# Patient Record
Sex: FEMALE | Race: BLACK OR AFRICAN AMERICAN | Employment: OTHER | URBAN - METROPOLITAN AREA
[De-identification: names, ages, dates, MRNs, and addresses within clinical notes are randomized per-mention and may not be internally consistent; named-entity substitution may affect disease eponyms.]

---

## 2017-01-31 ENCOUNTER — OFFICE VISIT (OUTPATIENT)
Dept: INTERNAL MEDICINE CLINIC | Age: 67
End: 2017-01-31

## 2017-01-31 VITALS
SYSTOLIC BLOOD PRESSURE: 124 MMHG | RESPIRATION RATE: 18 BRPM | HEART RATE: 65 BPM | BODY MASS INDEX: 29.06 KG/M2 | OXYGEN SATURATION: 93 % | TEMPERATURE: 98 F | HEIGHT: 66 IN | DIASTOLIC BLOOD PRESSURE: 66 MMHG | WEIGHT: 180.8 LBS

## 2017-01-31 DIAGNOSIS — J44.1 CHRONIC OBSTRUCTIVE PULMONARY DISEASE WITH ACUTE EXACERBATION (HCC): ICD-10-CM

## 2017-01-31 DIAGNOSIS — R42 DIZZINESS: ICD-10-CM

## 2017-01-31 DIAGNOSIS — I48.0 PAROXYSMAL ATRIAL FIBRILLATION (HCC): Chronic | ICD-10-CM

## 2017-01-31 DIAGNOSIS — I50.32 CHRONIC DIASTOLIC HEART FAILURE (HCC): ICD-10-CM

## 2017-01-31 DIAGNOSIS — Z95.5 S/P CORONARY ARTERY STENT PLACEMENT: ICD-10-CM

## 2017-01-31 DIAGNOSIS — E78.2 MIXED HYPERLIPIDEMIA: ICD-10-CM

## 2017-01-31 DIAGNOSIS — I10 ESSENTIAL HYPERTENSION: Chronic | ICD-10-CM

## 2017-01-31 DIAGNOSIS — Z79.01 ANTICOAGULATION MONITORING, INR RANGE 2-3: ICD-10-CM

## 2017-01-31 DIAGNOSIS — J30.9 ALLERGIC RHINITIS, UNSPECIFIED ALLERGIC RHINITIS TRIGGER, UNSPECIFIED RHINITIS SEASONALITY: ICD-10-CM

## 2017-01-31 DIAGNOSIS — E11.40 TYPE 2 DIABETES MELLITUS WITH DIABETIC NEUROPATHY, WITHOUT LONG-TERM CURRENT USE OF INSULIN (HCC): Primary | ICD-10-CM

## 2017-01-31 LAB
CHOLEST SERPL-MCNC: 149 MG/DL
GLUCOSE POC: 195 MG/DL
HBA1C MFR BLD HPLC: 7.1 %
HDLC SERPL-MCNC: 42 MG/DL
INR BLD: 4.5
LDL CHOLESTEROL POC: 74
NON-HDL GOAL (POC): 107
PT POC: 53.5 SEC
TCHOL/HDL RATIO (POC): 3.5
TRIGL SERPL-MCNC: 166 MG/DL
VALID INTERNAL CONTROL?: YES

## 2017-01-31 RX ORDER — INSULIN PUMP SYRINGE, 3 ML
EACH MISCELLANEOUS
Qty: 1 KIT | Refills: 0 | Status: SHIPPED | OUTPATIENT
Start: 2017-01-31 | End: 2017-09-22

## 2017-01-31 RX ORDER — FLUTICASONE PROPIONATE 50 MCG
2 SPRAY, SUSPENSION (ML) NASAL ONCE
Qty: 1 BOTTLE | Refills: 11 | Status: SHIPPED | OUTPATIENT
Start: 2017-01-31 | End: 2017-01-31

## 2017-01-31 RX ORDER — CETIRIZINE HCL 10 MG
10 TABLET ORAL DAILY
Qty: 30 TAB | Refills: 5 | Status: SHIPPED | OUTPATIENT
Start: 2017-01-31 | End: 2017-04-04 | Stop reason: CLARIF

## 2017-01-31 NOTE — PROGRESS NOTES
1. Have you been to the ER, urgent care clinic since your last visit? Hospitalized since your last visit? No    2. Have you seen or consulted any other health care providers outside of the [address] [place] [address] since your last visit? Include any pap smears or colon screening. No     Pt is here for   Chief Complaint   Patient presents with    Cold     pt states she's had this cold for 2 days    Diabetes     follow up    Cholesterol Problem     follow up     Pt denies pain at this time. ...      Pt states that she would like her inhalers to be sent to [facility] for 90 day supply

## 2017-01-31 NOTE — MR AVS SNAPSHOT
Visit Information Date & Time Provider Department Dept. Phone Encounter #  
 1/31/2017  7:45 AM Noa Valdez NP [address] [facility] [phone] [id number] Follow-up Instructions Return in about 3 months (around 4/30/2017) for HTN, DM, Chol. INR check on FRI. Your Appointments 3/23/2017  8:30 AM  
PACEMAKER with PACEMAKER, [facility] [address]) Appt Note: [employer] DCPM 6mo check [address] [facility]  
[phone] [address] [facility]  
  
    
 4/25/2017  2:30 PM  
New Patient with Dyan Clements MD  
[facility] [address] [facility]) Appt Note: ref by Dr Colette Lopez , for type 2 DM message sent wants name on list; r/s; ref by Dr Colette Lopez , for type 2 DM message sent wants name on list  
 [address] [facility] [phone] [id number] [address] [place] [address] Upcoming Health Maintenance Date Due  
 BREAST CANCER SCRN MAMMOGRAM 4/7/2017 EYE EXAM RETINAL OR DILATED Q1 5/1/2017* MICROALBUMIN Q1 3/22/2017 HEMOGLOBIN A1C Q6M 4/30/2017 FOOT EXAM Q1 6/10/2017 LIPID PANEL Q1 10/31/2017 FOBT Q 1 YEAR AGE 50-75 10/31/2017 MEDICARE YEARLY EXAM 11/1/2017 GLAUCOMA SCREENING Q2Y 7/28/2018 Pneumococcal 65+ Low/Medium Risk (2 of 2 - PPSV23) 6/25/2020 DTaP/Tdap/Td series (2 - Td) 7/16/2026 *Topic was postponed. The date shown is not the original due date. Allergies as of 1/31/2017  Review Complete On: 1/31/2017 By: Noa Valdez NP Severity Noted Reaction Type Reactions Crestor [Rosuvastatin]  10/31/2016   Side Effect Myalgia Levaquin [Levofloxacin]  12/07/2015   Intolerance Nausea Only GI Upset Lyrica [Pregabalin]  10/31/2016   Side Effect Myalgia Current Immunizations  Reviewed on 10/31/2016 Name Date H1N1 FLU VACCINE 10/20/2009 Influenza High Dose Vaccine PF 10/31/2016 Influenza Vaccine (Madin June Canine Kidney) PF 9/23/2014 11:26 AM  
 Influenza Vaccine Intradermal PF 11/27/2015 Influenza Vaccine Split 9/22/2011  6:25 AM  
 Influenza Vaccine Whole 10/20/2009 Pneumococcal Polysaccharide (PPSV-23) 6/25/2015 Pneumococcal Vaccine (Unspecified Type) 10/20/2009 Tdap 7/16/2016 11:47 PM  
  
 Not reviewed this visit You Were Diagnosed With   
  
 Codes Comments Type 2 diabetes mellitus with diabetic neuropathy, without long-term current use of insulin (HCC)    -  Primary ICD-10-CM: E11.40 ICD-9-CM: 250.60, 357.2 Paroxysmal atrial fibrillation (HCC)     ICD-10-CM: I48.0 ICD-9-CM: 427.31 Chronic diastolic heart failure (HCC)     ICD-10-CM: I50.32 
ICD-9-CM: 428.32 Chronic obstructive pulmonary disease with acute exacerbation (HCC)     ICD-10-CM: J44.1 ICD-9-CM: 491.21 Essential hypertension     ICD-10-CM: I10 
ICD-9-CM: 401.9 S/P coronary artery stent placement     ICD-10-CM: Z95.5 ICD-9-CM: V45.82 Anticoagulation monitoring, INR range 2-3     ICD-10-CM: Z79.01 
ICD-9-CM: V58.61 Mixed hyperlipidemia     ICD-10-CM: E78.2 ICD-9-CM: 272.2 Type 2 diabetes mellitus without complication, without long-term current use of insulin (HCC)     ICD-10-CM: E11.9 ICD-9-CM: 250.00 Allergic rhinitis, unspecified allergic rhinitis trigger, unspecified rhinitis seasonality     ICD-10-CM: J30.9 ICD-9-CM: 477.9 Vitals BP Pulse Temp Resp Height(growth percentile) Weight(growth percentile) 124/66 (BP 1 Location: Right arm, BP Patient Position: Sitting) 65 98 °F (36.7 °C) (Oral) 18 5' 6\" (1.676 m) 180 lb 12.8 oz (82 kg) SpO2 BMI OB Status Smoking Status 93% 29.18 kg/m2 Postmenopausal Former Smoker Vitals History BMI and BSA Data Body Mass Index Body Surface Area 29.18 kg/m 2 1.95 m 2 Preferred Pharmacy Pharmacy Name Phone [facility] [address], [address] [place] [address] Avenue [phone] Your Updated Medication List  
  
   
This list is accurate as of: 1/31/17  9:10 AM.  Always use your most recent med list.  
  
  
  
  
 * albuterol 90 mcg/actuation inhaler Commonly known as:  VENTOLIN HFA Take 2 Puffs by inhalation every six (6) hours as needed for Wheezing. * albuterol 2.5 mg /3 mL (0.083 %) nebulizer solution Commonly known as:  PROVENTIL VENTOLIN  
3 mL by Nebulization route every four (4) hours as needed for Wheezing. aspirin 81 mg chewable tablet Take 81 mg by mouth daily. Azelastine 0.15 % (205.5 mcg) nasal spray Commonly known as:  ASTEPRO  
1 Spray by Both Nostrils route two (2) times daily as needed. budesonide-formoterol 160-4.5 mcg/actuation HFA inhaler Commonly known as:  SYMBICORT Take 1 Puff by inhalation two (2) times a day. CENTRUM SILVER PO Take  by mouth. Takes one po daily. cod liver oil Cap Take 1 Cap by mouth daily. colchicine 0.6 mg tablet Take 1 Tab by mouth two (2) times a day. evolocumab 420 mg/3.5 mL Injt Commonly known as:  REPATHA PUSHTRONEX  
420 mg by SubCUTAneous route every month. Indications: arteriosclerotic vascular disease  
  
 fluticasone 50 mcg/actuation nasal spray Commonly known as:  Kvng Hernandez 2 Sprays by Both Nostrils route once for 1 dose. Indications: ALLERGIC RHINITIS  
  
 furosemide 20 mg tablet Commonly known as:  LASIX Take 1 Tab by mouth daily. gabapentin 800 mg tablet Commonly known as:  NEURONTIN Take 1 Tab by mouth three (3) times daily. metFORMIN 1,000 mg tablet Commonly known as:  GLUCOPHAGE  
TAKE 1 TABLET BY MOUTH TWICE DAILY WITH MEALS  Indications: PREVENTION OF TYPE 2 DIABETES MELLITUS  
  
 sotalol 120 mg tablet Commonly known as:  Leann Ni  
 Take 1 Tab by mouth two (2) times a day. tiotropium 18 mcg inhalation capsule Commonly known as:  [address] INHALE THE CONTENTS OF 1 CAPSULE THROUGH HANDIHALER DEVICE DAILY  
  
 valsartan 40 mg tablet Commonly known as:  DIOVAN  
TAKE ONE-HALF TABLET BY MOUTH ONCE DAILY FOR  DIABETIC NEPHROPATHY  
  
 warfarin 5 mg tablet Commonly known as:  COUMADIN Take 1.5 tabs daily, but on Saturday and  take 2 tabs. * Notice: This list has 2 medication(s) that are the same as other medications prescribed for you. Read the directions carefully, and ask your doctor or other care provider to review them with you. Prescriptions Sent to Pharmacy Refills  
 fluticasone (FLONASE) 50 mcg/actuation nasal spray 11 Si Sprays by Both Nostrils route once for 1 dose. Indications: ALLERGIC RHINITIS Class: Normal  
 Pharmacy: [address] [place] [address] Dr [place], [address] [place] [address] Ph #: [phone] Route: Both Nostrils We Performed the Following AMB POC GLUCOSE BLOOD, BY GLUCOSE MONITORING DEVICE [22322 CPT(R)] AMB POC HEMOGLOBIN A1C [34626 CPT(R)] AMB POC LIPID PROFILE [38263 CPT(R)] AMB POC PT/INR [41633 CPT(R)] Follow-up Instructions Return in about 3 months (around 2017) for HTN, DM, Chol. INR check on FRI. Patient Instructions Allergies: Care Instructions Your Care Instructions Allergies occur when your body's defense system (immune system) overreacts to certain substances. The immune system treats a harmless substance as if it were a harmful germ or virus. Many things can cause this overreaction, including pollens, medicine, food, dust, animal dander, and mold. Allergies can be mild or severe. Mild allergies can be managed with home treatment. But medicine may be needed to prevent problems. Managing your allergies is an important part of staying healthy.  Your doctor may suggest that you have allergy testing to help find out what is causing your allergies. When you know what things trigger your symptoms, you can avoid them. This can prevent allergy symptoms and other health problems. For severe allergies that cause reactions that affect your whole body (anaphylactic reactions), your doctor may prescribe a shot of epinephrine to carry with you in case you have a severe reaction. Learn how to give yourself the shot and keep it with you at all times. Make sure it is not . Follow-up care is a key part of your treatment and safety. Be sure to make and go to all appointments, and call your doctor if you are having problems. It's also a good idea to know your test results and keep a list of the medicines you take. How can you care for yourself at home? · If you have been told by your doctor that dust or dust mites are causing your allergy, decrease the dust around your bed: 
[facility] AUTHORITY sheets, pillowcases, and other bedding in hot water every week. ¨ Use dust-proof covers for pillows, duvets, and mattresses. Avoid plastic covers because they tear easily and do not \"breathe. \" Wash as instructed on the label. ¨ Do not use any blankets and pillows that you do not need. ¨ Use blankets that you can wash in your washing machine. ¨ Consider removing drapes and carpets, which attract and hold dust, from your bedroom. · If you are allergic to house dust and mites, do not use home humidifiers. Your doctor can suggest ways you can control dust and mites. · Look for signs of cockroaches. Cockroaches cause allergic reactions. Use cockroach baits to get rid of them. Then, clean your home well. Cockroaches like areas where grocery bags, newspapers, empty bottles, or cardboard boxes are stored. Do not keep these inside your home, and keep trash and food containers sealed. Seal off any spots where cockroaches might enter your home. · If you are allergic to mold, get rid of furniture, rugs, and drapes that smell musty. Check for mold in the bathroom. · If you are allergic to outdoor pollen or mold spores, use air-conditioning. Change or clean all filters every month. Keep windows closed. · If you are allergic to pollen, stay inside when pollen counts are high. Use a vacuum  with a HEPA filter or a double-thickness filter at least two times each week. · Stay inside when air pollution is bad. Avoid paint fumes, perfumes, and other strong odors. · Avoid conditions that make your allergies worse. Stay away from smoke. Do not smoke or let anyone else smoke in your house. Do not use fireplaces or wood-burning stoves. · If you are allergic to your pets, change the air filter in your furnace every month. Use high-efficiency filters. · If you are allergic to pet dander, keep pets outside or out of your bedroom. Old carpet and cloth furniture can hold a lot of animal dander. You may need to replace them. When should you call for help? Give an epinephrine shot if: 
· You think you are having a severe allergic reaction. · You have symptoms in more than one body area, such as mild nausea and an itchy mouth. After giving an epinephrine shot call 911, even if you feel better. Call 911 if: 
· You have symptoms of a severe allergic reaction. These may include: 
¨ Sudden raised, red areas (hives) all over your body. ¨ Swelling of the throat, mouth, lips, or tongue. ¨ Trouble breathing. ¨ Passing out (losing consciousness). Or you may feel very lightheaded or suddenly feel weak, confused, or restless. · You have been given an epinephrine shot, even if you feel better. Call your doctor now or seek immediate medical care if: 
· You have symptoms of an allergic reaction, such as: ¨ A rash or hives (raised, red areas on the skin). ¨ Itching. ¨ Swelling. ¨ Belly pain, nausea, or vomiting. Watch closely for changes in your health, and be sure to contact your doctor if: 
· You do not get better as expected. Where can you learn more? Go to http://rusty-marcela.info/. Enter P144 in the search box to learn more about \"Allergies: Care Instructions. \" Current as of: February 12, 2016 Content Version: 11.1 © 4667-7732 [employer]. Care instructions adapted under license by [employer] (which disclaims liability or warranty for this information). If you have questions about a medical condition or this instruction, always ask your healthcare professional. [address] any warranty or liability for your use of this information. Introducing [facility]! Dear Allan Andres: Thank you for requesting a [employer] account. Our records indicate that you already have an active [employer] account. You can access your account anytime at [email or web address] Did you know that you can access your hospital and ER discharge instructions at any time in [employer]? You can also review all of your test results from your hospital stay or ER visit. Additional Information If you have questions, please visit the Frequently Asked Questions section of the [employer] website at [email or web address] Remember, [employer] is NOT to be used for urgent needs. For medical emergencies, dial 911. Now available from your iPhone and Android! Please provide this summary of care documentation to your next provider. Your primary care clinician is listed as CAROLINA Glez. If you have any questions after today's visit, please call [phone].

## 2017-01-31 NOTE — PATIENT INSTRUCTIONS
Allergies: Care Instructions  Your Care Instructions  Allergies occur when your body's defense system (immune system) overreacts to certain substances. The immune system treats a harmless substance as if it were a harmful germ or virus. Many things can cause this overreaction, including pollens, medicine, food, dust, animal dander, and mold. Allergies can be mild or severe. Mild allergies can be managed with home treatment. But medicine may be needed to prevent problems. Managing your allergies is an important part of staying healthy. Your doctor may suggest that you have allergy testing to help find out what is causing your allergies. When you know what things trigger your symptoms, you can avoid them. This can prevent allergy symptoms and other health problems. For severe allergies that cause reactions that affect your whole body (anaphylactic reactions), your doctor may prescribe a shot of epinephrine to carry with you in case you have a severe reaction. Learn how to give yourself the shot and keep it with you at all times. Make sure it is not . Follow-up care is a key part of your treatment and safety. Be sure to make and go to all appointments, and call your doctor if you are having problems. It's also a good idea to know your test results and keep a list of the medicines you take. How can you care for yourself at home? · If you have been told by your doctor that dust or dust mites are causing your allergy, decrease the dust around your bed:  [facility] AUTHORITY sheets, pillowcases, and other bedding in hot water every week. ¨ Use dust-proof covers for pillows, duvets, and mattresses. Avoid plastic covers because they tear easily and do not \"breathe. \" Wash as instructed on the label. ¨ Do not use any blankets and pillows that you do not need. ¨ Use blankets that you can wash in your washing machine. ¨ Consider removing drapes and carpets, which attract and hold dust, from your bedroom.   · If you are allergic to house dust and mites, do not use home humidifiers. Your doctor can suggest ways you can control dust and mites. · Look for signs of cockroaches. Cockroaches cause allergic reactions. Use cockroach baits to get rid of them. Then, clean your home well. Cockroaches like areas where grocery bags, newspapers, empty bottles, or cardboard boxes are stored. Do not keep these inside your home, and keep trash and food containers sealed. Seal off any spots where cockroaches might enter your home. · If you are allergic to mold, get rid of furniture, rugs, and drapes that smell musty. Check for mold in the bathroom. · If you are allergic to outdoor pollen or mold spores, use air-conditioning. Change or clean all filters every month. Keep windows closed. · If you are allergic to pollen, stay inside when pollen counts are high. Use a vacuum  with a HEPA filter or a double-thickness filter at least two times each week. · Stay inside when air pollution is bad. Avoid paint fumes, perfumes, and other strong odors. · Avoid conditions that make your allergies worse. Stay away from smoke. Do not smoke or let anyone else smoke in your house. Do not use fireplaces or wood-burning stoves. · If you are allergic to your pets, change the air filter in your furnace every month. Use high-efficiency filters. · If you are allergic to pet dander, keep pets outside or out of your bedroom. Old carpet and cloth furniture can hold a lot of animal dander. You may need to replace them. When should you call for help? Give an epinephrine shot if:  · You think you are having a severe allergic reaction. · You have symptoms in more than one body area, such as mild nausea and an itchy mouth. After giving an epinephrine shot call 911, even if you feel better. Call 911 if:  · You have symptoms of a severe allergic reaction. These may include:  ¨ Sudden raised, red areas (hives) all over your body.   ¨ Swelling of the throat, mouth, lips, or tongue. ¨ Trouble breathing. ¨ Passing out (losing consciousness). Or you may feel very lightheaded or suddenly feel weak, confused, or restless. · You have been given an epinephrine shot, even if you feel better. Call your doctor now or seek immediate medical care if:  · You have symptoms of an allergic reaction, such as:  ¨ A rash or hives (raised, red areas on the skin). ¨ Itching. ¨ Swelling. ¨ Belly pain, nausea, or vomiting. Watch closely for changes in your health, and be sure to contact your doctor if:  · You do not get better as expected. Where can you learn more? Go to http://rusty-marcela.info/. Enter S244 in the search box to learn more about \"Allergies: Care Instructions. \"  Current as of: February 12, 2016  Content Version: 11.1  © 9171-5177 [employer]. Care instructions adapted under license by [employer] (which disclaims liability or warranty for this information). If you have questions about a medical condition or this instruction, always ask your healthcare professional. [address] any warranty or liability for your use of this information.

## 2017-01-31 NOTE — PROGRESS NOTES
Darron Bautista is a 73 y.o. female and presents with Cold (pt states she's had this cold for 2 days); Diabetes (follow up); and Cholesterol Problem (follow up)    Subjective:  Upper respiratory infection Review:  Darron Bautista is a 73 y.o. female who complains of rhinitis, nasal congestion, nasal drainage, and productive cough for the past 2 days, unchanged since that time. She denies a history of shortness of breath. Evaluation to date: none. Treatment to date: Flonase  Patient does not smoke cigarettes. Relevant PMH: COPD, CHF    Hypertension Review:  The patient has hypertension  Diet and Lifestyle: generally does try to follow a  low sodium diet, exercises rarely   Home BP Monitoring: is not measured at home. Pertinent ROS: taking medications as instructed, no medication side effects noted. No TIA's, chest pain on exertion, or swelling of ankles. BP Readings from Last 3 Encounters:   01/31/17 124/66   12/08/16 123/58   10/31/16 127/60     Diabetes Mellitus Review:  She has diabetes mellitus, NIDDM  Diabetic ROS -negative for polyphagia. negative for polyuria,polydipsia, and heat intolerance  Current diabetic medications include oral agents, NO insulin    Medication compliance: compliant MOST of the time  Diabetic diet compliance: compliant MOST of the time,   Yearly visit with dietician: no  Current exercise: walking, only at work  Home glucose monitoring: is performed daily, averaging 125-160  Any episodes of hypoglycemia? no  Known diabetic complications: neuropathy  Cardiovascular risk factors: family history, dyslipidemia, obesity, hypertension  Yearly foot exam: 6/2016  Eye exam current (within one year): Summer 2016  Weight trend: stable   Is She on ACE inhibitor or angiotensin II receptor blocker? Yes   Lab review: orders written for new lab studies as appropriate; see orders.    Lab Results   Component Value Date/Time    Hemoglobin A1c 6.9 10/31/2016 12:00 AM    Hemoglobin A1c 8.0 07/01/2014 03:10 AM    Hemoglobin A1c 6.5 02/08/2011 05:30 AM     Dyslipidemia Review:  Patient presents for evaluation of lipids. Compliance with treatment thus far has been good, started REPATHA injection. Denies myalgias, slurring speech, unilateral weakness, and chest pain. A repeat fasting lipid profile was done. The patient does use medications that may worsen dyslipidemias (corticosteroids, progestins, anabolic steroids, diuretics, beta-blockers, amiodarone, cyclosporine, olanzapine). The patient does NOT exercise regularly. The patient is known to have coexisting coronary artery disease: stents. Taking Aspirin daily as prescribed/advised. Anticoagulation  Patient here for followup of chronic anticoagulation. Indication: A-Fib  Bleeding Signs/Symptoms:  None. Thromboembolic Signs/Symptoms:  None. INR's are drawn regularly and have been therapeutic. Taking coumadin 7.5 mg x 5 days, 10 mg x 2 days  Lab Results   Component Value Date/Time    INR 1.1 04/12/2016 10:50 PM    INR (POC) 2.1 07/16/2016 11:47 PM    INR POC 2.4 07/29/2016 09:29 AM     Review of Systems  Constitutional: negative for fevers, chills, anorexia and weight loss  Eyes:   negative for visual disturbance, drainage, and irritation  ENT:   See HPI. negative for tinnitus and ear pain  Respiratory:  See HPI. negative for hemoptysis and wheezing  CV:   negative for chest pain, palpitations, and lower extremity edema  GI:   negative for nausea, vomiting, diarrhea, abdominal pain, and melena  Endo:               negative for polyuria,polydipsia,polyphagia, and heat intolerance  Genitourinary: negative for frequency, urgency, dysuria, retention, and hematuria  Integument:  negative for rash, ulcerations, and pruritus  Hematologic:  negative for easy bruising and bleeding  Musculoskel: + arthralgias. negative for muscle weakness,and joint pain/swelling  Neurological:  + dizziness, increasing lately.  negative for headaches, vertigo,and memory/gait problems  Behavl/Psych: negative for feelings of anxiety, depression, suicide, and mood changes    Past Medical History   Diagnosis Date    Atrial fibrillation ([facility] 2010    Chronic diastolic heart failure ([facility] 2014    COPD     COPD (chronic obstructive pulmonary disease) ([facility] 2010    Diabetes ([facility]     Fibroid     Heart failure ([facility]     Hypertension 2010    NIDDM (non-insulin dependent diabetes mellitus) 2010    Screening mammogram 5/4/10    SOB (shortness of breath) 2014     Past Surgical History   Procedure Laterality Date    Pr excise adrenal gland      Hx other surgical       adrenal gland removed    Hx  section      Hx pacemaker       Social History     Social History    Marital status:      Spouse name: N/A    Number of children: N/A    Years of education: N/A     Social History Main Topics    Smoking status: Former Smoker     Types: Cigarettes     Quit date: 2009    Smokeless tobacco: Never Used    Alcohol use No    Drug use: No    Sexual activity: Not Currently     Other Topics Concern    None     Social History Narrative     Family History   Problem Relation Age of Onset    Heart Disease Mother     Diabetes Father     Heart Disease Brother      Current Outpatient Prescriptions   Medication Sig Dispense Refill    fluticasone (FLONASE) 50 mcg/actuation nasal spray 2 Sprays by Both Nostrils route once for 1 dose. Indications: ALLERGIC RHINITIS 1 Bottle 11    Blood-Glucose Meter monitoring kit Check blood sugar daily. May substitute for insurance preferred meter 1 Kit 0    cetirizine (ZYRTEC) 10 mg tablet Take 1 Tab by mouth daily. Indications: ALLERGIC RHINITIS 30 Tab 5    evolocumab (REPATHA PUSHTRONEX) 420 mg/3.5 mL Injt 420 mg by SubCUTAneous route every month.  Indications: arteriosclerotic vascular disease 1 Device 12    tiotropium (SPIRIVA WITH HANDIHALER) 18 mcg inhalation capsule INHALE THE CONTENTS OF 1 CAPSULE THROUGH HANDIHALER DEVICE DAILY 90 Cap 3    metFORMIN (GLUCOPHAGE) 1,000 mg tablet TAKE 1 TABLET BY MOUTH TWICE DAILY WITH MEALS  Indications: PREVENTION OF TYPE 2 DIABETES MELLITUS 180 Tab 3    gabapentin (NEURONTIN) 800 mg tablet Take 1 Tab by mouth three (3) times daily. 90 Tab 3    valsartan (DIOVAN) 40 mg tablet TAKE ONE-HALF TABLET BY MOUTH ONCE DAILY FOR  DIABETIC NEPHROPATHY 15 Tab 11    sotalol (BETAPACE) 120 mg tablet Take 1 Tab by mouth two (2) times a day. 180 Tab 3    warfarin (COUMADIN) 5 mg tablet Take 1.5 tabs daily, but on Saturday and Sunday take 2 tabs. 55 Tab 2    furosemide (LASIX) 20 mg tablet Take 1 Tab by mouth daily. 90 Tab 0    colchicine 0.6 mg tablet Take 1 Tab by mouth two (2) times a day. 60 Tab 5    budesonide-formoterol (SYMBICORT) 160-4.5 mcg/actuation HFA inhaler Take 1 Puff by inhalation two (2) times a day. 3 Inhaler 3    albuterol (PROVENTIL VENTOLIN) 2.5 mg /3 mL (0.083 %) nebulizer solution 3 mL by Nebulization route every four (4) hours as needed for Wheezing. 1 Package 5    FOLIC ACID/MULTIVIT-MIN/LUTEIN (CENTRUM SILVER PO) Take  by mouth. Takes one po daily.  albuterol (VENTOLIN HFA) 90 mcg/actuation inhaler Take 2 Puffs by inhalation every six (6) hours as needed for Wheezing. 1 Inhaler 11    Azelastine (ASTEPRO) 0.15 % (205.5 mcg) nasal spray 1 [place] by Both Nostrils route two (2) times daily as needed.  cod liver oil cap Take 1 Cap by mouth daily.  aspirin 81 mg chewable tablet Take 81 mg by mouth daily.        Allergies   Allergen Reactions    Crestor [Rosuvastatin] Myalgia    Levaquin [Levofloxacin] Nausea Only     GI Upset    Lyrica [Pregabalin] Myalgia       Objective:  Visit Vitals    /66 (BP 1 Location: Right arm, BP Patient Position: Sitting)    Pulse 65    Temp 98 °F (36.7 °C) (Oral)    Resp 18    Ht 5' 6\" (1.676 m)    Wt 180 lb 12.8 oz (82 kg)    SpO2 93%    BMI 29.18 kg/m2     Physical Exam:   General appearance - alert, well appearing, and in mild distress. +hoarseness. Mental status - A/O x 4, normal mood and affect. Head/Eyes- AT/NC. ROCK, EOMI, corneas normal, no foreign bodies. Ears- TM intact bilaterally, no erythema or drainage. Nose- Septum midline, pink mucosa. Turbinates normal, no polyps or erythema. No sinus tenderness. Mouth/Throat - mucous membranes moist, pharynx normal without lesions. Neck -Supple ,normal CSP. FROM, non-tender. No cervical adenopathy. No thyromegaly. No JVD. Chest - clear to auscultation with symmetric chest rise. No wheezing, rales or rhonchi.  +cough  Heart - Normal rate, irregular rhythm. Normal S1, S2. No MGR. Abdomen - Soft,non-distended. Normoactive BS in all quadrants. NT, no mass, rebound, or HSM   Ext- Radial, DP pulses, 2+ bilaterally. No pedal edema, clubbing, or cyanosis. Skin- Normal for ethnicity, warm, and dry. No hyperpigmentation, ulcerations, or suspicious lesions  Neuro - Normal speech, no focal findings. Normal strength and muscle tone. Coordination and gait normal.      Assessment/Plan:  Holding dose of coumadin today, will return on Friday for recheck. Zyrtec and flonase for sinus symptoms and dizziness. See below for other orders   Follow-up Disposition:  Return in about 3 months (around 4/30/2017) for HTN, DM, Chol. INR check on FRI. ICD-10-CM ICD-9-CM    1. Type 2 diabetes mellitus with diabetic neuropathy, without long-term current use of insulin ([facility]) E11.40 250.60 AMB POC GLUCOSE BLOOD, BY GLUCOSE MONITORING DEVICE     357.2 AMB POC HEMOGLOBIN A1C      Blood-Glucose Meter monitoring kit   2. Paroxysmal atrial fibrillation ([facility]) I48.0 427.31 AMB POC PT/INR   3. Chronic diastolic heart failure ([facility]) I50.32 428.32 AMB POC LIPID PROFILE   4. Chronic obstructive pulmonary disease with acute exacerbation ([facility]) J44.1 491.21    5. Essential hypertension I10 401.9 AMB POC LIPID PROFILE   6. S/P coronary artery stent placement Z95.5 V45.82 AMB POC LIPID PROFILE   7. Anticoagulation monitoring, INR range 2-3 Z79.01 V58.61 AMB POC PT/INR   8. Mixed hyperlipidemia E78.2 272.2 AMB POC LIPID PROFILE   9. Allergic rhinitis, unspecified allergic rhinitis trigger, unspecified rhinitis seasonality J30.9 477.9 fluticasone (FLONASE) 50 mcg/actuation nasal spray      cetirizine (ZYRTEC) 10 mg tablet   10. Dizziness R42 780.4      Orders Placed This Encounter    AMB POC GLUCOSE BLOOD, BY GLUCOSE MONITORING DEVICE    AMB POC LIPID PROFILE    AMB POC HEMOGLOBIN A1C    AMB POC PT/INR    fluticasone (FLONASE) 50 mcg/actuation nasal spray     Si Sprays by Both Nostrils route once for 1 dose. Indications: ALLERGIC RHINITIS     Dispense:  1 Bottle     Refill:  11    Blood-Glucose Meter monitoring kit     Sig: Check blood sugar daily. May substitute for insurance preferred meter     Dispense:  1 Kit     Refill:  0    cetirizine (ZYRTEC) 10 mg tablet     Sig: Take 1 Tab by mouth daily. Indications: ALLERGIC RHINITIS     Dispense:  30 Tab     Refill:  Kendra Sorto 74 Ilya Leal expressed understanding of plan. An After Visit Summary was offered/printed and given to the patient.

## 2017-02-03 ENCOUNTER — CLINICAL SUPPORT (OUTPATIENT)
Dept: INTERNAL MEDICINE CLINIC | Age: 67
End: 2017-02-03

## 2017-02-03 DIAGNOSIS — D68.9 COAGULATION DEFECT (HCC): Primary | ICD-10-CM

## 2017-02-03 LAB
INR BLD: 2
PT POC: 23.6 SEC
VALID INTERNAL CONTROL?: YES

## 2017-02-03 NOTE — PROGRESS NOTES
Pt is advised to continue current warfarin dosage and to return in 1 week for an INR check.     Lab Results   Component Value Date/Time    INR 3.2 10/31/2016 12:00 AM    INR 1.1 04/12/2016 10:50 PM    INR 1.0 04/12/2016 04:29 PM    INR (POC) 2.1 07/16/2016 11:47 PM    INR (POC) 0.9 09/09/2015 06:49 PM    INR (POC) 3.5 02/25/2011 07:30 AM    INR POC 2.0 02/03/2017 09:25 AM    INR POC 4.5 01/31/2017 09:23 AM    INR POC 2.4 07/29/2016 09:29 AM

## 2017-02-09 ENCOUNTER — TELEPHONE (OUTPATIENT)
Dept: CARDIOLOGY CLINIC | Age: 67
End: 2017-02-09

## 2017-02-09 NOTE — TELEPHONE ENCOUNTER
Spoke with patient. Verified patient with two patient identifiers. States she has been \"spasm\" in the right side of her breast, lasting less than a minute for 2 days. Comes and goes intermittently all day long. No change with exertion. No real pattern. Denied CP, SOB, ankle edema. Advised to call PCP for advice. Patient verbalized understanding.

## 2017-02-09 NOTE — TELEPHONE ENCOUNTER
Pt says she has a short spasm on the right side of the breast and she's not sure if this is heart related or pcp. No chest pain or sob. Please call.

## 2017-02-14 ENCOUNTER — APPOINTMENT (OUTPATIENT)
Dept: GENERAL RADIOLOGY | Age: 67
End: 2017-02-14
Attending: EMERGENCY MEDICINE
Payer: COMMERCIAL

## 2017-02-14 ENCOUNTER — HOSPITAL ENCOUNTER (EMERGENCY)
Age: 67
Discharge: HOME | End: 2017-02-14
Attending: EMERGENCY MEDICINE | Admitting: EMERGENCY MEDICINE
Payer: COMMERCIAL

## 2017-02-14 VITALS
TEMPERATURE: 97.5 F | WEIGHT: 182.76 LBS | HEIGHT: 66 IN | RESPIRATION RATE: 22 BRPM | SYSTOLIC BLOOD PRESSURE: 118 MMHG | DIASTOLIC BLOOD PRESSURE: 56 MMHG | BODY MASS INDEX: 29.37 KG/M2 | OXYGEN SATURATION: 97 % | HEART RATE: 82 BPM

## 2017-02-14 DIAGNOSIS — J44.1 COPD EXACERBATION (HCC): ICD-10-CM

## 2017-02-14 DIAGNOSIS — R06.02 SOB (SHORTNESS OF BREATH): Primary | ICD-10-CM

## 2017-02-14 LAB
ATRIAL RATE: 98 BPM
CALCULATED P AXIS, ECG09: 54 DEGREES
CALCULATED R AXIS, ECG10: -18 DEGREES
CALCULATED T AXIS, ECG11: 136 DEGREES
DIAGNOSIS, 93000: NORMAL
P-R INTERVAL, ECG05: 218 MS
Q-T INTERVAL, ECG07: 396 MS
QRS DURATION, ECG06: 92 MS
QTC CALCULATION (BEZET), ECG08: 445 MS
VENTRICULAR RATE, ECG03: 76 BPM

## 2017-02-14 PROCEDURE — 94640 AIRWAY INHALATION TREATMENT: CPT

## 2017-02-14 PROCEDURE — 93005 ELECTROCARDIOGRAM TRACING: CPT

## 2017-02-14 PROCEDURE — 77030013140 HC MSK NEB VYRM -A

## 2017-02-14 PROCEDURE — 74011636637 HC RX REV CODE- 636/637: Performed by: EMERGENCY MEDICINE

## 2017-02-14 PROCEDURE — 74011000250 HC RX REV CODE- 250: Performed by: EMERGENCY MEDICINE

## 2017-02-14 PROCEDURE — 71020 XR CHEST PA LAT: CPT

## 2017-02-14 PROCEDURE — 99284 EMERGENCY DEPT VISIT MOD MDM: CPT

## 2017-02-14 RX ORDER — IPRATROPIUM BROMIDE AND ALBUTEROL SULFATE 2.5; .5 MG/3ML; MG/3ML
3 SOLUTION RESPIRATORY (INHALATION)
Status: COMPLETED | OUTPATIENT
Start: 2017-02-14 | End: 2017-02-14

## 2017-02-14 RX ORDER — PREDNISONE 20 MG/1
60 TABLET ORAL DAILY
Qty: 15 TAB | Refills: 0 | Status: SHIPPED | OUTPATIENT
Start: 2017-02-14 | End: 2017-02-19

## 2017-02-14 RX ORDER — PREDNISONE 20 MG/1
60 TABLET ORAL
Status: COMPLETED | OUTPATIENT
Start: 2017-02-14 | End: 2017-02-14

## 2017-02-14 RX ADMIN — PREDNISONE 60 MG: 20 TABLET ORAL at 11:13

## 2017-02-14 RX ADMIN — IPRATROPIUM BROMIDE AND ALBUTEROL SULFATE 3 ML: .5; 3 SOLUTION RESPIRATORY (INHALATION) at 09:06

## 2017-02-14 NOTE — LETTER
NOTIFICATION RETURN TO WORK 
 
2/14/2017 10:38 AM 
 
Ms. Riky Garcia [id number] [facility] [address] [id number] To Whom It May Concern: 
 
Riky Garcia is currently under the care of [facility] EMERGENCY DEPT. She will return to work on: 02/15/2017 If there are questions or concerns please have the patient contact our office.  
 
 
 
Sincerely, 
 
 
 
 
 
 
Regan Burciaga MD

## 2017-02-14 NOTE — ED PROVIDER NOTES
HPI Comments: Terry Méndez is a 73 y.o. female who presents ambulatory to ED c/o worsening shortness of breath for the past 4-5 days, along with associated productive wheezing and cough with dark and bloody sputum. Pt states she was referred to the ED by her pulmonologist for a chest xray. She has been using nebulizer treatments at home with no improvement of her symptoms. Pt denies any fever, chills, chest pain, N/V/D.     PCP:  Rome Cabrera NP  Pulmonology: Dr Moises Melvin    There are no other complaints, changes or physical findings at this time. Written by Cecilia Allison. Tracy Sherman ED Scribe, as dictated by Rita Lopez MD.    The history is provided by the patient. No  was used. Past Medical History:   Diagnosis Date    Atrial fibrillation ([address] 2010    Chronic diastolic heart failure ([address] 2014    COPD     COPD (chronic obstructive pulmonary disease) ([address] 2010    Diabetes ([address]     Fibroid     Heart failure ([address]     Hypertension 2010    NIDDM (non-insulin dependent diabetes mellitus) 2010    Screening mammogram 5/4/10    SOB (shortness of breath) 2014       Past Surgical History:   Procedure Laterality Date    Pr excise adrenal gland      Hx other surgical       adrenal gland removed    Hx  section      Hx pacemaker           Family History:   Problem Relation Age of Onset    Heart Disease Mother     Diabetes Father     Heart Disease Brother        Social History     Social History    Marital status:      Spouse name: N/A    Number of children: N/A    Years of education: N/A     Occupational History    Not on file.      Social History Main Topics    Smoking status: Former Smoker     Types: Cigarettes     Quit date: 2009    Smokeless tobacco: Never Used    Alcohol use No    Drug use: No    Sexual activity: Not Currently     Other Topics Concern    Not on file     Social History Narrative ALLERGIES: Crestor [rosuvastatin]; Levaquin [levofloxacin]; and Lyrica [pregabalin]    Review of Systems   Constitutional: Negative for chills and fever. HENT: Negative for rhinorrhea, sneezing and sore throat. Eyes: Negative for redness and visual disturbance. Respiratory: Positive for cough, shortness of breath and wheezing. Cardiovascular: Negative for leg swelling. Gastrointestinal: Negative for abdominal pain, nausea and vomiting. Genitourinary: Negative for difficulty urinating and frequency. Musculoskeletal: Negative for back pain, myalgias and neck stiffness. Skin: Negative for rash. Neurological: Negative for dizziness, syncope, weakness and headaches. Hematological: Negative for adenopathy. Patient Vitals for the past 12 hrs:   Temp Pulse Resp BP SpO2   02/14/17 1000 - 69 25 108/63 98 %   02/14/17 0930 - 61 16 114/51 100 %   02/14/17 0828 97.7 °F (36.5 °C) 62 20 139/61 100 %       Physical Exam   Constitutional: She is oriented to person, place, and time. She appears well-developed and well-nourished. HENT:   Head: Normocephalic and atraumatic. Mouth/Throat: Oropharynx is clear and moist.   Eyes: Conjunctivae and EOM are normal.   Neck: Normal range of motion and full passive range of motion without pain. Neck supple. Cardiovascular: Normal rate, regular rhythm, S1 normal, S2 normal, normal heart sounds, intact distal pulses and normal pulses. No murmur heard. Pulmonary/Chest: Effort normal. No respiratory distress. Decreased air movement in upper lobes. No wheezing in lower lobes. Abdominal: Soft. Normal appearance and bowel sounds are normal. She exhibits no distension. There is no tenderness. There is no rebound. Musculoskeletal: Normal range of motion. Neurological: She is alert and oriented to person, place, and time. She has normal strength. Skin: Skin is warm, dry and intact. No rash noted. Psychiatric: She has a normal mood and affect.  Her speech is normal and behavior is normal. Judgment and thought content normal.   Nursing note and vitals reviewed. MDM  Number of Diagnoses or Management Options  Diagnosis management comments: DDx: COPD exacerbation, bronchitis, pneumonia, URI        Amount and/or Complexity of Data Reviewed  Tests in the radiology section of CPT®: reviewed and ordered  Tests in the medicine section of CPT®: ordered and reviewed  Review and summarize past medical records: yes    Patient Progress  Patient progress: stable    ED Course       Procedures     ED EKG interpretation: 08:25  Rhythm: atrial-paced and PVC's; and irregular. Rate (approx.): 76; Axis: normal; P wave: prolonged; QRS interval: normal ; ST/T wave: T wave inverted; Other findings: unchanged from previous ekg. This EKG was interpreted by Caleb William MD,ED Provider. Progress Note  10:38 AM    Caleb William MD has re-evaluated pt, and pt states her breathing has improved after the nebulizer treatment. Discussed pt with Dr. Oli Garber, who states that pt has an appointment with them in the office tomorrow. Recommends PO Prednisone and follow up with them in the office. Pt agrees with this plan.     LABORATORY TESTS:  Recent Results (from the past 12 hour(s))   EKG, 12 LEAD, INITIAL    Collection Time: 02/14/17  8:25 AM   Result Value Ref Range    Ventricular Rate 76 BPM    Atrial Rate 98 BPM    P-R Interval 218 ms    QRS Duration 92 ms    Q-T Interval 396 ms    QTC Calculation (Bezet) 445 ms    Calculated P Axis 54 degrees    Calculated R Axis -18 degrees    Calculated T Axis 136 degrees    Diagnosis       Atrial-paced rhythm with prolonged AV conduction with occasional   supraventricular complexes and with premature ventricular or aberrantly   conducted  complexes  T wave abnormality, consider lateral ischemia  When compared with ECG of 26-SEP-2016 10:06,  No significant change was found         IMAGING RESULTS:  CXR Results (Last 48 hours)               02/14/17 0913  XR CHEST PA LAT Final result    Impression:  IMPRESSION: No acute abnormality identified. Narrative:  EXAM:  XR CHEST PA LAT       INDICATION:   SOB, cough with yellow/bloody mucous, COPD history, pneumonia? COMPARISON: 2016. FINDINGS: PA and lateral radiographs of the chest demonstrate pacer leads are   intact. . The lungs are clear except for minor atelectasis/scarring left base. [place] [facility] size is borderline enlarged and stable. Bony structures are unchanged. MEDICATIONS GIVEN:  Medications   predniSONE (DELTASONE) tablet 60 mg (not administered)   albuterol-ipratropium (DUO-NEB) 2.5 MG-0.5 MG/3 ML (3 mL Nebulization Given 2/14/17 0906)       IMPRESSION:  1. SOB (shortness of breath)    2. COPD exacerbation ([facility]        PLAN:  1. Current Discharge Medication List      START taking these medications    Details   predniSONE (DELTASONE) 20 mg tablet Take 3 Tabs by mouth daily for 5 days. Qty: 15 Tab, Refills: 0         CONTINUE these medications which have NOT CHANGED    Details   Blood-Glucose Meter monitoring kit Check blood sugar daily. May substitute for insurance preferred meter  Qty: 1 Kit, Refills: 0    Associated Diagnoses: Type 2 diabetes mellitus with diabetic neuropathy, without long-term current use of insulin ([facility])      cetirizine (ZYRTEC) 10 mg tablet Take 1 Tab by mouth daily. Indications: ALLERGIC RHINITIS  Qty: 30 Tab, Refills: 5    Associated Diagnoses: Allergic rhinitis, unspecified allergic rhinitis trigger, unspecified rhinitis seasonality      evolocumab (REPATHA PUSHTRONEX) 420 mg/3.5 mL Injt 420 mg by SubCUTAneous route every month.  Indications: arteriosclerotic vascular disease  Qty: 1 Device, Refills: 12    Associated Diagnoses: S/P coronary artery stent placement; Mixed hyperlipidemia; Cardiac pacemaker in situ; Sick sinus syndrome (HCC)      tiotropium (SPIRIVA WITH HANDIHALER) 18 mcg inhalation capsule INHALE THE CONTENTS OF 1 CAPSULE THROUGH HANDIHALER DEVICE DAILY  Qty: 90 Cap, Refills: 3    Associated Diagnoses: Chronic obstructive pulmonary disease, unspecified COPD type ([facility])      metFORMIN (GLUCOPHAGE) 1,000 mg tablet TAKE 1 TABLET BY MOUTH TWICE DAILY WITH MEALS  Indications: PREVENTION OF TYPE 2 DIABETES MELLITUS  Qty: 180 Tab, Refills: 3    Associated Diagnoses: Type 2 diabetes mellitus without complication, unspecified long term insulin use status ([facility])      gabapentin (NEURONTIN) 800 mg tablet Take 1 Tab by mouth three (3) times daily. Qty: 90 Tab, Refills: 3    Associated Diagnoses: Polyneuropathy associated with underlying disease ([facility]      valsartan (DIOVAN) 40 mg tablet TAKE ONE-HALF TABLET BY MOUTH ONCE DAILY FOR  DIABETIC NEPHROPATHY  Qty: 15 Tab, Refills: 11    Associated Diagnoses: Type 2 diabetes mellitus without complication ([facility])      sotalol (BETAPACE) 120 mg tablet Take 1 Tab by mouth two (2) times a day. Qty: 180 Tab, Refills: 3      warfarin (COUMADIN) 5 mg tablet Take 1.5 tabs daily, but on Saturday and Sunday take 2 tabs. Qty: 55 Tab, Refills: 2    Associated Diagnoses: Atrial fibrillation, unspecified type ([facility])      furosemide (LASIX) 20 mg tablet Take 1 Tab by mouth daily. Qty: 90 Tab, Refills: 0      colchicine 0.6 mg tablet Take 1 Tab by mouth two (2) times a day. Qty: 60 Tab, Refills: 5    Associated Diagnoses: Chronic gout involving toe of left foot without tophus, unspecified cause      budesonide-formoterol (SYMBICORT) 160-4.5 mcg/actuation HFA inhaler Take 1 Puff by inhalation two (2) times a day. Qty: 3 Inhaler, Refills: 3    Associated Diagnoses: Chronic obstructive pulmonary disease with acute exacerbation ([facility])      albuterol (PROVENTIL VENTOLIN) 2.5 mg /3 mL (0.083 %) nebulizer solution 3 mL by Nebulization route every four (4) hours as needed for Wheezing.   Qty: 1 Package, Refills: 5    Associated Diagnoses: SOB (shortness of breath)      FOLIC ACID/MULTIVIT-MIN/LUTEIN (CENTRUM SILVER PO) Take  by mouth. Takes one po daily. albuterol (VENTOLIN HFA) 90 mcg/actuation inhaler Take 2 Puffs by inhalation every six (6) hours as needed for Wheezing. Qty: 1 Inhaler, Refills: 11      Azelastine (ASTEPRO) 0.15 % (205.5 mcg) nasal spray 1 [place] by Both Nostrils route two (2) times daily as needed. cod liver oil cap Take 1 Cap by mouth daily. aspirin 81 mg chewable tablet Take 81 mg by mouth daily. 2.   Follow-up Information     Follow up With Details Comments [address] NP Call  [place]  [address] [facility] [facility]  [address] [facility] [address]      Stiven Thurman MD In 1 day at your appointment [address]  [facility]  Sly Moise [address] [place] [address]  [phone]      [facility] EMERGENCY DEPT  As needed, If symptoms worsen [address]  [address] [facility]  [phone]        Return to ED if worse       DISCHARGE NOTE  10:41 AM  The patient has been re-evaluated and is ready for discharge. Reviewed available results with patient. Counseled pt on diagnosis and care plan. Pt has expressed understanding, and all questions have been answered. Pt agrees with plan and agrees to follow up as recommended, or return to the ED if their symptoms worsen. Discharge instructions have been provided and explained to the pt, along with reasons to return to the ED. Attestations: This note is prepared by Kaleb Azar. Barbara Tuttle, acting as Scribe for Yazan Winston MD.    Yazan Winston MD: The scribe's documentation has been prepared under my direction and personally reviewed by me in its entirety. I confirm that the note above accurately reflects all work, treatment, procedures, and medical decision making performed by me.

## 2017-02-14 NOTE — DISCHARGE INSTRUCTIONS
Chronic Obstructive Pulmonary Disease (COPD): Care Instructions  Your Care Instructions    Chronic obstructive pulmonary disease (COPD) is a general term for a group of lung diseases, including emphysema and chronic bronchitis. People with COPD have decreased airflow in and out of the lungs, which makes it hard to breathe. The airways also can get clogged with thick mucus. Cigarette smoking is a major cause of COPD. Although there is no cure for COPD, you can slow its progress. Following your treatment plan and taking care of yourself can help you feel better and live longer. Follow-up care is a key part of your treatment and safety. Be sure to make and go to all appointments, and call your doctor if you are having problems. It's also a good idea to know your test results and keep a list of the medicines you take. How can you care for yourself at home? Staying healthy  · Do not smoke. This is the most important step you can take to prevent more damage to your lungs. If you need help quitting, talk to your doctor about stop-smoking programs and medicines. These can increase your chances of quitting for good. · Avoid colds and flu. Get a pneumococcal vaccine shot. If you have had one before, ask your doctor whether you need a second dose. Get the flu vaccine every fall. If you must be around people with colds or the flu, wash your hands often. · Avoid secondhand smoke, air pollution, and high altitudes. Also avoid cold, dry air and hot, humid air. Stay at home with your windows closed when air pollution is bad. Medicines and oxygen therapy  · Take your medicines exactly as prescribed. Call your doctor if you think you are having a problem with your medicine. · You may be taking medicines such as:  ¨ Bronchodilators. These help open your airways and make breathing easier. Bronchodilators are either short-acting (work for 6 to 9 hours) or long-acting (work for 24 hours).  You inhale most bronchodilators, so they start to act quickly. Always carry your quick-relief inhaler with you in case you need it while you are away from home. ¨ Corticosteroids (prednisone, budesonide). These reduce airway inflammation. They come in pill or inhaled form. You must take these medicines every day for them to work well. · A spacer may help you get more inhaled medicine to your lungs. Ask your doctor or pharmacist if a spacer is right for you. If it is, ask how to use it properly. · Do not take any vitamins, over-the-counter medicine, or herbal products without talking to your doctor first.  · If your doctor prescribed antibiotics, take them as directed. Do not stop taking them just because you feel better. You need to take the full course of antibiotics. · Oxygen therapy boosts the amount of oxygen in your blood and helps you breathe easier. Use the flow rate your doctor has recommended, and do not change it without talking to your doctor first.  Activity  · Get regular exercise. Walking is an easy way to get exercise. Start out slowly, and walk a little more each day. · Pay attention to your breathing. You are exercising too hard if you cannot talk while you are exercising. · Take short rest breaks when doing household chores and other activities. · Learn breathing methods--such as breathing through pursed lips--to help you become less short of breath. · If your doctor has not set you up with a pulmonary rehabilitation program, talk to him or her about whether rehab is right for you. Rehab includes exercise programs, education about your disease and how to manage it, help with diet and other changes, and emotional support. Diet  · Eat regular, healthy meals. Use bronchodilators about 1 hour before you eat to make it easier to eat. Eat several small meals instead of three large ones. Drink beverages at the end of the meal. Avoid foods that are hard to chew.   · Eat foods that contain protein so that you do not lose muscle mass.  Mental health  · Talk to your family, friends, or a therapist about your feelings. It is normal to feel frightened, angry, hopeless, helpless, and even guilty. Talking openly about bad feelings can help you cope. If these feelings last, talk to your doctor. When should you call for help? Call 911 anytime you think you may need emergency care. For example, call if:  · You have severe trouble breathing. Call your doctor now or seek immediate medical care if:  · You have new or worse trouble breathing. · You cough up blood. · You have a fever. Watch closely for changes in your health, and be sure to contact your doctor if:  · You cough more deeply or more often, especially if you notice more mucus or a change in the color of your mucus. · You have new or worse swelling in your legs or belly. · You are not getting better as expected. Where can you learn more? Go to http://rustyHi-Dis(Mosen)marcela.info/. Inderjit Jones in the search box to learn more about \"Chronic Obstructive Pulmonary Disease (COPD): Care Instructions. \"  Current as of: May 23, 2016  Content Version: 11.1  © 7329-0958 [employer]. Care instructions adapted under license by [employer] (which disclaims liability or warranty for this information). If you have questions about a medical condition or this instruction, always ask your healthcare professional. [place] [address] any warranty or liability for your use of this information. Learning About Chronic Bronchitis  What is chronic bronchitis? Chronic bronchitis is long-term swelling and the buildup of mucus in the airways of your lungs. The airways (bronchial tubes) get inflamed and make a lot of mucus. This can narrow or block the airways, making it hard for you to breathe. It is a form of COPD (chronic obstructive pulmonary disease). Chronic bronchitis is usually caused by smoking.  But chemical fumes, dust, or air pollution also can cause it over time. What can you expect when you have chronic bronchitis? Chronic bronchitis gets worse over time. You cannot undo the damage to your lungs. Over time, you may find that:  · You get short of breath even when you do simple things like get dressed or fix a meal.  · It is hard to eat or exercise. · You lose weight and feel weaker. Over many years, the swelling and mucus from chronic bronchitis make it more likely that you will get lung infections. But there are things you can do to prevent more damage and feel better. What are the symptoms? The main symptoms of chronic bronchitis are:  · A cough that will not go away. · Mucus that comes up when you cough. · Shortness of breath that gets worse when you exercise. At times, your symptoms may suddenly flare up and get much worse. This is a called an exacerbation (say \"egg-ANN-MARIE-phoebe-BAY-renate\"). When this happens, your usual symptoms quickly get worse and stay bad. This can be dangerous. You may have to go to the hospital.  How can you keep chronic bronchitis from getting worse? Don't smoke. That is the best way to keep chronic bronchitis from getting worse. If you already smoke, it is never too late to stop. If you need help quitting, talk to your doctor about stop-smoking programs and medicines. These can increase your chances of quitting for good. You can do other things to keep chronic bronchitis from getting worse:  · Avoid bad air. Air pollution, chemical fumes, and dust also can make chronic bronchitis worse. · Get a flu shot every year. A shot may keep the flu from turning into something more serious, like pneumonia. A flu shot also may lower your chances of having a flare-up. · Get a pneumococcal shot. A shot can prevent some of the serious complications of pneumonia. Ask your doctor how often you should get this shot. How is chronic bronchitis treated? Chronic bronchitis is treated with medicines and oxygen.  You also can take steps at home to stay healthy and keep your condition from getting worse. Medicines and oxygen therapy  · You may be taking medicines such as:  ¨ Bronchodilators. These help open your airways and make breathing easier. Bronchodilators are either short-acting (work for 6 to 9 hours) or long-acting (work for 24 hours). You inhale most bronchodilators, so they start to act quickly. Always carry your quick-relief inhaler with you in case you need it while you are away from home. ¨ Corticosteroids. These reduce airway inflammation. They come in pill or inhaled form. You must take these medicines every day for them to work well. ¨ Antibiotics. These medicines are used when you have a bacterial lung infection. · Take your medicines exactly as prescribed. Call your doctor if you think you are having a problem with your medicine. · Oxygen therapy boosts the amount of oxygen in your blood and helps you breathe easier. Use the flow rate your doctor has recommended, and do not change it without talking to your doctor first.  Other care at home  · If your doctor recommends it, get more exercise. Walking is a good choice. Bit by bit, increase the amount you walk every day. Try for at least 30 minutes on most days of the week. · Learn breathing methods--such as breathing through pursed lips--to help you become less short of breath. · If your doctor has not set you up with a pulmonary rehabilitation program, talk to him or her about whether rehab is right for you. Rehab includes exercise programs, education about your disease and how to manage it, help with diet and other changes, and emotional support. · Eat regular, healthy meals. Use bronchodilators about 1 hour before you eat to make it easier to eat. Eat several small meals instead of three large ones. Drink beverages at the end of the meal. Avoid foods that are hard to chew. Follow-up care is a key part of your treatment and safety.  Be sure to make and go to all appointments, and call your doctor if you are having problems. It's also a good idea to know your test results and keep a list of the medicines you take. Where can you learn more? Go to http://rusty-marcela.info/. Enter V554 in the search box to learn more about \"Learning About Chronic Bronchitis. \"  Current as of: May 23, 2016  Content Version: 11.1  © 7432-8290 [employer]. Care instructions adapted under license by [employer] (which disclaims liability or warranty for this information). If you have questions about a medical condition or this instruction, always ask your healthcare professional. [place] [address] any warranty or liability for your use of this information. Shortness of Breath: Care Instructions  Your Care Instructions  Shortness of breath has many causes. Sometimes conditions such as anxiety can lead to shortness of breath. Some people get mild shortness of breath when they exercise. Trouble breathing also can be a symptom of a serious problem, such as asthma, lung disease, emphysema, heart problems, and pneumonia. If your shortness of breath continues, you may need tests and treatment. Watch for any changes in your breathing and other symptoms. Follow-up care is a key part of your treatment and safety. Be sure to make and go to all appointments, and call your doctor if you are having problems. Its also a good idea to know your test results and keep a list of the medicines you take. How can you care for yourself at home? · Do not smoke or allow others to smoke around you. If you need help quitting, talk to your doctor about stop-smoking programs and medicines. These can increase your chances of quitting for good. · Get plenty of rest and sleep. · Take your medicines exactly as prescribed. Call your doctor if you think you are having a problem with your medicine. · Find healthy ways to deal with stress.   ¨ Exercise daily.  ¨ Get plenty of sleep. ¨ Eat regularly and well. When should you call for help? Call 911 anytime you think you may need emergency care. For example, call if:  · You have severe shortness of breath. · You have symptoms of a heart attack. These may include:  ¨ Chest pain or pressure, or a strange feeling in the chest.  ¨ Sweating. ¨ Shortness of breath. ¨ Nausea or vomiting. ¨ Pain, pressure, or a strange feeling in the back, neck, jaw, or upper belly or in one or both shoulders or arms. ¨ Lightheadedness or sudden weakness. ¨ A fast or irregular heartbeat. After you call 911, the  may tell you to chew 1 adult-strength or 2 to 4 low-dose aspirin. Wait for an ambulance. Do not try to drive yourself. Call your doctor now or seek immediate medical care if:  · Your shortness of breath gets worse or you start to wheeze. Wheezing is a high-pitched sound when you breathe. · You wake up at night out of breath or have to prop your head up on several pillows to breathe. · You are short of breath after only light activity or while at rest.  Watch closely for changes in your health, and be sure to contact your doctor if:  · You do not get better over the next 1 to 2 days. Where can you learn more? Go to http://rusty-marcela.info/. Enter S780 in the search box to learn more about \"Shortness of Breath: Care Instructions. \"  Current as of: May 23, 2016  Content Version: 11.1  © 2736-0397 [employer]. Care instructions adapted under license by [employer] (which disclaims liability or warranty for this information). If you have questions about a medical condition or this instruction, always ask your healthcare professional. [address] any warranty or liability for your use of this information.

## 2017-02-14 NOTE — ED NOTES
Discharge instructions given to pt. By provider. All questions answered and pt verbalized understanding. V/S stable @ time of discharge. Pt ambulatory out of unit.

## 2017-02-15 ENCOUNTER — PATIENT OUTREACH (OUTPATIENT)
Dept: INTERNAL MEDICINE CLINIC | Age: 67
End: 2017-02-15

## 2017-02-23 ENCOUNTER — HOSPITAL ENCOUNTER (OUTPATIENT)
Dept: MAMMOGRAPHY | Age: 67
Discharge: HOME | End: 2017-02-23
Attending: NURSE PRACTITIONER
Payer: COMMERCIAL

## 2017-02-23 DIAGNOSIS — Z12.31 VISIT FOR SCREENING MAMMOGRAM: ICD-10-CM

## 2017-02-23 PROCEDURE — 77067 SCR MAMMO BI INCL CAD: CPT

## 2017-03-14 DIAGNOSIS — I48.0 PAROXYSMAL ATRIAL FIBRILLATION (HCC): ICD-10-CM

## 2017-03-14 DIAGNOSIS — K52.9 COLITIS: ICD-10-CM

## 2017-03-14 DIAGNOSIS — B34.9 VIRAL ILLNESS: ICD-10-CM

## 2017-03-14 DIAGNOSIS — J44.1 CHRONIC OBSTRUCTIVE PULMONARY DISEASE WITH ACUTE EXACERBATION (HCC): Primary | ICD-10-CM

## 2017-03-14 RX ORDER — CLINDAMYCIN HYDROCHLORIDE 300 MG/1
300 CAPSULE ORAL 4 TIMES DAILY
Qty: 28 CAP | Refills: 0 | Status: SHIPPED | OUTPATIENT
Start: 2017-03-14 | End: 2017-03-21

## 2017-03-20 ENCOUNTER — TELEPHONE (OUTPATIENT)
Dept: INTERNAL MEDICINE CLINIC | Age: 67
End: 2017-03-20

## 2017-03-20 NOTE — TELEPHONE ENCOUNTER
Pt states it was the illness that caused her blood sugar to go up and right now she does not want an alternative mediction because she is much better

## 2017-03-20 NOTE — TELEPHONE ENCOUNTER
Pt states she could not take the antibiotic because it elevated her blood sugar. She states each time she takes colored pills  Her sugar goes up. She took the pill on last Thursday and was up all night. Her feet and hands are not bothering her. You gave her clindamycin 300.   Pt # [phone]

## 2017-03-20 NOTE — TELEPHONE ENCOUNTER
If she is no longer feeling ill, we don't need an alternate medication. If she is, then I can try sending in an alternate medication, however I can not promise it will not cause the same reaction for her. Her reaction is atypical, as clindamycin does NOT typically raise blood sugar levels. It is possible the illness raised her level, but if she took the med more than once and noticed this effect with each use then it seems that is her reaction to this med.

## 2017-03-23 ENCOUNTER — CLINICAL SUPPORT (OUTPATIENT)
Dept: CARDIOLOGY CLINIC | Age: 67
End: 2017-03-23

## 2017-03-23 DIAGNOSIS — Z95.0 CARDIAC PACEMAKER IN SITU: Primary | ICD-10-CM

## 2017-03-23 DIAGNOSIS — I48.0 PAROXYSMAL ATRIAL FIBRILLATION (HCC): Chronic | ICD-10-CM

## 2017-04-04 ENCOUNTER — APPOINTMENT (OUTPATIENT)
Dept: GENERAL RADIOLOGY | Age: 67
DRG: 247 | End: 2017-04-04
Attending: EMERGENCY MEDICINE
Payer: COMMERCIAL

## 2017-04-04 ENCOUNTER — HOSPITAL ENCOUNTER (INPATIENT)
Age: 67
LOS: 6 days | Discharge: HOME HEALTH | DRG: 247 | End: 2017-04-10
Attending: EMERGENCY MEDICINE | Admitting: INTERNAL MEDICINE
Payer: COMMERCIAL

## 2017-04-04 DIAGNOSIS — Z71.89 COUNSELING REGARDING ADVANCED CARE PLANNING AND GOALS OF CARE: ICD-10-CM

## 2017-04-04 DIAGNOSIS — E11.9 TYPE 2 DIABETES MELLITUS WITHOUT COMPLICATION (HCC): Chronic | ICD-10-CM

## 2017-04-04 DIAGNOSIS — G89.29 CHRONIC MIDLINE LOW BACK PAIN WITHOUT SCIATICA: ICD-10-CM

## 2017-04-04 DIAGNOSIS — F40.9 FEAR ASSOCIATED WITH ILLNESS AND BODY FUNCTION: ICD-10-CM

## 2017-04-04 DIAGNOSIS — I50.20 SYSTOLIC CONGESTIVE HEART FAILURE, UNSPECIFIED CONGESTIVE HEART FAILURE CHRONICITY: ICD-10-CM

## 2017-04-04 DIAGNOSIS — E87.6 HYPOKALEMIA: ICD-10-CM

## 2017-04-04 DIAGNOSIS — R07.9 CHEST PAIN, UNSPECIFIED TYPE: ICD-10-CM

## 2017-04-04 DIAGNOSIS — M54.50 CHRONIC MIDLINE LOW BACK PAIN WITHOUT SCIATICA: ICD-10-CM

## 2017-04-04 DIAGNOSIS — R09.02 HYPOXIA: Primary | ICD-10-CM

## 2017-04-04 PROBLEM — I50.9 CHF (CONGESTIVE HEART FAILURE) (HCC): Status: ACTIVE | Noted: 2017-04-04

## 2017-04-04 LAB
ALBUMIN SERPL BCP-MCNC: 3.2 G/DL (ref 3.5–5)
ALBUMIN/GLOB SERPL: 0.8 {RATIO} (ref 1.1–2.2)
ALP SERPL-CCNC: 109 U/L (ref 45–117)
ALT SERPL-CCNC: 61 U/L (ref 12–78)
ANION GAP BLD CALC-SCNC: 10 MMOL/L (ref 5–15)
ANION GAP BLD CALC-SCNC: 11 MMOL/L (ref 5–15)
AST SERPL W P-5'-P-CCNC: 64 U/L (ref 15–37)
ATRIAL RATE: 267 BPM
ATRIAL RATE: 288 BPM
BASOPHILS # BLD AUTO: 0 K/UL (ref 0–0.1)
BASOPHILS # BLD: 0 % (ref 0–1)
BILIRUB SERPL-MCNC: 0.7 MG/DL (ref 0.2–1)
BNP SERPL-MCNC: 1798 PG/ML (ref 0–125)
BUN SERPL-MCNC: 12 MG/DL (ref 6–20)
BUN SERPL-MCNC: 15 MG/DL (ref 6–20)
BUN/CREAT SERPL: 13 (ref 12–20)
BUN/CREAT SERPL: 18 (ref 12–20)
CALCIUM SERPL-MCNC: 8.5 MG/DL (ref 8.5–10.1)
CALCIUM SERPL-MCNC: 9.1 MG/DL (ref 8.5–10.1)
CALCULATED R AXIS, ECG10: 6 DEGREES
CALCULATED R AXIS, ECG10: 9 DEGREES
CALCULATED T AXIS, ECG11: -146 DEGREES
CALCULATED T AXIS, ECG11: -55 DEGREES
CHLORIDE SERPL-SCNC: 103 MMOL/L (ref 97–108)
CHLORIDE SERPL-SCNC: 109 MMOL/L (ref 97–108)
CO2 SERPL-SCNC: 25 MMOL/L (ref 21–32)
CO2 SERPL-SCNC: 26 MMOL/L (ref 21–32)
CREAT SERPL-MCNC: 0.83 MG/DL (ref 0.55–1.02)
CREAT SERPL-MCNC: 0.94 MG/DL (ref 0.55–1.02)
D DIMER PPP FEU-MCNC: 0.19 MG/L FEU (ref 0–0.65)
DIAGNOSIS, 93000: NORMAL
DIAGNOSIS, 93000: NORMAL
EOSINOPHIL # BLD: 0.1 K/UL (ref 0–0.4)
EOSINOPHIL NFR BLD: 2 % (ref 0–7)
ERYTHROCYTE [DISTWIDTH] IN BLOOD BY AUTOMATED COUNT: 14.6 % (ref 11.5–14.5)
FLUAV AG NPH QL IA: NEGATIVE
FLUBV AG NOSE QL IA: NEGATIVE
GLOBULIN SER CALC-MCNC: 3.9 G/DL (ref 2–4)
GLUCOSE BLD STRIP.AUTO-MCNC: 151 MG/DL (ref 65–100)
GLUCOSE BLD STRIP.AUTO-MCNC: 165 MG/DL (ref 65–100)
GLUCOSE SERPL-MCNC: 127 MG/DL (ref 65–100)
GLUCOSE SERPL-MCNC: 148 MG/DL (ref 65–100)
HCT VFR BLD AUTO: 36.2 % (ref 35–47)
HGB BLD-MCNC: 12.3 G/DL (ref 11.5–16)
INR PPP: 3.3 (ref 0.9–1.1)
LYMPHOCYTES # BLD AUTO: 30 % (ref 12–49)
LYMPHOCYTES # BLD: 2.3 K/UL (ref 0.8–3.5)
MAGNESIUM SERPL-MCNC: 1.9 MG/DL (ref 1.6–2.4)
MCH RBC QN AUTO: 28.3 PG (ref 26–34)
MCHC RBC AUTO-ENTMCNC: 34 G/DL (ref 30–36.5)
MCV RBC AUTO: 83.2 FL (ref 80–99)
MONOCYTES # BLD: 0.9 K/UL (ref 0–1)
MONOCYTES NFR BLD AUTO: 11 % (ref 5–13)
NEUTS SEG # BLD: 4.3 K/UL (ref 1.8–8)
NEUTS SEG NFR BLD AUTO: 57 % (ref 32–75)
PLATELET # BLD AUTO: 225 K/UL (ref 150–400)
POTASSIUM SERPL-SCNC: 3.1 MMOL/L (ref 3.5–5.1)
POTASSIUM SERPL-SCNC: 4.3 MMOL/L (ref 3.5–5.1)
PROT SERPL-MCNC: 7.1 G/DL (ref 6.4–8.2)
PROTHROMBIN TIME: 34.8 SEC (ref 9–11.1)
Q-T INTERVAL, ECG07: 376 MS
Q-T INTERVAL, ECG07: 398 MS
QRS DURATION, ECG06: 86 MS
QRS DURATION, ECG06: 88 MS
QTC CALCULATION (BEZET), ECG08: 449 MS
QTC CALCULATION (BEZET), ECG08: 459 MS
RBC # BLD AUTO: 4.35 M/UL (ref 3.8–5.2)
SERVICE CMNT-IMP: ABNORMAL
SERVICE CMNT-IMP: ABNORMAL
SODIUM SERPL-SCNC: 140 MMOL/L (ref 136–145)
SODIUM SERPL-SCNC: 144 MMOL/L (ref 136–145)
TROPONIN I SERPL-MCNC: <0.04 NG/ML
VENTRICULAR RATE, ECG03: 80 BPM
VENTRICULAR RATE, ECG03: 86 BPM
WBC # BLD AUTO: 7.5 K/UL (ref 3.6–11)

## 2017-04-04 PROCEDURE — 85025 COMPLETE CBC W/AUTO DIFF WBC: CPT | Performed by: EMERGENCY MEDICINE

## 2017-04-04 PROCEDURE — 80048 BASIC METABOLIC PNL TOTAL CA: CPT | Performed by: INTERNAL MEDICINE

## 2017-04-04 PROCEDURE — 99218 HC RM OBSERVATION: CPT

## 2017-04-04 PROCEDURE — 77030029684 HC NEB SM VOL KT MONA -A

## 2017-04-04 PROCEDURE — 36415 COLL VENOUS BLD VENIPUNCTURE: CPT | Performed by: EMERGENCY MEDICINE

## 2017-04-04 PROCEDURE — 71010 XR CHEST PORT: CPT

## 2017-04-04 PROCEDURE — 83735 ASSAY OF MAGNESIUM: CPT | Performed by: INTERNAL MEDICINE

## 2017-04-04 PROCEDURE — 65660000000 HC RM CCU STEPDOWN

## 2017-04-04 PROCEDURE — 80053 COMPREHEN METABOLIC PANEL: CPT | Performed by: EMERGENCY MEDICINE

## 2017-04-04 PROCEDURE — 83880 ASSAY OF NATRIURETIC PEPTIDE: CPT | Performed by: EMERGENCY MEDICINE

## 2017-04-04 PROCEDURE — 85379 FIBRIN DEGRADATION QUANT: CPT | Performed by: EMERGENCY MEDICINE

## 2017-04-04 PROCEDURE — 85610 PROTHROMBIN TIME: CPT | Performed by: EMERGENCY MEDICINE

## 2017-04-04 PROCEDURE — 96374 THER/PROPH/DIAG INJ IV PUSH: CPT

## 2017-04-04 PROCEDURE — 93005 ELECTROCARDIOGRAM TRACING: CPT

## 2017-04-04 PROCEDURE — 84484 ASSAY OF TROPONIN QUANT: CPT | Performed by: EMERGENCY MEDICINE

## 2017-04-04 PROCEDURE — 74011250636 HC RX REV CODE- 250/636: Performed by: EMERGENCY MEDICINE

## 2017-04-04 PROCEDURE — 74011250637 HC RX REV CODE- 250/637: Performed by: EMERGENCY MEDICINE

## 2017-04-04 PROCEDURE — 82962 GLUCOSE BLOOD TEST: CPT

## 2017-04-04 PROCEDURE — 74011250637 HC RX REV CODE- 250/637: Performed by: INTERNAL MEDICINE

## 2017-04-04 PROCEDURE — 87804 INFLUENZA ASSAY W/OPTIC: CPT | Performed by: EMERGENCY MEDICINE

## 2017-04-04 PROCEDURE — 99285 EMERGENCY DEPT VISIT HI MDM: CPT

## 2017-04-04 PROCEDURE — 77030018729 HC ELECTRD DEFIB PAD CARD -B

## 2017-04-04 PROCEDURE — 94640 AIRWAY INHALATION TREATMENT: CPT

## 2017-04-04 PROCEDURE — 74011250636 HC RX REV CODE- 250/636: Performed by: INTERNAL MEDICINE

## 2017-04-04 PROCEDURE — 74011000250 HC RX REV CODE- 250: Performed by: EMERGENCY MEDICINE

## 2017-04-04 RX ORDER — MAGNESIUM SULFATE 100 %
4 CRYSTALS MISCELLANEOUS AS NEEDED
Status: DISCONTINUED | OUTPATIENT
Start: 2017-04-04 | End: 2017-04-10 | Stop reason: HOSPADM

## 2017-04-04 RX ORDER — FLUTICASONE PROPIONATE 50 MCG
2 SPRAY, SUSPENSION (ML) NASAL
Status: DISCONTINUED | OUTPATIENT
Start: 2017-04-04 | End: 2017-04-10 | Stop reason: HOSPADM

## 2017-04-04 RX ORDER — FLUTICASONE FUROATE AND VILANTEROL 100; 25 UG/1; UG/1
1 POWDER RESPIRATORY (INHALATION) DAILY
Status: DISCONTINUED | OUTPATIENT
Start: 2017-04-05 | End: 2017-04-10 | Stop reason: HOSPADM

## 2017-04-04 RX ORDER — WARFARIN SODIUM 5 MG/1
10 TABLET ORAL
COMMUNITY
End: 2017-04-17 | Stop reason: DRUGHIGH

## 2017-04-04 RX ORDER — SOTALOL HYDROCHLORIDE 120 MG/1
180 TABLET ORAL 2 TIMES DAILY
Status: DISCONTINUED | OUTPATIENT
Start: 2017-04-05 | End: 2017-04-07

## 2017-04-04 RX ORDER — FUROSEMIDE 10 MG/ML
20 INJECTION INTRAMUSCULAR; INTRAVENOUS
Status: COMPLETED | OUTPATIENT
Start: 2017-04-04 | End: 2017-04-04

## 2017-04-04 RX ORDER — SODIUM CHLORIDE 0.9 % (FLUSH) 0.9 %
5-10 SYRINGE (ML) INJECTION EVERY 8 HOURS
Status: DISCONTINUED | OUTPATIENT
Start: 2017-04-04 | End: 2017-04-10 | Stop reason: HOSPADM

## 2017-04-04 RX ORDER — ONDANSETRON 2 MG/ML
4 INJECTION INTRAMUSCULAR; INTRAVENOUS
Status: DISCONTINUED | OUTPATIENT
Start: 2017-04-04 | End: 2017-04-10 | Stop reason: HOSPADM

## 2017-04-04 RX ORDER — FLUTICASONE PROPIONATE 50 MCG
2 SPRAY, SUSPENSION (ML) NASAL
COMMUNITY
End: 2020-01-01 | Stop reason: SDUPTHER

## 2017-04-04 RX ORDER — INSULIN LISPRO 100 [IU]/ML
INJECTION, SOLUTION INTRAVENOUS; SUBCUTANEOUS
Status: DISCONTINUED | OUTPATIENT
Start: 2017-04-04 | End: 2017-04-10 | Stop reason: HOSPADM

## 2017-04-04 RX ORDER — FUROSEMIDE 10 MG/ML
40 INJECTION INTRAMUSCULAR; INTRAVENOUS 2 TIMES DAILY
Status: DISCONTINUED | OUTPATIENT
Start: 2017-04-04 | End: 2017-04-05

## 2017-04-04 RX ORDER — SOTALOL HYDROCHLORIDE 80 MG/1
120 TABLET ORAL
Status: COMPLETED | OUTPATIENT
Start: 2017-04-04 | End: 2017-04-04

## 2017-04-04 RX ORDER — COLCHICINE 0.6 MG/1
0.6 TABLET ORAL 2 TIMES DAILY
Status: DISCONTINUED | OUTPATIENT
Start: 2017-04-04 | End: 2017-04-05

## 2017-04-04 RX ORDER — VALSARTAN 40 MG/1
20 TABLET ORAL
Status: DISCONTINUED | OUTPATIENT
Start: 2017-04-05 | End: 2017-04-10 | Stop reason: HOSPADM

## 2017-04-04 RX ORDER — DOCUSATE SODIUM 100 MG/1
100 CAPSULE, LIQUID FILLED ORAL
Status: DISCONTINUED | OUTPATIENT
Start: 2017-04-04 | End: 2017-04-10 | Stop reason: HOSPADM

## 2017-04-04 RX ORDER — VALSARTAN 40 MG/1
40 TABLET ORAL
Status: DISPENSED | OUTPATIENT
Start: 2017-04-04 | End: 2017-04-04

## 2017-04-04 RX ORDER — GUAIFENESIN 100 MG/5ML
81 LIQUID (ML) ORAL DAILY
Status: DISCONTINUED | OUTPATIENT
Start: 2017-04-05 | End: 2017-04-10 | Stop reason: HOSPADM

## 2017-04-04 RX ORDER — DEXTROSE 50 % IN WATER (D50W) INTRAVENOUS SYRINGE
12.5-25 AS NEEDED
Status: DISCONTINUED | OUTPATIENT
Start: 2017-04-04 | End: 2017-04-10 | Stop reason: HOSPADM

## 2017-04-04 RX ORDER — WARFARIN SODIUM 5 MG/1
7.5 TABLET ORAL DAILY
COMMUNITY
End: 2017-06-14

## 2017-04-04 RX ORDER — VALSARTAN 40 MG/1
40 TABLET ORAL DAILY
Status: DISCONTINUED | OUTPATIENT
Start: 2017-04-05 | End: 2017-04-04

## 2017-04-04 RX ORDER — IPRATROPIUM BROMIDE AND ALBUTEROL SULFATE 2.5; .5 MG/3ML; MG/3ML
3 SOLUTION RESPIRATORY (INHALATION)
Status: COMPLETED | OUTPATIENT
Start: 2017-04-04 | End: 2017-04-04

## 2017-04-04 RX ORDER — MAGNESIUM SULFATE 1 G/100ML
1 INJECTION INTRAVENOUS ONCE
Status: COMPLETED | OUTPATIENT
Start: 2017-04-05 | End: 2017-04-05

## 2017-04-04 RX ORDER — POTASSIUM CHLORIDE 7.45 MG/ML
10 INJECTION INTRAVENOUS
Status: DISCONTINUED | OUTPATIENT
Start: 2017-04-04 | End: 2017-04-04

## 2017-04-04 RX ORDER — ACETAMINOPHEN 325 MG/1
650 TABLET ORAL
Status: DISCONTINUED | OUTPATIENT
Start: 2017-04-04 | End: 2017-04-10 | Stop reason: HOSPADM

## 2017-04-04 RX ORDER — SODIUM CHLORIDE 0.9 % (FLUSH) 0.9 %
5-10 SYRINGE (ML) INJECTION AS NEEDED
Status: DISCONTINUED | OUTPATIENT
Start: 2017-04-04 | End: 2017-04-10 | Stop reason: HOSPADM

## 2017-04-04 RX ADMIN — IPRATROPIUM BROMIDE AND ALBUTEROL SULFATE 3 ML: .5; 3 SOLUTION RESPIRATORY (INHALATION) at 09:30

## 2017-04-04 RX ADMIN — COLCHICINE 0.6 MG: 0.6 TABLET, FILM COATED ORAL at 23:45

## 2017-04-04 RX ADMIN — FUROSEMIDE 40 MG: 10 INJECTION, SOLUTION INTRAMUSCULAR; INTRAVENOUS at 18:18

## 2017-04-04 RX ADMIN — Medication 10 ML: at 23:52

## 2017-04-04 RX ADMIN — FUROSEMIDE 20 MG: 10 INJECTION, SOLUTION INTRAMUSCULAR; INTRAVENOUS at 11:02

## 2017-04-04 RX ADMIN — ACETAMINOPHEN 650 MG: 325 TABLET, FILM COATED ORAL at 17:59

## 2017-04-04 RX ADMIN — SOTALOL HYDROCHLORIDE 120 MG: 80 TABLET ORAL at 12:37

## 2017-04-04 RX ADMIN — POTASSIUM CHLORIDE 10 MEQ: 10 INJECTION, SOLUTION INTRAVENOUS at 18:13

## 2017-04-04 RX ADMIN — GABAPENTIN 800 MG: 100 CAPSULE ORAL at 10:07

## 2017-04-04 RX ADMIN — GABAPENTIN 800 MG: 100 CAPSULE ORAL at 23:48

## 2017-04-04 NOTE — ED TRIAGE NOTES
Patient reports onset of shortness of breath 4/1/17 and chest pain with exertion, \"feels like something is sitting on my chest.\"  Patient O2 sats at 90% on room air on arrival to ED, O2 2L via NC administered and O2 sats increased to 93%.

## 2017-04-04 NOTE — PROGRESS NOTES
Pharmacy Medication Reconciliation     Recommendations/Findings: The following amendments were made to the patient's active medication list on file at [facility]:   1) Additions: flonase (says she alternates nasal spray taking astepro once in morning and flonase once in evening)    2) Deletions: none    3) Changes: repatha (once monthly injection, due for next injection on )    4) sotalol: patient explains that her dose was increased from 120 mg twice daily to 180 mg twice daily by her cardiologist about 1.5 weeks ago (was seen to be in afib on dose of 120 mg twice daily)     5) warfarin: 5 mg tab, takes once and one half tab (7.5 mg) Monday through Friday and takes two tabs (10 mg) on Sat and Sun. Takes in evening and last dose was 4/3    -Clarified PTA med list verbally with patient at bedside. PTA medication list was corrected to the following:     Prior to Admission Medications   Prescriptions Last Dose Informant Patient Reported? Taking? Azelastine (ASTEPRO) 0.15 % (205.5 mcg) nasal spray 3/28/2017 at Unknown time  Yes Yes   Si [place] by Both Nostrils route daily. Blood-Glucose Meter monitoring kit   No No   Sig: Check blood sugar daily. May substitute for insurance preferred meter   FOLIC ACID/MULTIVIT-MIN/LUTEIN (CENTRUM SILVER PO) 4/3/2017 at Unknown time  Yes Yes   Sig: Take 1 Tab by mouth daily. Takes one po daily. albuterol (PROVENTIL VENTOLIN) 2.5 mg /3 mL (0.083 %) nebulizer solution 3/28/2017 at Unknown time  No Yes   Sig: 3 mL by Nebulization route every four (4) hours as needed for Wheezing. albuterol (VENTOLIN HFA) 90 mcg/actuation inhaler 2017 at Unknown time  No Yes   Sig: Take 2 Puffs by inhalation every six (6) hours as needed for Wheezing. aspirin 81 mg chewable tablet 4/3/2017 at Unknown time  Yes Yes   Sig: Take 81 mg by mouth daily.    budesonide-formoterol (SYMBICORT) 160-4.5 mcg/actuation HFA inhaler 2017 at Unknown time  No Yes   Sig: Take 1 Puff by inhalation two (2) times a day. cod liver oil cap 4/3/2017 at Unknown time  Yes Yes   Sig: Take 1 Cap by mouth daily. colchicine 0.6 mg tablet 4/3/2017 at Unknown time  No Yes   Sig: Take 1 Tab by mouth two (2) times a day. evolocumab (REPATHA PUSHTRONEX) 420 mg/3.5 mL Injt 3/4/2017 at Unknown time  No Yes   Si mg by SubCUTAneous route every month. Indications: arteriosclerotic vascular disease   fluticasone (FLONASE) 50 mcg/actuation nasal spray   Yes Yes   Si Sprays by Both Nostrils route every evening. furosemide (LASIX) 20 mg tablet 4/3/2017 at Unknown time  No Yes   Sig: Take 1 Tab by mouth daily. gabapentin (NEURONTIN) 800 mg tablet 4/3/2017 at Unknown time  No Yes   Sig: TAKE ONE TABLET BY MOUTH THREE TIMES DAILY   metFORMIN (GLUCOPHAGE) 1,000 mg tablet 4/3/2017 at Unknown time  No Yes   Sig: TAKE 1 TABLET BY MOUTH TWICE DAILY WITH MEALS  Indications: PREVENTION OF TYPE 2 DIABETES MELLITUS   sotalol (BETAPACE) 120 mg tablet 4/3/2017 at Unknown time  No Yes   Sig: Take 1 Tab by mouth two (2) times a day. tiotropium (SPIRIVA WITH HANDIHALER) 18 mcg inhalation capsule 2017 at Unknown time  No Yes   Sig: INHALE THE CONTENTS OF 1 CAPSULE THROUGH HANDIHALER DEVICE DAILY   valsartan (DIOVAN) 40 mg tablet 4/3/2017 at Unknown time  No Yes   Sig: TAKE ONE-HALF TABLET BY MOUTH ONCE DAILY FOR  DIABETIC NEPHROPATHY   warfarin (COUMADIN) 5 mg tablet 4/3/2017 at Unknown time  No Yes   Sig: Take 1.5 tabs daily, but on Saturday and  take 2 tabs. warfarin (COUMADIN) 5 mg tablet 4/3/2017  Yes Yes   Sig: Take 7.5 mg by mouth five (5) days a week.  7.5 mg five days a week Monday through Friday   warfarin (COUMADIN) 5 mg tablet 2017  Yes Yes   Sig: Take 10 mg by mouth two (2) times daily on Sat & Sun. 10 mg Sat and Sun      Facility-Administered Medications: None          Thank you,  Mauro Ruiz, PHARMD

## 2017-04-04 NOTE — CONSULTS
CARDIOLOGY CONSULT       Date of  Admission: 4/4/2017  8:32 AM     Admission type:Emergency    Consult for: Acute on chronic diastolic heart failure  Consulted by: Dr. Rivka Ortiz     Subjective:     Raghu Giron is a 73 y.o. female admitted for acute on chronic diastolic CHF (congestive heart failure) ([facility] 73 y.o. female, presents via EMS to [facility] ED with cc of progressively worsening SOB x 3 days. The patient states that her SOB is exacerbated with exertion. The patient also c/o exertional chest pain that she describes as a pressure that started three days ago as well. The patient also c/o an unproductive cough and increased leg swelling. The patient notes that she has used her inhaler and nebulizer treatment with no relief. Per EMS, the pt's SpO2 was 90% on RA and was placed on 2 L of O2 NC. The patient states that she is prescribed a diuretic and denies any recent changes in dosages. The patient notes that she attempted to take an additional dose to alleviate her symptoms with no relief. The patient states that she had her pacemaker interrogated 1 week ago with intermittent paroxysmal atrial fibrillation discovered. Her sotalol was increased at that time. She states she has been weighing herself regularly has not noted any increase in her weight although she has noted some increased lower extremity edema. Chest x-ray revealed interstitial edema and proBNP was elevated in the emergency room. She was also in atrial fibrillation on EKG. She has started to feel better with diuresis.     Patient Active Problem List    Diagnosis Date Noted    CHF (congestive heart failure) ([facility] 04/04/2017    Type 2 diabetes mellitus with diabetic neuropathy, without long-term current use of insulin ([facility] 01/31/2017    Anticoagulation monitoring, INR range 2-3 01/31/2017    Dizziness 06/18/9591    Diastolic CHF, acute on chronic (HCC) 09/22/2014    S/P coronary artery stent placement 07/04/2014    DNR (do not resuscitate) 06/30/2014    Back pain 11/14/2012    Sinoatrial node dysfunction ([address] 07/05/2012    Cardiac pacemaker in situ 07/05/2012    Mixed hyperlipidemia 07/05/2012    Anemia 07/25/2011    Sick sinus syndrome ([address] 02/25/2011    S/P angioplasty with stent 02/07/2011    Hip pain 06/08/2010    Hypertension--essential 06/02/2010    Atrial fibrillation--paroxysmal 06/02/2010    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      July Krishnamurthy NP  Past Medical History:   Diagnosis Date    Atrial fibrillation ([facility] 6/2/2010    Chronic diastolic heart failure ([address] 9/22/2014    COPD     COPD (chronic obstructive pulmonary disease) ([address] 6/2/2010    Diabetes ([facility]     Fibroid     Heart failure ([facility]     Hypertension 6/2/2010    NIDDM (non-insulin dependent diabetes mellitus) 6/2/2010    Screening mammogram 5/4/10    SOB (shortness of breath) 9/22/2014      Social History     Social History    Marital status:      Spouse name: N/A    Number of children: N/A    Years of education: N/A     Social History Main Topics    Smoking status: Former Smoker     Types: Cigarettes     Quit date: 12/31/2009    Smokeless tobacco: Never Used    Alcohol use No    Drug use: No    Sexual activity: Not Currently     Other Topics Concern    None     Social History Narrative     Allergies   Allergen Reactions    Crestor [Rosuvastatin] Myalgia    Levaquin [Levofloxacin] Nausea Only     GI Upset    Lyrica [Pregabalin] Myalgia      Family History   Problem Relation Age of Onset    Heart Disease Mother     Diabetes Father     Heart Disease Brother       Current Facility-Administered Medications   Medication Dose Route Frequency    valsartan (DIOVAN) tablet 40 mg  40 mg Oral NOW    potassium chloride 10 mEq in 100 ml IVPB  10 mEq IntraVENous Q1H    [START ON 4/5/2017] aspirin chewable tablet 81 mg  81 mg Oral DAILY    . PHARMACY TO SUBSTITUTE PER PROTOCOL    Per Protocol    colchicine tablet 0.6 mg  0.6 mg Oral BID    fluticasone (FLONASE) 50 mcg/actuation nasal spray 2 Spray  2 Spray Both Nostrils QPM    . PHARMACY TO SUBSTITUTE PER PROTOCOL    Per Protocol    . PHARMACY TO SUBSTITUTE PER PROTOCOL    Per Protocol    sotalol (BETAPACE) tablet 180 mg  180 mg Oral BID    [START ON 4/5/2017] umeclidinium (INCRUSE ELLIPTA) 62.5 mcg/actuation  1 Puff Inhalation DAILY    [START ON 4/5/2017] valsartan (DIOVAN) tablet 40 mg  40 mg Oral DAILY    furosemide (LASIX) injection 40 mg  40 mg IntraVENous BID    sodium chloride (NS) flush 5-10 mL  5-10 mL IntraVENous Q8H    sodium chloride (NS) flush 5-10 mL  5-10 mL IntraVENous PRN    acetaminophen (TYLENOL) tablet 650 mg  650 mg Oral Q4H PRN    ondansetron (ZOFRAN) injection 4 mg  4 mg IntraVENous Q4H PRN    docusate sodium (COLACE) capsule 100 mg  100 mg Oral BID PRN    insulin lispro (HUMALOG) injection   SubCUTAneous AC&HS    glucose chewable tablet 16 g  4 Tab Oral PRN    dextrose (D50W) injection syrg 12.5-25 g  12.5-25 g IntraVENous PRN    glucagon (GLUCAGEN) injection 1 mg  1 mg IntraMUSCular PRN    WARFARIN INFORMATION NOTE (COUMADIN)   Other QPM    warfarin - hold dose on 4/4  1 Each Other ONCE     Current Outpatient Prescriptions   Medication Sig    warfarin (COUMADIN) 5 mg tablet Take 7.5 mg by mouth five (5) days a week. 7.5 mg five days a week Monday through Friday    warfarin (COUMADIN) 5 mg tablet Take 10 mg by mouth two (2) times daily on Sat & Sun. 10 mg Sat and Sun    fluticasone (FLONASE) 50 mcg/actuation nasal spray 2 Sprays by Both Nostrils route every evening.  gabapentin (NEURONTIN) 800 mg tablet TAKE ONE TABLET BY MOUTH THREE TIMES DAILY    evolocumab (REPATHA PUSHTRONEX) 420 mg/3.5 mL Injt 420 mg by SubCUTAneous route every month.  Indications: arteriosclerotic vascular disease    tiotropium (SPIRIVA WITH HANDIHALER) 18 mcg inhalation capsule INHALE THE CONTENTS OF 1 CAPSULE THROUGH HANDIHALER DEVICE DAILY    metFORMIN (GLUCOPHAGE) 1,000 mg tablet TAKE 1 TABLET BY MOUTH TWICE DAILY WITH MEALS  Indications: PREVENTION OF TYPE 2 DIABETES MELLITUS    valsartan (DIOVAN) 40 mg tablet TAKE ONE-HALF TABLET BY MOUTH ONCE DAILY FOR  DIABETIC NEPHROPATHY    sotalol (BETAPACE) 120 mg tablet Take 1 Tab by mouth two (2) times a day. (Patient taking differently: Take 180 mg by mouth two (2) times a day.)    warfarin (COUMADIN) 5 mg tablet Take 1.5 tabs daily, but on Saturday and Sunday take 2 tabs.  furosemide (LASIX) 20 mg tablet Take 1 Tab by mouth daily.  colchicine 0.6 mg tablet Take 1 Tab by mouth two (2) times a day.  budesonide-formoterol (SYMBICORT) 160-4.5 mcg/actuation HFA inhaler Take 1 Puff by inhalation two (2) times a day.  albuterol (PROVENTIL VENTOLIN) 2.5 mg /3 mL (0.083 %) nebulizer solution 3 mL by Nebulization route every four (4) hours as needed for Wheezing.  FOLIC ACID/MULTIVIT-MIN/LUTEIN (CENTRUM SILVER PO) Take 1 Tab by mouth daily. Takes one po daily.  albuterol (VENTOLIN HFA) 90 mcg/actuation inhaler Take 2 Puffs by inhalation every six (6) hours as needed for Wheezing.  Azelastine (ASTEPRO) 0.15 % (205.5 mcg) nasal spray 1 [place] by Both Nostrils route daily.  cod liver oil cap Take 1 Cap by mouth daily.  aspirin 81 mg chewable tablet Take 81 mg by mouth daily.  Blood-Glucose Meter monitoring kit Check blood sugar daily.  May substitute for insurance preferred meter         Review of Symptoms:  11 systems reviewed, negative other than as stated in HPI     Subjective:      Visit Vitals    /64    Pulse 99    Temp 98.1 °F (36.7 °C)    Resp 14    Ht 5' 6\" (1.676 m)    Wt 173 lb (78.5 kg)    SpO2 94%    BMI 27.92 kg/m2       Physical:  Gen:  NAD  Heart: Irregular rhythm, no murmur, gallop or rub  Lungs: Decreased breath sounds with a few basilar crackles bilaterally  Neck: supple, symmetrical, trachea midline, no adenopathy, thyroid: not enlarged, symmetric, no tenderness/mass/nodules, no carotid bruit and no JVD  Abdomen: soft, non-tender. Bowel sounds normal. No masses,  no organomegaly  Extremities: extremities normal, atraumatic, no cyanosis or edema, positive femorals without bruits, normal dp pulses  Neurologic: CN, motor and speech grossly normal    Data Review:   Recent Labs      04/04/17   0944   WBC  7.5   HGB  12.3   HCT  36.2   PLT  225     Recent Labs      04/04/17   0944   NA  144   K  3.1*   CL  109*   CO2  25   GLU  127*   BUN  15   CREA  0.83   CA  8.5   ALB  3.2*   TBILI  0.7   SGOT  64*   ALT  61   INR  3.3*       Recent Labs      04/04/17   0944   TROIQ  <0.04       @BRIEFLAB(CHOL,CHOLPOCT,[id number],CHOLX,CHOLP,CHLST,CHOLV,[id number],HDL,HDLPOCT,HDLPOC,[id number],NHDLCT,[id number],HDLC,HDLP,LDL,LDLPOCT,LDLCPOC,[id number],NLDLCT,DLDL,LDLC,DLDLP,[id number],VLDLC,VLDL,TGL,TGLX,TRIGL,[id number],TRIGP,TGLPOCT,[id number],CHHD,CHHDX)@      Intake/Output Summary (Last 24 hours) at 04/04/17 1822  Last data filed at 04/04/17 1533   Gross per 24 hour   Intake                0 ml   Output             1500 ml   Net            -1500 ml        Cardiographics    Telemetry: AFIB    ECG: atrial fibrillation    Echocardiogram 10/17/16:  SUMMARY:  Left ventricle: Systolic function was normal. Ejection fraction was  estimated in the range of 55 % to 60 %. Left atrium: The atrium was mildly dilated. Right atrium: The atrium was mildly dilated. Mitral valve: There was mild regurgitation. Aortic valve: There was moderate regurgitation.      Assessment:         Active Problems:    Hypertension--essential (6/2/2010)      Atrial fibrillation--paroxysmal (6/2/2010)      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 (6/2/2010)      Cardiac pacemaker in situ (7/5/2012)      Mixed hyperlipidemia (7/5/2012)      S/P coronary artery stent placement (2/9/1578)      Diastolic CHF, acute on chronic ([facility] (9/22/2014)      Type 2 diabetes mellitus with diabetic neuropathy, without long-term current use of insulin ([facility] (1/31/2017)         Plan:     67y/o admitted with Acute on Chronic diastolic heart failure:  IV diuretics as tolerated/ needed by creatinine and physical examshe is diuresing well currently  Strict I's/O's, weights, sign on door  Follow lytes and replete as needed  Continue valsartan and tight BP control  Repeat echo as it has been 6 months to rule out new changes  Cardiac rehab to follow    Paroxysmal atrial fibrillation:  Continue Coumadin and sotalol. Possible contributor to diastolic heart failure    HTN:  Well-controlled    Thanks for the consult. Dr. Ely Adler will continue to follow starting the morning.

## 2017-04-04 NOTE — IP AVS SNAPSHOT
[address] [facility] 
[phone] Patient: Unknown Jamar MRN: [id number] NEQ:[id number] You are allergic to the following Allergen Reactions Crestor (Rosuvastatin) Myalgia Levaquin (Levofloxacin) Nausea Only GI Upset Lyrica (Pregabalin) Myalgia Recent Documentation Height Weight BMI OB Status Smoking Status 1.676 m 77.2 kg 27.47 kg/m2 Postmenopausal Former Smoker Emergency Contacts Name Discharge Info Relation Home Work Mobile Rhonda Beat  Child [2] [phone] [address] [facility] CAREGIVER [3] Other Relative [6] [phone] About your hospitalization You were admitted on:  April 4, 2017 You last received care in the:  [facility] 2 [facility] CARDIO You were discharged on:  April 10, 2017 Unit phone number:  [phone] Why you were hospitalized Your primary diagnosis was:  Systolic Chf, Acute (Hcc) Your diagnoses also included:  Diastolic Chf, Acute On Chronic (Hcc), Type 2 Diabetes Mellitus With Diabetic Neuropathy, Without Long-Term Current Use Of Insulin (Hcc), S/P Coronary Artery Stent Placement, Mixed Hyperlipidemia, Hypertension, Copd (Chronic Obstructive Pulmonary Disease) (Hcc), Cardiac Pacemaker In Situ, Atrial Fibrillation (Hcc), Hypokalemia, Acute Systolic Chf (Congestive Heart Failure) ([facility]), Fear Associated With Illness And Body Function, Counseling Regarding Advanced Care Planning And Goals Of Care Providers Seen During Your Hospitalizations Provider Role Specialty Primary office phone Tomy Caballero MD Attending Provider Emergency Medicine [phone] Svetlana Hoyos MD Attending Provider Internal Medicine [phone] Your Primary Care Physician (PCP) Primary Care Physician Office Phone Office Fax Yudy Yao I [phone] Follow-up Information Follow up With Details Comments Contact Info Irais Garcia MD Schedule an appointment as soon as possible for a visit on 4/25/2017 your appointment is at [facility] 
[phone] [address] [place] [address]  PT, OT and [place] [facility] [place] [id number] 
[phone] Shonna Garcia NP On 4/17/2017 your appointment is at 9:20am.  [facility] [id number] 
[phone] Your Appointments Monday April 17, 2017  9:20 AM EDT ROUTINE CARE with Shonna Garcia NP  
[facility] [address] [facility] [address] [address] [address]  
[phone] Tuesday April 25, 2017  9:00 AM EDT HOSPITAL FOLLOW-UP with Irais Garcia MD  
[facility] [address]  
[phone] Monday May 01, 2017  8:20 AM EDT ROUTINE CARE with Shonna Garcia NP  
[facility] [place] [id number] [facility] [address]  
[phone] Current Discharge Medication List  
  
START taking these medications Dose & Instructions Dispensing Information Comments Morning Noon Evening Bedtime  
 atorvastatin 20 mg tablet Commonly known as:  LIPITOR Your last dose was: Your next dose is:    
   
   
 Dose:  20 mg Take 1 Tab by mouth nightly. Quantity:  30 Tab Refills:  0  
     
   
   
   
  
 clopidogrel 75 mg Tab Commonly known as:  PLAVIX Your last dose was: Your next dose is:    
   
   
 Dose:  75 mg Take 1 Tab by mouth daily. Quantity:  30 Tab Refills:  0 CONTINUE these medications which have CHANGED Dose & Instructions Dispensing Information Comments Morning Noon Evening Bedtime colchicine 0.6 mg tablet What changed:   
- how much to take - when to take this Your last dose was: Your next dose is:    
   
   
 Dose:  0.6 mg Take 1 Tab by mouth two (2) times a day. Quantity:  60 Tab Refills:  5  
     
   
   
   
  
 sotalol 120 mg tablet Commonly known as:  Hermilo Gonzales What changed:  how much to take Your last dose was: Your next dose is:    
   
   
 Dose:  120 mg Take 1 Tab by mouth two (2) times a day. Quantity:  180 Tab Refills:  3  
     
   
   
   
  
 valsartan 40 mg tablet Commonly known as:  DIOVAN What changed:  See the new instructions. Your last dose was: Your next dose is:    
   
   
 Dose:  20 mg Take 0.5 Tabs by mouth daily. Quantity:  30 Tab Refills:  0 CONTINUE these medications which have NOT CHANGED Dose & Instructions Dispensing Information Comments Morning Noon Evening Bedtime * albuterol 90 mcg/actuation inhaler Commonly known as:  VENTOLIN HFA Your last dose was: Your next dose is:    
   
   
 Dose:  2 Puff Take 2 Puffs by inhalation every six (6) hours as needed for Wheezing. Quantity:  1 Inhaler Refills:  11  
     
   
   
   
  
 * albuterol 2.5 mg /3 mL (0.083 %) nebulizer solution Commonly known as:  PROVENTIL VENTOLIN Your last dose was: Your next dose is:    
   
   
 Dose:  2.5 mg  
3 mL by Nebulization route every four (4) hours as needed for Wheezing. Quantity:  1 Package Refills:  5  
     
   
   
   
  
 aspirin 81 mg chewable tablet Your last dose was: Your next dose is:    
   
   
 Dose:  81 mg Take 81 mg by mouth daily. Refills:  0 Azelastine 0.15 % (205.5 mcg) nasal spray Commonly known as:  ASTEPRO Your last dose was: Your next dose is:    
   
   
 Dose:  1 Spray 1 [place] by Both Nostrils route daily. Refills:  0 Blood-Glucose Meter monitoring kit Your last dose was: Your next dose is:    
   
   
 Check blood sugar daily. May substitute for insurance preferred meter Quantity:  1 Kit Refills:  0  
     
   
   
   
  
 budesonide-formoterol 160-4.5 mcg/actuation HFA inhaler Commonly known as:  SYMBICORT Your last dose was: Your next dose is:    
   
   
 Dose:  1 Puff Take 1 Puff by inhalation two (2) times a day. Quantity:  3 Inhaler Refills:  3 CENTRUM SILVER PO Your last dose was: Your next dose is:    
   
   
 Dose:  1 Tab Take 1 Tab by mouth daily. Takes one po daily. Refills:  0  
     
   
   
   
  
 cod liver oil Cap Your last dose was: Your next dose is:    
   
   
 Dose:  1 Cap Take 1 Cap by mouth daily. Refills:  0  
     
   
   
   
  
 evolocumab 420 mg/3.5 mL Injt Commonly known as:  Paticijenifer Latonia Your last dose was: Your next dose is:    
   
   
 Dose:  420 mg  
420 mg by SubCUTAneous route every month. Indications: arteriosclerotic vascular disease Quantity:  1 Device Refills:  12  
     
   
   
   
  
 FLONASE 50 mcg/actuation nasal spray Generic drug:  fluticasone Your last dose was: Your next dose is:    
   
   
 Dose:  2 Spray 2 Sprays by Both Nostrils route every evening. Refills:  0  
     
   
   
   
  
 furosemide 20 mg tablet Commonly known as:  LASIX Your last dose was: Your next dose is:    
   
   
 Dose:  20 mg Take 1 Tab by mouth daily. Quantity:  90 Tab Refills:  0  
     
   
   
   
  
 gabapentin 800 mg tablet Commonly known as:  NEURONTIN Your last dose was: Your next dose is: TAKE ONE TABLET BY MOUTH THREE TIMES DAILY Quantity:  90 Tab Refills:  11  
     
   
   
   
  
 metFORMIN 1,000 mg tablet Commonly known as:  GLUCOPHAGE Your last dose was: Your next dose is: TAKE 1 TABLET BY MOUTH TWICE DAILY WITH MEALS  Indications: PREVENTION OF TYPE 2 DIABETES MELLITUS Quantity:  180 Tab Refills:  3  
     
   
   
   
  
 tiotropium 18 mcg inhalation capsule Commonly known as:  [address] Your last dose was: Your next dose is:    
   
   
 INHALE THE CONTENTS OF 1 CAPSULE THROUGH HANDIHALER DEVICE DAILY Quantity:  90 Cap Refills:  3  
     
   
   
   
  
 * warfarin 5 mg tablet Commonly known as:  COUMADIN Your last dose was: Your next dose is:    
   
   
 Dose:  7.5 mg Take 7.5 mg by mouth five (5) days a week. 7.5 mg five days a week Monday through Friday Refills:  0  
     
   
   
   
  
 * warfarin 5 mg tablet Commonly known as:  COUMADIN Your last dose was: Your next dose is:    
   
   
 Dose:  10 mg Take 10 mg by mouth two (2) times daily on Sat & Sun. 10 mg Sat and Sun Refills:  0  
     
   
   
   
  
 * warfarin 5 mg tablet Commonly known as:  COUMADIN Your last dose was: Your next dose is: Take 1.5 tabs daily, but on Saturday and  take 2 tabs. Quantity:  55 Tab Refills:  2  
     
   
   
   
  
 * Notice: This list has 5 medication(s) that are the same as other medications prescribed for you. Read the directions carefully, and ask your doctor or other care provider to review them with you. Where to Get Your Medications Information on where to get these meds will be given to you by the nurse or doctor. ! Ask your nurse or doctor about these medications  
  atorvastatin 20 mg tablet  
 clopidogrel 75 mg Tab  
 valsartan 40 mg tablet Discharge Instructions HOSPITALIST DISCHARGE INSTRUCTIONS 
 
NAME: Nydia Coles :  1950 MRN:  [id number] Date/Time:  4/10/2017 12:47 PM 
 
ADMIT DATE: 2017 DISCHARGE DATE: 4/10/2017 DISCHARGE DIAGNOSIS: 
 CHF specific discharge instructions Weight: 
· Daily weights, Notify your Heart Doctor if Wt gain of 3 lb in a day or 5 lb in a week Diet : 
· Low salt cardiac diet · Limit Sodium to 2000 mg per day · Fluid restriction of 1500 ml daily Acute (New) Systolic CHF POA (EF 27% on Echo this admission)- s/p Cardiac Cath/PCI of diagonal vessel , Added Plavix & Lipitor by cardiology , OP follow up in 2-3 weeks R groin hematoma (cath site) - now resolved, no oozing Acute on chronic diastolic heart failure POA- diuresing well, cont Lasix at home dose Afib with RVR POA- now rate controlled, decreased sotalol to 120 mg BID this admission Subtherapeutic INR- 2.0 today on discharge, cont Coumadin daily Hypokalemia - resolved Hypomagnesemia - resolved Runs of Vtach in ED - no more events since then COPD Diabetes Principal Problem: 
  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 
 
  Diastolic CHF, acute on chronic ([address] (9/22/2014) Type 2 diabetes mellitus with diabetic neuropathy, without long-term current use of insulin ([address] (1/31/2017) Acute systolic CHF (congestive heart failure) ([address] (4/6/2017) Fear associated with illness and body function (4/7/2017) Counseling regarding advanced care planning and goals of care (4/7/2017) MEDICATIONS: 
As per medication reconciliation  list 
· It is important that you take the medication exactly as they are prescribed.   
· Keep your medication in the bottles provided by the pharmacist and keep a list of the medication names, dosages, and times to be taken in your wallet. · Do not take other medications without consulting your doctor. Pain Management: per above medications What to do at [facility] Recommended diet:  Cardiac Diet, Diabetic Diet and Low fat, Low cholesterol Recommended activity: Activity as tolerated If you have questions regarding the hospital related prescriptions or hospital related issues please call Lorna Mead at . If you experience any of the following symptoms then please call your primary care physician or return to the emergency room if you cannot get hold of your doctor: 
Fever, chills, nausea, vomiting, diarrhea, change in mentation, falling, bleeding, shortness of breath, Follow Up: 
Dr. Fátima Diaz, NP  you are to call and set up an appointment to see them in 7-10 days. Dr Chinmay John ([address] [place] [address] cardiology) in office in 2-3 weeks for follow up on CHF, Afib Information obtained by : 
I understand that if any problems occur once I am at home I am to contact my physician. I understand and acknowledge receipt of the instructions indicated above. Physician's or R.N.'s Signature                                                                  Date/Time Patient or Representative Signature                                                          Date/Time [facility] [address] [place] [address]  [phone] CARDIOLOGY DISCHARGE INSTRUCTIONS Admission Diagnoses:  
CHF (congestive heart failure) ([facility] Diastolic CHF, acute on chronic (HCC) Acute systolic CHF (congestive heart failure) ([facility] Discharge Diagnoses: Principal Problem: 
  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 
 
  Diastolic CHF, acute on chronic ([facility] (9/22/2014) Type 2 diabetes mellitus with diabetic neuropathy, without long-term current use of insulin ([facility] (1/31/2017) Acute systolic CHF (congestive heart failure) ([facility] (4/6/2017) Cardiology Procedures this Admission:  EchoCardiogram, Left heart catheterization and intervention CARDIAC CATHETERIZATION/PCI DISCHARGE INSTRUCTIONS It is normal to feel tired the first couple days. Take it easy and follow the physicians instructions. CHECK THE CATHETER INSERTION SITE DAILY: 
 
You may shower 24 hours after the procedure, remove the bandage during showering. Wash with soap and water and pat dry. Gentle cleaning of the site with soap and water is sufficient, cover with a dry clean dressing or bandage. Do not apply creams or powders to the area. Do not sit in a bathtub or pool of water for 7 days or until wound has completely healed. Temporary bruising and discomfort is normal and may last a few weeks. You may have a  formation of a small lump at the site which may last up to 6 weeks. CALL THE PHYSICIANS: 
 
If the site becomes red, swollen or feels warm to the touch If there is bleeding or drainage or if there is unusual pain at the groin or down the leg. If there is any bleeding, lie down, apply pressure or have someone apply pressure with a clean cloth until the bleeding stops.   
If the bleeding continues, call 911 to be transported to the hospital. 
 DO NOT DRIVE YOURSELF, OR HAVE ANYONE ELSE DRIVE YOU  CALL [phone]. ACTIVITY: 
 
For the first 24-48 hours or as instructed by the physician: No lifting, pushing or pulling over 10 pounds and no straining the insertion site. Do not life grocery bags or the garbage can, do not run the vacuum  or  for 7 days. Start with short walks as in the hospital and gradually increase as tolerated each day. It is recommended to walk 30 minutes 5-7 days per week. Follow your physicians instructions on activity. Avoid walking outside in extremes of heat or cold. Walk inside when it is cold and windy or hot and humid. Things to keep in mind: 
No driving for at least 24 hours, or as designated by your physician. Limit the number of times you go up and down the stairs Take rests and pace yourself with activity. Be careful and do not strain with bowel movements. MEDICATIONS: 
 
Take all medications as prescribed Call your physician if you have any questions Keep an updated list of your medications with you at all times and give a list to your physician and pharmacist 
 
 
 
SIGNS AND SYMPTOMS: 
 
Be cautious of symptoms of angina or recurrent symptoms such as chest discomfort, unusual shortness of breath or fatigue. These could be symptoms of restenosis, a new blockage or a heart attack. If your symptoms are relieved with rest it is still recommended that you notify your physician of recurrent chest pain or discomfort. FOR CHEST PAIN or symptoms of angina not relieved with rest:  If the discomfort is not relieved with rest, and you have been prescribed Nitroglycerin, take as directed (taken under the tongue, one at a time 5 minutes apart for a total of 3 doses). If the discomfort is not relieved after the 3rd nitroglycerin, call 911. If you have not been prescribed Nitroglycerin  and your chest discomfort is not relieved with rest, call 911.   
 
AFTER CARE: 
 
 Follow up with your physician as instructed. Follow a heart healthy diet with proper portion control, daily stress management, daily exercise, blood pressure and cholesterol control , and smoking cessation. Discharge Instructions Attachments/References WARFARIN: LONG-TERM USE (ENGLISH) HEART FAILURE ZONES: GENERAL INFO (ENGLISH) HEART FAILURE: AVOIDING TRIGGERS (ENGLISH) Discharge Orders None MyChart Announcement We are excited to announce that we are making your provider's discharge notes available to you in [employer]. You will see these notes when they are completed and signed by the physician that discharged you from your recent hospital stay. If you have any questions or concerns about any information you see in [employer], please call the Health Information Department where you were seen or reach out to your Primary Care Provider for more information about your plan of care. Introducing [facility] & HEALTH SERVICES! Dear Cassidy Greenwood: Thank you for requesting a [employer] account. Our records indicate that you already have an active [employer] account. You can access your account anytime at [email or web address] Did you know that you can access your hospital and ER discharge instructions at any time in [employer]? You can also review all of your test results from your hospital stay or ER visit. Additional Information If you have questions, please visit the Frequently Asked Questions section of the [employer] website at [email or web address] Remember, [employer] is NOT to be used for urgent needs. For medical emergencies, dial 911. Now available from your iPhone and Android! General Information Please provide this summary of care documentation to your next provider. Patient Signature:  ____________________________________________________________ Date:  ____________________________________________________________  
  
[facility] Provider Signature:  ____________________________________________________________ Date:  ____________________________________________________________ More Information Taking Warfarin Safely: Care Instructions Your Care Instructions Warfarin is a medicine that you take to prevent blood clots. It is often called a blood thinner. Doctors give warfarin (such as Coumadin) to reduce the risk of blood clots. You may be at risk for blood clots if you have atrial fibrillation or deep vein thrombosis. Some other health problems may also put you at risk. Warfarin slows the amount of time it takes for your blood to clot. It can cause bleeding problems. Even if you've been taking warfarin for a while, it's important to know how to take it safely. Foods and other medicines can affect the way warfarin works. Some can make warfarin work too well. This can cause bleeding problems. And some can make it work poorly, so that it does not prevent blood clots very well. You will need regular blood tests to check how long it takes for your blood to form a clot. This test is called a PT or prothrombin time test. The result of the test is called an INR level. Depending on the test results, your doctor or anticoagulation clinic may adjust your dose of warfarin. Follow-up care is a key part of your treatment and safety. Be sure to make and go to all appointments, and call your doctor if you are having problems. It's also a good idea to know your test results and keep a list of the medicines you take. How can you care for yourself at home? Take warfarin safely · Take your warfarin at the same time each day. · If you miss a dose of warfarin, don't take an extra dose to make up for it. Your doctor can tell you exactly what to do so you don't take too much or too little. · Wear medical alert jewelry that lets others know that you take warfarin. You can buy this at most drugstores. · Don't take warfarin if you are pregnant or planning to get pregnant. Talk to your doctor about how you can prevent getting pregnant while you are taking it. · Don't change your dose or stop taking warfarin unless your doctor tells you to. Effects of medicines and food on warfarin · Don't start or stop taking any medicines, vitamins, or natural remedies unless you first talk to your doctor. Many medicines can affect how warfarin works. These include aspirin and other pain relievers, over-the-counter medicines, multivitamins, dietary supplements, and herbal products. · Tell all of your doctors and pharmacists that you take warfarin. Some prescription medicines can affect how warfarin works. · Keep the amount of vitamin K in your diet about the same from day to day. Do not suddenly eat a lot more or a lot less food that is rich in vitamin K than you usually do. Vitamin K affects how warfarin works and how your blood clots. Talk with your doctor before making big changes in your diet. Foods that have a lot of vitamin K include cooked green vegetables, such as: ¨ Kale, spinach, turnip greens, juan carlos greens, Swiss chard, and mustard greens. ¨ Lutz sprouts, broccoli, and asparagus. · Limit your use of alcohol. Avoid bleeding by preventing falls and injuries · Wear slippers or shoes with nonskid soles. · Remove throw rugs and clutter. · Rearrange furniture and electrical cords to keep them out of walking paths. · Keep stairways, porches, and outside walkways well lit. Use night-lights in hallways and bathrooms. · Be extra careful when you work with sharp tools or knives. When should you call for help? Call 911 anytime you think you may need emergency care. For example, call if: 
· You have a sudden, severe headache that is different from past headaches. Call your doctor now or seek immediate medical care if: 
· You have any abnormal bleeding, such as: 
¨ Nosebleeds. ¨ Vaginal bleeding that is different (heavier, more frequent, at a different time of the month) than what you are used to. ¨ Bloody or black stools, or rectal bleeding. ¨ Bloody or pink urine. Watch closely for changes in your health, and be sure to contact your doctor if you have any problems. Where can you learn more? Go to http://rusty-marcela.info/. Enter S205 in the search box to learn more about \"Taking Warfarin Safely: Care Instructions. \" Current as of: November 15, 2016 Content Version: 11.2 © 2961-0031 [employer]. Care instructions adapted under license by [employer] (which disclaims liability or warranty for this information). If you have questions about a medical condition or this instruction, always ask your healthcare professional. [place] [address] any warranty or liability for your use of this information. Learning About Heart Failure Zones What are heart failure zones? Heart failure zones give you an easy way to see changes in your heart failure symptoms. They also tell you when you need to get help. Check every day to see which zone you are in. Green zone. You are doing well. This is where you want to be. · Your weight is stable. This means it is not going up or down. · You breathe easily. · You are sleeping well. You are able to lie flat without shortness of breath. · You can do your usual activities. Yellow zone. Be careful. Your symptoms are changing. Call your doctor. · You have new or increased shortness of breath. · You are dizzy or lightheaded, or you feel like you may faint. · You have sudden weight gain, such as 3 pounds or more in 2 to 3 days. · You have increased swelling in your legs, ankles, or feet. · You are so tired or weak that you cannot do your usual activities. · You are not sleeping well. Shortness of breath wakes you up at night. You need extra pillows. Your doctor's name: ____________________________________________________________ Your doctor's contact information: _________________________________________________ Red zone. This is an emergency. Call 911. You have symptoms of sudden heart failure, such as: 
· You have severe trouble breathing. · You cough up pink, foamy mucus. · You have a new irregular or fast heartbeat. You have symptoms of a heart attack. These may include: · Chest pain or pressure, or a strange feeling in the chest. 
· Sweating. · Shortness of breath. · Nausea or vomiting. · Pain, pressure, or a strange feeling in the back, neck, jaw, or upper belly or in one or both shoulders or arms. · Lightheadedness or sudden weakness. · A fast or irregular heartbeat. If you have symptoms of a heart attack: After you call 911, the  may tell you to chew 1 adult-strength or 2 to 4 low-dose aspirin. Wait for an ambulance. Do not try to drive yourself. Follow-up care is a key part of your treatment and safety. Be sure to make and go to all appointments, and call your doctor if you are having problems. It's also a good idea to know your test results and keep a list of the medicines you take. Where can you learn more? Go to http://rusty-marcela.info/. Enter T174 in the search box to learn more about \"Learning About Heart Failure Zones. \" Current as of: January 27, 2016 Content Version: 11.2 © 3551-6059 [employer]. Care instructions adapted under license by [employer] (which disclaims liability or warranty for this information). If you have questions about a medical condition or this instruction, always ask your healthcare professional. [address] any warranty or liability for your use of this information. Avoiding Triggers With Heart Failure: Care Instructions Your Care Instructions Triggers are anything that make your heart failure flare up. A flare-up is also called \"sudden heart failure\" or \"acute heart failure. \" When you have a flare-up, fluid builds up in your lungs, and you have problems breathing. You might need to go to the hospital. By watching for changes in your condition and avoiding triggers, you can prevent heart failure flare-ups. Follow-up care is a key part of your treatment and safety. Be sure to make and go to all appointments, and call your doctor if you are having problems. It's also a good idea to know your test results and keep a list of the medicines you take. How can you care for yourself at home? Watch for changes in your weight and condition · Weigh yourself without clothing at the same time each day. Record your weight. Call your doctor if you gain 3 pounds or more in 2 to 3 days. A sudden weight gain may mean that your heart failure is getting worse. · Keep a daily record of your symptoms. Write down any changes in how you feel, such as new shortness of breath, cough, or problems eating. Also record if your ankles are more swollen than usual and if you have to urinate in the night more often. Note anything that you ate or did that could have triggered these changes. Limit sodium Sodium causes your body to hold on to extra water. This may cause your heart failure symptoms to get worse. People get most of their sodium from processed foods. Fast food and restaurant meals also tend to be very high in sodium. · Your doctor may suggest that you limit sodium to 2,000 milligrams (mg) a day or less. That is less than 1 teaspoon of salt a day, including all the salt you eat in cooking or in packaged foods. · Read food labels on cans and food packages. They tell you how much sodium you get in one serving. Check the serving size.  If you eat more than one serving, you are getting more sodium. · Be aware that sodium can come in forms other than salt, including monosodium glutamate (MSG), sodium citrate, and sodium bicarbonate (baking soda). MSG is often added to Asian food. You can sometimes ask for food without MSG or salt. · Slowly reducing salt will help you adjust to the taste. Take the salt shaker off the table. · Flavor your food with garlic, lemon juice, onion, vinegar, herbs, and spices instead of salt. Do not use soy sauce, steak sauce, onion salt, garlic salt, mustard, or ketchup on your food, unless it is labeled \"low-sodium\" or \"low-salt. \" 
· Make your own salad dressings, sauces, and ketchup without adding salt. · Use fresh or frozen ingredients, instead of canned ones, whenever you can. Choose low-sodium canned goods. · Eat less processed food and food from restaurants, including fast food. Exercise as directed Moderate, regular exercise is very good for your heart. It improves your blood flow and helps control your weight. But too much exercise can stress your heart and cause a heart failure flare-up. · Check with your doctor before you start an exercise program. 
· Walking is an easy way to get exercise. Start out slowly. Gradually increase the length and pace of your walk. Swimming, riding a bike, and using a treadmill are also good forms of exercise. · When you exercise, watch for signs that your heart is working too hard. You are pushing yourself too hard if you cannot talk while you are exercising. If you become short of breath or dizzy or have chest pain, stop, sit down, and rest. 
· Do not exercise when you do not feel well. Take medicines correctly · Take your medicines exactly as prescribed. Call your doctor if you think you are having a problem with your medicine. · Make a list of all the medicines you take.  Include those prescribed to you by other doctors and any over-the-counter medicines, vitamins, or supplements you take. Take this list with you when you go to any doctor. · Take your medicines at the same time every day. It may help you to post a list of all the medicines you take every day and what time of day you take them. · Make taking your medicine as simple as you can. Plan times to take your medicines when you are doing other things, such as eating a meal or getting ready for bed. This will make it easier to remember to take your medicines. · Get organized. Use helpful tools, such as daily or weekly pill containers. When should you call for help? Call 911 if you have symptoms of sudden heart failure such as: 
· You have severe trouble breathing. · You cough up pink, foamy mucus. · You have a new irregular or rapid heartbeat. Call your doctor now or seek immediate medical care if: 
· You have new or increased shortness of breath. · You are dizzy or lightheaded, or you feel like you may faint. · You have sudden weight gain, such as 3 pounds or more in 2 to 3 days. · You have increased swelling in your legs, ankles, or feet. · You are suddenly so tired or weak that you cannot do your usual activities. Watch closely for changes in your health, and be sure to contact your doctor if you develop new symptoms. Where can you learn more? Go to http://rusty-marcela.info/. Enter L055 in the search box to learn more about \"Avoiding Triggers With Heart Failure: Care Instructions. \" Current as of: November 15, 2016 Content Version: 11.2 © 7233-7397 [employer]. Care instructions adapted under license by [employer] (which disclaims liability or warranty for this information). If you have questions about a medical condition or this instruction, always ask your healthcare professional. [address] any warranty or liability for your use of this information.

## 2017-04-04 NOTE — ED NOTES
Patient alert and oriented x 4, call bell in reach, eating dinner meal, breath sounds diminished bilaterally, aware of plan for admission.

## 2017-04-04 NOTE — PROGRESS NOTES
Pharmacy Daily Dosing of Warfarin    Indication: afib  Goal INR: 2-3    Average Daily Warfarin Dose: 7.5 mg M-F, and 10 mg on Sat and Sun  Concurrent Anticoagulants/Antiplatelets none at this time  Major Interacting Medications (Dose/Frequency): none at this time    INR (0.9-1.1) > 5 or Platelets (< 63S): discuss with MD:  Recent Labs      04/04/17   0944   INR  3.3*   HGB  12.3   PLT  225       Impression/Plan: hold warfarin for elevated INR     Pharmacy will follow daily and adjust the dose as appropriate.     Thanks for the consult  Henri Eason, GIOVANAD

## 2017-04-04 NOTE — ED NOTES
Patient alert and oriented x 4, call bell within reach and side rails up, needing to urinate and placed on bedside commode, denies any further chest pressure since initial event.

## 2017-04-04 NOTE — H&P
Hospitalist Admission Note    NAME: Nydia Coles   :  1950   MRN:  [id number]     Date/Time:  2017 3:21 PM    Patient PCP: Delilah Zimmerman NP  ________________________________________________________________________    My assessment of this patient's clinical condition and my plan of care is as follows. Assessment / Plan:  Acute on chronic diastolic heart failure  Afib with RVR  -patient with increased shortness of breath and chest pressure on exertion, rales on exam and CXR with interstitial edema, pro BNP 1700 all suggest CHF exacerbation, likely triggered by recent illness / afib with RVR, troponin negative  -given lasix in ED, will order lasix 40 mg IV BID  -strict I/Os, daily weights, recheck echo  -cardiology consulted  -continue aspirin, diovan  -continue sotalol and warfarin    Runs of Vtach  -up to 24 beats on ED telemetry  -will replete K  -monitor lytes closely    COPD  -continue home inhalers, no signs of exacerbation    Diabetes  -hold metformin  -SSI      Code Status: full  Surrogate Decision Maker: daughter    DVT Prophylaxis: warfarin  GI Prophylaxis: not indicated    Baseline: independent        Subjective:   CHIEF COMPLAINT: SOB, chest pressure on exertion    HISTORY OF PRESENT ILLNESS:     Jefferson Farias is a 73 y.o.  female with history of atrial fibrillation, SSS s/p pacemaker, diabetes, diastolic CHF who presents with about 1 week history of shortness of breath and chest pressure on exertion. Patient had URI symptoms about 3 weeks ago, was seen by PCP and given antibiotics has not had any further fever or chills for last week. Over last few days patient has noticed increased swelling in her feet and shortness of breath that is associated with chest pressure only on exertion. Of note patient's sotalol was increased to 180 mg due to afib, had pacemaker interrogation at that time. Has been taking medications regularly.    In ED had few runs of Vtach - up to 24 beats. We were asked to admit for work up and evaluation of the above problems. Past Medical History:   Diagnosis Date    [facility] fibrillation ([facility] [address] 2010    Chronic diastolic heart failure ([facility] [address] 2014    COPD     COPD (chronic obstructive pulmonary disease) ([facility] [address] 2010    Diabetes ([facility] [address]     Fibroid     [facility]     Hypertension 2010    NIDDM (non-insulin dependent diabetes mellitus) 2010    Screening mammogram 5/4/10    SOB (shortness of breath) 2014        Past Surgical History:   Procedure Laterality Date    HX  SECTION      HX OTHER SURGICAL      adrenal gland removed    HX PACEMAKER      [place] EXCISE ADRENAL GLAND         Social History   Substance Use Topics    Smoking status: Former Smoker     Types: Cigarettes     Quit date: 2009    Smokeless tobacco: Never Used    Alcohol use No        Family History   Problem Relation Age of Onset    Heart Disease Mother     Diabetes Father     Heart Disease Brother      Allergies   Allergen Reactions    Crestor [Rosuvastatin] Myalgia    Levaquin [Levofloxacin] Nausea Only     GI Upset    Lyrica [Pregabalin] Myalgia        Prior to Admission medications    Medication Sig Start Date End Date Taking? Authorizing Provider   warfarin (COUMADIN) 5 mg tablet Take 7.5 mg by mouth five (5) days a week. 7.5 mg five days a week Monday through Friday   Yes Shannan Castro MD   warfarin (COUMADIN) 5 mg tablet Take 10 mg by mouth two (2) times daily on Sat & Sun. 10 mg Sat and Sun   Yes Shannan Castro MD   fluticasone (FLONASE) 50 mcg/actuation nasal spray 2 Sprays by Both Nostrils route every evening. Yes Shannan Castro MD   gabapentin (NEURONTIN) 800 mg tablet TAKE ONE TABLET BY MOUTH THREE TIMES DAILY 3/6/17  Yes Katt Stewart NP   evolocumab (REPATHA PUSHTRONEX) 420 mg/3.5 mL Injt 420 mg by SubCUTAneous route every month.  Indications: arteriosclerotic vascular disease 16  Yes Katt Stewart NP tiotropium (SPIRIVA WITH HANDIHALER) 18 mcg inhalation capsule INHALE THE CONTENTS OF 1 CAPSULE THROUGH HANDIHALER DEVICE DAILY 10/31/16  Yes Malissa Mcfadden NP   metFORMIN (GLUCOPHAGE) 1,000 mg tablet TAKE 1 TABLET BY MOUTH TWICE DAILY WITH MEALS  Indications: PREVENTION OF TYPE 2 DIABETES MELLITUS 10/31/16  Yes Malissa Mcfadden NP   valsartan (DIOVAN) 40 mg tablet TAKE ONE-HALF TABLET BY MOUTH ONCE DAILY FOR  DIABETIC NEPHROPATHY 9/9/16  Yes Malissa Mcfadden NP   sotalol (BETAPACE) 120 mg tablet Take 1 Tab by mouth two (2) times a day. Patient taking differently: Take 180 mg by mouth two (2) times a day. 9/8/16  Yes Malissa Mcfadden NP   warfarin (COUMADIN) 5 mg tablet Take 1.5 tabs daily, but on Saturday and Sunday take 2 tabs. 9/8/16  Yes Malissa Mcfadden NP   furosemide (LASIX) 20 mg tablet Take 1 Tab by mouth daily. 9/8/16  Yes Shannan Byers NP   colchicine 0.6 mg tablet Take 1 Tab by mouth two (2) times a day. 9/8/16  Yes Malissa Mcfadden NP   budesonide-formoterol (SYMBICORT) 160-4.5 mcg/actuation HFA inhaler Take 1 Puff by inhalation two (2) times a day. 7/12/16  Yes Malissa Mcfadden NP   albuterol (PROVENTIL VENTOLIN) 2.5 mg /3 mL (0.083 %) nebulizer solution 3 mL by Nebulization route every four (4) hours as needed for Wheezing. 5/19/16  Yes Malissa Mcfadden NP   FOLIC ACID/MULTIVIT-MIN/LUTEIN (CENTRUM SILVER PO) Take 1 Tab by mouth daily. Takes one po daily. Yes Historical Provider   albuterol (VENTOLIN HFA) 90 mcg/actuation inhaler Take 2 Puffs by inhalation every six (6) hours as needed for Wheezing. 3/8/16  Yes Deric Arvizu MD   Azelastine (ASTEPRO) 0.15 % (205.5 mcg) nasal spray 1 [place] by Both Nostrils route daily. 1/21/15  Yes Historical Provider   cod liver oil cap Take 1 Cap by mouth daily. Yes Historical Provider   aspirin 81 mg chewable tablet Take 81 mg by mouth daily. Yes Historical Provider   Blood-Glucose Meter monitoring kit Check blood sugar daily.  May substitute for insurance preferred meter 1/31/17   Princess Bolton NP       REVIEW OF SYSTEMS:       Total of 12 systems reviewed as follows:         General: no fever, no chills, no sweats, no generalized weakness, no weight loss, no weight gain, no loss of appetite   Eyes: no blurred vision, no eye pain, no loss of vision, no double vision  ENT: no rhinorrhea, no pharyngitis   Respiratory: + cough, no sputum production, + SOB, + [place], no wheezing, no pleuritic pain   Cardiology: + chest pain, no palpitations, no orthopnea, no PND, + edema, no syncope   Gastrointestinal: no abdominal pain, no N/V, + diarrhea, no dysphagia, no constipation, no bleeding   Genitourinary: no frequency, no urgency, no dysuria, no hematuria, no incontinence   Muskuloskeletal : no arthralgia, no myalgia, no back pain  Hematology: no easy bruising, no nose or gum bleeding, no lymphadenopathy   Dermatological: no rash, no ulceration, no pruritis, no color change / jaundice  Endocrine: no hot flashes, no polydipsia   Neurological: no headache, no dizziness, no confusion, no focal weakness, no paresthesia, no speech difficulties, no memory loss, no gait difficulty  Psychological: no anxiety, no depression, no agitation      Objective:   VITALS:    Patient Vitals for the past 12 hrs:   Temp Pulse Resp BP SpO2   04/04/17 1430 - 83 14 100/67 94 %   04/04/17 1400 - 85 18 104/68 98 %   04/04/17 1331 - 91 15 112/74 96 %   04/04/17 1300 - 97 16 96/73 97 %   04/04/17 1245 - (!) 102 19 (!) 116/97 97 %   04/04/17 1230 - (!) 101 13 100/57 96 %   04/04/17 1145 - (!) 102 18 110/63 97 %   04/04/17 1000 - 100 23 110/70 94 %   04/04/17 0945 - (!) 101 23 114/67 96 %   04/04/17 0916 - 97 27 122/72 95 %   04/04/17 0900 98.1 °F (36.7 °C) 97 21 103/88 91 %         PHYSICAL EXAM:    General:    Alert, cooperative, no distress, appears stated age.      HEENT: Atraumatic, anicteric sclerae, pink conjunctivae     No oral ulcers, oral mucosa moist, throat clear, dentition fair  Neck:  Supple, symmetrical  Lungs: Rales at bases bilaterally, no wheezing or rhonchi  Chest wall:  No tenderness, no accessory muscle use. Heart:   irregular rhythm, no murmur, no significant edema on exam  Abdomen:   Soft, non-tender, not distended, bowel sounds normal  Extremities: No cyanosis, no clubbing, warm, well perfused, radial pulse 2+  Skin:     Not pale, not jaundiced, no rashes   Psych:  Good insight, not depressed, not anxious or agitated. Neurologic: EOMs intact, face symmetric, no aphasia or slurred speech, strength 5/5 in BUE, 5/5 in BLE, sensation grossly intact, alert and oriented x 4.     _______________________________________________________________________  Care Plan discussed with:    Comments   Patient x    Family      RN x    Care Manager                    Consultant:      _______________________________________________________________________  Expected  Disposition:   Home with Family x   HH/PT/OT/RN    SNF/LTC    [facility]    ________________________________________________________________________  TOTAL TIME:  72 Minutes    Critical Care Provided     Minutes non procedure based      Comments     Reviewed previous records   >50% of visit spent in counseling and coordination of care  Discussion with patient and/or family and questions answered       ________________________________________________________________________  Sierra Shelby MD    Procedures: see electronic medical records for all procedures/Xrays and details which were not copied into this note but were reviewed prior to creation of Plan.     LAB DATA REVIEWED:    Recent Results (from the past 24 hour(s))   EKG, 12 LEAD, INITIAL    Collection Time: 04/04/17  8:45 AM   Result Value Ref Range    Ventricular Rate 86 BPM    Atrial Rate 288 BPM    QRS Duration 86 ms    Q-T Interval 376 ms    QTC Calculation (Bezet) 449 ms    Calculated R Axis 9 degrees    Calculated T Axis -55 degrees    Diagnosis       Atrial fibrillation with premature ventricular or aberrantly conducted   complexes  ST & T wave abnormality, consider lateral ischemia or digitalis effect  Abnormal ECG  When compared with ECG of 14-FEB-2017 08:25,  Atrial fibrillation has replaced Electronic atrial pacemaker  ST now depressed in Anterior leads  Inverted T waves have replaced nonspecific T wave abnormality in Inferior   leads     CBC WITH AUTOMATED DIFF    Collection Time: 04/04/17  9:44 AM   Result Value Ref Range    WBC 7.5 3.6 - 11.0 K/uL    RBC 4.35 3.80 - 5.20 M/uL    HGB 12.3 11.5 - 16.0 g/dL    HCT 36.2 35.0 - 47.0 %    MCV 83.2 80.0 - 99.0 FL    MCH 28.3 26.0 - 34.0 PG    MCHC 34.0 30.0 - 36.5 g/dL    RDW 14.6 (H) 11.5 - 14.5 %    PLATELET 449 911 - 565 K/uL    NEUTROPHILS 57 32 - 75 %    LYMPHOCYTES 30 12 - 49 %    MONOCYTES 11 5 - 13 %    EOSINOPHILS 2 0 - 7 %    BASOPHILS 0 0 - 1 %    ABS. NEUTROPHILS 4.3 1.8 - 8.0 K/UL    ABS. LYMPHOCYTES 2.3 0.8 - 3.5 K/UL    ABS. MONOCYTES 0.9 0.0 - 1.0 K/UL    ABS. EOSINOPHILS 0.1 0.0 - 0.4 K/UL    ABS. BASOPHILS 0.0 0.0 - 0.1 K/UL   METABOLIC PANEL, COMPREHENSIVE    Collection Time: 04/04/17  9:44 AM   Result Value Ref Range    Sodium 144 136 - 145 mmol/L    Potassium 3.1 (L) 3.5 - 5.1 mmol/L    Chloride 109 (H) 97 - 108 mmol/L    CO2 25 21 - 32 mmol/L    Anion gap 10 5 - 15 mmol/L    Glucose 127 (H) 65 - 100 mg/dL    BUN 15 6 - 20 MG/DL    Creatinine 0.83 0.55 - 1.02 MG/DL    BUN/Creatinine ratio 18 12 - 20      GFR est AA >60 >60 ml/min/1.73m2    GFR est non-AA >60 >60 ml/min/1.73m2    Calcium 8.5 8.5 - 10.1 MG/DL    Bilirubin, total 0.7 0.2 - 1.0 MG/DL    ALT (SGPT) 61 12 - 78 U/L    AST (SGOT) 64 (H) 15 - 37 U/L    Alk.  phosphatase 109 45 - 117 U/L    Protein, total 7.1 6.4 - 8.2 g/dL    Albumin 3.2 (L) 3.5 - 5.0 g/dL    Globulin 3.9 2.0 - 4.0 g/dL    A-G Ratio 0.8 (L) 1.1 - 2.2     PRO-BNP    Collection Time: 04/04/17  9:44 AM   Result Value Ref Range    NT pro-BNP 1798 (H) 0 - 125 PG/ML   D DIMER    Collection Time: 04/04/17  9:44 AM   Result Value Ref Range    D-dimer 0.19 0.00 - 0.65 mg/L FEU   TROPONIN I    Collection Time: 04/04/17  9:44 AM   Result Value Ref Range    Troponin-I, Qt. <0.04 <0.05 ng/mL   PROTHROMBIN TIME + INR    Collection Time: 04/04/17  9:44 AM   Result Value Ref Range    INR 3.3 (H) 0.9 - 1.1      Prothrombin time 34.8 (H) 9.0 - 11.1 sec   INFLUENZA A & B AG (RAPID TEST)    Collection Time: 04/04/17  9:45 AM   Result Value Ref Range    Influenza A Antigen NEGATIVE  NEG      Influenza B Antigen NEGATIVE  NEG

## 2017-04-04 NOTE — IP AVS SNAPSHOT
Current Discharge Medication List  
  
START taking these medications Dose & Instructions Dispensing Information Comments Morning Noon Evening Bedtime  
 atorvastatin 20 mg tablet Commonly known as:  LIPITOR Your last dose was: Your next dose is:    
   
   
 Dose:  20 mg Take 1 Tab by mouth nightly. Quantity:  30 Tab Refills:  0  
     
   
   
   
  
 clopidogrel 75 mg Tab Commonly known as:  PLAVIX Your last dose was: Your next dose is:    
   
   
 Dose:  75 mg Take 1 Tab by mouth daily. Quantity:  30 Tab Refills:  0 CONTINUE these medications which have CHANGED Dose & Instructions Dispensing Information Comments Morning Noon Evening Bedtime  
 colchicine 0.6 mg tablet What changed:   
- how much to take - when to take this Your last dose was: Your next dose is:    
   
   
 Dose:  0.6 mg Take 1 Tab by mouth two (2) times a day. Quantity:  60 Tab Refills:  5  
     
   
   
   
  
 sotalol 120 mg tablet Commonly known as:  Janet De Los Santos What changed:  how much to take Your last dose was: Your next dose is:    
   
   
 Dose:  120 mg Take 1 Tab by mouth two (2) times a day. Quantity:  180 Tab Refills:  3  
     
   
   
   
  
 valsartan 40 mg tablet Commonly known as:  DIOVAN What changed:  See the new instructions. Your last dose was: Your next dose is:    
   
   
 Dose:  20 mg Take 0.5 Tabs by mouth daily. Quantity:  30 Tab Refills:  0 CONTINUE these medications which have NOT CHANGED Dose & Instructions Dispensing Information Comments Morning Noon Evening Bedtime * albuterol 90 mcg/actuation inhaler Commonly known as:  VENTOLIN HFA Your last dose was: Your next dose is:    
   
   
 Dose:  2 Puff Take 2 Puffs by inhalation every six (6) hours as needed for Wheezing. Quantity:  1 Inhaler Refills:  11  
     
   
   
   
  
 * albuterol 2.5 mg /3 mL (0.083 %) nebulizer solution Commonly known as:  PROVENTIL VENTOLIN Your last dose was: Your next dose is:    
   
   
 Dose:  2.5 mg  
3 mL by Nebulization route every four (4) hours as needed for Wheezing. Quantity:  1 Package Refills:  5  
     
   
   
   
  
 aspirin 81 mg chewable tablet Your last dose was: Your next dose is:    
   
   
 Dose:  81 mg Take 81 mg by mouth daily. Refills:  0 Azelastine 0.15 % (205.5 mcg) nasal spray Commonly known as:  ASTEPRO Your last dose was: Your next dose is:    
   
   
 Dose:  1 Spray 1 [place] by Both Nostrils route daily. Refills:  0 Blood-Glucose Meter monitoring kit Your last dose was: Your next dose is:    
   
   
 Check blood sugar daily. May substitute for insurance preferred meter Quantity:  1 Kit Refills:  0  
     
   
   
   
  
 budesonide-formoterol 160-4.5 mcg/actuation HFA inhaler Commonly known as:  SYMBICORT Your last dose was: Your next dose is:    
   
   
 Dose:  1 Puff Take 1 Puff by inhalation two (2) times a day. Quantity:  3 Inhaler Refills:  3 CENTRUM SILVER PO Your last dose was: Your next dose is:    
   
   
 Dose:  1 Tab Take 1 Tab by mouth daily. Takes one po daily. Refills:  0  
     
   
   
   
  
 cod liver oil Cap Your last dose was: Your next dose is:    
   
   
 Dose:  1 Cap Take 1 Cap by mouth daily. Refills:  0  
     
   
   
   
  
 evolocumab 420 mg/3.5 mL Injt Commonly known as:  Ivana Heatphoebe Your last dose was: Your next dose is:    
   
   
 Dose:  420 mg  
420 mg by SubCUTAneous route every month. Indications: arteriosclerotic vascular disease Quantity:  1 Device Refills:  12 FLONASE 50 mcg/actuation nasal spray Generic drug:  fluticasone Your last dose was: Your next dose is:    
   
   
 Dose:  2 Spray 2 Sprays by Both Nostrils route every evening. Refills:  0  
     
   
   
   
  
 furosemide 20 mg tablet Commonly known as:  LASIX Your last dose was: Your next dose is:    
   
   
 Dose:  20 mg Take 1 Tab by mouth daily. Quantity:  90 Tab Refills:  0  
     
   
   
   
  
 gabapentin 800 mg tablet Commonly known as:  NEURONTIN Your last dose was: Your next dose is: TAKE ONE TABLET BY MOUTH THREE TIMES DAILY Quantity:  90 Tab Refills:  11  
     
   
   
   
  
 metFORMIN 1,000 mg tablet Commonly known as:  GLUCOPHAGE Your last dose was: Your next dose is: TAKE 1 TABLET BY MOUTH TWICE DAILY WITH MEALS  Indications: PREVENTION OF TYPE 2 DIABETES MELLITUS Quantity:  180 Tab Refills:  3  
     
   
   
   
  
 tiotropium 18 mcg inhalation capsule Commonly known as:  [address] Your last dose was: Your next dose is:    
   
   
 INHALE THE CONTENTS OF 1 CAPSULE THROUGH HANDIHALER DEVICE DAILY Quantity:  90 Cap Refills:  3  
     
   
   
   
  
 * warfarin 5 mg tablet Commonly known as:  COUMADIN Your last dose was: Your next dose is:    
   
   
 Dose:  7.5 mg Take 7.5 mg by mouth five (5) days a week. 7.5 mg five days a week Monday through Friday Refills:  0  
     
   
   
   
  
 * warfarin 5 mg tablet Commonly known as:  COUMADIN Your last dose was: Your next dose is:    
   
   
 Dose:  10 mg Take 10 mg by mouth two (2) times daily on Sat & Sun. 10 mg Sat and Sun Refills:  0  
     
   
   
   
  
 * warfarin 5 mg tablet Commonly known as:  COUMADIN Your last dose was: Your next dose is: Take 1.5 tabs daily, but on Saturday and Sunday take 2 tabs. Quantity:  55 Tab Refills:  2  
     
   
   
   
  
 * Notice: This list has 5 medication(s) that are the same as other medications prescribed for you. Read the directions carefully, and ask your doctor or other care provider to review them with you. Where to Get Your Medications Information on where to get these meds will be given to you by the nurse or doctor. ! Ask your nurse or doctor about these medications  
  atorvastatin 20 mg tablet  
 clopidogrel 75 mg Tab  
 valsartan 40 mg tablet

## 2017-04-04 NOTE — ED NOTES
Dr. Samaria Jackson aware of systolic blood pressure 072, advised to hold Diovan PO. Patient given georgia crackers.

## 2017-04-04 NOTE — ED NOTES
Cardiac rhythm changed to approximately 18 beat run of V-tach, patient c/o chest pressure, Dr. Lola Pastrana aware and ordered home medications.

## 2017-04-04 NOTE — ED NOTES
Patient's cardiac rhythm with approx 24 beat run of v-tach, patient just started Albuterol aerosol tx and states \"this is making me feel funny,\" described as increased pressure to left chest.  Albuterol tx stopped, discussed change in cardiac rhythm with Dr. Hipolito Ramirez. Multifunction pads placed and code cart at bedside.

## 2017-04-04 NOTE — ED PROVIDER NOTES
HPI Comments: Latonia Castillo, 73 y.o. female, presents via EMS to [facility] ED with cc of progressively worsening SOB x 3 days. The patient states that her SOB is exacerbated with exertion. The patient also c/o exertional chest pain that she describes as a pressure that started three days ago as well. The patient also c/o an unproductive cough and increased leg swelling. The patient notes that she has used her inhaler and nebulizer treatment with no relief. Per EMS, the pt's SpO2 was 90% on RA and was placed on 2 L of O2 NC. The patient states that she is prescribed a diuretic and denies any recent changes in dosages. The patient notes that she attempted to take an additional dose to alleviate her symptoms with no relief. The patient states that she had her pacemaker interrogated 1 week ago with no significant findings. The patient also c/o intermittent episodes of diarrhea x 1 week. The pt denies being on home O2. The pt specifically denies rashes, dysuria, blood in her stool, or hematemesis at this time. PCP: Rome Abraham NP  Cardiology: Francoise Novoa MD    PMHx significant for: Diabetes, HTN, atrial fibrillation, COPD, CHF   Social history significant for: - Tobacco (former), - EtOH, - Illicit Drug Use    There are no other complaints, changes, or physical findings at this time. Written by ALETHA Mcleod, as dictated by Lucien Latif MD.    The history is provided by the patient. No  was used.         Past Medical History:   Diagnosis Date    Atrial fibrillation ([address] 6/2/2010    Chronic diastolic heart failure ([address] 9/22/2014    COPD     COPD (chronic obstructive pulmonary disease) ([address] 6/2/2010    Diabetes ([address]     Fibroid     Heart failure ([address]     Hypertension 6/2/2010    NIDDM (non-insulin dependent diabetes mellitus) 6/2/2010    Screening mammogram 5/4/10    SOB (shortness of breath) 9/22/2014       Past Surgical History:   Procedure Laterality Date  HX  SECTION      HX OTHER SURGICAL      adrenal gland removed    HX PACEMAKER      [place] EXCISE ADRENAL GLAND           Family History:   Problem Relation Age of Onset    Heart Disease Mother     Diabetes Father     Heart Disease Brother        Social History     Social History    Marital status:      Spouse name: N/A    Number of children: N/A    Years of education: N/A     Occupational History    Not on file. Social History Main Topics    Smoking status: Former Smoker     Types: Cigarettes     Quit date: 2009    Smokeless tobacco: Never Used    Alcohol use No    Drug use: No    Sexual activity: Not Currently     Other Topics Concern    Not on file     Social History Narrative         ALLERGIES: Crestor [rosuvastatin]; Levaquin [levofloxacin]; and Lyrica [pregabalin]    Review of Systems   Constitutional: Negative for chills, fatigue and fever. HENT: Negative for congestion, rhinorrhea and sore throat. Eyes: Negative for pain, discharge and visual disturbance. Respiratory: Positive for cough and shortness of breath. Negative for chest tightness and wheezing. Cardiovascular: Positive for chest pain and leg swelling. Negative for palpitations. Gastrointestinal: Negative for abdominal pain, blood in stool, constipation, diarrhea, nausea and vomiting.        (-) Hematemesis   Genitourinary: Negative for dysuria, frequency and hematuria. Musculoskeletal: Negative for arthralgias, back pain and myalgias. Skin: Negative for rash. Neurological: Negative for dizziness, weakness, light-headedness and headaches. Psychiatric/Behavioral: Negative.         Vitals:    17 0945 17 1000 17 1145 17 1230   BP: 114/67 110/70 110/63 100/57   Pulse: (!) 101 100 (!) 102 (!) 101   Resp: 23 23 18 13   Temp:       SpO2: 96% 94% 97% 96%   Weight:       Height:                Physical Exam   Vital signs and nursing notes reviewed    CONSTITUTIONAL: Alert, in no apparent distress; well-developed; well-nourished. HEAD:  Normocephalic, atraumatic  EYES: PERRL; EOM's intact. ENTM: Nose: no rhinorrhea; Throat: no erythema or exudate, mucous membranes moist  Neck:  Supple. trachea is midline. RESP: 95% on 2 L of NC. Pt is winded when sitting up in bed. RR is 22. Diminished air movement bilaterally. No crackles in bases. CV: S1 and S2 WNL; No murmurs, gallops or rubs. 2+ radial and DP pulses bilaterally. Borderline tachycardia. GI: non-distended, normal bowel sounds, abdomen soft and non-tender. No masses or organomegaly. : No costo-vertebral angle tenderness. BACK:  Non-tender, normal appearance  UPPER EXT:  Normal inspection. no joint or soft tissue swelling  LOWER EXT: Trace peripheral edema, no calf tenderness. NEURO: Alert and oriented x3, 5/5 strength and light touch sensation intact in bilateral upper and lower extremities. SKIN: No rashes; Warm and dry. Healing scab lesion on right lower shin. PSYCH: Normal mood, normal affect  Written by ALETHA Wright, as dictated by Aaron Sanford MD.    MDM  Number of Diagnoses or Management Options  Hypokalemia:   Hypoxia:   Systolic congestive heart failure, unspecified congestive heart failure chronicity [facility]):   Diagnosis management comments: 73 y.o. F with hypoxia, extreme fatigue, and dyspnea on exertion with concern for pulmonary edema and possible CHF without evidence of cardiac ischemia or increased risk for PE.  Plan for initial diuresis, had discussion with Cardiology who will see patient in the hospital.       Amount and/or Complexity of Data Reviewed  Clinical lab tests: ordered and reviewed  Tests in the radiology section of CPT®: ordered and reviewed  Tests in the medicine section of CPT®: ordered and reviewed  Review and summarize past medical records: yes  Discuss the patient with other providers: yes (Cardiology, Hospitalist)  Independent visualization of images, tracings, or specimens: yes    Patient Progress  Patient progress: stable    ED Course       Procedures    EKG interpretation: (Preliminary) 8:45  Rhythm: atrial fib; and irregular. Rate (approx.): 86; Axis: normal; T wave inversion in V4, V5, and V6, which are old compared to 2/14/17 EKG. Written by ALETHA Aguilar, as dictated by Emily Way MD.    Progress Note:  9:41 AM  During the patient's duo-neb treatment, she c/o palpitations. The patient was informed to stop her treatment. The patient had a run of vtach. Will place on pacers. Written by ALETHA Aguilar, as dictated by Emily Way MD.    Consult Note:  11:29 AM  Emily Way MD spoke with Daniela Alfaro MD,  Specialty: Cardiology  Discussed pt's hx, disposition, and available diagnostic and imaging results. Reviewed care plans. Consultant agrees with plans as outlined. Dr. Dionicio Kennedy recommends hospitalist admission with Cardiology consultation. Written by ALETHA Aguilar, as dictated by Emily Way MD.    CONSULT NOTE:   12:59 PM  Emily Way MD spoke with Verdell Siemens, MD,   Specialty: Hospitalist  Discussed pt's hx, disposition, and available diagnostic and imaging results. Reviewed care plans. Consultant will evaluate pt for admission. Written by ALETHA Aguilar, as dictated by Emily Way MD.    EKG interpretation: 13:49  Rhythm: paced; and regular . Rate (approx.): 80; Axis: normal; Frequent PVCs.   Written by ALETHA Aguilar, as dictated by Emily Way MD.    LABORATORY TESTS:  Recent Results (from the past 12 hour(s))   EKG, 12 LEAD, INITIAL    Collection Time: 04/04/17  8:45 AM   Result Value Ref Range    Ventricular Rate 86 BPM    Atrial Rate 288 BPM    QRS Duration 86 ms    Q-T Interval 376 ms    QTC Calculation (Bezet) 449 ms    Calculated R Axis 9 degrees    Calculated T Axis -55 degrees    Diagnosis       Atrial fibrillation with premature ventricular or aberrantly conducted   complexes  ST & T wave abnormality, consider lateral ischemia or digitalis effect  Abnormal ECG  When compared with ECG of 14-FEB-2017 08:25,  Atrial fibrillation has replaced Electronic atrial pacemaker  ST now depressed in Anterior leads  Inverted T waves have replaced nonspecific T wave abnormality in Inferior   leads     CBC WITH AUTOMATED DIFF    Collection Time: 04/04/17  9:44 AM   Result Value Ref Range    WBC 7.5 3.6 - 11.0 K/uL    RBC 4.35 3.80 - 5.20 M/uL    HGB 12.3 11.5 - 16.0 g/dL    HCT 36.2 35.0 - 47.0 %    MCV 83.2 80.0 - 99.0 FL    MCH 28.3 26.0 - 34.0 PG    MCHC 34.0 30.0 - 36.5 g/dL    RDW 14.6 (H) 11.5 - 14.5 %    PLATELET 294 281 - 807 K/uL    NEUTROPHILS 57 32 - 75 %    LYMPHOCYTES 30 12 - 49 %    MONOCYTES 11 5 - 13 %    EOSINOPHILS 2 0 - 7 %    BASOPHILS 0 0 - 1 %    ABS. NEUTROPHILS 4.3 1.8 - 8.0 K/UL    ABS. LYMPHOCYTES 2.3 0.8 - 3.5 K/UL    ABS. MONOCYTES 0.9 0.0 - 1.0 K/UL    ABS. EOSINOPHILS 0.1 0.0 - 0.4 K/UL    ABS. BASOPHILS 0.0 0.0 - 0.1 K/UL   METABOLIC PANEL, COMPREHENSIVE    Collection Time: 04/04/17  9:44 AM   Result Value Ref Range    Sodium 144 136 - 145 mmol/L    Potassium 3.1 (L) 3.5 - 5.1 mmol/L    Chloride 109 (H) 97 - 108 mmol/L    CO2 25 21 - 32 mmol/L    Anion gap 10 5 - 15 mmol/L    Glucose 127 (H) 65 - 100 mg/dL    BUN 15 6 - 20 MG/DL    Creatinine 0.83 0.55 - 1.02 MG/DL    BUN/Creatinine ratio 18 12 - 20      GFR est AA >60 >60 ml/min/1.73m2    GFR est non-AA >60 >60 ml/min/1.73m2    Calcium 8.5 8.5 - 10.1 MG/DL    Bilirubin, total 0.7 0.2 - 1.0 MG/DL    ALT (SGPT) 61 12 - 78 U/L    AST (SGOT) 64 (H) 15 - 37 U/L    Alk.  phosphatase 109 45 - 117 U/L    Protein, total 7.1 6.4 - 8.2 g/dL    Albumin 3.2 (L) 3.5 - 5.0 g/dL    Globulin 3.9 2.0 - 4.0 g/dL    A-G Ratio 0.8 (L) 1.1 - 2.2     PRO-BNP    Collection Time: 04/04/17  9:44 AM   Result Value Ref Range    NT pro-BNP 1798 (H) 0 - 125 PG/ML   D DIMER    Collection Time: 04/04/17  9:44 AM   Result Value Ref Range    D-dimer 0.19 0.00 - 0.65 mg/L FEU   TROPONIN I    Collection Time: 04/04/17  9:44 AM   Result Value Ref Range    Troponin-I, Qt. <0.04 <0.05 ng/mL   PROTHROMBIN TIME + INR    Collection Time: 04/04/17  9:44 AM   Result Value Ref Range    INR 3.3 (H) 0.9 - 1.1      Prothrombin time 34.8 (H) 9.0 - 11.1 sec   INFLUENZA A & B AG (RAPID TEST)    Collection Time: 04/04/17  9:45 AM   Result Value Ref Range    Influenza A Antigen NEGATIVE  NEG      Influenza B Antigen NEGATIVE  NEG         IMAGING RESULTS:  CXR Results  (Last 48 hours)               04/04/17 1027  XR CHEST PORT Final result    Impression:  IMPRESSION: Mild interstitial edema. Narrative:  EXAM:  XR CHEST PORT       INDICATION:  acute sob with hx of CHF and copd; eval for pulm edema       COMPARISON:  2/14/2017       FINDINGS: A portable AP radiograph of the chest was obtained at 1019 hours. The   patient is on a cardiac monitor. There is a dual-lead pacemaker in place without   evidence for lead fracture. There is a mild accentuation of interstitial   markings. No focal infiltrate is seen. The lungs remain hyperinflated. MEDICATIONS GIVEN:  Medications   valsartan (DIOVAN) tablet 40 mg (not administered)   albuterol-ipratropium (DUO-NEB) 2.5 MG-0.5 MG/3 ML (3 mL Nebulization Given 4/4/17 0930)   gabapentin (NEURONTIN) capsule 800 mg (800 mg Oral Given 4/4/17 1007)   furosemide (LASIX) injection 20 mg (20 mg IntraVENous Given 4/4/17 1102)   sotalol (BETAPACE) tablet 120 mg (120 mg Oral Given 4/4/17 1237)       IMPRESSION:  1. Hypoxia    2. Systolic congestive heart failure, unspecified congestive heart failure chronicity ([facility]    3. Hypokalemia        PLAN:  1. Admit to Hospitalist    Admit Note:  12:52 PM  Pt is being admitted by Tammie Graff MD. The results of their tests and reason(s) for their admission have been discussed with pt and/or available family.  They convey agreement and understanding for the need to be admitted and for admission diagnosis. This note is prepared by Dane Paz, acting as a Scribe for Calderon Alfred MD.    Calderon Alfred MD: The scribe's documentation has been prepared under my direction and personally reviewed by me in its entirety. I confirm that the notes above accurately reflects all work, treatment, procedures, and medical decision making performed by me.

## 2017-04-04 NOTE — ED NOTES
Patient alert and oriented x 4, call bell in reach and side rails up, denies increased in chest pressure, continues to c/o shortness of breath with exertion, aware of awaiting assessment by hospitalist.

## 2017-04-05 LAB
ANION GAP BLD CALC-SCNC: 12 MMOL/L (ref 5–15)
BUN SERPL-MCNC: 12 MG/DL (ref 6–20)
BUN/CREAT SERPL: 14 (ref 12–20)
CALCIUM SERPL-MCNC: 8.5 MG/DL (ref 8.5–10.1)
CHLORIDE SERPL-SCNC: 107 MMOL/L (ref 97–108)
CO2 SERPL-SCNC: 24 MMOL/L (ref 21–32)
CREAT SERPL-MCNC: 0.87 MG/DL (ref 0.55–1.02)
ERYTHROCYTE [DISTWIDTH] IN BLOOD BY AUTOMATED COUNT: 14.6 % (ref 11.5–14.5)
GLUCOSE BLD STRIP.AUTO-MCNC: 112 MG/DL (ref 65–100)
GLUCOSE BLD STRIP.AUTO-MCNC: 136 MG/DL (ref 65–100)
GLUCOSE BLD STRIP.AUTO-MCNC: 167 MG/DL (ref 65–100)
GLUCOSE BLD STRIP.AUTO-MCNC: 243 MG/DL (ref 65–100)
GLUCOSE SERPL-MCNC: 139 MG/DL (ref 65–100)
HCT VFR BLD AUTO: 37.4 % (ref 35–47)
HGB BLD-MCNC: 12.8 G/DL (ref 11.5–16)
INR PPP: 2.1 (ref 0.9–1.1)
MAGNESIUM SERPL-MCNC: 2.2 MG/DL (ref 1.6–2.4)
MAGNESIUM SERPL-MCNC: 2.2 MG/DL (ref 1.6–2.4)
MCH RBC QN AUTO: 28.7 PG (ref 26–34)
MCHC RBC AUTO-ENTMCNC: 34.2 G/DL (ref 30–36.5)
MCV RBC AUTO: 83.9 FL (ref 80–99)
PHOSPHATE SERPL-MCNC: 2.8 MG/DL (ref 2.6–4.7)
PLATELET # BLD AUTO: 244 K/UL (ref 150–400)
POTASSIUM SERPL-SCNC: 3.4 MMOL/L (ref 3.5–5.1)
POTASSIUM SERPL-SCNC: 3.7 MMOL/L (ref 3.5–5.1)
PROTHROMBIN TIME: 21.9 SEC (ref 9–11.1)
RBC # BLD AUTO: 4.46 M/UL (ref 3.8–5.2)
SERVICE CMNT-IMP: ABNORMAL
SODIUM SERPL-SCNC: 143 MMOL/L (ref 136–145)
WBC # BLD AUTO: 6.3 K/UL (ref 3.6–11)

## 2017-04-05 PROCEDURE — 74011636637 HC RX REV CODE- 636/637: Performed by: INTERNAL MEDICINE

## 2017-04-05 PROCEDURE — 99218 HC RM OBSERVATION: CPT

## 2017-04-05 PROCEDURE — 84132 ASSAY OF SERUM POTASSIUM: CPT | Performed by: INTERNAL MEDICINE

## 2017-04-05 PROCEDURE — 82962 GLUCOSE BLOOD TEST: CPT

## 2017-04-05 PROCEDURE — 65660000000 HC RM CCU STEPDOWN

## 2017-04-05 PROCEDURE — 74011250637 HC RX REV CODE- 250/637: Performed by: HOSPITALIST

## 2017-04-05 PROCEDURE — 77010033678 HC OXYGEN DAILY

## 2017-04-05 PROCEDURE — 83735 ASSAY OF MAGNESIUM: CPT | Performed by: INTERNAL MEDICINE

## 2017-04-05 PROCEDURE — 74011250636 HC RX REV CODE- 250/636: Performed by: INTERNAL MEDICINE

## 2017-04-05 PROCEDURE — 85027 COMPLETE CBC AUTOMATED: CPT | Performed by: INTERNAL MEDICINE

## 2017-04-05 PROCEDURE — 74011250637 HC RX REV CODE- 250/637: Performed by: INTERNAL MEDICINE

## 2017-04-05 PROCEDURE — 93306 TTE W/DOPPLER COMPLETE: CPT

## 2017-04-05 PROCEDURE — 36415 COLL VENOUS BLD VENIPUNCTURE: CPT | Performed by: INTERNAL MEDICINE

## 2017-04-05 PROCEDURE — 85610 PROTHROMBIN TIME: CPT | Performed by: INTERNAL MEDICINE

## 2017-04-05 PROCEDURE — 80048 BASIC METABOLIC PNL TOTAL CA: CPT | Performed by: INTERNAL MEDICINE

## 2017-04-05 PROCEDURE — 84100 ASSAY OF PHOSPHORUS: CPT | Performed by: INTERNAL MEDICINE

## 2017-04-05 RX ORDER — COLCHICINE 0.6 MG/1
1.25 TABLET ORAL DAILY
Status: DISCONTINUED | OUTPATIENT
Start: 2017-04-06 | End: 2017-04-05

## 2017-04-05 RX ORDER — POTASSIUM CHLORIDE 1.5 G/1.77G
40 POWDER, FOR SOLUTION ORAL
Status: DISCONTINUED | OUTPATIENT
Start: 2017-04-05 | End: 2017-04-05

## 2017-04-05 RX ORDER — POTASSIUM CHLORIDE 750 MG/1
40 TABLET, FILM COATED, EXTENDED RELEASE ORAL
Status: COMPLETED | OUTPATIENT
Start: 2017-04-05 | End: 2017-04-05

## 2017-04-05 RX ORDER — COLCHICINE 0.6 MG/1
1.25 TABLET ORAL DAILY
Status: DISCONTINUED | OUTPATIENT
Start: 2017-04-05 | End: 2017-04-10 | Stop reason: HOSPADM

## 2017-04-05 RX ORDER — POTASSIUM CHLORIDE 750 MG/1
20 TABLET, FILM COATED, EXTENDED RELEASE ORAL 2 TIMES DAILY
Status: DISCONTINUED | OUTPATIENT
Start: 2017-04-05 | End: 2017-04-10 | Stop reason: HOSPADM

## 2017-04-05 RX ORDER — WARFARIN 7.5 MG/1
7.5 TABLET ORAL ONCE
Status: COMPLETED | OUTPATIENT
Start: 2017-04-05 | End: 2017-04-05

## 2017-04-05 RX ORDER — FUROSEMIDE 10 MG/ML
20 INJECTION INTRAMUSCULAR; INTRAVENOUS 2 TIMES DAILY
Status: DISCONTINUED | OUTPATIENT
Start: 2017-04-05 | End: 2017-04-06

## 2017-04-05 RX ADMIN — INSULIN LISPRO 3 UNITS: 100 INJECTION, SOLUTION INTRAVENOUS; SUBCUTANEOUS at 12:42

## 2017-04-05 RX ADMIN — GABAPENTIN 800 MG: 100 CAPSULE ORAL at 21:15

## 2017-04-05 RX ADMIN — VALSARTAN 20 MG: 40 TABLET ORAL at 21:16

## 2017-04-05 RX ADMIN — FUROSEMIDE 20 MG: 10 INJECTION, SOLUTION INTRAMUSCULAR; INTRAVENOUS at 09:34

## 2017-04-05 RX ADMIN — COLCHICINE 1.2 MG: 0.6 TABLET, FILM COATED ORAL at 10:26

## 2017-04-05 RX ADMIN — WARFARIN SODIUM 7.5 MG: 7.5 TABLET ORAL at 17:03

## 2017-04-05 RX ADMIN — FUROSEMIDE 20 MG: 10 INJECTION, SOLUTION INTRAMUSCULAR; INTRAVENOUS at 17:03

## 2017-04-05 RX ADMIN — GABAPENTIN 800 MG: 100 CAPSULE ORAL at 09:33

## 2017-04-05 RX ADMIN — MAGNESIUM SULFATE HEPTAHYDRATE 1 G: 1 INJECTION, SOLUTION INTRAVENOUS at 00:06

## 2017-04-05 RX ADMIN — SOTALOL HYDROCHLORIDE 180 MG: 120 TABLET ORAL at 00:58

## 2017-04-05 RX ADMIN — Medication 10 ML: at 21:15

## 2017-04-05 RX ADMIN — MULTIPLE VITAMINS W/ MINERALS TAB 1 TABLET: TAB at 09:33

## 2017-04-05 RX ADMIN — POTASSIUM CHLORIDE 40 MEQ: 750 TABLET, FILM COATED, EXTENDED RELEASE ORAL at 09:34

## 2017-04-05 RX ADMIN — ASPIRIN 81 MG CHEWABLE TABLET 81 MG: 81 TABLET CHEWABLE at 09:34

## 2017-04-05 RX ADMIN — UMECLIDINIUM 1 PUFF: 62.5 AEROSOL, POWDER ORAL at 09:35

## 2017-04-05 RX ADMIN — FLUTICASONE FUROATE AND VILANTEROL TRIFENATATE 1 PUFF: 100; 25 POWDER RESPIRATORY (INHALATION) at 09:41

## 2017-04-05 RX ADMIN — POTASSIUM CHLORIDE 20 MEQ: 750 TABLET, FILM COATED, EXTENDED RELEASE ORAL at 17:03

## 2017-04-05 RX ADMIN — Medication 10 ML: at 06:28

## 2017-04-05 RX ADMIN — GABAPENTIN 800 MG: 100 CAPSULE ORAL at 17:03

## 2017-04-05 RX ADMIN — SOTALOL HYDROCHLORIDE 180 MG: 120 TABLET ORAL at 12:43

## 2017-04-05 RX ADMIN — FLUTICASONE PROPIONATE 2 SPRAY: 50 SPRAY, METERED NASAL at 21:15

## 2017-04-05 NOTE — PROGRESS NOTES
[address] SHIFT NURSING NOTE    Bedside shift change report given to Napoleon Vega (oncoming nurse) by Roby Gallego (offgoing nurse). Report included the following information SBAR, Kardex and MAR. SHIFT SUMMARY:         Admission Date 4/4/2017   Admission Diagnosis CHF (congestive heart failure) ([facility] [address]   Consults IP CONSULT TO CARDIOLOGY        Consults   [] PT   [] OT   [] Speech   [] Palliative      [] Hospice    [] Case Management   [x] None   Cardiac Monitoring   [x] Yes   [] No     Antibiotics   [] Yes   [x] No   GI Prophylaxis  (Ex: Protonix, Pepcid, etc,.)   [] Yes   [x] No          DVT Prophylaxis   SCDs:             Mayo stockings:         [x] Medication (Ex: Lovenox, Eliquis, Brilinta, Coumadin,  Heparin, etc..)   [] Contraindicated   [] No VTE needed       Urinary Catheter             LDAs               Peripheral IV 04/04/17 Left Forearm (Active)   Site Assessment Clean, dry, & intact 4/5/2017  7:50 AM   Phlebitis Assessment 0 4/5/2017  7:50 AM   Infiltration Assessment 0 4/5/2017  7:50 AM   Dressing Status Clean, dry, & intact 4/5/2017  7:50 AM   Dressing Type Transparent;Tape 4/5/2017  7:50 AM   Hub Color/Line Status Blue;Capped 4/5/2017  7:50 AM                      I/Os   Intake/Output Summary (Last 24 hours) at 04/05/17 0832  Last data filed at 04/05/17 3552   Gross per 24 hour   Intake                0 ml   Output             1950 ml   Net            -1950 ml         Activity Level Activity Level: Up with Assistance     Activity Assistance: Partial (one person)   Diet Active Orders   Diet    DIET CARDIAC Regular; 2 GM NA (House Low NA); Consistent Carb 1800kcal      Purposeful Rounding every 1-2 hour?    [x] Yes    Anthony Score  Total Score: 1   Bed Alarm (If score 3 or >)   [] Yes    [] Refused (See signed refusal form in chart)   Landon Score  Landon Score: 20       Landon Score (if score 14 or less)   [] PMT consult   [] Nutrition consult   [] Wound Care consult      []  Specialty bed         Influenza Vaccine Received Flu Vaccine for Current Season (usually Sept-March): Yes               Needs prior to discharge:   Home O2 required:    [] Yes   [x] No     If yes, how much O2 required?     Other:    Last Bowel Movement Date: 04/04/17   Readmission Risk Assessment Tool Score High Risk            21       Total Score        4 More than 1 Admission in calendar year    17 Charlson Comorbidity Score        Criteria that do not apply:    Relationship with PCP    Patient Living Status    Patient Length of Stay > 5    Patient Insurance is Medicare, Medicaid or Self Pay       Expected Length of Stay - - -   Actual Length of Stay 1

## 2017-04-05 NOTE — PROGRESS NOTES
Hospitalist Progress Note    NAME: Michael Wei   :  1950   MRN:  [id number]       Interim Hospital Summary: 73 y.o. female whom presented on 2017 with      Assessment / Plan:  Acute on chronic diastolic heart failure POA- diuresing well  Afib with RVR POA- now rate controlled  -patient with increased shortness of breath and chest pressure on exertion,   CXR with interstitial edema,   pro BNP 1700 all suggest     S/p IV lasix in ED- pt has responded well  Cont lasix but decrease to 20 mg IV BID for now, change to PO in 24 hrs on discharge  -strict I/Os, daily weights,  -cardiology consult noted - cleared pt from their standpoint  -continue aspirin, diovan  -continue sotalol and warfarin - resume tonight as INR down today to 2.1     Runs of Vtach in ED -up to 24 beats on ED telemetry    repleted K + now, still low today AM  Cont to give K+ BID with diuresis (pt is on sotalol-monitor lytes closely)  Cont telemetry observation     COPD  -continue home inhalers, no signs of exacerbation     Diabetes  -hold metformin  -SSI        Code Status: full  Surrogate Decision Maker: daughter     DVT Prophylaxis: warfarin  GI Prophylaxis: not indicated     Baseline: independent  Recommended Disposition: Home w/Family in 24 hrs     Subjective:     Chief Complaint / Reason for Physician Visit F/U SOB, Diastolic CHF  \"I feel a little better\". Discussed with RN events overnight. Review of Systems:  Symptom Y/N Comments  Symptom Y/N Comments   Fever/Chills n   Chest Pain n    Poor Appetite n   Edema n    Cough n   Abdominal Pain n    Sputum n   Joint Pain n    SOB/GREENFIELD y Improved slightly  Pruritis/Rash     Nausea/vomit n   Tolerating PT/OT y    Diarrhea    Tolerating Diet y    Constipation    Other       Could NOT obtain due to:      Objective:     VITALS:   Last 24hrs VS reviewed since prior progress note.  Most recent are:  Patient Vitals for the past 24 hrs:   Temp Pulse Resp BP SpO2   17 1535 - - - - 96 %   04/05/17 1453 97.7 °F (36.5 °C) 94 18 97/64 97 %   04/05/17 0745 97.6 °F (36.4 °C) 81 18 109/59 98 %   04/05/17 0354 97.8 °F (36.6 °C) 86 18 110/60 94 %   04/05/17 0058 - 94 - 94/52 -   04/05/17 0000 - 90 - 103/69 -   04/04/17 2300 97.3 °F (36.3 °C) (!) 115 18 113/84 97 %   04/04/17 2202 97.8 °F (36.6 °C) 93 16 110/69 93 %   04/04/17 1818 - 99 - 129/64 -       Intake/Output Summary (Last 24 hours) at 04/05/17 1730  Last data filed at 04/05/17 1204   Gross per 24 hour   Intake              600 ml   Output             1700 ml   Net            -1100 ml        PHYSICAL EXAM:  General: WD, WN. Alert, cooperative, no acute distress    EENT:  EOMI. Anicteric sclerae. MMM  Resp:  CTA bilaterally, no wheezing or rales. No accessory muscle use  CV:  irregular  rhythm,  1 + LE edema  GI:  Soft, Non distended, Non tender.  +Bowel sounds  Neurologic:  Alert and oriented X 3, normal speech,   Psych:   Good insight. Not anxious nor agitated  Skin:  No rashes. No jaundice    Reviewed most current lab test results and cultures  YES  Reviewed most current radiology test results   YES  Review and summation of old records today    NO  Reviewed patient's current orders and MAR    YES  PMH/SH reviewed - no change compared to H&P  ________________________________________________________________________  Care Plan discussed with:    Comments   Patient x    Family      RN x    Care Manager     Consultant  x Dr Jonna Messer ([address] [place] [address])                     Multidiciplinary team rounds were held today with , nursing, pharmacist and clinical coordinator. Patient's plan of care was discussed; medications were reviewed and discharge planning was addressed.      ________________________________________________________________________  Total NON critical care TIME:  35   Minutes    Total CRITICAL CARE TIME Spent:   Minutes non procedure based      Comments   >50% of visit spent in counseling and coordination of care ________________________________________________________________________  Sydni Lopez MD     Procedures: see electronic medical records for all procedures/Xrays and details which were not copied into this note but were reviewed prior to creation of Plan. LABS:  I reviewed today's most current labs and imaging studies.   Pertinent labs include:  Recent Labs      04/05/17 0559  04/04/17 0944   WBC  6.3  7.5   HGB  12.8  12.3   HCT  37.4  36.2   PLT  244  225     Recent Labs      04/05/17   1300  04/05/17 0559  04/04/17 2008 04/04/17   0944   NA   --   143  140  144   K  3.7  3.4*  4.3  3.1*   CL   --   107  103  109*   CO2   --   24  26  25   GLU   --   139*  148*  127*   BUN   --   12  12  15   CREA   --   0.87  0.94  0.83   CA   --   8.5  9.1  8.5   MG  2.2  2.2  1.9   --    PHOS   --   2.8   --    --    ALB   --    --    --   3.2*   TBILI   --    --    --   0.7   SGOT   --    --    --   64*   ALT   --    --    --   61   INR   --   2.1*   --   3.3*       Signed: Sydni Lopez MD

## 2017-04-05 NOTE — CARDIO/PULMONARY
CP REHAB NOTE    Chart Review: Patient admitted for increasing SOB and chest pressure on exertion. Medical History: Afib, SSS, pacemaker, diabetes, diastolic CHF, COPD  EF 26-19%, Echo 10/17/2016  Former Smoker    New Echo pending    The CHF teaching packet was provided. The pt also received information regarding the low sodium diet, Spices, and The TLC diet. Instruction given on s/s of CHF, checking weight every am and calling MD if weight is up 2-3 lbs in a day or 5 lbs in a week (or as directed by the physician), fluid/Na restrictions, s/s of worsening CHF and when to call MD.  Discussed the CHF \"zones\" and subsequent actions with pt. Reviewed activity as tolerated with frequent rest periods as needed, taking medications as prescribed, and the importance of follow up visits with physician. Patient admitted she had not been weighing daily and stated she plans to get a scale upon discharge. Reviewed guidelines for calling the doctor. We also discussed the low sodium diet and the rationale behind monitoring sodium intake. Patient stated she does not use salt or eat packaged foods and reads labels carefully. Encouraged patient to begin walking and build up her stamina. Patient is currently in Afib and per patient they are going to get fluid off first and then decide plan for her Afib. Will continue to follow as plan develops.

## 2017-04-05 NOTE — PROGRESS NOTES
This is a 73 yr old feamle who has been admitted due to heart failure, afib with RVR with history of COPD and diabetes. Patient reports independence and is followed by [facility] PCP. Due to RAT score of 21 will request Palliative Care consult and [facility] program.     Care Management Interventions  MyChart Signup: No  Discharge Durable Medical Equipment: No  Physical Therapy Consult: No  Occupational Therapy Consult: No  Speech Therapy Consult: No  Current Support Network: Own Home  Plan discussed with Pt/Family/Caregiver:  Yes

## 2017-04-05 NOTE — PROGRESS NOTES
Pharmacy Daily Dosing of Warfarin     Indication: afib  Goal INR: 2-3     Average Daily Warfarin Dose: 7.5 mg M-F, and 10 mg on Sat and Sun  Concurrent Anticoagulants/Antiplatelets none at this time  Major Interacting Medications (Dose/Frequency): none at this time     Estimated Creatinine Clearance: 67.9 mL/min (based on Cr of 0.87). Estimated Creatinine Clearance (using IBW): 60.4 mL/min  Recent Labs      17   1300  17   0559  17   0944   CREA   --   0.87  0.94  0.83   BUN   --   12  12  15   WBC   --   6.3   --   7.5   NA   --   143  140  144   K  3.7  3.4*  4.3  3.1*   MG  2.2  2.2  1.9   --    CA   --   8.5  9.1  8.5   PHOS   --   2.8   --    --    ALB   --    --    --   3.2*   HGB   --   12.8   --   12.3   HCT   --   37.4   --   36.2   PLT   --   244   --   225   INR   --   2.1*   --   3.3*   ALT   --    --    --   61     Temp (24hrs), Av.6 °F (36.4 °C), Min:97.3 °F (36.3 °C), Max:97.8 °F (36.6 °C)       Impression/Plan: INR drop 1.2 since . Warfarin 7.5mg today      Pharmacy will follow daily and adjust the dose as appropriate.   Thank you,  Jae Centeno, [facility]

## 2017-04-05 NOTE — CDMP QUERY
Please clarify if this patient is being treated/managed for:    =>What is the clinical significance of pt's abnormal coag panel? Abnormal coagulation profile POA?    =>Other Explanation of clinical findings  =>Unable to Determine (no explanation of clinical findings)    The medical record reflects the following clinical findings, treatment, and risk factors:    Risk Factors: on diuretics, pacer, has AOCDCHF, coumadin, diovan, inhaers, nebs, started betapace last wk, h/o HTN    Clinical Indicators: k-3.1, inr-3.3, ekg: afib, 24 beat run VTach, on coumadin    Treatment: Cont coumadin, CR to cs, redo echo, cont valsartan, labs, strict I&O's, IV diuretics, coags. Please clarify and document your clinical opinion in the progress notes and discharge summary. Thank you!   Albaro Keating, [facility] . [address]

## 2017-04-05 NOTE — PROGRESS NOTES
[address] SHIFT NURSING NOTE    Bedside shift change report given to Chi Modi RN (oncoming nurse) by Sallie Hartman RN (offgoing nurse). Report included the following information SBAR, Kardex, Recent Results and Cardiac Rhythm AFib. SHIFT SUMMARY:  22:30-Spoke with Dr. Chris Cervantes about continuing K+ with patient's K+ at 4.3, Dr. Chris Cervantes said to stop all K+ administration. 23:00-Spoke with Dr. Kecia Gregory (hopitalist) about adjusting valsartan to patient's home dose of 20mg to be taken at night. 23:00-Spoke with pharmacy about sotalol dosing. Zia Shannon from pharmacy said that the patient's home dose was not available and suggested to allow patient to use home medication instead. Pharmacy verified and labeled home medication. 00:30-Spoke with Dr. Kecia Gregory (hospitalist) again about holding valsartan due to patient's BP being too low.        Admission Date 4/4/2017   Admission Diagnosis CHF (congestive heart failure) ([facility] [address]   Consults IP CONSULT TO CARDIOLOGY        Consults   [] PT   [] OT   [] Speech   [] Palliative      [] Hospice    [] Case Management   [x] None   Cardiac Monitoring   [x] Yes   [] No     Antibiotics   [] Yes   [x] No   GI Prophylaxis  (Ex: Protonix, Pepcid, etc,.)   [] Yes   [x] No          DVT Prophylaxis   SCDs:             Mayo stockings:         [x] Medication (Ex: Lovenox, Eliquis, Brilinta, Coumadin,  Heparin, etc..)   [] Contraindicated   [] No VTE needed       Urinary Catheter             LDAs               Peripheral IV 04/04/17 Left Forearm (Active)   Site Assessment Clean, dry, & intact 4/5/2017  6:33 AM   Phlebitis Assessment 0 4/5/2017  6:33 AM   Infiltration Assessment 0 4/5/2017  6:33 AM   Dressing Status Clean, dry, & intact 4/5/2017  6:33 AM   Dressing Type Transparent 4/5/2017  6:33 AM   Hub Color/Line Status Blue;Flushed 4/5/2017  6:33 AM                      I/Os   Intake/Output Summary (Last 24 hours) at 04/05/17 0635  Last data filed at 04/05/17 3126   Gross per 24 hour   Intake                0 ml Output             1950 ml   Net            -1950 ml         Activity Level Activity Level: Up with Assistance     Activity Assistance: Partial (one person)   Diet Active Orders   There are no active orders of the following type(s): Diet. Purposeful Rounding every 1-2 hour? [x] Yes    Anthony Score  Total Score: 0   Bed Alarm (If score 3 or >)   [] Yes    [] Refused (See signed refusal form in chart)   Landon Score  Landon Score: 21       Landon Score (if score 14 or less)   [] PMT consult   [] Nutrition consult   [] Wound Care consult      []  Specialty bed         Influenza Vaccine Received Flu Vaccine for Current Season (usually Sept-March): Yes               Needs prior to discharge:   Home O2 required:    [] Yes   [x] No     If yes, how much O2 required?     Other:    Last Bowel Movement Date: 04/04/17   Readmission Risk Assessment Tool Score High Risk            21       Total Score        4 More than 1 Admission in calendar year    17 Charlson Comorbidity Score        Criteria that do not apply:    Relationship with PCP    Patient Living Status    Patient Length of Stay > 5    Patient Insurance is Medicare, Medicaid or Self Pay       Expected Length of Stay - - -   Actual Length of Stay 1

## 2017-04-05 NOTE — ED NOTES
TRANSFER - OUT REPORT:    Verbal report given to Araceli(name) on Echo Moncada  being transferred to [facility](unit) for routine progression of care       Report consisted of patients Situation, Background, Assessment and   Recommendations(SBAR). Information from the following report(s) SBAR was reviewed with the receiving nurse. Lines:       Opportunity for questions and clarification was provided.       Patient transported with:   O2 @ 2 liters

## 2017-04-05 NOTE — CDMP QUERY
Please clarify if this patient is being treated/managed for:    =>What is the clinical significance of pt's low potassium in the setting of longterm diuretic use? Hypokalemia POA?    =>Other Explanation of clinical findings  =>Unable to Determine (no explanation of clinical findings)    The medical record reflects the following clinical findings, treatment, and risk factors:    Risk Factors: on diuretics, pacer, has AOCDCHF, coumadin, diovan, inhaers, nebs, started betapace last wk, h/o HTN    Clinical Indicators: k-3.1, inr-3.3, ekg: afib, 24 beat run VTach, on coumadin    Treatment: Cont coumadin, CR to cs, redo echo, cont valsartan, labs, strict I&O's, IV diuretics, coags. Please clarify and document your clinical opinion in the progress notes and discharge summary. Thank you!   Nahid Mercer, [facility], . [address]

## 2017-04-05 NOTE — PROGRESS NOTES
TRANSFER - IN REPORT:    Verbal report received from Sunshine Foley [facility] on Annabella Elm  being received from Leonid [facility] for routine progression of care      Report consisted of patients Situation, Background, Assessment and   Recommendations(SBAR). Information from the following report(s) SBAR was reviewed with the receiving nurse. Opportunity for questions and clarification was provided. Assessment completed upon patients arrival to unit and care assumed.        Primary Nurse Beryl Lopez RN and Marvie Dakins, RN performed a dual skin assessment on this patient No impairment noted  Landon score is 21

## 2017-04-05 NOTE — PROGRESS NOTES
Pharmacy Monitoring of Sotalol for Atrial Arrhythmias    Pharmacy monitoring of this 73 y.o. female being treated with sotalol for atrial arrhythmias. Patient was on it at home and dose was just increased recently per patient    Sotalol dose ordered: 180 mg q 12 hr (note: confirmed with patient and patient's medication; ER pharmacist also confirmed with patient  Baseline QTc/JTc interval (on admission prior to dose) for new starts: n/a  QTc/JTc interval 2 hours after dose required for new starts or if available for continuation of chronic therapy: QTc on   Drug interactions: furosemide   Creatinine clearance(ml/min): 55.9  Potassium supplementation ordered: received 1 run of KCL 10 meq IV. Repeat k=4.3  Magnesium supplementation ordered: 1 gram IV x 1  Phosphate supplementation ordered: none      Recent Labs      04/04/17 2008 04/04/17   0944   K  4.3  3.1*   MG  1.9   --    CREA  0.94  0.83   BUN  12  15     Wt Readings from Last 1 Encounters:   04/04/17 78.5 kg (173 lb)     Ht Readings from Last 1 Encounters:   04/04/17 167.6 cm (66\")             Impression/Plan: Patient on high dose sotalol 180 mg po q 12 hr at home. Will ask day shift pharmacist to confirm this dose with cardiology. Will continue current regimen for now. Will check electrolytes and replete per protocol.            Thanks,    Allegra Thompson, [facility]

## 2017-04-06 ENCOUNTER — APPOINTMENT (OUTPATIENT)
Dept: GENERAL RADIOLOGY | Age: 67
DRG: 247 | End: 2017-04-06
Attending: INTERNAL MEDICINE
Payer: COMMERCIAL

## 2017-04-06 ENCOUNTER — HOME HEALTH ADMISSION (OUTPATIENT)
Dept: HOME HEALTH SERVICES | Facility: HOME HEALTH | Age: 67
End: 2017-04-06
Payer: COMMERCIAL

## 2017-04-06 PROBLEM — I50.21 ACUTE SYSTOLIC CHF (CONGESTIVE HEART FAILURE) (HCC): Status: ACTIVE | Noted: 2017-04-06

## 2017-04-06 PROBLEM — I50.21 SYSTOLIC CHF, ACUTE (HCC): Status: ACTIVE | Noted: 2017-04-06

## 2017-04-06 LAB
ANION GAP BLD CALC-SCNC: 10 MMOL/L (ref 5–15)
BUN SERPL-MCNC: 11 MG/DL (ref 6–20)
BUN/CREAT SERPL: 13 (ref 12–20)
CALCIUM SERPL-MCNC: 8.2 MG/DL (ref 8.5–10.1)
CHLORIDE SERPL-SCNC: 107 MMOL/L (ref 97–108)
CO2 SERPL-SCNC: 26 MMOL/L (ref 21–32)
CREAT SERPL-MCNC: 0.86 MG/DL (ref 0.55–1.02)
ERYTHROCYTE [DISTWIDTH] IN BLOOD BY AUTOMATED COUNT: 14.6 % (ref 11.5–14.5)
GLUCOSE BLD STRIP.AUTO-MCNC: 128 MG/DL (ref 65–100)
GLUCOSE BLD STRIP.AUTO-MCNC: 131 MG/DL (ref 65–100)
GLUCOSE BLD STRIP.AUTO-MCNC: 135 MG/DL (ref 65–100)
GLUCOSE SERPL-MCNC: 124 MG/DL (ref 65–100)
HCT VFR BLD AUTO: 38.5 % (ref 35–47)
HGB BLD-MCNC: 12.5 G/DL (ref 11.5–16)
INR PPP: 1.6 (ref 0.9–1.1)
MAGNESIUM SERPL-MCNC: 2.2 MG/DL (ref 1.6–2.4)
MCH RBC QN AUTO: 27.4 PG (ref 26–34)
MCHC RBC AUTO-ENTMCNC: 32.5 G/DL (ref 30–36.5)
MCV RBC AUTO: 84.4 FL (ref 80–99)
PLATELET # BLD AUTO: 244 K/UL (ref 150–400)
POTASSIUM SERPL-SCNC: 3.5 MMOL/L (ref 3.5–5.1)
PROTHROMBIN TIME: 16.1 SEC (ref 9–11.1)
RBC # BLD AUTO: 4.56 M/UL (ref 3.8–5.2)
SERVICE CMNT-IMP: ABNORMAL
SODIUM SERPL-SCNC: 143 MMOL/L (ref 136–145)
WBC # BLD AUTO: 6.7 K/UL (ref 3.6–11)

## 2017-04-06 PROCEDURE — 36415 COLL VENOUS BLD VENIPUNCTURE: CPT | Performed by: INTERNAL MEDICINE

## 2017-04-06 PROCEDURE — 74011250637 HC RX REV CODE- 250/637: Performed by: INTERNAL MEDICINE

## 2017-04-06 PROCEDURE — 71010 XR CHEST PORT: CPT

## 2017-04-06 PROCEDURE — 77010033678 HC OXYGEN DAILY

## 2017-04-06 PROCEDURE — 82962 GLUCOSE BLOOD TEST: CPT

## 2017-04-06 PROCEDURE — 76450000000

## 2017-04-06 PROCEDURE — 83735 ASSAY OF MAGNESIUM: CPT | Performed by: INTERNAL MEDICINE

## 2017-04-06 PROCEDURE — 74011250636 HC RX REV CODE- 250/636: Performed by: INTERNAL MEDICINE

## 2017-04-06 PROCEDURE — 85027 COMPLETE CBC AUTOMATED: CPT | Performed by: INTERNAL MEDICINE

## 2017-04-06 PROCEDURE — 65660000000 HC RM CCU STEPDOWN

## 2017-04-06 PROCEDURE — 74011250637 HC RX REV CODE- 250/637: Performed by: HOSPITALIST

## 2017-04-06 PROCEDURE — 80048 BASIC METABOLIC PNL TOTAL CA: CPT | Performed by: INTERNAL MEDICINE

## 2017-04-06 PROCEDURE — 85610 PROTHROMBIN TIME: CPT | Performed by: INTERNAL MEDICINE

## 2017-04-06 RX ORDER — POTASSIUM CHLORIDE 1.5 G/1.77G
40 POWDER, FOR SOLUTION ORAL ONCE
Status: COMPLETED | OUTPATIENT
Start: 2017-04-06 | End: 2017-04-06

## 2017-04-06 RX ORDER — ENOXAPARIN SODIUM 100 MG/ML
1 INJECTION SUBCUTANEOUS EVERY 12 HOURS
Status: DISCONTINUED | OUTPATIENT
Start: 2017-04-06 | End: 2017-04-07

## 2017-04-06 RX ORDER — FUROSEMIDE 40 MG/1
40 TABLET ORAL DAILY
Status: DISCONTINUED | OUTPATIENT
Start: 2017-04-06 | End: 2017-04-07

## 2017-04-06 RX ADMIN — POTASSIUM CHLORIDE 20 MEQ: 750 TABLET, FILM COATED, EXTENDED RELEASE ORAL at 17:19

## 2017-04-06 RX ADMIN — GABAPENTIN 800 MG: 100 CAPSULE ORAL at 21:20

## 2017-04-06 RX ADMIN — GABAPENTIN 800 MG: 100 CAPSULE ORAL at 08:27

## 2017-04-06 RX ADMIN — FUROSEMIDE 40 MG: 40 TABLET ORAL at 08:27

## 2017-04-06 RX ADMIN — FLUTICASONE FUROATE AND VILANTEROL TRIFENATATE 1 PUFF: 100; 25 POWDER RESPIRATORY (INHALATION) at 08:28

## 2017-04-06 RX ADMIN — ENOXAPARIN SODIUM 80 MG: 40 INJECTION SUBCUTANEOUS at 15:19

## 2017-04-06 RX ADMIN — SOTALOL HYDROCHLORIDE 180 MG: 120 TABLET ORAL at 16:00

## 2017-04-06 RX ADMIN — COLCHICINE 1.2 MG: 0.6 TABLET, FILM COATED ORAL at 08:27

## 2017-04-06 RX ADMIN — UMECLIDINIUM 1 PUFF: 62.5 AEROSOL, POWDER ORAL at 08:30

## 2017-04-06 RX ADMIN — Medication 10 ML: at 15:20

## 2017-04-06 RX ADMIN — MULTIPLE VITAMINS W/ MINERALS TAB 1 TABLET: TAB at 08:27

## 2017-04-06 RX ADMIN — VALSARTAN 20 MG: 40 TABLET ORAL at 21:20

## 2017-04-06 RX ADMIN — GABAPENTIN 800 MG: 100 CAPSULE ORAL at 16:12

## 2017-04-06 RX ADMIN — POTASSIUM CHLORIDE 20 MEQ: 750 TABLET, FILM COATED, EXTENDED RELEASE ORAL at 08:27

## 2017-04-06 RX ADMIN — SOTALOL HYDROCHLORIDE 180 MG: 120 TABLET ORAL at 03:26

## 2017-04-06 RX ADMIN — Medication 10 ML: at 21:19

## 2017-04-06 RX ADMIN — POTASSIUM CHLORIDE 40 MEQ: 1.5 POWDER, FOR SOLUTION ORAL at 13:37

## 2017-04-06 RX ADMIN — ASPIRIN 81 MG CHEWABLE TABLET 81 MG: 81 TABLET CHEWABLE at 08:27

## 2017-04-06 RX ADMIN — FLUTICASONE PROPIONATE 2 SPRAY: 50 SPRAY, METERED NASAL at 23:23

## 2017-04-06 NOTE — PROGRESS NOTES
Hospitalist Progress Note    NAME: Minerva Cortez   :  1950   MRN:  [id number]       Interim Hospital Summary: 73 y.o. female whom presented on 2017 with      Assessment / Plan:  Acute (New) Systolic CHF POA (EF 85% on Echo this admission)  Acute on chronic diastolic heart failure POA- diuresing well  Afib with RVR POA- now rate controlled  Subtherapeutic INR today  -patient with increased shortness of breath and chest pressure on exertion,   CXR with interstitial edema,   pro BNP 1700 all suggest     S/p IV lasix in ED- pt has responded well  Change to PO Lasix today 40mg  -strict I/Os, daily weights,  -cardiology consult noted - cleared pt from their standpoint  -continue aspirin, diovan  -continue sotalol   Hold warfarin as per Dr Silvana Castellon (cardiology)resume tonight as INR down today to 1.6  SQ Lovenox bridge for now- hold as needed as per cardiology  Cardiac Cath planned in AM  ?AICD upgrade of the PPM     Runs of Vtach in ED -up to 24 beats on ED telemetry    May need AICD upgrade with new Systolic CHF on Echo EF 30% - Dr Pina How following  repleted K + now, still low today AM  Cont to give K+ BID PO now (pt is on sotalol-monitor lytes closely)       COPD  -continue home inhalers, no signs of exacerbation     Diabetes  -hold metformin  -SSI        Code Status: full  Surrogate Decision Maker: daughter     DVT Prophylaxis: warfarin  GI Prophylaxis: not indicated     Baseline: independent  Recommended Disposition: Home w/Family once cleared by Cardiology     Subjective:     Chief Complaint / Reason for Physician Visit F/U SOB, Diastolic CHF  \"I feel a little better\". Discussed with RN events overnight.      Review of Systems:  Symptom Y/N Comments  Symptom Y/N Comments   Fever/Chills n   Chest Pain n    Poor Appetite n   Edema n    Cough n   Abdominal Pain n    Sputum n   Joint Pain n    SOB/GREENFIELD y Improved slightly  Pruritis/Rash     Nausea/vomit n   Tolerating PT/OT y    Diarrhea    Tolerating Diet y    Constipation    Other       Could NOT obtain due to:      Objective:     VITALS:   Last 24hrs VS reviewed since prior progress note. Most recent are:  Patient Vitals for the past 24 hrs:   Temp Pulse Resp BP SpO2   04/06/17 1117 98.3 °F (36.8 °C) 80 18 100/53 98 %   04/06/17 0745 98 °F (36.7 °C) 64 20 99/76 98 %   04/06/17 0326 97.6 °F (36.4 °C) 94 18 90/66 97 %   04/06/17 0040 98.3 °F (36.8 °C) 98 18 97/58 95 %   04/05/17 2117 - 84 - 105/60 -   04/05/17 2004 97.2 °F (36.2 °C) 77 18 91/78 99 %   04/05/17 1535 - - - - 96 %   04/05/17 1453 97.7 °F (36.5 °C) 94 18 97/64 97 %       Intake/Output Summary (Last 24 hours) at 04/06/17 1329  Last data filed at 04/06/17 1300   Gross per 24 hour   Intake              720 ml   Output             3500 ml   Net            -2780 ml        PHYSICAL EXAM:  General: WD, WN. Alert, cooperative, no acute distress    EENT:  EOMI. Anicteric sclerae. MMM  Resp:  CTA bilaterally, no wheezing or rales. No accessory muscle use  CV:  irregular  rhythm,  1 + LE edema  GI:  Soft, Non distended, Non tender.  +Bowel sounds  Neurologic:  Alert and oriented X 3, normal speech,   Psych:   Good insight. Not anxious nor agitated  Skin:  No rashes. No jaundice    Reviewed most current lab test results and cultures  YES  Reviewed most current radiology test results   YES  Review and summation of old records today    NO  Reviewed patient's current orders and MAR    YES  PMH/SH reviewed - no change compared to H&P  ________________________________________________________________________  Care Plan discussed with:    Comments   Patient x    Family      RN x    Care Manager     Consultant  x Dr Nick Mota ([address])                     Multidiciplinary team rounds were held today with , nursing, pharmacist and clinical coordinator. Patient's plan of care was discussed; medications were reviewed and discharge planning was addressed. ________________________________________________________________________  Total NON critical care TIME:  35   Minutes    Total CRITICAL CARE TIME Spent:   Minutes non procedure based      Comments   >50% of visit spent in counseling and coordination of care     ________________________________________________________________________  Shane Robles MD     Procedures: see electronic medical records for all procedures/Xrays and details which were not copied into this note but were reviewed prior to creation of Plan. LABS:  I reviewed today's most current labs and imaging studies. Pertinent labs include:  Recent Labs      04/06/17   0344  04/05/17 0559 04/04/17   0944   WBC  6.7  6.3  7.5   HGB  12.5  12.8  12.3   HCT  38.5  37.4  36.2   PLT  244  244  225     Recent Labs      04/06/17   0344  04/05/17   1300  04/05/17 0559  04/04/17 2008 04/04/17   0944   NA  143   --   143  140   --   144   K  3.5  3.7  3.4*  4.3   --   3.1*   CL  107   --   107  103   --   109*   CO2  26   --   24  26   --   25   GLU  124*   --   139*  148*   --   127*   BUN  11   --   12  12   --   15   CREA  0.86   --   0.87  0.94   --   0.83   CA  8.2*   --   8.5  9.1   --   8.5   MG  2.2  2.2  2.2  1.9   < >   --    PHOS   --    --   2.8   --    --    --    ALB   --    --    --    --    --   3.2*   TBILI   --    --    --    --    --   0.7   SGOT   --    --    --    --    --   64*   ALT   --    --    --    --    --   61   INR  1.6*   --   2.1*   --    --   3.3*    < > = values in this interval not displayed.        Signed: Shane Robels MD

## 2017-04-06 NOTE — PROGRESS NOTES
[address] SHIFT NURSING NOTE    Bedside and Verbal shift change report given to  (oncoming nurse) by Alyson (offgoing nurse). Report included the following information SBAR, Procedure Summary, Intake/Output, MAR and Recent Results. SHIFT SUMMARY:       Admission Date 4/4/2017   Admission Diagnosis CHF (congestive heart failure) (HCC)  Diastolic CHF, acute on chronic (HCC)   Consults IP CONSULT TO CARDIOLOGY        Consults   []PT   []OT   []Speech   []Case Management      [] Palliative       Cardiac Monitoring Order   []Yes   []No     GI Prophylaxis   []Yes   []No           DVT Prophylaxis   SCDs:             Mayo stockings:         [] Medication   []Contraindicated   []None        Activity Level Activity Level: Up ad nicolas     Activity Assistance: No assistance needed   Purposeful Rounding every 1-2 hour? []Yes    Anthony Score  Total Score: 1   Bed Alarm (If score 3 or >)   []Yes   [] Refused (See signed refusal form in chart)   Landon Score  Landon Score: 20   Landon Score (if score 14 or less)   []PMT consult   []Wound Care consult      []Specialty bed   [] Nutrition consult          Needs prior to discharge:   Home O2 required:    []Yes   []No    If yes, how much O2 required? Other:    Last Bowel Movement: Last Bowel Movement Date: 04/05/17        POST-OP SURGICAL VATS   []Yes   []N/A   Incentive Spirometer:   []Yes   []Refused   Coughing and deep breathing:     []Yes   []Refused   Oral care:     []Yes   []Refused   Understanding (patient education):     []Yes   []Refused   Getting out of bed Number times ambulated in hallway past shift:    Number of times OOB to chair past shift:     Head of bed elevation:     []Yes   []Refused      Influenza Vaccine Received Flu Vaccine for Current Season (usually Sept-March): Yes        Pneumonia Vaccine           Diet Active Orders   Diet    DIET CARDIAC Regular; 2 GM NA (House Low NA);  Consistent Carb 1800kcal      LDAs               Peripheral IV 04/04/17 Left Forearm (Active)   Site Assessment Clean, dry, & intact 4/6/2017  4:18 AM   Phlebitis Assessment 0 4/6/2017  4:18 AM   Infiltration Assessment 0 4/6/2017  4:18 AM   Dressing Status Clean, dry, & intact 4/6/2017  4:18 AM   Dressing Type Transparent;Tape 4/6/2017  4:18 AM   Hub Color/Line Status Blue;Capped 4/6/2017  4:18 AM                      Urinary Catheter      Intake & Output   Date 04/05/17 0700 - 04/06/17 0659 04/06/17 0700 - 04/07/17 0659   Shift 3619-5264 0968-7106 24 Hour Total 6981-5348 6881-8235 24 Hour Total   I  N  T  A  K  E   P. O.          P. O.        Shift Total  (mL/kg) 600  (7.7) 480  (6.2) 1080  (13.9)      O  U  T  P  U  T   Urine  (mL/kg/hr) 1950  (2.1) 1450 3400         Urine Voided 1950 1450 3400         Urine Occurrence(s)  4 x 4 x       Shift Total  (mL/kg) 1950  (25.1) 1450  (18.6) 3400  (43.6)      NET -1352 -307 -7280      Weight (kg) 77.7 77.9 77.9 77.9 77.9 77.9         Readmission Risk Assessment Tool Score High Risk            21       Total Score        4 More than 1 Admission in calendar year    17 Charlson Comorbidity Score        Criteria that do not apply:    Relationship with PCP    Patient Living Status    Patient Length of Stay > 5    Patient Insurance is Medicare, Medicaid or Self Pay       Expected Length of Stay 3d 12h   Actual Length of Stay 1

## 2017-04-06 NOTE — CARDIO/PULMONARY
CP REHAB NOTE     Chart Review: Patient admitted for increasing SOB and chest pressure on exertion. Medical History: Afib, SSS, pacemaker, diabetes, diastolic CHF, COPD  EF 36-65%, Echo 10/17/2016  Echo of yesterday (4/5/17) shows EF  of 25-30%. Former Smoker      This was a follow-up visit to answer questions and reinforce prior teaching re: CHF, S&Ss, medication management, Low NA diet, daily weights, when to call the doctor and balancing rest/activity. Patient state she knows her heart has gotten weaker and she wants to do everything she can to be compliant      All questions answered. Understanding verbalized.

## 2017-04-06 NOTE — PROGRESS NOTES
Indication: AFib  Goal INR: 2-3     Average Daily Warfarin Dose: 7.5 mg M-F, and 10 mg on Sat and Sun  Concurrent Anticoagulants/Antiplatelets: none at this time  Major Interacting Medications (Dose/Frequency): none at this time  Estimated Creatinine Clearance: 68.7 mL/min (based on Cr of 0.86). Estimated Creatinine Clearance (using IBW): 61.1 mL/min  Recent Labs      17   0344  17   1300  17   0559  17   0944   CREA  0.86   --   0.87  0.94  0.83   BUN  11   --   12  12  15   WBC  6.7   --   6.3   --   7.5   NA  143   --   143  140  144   K  3.5  3.7  3.4*  4.3  3.1*   MG  2.2  2.2  2.2  1.9   --    CA  8.2*   --   8.5  9.1  8.5   PHOS   --    --   2.8   --    --    ALB   --    --    --    --   3.2*   HGB  12.5   --   12.8   --   12.3   HCT  38.5   --   37.4   --   36.2   PLT  244   --   244   --   225   INR  1.6*   --   2.1*   --   3.3*   ALT   --    --    --    --   61     Temp (24hrs), Av.9 °F (36.6 °C), Min:97.2 °F (36.2 °C), Max:98.3 °F (36.8 °C)     Impression/Plan: INR drop due to Warfarin hold on  with supratherapeutic INR POA. Warfarin 9 mg today      Pharmacy will follow daily and adjust the dose as appropriate.

## 2017-04-06 NOTE — PROGRESS NOTES
7: 15 AM Bedside report received from Guero Perez, [address] [place] [address].    7:35 PM Bedside report given to Guero Perez RN including SBAR, MAR, and Recent Results.

## 2017-04-06 NOTE — PROGRESS NOTES
Per discussion with attending have consulted Palliative Care for dz management and have placed consult to [facility] to home program.     Ex [id number]

## 2017-04-06 NOTE — PROGRESS NOTES
Cardiology Progress Note            [address] [address]  [phone]    4/6/2017 11:59 AM    Admit Date: 4/4/2017    Admit Diagnosis: CHF (congestive heart failure) ([address] Diastolic CHF, acute o*    Subjective:     Nydia Coles   has dyspnea.     Visit Vitals    /53 (BP 1 Location: Right arm, BP Patient Position: At rest)    Pulse 80    Temp 98.3 °F (36.8 °C)    Resp 18    Ht 5' 6\" (1.676 m)    Wt 171 lb 11.8 oz (77.9 kg)    SpO2 98%    BMI 27.72 kg/m2     Current Facility-Administered Medications   Medication Dose Route Frequency    furosemide (LASIX) tablet 40 mg  40 mg Oral DAILY    potassium chloride SR (KLOR-CON 10) tablet 20 mEq  20 mEq Oral BID    colchicine tablet 1.2 mg  1.2 mg Oral DAILY    aspirin chewable tablet 81 mg  81 mg Oral DAILY    fluticasone-vilanterol (BREO ELLIPTA) 100mcg-25mcg/puff  1 Puff Inhalation DAILY    fluticasone (FLONASE) 50 mcg/actuation nasal spray 2 Spray  2 Spray Both Nostrils QHS    multivitamin, tx-iron-ca-min (THERA-M w/ IRON) tablet 1 Tab  1 Tab Oral DAILY    gabapentin (NEURONTIN) 800 mg  800 mg Oral TID    sotalol (BETAPACE) tablet 180 mg  180 mg Oral BID    umeclidinium (INCRUSE ELLIPTA) 62.5 mcg/actuation  1 Puff Inhalation DAILY    sodium chloride (NS) flush 5-10 mL  5-10 mL IntraVENous Q8H    sodium chloride (NS) flush 5-10 mL  5-10 mL IntraVENous PRN    acetaminophen (TYLENOL) tablet 650 mg  650 mg Oral Q4H PRN    ondansetron (ZOFRAN) injection 4 mg  4 mg IntraVENous Q4H PRN    docusate sodium (COLACE) capsule 100 mg  100 mg Oral BID PRN    insulin lispro (HUMALOG) injection   SubCUTAneous AC&HS    glucose chewable tablet 16 g  4 Tab Oral PRN    dextrose (D50W) injection syrg 12.5-25 g  12.5-25 g IntraVENous PRN    glucagon (GLUCAGEN) injection 1 mg  1 mg IntraMUSCular PRN    WARFARIN INFORMATION NOTE (COUMADIN)   Other QPM    valsartan (DIOVAN) tablet 20 mg  20 mg Oral QHS         Objective:      Visit Vitals    /53 (BP 1 Location: Right arm, BP Patient Position: At rest)    Pulse 80    Temp 98.3 °F (36.8 °C)    Resp 18    Ht 5' 6\" (1.676 m)    Wt 171 lb 11.8 oz (77.9 kg)    SpO2 98%    BMI 27.72 kg/m2       Physical Exam:  Abdomen: soft, non-tender  Extremities: extremities normal  Heart: irr irr  Lungs: clear to auscultation bilaterally  Pulses: 2+ and symmetric    Data Review:   Labs:    Recent Labs      04/06/17 0344 04/05/17 0559 04/04/17 0944   WBC  6.7  6.3  7.5   HGB  12.5  12.8  12.3   HCT  38.5  37.4  36.2   PLT  244  244  225     Recent Labs      04/06/17 0344 04/05/17   1300  04/05/17 0559  04/04/17 2008 04/04/17   0944   NA  143   --   143  140   --   144   K  3.5  3.7  3.4*  4.3   --   3.1*   CL  107   --   107  103   --   109*   CO2  26   --   24  26   --   25   GLU  124*   --   139*  148*   --   127*   BUN  11   --   12  12   --   15   CREA  0.86   --   0.87  0.94   --   0.83   CA  8.2*   --   8.5  9.1   --   8.5   MG  2.2  2.2  2.2  1.9   < >   --    PHOS   --    --   2.8   --    --    --    ALB   --    --    --    --    --   3.2*   TBILI   --    --    --    --    --   0.7   SGOT   --    --    --    --    --   64*   ALT   --    --    --    --    --   61   INR  1.6*   --   2.1*   --    --   3.3*    < > = values in this interval not displayed.        Recent Labs      04/04/17   0944   TROIQ  <0.04         Intake/Output Summary (Last 24 hours) at 04/06/17 1159  Last data filed at 04/06/17 0830   Gross per 24 hour   Intake             1320 ml   Output             2800 ml   Net            -1480 ml        Telemetry: afib with controlled vr    Assessment:     Principal Problem:    Systolic CHF, acute ([address] (4/6/2017)    Active Problems:    Hypertension--essential (6/2/2010)      Atrial fibrillation--paroxysmal (6/2/2010)      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 (6/2/2010)      Hypokalemia (7/20/2010)      Cardiac pacemaker in situ (7/5/2012)      Mixed hyperlipidemia (7/5/2012)      S/P coronary artery stent placement (5/2/6892)      Diastolic CHF, acute on chronic ([facility] (9/22/2014)      Type 2 diabetes mellitus with diabetic neuropathy, without long-term current use of insulin ([facility] (1/31/2017)      Acute systolic CHF (congestive heart failure) ([facility] (4/6/2017)        Plan:     Judy Orosco 's echo revealed a drop in her ef to 25-30%. We will proceed with cardiac cath tmrw am by Dr Karla Tabor. If the cath is unchanged from 2014, then we will proceed with upgrade to BiV-ICD (since she has already been on greater than 3 months of optimal med rx). If she receives an intervention then we will hold off. I discussed the risks/benefits/alternatives of the procedure with the patient. Risks include (but are not limited to) bleeding, heart block, infection, cva/mi/tamponade/death. The patient understands and agrees to proceed.  Npo p christopher Sarabia MD, [facility]    4/6/2017

## 2017-04-07 PROBLEM — F40.9 FEAR ASSOCIATED WITH ILLNESS AND BODY FUNCTION: Status: ACTIVE | Noted: 2017-04-07

## 2017-04-07 PROBLEM — Z71.89 COUNSELING REGARDING ADVANCED CARE PLANNING AND GOALS OF CARE: Status: ACTIVE | Noted: 2017-04-07

## 2017-04-07 LAB
ANION GAP BLD CALC-SCNC: 8 MMOL/L (ref 5–15)
ATRIAL RATE: 97 BPM
BUN SERPL-MCNC: 10 MG/DL (ref 6–20)
BUN/CREAT SERPL: 17 (ref 12–20)
CALCIUM SERPL-MCNC: 6.6 MG/DL (ref 8.5–10.1)
CALCULATED R AXIS, ECG10: 88 DEGREES
CALCULATED T AXIS, ECG11: -124 DEGREES
CHLORIDE SERPL-SCNC: 113 MMOL/L (ref 97–108)
CO2 SERPL-SCNC: 21 MMOL/L (ref 21–32)
CREAT SERPL-MCNC: 0.59 MG/DL (ref 0.55–1.02)
DIAGNOSIS, 93000: NORMAL
ERYTHROCYTE [DISTWIDTH] IN BLOOD BY AUTOMATED COUNT: 14.8 % (ref 11.5–14.5)
GLUCOSE BLD STRIP.AUTO-MCNC: 102 MG/DL (ref 65–100)
GLUCOSE BLD STRIP.AUTO-MCNC: 126 MG/DL (ref 65–100)
GLUCOSE BLD STRIP.AUTO-MCNC: 135 MG/DL (ref 65–100)
GLUCOSE SERPL-MCNC: 126 MG/DL (ref 65–100)
HCT VFR BLD AUTO: 37.5 % (ref 35–47)
HGB BLD-MCNC: 12.4 G/DL (ref 11.5–16)
INR PPP: 1.8 (ref 0.9–1.1)
MAGNESIUM SERPL-MCNC: 1.6 MG/DL (ref 1.6–2.4)
MCH RBC QN AUTO: 28.1 PG (ref 26–34)
MCHC RBC AUTO-ENTMCNC: 33.1 G/DL (ref 30–36.5)
MCV RBC AUTO: 85 FL (ref 80–99)
PLATELET # BLD AUTO: 239 K/UL (ref 150–400)
POTASSIUM SERPL-SCNC: 3.3 MMOL/L (ref 3.5–5.1)
PROTHROMBIN TIME: 18.1 SEC (ref 9–11.1)
Q-T INTERVAL, ECG07: 414 MS
QRS DURATION, ECG06: 90 MS
QTC CALCULATION (BEZET), ECG08: 474 MS
RBC # BLD AUTO: 4.41 M/UL (ref 3.8–5.2)
SERVICE CMNT-IMP: ABNORMAL
SODIUM SERPL-SCNC: 142 MMOL/L (ref 136–145)
VENTRICULAR RATE, ECG03: 79 BPM
WBC # BLD AUTO: 6.9 K/UL (ref 3.6–11)

## 2017-04-07 PROCEDURE — 74011250637 HC RX REV CODE- 250/637: Performed by: INTERNAL MEDICINE

## 2017-04-07 PROCEDURE — C1894 INTRO/SHEATH, NON-LASER: HCPCS

## 2017-04-07 PROCEDURE — 74011636320 HC RX REV CODE- 636/320

## 2017-04-07 PROCEDURE — 74011000258 HC RX REV CODE- 258: Performed by: INTERNAL MEDICINE

## 2017-04-07 PROCEDURE — 4A023N7 MEASUREMENT OF CARDIAC SAMPLING AND PRESSURE, LEFT HEART, PERCUTANEOUS APPROACH: ICD-10-PCS | Performed by: INTERNAL MEDICINE

## 2017-04-07 PROCEDURE — 74011250636 HC RX REV CODE- 250/636: Performed by: INTERNAL MEDICINE

## 2017-04-07 PROCEDURE — C1769 GUIDE WIRE: HCPCS

## 2017-04-07 PROCEDURE — 85610 PROTHROMBIN TIME: CPT | Performed by: INTERNAL MEDICINE

## 2017-04-07 PROCEDURE — 83735 ASSAY OF MAGNESIUM: CPT | Performed by: INTERNAL MEDICINE

## 2017-04-07 PROCEDURE — 74011636320 HC RX REV CODE- 636/320: Performed by: INTERNAL MEDICINE

## 2017-04-07 PROCEDURE — 77030002996 HC SUT SLK J&J -A

## 2017-04-07 PROCEDURE — 77030015766

## 2017-04-07 PROCEDURE — 77030004549 HC CATH ANGI DX PRF MRTM -A

## 2017-04-07 PROCEDURE — 77030010221 HC SPLNT WR POS TELE -B

## 2017-04-07 PROCEDURE — 74011000250 HC RX REV CODE- 250

## 2017-04-07 PROCEDURE — 4A033BC MEASUREMENT OF ARTERIAL PRESSURE, CORONARY, PERCUTANEOUS APPROACH: ICD-10-PCS | Performed by: INTERNAL MEDICINE

## 2017-04-07 PROCEDURE — 82962 GLUCOSE BLOOD TEST: CPT

## 2017-04-07 PROCEDURE — 77030019698 HC SYR ANGI MDLON MRTM -A

## 2017-04-07 PROCEDURE — 74011250637 HC RX REV CODE- 250/637: Performed by: NURSE PRACTITIONER

## 2017-04-07 PROCEDURE — 80048 BASIC METABOLIC PNL TOTAL CA: CPT | Performed by: INTERNAL MEDICINE

## 2017-04-07 PROCEDURE — B2151ZZ FLUOROSCOPY OF LEFT HEART USING LOW OSMOLAR CONTRAST: ICD-10-PCS | Performed by: INTERNAL MEDICINE

## 2017-04-07 PROCEDURE — 77030013797 HC KT TRNSDUC PRSSR EDWD -A

## 2017-04-07 PROCEDURE — 85027 COMPLETE CBC AUTOMATED: CPT | Performed by: INTERNAL MEDICINE

## 2017-04-07 PROCEDURE — 77030019569 HC BND COMPR RAD TERU -B

## 2017-04-07 PROCEDURE — 74011250637 HC RX REV CODE- 250/637: Performed by: HOSPITALIST

## 2017-04-07 PROCEDURE — C1887 CATHETER, GUIDING: HCPCS

## 2017-04-07 PROCEDURE — 74011250637 HC RX REV CODE- 250/637

## 2017-04-07 PROCEDURE — 85347 COAGULATION TIME ACTIVATED: CPT

## 2017-04-07 PROCEDURE — C1725 CATH, TRANSLUMIN NON-LASER: HCPCS

## 2017-04-07 PROCEDURE — 65660000000 HC RM CCU STEPDOWN

## 2017-04-07 PROCEDURE — 77030008543 HC TBNG MON PRSS MRTM -A

## 2017-04-07 PROCEDURE — 99152 MOD SED SAME PHYS/QHP 5/>YRS: CPT

## 2017-04-07 PROCEDURE — 77030019697 HC SYR ANGI INFL MRTM -B

## 2017-04-07 PROCEDURE — C1874 STENT, COATED/COV W/DEL SYS: HCPCS

## 2017-04-07 PROCEDURE — 93041 RHYTHM ECG TRACING: CPT

## 2017-04-07 PROCEDURE — 77030012468 HC VLV BLEEDBK CNTRL ABBT -B

## 2017-04-07 PROCEDURE — 36415 COLL VENOUS BLD VENIPUNCTURE: CPT | Performed by: INTERNAL MEDICINE

## 2017-04-07 PROCEDURE — B2111ZZ FLUOROSCOPY OF MULTIPLE CORONARY ARTERIES USING LOW OSMOLAR CONTRAST: ICD-10-PCS | Performed by: INTERNAL MEDICINE

## 2017-04-07 PROCEDURE — 027034Z DILATION OF CORONARY ARTERY, ONE ARTERY WITH DRUG-ELUTING INTRALUMINAL DEVICE, PERCUTANEOUS APPROACH: ICD-10-PCS | Performed by: INTERNAL MEDICINE

## 2017-04-07 PROCEDURE — 74011250636 HC RX REV CODE- 250/636

## 2017-04-07 RX ORDER — FENTANYL CITRATE 50 UG/ML
INJECTION, SOLUTION INTRAMUSCULAR; INTRAVENOUS
Status: COMPLETED
Start: 2017-04-07 | End: 2017-04-07

## 2017-04-07 RX ORDER — SODIUM CHLORIDE 9 MG/ML
INJECTION, SOLUTION INTRAVENOUS
Status: DISCONTINUED
Start: 2017-04-07 | End: 2017-04-08

## 2017-04-07 RX ORDER — FENTANYL CITRATE 50 UG/ML
25 INJECTION, SOLUTION INTRAMUSCULAR; INTRAVENOUS
Status: DISCONTINUED | OUTPATIENT
Start: 2017-04-07 | End: 2017-04-10 | Stop reason: HOSPADM

## 2017-04-07 RX ORDER — HEPARIN SODIUM 1000 [USP'U]/ML
INJECTION, SOLUTION INTRAVENOUS; SUBCUTANEOUS
Status: DISCONTINUED
Start: 2017-04-07 | End: 2017-04-10 | Stop reason: HOSPADM

## 2017-04-07 RX ORDER — ATORVASTATIN CALCIUM 20 MG/1
20 TABLET, FILM COATED ORAL
Status: DISCONTINUED | OUTPATIENT
Start: 2017-04-07 | End: 2017-04-10 | Stop reason: HOSPADM

## 2017-04-07 RX ORDER — FENTANYL CITRATE 50 UG/ML
25 INJECTION, SOLUTION INTRAMUSCULAR; INTRAVENOUS
Status: DISCONTINUED | OUTPATIENT
Start: 2017-04-07 | End: 2017-04-08

## 2017-04-07 RX ORDER — VERAPAMIL HYDROCHLORIDE 2.5 MG/ML
2.5 INJECTION, SOLUTION INTRAVENOUS ONCE
Status: DISCONTINUED | OUTPATIENT
Start: 2017-04-07 | End: 2017-04-07

## 2017-04-07 RX ORDER — FENTANYL CITRATE 50 UG/ML
25-50 INJECTION, SOLUTION INTRAMUSCULAR; INTRAVENOUS
Status: DISCONTINUED | OUTPATIENT
Start: 2017-04-07 | End: 2017-04-07

## 2017-04-07 RX ORDER — HEPARIN SODIUM 200 [USP'U]/100ML
500 INJECTION, SOLUTION INTRAVENOUS ONCE
Status: COMPLETED | OUTPATIENT
Start: 2017-04-07 | End: 2017-04-07

## 2017-04-07 RX ORDER — BIVALIRUDIN 250 MG/5ML
INJECTION, POWDER, LYOPHILIZED, FOR SOLUTION INTRAVENOUS
Status: DISCONTINUED
Start: 2017-04-07 | End: 2017-04-08

## 2017-04-07 RX ORDER — CLOPIDOGREL BISULFATE 75 MG/1
75 TABLET ORAL DAILY
Status: DISCONTINUED | OUTPATIENT
Start: 2017-04-08 | End: 2017-04-10 | Stop reason: HOSPADM

## 2017-04-07 RX ORDER — HEPARIN SODIUM 1000 [USP'U]/ML
2500 INJECTION, SOLUTION INTRAVENOUS; SUBCUTANEOUS ONCE
Status: DISCONTINUED | OUTPATIENT
Start: 2017-04-07 | End: 2017-04-07

## 2017-04-07 RX ORDER — MIDAZOLAM HYDROCHLORIDE 1 MG/ML
INJECTION, SOLUTION INTRAMUSCULAR; INTRAVENOUS
Status: COMPLETED
Start: 2017-04-07 | End: 2017-04-07

## 2017-04-07 RX ORDER — SOTALOL HYDROCHLORIDE 80 MG/1
120 TABLET ORAL 2 TIMES DAILY
Status: DISCONTINUED | OUTPATIENT
Start: 2017-04-07 | End: 2017-04-10 | Stop reason: HOSPADM

## 2017-04-07 RX ORDER — LIDOCAINE HYDROCHLORIDE 10 MG/ML
INJECTION INFILTRATION; PERINEURAL
Status: DISCONTINUED
Start: 2017-04-07 | End: 2017-04-08

## 2017-04-07 RX ORDER — WARFARIN SODIUM 5 MG/1
7.5 TABLET ORAL ONCE
Status: COMPLETED | OUTPATIENT
Start: 2017-04-07 | End: 2017-04-07

## 2017-04-07 RX ORDER — HEPARIN SODIUM 200 [USP'U]/100ML
INJECTION, SOLUTION INTRAVENOUS
Status: COMPLETED
Start: 2017-04-07 | End: 2017-04-07

## 2017-04-07 RX ORDER — VERAPAMIL HYDROCHLORIDE 2.5 MG/ML
INJECTION, SOLUTION INTRAVENOUS
Status: DISCONTINUED
Start: 2017-04-07 | End: 2017-04-10 | Stop reason: HOSPADM

## 2017-04-07 RX ORDER — MAGNESIUM SULFATE 1 G/100ML
1 INJECTION INTRAVENOUS ONCE
Status: COMPLETED | OUTPATIENT
Start: 2017-04-07 | End: 2017-04-07

## 2017-04-07 RX ORDER — LIDOCAINE HYDROCHLORIDE 10 MG/ML
INJECTION, SOLUTION EPIDURAL; INFILTRATION; INTRACAUDAL; PERINEURAL
Status: COMPLETED
Start: 2017-04-07 | End: 2017-04-07

## 2017-04-07 RX ORDER — MIDAZOLAM HYDROCHLORIDE 1 MG/ML
.5-2 INJECTION, SOLUTION INTRAMUSCULAR; INTRAVENOUS
Status: DISCONTINUED | OUTPATIENT
Start: 2017-04-07 | End: 2017-04-07

## 2017-04-07 RX ORDER — CLOPIDOGREL 300 MG/1
TABLET, FILM COATED ORAL
Status: COMPLETED
Start: 2017-04-07 | End: 2017-04-07

## 2017-04-07 RX ORDER — CLOPIDOGREL 300 MG/1
600 TABLET, FILM COATED ORAL ONCE
Status: COMPLETED | OUTPATIENT
Start: 2017-04-07 | End: 2017-04-07

## 2017-04-07 RX ORDER — FUROSEMIDE 20 MG/1
20 TABLET ORAL DAILY
Status: DISCONTINUED | OUTPATIENT
Start: 2017-04-07 | End: 2017-04-10 | Stop reason: HOSPADM

## 2017-04-07 RX ORDER — LIDOCAINE HYDROCHLORIDE 10 MG/ML
1-30 INJECTION, SOLUTION EPIDURAL; INFILTRATION; INTRACAUDAL; PERINEURAL
Status: DISCONTINUED | OUTPATIENT
Start: 2017-04-07 | End: 2017-04-07

## 2017-04-07 RX ORDER — ADENOSINE 3 MG/ML
INJECTION, SOLUTION INTRAVENOUS
Status: DISCONTINUED
Start: 2017-04-07 | End: 2017-04-08

## 2017-04-07 RX ADMIN — IOPAMIDOL 20 ML: 755 INJECTION, SOLUTION INTRAVENOUS at 09:50

## 2017-04-07 RX ADMIN — BIVALIRUDIN 1.75 MG/KG/HR: 250 INJECTION, POWDER, LYOPHILIZED, FOR SOLUTION INTRAVENOUS at 09:40

## 2017-04-07 RX ADMIN — HEPARIN SODIUM 1000 UNITS: 200 INJECTION, SOLUTION INTRAVENOUS at 08:09

## 2017-04-07 RX ADMIN — ENOXAPARIN SODIUM 80 MG: 40 INJECTION SUBCUTANEOUS at 01:14

## 2017-04-07 RX ADMIN — VALSARTAN 20 MG: 40 TABLET ORAL at 22:59

## 2017-04-07 RX ADMIN — ASPIRIN 81 MG CHEWABLE TABLET 81 MG: 81 TABLET CHEWABLE at 08:07

## 2017-04-07 RX ADMIN — HEPARIN SODIUM 1000 UNITS: 200 INJECTION, SOLUTION INTRAVENOUS at 08:08

## 2017-04-07 RX ADMIN — Medication 10 ML: at 11:27

## 2017-04-07 RX ADMIN — CLOPIDOGREL 600 MG: 300 TABLET, FILM COATED ORAL at 09:54

## 2017-04-07 RX ADMIN — WARFARIN SODIUM 7.5 MG: 5 TABLET ORAL at 20:50

## 2017-04-07 RX ADMIN — ONDANSETRON HYDROCHLORIDE 4 MG: 2 INJECTION, SOLUTION INTRAMUSCULAR; INTRAVENOUS at 11:27

## 2017-04-07 RX ADMIN — FENTANYL CITRATE 25 MCG: 50 INJECTION, SOLUTION INTRAMUSCULAR; INTRAVENOUS at 13:40

## 2017-04-07 RX ADMIN — FENTANYL CITRATE 25 MCG: 50 INJECTION, SOLUTION INTRAMUSCULAR; INTRAVENOUS at 14:47

## 2017-04-07 RX ADMIN — BIVALIRUDIN 1.75 MG/KG/HR: 250 INJECTION, POWDER, LYOPHILIZED, FOR SOLUTION INTRAVENOUS at 08:59

## 2017-04-07 RX ADMIN — IOPAMIDOL 120 ML: 755 INJECTION, SOLUTION INTRAVENOUS at 09:32

## 2017-04-07 RX ADMIN — FENTANYL CITRATE 25 MCG: 50 INJECTION, SOLUTION INTRAMUSCULAR; INTRAVENOUS at 08:08

## 2017-04-07 RX ADMIN — IOPAMIDOL 30 ML: 755 INJECTION, SOLUTION INTRAVENOUS at 08:57

## 2017-04-07 RX ADMIN — GABAPENTIN 800 MG: 100 CAPSULE ORAL at 22:58

## 2017-04-07 RX ADMIN — LIDOCAINE HYDROCHLORIDE 1 ML: 10 INJECTION, SOLUTION EPIDURAL; INFILTRATION; INTRACAUDAL; PERINEURAL at 08:21

## 2017-04-07 RX ADMIN — FENTANYL CITRATE 25 MCG: 50 INJECTION, SOLUTION INTRAMUSCULAR; INTRAVENOUS at 17:46

## 2017-04-07 RX ADMIN — CLOPIDOGREL BISULFATE 600 MG: 300 TABLET, FILM COATED ORAL at 09:54

## 2017-04-07 RX ADMIN — MIDAZOLAM HYDROCHLORIDE 1 MG: 1 INJECTION, SOLUTION INTRAMUSCULAR; INTRAVENOUS at 09:19

## 2017-04-07 RX ADMIN — GABAPENTIN 800 MG: 100 CAPSULE ORAL at 17:19

## 2017-04-07 RX ADMIN — POTASSIUM CHLORIDE 20 MEQ: 750 TABLET, FILM COATED, EXTENDED RELEASE ORAL at 20:53

## 2017-04-07 RX ADMIN — Medication 10 ML: at 23:01

## 2017-04-07 RX ADMIN — MIDAZOLAM HYDROCHLORIDE 1 MG: 1 INJECTION, SOLUTION INTRAMUSCULAR; INTRAVENOUS at 08:03

## 2017-04-07 RX ADMIN — FENTANYL CITRATE 25 MCG: 50 INJECTION, SOLUTION INTRAMUSCULAR; INTRAVENOUS at 11:44

## 2017-04-07 RX ADMIN — SOTALOL HYDROCHLORIDE 120 MG: 80 TABLET ORAL at 20:50

## 2017-04-07 RX ADMIN — SOTALOL HYDROCHLORIDE 180 MG: 120 TABLET ORAL at 04:48

## 2017-04-07 RX ADMIN — FLUTICASONE PROPIONATE 2 SPRAY: 50 SPRAY, METERED NASAL at 23:00

## 2017-04-07 RX ADMIN — COLCHICINE 1.2 MG: 0.6 TABLET, FILM COATED ORAL at 17:19

## 2017-04-07 RX ADMIN — MAGNESIUM SULFATE HEPTAHYDRATE 1 G: 1 INJECTION, SOLUTION INTRAVENOUS at 13:43

## 2017-04-07 RX ADMIN — UMECLIDINIUM 1 PUFF: 62.5 AEROSOL, POWDER ORAL at 12:57

## 2017-04-07 RX ADMIN — FENTANYL CITRATE 25 MCG: 50 INJECTION, SOLUTION INTRAMUSCULAR; INTRAVENOUS at 09:14

## 2017-04-07 RX ADMIN — Medication 10 ML: at 04:49

## 2017-04-07 RX ADMIN — MIDAZOLAM HYDROCHLORIDE 1 MG: 1 INJECTION, SOLUTION INTRAMUSCULAR; INTRAVENOUS at 08:32

## 2017-04-07 RX ADMIN — ATORVASTATIN CALCIUM 20 MG: 20 TABLET, FILM COATED ORAL at 22:58

## 2017-04-07 RX ADMIN — FENTANYL CITRATE 25 MCG: 50 INJECTION, SOLUTION INTRAMUSCULAR; INTRAVENOUS at 16:17

## 2017-04-07 RX ADMIN — MIDAZOLAM HYDROCHLORIDE 1 MG: 1 INJECTION INTRAMUSCULAR; INTRAVENOUS at 08:03

## 2017-04-07 NOTE — PROGRESS NOTES
Pharmacy Daily Dosing of Warfarin    Indication: A fib  Goal INR: 2-3      Average Daily Warfarin Dose: 7.5 mg M-F, and 10 mg on Sat and Sun  Concurrent Anticoagulants/Antiplatelets Lovenox 80 mg every 12 hours and aspirin 81 mg daily   Major Interacting Medications (Dose/Frequency): None    INR (0.9-1.1) > 5 or Platelets (< 02Q): discuss with MD:  Recent Labs      04/07/17   0212  04/07/17   0119  04/06/17   0344  04/05/17   0559   INR  1.8*   --   1.6*  2.1*   HGB   --   12.4  12.5  12.8   PLT   --   239  244  244       Impression/Plan: Will order warfarin 7.5 mg today. Per Dr. Herber Keller not the patient can resume warfarin. Lovenox dose is appropriate once patient is therapeutic would recommend discontinuing. Pharmacy will follow daily and adjust the dose as appropriate.     Thanks for the consult  SAMSON Bhatti

## 2017-04-07 NOTE — PROGRESS NOTES
Sotalol Monitor  PTA Regimen: Sotalol 180mg PO BID  Estimated Creatinine Clearance: 101 mL/min (based on Cr of 0.59). Estimated Creatinine Clearance (using IBW): 89.0 mL/min  Recent Labs      17   0212  17   0119  17   0344  17   1300  17   0559  17   CREA  0.59   --   0.86   --   0.87  0.94   BUN  10   --   11   --   12  12   WBC   --   6.9  6.7   --   6.3   --    NA  142   --   143   --   143  140   K  3.3*   --   3.5  3.7  3.4*  4.3   MG  1.6   --   2.2  2.2  2.2  1.9   CA  6.6*   --   8.2*   --   8.5  9.1   PHOS   --    --    --    --   2.8   --    HGB   --   12.4  12.5   --   12.8   --    HCT   --   37.5  38.5   --   37.4   --    PLT   --   239  244   --   244   --    INR  1.8*   --   1.6*   --   2.1*   --      Temp (24hrs), Av.9 °F (36.6 °C), Min:97.6 °F (36.4 °C), Max:98.3 °F (36.8 °C)    Impression/Plan:   PTA Patient on high dose sotalol 180 mg PO q12h. Post CCL with PCI completed. Dr. Ruben Noel has reduced Sotalol dose  with next dose due this evening. Last last  0400 so will make next dose 20:00 tonight. Pt for discharge today so will resume post discharge.         Thank you,  Jacqueline Reyes, [facility]

## 2017-04-07 NOTE — PROGRESS NOTES
[address] SHIFT NURSING NOTE    Bedside and Verbal shift change report given to [address] (oncoming nurse) by Jelena Mercado (offgoing nurse). Report included the following information SBAR, ED Summary, OR Summary, Procedure Summary, Intake/Output, Recent Results and Cardiac Rhythm AFib Paced. SHIFT SUMMARY:   6033: Pt being taken to card cath    pt's belongings including purse, cell phone, cell phone  and medications taken to pt in 2152.

## 2017-04-07 NOTE — DISCHARGE INSTRUCTIONS
HOSPITALIST DISCHARGE INSTRUCTIONS    NAME: Danielle Jaeger   :  1950   MRN:  [id number]     Date/Time:  4/10/2017 12:47 PM    ADMIT DATE: 2017     DISCHARGE DATE: 4/10/2017     DISCHARGE DIAGNOSIS:  CHF specific discharge instructions    Weight:  · Daily weights, Notify your Heart Doctor if Wt gain of 3 lb in a day or 5 lb in a week     Diet :  · Low salt cardiac diet   · Limit Sodium to 2000 mg per day  · Fluid restriction of 1500 ml daily              Acute (New) Systolic CHF POA (EF 42% on Echo this admission)- s/p Cardiac Cath/PCI of diagonal vessel , Added Plavix & Lipitor by cardiology , OP follow up in 2-3 weeks  R groin hematoma (cath site) - now resolved, no oozing   Acute on chronic diastolic heart failure POA- diuresing well, cont Lasix at home dose  Afib with RVR POA- now rate controlled, decreased sotalol to 120 mg BID this admission  Subtherapeutic INR- 2.0 today on discharge, cont Coumadin daily  Hypokalemia - resolved  Hypomagnesemia - resolved  Runs of Vtach in ED - no more events since then  COPD  Diabetes    Principal Problem:    Systolic CHF, acute ([address] (2017)    Active Problems:    Hypertension--essential (2010)      Atrial fibrillation--paroxysmal (2010)      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 (2010)      Hypokalemia (2010)      Cardiac pacemaker in situ (2012)      Mixed hyperlipidemia (2012)      S/P coronary artery stent placement (2014)      Overview: 17 PCI/ROSALINO Diagonal      Diastolic CHF, acute on chronic ([address] (2014)      Type 2 diabetes mellitus with diabetic neuropathy, without long-term current use of insulin ([address] (2017)      Acute systolic CHF (congestive heart failure) ([address] (2017)      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 · Keep your medication in the bottles provided by the pharmacist and keep a list of the medication names, dosages, and times to be taken in your wallet. · Do not take other medications without consulting your doctor. Pain Management: per above medications    What to do at Home    Recommended diet:  Cardiac Diet, Diabetic Diet and Low fat, Low cholesterol    Recommended activity: Activity as tolerated    If you have questions regarding the hospital related prescriptions or hospital related issues please call [facility] matthew Gilman at . If you experience any of the following symptoms then please call your primary care physician or return to the emergency room if you cannot get hold of your doctor:  Fever, chills, nausea, vomiting, diarrhea, change in mentation, falling, bleeding, shortness of breath,     Follow Up:  Dr. Martinez, NP  you are to call and set up an appointment to see them in 7-10 days. Dr Darrian Bueno ([address] [place] [address] cardiology) in office in 2-3 weeks for follow up on CHF, Afib    Information obtained by :  I understand that if any problems occur once I am at home I am to contact my physician. I understand and acknowledge receipt of the instructions indicated above.                                                                                                                                            Physician's or R.N.'s Signature                                                                  Date/Time                                                                                                                                              Patient or Representative Signature                                                          Date/Time             [facility]  [address] [place] [address] Beatrice Samuel, [address] [facility]  [phone]  CARDIOLOGY DISCHARGE INSTRUCTIONS        Admission Diagnoses:   CHF (congestive heart failure) ([facility])  Diastolic CHF, acute on chronic ([facility])  Acute systolic CHF (congestive heart failure) ([facility]    Discharge Diagnoses:   Principal Problem:    Systolic CHF, acute ([facility] (4/6/2017)    Active Problems:    Hypertension--essential (6/2/2010)      Atrial fibrillation--paroxysmal (6/2/2010)      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 (6/2/2010)      Hypokalemia (7/20/2010)      Cardiac pacemaker in situ (7/5/2012)      Mixed hyperlipidemia (7/5/2012)      S/P coronary artery stent placement (7/4/2014)      Overview: 4/7/17 PCI/ROSALINO Diagonal      Diastolic CHF, acute on chronic ([facility] (9/22/2014)      Type 2 diabetes mellitus with diabetic neuropathy, without long-term current use of insulin ([facility]) (1/31/2017)      Acute systolic CHF (congestive heart failure) ([facility] (4/6/2017)      Cardiology Procedures this Admission:  EchoCardiogram, Left heart catheterization and intervention      CARDIAC CATHETERIZATION/PCI DISCHARGE INSTRUCTIONS    It is normal to feel tired the first couple days. Take it easy and follow the physicians instructions. CHECK THE CATHETER INSERTION SITE DAILY:    You may shower 24 hours after the procedure, remove the bandage during showering. Wash with soap and water and pat dry. Gentle cleaning of the site with soap and water is sufficient, cover with a dry clean dressing or bandage. Do not apply creams or powders to the area. Do not sit in a bathtub or pool of water for 7 days or until wound has completely healed. Temporary bruising and discomfort is normal and may last a few weeks. You may have a  formation of a small lump at the site which may last up to 6 weeks. CALL THE PHYSICIANS:    If the site becomes red, swollen or feels warm to the touch  If there is bleeding or drainage or if there is unusual pain at the groin or down the leg.   If there is any bleeding, lie down, apply pressure or have someone apply pressure with a clean cloth until the bleeding stops. If the bleeding continues, call 911 to be transported to the hospital.  DO NOT DRIVE YOURSELF, [address] ACTIVITY:    For the first 24-48 hours or as instructed by the physician:  No lifting, pushing or pulling over 10 pounds and no straining the insertion site. Do not life grocery bags or the garbage can, do not run the vacuum  or  for 7 days. Start with short walks as in the hospital and gradually increase as tolerated each day. It is recommended to walk 30 minutes 5-7 days per week. Follow your physicians instructions on activity. Avoid walking outside in extremes of heat or cold. Walk inside when it is cold and windy or hot and humid. Things to keep in mind:  No driving for at least 24 hours, or as designated by your physician. Limit the number of times you go up and down the stairs  Take rests and pace yourself with activity. Be careful and do not strain with bowel movements. MEDICATIONS:    Take all medications as prescribed  Call your physician if you have any questions  Keep an updated list of your medications with you at all times and give a list to your physician and pharmacist        SIGNS AND SYMPTOMS:    Be cautious of symptoms of angina or recurrent symptoms such as chest discomfort, unusual shortness of breath or fatigue. These could be symptoms of restenosis, a new blockage or a heart attack. If your symptoms are relieved with rest it is still recommended that you notify your physician of recurrent chest pain or discomfort. FOR CHEST PAIN or symptoms of angina not relieved with rest:  If the discomfort is not relieved with rest, and you have been prescribed Nitroglycerin, take as directed (taken under the tongue, one at a time 5 minutes apart for a total of 3 doses). If the discomfort is not relieved after the 3rd nitroglycerin, call 911.   If you have not been prescribed Nitroglycerin  and your chest discomfort is not relieved with rest, call 911. AFTER CARE:    Follow up with your physician as instructed. Follow a heart healthy diet with proper portion control, daily stress management, daily exercise, blood pressure and cholesterol control , and smoking cessation.

## 2017-04-07 NOTE — PROGRESS NOTES
6fr sheath removed from the right femoral artery,manual pressure and quickclot held for 20 min. upon release no oozing or hematoma noted. femostop in place at 40 mmhg.  Groin inspected and card assumed by DARIELA Veras

## 2017-04-07 NOTE — PROGRESS NOTES
[address] SHIFT NURSING NOTE    Bedside and Verbal shift change report given to  (oncoming nurse) by Antonio Mayberry (offgoing nurse). Report included the following information SBAR, Procedure Summary, Intake/Output, MAR and Recent Results. SHIFT SUMMARY:   Pt is out of home [place]. Pt informed. Admission Date 4/4/2017   Admission Diagnosis CHF (congestive heart failure) (HCC)  Diastolic CHF, acute on chronic (HCC)  Acute systolic CHF (congestive heart failure) ([facility] [address]   Consults IP CONSULT TO CARDIOLOGY  IP CONSULT TO PALLIATIVE CARE - PROVIDER        Consults   []PT   []OT   []Speech   []Case Management      [] Palliative       Cardiac Monitoring Order   [x]Yes   []No     GI Prophylaxis   []Yes   []No           DVT Prophylaxis   SCDs:             Mayo stockings:         [x] Medication   []Contraindicated   []None        Activity Level Activity Level: Up ad nicolas     Activity Assistance: No assistance needed   Purposeful Rounding every 1-2 hour? [x]Yes    Anthony Score  Total Score: 1   Bed Alarm (If score 3 or >)   []Yes   [] Refused (See signed refusal form in chart)   Landon Score  Landon Score: 20   Landon Score (if score 14 or less)   []PMT consult   []Wound Care consult      []Specialty bed   [] Nutrition consult          Needs prior to discharge:   Home O2 required:    []Yes   []No    If yes, how much O2 required?     Other:    Last Bowel Movement: Last Bowel Movement Date: 04/05/17        POST-OP SURGICAL VATS   []Yes   [x]N/A   Incentive Spirometer:   []Yes   []Refused   Coughing and deep breathing:     []Yes   []Refused   Oral care:     []Yes   []Refused   Understanding (patient education):     []Yes   []Refused   Getting out of bed Number times ambulated in hallway past shift:    Number of times OOB to chair past shift:     Head of bed elevation:     []Yes   []Refused      Influenza Vaccine Received Flu Vaccine for Current Season (usually Sept-March): Yes        Pneumonia Vaccine           Diet Active Orders   Diet    DIET NPO      LDAs               Peripheral IV 04/07/17 Left Arm (Active)   Site Assessment Clean, dry, & intact 4/7/2017  3:03 AM   Phlebitis Assessment 0 4/7/2017  3:03 AM   Infiltration Assessment 0 4/7/2017  3:03 AM   Dressing Status Clean, dry, & intact 4/7/2017  3:03 AM   Dressing Type Transparent;Tape 4/7/2017  3:03 AM   Hub Color/Line Status Pink;Capped;Flushed 4/7/2017  3:03 AM   Action Taken Blood drawn 4/7/2017  3:03 AM                      Urinary Catheter      Intake & Output   Date 04/06/17 0700 - 04/07/17 0659 04/07/17 0700 - 04/08/17 0659   Shift 7353-5545 4148-1497 24 Hour Total 9346-2997 2730-8098 24 Hour Total   I  N  T  A  K  E   P.O.          P. O.        Shift Total  (mL/kg) 800  (10.3) 240  (3) 1040  (13.1)      O  U  T  P  U  T   Urine  (mL/kg/hr) 1350  (1.4) 1000 2350         Urine Voided 1350 1000 2350       Shift Total  (mL/kg) 1350  (17.3) 1000  (12.6) 2350  (29.7)      NET -550 760 -1310      Weight (kg) 77.9 79.2 79.2 79.2 79.2 79.2         Readmission Risk Assessment Tool Score High Risk            21       Total Score        4 More than 1 Admission in calendar year    17 Charlson Comorbidity Score        Criteria that do not apply:    Relationship with PCP    Patient Living Status    Patient Length of Stay > 5    Patient Insurance is Medicare, Medicaid or Self Pay       Expected Length of Stay 3d 12h   Actual Length of Stay 3

## 2017-04-07 NOTE — PROGRESS NOTES
Cardiology Progress Note            [address] [address]  [phone]    4/7/2017 10:51 AM    Admit Date: 4/4/2017    Admit Diagnosis: CHF (congestive heart failure) ([address] Diastolic CHF, acute o*    Subjective:     Minerva Cortez   denies chest pain.     Visit Vitals    BP 98/57 (BP 1 Location: Right arm, BP Patient Position: At rest)    Pulse 83    Temp 98 °F (36.7 °C)    Resp 16    Ht 5' 6\" (1.676 m)    Wt 174 lb 9.7 oz (79.2 kg)    SpO2 100%    BMI 28.18 kg/m2     Current Facility-Administered Medications   Medication Dose Route Frequency    magnesium sulfate 1 g/100 ml IVPB (premix or compounded)  1 g IntraVENous ONCE    sotalol (BETAPACE) tablet 120 mg  120 mg Oral BID    lidocaine (XYLOCAINE) 10 mg/mL (1 %) injection        bivalirudin (ANGIOMAX) 250 mg in 0.9% sodium chloride (MBP/ADV) 50 mL  0.5 mg/kg/hr IntraVENous CONTINUOUS    bivalirudin (ANGIOMAX) 250 mg injection        0.9% sodium chloride infusion        adenosine (ADENOSCAN) 3 mg/mL injection        ADDaptor        bivalirudin (ANGIOMAX) 250 mg injection        0.9% sodium chloride infusion        ADDaptor        enoxaparin (LOVENOX) injection 80 mg  1 mg/kg SubCUTAneous Q12H    potassium chloride SR (KLOR-CON 10) tablet 20 mEq  20 mEq Oral BID    colchicine tablet 1.2 mg  1.2 mg Oral DAILY    aspirin chewable tablet 81 mg  81 mg Oral DAILY    fluticasone-vilanterol (BREO ELLIPTA) 100mcg-25mcg/puff  1 Puff Inhalation DAILY    fluticasone (FLONASE) 50 mcg/actuation nasal spray 2 Spray  2 Spray Both Nostrils QHS    multivitamin, tx-iron-ca-min (THERA-M w/ IRON) tablet 1 Tab  1 Tab Oral DAILY    gabapentin (NEURONTIN) 800 mg  800 mg Oral TID    umeclidinium (INCRUSE ELLIPTA) 62.5 mcg/actuation  1 Puff Inhalation DAILY    sodium chloride (NS) flush 5-10 mL  5-10 mL IntraVENous Q8H    sodium chloride (NS) flush 5-10 mL  5-10 mL IntraVENous PRN    acetaminophen (TYLENOL) tablet 650 mg  650 mg Oral Q4H PRN    ondansetron (ZOFRAN) injection 4 mg  4 mg IntraVENous Q4H PRN    docusate sodium (COLACE) capsule 100 mg  100 mg Oral BID PRN    insulin lispro (HUMALOG) injection   SubCUTAneous AC&HS    glucose chewable tablet 16 g  4 Tab Oral PRN    dextrose (D50W) injection syrg 12.5-25 g  12.5-25 g IntraVENous PRN    glucagon (GLUCAGEN) injection 1 mg  1 mg IntraMUSCular PRN    WARFARIN INFORMATION NOTE (COUMADIN)   Other QPM    valsartan (DIOVAN) tablet 20 mg  20 mg Oral QHS         Objective:      Visit Vitals    BP 98/57 (BP 1 Location: Right arm, BP Patient Position: At rest)    Pulse 83    Temp 98 °F (36.7 °C)    Resp 16    Ht 5' 6\" (1.676 m)    Wt 174 lb 9.7 oz (79.2 kg)    SpO2 100%    BMI 28.18 kg/m2       Physical Exam:  Abdomen: soft, non-tender  Extremities: extremities normal  Heart: irr irr  Lungs: clear to auscultation bilaterally  Pulses: 2+ and symmetric    Data Review:   Labs:    Recent Labs      04/07/17   0119  04/06/17   0344  04/05/17   0559   WBC  6.9  6.7  6.3   HGB  12.4  12.5  12.8   HCT  37.5  38.5  37.4   PLT  239  244  244     Recent Labs      04/07/17   0212  04/06/17   0344  04/05/17   1300  04/05/17   0559   NA  142  143   --   143   K  3.3*  3.5  3.7  3.4*   CL  113*  107   --   107   CO2  21  26   --   24   GLU  126*  124*   --   139*   BUN  10  11   --   12   CREA  0.59  0.86   --   0.87   CA  6.6*  8.2*   --   8.5   MG  1.6  2.2  2.2  2.2   PHOS   --    --    --   2.8   INR  1.8*  1.6*   --   2.1*       No results for input(s): TROIQ, CPK, CKMB in the last 72 hours.       Intake/Output Summary (Last 24 hours) at 04/07/17 1051  Last data filed at 04/07/17 0440   Gross per 24 hour   Intake              800 ml   Output             2000 ml   Net            -1200 ml        Telemetry: afib with occasional v pacing    Assessment:     Principal Problem:    Systolic CHF, acute ([facility] [address] (4/6/2017)    Active Problems:    Hypertension--essential (6/2/2010)      Atrial fibrillation--paroxysmal (6/2/2010)      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 (6/2/2010)      Hypokalemia (7/20/2010)      Cardiac pacemaker in situ (7/5/2012)      Mixed hyperlipidemia (7/5/2012)      S/P coronary artery stent placement (7/0/8463)      Diastolic CHF, acute on chronic ([facility] [address] (9/22/2014)      Type 2 diabetes mellitus with diabetic neuropathy, without long-term current use of insulin ([facility]) (1/31/2017)      Acute systolic CHF (congestive heart failure) ([facility] [address] (4/6/2017)        Plan:     Hong Wagner is sp PCI of a diagonal vessel. Ok for [employer] once Dr Wing Ahumada agrees. AF is rate controlled. Cont sotalol. Resume coumadin.  Will f/u in 1 week to discuss cardioversion vs ablation    Maru Nash MD, [facility]    4/7/2017

## 2017-04-07 NOTE — CONSULTS
Palliative Medicine Consult  [place]: [phone] ([phone])    Patient Name: Davis Fleming  YOB: 1950    Date of Initial Consult: 04/07/2017  Reason for Consult: care decisions  Requesting Provider: Dr. Garold Gottron   Primary Care Physician: Murray Garcia NP      SUMMARY:   Davis Fleming is a 73 y.o. woman with PMH most significant for A fib, SSS s/p PM placement, diastolic HF, DM2, who was admitted on 4/4/2017 for SOB and chest pressure on exertion. Current medical issues leading to Palliative Medicine involvement include: care decisions in setting of  Acute systolic HF, EF 84%  s/p PCI earlier today  Vtach runs in our ED  COPD     PALLIATIVE DIAGNOSES:   1. Chest pain  2. Low back pain  3. Fear of sickness  4. Counseling regarding goals of care and advance care planning     PLAN:   Brief Summary of Plan  -visited w/ pt in her rm. No family at bedside.   -goals of care confirmed  -advance care planning discussed  -we will continue to establish therapeutic alliance w/ pt and her family as well as psychosocial support    1. Goals of Care- confirmed  Pt is clear that she wishes for full, restorative treatment and all measures that support this. 2. Shared Medical Decision Making- pt relays that she speaks w/ her dtr, Raghavendra Gipson, about medical decisions. 3. Information Sharing-   Pt was unfamiliar w/ palliative care.     After I provided education, she verbalized understanding of all the points made including the following:  -we are an interdisciplinary team serving as a bridge of communication and advocate on behalf of the patient and family when needed  -we are a support care service that, when needed, assists w/ sx mgmt  -though hospice is \"under\" palliative medicine, our service is not hospice nor does a consult visit by our service mean that hospice is being or should be considered  -we are not here to make medical decisions, and our being consulted does not equate to a change in a patient's overall condition    Pt states that she is scared. This is her 4th or 5th stent placed. She asks if her heart can be made stronger. We discussed range of medical treatment and, separate from this, code status. Pt is clear on wanting full, restorative treatment. She wishes to think about code status. It was relayed to pt that our discussion of goals of care and advance care planning are independent of her condition and clinical trajectory. Pt verbalized understanding. She said that she would speak w/ her dtr about what we discussed and expressed appreciation that we spoke about the above. Questions and concerns addressed. Supportive listening provided. 4. Advance Care Planning- pt's code status is FULL CODE. Active EMR order reflects this. Pt says that she may have an AMD but is not sure. She expresses wish to have her dtr to be her primary MPOA    5. Psychosocial Support- We will continue to support patient and family during this difficult hospitalization    6. Spiritual- at this time, there are no apparent spiritual needs or concerns. 7. Symptom Management- at this time, there does not appear to be symptom management for which our assistance is needed. I have discussed with patients bedside RN, Stephanie Noel. We appreciate her report and input. I have discussed with our palliative care IDT. Thank you for this consult that has provided us with the opportunity to be involved in this patient's care. We will continue to follow along with you. Should you have any questions or concerns prior to our next visit at the bedside, please do not hesitate to contact us at [phone] Ashish Colindres MD  Palliative Care Team     GOALS OF CARE / TREATMENT PREFERENCES:   [====Goals of Care====]  GOALS OF CARE:  Patient / health care proxy stated goals: full restorative treatment and all measures that support this.      TREATMENT PREFERENCES:   Code Status: Full Code    Advance Care Planning:  Advance Care Planning 4/4/2017   Patient's Healthcare Decision Maker is: Named in scanned ACP document   Confirm Advance Directive Yes, on file       Other:    The palliative care team has discussed with patient / health care proxy about goals of care / treatment preferences for patient.  [====Goals of Care====]         HISTORY:     History obtained from: pt, chart    CHIEF COMPLAINT: low back pain    HPI/SUBJECTIVE:    The patient is:   [x] Verbal and participatory  [] Non-participatory due to:     Pt is a 74 y/o AAF w/ PMH noted above who presented to our ED w/ SOB and chest pressure on exertion x ~1 wk. Pt said that she had a bad cough and subjective fever about 3 wks ago; was seen by her PCP; and was started on abx. She said that her sx's resolved and had none for the past 1 wk. Pt said that she has had swelling in both feet and SOB developed w/ chest pressure only on exertion. Pt's sotalol was recently increased to 180 mg for A fib. She reports having taken her meds as directed. Of note, pt had several episodes of V tach in our ED. Clinical Pain Assessment (nonverbal scale for severity on nonverbal patients):   [++++ Clinical Pain Assessment++++]  [++++Pain Severity++++]: Pain: 8   Pt notes that it was 10/10 all day while she was lying s/p cardiac cath. Just sitting up for less than 10 min has made pain go from 10/10 to 8/10.    [++++Pain Character++++]: achy, chronic  [++++Pain Duration++++]: constant  [++++Pain Effect++++]:   [++++Pain Factors++++]: sitting up has helped a great deal  [++++Pain Frequency++++]: persistent  [++++Pain Location++++]: low back   [++++ Clinical Pain Assessment++++]     FUNCTIONAL ASSESSMENT:     Palliative Performance Scale (PPS):  PPS: 80       PSYCHOSOCIAL/SPIRITUAL SCREENING:     Advance Care Planning:  Advance Care Planning 4/4/2017   Patient's Healthcare Decision Maker is: Named in scanned ACP document   Confirm Advance Directive Yes, on file   No ACP found on file     Any spiritual / Taoist concerns:  [] Yes /  [x] No    Caregiver Burnout:  [] Yes /  [] No /  [x] No Caregiver Present      Anticipatory grief assessment:   [] Normal  / [] Maladaptive       ESAS Anxiety: Anxiety: 0    ESAS Depression: Depression: 0        REVIEW OF SYSTEMS:     Positive and pertinent negative findings in ROS are noted above in HPI. The following systems were [x] reviewed / [] unable to be reviewed as noted in HPI  Other findings are noted below. Systems: constitutional, ears/nose/mouth/throat, respiratory, gastrointestinal, genitourinary, musculoskeletal, integumentary, neurologic, psychiatric, endocrine. Positive findings noted below. Modified ESAS Completed by: provider   Fatigue: 0 Drowsiness: 0   Depression: 0 Pain: 8   Anxiety: 0 Nausea: 0   Anorexia: 0 Dyspnea: 0                    PHYSICAL EXAM:     From RN flowsheet:  Wt Readings from Last 3 Encounters:   04/07/17 174 lb 9.7 oz (79.2 kg)   02/14/17 182 lb 12.2 oz (82.9 kg)   01/31/17 180 lb 12.8 oz (82 kg)     Blood pressure (!) 109/96, pulse 97, temperature 97.8 °F (36.6 °C), resp. rate 18, height 5' 6\" (1.676 m), weight 174 lb 9.7 oz (79.2 kg), SpO2 94 %.     Pain Scale 1: Numeric (0 - 10)  Pain Intensity 1: 0  Pain Onset 1: acute  Pain Location 1: Back  Pain Orientation 1: Left  Pain Description 1: Aching  Pain Intervention(s) 1: Medication (see MAR)    Gen: sitting up in bed, in NAD  HEENT: NC/AT, MMM  Respiratory: breathing not labored, symmetric  Neurologic: awake, alert, following commands, moving all extremities  Eats her supper without difficulty  Psychiatric: appropriate, no obvious signs of agitation     HISTORY:     Principal Problem:    Systolic CHF, acute ([facility] [address] (4/6/2017)    Active Problems:    Hypertension--essential (6/2/2010)      Atrial fibrillation--paroxysmal (6/2/2010)      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 (6/2/2010)      Hypokalemia (7/20/2010)      Cardiac pacemaker in situ (2012)      Mixed hyperlipidemia (2012)      S/P coronary artery stent placement (2014)      Overview: 17 PCI/ROSALINO Diagonal      Diastolic CHF, acute on chronic (HCC) (2014)      Type 2 diabetes mellitus with diabetic neuropathy, without long-term current use of insulin (HCC) (2017)      Acute systolic CHF (congestive heart failure) ([address] (2017)      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)      Past Medical History:   Diagnosis Date    Atrial fibrillation ([address] 2010    Chronic diastolic heart failure ([address] 2014    COPD     COPD (chronic obstructive pulmonary disease) ([address] 2010    Diabetes ([address]     Fibroid     Heart failure ([address]     Hypertension 2010    NIDDM (non-insulin dependent diabetes mellitus) 2010    Screening mammogram 5/4/10    SOB (shortness of breath) 2014      Past Surgical History:   Procedure Laterality Date    HX  SECTION      HX OTHER SURGICAL      adrenal gland removed    HX PACEMAKER      CT EXCISE ADRENAL GLAND        Family History   Problem Relation Age of Onset    Heart Disease Mother     Diabetes Father     Heart Disease Brother       History reviewed, no pertinent family history.   Social History   Substance Use Topics    Smoking status: Former Smoker     Types: Cigarettes     Quit date: 2009    Smokeless tobacco: Never Used    Alcohol use No     Allergies   Allergen Reactions    Crestor [Rosuvastatin] Myalgia    Levaquin [Levofloxacin] Nausea Only     GI Upset    Lyrica [Pregabalin] Myalgia      Current Facility-Administered Medications   Medication Dose Route Frequency    sotalol (BETAPACE) tablet 120 mg  120 mg Oral BID    lidocaine (XYLOCAINE) 10 mg/mL (1 %) injection        bivalirudin (ANGIOMAX) 250 mg injection        0.9% sodium chloride infusion        adenosine (ADENOSCAN) 3 mg/mL injection        ADDaptor        bivalirudin (ANGIOMAX) 250 mg injection        0.9% sodium chloride infusion        ADDaptor        fentaNYL citrate (PF) injection 25 mcg  25 mcg IntraVENous Q4H PRN    warfarin (COUMADIN) tablet 7.5 mg  7.5 mg Oral ONCE    [START ON 4/8/2017] clopidogrel (PLAVIX) tablet 75 mg  75 mg Oral DAILY    atorvastatin (LIPITOR) tablet 20 mg  20 mg Oral QHS    fentaNYL citrate (PF) injection 25 mcg  25 mcg IntraVENous Q1H PRN    furosemide (LASIX) tablet 20 mg  20 mg Oral DAILY    potassium chloride SR (KLOR-CON 10) tablet 20 mEq  20 mEq Oral BID    colchicine tablet 1.2 mg  1.2 mg Oral DAILY    aspirin chewable tablet 81 mg  81 mg Oral DAILY    fluticasone-vilanterol (BREO ELLIPTA) 100mcg-25mcg/puff  1 Puff Inhalation DAILY    fluticasone (FLONASE) 50 mcg/actuation nasal spray 2 Spray  2 Spray Both Nostrils QHS    multivitamin, tx-iron-ca-min (THERA-M w/ IRON) tablet 1 Tab  1 Tab Oral DAILY    gabapentin (NEURONTIN) 800 mg  800 mg Oral TID    umeclidinium (INCRUSE ELLIPTA) 62.5 mcg/actuation  1 Puff Inhalation DAILY    sodium chloride (NS) flush 5-10 mL  5-10 mL IntraVENous Q8H    sodium chloride (NS) flush 5-10 mL  5-10 mL IntraVENous PRN    acetaminophen (TYLENOL) tablet 650 mg  650 mg Oral Q4H PRN    ondansetron (ZOFRAN) injection 4 mg  4 mg IntraVENous Q4H PRN    docusate sodium (COLACE) capsule 100 mg  100 mg Oral BID PRN    insulin lispro (HUMALOG) injection   SubCUTAneous AC&HS    glucose chewable tablet 16 g  4 Tab Oral PRN    dextrose (D50W) injection syrg 12.5-25 g  12.5-25 g IntraVENous PRN    glucagon (GLUCAGEN) injection 1 mg  1 mg IntraMUSCular PRN    WARFARIN INFORMATION NOTE (COUMADIN)   Other QPM    valsartan (DIOVAN) tablet 20 mg  20 mg Oral QHS          LAB AND IMAGING FINDINGS:     Lab Results   Component Value Date/Time    WBC 6.9 04/07/2017 01:19 AM    HGB 12.4 04/07/2017 01:19 AM    PLATELET 726 12/62/2295 01:19 AM     Lab Results   Component Value Date/Time    Sodium 142 04/07/2017 02:12 AM Potassium 3.3 04/07/2017 02:12 AM    Chloride 113 04/07/2017 02:12 AM    CO2 21 04/07/2017 02:12 AM    BUN 10 04/07/2017 02:12 AM    Creatinine 0.59 04/07/2017 02:12 AM    Calcium 6.6 04/07/2017 02:12 AM    Magnesium 1.6 04/07/2017 02:12 AM    Phosphorus 2.8 04/05/2017 05:59 AM      Lab Results   Component Value Date/Time    AST (SGOT) 64 04/04/2017 09:44 AM    Alk. phosphatase 109 04/04/2017 09:44 AM    Protein, total 7.1 04/04/2017 09:44 AM    Albumin 3.2 04/04/2017 09:44 AM    Globulin 3.9 04/04/2017 09:44 AM     Lab Results   Component Value Date/Time    INR 1.8 04/07/2017 02:12 AM    Prothrombin time 18.1 04/07/2017 02:12 AM    aPTT 27.1 04/12/2016 04:29 PM      Lab Results   Component Value Date/Time    Iron 59 06/30/2014 03:30 AM    TIBC 302 06/30/2014 03:30 AM    Iron % saturation 20 06/30/2014 03:30 AM      Lab Results   Component Value Date/Time    pH 7.45 06/18/2013 04:53 AM    PCO2 38 06/18/2013 04:53 AM    PO2 146 06/18/2013 04:53 AM     No components found for: [place]   Lab Results   Component Value Date/Time     09/26/2016 11:02 AM    CK - MB 2.2 09/26/2016 11:02 AM                Total time: 60 min  Counseling / coordination time: 50 min  > 50% counseling / coordination?: yes    Prolonged service was provided for  []30 min   []75 min in face to face time in the presence of the patient. Time Start:   Time End:   Note: this can only be billed with 53241 (initial) or 34937 (follow up). If multiple start / stop times, list each separately.

## 2017-04-07 NOTE — PROGRESS NOTES
SW completed chart review. Pt is now on IVCU in room 2152. No discharge orders yet. Noted Irais's note: Pallitative Consult was made for disease management and  [facility] will be notified at discharge. CM will continue to monitor discharge plan.      Lui, [facility]

## 2017-04-07 NOTE — PROGRESS NOTES
Hospitalist Progress Note    NAME: Tal Miramontes   :  1950   MRN:  [id number]       Interim Hospital Summary: 73 y.o. female whom presented on 2017 with      Assessment / Plan:  Acute (New) Systolic CHF POA (EF 67% on Echo this admission)- s/p Cardiac Cath/PCI of diagonal vessel today  Acute on chronic diastolic heart failure POA- diuresing well  Afib with RVR POA- now rate controlled  Subtherapeutic INR- 1.8 today  Hypokalemia  Hypomagnesemia  -patient with increased shortness of breath and chest pressure on exertion,   CXR with interstitial edema,   pro BNP 1700 all suggest     S/p IV lasix in ED- pt has responded well  Change to PO Lasix today at lower dose due to borderline BP  Replenish Mag 1 g x 1  Cont PO   -strict I/Os, daily weights,  -EP consult noted - cleared pt from their standpoint again today after cardiac cath today AM  -continue aspirin, diovan  -continue sotalol - decrease the dose to 120 mg today as pt hypotensive  Cont SQ Lovenox bridge for now- resume coumadin tonight  ? AICD upgrade of the PPM on hold for now - OP follow up as per Dr Melina Walker in ED -up to 24 beats on ED telemetry    May need AICD upgrade with new Systolic CHF on Echo EF 19% - Dr Ranjana Weir to decide in office on follow up after Cath/PCI today  repleted K + now, still low today AM  Cont to give K+ BID PO now (pt is on sotalol-monitor lytes closely)       COPD  -continue home inhalers, no signs of exacerbation     Diabetes  -hold metformin  -SSI        Code Status: full  Surrogate Decision Maker: daughter     DVT Prophylaxis: warfarin  GI Prophylaxis: not indicated     Baseline: independent  Recommended Disposition: Home w/Family once cleared by Cardiology     Subjective:     Chief Complaint / Reason for Physician Visit F/U SOB, Diastolic CHF  \"I feel OKr\". Discussed with RN events overnight.      Review of Systems:  Symptom Y/N Comments  Symptom Y/N Comments   Fever/Chills n   Chest Pain n    Poor Appetite n   Edema n    Cough n   Abdominal Pain n    Sputum n   Joint Pain n    SOB/GREENFIELD y Improved slightly  Pruritis/Rash     Nausea/vomit n   Tolerating PT/OT y    Diarrhea    Tolerating Diet y    Constipation    Other       Could NOT obtain due to:      Objective:     VITALS:   Last 24hrs VS reviewed since prior progress note. Most recent are:  Patient Vitals for the past 24 hrs:   Temp Pulse Resp BP SpO2   04/07/17 1343 - - - 99/64 -   04/07/17 1230 97.8 °F (36.6 °C) 81 18 96/47 97 %   04/07/17 1215 - 83 - 104/69 97 %   04/07/17 1201 - 82 - 101/66 96 %   04/07/17 1145 - 78 - 109/64 97 %   04/07/17 1130 - 80 - 120/79 98 %   04/07/17 0950 97.6 °F (36.4 °C) 80 20 114/70 98 %   04/07/17 0447 98 °F (36.7 °C) 83 16 98/57 100 %   04/06/17 2322 98 °F (36.7 °C) 90 18 (!) 75/51 98 %   04/06/17 2030 98.1 °F (36.7 °C) 70 18 103/59 98 %   04/06/17 1458 97.6 °F (36.4 °C) (!) 101 20 114/89 98 %       Intake/Output Summary (Last 24 hours) at 04/07/17 1432  Last data filed at 04/07/17 1215   Gross per 24 hour   Intake              560 ml   Output             1300 ml   Net             -740 ml        PHYSICAL EXAM:  General: WD, WN. Alert, cooperative, no acute distress    EENT:  EOMI. Anicteric sclerae. MMM  Resp:  CTA bilaterally, no wheezing or rales. No accessory muscle use  CV:  irregular  rhythm,  1 + LE edema  GI:  Soft, Non distended, Non tender.  +Bowel sounds  Neurologic:  Alert and oriented X 3, normal speech,   Psych:   Good insight. Not anxious nor agitated  Skin:  No rashes.   No jaundice    Reviewed most current lab test results and cultures  YES  Reviewed most current radiology test results   YES  Review and summation of old records today    NO  Reviewed patient's current orders and MAR    YES  PMH/SH reviewed - no change compared to H&P  ________________________________________________________________________  Care Plan discussed with:    Comments   Patient x    Family      RN x    Care Manager x    Consultant Multidiciplinary team rounds were held today with , nursing, pharmacist and clinical coordinator. Patient's plan of care was discussed; medications were reviewed and discharge planning was addressed. ________________________________________________________________________  Total NON critical care TIME:  25   Minutes    Total CRITICAL CARE TIME Spent:   Minutes non procedure based      Comments   >50% of visit spent in counseling and coordination of care     ________________________________________________________________________  Clovis Vallejo MD     Procedures: see electronic medical records for all procedures/Xrays and details which were not copied into this note but were reviewed prior to creation of Plan. LABS:  I reviewed today's most current labs and imaging studies.   Pertinent labs include:  Recent Labs      04/07/17   0119  04/06/17   0344  04/05/17   0559   WBC  6.9  6.7  6.3   HGB  12.4  12.5  12.8   HCT  37.5  38.5  37.4   PLT  239  244  244     Recent Labs      04/07/17   0212  04/06/17   0344  04/05/17   1300  04/05/17   0559   NA  142  143   --   143   K  3.3*  3.5  3.7  3.4*   CL  113*  107   --   107   CO2  21  26   --   24   GLU  126*  124*   --   139*   BUN  10  11   --   12   CREA  0.59  0.86   --   0.87   CA  6.6*  8.2*   --   8.5   MG  1.6  2.2  2.2  2.2   PHOS   --    --    --   2.8   INR  1.8*  1.6*   --   2.1*       Signed: Clovis Vallejo MD

## 2017-04-08 LAB
ANION GAP BLD CALC-SCNC: 9 MMOL/L (ref 5–15)
BUN SERPL-MCNC: 9 MG/DL (ref 6–20)
BUN/CREAT SERPL: 9 (ref 12–20)
CALCIUM SERPL-MCNC: 8.4 MG/DL (ref 8.5–10.1)
CHLORIDE SERPL-SCNC: 106 MMOL/L (ref 97–108)
CO2 SERPL-SCNC: 24 MMOL/L (ref 21–32)
CREAT SERPL-MCNC: 1 MG/DL (ref 0.55–1.02)
ERYTHROCYTE [DISTWIDTH] IN BLOOD BY AUTOMATED COUNT: 14.6 % (ref 11.5–14.5)
GLUCOSE BLD STRIP.AUTO-MCNC: 122 MG/DL (ref 65–100)
GLUCOSE BLD STRIP.AUTO-MCNC: 134 MG/DL (ref 65–100)
GLUCOSE BLD STRIP.AUTO-MCNC: 137 MG/DL (ref 65–100)
GLUCOSE BLD STRIP.AUTO-MCNC: 172 MG/DL (ref 65–100)
GLUCOSE SERPL-MCNC: 140 MG/DL (ref 65–100)
HCT VFR BLD AUTO: 35.4 % (ref 35–47)
HGB BLD-MCNC: 11.7 G/DL (ref 11.5–16)
INR PPP: 1.3 (ref 0.9–1.1)
MAGNESIUM SERPL-MCNC: 2.3 MG/DL (ref 1.6–2.4)
MCH RBC QN AUTO: 28.3 PG (ref 26–34)
MCHC RBC AUTO-ENTMCNC: 33.1 G/DL (ref 30–36.5)
MCV RBC AUTO: 85.5 FL (ref 80–99)
PLATELET # BLD AUTO: 230 K/UL (ref 150–400)
POTASSIUM SERPL-SCNC: 4.6 MMOL/L (ref 3.5–5.1)
PROTHROMBIN TIME: 12.9 SEC (ref 9–11.1)
RBC # BLD AUTO: 4.14 M/UL (ref 3.8–5.2)
SERVICE CMNT-IMP: ABNORMAL
SODIUM SERPL-SCNC: 139 MMOL/L (ref 136–145)
WBC # BLD AUTO: 7.6 K/UL (ref 3.6–11)

## 2017-04-08 PROCEDURE — 36415 COLL VENOUS BLD VENIPUNCTURE: CPT | Performed by: INTERNAL MEDICINE

## 2017-04-08 PROCEDURE — 74011250637 HC RX REV CODE- 250/637: Performed by: INTERNAL MEDICINE

## 2017-04-08 PROCEDURE — 85610 PROTHROMBIN TIME: CPT | Performed by: INTERNAL MEDICINE

## 2017-04-08 PROCEDURE — G8978 MOBILITY CURRENT STATUS: HCPCS

## 2017-04-08 PROCEDURE — 85027 COMPLETE CBC AUTOMATED: CPT | Performed by: INTERNAL MEDICINE

## 2017-04-08 PROCEDURE — G8979 MOBILITY GOAL STATUS: HCPCS

## 2017-04-08 PROCEDURE — 82962 GLUCOSE BLOOD TEST: CPT

## 2017-04-08 PROCEDURE — 80048 BASIC METABOLIC PNL TOTAL CA: CPT | Performed by: INTERNAL MEDICINE

## 2017-04-08 PROCEDURE — 74011250636 HC RX REV CODE- 250/636: Performed by: INTERNAL MEDICINE

## 2017-04-08 PROCEDURE — 97162 PT EVAL MOD COMPLEX 30 MIN: CPT

## 2017-04-08 PROCEDURE — 74011636637 HC RX REV CODE- 636/637: Performed by: INTERNAL MEDICINE

## 2017-04-08 PROCEDURE — 74011250637 HC RX REV CODE- 250/637: Performed by: NURSE PRACTITIONER

## 2017-04-08 PROCEDURE — 83735 ASSAY OF MAGNESIUM: CPT | Performed by: INTERNAL MEDICINE

## 2017-04-08 PROCEDURE — 65660000000 HC RM CCU STEPDOWN

## 2017-04-08 RX ORDER — WARFARIN SODIUM 5 MG/1
10 TABLET ORAL ONCE
Status: COMPLETED | OUTPATIENT
Start: 2017-04-08 | End: 2017-04-08

## 2017-04-08 RX ORDER — ENOXAPARIN SODIUM 100 MG/ML
1 INJECTION SUBCUTANEOUS EVERY 12 HOURS
Status: DISCONTINUED | OUTPATIENT
Start: 2017-04-08 | End: 2017-04-09

## 2017-04-08 RX ADMIN — GABAPENTIN 800 MG: 100 CAPSULE ORAL at 21:21

## 2017-04-08 RX ADMIN — Medication 10 ML: at 03:36

## 2017-04-08 RX ADMIN — SOTALOL HYDROCHLORIDE 120 MG: 80 TABLET ORAL at 11:19

## 2017-04-08 RX ADMIN — ATORVASTATIN CALCIUM 20 MG: 20 TABLET, FILM COATED ORAL at 21:22

## 2017-04-08 RX ADMIN — INSULIN LISPRO 2 UNITS: 100 INJECTION, SOLUTION INTRAVENOUS; SUBCUTANEOUS at 08:59

## 2017-04-08 RX ADMIN — VALSARTAN 20 MG: 40 TABLET ORAL at 21:24

## 2017-04-08 RX ADMIN — POTASSIUM CHLORIDE 20 MEQ: 750 TABLET, FILM COATED, EXTENDED RELEASE ORAL at 08:58

## 2017-04-08 RX ADMIN — COLCHICINE 1.2 MG: 0.6 TABLET, FILM COATED ORAL at 09:09

## 2017-04-08 RX ADMIN — FLUTICASONE FUROATE AND VILANTEROL TRIFENATATE 1 PUFF: 100; 25 POWDER RESPIRATORY (INHALATION) at 09:02

## 2017-04-08 RX ADMIN — ENOXAPARIN SODIUM 80 MG: 80 INJECTION SUBCUTANEOUS at 12:39

## 2017-04-08 RX ADMIN — GABAPENTIN 800 MG: 100 CAPSULE ORAL at 16:36

## 2017-04-08 RX ADMIN — ASPIRIN 81 MG CHEWABLE TABLET 81 MG: 81 TABLET CHEWABLE at 08:59

## 2017-04-08 RX ADMIN — Medication 10 ML: at 23:23

## 2017-04-08 RX ADMIN — CLOPIDOGREL BISULFATE 75 MG: 75 TABLET, FILM COATED ORAL at 08:58

## 2017-04-08 RX ADMIN — GABAPENTIN 800 MG: 100 CAPSULE ORAL at 08:58

## 2017-04-08 RX ADMIN — ENOXAPARIN SODIUM 80 MG: 80 INJECTION SUBCUTANEOUS at 22:56

## 2017-04-08 RX ADMIN — SOTALOL HYDROCHLORIDE 120 MG: 80 TABLET ORAL at 01:20

## 2017-04-08 RX ADMIN — UMECLIDINIUM 1 PUFF: 62.5 AEROSOL, POWDER ORAL at 09:02

## 2017-04-08 RX ADMIN — FLUTICASONE PROPIONATE 2 SPRAY: 50 SPRAY, METERED NASAL at 21:22

## 2017-04-08 RX ADMIN — FUROSEMIDE 20 MG: 20 TABLET ORAL at 08:59

## 2017-04-08 RX ADMIN — POTASSIUM CHLORIDE 20 MEQ: 750 TABLET, FILM COATED, EXTENDED RELEASE ORAL at 17:58

## 2017-04-08 RX ADMIN — MULTIPLE VITAMINS W/ MINERALS TAB 1 TABLET: TAB at 09:05

## 2017-04-08 RX ADMIN — WARFARIN SODIUM 10 MG: 5 TABLET ORAL at 17:58

## 2017-04-08 NOTE — PROGRESS NOTES
Cardiology Progress Note            [address] [facility], [address]  [phone]    4/8/2017 10:10 AM    Admit Date: 4/4/2017    Admit Diagnosis: CHF (congestive heart failure) ([address] Diastolic CHF, acute o*    Subjective:     Emani Estes   denies chest pain.     Visit Vitals    /66    Pulse 91    Temp 98.3 °F (36.8 °C)    Resp 16    Ht 5' 6\" (1.676 m)    Wt 172 lb 6.4 oz (78.2 kg)    SpO2 98%    BMI 27.83 kg/m2     Current Facility-Administered Medications   Medication Dose Route Frequency    enoxaparin (LOVENOX) injection 80 mg  1 mg/kg SubCUTAneous Q12H    sotalol (BETAPACE) tablet 120 mg  120 mg Oral BID    clopidogrel (PLAVIX) tablet 75 mg  75 mg Oral DAILY    atorvastatin (LIPITOR) tablet 20 mg  20 mg Oral QHS    fentaNYL citrate (PF) injection 25 mcg  25 mcg IntraVENous Q1H PRN    furosemide (LASIX) tablet 20 mg  20 mg Oral DAILY    potassium chloride SR (KLOR-CON 10) tablet 20 mEq  20 mEq Oral BID    colchicine tablet 1.2 mg  1.2 mg Oral DAILY    aspirin chewable tablet 81 mg  81 mg Oral DAILY    fluticasone-vilanterol (BREO ELLIPTA) 100mcg-25mcg/puff  1 Puff Inhalation DAILY    fluticasone (FLONASE) 50 mcg/actuation nasal spray 2 Spray  2 Spray Both Nostrils QHS    multivitamin, tx-iron-ca-min (THERA-M w/ IRON) tablet 1 Tab  1 Tab Oral DAILY    gabapentin (NEURONTIN) 800 mg  800 mg Oral TID    umeclidinium (INCRUSE ELLIPTA) 62.5 mcg/actuation  1 Puff Inhalation DAILY    sodium chloride (NS) flush 5-10 mL  5-10 mL IntraVENous Q8H    sodium chloride (NS) flush 5-10 mL  5-10 mL IntraVENous PRN    acetaminophen (TYLENOL) tablet 650 mg  650 mg Oral Q4H PRN    ondansetron (ZOFRAN) injection 4 mg  4 mg IntraVENous Q4H PRN    docusate sodium (COLACE) capsule 100 mg  100 mg Oral BID PRN    insulin lispro (HUMALOG) injection   SubCUTAneous AC&HS    glucose chewable tablet 16 g  4 Tab Oral PRN    dextrose (D50W) injection syrg 12.5-25 g  12.5-25 g IntraVENous PRN    glucagon (GLUCAGEN) injection 1 mg  1 mg IntraMUSCular PRN    WARFARIN INFORMATION NOTE (COUMADIN)   Other QPM    valsartan (DIOVAN) tablet 20 mg  20 mg Oral QHS         Objective:      Visit Vitals    /66    Pulse 91    Temp 98.3 °F (36.8 °C)    Resp 16    Ht 5' 6\" (1.676 m)    Wt 172 lb 6.4 oz (78.2 kg)    SpO2 98%    BMI 27.83 kg/m2       Physical Exam:  Abdomen: soft, non-tender  Extremities: extremities normal  Heart: irr irr  Lungs: clear to auscultation bilaterally  Pulses: 2+ and symmetric    Data Review:   Labs:    Recent Labs      04/08/17   0343  04/07/17   0119  04/06/17   0344   WBC  7.6  6.9  6.7   HGB  11.7  12.4  12.5   HCT  35.4  37.5  38.5   PLT  230  239  244     Recent Labs      04/08/17   0343  04/07/17   0212  04/06/17   0344   NA  139  142  143   K  4.6  3.3*  3.5   CL  106  113*  107   CO2  24  21  26   GLU  140*  126*  124*   BUN  9  10  11   CREA  1.00  0.59  0.86   CA  8.4*  6.6*  8.2*   MG  2.3  1.6  2.2   INR  1.3*  1.8*  1.6*       No results for input(s): TROIQ, CPK, CKMB in the last 72 hours.       Intake/Output Summary (Last 24 hours) at 04/08/17 1010  Last data filed at 04/08/17 0329   Gross per 24 hour   Intake              480 ml   Output             1750 ml   Net            -1270 ml        Telemetry: afib    Assessment:     Principal Problem:    Systolic CHF, acute ([facility] (4/6/2017)    Active Problems:    Hypertension--essential (6/2/2010)      Atrial fibrillation--paroxysmal (6/2/2010)      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 (6/2/2010)      Hypokalemia (7/20/2010)      Cardiac pacemaker in situ (7/5/2012)      Mixed hyperlipidemia (7/5/2012)      S/P coronary artery stent placement (7/4/2014)      Overview: 4/7/17 PCI/ROSALINO Diagonal      Diastolic CHF, acute on chronic ([facility] (9/22/2014)      Type 2 diabetes mellitus with diabetic neuropathy, without long-term current use of insulin ([facility] (1/31/2017)      Acute systolic CHF (congestive heart failure) ([facility] (4/6/2017)      Fear associated with illness and body function (4/7/2017)      Counseling regarding advanced care planning and goals of care (4/7/2017)        Plan:     Keshawn Cast is in rate controlled afib. She is diuresing well on oral lasix. Would restart coumadin. Cont sotalol and diovan for cardiomyopathy. Sp pci - cont plavix.  Gita Bracket for [employer] from a cardiology standpoint with f/u in 1 week    Peña Talamantes MD, [facility]    4/8/2017

## 2017-04-08 NOTE — CARDIO/PULMONARY
CP REHAB NOTE      Chart Review: Patient admitted for increasing SOB and chest pressure on exertion. Medical History: Afib, SSS, pacemaker, diabetes, diastolic CHF, COPD  EF 88-64%, Echo 10/17/2016 Echo of yesterday (4/5/17) shows EF of 25-30%. Former Smoker      Pt visited. Printed material given and discussed re: heart healthy habits, the cardiac diet, medication management, what to expect following coronary angioplasty, and post cardiac catheterization instructions. Discussed post catheterization restrictions including no lifting, no tub baths and no straining for 7 days. Also discussed what to do if bleeding or bruising at the cath insertion site is observed. Reviewed the cardiac diet (low NA/fat/CHOL), the importance of medication compliance, monitoring for any unusual signs & symptoms and when to call the doctor. This was a follow-up visit to answer questions and reinforce prior teaching re: CHF, S&Ss, medication management, Low NA diet, daily weights, when to call the doctor and balancing rest/activity. Pt verbalized understanding.

## 2017-04-08 NOTE — PROGRESS NOTES
Hospitalist Progress Note    NAME: Terrance Del Valle   :  1950   MRN:  [id number]       Interim Hospital Summary: 73 y.o. female whom presented on 2017 with      Assessment / Plan:  Acute (New) Systolic CHF POA (EF 17% on Echo this admission)- s/p Cardiac Cath/PCI of diagonal vessel   Acute on chronic diastolic heart failure POA- diuresing well  Afib with RVR POA- now rate controlled  Subtherapeutic INR- 1.3 today  Hypokalemia - resolved  Hypomagnesemia - resolved  -patient with increased shortness of breath and chest pressure on exertion,   CXR with interstitial edema,   pro BNP 1700 all suggest     S/p IV lasix in ED- pt has responded well  Changed to PO Lasix now at lower dose due to borderline BP  strict I/Os, daily weights,  -EP consult noted - cleared pt from their standpoint again today after cardiac cath /PCI  -continue aspirin, diovan  -continue sotalol - decreased the dose to 120 mg today as pt hypotensive - cardiology seems to be ok with it  Cont SQ Lovenox bridge for now- cont dosing coumadin tonight - DC home once INR trends up  ? AICD upgrade of the PPM on hold for now - OP follow up as per Dr Lauren Garza  PT/OT eval today for DC planning    Runs of Vtach in ED -up to 24 beats on ED telemetry    May need AICD upgrade with new Systolic CHF on Echo EF 84% - Dr Lauren Garza to decide in office on follow up after Cath/PCI today  repleted K + now, still low today AM  Cont to give K+ BID PO with diuresis(pt is on sotalol-monitor lytes closely)       COPD  -continue home inhalers, no signs of exacerbation     Diabetes  -hold metformin  -SSI        Code Status: full  Surrogate Decision Maker: daughter     DVT Prophylaxis: warfarin  GI Prophylaxis: not indicated     Baseline: independent  Recommended Disposition: Home w/Family with [facility] services likely     Subjective:     Chief Complaint / Reason for Physician Visit F/U SOB, Diastolic CHF  \"I feel OK\". Discussed with RN events overnight.      Review of Systems:  Symptom Y/N Comments  Symptom Y/N Comments   Fever/Chills n   Chest Pain n    Poor Appetite n   Edema n    Cough n   Abdominal Pain n    Sputum n   Joint Pain n    SOB/[place] y Improved slightly  Pruritis/Rash     Nausea/vomit n   Tolerating PT/OT y    Diarrhea    Tolerating Diet y    Constipation    Other       Could NOT obtain due to:      Objective:     VITALS:   Last 24hrs VS reviewed since prior progress note. Most recent are:  Patient Vitals for the past 24 hrs:   Temp Pulse Resp BP SpO2   04/08/17 1200 97.8 °F (36.6 °C) - - - -   04/08/17 1127 - 94 - 91/53 (!) 73 %   04/08/17 0722 98.3 °F (36.8 °C) 91 16 107/66 98 %   04/08/17 0329 96.7 °F (35.9 °C) 82 17 93/50 96 %   04/07/17 2308 97.7 °F (36.5 °C) 83 18 (!) 73/41 94 %   04/07/17 2100 - 88 - 98/63 95 %   04/07/17 2045 - 87 - 108/65 96 %   04/07/17 2030 - 82 - 97/62 99 %   04/07/17 2015 - 93 - 95/53 94 %   04/07/17 2000 - 88 - (!) 127/105 95 %   04/07/17 1946 - 86 - (!) 124/95 95 %   04/07/17 1930 - 90 - 102/56 94 %   04/07/17 1915 - 90 - (!) 120/107 95 %   04/07/17 1900 - 97 - 108/70 96 %   04/07/17 1845 - 94 - 122/79 96 %   04/07/17 1830 97.8 °F (36.6 °C) 97 18 (!) 109/96 94 %   04/07/17 1810 - 93 - 108/75 95 %   04/07/17 1805 - 91 - 109/76 96 %   04/07/17 1800 - 92 - 109/58 96 %   04/07/17 1745 - 93 - 118/63 98 %   04/07/17 1730 - 88 - 101/65 94 %   04/07/17 1715 - 89 - 105/62 97 %   04/07/17 1700 - 93 - 104/76 97 %   04/07/17 1645 - 92 - 101/63 96 %   04/07/17 1630 - 88 - 91/59 96 %   04/07/17 1615 - 97 - 111/67 -   04/07/17 1601 - 94 - 148/68 -   04/07/17 1519 97.5 °F (36.4 °C) 90 - 108/54 -       Intake/Output Summary (Last 24 hours) at 04/08/17 1458  Last data filed at 04/08/17 1427   Gross per 24 hour   Intake             1080 ml   Output             2700 ml   Net            -1620 ml        PHYSICAL EXAM:  General: WD, WN. Alert, cooperative, no acute distress    EENT:  EOMI. Anicteric sclerae. MMM  Resp:  CTA bilaterally, no wheezing or rales. No accessory muscle use  CV:  irregular  rhythm,  1 + LE edema  GI:  Soft, Non distended, Non tender.  +Bowel sounds  Neurologic:  Alert and oriented X 3, normal speech,   Psych:   Good insight. Not anxious nor agitated  Skin:  No rashes. No jaundice    Reviewed most current lab test results and cultures  YES  Reviewed most current radiology test results   YES  Review and summation of old records today    NO  Reviewed patient's current orders and MAR    YES  PMH/SH reviewed - no change compared to H&P  ________________________________________________________________________  Care Plan discussed with:    Comments   Patient x    Family      RN x    Care Manager x    Consultant  x Dr Noemi Blakely (cardio/EP)                     Multidiciplinary team rounds were held today with , nursing, pharmacist and clinical coordinator. Patient's plan of care was discussed; medications were reviewed and discharge planning was addressed. ________________________________________________________________________  Total NON critical care TIME:  15   Minutes    Total CRITICAL CARE TIME Spent:   Minutes non procedure based      Comments   >50% of visit spent in counseling and coordination of care     ________________________________________________________________________  Shane Robles MD     Procedures: see electronic medical records for all procedures/Xrays and details which were not copied into this note but were reviewed prior to creation of Plan. LABS:  I reviewed today's most current labs and imaging studies.   Pertinent labs include:  Recent Labs      04/08/17   0343  04/07/17   0119  04/06/17   0344   WBC  7.6  6.9  6.7   HGB  11.7  12.4  12.5   HCT  35.4  37.5  38.5   PLT  230  239  244     Recent Labs      04/08/17   0343  04/07/17   0212  04/06/17   0344   NA  139  142  143   K  4.6  3.3*  3.5   CL  106  113*  107   CO2  24  21  26   GLU  140*  126*  124*   BUN  9  10  11   CREA  1.00  0.59  0.86   CA 8. 4*  6.6*  8.2*   MG  2.3  1.6  2.2   INR  1.3*  1.8*  1.6*       Signed: Kemal Ortega MD

## 2017-04-08 NOTE — PROGRESS NOTES
Problem: Mobility Impaired (Adult and Pediatric)  Goal: *Acute Goals and Plan of Care (Insert Text)  Physical Therapy Goals  Initiated 4/8/2017  1. Patient will move from supine to sit and sit to supine , scoot up and down and roll side to side in bed with independence within 7 day(s). 2. Patient will transfer from bed to chair and chair to bed with independence using the least restrictive device within 7 day(s). 3. Patient will perform sit to stand with independence within 7 day(s). 4. Patient will ambulate with independence for 150 feet with the least restrictive device within 7 day(s). PHYSICAL THERAPY EVALUATION  Patient: Mireya Moe (22 y.o. female)  Date: 4/8/2017  Primary Diagnosis: CHF (congestive heart failure) ([facility])  Diastolic CHF, acute on chronic ([facility])  Acute systolic CHF (congestive heart failure) ([facility] [address]        Precautions:  Fall      ASSESSMENT :  Based on the objective data described below, the patient presents with impaired standing balance, impaired gait, generalized weakness, decreased activity tolerance, and decline from baseline functional mobility following admission for CHF. Pt is independent for mobility at baseline. Pt received supine in bed and agreeable to PT evaluation. Nursing cleared pt for mobility and VSS on RA. Pt reports that she has been mobilizing around room without difficulty, but admits to generalized weakness due to hospital stay. Pt performed bed mobility mod I. /98 sitting EOB. Patient performed transfers with supervision, however reporting mild dizziness in standing. /48 in standing, however pt reports dizziness improving and agreeable to attempt ambulation. Pt ambulated 115' with min-CGA x 1, demonstrating mild path deviations, slow gait speed, narrowed FALGUNI, decreased step clearance, and slightly scissoring gait pattern throughout. Pt without overt LOB, however did demonstrate unsteady gait overall.  Pt was assisted into bedside chair following ambulation and BP 96/97. SaO2 >90% throughout on RA. Educated pt on use of SPC for balance upon discharge. Pt was left sitting in bedside chair with all needs met and nursing informed. Recommend patient discharge home with HHPT, family support, and possible use of SPC. Patient will continue to benefit from skilled acute PT while in the hospital to improve activity tolerance, strength, and gait. Patient will benefit from skilled intervention to address the above impairments. Patients rehabilitation potential is considered to be Good  Factors which may influence rehabilitation potential include:   [ ]         None noted  [ ]         Mental ability/status  [X]         Medical condition  [ ]         Home/family situation and support systems  [ ]         Safety awareness  [ ]         Pain tolerance/management  [ ]         Other:        PLAN :  Recommendations and Planned Interventions:  [X]           Bed Mobility Training             [ ]    Neuromuscular Re-Education  [X]           Transfer Training                   [ ]    Orthotic/Prosthetic Training  [X]           Gait Training                         [ ]    Modalities  [X]           Therapeutic Exercises           [ ]    Edema Management/Control  [X]           Therapeutic Activities            [X]    Patient and Family Training/Education  [ ]           Other (comment):     Frequency/Duration: Patient will be followed by physical therapy  4 times a week to address goals. Discharge Recommendations: Home Health  Further Equipment Recommendations for Discharge: TBD; possibly straight cane       SUBJECTIVE:   Patient stated I will be staying with my daughter.       OBJECTIVE DATA SUMMARY:   HISTORY:    Past Medical History:   Diagnosis Date    Atrial fibrillation ([facility] [address] 6/2/2010    Chronic diastolic heart failure ([facility] [address] 9/22/2014    COPD      COPD (chronic obstructive pulmonary disease) ([facility] [address] 6/2/2010    Diabetes ([facility] [address]      Fibroid      Heart failure ([facility] [address]  Hypertension 2010    NIDDM (non-insulin dependent diabetes mellitus) 2010    Screening mammogram 5/4/10    SOB (shortness of breath) 2014     Past Surgical History:   Procedure Laterality Date    HX  SECTION        HX OTHER SURGICAL         adrenal gland removed    HX PACEMAKER        [place] EXCISE ADRENAL GLAND         Prior Level of Function/Home Situation: pt is independent for mobility at baseline; lives with brother who has CP (reports independence) and grandson (12 yo); drives; works as ; planning to stay with daughter at discharge (home situation reflects this), but reports that she will be alone at times; active at baseline (does laundry, cooking, etc)  Personal factors and/or comorbidities impacting plan of care: a-fib; COPD; HF; HTN; diabetes     Home Situation  Home Environment: Private residence  # Steps to Enter: 0  Wheelchair Ramp: No  One/Two Story Residence: One story  Living Alone: No  Support Systems: [employer] community, Family member(s), Friends \ neighbors  Patient Expects to be Discharged to[de-identified] Private residence  Current DME Used/Available at Home: Neb[employer]  Tub or Shower Type: Tub/Shower combination     EXAMINATION/PRESENTATION/DECISION MAKING:   Critical Behavior:  Neurologic State: Alert     Cognition: Appropriate decision making, Appropriate for age attention/concentration, Appropriate safety awareness     Hearing:   Auditory  Auditory Impairment: None  Skin:  Intact  Edema: None  Range Of Motion:  AROM: Within functional limits           PROM: Within functional limits           Strength:    Strength: Generally decreased, functional                    Tone & Sensation:   Tone: Normal                              Coordination:  Coordination: Within functional limits  Vision:      Functional Mobility:  Bed Mobility:  Rolling: Modified independent  Supine to Sit: Modified independent     Scooting: Modified independent  Transfers:  Sit to Stand: Supervision  Stand to Sit: Supervision                       Balance:   Sitting: Intact  Standing: Impaired  Standing - Static: Good  Standing - Dynamic : Fair  Ambulation/Gait Training:  Distance (ft): 115 Feet (ft)  Assistive Device: Gait belt  Ambulation - Level of Assistance: Contact guard assistance;Minimal assistance;Assist x1;Additional time     Gait Description (WDL): Exceptions to WDL  Gait Abnormalities: Decreased step clearance; Path deviations;Scissoring        Base of Support: Narrowed     Speed/Cece: Pace decreased (<100 feet/min)  Step Length: Left shortened;Right shortened        Functional Measure:  Tinetti test:      Sitting Balance: 1  Arises: 1  Attempts to Rise: 2  Immediate Standing Balance: 1  Standing Balance: 2  Nudged: 2  Eyes Closed: 0  Turn 360 Degrees - Continuous/Discontinuous: 0  Turn 360 Degrees - Steady/Unsteady: 1  Sitting Down: 1  Balance Score: 11  Indication of Gait: 1  R Step Length/Height: 1  L Step Length/Height: 1  R Foot Clearance: 1  L Foot Clearance: 1  Step Symmetry: 1  Step Continuity: 1  Path: 1  Trunk: 1  Walking Time: 1  Gait Score: 10  Total Score: 21         Tinetti Test and G-code impairment scale:  Percentage of Impairment CH     0%    CI     1-19% CJ     20-39% CK     40-59% CL     60-79% CM     80-99% CN      100%   Tinetti  Score 0-28 28 23-27 17-22 12-16 6-11 1-5 0          Tinetti Tool Score Risk of Falls  <19 = High Fall Risk  19-24 = Moderate Fall Risk  25-28 = Low Fall Risk  Tinetti ME. Performance-Oriented Assessment of Mobility Problems in Elderly Patients. [address]man 66; [id number].  (Scoring Description: PT Bulletin Feb. 10, 1993)     Older adults: Joanne Owusu et al, 2009; n = 1000 [facility] elderly evaluated with ABC, WILMAN, ADL, and IADL)  · Mean WILMAN score for males aged 69-68 years = 26.21(3.40)  · Mean WILMAN score for females age 69-68 years = 25.16(4.30)  · Mean WILMAN score for males over 80 years = 23.29(6.02)  · Mean WILMAN score for females over 80 years = 17.20(8.32)         G codes: In compliance with CMSs Claims Based Outcome Reporting, the following G-code set was chosen for this patient based on their primary functional limitation being treated: The outcome measure chosen to determine the severity of the functional limitation was the Tinetti with a score of 21/28 which was correlated with the impairment scale. · Mobility - Walking and Moving Around:               - CURRENT STATUS:    CJ - 20%-39% impaired, limited or restricted               - GOAL STATUS:           CI - 1%-19% impaired, limited or restricted               - D/C STATUS:                       ---------------To be determined---------------      Physical Therapy Evaluation Charge Determination   History Examination Presentation Decision-Making   HIGH Complexity :3+ comorbidities / personal factors will impact the outcome/ POC  HIGH Complexity : 4+ Standardized tests and measures addressing body structure, function, activity limitation and / or participation in recreation  MEDIUM Complexity : Evolving with changing characteristics  Other outcome measures Tinetti  MEDIUM      Based on the above components, the patient evaluation is determined to be of the following complexity level: MEDIUM     Pain:  Pain Scale 1: Numeric (0 - 10)  Pain Intensity 1: 0              Activity Tolerance:   Fair - decline in BP with upright activities; SaO2 stable on RA  After treatment:   [X]         Patient left in no apparent distress sitting up in chair  [ ]         Patient left in no apparent distress in bed  [X]         Call bell left within reach  [X]         Nursing notified  [ ]         Caregiver present  [ ]         Bed alarm activated      COMMUNICATION/EDUCATION:   The patients plan of care was discussed with: Physical Therapist and Registered Nurse.  [X]         Fall prevention education was provided and the patient/caregiver indicated understanding.   [X]         Patient/family have participated as able in goal setting and plan of care. [X]         Patient/family agree to work toward stated goals and plan of care. [ ]         Patient understands intent and goals of therapy, but is neutral about his/her participation. [ ]         Patient is unable to participate in goal setting and plan of care.      Thank you for this referral.  Jess Orozco, PT, DPT   Time Calculation: 16 mins

## 2017-04-09 LAB
ANION GAP BLD CALC-SCNC: 8 MMOL/L (ref 5–15)
BUN SERPL-MCNC: 13 MG/DL (ref 6–20)
BUN/CREAT SERPL: 12 (ref 12–20)
CALCIUM SERPL-MCNC: 9 MG/DL (ref 8.5–10.1)
CHLORIDE SERPL-SCNC: 103 MMOL/L (ref 97–108)
CO2 SERPL-SCNC: 28 MMOL/L (ref 21–32)
CREAT SERPL-MCNC: 1.05 MG/DL (ref 0.55–1.02)
ERYTHROCYTE [DISTWIDTH] IN BLOOD BY AUTOMATED COUNT: 14.5 % (ref 11.5–14.5)
GLUCOSE BLD STRIP.AUTO-MCNC: 142 MG/DL (ref 65–100)
GLUCOSE BLD STRIP.AUTO-MCNC: 147 MG/DL (ref 65–100)
GLUCOSE BLD STRIP.AUTO-MCNC: 187 MG/DL (ref 65–100)
GLUCOSE BLD STRIP.AUTO-MCNC: 189 MG/DL (ref 65–100)
GLUCOSE SERPL-MCNC: 130 MG/DL (ref 65–100)
HCT VFR BLD AUTO: 39.4 % (ref 35–47)
HGB BLD-MCNC: 12.9 G/DL (ref 11.5–16)
INR PPP: 1.5 (ref 0.9–1.1)
MAGNESIUM SERPL-MCNC: 2.2 MG/DL (ref 1.6–2.4)
MCH RBC QN AUTO: 27.7 PG (ref 26–34)
MCHC RBC AUTO-ENTMCNC: 32.7 G/DL (ref 30–36.5)
MCV RBC AUTO: 84.5 FL (ref 80–99)
PLATELET # BLD AUTO: 239 K/UL (ref 150–400)
POTASSIUM SERPL-SCNC: 4.5 MMOL/L (ref 3.5–5.1)
PROTHROMBIN TIME: 14.9 SEC (ref 9–11.1)
RBC # BLD AUTO: 4.66 M/UL (ref 3.8–5.2)
SERVICE CMNT-IMP: ABNORMAL
SODIUM SERPL-SCNC: 139 MMOL/L (ref 136–145)
WBC # BLD AUTO: 6.8 K/UL (ref 3.6–11)

## 2017-04-09 PROCEDURE — 65660000000 HC RM CCU STEPDOWN

## 2017-04-09 PROCEDURE — 83735 ASSAY OF MAGNESIUM: CPT | Performed by: INTERNAL MEDICINE

## 2017-04-09 PROCEDURE — 85027 COMPLETE CBC AUTOMATED: CPT | Performed by: INTERNAL MEDICINE

## 2017-04-09 PROCEDURE — 74011250637 HC RX REV CODE- 250/637: Performed by: INTERNAL MEDICINE

## 2017-04-09 PROCEDURE — 74011636637 HC RX REV CODE- 636/637: Performed by: INTERNAL MEDICINE

## 2017-04-09 PROCEDURE — 82962 GLUCOSE BLOOD TEST: CPT

## 2017-04-09 PROCEDURE — 74011250637 HC RX REV CODE- 250/637: Performed by: NURSE PRACTITIONER

## 2017-04-09 PROCEDURE — 74011250636 HC RX REV CODE- 250/636: Performed by: NURSE PRACTITIONER

## 2017-04-09 PROCEDURE — 80048 BASIC METABOLIC PNL TOTAL CA: CPT | Performed by: INTERNAL MEDICINE

## 2017-04-09 PROCEDURE — 85610 PROTHROMBIN TIME: CPT | Performed by: INTERNAL MEDICINE

## 2017-04-09 PROCEDURE — 36415 COLL VENOUS BLD VENIPUNCTURE: CPT | Performed by: INTERNAL MEDICINE

## 2017-04-09 RX ORDER — WARFARIN SODIUM 5 MG/1
10 TABLET ORAL ONCE
Status: COMPLETED | OUTPATIENT
Start: 2017-04-09 | End: 2017-04-09

## 2017-04-09 RX ADMIN — FLUTICASONE PROPIONATE 2 SPRAY: 50 SPRAY, METERED NASAL at 19:59

## 2017-04-09 RX ADMIN — FENTANYL CITRATE 25 MCG: 50 INJECTION, SOLUTION INTRAMUSCULAR; INTRAVENOUS at 09:21

## 2017-04-09 RX ADMIN — MULTIPLE VITAMINS W/ MINERALS TAB 1 TABLET: TAB at 09:14

## 2017-04-09 RX ADMIN — Medication 10 ML: at 04:31

## 2017-04-09 RX ADMIN — COLCHICINE 1.2 MG: 0.6 TABLET, FILM COATED ORAL at 09:12

## 2017-04-09 RX ADMIN — INSULIN LISPRO 2 UNITS: 100 INJECTION, SOLUTION INTRAVENOUS; SUBCUTANEOUS at 17:07

## 2017-04-09 RX ADMIN — ATORVASTATIN CALCIUM 20 MG: 20 TABLET, FILM COATED ORAL at 19:53

## 2017-04-09 RX ADMIN — ACETAMINOPHEN 650 MG: 325 TABLET, FILM COATED ORAL at 19:53

## 2017-04-09 RX ADMIN — Medication 10 ML: at 17:08

## 2017-04-09 RX ADMIN — SOTALOL HYDROCHLORIDE 120 MG: 80 TABLET ORAL at 09:49

## 2017-04-09 RX ADMIN — Medication 10 ML: at 19:52

## 2017-04-09 RX ADMIN — WARFARIN SODIUM 10 MG: 5 TABLET ORAL at 17:07

## 2017-04-09 RX ADMIN — GABAPENTIN 800 MG: 100 CAPSULE ORAL at 19:52

## 2017-04-09 RX ADMIN — POTASSIUM CHLORIDE 20 MEQ: 750 TABLET, FILM COATED, EXTENDED RELEASE ORAL at 09:03

## 2017-04-09 RX ADMIN — FLUTICASONE FUROATE AND VILANTEROL TRIFENATATE 1 PUFF: 100; 25 POWDER RESPIRATORY (INHALATION) at 09:02

## 2017-04-09 RX ADMIN — UMECLIDINIUM 1 PUFF: 62.5 AEROSOL, POWDER ORAL at 09:01

## 2017-04-09 RX ADMIN — ASPIRIN 81 MG CHEWABLE TABLET 81 MG: 81 TABLET CHEWABLE at 09:03

## 2017-04-09 RX ADMIN — GABAPENTIN 800 MG: 100 CAPSULE ORAL at 17:07

## 2017-04-09 RX ADMIN — FENTANYL CITRATE 25 MCG: 50 INJECTION, SOLUTION INTRAMUSCULAR; INTRAVENOUS at 11:35

## 2017-04-09 RX ADMIN — FUROSEMIDE 20 MG: 20 TABLET ORAL at 09:03

## 2017-04-09 RX ADMIN — GABAPENTIN 800 MG: 100 CAPSULE ORAL at 09:03

## 2017-04-09 RX ADMIN — Medication 10 ML: at 09:21

## 2017-04-09 RX ADMIN — SOTALOL HYDROCHLORIDE 120 MG: 80 TABLET ORAL at 19:54

## 2017-04-09 RX ADMIN — POTASSIUM CHLORIDE 20 MEQ: 750 TABLET, FILM COATED, EXTENDED RELEASE ORAL at 17:07

## 2017-04-09 RX ADMIN — CLOPIDOGREL BISULFATE 75 MG: 75 TABLET, FILM COATED ORAL at 09:03

## 2017-04-09 NOTE — PROGRESS NOTES
1984 Bedside and Verbal shift change report given to Georgiana Allison  (oncoming nurse) by Merceda Bosworth  (offgoing nurse). Report included the following information Kardex.

## 2017-04-09 NOTE — PROGRESS NOTES
Cardiology Progress Note            [address] [place] [address], [address] [facility]  [phone]    4/9/2017 10:50 AM    Admit Date: 4/4/2017    Admit Diagnosis: CHF (congestive heart failure) ([address] Diastolic CHF, acute o*    Subjective:     Jessica Holliday  Had some oozing from her groin site overnight.     Visit Vitals    /69 (BP 1 Location: Right arm, BP Patient Position: Post activity)    Pulse (!) 101    Temp 97.8 °F (36.6 °C)    Resp 18    Ht 5' 6\" (1.676 m)    Wt 171 lb 1.2 oz (77.6 kg)    SpO2 96%    BMI 27.61 kg/m2     Current Facility-Administered Medications   Medication Dose Route Frequency    sotalol (BETAPACE) tablet 120 mg  120 mg Oral BID    clopidogrel (PLAVIX) tablet 75 mg  75 mg Oral DAILY    atorvastatin (LIPITOR) tablet 20 mg  20 mg Oral QHS    fentaNYL citrate (PF) injection 25 mcg  25 mcg IntraVENous Q1H PRN    furosemide (LASIX) tablet 20 mg  20 mg Oral DAILY    potassium chloride SR (KLOR-CON 10) tablet 20 mEq  20 mEq Oral BID    colchicine tablet 1.2 mg  1.2 mg Oral DAILY    aspirin chewable tablet 81 mg  81 mg Oral DAILY    fluticasone-vilanterol (BREO ELLIPTA) 100mcg-25mcg/puff  1 Puff Inhalation DAILY    fluticasone (FLONASE) 50 mcg/actuation nasal spray 2 Spray  2 Spray Both Nostrils QHS    multivitamin, tx-iron-ca-min (THERA-M w/ IRON) tablet 1 Tab  1 Tab Oral DAILY    gabapentin (NEURONTIN) 800 mg  800 mg Oral TID    umeclidinium (INCRUSE ELLIPTA) 62.5 mcg/actuation  1 Puff Inhalation DAILY    sodium chloride (NS) flush 5-10 mL  5-10 mL IntraVENous Q8H    sodium chloride (NS) flush 5-10 mL  5-10 mL IntraVENous PRN    acetaminophen (TYLENOL) tablet 650 mg  650 mg Oral Q4H PRN    ondansetron (ZOFRAN) injection 4 mg  4 mg IntraVENous Q4H PRN    docusate sodium (COLACE) capsule 100 mg  100 mg Oral BID PRN    insulin lispro (HUMALOG) injection   SubCUTAneous AC&HS    glucose chewable tablet 16 g  4 Tab Oral PRN    dextrose (D50W) injection syrg 12.5-25 g  12.5-25 g IntraVENous PRN    glucagon (GLUCAGEN) injection 1 mg  1 mg IntraMUSCular PRN    WARFARIN INFORMATION NOTE (COUMADIN)   Other QPM    valsartan (DIOVAN) tablet 20 mg  20 mg Oral QHS         Objective:      Visit Vitals    /69 (BP 1 Location: Right arm, BP Patient Position: Post activity)    Pulse (!) 101    Temp 97.8 °F (36.6 °C)    Resp 18    Ht 5' 6\" (1.676 m)    Wt 171 lb 1.2 oz (77.6 kg)    SpO2 96%    BMI 27.61 kg/m2       Physical Exam:  Abdomen: soft, non-tender  Extremities: right groin hematoma  Heart: regular rate and rhythm  Lungs: clear to auscultation bilaterally  Pulses: 2+ and symmetric    Data Review:   Labs:    Recent Labs      04/09/17 0427 04/08/17   0343  04/07/17   0119   WBC  6.8  7.6  6.9   HGB  12.9  11.7  12.4   HCT  39.4  35.4  37.5   PLT  239  230  239     Recent Labs      04/09/17   0427  04/08/17   0343  04/07/17   0212   NA  139  139  142   K  4.5  4.6  3.3*   CL  103  106  113*   CO2  28  24  21   GLU  130*  140*  126*   BUN  13  9  10   CREA  1.05*  1.00  0.59   CA  9.0  8.4*  6.6*   MG  2.2  2.3  1.6   INR  1.5*  1.3*  1.8*       No results for input(s): TROIQ, CPK, CKMB in the last 72 hours.       Intake/Output Summary (Last 24 hours) at 04/09/17 1050  Last data filed at 04/09/17 0029   Gross per 24 hour   Intake              600 ml   Output             2400 ml   Net            -1800 ml        Telemetry: afib    Assessment:     Principal Problem:    Systolic CHF, acute ([facility] [address] (4/6/2017)    Active Problems:    Hypertension--essential (6/2/2010)      Atrial fibrillation--paroxysmal (6/2/2010)      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 (6/2/2010)      Hypokalemia (7/20/2010)      Cardiac pacemaker in situ (7/5/2012)      Mixed hyperlipidemia (7/5/2012)      S/P coronary artery stent placement (7/4/2014)      Overview: 4/7/17 PCI/ROSALINO Diagonal      Diastolic CHF, acute on chronic ([facility] [address] (9/22/2014)      Type 2 diabetes mellitus with diabetic neuropathy, without long-term current use of insulin ([facility] [address] (1/31/2017)      Acute systolic CHF (congestive heart failure) ([facility] [address] (4/6/2017)      Fear associated with illness and body function (4/7/2017)      Counseling regarding advanced care planning and goals of care (4/7/2017)        Plan:     Wu Roberson is had a hematoma from the the cath site. Will stop lovenox. Cont coumadin. Observe overnight.      Eric Mcintosh MD, [facility]    4/9/2017

## 2017-04-09 NOTE — PROGRESS NOTES
Lovenox has been discontinued due to hematoma at cath site by cardiology. Her discharge was cancelled.     Ravin Hammond RN #[id number]

## 2017-04-09 NOTE — PROGRESS NOTES
NOTE FOR PHYSICIAN  This patient's renal function, as reflected by their creatinine clearance, is on the borderline which would warrant a dose adjustment. The patient's current creatinine clearance is ~ 56 ml/min, below 60 ml/min it could be recommended that the dose be reduced from q12h to q24h. For your consideration.   Brina Nettles., h

## 2017-04-09 NOTE — PROGRESS NOTES
Hospitalist Progress Note    NAME: Cony Rapp   :  1950   MRN:  [id number]       Interim Hospital Summary: 73 y.o. female whom presented on 2017 with      Assessment / Plan:  Acute (New) Systolic CHF POA (EF 70% on Echo this admission)- s/p Cardiac Cath/PCI of diagonal vessel   Acute on chronic diastolic heart failure POA- diuresing well  Afib with RVR POA- now rate controlled  Subtherapeutic INR- 1.5 today (trending up now)  Hypokalemia - resolved  Hypomagnesemia - resolved  -patient with increased shortness of breath and chest pressure on exertion,   CXR with interstitial edema,   pro BNP 1700 all suggest     S/p IV lasix in ED- pt has responded well  Changed to PO Lasix now at lower dose due to borderline BP  strict I/Os, daily weights,  -EP consult noted - cleared pt from their standpoint again today after cardiac cath /PCI  -continue aspirin, diovan  -continue sotalol - decreased the dose to 120 mg today as pt hypotensive - cardiology seems to be ok with this dose for now  Hold Lovenox bridge due to R groin hematoma (cath site)- pressure per protocol, Hold DC home today- observe overnight as per cardiology  cont dosing coumadin PO tonight as per cardiology - INR in AM  ?AICD upgrade of the PPM on hold for now - OP follow up as per Dr Juan Sanford in ED -up to 24 beats on ED telemetry    May need AICD upgrade with new Systolic CHF on Echo EF 44% - Dr Oskar Dailey to decide in office on follow up after Cath/PCI today  repleted K + now, still low today AM  Cont to give K+ BID PO with diuresis(pt is on sotalol-monitor lytes closely)       COPD  -continue home inhalers, no signs of exacerbation     Diabetes  -hold metformin  -SSI        Code Status: full  Surrogate Decision Maker: daughter     DVT Prophylaxis: warfarin  GI Prophylaxis: not indicated     Baseline: independent  [facility] by [facility]iology     Subjective: Chief Complaint / Reason for Physician Visit F/U SOB, Diastolic CHF, R groin hematoma  \"I feel OK\". Discussed with RN events overnight. Review of Systems:  Symptom Y/N Comments  Symptom Y/N Comments   Fever/Chills n   Chest Pain n    Poor Appetite n   Edema n    Cough n   Abdominal Pain n    Sputum n   Joint Pain n    SOB/[place] y Improved slightly  Pruritis/Rash     Nausea/vomit n   Tolerating PT/OT y    Diarrhea    Tolerating Diet y    Constipation    Other       Could NOT obtain due to:      Objective:     VITALS:   Last 24hrs VS reviewed since prior progress note. Most recent are:  Patient Vitals for the past 24 hrs:   Temp Pulse Resp BP SpO2   04/09/17 0706 97.8 °F (36.6 °C) (!) 101 18 109/69 96 %   04/09/17 0426 98.1 °F (36.7 °C) 96 14 102/56 98 %   04/09/17 0015 - 91 - 98/74 -   04/08/17 2323 - 97 14 (!) 89/62 92 %   04/08/17 2301 98 °F (36.7 °C) 90 18 (!) 85/51 99 %   04/08/17 1800 98 °F (36.7 °C) 96 18 112/73 98 %       Intake/Output Summary (Last 24 hours) at 04/09/17 1316  Last data filed at 04/09/17 0029   Gross per 24 hour   Intake              240 ml   Output             1750 ml   Net            -1510 ml        PHYSICAL EXAM:  General: WD, WN. Alert, cooperative, no acute distress    EENT:  EOMI. Anicteric sclerae. MMM  Resp:  CTA bilaterally, no wheezing or rales. No accessory muscle use  CV:  irregular  rhythm,  1 + LE edema  GI:  Soft, Non distended, Non tender.  +Bowel sounds  Neurologic:  Alert and oriented X 3, normal speech,   Psych:   Good insight. Not anxious nor agitated  Skin/extremities:  No rashes.   No jaundice, R Groin (Cath site) hematoma noted +    Reviewed most current lab test results and cultures  YES  Reviewed most current radiology test results   YES  Review and summation of old records today    NO  Reviewed patient's current orders and MAR    YES  PMH/SH reviewed - no change compared to H&P  ________________________________________________________________________  Care Plan discussed with:    Comments   Patient x    Family      RN x    Care Manager x    Consultant                        Multidiciplinary team rounds were held today with , nursing, pharmacist and clinical coordinator. Patient's plan of care was discussed; medications were reviewed and discharge planning was addressed. ________________________________________________________________________  Total NON critical care TIME:  15   Minutes    Total CRITICAL CARE TIME Spent:   Minutes non procedure based      Comments   >50% of visit spent in counseling and coordination of care     ________________________________________________________________________  Jermaine Lux MD     Procedures: see electronic medical records for all procedures/Xrays and details which were not copied into this note but were reviewed prior to creation of Plan. LABS:  I reviewed today's most current labs and imaging studies.   Pertinent labs include:  Recent Labs      04/09/17   0427  04/08/17   0343  04/07/17   0119   WBC  6.8  7.6  6.9   HGB  12.9  11.7  12.4   HCT  39.4  35.4  37.5   PLT  239  230  239     Recent Labs      04/09/17   0427  04/08/17   0343  04/07/17   0212   NA  139  139  142   K  4.5  4.6  3.3*   CL  103  106  113*   CO2  28  24  21   GLU  130*  140*  126*   BUN  13  9  10   CREA  1.05*  1.00  0.59   CA  9.0  8.4*  6.6*   MG  2.2  2.3  1.6   INR  1.5*  1.3*  1.8*       Signed: Jermaine Lux MD

## 2017-04-09 NOTE — PROGRESS NOTES
MAGUI called the insurance for the patient and the office was closed that would determine the cost of lovenox.   When the patient's prescription is written CM can fax it to the [facility] at [facility] to get the cost.   Ethyl Face RN #[id number]

## 2017-04-10 VITALS
HEIGHT: 66 IN | OXYGEN SATURATION: 98 % | BODY MASS INDEX: 27.35 KG/M2 | SYSTOLIC BLOOD PRESSURE: 110 MMHG | DIASTOLIC BLOOD PRESSURE: 68 MMHG | WEIGHT: 170.19 LBS | TEMPERATURE: 97.9 F | HEART RATE: 82 BPM | RESPIRATION RATE: 18 BRPM

## 2017-04-10 LAB
ACT BLD: 167 SECS (ref 79–138)
ACT BLD: 178 SECS (ref 79–138)
ACT BLD: 291 SECS (ref 79–138)
ACT BLD: 425 SECS (ref 79–138)
GLUCOSE BLD STRIP.AUTO-MCNC: 168 MG/DL (ref 65–100)
GLUCOSE BLD STRIP.AUTO-MCNC: 271 MG/DL (ref 65–100)
INR PPP: 2 (ref 0.9–1.1)
PROTHROMBIN TIME: 20.1 SEC (ref 9–11.1)
SERVICE CMNT-IMP: ABNORMAL
SERVICE CMNT-IMP: ABNORMAL

## 2017-04-10 PROCEDURE — G8989 SELF CARE D/C STATUS: HCPCS

## 2017-04-10 PROCEDURE — G8988 SELF CARE GOAL STATUS: HCPCS

## 2017-04-10 PROCEDURE — 82962 GLUCOSE BLOOD TEST: CPT

## 2017-04-10 PROCEDURE — 85610 PROTHROMBIN TIME: CPT | Performed by: INTERNAL MEDICINE

## 2017-04-10 PROCEDURE — 74011636637 HC RX REV CODE- 636/637: Performed by: INTERNAL MEDICINE

## 2017-04-10 PROCEDURE — 74011250637 HC RX REV CODE- 250/637: Performed by: INTERNAL MEDICINE

## 2017-04-10 PROCEDURE — 36415 COLL VENOUS BLD VENIPUNCTURE: CPT | Performed by: INTERNAL MEDICINE

## 2017-04-10 PROCEDURE — 97165 OT EVAL LOW COMPLEX 30 MIN: CPT

## 2017-04-10 PROCEDURE — 74011250637 HC RX REV CODE- 250/637: Performed by: NURSE PRACTITIONER

## 2017-04-10 PROCEDURE — 97116 GAIT TRAINING THERAPY: CPT

## 2017-04-10 PROCEDURE — G8987 SELF CARE CURRENT STATUS: HCPCS

## 2017-04-10 RX ORDER — CLOPIDOGREL BISULFATE 75 MG/1
75 TABLET ORAL DAILY
Qty: 30 TAB | Refills: 0 | Status: SHIPPED | OUTPATIENT
Start: 2017-04-10 | End: 2017-05-25 | Stop reason: SDUPTHER

## 2017-04-10 RX ORDER — WARFARIN SODIUM 5 MG/1
7.5 TABLET ORAL ONCE
Status: DISCONTINUED | OUTPATIENT
Start: 2017-04-10 | End: 2017-04-10 | Stop reason: HOSPADM

## 2017-04-10 RX ORDER — VALSARTAN 40 MG/1
20 TABLET ORAL DAILY
Qty: 30 TAB | Refills: 0 | Status: SHIPPED
Start: 2017-04-10 | End: 2017-05-22

## 2017-04-10 RX ORDER — ATORVASTATIN CALCIUM 20 MG/1
20 TABLET, FILM COATED ORAL
Qty: 30 TAB | Refills: 0 | Status: SHIPPED | OUTPATIENT
Start: 2017-04-10 | End: 2017-04-17 | Stop reason: SINTOL

## 2017-04-10 RX ADMIN — UMECLIDINIUM 1 PUFF: 62.5 AEROSOL, POWDER ORAL at 10:04

## 2017-04-10 RX ADMIN — ASPIRIN 81 MG CHEWABLE TABLET 81 MG: 81 TABLET CHEWABLE at 08:37

## 2017-04-10 RX ADMIN — COLCHICINE 1.2 MG: 0.6 TABLET, FILM COATED ORAL at 10:00

## 2017-04-10 RX ADMIN — FUROSEMIDE 20 MG: 20 TABLET ORAL at 08:37

## 2017-04-10 RX ADMIN — INSULIN LISPRO 2 UNITS: 100 INJECTION, SOLUTION INTRAVENOUS; SUBCUTANEOUS at 08:37

## 2017-04-10 RX ADMIN — POTASSIUM CHLORIDE 20 MEQ: 750 TABLET, FILM COATED, EXTENDED RELEASE ORAL at 08:37

## 2017-04-10 RX ADMIN — CLOPIDOGREL BISULFATE 75 MG: 75 TABLET, FILM COATED ORAL at 08:37

## 2017-04-10 RX ADMIN — MULTIPLE VITAMINS W/ MINERALS TAB 1 TABLET: TAB at 09:59

## 2017-04-10 RX ADMIN — FLUTICASONE FUROATE AND VILANTEROL TRIFENATATE 1 PUFF: 100; 25 POWDER RESPIRATORY (INHALATION) at 10:03

## 2017-04-10 RX ADMIN — Medication 10 ML: at 04:32

## 2017-04-10 RX ADMIN — GABAPENTIN 800 MG: 100 CAPSULE ORAL at 08:37

## 2017-04-10 RX ADMIN — SOTALOL HYDROCHLORIDE 120 MG: 80 TABLET ORAL at 10:00

## 2017-04-10 NOTE — PROGRESS NOTES
Spiritual Care Assessment/Progress Notes    Wu Roberson [id number]  [id number]    1950  73 y.o.  female    Patient Telephone Number: [phone] (home)   Episcopal Affiliation: Tim Trinh   Language: English   Extended Emergency Contact Information  Primary Emergency Contact: Juan5 Kirill Torres Phone: [phone]  Work Phone: [phone]  Relation: Child  Secondary Emergency Contact: Trudi Christensen Phone: [phone]  Relation: Other Relative   Patient Active Problem List    Diagnosis Date Noted    Fear associated with illness and body function 04/07/2017    Counseling regarding advanced care planning and goals of care 16/07/3632    Systolic CHF, acute ([facility] 64/18/9628    Acute systolic CHF (congestive heart failure) ([address] 04/06/2017    CHF (congestive heart failure) ([address] 04/04/2017    Type 2 diabetes mellitus with diabetic neuropathy, without long-term current use of insulin ([address] 01/31/2017    Anticoagulation monitoring, INR range 2-3 01/31/2017    Dizziness 50/96/0341    Diastolic CHF, acute on chronic ([address] 09/22/2014    S/P coronary artery stent placement 07/04/2014    DNR (do not resuscitate) 06/30/2014    Back pain 11/14/2012    Sinoatrial node dysfunction ([address] 07/05/2012    Cardiac pacemaker in situ 07/05/2012    Mixed hyperlipidemia 07/05/2012    Chest pain 09/21/2011    Anemia 07/25/2011    Sick sinus syndrome ([address] 02/25/2011    S/P angioplasty with stent 02/07/2011    Hypokalemia 07/20/2010    Hip pain 06/08/2010    Hypertension--essential 06/02/2010    Atrial fibrillation--paroxysmal 06/02/2010    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        Date: 4/10/2017       Level of Episcopal/Spiritual Activity:  []         Involved in bernardo tradition/spiritual practice    []         Not involved in bernardo tradition/spiritual practice  [x]         Spiritually oriented    [] Claims no spiritual orientation    []         seeking spiritual identity  []         Feels alienated from Faith practice/tradition  []         Feels angry about Faith practice/tradition  [x]         Spirituality/Faith tradition IS a resource for coping at this time. []         Not able to assess due to medical condition    Services Provided Today:  []         crisis intervention    []         reading Scriptures  [x]         spiritual assessment    []         prayer  [x]         empathic listening/emotional support  []         rites and rituals (cite in comments)  []         life review     []         Faith support  []         theological development   []         advocacy  []         ethical dialog     []         blessing  []         bereavement support    []         support to family  []         anticipatory grief support   []         help with AMD  []         spiritual guidance    []         meditation      Spiritual Care Needs  []         Emotional Support  []         Spiritual/Synagogue Care  []         Loss/Adjustment  []         Advocacy/Referral                /Ethics  [x]         No needs expressed at               this time  []         Other: (note in               comments)  [address]  []         Follow up visits with               pt/family  []         Provide materials  []         Schedule sacraments  []         Contact Community               Clergy  [x]         Follow up as needed  []         Other: (note in               comments)     Comments: Initial spiritual assessment in patients room. Patient shared about her bernardo in the aneudy and about her going home today. .  Provided pastoral presence today and encouragement. Advised of  Availability. [address] [facility]  PRN    paging service [phone])    Tru Magaña

## 2017-04-10 NOTE — PROGRESS NOTES
Met with pt and informed her she was discharged. Pt will contact family to transport her home. CM made f/u appointments, documented on the discharge paperwork and informed pt and pt's nurse. Pt to receive home health from [facility].      Freedom Villalba, [address]

## 2017-04-10 NOTE — PROGRESS NOTES
Occupational Therapy EVALUATION/discharge  Patient: Echo Moncada (89 y.o. female)  Date: 4/10/2017  Primary Diagnosis: CHF (congestive heart failure) (HCC)  Diastolic CHF, acute on chronic (HCC)  Acute systolic CHF (congestive heart failure) ([facility]        Precautions:   Fall    ASSESSMENT:   Based on the objective data described below, the patient presents with slightly impaired balance from generalized weakness and residual soresness at R groin site s/p cardiac cath. Pt is supervision for mobility without AD, pt with minor LOB but able to correct balance losses without physical assistance. Pt is completing ADLs supervision, educated pt to don underwear seated and don R LE first due to discomfort with groin hematoma. Pt with anticipated discharge home today, recommend HHPT. Further skilled acute occupational therapy is not indicated at this time. Discharge Recommendations: HHPT  Further Equipment Recommendations for Discharge: none      SUBJECTIVE:   Patient stated I am going shopping tomorrow at [employer].     OBJECTIVE DATA SUMMARY:   HISTORY:   Past Medical History:   Diagnosis Date    Atrial fibrillation ([facility] 2010    Chronic diastolic heart failure ([facility] 2014    COPD     COPD (chronic obstructive pulmonary disease) ([facility] 2010    Diabetes ([facility]     Fibroid     Heart failure ([facility]     Hypertension 2010    NIDDM (non-insulin dependent diabetes mellitus) 2010    Screening mammogram 5/4/10    SOB (shortness of breath) 2014     Past Surgical History:   Procedure Laterality Date    HX  SECTION      HX OTHER SURGICAL      adrenal gland removed    HX PACEMAKER      RI EXCISE ADRENAL GLAND         Prior Level of Function/Home Situation: independent, working   Expanded or extensive additional review of patient history:     Home Situation  Home Environment: Private residence  # Steps to Enter: 0  Wheelchair Ramp: No  One/Two Story Residence: One story  Living Alone: No  Support Systems: [place] / bernardo community, Family member(s), Friends \ neighbors  Patient Expects to be Discharged to[de-identified] Private residence  Current DME Used/Available at Home: Nebulizer  Tub or Shower Type: Tub/Shower combination  [x]  Right hand dominant   []  Left hand dominant    EXAMINATION OF PERFORMANCE DEFICITS:  Cognitive/Behavioral Status:  Neurologic State: Alert  Orientation Level: Oriented X4  Cognition: Appropriate decision making; Appropriate safety awareness; Appropriate for age attention/concentration; Follows commands             Skin: intact- groin site intact but not formally assessed    Edema: intact    Hearing: Auditory  Auditory Impairment: None    Vision/Perceptual:    Tracking: Able to track stimulus in all quadrants w/o difficulty                      Acuity: Within Defined Limits    Corrective Lenses: Glasses    Range of Motion:    AROM: Within functional limits  PROM: Within functional limits                      Strength:    Strength: Generally decreased, functional (generalized)                Coordination:  Coordination: Within functional limits  Fine Motor Skills-Upper: Left Intact; Right Intact    Gross Motor Skills-Upper: Left Intact; Right Intact    Tone & Sensation:    Tone: Normal                         Balance:  Sitting: Intact  Standing: Intact; Without support  Standing - Static: Good  Standing - Dynamic : Fair (occassional LOB)Intact without assistive device     Functional Mobility and Transfers for ADLs:  Bed Mobility:  Supine to Sit: Supervision  Sit to Supine: Supervision    Transfers:  Sit to Stand: Supervision  Stand to Sit: Supervision    ADL Assessment:  Feeding: Independent    Oral Facial Hygiene/Grooming: Supervision    Bathing: Supervision    Upper Body Dressing: Supervision    Lower Body Dressing: Supervision    Toileting: Supervision                ADL Intervention and task modifications:         Pt educated on role of OT and required verbal cues for safety and proper hand placement during ADLs/functional transfers. Pt has hx of dizziness with positional changes. Educated pt on importance of standing, making sure pt is asymptomatic prior to ambulating away from stable surface. Pt indicated understanding. Functional Measure:  Barthel Index:    Bathin  Bladder: 10  Bowels: 10  Groomin  Dressing: 10  Feeding: 10  Mobility: 15  Stairs: 5  Toilet Use: 10  Transfer (Bed to Chair and Back): 15  Total: 95       Barthel and G-code impairment scale:  Percentage of impairment CH  0% CI  1-19% CJ  20-39% CK  40-59% CL  60-79% CM  80-99% CN  100%   Barthel Score 0-100 100 99-80 79-60 59-40 20-39 1-19   0   Barthel Score 0-20 20 17-19 13-16 9-12 5-8 1-4 0      The Barthel ADL Index: Guidelines  1. The index should be used as a record of what a patient does, not as a record of what a patient could do. 2. The main aim is to establish degree of independence from any help, physical or verbal, however minor and for whatever reason. 3. The need for supervision renders the patient not independent. 4. A patient's performance should be established using the best available evidence. Asking the patient, friends/relatives and nurses are the usual sources, but direct observation and common sense are also important. However direct testing is not needed. 5. Usually the patient's performance over the preceding 24-48 hours is important, but occasionally longer periods will be relevant. 6. Middle categories imply that the patient supplies over 50 per cent of the effort. 7. Use of aids to be independent is allowed. Addison Silverman., Barthel, D.W. (316). Functional evaluation: the Barthel Index. [facility] (14)2. Dorathy Leyla ben BRAD Pelayo, Rosario Galindo, Marta Rivas., Ruth, [facility] (). Measuring the change indisability after inpatient rehabilitation; comparison of the responsiveness of the Barthel Index and Functional [place] Measure.  Journal of Neurology, Neurosurgery, and Psychiatry, 664), 184-556. CHAR Shah, MAL Bedolla, & Doris Garber M.A. (2004.) Assessment of post-stroke quality of life in cost-effectiveness studies: The usefulness of the Barthel Index and the EuroQoL-5D. Quality of Life Research, 13, 672-67         G codes: In compliance with CMSs Claims Based Outcome Reporting, the following G-code set was chosen for this patient based on their primary functional limitation being treated: The outcome measure chosen to determine the severity of the functional limitation was the Barthel index with a score of 95/100 which was correlated with the impairment scale. ? Self Care:     - CURRENT STATUS: CI - 1%-19% impaired, limited or restricted    - GOAL STATUS: CI - 1%-19% impaired, limited or restricted    - D/C STATUS:  CI - 1%-19% impaired, limited or restricted     Occupational Therapy Evaluation Charge Determination   History Examination Decision-Making   LOW Complexity : Brief history review  LOW Complexity : 1-3 performance deficits relating to physical, cognitive , or psychosocial skils that result in activity limitations and / or participation restrictions  LOW Complexity : No comorbidities that affect functional and no verbal or physical assistance needed to complete eval tasks       Based on the above components, the patient evaluation is determined to be of the following complexity level: LOW   Pain:  Pain Scale 1: Numeric (0 - 10)  Pain Intensity 1: 0              Activity Tolerance:   VSS  Please refer to the flowsheet for vital signs taken during this treatment.   After treatment:   []  Patient left in no apparent distress sitting up in chair  [x]  Patient left in no apparent distress in bed  [x]  Call bell left within reach  [x]  Nursing notified  []  Caregiver present  []  Bed alarm activated    COMMUNICATION/EDUCATION:   Communication/Collaboration:  []      Home safety education was provided and the patient/caregiver indicated understanding. [x]      Patient/family have participated as able and agree with findings and recommendations. []      Patient is unable to participate in plan of care at this time.   Findings and recommendations were discussed with: Physical Therapist and Registered Nurse    Rupert Pisano OT  Time Calculation: 16 mins

## 2017-04-10 NOTE — DISCHARGE SUMMARY
Hospitalist Discharge Summary     Patient ID:  Jessica Holliday  [id number]  73 y.o.  1950    PCP on record: Louis Flores NP    Admit date: 4/4/2017  Discharge date and time: 4/10/2017      DISCHARGE DIAGNOSIS:    Acute (New) Systolic CHF POA (EF 95% on Echo this admission)- s/p Cardiac Cath/PCI of diagonal vessel , Added Plavix & Lipitor by cardiology , OP follow up in 2-3 weeks  R groin hematoma (cath site) - now resolved, no oozing   Acute on chronic diastolic heart failure POA- diuresing well, cont Lasix at home dose  Afib with RVR POA- now rate controlled, decreased sotalol to 120 mg BID this admission  Subtherapeutic INR- 2.0 today on discharge, cont Coumadin daily  Hypokalemia - resolved  Hypomagnesemia - resolved  Runs of Vtach in ED - no more events since then  COPD  Diabetes      CONSULTATIONS:  IP CONSULT TO CARDIOLOGY  IP CONSULT TO PALLIATIVE CARE - PROVIDER    Excerpted HPI from H&P of Chi Scruggs MD:  Theo.Islas y.o.  female with history of atrial fibrillation, SSS s/p pacemaker, diabetes, diastolic CHF who presents with about 1 week history of shortness of breath and chest pressure on exertion. Patient had URI symptoms about 3 weeks ago, was seen by PCP and given antibiotics has not had any further fever or chills for last week. Over last few days patient has noticed increased swelling in her feet and shortness of breath that is associated with chest pressure only on exertion. Of note patient's sotalol was increased to 180 mg due to afib, had pacemaker interrogation at that time. Has been taking medications regularly. In ED had few runs of Vtach - up to 24 beats.      We were asked to admit for work up and evaluation of the above problems. \"    ______________________________________________________________________  DISCHARGE SUMMARY/HOSPITAL COURSE:  for full details see H&P, daily progress notes, labs, consult notes.      Acute (New) Systolic CHF POA (EF 03% on Echo this admission)- s/p Cardiac Cath/PCI of diagonal vessel , Added Plavix & Lipitor by cardiology , OP follow up in 2-3 weeks  Acute on chronic diastolic heart failure POA- diuresing well, cont Lasix at home dose  Afib with RVR POA- now rate controlled, decreased sotalol to 120 mg BID this admission  Subtherapeutic INR- 2.0 today on discharge, cont Coumadin daily  Hypokalemia - resolved  Hypomagnesemia - resolved  -patient with increased shortness of breath and chest pressure on exertion,   CXR with interstitial edema,   pro BNP 1700 all suggest      S/p IV lasix in ED- pt has responded well  Changed to PO Lasix now at lower dose due to borderline BP  strict I/Os, daily weights,  -EP consult noted - cleared pt from their standpoint again today after cardiac cath /PCI  -continue aspirin, diovan  -continue sotalol - decreased the dose to 120 mg today as pt hypotensive - cardiology seems to be ok with this dose for now  Hold Lovenox bridge due to R groin hematoma (cath site)- pressure per protocol, Hold DC home today- observe overnight as per cardiology  cont dosing coumadin PO tonight as per cardiology - INR in AM  ?AICD upgrade of the PPM on hold for now - OP follow up as per Dr Maurer Led in ED -up to 24 beats on ED telemetry     May need AICD upgrade with new Systolic CHF on Echo EF 10% - Dr Darrian Bueno to decide in office on follow up after Cath/PCI today  repleted K + now, still low today AM  Cont to give K+ BID PO with diuresis(pt is on sotalol-monitor lytes closely)         COPD  -continue home inhalers, no signs of exacerbation      Diabetes  -hold metformin  -SSI          Code Status: full  Surrogate Decision Maker: daughter        _______________________________________________________________________  Patient seen and examined by me on discharge day. Pertinent Findings:  Gen:    Not in distress  Chest: Clear lungs  CVS:   Regular rhythm.   No edema  Abd:  Soft, not distended, not tender  Neuro:  Alert, oriented x 3  Ext: R groin hematoma resolved, no oozing noted  _______________________________________________________________________  DISCHARGE MEDICATIONS:   Current Discharge Medication List      START taking these medications    Details   atorvastatin (LIPITOR) 20 mg tablet Take 1 Tab by mouth nightly. Qty: 30 Tab, Refills: 0      clopidogrel (PLAVIX) 75 mg tab Take 1 Tab by mouth daily. Qty: 30 Tab, Refills: 0         CONTINUE these medications which have CHANGED    Details   valsartan (DIOVAN) 40 mg tablet Take 0.5 Tabs by mouth daily. Qty: 30 Tab, Refills: 0    Associated Diagnoses: Type 2 diabetes mellitus without complication ([facility]         CONTINUE these medications which have NOT CHANGED    Details   !! warfarin (COUMADIN) 5 mg tablet Take 7.5 mg by mouth five (5) days a week. 7.5 mg five days a week Monday through Friday      !! warfarin (COUMADIN) 5 mg tablet Take 10 mg by mouth two (2) times daily on Sat & Sun. 10 mg Sat and Sun      fluticasone (FLONASE) 50 mcg/actuation nasal spray 2 Sprays by Both Nostrils route every evening.      gabapentin (NEURONTIN) 800 mg tablet TAKE ONE TABLET BY MOUTH THREE TIMES DAILY  Qty: 90 Tab, Refills: 11    Associated Diagnoses: Polyneuropathy associated with underlying disease ([facility]      evolocumab (REPATHA PUSHTRONEX) 420 mg/3.5 mL Injt 420 mg by SubCUTAneous route every month.  Indications: arteriosclerotic vascular disease  Qty: 1 Device, Refills: 12    Associated Diagnoses: S/P coronary artery stent placement; Mixed hyperlipidemia; Cardiac pacemaker in situ; Sick sinus syndrome (HCC)      tiotropium (SPIRIVA WITH HANDIHALER) 18 mcg inhalation capsule INHALE THE CONTENTS OF 1 CAPSULE THROUGH HANDIHALER DEVICE DAILY  Qty: 90 Cap, Refills: 3    Associated Diagnoses: Chronic obstructive pulmonary disease, unspecified COPD type (HCC)      metFORMIN (GLUCOPHAGE) 1,000 mg tablet TAKE 1 TABLET BY MOUTH TWICE DAILY WITH MEALS  Indications: PREVENTION OF TYPE 2 DIABETES MELLITUS  Qty: 180 Tab, Refills: 3    Associated Diagnoses: Type 2 diabetes mellitus without complication, unspecified long term insulin use status      sotalol (BETAPACE) 120 mg tablet Take 1 Tab by mouth two (2) times a day. Qty: 180 Tab, Refills: 3      !! warfarin (COUMADIN) 5 mg tablet Take 1.5 tabs daily, but on Saturday and Sunday take 2 tabs. Qty: 55 Tab, Refills: 2    Associated Diagnoses: Atrial fibrillation, unspecified type (HCC)      furosemide (LASIX) 20 mg tablet Take 1 Tab by mouth daily. Qty: 90 Tab, Refills: 0      colchicine 0.6 mg tablet Take 1 Tab by mouth two (2) times a day. Qty: 60 Tab, Refills: 5    Associated Diagnoses: Chronic gout involving toe of left foot without tophus, unspecified cause      budesonide-formoterol (SYMBICORT) 160-4.5 mcg/actuation HFA inhaler Take 1 Puff by inhalation two (2) times a day. Qty: 3 Inhaler, Refills: 3    Associated Diagnoses: Chronic obstructive pulmonary disease with acute exacerbation (HCC)      albuterol (PROVENTIL VENTOLIN) 2.5 mg /3 mL (0.083 %) nebulizer solution 3 mL by Nebulization route every four (4) hours as needed for Wheezing. Qty: 1 Package, Refills: 5    Associated Diagnoses: SOB (shortness of breath)      FOLIC ACID/MULTIVIT-MIN/LUTEIN (CENTRUM SILVER PO) Take 1 Tab by mouth daily. Takes one po daily. albuterol (VENTOLIN HFA) 90 mcg/actuation inhaler Take 2 Puffs by inhalation every six (6) hours as needed for Wheezing. Qty: 1 Inhaler, Refills: 11      Azelastine (ASTEPRO) 0.15 % (205.5 mcg) nasal spray 1 Allen by Both Nostrils route daily. cod liver oil cap Take 1 Cap by mouth daily. aspirin 81 mg chewable tablet Take 81 mg by mouth daily. Blood-Glucose Meter monitoring kit Check blood sugar daily.  May substitute for insurance preferred meter  Qty: 1 Kit, Refills: 0    Associated Diagnoses: Type 2 diabetes mellitus with diabetic neuropathy, without long-term current use of insulin [facility])       ! ! - Potential duplicate medications found. Please discuss with provider. My Recommended Diet, Activity, Wound Care, and follow-up labs are listed in the patient's Discharge Insturctions which I have personally completed and reviewed.     _______________________________________________________________________  DISPOSITION:    Home with Family:    Home with HH/PT/OT/RN: x   SNF/LTC:    CLARENCE:    OTHER:        Condition at Discharge:  Stable  _______________________________________________________________________  Follow up with:   PCP : Claudio Ramirez NP  Follow-up Information     Follow up With Details Comments [address], [place] Schedule an appointment as soon as possible for a visit on 4/25/2017 your appointment is at [facility]  [address]  PT, OT and Nursing [address] [place] [address] [id number] [place] [address], NP On 4/17/2017 your appointment is at 9:20am.  [facility] [address]  [phone]                Total time in minutes spent coordinating this discharge (includes going over instructions, follow-up, prescriptions, and preparing report for sign off to her PCP) :  35 minutes    Signed:  Shane Robles MD

## 2017-04-10 NOTE — PROGRESS NOTES
Pt received discharge instructions and prescriptions. Pt states understanding of follow-up care and side effects of medications. Pt given opportunity of for questions and clarifications. IV removed.  Luke Dear RN

## 2017-04-10 NOTE — PROGRESS NOTES
[address] April 10, 2017       RE: Parvez Nicole      To Whom It May Concern,    This is to certify that Parvez Nicole was admitted to [id number] [place] [address] on 4/4/2017 under hospitalist medicine service. Pt has been cleared to be discharged home today and has been advised to rest for next few days before returning to her work duties. Please feel free to contact my office if you have any questions or concerns. Thank you for your assistance in this matter.       Sincerely,  Ramya Jimenez MD

## 2017-04-10 NOTE — PROGRESS NOTES
Pt received discharge instructions and prescriptions. Pt states understanding follow-up care and side effects of medications. Pt given opportunity for questions and clarifications.  IV danna Benson RN

## 2017-04-10 NOTE — CONSULTS
Palliative Medicine Consult  [place]: [phone] ([phone])    Patient Name: Nydia Coles  YOB: 1950    Date of Initial Consult: 04/07/2017  Reason for Consult: care decisions  Requesting Provider: Dr. Gonzalez Lea   Primary Care Physician: Delilah Zimmerman NP      SUMMARY:   Nydia Coles is a 73 y.o. woman with PMH most significant for A fib, SSS s/p PM placement, diastolic HF, DM2, who was admitted on 4/4/2017 for SOB and chest pressure on exertion. Current medical issues leading to Palliative Medicine involvement include: care decisions in setting of  Acute systolic HF, EF 44%  s/p PCI earlier today  Vtach runs in our ED  COPD  Hematoma at femoral site for cardiac cath     PALLIATIVE DIAGNOSES:   1. Chest pain  2. Low back pain  3. Fear of sickness  4. Counseling regarding goals of care and advance care planning     PLAN:   Brief Summary of Plan  -visited w/ pt in her rm. No family at bedside.   -goals of care confirmed and advance care planning discussed  -we will continue to establish therapeutic alliance w/ pt and her family as well as psychosocial support    1. Goals of Care- confirmed  Pt was clear on 04/07/2017 that she wishes for full, restorative treatment and all measures that support this. 2. Shared Medical Decision Making- pt relays that she speaks w/ her dtr, Migue, about medical decisions. 3. Information Sharing-   Pt relayed that she s[norma with her dtr about range of medical treatment and code status. She initially tells me that she does not wish for intubation if she has cardiopulmonary arrest but she does want intubation if respiratory failure/distress outside of an end of life event. She further says that she does not want to be on life support if she is [place] dead\". She recalls that family went through this with her mother. Then she speaks about no interventions if there is no hope of recovery.       I provided education on resuscitative efforts and limiting it w/ no intubation. I also had detailed discussion regarding wishing for no intubation if end-of-life event and yes to intubation if, in the living, she has respiratory failure/distress. She notes that there is potential inconsistency with these 2 wishes as well as perhaps with what she had said (as noted above). She verbalizes understanding that, for now, her wishes are for full, restorative treatment and, separate from this, her code status is FULL CODE. She wishes to continue conversations w/ her dtr, Tanya Barrios, to determine what decisions she should make/change. Additionally, she verbalizes understanding that she can have an AMD completed to explicitly express her wishes in certain medical situations/conditions. Questions and concerns addressed. Supportive listening provided. 4. Advance Care Planning- pt's code status is FULL CODE. Active EMR order reflects this. Pt says that she may have an AMD but is not sure. She expresses wish to have her dtr to be her primary MPOA. 5. Psychosocial Support- We will continue to support patient and family during this difficult hospitalization    6. Spiritual- at this time, there are no apparent spiritual needs or concerns. 7. Symptom Management- at this time, there does not appear to be symptom management for which our assistance is needed. I have discussed with patients bedside RN, Javier Hernandez. We appreciate her report and input. I have discussed with our palliative care IDT. Thank you for this consult that has provided us with the opportunity to be involved in this patient's care. We will continue to follow along with you. Should you have any questions or concerns prior to our next visit at the bedside, please do not hesitate to contact us at [phone]     Andra Amaya MD  Palliative Care Team     GOALS OF CARE / TREATMENT PREFERENCES:   [====Goals of Care====]  GOALS OF CARE:  Patient / health care proxy stated goals: full restorative treatment and all measures that support this. TREATMENT PREFERENCES:   Code Status: Full Code    Advance Care Planning:  Advance Care Planning 4/4/2017   Patient's Healthcare Decision Maker is: Named in scanned ACP document   Confirm Advance Directive Yes, on file       Other:    The palliative care team has discussed with patient / health care proxy about goals of care / treatment preferences for patient.  [====Goals of Care====]         HISTORY:     History obtained from: pt, chart    CHIEF COMPLAINT: low back pain    HPI/SUBJECTIVE:    The patient is:   [x] Verbal and participatory  [] Non-participatory due to:   No o/n events or issues. Per RN, no staff concerns or issues. Clinical Pain Assessment (nonverbal scale for severity on nonverbal patients):   [++++ Clinical Pain Assessment++++]  [++++Pain Severity++++]: Pain: 8   Pt notes that it was 10/10 all day while she was lying s/p cardiac cath. Just sitting up for less than 10 min has made pain go from 10/10 to 8/10.    [++++Pain Character++++]: achy, chronic  [++++Pain Duration++++]: constant  [++++Pain Effect++++]:   [++++Pain Factors++++]: sitting up has helped a great deal  [++++Pain Frequency++++]: persistent  [++++Pain Location++++]: low back   [++++ Clinical Pain Assessment++++]     FUNCTIONAL ASSESSMENT:     Palliative Performance Scale (PPS):  PPS: 80       PSYCHOSOCIAL/SPIRITUAL SCREENING:     Advance Care Planning:  Advance Care Planning 4/4/2017   Patient's Healthcare Decision Maker is: Named in scanned ACP document   Confirm Advance Directive Yes, on file   No ACP found on file     Any spiritual / Sikhism concerns:  [] Yes /  [x] No    Caregiver Burnout:  [] Yes /  [] No /  [x] No Caregiver Present      Anticipatory grief assessment:   [] Normal  / [] Maladaptive       ESAS Anxiety: Anxiety: 0    ESAS Depression: Depression: 0        REVIEW OF SYSTEMS:     Positive and pertinent negative findings in ROS are noted above in HPI.  The following systems were [x] reviewed / [] unable to be reviewed as noted in HPI  Other findings are noted below. Systems: constitutional, ears/nose/mouth/throat, respiratory, gastrointestinal, genitourinary, musculoskeletal, integumentary, neurologic, psychiatric, endocrine. Positive findings noted below. Modified ESAS Completed by: provider   Fatigue: 0 Drowsiness: 0   Depression: 0 Pain: 8   Anxiety: 0 Nausea: 0   Anorexia: 0 Dyspnea: 0                    PHYSICAL EXAM:     From RN flowsheet:  Wt Readings from Last 3 Encounters:   04/10/17 170 lb 3.1 oz (77.2 kg)   02/14/17 182 lb 12.2 oz (82.9 kg)   01/31/17 180 lb 12.8 oz (82 kg)     Blood pressure 110/68, pulse 82, temperature 97.9 °F (36.6 °C), resp. rate 18, height 5' 6\" (1.676 m), weight 170 lb 3.1 oz (77.2 kg), SpO2 98 %.     Pain Scale 1: Numeric (0 - 10)  Pain Intensity 1: 0  Pain Onset 1:  (hand pressure appied for hematoma)  Pain Location 1: Head  Pain Orientation 1: Anterior  Pain Description 1: Aching  Pain Intervention(s) 1: Medication (see MAR)    Gen: sitting up in bed, in NAD  HEENT: NC/AT, MMM  Respiratory: breathing not labored, symmetric  Neurologic: awake, alert, following commands, moving all extremities  Eats her supper without difficulty  Psychiatric: appropriate, no obvious signs of agitation     HISTORY:     Principal Problem:    Systolic CHF, acute ([address] (4/6/2017)    Active Problems:    Hypertension--essential (6/2/2010)      Atrial fibrillation--paroxysmal (6/2/2010)      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 (6/2/2010)      Hypokalemia (7/20/2010)      Cardiac pacemaker in situ (7/5/2012)      Mixed hyperlipidemia (7/5/2012)      S/P coronary artery stent placement (7/4/2014)      Overview: 4/7/17 PCI/ROSALINO Diagonal      Diastolic CHF, acute on chronic ([address] (9/22/2014)      Type 2 diabetes mellitus with diabetic neuropathy, without long-term current use of insulin ([address] (1/31/2017)      Acute systolic CHF (congestive heart failure) ([facility] (2017)      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)      Counseling regarding advanced care planning and goals of care (2017)      Past Medical History:   Diagnosis Date    Atrial fibrillation ([facility] 2010    Chronic diastolic heart failure ([facility] 2014    COPD     COPD (chronic obstructive pulmonary disease) ([facility] 2010    Diabetes ([facility]     Fibroid     Heart failure ([facility]     Hypertension 2010    NIDDM (non-insulin dependent diabetes mellitus) 2010    Screening mammogram 5/4/10    SOB (shortness of breath) 2014      Past Surgical History:   Procedure Laterality Date    HX  SECTION      HX OTHER SURGICAL      adrenal gland removed    HX PACEMAKER      OK EXCISE ADRENAL GLAND        Family History   Problem Relation Age of Onset    Heart Disease Mother     Diabetes Father     Heart Disease Brother       History reviewed, no pertinent family history.   Social History   Substance Use Topics    Smoking status: Former Smoker     Types: Cigarettes     Quit date: 2009    Smokeless tobacco: Never Used    Alcohol use No     Allergies   Allergen Reactions    Crestor [Rosuvastatin] Myalgia    Levaquin [Levofloxacin] Nausea Only     GI Upset    Lyrica [Pregabalin] Myalgia      Current Facility-Administered Medications   Medication Dose Route Frequency    warfarin (COUMADIN) tablet 7.5 mg  7.5 mg Oral ONCE    sotalol (BETAPACE) tablet 120 mg  120 mg Oral BID    clopidogrel (PLAVIX) tablet 75 mg  75 mg Oral DAILY    atorvastatin (LIPITOR) tablet 20 mg  20 mg Oral QHS    fentaNYL citrate (PF) injection 25 mcg  25 mcg IntraVENous Q1H PRN    furosemide (LASIX) tablet 20 mg  20 mg Oral DAILY    potassium chloride SR (KLOR-CON 10) tablet 20 mEq  20 mEq Oral BID    colchicine tablet 1.2 mg  1.2 mg Oral DAILY    aspirin chewable tablet 81 mg  81 mg Oral DAILY    fluticasone-vilanterol (BREO ELLIPTA) 100mcg-25mcg/puff  1 Puff Inhalation DAILY    fluticasone (FLONASE) 50 mcg/actuation nasal spray 2 Spray  2 Spray Both Nostrils QHS    multivitamin, tx-iron-ca-min (THERA-M w/ IRON) tablet 1 Tab  1 Tab Oral DAILY    gabapentin (NEURONTIN) 800 mg  800 mg Oral TID    umeclidinium (INCRUSE ELLIPTA) 62.5 mcg/actuation  1 Puff Inhalation DAILY    sodium chloride (NS) flush 5-10 mL  5-10 mL IntraVENous Q8H    sodium chloride (NS) flush 5-10 mL  5-10 mL IntraVENous PRN    acetaminophen (TYLENOL) tablet 650 mg  650 mg Oral Q4H PRN    ondansetron (ZOFRAN) injection 4 mg  4 mg IntraVENous Q4H PRN    docusate sodium (COLACE) capsule 100 mg  100 mg Oral BID PRN    insulin lispro (HUMALOG) injection   SubCUTAneous AC&HS    glucose chewable tablet 16 g  4 Tab Oral PRN    dextrose (D50W) injection syrg 12.5-25 g  12.5-25 g IntraVENous PRN    glucagon (GLUCAGEN) injection 1 mg  1 mg IntraMUSCular PRN    WARFARIN INFORMATION NOTE (COUMADIN)   Other QPM    valsartan (DIOVAN) tablet 20 mg  20 mg Oral QHS          LAB AND IMAGING FINDINGS:     Lab Results   Component Value Date/Time    WBC 6.8 04/09/2017 04:27 AM    HGB 12.9 04/09/2017 04:27 AM    PLATELET 102 87/32/3960 04:27 AM     Lab Results   Component Value Date/Time    Sodium 139 04/09/2017 04:27 AM    Potassium 4.5 04/09/2017 04:27 AM    Chloride 103 04/09/2017 04:27 AM    CO2 28 04/09/2017 04:27 AM    BUN 13 04/09/2017 04:27 AM    Creatinine 1.05 04/09/2017 04:27 AM    Calcium 9.0 04/09/2017 04:27 AM    Magnesium 2.2 04/09/2017 04:27 AM    Phosphorus 2.8 04/05/2017 05:59 AM      Lab Results   Component Value Date/Time    AST (SGOT) 64 04/04/2017 09:44 AM    Alk.  phosphatase 109 04/04/2017 09:44 AM    Protein, total 7.1 04/04/2017 09:44 AM    Albumin 3.2 04/04/2017 09:44 AM    Globulin 3.9 04/04/2017 09:44 AM     Lab Results   Component Value Date/Time    INR 2.0 04/10/2017 04:30 AM    Prothrombin time 20.1 04/10/2017 04:30 AM    aPTT 27.1 04/12/2016 04:29 PM      Lab Results   Component Value Date/Time    Iron 59 06/30/2014 03:30 AM    TIBC 302 06/30/2014 03:30 AM    Iron % saturation 20 06/30/2014 03:30 AM      Lab Results   Component Value Date/Time    pH 7.45 06/18/2013 04:53 AM    PCO2 38 06/18/2013 04:53 AM    PO2 146 06/18/2013 04:53 AM     No components found for: [place]   Lab Results   Component Value Date/Time     09/26/2016 11:02 AM    CK - MB 2.2 09/26/2016 11:02 AM                Total time: 35 min  Counseling / coordination time: 20 min  > 50% counseling / coordination?: yes    Prolonged service was provided for  []30 min   []75 min in face to face time in the presence of the patient. Time Start:   Time End:   Note: this can only be billed with 45034 (initial) or 68800 (follow up). If multiple start / stop times, list each separately.

## 2017-04-10 NOTE — PROGRESS NOTES
Problem: Mobility Impaired (Adult and Pediatric)  Goal: *Acute Goals and Plan of Care (Insert Text)  Physical Therapy Goals  Initiated 4/8/2017  1. Patient will move from supine to sit and sit to supine , scoot up and down and roll side to side in bed with independence within 7 day(s). 2. Patient will transfer from bed to chair and chair to bed with independence using the least restrictive device within 7 day(s). 3. Patient will perform sit to stand with independence within 7 day(s). 4. Patient will ambulate with independence for 150 feet with the least restrictive device within 7 day(s). PHYSICAL THERAPY TREATMENT  Patient: Cony Rapp (64 y.o. female)  Date: 4/10/2017  Diagnosis: CHF (congestive heart failure) ([facility])  Diastolic CHF, acute on chronic ([facility])  Acute systolic CHF (congestive heart failure) ([facility]) Systolic CHF, acute ([facility]       Precautions: Fall      ASSESSMENT:  Patient received seated edge of bed and agreeable to therapy. Patient tolerated session well. Patient completed supine to sit with supervision, sit<>stand without assistive device with supervision. Patient ambulated in the hallway with CGA. Patient with 1 -2 episodes of instability during gait, but able to correct LOB by way of stepping strategy. Discussed with patient the importance of maintaining an active lifestyle within reason and if she decides to begin an exercise program that it to be monitored and be cleared by her MD. Patient verbalized understanding. Patient will continue to benefit from PT if she remains in the hospital, however patient reports she is planned for discharge today. Patient reported improvement in pain at groin site, just soreness.    Progression toward goals:  [X]    Improving appropriately and progressing toward goals  [ ]    Improving slowly and progressing toward goals  [ ]    Not making progress toward goals and plan of care will be adjusted       PLAN:  Patient continues to benefit from skilled intervention to address the above impairments. Continue treatment per established plan of care. Discharge Recommendations:  Home Health  Further Equipment Recommendations for Discharge:  none       SUBJECTIVE:   Patient stated I am going home today! Evelyn Bahena      OBJECTIVE DATA SUMMARY:   Critical Behavior:  Neurologic State: Alert  Orientation Level: Oriented X4  Cognition: Appropriate decision making, Appropriate safety awareness, Appropriate for age attention/concentration, Follows commands     Functional Mobility Training:  Bed Mobility:     Supine to Sit: Supervision  Sit to Supine: Supervision           Transfers:  Sit to Stand: Supervision  Stand to Sit: Supervision                             Balance:     Ambulation/Gait Training:  Distance (ft): 120 Feet (ft)  Assistive Device: Gait belt  Ambulation - Level of Assistance: Stand-by asssistance;Contact guard assistance        Gait Abnormalities: Decreased step clearance; Path deviations        Base of Support: Narrowed     Speed/Cece: Pace decreased (<100 feet/min)  Step Length: Left shortened;Right shortened                               Stairs:                       Neuro Re-Education:     Therapeutic Exercises:      Pain:  Pain Scale 1: Numeric (0 - 10)  Pain Intensity 1: 0              Activity Tolerance:   Good. VSS  Please refer to the flowsheet for vital signs taken during this treatment.   After treatment:   [ ]    Patient left in no apparent distress sitting up in chair  [X]    Patient left in no apparent distress in bed  [X]    Call bell left within reach  [X]    Nursing notified  [ ]    Caregiver present  [ ]    Bed alarm activated      COMMUNICATION/COLLABORATION:   The patients plan of care was discussed with: Occupational Therapist and Registered Nurse     Hedy Vance, PT, DPT   Time Calculation: 14 mins

## 2017-04-10 NOTE — PROGRESS NOTES
Met with pt and discussed new orders of home health PT/OT and Nursing. Pt selected [id number] prior for hospital to home visit and agreed to have them for home health PT& OT. Pt signed the [facility] form for [address]. Pt in good spirits and ready for discharge.      Priscila Ledbetter, 2019 Andrew Samuel

## 2017-04-10 NOTE — CARDIO/PULMONARY
CP REHAB NOTES/P PCI/stenting on  4/7/2017. Chart Review: Patient admitted for increasing SOB and chest pressure on exertion. Medical History: Afib, SSS, pacemaker, diabetes, diastolic CHF, COPD  EF 66-43%, Echo 10/17/2016 Echo  EF of 25-30%. Former Smoker. Discharge was cancelled per notes due to oozing from groin. This was a follow-up visit to answer questions and reinforce prior teaching re: CHF, S&Ss, medication management, Low NA diet, daily weights, when to call the doctor and balancing rest/activity. Teachback-able to answer all teach back questions. States she is unable to attend Cardiac Rehab due to work schedule as she works at [employer]. Plans to join a gym or try to walk after work. States she is purchasing a scale and lives with 2 family members. She does the grocery shopping and does read labels. Reminded to keep follow up appts and take medications as ordered and report any changes to MD.  Reviewed Plavix,what it is for and why she needs to not miss a dose along with side effects. States she does have Cardiac Rehab contact information if schedule changes  All questions answered. Understanding verbalized.

## 2017-04-10 NOTE — PROGRESS NOTES
Pharmacy Daily Dosing of Warfarin    Indication: A fib  Goal INR: 2-3      Average Daily Warfarin Dose: 7.5 mg M-F, and 10 mg on Sat and Sun  Concurrent Anticoagulants/Antiplatelets Lovenox 80 mg every 12 hours and aspirin 81 mg daily   Major Interacting Medications (Dose/Frequency): None    INR (0.9-1.1) > 5 or Platelets (< 14B): discuss with MD:  Recent Labs      04/10/17   0430  04/09/17   0427  04/08/17   0343   INR  2.0*  1.5*  1.3*   HGB   --   12.9  11.7   PLT   --   239  230       Impression/Plan: patient getting discharged. Will place order for 7.5 mg for today incase dose is needed prior to discharge. Pharmacy will follow daily and adjust the dose as appropriate.     Thanks for the consult  SAMSON Garcia

## 2017-04-10 NOTE — PROGRESS NOTES
Home Health Care Discharge Planning: [facility]  Face to Face Encounter      NAME: Nydia Coles   :  1950   MRN:  [id number]     Primary Diagnosis: New Acute systolic CHF + chronic diastolic CHF    Date of Face to Face:  4/10/2017 12:18 PM                                  Face to Face Encounter findings are related to primary reason for home care:   YES    1. I certify that the patient needs intermittent skilled nursing care, physical therapy and/or speech therapy. I will not be following this patient in the Community and Dr. Delilah Zimmerman, NP will be responsible for signing the [address] of Care. 2. Initial Orders for Care: Skilled Nursing, Physical Therapy and Occupational Therapy    3. I certify that this patient is homebound because of illness or injury, need the aid of supportive devices such as crutches, canes, wheelchairs, and walkers; the use of special transportation; or the assistance of another person in order to leave their place of residence. There exists a normal inability to leave home and leaving home requires a considerable and taxing effort. 4. I certify that this patient is under my care and that I had a Face-to-Face Encounter that meets the physician Face-to-Face Encounter requirements. Document the physical findings from the Face-to-Face Encounter that support the need for skilled services: Has new diagnosis that requires skilled nursing teaching and intervention , Has new medications that requires skilled nursing teaching and monitoring for understanding and compliance  and Has new finding of weakness and altered mobility that requires skilled physical/occupational  therapy services for evaluation and interventions.      Dickson Cota MD  Discharging Physician  Office: [phone]  Fax:   [phone]

## 2017-04-11 ENCOUNTER — PATIENT OUTREACH (OUTPATIENT)
Dept: INTERNAL MEDICINE CLINIC | Age: 67
End: 2017-04-11

## 2017-04-11 NOTE — PROGRESS NOTES
Patient listed on discharge [facility]) report on 4/10/17. Patient discharged from [facility] for Acute Systolic CHF. NN contacted the patient to perform post hospital discharge assessment. Verified  and address (including temp addr) with patient as identifiers. Provided introduction to self, and explanation of the NN role. Today Mr//Ms April Arreaga reports \"I'm coming along, my chest is a little tight today. I'm taking it easy\". She denies any chest pain, falls, groin changes (moitoring recent hematoma), vision changes, dizziness. She does have shortness of breath at rest and says her blood sugar today was 123. Ms April Arreaga says she has all new medications and is taking all of her previous medications as indicated prior to this hospitalization. As this writer continues with conversation Ms April Arreaga says \"I don't feel like talking right now, I want to lay down\". This writer thanks pt for taking the time to talk with this writer and she is reminded of upcoming PCP apt on . This writer encouraged pt to rest, elevate legs and monitor right groin and to contact office even after hours to speak with on call physician to report non life threatening symptoms and that physician will assist in deciding if a trip to the ED is necessary. Reviewed red flags and discharge instructions with patient who verbalizes understanding. Patient given an opportunity to ask questions. No other clinical/social/functional needs noted. The patient agrees to contact the PCP office for questions related to her healthcare. The patient expressed thanks, offered no additional questions and ended the call.        RAT Score: High Risk            24       Total Score        4 More than 1 Admission in calendar year    20 Charlson Comorbidity Score        Criteria that do not apply:    Relationship with PCP    Patient Living Status    Patient Length of Stay > 5    Patient Insurance is Medicare, Medicaid or Self Pay

## 2017-04-12 ENCOUNTER — HOME CARE VISIT (OUTPATIENT)
Dept: SCHEDULING | Facility: HOME HEALTH | Age: 67
End: 2017-04-12
Payer: COMMERCIAL

## 2017-04-12 PROCEDURE — G0299 HHS/HOSPICE OF RN EA 15 MIN: HCPCS

## 2017-04-12 PROCEDURE — 3331090002 HH PPS REVENUE DEBIT

## 2017-04-12 PROCEDURE — 3331090001 HH PPS REVENUE CREDIT

## 2017-04-12 PROCEDURE — G0151 HHCP-SERV OF PT,EA 15 MIN: HCPCS

## 2017-04-12 PROCEDURE — 400013 HH SOC

## 2017-04-13 VITALS
OXYGEN SATURATION: 95 % | RESPIRATION RATE: 20 BRPM | DIASTOLIC BLOOD PRESSURE: 66 MMHG | HEART RATE: 90 BPM | TEMPERATURE: 98.9 F | SYSTOLIC BLOOD PRESSURE: 118 MMHG

## 2017-04-13 PROCEDURE — 3331090002 HH PPS REVENUE DEBIT

## 2017-04-13 PROCEDURE — 3331090001 HH PPS REVENUE CREDIT

## 2017-04-14 ENCOUNTER — HOME CARE VISIT (OUTPATIENT)
Dept: HOME HEALTH SERVICES | Facility: HOME HEALTH | Age: 67
End: 2017-04-14
Payer: COMMERCIAL

## 2017-04-14 ENCOUNTER — DOCUMENTATION ONLY (OUTPATIENT)
Dept: INTERNAL MEDICINE CLINIC | Age: 67
End: 2017-04-14

## 2017-04-14 PROCEDURE — G0157 HHC PT ASSISTANT EA 15: HCPCS

## 2017-04-14 PROCEDURE — 3331090002 HH PPS REVENUE DEBIT

## 2017-04-14 PROCEDURE — 3331090001 HH PPS REVENUE CREDIT

## 2017-04-14 NOTE — PROGRESS NOTES
Shaye from [facility] called to inform us that the patient has been having diarrhea since she started taking the atorvastatin and that she's been fatigued and very raw. .. Pt was instructed by DEEPAK Henderson to stop taking the atorvastatin and to apply Desitin to the affected area.  Pt verbalizes understanding

## 2017-04-15 VITALS
OXYGEN SATURATION: 98 % | HEART RATE: 62 BPM | TEMPERATURE: 97.7 F | SYSTOLIC BLOOD PRESSURE: 100 MMHG | DIASTOLIC BLOOD PRESSURE: 64 MMHG | RESPIRATION RATE: 14 BRPM

## 2017-04-15 PROCEDURE — 3331090001 HH PPS REVENUE CREDIT

## 2017-04-15 PROCEDURE — 3331090002 HH PPS REVENUE DEBIT

## 2017-04-16 VITALS
SYSTOLIC BLOOD PRESSURE: 120 MMHG | RESPIRATION RATE: 20 BRPM | TEMPERATURE: 98.2 F | DIASTOLIC BLOOD PRESSURE: 82 MMHG | WEIGHT: 169 LBS | BODY MASS INDEX: 26.53 KG/M2 | HEART RATE: 88 BPM | HEIGHT: 67 IN

## 2017-04-16 PROCEDURE — 3331090001 HH PPS REVENUE CREDIT

## 2017-04-16 PROCEDURE — 3331090002 HH PPS REVENUE DEBIT

## 2017-04-17 ENCOUNTER — HOME CARE VISIT (OUTPATIENT)
Dept: HOME HEALTH SERVICES | Facility: HOME HEALTH | Age: 67
End: 2017-04-17
Payer: COMMERCIAL

## 2017-04-17 ENCOUNTER — TELEPHONE (OUTPATIENT)
Dept: INTERNAL MEDICINE CLINIC | Age: 67
End: 2017-04-17

## 2017-04-17 ENCOUNTER — OFFICE VISIT (OUTPATIENT)
Dept: INTERNAL MEDICINE CLINIC | Age: 67
End: 2017-04-17

## 2017-04-17 VITALS
WEIGHT: 172.2 LBS | DIASTOLIC BLOOD PRESSURE: 59 MMHG | SYSTOLIC BLOOD PRESSURE: 99 MMHG | BODY MASS INDEX: 27.03 KG/M2 | HEART RATE: 64 BPM | HEIGHT: 67 IN | TEMPERATURE: 98 F | OXYGEN SATURATION: 95 % | RESPIRATION RATE: 18 BRPM

## 2017-04-17 VITALS
SYSTOLIC BLOOD PRESSURE: 98 MMHG | OXYGEN SATURATION: 98 % | RESPIRATION RATE: 16 BRPM | DIASTOLIC BLOOD PRESSURE: 58 MMHG | TEMPERATURE: 97.6 F | HEART RATE: 96 BPM

## 2017-04-17 DIAGNOSIS — I50.23 ACUTE ON CHRONIC SYSTOLIC CONGESTIVE HEART FAILURE (HCC): ICD-10-CM

## 2017-04-17 DIAGNOSIS — Z95.5 S/P CORONARY ARTERY STENT PLACEMENT: ICD-10-CM

## 2017-04-17 DIAGNOSIS — Z95.0 CARDIAC PACEMAKER IN SITU: ICD-10-CM

## 2017-04-17 DIAGNOSIS — I48.0 PAROXYSMAL ATRIAL FIBRILLATION (HCC): Chronic | ICD-10-CM

## 2017-04-17 DIAGNOSIS — I10 ESSENTIAL HYPERTENSION: Chronic | ICD-10-CM

## 2017-04-17 DIAGNOSIS — E78.00 HYPERCHOLESTEREMIA: ICD-10-CM

## 2017-04-17 DIAGNOSIS — E11.40 TYPE 2 DIABETES MELLITUS WITH DIABETIC NEUROPATHY, WITHOUT LONG-TERM CURRENT USE OF INSULIN (HCC): ICD-10-CM

## 2017-04-17 DIAGNOSIS — E78.2 MIXED HYPERLIPIDEMIA: ICD-10-CM

## 2017-04-17 DIAGNOSIS — Z79.01 ANTICOAGULATION MONITORING, INR RANGE 2-3: Primary | ICD-10-CM

## 2017-04-17 PROBLEM — R42 DIZZINESS: Status: RESOLVED | Noted: 2017-01-31 | Resolved: 2017-04-17

## 2017-04-17 LAB
GLUCOSE POC: 147 MG/DL
HBA1C MFR BLD HPLC: 7.6 %
INR BLD: 3.8
PT POC: 45.3 SEC
VALID INTERNAL CONTROL?: YES

## 2017-04-17 PROCEDURE — 3331090002 HH PPS REVENUE DEBIT

## 2017-04-17 PROCEDURE — G0157 HHC PT ASSISTANT EA 15: HCPCS

## 2017-04-17 PROCEDURE — 3331090001 HH PPS REVENUE CREDIT

## 2017-04-17 RX ORDER — PRAVASTATIN SODIUM 20 MG/1
20 TABLET ORAL
Qty: 90 TAB | Refills: 3 | Status: SHIPPED | OUTPATIENT
Start: 2017-04-17 | End: 2017-09-22

## 2017-04-17 RX ORDER — CEFUROXIME AXETIL 500 MG/1
TABLET ORAL
COMMUNITY
Start: 2017-02-15 | End: 2017-04-17 | Stop reason: ALTCHOICE

## 2017-04-17 NOTE — TELEPHONE ENCOUNTER
Pt states her job wants to know from what date you are keeping her out and what date you want her to return to work. She also gave a policy#  [id number]. Pt # [phone]

## 2017-04-17 NOTE — PATIENT INSTRUCTIONS
Learning About Atrial Fibrillation  What is atrial fibrillation? Atrial fibrillation (say \"AY-tree-cory esk-cdoy-QCK-shun\") is the most common type of irregular heartbeat (arrhythmia). Normally, the heart beats in a strong, steady rhythm. In atrial fibrillation, a problem with the heart's electrical system causes the two upper parts of the heart (the atria) to quiver, or fibrillate. Your heart rate also may be faster than normal.  Atrial fibrillation can be dangerous because if the heartbeat isn't strong and steady, blood can collect, or pool, in the atria. And pooled blood is more likely to form clots. Clots can travel to the brain, block blood flow, and cause a stroke. Atrial fibrillation can also lead to heart failure. Treatment for atrial fibrillation helps prevent stroke and heart failure. It also helps relieve symptoms. Atrial fibrillation is often caused by another heart problem. It may happen after heart surgery. It may also be caused by other problems, such as an overactive thyroid gland or lung disease. Many people with atrial fibrillation are able to live full and active lives. What are the symptoms? Some people feel symptoms when they have episodes of atrial fibrillation. But other people don't notice any symptoms. If you have symptoms, you may feel:  · A fluttering, racing, or pounding feeling in your chest called palpitations. · Weak or tired. · Dizzy or lightheaded. · Short of breath. · Chest pain. · Confused. You may notice signs of atrial fibrillation when you check your pulse. Your pulse may seem uneven or fast.  What can you expect when you have atrial fibrillation? At first, spells of atrial fibrillation may come on suddenly and last a short time. It may go away on its own or it goes away after treatment. This is called paroxysmal atrial fibrillation. Over time, the spells may last longer and occur more often. They often don't go away on their own.   How is it treated? Treatments can help you feel better and prevent future problems, especially stroke and heart failure. The main types of treatment slow the heart rate, control the heart rhythm, and help prevent stroke. Your treatment will depend on the cause of your atrial fibrillation, your symptoms, and your risk for stroke. · Heart rate treatment. Medicine may be used to slow your heart rate. Your heartbeat may still be irregular. But these medicines keep your heart from beating too fast. They may also help relieve your symptoms. · Heart rhythm treatment. Different treatments may be used to try to stop atrial fibrillation and keep it from returning. They can also relieve symptoms. These treatments include medicine, electrical cardioversion to shock the heart back to a normal rhythm, a procedure called catheter ablation, and heart surgery. · Stroke prevention. You and your doctor can decide how to lower your risk. You may decide to take a blood-thinning medicine, such as aspirin or an anticoagulant. How can you live well with it? You can live well and help manage atrial fibrillation by having a heart-healthy lifestyle. To have a heart-healthy lifestyle:  · Don't smoke. · Eat heart-healthy foods. · Be active. Talk to your doctor about what type and level of exercise is safe for you. · Stay at a healthy weight. Lose weight if you need to. · Manage stress. · Avoid alcohol if it triggers symptoms. · Manage other health problems such as high blood pressure, high cholesterol, and diabetes. · Avoid getting sick from the flu. Get a flu shot every year. When should you call for help? Call 911 anytime you think you may need emergency care. For example, call if:  · You have symptoms of a stroke. These may include:  ¨ Sudden numbness, tingling, weakness, or loss of movement in your face, arm, or leg, especially on only one side of your body. ¨ Sudden vision changes. ¨ Sudden trouble speaking.   ¨ Sudden confusion or trouble understanding simple statements. ¨ Sudden problems with walking or balance. ¨ A sudden, severe headache that is different from past headaches. Call your doctor now or seek immediate medical care if:  · You have new or increased shortness of breath. · You feel dizzy or lightheaded, or you feel like you may faint. Watch closely for changes in your health, and be sure to contact your doctor if you have any problems. Follow-up care is a key part of your treatment and safety. Be sure to make and go to all appointments, and call your doctor if you are having problems. It's also a good idea to know your test results and keep a list of the medicines you take. Where can you learn more? Go to http://rusty-marcela.info/. Enter [phone] in the search box to learn more about \"Learning About Atrial Fibrillation. \"  Current as of: March 28, 2016  Content Version: 11.2  © 4580-2132 [employer]. Care instructions adapted under license by [employer] (which disclaims liability or warranty for this information). If you have questions about a medical condition or this instruction, always ask your healthcare professional. [place] [address] any warranty or liability for your use of this information. Managing Other Conditions When You Have Heart Failure: Care Instructions  Your Care Instructions  All the systems in your body rely on each other to work properly. Heart failure has effects all through your body that can lead to other problems, such as kidney disease. The reverse is also true. A condition like diabetes or lung disease can damage or stress your heart and cause heart failure. Managing any other problems can help reduce your heart's workload and make your heart failure better. Conditions that commonly cause or occur along with heart failure include high blood pressure, diabetes, COPD, high cholesterol, kidney problems, anemia, and arthritis.   Follow-up care is a key part of your treatment and safety. Be sure to make and go to all appointments, and call your doctor if you are having problems. It's also a good idea to know your test results and keep a list of the medicines you take. How can you care for yourself at home? Steps to help with heart failure and other problems  · Eat less salt (sodium). This helps keep fluid from building up. It may help you feel better. Limiting sodium can also help if you have high blood pressure or kidney disease. · Watch your fluid intake if your doctor tells you to. Reducing fluids can ease your heart's workload and keep your sodium level in balance. · Get regular exercise. Regular, moderate exercise, such as walking, helps your heart. It can also help lower your blood pressure, lower stress, and help you lose weight. · Lose weight if you are overweight. Losing weight can help you manage diabetes, lower your blood pressure and cholesterol level, and reduce the workload on your heart. · Stop smoking. Smoking stresses your lungs, interferes with healing, and can make heart failure worse. · Limit alcohol. Alcohol can raise your blood pressure. Ask your doctor how much, if any, is safe. If your doctor has not set you up with a cardiac rehabilitation (rehab) program, talk to him or her about whether that is right for you. Cardiac rehab includes exercise, help with diet and lifestyle changes, and emotional support. To stay as healthy as possible  · Work closely with your doctor. Have all your tests, and go to all your appointments. · Take your medicines exactly as prescribed. You will take medicines to treat the other conditions you have along with heart failure. It can be hard to balance the treatment for all your conditions. You will need to have follow-up tests to make sure that all your medicines are working well together. Talk to your doctor if you have any problems with your medicine.   · Keep all your doctors informed about your health problems and all the medicines you take for them. Medicines that can treat one condition may make another condition worse. · Talk to your doctor before you take any vitamins, over-the-counter drugs, or herbal products. Do not take aspirin, ibuprofen (Advil, Motrin), or naproxen (Aleve) unless you talk to your doctor first. They could make your heart failure and other problems worse. When should you call for help? Call 911 if you have symptoms of sudden heart failure such as:  · You have severe trouble breathing. · You cough up pink, foamy mucus. · You have a new irregular or rapid heartbeat. Call 911 if you have symptoms of a heart attack. These may include:  · Chest pain or pressure, or a strange feeling in the chest.  · Sweating. · Shortness of breath. · Nausea or vomiting. · Pain, pressure, or a strange feeling in the back, neck, jaw, or upper belly or in one or both shoulders or arms. · Lightheadedness or sudden weakness. · A fast or irregular heartbeat. After you call 911, the  may tell you to chew 1 adult-strength or 2 to 4 low-dose aspirin. Wait for an ambulance. Do not try to drive yourself. Call your doctor now or seek immediate medical care if:  · You have new or increased shortness of breath. · You are dizzy or lightheaded, or you feel like you may faint. · You have sudden weight gain, such as 3 pounds or more in 2 to 3 days. · You have increased swelling in your legs, ankles, or feet. · You are suddenly so tired or weak that you cannot do your usual activities. Watch closely for changes in your health, and be sure to contact your doctor if you develop new symptoms. Where can you learn more? Go to http://rusty-marcela.info/. Enter P052 in the search box to learn more about \"Managing Other Conditions When You Have Heart Failure: Care Instructions. \"  Current as of: November 15, 2016  Content Version: 11.2  © 6360-4579 [employer]. Care instructions adapted under license by [employer] (which disclaims liability or warranty for this information). If you have questions about a medical condition or this instruction, always ask your healthcare professional. [address] any warranty or liability for your use of this information.

## 2017-04-17 NOTE — PROGRESS NOTES
A1C stable. Advised to hold coumadin today and resume at 7.5 mg daily. On plavix and ASA since stenting. Modified dosing to accommodate, will recheck in 1 week.

## 2017-04-17 NOTE — MR AVS SNAPSHOT
Visit Information Date & Time Provider Department Dept. Phone Encounter #  
 4/17/2017  9:20 AM Evelin Rodriguez NP [address] [facility] [phone] [id number] Follow-up Instructions Return in about 4 weeks (around 5/15/2017) for CHF, afib f/u. INR check in 1 wk. Your Appointments 4/25/2017  9:00 AM  
HOSPITAL FOLLOW-UP with Trang Leavitt MD  
[facility] [address] [place] [address]) Appt Note: increased Atrial burden; hfu per  at [employer]. [address] [facility]  
[phone] [address] [facility]  
  
    
 5/1/2017  8:20 AM  
ROUTINE CARE with Evelin Rodriguez NP  
[address]) Appt Note: DM, htn and chol; DM, htn and chol [facility] [address]  
[phone]  
  
   
 [address] [facility] Upcoming Health Maintenance Date Due MICROALBUMIN Q1 3/22/2017 EYE EXAM RETINAL OR DILATED Q1 5/1/2017* FOOT EXAM Q1 6/10/2017 HEMOGLOBIN A1C Q6M 7/31/2017 FOBT Q 1 YEAR AGE 50-75 10/31/2017 MEDICARE YEARLY EXAM 11/1/2017 LIPID PANEL Q1 1/31/2018 GLAUCOMA SCREENING Q2Y 7/28/2018 BREAST CANCER SCRN MAMMOGRAM 2/23/2019 DTaP/Tdap/Td series (2 - Td) 7/16/2026 *Topic was postponed. The date shown is not the original due date. Allergies as of 4/17/2017  Review Complete On: 4/17/2017 By: Evelin Rodriguez NP Severity Noted Reaction Type Reactions Crestor [Rosuvastatin]  10/31/2016   Side Effect Myalgia Levaquin [Levofloxacin]  12/07/2015   Intolerance Nausea Only GI Upset Lipitor [Atorvastatin]  04/17/2017   Side Effect Diarrhea Lyrica [Pregabalin]  10/31/2016   Side Effect Myalgia Current Immunizations  Reviewed on 10/31/2016 Name Date H1N1 FLU VACCINE 10/20/2009 Influenza High Dose Vaccine PF 10/31/2016 Influenza Vaccine ([place] Canine Kidney) PF 9/23/2014 11:26 AM  
 Influenza Vaccine Intradermal PF 11/27/2015 Influenza Vaccine Split 9/22/2011  6:25 AM  
 Influenza Vaccine Whole 10/20/2009 Pneumococcal Polysaccharide (PPSV-23) 6/25/2015 Pneumococcal Vaccine (Unspecified Type) 10/20/2009 Tdap 7/16/2016 11:47 PM  
  
 Not reviewed this visit You Were Diagnosed With   
  
 Codes Comments Anticoagulation monitoring, INR range 2-3    -  Primary ICD-10-CM: Z79.01 
ICD-9-CM: V58.61 Type 2 diabetes mellitus with diabetic neuropathy, without long-term current use of insulin (HCC)     ICD-10-CM: E11.40 ICD-9-CM: 250.60, 357.2 Mixed hyperlipidemia     ICD-10-CM: E78.2 ICD-9-CM: 272.2 Hypercholesteremia     ICD-10-CM: E78.00 ICD-9-CM: 272.0 Paroxysmal atrial fibrillation (HCC)     ICD-10-CM: I48.0 ICD-9-CM: 427.31 S/P coronary artery stent placement     ICD-10-CM: Z95.5 ICD-9-CM: V45.82 Cardiac pacemaker in situ     ICD-10-CM: Z95.0 ICD-9-CM: V45.01 Essential hypertension     ICD-10-CM: I10 
ICD-9-CM: 401.9 Vitals BP Pulse Temp Resp Height(growth percentile) Weight(growth percentile) 99/59 (BP 1 Location: Right arm, BP Patient Position: Sitting) 64 98 °F (36.7 °C) (Oral) 18 5' 7\" (1.702 m) 172 lb 3.2 oz (78.1 kg) SpO2 BMI OB Status Smoking Status 95% 26.97 kg/m2 Postmenopausal Former Smoker Vitals History BMI and BSA Data Body Mass Index Body Surface Area  
 26.97 kg/m 2 1.92 m 2 Preferred Pharmacy Pharmacy Name Phone [facility] [address], [address] [phone] Your Updated Medication List  
  
   
This list is accurate as of: 4/17/17 10:32 AM.  Always use your most recent med list.  
  
  
  
  
 * albuterol 90 mcg/actuation inhaler Commonly known as:  VENTOLIN HFA Take 2 Puffs by inhalation every six (6) hours as needed for Wheezing. * albuterol 2.5 mg /3 mL (0.083 %) nebulizer solution Commonly known as:  PROVENTIL VENTOLIN  
3 mL by Nebulization route every four (4) hours as needed for Wheezing. aspirin 81 mg chewable tablet Take 81 mg by mouth daily. Azelastine 0.15 % (205.5 mcg) nasal spray Commonly known as:  ASTEPRO  
1 Spray by Both Nostrils route daily. Blood-Glucose Meter monitoring kit Check blood sugar daily. May substitute for insurance preferred meter  
  
 budesonide-formoterol 160-4.5 mcg/actuation HFA inhaler Commonly known as:  SYMBICORT Take 1 Puff by inhalation two (2) times a day. cefUROXime 500 mg tablet Commonly known as:  CEFTIN  
  
 CENTRUM SILVER PO Take 1 Tab by mouth daily. Takes one po daily. clopidogrel 75 mg Tab Commonly known as:  PLAVIX Take 1 Tab by mouth daily. cod liver oil Cap Take 1 Cap by mouth daily. colchicine 0.6 mg tablet Take 1 Tab by mouth two (2) times a day. evolocumab 420 mg/3.5 mL Injt Commonly known as:  REPATHA PUSHTRONEX  
420 mg by SubCUTAneous route every month. Indications: arteriosclerotic vascular disease FLONASE 50 mcg/actuation nasal spray Generic drug:  fluticasone 2 Sprays by Both Nostrils route every evening. furosemide 20 mg tablet Commonly known as:  LASIX Take 1 Tab by mouth daily. gabapentin 800 mg tablet Commonly known as:  NEURONTIN  
TAKE ONE TABLET BY MOUTH THREE TIMES DAILY  
  
 metFORMIN 1,000 mg tablet Commonly known as:  GLUCOPHAGE  
TAKE 1 TABLET BY MOUTH TWICE DAILY WITH MEALS  Indications: PREVENTION OF TYPE 2 DIABETES MELLITUS  
  
 pravastatin 20 mg tablet Commonly known as:  PRAVACHOL Take 1 Tab by mouth nightly. sotalol 120 mg tablet Commonly known as:  [place] Hungarian Take 1 Tab by mouth two (2) times a day. tiotropium 18 mcg inhalation capsule Commonly known as:  Oxsensis [facility]  
 INHALE THE CONTENTS OF 1 CAPSULE THROUGH HANDIHALER DEVICE DAILY  
  
 valsartan 40 mg tablet Commonly known as:  DIOVAN Take 0.5 Tabs by mouth daily. * warfarin 5 mg tablet Commonly known as:  COUMADIN Take 7.5 mg by mouth five (5) days a week. 7.5 mg five days a week Monday through Friday * warfarin 5 mg tablet Commonly known as:  COUMADIN Take 1.5 tabs daily, but on Saturday and Sunday take 2 tabs. * Notice: This list has 4 medication(s) that are the same as other medications prescribed for you. Read the directions carefully, and ask your doctor or other care provider to review them with you. Prescriptions Sent to Pharmacy Refills  
 pravastatin (PRAVACHOL) 20 mg tablet 3 Sig: Take 1 Tab by mouth nightly. Class: Normal  
 Pharmacy: [id number] Ph #: [phone] Route: Oral  
  
We Performed the Following AMB POC GLUCOSE BLOOD, BY GLUCOSE MONITORING DEVICE [28017 CPT(R)] AMB POC HEMOGLOBIN A1C [45710 CPT(R)] AMB POC PT/INR [75348 CPT(R)] Follow-up Instructions Return in about 4 weeks (around 5/15/2017) for CHF, afib f/u. INR check in 1 wk. To-Do List   
 04/17/2017 3:00 PM  
  Appointment with Roby Roberson at [place] [address]  
  
 04/18/2017 To Be Determined Appointment with Edilson Moreira at [place] [address]  
  
 04/21/2017 To Be Determined Appointment with Rubi Borrero PT at [place] [address]  
  
 04/21/2017 To Be Determined Appointment with Edilson Moreira at [place] [address]  
  
 04/25/2017 To Be Determined Appointment with Edilson Moreira at [place] [address]  
  
 04/28/2017 To Be Determined Appointment with Monisha Ty RN at [place] [address] Patient Instructions Learning About Atrial Fibrillation What is atrial fibrillation? Atrial fibrillation (say \"AY-tree-cory skn-ucyu-DSA-shun\") is the most common type of irregular heartbeat (arrhythmia). Normally, the heart beats in a strong, steady rhythm. In atrial fibrillation, a problem with the heart's electrical system causes the two upper parts of the heart (the atria) to quiver, or fibrillate. Your heart rate also may be faster than normal. 
Atrial fibrillation can be dangerous because if the heartbeat isn't strong and steady, blood can collect, or pool, in the atria. And pooled blood is more likely to form clots. Clots can travel to the brain, block blood flow, and cause a stroke. Atrial fibrillation can also lead to heart failure. Treatment for atrial fibrillation helps prevent stroke and heart failure. It also helps relieve symptoms. Atrial fibrillation is often caused by another heart problem. It may happen after heart surgery. It may also be caused by other problems, such as an overactive thyroid gland or lung disease. Many people with atrial fibrillation are able to live full and active lives. What are the symptoms? Some people feel symptoms when they have episodes of atrial fibrillation. But other people don't notice any symptoms. If you have symptoms, you may feel: · A fluttering, racing, or pounding feeling in your chest called palpitations. · Weak or tired. · Dizzy or lightheaded. · Short of breath. · Chest pain. · Confused. You may notice signs of atrial fibrillation when you check your pulse. Your pulse may seem uneven or fast. 
What can you expect when you have atrial fibrillation? At first, spells of atrial fibrillation may come on suddenly and last a short time. It may go away on its own or it goes away after treatment. This is called paroxysmal atrial fibrillation. Over time, the spells may last longer and occur more often. They often don't go away on their own. How is it treated? Treatments can help you feel better and prevent future problems, especially stroke and heart failure. The main types of treatment slow the heart rate, control the heart rhythm, and help prevent stroke. Your treatment will depend on the cause of your atrial fibrillation, your symptoms, and your risk for stroke. · Heart rate treatment. Medicine may be used to slow your heart rate. Your heartbeat may still be irregular. But these medicines keep your heart from beating too fast. They may also help relieve your symptoms. · Heart rhythm treatment. Different treatments may be used to try to stop atrial fibrillation and keep it from returning. They can also relieve symptoms. These treatments include medicine, electrical cardioversion to shock the heart back to a normal rhythm, a procedure called catheter ablation, and heart surgery. · Stroke prevention. You and your doctor can decide how to lower your risk. You may decide to take a blood-thinning medicine, such as aspirin or an anticoagulant. How can you live well with it? You can live well and help manage atrial fibrillation by having a heart-healthy lifestyle. To have a heart-healthy lifestyle: · Don't smoke. · Eat heart-healthy foods. · Be active. Talk to your doctor about what type and level of exercise is safe for you. · Stay at a healthy weight. Lose weight if you need to. · Manage stress. · Avoid alcohol if it triggers symptoms. · Manage other health problems such as high blood pressure, high cholesterol, and diabetes. · Avoid getting sick from the flu. Get a flu shot every year. When should you call for help? Call 911 anytime you think you may need emergency care. For example, call if: 
· You have symptoms of a stroke. These may include: 
¨ Sudden numbness, tingling, weakness, or loss of movement in your face, arm, or leg, especially on only one side of your body. ¨ Sudden vision changes. ¨ Sudden trouble speaking. ¨ Sudden confusion or trouble understanding simple statements. ¨ Sudden problems with walking or balance. ¨ A sudden, severe headache that is different from past headaches. Call your doctor now or seek immediate medical care if: 
· You have new or increased shortness of breath. · You feel dizzy or lightheaded, or you feel like you may faint. Watch closely for changes in your health, and be sure to contact your doctor if you have any problems. Follow-up care is a key part of your treatment and safety. Be sure to make and go to all appointments, and call your doctor if you are having problems. It's also a good idea to know your test results and keep a list of the medicines you take. Where can you learn more? Go to http://rusty-marcela.info/. Enter [phone] in the search box to learn more about \"Learning About Atrial Fibrillation. \" Current as of: March 28, 2016 Content Version: 11.2 © 9353-5319 [employer]. Care instructions adapted under license by [employer] (which disclaims liability or warranty for this information). If you have questions about a medical condition or this instruction, always ask your healthcare professional. [place] [address] any warranty or liability for your use of this information. Managing Other Conditions When You Have Heart Failure: Care Instructions Your Care Instructions All the systems in your body rely on each other to work properly. Heart failure has effects all through your body that can lead to other problems, such as kidney disease. The reverse is also true. A condition like diabetes or lung disease can damage or stress your heart and cause heart failure. Managing any other problems can help reduce your heart's workload and make your heart failure better.  
Conditions that commonly cause or occur along with heart failure include high blood pressure, diabetes, COPD, high cholesterol, kidney problems, anemia, and arthritis. Follow-up care is a key part of your treatment and safety. Be sure to make and go to all appointments, and call your doctor if you are having problems. It's also a good idea to know your test results and keep a list of the medicines you take. How can you care for yourself at home? Steps to help with heart failure and other problems · Eat less salt (sodium). This helps keep fluid from building up. It may help you feel better. Limiting sodium can also help if you have high blood pressure or kidney disease. · Watch your fluid intake if your doctor tells you to. Reducing fluids can ease your heart's workload and keep your sodium level in balance. · Get regular exercise. Regular, moderate exercise, such as walking, helps your heart. It can also help lower your blood pressure, lower stress, and help you lose weight. · Lose weight if you are overweight. Losing weight can help you manage diabetes, lower your blood pressure and cholesterol level, and reduce the workload on your heart. · Stop smoking. Smoking stresses your lungs, interferes with healing, and can make heart failure worse. · Limit alcohol. Alcohol can raise your blood pressure. Ask your doctor how much, if any, is safe. If your doctor has not set you up with a cardiac rehabilitation (rehab) program, talk to him or her about whether that is right for you. Cardiac rehab includes exercise, help with diet and lifestyle changes, and emotional support. To stay as healthy as possible · Work closely with your doctor. Have all your tests, and go to all your appointments. · Take your medicines exactly as prescribed. You will take medicines to treat the other conditions you have along with heart failure. It can be hard to balance the treatment for all your conditions. You will need to have follow-up tests to make sure that all your medicines are working well together. Talk to your doctor if you have any problems with your medicine. · Keep all your doctors informed about your health problems and all the medicines you take for them. Medicines that can treat one condition may make another condition worse. · Talk to your doctor before you take any vitamins, over-the-counter drugs, or herbal products. Do not take aspirin, ibuprofen (Advil, Motrin), or naproxen (Aleve) unless you talk to your doctor first. They could make your heart failure and other problems worse. When should you call for help? Call 911 if you have symptoms of sudden heart failure such as: 
· You have severe trouble breathing. · You cough up pink, foamy mucus. · You have a new irregular or rapid heartbeat. Call 911 if you have symptoms of a heart attack. These may include: · Chest pain or pressure, or a strange feeling in the chest. 
· Sweating. · Shortness of breath. · Nausea or vomiting. · Pain, pressure, or a strange feeling in the back, neck, jaw, or upper belly or in one or both shoulders or arms. · Lightheadedness or sudden weakness. · A fast or irregular heartbeat. After you call 911, the  may tell you to chew 1 adult-strength or 2 to 4 low-dose aspirin. Wait for an ambulance. Do not try to drive yourself. Call your doctor now or seek immediate medical care if: 
· You have new or increased shortness of breath. · You are dizzy or lightheaded, or you feel like you may faint. · You have sudden weight gain, such as 3 pounds or more in 2 to 3 days. · You have increased swelling in your legs, ankles, or feet. · You are suddenly so tired or weak that you cannot do your usual activities. Watch closely for changes in your health, and be sure to contact your doctor if you develop new symptoms. Where can you learn more? Go to http://rusty-marcela.info/. Enter P052 in the search box to learn more about \"Managing Other Conditions When You Have Heart Failure: Care Instructions. \" Current as of: November 15, 2016 Content Version: 11.2 © 7946-5119 [employer]. Care instructions adapted under license by [employer] (which disclaims liability or warranty for this information). If you have questions about a medical condition or this instruction, always ask your healthcare professional. [address] any warranty or liability for your use of this information. Introducing \[facility] [place] [facility]\"" & HEALTH SERVICES! Dear Cassidy Greenwood: Thank you for requesting a [employer] account. Our records indicate that you already have an active [employer] account. You can access your account anytime at [email or web address] Did you know that you can access your hospital and ER discharge instructions at any time in [employer]? You can also review all of your test results from your hospital stay or ER visit. Additional Information If you have questions, please visit the Frequently Asked Questions section of the [employer] website at [email or web address] Remember, [employer] is NOT to be used for urgent needs. For medical emergencies, dial 911. Now available from your iPhone and Android! Please provide this summary of care documentation to your next provider. Your primary care clinician is listed as CAROLINA Ayala. If you have any questions after today's visit, please call [phone].

## 2017-04-17 NOTE — PROGRESS NOTES
Wu Roberson is a 73 y.o. female and presents with Hospital Follow Up [facility] for CHF 4/4/17)    Subjective:  Pt is here for hospital follow-up from 4/4 to 4/10 for SOB and trouble walking. Diagnosed with CHF and Afib. Was given stent and started on Plavix with lipitor. Instructed to schedule appt with PCP. In home PT and will see cardio next week. Reports feeling BETTER THAN when in hospital.    Congestive Heart Failure Review:   Patient presents for presents evaluation of congestive heart failure. Patient currently complains of No dyspnea, fatigue, orthopnea. Patient states they are compliant most of the time with their medications. Patient states they are compliant most of the time with their diet. Measuring daily weight, no wt gain over 2 lb reported. Hypertension Review:  The patient has hypertension  Diet and Lifestyle: generally does try to follow a  low sodium diet, exercises rarely   Home BP Monitoring: is not measured at home. Pertinent ROS: taking medications as instructed, no medication side effects noted. No TIA's, chest pain on exertion, or swelling of ankles.    BP Readings from Last 3 Encounters:   04/17/17 99/59   04/14/17 100/64   04/12/17 118/66     Diabetes Mellitus Review:  She has diabetes mellitus, NIDDM  Diabetic ROS -negative for polyphagia. negative for polyuria,polydipsia, and heat intolerance  Current diabetic medications include oral agents, NO insulin    Medication compliance: compliant MOST of the time  Diabetic diet compliance: compliant MOST of the time,   Yearly visit with dietician: no  Current exercise: walking, only at work  Home glucose monitoring: is performed daily, averaging 125-160  Any episodes of hypoglycemia? no  Known diabetic complications: neuropathy  Cardiovascular risk factors: family history, dyslipidemia, obesity, hypertension  Yearly foot exam: 6/2016  Eye exam current (within one year): Summer 2016  Weight trend: stable   Is She on ACE inhibitor or angiotensin II receptor blocker? Yes   Lab review: orders written for new lab studies as appropriate; see orders. Lab Results   Component Value Date/Time    Hemoglobin A1c 6.9 10/31/2016 12:00 AM    Hemoglobin A1c 8.0 07/01/2014 03:10 AM    Hemoglobin A1c 6.5 02/08/2011 05:30 AM     Dyslipidemia Review:  Patient presents for evaluation of lipids. Compliance with treatment thus far has been good, started REPATHA injection. Also noted with diarrhea upon start of lipitor, but also around grandson whom was also stricken with diarrhea around the same time. Not exposed before that. Denies myalgias, slurring speech, unilateral weakness, and chest pain. A repeat fasting lipid profile was ordered. The patient does use medications that may worsen dyslipidemias (corticosteroids, progestins, anabolic steroids, diuretics, beta-blockers, amiodarone, cyclosporine, olanzapine). The patient does NOT exercise regularly. The patient is known to have coexisting coronary artery disease: stents, recent over hospitalization. Taking Aspirin and Plavix daily as prescribed/advised. Anticoagulation  Patient here for followup of chronic anticoagulation. Indication: A-Fib  Bleeding Signs/Symptoms:  None. Thromboembolic Signs/Symptoms:  None. INR's are drawn regularly and have been therapeutic.  Taking coumadin 7.5 mg x 5 days, 10 mg x 2 days  Lab Results   Component Value Date/Time    INR 1.1 04/12/2016 10:50 PM    INR (POC) 2.1 07/16/2016 11:47 PM    INR POC 2.4 07/29/2016 09:29 AM     Review of Systems  Constitutional: negative for fevers, chills, anorexia and weight loss  Eyes:   negative for visual disturbance, drainage, and irritation  ENT:   See HPI. negative for tinnitus and ear pain  Respiratory:  See HPI. negative for hemoptysis and wheezing  CV:   negative for chest pain, palpitations, and lower extremity edema  GI:   negative for nausea, vomiting, diarrhea, abdominal pain, and melena  Endo:               negative for polyuria,polydipsia,polyphagia, and heat intolerance  Genitourinary: negative for frequency, urgency, dysuria, retention, and hematuria  Integument:  negative for rash, ulcerations, and pruritus  Hematologic:  negative for easy bruising and bleeding  Musculoskel: + arthralgias. negative for muscle weakness,and joint pain/swelling  Neurological:  + dizziness, increasing lately. negative for headaches, vertigo,and memory/gait problems  Behavl/Psych: negative for feelings of anxiety, depression, suicide, and mood changes    Past Medical History:   Diagnosis Date    Atrial fibrillation ([facility] [facility] 2010    Chronic diastolic heart failure ([facility] [address] [facility] 2014    COPD     COPD (chronic obstructive pulmonary disease) ([facility] [address] 2010    Diabetes ([facility] [address]     Fibroid     Heart failure ([facility] [address]     Hypertension 2010    NIDDM (non-insulin dependent diabetes mellitus) 2010    Screening mammogram 5/4/10    SOB (shortness of breath) 2014     Past Surgical History:   Procedure Laterality Date    HX  SECTION      HX OTHER SURGICAL      adrenal gland removed    HX PACEMAKER      [place] EXCISE ADRENAL GLAND       Social History     Social History    Marital status:      Spouse name: N/A    Number of children: N/A    Years of education: N/A     Social History Main Topics    Smoking status: Former Smoker     Types: Cigarettes     Quit date: 2009    Smokeless tobacco: Never Used    Alcohol use No    Drug use: No    Sexual activity: Not Currently     Other Topics Concern    None     Social History Narrative     Family History   Problem Relation Age of Onset    Heart Disease Mother     Diabetes Father     Heart Disease Brother      Current Outpatient Prescriptions   Medication Sig Dispense Refill    pravastatin (PRAVACHOL) 20 mg tablet Take 1 Tab by mouth nightly. 90 Tab 3    valsartan (DIOVAN) 40 mg tablet Take 0.5 Tabs by mouth daily.  30 Tab 0    clopidogrel (PLAVIX) 75 mg tab Take 1 Tab by mouth daily. 30 Tab 0    warfarin (COUMADIN) 5 mg tablet Take 7.5 mg by mouth five (5) days a week. 7.5 mg five days a week Monday through Friday      fluticasone (FLONASE) 50 mcg/actuation nasal spray 2 Sprays by Both Nostrils route every evening.  evolocumab (REPATHA PUSHTRONEX) 420 mg/3.5 mL Injt 420 mg by SubCUTAneous route every month. Indications: arteriosclerotic vascular disease 1 Device 12    tiotropium (SPIRIVA WITH HANDIHALER) 18 mcg inhalation capsule INHALE THE CONTENTS OF 1 CAPSULE THROUGH HANDIHALER DEVICE DAILY 90 Cap 3    warfarin (COUMADIN) 5 mg tablet Take 1.5 tabs daily, but on Saturday and Sunday take 2 tabs. 55 Tab 2    colchicine 0.6 mg tablet Take 1 Tab by mouth two (2) times a day. (Patient taking differently: Take 1.25 mg by mouth daily. Indications: GOUT) 60 Tab 5    budesonide-formoterol (SYMBICORT) 160-4.5 mcg/actuation HFA inhaler Take 1 Puff by inhalation two (2) times a day. 3 Inhaler 3    albuterol (PROVENTIL VENTOLIN) 2.5 mg /3 mL (0.083 %) nebulizer solution 3 mL by Nebulization route every four (4) hours as needed for Wheezing. 1 Package 5    FOLIC ACID/MULTIVIT-MIN/LUTEIN (CENTRUM SILVER PO) Take 1 Tab by mouth daily. Takes one po daily.  albuterol (VENTOLIN HFA) 90 mcg/actuation inhaler Take 2 Puffs by inhalation every six (6) hours as needed for Wheezing. 1 Inhaler 11    Azelastine (ASTEPRO) 0.15 % (205.5 mcg) nasal spray 1 [place] by Both Nostrils route daily.  cod liver oil cap Take 1 Cap by mouth daily.  aspirin 81 mg chewable tablet Take 81 mg by mouth daily.  cefUROXime (CEFTIN) 500 mg tablet       gabapentin (NEURONTIN) 800 mg tablet TAKE ONE TABLET BY MOUTH THREE TIMES DAILY 90 Tab 11    Blood-Glucose Meter monitoring kit Check blood sugar daily.  May substitute for insurance preferred meter 1 Kit 0    metFORMIN (GLUCOPHAGE) 1,000 mg tablet TAKE 1 TABLET BY MOUTH TWICE DAILY WITH MEALS  Indications: PREVENTION OF TYPE 2 DIABETES MELLITUS 180 Tab 3    sotalol (BETAPACE) 120 mg tablet Take 1 Tab by mouth two (2) times a day. (Patient taking differently: Take 180 mg by mouth two (2) times a day.) 180 Tab 3    furosemide (LASIX) 20 mg tablet Take 1 Tab by mouth daily. 90 Tab 0     Allergies   Allergen Reactions    Crestor [Rosuvastatin] Myalgia    Levaquin [Levofloxacin] Nausea Only     GI Upset    Lipitor [Atorvastatin] Diarrhea    Lyrica [Pregabalin] Myalgia       Objective:  Visit Vitals    BP 99/59 (BP 1 Location: Right arm, BP Patient Position: Sitting)    Pulse 64    Temp 98 °F (36.7 °C) (Oral)    Resp 18    Ht 5' 7\" (1.702 m)    Wt 172 lb 3.2 oz (78.1 kg)    SpO2 95%    BMI 26.97 kg/m2     Physical Exam:   General appearance - alert, well appearing, and in mild distress. + raspy voice. Mental status - A/O x 4, normal mood and affect. Neck -Supple ,normal CSP. FROM, non-tender. No cervical adenopathy. No thyromegaly. No JVD. Chest - faint crackles in bases, R>L. clear to auscultation in upper lung fields with symmetric chest rise. No wheezing. + productive cough  Heart - Normal rate, irregular rhythm. Normal S1, S2. No MGR. Abdomen - Soft,non-distended. Normoactive BS in all quadrants. NT, no mass, rebound, or HSM   Ext- Radial, DP pulses, 2+ bilaterally. No pedal edema, clubbing, or cyanosis. Skin- Normal for ethnicity, warm, and dry. No hyperpigmentation, ulcerations, or suspicious lesions  Neuro - Normal speech, no focal findings. Normal strength and muscle tone. Coordination and gait normal.      Assessment/Plan:  Changed coumadin to 7.5 mg daily, holding today's dose. Pt taking plavix and ASA now for recent stenting. STD paperwork completed also. See below for other orders   Follow-up Disposition:  Return in about 4 weeks (around 5/15/2017) for CHF, afib f/u. INR check in 1 wk. ICD-10-CM ICD-9-CM    1. Anticoagulation monitoring, INR range 2-3 Z79.01 V58.61 AMB POC PT/INR   2.  Type 2 diabetes mellitus with diabetic neuropathy, without long-term current use of insulin (HCC) E11.40 250.60 AMB POC GLUCOSE BLOOD, BY GLUCOSE MONITORING DEVICE     357.2 AMB POC HEMOGLOBIN A1C   3. Mixed hyperlipidemia E78.2 272.2 CANCELED: AMB POC LIPID PROFILE   4. Hypercholesteremia E78.00 272.0 pravastatin (PRAVACHOL) 20 mg tablet   5. Paroxysmal atrial fibrillation (HCC) I48.0 427.31    6. S/P coronary artery stent placement Z95.5 V45.82    7. Cardiac pacemaker in situ Z95.0 V45.01    8. Essential hypertension I10 401.9      Orders Placed This Encounter    AMB POC PT/INR    AMB POC GLUCOSE BLOOD, BY GLUCOSE MONITORING DEVICE    AMB POC HEMOGLOBIN A1C    cefUROXime (CEFTIN) 500 mg tablet    pravastatin (PRAVACHOL) 20 mg tablet     Sig: Take 1 Tab by mouth nightly. Dispense:  90 Tab     Refill:  3       Wu Roberson expressed understanding of plan. An After Visit Summary was offered/printed and given to the patient.

## 2017-04-17 NOTE — PROGRESS NOTES
Pt is here for   Chief Complaint   Patient presents with   [place] for CHF 4/4/17     Pt denies pain at this time. ..    1. Have you been to the ER, urgent care clinic since your last visit? Hospitalized since your last visit? Yes When: 4/4/17 [facility] for CHF    2. Have you seen or consulted any other health care providers outside of the [address] [place] [address] since your last visit? Include any pap smears or colon screening.  No

## 2017-04-18 ENCOUNTER — HOSPITAL ENCOUNTER (EMERGENCY)
Age: 67
Discharge: HOME | End: 2017-04-18
Attending: EMERGENCY MEDICINE
Payer: COMMERCIAL

## 2017-04-18 ENCOUNTER — APPOINTMENT (OUTPATIENT)
Dept: GENERAL RADIOLOGY | Age: 67
End: 2017-04-18
Attending: EMERGENCY MEDICINE
Payer: COMMERCIAL

## 2017-04-18 ENCOUNTER — HOME CARE VISIT (OUTPATIENT)
Dept: SCHEDULING | Facility: HOME HEALTH | Age: 67
End: 2017-04-18
Payer: COMMERCIAL

## 2017-04-18 VITALS
SYSTOLIC BLOOD PRESSURE: 108 MMHG | BODY MASS INDEX: 26.49 KG/M2 | RESPIRATION RATE: 16 BRPM | TEMPERATURE: 98 F | HEIGHT: 67 IN | HEART RATE: 87 BPM | OXYGEN SATURATION: 95 % | DIASTOLIC BLOOD PRESSURE: 62 MMHG | WEIGHT: 168.8 LBS

## 2017-04-18 DIAGNOSIS — N17.9 AKI (ACUTE KIDNEY INJURY) (HCC): ICD-10-CM

## 2017-04-18 DIAGNOSIS — R07.9 CHEST PAIN, UNSPECIFIED TYPE: Primary | ICD-10-CM

## 2017-04-18 DIAGNOSIS — R19.7 DIARRHEA, UNSPECIFIED TYPE: ICD-10-CM

## 2017-04-18 DIAGNOSIS — Z79.01 ANTICOAGULANT LONG-TERM USE: ICD-10-CM

## 2017-04-18 DIAGNOSIS — M79.2 NEUROPATHIC PAIN: ICD-10-CM

## 2017-04-18 LAB
ALBUMIN SERPL BCP-MCNC: 3.6 G/DL (ref 3.5–5)
ALBUMIN/GLOB SERPL: 0.8 {RATIO} (ref 1.1–2.2)
ALP SERPL-CCNC: 124 U/L (ref 45–117)
ALT SERPL-CCNC: 45 U/L (ref 12–78)
ANION GAP BLD CALC-SCNC: 10 MMOL/L (ref 5–15)
AST SERPL W P-5'-P-CCNC: 57 U/L (ref 15–37)
ATRIAL RATE: 91 BPM
BASOPHILS # BLD AUTO: 0 K/UL (ref 0–0.1)
BASOPHILS # BLD: 0 % (ref 0–1)
BILIRUB SERPL-MCNC: 0.6 MG/DL (ref 0.2–1)
BUN SERPL-MCNC: 20 MG/DL (ref 6–20)
BUN/CREAT SERPL: 16 (ref 12–20)
C DIFF TOX GENS STL QL NAA+PROBE: POSITIVE
CALCIUM SERPL-MCNC: 9.6 MG/DL (ref 8.5–10.1)
CALCULATED R AXIS, ECG10: 16 DEGREES
CALCULATED T AXIS, ECG11: -71 DEGREES
CHLORIDE SERPL-SCNC: 104 MMOL/L (ref 97–108)
CK MB CFR SERPL CALC: 1.2 % (ref 0–2.5)
CK MB SERPL-MCNC: 1.6 NG/ML (ref 5–25)
CK SERPL-CCNC: 130 U/L (ref 26–192)
CO2 SERPL-SCNC: 25 MMOL/L (ref 21–32)
CREAT SERPL-MCNC: 1.24 MG/DL (ref 0.55–1.02)
DIAGNOSIS, 93000: NORMAL
EOSINOPHIL # BLD: 0.2 K/UL (ref 0–0.4)
EOSINOPHIL NFR BLD: 2 % (ref 0–7)
ERYTHROCYTE [DISTWIDTH] IN BLOOD BY AUTOMATED COUNT: 14.4 % (ref 11.5–14.5)
GLOBULIN SER CALC-MCNC: 4.7 G/DL (ref 2–4)
GLUCOSE SERPL-MCNC: 119 MG/DL (ref 65–100)
HCT VFR BLD AUTO: 39.5 % (ref 35–47)
HGB BLD-MCNC: 13.4 G/DL (ref 11.5–16)
LYMPHOCYTES # BLD AUTO: 40 % (ref 12–49)
LYMPHOCYTES # BLD: 3.1 K/UL (ref 0.8–3.5)
MCH RBC QN AUTO: 28.3 PG (ref 26–34)
MCHC RBC AUTO-ENTMCNC: 33.9 G/DL (ref 30–36.5)
MCV RBC AUTO: 83.3 FL (ref 80–99)
MONOCYTES # BLD: 1.5 K/UL (ref 0–1)
MONOCYTES NFR BLD AUTO: 19 % (ref 5–13)
NEUTS SEG # BLD: 3 K/UL (ref 1.8–8)
NEUTS SEG NFR BLD AUTO: 39 % (ref 32–75)
PLATELET # BLD AUTO: 267 K/UL (ref 150–400)
POTASSIUM SERPL-SCNC: 4 MMOL/L (ref 3.5–5.1)
PROT SERPL-MCNC: 8.3 G/DL (ref 6.4–8.2)
Q-T INTERVAL, ECG07: 370 MS
QRS DURATION, ECG06: 90 MS
QTC CALCULATION (BEZET), ECG08: 462 MS
RBC # BLD AUTO: 4.74 M/UL (ref 3.8–5.2)
SODIUM SERPL-SCNC: 139 MMOL/L (ref 136–145)
TROPONIN I SERPL-MCNC: <0.04 NG/ML
VENTRICULAR RATE, ECG03: 94 BPM
WBC # BLD AUTO: 7.8 K/UL (ref 3.6–11)

## 2017-04-18 PROCEDURE — 71020 XR CHEST PA LAT: CPT

## 2017-04-18 PROCEDURE — 87045 FECES CULTURE AEROBIC BACT: CPT | Performed by: EMERGENCY MEDICINE

## 2017-04-18 PROCEDURE — 3331090001 HH PPS REVENUE CREDIT

## 2017-04-18 PROCEDURE — 80053 COMPREHEN METABOLIC PANEL: CPT | Performed by: EMERGENCY MEDICINE

## 2017-04-18 PROCEDURE — 96360 HYDRATION IV INFUSION INIT: CPT

## 2017-04-18 PROCEDURE — 36415 COLL VENOUS BLD VENIPUNCTURE: CPT | Performed by: EMERGENCY MEDICINE

## 2017-04-18 PROCEDURE — G0152 HHCP-SERV OF OT,EA 15 MIN: HCPCS

## 2017-04-18 PROCEDURE — 99284 EMERGENCY DEPT VISIT MOD MDM: CPT

## 2017-04-18 PROCEDURE — 87077 CULTURE AEROBIC IDENTIFY: CPT | Performed by: EMERGENCY MEDICINE

## 2017-04-18 PROCEDURE — 93005 ELECTROCARDIOGRAM TRACING: CPT

## 2017-04-18 PROCEDURE — 87493 C DIFF AMPLIFIED PROBE: CPT | Performed by: EMERGENCY MEDICINE

## 2017-04-18 PROCEDURE — 94762 N-INVAS EAR/PLS OXIMTRY CONT: CPT

## 2017-04-18 PROCEDURE — 85025 COMPLETE CBC W/AUTO DIFF WBC: CPT | Performed by: EMERGENCY MEDICINE

## 2017-04-18 PROCEDURE — 74011250636 HC RX REV CODE- 250/636: Performed by: EMERGENCY MEDICINE

## 2017-04-18 PROCEDURE — 84484 ASSAY OF TROPONIN QUANT: CPT | Performed by: EMERGENCY MEDICINE

## 2017-04-18 PROCEDURE — G0300 HHS/HOSPICE OF LPN EA 15 MIN: HCPCS

## 2017-04-18 PROCEDURE — 74011250637 HC RX REV CODE- 250/637: Performed by: EMERGENCY MEDICINE

## 2017-04-18 PROCEDURE — 82550 ASSAY OF CK (CPK): CPT | Performed by: EMERGENCY MEDICINE

## 2017-04-18 PROCEDURE — 3331090002 HH PPS REVENUE DEBIT

## 2017-04-18 RX ORDER — HYDROCODONE BITARTRATE AND ACETAMINOPHEN 5; 325 MG/1; MG/1
1 TABLET ORAL
Status: COMPLETED | OUTPATIENT
Start: 2017-04-18 | End: 2017-04-18

## 2017-04-18 RX ORDER — METRONIDAZOLE 500 MG/1
500 TABLET ORAL 3 TIMES DAILY
Qty: 30 TAB | Refills: 0 | Status: SHIPPED | OUTPATIENT
Start: 2017-04-18 | End: 2017-04-28

## 2017-04-18 RX ORDER — HYDROCODONE BITARTRATE AND ACETAMINOPHEN 5; 325 MG/1; MG/1
1 TABLET ORAL
Qty: 8 TAB | Refills: 0 | Status: ON HOLD | OUTPATIENT
Start: 2017-04-18 | End: 2017-05-01

## 2017-04-18 RX ADMIN — SODIUM CHLORIDE 500 ML: 900 INJECTION, SOLUTION INTRAVENOUS at 04:18

## 2017-04-18 RX ADMIN — HYDROCODONE BITARTRATE AND ACETAMINOPHEN 1 TABLET: 5; 325 TABLET ORAL at 03:21

## 2017-04-18 NOTE — ED NOTES
IV fluids infusing as ordered at this time. Chest pain remains 8/10. Feet pain 8/10. Able to speak in complete sentences. Resting in bed at this time.

## 2017-04-18 NOTE — ED TRIAGE NOTES
Received patient from EMS. Patient c/o chest pressure beneath left breast. She was recently discharged from [facility] last week during which she received a cardiac stent. She also c/o BLE pain and diarrhea which began this evening. Patient has SOB due to A-fib. Placed on cardiac monitor x 4. Call bell in reach.

## 2017-04-18 NOTE — ED NOTES
Assumed care at this time. [facility] at this time.

## 2017-04-18 NOTE — DISCHARGE INSTRUCTIONS
Chest Pain: Care Instructions  Your Care Instructions  There are many things that can cause chest pain. Some are not serious and will get better on their own in a few days. But some kinds of chest pain need more testing and treatment. Your doctor may have recommended a follow-up visit in the next 8 to 12 hours. If you are not getting better, you may need more tests or treatment. Even though your doctor has released you, you still need to watch for any problems. The doctor carefully checked you, but sometimes problems can develop later. If you have new symptoms or if your symptoms do not get better, get medical care right away. If you have worse or different chest pain or pressure that lasts more than 5 minutes or you passed out (lost consciousness), call 911 or seek other emergency help right away. A medical visit is only one step in your treatment. Even if you feel better, you still need to do what your doctor recommends, such as going to all suggested follow-up appointments and taking medicines exactly as directed. This will help you recover and help prevent future problems. How can you care for yourself at home? · Rest until you feel better. · Take your medicine exactly as prescribed. Call your doctor if you think you are having a problem with your medicine. · Do not drive after taking a prescription pain medicine. When should you call for help? Call 911 if:  · You passed out (lost consciousness). · You have severe difficulty breathing. · You have symptoms of a heart attack. These may include:  ¨ Chest pain or pressure, or a strange feeling in your chest.  ¨ Sweating. ¨ Shortness of breath. ¨ Nausea or vomiting. ¨ Pain, pressure, or a strange feeling in your back, neck, jaw, or upper belly or in one or both shoulders or arms. ¨ Lightheadedness or sudden weakness. ¨ A fast or irregular heartbeat.   After you call 911, the  may tell you to chew 1 adult-strength or 2 to 4 low-dose aspirin. Wait for an ambulance. Do not try to drive yourself. Call your doctor today if:  · You have any trouble breathing. · Your chest pain gets worse. · You are dizzy or lightheaded, or you feel like you may faint. · You are not getting better as expected. · You are having new or different chest pain. Where can you learn more? Go to http://rusty-marcela.info/. Enter A120 in the search box to learn more about \"Chest Pain: Care Instructions. \"  Current as of: May 27, 2016  Content Version: 11.2  © 1828-5104 [employer]. Care instructions adapted under license by [employer] (which disclaims liability or warranty for this information). If you have questions about a medical condition or this instruction, always ask your healthcare professional. [address] any warranty or liability for your use of this information. Diarrhea: Care Instructions  Your Care Instructions    Diarrhea is loose, watery stools (bowel movements). The exact cause is often hard to find. Sometimes diarrhea is your body's way of getting rid of what caused an upset stomach. Viruses, food poisoning, and many medicines can cause diarrhea. Some people get diarrhea in response to emotional stress, anxiety, or certain foods. Almost everyone has diarrhea now and then. It usually isn't serious, and your stools will return to normal soon. The important thing to do is replace the fluids you have lost, so you can prevent dehydration. The doctor has checked you carefully, but problems can develop later. If you notice any problems or new symptoms, get medical treatment right away. Follow-up care is a key part of your treatment and safety. Be sure to make and go to all appointments, and call your doctor if you are having problems. It's also a good idea to know your test results and keep a list of the medicines you take. How can you care for yourself at home?   · Watch for signs of dehydration, which means your body has lost too much water. Dehydration is a serious condition and should be treated right away. Signs of dehydration are:  ¨ Increasing thirst and dry eyes and mouth. ¨ Feeling faint or lightheaded. ¨ Darker urine, and a smaller amount of urine than normal.  · To prevent dehydration, drink plenty of fluids, enough so that your urine is light yellow or clear like water. Choose water and other caffeine-free clear liquids until you feel better. If you have kidney, heart, or liver disease and have to limit fluids, talk with your doctor before you increase the amount of fluids you drink. · Begin eating small amounts of mild foods the next day, if you feel like it. ¨ Try yogurt that has live cultures of Lactobacillus. (Check the label.)  ¨ Avoid spicy foods, fruits, alcohol, and caffeine until 48 hours after all symptoms are gone. ¨ Avoid chewing gum that contains sorbitol. ¨ Avoid dairy products (except for yogurt with Lactobacillus) while you have diarrhea and for 3 days after symptoms are gone. · The doctor may recommend that you take over-the-counter medicine, such as loperamide (Imodium), if you still have diarrhea after 6 hours. Read and follow all instructions on the label. Do not use this medicine if you have bloody diarrhea, a high fever, or other signs of serious illness. Call your doctor if you think you are having a problem with your medicine. When should you call for help? Call 911 anytime you think you may need emergency care. For example, call if:  · You passed out (lost consciousness). · Your stools are maroon or very bloody. Call your doctor now or seek immediate medical care if:  · You are dizzy or lightheaded, or you feel like you may faint. · Your stools are black and look like tar, or they have streaks of blood. · You have new or worse belly pain. · You have symptoms of dehydration, such as:  ¨ Dry eyes and a dry mouth.   ¨ Passing only a little dark urine.  ¨ Feeling thirstier than usual.  · You have a new or higher fever. Watch closely for changes in your health, and be sure to contact your doctor if:  · Your diarrhea is getting worse. · You see pus in the diarrhea. · You are not getting better after 2 days (48 hours). Where can you learn more? Go to http://rusty-marcela.info/. Enter J697 in the search box to learn more about \"Diarrhea: Care Instructions. \"  Current as of: May 27, 2016  Content Version: 11.2  © 5499-2803 [employer]. Care instructions adapted under license by [employer] (which disclaims liability or warranty for this information). If you have questions about a medical condition or this instruction, always ask your healthcare professional. [address] any warranty or liability for your use of this information. Neuropathic Pain: Care Instructions  Your Care Instructions  Neuropathic pain is caused by pressure on or damage to your nerves. It's often simply called nerve pain. Some people feel this type of pain all the time. For others, it comes and goes. Diabetes, shingles, or an injury can cause nerve pain. Many people say the pain feels sharp, burning, or stabbing. But some people feel it as a dull ache. In some cases, it makes your skin very sensitive. So touch, pressure, and other sensations that did not hurt before may now cause pain. It's important to know that this kind of pain is real and can affect your quality of life. It's also important to know that treatment can help. Treatment includes pain medicines, exercise, and physical therapy. Medicines can help reduce the number of pain signals that travel over the nerves. This can make the painful areas less sensitive. It can also help you sleep better and improve your mood. But medicines are only one part of successful treatment. Most people do best with more than one kind of treatment.  Your doctor may recommend that you try cognitive-behavioral therapy and stress management. Or, if needed, you may decide to try to quit smoking, lower your blood pressure, or better control blood sugar. These kinds of healthy changes can also make a difference. If you feel that your treatment is not working, talk to your doctor. And be sure to tell your doctor if you think you might be depressed or anxious. These are common problems that can also be treated. Follow-up care is a key part of your treatment and safety. Be sure to make and go to all appointments, and call your doctor if you are having problems. It's also a good idea to know your test results and keep a list of the medicines you take. How can you care for yourself at home? · Be safe with medicines. Read and follow all instructions on the label. ¨ If the doctor gave you a prescription medicine for pain, take it as prescribed. ¨ If you are not taking a prescription pain medicine, ask your doctor if you can take an over-the-counter medicine. · Save hard tasks for days when you have less pain. Follow a hard task with an easy task. And remember to take breaks. · Relax, and reduce stress. You may want to try deep breathing or meditation. These can help. · Keep moving. Gentle, daily exercise can help reduce pain. Your doctor or physical therapist can tell you what type of exercise is best for you. This may include walking, swimming, and stationary biking. It may also include stretches and range-of-motion exercises. · Try heat, cold packs, and massage. · Get enough sleep. Constant pain can make you more tired. If the pain makes it hard to sleep, talk with your doctor. · Think positively. Your thoughts can affect your pain. Do fun things to distract yourself from the pain. See a movie, read a book, listen to music, or spend time with a friend. · Keep a pain diary. Try to write down how strong your pain is and what it feels like.  Also try to notice and write down how your moods, thoughts, sleep, activities, and medicine affect your pain. These notes can help you and your doctor find the best ways to treat your pain. Reducing constipation caused by pain medicine  Pain medicines often cause constipation. To reduce constipation:  · Include fruits, vegetables, beans, and whole grains in your diet each day. These foods are high in fiber. · Drink plenty of fluids, enough so that your urine is light yellow or clear like water. If you have kidney, heart, or liver disease and have to limit fluids, talk with your doctor before you increase the amount of fluids you drink. · Get some exercise every day. Build up slowly to 30 to 60 minutes a day on 5 or more days of the week. · Take a fiber supplement, such as Citrucel or Metamucil, every day if needed. Read and follow all instructions on the label. · Schedule time each day for a bowel movement. Having a daily routine may help. Take your time and do not strain when having a bowel movement. · Ask your doctor about a laxative. The goal is to have one easy bowel movement every 1 to 2 days. Do not let constipation go untreated for more than 3 days. When should you call for help? Call your doctor now or seek immediate medical care if:  · You feel sad, anxious, or hopeless for more than a few days. This could mean you are depressed. Depression is common in people who have a lot of pain. But it can be treated. · You have trouble with bowel movements, such as:  ¨ No bowel movement in 3 days. ¨ Blood in the anal area, in your stool, or on the toilet paper. ¨ Diarrhea for more than 24 hours. Watch closely for changes in your health, and be sure to contact your doctor if:  · Your pain is getting worse. · You can't sleep because of pain. · You are very worried or anxious about your pain. · You have trouble taking your pain medicine. · You have any concerns about your pain medicine or its side effects.   · You have vomiting or cramps for more than 2 hours. Where can you learn more? Go to http://rusty-marcela.info/. Enter P207 in the search box to learn more about \"Neuropathic Pain: Care Instructions. \"  Current as of: October 14, 2016  Content Version: 11.2  © 9094-4668 [employer]. Care instructions adapted under license by [employer] (which disclaims liability or warranty for this information). If you have questions about a medical condition or this instruction, always ask your healthcare professional. [place] [address] any warranty or liability for your use of this information.

## 2017-04-18 NOTE — ED PROVIDER NOTES
HPI Comments: Alexei Brooks is a 73 y.o. female with PMHx significant for CHF, A-fib, HTN, DM, who presents via EMS to [facility] ED with cc of sudden onset b/l foot pain since last night. Patient reports she difficulty walking secondary to pain. She states her foot pain feels different than neuropathy (prescribed Gabapentin). Patient reports 7 episodes of diarrhea since 9:00 PM last night. She also c/o persistent chest pressure under her L breast with associated SOB since she was discharged from [facility] on 4/10/2017. She reports she was admitted to the hospital for a heart blockage and had to have stents placed. Patient states she has an appointment with Dr. Parth Hernandez on 2017. She reports she was prescribed Coumadin prior to stent placement and was prescribed Plavix after her surgery. Patient states her PCP frequently checks her INR and advised her not to take Coumadin today because her INR was 3.8. Patient denies any change in medication since discharge from the hospital. She specifically denies any recent falls. PCP: Malissa Mcfadden NP      Social History: (former) Tobacco, (-) EtOH, (-) Illicit Drugs       There are no other complaints, changes, or physical findings at this time. Written by Revonda Snellen, ED Scribe, as dictated by Dulce Maria Calzada MD.       The history is provided by the patient. No  was used.         Past Medical History:   Diagnosis Date    Atrial fibrillation ([address] 2010    CAD (coronary artery disease)     stent    Chronic diastolic heart failure ([address] 2014    COPD     COPD (chronic obstructive pulmonary disease) ([address] 2010    Diabetes ([address]     Fibroid     Heart failure ([address]     Hypertension 2010    NIDDM (non-insulin dependent diabetes mellitus) 2010    Screening mammogram 5/4/10    SOB (shortness of breath) 2014       Past Surgical History:   Procedure Laterality Date    HX  SECTION      HX OTHER SURGICAL adrenal gland removed    HX PACEMAKER      UT EXCISE ADRENAL GLAND           Family History:   Problem Relation Age of Onset    Heart Disease Mother     Diabetes Father     Heart Disease Brother        Social History     Social History    Marital status:      Spouse name: N/A    Number of children: N/A    Years of education: N/A     Occupational History    Not on file. Social History Main Topics    Smoking status: Former Smoker     Types: Cigarettes     Quit date: 12/31/2009    Smokeless tobacco: Never Used    Alcohol use No    Drug use: No    Sexual activity: Not Currently     Other Topics Concern    Not on file     Social History Narrative         ALLERGIES: Crestor [rosuvastatin]; Levaquin [levofloxacin]; Lipitor [atorvastatin]; and Lyrica [pregabalin]    Review of Systems   Constitutional: Negative for chills and fever. HENT: Negative for congestion, rhinorrhea, sneezing and sore throat. Eyes: Negative for redness and visual disturbance. Respiratory: Positive for shortness of breath. Cardiovascular: Positive for chest pain. Negative for leg swelling. Gastrointestinal: Positive for diarrhea. Negative for abdominal pain, nausea and vomiting. Genitourinary: Negative for difficulty urinating and frequency. Musculoskeletal: Positive for myalgias. Negative for back pain and neck stiffness. Skin: Negative for rash. Neurological: Negative for dizziness, syncope, weakness and headaches. Hematological: Negative for adenopathy. Patient Vitals for the past 12 hrs:   Temp Pulse Resp BP SpO2   04/18/17 0300 - 97 17 105/56 94 %   04/18/17 0230 - (!) 105 24 114/72 96 %   04/18/17 0200 97.7 °F (36.5 °C) 96 17 94/70 96 %       Physical Exam   Constitutional: She is oriented to person, place, and time. She appears well-developed and well-nourished. HENT:   Head: Normocephalic and atraumatic.    Mouth/Throat: Oropharynx is clear and moist.   Eyes: Conjunctivae and EOM are normal. Neck: Normal range of motion and full passive range of motion without pain. Neck supple. Cardiovascular: S1 normal, S2 normal, normal heart sounds, intact distal pulses and normal pulses. An irregularly irregular rhythm present. Tachycardia present. No murmur heard. Pulmonary/Chest: Effort normal and breath sounds normal. No respiratory distress. She has no wheezes. Abdominal: Soft. Normal appearance and bowel sounds are normal. She exhibits no distension. There is no tenderness. There is no rebound. Musculoskeletal: Normal range of motion. Tenderness to bilateral feet. No deformity noted. Neurological: She is alert and oriented to person, place, and time. She has normal strength. Skin: Skin is warm, dry and intact. No rash noted. Psychiatric: She has a normal mood and affect. Her speech is normal and behavior is normal. Judgment and thought content normal.   Nursing note and vitals reviewed. MDM  Number of Diagnoses or Management Options  Diagnosis management comments: DDx: neuropathy, arthritis, chronic CP, ACS        Amount and/or Complexity of Data Reviewed  Clinical lab tests: ordered and reviewed  Tests in the radiology section of CPT®: ordered and reviewed  Tests in the medicine section of CPT®: ordered and reviewed  Review and summarize past medical records: yes  Independent visualization of images, tracings, or specimens: yes    Patient Progress  Patient progress: stable    ED Course       Procedures     ED EKG interpretation: 2:30  Rhythm: atrial fib; and irregular. Rate (approx.): 94; Axis: normal; QRS interval: normal ; ST/T wave: non-specific changes; Other findings: unchanged from previous ekg (04/07/2017). This EKG was interpreted by Alexandra Shah MD,ED Provider. Progress Note  4:54 AM  I have re-evaluated pt and her pain is better. I discussed her results and plan. Will send a stool sample for c dif and stool culture. Pt is stable for discharge.  Pt agrees to follow up with PCP and return if her symptoms persist or worsen. LABORATORY TESTS:  Recent Results (from the past 12 hour(s))   EKG, 12 LEAD, INITIAL    Collection Time: 04/18/17  2:30 AM   Result Value Ref Range    Ventricular Rate 94 BPM    Atrial Rate 91 BPM    QRS Duration 90 ms    Q-T Interval 370 ms    QTC Calculation (Bezet) 462 ms    Calculated R Axis 16 degrees    Calculated T Axis -71 degrees    Diagnosis       Atrial fibrillation  ST & T wave abnormality, consider lateral ischemia  When compared with ECG of 07-APR-2017 10:11,  Previous ECG has undetermined rhythm, needs review     CBC WITH AUTOMATED DIFF    Collection Time: 04/18/17  2:46 AM   Result Value Ref Range    WBC 7.8 3.6 - 11.0 K/uL    RBC 4.74 3.80 - 5.20 M/uL    HGB 13.4 11.5 - 16.0 g/dL    HCT 39.5 35.0 - 47.0 %    MCV 83.3 80.0 - 99.0 FL    MCH 28.3 26.0 - 34.0 PG    MCHC 33.9 30.0 - 36.5 g/dL    RDW 14.4 11.5 - 14.5 %    PLATELET 478 850 - 384 K/uL    NEUTROPHILS 39 32 - 75 %    LYMPHOCYTES 40 12 - 49 %    MONOCYTES 19 (H) 5 - 13 %    EOSINOPHILS 2 0 - 7 %    BASOPHILS 0 0 - 1 %    ABS. NEUTROPHILS 3.0 1.8 - 8.0 K/UL    ABS. LYMPHOCYTES 3.1 0.8 - 3.5 K/UL    ABS. MONOCYTES 1.5 (H) 0.0 - 1.0 K/UL    ABS. EOSINOPHILS 0.2 0.0 - 0.4 K/UL    ABS.  BASOPHILS 0.0 0.0 - 0.1 K/UL   CK W/ CKMB & INDEX    Collection Time: 04/18/17  2:46 AM   Result Value Ref Range     26 - 192 U/L    CK - MB 1.6 <3.6 NG/ML    CK-MB Index 1.2 0 - 2.5     METABOLIC PANEL, COMPREHENSIVE    Collection Time: 04/18/17  2:46 AM   Result Value Ref Range    Sodium 139 136 - 145 mmol/L    Potassium 4.0 3.5 - 5.1 mmol/L    Chloride 104 97 - 108 mmol/L    CO2 25 21 - 32 mmol/L    Anion gap 10 5 - 15 mmol/L    Glucose 119 (H) 65 - 100 mg/dL    BUN 20 6 - 20 MG/DL    Creatinine 1.24 (H) 0.55 - 1.02 MG/DL    BUN/Creatinine ratio 16 12 - 20      GFR est AA 53 (L) >60 ml/min/1.73m2    GFR est non-AA 43 (L) >60 ml/min/1.73m2    Calcium 9.6 8.5 - 10.1 MG/DL    Bilirubin, total 0.6 0.2 - 1.0 MG/DL    ALT (SGPT) 45 12 - 78 U/L    AST (SGOT) 57 (H) 15 - 37 U/L    Alk. phosphatase 124 (H) 45 - 117 U/L    Protein, total 8.3 (H) 6.4 - 8.2 g/dL    Albumin 3.6 3.5 - 5.0 g/dL    Globulin 4.7 (H) 2.0 - 4.0 g/dL    A-G Ratio 0.8 (L) 1.1 - 2.2     TROPONIN I    Collection Time: 04/18/17  2:46 AM   Result Value Ref Range    Troponin-I, Qt. <0.04 <0.05 ng/mL       IMAGING RESULTS:  XR CHEST PA LAT   Final Result   INDICATION: Chest Pain, SOB     COMPARISON: April 6, 2017     FINDINGS:     Frontal and lateral views of the chest demonstrate left subclavian pacemaker. Heart size upper limits of normal. The lungs are adequately expanded. There is  no edema, effusion, consolidation, or pneumothorax. The osseous structures are  unremarkable.     IMPRESSION  IMPRESSION:  No acute process. MEDICATIONS GIVEN:  Medications   sodium chloride 0.9 % bolus infusion 500 mL (500 mL IntraVENous New Bag 4/18/17 6769)   HYDROcodone-acetaminophen (NORCO) 5-325 mg per tablet 1 Tab (1 Tab Oral Given 4/18/17 6575)       IMPRESSION:  1. Chest pain, unspecified type    2. Neuropathic pain    3. Diarrhea, unspecified type    4. MODE (acute kidney injury) ([facility] [address]        PLAN:  1. Current Discharge Medication List      START taking these medications    Details   HYDROcodone-acetaminophen (NORCO) 5-325 mg per tablet Take 1 Tab by mouth every four (4) hours as needed for Pain. Max Daily Amount: 6 Tabs. Qty: 8 Tab, Refills: 0           2.    Follow-up Information     Follow up With Details Comments [address] Saranya Bowling NP Call in 1 day  [facility] [address]      Peña Talamantes MD Call  [address] [facility]  [place] [address]  [phone]      [facility] EMERGENCY DEPT  As needed, If symptoms worsen [address]  [address] [facility]  [phone]          Return to ED if worse Discharge Note:  4:51 AM  The patient has been re-evaluated and is ready for discharge. Reviewed available results with patient. Counseled patient on diagnosis and care plan. Patient has expressed understanding, and all questions have been answered. Patient agrees with plan and agrees to follow up as recommended, or to return to the ED if their symptoms worsen. Discharge instructions have been provided and explained to the patient, along with reasons to return to the ED. Written by Keny Lam, ED Scribe, as dictated by Vale Cooley MD.    This note is prepared by Keny Lam, acting as Scribe for Vale Cooley MD.    Vale Cooley MD: The scribe's documentation has been prepared under my direction and personally reviewed by me in its entirety. I confirm that the note above accurately reflects all work, treatment, procedures, and medical decision making performed by me.

## 2017-04-18 NOTE — PROGRESS NOTES
Called and discussed results with patient. Flagyl 500 mg TID x 10 days prescribed to patient's preferred pharmacy.   Negro Crane PA-C

## 2017-04-18 NOTE — ED NOTES
Patient discharged by provider. Given written and verbal discharge instructions by provider. Patient states her family is on their way to come give her a ride home. Patient removed from the monitor and IV removed.      Patient instructed she can change into her street clothes while she awaits her family's arrival.

## 2017-04-18 NOTE — ED NOTES
Patient on bedside commode to attempt to provide stool specimen due to complaints of diarrhea at this time. Patient instructed to call after provides specimen.

## 2017-04-19 ENCOUNTER — PATIENT OUTREACH (OUTPATIENT)
Dept: INTERNAL MEDICINE CLINIC | Age: 67
End: 2017-04-19

## 2017-04-19 VITALS
RESPIRATION RATE: 12 BRPM | OXYGEN SATURATION: 96 % | HEART RATE: 88 BPM | SYSTOLIC BLOOD PRESSURE: 102 MMHG | BODY MASS INDEX: 26.74 KG/M2 | WEIGHT: 168.2 LBS | DIASTOLIC BLOOD PRESSURE: 58 MMHG | TEMPERATURE: 97.7 F

## 2017-04-19 PROCEDURE — 3331090002 HH PPS REVENUE DEBIT

## 2017-04-19 PROCEDURE — 3331090001 HH PPS REVENUE CREDIT

## 2017-04-19 RX ORDER — WARFARIN SODIUM 5 MG/1
TABLET ORAL
Qty: 90 TAB | Refills: 0 | Status: ON HOLD | OUTPATIENT
Start: 2017-04-19 | End: 2017-05-01

## 2017-04-19 NOTE — PATIENT INSTRUCTIONS
Clostridium Difficile Colitis: Care Instructions  Your Care Instructions  Clostridium difficile (also called C. difficile) are bacteria that can cause swelling and irritation of the large intestine, or colon. This inflammation is also called colitis. It can cause diarrhea, fever, and belly cramps. You may get C. difficile colitis if you take antibiotics. The infection is most common in people who are taking antibiotics while in the hospital. It is also common in older people in hospitals and nursing homes. Severe disease could cause the colon to swell to many times its normal size (toxic megacolon). This can cause death and needs emergency treatment. You may have a swollen belly that is painful or tender, a rapid heartbeat, and a fever. Follow-up care is a key part of your treatment and safety. Be sure to make and go to all appointments, and call your doctor if you are having problems. It's also a good idea to know your test results and keep a list of the medicines you take. How can you care for yourself at home? · Your doctor may give you antibiotics to treat C. difficile colitis. If your doctor prescribes an antibiotic, he or she will give you a different antibiotic than the one that caused your infection. Take your antibiotics as directed. Do not stop taking them just because you feel better. You need to take the full course of antibiotics. · To prevent dehydration, drink plenty of fluids, enough so that your urine is light yellow or clear like water. Choose water and other caffeine-free clear liquids until you feel better. If you have kidney, heart, or liver disease and have to limit fluids, talk with your doctor before you increase the amount of fluids you drink. · Begin eating small amounts of mild foods, if you feel like it. Try yogurt that has live cultures of lactobacillus (check the label). ¨ Avoid spicy foods, fruits, alcohol, and caffeine until 48 hours after all symptoms go away.   ¨ Avoid chewing gum that contains sorbitol. ¨ Avoid dairy products (except for yogurt with lactobacillus) while you have diarrhea and for 3 days after symptoms go away. · To prevent the spread of C. difficile, practice good hygiene. Keep your hands clean by washing them well and often with soap and clean, running water. Alcohol-based hand sanitizers do not kill C. difficile. When should you call for help? Call 911 if:  · You passed out (lost consciousness). Call your doctor now or seek immediate medical care if:  · You have a fever over 101°F or shaking chills. · You feel lightheaded or have a fast heart rate. · You pass stools that are almost always bloody. · You have signs of needing more fluids. You have sunken eyes and a dry mouth, and you pass only a little dark urine. · You have severe belly pain with or without bloating. · You have severe vomiting and cannot keep down liquids. · You are not passing any stools or gas. Watch closely for changes in your health, and be sure to contact your doctor if:  · You do not get better as expected. Where can you learn more? Go to http://rusty-marcela.info/. Enter [phone] in the search box to learn more about \"Clostridium Difficile Colitis: Care Instructions. \"  Current as of: May 24, 2016  Content Version: 11.2  © 7581-8561 [employer]. Care instructions adapted under license by [employer] (which disclaims liability or warranty for this information). If you have questions about a medical condition or this instruction, always ask your healthcare professional. [place] [address] any warranty or liability for your use of this information.

## 2017-04-19 NOTE — PROGRESS NOTES
This writer reaches out to pt as she is listed on discharge [facility]) report on 17. Patient discharged from [facility] for Chest Pain (pressure). NN contacted the patient to perform post ED discharge assessment and f/u on previous conversation. Verified  and address with patient as identifiers. Provided introduction to self, and explanation of the NN role. Today Mr/Mrs/Ms Early reports that she is not sure how she is doing, d/t social issues like mice in her place thus staying with her daughter. She tells this writer that she went to the ED because of diarrhea and pain to her feet that woke her out of her sleep. At this time she doesn't have pain to her feet d/t medication given to her in the ed but reports having filled the Tam Christine should she need it. She also has the Flagyl for her new diagnosis of C-Diff. This writer educated pt on importance to hand washing especially since there is a one year old in the home. Shortness of breath \"comes and goes\". She is mostly resting at this time trying not to stress. Her cardiology f/u was pushed back to 17. This writer has confirmed her disability paper work has been completed and ready for her  in the morning as she has no plans to come out today. This writer encouraged pt to rest, everyone in the home to wash hands regularly and monitor young grandson for changes (fever, diarrhea, decreased appetite) and to contact office even after hours to speak with on call physician to report non life threatening symptoms and that physician will assist in deciding if a trip to the ED is necessary. Reviewed red flags and discharge instructions with patient who verbalizes understanding. Patient given an opportunity to ask questions. No other clinical/social/functional needs noted. The patient agrees to contact the PCP office for questions related to her healthcare. The patient expressed thanks, offered no additional questions and ended the call.        RAT Score: High Risk            24       Total Score        4 More than 1 Admission in calendar year    20 Charlson Comorbidity Score        Criteria that do not apply:    Relationship with PCP    Patient Living Status    Patient Length of Stay > 5    Patient Insurance is Medicare, Medicaid or Self Pay

## 2017-04-20 ENCOUNTER — HOME CARE VISIT (OUTPATIENT)
Dept: SCHEDULING | Facility: HOME HEALTH | Age: 67
End: 2017-04-20
Payer: COMMERCIAL

## 2017-04-20 PROCEDURE — 3331090001 HH PPS REVENUE CREDIT

## 2017-04-20 PROCEDURE — G0152 HHCP-SERV OF OT,EA 15 MIN: HCPCS

## 2017-04-20 PROCEDURE — 3331090002 HH PPS REVENUE DEBIT

## 2017-04-21 ENCOUNTER — HOME CARE VISIT (OUTPATIENT)
Dept: HOME HEALTH SERVICES | Facility: HOME HEALTH | Age: 67
End: 2017-04-21
Payer: COMMERCIAL

## 2017-04-21 ENCOUNTER — HOME CARE VISIT (OUTPATIENT)
Dept: SCHEDULING | Facility: HOME HEALTH | Age: 67
End: 2017-04-21
Payer: COMMERCIAL

## 2017-04-21 VITALS — HEART RATE: 74 BPM | SYSTOLIC BLOOD PRESSURE: 128 MMHG | OXYGEN SATURATION: 98 % | DIASTOLIC BLOOD PRESSURE: 66 MMHG

## 2017-04-21 LAB
BACTERIA SPEC CULT: NORMAL
C JEJUNI+C COLI AG STL QL: NEGATIVE
E COLI SXT1+2 STL IA: NEGATIVE
SERVICE CMNT-IMP: NORMAL

## 2017-04-21 PROCEDURE — 3331090001 HH PPS REVENUE CREDIT

## 2017-04-21 PROCEDURE — 3331090002 HH PPS REVENUE DEBIT

## 2017-04-22 PROCEDURE — 3331090001 HH PPS REVENUE CREDIT

## 2017-04-22 PROCEDURE — 3331090002 HH PPS REVENUE DEBIT

## 2017-04-23 PROCEDURE — 3331090001 HH PPS REVENUE CREDIT

## 2017-04-23 PROCEDURE — 3331090002 HH PPS REVENUE DEBIT

## 2017-04-24 PROCEDURE — 3331090002 HH PPS REVENUE DEBIT

## 2017-04-24 PROCEDURE — 3331090001 HH PPS REVENUE CREDIT

## 2017-04-25 ENCOUNTER — OFFICE VISIT (OUTPATIENT)
Dept: CARDIOLOGY CLINIC | Age: 67
End: 2017-04-25

## 2017-04-25 ENCOUNTER — CLINICAL SUPPORT (OUTPATIENT)
Dept: CARDIOLOGY CLINIC | Age: 67
End: 2017-04-25

## 2017-04-25 VITALS
DIASTOLIC BLOOD PRESSURE: 66 MMHG | HEIGHT: 67 IN | RESPIRATION RATE: 18 BRPM | WEIGHT: 172.2 LBS | OXYGEN SATURATION: 97 % | BODY MASS INDEX: 27.03 KG/M2 | HEART RATE: 98 BPM | SYSTOLIC BLOOD PRESSURE: 98 MMHG

## 2017-04-25 DIAGNOSIS — I10 ESSENTIAL HYPERTENSION: Primary | Chronic | ICD-10-CM

## 2017-04-25 DIAGNOSIS — I50.33 DIASTOLIC CHF, ACUTE ON CHRONIC (HCC): ICD-10-CM

## 2017-04-25 DIAGNOSIS — I48.0 PAROXYSMAL ATRIAL FIBRILLATION (HCC): Chronic | ICD-10-CM

## 2017-04-25 DIAGNOSIS — Z95.0 CARDIAC PACEMAKER IN SITU: Primary | ICD-10-CM

## 2017-04-25 DIAGNOSIS — I49.5 SINOATRIAL NODE DYSFUNCTION (HCC): ICD-10-CM

## 2017-04-25 PROCEDURE — 3331090002 HH PPS REVENUE DEBIT

## 2017-04-25 PROCEDURE — 3331090001 HH PPS REVENUE CREDIT

## 2017-04-25 NOTE — PROGRESS NOTES
Chief Complaint   Patient presents with   [facility] Follow Up     complains of dizziness and Occasional SOB with exerction

## 2017-04-25 NOTE — PROGRESS NOTES
Subjective:      Emani Estes is a 73 y.o. female is here for hospital follow up s/p PCI with AF post procedure. She was discharged home with sotalol and presents today with shortness of breath. The patient denies chest pain, orthopnea, PND, LE edema, palpitations, syncope, presyncope or fatigue.        Patient Active Problem List    Diagnosis Date Noted    Fear associated with illness and body function 04/07/2017    Counseling regarding advanced care planning and goals of care 80/18/6255    Systolic CHF, acute ([address] 22/20/7517    Acute systolic CHF (congestive heart failure) ([address] 04/06/2017    CHF (congestive heart failure) ([address] 04/04/2017    Type 2 diabetes mellitus with diabetic neuropathy, without long-term current use of insulin ([address] 01/31/2017    Anticoagulation monitoring, INR range 2-3 65/14/6248    Diastolic CHF, acute on chronic ([address] 09/22/2014    S/P coronary artery stent placement 07/04/2014    DNR (do not resuscitate) 06/30/2014    Back pain 11/14/2012    Sinoatrial node dysfunction ([address] 07/05/2012    Cardiac pacemaker in situ 07/05/2012    Mixed hyperlipidemia 07/05/2012    Chest pain 09/21/2011    Anemia 07/25/2011    Sick sinus syndrome ([address] 02/25/2011    S/P angioplasty with stent 02/07/2011    Hypokalemia 07/20/2010    Hip pain 06/08/2010    Hypertension--essential 06/02/2010    Atrial fibrillation--paroxysmal 06/02/2010    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      Brody Draper NP  Past Medical History:   Diagnosis Date    Atrial fibrillation ([address] 6/2/2010    CAD (coronary artery disease)     stent    Chronic diastolic heart failure ([address] 9/22/2014    COPD     COPD (chronic obstructive pulmonary disease) ([address] 6/2/2010    Diabetes ([address]     Fibroid     Heart failure ([address]     Hypertension 6/2/2010    NIDDM (non-insulin dependent diabetes mellitus) 6/2/2010    Screening mammogram 5/4/10    SOB (shortness of breath) 2014      Past Surgical History:   Procedure Laterality Date    HX  SECTION      HX OTHER SURGICAL      adrenal gland removed    HX PACEMAKER      MO EXCISE ADRENAL GLAND       Allergies   Allergen Reactions    Crestor [Rosuvastatin] Myalgia    Levaquin [Levofloxacin] Nausea Only     GI Upset    Lipitor [Atorvastatin] Diarrhea    Lyrica [Pregabalin] Myalgia      Family History   Problem Relation Age of Onset    Heart Disease Mother     Diabetes Father     Heart Disease Brother     negative for cardiac disease  Social History     Social History    Marital status:      Spouse name: N/A    Number of children: N/A    Years of education: N/A     Social History Main Topics    Smoking status: Former Smoker     Types: Cigarettes     Quit date: 2009    Smokeless tobacco: Never Used    Alcohol use No    Drug use: No    Sexual activity: Not Currently     Other Topics Concern    None     Social History Narrative     Current Outpatient Prescriptions   Medication Sig    metroNIDAZOLE (FLAGYL) 500 mg tablet Take 1 Tab by mouth three (3) times daily for 10 days.  pravastatin (PRAVACHOL) 20 mg tablet Take 1 Tab by mouth nightly.  valsartan (DIOVAN) 40 mg tablet Take 0.5 Tabs by mouth daily.  clopidogrel (PLAVIX) 75 mg tab Take 1 Tab by mouth daily.  fluticasone (FLONASE) 50 mcg/actuation nasal spray 2 Sprays by Both Nostrils route every evening.  gabapentin (NEURONTIN) 800 mg tablet TAKE ONE TABLET BY MOUTH THREE TIMES DAILY    Blood-Glucose Meter monitoring kit Check blood sugar daily.  May substitute for insurance preferred meter    tiotropium (SPIRIVA WITH HANDIHALER) 18 mcg inhalation capsule INHALE THE CONTENTS OF 1 CAPSULE THROUGH HANDIHALER DEVICE DAILY    metFORMIN (GLUCOPHAGE) 1,000 mg tablet TAKE 1 TABLET BY MOUTH TWICE DAILY WITH MEALS  Indications: PREVENTION OF TYPE 2 DIABETES MELLITUS    sotalol (BETAPACE) 120 mg tablet Take 1 Tab by mouth two (2) times a day. (Patient taking differently: Take 180 mg by mouth two (2) times a day.)    warfarin (COUMADIN) 5 mg tablet Take 1.5 tabs daily, but on Saturday and Sunday take 2 tabs.  furosemide (LASIX) 20 mg tablet Take 1 Tab by mouth daily.  colchicine 0.6 mg tablet Take 1 Tab by mouth two (2) times a day. (Patient taking differently: Take 1.25 mg by mouth daily. Indications: GOUT)    budesonide-formoterol (SYMBICORT) 160-4.5 mcg/actuation HFA inhaler Take 1 Puff by inhalation two (2) times a day.  albuterol (PROVENTIL VENTOLIN) 2.5 mg /3 mL (0.083 %) nebulizer solution 3 mL by Nebulization route every four (4) hours as needed for Wheezing.  FOLIC ACID/MULTIVIT-MIN/LUTEIN (CENTRUM SILVER PO) Take 1 Tab by mouth daily. Takes one po daily.  albuterol (VENTOLIN HFA) 90 mcg/actuation inhaler Take 2 Puffs by inhalation every six (6) hours as needed for Wheezing.  Azelastine (ASTEPRO) 0.15 % (205.5 mcg) nasal spray 1 [place] by Both Nostrils route daily.  cod liver oil cap Take 1 Cap by mouth daily.  aspirin 81 mg chewable tablet Take 81 mg by mouth daily.  warfarin (COUMADIN) 5 mg tablet TAKE ONE TABLET BY MOUTH ONCE DAILY    HYDROcodone-acetaminophen (NORCO) 5-325 mg per tablet Take 1 Tab by mouth every four (4) hours as needed for Pain. Max Daily Amount: 6 Tabs.  warfarin (COUMADIN) 5 mg tablet Take 7.5 mg by mouth five (5) days a week. 7.5 mg five days a week Monday through Friday    evolocumab (REPATHA PUSHTRONEX) 420 mg/3.5 mL Injt 420 mg by SubCUTAneous route every month. Indications: arteriosclerotic vascular disease     No current facility-administered medications for this visit.        Vitals:    04/25/17 1334 04/25/17 1347 04/25/17 1356   BP: 108/66 100/56 98/66   Pulse: 67 89 98   Resp: 18     SpO2: 97%     Weight: 172 lb 3.2 oz (78.1 kg)     Height: 5' 6.5\" (1.689 m)         I have reviewed the nurses notes, vitals, problem list, allergy list, medical history, family, social history and medications. Review of Symptoms:    General: Pt denies excessive weight gain or loss. Pt is able to conduct ADL's  HEENT: Denies blurred vision, headaches, epistaxis and difficulty swallowing. Respiratory:  +shortness of breath, Denies shortness of breath, [place], wheezing or stridor. Cardiovascular: +palpitations, Denies precordial pain, edema or PND  Gastrointestinal: Denies poor appetite, indigestion, abdominal pain or blood in stool  Urinary: Denies dysuria, pyuria  Musculoskeletal: Denies pain or swelling from muscles or joints  Neurologic: Denies tremor, paresthesias, or sensory motor disturbance  Skin: Denies rash, itching or texture change. Psych: Denies depression      Physical Exam:      General: Well developed, in no acute distress. HEENT: Eyes - PERRL, no jvd  Heart:  +irregular, Normal S1/S2 negative S3 or S4. Regular, no murmur, gallop or rub.   Respiratory: Clear bilaterally x 4, no wheezing or rales  Abdomen:   Soft, non-tender, bowel sounds are active.   Extremities:  No edema, normal cap refill, no cyanosis. Musculoskeletal: No clubbing  Neuro: A&Ox3, speech clear, gait stable. Skin: Skin color is normal. No rashes or lesions. Non diaphoretic  Vascular: 2+ pulses symmetric in all extremities    Cardiographics    Ekg: atrial fibrillation  [place] PM: 29% AF x15 days.      Results for orders placed or performed during the hospital encounter of 04/18/17   EKG, 12 LEAD, INITIAL   Result Value Ref Range    Ventricular Rate 94 BPM    Atrial Rate 91 BPM    QRS Duration 90 ms    Q-T Interval 370 ms    QTC Calculation (Bezet) 462 ms    Calculated R Axis 16 degrees    Calculated T Axis -71 degrees    Diagnosis       Atrial fibrillation  Nonspecific ST abnormality  Confirmed by Shanna Taylor ([id number]) on 4/18/2017 8:57:37 AM           Lab Results   Component Value Date/Time    WBC 7.8 04/18/2017 02:46 AM    Hemoglobin (POC) 10.6 05/19/2016 08:50 AM    HGB 13.4 04/18/2017 02:46 AM    HCT 39.5 04/18/2017 02:46 AM    PLATELET 394 37/33/9070 02:46 AM    MCV 83.3 04/18/2017 02:46 AM      Lab Results   Component Value Date/Time    Sodium 139 04/18/2017 02:46 AM    Potassium 4.0 04/18/2017 02:46 AM    Chloride 104 04/18/2017 02:46 AM    CO2 25 04/18/2017 02:46 AM    Anion gap 10 04/18/2017 02:46 AM    Glucose 119 04/18/2017 02:46 AM    BUN 20 04/18/2017 02:46 AM    Creatinine 1.24 04/18/2017 02:46 AM    BUN/Creatinine ratio 16 04/18/2017 02:46 AM    GFR est AA 53 04/18/2017 02:46 AM    GFR est non-AA 43 04/18/2017 02:46 AM    Calcium 9.6 04/18/2017 02:46 AM    Bilirubin, total 0.6 04/18/2017 02:46 AM    AST (SGOT) 57 04/18/2017 02:46 AM    Alk. phosphatase 124 04/18/2017 02:46 AM    Protein, total 8.3 04/18/2017 02:46 AM    Albumin 3.6 04/18/2017 02:46 AM    Globulin 4.7 04/18/2017 02:46 AM    A-G Ratio 0.8 04/18/2017 02:46 AM    ALT (SGPT) 45 04/18/2017 02:46 AM         Assessment:     Assessment:        ICD-10-CM ICD-9-CM    1. Essential hypertension I10 401.9 AMB POC EKG ROUTINE W/ 12 LEADS, INTER & REP     Orders Placed This Encounter    AMB POC EKG ROUTINE W/ 12 LEADS, INTER & REP     Order Specific Question:   Reason for Exam:     Answer:   routine        Plan:   Ms. Jeramy Luo is here for follow up of hospital visit. She has symptomatic left sided aflutter/afib 29% of the time and has been maintained on sotalol. EKG today shows AF. She is a candidate for is a candidate for an AF/AFL ablation. I discussed the risks/benefits/alternatives of the procedure with the patient. Risks include (but are not limited to) bleeding, infection, cva/mi/tamponade/esophageal perforation/pv stenosis/death. The patient understands that there is a 0-8% major complication rate and agrees to proceed. Thank you for this interesting consultation. She can return to the work this week. Continue medical management for HTN. Thank you for allowing me to participate in [facility] 's care.     Isaiah MELCHOR Shamar Kraft MD, [facility], [facility]

## 2017-04-25 NOTE — MR AVS SNAPSHOT
Visit Information Date & Time Provider Department Dept. Phone Encounter #  
 4/25/2017  1:30 PM Isaiah Mandel, [address] [facility] [phone] [id number] Follow-up Instructions Return in about 4 weeks (around 5/23/2017). Routing History Follow-up and Disposition History Your Appointments 5/15/2017  9:40 AM  
ROUTINE CARE with Prateek Camacho NP  
[address]) Appt Note: one month fu  
 [address] [id number]  
[phone]  
  
   
 [address] [facility] Upcoming Health Maintenance Date Due  
 EYE EXAM RETINAL OR DILATED Q1 5/1/2017* MICROALBUMIN Q1 5/17/2017* FOOT EXAM Q1 6/10/2017 HEMOGLOBIN A1C Q6M 10/17/2017 FOBT Q 1 YEAR AGE 50-75 10/31/2017 MEDICARE YEARLY EXAM 11/1/2017 LIPID PANEL Q1 1/31/2018 GLAUCOMA SCREENING Q2Y 7/28/2018 BREAST CANCER SCRN MAMMOGRAM 2/23/2019 DTaP/Tdap/Td series (2 - Td) 7/16/2026 *Topic was postponed. The date shown is not the original due date. Allergies as of 4/25/2017  Review Complete On: 4/25/2017 By: William Lux MD  
  
 Severity Noted Reaction Type Reactions Crestor [Rosuvastatin]  10/31/2016   Side Effect Myalgia Levaquin [Levofloxacin]  12/07/2015   Intolerance Nausea Only GI Upset Lipitor [Atorvastatin]  04/17/2017   Side Effect Diarrhea Lyrica [Pregabalin]  10/31/2016   Side Effect Myalgia Current Immunizations  Reviewed on 10/31/2016 Name Date H1N1 FLU VACCINE 10/20/2009 Influenza High Dose Vaccine PF 10/31/2016 Influenza Vaccine (Madin June Canine Kidney) PF 9/23/2014 11:26 AM  
 Influenza Vaccine Intradermal PF 11/27/2015 Influenza Vaccine Split 9/22/2011  6:25 AM  
 Influenza Vaccine Whole 10/20/2009 Pneumococcal Polysaccharide (PPSV-23) 6/25/2015 Pneumococcal Vaccine (Unspecified Type) 10/20/2009  Tdap 7/16/2016 11:47 PM  
  
 Not reviewed this visit You Were Diagnosed With   
  
 Codes Comments Essential hypertension    -  Primary ICD-10-CM: I10 
ICD-9-CM: 401.9 Paroxysmal atrial fibrillation (HCC)     ICD-10-CM: I48.0 ICD-9-CM: 427.31 Vitals BP Pulse Resp Height(growth percentile) Weight(growth percentile) SpO2  
 98/66 (BP 1 Location: Right arm, BP Patient Position: Standing) 98 18 5' 6.5\" (1.689 m) 172 lb 3.2 oz (78.1 kg) 97% BMI OB Status Smoking Status 27.38 kg/m2 Postmenopausal Former Smoker Vitals History BMI and BSA Data Body Mass Index Body Surface Area  
 27.38 kg/m 2 1.91 m 2 Preferred Pharmacy Pharmacy Name Phone [facility] [address], [address] [place] [address] Avenue [phone] Your Updated Medication List  
  
   
This list is accurate as of: 4/25/17  2:59 PM.  Always use your most recent med list.  
  
  
  
  
 * albuterol 90 mcg/actuation inhaler Commonly known as:  VENTOLIN HFA Take 2 Puffs by inhalation every six (6) hours as needed for Wheezing. * albuterol 2.5 mg /3 mL (0.083 %) nebulizer solution Commonly known as:  PROVENTIL VENTOLIN  
3 mL by Nebulization route every four (4) hours as needed for Wheezing. aspirin 81 mg chewable tablet Take 81 mg by mouth daily. Azelastine 0.15 % (205.5 mcg) nasal spray Commonly known as:  ASTEPRO  
1 Spray by Both Nostrils route daily. Blood-Glucose Meter monitoring kit Check blood sugar daily. May substitute for insurance preferred meter  
  
 budesonide-formoterol 160-4.5 mcg/actuation HFA inhaler Commonly known as:  SYMBICORT Take 1 Puff by inhalation two (2) times a day. CENTRUM SILVER PO Take 1 Tab by mouth daily. Takes one po daily. clopidogrel 75 mg Tab Commonly known as:  PLAVIX Take 1 Tab by mouth daily. cod liver oil Cap Take 1 Cap by mouth daily. colchicine 0.6 mg tablet Take 1 Tab by mouth two (2) times a day. evolocumab 420 mg/3.5 mL Injt Commonly known as:  REPATHA PUSHTRONEX  
420 mg by SubCUTAneous route every month. Indications: arteriosclerotic vascular disease FLONASE 50 mcg/actuation nasal spray Generic drug:  fluticasone 2 Sprays by Both Nostrils route every evening. furosemide 20 mg tablet Commonly known as:  LASIX Take 1 Tab by mouth daily. gabapentin 800 mg tablet Commonly known as:  NEURONTIN  
TAKE ONE TABLET BY MOUTH THREE TIMES DAILY HYDROcodone-acetaminophen 5-325 mg per tablet Commonly known as:  Maryfrances Grumbles Take 1 Tab by mouth every four (4) hours as needed for Pain. Max Daily Amount: 6 Tabs. metFORMIN 1,000 mg tablet Commonly known as:  GLUCOPHAGE  
TAKE 1 TABLET BY MOUTH TWICE DAILY WITH MEALS  Indications: PREVENTION OF TYPE 2 DIABETES MELLITUS  
  
 metroNIDAZOLE 500 mg tablet Commonly known as:  FLAGYL Take 1 Tab by mouth three (3) times daily for 10 days. pravastatin 20 mg tablet Commonly known as:  PRAVACHOL Take 1 Tab by mouth nightly. sotalol 120 mg tablet Commonly known as:  Kimmie Coombes Take 1 Tab by mouth two (2) times a day. tiotropium 18 mcg inhalation capsule Commonly known as:  [address] INHALE THE CONTENTS OF 1 CAPSULE THROUGH HANDIHALER DEVICE DAILY  
  
 valsartan 40 mg tablet Commonly known as:  DIOVAN Take 0.5 Tabs by mouth daily. * warfarin 5 mg tablet Commonly known as:  COUMADIN Take 7.5 mg by mouth five (5) days a week. 7.5 mg five days a week Monday through Friday * warfarin 5 mg tablet Commonly known as:  COUMADIN Take 1.5 tabs daily, but on Saturday and Sunday take 2 tabs. * warfarin 5 mg tablet Commonly known as:  COUMADIN  
TAKE ONE TABLET BY MOUTH ONCE DAILY * Notice: This list has 5 medication(s) that are the same as other medications prescribed for you.  Read the directions carefully, and ask your doctor or other care provider to review them with you. We Performed the Following AMB POC EKG ROUTINE W/ 12 LEADS, INTER & REP [05385 CPT(R)] CBC W/O DIFF [43851 CPT(R)] METABOLIC PANEL, COMPREHENSIVE [59822 CPT(R)] PROTHROMBIN TIME + INR [84073 CPT(R)] Follow-up Instructions Return in about 4 weeks (around 5/23/2017). To-Do List   
 04/25/2017 Imaging:  CTA CHEST W OR W WO CONT   
  
 04/26/2017 To Be Determined Appointment with Mychal Banegas at [place] [address]  
  
 04/28/2017 To Be Determined Appointment with Marzena Casper RN at [place] [address]  
  
 04/28/2017 3:00 PM  
  Appointment with [facility] CT 1 at [facility] CT ([phone]) CONTRAST STUDY: 1. The patient should not eat solid food four hours before the appointment but should be encouraged to drink clear liquids. 2. The patient will require IV access for contrast administration. 3. The patient should not take  Ibuprofen (Advil, Motrin, etc.) and Naproxen Sodium (Aleve, etc.)  on the day of the exam. Stopping non-steroidal anti-inflammatory agents (NSAIDs) like Ibuprofen decreases the risk of kidney damage from the x-ray contrast (dye). 4. Bring any non [facility] facility films/images pertaining to the area of interest with you on the day of appointment. 5. Bring current lab work if available(within last 90 days CMP) ***If scheduled at [facility], iSTAT is not available, labs will need to be done before appointment*** 6. Check in at registration at least 30 minutes before appt time unless you were instructed to do otherwise. 05/01/2017 11:45 AM  
  Appointment with CATH EP ROOM [facility] at [address] [facility] ([phone]) NPO AFTER MIDNIGHT! ROUTINE CASES:  Please arrive 2 hour prior to your scheduled appointment time. If your scheduled appointment is for 0730, 0800, 0815, please arrive by 0645.   NON ROUTINE CASES:  PATIENTS WHO REQUIRE LABS, X-RAY, EKG, or MEDS:  PLEASE ARRIVE 3 HOURS PRIOR TO YOUR SCHEDULED APPOINTMENT. If you require hydration prior to your procedure, PLEASE ARRIVE 4 HOURS PRIOR TO YOUR APPOINTMENT  **** IT IS THE OFFICE SCHEDULARS RESPONSBILITY TO NOTIFY THE CATH LAB SCHEDULAR IF THE PATIENT WILL REQUIRE ANY ADDITIONAL TIME FOR PREP FROM ROUTINE CASE ***** Introducing [facility] SERVICES! Dear Ulises Wilkins: Thank you for requesting a [employer] account. Our records indicate that you already have an active [employer] account. You can access your account anytime at [email or web address] Did you know that you can access your hospital and ER discharge instructions at any time in [employer]? You can also review all of your test results from your hospital stay or ER visit. Additional Information If you have questions, please visit the Frequently Asked Questions section of the [employer] website at [email or web address] Remember, [employer] is NOT to be used for urgent needs. For medical emergencies, dial 911. Now available from your iPhone and Android! Please provide this summary of care documentation to your next provider. Your primary care clinician is listed as CAROLINA Salgado. If you have any questions after today's visit, please call [phone].

## 2017-04-26 ENCOUNTER — HOME CARE VISIT (OUTPATIENT)
Dept: HOME HEALTH SERVICES | Facility: HOME HEALTH | Age: 67
End: 2017-04-26
Payer: COMMERCIAL

## 2017-04-26 ENCOUNTER — TELEPHONE (OUTPATIENT)
Dept: CARDIOLOGY CLINIC | Age: 67
End: 2017-04-26

## 2017-04-26 LAB
ALBUMIN SERPL-MCNC: 4.4 G/DL (ref 3.6–4.8)
ALBUMIN/GLOB SERPL: 1.2 {RATIO} (ref 1.2–2.2)
ALP SERPL-CCNC: 116 IU/L (ref 39–117)
ALT SERPL-CCNC: 115 IU/L (ref 0–32)
AST SERPL-CCNC: 161 IU/L (ref 0–40)
BILIRUB SERPL-MCNC: 0.4 MG/DL (ref 0–1.2)
BUN SERPL-MCNC: 11 MG/DL (ref 8–27)
BUN/CREAT SERPL: 13 (ref 12–28)
CALCIUM SERPL-MCNC: 9.6 MG/DL (ref 8.7–10.3)
CHLORIDE SERPL-SCNC: 99 MMOL/L (ref 96–106)
CO2 SERPL-SCNC: 23 MMOL/L (ref 18–29)
CREAT SERPL-MCNC: 0.83 MG/DL (ref 0.57–1)
ERYTHROCYTE [DISTWIDTH] IN BLOOD BY AUTOMATED COUNT: 15 % (ref 12.3–15.4)
GLOBULIN SER CALC-MCNC: 3.6 G/DL (ref 1.5–4.5)
GLUCOSE SERPL-MCNC: 93 MG/DL (ref 65–99)
HCT VFR BLD AUTO: 41.4 % (ref 34–46.6)
HGB BLD-MCNC: 13.8 G/DL (ref 11.1–15.9)
INR PPP: 6.1 (ref 0.8–1.2)
MCH RBC QN AUTO: 28.3 PG (ref 26.6–33)
MCHC RBC AUTO-ENTMCNC: 33.3 G/DL (ref 31.5–35.7)
MCV RBC AUTO: 85 FL (ref 79–97)
PLATELET # BLD AUTO: 293 X10E3/UL (ref 150–379)
POTASSIUM SERPL-SCNC: 4.3 MMOL/L (ref 3.5–5.2)
PROT SERPL-MCNC: 8 G/DL (ref 6–8.5)
PROTHROMBIN TIME: 61.2 SEC (ref 9.1–12)
RBC # BLD AUTO: 4.87 X10E6/UL (ref 3.77–5.28)
SODIUM SERPL-SCNC: 140 MMOL/L (ref 134–144)
WBC # BLD AUTO: 7.7 X10E3/UL (ref 3.4–10.8)

## 2017-04-26 PROCEDURE — 3331090001 HH PPS REVENUE CREDIT

## 2017-04-26 PROCEDURE — 3331090002 HH PPS REVENUE DEBIT

## 2017-04-26 NOTE — TELEPHONE ENCOUNTER
Spoke with patient - she verbalized understanding to hold her Coumadin after tonight - Pt is taking Flagyl for 10 days which is more than likely making the INR elevated.

## 2017-04-26 NOTE — TELEPHONE ENCOUNTER
Pt was advised to hold current dose of warfarin tonight as well. Pt verbalizes understanding. Pt was also informed that we did receive her paperwork, will call when completed and faxed.

## 2017-04-26 NOTE — TELEPHONE ENCOUNTER
MD Harpreet Fallon RN        Caller: Unspecified (Today,  8:05 AM)                     pls have her hold her coumadin after tonite            Previous Messages       ----- Message -----      From: Harpreet Ramsey RN      Sent: 4/26/2017   8:12 AM        To:  Emerita Garcia MD, Anabel Alberts NP     ----- Message from Harpreet Ramsey RN sent at 4/26/2017  8:12 AM EDT -----   Bola Jonas to PCP NP

## 2017-04-26 NOTE — TELEPHONE ENCOUNTER
Critical lab value from [address] [place] [address] on 4/25/17. INR 6.1  PT 61.2    Pt is having a PVI ablation with Dr. Mario Magallanes on 5/1/17 - these were pre op labs drawn.

## 2017-04-27 ENCOUNTER — TELEPHONE (OUTPATIENT)
Dept: INTERNAL MEDICINE CLINIC | Age: 67
End: 2017-04-27

## 2017-04-27 PROCEDURE — 3331090001 HH PPS REVENUE CREDIT

## 2017-04-27 PROCEDURE — 3331090002 HH PPS REVENUE DEBIT

## 2017-04-28 ENCOUNTER — HOSPITAL ENCOUNTER (OUTPATIENT)
Dept: CT IMAGING | Age: 67
Discharge: HOME | End: 2017-04-28
Attending: NURSE PRACTITIONER
Payer: COMMERCIAL

## 2017-04-28 ENCOUNTER — HOME CARE VISIT (OUTPATIENT)
Dept: HOME HEALTH SERVICES | Facility: HOME HEALTH | Age: 67
End: 2017-04-28
Payer: COMMERCIAL

## 2017-04-28 VITALS
HEART RATE: 68 BPM | DIASTOLIC BLOOD PRESSURE: 42 MMHG | RESPIRATION RATE: 20 BRPM | TEMPERATURE: 97.8 F | SYSTOLIC BLOOD PRESSURE: 93 MMHG | OXYGEN SATURATION: 95 %

## 2017-04-28 DIAGNOSIS — I10 ESSENTIAL HYPERTENSION: Chronic | ICD-10-CM

## 2017-04-28 PROCEDURE — 74011636320 HC RX REV CODE- 636/320: Performed by: NURSE PRACTITIONER

## 2017-04-28 PROCEDURE — 71275 CT ANGIOGRAPHY CHEST: CPT

## 2017-04-28 PROCEDURE — 3331090001 HH PPS REVENUE CREDIT

## 2017-04-28 PROCEDURE — 74011250636 HC RX REV CODE- 250/636: Performed by: NURSE PRACTITIONER

## 2017-04-28 PROCEDURE — 3331090002 HH PPS REVENUE DEBIT

## 2017-04-28 RX ORDER — SODIUM CHLORIDE 9 MG/ML
150 INJECTION, SOLUTION INTRAVENOUS CONTINUOUS
Status: DISCONTINUED | OUTPATIENT
Start: 2017-04-28 | End: 2017-05-02 | Stop reason: HOSPADM

## 2017-04-28 RX ORDER — SODIUM CHLORIDE 0.9 % (FLUSH) 0.9 %
10 SYRINGE (ML) INJECTION
Status: COMPLETED | OUTPATIENT
Start: 2017-04-28 | End: 2017-04-28

## 2017-04-28 RX ORDER — SODIUM CHLORIDE 9 MG/ML
50 INJECTION, SOLUTION INTRAVENOUS
Status: COMPLETED | OUTPATIENT
Start: 2017-04-28 | End: 2017-04-28

## 2017-04-28 RX ADMIN — SODIUM CHLORIDE 50 ML/HR: 900 INJECTION, SOLUTION INTRAVENOUS at 15:04

## 2017-04-28 RX ADMIN — IOPAMIDOL 100 ML: 755 INJECTION, SOLUTION INTRAVENOUS at 15:04

## 2017-04-28 RX ADMIN — SODIUM CHLORIDE 150 ML/HR: 900 INJECTION, SOLUTION INTRAVENOUS at 14:00

## 2017-04-28 RX ADMIN — Medication 10 ML: at 15:04

## 2017-04-28 NOTE — ROUTINE PROCESS
Received care of pt from lobby to radiology recovery for IV hydration before and after CT scan. Hydration started at this time as ordered. VS stable and pt is free from complaints.

## 2017-04-28 NOTE — TELEPHONE ENCOUNTER
My chart message left for Ms Chuyita Alexphoebe that her Documents have been faxed and she can also pick them up from the office.

## 2017-04-29 PROCEDURE — 3331090001 HH PPS REVENUE CREDIT

## 2017-04-29 PROCEDURE — 3331090002 HH PPS REVENUE DEBIT

## 2017-04-30 PROCEDURE — 3331090002 HH PPS REVENUE DEBIT

## 2017-04-30 PROCEDURE — 3331090001 HH PPS REVENUE CREDIT

## 2017-05-01 ENCOUNTER — ANESTHESIA (OUTPATIENT)
Dept: SURGERY | Age: 67
End: 2017-05-01
Payer: COMMERCIAL

## 2017-05-01 ENCOUNTER — HOSPITAL ENCOUNTER (OUTPATIENT)
Dept: NON INVASIVE DIAGNOSTICS | Age: 67
Setting detail: OBSERVATION
Discharge: HOME | End: 2017-05-02
Attending: INTERNAL MEDICINE | Admitting: INTERNAL MEDICINE
Payer: COMMERCIAL

## 2017-05-01 ENCOUNTER — ANESTHESIA EVENT (OUTPATIENT)
Dept: SURGERY | Age: 67
End: 2017-05-01
Payer: COMMERCIAL

## 2017-05-01 LAB
ACT BLD: 142 SECS (ref 79–138)
ACT BLD: 322 SECS (ref 79–138)
ACT BLD: 337 SECS (ref 79–138)
GLUCOSE BLD STRIP.AUTO-MCNC: 147 MG/DL (ref 65–100)
GLUCOSE BLD STRIP.AUTO-MCNC: 153 MG/DL (ref 65–100)
GLUCOSE BLD STRIP.AUTO-MCNC: 258 MG/DL (ref 65–100)
INR BLD: 1 (ref 0.9–1.2)
SERVICE CMNT-IMP: ABNORMAL

## 2017-05-01 PROCEDURE — 74011000250 HC RX REV CODE- 250

## 2017-05-01 PROCEDURE — C1766 INTRO/SHEATH,STRBLE,NON-PEEL: HCPCS

## 2017-05-01 PROCEDURE — 74011250636 HC RX REV CODE- 250/636

## 2017-05-01 PROCEDURE — 93657 TX L/R ATRIAL FIB ADDL: CPT

## 2017-05-01 PROCEDURE — 76210000001 HC OR PH I REC 2.5 TO 3 HR

## 2017-05-01 PROCEDURE — C1894 INTRO/SHEATH, NON-LASER: HCPCS

## 2017-05-01 PROCEDURE — 3331090002 HH PPS REVENUE DEBIT

## 2017-05-01 PROCEDURE — 93656 COMPRE EP EVAL ABLTJ ATR FIB: CPT

## 2017-05-01 PROCEDURE — 77030027107 HC PTCH EXT REF CARTO3 J&J -F

## 2017-05-01 PROCEDURE — 93613 INTRACARDIAC EPHYS 3D MAPG: CPT

## 2017-05-01 PROCEDURE — 85347 COAGULATION TIME ACTIVATED: CPT

## 2017-05-01 PROCEDURE — 76060000035 HC ANESTHESIA 2 TO 2.5 HR

## 2017-05-01 PROCEDURE — 82962 GLUCOSE BLOOD TEST: CPT

## 2017-05-01 PROCEDURE — 74011250636 HC RX REV CODE- 250/636: Performed by: ANESTHESIOLOGY

## 2017-05-01 PROCEDURE — 77030029065 HC DRSG HEMO QCLOT ZMED -B

## 2017-05-01 PROCEDURE — 99218 HC RM OBSERVATION: CPT

## 2017-05-01 PROCEDURE — 77030026438 HC STYL ET INTUB CARD -A: Performed by: NURSE ANESTHETIST, CERTIFIED REGISTERED

## 2017-05-01 PROCEDURE — 77030013797 HC KT TRNSDUC PRSSR EDWD -A

## 2017-05-01 PROCEDURE — 77030015398 HC CBL EP EXT STJU -C

## 2017-05-01 PROCEDURE — 74011000250 HC RX REV CODE- 250: Performed by: ANESTHESIOLOGY

## 2017-05-01 PROCEDURE — 77030029359 HC PRB ESOPH TEMP CATH ANTM -F

## 2017-05-01 PROCEDURE — 3331090001 HH PPS REVENUE CREDIT

## 2017-05-01 PROCEDURE — 85610 PROTHROMBIN TIME: CPT

## 2017-05-01 PROCEDURE — 77030013079 HC BLNKT BAIR HGGR 3M -A: Performed by: NURSE ANESTHETIST, CERTIFIED REGISTERED

## 2017-05-01 PROCEDURE — 77030011640 HC PAD GRND REM COVD -A

## 2017-05-01 PROCEDURE — C1893 INTRO/SHEATH, FIXED,NON-PEEL: HCPCS

## 2017-05-01 PROCEDURE — C1730 CATH, EP, 19 OR FEW ELECT: HCPCS

## 2017-05-01 PROCEDURE — C1732 CATH, EP, DIAG/ABL, 3D/VECT: HCPCS

## 2017-05-01 PROCEDURE — 77030010880 HC CBL EP SUPRME STJU -C

## 2017-05-01 PROCEDURE — 77030008684 HC TU ET CUF COVD -B: Performed by: NURSE ANESTHETIST, CERTIFIED REGISTERED

## 2017-05-01 PROCEDURE — 77030034850

## 2017-05-01 PROCEDURE — 74011250637 HC RX REV CODE- 250/637: Performed by: INTERNAL MEDICINE

## 2017-05-01 PROCEDURE — 77030020506 HC NDL TRNSPTL NRG BAYL -F

## 2017-05-01 PROCEDURE — 93325 DOPPLER ECHO COLOR FLOW MAPG: CPT

## 2017-05-01 PROCEDURE — 77010033678 HC OXYGEN DAILY

## 2017-05-01 PROCEDURE — 77030018729 HC ELECTRD DEFIB PAD CARD -B

## 2017-05-01 PROCEDURE — 77030035291 HC TBNG PMP SMARTABLATE J&J -B

## 2017-05-01 PROCEDURE — C1731 CATH, EP, 20 OR MORE ELEC: HCPCS

## 2017-05-01 RX ORDER — GABAPENTIN 800 MG/1
800 TABLET ORAL 3 TIMES DAILY
Status: DISCONTINUED | OUTPATIENT
Start: 2017-05-01 | End: 2017-05-01

## 2017-05-01 RX ORDER — HEPARIN SODIUM 200 [USP'U]/100ML
1500 INJECTION, SOLUTION INTRAVENOUS ONCE
Status: DISCONTINUED | OUTPATIENT
Start: 2017-05-01 | End: 2017-05-01

## 2017-05-01 RX ORDER — HEPARIN SODIUM 1000 [USP'U]/ML
INJECTION, SOLUTION INTRAVENOUS; SUBCUTANEOUS
Status: DISCONTINUED
Start: 2017-05-01 | End: 2017-05-01

## 2017-05-01 RX ORDER — FENTANYL CITRATE 50 UG/ML
12.5-5 INJECTION, SOLUTION INTRAMUSCULAR; INTRAVENOUS
Status: DISCONTINUED | OUTPATIENT
Start: 2017-05-01 | End: 2017-05-01

## 2017-05-01 RX ORDER — FENTANYL CITRATE 50 UG/ML
INJECTION, SOLUTION INTRAMUSCULAR; INTRAVENOUS AS NEEDED
Status: DISCONTINUED | OUTPATIENT
Start: 2017-05-01 | End: 2017-05-01 | Stop reason: HOSPADM

## 2017-05-01 RX ORDER — SODIUM CHLORIDE, SODIUM LACTATE, POTASSIUM CHLORIDE, CALCIUM CHLORIDE 600; 310; 30; 20 MG/100ML; MG/100ML; MG/100ML; MG/100ML
INJECTION, SOLUTION INTRAVENOUS
Status: DISCONTINUED | OUTPATIENT
Start: 2017-05-01 | End: 2017-05-01 | Stop reason: HOSPADM

## 2017-05-01 RX ORDER — ACETAMINOPHEN 325 MG/1
650 TABLET ORAL
Status: DISCONTINUED | OUTPATIENT
Start: 2017-05-01 | End: 2017-05-02 | Stop reason: HOSPADM

## 2017-05-01 RX ORDER — WARFARIN 7.5 MG/1
7.5 TABLET ORAL
Status: DISCONTINUED | OUTPATIENT
Start: 2017-05-01 | End: 2017-05-02 | Stop reason: HOSPADM

## 2017-05-01 RX ORDER — DIPHENHYDRAMINE HYDROCHLORIDE 50 MG/ML
12.5 INJECTION, SOLUTION INTRAMUSCULAR; INTRAVENOUS
Status: DISCONTINUED | OUTPATIENT
Start: 2017-05-01 | End: 2017-05-01 | Stop reason: HOSPADM

## 2017-05-01 RX ORDER — VALSARTAN 40 MG/1
20 TABLET ORAL DAILY
Status: DISCONTINUED | OUTPATIENT
Start: 2017-05-02 | End: 2017-05-02 | Stop reason: HOSPADM

## 2017-05-01 RX ORDER — HEPARIN SODIUM 1000 [USP'U]/ML
INJECTION, SOLUTION INTRAVENOUS; SUBCUTANEOUS AS NEEDED
Status: DISCONTINUED | OUTPATIENT
Start: 2017-05-01 | End: 2017-05-01 | Stop reason: HOSPADM

## 2017-05-01 RX ORDER — FENTANYL CITRATE 50 UG/ML
25 INJECTION, SOLUTION INTRAMUSCULAR; INTRAVENOUS
Status: DISCONTINUED | OUTPATIENT
Start: 2017-05-01 | End: 2017-05-01 | Stop reason: HOSPADM

## 2017-05-01 RX ORDER — HEPARIN SODIUM 200 [USP'U]/100ML
INJECTION, SOLUTION INTRAVENOUS
Status: DISCONTINUED
Start: 2017-05-01 | End: 2017-05-01

## 2017-05-01 RX ORDER — ONDANSETRON 2 MG/ML
INJECTION INTRAMUSCULAR; INTRAVENOUS AS NEEDED
Status: DISCONTINUED | OUTPATIENT
Start: 2017-05-01 | End: 2017-05-01 | Stop reason: HOSPADM

## 2017-05-01 RX ORDER — SODIUM CHLORIDE, SODIUM LACTATE, POTASSIUM CHLORIDE, CALCIUM CHLORIDE 600; 310; 30; 20 MG/100ML; MG/100ML; MG/100ML; MG/100ML
25 INJECTION, SOLUTION INTRAVENOUS CONTINUOUS
Status: DISCONTINUED | OUTPATIENT
Start: 2017-05-01 | End: 2017-05-01 | Stop reason: HOSPADM

## 2017-05-01 RX ORDER — PROTAMINE SULFATE 10 MG/ML
INJECTION, SOLUTION INTRAVENOUS
Status: DISCONTINUED
Start: 2017-05-01 | End: 2017-05-02 | Stop reason: HOSPADM

## 2017-05-01 RX ORDER — BUDESONIDE AND FORMOTEROL FUMARATE DIHYDRATE 160; 4.5 UG/1; UG/1
1 AEROSOL RESPIRATORY (INHALATION) 2 TIMES DAILY
Status: DISCONTINUED | OUTPATIENT
Start: 2017-05-01 | End: 2017-05-02

## 2017-05-01 RX ORDER — WARFARIN SODIUM 5 MG/1
5 TABLET ORAL
Status: DISCONTINUED | OUTPATIENT
Start: 2017-05-01 | End: 2017-05-01 | Stop reason: DRUGHIGH

## 2017-05-01 RX ORDER — PROTAMINE SULFATE 10 MG/ML
INJECTION, SOLUTION INTRAVENOUS AS NEEDED
Status: DISCONTINUED | OUTPATIENT
Start: 2017-05-01 | End: 2017-05-01 | Stop reason: HOSPADM

## 2017-05-01 RX ORDER — HEPARIN SODIUM 10000 [USP'U]/100ML
INJECTION, SOLUTION INTRAVENOUS
Status: DISCONTINUED
Start: 2017-05-01 | End: 2017-05-01

## 2017-05-01 RX ORDER — WARFARIN SODIUM 5 MG/1
10 TABLET ORAL
Status: DISCONTINUED | OUTPATIENT
Start: 2017-05-06 | End: 2017-05-02 | Stop reason: HOSPADM

## 2017-05-01 RX ORDER — GUAIFENESIN 100 MG/5ML
81 LIQUID (ML) ORAL DAILY
Status: DISCONTINUED | OUTPATIENT
Start: 2017-05-02 | End: 2017-05-02 | Stop reason: HOSPADM

## 2017-05-01 RX ORDER — SODIUM CHLORIDE 0.9 % (FLUSH) 0.9 %
5-10 SYRINGE (ML) INJECTION AS NEEDED
Status: CANCELLED | OUTPATIENT
Start: 2017-05-01

## 2017-05-01 RX ORDER — SODIUM CHLORIDE 0.9 % (FLUSH) 0.9 %
5-10 SYRINGE (ML) INJECTION AS NEEDED
Status: DISCONTINUED | OUTPATIENT
Start: 2017-05-01 | End: 2017-05-02 | Stop reason: HOSPADM

## 2017-05-01 RX ORDER — ROCURONIUM BROMIDE 10 MG/ML
INJECTION, SOLUTION INTRAVENOUS AS NEEDED
Status: DISCONTINUED | OUTPATIENT
Start: 2017-05-01 | End: 2017-05-01 | Stop reason: HOSPADM

## 2017-05-01 RX ORDER — HEPARIN SODIUM 200 [USP'U]/100ML
INJECTION, SOLUTION INTRAVENOUS
Status: COMPLETED
Start: 2017-05-01 | End: 2017-05-01

## 2017-05-01 RX ORDER — PROTAMINE SULFATE 10 MG/ML
25-100 INJECTION, SOLUTION INTRAVENOUS
Status: DISCONTINUED | OUTPATIENT
Start: 2017-05-01 | End: 2017-05-01

## 2017-05-01 RX ORDER — SOTALOL HYDROCHLORIDE 80 MG/1
120 TABLET ORAL EVERY 12 HOURS
Status: DISCONTINUED | OUTPATIENT
Start: 2017-05-01 | End: 2017-05-02 | Stop reason: HOSPADM

## 2017-05-01 RX ORDER — MIDAZOLAM HYDROCHLORIDE 1 MG/ML
1-5 INJECTION, SOLUTION INTRAMUSCULAR; INTRAVENOUS
Status: DISCONTINUED | OUTPATIENT
Start: 2017-05-01 | End: 2017-05-01

## 2017-05-01 RX ORDER — HEPARIN SODIUM 1000 [USP'U]/ML
30000 INJECTION, SOLUTION INTRAVENOUS; SUBCUTANEOUS ONCE
Status: DISCONTINUED | OUTPATIENT
Start: 2017-05-01 | End: 2017-05-01 | Stop reason: HOSPADM

## 2017-05-01 RX ORDER — HEPARIN SODIUM 1000 [USP'U]/ML
INJECTION, SOLUTION INTRAVENOUS; SUBCUTANEOUS
Status: DISCONTINUED | OUTPATIENT
Start: 2017-05-01 | End: 2017-05-01 | Stop reason: HOSPADM

## 2017-05-01 RX ORDER — PROCHLORPERAZINE EDISYLATE 5 MG/ML
INJECTION INTRAMUSCULAR; INTRAVENOUS
Status: DISPENSED
Start: 2017-05-01 | End: 2017-05-02

## 2017-05-01 RX ORDER — LIDOCAINE HYDROCHLORIDE 10 MG/ML
0.1 INJECTION, SOLUTION EPIDURAL; INFILTRATION; INTRACAUDAL; PERINEURAL AS NEEDED
Status: CANCELLED | OUTPATIENT
Start: 2017-05-01

## 2017-05-01 RX ORDER — ALBUTEROL SULFATE 90 UG/1
2 AEROSOL, METERED RESPIRATORY (INHALATION)
Status: DISCONTINUED | OUTPATIENT
Start: 2017-05-01 | End: 2017-05-02 | Stop reason: HOSPADM

## 2017-05-01 RX ORDER — DOBUTAMINE HYDROCHLORIDE 200 MG/100ML
INJECTION INTRAVENOUS
Status: DISCONTINUED
Start: 2017-05-01 | End: 2017-05-01

## 2017-05-01 RX ORDER — FLUTICASONE PROPIONATE 50 MCG
2 SPRAY, SUSPENSION (ML) NASAL EVERY EVENING
Status: DISCONTINUED | OUTPATIENT
Start: 2017-05-01 | End: 2017-05-02 | Stop reason: HOSPADM

## 2017-05-01 RX ORDER — SODIUM CHLORIDE 0.9 % (FLUSH) 0.9 %
5-10 SYRINGE (ML) INJECTION EVERY 8 HOURS
Status: CANCELLED | OUTPATIENT
Start: 2017-05-01

## 2017-05-01 RX ORDER — SUCCINYLCHOLINE CHLORIDE 20 MG/ML
INJECTION INTRAMUSCULAR; INTRAVENOUS AS NEEDED
Status: DISCONTINUED | OUTPATIENT
Start: 2017-05-01 | End: 2017-05-01 | Stop reason: HOSPADM

## 2017-05-01 RX ORDER — COLCHICINE 0.6 MG/1
0.6 TABLET ORAL 2 TIMES DAILY
Status: DISCONTINUED | OUTPATIENT
Start: 2017-05-01 | End: 2017-05-02 | Stop reason: HOSPADM

## 2017-05-01 RX ORDER — SODIUM CHLORIDE 0.9 % (FLUSH) 0.9 %
5-10 SYRINGE (ML) INJECTION EVERY 8 HOURS
Status: DISCONTINUED | OUTPATIENT
Start: 2017-05-01 | End: 2017-05-02 | Stop reason: HOSPADM

## 2017-05-01 RX ORDER — MIDAZOLAM HYDROCHLORIDE 1 MG/ML
INJECTION, SOLUTION INTRAMUSCULAR; INTRAVENOUS AS NEEDED
Status: DISCONTINUED | OUTPATIENT
Start: 2017-05-01 | End: 2017-05-01 | Stop reason: HOSPADM

## 2017-05-01 RX ORDER — ALBUTEROL SULFATE 0.83 MG/ML
2.5 SOLUTION RESPIRATORY (INHALATION)
Status: DISCONTINUED | OUTPATIENT
Start: 2017-05-01 | End: 2017-05-02 | Stop reason: HOSPADM

## 2017-05-01 RX ORDER — FUROSEMIDE 20 MG/1
20 TABLET ORAL DAILY
Status: DISCONTINUED | OUTPATIENT
Start: 2017-05-02 | End: 2017-05-02 | Stop reason: HOSPADM

## 2017-05-01 RX ORDER — HYDROMORPHONE HYDROCHLORIDE 1 MG/ML
.2-.5 INJECTION, SOLUTION INTRAMUSCULAR; INTRAVENOUS; SUBCUTANEOUS
Status: DISCONTINUED | OUTPATIENT
Start: 2017-05-01 | End: 2017-05-01 | Stop reason: HOSPADM

## 2017-05-01 RX ORDER — ETOMIDATE 2 MG/ML
INJECTION INTRAVENOUS AS NEEDED
Status: DISCONTINUED | OUTPATIENT
Start: 2017-05-01 | End: 2017-05-01 | Stop reason: HOSPADM

## 2017-05-01 RX ORDER — METFORMIN HYDROCHLORIDE 500 MG/1
1000 TABLET ORAL 2 TIMES DAILY WITH MEALS
Status: DISCONTINUED | OUTPATIENT
Start: 2017-05-02 | End: 2017-05-02 | Stop reason: HOSPADM

## 2017-05-01 RX ORDER — MORPHINE SULFATE 10 MG/ML
2 INJECTION, SOLUTION INTRAMUSCULAR; INTRAVENOUS
Status: DISCONTINUED | OUTPATIENT
Start: 2017-05-01 | End: 2017-05-01 | Stop reason: HOSPADM

## 2017-05-01 RX ORDER — PROTAMINE SULFATE 10 MG/ML
INJECTION, SOLUTION INTRAVENOUS
Status: DISPENSED
Start: 2017-05-01 | End: 2017-05-02

## 2017-05-01 RX ORDER — CLOPIDOGREL BISULFATE 75 MG/1
75 TABLET ORAL DAILY
Status: DISCONTINUED | OUTPATIENT
Start: 2017-05-02 | End: 2017-05-02 | Stop reason: HOSPADM

## 2017-05-01 RX ORDER — HYDROCODONE BITARTRATE AND ACETAMINOPHEN 5; 325 MG/1; MG/1
1 TABLET ORAL
Status: DISCONTINUED | OUTPATIENT
Start: 2017-05-01 | End: 2017-05-02 | Stop reason: HOSPADM

## 2017-05-01 RX ORDER — PROPOFOL 10 MG/ML
INJECTION, EMULSION INTRAVENOUS AS NEEDED
Status: DISCONTINUED | OUTPATIENT
Start: 2017-05-01 | End: 2017-05-01 | Stop reason: HOSPADM

## 2017-05-01 RX ORDER — SODIUM CHLORIDE 0.9 % (FLUSH) 0.9 %
5-10 SYRINGE (ML) INJECTION AS NEEDED
Status: DISCONTINUED | OUTPATIENT
Start: 2017-05-01 | End: 2017-05-01 | Stop reason: HOSPADM

## 2017-05-01 RX ORDER — AZELASTINE 1 MG/ML
1 SPRAY, METERED NASAL DAILY
Status: DISCONTINUED | OUTPATIENT
Start: 2017-05-02 | End: 2017-05-02 | Stop reason: HOSPADM

## 2017-05-01 RX ORDER — FENTANYL CITRATE 50 UG/ML
INJECTION, SOLUTION INTRAMUSCULAR; INTRAVENOUS
Status: DISPENSED
Start: 2017-05-01 | End: 2017-05-02

## 2017-05-01 RX ORDER — PRAVASTATIN SODIUM 10 MG/1
20 TABLET ORAL
Status: DISCONTINUED | OUTPATIENT
Start: 2017-05-01 | End: 2017-05-02 | Stop reason: HOSPADM

## 2017-05-01 RX ORDER — SODIUM CHLORIDE, SODIUM LACTATE, POTASSIUM CHLORIDE, CALCIUM CHLORIDE 600; 310; 30; 20 MG/100ML; MG/100ML; MG/100ML; MG/100ML
25 INJECTION, SOLUTION INTRAVENOUS CONTINUOUS
Status: CANCELLED | OUTPATIENT
Start: 2017-05-01 | End: 2017-05-02

## 2017-05-01 RX ORDER — DOBUTAMINE HYDROCHLORIDE 400 MG/100ML
INJECTION INTRAVENOUS
Status: DISCONTINUED | OUTPATIENT
Start: 2017-05-01 | End: 2017-05-01 | Stop reason: HOSPADM

## 2017-05-01 RX ORDER — DEXAMETHASONE SODIUM PHOSPHATE 4 MG/ML
INJECTION, SOLUTION INTRA-ARTICULAR; INTRALESIONAL; INTRAMUSCULAR; INTRAVENOUS; SOFT TISSUE AS NEEDED
Status: DISCONTINUED | OUTPATIENT
Start: 2017-05-01 | End: 2017-05-01 | Stop reason: HOSPADM

## 2017-05-01 RX ADMIN — SODIUM CHLORIDE, SODIUM LACTATE, POTASSIUM CHLORIDE, CALCIUM CHLORIDE: 600; 310; 30; 20 INJECTION, SOLUTION INTRAVENOUS at 14:32

## 2017-05-01 RX ADMIN — Medication 10 ML: at 21:46

## 2017-05-01 RX ADMIN — PROTAMINE SULFATE 100 MG: 10 INJECTION, SOLUTION INTRAVENOUS at 16:33

## 2017-05-01 RX ADMIN — FLUTICASONE PROPIONATE 2 SPRAY: 50 SPRAY, METERED NASAL at 21:45

## 2017-05-01 RX ADMIN — HEPARIN SODIUM 1500 ML: 200 INJECTION, SOLUTION INTRAVENOUS at 15:26

## 2017-05-01 RX ADMIN — DEXAMETHASONE SODIUM PHOSPHATE 10 MG: 4 INJECTION, SOLUTION INTRA-ARTICULAR; INTRALESIONAL; INTRAMUSCULAR; INTRAVENOUS; SOFT TISSUE at 15:16

## 2017-05-01 RX ADMIN — MIDAZOLAM HYDROCHLORIDE 2 MG: 1 INJECTION, SOLUTION INTRAMUSCULAR; INTRAVENOUS at 14:32

## 2017-05-01 RX ADMIN — FENTANYL CITRATE 50 MCG: 50 INJECTION, SOLUTION INTRAMUSCULAR; INTRAVENOUS at 17:11

## 2017-05-01 RX ADMIN — HYDROCODONE BITARTRATE AND ACETAMINOPHEN 1 TABLET: 5; 325 TABLET ORAL at 20:24

## 2017-05-01 RX ADMIN — DOBUTAMINE HYDROCHLORIDE 20 MCG/KG/MIN: 400 INJECTION INTRAVENOUS at 16:32

## 2017-05-01 RX ADMIN — SOTALOL HYDROCHLORIDE 120 MG: 80 TABLET ORAL at 21:45

## 2017-05-01 RX ADMIN — FENTANYL CITRATE 100 MCG: 50 INJECTION, SOLUTION INTRAMUSCULAR; INTRAVENOUS at 14:41

## 2017-05-01 RX ADMIN — SUCCINYLCHOLINE CHLORIDE 120 MG: 20 INJECTION INTRAMUSCULAR; INTRAVENOUS at 14:41

## 2017-05-01 RX ADMIN — PROPOFOL 50 MG: 10 INJECTION, EMULSION INTRAVENOUS at 16:07

## 2017-05-01 RX ADMIN — GABAPENTIN 800 MG: 300 CAPSULE ORAL at 20:54

## 2017-05-01 RX ADMIN — COLCHICINE 0.6 MG: 0.6 TABLET, FILM COATED ORAL at 23:36

## 2017-05-01 RX ADMIN — HEPARIN SODIUM 5000 UNITS/HR: 1000 INJECTION, SOLUTION INTRAVENOUS; SUBCUTANEOUS at 15:23

## 2017-05-01 RX ADMIN — PRAVASTATIN SODIUM 20 MG: 10 TABLET ORAL at 21:45

## 2017-05-01 RX ADMIN — ETOMIDATE 12 MG: 2 INJECTION INTRAVENOUS at 14:41

## 2017-05-01 RX ADMIN — ROCURONIUM BROMIDE 10 MG: 10 INJECTION, SOLUTION INTRAVENOUS at 14:41

## 2017-05-01 RX ADMIN — PROPOFOL 50 MG: 10 INJECTION, EMULSION INTRAVENOUS at 14:41

## 2017-05-01 RX ADMIN — PROPOFOL 20 MG: 10 INJECTION, EMULSION INTRAVENOUS at 16:17

## 2017-05-01 RX ADMIN — WARFARIN SODIUM 7.5 MG: 7.5 TABLET ORAL at 23:36

## 2017-05-01 RX ADMIN — ONDANSETRON 4 MG: 2 INJECTION INTRAMUSCULAR; INTRAVENOUS at 15:16

## 2017-05-01 RX ADMIN — PROCHLORPERAZINE EDISYLATE 5 MG: 5 INJECTION INTRAMUSCULAR; INTRAVENOUS at 17:41

## 2017-05-01 RX ADMIN — HEPARIN SODIUM 15000 UNITS: 1000 INJECTION, SOLUTION INTRAVENOUS; SUBCUTANEOUS at 15:22

## 2017-05-01 NOTE — ANESTHESIA PREPROCEDURE EVALUATION
Anesthetic History   No history of anesthetic complications            Review of Systems / Medical History  Patient summary reviewed, nursing notes reviewed and pertinent labs reviewed    Pulmonary    COPD: mild      Shortness of breath and smoker      Comments: Former smoker   Neuro/Psych   Within defined limits           Cardiovascular    Hypertension: well controlled      CHF  Dysrhythmias : atrial fibrillation  Pacemaker, CAD, cardiac stents and hyperlipidemia    Exercise tolerance: <4 METS  Comments:   Sick sinus syndrome    DNR (do not resuscitate   GI/Hepatic/Renal  Within defined limits              Endo/Other    Diabetes: type 2         Other Findings            Physical Exam    Airway  Mallampati: III  TM Distance: > 6 cm  Neck ROM: normal range of motion   Mouth opening: Normal     Cardiovascular    Rhythm: irregular  Rate: normal         Dental    Dentition: Poor dentition     Pulmonary  Breath sounds clear to auscultation               Abdominal  GI exam deferred       Other Findings   Comments: Missing multiple teeth         Anesthetic Plan    ASA: 3  Anesthesia type: general    Monitoring Plan: BIS      Induction: Intravenous  Anesthetic plan and risks discussed with: Patient      Was told to stop her beta blocker last Friday.

## 2017-05-01 NOTE — H&P (VIEW-ONLY)
Subjective:      Mireya Moe is a 73 y.o. female is here for hospital follow up s/p PCI with AF post procedure. She was discharged home with sotalol and presents today with shortness of breath. The patient denies chest pain, orthopnea, PND, LE edema, palpitations, syncope, presyncope or fatigue.        Patient Active Problem List    Diagnosis Date Noted    Fear associated with illness and body function 04/07/2017    Counseling regarding advanced care planning and goals of care 78/34/1637    Systolic CHF, acute ([address] 54/72/8149    Acute systolic CHF (congestive heart failure) ([address] 04/06/2017    CHF (congestive heart failure) ([address] 04/04/2017    Type 2 diabetes mellitus with diabetic neuropathy, without long-term current use of insulin ([address] 01/31/2017    Anticoagulation monitoring, INR range 2-3 57/69/4973    Diastolic CHF, acute on chronic ([address] 09/22/2014    S/P coronary artery stent placement 07/04/2014    DNR (do not resuscitate) 06/30/2014    Back pain 11/14/2012    Sinoatrial node dysfunction ([address] 07/05/2012    Cardiac pacemaker in situ 07/05/2012    Mixed hyperlipidemia 07/05/2012    Chest pain 09/21/2011    Anemia 07/25/2011    Sick sinus syndrome ([address] 02/25/2011    S/P angioplasty with stent 02/07/2011    Hypokalemia 07/20/2010    Hip pain 06/08/2010    Hypertension--essential 06/02/2010    Atrial fibrillation--paroxysmal 06/02/2010    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      Rhea Ochoa NP  Past Medical History:   Diagnosis Date    Atrial fibrillation ([address] 6/2/2010    CAD (coronary artery disease)     stent    Chronic diastolic heart failure ([address] 9/22/2014    COPD     COPD (chronic obstructive pulmonary disease) ([address] 6/2/2010    Diabetes ([address]     Fibroid     Heart failure ([address]     Hypertension 6/2/2010    NIDDM (non-insulin dependent diabetes mellitus) 6/2/2010    Screening mammogram 5/4/10    SOB (shortness of breath) 2014      Past Surgical History:   Procedure Laterality Date    HX  SECTION      HX OTHER SURGICAL      adrenal gland removed    HX PACEMAKER      SD EXCISE ADRENAL GLAND       Allergies   Allergen Reactions    Crestor [Rosuvastatin] Myalgia    Levaquin [Levofloxacin] Nausea Only     GI Upset    Lipitor [Atorvastatin] Diarrhea    Lyrica [Pregabalin] Myalgia      Family History   Problem Relation Age of Onset    Heart Disease Mother     Diabetes Father     Heart Disease Brother     negative for cardiac disease  Social History     Social History    Marital status:      Spouse name: N/A    Number of children: N/A    Years of education: N/A     Social History Main Topics    Smoking status: Former Smoker     Types: Cigarettes     Quit date: 2009    Smokeless tobacco: Never Used    Alcohol use No    Drug use: No    Sexual activity: Not Currently     Other Topics Concern    None     Social History Narrative     Current Outpatient Prescriptions   Medication Sig    metroNIDAZOLE (FLAGYL) 500 mg tablet Take 1 Tab by mouth three (3) times daily for 10 days.  pravastatin (PRAVACHOL) 20 mg tablet Take 1 Tab by mouth nightly.  valsartan (DIOVAN) 40 mg tablet Take 0.5 Tabs by mouth daily.  clopidogrel (PLAVIX) 75 mg tab Take 1 Tab by mouth daily.  fluticasone (FLONASE) 50 mcg/actuation nasal spray 2 Sprays by Both Nostrils route every evening.  gabapentin (NEURONTIN) 800 mg tablet TAKE ONE TABLET BY MOUTH THREE TIMES DAILY    Blood-Glucose Meter monitoring kit Check blood sugar daily.  May substitute for insurance preferred meter    tiotropium (SPIRIVA WITH HANDIHALER) 18 mcg inhalation capsule INHALE THE CONTENTS OF 1 CAPSULE THROUGH HANDIHALER DEVICE DAILY    metFORMIN (GLUCOPHAGE) 1,000 mg tablet TAKE 1 TABLET BY MOUTH TWICE DAILY WITH MEALS  Indications: PREVENTION OF TYPE 2 DIABETES MELLITUS    sotalol (BETAPACE) 120 mg tablet Take 1 Tab by mouth two (2) times a day. (Patient taking differently: Take 180 mg by mouth two (2) times a day.)    warfarin (COUMADIN) 5 mg tablet Take 1.5 tabs daily, but on Saturday and Sunday take 2 tabs.  furosemide (LASIX) 20 mg tablet Take 1 Tab by mouth daily.  colchicine 0.6 mg tablet Take 1 Tab by mouth two (2) times a day. (Patient taking differently: Take 1.25 mg by mouth daily. Indications: GOUT)    budesonide-formoterol (SYMBICORT) 160-4.5 mcg/actuation HFA inhaler Take 1 Puff by inhalation two (2) times a day.  albuterol (PROVENTIL VENTOLIN) 2.5 mg /3 mL (0.083 %) nebulizer solution 3 mL by Nebulization route every four (4) hours as needed for Wheezing.  FOLIC ACID/MULTIVIT-MIN/LUTEIN (CENTRUM SILVER PO) Take 1 Tab by mouth daily. Takes one po daily.  albuterol (VENTOLIN HFA) 90 mcg/actuation inhaler Take 2 Puffs by inhalation every six (6) hours as needed for Wheezing.  Azelastine (ASTEPRO) 0.15 % (205.5 mcg) nasal spray 1 Fortescue by Both Nostrils route daily.  cod liver oil cap Take 1 Cap by mouth daily.  aspirin 81 mg chewable tablet Take 81 mg by mouth daily.  warfarin (COUMADIN) 5 mg tablet TAKE ONE TABLET BY MOUTH ONCE DAILY    HYDROcodone-acetaminophen (NORCO) 5-325 mg per tablet Take 1 Tab by mouth every four (4) hours as needed for Pain. Max Daily Amount: 6 Tabs.  warfarin (COUMADIN) 5 mg tablet Take 7.5 mg by mouth five (5) days a week. 7.5 mg five days a week Monday through Friday    evolocumab (REPATHA PUSHTRONEX) 420 mg/3.5 mL Injt 420 mg by SubCUTAneous route every month. Indications: arteriosclerotic vascular disease     No current facility-administered medications for this visit.        Vitals:    04/25/17 1334 04/25/17 1347 04/25/17 1356   BP: 108/66 100/56 98/66   Pulse: 67 89 98   Resp: 18     SpO2: 97%     Weight: 172 lb 3.2 oz (78.1 kg)     Height: 5' 6.5\" (1.689 m)         I have reviewed the nurses notes, vitals, problem list, allergy list, medical history, family, social history and medications. Review of Symptoms:    General: Pt denies excessive weight gain or loss. Pt is able to conduct ADL's  HEENT: Denies blurred vision, headaches, epistaxis and difficulty swallowing. Respiratory:  +shortness of breath, Denies shortness of breath, [place], wheezing or stridor. Cardiovascular: +palpitations, Denies precordial pain, edema or PND  Gastrointestinal: Denies poor appetite, indigestion, abdominal pain or blood in stool  Urinary: Denies dysuria, pyuria  Musculoskeletal: Denies pain or swelling from muscles or joints  Neurologic: Denies tremor, paresthesias, or sensory motor disturbance  Skin: Denies rash, itching or texture change. Psych: Denies depression      Physical Exam:      General: Well developed, in no acute distress. HEENT: Eyes - PERRL, no jvd  Heart:  +irregular, Normal S1/S2 negative S3 or S4. Regular, no murmur, gallop or rub.   Respiratory: Clear bilaterally x 4, no wheezing or rales  Abdomen:   Soft, non-tender, bowel sounds are active.   Extremities:  No edema, normal cap refill, no cyanosis. Musculoskeletal: No clubbing  Neuro: A&Ox3, speech clear, gait stable. Skin: Skin color is normal. No rashes or lesions. Non diaphoretic  Vascular: 2+ pulses symmetric in all extremities    Cardiographics    Ekg: atrial fibrillation  Biotronik PM: 29% AF x15 days.      Results for orders placed or performed during the hospital encounter of 04/18/17   EKG, 12 LEAD, INITIAL   Result Value Ref Range    Ventricular Rate 94 BPM    Atrial Rate 91 BPM    QRS Duration 90 ms    Q-T Interval 370 ms    QTC Calculation (Bezet) 462 ms    Calculated R Axis 16 degrees    Calculated T Axis -71 degrees    Diagnosis       Atrial fibrillation  Nonspecific ST abnormality  Confirmed by Iron Pacheco ([id number]) on 4/18/2017 8:57:37 AM           Lab Results   Component Value Date/Time    WBC 7.8 04/18/2017 02:46 AM    Hemoglobin (POC) 10.6 05/19/2016 08:50 AM    HGB 13.4 04/18/2017 02:46 AM    HCT 39.5 04/18/2017 02:46 AM    PLATELET 893 75/70/6983 02:46 AM    MCV 83.3 04/18/2017 02:46 AM      Lab Results   Component Value Date/Time    Sodium 139 04/18/2017 02:46 AM    Potassium 4.0 04/18/2017 02:46 AM    Chloride 104 04/18/2017 02:46 AM    CO2 25 04/18/2017 02:46 AM    Anion gap 10 04/18/2017 02:46 AM    Glucose 119 04/18/2017 02:46 AM    BUN 20 04/18/2017 02:46 AM    Creatinine 1.24 04/18/2017 02:46 AM    BUN/Creatinine ratio 16 04/18/2017 02:46 AM    GFR est AA 53 04/18/2017 02:46 AM    GFR est non-AA 43 04/18/2017 02:46 AM    Calcium 9.6 04/18/2017 02:46 AM    Bilirubin, total 0.6 04/18/2017 02:46 AM    AST (SGOT) 57 04/18/2017 02:46 AM    Alk. phosphatase 124 04/18/2017 02:46 AM    Protein, total 8.3 04/18/2017 02:46 AM    Albumin 3.6 04/18/2017 02:46 AM    Globulin 4.7 04/18/2017 02:46 AM    A-G Ratio 0.8 04/18/2017 02:46 AM    ALT (SGPT) 45 04/18/2017 02:46 AM         Assessment:     Assessment:        ICD-10-CM ICD-9-CM    1. Essential hypertension I10 401.9 AMB POC EKG ROUTINE W/ 12 LEADS, INTER & REP     Orders Placed This Encounter    AMB POC EKG ROUTINE W/ 12 LEADS, INTER & REP     Order Specific Question:   Reason for Exam:     Answer:   routine        Plan:   Ms. Josepha Sever is here for follow up of hospital visit. She has symptomatic left sided aflutter/afib 29% of the time and has been maintained on sotalol. EKG today shows AF. She is a candidate for is a candidate for an AF/AFL ablation. I discussed the risks/benefits/alternatives of the procedure with the patient. Risks include (but are not limited to) bleeding, infection, cva/mi/tamponade/esophageal perforation/pv stenosis/death. The patient understands that there is a 2-3% major complication rate and agrees to proceed. Thank you for this interesting consultation. She can return to the work this week. Continue medical management for HTN. Thank you for allowing me to participate in Batsheva Gomez 's care.     Isaiah MELCHOR Ely Adler MD, [facility], [facility]

## 2017-05-01 NOTE — PROGRESS NOTES
Cardiac Cath Lab Recovery Arrival Note:      Latonia Castillo arrived to Cardiac Cath Lab, Recovery Area. Staff introduced to patient. Patient identifiers verified with NAME and DATE OF BIRTH. Procedure verified with patient. Consent forms reviewed and signed by patient or authorized representative and verified. Allergies verified. Patient and family oriented to department. Patient and family informed of procedure and plan of care. Questions answered with review. Patient prepped for procedure, per orders from physician, prior to arrival.    Patient on cardiac monitor, non-invasive blood pressure, SPO2 monitor. On room air. Patient is A&Ox 3. Patient reports no c/o. Patient in stretcher, in low position, with side rails up, call bell within reach, patient instructed to call if assistance as needed. Patient prep in: [address], [place] [address]. Patient family has pager # none  Family in: [facility] waiting area.    Prep by: Tina Olson RN

## 2017-05-01 NOTE — DISCHARGE INSTRUCTIONS
[address]  [phone]        [facility] INSTRUCTIONS    Patient ID:  Alfreda Connell  [id number]  2 y.o.  1950    Admit Date: 5/1/2017    Discharge Date: 5/2/2017     Admitting Physician: Araeclis Escudero MD     Discharge Physician: Kimberly Herman NP/ Dr. Meme Schwarz     Admission Diagnoses:   a fib  RECOVERY NEEDED IN PACU  a fib    Discharge Diagnoses: Active Problems:    S/P ablation of atrial fibrillation (5/2/2017)      Overview: 5/1/17    atrial fibrillation  htn    Discharge Condition: Good    Cardiology Procedures this Admission:  AF ablation    Disposition: home    Reference discharge instructions provided by nursing for diet and activity. Follow-up with Dr Nick Mota in one week. Call [phone] to make an appointment. Signed:  Kimberly Herman NP  5/2/2017  2:47 PM    S/P ABLATION DISCHARGE INSTRUCTIONS    It is normal to feel tired the first couple days. Take it easy and follow the physicians instructions. CHECK THE CATHETER INSERTION SITE DAILY:  You may shower 24 hours after the procedure, remove the bandage during showering. Wash with soap and water and pat dry. Gentle cleaning of the site with soap and water is sufficient, cover with a dry clean dressing or bandage. Do not apply creams or powders to the area. Do not sit in a bathtub or pool of water for 7 days or until wound has completely healed. Temporary bruising and discomfort is normal and may last a few weeks. You may have a  formation of a small lump at the site which may last up to 6 weeks. CALL THE PHYSICIAN:  If the site becomes red, swollen or feels warm to the touch  If there is bleeding or drainage or if there is unusual pain at the groin or down the leg. If there is any bleeding, lie down, apply pressure or have someone apply pressure with a clean cloth until the bleeding stops.   If the bleeding continues, call 911 to be transported to the hospital.  DO NOT DRIVE YOURSELF, OR HAVE ANYONE ELSE DRIVE YOU - CALL [phone]. Activity:      For the first 24-48 hours or as instructed by the physician:  No lifting, pushing or pulling over 5 pounds and no straining the insertion site. Do not life grocery bags or the garbage can, do not run the vacuum  or  for 7 days. Start with short walks as in the hospital and gradually increase as tolerated each day. It is recommended to walk 30 minutes 5-7 days per week. Follow your physicians instructions on activity. Avoid walking outside in extremes of heat or cold. Walk inside when it is cold and windy or hot and humid. Things to keep in mind:  No driving for at least 5 days, or as designated by your physician. Limit the number of times you go up and down the stairs  Take rests and pace yourself with activity. Be careful and do not strain with bowel movements. Medications: Take all medications as prescribed  Call your physician if you have any questions  Keep an updated list of your medications with you at all times and give a list to your physician and pharmacist    Signs and Symptoms:  Be cautious of symptoms of angina or recurrent symptoms such as chest discomfort, unusual shortness of breath or fatigue, palpitations. After Care: Follow up with your physician as instructed. Follow a heart healthy diet with proper portion control, daily stress management, daily exercise, blood pressure and cholesterol control , and smoking cessation. AFTER YOU TRANSESOPHAGEAL ECHOCARDIOGRAM    Do not eat or drink for at least two hours after your procedure. Your throat will be numb and there is a risk you might have difficulty swallowing for a while. Be careful when you do eat or drink for the first time especially with hot fluids since you could easily burn your throat. Call your doctor if:    · You are bleeding from your throat or mouth.   · You have trouble breathing all of a sudden. · You have chest pain or any pain that spreads to your neck, jaw, or arms. · You have questions or concerns.   · You have a fever greater than 101°F.

## 2017-05-01 NOTE — PERIOP NOTES
Handoff Report from Operating Room to PACU    Report received from  [facility]  and [facility] regarding Jasmyn Pires. Surgeon(s):  Case Anesthesia  And Procedure(s) (LRB):  PACU/RECOVERY FROM SIERRA/FIB ABLATION (N/A)  confirmed   with drains and dressings discussed. Anesthesia type, drugs, patient history, complications, estimated blood loss, vital signs, intake and output, and last pain medication were reviewed.

## 2017-05-01 NOTE — ANESTHESIA POSTPROCEDURE EVALUATION
Post-Anesthesia Evaluation and Assessment    Patient: Mireya Moe MRN: [id number]  SSN: [id number]    YOB: 1950  Age: 73 y.o. Sex: female       Cardiovascular Function/Vital Signs  Visit Vitals    BP 96/49    Pulse 90    Temp 37.3 °C (99.1 °F)    Resp 18    Ht 5' 6\" (1.676 m)    Wt 77.1 kg (170 lb)    SpO2 94%    Breastfeeding No    BMI 27.44 kg/m2       Patient is status post general anesthesia for * No procedures listed *. Nausea/Vomiting: None    Postoperative hydration reviewed and adequate. Pain:  Pain Scale 1: Numeric (0 - 10) (05/01/17 1720)  Pain Intensity 1: 0 (05/01/17 1720)   Managed    Neurological Status:   Neuro (WDL): Within Defined Limits (05/01/17 1720)  Neuro  LUE Motor Response: Purposeful;Spontaneous  (05/01/17 1720)  LLE Motor Response: Purposeful;Spontaneous  (05/01/17 1720)  RUE Motor Response: Purposeful;Spontaneous  (05/01/17 1720)  RLE Motor Response: Purposeful;Spontaneous  (05/01/17 1720)   At baseline    Mental Status and Level of Consciousness: Arousable    Pulmonary Status:   O2 Device: Nasal cannula (05/01/17 1720)   Adequate oxygenation and airway patent    Complications related to anesthesia: None    Post-anesthesia assessment completed.  No concerns    Signed By: Archana Wall MD     May 1, 2017

## 2017-05-01 NOTE — PROCEDURES
[address] [address]  [phone]    Indications and Pre-Procedure Diagnosis:  Tal Miramontes is a 73 y.o. female with persistent AF is referred for electr-physiologic evaluation and intervention. Post Procedure Diagnosis    Atrial fibrillation    Atrial Fibrillation Ablation Summary  Informed consent was obtained. The patient was brought to the EP lab where SIERRA demonstrated no thrombus in the left atrial appendage. All vascular access sites were prepped and draped in the usual sterile fashion and the Seldinger technique was used to catherize the RFV and LFV with multi-polar electrode catheters, which were placed in the appropriate intra-cardiac sites under fluoroscopic guidance (see catheter list). Right and left atrial pacing and recording, His bundle recording, and right ventricular pacing and recording were performed. Continuous pulse oximetry and cuff BP monitoring were performed. During the procedure, the patient received Versed, Fentanyl and Propofol for sedation per anesthesia personnel. Transeptal Puncture  A transeptal atrial puncture was performed using fluoroscopic and intracardiac ultrasound guidance. An SL1 sheath was dragged from the SVC along the septum until a sudden leftward displacement was observed. Engagement of the Fossa Ovalis was confirmed by the interatrial tenting seen under intracardiac ultrasound guidance. The Murvin Larve NRG needle was advanced into the left atrium and its positioned confirmed with contrast injection. The dilator and sheath were advanced carefully over the needle into the body of the left atrium and the dilator/needle removed. An Agilis sheath was exchanged over the wire for the SL1 sheath. A second transeptal puncture was performed using the same technique and a SL1 sheath was maintained.  No pericardial effusion was appreciated on intracardiac ultrasound so a bolus injection of heparin 100 units/kg was administered, with a continuous heparin gtt of 5000 units/hr so that the activated clotting time maintained was between 300 and 400 seconds with additional boluses q 30 minutes as necessary. A 3D shell representing the left atrium and pulmonary veins was constructed with the electroanatomic mapping system. A Lasso mapping catheter was advanced into the left atrium through the Agilis sheath and a map of the body of the LA and the pulmonary veins was created. A SmartMelophonech F/J curve 8Fr/3.5mm catheter was then advanced into the left atrium and WACA RFA was performed around the pulmonary veins. Pulmonary vein isolation was confirmed with exit and entrance block. Additional Focus  The patient remained in atrial fibrillation and the RF catheter was drawn up to the superior aspect of the LUPV and across the roof the superior aspect of the RUPV. Additional Focus  The patient remained in atrial fibrillation and the RF catheter was used to perform a WACA around the MAGGY including the aidee between the MAGGY and the LUPV. Additional Focus  The patient remained in atrial fibrillation and the RF catheter was used to perform a RF line from the LIPV down to the MV to create a mitral isthmus line. The patient was cardioverted to sinus rhythm with one 360J biphasic synchronized shock. Radiofrequency energy was applied with a target power of 25-35W. Local sites were targeted until the local electrogram amplitude decreased to <0.15mv or by >50%. Autonomic manipulation with Dobutamine @ 20 mcg/min was performed during this procedure. Post ablation, rapid atrial pacing to 240 msec, on and off dobutamine, failed to induce any atrial arrhythmias. At the end of the procedure, all catheters were removed, heparin reversed, groin sheaths pulled and hemostasis achieved. The patient left the laboratory in a stable condition. Fluoroscopy and procedure times were 16 and 90 minutes respectively. Estimated blood loss: <10 ml. Sharp counts: correct. Specimen (s) collected: none. The procedure related complication occurred: none. The following problems were encountered: none. Conduction intervals (ms)    A-A A-H H-V P-R QRS Q-T R-R V-V  [phone]    AV victoria conduction    VA Block when pacing at 480 ms    Findings and Summary    This study demonstrates:  1. Successful PVI of all 4 PVs  2. Additional RFA focus of roof line  3. Additional RFA focus of WACA around the MAGGY  4. Additional RFA focus of mitral isthmus line  5. No further inducible atrial arrhythmias on dobutamine with rapid atrial pacing    Recommendation:  1. [address]  2. Sotalol  3. DDDR 75 bpm    Thank you for allowing me to participate in this patients care.     Devin Andrews MD, Ivone Matthews

## 2017-05-01 NOTE — INTERVAL H&P NOTE
H&P Update:  Paulie Dunlap was seen and examined. History and physical has been reviewed. The patient has been examined.  There have been no significant clinical changes since the completion of the originally dated History and Physical.    Signed By: Migue Morgan MD     May 1, 2017 8:56 AM

## 2017-05-01 NOTE — PERIOP NOTES
C/o nausea relieved by compazine 5 mg iv, vitals stable, no c/o chest pain, bilateral groin soft, no hematoma noted

## 2017-05-01 NOTE — IP AVS SNAPSHOT
[address] [facility] 
[phone] Patient: Wu Roberson MRN: [id number] BKX:[id number]/[id number] You are allergic to the following Allergen Reactions Crestor (Rosuvastatin) Myalgia Levaquin (Levofloxacin) Nausea Only GI Upset Lipitor (Atorvastatin) Diarrhea Lyrica (Pregabalin) Myalgia Recent Documentation Height Weight Breastfeeding? BMI OB Status Smoking Status 1.676 m 77.1 kg No 27.44 kg/m2 Postmenopausal Former Smoker Emergency Contacts Name Discharge Info Relation Home Work Mobile Halina Collins  Child [2] [phone] [address] [facility] CAREGIVER [3] Other Relative [6] [phone] Huong Knox  Child [2] [phone] About your hospitalization You were admitted on:  May 1, 2017 You [facility] You were discharged on:  May 2, 2017 Unit phone number:  [phone] Why you were hospitalized Your primary diagnosis was:  Not on File Your diagnoses also included:  S/P Ablation Of Atrial Fibrillation Providers Seen During Your Hospitalizations Provider Role Specialty Primary office phone Eric Mcintosh MD Attending Provider Cardiology [phone] Your Primary Care Physician (PCP) Primary Care Physician Office Phone Office Fax Belgica Humphreys I [phone] Follow-up Information Follow up With Details Comments Contact Info Yen Gómez NP   [facility] [address] 
[phone] Eric Mcintosh MD In 1 week Please call and make an appointment for 1 week from now when you get home. Thank you! [address] [facility] 
[phone] Your Appointments  Monday May 15, 2017  9:40 AM EDT  
 ROUTINE CARE with Rhea Ochoa NP  
[facility]) [address] [address]  
[phone] Thursday May 18, 2017  9:15 AM EDT  
PACEMAKER with PACEMAKER, [facility]) [address] [place]  
[phone] Current Discharge Medication List  
  
CONTINUE these medications which have CHANGED Dose & Instructions Dispensing Information Comments Morning Noon Evening Bedtime  
 colchicine 0.6 mg tablet What changed:   
- how much to take - when to take this Your last dose was: Your next dose is:    
   
   
 Dose:  0.6 mg Take 1 Tab by mouth two (2) times a day. Quantity:  60 Tab Refills:  5  
     
   
   
   
  
 sotalol 120 mg tablet Commonly known as:  Savi Campos What changed:  how much to take Your last dose was: Your next dose is:    
   
   
 Dose:  120 mg Take 1 Tab by mouth two (2) times a day. Quantity:  180 Tab Refills:  3 CONTINUE these medications which have NOT CHANGED Dose & Instructions Dispensing Information Comments Morning Noon Evening Bedtime * albuterol 90 mcg/actuation inhaler Commonly known as:  VENTOLIN HFA Your last dose was: Your next dose is:    
   
   
 Dose:  2 Puff Take 2 Puffs by inhalation every six (6) hours as needed for Wheezing. Quantity:  1 Inhaler Refills:  11  
     
   
   
   
  
 * albuterol 2.5 mg /3 mL (0.083 %) nebulizer solution Commonly known as:  PROVENTIL VENTOLIN Your last dose was: Your next dose is:    
   
   
 Dose:  2.5 mg  
3 mL by Nebulization route every four (4) hours as needed for Wheezing. Quantity:  1 Package Refills:  5  
     
   
   
   
  
 aspirin 81 mg chewable tablet Your last dose was:     
   
Your next dose is:    
   
   
 Dose:  81 mg  
 Take 81 mg by mouth daily. Refills:  0 Azelastine 0.15 % (205.5 mcg) nasal spray Commonly known as:  ASTEPRO Your last dose was: Your next dose is:    
   
   
 Dose:  1 Spray 1 [place] by Both Nostrils route daily. Refills:  0 Blood-Glucose Meter monitoring kit Your last dose was: Your next dose is:    
   
   
 Check blood sugar daily. May substitute for insurance preferred meter Quantity:  1 Kit Refills:  0  
     
   
   
   
  
 budesonide-formoterol 160-4.5 mcg/actuation HFA inhaler Commonly known as:  SYMBICORT Your last dose was: Your next dose is:    
   
   
 Dose:  1 Puff Take 1 Puff by inhalation two (2) times a day. Quantity:  3 Inhaler Refills:  3 CENTRUM SILVER PO Your last dose was: Your next dose is:    
   
   
 Dose:  1 Tab Take 1 Tab by mouth daily. Takes one po daily. Refills:  0  
     
   
   
   
  
 clopidogrel 75 mg Tab Commonly known as:  PLAVIX Your last dose was: Your next dose is:    
   
   
 Dose:  75 mg Take 1 Tab by mouth daily. Quantity:  30 Tab Refills:  0  
     
   
   
   
  
 cod liver oil Cap Your last dose was: Your next dose is:    
   
   
 Dose:  1 Cap Take 1 Cap by mouth daily. Refills:  0  
     
   
   
   
  
 evolocumab 420 mg/3.5 mL Injt Commonly known as:  Jackie Eis Your last dose was: Your next dose is:    
   
   
 Dose:  420 mg  
420 mg by SubCUTAneous route every month. Indications: arteriosclerotic vascular disease Quantity:  1 Device Refills:  12  
     
   
   
   
  
 FLONASE 50 mcg/actuation nasal spray Generic drug:  fluticasone Your last dose was: Your next dose is:    
   
   
 Dose:  2 Spray 2 Sprays by Both Nostrils route every evening. Refills:  0  
     
   
   
   
  
 furosemide 20 mg tablet Commonly known as:  LASIX Your last dose was: Your next dose is:    
   
   
 Dose:  20 mg Take 1 Tab by mouth daily. Quantity:  90 Tab Refills:  0  
     
   
   
   
  
 gabapentin 800 mg tablet Commonly known as:  NEURONTIN Your last dose was: Your next dose is: TAKE ONE TABLET BY MOUTH THREE TIMES DAILY Quantity:  90 Tab Refills:  11  
     
   
   
   
  
 metFORMIN 1,000 mg tablet Commonly known as:  GLUCOPHAGE Your last dose was: Your next dose is: TAKE 1 TABLET BY MOUTH TWICE DAILY WITH MEALS  Indications: PREVENTION OF TYPE 2 DIABETES MELLITUS Quantity:  180 Tab Refills:  3  
     
   
   
   
  
 pravastatin 20 mg tablet Commonly known as:  PRAVACHOL Your last dose was: Your next dose is:    
   
   
 Dose:  20 mg Take 1 Tab by mouth nightly. Quantity:  90 Tab Refills:  3  
     
   
   
   
  
 tiotropium 18 mcg inhalation capsule Commonly known as:  [employer] [address] Your last dose was: Your next dose is:    
   
   
 INHALE THE CONTENTS OF 1 CAPSULE THROUGH HANDIHALER DEVICE DAILY Quantity:  90 Cap Refills:  3  
     
   
   
   
  
 valsartan 40 mg tablet Commonly known as:  DIOVAN Your last dose was: Your next dose is:    
   
   
 Dose:  20 mg Take 0.5 Tabs by mouth daily. Quantity:  30 Tab Refills:  0  
     
   
   
   
  
 * warfarin 5 mg tablet Commonly known as:  COUMADIN Your last dose was: Your next dose is:    
   
   
 Dose:  7.5 mg Take 7.5 mg by mouth five (5) days a week. 7.5 mg five days a week Monday through Friday Refills:  0  
     
   
   
   
  
 * warfarin 5 mg tablet Commonly known as:  COUMADIN Your last dose was: Your next dose is: Take 1.5 tabs daily, but on Saturday and Sunday take 2 tabs. Quantity:  55 Tab Refills:  2 * Notice: This list has 4 medication(s) that are the same as other medications prescribed for you. Read the directions carefully, and ask your doctor or other care provider to review them with you. Discharge Instructions [address] [facility], [address]  [phone] ABLATION DISCHARGE INSTRUCTIONS Patient ID: 
Michael Wei [id number] 
63 y.o. 
1950 Admit Date: 5/1/2017 Discharge Date: 5/2/2017 Admitting Physician: Jaquan Scruggs MD  
 
Discharge Physician: Deborah Dugan NP/ Dr. Yaritza Miranda Admission Diagnoses:  
a fib RECOVERY NEEDED IN PACU 
a fib Discharge Diagnoses: Active Problems: S/P ablation of atrial fibrillation (5/2/2017) Overview: 5/1/17 
 
atrial fibrillation 
htn Discharge Condition: Good Cardiology Procedures this Admission:  AF ablation Disposition: home Reference discharge instructions provided by nursing for diet and activity. Follow-up with Dr Darrian Bueno in one week. Call [phone] to make an appointment. Signed: 
Deborah Dugan NP 
5/2/2017 
2:47 PM 
 
S/P ABLATION DISCHARGE INSTRUCTIONS It is normal to feel tired the first couple days. Take it easy and follow the physicians instructions. CHECK THE CATHETER INSERTION SITE DAILY: 
You may shower 24 hours after the procedure, remove the bandage during showering. Wash with soap and water and pat dry. Gentle cleaning of the site with soap and water is sufficient, cover with a dry clean dressing or bandage. Do not apply creams or powders to the area. Do not sit in a bathtub or pool of water for 7 days or until wound has completely healed. Temporary bruising and discomfort is normal and may last a few weeks. You may have a  formation of a small lump at the site which may last up to 6 weeks. CALL THE PHYSICIAN: If the site becomes red, swollen or feels warm to the touch If there is bleeding or drainage or if there is unusual pain at the groin or down the leg. If there is any bleeding, lie down, apply pressure or have someone apply pressure with a clean cloth until the bleeding stops. If the bleeding continues, call 911 to be transported to the hospital. 
DO  [facility] [address]. Activity: For the first 24-48 hours or as instructed by the physician: No lifting, pushing or pulling over 5 pounds and no straining the insertion site. Do not life grocery bags or the garbage can, do not run the vacuum  or  for 7 days. Start with short walks as in the hospital and gradually increase as tolerated each day. It is recommended to walk 30 minutes 5-7 days per week. Follow your physicians instructions on activity. Avoid walking outside in extremes of heat or cold. Walk inside when it is cold and windy or hot and humid. Things to keep in mind: 
No driving for at least 5 days, or as designated by your physician. Limit the number of times you go up and down the stairs Take rests and pace yourself with activity. Be careful and do not strain with bowel movements. Medications: Take all medications as prescribed Call your physician if you have any questions Keep an updated list of your medications with you at all times and give a list to your physician and pharmacist 
 
Signs and Symptoms: 
Be cautious of symptoms of angina or recurrent symptoms such as chest discomfort, unusual shortness of breath or fatigue, palpitations. After Care: Follow up with your physician as instructed. Follow a heart healthy diet with proper portion control, daily stress management, daily exercise, blood pressure and cholesterol control , and smoking cessation. AFTER YOU TRANSESOPHAGEAL ECHOCARDIOGRAM 
 
Do not eat or drink for at least two hours after your procedure.  Your throat will be numb and there is a risk you might have difficulty swallowing for a while. Be careful when you do eat or drink for the first time especially with hot fluids since you could easily burn your throat. Call your doctor if: 
 
· You are bleeding from your throat or mouth. · You have trouble breathing all of a sudden. · You have chest pain or any pain that spreads to your neck, jaw, or arms. · You have questions or concerns. · You have a fever greater than 101°F. Discharge Orders None Introducing [facility]! Dear Ulises Wilkins: Thank you for requesting a [employer] account. Our records indicate that you already have an active [employer] account. You can access your account anytime at [email or web address] Did you know that you can access your hospital and ER discharge instructions at any time in [employer]? You can also review all of your test results from your hospital stay or ER visit. Additional Information If you have questions, please visit the Frequently Asked Questions section of the [employer] website at [email or web address] Remember, [employer] is NOT to be used for urgent needs. For medical emergencies, dial 911. Now available from your iPhone and Android! General Information Please provide this summary of care documentation to your next provider. Patient Signature:  ____________________________________________________________ Date:  ____________________________________________________________  
  
Meagan Brought Provider Signature:  ____________________________________________________________ Date:  ____________________________________________________________

## 2017-05-02 VITALS
WEIGHT: 170 LBS | TEMPERATURE: 97.5 F | BODY MASS INDEX: 27.32 KG/M2 | OXYGEN SATURATION: 99 % | SYSTOLIC BLOOD PRESSURE: 109 MMHG | RESPIRATION RATE: 18 BRPM | DIASTOLIC BLOOD PRESSURE: 54 MMHG | HEART RATE: 76 BPM | HEIGHT: 66 IN

## 2017-05-02 PROBLEM — Z98.890 S/P ABLATION OF ATRIAL FIBRILLATION: Status: ACTIVE | Noted: 2017-05-02

## 2017-05-02 PROBLEM — Z86.79 S/P ABLATION OF ATRIAL FIBRILLATION: Status: ACTIVE | Noted: 2017-05-02

## 2017-05-02 LAB
ANION GAP BLD CALC-SCNC: 14 MMOL/L (ref 5–15)
ATRIAL RATE: 79 BPM
BUN SERPL-MCNC: 9 MG/DL (ref 6–20)
BUN/CREAT SERPL: 9 (ref 12–20)
CALCIUM SERPL-MCNC: 8.1 MG/DL (ref 8.5–10.1)
CALCULATED P AXIS, ECG09: 90 DEGREES
CALCULATED R AXIS, ECG10: 81 DEGREES
CALCULATED T AXIS, ECG11: 115 DEGREES
CHLORIDE SERPL-SCNC: 106 MMOL/L (ref 97–108)
CO2 SERPL-SCNC: 20 MMOL/L (ref 21–32)
CREAT SERPL-MCNC: 0.97 MG/DL (ref 0.55–1.02)
DIAGNOSIS, 93000: NORMAL
ERYTHROCYTE [DISTWIDTH] IN BLOOD BY AUTOMATED COUNT: 14.9 % (ref 11.5–14.5)
GLUCOSE BLD STRIP.AUTO-MCNC: 191 MG/DL (ref 65–100)
GLUCOSE BLD STRIP.AUTO-MCNC: 212 MG/DL (ref 65–100)
GLUCOSE SERPL-MCNC: 196 MG/DL (ref 65–100)
HCT VFR BLD AUTO: 34.8 % (ref 35–47)
HGB BLD-MCNC: 11.4 G/DL (ref 11.5–16)
INR PPP: 1.1 (ref 0.9–1.1)
MAGNESIUM SERPL-MCNC: 1.8 MG/DL (ref 1.6–2.4)
MCH RBC QN AUTO: 27.7 PG (ref 26–34)
MCHC RBC AUTO-ENTMCNC: 32.8 G/DL (ref 30–36.5)
MCV RBC AUTO: 84.5 FL (ref 80–99)
P-R INTERVAL, ECG05: 206 MS
PHOSPHATE SERPL-MCNC: 1.9 MG/DL (ref 2.6–4.7)
PLATELET # BLD AUTO: 199 K/UL (ref 150–400)
POTASSIUM SERPL-SCNC: 3.9 MMOL/L (ref 3.5–5.1)
PROTHROMBIN TIME: 11.3 SEC (ref 9–11.1)
Q-T INTERVAL, ECG07: 384 MS
QRS DURATION, ECG06: 94 MS
QTC CALCULATION (BEZET), ECG08: 440 MS
RBC # BLD AUTO: 4.12 M/UL (ref 3.8–5.2)
SERVICE CMNT-IMP: ABNORMAL
SERVICE CMNT-IMP: ABNORMAL
SODIUM SERPL-SCNC: 140 MMOL/L (ref 136–145)
VENTRICULAR RATE, ECG03: 79 BPM
WBC # BLD AUTO: 8.6 K/UL (ref 3.6–11)

## 2017-05-02 PROCEDURE — 74011250637 HC RX REV CODE- 250/637: Performed by: INTERNAL MEDICINE

## 2017-05-02 PROCEDURE — 36415 COLL VENOUS BLD VENIPUNCTURE: CPT | Performed by: INTERNAL MEDICINE

## 2017-05-02 PROCEDURE — 82962 GLUCOSE BLOOD TEST: CPT

## 2017-05-02 PROCEDURE — 3331090001 HH PPS REVENUE CREDIT

## 2017-05-02 PROCEDURE — 83735 ASSAY OF MAGNESIUM: CPT | Performed by: INTERNAL MEDICINE

## 2017-05-02 PROCEDURE — 74011000250 HC RX REV CODE- 250: Performed by: INTERNAL MEDICINE

## 2017-05-02 PROCEDURE — 94640 AIRWAY INHALATION TREATMENT: CPT

## 2017-05-02 PROCEDURE — 85027 COMPLETE CBC AUTOMATED: CPT | Performed by: INTERNAL MEDICINE

## 2017-05-02 PROCEDURE — 99218 HC RM OBSERVATION: CPT

## 2017-05-02 PROCEDURE — 85610 PROTHROMBIN TIME: CPT | Performed by: INTERNAL MEDICINE

## 2017-05-02 PROCEDURE — 84100 ASSAY OF PHOSPHORUS: CPT | Performed by: INTERNAL MEDICINE

## 2017-05-02 PROCEDURE — 3331090002 HH PPS REVENUE DEBIT

## 2017-05-02 PROCEDURE — 93005 ELECTROCARDIOGRAM TRACING: CPT

## 2017-05-02 PROCEDURE — 80048 BASIC METABOLIC PNL TOTAL CA: CPT | Performed by: INTERNAL MEDICINE

## 2017-05-02 RX ADMIN — Medication 10 ML: at 04:45

## 2017-05-02 RX ADMIN — SOTALOL HYDROCHLORIDE 120 MG: 80 TABLET ORAL at 09:22

## 2017-05-02 RX ADMIN — FUROSEMIDE 20 MG: 20 TABLET ORAL at 09:21

## 2017-05-02 RX ADMIN — CLOPIDOGREL BISULFATE 75 MG: 75 TABLET, FILM COATED ORAL at 09:21

## 2017-05-02 RX ADMIN — GABAPENTIN 800 MG: 300 CAPSULE ORAL at 09:21

## 2017-05-02 RX ADMIN — ALBUTEROL SULFATE 2.5 MG: 2.5 SOLUTION RESPIRATORY (INHALATION) at 10:20

## 2017-05-02 RX ADMIN — COLCHICINE 0.6 MG: 0.6 TABLET, FILM COATED ORAL at 09:21

## 2017-05-02 RX ADMIN — ASPIRIN 81 MG 81 MG: 81 TABLET ORAL at 09:21

## 2017-05-02 RX ADMIN — METFORMIN HYDROCHLORIDE 1000 MG: 500 TABLET, FILM COATED ORAL at 09:21

## 2017-05-02 NOTE — PROGRESS NOTES
Patient ambulated in hallway without difficulty. Dressing CDI. No complaints. Discharge instructions reviewed with patient; to be discharged to home with granddaughter. Site care instructions reviewed; site(s) CDI. Patient instructed on which medications to continue. Medication info provided and reviewed with patient. Follow-up appointment information given; follow-up appointment to be made by patient. IV and tele box removed. Opportunity for questions provided; all questions answered. All belongings returned. Patient wheeled to front door via wheelchair by volunteer; to be transported home by granddaughter.

## 2017-05-02 NOTE — CARDIO/PULMONARY
C/P Rehab Note:    Chart Reviewed. Pt with history of A fib admitted for A fib Ablation,(5/1/17). [facility] results yesterday pending. Echo from (4/17 ),LV EF 25-30%. Pt received CHF teaching on 4/4/17. Met with pt who was sitting up in bed. Reviewed role of Cardiac Rehab Nurse. Pt responded,\" I want to show you something\", and pulled out Zone Calendar that she had received from our department. \" I have been weighing daily and documenting weight and blood sugar. \"  I explained how much I appreciated knowing that she is utilizing information that we provided. Provided pt a handout on \" EP STUDY, AFTER YOUR PROCEDURE\". Reviewed activity restrictions: No tub baths or swimming for the first seven days. No driving for the first 24 hours or strenuous activities. Discussed signs and symptoms of infection and when to call the MD. The importance of taking medications as prescribed and keeping follow up appointments. Patient  given printed teaching materials on CHF and low NA diet. Instruction given on s/s of CHF, checking weight every am and calling MD if weight is up 2-3 lbs in a day or 5 lbs in a week, fluid/Na restrictions, s/s of worsening CHF and when to call MD. Reviewed activity as tolerated with frequent rest periods as needed, taking medications as prescribed, and the importance of follow up visits with physician. Pt without questions and demonstrated understanding.

## 2017-05-02 NOTE — CARDIO/PULMONARY
C/P Rehab Note:     Chart Reviewed. Pt with history of A fib admitted for A fib Ablation,(5/1/17). [facility] results yesterday pending. Echo from (4/17 ),LV EF 25-30%. Pt received CHF teaching on 4/4/17. Disregard note, this note started inadvertently.

## 2017-05-02 NOTE — PROGRESS NOTES
[address] [place] [address]  [phone]      Cardiology Progress Note      5/2/2017 815 AM    Admit Date: 5/1/2017    Admit Diagnosis:   a fib  RECOVERY NEEDED IN PACU  a fib    Subjective:     Sharon Lindo is a 73 y.o. female who underwent elective a-fib ablation yesterday. She states she is slightly out of breath this morning, but that SOB is not unusual for her. She denies pain, palpitations.       Visit Vitals    /56 (BP 1 Location: Left arm, BP Patient Position: At rest;Sitting)    Pulse 78    Temp 98.2 °F (36.8 °C)    Resp 16    Ht 5' 6\" (1.676 m)    Wt 77.1 kg (170 lb)    SpO2 99%    Breastfeeding No    BMI 27.44 kg/m2       Current Facility-Administered Medications   Medication Dose Route Frequency    aspirin chewable tablet 81 mg  81 mg Oral DAILY    albuterol (PROVENTIL HFA, VENTOLIN HFA, PROAIR HFA) inhaler 2 Puff  2 Puff Inhalation Q6H PRN    albuterol (PROVENTIL VENTOLIN) nebulizer solution 2.5 mg  2.5 mg Nebulization Q4H PRN    budesonide-formoterol (SYMBICORT) 160-4.5 mcg/actuation HFA inhaler 1 Puff  1 Puff Inhalation BID    sotalol (BETAPACE) tablet 120 mg  120 mg Oral Q12H    furosemide (LASIX) tablet 20 mg  20 mg Oral DAILY    colchicine tablet 0.6 mg  0.6 mg Oral BID    tiotropium (SPIRIVA) inhalation capsule 18 mcg  1 Cap Inhalation DAILY    metFORMIN (GLUCOPHAGE) tablet 1,000 mg  1,000 mg Oral BID WITH MEALS    warfarin (COUMADIN) tablet 7.5 mg  7.5 mg Oral Once per day on Mon Tue Wed Thu Fri    fluticasone (FLONASE) 50 mcg/actuation nasal spray 2 Spray  2 Spray Both Nostrils QPM    valsartan (DIOVAN) tablet 20 mg  20 mg Oral DAILY    clopidogrel (PLAVIX) tablet 75 mg  75 mg Oral DAILY    pravastatin (PRAVACHOL) tablet 20 mg  20 mg Oral QHS    azelastine (ASTELIN) 137mcg/spray nasal spray  1 Spray Both Nostrils DAILY    sodium chloride (NS) flush 5-10 mL  5-10 mL IntraVENous Q8H    sodium chloride (NS) flush 5-10 mL  5-10 mL IntraVENous PRN    acetaminophen (TYLENOL) tablet 650 mg  650 mg Oral Q4H PRN    HYDROcodone-acetaminophen (NORCO) 5-325 mg per tablet 1 Tab  1 Tab Oral Q4H PRN    protamine 10 mg/mL injection        gabapentin (NEURONTIN) capsule 800 mg  800 mg Oral TID    [START ON 5/6/2017] warfarin (COUMADIN) tablet 10 mg  10 mg Oral Once per day on Sun Sat       Objective:      Physical Exam:  General Appearance:  pleasant, AAF resting on bed eating in NAD   Chest:   Clear;  Slightly dyspneic when talking   Cardiovascular:  irregular rhythm, regular rate, no murmur.   Abdomen:   Soft, non-tender, bowel sounds are active.   Extremities: palpable distal pulses; no edema   Skin:  Warm and dry. bilateral groin with scant old drainage, no swelling, bleeding, or bruit     Data Review:   Recent Labs      05/02/17   0444   WBC  8.6   HGB  11.4*   HCT  34.8*   PLT  199     Recent Labs      05/02/17   0444  05/01/17   1156   NA  140   --    K  3.9   --    CL  106   --    CO2  20*   --    GLU  196*   --    BUN  9   --    CREA  0.97   --    CA  8.1*   --    MG  1.8   --    PHOS  1.9*   --    INR  1.1  1.0       No results for input(s): TROIQ, CPK, CKMB in the last 72 hours. Intake/Output Summary (Last 24 hours) at 05/02/17 0951  Last data filed at 05/01/17 2341   Gross per 24 hour   Intake              880 ml   Output              535 ml   Net              345 ml        Telemetry: some sinus, some ? A-fib. Burst of a-fib abberancy overnight   EKG: ordered       Assessment:     Active Problems:    S/P ablation of atrial fibrillation (5/2/2017)      Overview: 5/1/17        Plan:     Charla Has is recovering post-ablationprocedure. Bilateral groin site dressing is CDI without swelling or bleeding or bruit. VSS. Rhythm slightly irregular with some sinus. May be in and out of a-fib. Darylene Peek denies complaints at this time other than SOB.   Will give patient breathing treatment, check EKG, and reassess patient after morning medications. If Ms. Fabienne Victor continues to progress, discharge is planned for later today. Malik Miller NP  DNP, RN, AGACNP-BC      Pt seen and examined in details. Agree with NP A&P. D/w pt.      Sally Turner MD

## 2017-05-02 NOTE — PROGRESS NOTES
[facility].      5/2/2017      RE: Roscoe Clements      To Whom it May Concern: This is to certify that Roscoe Clements may may return to work on 5/8/17. Please feel free to contact my office if you have any questions or concerns. Thank you for your assistance in this matter.     Sincerely,      Tasia Lloyd NP      [facility]  [phone]

## 2017-05-02 NOTE — PERIOP NOTES
TRANSFER - OUT REPORT:    Verbal report given to [facility] RN  on Cali Bai  being transferred to 2164(unit) for routine post - op       Report consisted of patients Situation, Background, Assessment and   Recommendations(SBAR). Information from the following report(s) SBAR, Kardex, Procedure Summary, Intake/Output, MAR and Recent Results was reviewed with the receiving nurse. Opportunity for questions and clarification was provided.       Patient transported with:   O2 @ 2 liters  Registered Nurse  Tech, monitor  Pt family in her room 2164 waiting

## 2017-05-03 ENCOUNTER — PATIENT OUTREACH (OUTPATIENT)
Dept: CARDIOLOGY CLINIC | Age: 67
End: 2017-05-03

## 2017-05-03 PROCEDURE — 3331090002 HH PPS REVENUE DEBIT

## 2017-05-03 PROCEDURE — 3331090001 HH PPS REVENUE CREDIT

## 2017-05-03 NOTE — PROGRESS NOTES
This note will not be viewable in [address] NN VALERIY Post Hospitalization     Called pt to follow up on hospital visit to [facility] from 17 to 17 with a diagnosis of s/p cardiac ablation. Pt verified  and address. NN role explained to pt and pt states understanding. Reviewed medications with pt and pt reports that she has all medications. Pt reports that Repatha pen broke in her leg and that hospital is aware of this. Pt asked if she should continue to use this. Pt advised to review medications with physician. Pt reports that she has removed bandage and site is healing well. Pt states that breathing has not improved. Patient reminded that there is a cardiologist on call 24 hours a day / 7 days a week (M-F 5pm to 8am and from Friday 5pm until Monday 8a for the weekend) should the patient have questions or concerns. Patient reminded to call 911 if situation is emergent or patient feels the situation is emergent. Physician procedure note states:  Procedures  Date of Service: 17 9526  Ramone Oates MD   Cardiology   Pre-procedure Diagnoses:   1. Persistent atrial fibrillation (HCC) [I48.1]   Post-procedure Diagnoses:   1. Persistent atrial fibrillation ([address] [I48.1]   Procedures:   1. ABLATION, ATRIAL FIBRILLATION  [QGW0152 (Custom)]   2. ABLATE L/R ATRIAL FIBRIL W/ ISOLATED PULM VEIN [[id number]]      []Hide copied text  []Praveenver for attribution information     [address] [place] [address] [phone]     Indications and Pre-Procedure Diagnosis:  Tobi Brice is a 73 y.o. female with persistent AF is referred for electr-physiologic evaluation and intervention.      Post Procedure Diagnosis     Atrial fibrillation     Atrial Fibrillation Ablation Summary  Informed consent was obtained. The patient was brought to the EP lab where SIERRA demonstrated no thrombus in the left atrial appendage.  All vascular access sites were prepped and draped in the usual sterile fashion and the Seldinger technique was used to catherize the RFV and LFV with multi-polar electrode catheters, which were placed in the appropriate intra-cardiac sites under fluoroscopic guidance (see catheter list). Right and left atrial pacing and recording, His bundle recording, and right ventricular pacing and recording were performed. Continuous pulse oximetry and cuff BP monitoring were performed. During the procedure, the patient received Versed, Fentanyl and Propofol for sedation per anesthesia personnel.     Transeptal Puncture  A transeptal atrial puncture was performed using fluoroscopic and intracardiac ultrasound guidance. An SL1 sheath was dragged from the SVC along the septum until a sudden leftward displacement was observed. Engagement of the Fossa Ovalis was confirmed by the interatrial tenting seen under intracardiac ultrasound guidance. The Applicasa NRG needle was advanced into the left atrium and its positioned confirmed with contrast injection. The dilator and sheath were advanced carefully over the needle into the body of the left atrium and the dilator/needle removed. An Agilis sheath was exchanged over the wire for the SL1 sheath. A second transeptal puncture was performed using the same technique and a SL1 sheath was maintained. No pericardial effusion was appreciated on intracardiac ultrasound so a bolus injection of heparin 100 units/kg was administered, with a continuous heparin gtt of 5000 units/hr so that the activated clotting time maintained was between 300 and 400 seconds with additional boluses q 30 minutes as necessary.     A 3D shell representing the left atrium and pulmonary veins was constructed with the electroanatomic mapping system. A Lasso mapping catheter was advanced into the left atrium through the Agilis sheath and a map of the body of the LA and the pulmonary veins was created.  A Smartouch F/J curve 8Fr/3.5mm catheter was then advanced into the left atrium and WACA RFA was performed around the pulmonary veins. Pulmonary vein isolation was confirmed with exit and entrance block.     Additional Focus  The patient remained in atrial fibrillation and the RF catheter was drawn up to the superior aspect of the LUPV and across the roof the superior aspect of the RUPV.      Additional Focus  The patient remained in atrial fibrillation and the RF catheter was used to perform a WACA around the MAGGY including the aidee between the MAGGY and the LUPV.     Additional Focus  The patient remained in atrial fibrillation and the RF catheter was used to perform a RF line from the LIPV down to the MV to create a mitral isthmus line. The patient was cardioverted to sinus rhythm with one 360J biphasic synchronized shock.     Radiofrequency energy was applied with a target power of 25-35W. Local sites were targeted until the local electrogram amplitude decreased to <0.15mv or by >50%. Autonomic manipulation with Dobutamine @ 20 mcg/min was performed during this procedure. Post ablation, rapid atrial pacing to 240 msec, on and off dobutamine, failed to induce any atrial arrhythmias.     At the end of the procedure, all catheters were removed, heparin reversed, groin sheaths pulled and hemostasis achieved. The patient left the laboratory in a stable condition. Fluoroscopy and procedure times were 16 and 90 minutes respectively. Estimated blood loss: <10 ml. Sharp counts: correct. Specimen (s) collected: none. The procedure related complication occurred: none. The following problems were encountered: none.     Conduction intervals (ms)     A-A A-H H-V P-R QRS Q-T R-R V-V  [phone]     AV victoria conduction     VA Block when pacing at 480 ms     Findings and Summary     This study demonstrates:  1. Successful PVI of all 4 PVs  2. Additional RFA focus of roof line  3. Additional RFA focus of WACA around the MAGGY  4. Additional RFA focus of mitral isthmus line  5.  No further inducible atrial arrhythmias on dobutamine with rapid atrial pacing     Recommendation:  1. [address]  2. Sotalol  3. DDDR 75 bpm     Thank you for allowing me to participate in this patients care.     Tiffani Reyes MD, [facility], [facility]              AVS includes: You are allergic to the following     Allergen Reactions     Crestor (Rosuvastatin) Myalgia             Levaquin (Levofloxacin) Nausea Only     GI Upset             Lipitor (Atorvastatin) Diarrhea             Lyrica (Pregabalin) Myalgia      Recent Documentation     Height Weight Breastfeeding? BMI OB Status Smoking Status     1.676 m 77.1 kg No 27.44 kg/m2 Postmenopausal Former Smoker                          Emergency Contacts        Name Discharge Info Relation Home Work Mobile     Huong Glass   Child [2] [phone]       Tonja Bartholomew DISCHARGE CAREGIVER [3] Other Relative [6] [phone]         Huong Knox   Child [2] [phone]          About your hospitalization            You were admitted on: May 1, 2017 You last received care in the: [facility]      You were discharged on: May 2, 2017 Unit phone number: [phone]        Why you were hospitalized           Your primary diagnosis was: Not on File      Your diagnoses also included: S/P Ablation Of Atrial Fibrillation                         Providers Seen During Your Hospitalizations     Provider Role Specialty Primary office phone     Jenniffer Graff MD Attending Provider Cardiology [phone]      Your Primary Care Physician (PCP)     Primary Care Physician Office Phone Office Fax   [facility] [phone]      Follow-up Information     Follow up With Details Comments [address], DEEPAK     8726 85[address] [facility]  [address]  [address]     Jenniffer Graff MD In 1 week Please call and make an appointment for 1 week from now when you get home. Thank you!  [address] [facility]  [address] [address]  [phone]      Your Appointments            Monday May 15, 2017 9:40 AM EDT   ROUTINE CARE with Prateek Camacho NP   [address] [facility] ([address])     [address] [facility] [address] [facility]   [phone]                  Thursday May 18, 2017 9:15 AM EDT   PACEMAKER with PACEMAKER, [facility] [address] [place] [address])     [address] [place] [address]   [phone]                                         Current Discharge Medication List       CONTINUE these medications which have CHANGED       Dose & Instructions Dispensing Information Comments   Morning Noon Evening Bedtime     colchicine 0.6 mg tablet   What changed:   - how much to take  - when to take this        Your last dose was:         Your next dose is:                Dose: 0.6 mg   Take 1 Tab by mouth two (2) times a day.     Quantity: 60 Tab   Refills: 5                                     sotalol 120 mg tablet   Commonly known as: BETAPACE   What changed: how much to take        Your last dose was:         Your next dose is:                Dose: 120 mg   Take 1 Tab by mouth two (2) times a day.     Quantity: 180 Tab   Refills: 3                                      CONTINUE these medications which have NOT CHANGED       Dose & Instructions Dispensing Information Comments   Morning Noon Evening Bedtime     * albuterol 90 mcg/actuation inhaler   Commonly known as: VENTOLIN HFA        Your last dose was:         Your next dose is:                Dose: 2 Puff   Take 2 Puffs by inhalation every six (6) hours as needed for Wheezing.     Quantity: 1 Inhaler   Refills: 11                                     * albuterol 2.5 mg /3 mL (0.083 %) nebulizer solution   Commonly known as: PROVENTIL VENTOLIN        Your last dose was:         Your next dose is:                Dose: 2.5 mg   3 mL by Nebulization route every four (4) hours as needed for Wheezing.     Quantity: 1 Package   Refills: 5                                     aspirin 81 mg chewable tablet        Your last dose was:         Your next dose is:                Dose: 81 mg   Take 81 mg by mouth daily.     Refills: 0                                     Azelastine 0.15 % (205.5 mcg) nasal spray   Commonly known as: ASTEPRO        Your last dose was:         Your next dose is:                Dose: 1 Spray   1 [place] by Both Nostrils route daily.     Refills: 0                                     Blood-Glucose Meter monitoring kit        Your last dose was:         Your next dose is:                Check blood sugar daily. May substitute for insurance preferred meter     Quantity: 1 Kit   Refills: 0                                     budesonide-formoterol 160-4.5 mcg/actuation HFA inhaler   Commonly known as: SYMBICORT        Your last dose was:         Your next dose is:                Dose: 1 Puff   Take 1 Puff by inhalation two (2) times a day.     Quantity: 3 Inhaler   Refills: 3                                     CENTRUM SILVER PO        Your last dose was:         Your next dose is:                Dose: 1 Tab   Take 1 Tab by mouth daily. Takes one po daily.     Refills: 0                                     clopidogrel 75 mg Tab   Commonly known as: PLAVIX        Your last dose was:         Your next dose is:                Dose: 75 mg   Take 1 Tab by mouth daily.     Quantity: 30 Tab   Refills: 0                                     cod liver oil Cap        Your last dose was:         Your next dose is:                Dose: 1 Cap   Take 1 Cap by mouth daily.     Refills: 0                                     evolocumab 420 mg/3.5 mL Injt   Commonly known as: REPATHA PUSHTRONEX        Your last dose was:         Your next dose is:                Dose: 420 mg   420 mg by SubCUTAneous route every month.  Indications: arteriosclerotic vascular disease     Quantity: 1 Device   Refills: 12                                   FLONASE 50 mcg/actuation nasal spray   Generic drug: fluticasone        Your last dose was:         Your next dose is:                Dose: 2 Spray   2 Sprays by Both Nostrils route every evening.     Refills: 0                                     furosemide 20 mg tablet   Commonly known as: LASIX        Your last dose was:         Your next dose is:                Dose: 20 mg   Take 1 Tab by mouth daily.     Quantity: 90 Tab   Refills: 0                                     gabapentin 800 mg tablet   Commonly known as: NEURONTIN        Your last dose was:         Your next dose is:                TAKE ONE TABLET BY MOUTH THREE TIMES DAILY     Quantity: 90 Tab   Refills: 11                                     metFORMIN 1,000 mg tablet   Commonly known as: GLUCOPHAGE        Your last dose was:         Your next dose is:                TAKE 1 TABLET BY MOUTH TWICE DAILY WITH MEALS Indications: PREVENTION OF TYPE 2 DIABETES MELLITUS     Quantity: 180 Tab   Refills: 3                                     pravastatin 20 mg tablet   Commonly known as: PRAVACHOL        Your last dose was:         Your next dose is:                Dose: 20 mg   Take 1 Tab by mouth nightly.     Quantity: 90 Tab   Refills: 3                                     tiotropium 18 mcg inhalation capsule   Commonly known as: SPIRIVA WITH HANDIHALER        Your last dose was:         Your next dose is:                INHALE THE CONTENTS OF 1 CAPSULE THROUGH HANDIHALER DEVICE DAILY     Quantity: 90 Cap   Refills: 3                                     valsartan 40 mg tablet   Commonly known as: DIOVAN        Your last dose was:         Your next dose is:                Dose: 20 mg   Take 0.5 Tabs by mouth daily.     Quantity: 30 Tab   Refills: 0                                     * warfarin 5 mg tablet   Commonly known as: COUMADIN        Your last dose was:         Your next dose is:                Dose: 7.5 mg   Take 7.5 mg by mouth five (5) days a week. 7.5 mg five days a week Monday through Friday     Refills: 0                                     * warfarin 5 mg tablet   Commonly known as: COUMADIN        Your last dose was:         Your next dose is:                Take 1.5 tabs daily, but on Saturday and Sunday take 2 tabs.     Quantity: 55 Tab   Refills: 2                                     * Notice: This list has 4 medication(s) that are the same as other medications prescribed for you. Read the directions carefully, and ask your doctor or other care provider to review them with you.                  Discharge Instructions                [address] [phone]           ABLATION DISCHARGE INSTRUCTIONS     Patient ID:  Paulie Dunlap  [id number]  11 y.o.  1950     Admit Date: 5/1/2017     Discharge Date: 5/2/2017      Admitting Physician: Migue Morgan MD      Discharge Physician: Gurmeet Young NP/ Dr. Kris Byrnes      Admission Diagnoses:   a fib  RECOVERY NEEDED IN PACU  a fib     Discharge Diagnoses: Active Problems:  S/P ablation of atrial fibrillation (5/2/2017)  Overview: 5/1/17     atrial fibrillation  htn     Discharge Condition: Good     Cardiology Procedures this Admission:  AF ablation     Disposition: home     Reference discharge instructions provided by nursing for diet and activity.     Follow-up with Dr Ely Adler in one week. Call [phone] to make an appointment.     Signed:  Gurmeet Young NP  5/2/2017  2:47 PM     S/P ABLATION DISCHARGE INSTRUCTIONS     It is normal to feel tired the first couple days. Take it easy and follow the physicians instructions.      CHECK THE CATHETER INSERTION SITE DAILY:  You may shower 24 hours after the procedure, remove the bandage during showering. Wash with soap and water and pat dry. Gentle cleaning of the site with soap and water is sufficient, cover with a dry clean dressing or bandage.  Do not apply creams or powders to the area.  Do not sit in a bathtub or pool of water for 7 days or until wound has completely healed. Temporary bruising and discomfort is normal and may last a few weeks. You may have a formation of a small lump at the site which may last up to 6 weeks.     CALL THE PHYSICIAN:  If the site becomes red, swollen or feels warm to the touch  If there is bleeding or drainage or if there is unusual pain at the groin or down the leg. If there is any bleeding, lie down, apply pressure or have someone apply pressure with a clean cloth until the bleeding stops. If the bleeding continues, call 911 to be transported to the hospital.  DO NOT DRIVE YOURSELF, OR HAVE ANYONE ELSE DRIVE YOU  CALL 429.     Activity:      For the first 24-48 hours or as instructed by the physician:  No lifting, pushing or pulling over 5 pounds and no straining the insertion site. Do not life grocery bags or the garbage can, do not run the vacuum  or  for 7 days. Start with short walks as in the hospital and gradually increase as tolerated each day. It is recommended to walk 30 minutes 5-7 days per week. Follow your physicians instructions on activity. Avoid walking outside in extremes of heat or cold. Walk inside when it is cold and windy or hot and humid.      Things to keep in mind:  No driving for at least 5 days, or as designated by your physician. Limit the number of times you go up and down the stairs  Take rests and pace yourself with activity. Be careful and do not strain with bowel movements.     Medications:     Take all medications as prescribed  Call your physician if you have any questions  Keep an updated list of your medications with you at all times and give a list to your physician and pharmacist     Signs and Symptoms:  Be cautious of symptoms of angina or recurrent symptoms such as chest discomfort, unusual shortness of breath or fatigue, palpitations.      After Care:   Follow up with your physician as instructed. Follow a heart healthy diet with proper portion control, daily stress management, daily exercise, blood pressure and cholesterol control , and smoking cessation.        AFTER YOU TRANSESOPHAGEAL ECHOCARDIOGRAM     Do not eat or drink for at least two hours after your procedure. Your throat will be numb and there is a risk you might have difficulty swallowing for a while. Be careful when you do eat or drink for the first time especially with hot fluids since you could easily burn your throat.     Call your doctor if:     · You are bleeding from your throat or mouth. · You have trouble breathing all of a sudden. · You have chest pain or any pain that spreads to your neck, jaw, or arms. · You have questions or concerns. · You have a fever greater than 101°F.    Most recent lab work:   Results for Ilana Shipley (MRN [id number]) as of 5/3/2017 15:57   Ref.  Range 5/1/2017 20:26 5/2/2017 04:44 5/2/2017 07:10 5/2/2017 11:07 5/2/2017 12:24   GLUCOSE,FAST - POC Latest Ref Range: 65 - 100 mg/dL 258 (H)  212 (H) 191 (H)    WBC Latest Ref Range: 3.6 - 11.0 K/uL  8.6      RBC Latest Ref Range: 3.80 - 5.20 M/uL  4.12      HGB Latest Ref Range: 11.5 - 16.0 g/dL  11.4 (L)      HCT Latest Ref Range: 35.0 - 47.0 %  34.8 (L)      MCV Latest Ref Range: 80.0 - 99.0 FL  84.5      MCH Latest Ref Range: 26.0 - 34.0 PG  27.7      MCHC Latest Ref Range: 30.0 - 36.5 g/dL  32.8      RDW Latest Ref Range: 11.5 - 14.5 %  14.9 (H)      PLATELET Latest Ref Range: 150 - 400 K/uL  199      INR Latest Ref Range: 0.9 - 1.1    1.1      Prothrombin time Latest Ref Range: 9.0 - 11.1 sec  11.3 (H)      Sodium Latest Ref Range: 136 - 145 mmol/L  140      Potassium Latest Ref Range: 3.5 - 5.1 mmol/L  3.9      Chloride Latest Ref Range: 97 - 108 mmol/L  106      CO2 Latest Ref Range: 21 - 32 mmol/L  20 (L)      Anion gap Latest Ref Range: 5 - 15 mmol/L  14      Glucose Latest Ref Range: 65 - 100 mg/dL  196 (H)      BUN Latest Ref Range: 6 - 20 MG/DL  9      Creatinine Latest Ref Range: 0.55 - 1.02 MG/DL  0.97      BUN/Creatinine ratio Latest Ref Range: 12 - 20    9 (L)      Calcium Latest Ref Range: 8.5 - 10.1 MG/DL  8.1 (L)      Phosphorus Latest Ref Range: 2.6 - 4.7 MG/DL  1.9 (L)      Magnesium Latest Ref Range: 1.6 - 2.4 mg/dL  1.8      GFR est non-AA Latest Ref Range: >60 ml/min/1.73m2  58 (L)      GFR est AA Latest Ref Range: >60 ml/min/1.73m2  >60      EKG, 12 LEAD, SUBSEQUENT Unknown     Rpt     Risk Assessment score: High Risk            27       Total Score        3 Relationship with PCP    4 More than 1 Admission in calendar year    20 Charlson Comorbidity Score        Criteria that do not apply:    Patient Living Status    Patient Length of Stay > 5    Patient Insurance is Medicare, Medicaid or Self Pay      Advanced Care Plan:  Not on file    Barriers include:  breathing    Patient reminded that there is a cardiologist on call 24 hours a day / 7 days a week should the patient have questions or concerns. Patient reminded to call 911 if situation is emergent or patient feels the situation is emergent. Plan of Care:  Pt has follow up appt with Dr. Atilio Still on 5/9/17. Pt states that her breathing has not improved yet. Chart sent to Dr. Barrera Form NP for review as well as nurses.

## 2017-05-04 PROCEDURE — 3331090002 HH PPS REVENUE DEBIT

## 2017-05-04 PROCEDURE — 3331090001 HH PPS REVENUE CREDIT

## 2017-05-05 PROCEDURE — 3331090001 HH PPS REVENUE CREDIT

## 2017-05-05 PROCEDURE — 3331090002 HH PPS REVENUE DEBIT

## 2017-05-06 PROCEDURE — 3331090002 HH PPS REVENUE DEBIT

## 2017-05-06 PROCEDURE — 3331090001 HH PPS REVENUE CREDIT

## 2017-05-07 PROCEDURE — 3331090001 HH PPS REVENUE CREDIT

## 2017-05-07 PROCEDURE — 3331090002 HH PPS REVENUE DEBIT

## 2017-05-08 PROCEDURE — 3331090001 HH PPS REVENUE CREDIT

## 2017-05-08 PROCEDURE — 3331090002 HH PPS REVENUE DEBIT

## 2017-05-09 ENCOUNTER — HOSPITAL ENCOUNTER (INPATIENT)
Age: 67
LOS: 13 days | Discharge: HOME HEALTH | DRG: 871 | End: 2017-05-22
Attending: EMERGENCY MEDICINE | Admitting: FAMILY MEDICINE
Payer: COMMERCIAL

## 2017-05-09 ENCOUNTER — OFFICE VISIT (OUTPATIENT)
Dept: CARDIOLOGY CLINIC | Age: 67
End: 2017-05-09

## 2017-05-09 ENCOUNTER — CLINICAL SUPPORT (OUTPATIENT)
Dept: CARDIOLOGY CLINIC | Age: 67
End: 2017-05-09

## 2017-05-09 ENCOUNTER — APPOINTMENT (OUTPATIENT)
Dept: CT IMAGING | Age: 67
DRG: 871 | End: 2017-05-09
Attending: EMERGENCY MEDICINE
Payer: COMMERCIAL

## 2017-05-09 ENCOUNTER — APPOINTMENT (OUTPATIENT)
Dept: GENERAL RADIOLOGY | Age: 67
DRG: 871 | End: 2017-05-09
Attending: EMERGENCY MEDICINE
Payer: COMMERCIAL

## 2017-05-09 VITALS
OXYGEN SATURATION: 97 % | BODY MASS INDEX: 27.35 KG/M2 | RESPIRATION RATE: 28 BRPM | WEIGHT: 170.2 LBS | DIASTOLIC BLOOD PRESSURE: 60 MMHG | SYSTOLIC BLOOD PRESSURE: 80 MMHG | HEART RATE: 153 BPM | HEIGHT: 66 IN

## 2017-05-09 DIAGNOSIS — Z95.0 PACEMAKER: Primary | ICD-10-CM

## 2017-05-09 DIAGNOSIS — I48.91 ATRIAL FIBRILLATION, UNSPECIFIED TYPE (HCC): Chronic | ICD-10-CM

## 2017-05-09 DIAGNOSIS — R00.0 TACHYCARDIA: Primary | ICD-10-CM

## 2017-05-09 DIAGNOSIS — R06.02 SHORTNESS OF BREATH: ICD-10-CM

## 2017-05-09 DIAGNOSIS — R77.8 ELEVATED TROPONIN I LEVEL: ICD-10-CM

## 2017-05-09 DIAGNOSIS — R06.02 SOB (SHORTNESS OF BREATH): ICD-10-CM

## 2017-05-09 DIAGNOSIS — I48.91 ATRIAL FIBRILLATION, UNSPECIFIED TYPE (HCC): Primary | Chronic | ICD-10-CM

## 2017-05-09 DIAGNOSIS — I50.21 SYSTOLIC CHF, ACUTE (HCC): ICD-10-CM

## 2017-05-09 LAB
ALBUMIN SERPL BCP-MCNC: 3.5 G/DL (ref 3.5–5)
ALBUMIN/GLOB SERPL: 0.7 {RATIO} (ref 1.1–2.2)
ALP SERPL-CCNC: 156 U/L (ref 45–117)
ALT SERPL-CCNC: 60 U/L (ref 12–78)
ANION GAP BLD CALC-SCNC: 10 MMOL/L (ref 5–15)
AST SERPL W P-5'-P-CCNC: 77 U/L (ref 15–37)
BASOPHILS # BLD AUTO: 0 K/UL (ref 0–0.1)
BASOPHILS # BLD: 0 % (ref 0–1)
BILIRUB SERPL-MCNC: 0.5 MG/DL (ref 0.2–1)
BUN SERPL-MCNC: 13 MG/DL (ref 6–20)
BUN/CREAT SERPL: 13 (ref 12–20)
CALCIUM SERPL-MCNC: 9.1 MG/DL (ref 8.5–10.1)
CHLORIDE SERPL-SCNC: 104 MMOL/L (ref 97–108)
CK SERPL-CCNC: 96 U/L (ref 26–192)
CO2 SERPL-SCNC: 24 MMOL/L (ref 21–32)
CREAT SERPL-MCNC: 1.04 MG/DL (ref 0.55–1.02)
EOSINOPHIL # BLD: 0.6 K/UL (ref 0–0.4)
EOSINOPHIL NFR BLD: 9 % (ref 0–7)
ERYTHROCYTE [DISTWIDTH] IN BLOOD BY AUTOMATED COUNT: 14.6 % (ref 11.5–14.5)
GLOBULIN SER CALC-MCNC: 5.3 G/DL (ref 2–4)
GLUCOSE SERPL-MCNC: 97 MG/DL (ref 65–100)
HCT VFR BLD AUTO: 38.9 % (ref 35–47)
HGB BLD-MCNC: 13.1 G/DL (ref 11.5–16)
INR BLD: 2.7 (ref 0.9–1.2)
INR PPP: 3 (ref 0.9–1.1)
LACTATE SERPL-SCNC: 1.5 MMOL/L (ref 0.4–2)
LYMPHOCYTES # BLD AUTO: 37 % (ref 12–49)
LYMPHOCYTES # BLD: 2.6 K/UL (ref 0.8–3.5)
MAGNESIUM SERPL-MCNC: 1.7 MG/DL (ref 1.6–2.4)
MCH RBC QN AUTO: 28.5 PG (ref 26–34)
MCHC RBC AUTO-ENTMCNC: 33.7 G/DL (ref 30–36.5)
MCV RBC AUTO: 84.6 FL (ref 80–99)
MONOCYTES # BLD: 0.9 K/UL (ref 0–1)
MONOCYTES NFR BLD AUTO: 13 % (ref 5–13)
NEUTS SEG # BLD: 2.9 K/UL (ref 1.8–8)
NEUTS SEG NFR BLD AUTO: 41 % (ref 32–75)
PLATELET # BLD AUTO: 275 K/UL (ref 150–400)
POTASSIUM SERPL-SCNC: 3.6 MMOL/L (ref 3.5–5.1)
PROT SERPL-MCNC: 8.8 G/DL (ref 6.4–8.2)
PROTHROMBIN TIME: 31.2 SEC (ref 9–11.1)
RBC # BLD AUTO: 4.6 M/UL (ref 3.8–5.2)
SODIUM SERPL-SCNC: 138 MMOL/L (ref 136–145)
TROPONIN I SERPL-MCNC: 0.13 NG/ML
TSH SERPL DL<=0.05 MIU/L-ACNC: 2.74 UIU/ML (ref 0.36–3.74)
WBC # BLD AUTO: 7.1 K/UL (ref 3.6–11)

## 2017-05-09 PROCEDURE — 85610 PROTHROMBIN TIME: CPT

## 2017-05-09 PROCEDURE — 80053 COMPREHEN METABOLIC PANEL: CPT | Performed by: EMERGENCY MEDICINE

## 2017-05-09 PROCEDURE — 71010 XR CHEST PORT: CPT

## 2017-05-09 PROCEDURE — 65660000000 HC RM CCU STEPDOWN

## 2017-05-09 PROCEDURE — 74011250637 HC RX REV CODE- 250/637: Performed by: EMERGENCY MEDICINE

## 2017-05-09 PROCEDURE — 74011250636 HC RX REV CODE- 250/636: Performed by: EMERGENCY MEDICINE

## 2017-05-09 PROCEDURE — 83735 ASSAY OF MAGNESIUM: CPT | Performed by: EMERGENCY MEDICINE

## 2017-05-09 PROCEDURE — 96360 HYDRATION IV INFUSION INIT: CPT

## 2017-05-09 PROCEDURE — 85025 COMPLETE CBC W/AUTO DIFF WBC: CPT | Performed by: EMERGENCY MEDICINE

## 2017-05-09 PROCEDURE — 74011636320 HC RX REV CODE- 636/320: Performed by: EMERGENCY MEDICINE

## 2017-05-09 PROCEDURE — 85610 PROTHROMBIN TIME: CPT | Performed by: EMERGENCY MEDICINE

## 2017-05-09 PROCEDURE — 83605 ASSAY OF LACTIC ACID: CPT | Performed by: EMERGENCY MEDICINE

## 2017-05-09 PROCEDURE — 36415 COLL VENOUS BLD VENIPUNCTURE: CPT | Performed by: EMERGENCY MEDICINE

## 2017-05-09 PROCEDURE — 3331090001 HH PPS REVENUE CREDIT

## 2017-05-09 PROCEDURE — 84443 ASSAY THYROID STIM HORMONE: CPT | Performed by: EMERGENCY MEDICINE

## 2017-05-09 PROCEDURE — 87040 BLOOD CULTURE FOR BACTERIA: CPT | Performed by: EMERGENCY MEDICINE

## 2017-05-09 PROCEDURE — 84484 ASSAY OF TROPONIN QUANT: CPT | Performed by: EMERGENCY MEDICINE

## 2017-05-09 PROCEDURE — 93005 ELECTROCARDIOGRAM TRACING: CPT

## 2017-05-09 PROCEDURE — 71275 CT ANGIOGRAPHY CHEST: CPT

## 2017-05-09 PROCEDURE — 82550 ASSAY OF CK (CPK): CPT | Performed by: EMERGENCY MEDICINE

## 2017-05-09 PROCEDURE — 93306 TTE W/DOPPLER COMPLETE: CPT

## 2017-05-09 PROCEDURE — 3331090002 HH PPS REVENUE DEBIT

## 2017-05-09 PROCEDURE — 99285 EMERGENCY DEPT VISIT HI MDM: CPT

## 2017-05-09 PROCEDURE — 75810000137 HC PLCMT CENT VENOUS CATH

## 2017-05-09 RX ORDER — CLOPIDOGREL BISULFATE 75 MG/1
75 TABLET ORAL DAILY
Status: DISCONTINUED | OUTPATIENT
Start: 2017-05-10 | End: 2017-05-22 | Stop reason: HOSPADM

## 2017-05-09 RX ORDER — HYDROCODONE BITARTRATE AND ACETAMINOPHEN 5; 325 MG/1; MG/1
TABLET ORAL
COMMUNITY
Start: 2017-04-18 | End: 2017-05-31

## 2017-05-09 RX ORDER — ALBUTEROL SULFATE 90 UG/1
2 AEROSOL, METERED RESPIRATORY (INHALATION)
Status: DISCONTINUED | OUTPATIENT
Start: 2017-05-09 | End: 2017-05-22 | Stop reason: HOSPADM

## 2017-05-09 RX ORDER — GUAIFENESIN 100 MG/5ML
162 LIQUID (ML) ORAL
Status: COMPLETED | OUTPATIENT
Start: 2017-05-09 | End: 2017-05-09

## 2017-05-09 RX ORDER — POTASSIUM CHLORIDE 1.5 G/1.77G
40 POWDER, FOR SOLUTION ORAL
Status: COMPLETED | OUTPATIENT
Start: 2017-05-09 | End: 2017-05-10

## 2017-05-09 RX ORDER — WARFARIN SODIUM 5 MG/1
5 TABLET ORAL DAILY
Status: DISCONTINUED | OUTPATIENT
Start: 2017-05-10 | End: 2017-05-10

## 2017-05-09 RX ORDER — ATORVASTATIN CALCIUM 20 MG/1
20 TABLET, FILM COATED ORAL DAILY
Status: DISCONTINUED | OUTPATIENT
Start: 2017-05-10 | End: 2017-05-10

## 2017-05-09 RX ORDER — MAGNESIUM SULFATE HEPTAHYDRATE 40 MG/ML
2 INJECTION, SOLUTION INTRAVENOUS ONCE
Status: COMPLETED | OUTPATIENT
Start: 2017-05-09 | End: 2017-05-12

## 2017-05-09 RX ORDER — ATORVASTATIN CALCIUM 20 MG/1
20 TABLET, FILM COATED ORAL DAILY
COMMUNITY
Start: 2017-04-10 | End: 2017-05-22

## 2017-05-09 RX ORDER — GUAIFENESIN 100 MG/5ML
81 LIQUID (ML) ORAL DAILY
Status: DISCONTINUED | OUTPATIENT
Start: 2017-05-10 | End: 2017-05-11

## 2017-05-09 RX ORDER — SODIUM CHLORIDE 0.9 % (FLUSH) 0.9 %
5-10 SYRINGE (ML) INJECTION EVERY 8 HOURS
Status: DISCONTINUED | OUTPATIENT
Start: 2017-05-09 | End: 2017-05-14 | Stop reason: SDUPTHER

## 2017-05-09 RX ORDER — FLUTICASONE FUROATE AND VILANTEROL 100; 25 UG/1; UG/1
1 POWDER RESPIRATORY (INHALATION) DAILY
Status: DISCONTINUED | OUTPATIENT
Start: 2017-05-10 | End: 2017-05-22 | Stop reason: HOSPADM

## 2017-05-09 RX ORDER — SODIUM CHLORIDE 9 MG/ML
250 INJECTION, SOLUTION INTRAVENOUS ONCE
Status: DISCONTINUED | OUTPATIENT
Start: 2017-05-09 | End: 2017-05-10

## 2017-05-09 RX ORDER — ALBUTEROL SULFATE 90 UG/1
2 AEROSOL, METERED RESPIRATORY (INHALATION)
Status: DISCONTINUED | OUTPATIENT
Start: 2017-05-09 | End: 2017-05-09

## 2017-05-09 RX ORDER — SODIUM CHLORIDE 9 MG/ML
500 INJECTION, SOLUTION INTRAVENOUS ONCE
Status: COMPLETED | OUTPATIENT
Start: 2017-05-09 | End: 2017-05-09

## 2017-05-09 RX ORDER — SODIUM CHLORIDE 0.9 % (FLUSH) 0.9 %
5-10 SYRINGE (ML) INJECTION AS NEEDED
Status: DISCONTINUED | OUTPATIENT
Start: 2017-05-09 | End: 2017-05-14 | Stop reason: SDUPTHER

## 2017-05-09 RX ORDER — SODIUM CHLORIDE 9 MG/ML
50 INJECTION, SOLUTION INTRAVENOUS
Status: COMPLETED | OUTPATIENT
Start: 2017-05-09 | End: 2017-05-12

## 2017-05-09 RX ORDER — SOTALOL HYDROCHLORIDE 80 MG/1
120 TABLET ORAL 2 TIMES DAILY
Status: DISCONTINUED | OUTPATIENT
Start: 2017-05-09 | End: 2017-05-10

## 2017-05-09 RX ORDER — SODIUM CHLORIDE 0.9 % (FLUSH) 0.9 %
10 SYRINGE (ML) INJECTION
Status: COMPLETED | OUTPATIENT
Start: 2017-05-09 | End: 2017-05-09

## 2017-05-09 RX ADMIN — IOPAMIDOL 80 ML: 755 INJECTION, SOLUTION INTRAVENOUS at 21:31

## 2017-05-09 RX ADMIN — Medication 10 ML: at 21:31

## 2017-05-09 RX ADMIN — GABAPENTIN 800 MG: 300 CAPSULE ORAL at 19:17

## 2017-05-09 RX ADMIN — SODIUM CHLORIDE 50 ML/HR: 900 INJECTION, SOLUTION INTRAVENOUS at 21:31

## 2017-05-09 RX ADMIN — SODIUM CHLORIDE 500 ML: 900 INJECTION, SOLUTION INTRAVENOUS at 17:06

## 2017-05-09 RX ADMIN — ASPIRIN 81 MG CHEWABLE TABLET 162 MG: 81 TABLET CHEWABLE at 19:14

## 2017-05-09 NOTE — IP AVS SNAPSHOT
Current Discharge Medication List  
  
START taking these medications Dose & Instructions Dispensing Information Comments Morning Noon Evening Bedtime  
 amiodarone 200 mg tablet Commonly known as:  CORDARONE Your last dose was: Your next dose is:    
   
   
 Dose:  200 mg Take 1 Tab by mouth two (2) times a day. Quantity:  60 Tab Refills:  0  
     
   
   
   
  
 benzonatate 100 mg capsule Commonly known as:  TESSALON Your last dose was: Your next dose is:    
   
   
 Dose:  100 mg Take 1 Cap by mouth three (3) times daily as needed for Cough for up to 7 days. Quantity:  20 Cap Refills:  0  
     
   
   
   
  
 carvedilol 6.25 mg tablet Commonly known as:  Real Bracket Your last dose was: Your next dose is:    
   
   
 Dose:  6.25 mg Take 1 Tab by mouth two (2) times daily (with meals). Quantity:  60 Tab Refills:  0  
     
   
   
   
  
 predniSONE 10 mg tablet Commonly known as:  Elvia Simon Your last dose was: Your next dose is: Take  2 Tab daily x 2 days then 1 Tab daily x 2 days Quantity:  6 Tab Refills:  0  
     
   
   
   
  
 vancomycin 50 mg/mL oral solution (compounded) Your last dose was: Your next dose is:    
   
   
 Dose:  125 mg Take 2.5 mL by mouth every six (6) hours for 3 days. Quantity:  30 mL Refills:  0 CONTINUE these medications which have CHANGED Dose & Instructions Dispensing Information Comments Morning Noon Evening Bedtime  
 furosemide 40 mg tablet Commonly known as:  LASIX What changed:   
- medication strength 
- how much to take 
- how to take this - when to take this 
- additional instructions Your last dose was: Your next dose is: Take 40 mg daily Quantity:  30 Tab Refills:  0 CONTINUE these medications which have NOT CHANGED Dose & Instructions Dispensing Information Comments Morning Noon Evening Bedtime * albuterol 90 mcg/actuation inhaler Commonly known as:  VENTOLIN HFA Your last dose was: Your next dose is:    
   
   
 Dose:  2 Puff Take 2 Puffs by inhalation every six (6) hours as needed for Wheezing. Quantity:  1 Inhaler Refills:  11  
     
   
   
   
  
 * albuterol 2.5 mg /3 mL (0.083 %) nebulizer solution Commonly known as:  PROVENTIL VENTOLIN Your last dose was: Your next dose is:    
   
   
 Dose:  2.5 mg  
3 mL by Nebulization route every four (4) hours as needed for Wheezing. Quantity:  1 Package Refills:  5 Azelastine 0.15 % (205.5 mcg) nasal spray Commonly known as:  ASTEPRO Your last dose was: Your next dose is:    
   
   
 Dose:  1 Spray 1 Clune by Both Nostrils route daily. Refills:  0 Blood-Glucose Meter monitoring kit Your last dose was: Your next dose is:    
   
   
 Check blood sugar daily. May substitute for insurance preferred meter Quantity:  1 Kit Refills:  0  
     
   
   
   
  
 budesonide-formoterol 160-4.5 mcg/actuation HFA inhaler Commonly known as:  SYMBICORT Your last dose was: Your next dose is:    
   
   
 Dose:  1 Puff Take 1 Puff by inhalation two (2) times a day. Quantity:  3 Inhaler Refills:  3 CENTRUM SILVER PO Your last dose was: Your next dose is:    
   
   
 Dose:  1 Tab Take 1 Tab by mouth daily. Takes one po daily. Refills:  0  
     
   
   
   
  
 clopidogrel 75 mg Tab Commonly known as:  PLAVIX Your last dose was: Your next dose is:    
   
   
 Dose:  75 mg Take 1 Tab by mouth daily. Quantity:  30 Tab Refills:  0  
     
   
   
   
  
 cod liver oil Cap Your last dose was: Your next dose is:    
   
   
 Dose:  1 Cap Take 1 Cap by mouth daily. Refills:  0  
     
   
   
   
  
 colchicine 0.6 mg tablet Your last dose was: Your next dose is:    
   
   
 Dose:  0.6 mg Take 1 Tab by mouth two (2) times a day. Quantity:  60 Tab Refills:  5 FLONASE 50 mcg/actuation nasal spray Generic drug:  fluticasone Your last dose was: Your next dose is:    
   
   
 Dose:  2 Spray 2 Sprays by Both Nostrils route every evening. Refills:  0  
     
   
   
   
  
 gabapentin 800 mg tablet Commonly known as:  NEURONTIN Your last dose was: Your next dose is: TAKE ONE TABLET BY MOUTH THREE TIMES DAILY Quantity:  90 Tab Refills:  11 HYDROcodone-acetaminophen 5-325 mg per tablet Commonly known as:  Mena Holiday Your last dose was: Your next dose is:    
   
   
  Refills:  0  
     
   
   
   
  
 metFORMIN 1,000 mg tablet Commonly known as:  GLUCOPHAGE Your last dose was: Your next dose is: TAKE 1 TABLET BY MOUTH TWICE DAILY WITH MEALS  Indications: PREVENTION OF TYPE 2 DIABETES MELLITUS Quantity:  180 Tab Refills:  3  
     
   
   
   
  
 pravastatin 20 mg tablet Commonly known as:  PRAVACHOL Your last dose was: Your next dose is:    
   
   
 Dose:  20 mg Take 1 Tab by mouth nightly. Quantity:  90 Tab Refills:  3  
     
   
   
   
  
 tiotropium 18 mcg inhalation capsule Commonly known as:  [address] Your last dose was: Your next dose is:    
   
   
 INHALE THE CONTENTS OF 1 CAPSULE THROUGH HANDIHALER DEVICE DAILY Quantity:  90 Cap Refills:  3  
     
   
   
   
  
 warfarin 5 mg tablet Commonly known as:  COUMADIN Your last dose was: Your next dose is:    
   
   
 Dose:  7.5 mg Take 7.5 mg by mouth daily. Refills:  0 * Notice: This list has 2 medication(s) that are the same as other medications prescribed for you. Read the directions carefully, and ask your doctor or other care provider to review them with you. STOP taking these medications   
 aspirin 81 mg chewable tablet  
   
  
 atorvastatin 20 mg tablet Commonly known as:  LIPITOR  
   
  
 evolocumab 420 mg/3.5 mL Injt Commonly known as:  REPATHA PUSHTRONEX  
   
  
 sotalol 120 mg tablet Commonly known as:  BETAPACE  
   
  
 valsartan 40 mg tablet Commonly known as:  DIOVAN Where to Get Your Medications Information on where to get these meds will be given to you by the nurse or doctor. ! Ask your nurse or doctor about these medications  
  amiodarone 200 mg tablet  
 benzonatate 100 mg capsule  
 carvedilol 6.25 mg tablet  
 furosemide 40 mg tablet  
 predniSONE 10 mg tablet  
 vancomycin 50 mg/mL oral solution (compounded)

## 2017-05-09 NOTE — IP AVS SNAPSHOT
[address] [facility] 
[phone] Patient: [place] MRN: [id number] WNP:[birth year] You are allergic to the following Allergen Reactions Crestor (Rosuvastatin) Myalgia Levaquin (Levofloxacin) Nausea Only GI Upset Lipitor (Atorvastatin) Diarrhea Lyrica (Pregabalin) Myalgia Recent Documentation Height Weight Breastfeeding? BMI OB Status Smoking Status 1.676 m 76.3 kg No 27.16 kg/m2 Postmenopausal Former Smoker Emergency Contacts Name Discharge Info Relation Home Work [employer]  Child [2] [phone] [address] [facility] CAREGIVER [3] Other Relative [6] [phone] Huong Knox  Child [2] [phone] About your hospitalization You were admitted on:  May 9, 2017 You last received care in the:  [facility] CARDIOPULMONARY CARE You were discharged on:  May 22, 2017 Unit phone number:  [phone] Why you were hospitalized Your primary diagnosis was:  Not on File Your diagnoses also included: Tachycardia, Chronic Systolic Hf (Heart Failure) (Hcc), Atrial Fibrillation (Hcc), Cardiac Pacemaker In Situ, Copd (Chronic Obstructive Pulmonary Disease) (Hcc), Hypertension, Mixed Hyperlipidemia, S/P Ablation Of Atrial Fibrillation, S/P Coronary Artery Stent Placement, Type 2 Diabetes Mellitus With Diabetic Neuropathy, Without Long-Term Current Use Of Insulin (Hcc), History Of Clostridium Difficile Infection, Junctional Tachycardia (Hcc), Hypotension Providers Seen During Your Hospitalizations Provider Role Specialty Primary office phone Gena Stewart. Audrey Monroe MD Attending Provider Emergency Medicine [phone] Joseph Vann MD Attending Provider Internal Medicine [phone] Kimberly Bass MD Attending Provider Internal Medicine [phone] Your Primary Care Physician (PCP) Primary Care Physician Office Phone Office Fax Anne-Marie Chirinos I [phone] Follow-up Information Follow up With Details Comments Contact Info Sarah Andrea NP In 1 week  [facility] [address] [facility] [place] [address] [address] 
[phone] Kristian Campos MD In 1 week  [address] [place] [address] 
[phone] Olivia Cordova MD In 2 months  [address] [facility] [address] [facility] 
[phone] Current Discharge Medication List  
  
START taking these medications Dose & Instructions Dispensing Information Comments Morning Noon Evening Bedtime  
 amiodarone 200 mg tablet Commonly known as:  CORDARONE Your last dose was: Your next dose is:    
   
   
 Dose:  200 mg Take 1 Tab by mouth two (2) times a day. Quantity:  60 Tab Refills:  0  
     
   
   
   
  
 benzonatate 100 mg capsule Commonly known as:  TESSALON Your last dose was: Your next dose is:    
   
   
 Dose:  100 mg Take 1 Cap by mouth three (3) times daily as needed for Cough for up to 7 days. Quantity:  20 Cap Refills:  0  
     
   
   
   
  
 carvedilol 6.25 mg tablet Commonly known as:  Pritchard Brod Your last dose was: Your next dose is:    
   
   
 Dose:  6.25 mg Take 1 Tab by mouth two (2) times daily (with meals). Quantity:  60 Tab Refills:  0  
     
   
   
   
  
 predniSONE 10 mg tablet Commonly known as:  Euel Salaam Your last dose was: Your next dose is: Take  2 Tab daily x 2 days then 1 Tab daily x 2 days Quantity:  6 Tab Refills:  0  
     
   
   
   
  
 vancomycin 50 mg/mL oral solution (compounded) Your last dose was: Your next dose is:    
   
   
 Dose:  125 mg Take 2.5 mL by mouth every six (6) hours for 3 days. Quantity:  30 mL Refills:  0 CONTINUE these medications which have CHANGED Dose & Instructions Dispensing Information Comments Morning Noon Evening Bedtime  
 furosemide 40 mg tablet Commonly known as:  LASIX What changed:   
- medication strength 
- how much to take 
- how to take this - when to take this 
- additional instructions Your last dose was: Your next dose is: Take 40 mg daily Quantity:  30 Tab Refills:  0 CONTINUE these medications which have NOT CHANGED Dose & Instructions Dispensing Information Comments Morning Noon Evening Bedtime * albuterol 90 mcg/actuation inhaler Commonly known as:  VENTOLIN HFA Your last dose was: Your next dose is:    
   
   
 Dose:  2 Puff Take 2 Puffs by inhalation every six (6) hours as needed for Wheezing. Quantity:  1 Inhaler Refills:  11  
     
   
   
   
  
 * albuterol 2.5 mg /3 mL (0.083 %) nebulizer solution Commonly known as:  PROVENTIL VENTOLIN Your last dose was: Your next dose is:    
   
   
 Dose:  2.5 mg  
3 mL by Nebulization route every four (4) hours as needed for Wheezing. Quantity:  1 Package Refills:  5 Azelastine 0.15 % (205.5 mcg) nasal spray Commonly known as:  ASTEPRO Your last dose was: Your next dose is:    
   
   
 Dose:  1 Spray 1 Reedy by Both Nostrils route daily. Refills:  0 Blood-Glucose Meter monitoring kit Your last dose was: Your next dose is:    
   
   
 Check blood sugar daily. May substitute for insurance preferred meter Quantity:  1 Kit Refills:  0  
     
   
   
   
  
 budesonide-formoterol 160-4.5 mcg/actuation HFA inhaler Commonly known as:  SYMBICORT Your last dose was: Your next dose is:    
   
   
 Dose:  1 Puff Take 1 Puff by inhalation two (2) times a day. Quantity:  3 Inhaler Refills:  3 CENTRUM SILVER PO Your last dose was: Your next dose is:    
   
   
 Dose:  1 Tab Take 1 Tab by mouth daily. Takes one po daily. Refills:  0  
     
   
   
   
  
 clopidogrel 75 mg Tab Commonly known as:  PLAVIX Your last dose was: Your next dose is:    
   
   
 Dose:  75 mg Take 1 Tab by mouth daily. Quantity:  30 Tab Refills:  0  
     
   
   
   
  
 cod liver oil Cap Your last dose was: Your next dose is:    
   
   
 Dose:  1 Cap Take 1 Cap by mouth daily. Refills:  0  
     
   
   
   
  
 colchicine 0.6 mg tablet Your last dose was: Your next dose is:    
   
   
 Dose:  0.6 mg Take 1 Tab by mouth two (2) times a day. Quantity:  60 Tab Refills:  5 FLONASE 50 mcg/actuation nasal spray Generic drug:  fluticasone Your last dose was: Your next dose is:    
   
   
 Dose:  2 Spray 2 Sprays by Both Nostrils route every evening. Refills:  0  
     
   
   
   
  
 gabapentin 800 mg tablet Commonly known as:  NEURONTIN Your last dose was: Your next dose is: TAKE ONE TABLET BY MOUTH THREE TIMES DAILY Quantity:  90 Tab Refills:  11 HYDROcodone-acetaminophen 5-325 mg per tablet Commonly known as:  Matt Fuller Your last dose was: Your next dose is:    
   
   
  Refills:  0  
     
   
   
   
  
 metFORMIN 1,000 mg tablet Commonly known as:  GLUCOPHAGE Your last dose was: Your next dose is: TAKE 1 TABLET BY MOUTH TWICE DAILY WITH MEALS  Indications: PREVENTION OF TYPE 2 DIABETES MELLITUS Quantity:  180 Tab Refills:  3  
     
   
   
   
  
 pravastatin 20 mg tablet Commonly known as:  PRAVACHOL Your last dose was: Your next dose is:    
   
   
 Dose:  20 mg Take 1 Tab by mouth nightly. Quantity:  90 Tab Refills:  3 tiotropium 18 mcg inhalation capsule Commonly known as:  [address] Your last dose was: Your next dose is:    
   
   
 INHALE THE CONTENTS OF 1 CAPSULE THROUGH HANDIHALER DEVICE DAILY Quantity:  90 Cap Refills:  3  
     
   
   
   
  
 warfarin 5 mg tablet Commonly known as:  COUMADIN Your last dose was: Your next dose is:    
   
   
 Dose:  7.5 mg Take 7.5 mg by mouth daily. Refills:  0  
     
   
   
   
  
 * Notice: This list has 2 medication(s) that are the same as other medications prescribed for you. Read the directions carefully, and ask your doctor or other care provider to review them with you. STOP taking these medications   
 aspirin 81 mg chewable tablet  
   
  
 atorvastatin 20 mg tablet Commonly known as:  LIPITOR  
   
  
 evolocumab 420 mg/3.5 mL Injt Commonly known as:  REPATHA PUSHTRONEX  
   
  
 sotalol 120 mg tablet Commonly known as:  BETAPACE  
   
  
 valsartan 40 mg tablet Commonly known as:  DIOVAN Where to Get Your Medications Information on where to get these meds will be given to you by the nurse or doctor. ! Ask your nurse or doctor about these medications  
  amiodarone 200 mg tablet  
 benzonatate 100 mg capsule  
 carvedilol 6.25 mg tablet  
 furosemide 40 mg tablet  
 predniSONE 10 mg tablet  
 vancomycin 50 mg/mL oral solution (compounded) Discharge Instructions Patient Discharge Instructions Steven Felix / [id number] : 1950 Admitted 2017 Discharged: 2017 DISCHARGE DIAGNOSIS:  
 
 
COPD exacerbation Follow with pulmonology in 2 months to repeat chest CT Heart Failure Atrial fibrillation Recurrent Clostridium difficile Complete vancomycin Take probiotics  X 1 month ( over the counter)   
  Take Home Medications General drug facts If you have a very bad allergy, wear an allergy ID at all times. It is important that you take the medication exactly as they are prescribed. Keep your medication in the bottles provided by the pharmacist. 
Keep a list of all your drugs (prescription, natural products, vitamins, OTC) with you. Give this list to your doctor. Do not take other medications without consulting your doctor. Do not share your drugs with others and do not take anyone else's drugs. Keep all drugs out of the reach of children and pets. Most drugs may be thrown away in household trash after mixing with coffee grounds or jewel litter and sealing in a plastic bag. Keep a list Call your doctor for help with any side effects. If in the [place] you may also call the FDA at 3-896-OVO-1928 Talk with the doctor before starting any new drug, including OTC, natural products, or vitamins. What to do at [facility] 1. Recommended diet:diabetic 2. Recommended activity: Activity as tolerated 3. If you experience any of the following symptoms then please call your primary care physician or return to the emergency room if you cannot get hold of your doctor: worsening dyspnea/ chest pain 4. You should weigh yourself daily. You should measure your weight first thing in the morning, after you use the restroom. If you gain more than 3 pounds in 1 day or 5 pounds in 1 week or you are feeling more short of breath then usual you should take an extra lasix in the afternoon. If you require extra lasix more than 3 days in a row, call your cardiology. Record your daily weights in a notebook. Bring this with you to all your doctor's appointments. 5. Lab work: INR need to be checked 5/25 ( home health care nurse will be drawing blood ) 6. Bring these papers with you to your follow up appointments.  The papers will help your doctors be sure to continue the care plan from the hospital. 
 
 
Follow-up with:  
PCP: Claudio Ramirez NP 
 Follow-up Information Follow up With Details Comments Contact Info Charly Stevens NP In 1 week  [facility] [address] [facility] [place] [address] [address] [phone] Paulette Singh MD In 1 week  [address] 
[phone] Daron Moran MD In 2 months  [address] [facility] [address] [facility] 
[phone] Please call for your own appointment Information obtained by : 
I understand that if any problems occur once I am at home I am to contact my physician. I understand and acknowledge receipt of the instructions indicated above. Physician's or R.N.'s Signature                                                                  Date/Time Patient or Representative Signature                                                          Date/Time Discharge Instructions Attachments/References WARFARIN: LONG-TERM USE (ENGLISH) Discharge Orders None [employer] Announcement We are excited to announce that we are making your provider's discharge notes available to you in [employer]. You will see these notes when they are completed and signed by the physician that discharged you from your recent hospital stay. If you have any questions or concerns about any information you see in [employer], please call the Health Information Department where you were seen or reach out to your Primary Care Provider for more information about your plan of care. Introducing [facility] [place] [facility]! Dear Marta Garcia: Thank you for requesting a [employer] account.   Our records indicate that you already have an active [employer] account. You can access your account anytime at [email or web address] Did you know that you can access your hospital and ER discharge instructions at any time in [employer]? You can also review all of your test results from your hospital stay or ER visit. Additional Information If you have questions, please visit the Frequently Asked Questions section of the [employer] website at [email or web address] Remember, [employer] is NOT to be used for urgent needs. For medical emergencies, dial 911. Now available from your iPhone and Android! General Information Please provide this summary of care documentation to your next provider. Patient Signature:  ____________________________________________________________ Date:  ____________________________________________________________  
  
Melida Huggins Provider Signature:  ____________________________________________________________ Date:  ____________________________________________________________ More Information Taking Warfarin Safely: Care Instructions Your Care Instructions Warfarin is a medicine that you take to prevent blood clots. It is often called a blood thinner. Doctors give warfarin (such as Coumadin) to reduce the risk of blood clots. You may be at risk for blood clots if you have atrial fibrillation or deep vein thrombosis. Some other health problems may also put you at risk. Warfarin slows the amount of time it takes for your blood to clot. It can cause bleeding problems. Even if you've been taking warfarin for a while, it's important to know how to take it safely. Foods and other medicines can affect the way warfarin works. Some can make warfarin work too well. This can cause bleeding problems. And some can make it work poorly, so that it does not prevent blood clots very well. You will need regular blood tests to check how long it takes for your blood to form a clot. This test is called a PT or prothrombin time test. The result of the test is called an INR level. Depending on the test results, your doctor or anticoagulation clinic may adjust your dose of warfarin. Follow-up care is a key part of your treatment and safety. Be sure to make and go to all appointments, and call your doctor if you are having problems. It's also a good idea to know your test results and keep a list of the medicines you take. How can you care for yourself at home? Take warfarin safely · Take your warfarin at the same time each day. · If you miss a dose of warfarin, don't take an extra dose to make up for it. Your doctor can tell you exactly what to do so you don't take too much or too little. · Wear medical alert jewelry that lets others know that you take warfarin. You can buy this at most drugstores. · Don't take warfarin if you are pregnant or planning to get pregnant. Talk to your doctor about how you can prevent getting pregnant while you are taking it. · Don't change your dose or stop taking warfarin unless your doctor tells you to. Effects of medicines and food on warfarin · Don't start or stop taking any medicines, vitamins, or natural remedies unless you first talk to your doctor. Many medicines can affect how warfarin works. These include aspirin and other pain relievers, over-the-counter medicines, multivitamins, dietary supplements, and herbal products. · Tell all of your doctors and pharmacists that you take warfarin. Some prescription medicines can affect how warfarin works. · Keep the amount of vitamin K in your diet about the same from day to day. Do not suddenly eat a lot more or a lot less food that is rich in vitamin K than you usually do. Vitamin K affects how warfarin works and how your blood clots.  Talk with your doctor before making big changes in your diet. Foods that have a lot of vitamin K include cooked green vegetables, such as: ¨ Kale, spinach, turnip greens, juan carlos greens, Swiss chard, and mustard greens. ¨ [place] sprouts, broccoli, and asparagus. · Limit your use of alcohol. Avoid bleeding by preventing falls and injuries · Wear slippers or shoes with nonskid soles. · Remove throw rugs and clutter. · Rearrange furniture and electrical cords to keep them out of walking paths. · Keep stairways, porches, and outside walkways well lit. Use night-lights in hallways and bathrooms. · Be extra careful when you work with sharp tools or knives. When should you call for help? Call 911 anytime you think you may need emergency care. For example, call if: 
· You have a sudden, severe headache that is different from past headaches. Call your doctor now or seek immediate medical care if: 
· You have any abnormal bleeding, such as: 
¨ Nosebleeds. ¨ Vaginal bleeding that is different (heavier, more frequent, at a different time of the month) than what you are used to. ¨ Bloody or black stools, or rectal bleeding. ¨ Bloody or pink urine. Watch closely for changes in your health, and be sure to contact your doctor if you have any problems. Where can you learn more? Go to http://rusty-marcela.info/. Enter Q310 in the search box to learn more about \"Taking Warfarin Safely: Care Instructions. \" Current as of: November 15, 2016 Content Version: 11.2 © 8660-3383 [employer]. Care instructions adapted under license by [employer] (which disclaims liability or warranty for this information). If you have questions about a medical condition or this instruction, always ask your healthcare professional. [place] [address] any warranty or liability for your use of this information.

## 2017-05-09 NOTE — PROGRESS NOTES
Chief Complaint   Patient presents with   [facility] Follow Up     S/P ablation; pt c/o chest pressure to L side x 1-1/2 days     Shortness of Breath

## 2017-05-09 NOTE — PROGRESS NOTES
Subjective:      Sharon Lindo is a 73 y.o. female is here for EP consult. She is complaining of sob. She was unable to sleep last night.      Patient Active Problem List    Diagnosis Date Noted    S/P ablation of atrial fibrillation 2017    Fear associated with illness and body function 2017    Counseling regarding advanced care planning and goals of care     Systolic CHF, acute ([address]     Acute systolic CHF (congestive heart failure) ([address] 2017    CHF (congestive heart failure) ([address] 2017    Type 2 diabetes mellitus with diabetic neuropathy, without long-term current use of insulin ([address] 2017    Anticoagulation monitoring, INR range 2-3     Diastolic CHF, acute on chronic ([address] 2014    S/P coronary artery stent placement 2014    DNR (do not resuscitate) 2014    Back pain 2012    Sinoatrial node dysfunction ([address] 2012    Cardiac pacemaker in situ 2012    Mixed hyperlipidemia 2012    Chest pain 2011    Anemia 2011    Sick sinus syndrome ([address] 2011    S/P angioplasty with stent 2011    Hypokalemia 2010    Hip pain 2010    Hypertension--essential 2010    Atrial fibrillation--paroxysmal 2010    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 2010      Sang Salazar NP  Past Medical History:   Diagnosis Date    Atrial fibrillation ([address] 2010    CAD (coronary artery disease)     stent    Chronic diastolic heart failure ([address] 2014    COPD     COPD (chronic obstructive pulmonary disease) ([address] 2010    Diabetes ([address]     Fibroid     Heart failure ([address]     Hypertension 2010    NIDDM (non-insulin dependent diabetes mellitus) 2010    Screening mammogram 5/4/10    SOB (shortness of breath) 2014      Past Surgical History:   Procedure Laterality Date    HX  SECTION      HX OTHER SURGICAL      adrenal gland removed    HX PACEMAKER      MA EXCISE ADRENAL GLAND       Allergies   Allergen Reactions    Crestor [Rosuvastatin] Myalgia    Levaquin [Levofloxacin] Nausea Only     GI Upset    Lipitor [Atorvastatin] Diarrhea    Lyrica [Pregabalin] Myalgia      Family History   Problem Relation Age of Onset    Heart Disease Mother     Diabetes Father     Heart Disease Brother     negative for cardiac disease  Social History     Social History    Marital status:      Spouse name: N/A    Number of children: N/A    Years of education: N/A     Social History Main Topics    Smoking status: Former Smoker     Types: Cigarettes     Quit date: 12/31/2009    Smokeless tobacco: Never Used    Alcohol use No    Drug use: No    Sexual activity: Not Currently     Other Topics Concern    None     Social History Narrative     Current Outpatient Prescriptions   Medication Sig    pravastatin (PRAVACHOL) 20 mg tablet Take 1 Tab by mouth nightly.  valsartan (DIOVAN) 40 mg tablet Take 0.5 Tabs by mouth daily.  clopidogrel (PLAVIX) 75 mg tab Take 1 Tab by mouth daily.  warfarin (COUMADIN) 5 mg tablet Take 7.5 mg by mouth five (5) days a week. 7.5 mg five days a week Monday through Friday    fluticasone (FLONASE) 50 mcg/actuation nasal spray 2 Sprays by Both Nostrils route every evening.  gabapentin (NEURONTIN) 800 mg tablet TAKE ONE TABLET BY MOUTH THREE TIMES DAILY    Blood-Glucose Meter monitoring kit Check blood sugar daily. May substitute for insurance preferred meter    tiotropium (SPIRIVA WITH HANDIHALER) 18 mcg inhalation capsule INHALE THE CONTENTS OF 1 CAPSULE THROUGH HANDIHALER DEVICE DAILY    metFORMIN (GLUCOPHAGE) 1,000 mg tablet TAKE 1 TABLET BY MOUTH TWICE DAILY WITH MEALS  Indications: PREVENTION OF TYPE 2 DIABETES MELLITUS    sotalol (BETAPACE) 120 mg tablet Take 1 Tab by mouth two (2) times a day.  (Patient taking differently: Take 180 mg by mouth two (2) times a day.)    warfarin (COUMADIN) 5 mg tablet Take 1.5 tabs daily, but on Saturday and Sunday take 2 tabs.  furosemide (LASIX) 20 mg tablet Take 1 Tab by mouth daily.  colchicine 0.6 mg tablet Take 1 Tab by mouth two (2) times a day. (Patient taking differently: Take 1.25 mg by mouth daily. Indications: GOUT)    budesonide-formoterol (SYMBICORT) 160-4.5 mcg/actuation HFA inhaler Take 1 Puff by inhalation two (2) times a day.  albuterol (PROVENTIL VENTOLIN) 2.5 mg /3 mL (0.083 %) nebulizer solution 3 mL by Nebulization route every four (4) hours as needed for Wheezing.  FOLIC ACID/MULTIVIT-MIN/LUTEIN (CENTRUM SILVER PO) Take 1 Tab by mouth daily. Takes one po daily.  albuterol (VENTOLIN HFA) 90 mcg/actuation inhaler Take 2 Puffs by inhalation every six (6) hours as needed for Wheezing.  Azelastine (ASTEPRO) 0.15 % (205.5 mcg) nasal spray 1 [place] by Both Nostrils route daily.  cod liver oil cap Take 1 Cap by mouth daily.  aspirin 81 mg chewable tablet Take 81 mg by mouth daily.  atorvastatin (LIPITOR) 20 mg tablet Take 20 mg by mouth daily.  HYDROcodone-acetaminophen (NORCO) 5-325 mg per tablet     evolocumab (REPATHA PUSHTRONEX) 420 mg/3.5 mL Injt 420 mg by SubCUTAneous route every month. Indications: arteriosclerotic vascular disease     No current facility-administered medications for this visit. Vitals:    05/09/17 1544   BP: (!) 80/60   Pulse: (!) 153   Resp: 28   SpO2: 97%   Weight: 170 lb 3.2 oz (77.2 kg)   Height: 5' 6\" (1.676 m)       I have reviewed the nurses notes, vitals, problem list, allergy list, medical history, family, social history and medications. Review of Symptoms:    General: Pt denies excessive weight gain or loss. Pt is able to conduct ADL's  HEENT: Denies blurred vision, headaches, epistaxis and difficulty swallowing. Respiratory: + shortness of breath, [place], denies wheezing or stridor.   Cardiovascular: Denies precordial pain, palpitations, edema or PND  Gastrointestinal: Denies poor appetite, indigestion, abdominal pain or blood in stool  Urinary: Denies dysuria, pyuria  Musculoskeletal: Denies pain or swelling from muscles or joints  Neurologic: Denies tremor, paresthesias, or sensory motor disturbance  Skin: Denies rash, itching or texture change. Psych: Denies depression      Physical Exam:      General: Well developed, in no acute distress. HEENT: Eyes - PERRL, no jvd  Heart: tachy,regular  Respiratory: Clear bilaterally x 4, no wheezing or rales  Abdomen:   Soft, non-tender, bowel sounds are active.   Extremities:  No edema, normal cap refill, no cyanosis. Musculoskeletal: No clubbing  Neuro: A&Ox3, speech clear, gait stable. Skin: Skin color is normal. No rashes or lesions.  Non diaphoretic  Vascular: 2+ pulses symmetric in all extremities    Cardiographics    Ekg: s tach  Pacer - s tach    Results for orders placed or performed during the hospital encounter of 04/18/17   EKG, 12 LEAD, INITIAL   Result Value Ref Range    Ventricular Rate 94 BPM    Atrial Rate 91 BPM    QRS Duration 90 ms    Q-T Interval 370 ms    QTC Calculation (Bezet) 462 ms    Calculated R Axis 16 degrees    Calculated T Axis -71 degrees    Diagnosis       Atrial fibrillation  Nonspecific ST abnormality  Confirmed by Linda Mac ([id number]) on 4/18/2017 8:57:37 AM           Lab Results   Component Value Date/Time    WBC 8.6 05/02/2017 04:44 AM    Hemoglobin (POC) 10.6 05/19/2016 08:50 AM    HGB 11.4 05/02/2017 04:44 AM    HCT 34.8 05/02/2017 04:44 AM    PLATELET 959 34/67/8564 04:44 AM    MCV 84.5 05/02/2017 04:44 AM      Lab Results   Component Value Date/Time    Sodium 140 05/02/2017 04:44 AM    Potassium 3.9 05/02/2017 04:44 AM    Chloride 106 05/02/2017 04:44 AM    CO2 20 05/02/2017 04:44 AM    Anion gap 14 05/02/2017 04:44 AM    Glucose 196 05/02/2017 04:44 AM    BUN 9 05/02/2017 04:44 AM    Creatinine 0.97 05/02/2017 04:44 AM    BUN/Creatinine ratio 9 05/02/2017 04:44 AM    GFR est AA >60 05/02/2017 04:44 AM    GFR est non-AA 58 05/02/2017 04:44 AM    Calcium 8.1 05/02/2017 04:44 AM    Bilirubin, total 0.4 04/25/2017 03:18 PM    AST (SGOT) 161 04/25/2017 03:18 PM    Alk. phosphatase 116 04/25/2017 03:18 PM    Protein, total 8.0 04/25/2017 03:18 PM    Albumin 4.4 04/25/2017 03:18 PM    Globulin 4.7 04/18/2017 02:46 AM    A-G Ratio 1.2 04/25/2017 03:18 PM    ALT (SGPT) 115 04/25/2017 03:18 PM         Assessment:     Assessment:        ICD-10-CM ICD-9-CM    1. Atrial fibrillation, unspecified type ([facility] [facility] I48.91 427.31 AMB POC EKG ROUTINE W/ 12 LEADS, INTER & REP   2. SOB (shortness of breath) R06.02 786.05      Orders Placed This Encounter    AMB POC EKG ROUTINE W/ 12 LEADS, INTER & REP     Order Specific Question:   Reason for Exam:     Answer:   Routine    atorvastatin (LIPITOR) 20 mg tablet     Sig: Take 20 mg by mouth daily.  HYDROcodone-acetaminophen (NORCO) 5-325 mg per tablet        Plan:   Ms Tasia Kraft is having sob and she is s tach. She has significant tachypnea. i am sending her to the ER to rule out an infection and obtain an echo to assess her aortic valve (significant AI noted on SIERRA at the time of AF ablation). Continue medical management for htn. Thank you for allowing me to participate in Hong Wagner 's care.     aMru Nash MD, Myrna Cade

## 2017-05-09 NOTE — IP AVS SNAPSHOT
[address] [facility] 
[phone] Patient: Davis Fleming MRN: [id number] GEV:1/71/2456 You are allergic to the following Allergen Reactions Crestor (Rosuvastatin) Myalgia Levaquin (Levofloxacin) Nausea Only GI Upset Lipitor (Atorvastatin) Diarrhea Lyrica (Pregabalin) Myalgia Recent Documentation Height Weight Breastfeeding? BMI OB Status Smoking Status 1.676 m 76.3 kg No 27.16 kg/m2 Postmenopausal Former Smoker Emergency Contacts Name Discharge Info Relation Home Work Mobile Minor Efrain  Child [2] [phone] [address] [facility] CAREGIVER [3] Other Relative [6] [phone] Huong Knox  Child [2] [phone] About your hospitalization You were admitted on:  May 9, 2017 You last received care in the:  [facility] 2 CARDIOPULMONARY CARE You were discharged on:  May 22, 2017 Unit phone number:  [phone] Why you were hospitalized Your primary diagnosis was:  Not on File Your diagnoses also included: Tachycardia, Chronic Systolic Hf (Heart Failure) (Hcc), Atrial Fibrillation (Hcc), Cardiac Pacemaker In Situ, Copd (Chronic Obstructive Pulmonary Disease) (Hcc), Hypertension, Mixed Hyperlipidemia, S/P Ablation Of Atrial Fibrillation, S/P Coronary Artery Stent Placement, Type 2 Diabetes Mellitus With Diabetic Neuropathy, Without Long-Term Current Use Of Insulin (Hcc), History Of Clostridium Difficile Infection, Junctional Tachycardia (Hcc), Hypotension Providers Seen During Your Hospitalizations Provider Role Specialty Primary office phone Tino Blue. Bravo Lovett MD Attending Provider Emergency Medicine [phone] Vlad Edwards MD Attending Provider Internal Medicine [phone] Darlene Cote MD Attending Provider Internal Medicine [phone] Your Primary Care Physician (PCP) Primary Care Physician Office Phone Office Fax Higinio King I [phone] Follow-up Information Follow up With Details Comments Contact Info Princess Bolton NP In 1 week  hospitals [address] [facility] [address] [address] [phone] Efrain Kayser, MD In 1 week  [address] [facility] 
[phone] Meera Brannon MD In 2 months  [address] [facility] [address] [facility] 
[phone] Current Discharge Medication List  
  
START taking these medications Dose & Instructions Dispensing Information Comments Morning Noon Evening Bedtime  
 amiodarone 200 mg tablet Commonly known as:  CORDARONE Your last dose was: Your next dose is:    
   
   
 Dose:  200 mg Take 1 Tab by mouth two (2) times a day. Quantity:  60 Tab Refills:  0  
     
   
   
   
  
 benzonatate 100 mg capsule Commonly known as:  TESSALON Your last dose was: Your next dose is:    
   
   
 Dose:  100 mg Take 1 Cap by mouth three (3) times daily as needed for Cough for up to 7 days. Quantity:  20 Cap Refills:  0  
     
   
   
   
  
 carvedilol 6.25 mg tablet Commonly known as:  Joanna Simper Your last dose was: Your next dose is:    
   
   
 Dose:  6.25 mg Take 1 Tab by mouth two (2) times daily (with meals). Quantity:  60 Tab Refills:  0  
     
   
   
   
  
 predniSONE 10 mg tablet Commonly known as:  Rowena Sanchezwell Your last dose was: Your next dose is: Take  2 Tab daily x 2 days then 1 Tab daily x 2 days Quantity:  6 Tab Refills:  0  
     
   
   
   
  
 vancomycin 50 mg/mL oral solution (compounded) Your last dose was: Your next dose is:    
   
   
 Dose:  125 mg Take 2.5 mL by mouth every six (6) hours for 3 days. Quantity:  30 mL Refills:  0 CONTINUE these medications which have CHANGED Dose & Instructions Dispensing Information Comments Morning Noon Evening Bedtime  
 furosemide 40 mg tablet Commonly known as:  LASIX What changed:   
- medication strength 
- how much to take 
- how to take this - when to take this 
- additional instructions Your last dose was: Your next dose is: Take 40 mg daily Quantity:  30 Tab Refills:  0 CONTINUE these medications which have NOT CHANGED Dose & Instructions Dispensing Information Comments Morning Noon Evening Bedtime * albuterol 90 mcg/actuation inhaler Commonly known as:  VENTOLIN HFA Your last dose was: Your next dose is:    
   
   
 Dose:  2 Puff Take 2 Puffs by inhalation every six (6) hours as needed for Wheezing. Quantity:  1 Inhaler Refills:  11  
     
   
   
   
  
 * albuterol 2.5 mg /3 mL (0.083 %) nebulizer solution Commonly known as:  PROVENTIL VENTOLIN Your last dose was: Your next dose is:    
   
   
 Dose:  2.5 mg  
3 mL by Nebulization route every four (4) hours as needed for Wheezing. Quantity:  1 Package Refills:  5 Azelastine 0.15 % (205.5 mcg) nasal spray Commonly known as:  ASTEPRO Your last dose was: Your next dose is:    
   
   
 Dose:  1 Spray 1 [place] by Both Nostrils route daily. Refills:  0 Blood-Glucose Meter monitoring kit Your last dose was: Your next dose is:    
   
   
 Check blood sugar daily. May substitute for insurance preferred meter Quantity:  1 Kit Refills:  0  
     
   
   
   
  
 budesonide-formoterol 160-4.5 mcg/actuation HFA inhaler Commonly known as:  SYMBICORT Your last dose was: Your next dose is:    
   
   
 Dose:  1 Puff Take 1 Puff by inhalation two (2) times a day. Quantity:  3 Inhaler Refills:  3 CENTRUM SILVER PO Your last dose was: Your next dose is:    
   
   
 Dose:  1 Tab Take 1 Tab by mouth daily. Takes one po daily. Refills:  0  
     
   
   
   
  
 clopidogrel 75 mg Tab Commonly known as:  PLAVIX Your last dose was: Your next dose is:    
   
   
 Dose:  75 mg Take 1 Tab by mouth daily. Quantity:  30 Tab Refills:  0  
     
   
   
   
  
 cod liver oil Cap Your last dose was: Your next dose is:    
   
   
 Dose:  1 Cap Take 1 Cap by mouth daily. Refills:  0  
     
   
   
   
  
 colchicine 0.6 mg tablet Your last dose was: Your next dose is:    
   
   
 Dose:  0.6 mg Take 1 Tab by mouth two (2) times a day. Quantity:  60 Tab Refills:  5 FLONASE 50 mcg/actuation nasal spray Generic drug:  fluticasone Your last dose was: Your next dose is:    
   
   
 Dose:  2 Spray 2 Sprays by Both Nostrils route every evening. Refills:  0  
     
   
   
   
  
 gabapentin 800 mg tablet Commonly known as:  NEURONTIN Your last dose was: Your next dose is: TAKE ONE TABLET BY MOUTH THREE TIMES DAILY Quantity:  90 Tab Refills:  11 HYDROcodone-acetaminophen 5-325 mg per tablet Commonly known as:  Mena Holiday Your last dose was: Your next dose is:    
   
   
  Refills:  0  
     
   
   
   
  
 metFORMIN 1,000 mg tablet Commonly known as:  GLUCOPHAGE Your last dose was: Your next dose is: TAKE 1 TABLET BY MOUTH TWICE DAILY WITH MEALS  Indications: PREVENTION OF TYPE 2 DIABETES MELLITUS Quantity:  180 Tab Refills:  3  
     
   
   
   
  
 pravastatin 20 mg tablet Commonly known as:  PRAVACHOL Your last dose was: Your next dose is:    
   
   
 Dose:  20 mg Take 1 Tab by mouth nightly. Quantity:  90 Tab Refills:  3 tiotropium 18 mcg inhalation capsule Commonly known as:  [address] Your last dose was: Your next dose is:    
   
   
 INHALE THE CONTENTS OF 1 CAPSULE THROUGH HANDIHALER DEVICE DAILY Quantity:  90 Cap Refills:  3  
     
   
   
   
  
 warfarin 5 mg tablet Commonly known as:  COUMADIN Your last dose was: Your next dose is:    
   
   
 Dose:  7.5 mg Take 7.5 mg by mouth daily. Refills:  0  
     
   
   
   
  
 * Notice: This list has 2 medication(s) that are the same as other medications prescribed for you. Read the directions carefully, and ask your doctor or other care provider to review them with you. STOP taking these medications   
 aspirin 81 mg chewable tablet  
   
  
 atorvastatin 20 mg tablet Commonly known as:  LIPITOR  
   
  
 evolocumab 420 mg/3.5 mL Injt Commonly known as:  REPATHA PUSHTRONEX  
   
  
 sotalol 120 mg tablet Commonly known as:  BETAPACE  
   
  
 valsartan 40 mg tablet Commonly known as:  DIOVAN Where to Get Your Medications Information on where to get these meds will be given to you by the nurse or doctor. ! Ask your nurse or doctor about these medications  
  amiodarone 200 mg tablet  
 benzonatate 100 mg capsule  
 carvedilol 6.25 mg tablet  
 furosemide 40 mg tablet  
 predniSONE 10 mg tablet  
 vancomycin 50 mg/mL oral solution (compounded) Discharge Instructions Patient Discharge Instructions Cali Bai / [id number] : 1950 Admitted 2017 Discharged: 2017 DISCHARGE DIAGNOSIS:  
 
 
COPD exacerbation Follow with pulmonology in 2 months to repeat chest CT Heart Failure Atrial fibrillation Recurrent Clostridium difficile Complete vancomycin Take probiotics  X 1 month ( over the counter)   
  Take Home Medications General drug facts If you have a very bad allergy, wear an allergy ID at all times. It is important that you take the medication exactly as they are prescribed. Keep your medication in the bottles provided by the pharmacist. 
Keep a list of all your drugs (prescription, natural products, vitamins, OTC) with you. Give this list to your doctor. Do not take other medications without consulting your doctor. Do not share your drugs with others and do not take anyone else's drugs. Keep all drugs out of the reach of children and pets. Most drugs may be thrown away in household trash after mixing with coffee grounds or jewel litter and sealing in a plastic bag. Keep a list Call your doctor for help with any side effects. If in the [place] you may also call the FDA at 8-110-SZB-0741 Talk with the doctor before starting any new drug, including OTC, natural products, or vitamins. What to do at [facility] 1. Recommended diet:diabetic 2. Recommended activity: Activity as tolerated 3. If you experience any of the following symptoms then please call your primary care physician or return to the emergency room if you cannot get hold of your doctor: worsening dyspnea/ chest pain 4. You should weigh yourself daily. You should measure your weight first thing in the morning, after you use the restroom. If you gain more than 3 pounds in 1 day or 5 pounds in 1 week or you are feeling more short of breath then usual you should take an extra lasix in the afternoon. If you require extra lasix more than 3 days in a row, call your cardiology. Record your daily weights in a notebook. Bring this with you to all your doctor's appointments. 5. Lab work: INR need to be checked 5/25 ( home health care nurse will be drawing blood ) 6. Bring these papers with you to your follow up appointments.  The papers will help your doctors be sure to continue the care plan from the hospital. 
 
 
Follow-up with:  
PCP: Claudia Zarco NP 
 Follow-up Information Follow up With Details Comments Contact Info Yen Gómez NP In 1 week  [facility] [address] [facility] [address] [address] [phone] Beverley Ceron MD In 1 week  [address] 
[phone] Consuelo Downey MD In 2 months  [address] [facility] [address] [facility] 
[phone] Please call for your own appointment Information obtained by : 
I understand that if any problems occur once I am at home I am to contact my physician. I understand and acknowledge receipt of the instructions indicated above. Physician's or R.N.'s Signature                                                                  Date/Time Patient or Representative Signature                                                          Date/Time Discharge Instructions Attachments/References WARFARIN: LONG-TERM USE (ENGLISH) Discharge Orders None [employer] Announcement We are excited to announce that we are making your provider's discharge notes available to you in DigiMeld. You will see these notes when they are completed and signed by the physician that discharged you from your recent hospital stay. If you have any questions or concerns about any information you see in DigiMeld, please call the Health Information Department where you were seen or reach out to your Primary Care Provider for more information about your plan of care. Introducing [facility]! Dear Jahaira Enrique: Thank you for requesting a DigiMeld account.   Our records indicate that you already have an active Converser account. You can access your account anytime at [email or web address] Did you know that you can access your hospital and ER discharge instructions at any time in Converser? You can also review all of your test results from your hospital stay or ER visit. Additional Information If you have questions, please visit the Frequently Asked Questions section of the Converser website at [email or web address] Remember, Converser is NOT to be used for urgent needs. For medical emergencies, dial 911. Now available from your iPhone and Android! General Information Please provide this summary of care documentation to your next provider. Patient Signature:  ____________________________________________________________ Date:  ____________________________________________________________  
  
Flavia De Luna Provider Signature:  ____________________________________________________________ Date:  ____________________________________________________________ More Information Taking Warfarin Safely: Care Instructions Your Care Instructions Warfarin is a medicine that you take to prevent blood clots. It is often called a blood thinner. Doctors give warfarin (such as Coumadin) to reduce the risk of blood clots. You may be at risk for blood clots if you have atrial fibrillation or deep vein thrombosis. Some other health problems may also put you at risk. Warfarin slows the amount of time it takes for your blood to clot. It can cause bleeding problems. Even if you've been taking warfarin for a while, it's important to know how to take it safely. Foods and other medicines can affect the way warfarin works. Some can make warfarin work too well. This can cause bleeding problems. And some can make it work poorly, so that it does not prevent blood clots very well. You will need regular blood tests to check how long it takes for your blood to form a clot. This test is called a PT or prothrombin time test. The result of the test is called an INR level. Depending on the test results, your doctor or anticoagulation clinic may adjust your dose of warfarin. Follow-up care is a key part of your treatment and safety. Be sure to make and go to all appointments, and call your doctor if you are having problems. It's also a good idea to know your test results and keep a list of the medicines you take. How can you care for yourself at home? Take warfarin safely · Take your warfarin at the same time each day. · If you miss a dose of warfarin, don't take an extra dose to make up for it. Your doctor can tell you exactly what to do so you don't take too much or too little. · Wear medical alert jewelry that lets others know that you take warfarin. You can buy this at most drugstores. · Don't take warfarin if you are pregnant or planning to get pregnant. Talk to your doctor about how you can prevent getting pregnant while you are taking it. · Don't change your dose or stop taking warfarin unless your doctor tells you to. Effects of medicines and food on warfarin · Don't start or stop taking any medicines, vitamins, or natural remedies unless you first talk to your doctor. Many medicines can affect how warfarin works. These include aspirin and other pain relievers, over-the-counter medicines, multivitamins, dietary supplements, and herbal products. · Tell all of your doctors and pharmacists that you take warfarin. Some prescription medicines can affect how warfarin works. · Keep the amount of vitamin K in your diet about the same from day to day. Do not suddenly eat a lot more or a lot less food that is rich in vitamin K than you usually do. Vitamin K affects how warfarin works and how your blood clots.  Talk with your doctor before making big changes in your diet. Foods that have a lot of vitamin K include cooked green vegetables, such as: ¨ Kale, spinach, turnip greens, juan carlos greens, Swiss chard, and mustard greens. ¨ [place] sprouts, broccoli, and asparagus. · Limit your use of alcohol. Avoid bleeding by preventing falls and injuries · Wear slippers or shoes with nonskid soles. · Remove throw rugs and clutter. · Rearrange furniture and electrical cords to keep them out of walking paths. · Keep stairways, porches, and outside walkways well lit. Use night-lights in hallways and bathrooms. · Be extra careful when you work with sharp tools or knives. When should you call for help? Call 911 anytime you think you may need emergency care. For example, call if: 
· You have a sudden, severe headache that is different from past headaches. Call your doctor now or seek immediate medical care if: 
· You have any abnormal bleeding, such as: 
¨ Nosebleeds. ¨ Vaginal bleeding that is different (heavier, more frequent, at a different time of the month) than what you are used to. ¨ Bloody or black stools, or rectal bleeding. ¨ Bloody or pink urine. Watch closely for changes in your health, and be sure to contact your doctor if you have any problems. Where can you learn more? Go to http://rusty-marcela.info/. Enter Y830 in the search box to learn more about \"Taking Warfarin Safely: Care Instructions. \" Current as of: November 15, 2016 Content Version: 11.2 © 4456-6066 [employer]. Care instructions adapted under license by [employer] (which disclaims liability or warranty for this information). If you have questions about a medical condition or this instruction, always ask your healthcare professional. [place] [address] any warranty or liability for your use of this information.

## 2017-05-09 NOTE — ED PROVIDER NOTES
HPI Comments: Judy Orosco, 73 y.o. female, presents ambulatory to ED [facility] ED with cc of gradually worsening shortness of breath since her ablation on 5/1/2017. She states that her SOB significantly worsened yesterday, is worse with laying flat and unchanged with remaining still. The patient reports that she had difficulty sleeping last night d/t sxs. She also c/o associated chest pressure below the breast, chills, diarrhea s/p PO intake, decreased appetite, sore throat, and baseline productive cough with white sputum. The patient reports no improvement of her sxs with a lower sodium diet as well. She denies specific complaints of fever, diaphoresis, abdominal pain, or vomiting. Her pmhx is not significant for DVT/PE. The patient states she had a cardiac stent placed on 4/4/2017 and a pacemaker placed in 2011. Patient takes 800 mg Gabapentin for neuropathy and states she is due at 1700. Patient is not on home O2 therapy. PCP: Fátima Diaz NP  Cardiology: Blair Melvin     PMHx significant for: heart failure, neuropathy, NIDDM, pulmonary nodule of LLL. COPD, HTN, CAD, SOB  PSHx significant for: adrenal gland removal, C section, pacemaker  Social history significant for: - (former) Tobacco, - EtOH, - Illicit Drug Use    There are no other complaints, changes, or physical findings at this time. Written by ALETHA Crowley, as dictated by Mariah Hannon MD.     The history is provided by the patient. No  was used.         Past Medical History:   Diagnosis Date    Atrial fibrillation ([address] 6/2/2010    CAD (coronary artery disease)     stent    Chronic diastolic heart failure ([address] 9/22/2014    COPD     COPD (chronic obstructive pulmonary disease) ([address] 6/2/2010    Diabetes ([address]     Fibroid     Heart failure ([address]     Hypertension 6/2/2010    NIDDM (non-insulin dependent diabetes mellitus) 6/2/2010    Screening mammogram 5/4/10    SOB (shortness of breath) 9/22/2014       Past Surgical History:   Procedure Laterality Date    HX  SECTION      HX OTHER SURGICAL      adrenal gland removed    HX PACEMAKER      [place] EXCISE ADRENAL GLAND           Family History:   Problem Relation Age of Onset    Heart Disease Mother     Diabetes Father     Heart Disease Brother        Social History     Social History    Marital status:      Spouse name: N/A    Number of children: N/A    Years of education: N/A     Occupational History    Not on file. Social History Main Topics    Smoking status: Former Smoker     Types: Cigarettes     Quit date: 2009    Smokeless tobacco: Never Used    Alcohol use No    Drug use: No    Sexual activity: Not Currently     Other Topics Concern    Not on file     Social History Narrative         ALLERGIES: Crestor [rosuvastatin]; Levaquin [levofloxacin]; Lipitor [atorvastatin]; and Lyrica [pregabalin]    Review of Systems   Constitutional: Positive for appetite change and chills. Negative for diaphoresis and fever. HENT: Positive for sore throat. Negative for congestion. Eyes: Negative for visual disturbance. Respiratory: Positive for cough and shortness of breath. Negative for chest tightness. Cardiovascular: Positive for chest pain (pressure). Negative for leg swelling. Gastrointestinal: Positive for diarrhea. Negative for abdominal pain and vomiting. Endocrine: Negative for polyuria. Genitourinary: Negative for dysuria and frequency. Musculoskeletal: Negative for myalgias. Skin: Negative for color change. Allergic/Immunologic: Negative for immunocompromised state. Neurological: Negative for numbness.        Patient Vitals for the past 12 hrs:   Temp Pulse Resp BP SpO2   05/10/17 0030 - (!) 150 18 (!) 76/56 98 %   05/10/17 0000 - (!) 137 15 (!) 76/56 95 %   176 - (!) 136 15 (!) 85/62 96 %   17 2330 - (!) 137 20 (!) 78/64 98 %   17 - (!) 156 27 106/67 100 %   17 - (!) 155 23 106/73 99 %   05/09/17 1915 - (!) 155 27 104/77 96 %   05/09/17 1900 - (!) 157 20 100/72 96 %   05/09/17 1845 - (!) 153 22 102/81 97 %   05/09/17 1830 - (!) 157 16 95/64 98 %   05/09/17 1815 - (!) 153 16 96/66 98 %   05/09/17 1800 - (!) 150 28 90/59 97 %   05/09/17 1745 - (!) 154 23 99/70 97 %   05/09/17 1734 - (!) 154 21 103/66 96 %   05/09/17 1730 - (!) 153 30 106/69 96 %   05/09/17 1715 - (!) 154 (!) 34 96/73 97 %   05/09/17 1701 - (!) 158 16 - 97 %   05/09/17 1700 - - - 101/65 -   05/09/17 1643 97.9 °F (36.6 °C) (!) 156 18 95/63 99 %        Physical Exam   Nursing note and vitals reviewed. General appearance: non-toxic, NAD  Eyes: PERRL, EOMI, conjunctiva normal, anicteric sclera  HEENT: mucous membranes moist, oropharynx is clear  Pulmonary: Slight coarse breath sounds of RLL o/w CTAB; mild tachypnea  Cardiac: tachycardic rate and regular rhythm, no murmurs, gallops, or rubs, 1+DP pulses bilaterally, 2+ radial pulses  Abdomen: soft, nontender, nondistended, bowel sounds present, no CVT. MSK: no pre-tibial edema  Neuro: Alert, answers questions appropriately. BLE decreased sensation to light touch c/w baseline neuropathy   Skin: capillary refill brisk, extremities are warm, well perfused       MDM  Number of Diagnoses or Management Options  Elevated troponin I level:   Shortness of breath: Tachycardia:   Diagnosis management comments:   Ddx: PE, pericardial effusion, underlying atrial tachycardia, lower suspicion for developing sepsis given no clear source    Pt with persistent tachycardia; BP slowly trending down. Admitted to hospitalist; d/w cardiology echo results. No signs of PE on CT. No abdominal pain, abdominal exam benign. No clear etiology for infection. No Abx per d/w hospitalist & cardiology at this point.         Amount and/or Complexity of Data Reviewed  Clinical lab tests: ordered and reviewed  Tests in the radiology section of CPT®: ordered and reviewed  Tests in the medicine section of CPT®: ordered and reviewed  Review and summarize past medical records: yes  Discuss the patient with other providers: yes (Echo tech, cardiology, hospitalist)  Independent visualization of images, tracings, or specimens: yes    Patient Progress  Patient progress: stable    ED Course       Procedures     EKG interpretation: (Preliminary) 1653  Rhythm: sinus tachycardia; and regular with short MT interval. Rate (approx.): 157; Axis: normal; P wave: normal; QRS interval: normal ; ST/T wave: no ST elevation, no ST depression; rate-related changes. MT interval 92 ms, QRS 92 ms,  ms, QTc 413 ms. Written by Linh Jaime ED Scribe, as dictated by Cesar Franklin MD.       Progress Note:  5:30 PM  After hours echocardiogram technician called. Written by Linh Jaime, ED Scribe, as dictated by Cesar Franklin MD.     Consult Note:  5:31 PM  Cesar Franklin MD spoke with Ama Alvarez,  Specialty: echocardiogram technician   Discussed pt's hx, disposition, and available diagnostic and imaging results. Reviewed care plans. Consultant agrees with plans as outlined. Ama Alvarez states she will perform the echocardiogram.   Written by Linh Jaime, ED Scribe, as dictated by Cesar Franklin MD.     Progress Note:  5:34 PM  MD updated on PT/INR of 2.7  Written by Linh Jaime, ED Scribe, as dictated by Cesar Franklin MD.     Consult Note:  5:49 PM  Cesar Franklin MD spoke with Dr. Mar Hilton,  Specialty: cardiology   Discussed pt's hx, disposition, and available diagnostic and imaging results. Reviewed care plans. Consultant agrees with plans as outlined. Dr. Mar Hilton suggests admitting the patient. Written by Linh Jaime, ED Scribe, as dictated by Cesar Franklin MD.     Progress Note:  5:50 PM  Tn reported 0.13. MD aware  Written by Linh Jaime, ED Scribe, as dictated by Cesar Franklin MD.      Progress Note:  6:57 PM  Patient resting in gurney.  Updated and counseled on results, recommended hospitalization based on results and consults, and addressed patient's questions. Patient is agreeable with hospitalization. Written by ALETHA Dupree, as dictated by Opal Monroy MD.    Consult Note:  7:29 PM  Opal Monroy MD spoke with Dr. Svetlana Alcala,  Specialty: cardiology   Discussed pt's hx, disposition, and available diagnostic and imaging results. Reviewed care plans. Consultant agrees with plans as outlined. Recommends CT of chest.   Written by ALETHA Dupree, as dictated by Opal Monroy MD.     Progress Note:  11:50 PM  RN states that the pt has been admitted with an order for phenylephrine drip which requires a central line placement. Written by ALETHA Dupree, as dictated by Opal Monroy MD.    Progress Note:  12:03 AM  Spoke with cardiologist and hospitalist, who both do not wish to start abx without clear source of infection. Written by ALETHA Dupree, as dictated by Opal Monroy MD.     Procedure Note - Central Line Placement:   1:00 AM  Performed by: Opal Monroy MD    Immediately prior to the procedure, the patient was reevaluated and found suitable for the planned procedure and any planned medications. Immediately prior to the procedure a time out was called to verify the correct patient, procedure, equipment, staff, and marking as appropriate. Area was cleansed with Betadine and anesthetized with 7 mLs of 1% lidocaine. Prepped and draped in sterile fashion. Landmarks identified. 18 gauge needle with triple lumen catheter was inserted into pt's Right, Internal Jugular Vein with ultrasound guidance. Line sutured in place; sterile dressing applied. Position: Trendelenburg  Number of attempts: 1  Estimated blood loss: 5 mL  The procedure took 31-45 minutes, and pt tolerated well.   Written by ALETHA Dupree, as dictated by Opal Monroy MD.        LABORATORY TESTS:  Recent Results (from the past 12 hour(s))   EKG, 12 LEAD, INITIAL    Collection Time: 05/09/17  4:53 PM Result Value Ref Range    Ventricular Rate 157 BPM    Atrial Rate 157 BPM    P-R Interval 92 ms    QRS Duration 92 ms    Q-T Interval 256 ms    QTC Calculation (Bezet) 413 ms    Calculated P Axis 87 degrees    Calculated R Axis -22 degrees    Calculated T Axis 124 degrees    Diagnosis       Sinus tachycardia with short GA  Moderate voltage criteria for LVH, may be normal variant  Anterior infarct , age undetermined  ST & T wave abnormality, consider lateral ischemia  Abnormal ECG  When compared with ECG of 02-MAY-2017 12:24,  Significant changes have occurred     CBC WITH AUTOMATED DIFF    Collection Time: 05/09/17  5:04 PM   Result Value Ref Range    WBC 7.1 3.6 - 11.0 K/uL    RBC 4.60 3.80 - 5.20 M/uL    HGB 13.1 11.5 - 16.0 g/dL    HCT 38.9 35.0 - 47.0 %    MCV 84.6 80.0 - 99.0 FL    MCH 28.5 26.0 - 34.0 PG    MCHC 33.7 30.0 - 36.5 g/dL    RDW 14.6 (H) 11.5 - 14.5 %    PLATELET 490 197 - 204 K/uL    NEUTROPHILS 41 32 - 75 %    LYMPHOCYTES 37 12 - 49 %    MONOCYTES 13 5 - 13 %    EOSINOPHILS 9 (H) 0 - 7 %    BASOPHILS 0 0 - 1 %    ABS. NEUTROPHILS 2.9 1.8 - 8.0 K/UL    ABS. LYMPHOCYTES 2.6 0.8 - 3.5 K/UL    ABS. MONOCYTES 0.9 0.0 - 1.0 K/UL    ABS. EOSINOPHILS 0.6 (H) 0.0 - 0.4 K/UL    ABS.  BASOPHILS 0.0 0.0 - 0.1 K/UL   PROTHROMBIN TIME + INR    Collection Time: 05/09/17  5:04 PM   Result Value Ref Range    INR 3.0 (H) 0.9 - 1.1      Prothrombin time 31.2 (H) 9.0 - 40.4 sec   METABOLIC PANEL, COMPREHENSIVE    Collection Time: 05/09/17  5:04 PM   Result Value Ref Range    Sodium 138 136 - 145 mmol/L    Potassium 3.6 3.5 - 5.1 mmol/L    Chloride 104 97 - 108 mmol/L    CO2 24 21 - 32 mmol/L    Anion gap 10 5 - 15 mmol/L    Glucose 97 65 - 100 mg/dL    BUN 13 6 - 20 MG/DL    Creatinine 1.04 (H) 0.55 - 1.02 MG/DL    BUN/Creatinine ratio 13 12 - 20      GFR est AA >60 >60 ml/min/1.73m2    GFR est non-AA 53 (L) >60 ml/min/1.73m2    Calcium 9.1 8.5 - 10.1 MG/DL    Bilirubin, total 0.5 0.2 - 1.0 MG/DL    ALT (SGPT) 60 12 - 78 U/L    AST (SGOT) 77 (H) 15 - 37 U/L    Alk. phosphatase 156 (H) 45 - 117 U/L    Protein, total 8.8 (H) 6.4 - 8.2 g/dL    Albumin 3.5 3.5 - 5.0 g/dL    Globulin 5.3 (H) 2.0 - 4.0 g/dL    A-G Ratio 0.7 (L) 1.1 - 2.2     MAGNESIUM    Collection Time: 05/09/17  5:04 PM   Result Value Ref Range    Magnesium 1.7 1.6 - 2.4 mg/dL   CK W/ REFLX CKMB    Collection Time: 05/09/17  5:04 PM   Result Value Ref Range    CK 96 26 - 192 U/L   TROPONIN I    Collection Time: 05/09/17  5:04 PM   Result Value Ref Range    Troponin-I, Qt. 0.13 (H) <0.05 ng/mL   TSH 3RD GENERATION    Collection Time: 05/09/17  5:04 PM   Result Value Ref Range    TSH 2.74 0.36 - 3.74 uIU/mL   POC INR    Collection Time: 05/09/17  5:22 PM   Result Value Ref Range    INR (POC) 2.7 (H) <1.2     LACTIC ACID, PLASMA    Collection Time: 05/09/17  5:27 PM   Result Value Ref Range    Lactic acid 1.5 0.4 - 2.0 MMOL/L       IMAGING RESULTS:  CTA CHEST W OR W WO CONT   Final Result   CT Results  (Last 48 hours)               05/09/17 2131  CTA CHEST W OR W WO CONT Final result    Impression:  IMPRESSION:   1. No CT evidence for central pulmonary embolus at this time . 2. Extensive interstitial lung disease bilaterally, advanced since 2015 with   nodules as described above and borderline enlarged hilar and mediastinal lymph   nodes, many of which were present in 2015. Correlate with known clinical   pulmonary history for sarcoidosis or other explanations for these findings. Compared to prior chest imaging. If none is available, follow-up chest CT would   be recommended in 3-6 months. Narrative:  EXAM: CT ANGIOGRAPHY CHEST        INDICATION:  SOB, atypical chest pain, recent ablation, eval for PE       COMPARISON: Pulmonary vein mapping for 20/8/2017. Unenhanced CT thorax   11/27/2015. CONTRAST: 80 mL of Isovue-370. TECHNIQUE:    Precontrast  images were obtained to localize the volume for acquisition.    Multislice helical CT arteriography was performed from the diaphragm to the   thoracic inlet during uneventful rapid bolus intravenous contrast   administration. Lung and soft tissue windows were generated. Coronal and   sagittal  reformatted images were also generated. 3D post processing consisting   of coronal maximum intensity projection images was performed. CT dose reduction   was achieved through use of a standardized protocol tailored for this   examination and automatic exposure control for dose modulation. FINDINGS:   A right lower lobe nodular density 1.4 x 1.1 cm in the costophrenic sulcus   medially is new since 4/28/2017. . A 8 0.6 cm nodule in the right lower lobe   (image 51) is new since 2015 but was not included on the more recent scan. A 0.8   cm pleural-based nodule in the right lower lobe is also new since the older   study and was not included on the numerous study. A nodular 1.2 cm density   appears in the right upper lobe, not included on the recent study but new since   the older study. Extensive interstitial lung disease with bleb formation   bilaterally is stable. The pulmonary arteries are well enhanced and no pulmonary emboli are identified. A right hilar lymph node measures 1.8 x 1.3 cm, compared to similar measurements   of 1.4 x 0.9 cm on the prior study. . A para-aortic lymph node measures 2.2 x 1.3   cm, compared to similar measurements of 1.9 x 1.2 cm on the older study study   11/27/2015. An indeterminate pretracheal lymph node is stable since 2015. Normal   and indeterminate sized lymph nodes are noted throughout the mediastinum, but   may appear stable since an older study 11/27/2015. The aorta enhances normally   without evidence of aneurysm or dissection. Surgical clips project at the site of right adrenalectomy.  The visualized   portions of the upper abdominal organs are normal.                 XR CHEST PORT   Final Result   CXR Results  (Last 48 hours) 05/09/17 1725  XR CHEST PORT Final result    Impression:  IMPRESSION: No acute findings. Narrative:  EXAM:  XR CHEST PORT       INDICATION:  tachycardia, sob       COMPARISON:  April 18, 2017       FINDINGS: A portable AP radiograph of the chest was obtained at 1718 hours. Left   subclavian leads are stable. The patient is on a cardiac monitor. The lungs are   clear. The cardiac and mediastinal contours and pulmonary vascularity are   normal.  The bones and soft tissues are grossly within normal limits.                       MEDICATIONS GIVEN:  Medications   0.9% sodium chloride infusion 250 mL (not administered)   sodium chloride (NS) flush 5-10 mL (not administered)   sodium chloride (NS) flush 5-10 mL (not administered)   aspirin chewable tablet 81 mg (not administered)   atorvastatin (LIPITOR) tablet 20 mg (not administered)   fluticasone-vilanterol (BREO ELLIPTA) 100mcg-25mcg/puff (not administered)   clopidogrel (PLAVIX) tablet 75 mg (not administered)   sotalol (BETAPACE) tablet 120 mg (not administered)   umeclidinium (INCRUSE ELLIPTA) 62.5 mcg/actuation (not administered)   albuterol (PROVENTIL HFA, VENTOLIN HFA, PROAIR HFA) inhaler 2 Puff (not administered)   potassium chloride (KLOR-CON) packet 40 mEq (not administered)   magnesium sulfate 2 g/50 ml IVPB (premix or compounded) (not administered)   PHENYLephrine (NEOSYNEPHRINE) 30,000 mcg in 0.9% sodium chloride 250 mL infusion (not administered)   warfarin (COUMADIN) tablet 7.5 mg (not administered)   WARFARIN INFORMATION NOTE (COUMADIN) (not administered)   lidocaine-EPINEPHrine (PF) (XYLOCAINE) 2 %-1:200,000 injection 30 mg (not administered)   lidocaine (PF) (XYLOCAINE) 10 mg/mL (1 %) injection (not administered)   lidocaine (PF) (XYLOCAINE) 10 mg/mL (1 %) injection (not administered)   gabapentin (NEURONTIN) capsule 800 mg (not administered)   0.9% sodium chloride infusion 500 mL (0 mL IntraVENous IV Completed 5/9/17 3643)   gabapentin (NEURONTIN) capsule 800 mg (800 mg Oral Given 5/9/17 1917)   aspirin chewable tablet 162 mg (162 mg Oral Given 5/9/17 1914)   iopamidol (ISOVUE-370) 76 % injection 100 mL (80 mL IntraVENous Given 5/9/17 2131)   0.9% sodium chloride infusion (50 mL/hr IntraVENous New Bag 5/9/17 2131)   sodium chloride (NS) flush 10 mL (10 mL IntraVENous Given 5/9/17 2131)       IMPRESSION:  1. Tachycardia    2. Shortness of breath    3. Elevated troponin I level        PLAN:  1. Admit to Dr. Jean-Pierre Guerrero. Admit Note:  6:49 PM   Pt is being admitted by Dr. Jean-Pierre Guerrero, hospitalist. The results of their tests and reason(s) for their admission have been discussed with pt and/or available family. They convey agreement and understanding for the need to be admitted and for admission diagnosis. This note is prepared by Dwain Esposito, acting as a Scribe for Jacky Levy MD.    Jacky Levy MD: The scribe's documentation has been prepared under my direction and personally reviewed by me in its entirety. I confirm that the notes above accurately reflects all work, treatment, procedures, and medical decision making performed by me.

## 2017-05-09 NOTE — CONSULTS
Subjective:       Nydia Coles is a 73 y.o. female is here for EP consult. She is complaining of sob.  She was unable to sleep last night.           Patient Active Problem List     Diagnosis Date Noted    S/P ablation of atrial fibrillation 05/02/2017    Fear associated with illness and body function 04/07/2017    Counseling regarding advanced care planning and goals of care 42/33/2830    Systolic CHF, acute ([address] 71/61/2517    Acute systolic CHF (congestive heart failure) ([address] 04/06/2017    CHF (congestive heart failure) ([address] 04/04/2017    Type 2 diabetes mellitus with diabetic neuropathy, without long-term current use of insulin ([address] 01/31/2017    Anticoagulation monitoring, INR range 2-3 21/80/6156    Diastolic CHF, acute on chronic ([address] 09/22/2014    S/P coronary artery stent placement 07/04/2014    DNR (do not resuscitate) 06/30/2014    Back pain 11/14/2012    Sinoatrial node dysfunction ([address] 07/05/2012    Cardiac pacemaker in situ 07/05/2012    Mixed hyperlipidemia 07/05/2012    Chest pain 09/21/2011    Anemia 07/25/2011    Sick sinus syndrome ([address] 02/25/2011    S/P angioplasty with stent 02/07/2011    Hypokalemia 07/20/2010    Hip pain 06/08/2010    Hypertension--essential 06/02/2010    Atrial fibrillation--paroxysmal 06/02/2010    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      Delilah Zimmerman NP       Past Medical History:   Diagnosis Date    Atrial fibrillation ([address] 6/2/2010    CAD (coronary artery disease)       stent    Chronic diastolic heart failure ([address] 9/22/2014    COPD      COPD (chronic obstructive pulmonary disease) ([address] 6/2/2010    Diabetes ([address]      Fibroid      Heart failure ([address]      Hypertension 6/2/2010    NIDDM (non-insulin dependent diabetes mellitus) 6/2/2010    Screening mammogram 5/4/10    SOB (shortness of breath) 9/22/2014            Past Surgical History:   Procedure Laterality Date    HX  SECTION        HX OTHER SURGICAL         adrenal gland removed    HX PACEMAKER        MO EXCISE ADRENAL GLAND                Allergies   Allergen Reactions    Crestor [Rosuvastatin] Myalgia    Levaquin [Levofloxacin] Nausea Only       GI Upset    Lipitor [Atorvastatin] Diarrhea    Lyrica [Pregabalin] Myalgia            Family History   Problem Relation Age of Onset    Heart Disease Mother      Diabetes Father      Heart Disease Brother      negative for cardiac disease   Social History                Social History    Marital status:        Spouse name: N/A    Number of children: N/A    Years of education: N/A            Social History Main Topics    Smoking status: Former Smoker       Types: Cigarettes       Quit date: 2009    Smokeless tobacco: Never Used    Alcohol use No    Drug use: No    Sexual activity: Not Currently           Other Topics Concern    None      Social History Narrative              Current Outpatient Prescriptions   Medication Sig    pravastatin (PRAVACHOL) 20 mg tablet Take 1 Tab by mouth nightly.  valsartan (DIOVAN) 40 mg tablet Take 0.5 Tabs by mouth daily.  clopidogrel (PLAVIX) 75 mg tab Take 1 Tab by mouth daily.  warfarin (COUMADIN) 5 mg tablet Take 7.5 mg by mouth five (5) days a week. 7.5 mg five days a week Monday through Friday    fluticasone (FLONASE) 50 mcg/actuation nasal spray 2 Sprays by Both Nostrils route every evening.  gabapentin (NEURONTIN) 800 mg tablet TAKE ONE TABLET BY MOUTH THREE TIMES DAILY    Blood-Glucose Meter monitoring kit Check blood sugar daily.  May substitute for insurance preferred meter    tiotropium (SPIRIVA WITH HANDIHALER) 18 mcg inhalation capsule INHALE THE CONTENTS OF 1 CAPSULE THROUGH HANDIHALER DEVICE DAILY    metFORMIN (GLUCOPHAGE) 1,000 mg tablet TAKE 1 TABLET BY MOUTH TWICE DAILY WITH MEALS Indications: PREVENTION OF TYPE 2 DIABETES MELLITUS    sotalol (BETAPACE) 120 mg tablet Take 1 Tab by mouth two (2) times a day. (Patient taking differently: Take 180 mg by mouth two (2) times a day.)    warfarin (COUMADIN) 5 mg tablet Take 1.5 tabs daily, but on Saturday and Sunday take 2 tabs.  furosemide (LASIX) 20 mg tablet Take 1 Tab by mouth daily.  colchicine 0.6 mg tablet Take 1 Tab by mouth two (2) times a day. (Patient taking differently: Take 1.25 mg by mouth daily. Indications: GOUT)    budesonide-formoterol (SYMBICORT) 160-4.5 mcg/actuation HFA inhaler Take 1 Puff by inhalation two (2) times a day.  albuterol (PROVENTIL VENTOLIN) 2.5 mg /3 mL (0.083 %) nebulizer solution 3 mL by Nebulization route every four (4) hours as needed for Wheezing.  FOLIC ACID/MULTIVIT-MIN/LUTEIN (CENTRUM SILVER PO) Take 1 Tab by mouth daily. Takes one po daily.  albuterol (VENTOLIN HFA) 90 mcg/actuation inhaler Take 2 Puffs by inhalation every six (6) hours as needed for Wheezing.  Azelastine (ASTEPRO) 0.15 % (205.5 mcg) nasal spray 1 [place] by Both Nostrils route daily.  cod liver oil cap Take 1 Cap by mouth daily.  aspirin 81 mg chewable tablet Take 81 mg by mouth daily.  atorvastatin (LIPITOR) 20 mg tablet Take 20 mg by mouth daily.  HYDROcodone-acetaminophen (NORCO) 5-325 mg per tablet      evolocumab (REPATHA PUSHTRONEX) 420 mg/3.5 mL Injt 420 mg by SubCUTAneous route every month. Indications: arteriosclerotic vascular disease      No current facility-administered medications for this visit. Vitals:     05/09/17 1544   BP: (!) 80/60   Pulse: (!) 153   Resp: 28   SpO2: 97%   Weight: 170 lb 3.2 oz (77.2 kg)   Height: 5' 6\" (1.676 m)         I have reviewed the nurses notes, vitals, problem list, allergy list, medical history, family, social history and medications.     Review of Symptoms:     General: Pt denies excessive weight gain or loss. Pt is able to conduct ADL's  HEENT: Denies blurred vision, headaches, epistaxis and difficulty swallowing.   Respiratory: + shortness of breath, [place], denies wheezing or stridor. Cardiovascular: Denies precordial pain, palpitations, edema or PND  Gastrointestinal: Denies poor appetite, indigestion, abdominal pain or blood in stool  Urinary: Denies dysuria, pyuria  Musculoskeletal: Denies pain or swelling from muscles or joints  Neurologic: Denies tremor, paresthesias, or sensory motor disturbance  Skin: Denies rash, itching or texture change. Psych: Denies depression        Physical Exam:       General: Well developed, in no acute distress. HEENT: Eyes - PERRL, no jvd  Heart: tachy,regular  Respiratory: Clear bilaterally x 4, no wheezing or rales  Abdomen:   Soft, non-tender, bowel sounds are active.   Extremities: No edema, normal cap refill, no cyanosis. Musculoskeletal: No clubbing  Neuro: A&Ox3, speech clear, gait stable. Skin: Skin color is normal. No rashes or lesions.  Non diaphoretic  Vascular: 2+ pulses symmetric in all extremities     Cardiographics     Ekg: s tach  Pacer - s tach            Results for orders placed or performed during the hospital encounter of 04/18/17   EKG, 12 LEAD, INITIAL   Result Value Ref Range     Ventricular Rate 94 BPM     Atrial Rate 91 BPM     QRS Duration 90 ms     Q-T Interval 370 ms     QTC Calculation (Bezet) 462 ms     Calculated R Axis 16 degrees     Calculated T Axis -71 degrees     Diagnosis           Atrial fibrillation  Nonspecific ST abnormality  Confirmed by Sherrill Ji ([id number]) on 4/18/2017 8:57:37 AM                  Lab Results   Component Value Date/Time     WBC 8.6 05/02/2017 04:44 AM     Hemoglobin (POC) 10.6 05/19/2016 08:50 AM     HGB 11.4 05/02/2017 04:44 AM     HCT 34.8 05/02/2017 04:44 AM     PLATELET 549 44/65/1667 04:44 AM     MCV 84.5 05/02/2017 04:44 AM            Lab Results   Component Value Date/Time     Sodium 140 05/02/2017 04:44 AM     Potassium 3.9 05/02/2017 04:44 AM     Chloride 106 05/02/2017 04:44 AM     CO2 20 05/02/2017 04:44 AM     Anion gap 14 05/02/2017 04:44 AM     Glucose 196 05/02/2017 04:44 AM     BUN 9 05/02/2017 04:44 AM     Creatinine 0.97 05/02/2017 04:44 AM     BUN/Creatinine ratio 9 05/02/2017 04:44 AM     GFR est AA >60 05/02/2017 04:44 AM     GFR est non-AA 58 05/02/2017 04:44 AM     Calcium 8.1 05/02/2017 04:44 AM     Bilirubin, total 0.4 04/25/2017 03:18 PM     AST (SGOT) 161 04/25/2017 03:18 PM     Alk. phosphatase 116 04/25/2017 03:18 PM     Protein, total 8.0 04/25/2017 03:18 PM     Albumin 4.4 04/25/2017 03:18 PM     Globulin 4.7 04/18/2017 02:46 AM     A-G Ratio 1.2 04/25/2017 03:18 PM     ALT (SGPT) 115 04/25/2017 03:18 PM         Assessment:     Assessment:           ICD-10-CM ICD-9-CM     1. Atrial fibrillation, unspecified type ([facility] I48.91 427.31 AMB POC EKG ROUTINE W/ 12 LEADS, INTER & REP   2. SOB (shortness of breath) R06.02 786.05              Orders Placed This Encounter    AMB POC EKG ROUTINE W/ 12 LEADS, INTER & REP       Order Specific Question:  Reason for Exam:       Answer:  Routine    atorvastatin (LIPITOR) 20 mg tablet       Sig: Take 20 mg by mouth daily.  HYDROcodone-acetaminophen (NORCO) 5-325 mg per tablet         Plan:   Ms Sharyn Burks is having sob and she is s tach. She has significant tachypnea. i am sending her to the ER to rule out an infection and obtain an echo to assess her aortic valve (significant AI noted on SIERRA at the time of AF ablation).      Continue medical management for htn.     Thank you for allowing me to participate in Alexei Brooks 's care.     Elif Tucker MD, [facility], [facility]                                  In the ER, ms israel's wbc is wnl and afebrile. Her cxr is wnl. Echo is pending. inr is therapeutic. Unclear origin of sinus tachycardia. Would admit to hospitalist service. Pt seen and examined. Agree with assessment and plan as documented above.     Elif Tucker MD, [facility]

## 2017-05-10 ENCOUNTER — APPOINTMENT (OUTPATIENT)
Dept: GENERAL RADIOLOGY | Age: 67
DRG: 871 | End: 2017-05-10
Attending: EMERGENCY MEDICINE
Payer: COMMERCIAL

## 2017-05-10 PROBLEM — I95.9 HYPOTENSION: Status: ACTIVE | Noted: 2017-05-10

## 2017-05-10 PROBLEM — Z86.19 HISTORY OF CLOSTRIDIUM DIFFICILE INFECTION: Status: ACTIVE | Noted: 2017-05-10

## 2017-05-10 PROBLEM — I50.22 CHRONIC SYSTOLIC HF (HEART FAILURE) (HCC): Status: ACTIVE | Noted: 2017-05-10

## 2017-05-10 PROBLEM — I47.1 JUNCTIONAL TACHYCARDIA (HCC): Status: ACTIVE | Noted: 2017-05-10

## 2017-05-10 LAB
ANION GAP BLD CALC-SCNC: 8 MMOL/L (ref 5–15)
ANION GAP BLD CALC-SCNC: 8 MMOL/L (ref 5–15)
APPEARANCE UR: CLEAR
ATRIAL RATE: 150 BPM
ATRIAL RATE: 157 BPM
BACTERIA URNS QL MICRO: NEGATIVE /HPF
BILIRUB UR QL: NEGATIVE
BUN SERPL-MCNC: 10 MG/DL (ref 6–20)
BUN SERPL-MCNC: 11 MG/DL (ref 6–20)
BUN/CREAT SERPL: 12 (ref 12–20)
BUN/CREAT SERPL: 14 (ref 12–20)
C DIFF TOX GENS STL QL NAA+PROBE: POSITIVE
CALCIUM SERPL-MCNC: 8.3 MG/DL (ref 8.5–10.1)
CALCIUM SERPL-MCNC: 8.8 MG/DL (ref 8.5–10.1)
CALCULATED P AXIS, ECG09: 103 DEGREES
CALCULATED P AXIS, ECG09: 87 DEGREES
CALCULATED R AXIS, ECG10: -22 DEGREES
CALCULATED R AXIS, ECG10: 9 DEGREES
CALCULATED T AXIS, ECG11: 107 DEGREES
CALCULATED T AXIS, ECG11: 124 DEGREES
CHLORIDE SERPL-SCNC: 105 MMOL/L (ref 97–108)
CHLORIDE SERPL-SCNC: 107 MMOL/L (ref 97–108)
CK MB CFR SERPL CALC: 2.9 % (ref 0–2.5)
CK MB SERPL-MCNC: 1.8 NG/ML (ref 5–25)
CK SERPL-CCNC: 62 U/L (ref 26–192)
CO2 SERPL-SCNC: 25 MMOL/L (ref 21–32)
CO2 SERPL-SCNC: 25 MMOL/L (ref 21–32)
COLOR UR: NORMAL
CREAT SERPL-MCNC: 0.78 MG/DL (ref 0.55–1.02)
CREAT SERPL-MCNC: 0.83 MG/DL (ref 0.55–1.02)
DIAGNOSIS, 93000: NORMAL
DIAGNOSIS, 93000: NORMAL
EPITH CASTS URNS QL MICRO: NORMAL /LPF
ERYTHROCYTE [DISTWIDTH] IN BLOOD BY AUTOMATED COUNT: 14.7 % (ref 11.5–14.5)
GLUCOSE BLD STRIP.AUTO-MCNC: 242 MG/DL (ref 65–100)
GLUCOSE BLD STRIP.AUTO-MCNC: 250 MG/DL (ref 65–100)
GLUCOSE BLD STRIP.AUTO-MCNC: 316 MG/DL (ref 65–100)
GLUCOSE SERPL-MCNC: 135 MG/DL (ref 65–100)
GLUCOSE SERPL-MCNC: 178 MG/DL (ref 65–100)
GLUCOSE UR STRIP.AUTO-MCNC: NEGATIVE MG/DL
HCT VFR BLD AUTO: 34.3 % (ref 35–47)
HGB BLD-MCNC: 11.3 G/DL (ref 11.5–16)
HGB UR QL STRIP: NEGATIVE
HYALINE CASTS URNS QL MICRO: NORMAL /LPF (ref 0–5)
INR PPP: 2.1 (ref 0.9–1.1)
KETONES UR QL STRIP.AUTO: NEGATIVE MG/DL
LEUKOCYTE ESTERASE UR QL STRIP.AUTO: NEGATIVE
MAGNESIUM SERPL-MCNC: 2.2 MG/DL (ref 1.6–2.4)
MCH RBC QN AUTO: 28.2 PG (ref 26–34)
MCHC RBC AUTO-ENTMCNC: 32.9 G/DL (ref 30–36.5)
MCV RBC AUTO: 85.5 FL (ref 80–99)
NITRITE UR QL STRIP.AUTO: NEGATIVE
P-R INTERVAL, ECG05: 208 MS
P-R INTERVAL, ECG05: 92 MS
PH UR STRIP: 5 [PH] (ref 5–8)
PHOSPHATE SERPL-MCNC: 3.1 MG/DL (ref 2.6–4.7)
PLATELET # BLD AUTO: 230 K/UL (ref 150–400)
POTASSIUM SERPL-SCNC: 3.9 MMOL/L (ref 3.5–5.1)
POTASSIUM SERPL-SCNC: 4 MMOL/L (ref 3.5–5.1)
PROT UR STRIP-MCNC: NEGATIVE MG/DL
PROTHROMBIN TIME: 21.4 SEC (ref 9–11.1)
Q-T INTERVAL, ECG07: 256 MS
Q-T INTERVAL, ECG07: 262 MS
QRS DURATION, ECG06: 100 MS
QRS DURATION, ECG06: 92 MS
QTC CALCULATION (BEZET), ECG08: 378 MS
QTC CALCULATION (BEZET), ECG08: 413 MS
RBC # BLD AUTO: 4.01 M/UL (ref 3.8–5.2)
RBC #/AREA URNS HPF: NORMAL /HPF (ref 0–5)
SERVICE CMNT-IMP: ABNORMAL
SODIUM SERPL-SCNC: 138 MMOL/L (ref 136–145)
SODIUM SERPL-SCNC: 140 MMOL/L (ref 136–145)
SP GR UR REFRACTOMETRY: 1.02 (ref 1–1.03)
TROPONIN I SERPL-MCNC: 0.09 NG/ML
TROPONIN I SERPL-MCNC: 0.1 NG/ML
UROBILINOGEN UR QL STRIP.AUTO: 0.2 EU/DL (ref 0.2–1)
VENTRICULAR RATE, ECG03: 125 BPM
VENTRICULAR RATE, ECG03: 157 BPM
WBC # BLD AUTO: 6.1 K/UL (ref 3.6–11)
WBC #/AREA STL HPF: NORMAL /HPF (ref 0–4)
WBC URNS QL MICRO: NORMAL /HPF (ref 0–4)

## 2017-05-10 PROCEDURE — 87177 OVA AND PARASITES SMEARS: CPT | Performed by: FAMILY MEDICINE

## 2017-05-10 PROCEDURE — 82553 CREATINE MB FRACTION: CPT | Performed by: FAMILY MEDICINE

## 2017-05-10 PROCEDURE — 3331090001 HH PPS REVENUE CREDIT

## 2017-05-10 PROCEDURE — 82550 ASSAY OF CK (CPK): CPT | Performed by: FAMILY MEDICINE

## 2017-05-10 PROCEDURE — 3331090002 HH PPS REVENUE DEBIT

## 2017-05-10 PROCEDURE — 74011250637 HC RX REV CODE- 250/637: Performed by: INTERNAL MEDICINE

## 2017-05-10 PROCEDURE — 89055 LEUKOCYTE ASSESSMENT FECAL: CPT | Performed by: FAMILY MEDICINE

## 2017-05-10 PROCEDURE — 74011000250 HC RX REV CODE- 250

## 2017-05-10 PROCEDURE — 71010 XR CHEST SNGL V: CPT

## 2017-05-10 PROCEDURE — 77030013797 HC KT TRNSDUC PRSSR EDWD -A

## 2017-05-10 PROCEDURE — 80048 BASIC METABOLIC PNL TOTAL CA: CPT | Performed by: FAMILY MEDICINE

## 2017-05-10 PROCEDURE — 74011250636 HC RX REV CODE- 250/636: Performed by: INTERNAL MEDICINE

## 2017-05-10 PROCEDURE — 87493 C DIFF AMPLIFIED PROBE: CPT | Performed by: INTERNAL MEDICINE

## 2017-05-10 PROCEDURE — 74011000258 HC RX REV CODE- 258: Performed by: INTERNAL MEDICINE

## 2017-05-10 PROCEDURE — 74011000250 HC RX REV CODE- 250: Performed by: INTERNAL MEDICINE

## 2017-05-10 PROCEDURE — 74011636637 HC RX REV CODE- 636/637: Performed by: INTERNAL MEDICINE

## 2017-05-10 PROCEDURE — 81001 URINALYSIS AUTO W/SCOPE: CPT | Performed by: EMERGENCY MEDICINE

## 2017-05-10 PROCEDURE — C1751 CATH, INF, PER/CENT/MIDLINE: HCPCS

## 2017-05-10 PROCEDURE — 74011000250 HC RX REV CODE- 250: Performed by: EMERGENCY MEDICINE

## 2017-05-10 PROCEDURE — 36415 COLL VENOUS BLD VENIPUNCTURE: CPT | Performed by: NURSE PRACTITIONER

## 2017-05-10 PROCEDURE — 93005 ELECTROCARDIOGRAM TRACING: CPT

## 2017-05-10 PROCEDURE — 74011000250 HC RX REV CODE- 250: Performed by: NURSE PRACTITIONER

## 2017-05-10 PROCEDURE — 65660000000 HC RM CCU STEPDOWN

## 2017-05-10 PROCEDURE — 93041 RHYTHM ECG TRACING: CPT

## 2017-05-10 PROCEDURE — 87045 FECES CULTURE AEROBIC BACT: CPT | Performed by: FAMILY MEDICINE

## 2017-05-10 PROCEDURE — 74011250636 HC RX REV CODE- 250/636

## 2017-05-10 PROCEDURE — 87077 CULTURE AEROBIC IDENTIFY: CPT | Performed by: FAMILY MEDICINE

## 2017-05-10 PROCEDURE — 85610 PROTHROMBIN TIME: CPT | Performed by: FAMILY MEDICINE

## 2017-05-10 PROCEDURE — 74011250636 HC RX REV CODE- 250/636: Performed by: FAMILY MEDICINE

## 2017-05-10 PROCEDURE — 85027 COMPLETE CBC AUTOMATED: CPT | Performed by: FAMILY MEDICINE

## 2017-05-10 PROCEDURE — 74011250637 HC RX REV CODE- 250/637: Performed by: FAMILY MEDICINE

## 2017-05-10 PROCEDURE — 84484 ASSAY OF TROPONIN QUANT: CPT | Performed by: FAMILY MEDICINE

## 2017-05-10 PROCEDURE — 84100 ASSAY OF PHOSPHORUS: CPT | Performed by: FAMILY MEDICINE

## 2017-05-10 PROCEDURE — 74011250636 HC RX REV CODE- 250/636: Performed by: EMERGENCY MEDICINE

## 2017-05-10 PROCEDURE — 83735 ASSAY OF MAGNESIUM: CPT | Performed by: FAMILY MEDICINE

## 2017-05-10 PROCEDURE — 82962 GLUCOSE BLOOD TEST: CPT

## 2017-05-10 RX ORDER — METOPROLOL TARTRATE 5 MG/5ML
2.5 INJECTION INTRAVENOUS ONCE
Status: COMPLETED | OUTPATIENT
Start: 2017-05-10 | End: 2017-05-10

## 2017-05-10 RX ORDER — AMIODARONE HYDROCHLORIDE 150 MG/3ML
150 INJECTION, SOLUTION INTRAVENOUS ONCE
Status: DISCONTINUED | OUTPATIENT
Start: 2017-05-10 | End: 2017-05-10

## 2017-05-10 RX ORDER — MAGNESIUM SULFATE 100 %
4 CRYSTALS MISCELLANEOUS AS NEEDED
Status: DISCONTINUED | OUTPATIENT
Start: 2017-05-10 | End: 2017-05-22 | Stop reason: HOSPADM

## 2017-05-10 RX ORDER — WARFARIN 7.5 MG/1
7.5 TABLET ORAL ONCE
Status: COMPLETED | OUTPATIENT
Start: 2017-05-10 | End: 2017-05-10

## 2017-05-10 RX ORDER — INSULIN LISPRO 100 [IU]/ML
INJECTION, SOLUTION INTRAVENOUS; SUBCUTANEOUS
Status: DISCONTINUED | OUTPATIENT
Start: 2017-05-10 | End: 2017-05-11

## 2017-05-10 RX ORDER — GABAPENTIN 300 MG/1
CAPSULE ORAL
Status: DISPENSED
Start: 2017-05-10 | End: 2017-05-10

## 2017-05-10 RX ORDER — PHENYLEPHRINE HYDROCHLORIDE 10 MG/ML
INJECTION INTRAVENOUS
Status: COMPLETED
Start: 2017-05-10 | End: 2017-05-10

## 2017-05-10 RX ORDER — ACETAMINOPHEN 325 MG/1
650 TABLET ORAL
Status: DISCONTINUED | OUTPATIENT
Start: 2017-05-10 | End: 2017-05-22 | Stop reason: HOSPADM

## 2017-05-10 RX ORDER — LIDOCAINE HYDROCHLORIDE 10 MG/ML
INJECTION, SOLUTION EPIDURAL; INFILTRATION; INTRACAUDAL; PERINEURAL
Status: COMPLETED
Start: 2017-05-10 | End: 2017-05-10

## 2017-05-10 RX ORDER — GABAPENTIN 100 MG/1
CAPSULE ORAL
Status: DISPENSED
Start: 2017-05-10 | End: 2017-05-10

## 2017-05-10 RX ORDER — HYDROCORTISONE SODIUM SUCCINATE 100 MG/2ML
50 INJECTION, POWDER, FOR SOLUTION INTRAMUSCULAR; INTRAVENOUS EVERY 8 HOURS
Status: DISCONTINUED | OUTPATIENT
Start: 2017-05-10 | End: 2017-05-11 | Stop reason: SDUPTHER

## 2017-05-10 RX ORDER — SODIUM CHLORIDE 9 MG/ML
75 INJECTION, SOLUTION INTRAVENOUS CONTINUOUS
Status: DISCONTINUED | OUTPATIENT
Start: 2017-05-10 | End: 2017-05-10

## 2017-05-10 RX ORDER — MUPIROCIN 20 MG/G
OINTMENT TOPICAL EVERY 12 HOURS
Status: DISPENSED | OUTPATIENT
Start: 2017-05-10 | End: 2017-05-15

## 2017-05-10 RX ORDER — LIDOCAINE HYDROCHLORIDE AND EPINEPHRINE 20; 5 MG/ML; UG/ML
1.5 INJECTION, SOLUTION EPIDURAL; INFILTRATION; INTRACAUDAL; PERINEURAL
Status: DISCONTINUED | OUTPATIENT
Start: 2017-05-10 | End: 2017-05-10

## 2017-05-10 RX ORDER — DEXTROSE 50 % IN WATER (D50W) INTRAVENOUS SYRINGE
12.5-25 AS NEEDED
Status: DISCONTINUED | OUTPATIENT
Start: 2017-05-10 | End: 2017-05-12

## 2017-05-10 RX ORDER — LIDOCAINE HYDROCHLORIDE 10 MG/ML
100 INJECTION INFILTRATION; PERINEURAL ONCE
Status: COMPLETED | OUTPATIENT
Start: 2017-05-10 | End: 2017-05-10

## 2017-05-10 RX ORDER — COLCHICINE 0.6 MG/1
0.6 TABLET ORAL DAILY
Status: DISCONTINUED | OUTPATIENT
Start: 2017-05-10 | End: 2017-05-12

## 2017-05-10 RX ORDER — WARFARIN SODIUM 5 MG/1
7.5 TABLET ORAL ONCE
Status: COMPLETED | OUTPATIENT
Start: 2017-05-10 | End: 2017-05-10

## 2017-05-10 RX ADMIN — SOTALOL HYDROCHLORIDE 120 MG: 80 TABLET ORAL at 03:21

## 2017-05-10 RX ADMIN — METOPROLOL TARTRATE 2.5 MG: 5 INJECTION INTRAVENOUS at 11:18

## 2017-05-10 RX ADMIN — METOPROLOL TARTRATE 2.5 MG: 5 INJECTION INTRAVENOUS at 10:15

## 2017-05-10 RX ADMIN — ACETAMINOPHEN 650 MG: 325 TABLET, FILM COATED ORAL at 09:48

## 2017-05-10 RX ADMIN — PHENYLEPHRINE HYDROCHLORIDE: 10 INJECTION INTRAVENOUS at 05:00

## 2017-05-10 RX ADMIN — ACETAMINOPHEN 650 MG: 325 TABLET, FILM COATED ORAL at 15:46

## 2017-05-10 RX ADMIN — MAGNESIUM SULFATE HEPTAHYDRATE 2 G: 40 INJECTION, SOLUTION INTRAVENOUS at 01:43

## 2017-05-10 RX ADMIN — AMIODARONE HYDROCHLORIDE 150 MG: 50 INJECTION, SOLUTION INTRAVENOUS at 20:41

## 2017-05-10 RX ADMIN — LIDOCAINE HYDROCHLORIDE: 10 INJECTION, SOLUTION EPIDURAL; INFILTRATION; INTRACAUDAL; PERINEURAL at 00:20

## 2017-05-10 RX ADMIN — PHENYLEPHRINE HYDROCHLORIDE 10 MCG/MIN: 10 INJECTION INTRAVENOUS at 05:07

## 2017-05-10 RX ADMIN — WARFARIN SODIUM 7.5 MG: 7.5 TABLET ORAL at 02:20

## 2017-05-10 RX ADMIN — GABAPENTIN 800 MG: 100 CAPSULE ORAL at 17:33

## 2017-05-10 RX ADMIN — HYDROCORTISONE SODIUM SUCCINATE 50 MG: 100 INJECTION, POWDER, FOR SOLUTION INTRAMUSCULAR; INTRAVENOUS at 14:58

## 2017-05-10 RX ADMIN — POTASSIUM CHLORIDE 40 MEQ: 1.5 POWDER, FOR SOLUTION ORAL at 02:20

## 2017-05-10 RX ADMIN — LIDOCAINE HYDROCHLORIDE: 10 INJECTION, SOLUTION EPIDURAL; INFILTRATION; INTRACAUDAL; PERINEURAL at 00:22

## 2017-05-10 RX ADMIN — SODIUM CHLORIDE 75 ML/HR: 900 INJECTION, SOLUTION INTRAVENOUS at 03:30

## 2017-05-10 RX ADMIN — CLOPIDOGREL BISULFATE 75 MG: 75 TABLET ORAL at 10:18

## 2017-05-10 RX ADMIN — GABAPENTIN 800 MG: 100 CAPSULE ORAL at 09:48

## 2017-05-10 RX ADMIN — MUPIROCIN: 20 OINTMENT TOPICAL at 11:32

## 2017-05-10 RX ADMIN — ASPIRIN 81 MG 81 MG: 81 TABLET ORAL at 10:18

## 2017-05-10 RX ADMIN — Medication 10 ML: at 14:55

## 2017-05-10 RX ADMIN — PHENYLEPHRINE HYDROCHLORIDE 55 MCG/MIN: 10 INJECTION INTRAVENOUS at 14:54

## 2017-05-10 RX ADMIN — INSULIN LISPRO 5 UNITS: 100 INJECTION, SOLUTION INTRAVENOUS; SUBCUTANEOUS at 11:32

## 2017-05-10 RX ADMIN — GABAPENTIN 800 MG: 100 CAPSULE ORAL at 21:14

## 2017-05-10 RX ADMIN — WARFARIN SODIUM 7.5 MG: 5 TABLET ORAL at 17:33

## 2017-05-10 RX ADMIN — METOPROLOL TARTRATE 2.5 MG: 5 INJECTION INTRAVENOUS at 16:49

## 2017-05-10 RX ADMIN — COLCHICINE 0.6 MG: 0.6 TABLET, FILM COATED ORAL at 17:34

## 2017-05-10 RX ADMIN — Medication 10 ML: at 10:44

## 2017-05-10 RX ADMIN — Medication 10 ML: at 21:11

## 2017-05-10 RX ADMIN — MUPIROCIN: 20 OINTMENT TOPICAL at 20:42

## 2017-05-10 RX ADMIN — INSULIN LISPRO 4 UNITS: 100 INJECTION, SOLUTION INTRAVENOUS; SUBCUTANEOUS at 21:31

## 2017-05-10 RX ADMIN — INSULIN LISPRO 3 UNITS: 100 INJECTION, SOLUTION INTRAVENOUS; SUBCUTANEOUS at 17:33

## 2017-05-10 RX ADMIN — FLUTICASONE FUROATE AND VILANTEROL TRIFENATATE 1 PUFF: 100; 25 POWDER RESPIRATORY (INHALATION) at 10:43

## 2017-05-10 RX ADMIN — ACETAMINOPHEN 650 MG: 325 TABLET, FILM COATED ORAL at 20:41

## 2017-05-10 RX ADMIN — HYDROCORTISONE SODIUM SUCCINATE 50 MG: 100 INJECTION, POWDER, FOR SOLUTION INTRAMUSCULAR; INTRAVENOUS at 21:14

## 2017-05-10 RX ADMIN — GABAPENTIN 800 MG: 100 CAPSULE ORAL at 01:42

## 2017-05-10 RX ADMIN — VANCOMYCIN HYDROCHLORIDE 125 MG: 1 INJECTION, POWDER, LYOPHILIZED, FOR SOLUTION INTRAVENOUS at 21:11

## 2017-05-10 RX ADMIN — UMECLIDINIUM 1 PUFF: 62.5 AEROSOL, POWDER ORAL at 10:43

## 2017-05-10 RX ADMIN — AMIODARONE HYDROCHLORIDE 1 MG/MIN: 50 INJECTION, SOLUTION INTRAVENOUS at 21:10

## 2017-05-10 RX ADMIN — LIDOCAINE HYDROCHLORIDE 10 ML: 10 INJECTION, SOLUTION INFILTRATION; PERINEURAL at 01:02

## 2017-05-10 NOTE — PROGRESS NOTES
Pharmacy Initial Dosing of Warfarin    Pharmacy consulted to dose warfarin for this  73 y.o. female ordered warfarin for atrial fibrillation    Goal INR (2-3, 2.5-3.5, 3-4): 2-3      Concurrent anticoagulants & Platelet Inhibitors: aspirin 81 mg po daily, clopidogrel 75 mg po daily  Home Warfarin Dose: 7.5 mg po daily  Major Interacting Medications (Dose/Frequency): none  Last dose: 5/8 pm   Recent Labs      05/09/17   1722  05/09/17   1704   INR  2.7*  3.0*   HGB   --   13.1   PLT   --   275   ALB   --   3.5   SGOT   --   77*   ALT   --   60   TBILI   --   0.5   TSH   --   2.74          Impression/Plan: therapeutic on home dose of 7.5 mg po daily. Will continue home dose of 7.5 mg po tonight since last dose was last night     Pharmacy will follow daily and adjust the dose as appropriate.     Thanks for the consult    Ryne Carney, GIOVANAD

## 2017-05-10 NOTE — PROGRESS NOTES
Problem: Pressure Injury - Risk of  Goal: *Prevention of pressure ulcer  Pressure ulcer prevention measures in place as documented on [facility] Flow sheet. Problem: Hypotension  Goal: *Blood pressure within specified parameters  Outcome: Progressing Towards Goal  Pt remains on Neosynephrine titrated for MAP >65. Goal: *Fluid volume balance  Outcome: Progressing Towards Goal  CVP monitoring    Problem: Falls - Risk of  Goal: *Knowledge of fall prevention  Outcome: Progressing Towards Goal  Call bell within reach, pt educated on and verbalized understanding of calling before getting OOB. Patient specific fall prevention measures in place as documented on Fall Flowsheet.

## 2017-05-10 NOTE — CDMP QUERY
Dr. Jim Moore :  Please clarify if this patient is being treated/managed for:    =>Cdiff poa in the setting of chills, diarrhea, 5/10 +cdiff req Solucortef, ivfs, vanc  =>Other Explanation of clinical findings  =>Unable to Determine (no explanation of clinical findings)    The medical record reflects the following clinical findings, treatment, and risk factors:    Risk Factors: 66F adm w/ junct. tachycardia, \"chills and diarrhea. Previous hx of CDiff\"  Clinical Indicators: 5/10 stool + cdiff  Treatment: Solucortef, ivfs, vanc    Please clarify and document your clinical opinion in the progress notes and discharge summary including the definitive and/or presumptive diagnosis, (suspected or probable), related to the above clinical findings. Please include clinical findings supporting your diagnosis.     Thank Patrick Messer

## 2017-05-10 NOTE — PROGRESS NOTES
Pharmacy Monitoring of Sotalol for Atrial Arrhythmias    Pharmacy monitoring of this 73 y.o. female being treated with sotalol for atrial arrhythmias. Patient was on sotalol 120 mg po BID at home (confirmed with patient)    Sotalol dose ordered: 120 mg BID  Baseline QTc/JTc interval (on admission prior to dose) for new starts: on at home  QTc/JTc interval 2 hours after dose required for new starts or if available for continuation of chronic therapy: CNu=298 on baseline ECG  Drug interactions: no major interactions  Creatinine clearance(ml/min): 50.5  Potassium supplementation ordered: 40 meq x1  Magnesium supplementation ordered: 2 grams IV x1  Phosphate supplementation ordered: n/a. Ordering phosphorous for am.  Will order BMP and magnesium starting with am labs as well as phosphorous x1      Recent Labs      05/09/17   1704   K  3.6   MG  1.7   CREA  1.04*   BUN  13     Wt Readings from Last 1 Encounters:   05/09/17 77 kg (169 lb 12.1 oz)     Ht Readings from Last 1 Encounters:   05/09/17 167.6 cm (66\")             Impression/Plan: Patient on sotalol at home continuing on same dose. Creatinine similar to last creatinine on 5/2 of 0.97. Repleted potassium and magnesium.   Repeat labs beginning with am labs and phosphorous x1 in am.         Thanks,    Junie Gonzalez, PHARMD

## 2017-05-10 NOTE — PROGRESS NOTES
[id number] [address]  [phone]      Cardiology Progress Note      5/10/2017 8:30 AM    Admit Date: 5/9/2017    Admit Diagnosis:   Tachycardia    Subjective:     Raghu Giron  Still feeling SOB, unable to lay flat. States that since PVI afib ablation on 5/1/17 she has been SOB, progressively worsening. Seen in office by Dr. Nevin Shah yesterday and admitted for above complaints. On admission, ECG showed ST at 150-160s. Today's ECG shows junctional tachycardia. Echo with EF 25-30%, sm pericardial effusion. New diagnosis of systolic HF with LVH 3/1842. Previous echo 10/2016 EF 55-60%. Negative CT for PE. She is on coumadin for anticoagulation, INR WNL. Requiring Antonio for BP support, 85-90/s  Her only complaints prior to admission was 5/8/17 evening felt chills and did have diarrhea. Previous hx of CDiff in April admission - stool sample pending. Overall labs stable.         Visit Vitals    BP (!) 88/58    Pulse (!) 109    Temp 97.7 °F (36.5 °C)    Resp 15    Ht 5' 6\" (1.676 m)    Wt 78 kg (171 lb 15.3 oz)    SpO2 99%    Breastfeeding No    BMI 27.75 kg/m2       Current Facility-Administered Medications   Medication Dose Route Frequency    WARFARIN INFORMATION NOTE (COUMADIN)   Other QPM    gabapentin (NEURONTIN) capsule 800 mg  800 mg Oral TID    gabapentin (NEURONTIN) 100 mg capsule        gabapentin (NEURONTIN) 300 mg capsule        0.9% sodium chloride infusion  75 mL/hr IntraVENous CONTINUOUS    acetaminophen (TYLENOL) tablet 650 mg  650 mg Oral Q4H PRN    hydrocortisone Sod Succ (PF) (SOLU-CORTEF) injection 50 mg  50 mg IntraVENous Q8H    insulin lispro (HUMALOG) injection   SubCUTAneous AC&HS    glucose chewable tablet 16 g  4 Tab Oral PRN    dextrose (D50W) injection syrg 12.5-25 g  12.5-25 g IntraVENous PRN    glucagon (GLUCAGEN) injection 1 mg  1 mg IntraMUSCular PRN    mupirocin (BACTROBAN) 2 % ointment   Topical Q12H    sodium chloride (NS) flush 5-10 mL  5-10 mL IntraVENous Q8H    sodium chloride (NS) flush 5-10 mL  5-10 mL IntraVENous PRN    aspirin chewable tablet 81 mg  81 mg Oral DAILY    fluticasone-vilanterol (BREO ELLIPTA) 100mcg-25mcg/puff  1 Puff Inhalation DAILY    clopidogrel (PLAVIX) tablet 75 mg  75 mg Oral DAILY    umeclidinium (INCRUSE ELLIPTA) 62.5 mcg/actuation  1 Puff Inhalation DAILY    albuterol (PROVENTIL HFA, VENTOLIN HFA, PROAIR HFA) inhaler 2 Puff  2 Puff Inhalation Q6H PRN    PHENYLephrine (NEOSYNEPHRINE) 30,000 mcg in 0.9% sodium chloride 250 mL infusion   mcg/min IntraVENous TITRATE       Objective:      Physical Exam:  General Appearance:  WNWD AAF in no acute distress  Chest:   Clear  Cardiovascular:  tachy no murmur.   Abdomen:   Soft, non-tender, bowel sounds are active.   Extremities: rosario LE no edema  Skin:  Warm and dry.     Data Review:   Recent Labs      05/10/17   0454  05/09/17   1704   WBC  6.1  7.1   HGB  11.3*  13.1   HCT  34.3*  38.9   PLT  230  275     Recent Labs      05/10/17   0454  05/09/17   1722  05/09/17   1704   NA  140   --   138   K  4.0   --   3.6   CL  107   --   104   CO2  25   --   24   GLU  135*   --   97   BUN  11   --   13   CREA  0.78   --   1.04*   CA  8.8   --   9.1   MG  2.2   --   1.7   PHOS  3.1   --    --    ALB   --    --   3.5   TBILI   --    --   0.5   SGOT   --    --   77*   ALT   --    --   60   INR  2.1*  2.7*  3.0*       Recent Labs      05/09/17   1704   TROIQ  0.13*         Intake/Output Summary (Last 24 hours) at 05/10/17 1150  Last data filed at 05/10/17 1046   Gross per 24 hour   Intake              240 ml   Output              300 ml   Net              -60 ml        Telemetry: tachy, junctional   EKG:junctional tachy  Cxray: no acute process    Echo: 5/9/17  Left ventricle: Systolic function was severely reduced. Ejection fraction  was estimated in the range of 25 % to 30 %.  There was severe hypokinesis  of the entire anterior and basal-mid anteroseptal wall(s). There was  moderate concentric hypertrophy. Right ventricle: The ventricle was mildly dilated. Systolic function was  moderately reduced. Left atrium: The atrium was moderately to severely dilated. Mitral valve: There was moderate regurgitation. Tricuspid valve: Pulmonary artery systolic pressure: 46 mmHg. There was  mild pulmonary hypertension. Pericardium: A small pericardial effusion was identified. 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 (6/2/2010)      Cardiac pacemaker in situ (7/5/2012)      Mixed hyperlipidemia (7/5/2012)      S/P coronary artery stent placement (7/4/2014)      Overview: 4/7/17 PCI/ROSALINO Diagonal      Type 2 diabetes mellitus with diabetic neuropathy, without long-term current use of insulin ([address] (1/31/2017)      S/P ablation of atrial fibrillation (5/2/2017)      Overview: 5/1/17      Tachycardia (5/9/2017)      Chronic systolic HF (heart failure) ([address] (5/10/2017)      Overview: 4/2017 EF 25-30%      History of Clostridium difficile infection (5/10/2017)      Overview: 4/2017 CDiff positive      Junctional tachycardia ([address] (5/10/2017)      Hypotension (5/10/2017)        Plan:     Junctional tachycardia:  Admission ECG ST, repeat ECG this AM with junctional tachycardia  IV BB 2.5mg given x 2 with slight slowing of HR, improved symptoms, but causing decreased BP further requiring increased pressor support  Unclear etiology of arrhythmia and unable to slow further due to hypotension  No obvious s/s infection, patient not having pain  Symptoms of SOB likely r/t tachyarrhythmia with low EF - supply/demand.   Her symptoms improved with slowing rate, but not tolerating AV victoria blocking meds with hypotension  Consider ablation if appropriate if continues to be uncontrolled  BB discontinued    Chronic systolic and diastolic HF:  Overall stable, CXray normal, no edema, not volume overloaded  PTA was on ARB, diuretic, BB (sotalol - which is not recommended for low EFs), now held due to hypotension  Newly diagnosed with systolic HF 0/4/83, repeat echo yesterday with continuing low EF  S/p PCI 4/7/17 had hoped for improvement in EF, but remains <35%  AICD candidate in July 2017 if EF remains <35%  Lifevest at discharge  Monitor I/Os, daily weights, labs    CAD:  S/p PCI 4/5/17, will require Plavix for at least 1 year  Continues on ASA. Will discontinue ASA as patient 1 month post PCI and on \"triple therapy\" meds (Plavix, ASA, coumadin). D/Cing ASA decreases risk of bleeds, and has similar outcomes with Plavix and AC post PCI  Holding Lipitor with mildly elevated LFTs. On Repatha for management  BB on hold, once able to restart, consider Coreg/Toprol XL with HF dx    PAF with PVI afib ablation:   No afib on telemetry, other tachyarrhythmia   Continue with coumadin, holding antiarrythmic (sotalol) and will be changing likely to dofetilide vs amiodarone (elevated LFTs)      [address] [facility]    5/10/2017         Agree with note as outlined by  NP. I confirm findings in history and physical exam. No additional findings noted. Agree with plan as outlined above. Should be able to tolerate low dose betablocker even though she had transient hypotension.     Milagro Pace MD

## 2017-05-10 NOTE — ROUTINE PROCESS
TRANSFER - OUT REPORT:    Verbal report given to Chin Marie RN(name) on Charla Has  being transferred to CCU(unit) for routine progression of care       Report consisted of patients Situation, Background, Assessment and   Recommendations(SBAR). Information from the following report(s) SBAR, Kardex, ED Summary, Intake/Output, MAR, Recent Results and Cardiac Rhythm ST was reviewed with the receiving nurse. Lines:   Peripheral IV 05/09/17 Right Antecubital (Active)   Site Assessment Clean, dry, & intact 5/9/2017  5:06 PM   Phlebitis Assessment 0 5/9/2017  5:06 PM   Infiltration Assessment 0 5/9/2017  5:06 PM   Dressing Status Clean, dry, & intact 5/9/2017  5:06 PM   Dressing Type Transparent 5/9/2017  5:06 PM   Hub Color/Line Status Pink 5/9/2017  5:06 PM   Action Taken Blood drawn 5/9/2017  5:06 PM        Opportunity for questions and clarification was provided.       Patient transported with:   Monitor  O2 @ 2 liters  Registered Nurse  [facility]

## 2017-05-10 NOTE — PROGRESS NOTES
Pt received from ED via stretcher. IV infusing without difficulty. VSS. Pt assisted to bed from stretcher, bathed and new gown placed.

## 2017-05-10 NOTE — PROGRESS NOTES
Hospitalist Progress Note    NAME: Jasmyn Pires   :  1950   MRN:  [id number]       Interim Hospital Summary: 73 y.o. female whom presented on 2017 with      Assessment / Plan:  Junctional Tachycardia/A.fib with RVR with associated hypotension  Managed in ICU  On Neosynephrine  Became further hypotensive with BB  Cardiology is involved in patient's care  If remain uncontrolled, cardiology may consider ablation (also had ablation in the past)    Respiratory distress  Due to ILD vs Acute on chronic systolic heart failure with EF 25%  CTA chest with ILD, findings could be related to CHF  Unable to diurese due to HR issues  Unable to use inotropic agents due to junctional tachycardia  Prognosis guarded  Managed in ICU  Cardiology and Pulmonary is involved in patient's care    DM type 2  holding metformin, got CTA  Continue SSI    Elevated troponin  due to strain from above tachycardia  Cardiology is following    Code Status: Full  Surrogate Decision Maker: Daughter     DVT Prophylaxis: on coumadin  GI Prophylaxis: not indicated     Baseline: ambulatory      Subjective: Pt seen and examined at bedside. Continue to feel short of breath. Overnight events d/w RN     CHIEF COMPLAINT: f/u \"Worsening SOB\"     Review of Systems:  Symptom Y/N Comments  Symptom Y/N Comments   Fever/Chills n   Chest Pain n    Poor Appetite    Edema     Cough n   Abdominal Pain n    Sputum    Joint Pain     SOB/GREENFIELD y   Pruritis/Rash     Nausea/vomit    Tolerating PT/OT     Diarrhea    Tolerating Diet y    Constipation    Other       Could NOT obtain due to:      Objective:     VITALS:   Last 24hrs VS reviewed since prior progress note.  Most recent are:  Patient Vitals for the past 24 hrs:   Temp Pulse Resp BP SpO2   05/10/17 1500 - (!) 109 16 (!) 87/55 99 %   05/10/17 1430 - (!) 104 15 98/59 98 %   05/10/17 1400 - (!) 111 20 103/65 100 %   05/10/17 1345 - (!) 111 24 - 94 %   05/10/17 1330 - (!) 121 24 (!) 87/49 100 %   05/10/17 1315 - 100 13 (!) 81/54 97 %   05/10/17 1300 - (!) 101 12 91/53 97 %   05/10/17 1245 - (!) 112 12 (!) 86/56 98 %   05/10/17 1230 - 100 18 (!) 82/59 98 %   05/10/17 1215 - 100 15 (!) 89/57 98 %   05/10/17 1200 98.1 °F (36.7 °C) 100 13 92/60 98 %   05/10/17 1145 - (!) 109 15 (!) 88/58 99 %   05/10/17 1130 - (!) 113 21 (!) 81/54 99 %   05/10/17 1128 - (!) 112 18 (!) 64/42 96 %   05/10/17 1115 - (!) 113 18 (!) 86/40 99 %   05/10/17 1100 - (!) 118 20 (!) 85/60 100 %   05/10/17 1045 - (!) 115 17 (!) 87/54 99 %   05/10/17 1033 - (!) 116 19 - 99 %   05/10/17 1030 - (!) 128 25 (!) 83/56 98 %   05/10/17 1018 - (!) 140 13 106/67 98 %   05/10/17 1015 - (!) 151 19 95/57 99 %   05/10/17 1000 - (!) 122 20 92/50 99 %   05/10/17 0949 - (!) 132 16 (!) 89/48 98 %   05/10/17 0941 - (!) 118 19 - 98 %   05/10/17 0915 - (!) 123 18 - 99 %   05/10/17 0900 - (!) 118 15 (!) 85/59 99 %   05/10/17 0845 - (!) 112 26 - 98 %   05/10/17 0800 97.7 °F (36.5 °C) (!) 118 13 91/57 98 %   05/10/17 0700 - (!) 111 18 93/58 100 %   05/10/17 0500 - (!) 101 13 91/53 96 %   05/10/17 0402 - (!) 137 15 (!) 81/55 97 %   05/10/17 0400 - (!) 146 14 (!) 74/56 97 %   05/10/17 0300 - (!) 156 18 102/74 99 %   05/10/17 0250 97.3 °F (36.3 °C) (!) 157 24 108/61 99 %   05/10/17 0249 - - - - 99 %   05/10/17 0200 - (!) 118 23 95/73 100 %   05/10/17 0142 - (!) 152 - 91/60 -   05/10/17 0130 - (!) 144 18 91/60 100 %   05/10/17 0030 - (!) 150 18 (!) 76/56 98 %   05/10/17 0000 - (!) 137 15 (!) 76/56 95 %   05/09/17 2336 - (!) 136 15 (!) 85/62 96 %   05/09/17 2330 - (!) 137 20 (!) 78/64 98 %   05/09/17 2036 - (!) 156 27 106/67 100 %   05/09/17 2000 - (!) 155 23 106/73 99 %   05/09/17 1915 - (!) 155 27 104/77 96 %   05/09/17 1900 - (!) 157 20 100/72 96 %   05/09/17 1845 - (!) 153 22 102/81 97 %   05/09/17 1830 - (!) 157 16 95/64 98 %   05/09/17 1815 - (!) 153 16 96/66 98 %   05/09/17 1800 - (!) 150 28 90/59 97 %   05/09/17 1745 - (!) 154 23 99/70 97 %   05/09/17 1734 - (!) 154 21 103/66 96 %   05/09/17 1730 - (!) 153 30 106/69 96 %   05/09/17 1715 - (!) 154 (!) 34 96/73 97 %   05/09/17 1701 - (!) 158 16 - 97 %   05/09/17 1700 - - - 101/65 -   05/09/17 1643 97.9 °F (36.6 °C) (!) 156 18 95/63 99 %       Intake/Output Summary (Last 24 hours) at 05/10/17 1525  Last data filed at 05/10/17 1333   Gross per 24 hour   Intake           288.17 ml   Output              900 ml   Net          -611.83 ml        PHYSICAL EXAM:  General: WD, WN. Alert, cooperative, no acute distress    EENT:  EOMI. Anicteric sclerae. MMM  Resp:  Coarse breath sounds. No accessory muscle use  CV:  Irregularly irregular  rhythm,  No edema  GI:  Soft, Non distended, Non tender.  +Bowel sounds  Neurologic:  Alert and oriented X 3, normal speech,   Psych:   Good insight. Not anxious nor agitated  Skin:  No rashes. No jaundice    Reviewed most current lab test results and cultures  YES  Reviewed most current radiology test results   YES  Review and summation of old records today    NO  Reviewed patient's current orders and MAR    YES  PMH/SH reviewed - no change compared to H&P  ________________________________________________________________________  Care Plan discussed with:    Comments   Patient y    Family      RN y    Care Manager     Consultant                        Multidiciplinary team rounds were held today with , nursing, pharmacist and clinical coordinator. Patient's plan of care was discussed; medications were reviewed and discharge planning was addressed.      ________________________________________________________________________  Total NON critical care TIME:  35  Minutes    Total CRITICAL CARE TIME Spent:   Minutes non procedure based      Comments   >50% of visit spent in counseling and coordination of care     ________________________________________________________________________  Azul Stewart MD     Procedures: see electronic medical records for all procedures/Xrays and details which were not copied into this note but were reviewed prior to creation of Plan. LABS:  I reviewed today's most current labs and imaging studies.   Pertinent labs include:  Recent Labs      05/10/17   0454 05/09/17   1704   WBC  6.1  7.1   HGB  11.3*  13.1   HCT  34.3*  38.9   PLT  230  275     Recent Labs      05/10/17   1037  05/10/17   0454  05/09/17   1722  05/09/17   1704   NA  138  140   --   138   K  3.9  4.0   --   3.6   CL  105  107   --   104   CO2  25  25   --   24   GLU  178*  135*   --   97   BUN  10  11   --   13   CREA  0.83  0.78   --   1.04*   CA  8.3*  8.8   --   9.1   MG   --   2.2   --   1.7   PHOS   --   3.1   --    --    ALB   --    --    --   3.5   TBILI   --    --    --   0.5   SGOT   --    --    --   77*   ALT   --    --    --   60   INR   --   2.1*  2.7*  3.0*       Signed: Joyce Oseguera MD

## 2017-05-10 NOTE — H&P
Hospitalist Admission Note    NAME: Raghu Giron   :  1950   MRN:  [id number]     Date/Time:  2017 11:02 PM    Patient PCP: Kasandra Johnson NP  ______________________________________________________________________        My assessment of this patient's clinical condition and my plan of care is as follows. Given the patient's current clinical presentation, I have a high level of concern for decompensation if discharged from the ED. Complex decision making was performed which includes reviewing the patient's available past medical records, laboratory results, and Xray films. I have also directly communicated my plan and discussed this case with the involved ED physician.      Assessment / Plan:  1. Sinus tachycardia with SOB: HR in the 150's, BP stable. Got about 750 ml of IVF in ED. Last ECHO with Ef of 25%. CTA neg for PE, lactate wnl. Holding fluids for now. CXR negative. Bcx sent. Seen by cardiology. ECHO done. CTA showing ILD and could be contributing to SOB along with being on sotalol. Will consult with Pulmonology for further recs  Extensive interstitial lung disease bilaterally, advanced since 2015 with nodules as described above and borderline enlarged hilar and mediastinal lymph nodes, many of which were present in 2015. Correlate with known clinical pulmonary history for sarcoidosis or other explanations for these findings. Compared to prior chest imaging. If none is available, follow-up chest CT would  be recommended in 3-6 months. 2. Chronic diastolic HF, AF s/p ablation; last ECHO with EF of 25-30%. Repeat ECHO done in ED. On coumadin, INR therapeutic. 3. DM; holding metformin, got CTA. Add SSI and accuchecks. 4. Elevated troponin; possibly due to strain from above tachycardia, follow serial enzymes. Cardiology on board. Addendum; spoke with Dr Nevin Shah about dropping BP and still tachycardic. Recommends transfer to ICU and start paula gtt.          Code Status: Wants to be FULL code if for short term  Surrogate Decision Maker: her dtr    DVT Prophylaxis: on coumadin  GI Prophylaxis: not indicated    Baseline: ambulatory        Subjective:   CHIEF COMPLAINT:  Worsening SOB    HISTORY OF PRESENT ILLNESS:     Lu Martinez is a 73 y.o.  female who presents with  Worsening SOB. Pt with h/o diastolic HF, DM, SSS s/p PPM, recent ablation  comes with above. Pt had ablation  and had f/u with Dr Jonna Messer at his office with worsening SOB. Denies fever/cough/CP. Had some diarrhea today. She was sent here to Ed for further eval.  In ED, HR in the 150's sinus tach, cr 1.04, lactate 1.5, trop 0.13, INR 2.7. ECHO with EF 25-30%. CTA chest negative for PE, shows Extensive interstitial lung disease bilaterally, advanced since 2015 with nodules as described above and borderline enlarged hilar and mediastinal lymph nodes, many of which were present in 2015. Correlate with known clinical  pulmonary history for sarcoidosis or other explanations for these findings. Compared to prior chest imaging. If none is available, follow-up chest CT would  be recommended in 3-6 months. Had ECHO in ED that was reviewed by Dr Jonna Messer.    We were asked to admit for work up and evaluation of the above problems.      Past Medical History:   Diagnosis Date    Atrial fibrillation ([address] 2010    CAD (coronary artery disease)     stent    Chronic diastolic heart failure ([address] 2014    COPD     COPD (chronic obstructive pulmonary disease) ([address] 2010    Diabetes ([address]     Fibroid     Heart failure ([address]     Hypertension 2010    NIDDM (non-insulin dependent diabetes mellitus) 2010    Screening mammogram 5/4/10    SOB (shortness of breath) 2014        Past Surgical History:   Procedure Laterality Date    HX  SECTION      HX OTHER SURGICAL      adrenal gland removed    HX PACEMAKER      MO EXCISE ADRENAL GLAND         Social History   Substance Use Topics    Smoking status: Former Smoker     Types: Cigarettes     Quit date: 12/31/2009    Smokeless tobacco: Never Used    Alcohol use No        Family History   Problem Relation Age of Onset    Heart Disease Mother     Diabetes Father     Heart Disease Brother       Allergies   Allergen Reactions    Crestor [Rosuvastatin] Myalgia    Levaquin [Levofloxacin] Nausea Only     GI Upset    Lipitor [Atorvastatin] Diarrhea    Lyrica [Pregabalin] Myalgia        Prior to Admission medications    Medication Sig Start Date End Date Taking? Authorizing Provider   atorvastatin (LIPITOR) 20 mg tablet Take 20 mg by mouth daily. 4/10/17   Historical Provider   HYDROcodone-acetaminophen (NORCO) 5-325 mg per tablet  4/18/17   Historical Provider   pravastatin (PRAVACHOL) 20 mg tablet Take 1 Tab by mouth nightly. 4/17/17   Claudia Zarco NP   valsartan (DIOVAN) 40 mg tablet Take 0.5 Tabs by mouth daily. 4/10/17   Toshia Wilson MD   clopidogrel (PLAVIX) 75 mg tab Take 1 Tab by mouth daily. 4/10/17   Toshia Wilson MD   warfarin (COUMADIN) 5 mg tablet Take 7.5 mg by mouth five (5) days a week. 7.5 mg five days a week Monday through Friday    Shannan Castro MD   fluticasone (FLONASE) 50 mcg/actuation nasal spray 2 Sprays by Both Nostrils route every evening. Shannan Castro MD   gabapentin (NEURONTIN) 800 mg tablet TAKE ONE TABLET BY MOUTH THREE TIMES DAILY 3/6/17   Claudia Zarco NP   Blood-Glucose Meter monitoring kit Check blood sugar daily. May substitute for insurance preferred meter 1/31/17   Claudia Zarco NP   evolocumab (REPATHA PUSHTRONEX) 420 mg/3.5 mL Injt 420 mg by SubCUTAneous route every month.  Indications: arteriosclerotic vascular disease 12/22/16   Claudia Zarco NP   tiotropium (SPIRIVA WITH HANDIHALER) 18 mcg inhalation capsule INHALE THE CONTENTS OF 1 CAPSULE THROUGH HANDIHALER DEVICE DAILY 10/31/16   Claudia Zarco NP   metFORMIN (GLUCOPHAGE) 1,000 mg tablet TAKE 1 TABLET BY MOUTH TWICE DAILY WITH MEALS Indications: PREVENTION OF TYPE 2 DIABETES MELLITUS 10/31/16   Claudio Ramirez NP   sotalol (BETAPACE) 120 mg tablet Take 1 Tab by mouth two (2) times a day. Patient taking differently: Take 180 mg by mouth two (2) times a day. 9/8/16   Claudio Ramirez NP   warfarin (COUMADIN) 5 mg tablet Take 1.5 tabs daily, but on Saturday and Sunday take 2 tabs. 9/8/16   Claudio Ramirez NP   furosemide (LASIX) 20 mg tablet Take 1 Tab by mouth daily. 9/8/16   Katherine BleacherDEEPAK   colchicine 0.6 mg tablet Take 1 Tab by mouth two (2) times a day. Patient taking differently: Take 1.25 mg by mouth daily. Indications: GOUT 9/8/16   Claudio Ramirez NP   budesonide-formoterol (SYMBICORT) 160-4.5 mcg/actuation HFA inhaler Take 1 Puff by inhalation two (2) times a day. 7/12/16   Claudio Ramirez NP   albuterol (PROVENTIL VENTOLIN) 2.5 mg /3 mL (0.083 %) nebulizer solution 3 mL by Nebulization route every four (4) hours as needed for Wheezing. 5/19/16   Claudio Ramirez NP   FOLIC ACID/MULTIVIT-MIN/LUTEIN (CENTRUM SILVER PO) Take 1 Tab by mouth daily. Takes one po daily. Historical Provider   albuterol (VENTOLIN HFA) 90 mcg/actuation inhaler Take 2 Puffs by inhalation every six (6) hours as needed for Wheezing. 3/8/16   Patrick Nickerson MD   Azelastine (ASTEPRO) 0.15 % (205.5 mcg) nasal spray 1 [place] by Both Nostrils route daily. 1/21/15   Historical Provider   cod liver oil cap Take 1 Cap by mouth daily. Historical Provider   aspirin 81 mg chewable tablet Take 81 mg by mouth daily. Historical Provider       REVIEW OF SYSTEMS:     I am not able to complete the review of systems because:    The patient is intubated and sedated    The patient has altered mental status due to his acute medical problems    The patient has baseline aphasia from prior stroke(s)    The patient has baseline dementia and is not reliable historian    The patient is in acute medical distress and unable to provide information           Total of 12 systems reviewed as follows:       POSITIVE= underlined text  Negative = text not underlined  General:  fever, chills, sweats, generalized weakness, weight loss/gain,      loss of appetite   Eyes:    blurred vision, eye pain, loss of vision, double vision  ENT:    rhinorrhea, pharyngitis   Respiratory:   cough, sputum production, SOB, GREENFIELD, wheezing, pleuritic pain   Cardiology:   chest pain, palpitations, orthopnea, PND, edema, syncope chest pressure  Gastrointestinal:  abdominal pain , N/V, diarrhea, dysphagia, constipation, bleeding   Genitourinary:  frequency, urgency, dysuria, hematuria, incontinence   Muskuloskeletal :  arthralgia, myalgia, back pain  Hematology:  easy bruising, nose or gum bleeding, lymphadenopathy   Dermatological: rash, ulceration, pruritis, color change / jaundice  Endocrine:   hot flashes or polydipsia   Neurological:  headache, dizziness, confusion, focal weakness, paresthesia,     Speech difficulties, memory loss, gait difficulty  Psychological: Feelings of anxiety, depression, agitation    Objective:   VITALS:    Visit Vitals    /77    Pulse (!) 155    Temp 97.9 °F (36.6 °C)    Resp 27    Ht 5' 6\" (1.676 m)    Wt 77 kg (169 lb 12.1 oz)    SpO2 96%    BMI 27.4 kg/m2       PHYSICAL EXAM:    General:    Alert, cooperative, no distress, appears stated age. HEENT: Atraumatic, anicteric sclerae, pink conjunctivae     No oral ulcers, mucosa moist, throat clear, dentition fair  Neck:  Supple, symmetrical,  thyroid: non tender  Lungs:   Clear to auscultation bilaterally. No Wheezing or Rhonchi. No rales. Chest wall:  No tenderness  No Accessory muscle use. Heart:   Sinus tachycardia, Regular  rhythm,  No  murmur   No edema  Abdomen:   Soft, non-tender. Not distended. Bowel sounds normal  Extremities: No cyanosis. No clubbing  Skin:     Not pale. Not Jaundiced  No rashes   Psych:  Good insight. Not depressed. Not anxious or agitated. Neurologic: EOMs intact. No facial asymmetry.  No aphasia or slurred speech. Symmetrical strength, Sensation grossly intact. Alert and oriented X 4.     _______________________________________________________________________  Care Plan discussed with:    Comments   Patient x    Family      RN x    Care Manager                    Consultant:      _______________________________________________________________________  Recommended Disposition:   Home with Family x   HH/PT/OT/RN    SNF/LTC    [facility]    ________________________________________________________________________  TOTAL TIME:   Minutes    Critical Care Provided 54    Minutes non procedure based      Comments   >50% of visit spent in counseling and coordination of care  Chart review  Discussion with patient and/or family and questions answered     ________________________________________________________________________  Luke Hammond MD    Procedures: see electronic medical records for all procedures/Xrays and details which were not copied into this note but were reviewed prior to creation of Plan.     LAB DATA REVIEWED:    Recent Results (from the past 24 hour(s))   EKG, 12 LEAD, INITIAL    Collection Time: 05/09/17  4:53 PM   Result Value Ref Range    Ventricular Rate 157 BPM    Atrial Rate 157 BPM    P-R Interval 92 ms    QRS Duration 92 ms    Q-T Interval 256 ms    QTC Calculation (Bezet) 413 ms    Calculated P Axis 87 degrees    Calculated R Axis -22 degrees    Calculated T Axis 124 degrees    Diagnosis       Sinus tachycardia with short FL  Moderate voltage criteria for LVH, may be normal variant  Anterior infarct , age undetermined  ST & T wave abnormality, consider lateral ischemia  Abnormal ECG  When compared with ECG of 02-MAY-2017 12:24,  Significant changes have occurred     CBC WITH AUTOMATED DIFF    Collection Time: 05/09/17  5:04 PM   Result Value Ref Range    WBC 7.1 3.6 - 11.0 K/uL    RBC 4.60 3.80 - 5.20 M/uL    HGB 13.1 11.5 - 16.0 g/dL    HCT 38.9 35.0 - 47.0 %    MCV 84.6 80.0 - 99.0 FL    MCH 28.5 26.0 - 34.0 PG    MCHC 33.7 30.0 - 36.5 g/dL    RDW 14.6 (H) 11.5 - 14.5 %    PLATELET 083 852 - 213 K/uL    NEUTROPHILS 41 32 - 75 %    LYMPHOCYTES 37 12 - 49 %    MONOCYTES 13 5 - 13 %    EOSINOPHILS 9 (H) 0 - 7 %    BASOPHILS 0 0 - 1 %    ABS. NEUTROPHILS 2.9 1.8 - 8.0 K/UL    ABS. LYMPHOCYTES 2.6 0.8 - 3.5 K/UL    ABS. MONOCYTES 0.9 0.0 - 1.0 K/UL    ABS. EOSINOPHILS 0.6 (H) 0.0 - 0.4 K/UL    ABS. BASOPHILS 0.0 0.0 - 0.1 K/UL   PROTHROMBIN TIME + INR    Collection Time: 05/09/17  5:04 PM   Result Value Ref Range    INR 3.0 (H) 0.9 - 1.1      Prothrombin time 31.2 (H) 9.0 - 22.1 sec   METABOLIC PANEL, COMPREHENSIVE    Collection Time: 05/09/17  5:04 PM   Result Value Ref Range    Sodium 138 136 - 145 mmol/L    Potassium 3.6 3.5 - 5.1 mmol/L    Chloride 104 97 - 108 mmol/L    CO2 24 21 - 32 mmol/L    Anion gap 10 5 - 15 mmol/L    Glucose 97 65 - 100 mg/dL    BUN 13 6 - 20 MG/DL    Creatinine 1.04 (H) 0.55 - 1.02 MG/DL    BUN/Creatinine ratio 13 12 - 20      GFR est AA >60 >60 ml/min/1.73m2    GFR est non-AA 53 (L) >60 ml/min/1.73m2    Calcium 9.1 8.5 - 10.1 MG/DL    Bilirubin, total 0.5 0.2 - 1.0 MG/DL    ALT (SGPT) 60 12 - 78 U/L    AST (SGOT) 77 (H) 15 - 37 U/L    Alk.  phosphatase 156 (H) 45 - 117 U/L    Protein, total 8.8 (H) 6.4 - 8.2 g/dL    Albumin 3.5 3.5 - 5.0 g/dL    Globulin 5.3 (H) 2.0 - 4.0 g/dL    A-G Ratio 0.7 (L) 1.1 - 2.2     MAGNESIUM    Collection Time: 05/09/17  5:04 PM   Result Value Ref Range    Magnesium 1.7 1.6 - 2.4 mg/dL   CK W/ REFLX CKMB    Collection Time: 05/09/17  5:04 PM   Result Value Ref Range    CK 96 26 - 192 U/L   TROPONIN I    Collection Time: 05/09/17  5:04 PM   Result Value Ref Range    Troponin-I, Qt. 0.13 (H) <0.05 ng/mL   POC INR    Collection Time: 05/09/17  5:22 PM   Result Value Ref Range    INR (POC) 2.7 (H) <1.2     LACTIC ACID, PLASMA    Collection Time: 05/09/17  5:27 PM   Result Value Ref Range    Lactic acid 1.5 0.4 - 2.0 MMOL/L

## 2017-05-10 NOTE — ED NOTES
Spoke with pharmacy regarding paula gtt and need for central line. Per pharmacy, d/t strength of paula gtt, pt will need a central line. Notified hospitialist. Request made for central line. Dr. Kenny Myles notified. Request made to hospitalist to call ED doctor to discuss.

## 2017-05-10 NOTE — PROGRESS NOTES
Received call from Faisal in lab patient is positive for C-diff. Dr. Dru Kaminski paged, notified of result. MD to enter orders.

## 2017-05-10 NOTE — PROGRESS NOTES
Pharmacy Dosing of Warfarin    Indication for Warfarin: AFIB    Goal INR: 2-3    Home Warfarin Dose: 7.5 mg po daily    Concurrent anticoagulants & Platelet Inhibitors:   - Aspirin 81 mg PO daily  - Clopidogrel 75 mg PO daily    Major Interacting Medications (Dose/Frequency): None ordered    Inpatient Warfarin Doses:  Date          INR          Warfarin Dose  5/9/17        2.7           Warfarin 7.5 mg PO  5/10/17      2.1           Warfarin 7.5 mg PO    Recent Labs      05/10/17   0454  05/09/17   1722  05/09/17   1704   INR  2.1*  2.7*  3.0*   HGB  11.3*   --   13.1   PLT  230   --   275   ALB   --    --   3.5   SGOT   --    --   77*   ALT   --    --   60   TBILI   --    --   0.5   TSH   --    --   2.74     Impression/Plan:   - INR is at goal. Will continue with home warfarin dosing and will order warfarin 7.5 mg PO for this evening. Pharmacy will follow daily and adjust the dose as appropriate.     Thanks for the consult  SAMSON Webster

## 2017-05-10 NOTE — ED NOTES
Spoke with hospitalist regarding pt being hypotensive and still tachycardic. Hospitalist states that she will put in orders. Will continue to monitor pt while awaiting orders. Pt is not symptomatic. Denies respiratory distress and dizziness. Pt up to bathroom with steady gait. Call bell within reach and bed in lowest position.

## 2017-05-10 NOTE — CDMP QUERY
Dr. France [place] :  Please clarify if this patient is being treated/managed for:    =>Sepsis poa in the setting of +cdiff, BP 76/56, 81/54, hr 118---157, rr 21--34 req MAP monitoring, Solucortef, Vanc, Antonio, ICU, 750 ml of IVF in ED  =>Other Explanation of clinical findings  =>Unable to Determine (no explanation of clinical findings)    The medical record reflects the following clinical findings, treatment, and risk factors:    Risk Factors: 66F adm w/ junct tachycardia, hx cdiff  Clinical Indicators: BP 76/56, 81/54, hr 118---157, rr 21--34, 5/10 +cdiff  Treatment: MAP monitoring, Solucortef, Vanc, Antonio, ICU, 750 ml of IVF in ED    Please clarify and document your clinical opinion in the progress notes and discharge summary including the definitive and/or presumptive diagnosis, (suspected or probable), related to the above clinical findings. Please include clinical findings supporting your diagnosis.     Thank Ari Sheets

## 2017-05-10 NOTE — PROGRESS NOTES
0800-Bedside report received from [facility], Critical access hospital0 [facility]. CVP monitoring started per Dr. Kaylee Peters. Stool spec obtained and sent to lab.  4015-  Pt rhythm change noted, Dr. Loraine Rajan NP Dickson Marie in to see pt, EKG completed. 1037-  UA obtained clean catch. Troponin obtained, sent to lab. 1113-  NP Dickson Marie given telephone update of pt rate change after 2.5 mg IV Metoprolol: remains in Junctional tachycardia with Trigeminal PVCs. Ordered to give another 2.5 mg IV Metoprolol. 1150-  Second dose Metoprolol given, pt maintains same rate but 100-110 currently. BP dropped, increasing Antonio accordingly. [phone]  Pt now in Bigeminal rate, NP Dickson Marie notified via telephone. 1430-  Pt sleeping, call bell within reach. BP and HR stable, Antonio titrated as needed, rhythm unchanged. 1640-  Pt HR became tachycardiac (regular rate) at 152. Pt has no physical complaints, BP stable. Dr. Kaylee Peters on unit, notified. Ordered one time dose of Metoprolol 2.5 mg IV. Also, instructed to notify Cardiology for additional orders. Left message for DEEPAK Marie, also paged Dr. Nik Méndez (on call for cardiology). Awaiting response. [id number]  Dr. Nik Méndez returned page. EKG ordered, Metoprolol was given, rate slower now and more irregular with PVCs. 1722-  EKG appears to be afib with RVR, Dr. Nik Méndez paged to notify. Damaris Tee in cath lab called for Dr. Nik Méndez, who is currently scrubbed in on a case. Relayed EKG findings,  Dr. Nik Méndez states that he will see pt when he is finished. Pt remains alert and interactive, tolerating rhythm currently. BP remains unchanged. 1824-  Repeat Troponin obtained. Pt condition unchanged. Sitting up in bed playing game on phone, niece at bedside. Awaiting Dr. Nik Méndez. Megan Méndez in, ordered Amiodarone bolus and continuous infusion, pharmacy preparing. 1930-  Bedside report given to [facility], RN.

## 2017-05-10 NOTE — PROGRESS NOTES
Attended Interdisciplinary rounds in Critical Care Unit, where patient care was discussed. Visit by: Jenniffer Marti. Marely Camargo.  Warden Rebeca MA, [address]

## 2017-05-10 NOTE — CONSULTS
[facility]  Pulmonary, Critical Care, and Sleep Medicine    Name: Tobi Brice MRN: [id number]   : 1950 Hospital: [address] [facility]   Date: 5/10/2017        Critical Care Initial Patient Consult    IMPRESSION:   · Systolic Heart Failure, s/p ablation, LVEF of 25%  · CVP of 14. · Hx of C Diff, was treated. · Has localized neck pain at site of CVC. · Hypotension was started on Antonio? · Noted to have diarrhea over night undergoing eval for C diff. · Dyspnea  · Sarcoid with ILD-pt does not remember having this per her report. Noted since . · Tachy with HR in the 150s  · On coumadin  · DM  · Critically ill, moderate risk of decompensation. 35 min CC, EOP. RECOMMENDATIONS:   · Antonio to keep MAP over 65  · May need steroids  · CVP monitoring, at 14-15. Will hold IV fluids. · Stool studies  · Per Cards  · Tylenol prn  · Will be available as needed in the ICU. Subjective/History: This patient has been seen and evaluated at the request of Dr. Shanna Vazquez for above. Patient is a 73 y.o. female worsening dyspnea, has not improved since ablation. NO fevers, chills, sweats. No pain. Started having diarrhea since in hospital. Pt report eating a lot of fruit recently. ROS of 10 systems otherwise negative.     Past Medical History:   Diagnosis Date    Atrial fibrillation ([address] 2010    CAD (coronary artery disease)     stent    Chronic diastolic heart failure [facility] 2014    COPD     COPD (chronic obstructive pulmonary disease) ([address] 2010    Diabetes ([facility]     Fibroid     Heart failure ([facility]     Hypertension 2010    NIDDM (non-insulin dependent diabetes mellitus) 2010    Screening mammogram 5/4/10    SOB (shortness of breath) 2014      Past Surgical History:   Procedure Laterality Date    HX  SECTION      HX OTHER SURGICAL      adrenal gland removed    HX PACEMAKER      AK EXCISE ADRENAL GLAND        Prior to Admission medications    Medication Sig Start Date End Date Taking? Authorizing Provider   warfarin (COUMADIN) 5 mg tablet Take 7.5 mg by mouth daily. Yes Shannan Castro MD   atorvastatin (LIPITOR) 20 mg tablet Take 20 mg by mouth daily. 4/10/17   Historical Provider   HYDROcodone-acetaminophen (NORCO) 5-325 mg per tablet  4/18/17   Historical Provider   pravastatin (PRAVACHOL) 20 mg tablet Take 1 Tab by mouth nightly. 4/17/17   Citlalli Mitchell NP   valsartan (DIOVAN) 40 mg tablet Take 0.5 Tabs by mouth daily. 4/10/17   Elfego Nicole MD   clopidogrel (PLAVIX) 75 mg tab Take 1 Tab by mouth daily. 4/10/17   Elfego Nicole MD   fluticasone (FLONASE) 50 mcg/actuation nasal spray 2 Sprays by Both Nostrils route every evening. Phys MD Gloria   gabapentin (NEURONTIN) 800 mg tablet TAKE ONE TABLET BY MOUTH THREE TIMES DAILY 3/6/17   Citlalli Mitchell NP   Blood-Glucose Meter monitoring kit Check blood sugar daily. May substitute for insurance preferred meter 1/31/17   Citlalli Mitchell NP   evolocumab (REPATHA PUSHTRONEX) 420 mg/3.5 mL Injt 420 mg by SubCUTAneous route every month. Indications: arteriosclerotic vascular disease 12/22/16   Citlalli Mitchell NP   tiotropium (SPIRIVA WITH HANDIHALER) 18 mcg inhalation capsule INHALE THE CONTENTS OF 1 CAPSULE THROUGH HANDIHALER DEVICE DAILY 10/31/16   Citlalli Mitchell NP   metFORMIN (GLUCOPHAGE) 1,000 mg tablet TAKE 1 TABLET BY MOUTH TWICE DAILY WITH MEALS  Indications: PREVENTION OF TYPE 2 DIABETES MELLITUS 10/31/16   Citlalli Mitchell NP   sotalol (BETAPACE) 120 mg tablet Take 1 Tab by mouth two (2) times a day. 9/8/16   Citlalli Mitchell NP   furosemide (LASIX) 20 mg tablet Take 1 Tab by mouth daily. 9/8/16   Rossy Segundo NP   colchicine 0.6 mg tablet Take 1 Tab by mouth two (2) times a day. Patient taking differently: Take 1.25 mg by mouth daily.  Indications: GOUT 9/8/16   Citlalli Mitchell NP   budesonide-formoterol (SYMBICORT) 160-4.5 mcg/actuation HFA inhaler Take 1 Puff by inhalation two (2) times a day. 7/12/16   Ramirez Vinson NP   albuterol (PROVENTIL VENTOLIN) 2.5 mg /3 mL (0.083 %) nebulizer solution 3 mL by Nebulization route every four (4) hours as needed for Wheezing. 5/19/16   Ramirez Vinson NP   FOLIC ACID/MULTIVIT-MIN/LUTEIN (CENTRUM SILVER PO) Take 1 Tab by mouth daily. Takes one po daily. Historical Provider   albuterol (VENTOLIN HFA) 90 mcg/actuation inhaler Take 2 Puffs by inhalation every six (6) hours as needed for Wheezing. 3/8/16   Jenniffer Jasso MD   Azelastine (ASTEPRO) 0.15 % (205.5 mcg) nasal spray 1 [place] by Both Nostrils route daily. 1/21/15   Historical Provider   cod liver oil cap Take 1 Cap by mouth daily. Historical Provider   aspirin 81 mg chewable tablet Take 81 mg by mouth daily.     Historical Provider     Current Facility-Administered Medications   Medication Dose Route Frequency    WARFARIN INFORMATION NOTE (COUMADIN)   Other QPM    gabapentin (NEURONTIN) capsule 800 mg  800 mg Oral TID    gabapentin (NEURONTIN) 100 mg capsule        gabapentin (NEURONTIN) 300 mg capsule        0.9% sodium chloride infusion  75 mL/hr IntraVENous CONTINUOUS    PHENYLephrine (NEOSYNEPHRINE) 10 mg/mL injection        0.9% sodium chloride infusion 250 mL  250 mL IntraVENous ONCE    sodium chloride (NS) flush 5-10 mL  5-10 mL IntraVENous Q8H    aspirin chewable tablet 81 mg  81 mg Oral DAILY    atorvastatin (LIPITOR) tablet 20 mg  20 mg Oral DAILY    fluticasone-vilanterol (BREO ELLIPTA) 100mcg-25mcg/puff  1 Puff Inhalation DAILY    clopidogrel (PLAVIX) tablet 75 mg  75 mg Oral DAILY    sotalol (BETAPACE) tablet 120 mg  120 mg Oral BID    umeclidinium (INCRUSE ELLIPTA) 62.5 mcg/actuation  1 Puff Inhalation DAILY    PHENYLephrine (NEOSYNEPHRINE) 30,000 mcg in 0.9% sodium chloride 250 mL infusion   mcg/min IntraVENous TITRATE     Allergies   Allergen Reactions    Crestor [Rosuvastatin] Myalgia    Levaquin [Levofloxacin] Nausea Only     GI Upset    Lipitor [Atorvastatin] Diarrhea    Lyrica [Pregabalin] Myalgia      Social History   Substance Use Topics    Smoking status: Former Smoker     Types: Cigarettes     Quit date: 2009    Smokeless tobacco: Never Used    Alcohol use No      Family History   Problem Relation Age of Onset    Heart Disease Mother     Diabetes Father     Heart Disease Brother         Review of Systems:  A comprehensive review of systems was negative. Objective:   Vital Signs:    Visit Vitals    BP 93/58    Pulse (!) 111    Temp 97.3 °F (36.3 °C)    Resp 18    Ht 5' 6\" (1.676 m)    Wt 78 kg (171 lb 15.3 oz)    SpO2 100%    Breastfeeding No    BMI 27.75 kg/m2       O2 Device: Nasal cannula   O2 Flow Rate (L/min): 2 l/min   Temp (24hrs), Av.6 °F (36.4 °C), Min:97.3 °F (36.3 °C), Max:97.9 °F (36.6 °C)       Intake/Output:   Last shift:         Last 3 shifts:    No intake or output data in the 24 hours ending 05/10/17 0750  Hemodynamics:   PAP:   CO:     Wedge:   CI:     CVP:    SVR:       PVR:       Physical Exam:    General:  Alert, cooperative, no distress, appears stated age. Head:  Normocephalic, without obvious abnormality, atraumatic. Eyes:  Conjunctivae/corneas clear. PERRL, EOMs intact. Nose: Nares normal. Septum midline. Mucosa normal. No drainage or sinus tenderness. Throat: Lips, mucosa, and tongue normal. Teeth and gums normal.   Neck: Supple, symmetrical, trachea midline, no adenopathy, thyroid: no enlargment/tenderness/nodules, no carotid bruit and no JVD. CVC on right neck. Back:   Symmetric, no curvature. ROM normal.   Lungs:   Clear to auscultation bilaterally. Chest wall:  No tenderness or deformity. Heart:  Regular rate and rhythm, S1, S2 normal, no murmur, click, rub or gallop. Abdomen:   Soft, non-tender. Bowel sounds normal. No masses,  No organomegaly. Extremities: Extremities normal, atraumatic, no cyanosis or edema. Pulses: 2+ and symmetric all extremities.    Skin: Skin color, texture, turgor normal. No rashes or lesions   Lymph nodes: Cervical, supraclavicular, and axillary nodes normal.   Neurologic: Grossly nonfocal       Data:     Recent Results (from the past 24 hour(s))   EKG, 12 LEAD, INITIAL    Collection Time: 05/09/17  4:53 PM   Result Value Ref Range    Ventricular Rate 157 BPM    Atrial Rate 157 BPM    P-R Interval 92 ms    QRS Duration 92 ms    Q-T Interval 256 ms    QTC Calculation (Bezet) 413 ms    Calculated P Axis 87 degrees    Calculated R Axis -22 degrees    Calculated T Axis 124 degrees    Diagnosis       Sinus tachycardia with short WV  Moderate voltage criteria for LVH, may be normal variant  Anterior infarct , age undetermined  ST & T wave abnormality, consider lateral ischemia  Abnormal ECG  When compared with ECG of 02-MAY-2017 12:24,  Significant changes have occurred     CBC WITH AUTOMATED DIFF    Collection Time: 05/09/17  5:04 PM   Result Value Ref Range    WBC 7.1 3.6 - 11.0 K/uL    RBC 4.60 3.80 - 5.20 M/uL    HGB 13.1 11.5 - 16.0 g/dL    HCT 38.9 35.0 - 47.0 %    MCV 84.6 80.0 - 99.0 FL    MCH 28.5 26.0 - 34.0 PG    MCHC 33.7 30.0 - 36.5 g/dL    RDW 14.6 (H) 11.5 - 14.5 %    PLATELET 930 372 - 317 K/uL    NEUTROPHILS 41 32 - 75 %    LYMPHOCYTES 37 12 - 49 %    MONOCYTES 13 5 - 13 %    EOSINOPHILS 9 (H) 0 - 7 %    BASOPHILS 0 0 - 1 %    ABS. NEUTROPHILS 2.9 1.8 - 8.0 K/UL    ABS. LYMPHOCYTES 2.6 0.8 - 3.5 K/UL    ABS. MONOCYTES 0.9 0.0 - 1.0 K/UL    ABS. EOSINOPHILS 0.6 (H) 0.0 - 0.4 K/UL    ABS.  BASOPHILS 0.0 0.0 - 0.1 K/UL   PROTHROMBIN TIME + INR    Collection Time: 05/09/17  5:04 PM   Result Value Ref Range    INR 3.0 (H) 0.9 - 1.1      Prothrombin time 31.2 (H) 9.0 - 06.6 sec   METABOLIC PANEL, COMPREHENSIVE    Collection Time: 05/09/17  5:04 PM   Result Value Ref Range    Sodium 138 136 - 145 mmol/L    Potassium 3.6 3.5 - 5.1 mmol/L    Chloride 104 97 - 108 mmol/L    CO2 24 21 - 32 mmol/L    Anion gap 10 5 - 15 mmol/L    Glucose 97 65 - 100 mg/dL BUN 13 6 - 20 MG/DL    Creatinine 1.04 (H) 0.55 - 1.02 MG/DL    BUN/Creatinine ratio 13 12 - 20      GFR est AA >60 >60 ml/min/1.73m2    GFR est non-AA 53 (L) >60 ml/min/1.73m2    Calcium 9.1 8.5 - 10.1 MG/DL    Bilirubin, total 0.5 0.2 - 1.0 MG/DL    ALT (SGPT) 60 12 - 78 U/L    AST (SGOT) 77 (H) 15 - 37 U/L    Alk.  phosphatase 156 (H) 45 - 117 U/L    Protein, total 8.8 (H) 6.4 - 8.2 g/dL    Albumin 3.5 3.5 - 5.0 g/dL    Globulin 5.3 (H) 2.0 - 4.0 g/dL    A-G Ratio 0.7 (L) 1.1 - 2.2     MAGNESIUM    Collection Time: 05/09/17  5:04 PM   Result Value Ref Range    Magnesium 1.7 1.6 - 2.4 mg/dL   CK W/ REFLX CKMB    Collection Time: 05/09/17  5:04 PM   Result Value Ref Range    CK 96 26 - 192 U/L   TROPONIN I    Collection Time: 05/09/17  5:04 PM   Result Value Ref Range    Troponin-I, Qt. 0.13 (H) <0.05 ng/mL   TSH 3RD GENERATION    Collection Time: 05/09/17  5:04 PM   Result Value Ref Range    TSH 2.74 0.36 - 3.74 uIU/mL   POC INR    Collection Time: 05/09/17  5:22 PM   Result Value Ref Range    INR (POC) 2.7 (H) <1.2     CULTURE, BLOOD, PAIRED    Collection Time: 05/09/17  5:27 PM   Result Value Ref Range    Special Requests: NO SPECIAL REQUESTS      Culture result: NO GROWTH AFTER 13 HOURS     LACTIC ACID, PLASMA    Collection Time: 05/09/17  5:27 PM   Result Value Ref Range    Lactic acid 1.5 0.4 - 2.0 MMOL/L   METABOLIC PANEL, BASIC    Collection Time: 05/10/17  4:54 AM   Result Value Ref Range    Sodium 140 136 - 145 mmol/L    Potassium 4.0 3.5 - 5.1 mmol/L    Chloride 107 97 - 108 mmol/L    CO2 25 21 - 32 mmol/L    Anion gap 8 5 - 15 mmol/L    Glucose 135 (H) 65 - 100 mg/dL    BUN 11 6 - 20 MG/DL    Creatinine 0.78 0.55 - 1.02 MG/DL    BUN/Creatinine ratio 14 12 - 20      GFR est AA >60 >60 ml/min/1.73m2    GFR est non-AA >60 >60 ml/min/1.73m2    Calcium 8.8 8.5 - 10.1 MG/DL   CBC W/O DIFF    Collection Time: 05/10/17  4:54 AM   Result Value Ref Range    WBC 6.1 3.6 - 11.0 K/uL    RBC 4.01 3.80 - 5.20 M/uL HGB 11.3 (L) 11.5 - 16.0 g/dL    HCT 34.3 (L) 35.0 - 47.0 %    MCV 85.5 80.0 - 99.0 FL    MCH 28.2 26.0 - 34.0 PG    MCHC 32.9 30.0 - 36.5 g/dL    RDW 14.7 (H) 11.5 - 14.5 %    PLATELET 787 083 - 946 K/uL   MAGNESIUM    Collection Time: 05/10/17  4:54 AM   Result Value Ref Range    Magnesium 2.2 1.6 - 2.4 mg/dL   PHOSPHORUS    Collection Time: 05/10/17  4:54 AM   Result Value Ref Range    Phosphorus 3.1 2.6 - 4.7 MG/DL   PROTHROMBIN TIME + INR    Collection Time: 05/10/17  4:54 AM   Result Value Ref Range    INR 2.1 (H) 0.9 - 1.1      Prothrombin time 21.4 (H) 9.0 - 11.1 sec             Telemetry:normal sinus rhythm    Imaging:  I have personally reviewed the patients radiographs and have reviewed the reports:  5-9-17: IMPRESSION:  1. No CT evidence for central pulmonary embolus at this time . 2. Extensive interstitial lung disease bilaterally, advanced since 2015 with  nodules as described above and borderline enlarged hilar and mediastinal lymph  nodes, many of which were present in 2015. Correlate with known clinical  pulmonary history for sarcoidosis or other explanations for these findings. Compared to prior chest imaging. If none is available, follow-up chest CT would  be recommended in 3-6 months.      Chrissy Curiel MD

## 2017-05-11 LAB
ANION GAP BLD CALC-SCNC: 9 MMOL/L (ref 5–15)
ATRIAL RATE: 147 BPM
BNP SERPL-MCNC: 747 PG/ML (ref 0–100)
BUN SERPL-MCNC: 11 MG/DL (ref 6–20)
BUN/CREAT SERPL: 11 (ref 12–20)
CALCIUM SERPL-MCNC: 8.4 MG/DL (ref 8.5–10.1)
CALCULATED R AXIS, ECG10: 0 DEGREES
CALCULATED T AXIS, ECG11: 114 DEGREES
CHLORIDE SERPL-SCNC: 108 MMOL/L (ref 97–108)
CO2 SERPL-SCNC: 22 MMOL/L (ref 21–32)
CREAT SERPL-MCNC: 1.03 MG/DL (ref 0.55–1.02)
DIAGNOSIS, 93000: NORMAL
GLUCOSE BLD STRIP.AUTO-MCNC: 175 MG/DL (ref 65–100)
GLUCOSE BLD STRIP.AUTO-MCNC: 188 MG/DL (ref 65–100)
GLUCOSE BLD STRIP.AUTO-MCNC: 256 MG/DL (ref 65–100)
GLUCOSE SERPL-MCNC: 252 MG/DL (ref 65–100)
INR PPP: 3.5 (ref 0.9–1.1)
MAGNESIUM SERPL-MCNC: 2.2 MG/DL (ref 1.6–2.4)
POTASSIUM SERPL-SCNC: 4.2 MMOL/L (ref 3.5–5.1)
PROTHROMBIN TIME: 36.3 SEC (ref 9–11.1)
Q-T INTERVAL, ECG07: 328 MS
QRS DURATION, ECG06: 96 MS
QTC CALCULATION (BEZET), ECG08: 488 MS
SERVICE CMNT-IMP: ABNORMAL
SODIUM SERPL-SCNC: 139 MMOL/L (ref 136–145)
VENTRICULAR RATE, ECG03: 133 BPM

## 2017-05-11 PROCEDURE — 74011250636 HC RX REV CODE- 250/636: Performed by: INTERNAL MEDICINE

## 2017-05-11 PROCEDURE — 74011250637 HC RX REV CODE- 250/637: Performed by: FAMILY MEDICINE

## 2017-05-11 PROCEDURE — 3331090001 HH PPS REVENUE CREDIT

## 2017-05-11 PROCEDURE — 74011000258 HC RX REV CODE- 258: Performed by: INTERNAL MEDICINE

## 2017-05-11 PROCEDURE — 74011636637 HC RX REV CODE- 636/637: Performed by: INTERNAL MEDICINE

## 2017-05-11 PROCEDURE — 80048 BASIC METABOLIC PNL TOTAL CA: CPT | Performed by: FAMILY MEDICINE

## 2017-05-11 PROCEDURE — 83735 ASSAY OF MAGNESIUM: CPT | Performed by: FAMILY MEDICINE

## 2017-05-11 PROCEDURE — 74011250636 HC RX REV CODE- 250/636: Performed by: FAMILY MEDICINE

## 2017-05-11 PROCEDURE — 83880 ASSAY OF NATRIURETIC PEPTIDE: CPT | Performed by: NURSE PRACTITIONER

## 2017-05-11 PROCEDURE — 92960 CARDIOVERSION ELECTRIC EXT: CPT | Performed by: NURSE PRACTITIONER

## 2017-05-11 PROCEDURE — 3331090002 HH PPS REVENUE DEBIT

## 2017-05-11 PROCEDURE — 36415 COLL VENOUS BLD VENIPUNCTURE: CPT | Performed by: FAMILY MEDICINE

## 2017-05-11 PROCEDURE — 65660000000 HC RM CCU STEPDOWN

## 2017-05-11 PROCEDURE — 77010033678 HC OXYGEN DAILY

## 2017-05-11 PROCEDURE — 74011000250 HC RX REV CODE- 250

## 2017-05-11 PROCEDURE — 77030018729 HC ELECTRD DEFIB PAD CARD -B

## 2017-05-11 PROCEDURE — 74011250637 HC RX REV CODE- 250/637: Performed by: INTERNAL MEDICINE

## 2017-05-11 PROCEDURE — 74011250636 HC RX REV CODE- 250/636: Performed by: NURSE PRACTITIONER

## 2017-05-11 PROCEDURE — 82962 GLUCOSE BLOOD TEST: CPT

## 2017-05-11 PROCEDURE — 85610 PROTHROMBIN TIME: CPT | Performed by: FAMILY MEDICINE

## 2017-05-11 RX ORDER — METOPROLOL TARTRATE 5 MG/5ML
INJECTION INTRAVENOUS
Status: COMPLETED
Start: 2017-05-11 | End: 2017-05-11

## 2017-05-11 RX ORDER — METOPROLOL TARTRATE 5 MG/5ML
2.5 INJECTION INTRAVENOUS ONCE
Status: COMPLETED | OUTPATIENT
Start: 2017-05-11 | End: 2017-05-11

## 2017-05-11 RX ORDER — HYDROCORTISONE SODIUM SUCCINATE 100 MG/2ML
50 INJECTION, POWDER, FOR SOLUTION INTRAMUSCULAR; INTRAVENOUS EVERY 8 HOURS
Status: DISCONTINUED | OUTPATIENT
Start: 2017-05-11 | End: 2017-05-12

## 2017-05-11 RX ORDER — INSULIN LISPRO 100 [IU]/ML
INJECTION, SOLUTION INTRAVENOUS; SUBCUTANEOUS EVERY 6 HOURS
Status: DISCONTINUED | OUTPATIENT
Start: 2017-05-11 | End: 2017-05-13

## 2017-05-11 RX ORDER — MIDAZOLAM HYDROCHLORIDE 1 MG/ML
4 INJECTION, SOLUTION INTRAMUSCULAR; INTRAVENOUS ONCE
Status: COMPLETED | OUTPATIENT
Start: 2017-05-11 | End: 2017-05-11

## 2017-05-11 RX ORDER — FENTANYL CITRATE 50 UG/ML
50 INJECTION, SOLUTION INTRAMUSCULAR; INTRAVENOUS ONCE
Status: COMPLETED | OUTPATIENT
Start: 2017-05-11 | End: 2017-05-11

## 2017-05-11 RX ADMIN — INSULIN HUMAN 8 UNITS: 100 INJECTION, SUSPENSION SUBCUTANEOUS at 14:41

## 2017-05-11 RX ADMIN — UMECLIDINIUM 1 PUFF: 62.5 AEROSOL, POWDER ORAL at 08:39

## 2017-05-11 RX ADMIN — PHENYLEPHRINE HYDROCHLORIDE 15 MCG/MIN: 10 INJECTION INTRAVENOUS at 14:54

## 2017-05-11 RX ADMIN — FLUTICASONE FUROATE AND VILANTEROL TRIFENATATE 1 PUFF: 100; 25 POWDER RESPIRATORY (INHALATION) at 08:39

## 2017-05-11 RX ADMIN — PHENYLEPHRINE HYDROCHLORIDE 25 MCG/MIN: 10 INJECTION INTRAVENOUS at 09:08

## 2017-05-11 RX ADMIN — MUPIROCIN: 20 OINTMENT TOPICAL at 22:13

## 2017-05-11 RX ADMIN — VANCOMYCIN HYDROCHLORIDE 125 MG: 1 INJECTION, POWDER, LYOPHILIZED, FOR SOLUTION INTRAVENOUS at 08:56

## 2017-05-11 RX ADMIN — MIDAZOLAM HYDROCHLORIDE 2 MG: 1 INJECTION, SOLUTION INTRAMUSCULAR; INTRAVENOUS at 13:21

## 2017-05-11 RX ADMIN — PHENYLEPHRINE HYDROCHLORIDE 20 MCG/MIN: 10 INJECTION INTRAVENOUS at 14:35

## 2017-05-11 RX ADMIN — HYDROCORTISONE SODIUM SUCCINATE 50 MG: 100 INJECTION, POWDER, FOR SOLUTION INTRAMUSCULAR; INTRAVENOUS at 14:39

## 2017-05-11 RX ADMIN — ACETAMINOPHEN 650 MG: 325 TABLET, FILM COATED ORAL at 04:07

## 2017-05-11 RX ADMIN — MUPIROCIN: 20 OINTMENT TOPICAL at 08:37

## 2017-05-11 RX ADMIN — VANCOMYCIN HYDROCHLORIDE 125 MG: 1 INJECTION, POWDER, LYOPHILIZED, FOR SOLUTION INTRAVENOUS at 18:07

## 2017-05-11 RX ADMIN — INSULIN LISPRO 2 UNITS: 100 INJECTION, SOLUTION INTRAVENOUS; SUBCUTANEOUS at 13:18

## 2017-05-11 RX ADMIN — Medication 10 ML: at 14:43

## 2017-05-11 RX ADMIN — PHENYLEPHRINE HYDROCHLORIDE 50 MCG/MIN: 10 INJECTION INTRAVENOUS at 01:05

## 2017-05-11 RX ADMIN — HYDROCORTISONE SODIUM SUCCINATE 50 MG: 100 INJECTION, POWDER, FOR SOLUTION INTRAMUSCULAR; INTRAVENOUS at 22:10

## 2017-05-11 RX ADMIN — VANCOMYCIN HYDROCHLORIDE 125 MG: 1 INJECTION, POWDER, LYOPHILIZED, FOR SOLUTION INTRAVENOUS at 14:42

## 2017-05-11 RX ADMIN — INSULIN LISPRO 2 UNITS: 100 INJECTION, SOLUTION INTRAVENOUS; SUBCUTANEOUS at 09:24

## 2017-05-11 RX ADMIN — PHENYLEPHRINE HYDROCHLORIDE 20 MCG/MIN: 10 INJECTION INTRAVENOUS at 13:36

## 2017-05-11 RX ADMIN — Medication 10 ML: at 06:32

## 2017-05-11 RX ADMIN — GABAPENTIN 800 MG: 100 CAPSULE ORAL at 08:37

## 2017-05-11 RX ADMIN — Medication 10 ML: at 22:09

## 2017-05-11 RX ADMIN — ASPIRIN 81 MG 81 MG: 81 TABLET ORAL at 08:37

## 2017-05-11 RX ADMIN — METOPROLOL TARTRATE 2.5 MG: 5 INJECTION INTRAVENOUS at 10:53

## 2017-05-11 RX ADMIN — AMIODARONE HYDROCHLORIDE 0.5 MG/MIN: 50 INJECTION, SOLUTION INTRAVENOUS at 04:43

## 2017-05-11 RX ADMIN — COLCHICINE 0.6 MG: 0.6 TABLET, FILM COATED ORAL at 08:56

## 2017-05-11 RX ADMIN — INSULIN HUMAN 8 UNITS: 100 INJECTION, SUSPENSION SUBCUTANEOUS at 22:38

## 2017-05-11 RX ADMIN — VANCOMYCIN HYDROCHLORIDE 125 MG: 1 INJECTION, POWDER, LYOPHILIZED, FOR SOLUTION INTRAVENOUS at 03:30

## 2017-05-11 RX ADMIN — GABAPENTIN 800 MG: 100 CAPSULE ORAL at 22:09

## 2017-05-11 RX ADMIN — AMIODARONE HYDROCHLORIDE 0.5 MG/MIN: 50 INJECTION, SOLUTION INTRAVENOUS at 22:07

## 2017-05-11 RX ADMIN — CLOPIDOGREL BISULFATE 75 MG: 75 TABLET ORAL at 08:37

## 2017-05-11 RX ADMIN — HYDROCORTISONE SODIUM SUCCINATE 50 MG: 100 INJECTION, POWDER, FOR SOLUTION INTRAMUSCULAR; INTRAVENOUS at 06:32

## 2017-05-11 RX ADMIN — GABAPENTIN 800 MG: 100 CAPSULE ORAL at 17:05

## 2017-05-11 RX ADMIN — FENTANYL CITRATE 50 MCG: 50 INJECTION, SOLUTION INTRAMUSCULAR; INTRAVENOUS at 13:20

## 2017-05-11 RX ADMIN — INSULIN LISPRO 5 UNITS: 100 INJECTION, SOLUTION INTRAVENOUS; SUBCUTANEOUS at 18:30

## 2017-05-11 NOTE — PROGRESS NOTES
Pharmacy Dosing of Warfarin    Indication for Warfarin: AFIB    Goal INR: 2-3    Home Warfarin Dose: 7.5 mg po daily    Concurrent anticoagulants & Platelet Inhibitors:   - Aspirin 81 mg PO daily  - Clopidogrel 75 mg PO daily    Major Interacting Medications (Dose/Frequency):   - Amiodarone 0.5 mg/min IV started on 5/11/17    Inpatient Warfarin Doses:  Date          INR          Warfarin Dose  5/9/17        2.7           Warfarin 7.5 mg PO  5/10/17      2.1           Warfarin 7.5 mg PO  5/11/17      3.5           Hold warfarin therapy today    Recent Labs      05/11/17   0338  05/10/17   0454  05/09/17   1722  05/09/17   1704   INR  3.5*  2.1*  2.7*  3.0*   HGB   --   11.3*   --   13.1   PLT   --   230   --   275   ALB   --    --    --   3.5   SGOT   --    --    --   77*   ALT   --    --    --   60   TBILI   --    --    --   0.5   TSH   --    --    --   2.74     Impression/Plan:   - INR is above goal and rising. Amiodarone started on 5/11/17. Patient now CDIFF positive again. - Will hold warfarin therapy today. Pharmacy will follow daily and adjust the dose as appropriate.     Thanks for the consult  SAMSON Harrison

## 2017-05-11 NOTE — PROGRESS NOTES
Bedside and Verbal shift change report given to Brittany Carmichael RN (oncoming nurse) by Dalton Turcios RN (offgoing nurse). Report included the following information SBAR, Kardex, Intake/Output, MAR, Recent Results, Med Rec Status and Cardiac Rhythm Junctional, Vent Bigeminy.

## 2017-05-11 NOTE — CARDIO/PULMONARY
C/P Rehab Note:    Chart Reviewed. Pt admitted with Tachycardia and developed A fib overnight. Plan for Life Vest at Discharge. Echo from 5/9/17 LV Ef 25-30%. PMH significant for:  -Chronic Systolic/Diastolic HF   -CAD  -COPD  -HTN  Pt is a former smoker. Pt received teaching last on 5/2/17. Pt lying in bed sleeping. RN in getting ready to go into pt room. Will continue to follow.

## 2017-05-11 NOTE — PROGRESS NOTES
Hospitalist Progress Note    NAME: Sharon Lindo   :  1950   MRN:  [id number]       Interim Hospital Summary: 73 y.o. female whom presented on 2017 with      Assessment / Plan:  Junctional Tachycardia/A.fib with RVR with associated hypotension  Managed in ICU  On Amiodarone and Neosynephrine  Became further hypotensive with BB  Cardiology is involved in patient's care  If remain uncontrolled, cardiology may consider cardioversion (also had ablation 17)    Respiratory distress  Due to ILD vs Acute on chronic systolic heart failure with EF 25%  CTA chest with ILD, findings could be related to CHF  Unable to diurese due to HR issues  Unable to use inotropic agents due to junctional tachycardia  Prognosis guarded  Managed in ICU  Cardiology and Pulmonary is involved in patient's care    Recurrent vs persistent C.diff  Continue PO vancomycin    DM type 2  holding metformin, got CTA  Continue SSI    Elevated troponin  due to strain from above tachycardia  Cardiology is following    Code Status: Full  Surrogate Decision Maker: Daughter     DVT Prophylaxis: on coumadin  GI Prophylaxis: not indicated     Baseline: ambulatory      Subjective: Pt seen and examined at bedside. Continue to feel short of breath. Overnight events d/w RN     CHIEF COMPLAINT: f/u \"Worsening SOB\"     Review of Systems:  Symptom Y/N Comments  Symptom Y/N Comments   Fever/Chills n   Chest Pain n    Poor Appetite    Edema     Cough n   Abdominal Pain n    Sputum    Joint Pain     SOB/[place] y   Pruritis/Rash     Nausea/vomit    Tolerating PT/OT     Diarrhea    Tolerating Diet y    Constipation    Other       Could NOT obtain due to:      Objective:     VITALS:   Last 24hrs VS reviewed since prior progress note.  Most recent are:  Patient Vitals for the past 24 hrs:   Temp Pulse Resp BP SpO2   17 1032 - - - 105/68 -   17 1028 - (!) 111 22 - 97 %   17 1026 - (!) 116 17 - 97 %   17 1024 - (!) 125 23 - 97 % 05/11/17 1023 - (!) 132 18 - 97 %   05/11/17 1021 - (!) 144 21 - -   05/11/17 1020 - (!) 147 18 - -   05/11/17 0955 - (!) 145 - - -   05/11/17 0928 - (!) 116 - 101/65 -   05/11/17 0900 - (!) 116 21 - 97 %   05/11/17 0830 - (!) 109 23 - 97 %   05/11/17 0800 - (!) 109 19 102/64 98 %   05/11/17 0730 97.6 °F (36.4 °C) 98 19 107/60 98 %   05/11/17 0630 - (!) 106 25 117/77 98 %   05/11/17 0600 - 96 22 108/60 97 %   05/11/17 0530 - 94 21 115/67 99 %   05/11/17 0500 - 95 19 117/57 99 %   05/11/17 0430 - 94 20 124/72 99 %   05/11/17 0400 97.4 °F (36.3 °C) - - - -   05/11/17 0200 - 91 12 105/59 98 %   05/11/17 0130 - 93 13 112/55 98 %   05/11/17 0105 - 98 - 108/58 -   05/11/17 0100 - 94 14 108/58 98 %   05/11/17 0000 97.6 °F (36.4 °C) 94 13 111/56 98 %   05/10/17 2300 - 95 13 94/45 99 %   05/10/17 2230 97.4 °F (36.3 °C) - - - -   05/10/17 2200 - 96 19 110/55 98 %   05/10/17 2100 - 96 20 95/54 100 %   05/10/17 2051 - (!) 129 17 - 98 %   05/10/17 2030 - (!) 147 23 - 97 %   05/10/17 2000 - (!) 154 21 (!) 89/61 98 %   05/10/17 1930 98 °F (36.7 °C) (!) 154 24 93/62 98 %   05/10/17 1915 - (!) 153 25 93/66 97 %   05/10/17 1900 - (!) 153 24 (!) 88/68 98 %   05/10/17 1845 - (!) 152 22 (!) 87/61 98 %   05/10/17 1830 - (!) 150 20 98/67 97 %   05/10/17 1815 - (!) 152 - 96/70 99 %   05/10/17 1800 - (!) 152 - 92/65 99 %   05/10/17 1745 - (!) 152 18 98/65 100 %   05/10/17 1734 - (!) 150 20 - 98 %   05/10/17 1715 - (!) 135 20 (!) 80/57 96 %   05/10/17 1700 - (!) 138 21 103/65 99 %   05/10/17 1649 - (!) 153 - - -   05/10/17 1630 - (!) 152 24 102/57 93 %   05/10/17 1628 - (!) 150 26 90/63 96 %   05/10/17 1530 97.6 °F (36.4 °C) (!) 114 13 104/58 99 %   05/10/17 1500 - (!) 109 16 (!) 87/55 99 %   05/10/17 1430 - (!) 104 15 98/59 98 %   05/10/17 1400 - (!) 111 20 103/65 100 %   05/10/17 1345 - (!) 111 24 - 94 %   05/10/17 1330 - (!) 121 24 (!) 87/49 100 %   05/10/17 1315 - 100 13 (!) 81/54 97 %   05/10/17 1300 - (!) 101 12 91/53 97 %   05/10/17 1245 - (!) 112 12 (!) 86/56 98 %   05/10/17 1230 - 100 18 (!) 82/59 98 %   05/10/17 1215 - 100 15 (!) 89/57 98 %   05/10/17 1200 98.1 °F (36.7 °C) 100 13 92/60 98 %   05/10/17 1145 - (!) 109 15 (!) 88/58 99 %   05/10/17 1130 - (!) 113 21 (!) 81/54 99 %   05/10/17 1128 - (!) 112 18 (!) 64/42 96 %       Intake/Output Summary (Last 24 hours) at 17 1119  Last data filed at 17 1032   Gross per 24 hour   Intake          1593.79 ml   Output             1650 ml   Net           -56.21 ml        PHYSICAL EXAM:  General: WD, WN. Alert, cooperative, no acute distress    EENT:  EOMI. Anicteric sclerae. MMM  Resp:  Coarse breath sounds. No accessory muscle use  CV:  Irregularly irregular  rhythm,  No edema  GI:  Soft, Non distended, Non tender.  +Bowel sounds  Neurologic:  Alert and oriented X 3, normal speech,   Psych:   Good insight. Not anxious nor agitated  Skin:  No rashes. No jaundice    Reviewed most current lab test results and cultures  YES  Reviewed most current radiology test results   YES  Review and summation of old records today    NO  Reviewed patient's current orders and MAR    YES  PMH/ reviewed - no change compared to H&P  ________________________________________________________________________  Care Plan discussed with:    Comments   Patient y    Family      RN y    Care Manager     Consultant                        Multidiciplinary team rounds were held today with , nursing, pharmacist and clinical coordinator. Patient's plan of care was discussed; medications were reviewed and discharge planning was addressed.      ________________________________________________________________________  Total NON critical care TIME:  35  Minutes    Total CRITICAL CARE TIME Spent:   Minutes non procedure based      Comments   >50% of visit spent in counseling and coordination of care     ________________________________________________________________________  Shabbir Saldana MD     Procedures: see electronic medical records for all procedures/Xrays and details which were not copied into this note but were reviewed prior to creation of Plan. LABS:  I reviewed today's most current labs and imaging studies.   Pertinent labs include:  Recent Labs      05/10/17   0454 05/09/17   1704   WBC  6.1  7.1   HGB  11.3*  13.1   HCT  34.3*  38.9   PLT  230  275     Recent Labs      05/11/17   0338  05/10/17   1037  05/10/17   0454 05/09/17   1722  05/09/17   1704   NA  139  138  140   --   138   K  4.2  3.9  4.0   --   3.6   CL  108  105  107   --   104   CO2  22  25  25   --   24   GLU  252*  178*  135*   --   97   BUN  11  10  11   --   13   CREA  1.03*  0.83  0.78   --   1.04*   CA  8.4*  8.3*  8.8   --   9.1   MG  2.2   --   2.2   --   1.7   PHOS   --    --   3.1   --    --    ALB   --    --    --    --   3.5   TBILI   --    --    --    --   0.5   SGOT   --    --    --    --   77*   ALT   --    --    --    --   60   INR  3.5*   --   2.1*  2.7*  3.0*       Signed: Vlad Edwards MD

## 2017-05-11 NOTE — PROGRESS NOTES
Pt had gotten tachycardic speaking with her employer re disability. Also developed chest discomfort . Dr Adilene Dejesus here, pt given lopressor 2.5mg  And now Hr down to 109.  Plan to cardiovert , pt NPO now

## 2017-05-11 NOTE — PROGRESS NOTES
Bedside and Verbal shift change report given to Nick Hill RN (oncoming nurse) by Jevon Lowe RN (offgoing nurse). Report included the following information SBAR, Kardex, Intake/Output, MAR, Recent Results, Med Rec Status and Cardiac Rhythm Afib. 2110 Amiodarone IV bolus given and HR dropped from 155 to . Patient continues to have some mild shortness of breath with exertion, advised that she has some extra fluid on board by her elevated CVP and until properly diuresed she may continue to feel SOB. Advised to not exert too much energy and call for assistance    2230 Patient in ventricular bigeminy. No other distress noted. VSS on phenylephrine drip.

## 2017-05-11 NOTE — PROGRESS NOTES
[facility]  Pulmonary, Critical Care, and Sleep Medicine    Name: Alexei Brooks MRN: [id number]   : 1950 Hospital: [facility]   Date: 2017          IMPRESSION:   · Systolic Heart Failure, s/p ablation, LVEF of 25%  · CVP of 14. · C Diff. · Has localized neck pain at site of CVC. · Hypotension was started on Antonio? · Noted to have diarrhea over night undergoing eval for C diff. · Dyspnea  · Sarcoid with ILD-pt does not remember having this per her report. Noted since . · Tachy with HR in the 150s  · On coumadin  · DM  · Critically ill, moderate risk of decompensation. 35 min CC, EOP.       RECOMMENDATIONS:   · Wean O2  · Antonio to keep MAP over 65  · CVP monitoring  · Oral vanco for CDiff  · amiodarone drip  · Tylenol prn  · Monitor renal fx     Subjective/History:     No acute events overnight  No distress      Current Facility-Administered Medications   Medication Dose Route Frequency    WARFARIN INFORMATION NOTE (COUMADIN)   Other QPM    gabapentin (NEURONTIN) capsule 800 mg  800 mg Oral TID    hydrocortisone Sod Succ (PF) (SOLU-CORTEF) injection 50 mg  50 mg IntraVENous Q8H    insulin lispro (HUMALOG) injection   SubCUTAneous AC&HS    mupirocin (BACTROBAN) 2 % ointment   Topical Q12H    vancomycin 50 mg/mL oral solution (compounded) 125 mg  125 mg Oral Q6H    colchicine tablet 0.6 mg  0.6 mg Oral DAILY    amiodarone (CORDARONE) 450 mg in dextrose 5% 250 mL infusion  0.5 mg/min IntraVENous CONTINUOUS    sodium chloride (NS) flush 5-10 mL  5-10 mL IntraVENous Q8H    aspirin chewable tablet 81 mg  81 mg Oral DAILY    fluticasone-vilanterol (BREO ELLIPTA) 100mcg-25mcg/puff  1 Puff Inhalation DAILY    clopidogrel (PLAVIX) tablet 75 mg  75 mg Oral DAILY    umeclidinium (INCRUSE ELLIPTA) 62.5 mcg/actuation  1 Puff Inhalation DAILY    PHENYLephrine (NEOSYNEPHRINE) 30,000 mcg in 0.9% sodium chloride 250 mL infusion   mcg/min IntraVENous TITRATE          Review of Systems:  A comprehensive review of systems was negative. Objective:   Vital Signs:    Visit Vitals    /77    Pulse (!) 106    Temp 97.4 °F (36.3 °C)    Resp 25    Ht 5' 6\" (1.676 m)    Wt 78.6 kg (173 lb 4.5 oz)    SpO2 98%    Breastfeeding No    BMI 27.97 kg/m2       O2 Device: Nasal cannula   O2 Flow Rate (L/min): 2 l/min   Temp (24hrs), Av.7 °F (36.5 °C), Min:97.4 °F (36.3 °C), Max:98.1 °F (36.7 °C)       Intake/Output:   Last shift:         Last 3 shifts:  1901 -  0700  In: 728.2 [P.O.:680; I.V.:48.2]  Out: 1200 [Urine:1200]    Intake/Output Summary (Last 24 hours) at 17 0754  Last data filed at 17 0640   Gross per 24 hour   Intake           728.17 ml   Output              950 ml   Net          -221.83 ml     Hemodynamics:   PAP:   CO:     Wedge:   CI:     CVP:  CVP (mmHg): 10 mmHg (17 0630) SVR:       PVR:       Physical Exam:    General:  Alert, cooperative, no distress, appears stated age. Head:  Normocephalic, without obvious abnormality, atraumatic. Eyes:  Conjunctivae/corneas clear. PERRL, EOMs intact. Nose: Nares normal. Septum midline. Mucosa normal. No drainage or sinus tenderness. Throat: Lips, mucosa, and tongue normal. Teeth and gums normal.   Neck: Supple, symmetrical, trachea midline, no adenopathy, thyroid: no enlargment/tenderness/nodules, no carotid bruit and no JVD. CVC on right neck. Back:   Symmetric, no curvature. ROM normal.   Lungs:   Clear to auscultation bilaterally. Chest wall:  No tenderness or deformity. Heart:  Regular rate and rhythm, S1, S2 normal, no murmur, click, rub or gallop. Abdomen:   Soft, non-tender. Bowel sounds normal. No masses,  No organomegaly. Extremities: Extremities normal, atraumatic, no cyanosis or edema. Pulses: 2+ and symmetric all extremities.    Skin: Skin color, texture, turgor normal. No rashes or lesions   Lymph nodes: Cervical, supraclavicular, and axillary nodes normal.   Neurologic: Grossly nonfocal       Data:     Recent Results (from the past 24 hour(s))   CULTURE, STOOL    Collection Time: 05/10/17  8:07 AM   Result Value Ref Range    Special Requests: NO SPECIAL REQUESTS      Campylobacter antigen NEGATIVE      Culture result: PENDING    WBC, STOOL    Collection Time: 05/10/17  8:07 AM   Result Value Ref Range    White blood cells, stool 0 TO 4 0 - 4 /HPF   C. DIFFICILE (DNA)    Collection Time: 05/10/17  8:07 AM   Result Value Ref Range    C. difficile (DNA) POSITIVE (A) NEG     EKG, 12 LEAD, SUBSEQUENT    Collection Time: 05/10/17  9:37 AM   Result Value Ref Range    Ventricular Rate 125 BPM    Atrial Rate 150 BPM    P-R Interval 208 ms    QRS Duration 100 ms    Q-T Interval 262 ms    QTC Calculation (Bezet) 378 ms    Calculated P Axis 103 degrees    Calculated R Axis 9 degrees    Calculated T Axis 107 degrees    Diagnosis       Sinus tachycardia with frequent ventricular-paced complexes  Nonspecific T wave abnormality      Confirmed by Shadia Alamo ([id number]) on 5/10/2017 12:24:08 PM     URINALYSIS W/MICROSCOPIC    Collection Time: 05/10/17 10:37 AM   Result Value Ref Range    Color YELLOW/STRAW      Appearance CLEAR CLEAR      Specific gravity 1.018 1.003 - 1.030      pH (UA) 5.0 5.0 - 8.0      Protein NEGATIVE  NEG mg/dL    Glucose NEGATIVE  NEG mg/dL    Ketone NEGATIVE  NEG mg/dL    Bilirubin NEGATIVE  NEG      Blood NEGATIVE  NEG      Urobilinogen 0.2 0.2 - 1.0 EU/dL    Nitrites NEGATIVE  NEG      Leukocyte Esterase NEGATIVE  NEG      WBC 0-4 0 - 4 /hpf    RBC 0-5 0 - 5 /hpf    Epithelial cells FEW FEW /lpf    Bacteria NEGATIVE  NEG /hpf    Hyaline cast 0-2 0 - 5 /lpf   METABOLIC PANEL, BASIC    Collection Time: 05/10/17 10:37 AM   Result Value Ref Range    Sodium 138 136 - 145 mmol/L    Potassium 3.9 3.5 - 5.1 mmol/L    Chloride 105 97 - 108 mmol/L    CO2 25 21 - 32 mmol/L    Anion gap 8 5 - 15 mmol/L    Glucose 178 (H) 65 - 100 mg/dL    BUN 10 6 - 20 MG/DL    Creatinine 0.83 0.55 - 1.02 MG/DL    BUN/Creatinine ratio 12 12 - 20      GFR est AA >60 >60 ml/min/1.73m2    GFR est non-AA >60 >60 ml/min/1.73m2    Calcium 8.3 (L) 8.5 - 10.1 MG/DL   CK-MB,QUANT.     Collection Time: 05/10/17 10:37 AM   Result Value Ref Range    CK - MB 1.8 <3.6 NG/ML    CK-MB Index 2.9 (H) 0 - 2.5     CK    Collection Time: 05/10/17 10:37 AM   Result Value Ref Range    CK 62 26 - 192 U/L   TROPONIN I    Collection Time: 05/10/17 10:37 AM   Result Value Ref Range    Troponin-I, Qt. 0.10 (H) <0.05 ng/mL   GLUCOSE, POC    Collection Time: 05/10/17 11:26 AM   Result Value Ref Range    Glucose (POC) 250 (H) 65 - 100 mg/dL    Performed by [employer]    ECG RHYTHM ANALYSIS ADULT    Collection Time: 05/10/17  4:05 PM   Result Value Ref Range    Ventricular Rate 133 BPM    Atrial Rate 147 BPM    QRS Duration 96 ms    Q-T Interval 328 ms    QTC Calculation (Bezet) 488 ms    Calculated R Axis 0 degrees    Calculated T Axis 114 degrees    Diagnosis       Demand pacemaker, interpretation is based on intrinsic rhythm  Atrial fibrillation with rapid ventricular response with premature   ventricular or aberrantly conducted complexes  Nonspecific T wave abnormality , probably digitalis effect  When compared with ECG of 10-MAY-2017 09:37,  Atrial fibrillation has replaced Electronic ventricular pacemaker     GLUCOSE, POC    Collection Time: 05/10/17  4:31 PM   Result Value Ref Range    Glucose (POC) 242 (H) 65 - 100 mg/dL    Performed by [employer]    TROPONIN I    Collection Time: 05/10/17  6:22 PM   Result Value Ref Range    Troponin-I, Qt. 0.09 (H) <0.05 ng/mL   GLUCOSE, POC    Collection Time: 05/10/17  9:25 PM   Result Value Ref Range    Glucose (POC) 316 (H) 65 - 100 mg/dL    Performed by [facility]    METABOLIC PANEL, BASIC    Collection Time: 05/11/17  3:38 AM   Result Value Ref Range    Sodium 139 136 - 145 mmol/L    Potassium 4.2 3.5 - 5.1 mmol/L    Chloride 108 97 - 108 mmol/L CO2 22 21 - 32 mmol/L    Anion gap 9 5 - 15 mmol/L    Glucose 252 (H) 65 - 100 mg/dL    BUN 11 6 - 20 MG/DL    Creatinine 1.03 (H) 0.55 - 1.02 MG/DL    BUN/Creatinine ratio 11 (L) 12 - 20      GFR est AA >60 >60 ml/min/1.73m2    GFR est non-AA 54 (L) >60 ml/min/1.73m2    Calcium 8.4 (L) 8.5 - 10.1 MG/DL   MAGNESIUM    Collection Time: 05/11/17  3:38 AM   Result Value Ref Range    Magnesium 2.2 1.6 - 2.4 mg/dL   PROTHROMBIN TIME + INR    Collection Time: 05/11/17  3:38 AM   Result Value Ref Range    INR 3.5 (H) 0.9 - 1.1      Prothrombin time 36.3 (H) 9.0 - 11.1 sec             Telemetry:normal sinus rhythm    Imaging:  I have personally reviewed the patients radiographs and have reviewed the reports:       Rachid Michel MD

## 2017-05-11 NOTE — PROGRESS NOTES
reported to Swapna Yao pt still c/o SOB with exertion and chest tightness with exertion.   Received orders for pt to stay in bed

## 2017-05-11 NOTE — PROGRESS NOTES
[address]  [phone]      Cardiology Progress Note      5/11/2017 9:00 AM    Admit Date: 5/9/2017    Admit Diagnosis:   Tachycardia    Subjective:     Tobi Brice still c/o SOB. Overnight developed Afib, started on amiodarone by Dr. Leonie Garcia for rate control as BP still requiring SHELDON. This AM remains in afib with RVR at 100-140s. Very anxious, tearful, which results with increased HR.   Positive for CDiff, started on PO vancomycin    Visit Vitals    /68    Pulse (!) 111    Temp 97.6 °F (36.4 °C)    Resp 22    Ht 5' 6\" (1.676 m)    Wt 78.6 kg (173 lb 4.5 oz)    SpO2 97%    Breastfeeding No    BMI 27.97 kg/m2       Current Facility-Administered Medications   Medication Dose Route Frequency    insulin lispro (HUMALOG) injection   SubCUTAneous Q6H    WARFARIN - Hold warfarin today  1 Each Other ONCE    hydrocortisone Sod Succ (PF) (SOLU-CORTEF) injection 50 mg  50 mg IntraVENous Q8H    And    insulin NPH (NOVOLIN N, HUMULIN N) injection 8 Units  8 Units SubCUTAneous Q8H    WARFARIN INFORMATION NOTE (COUMADIN)   Other QPM    gabapentin (NEURONTIN) capsule 800 mg  800 mg Oral TID    acetaminophen (TYLENOL) tablet 650 mg  650 mg Oral Q4H PRN    glucose chewable tablet 16 g  4 Tab Oral PRN    dextrose (D50W) injection syrg 12.5-25 g  12.5-25 g IntraVENous PRN    glucagon (GLUCAGEN) injection 1 mg  1 mg IntraMUSCular PRN    mupirocin (BACTROBAN) 2 % ointment   Topical Q12H    vancomycin 50 mg/mL oral solution (compounded) 125 mg  125 mg Oral Q6H    colchicine tablet 0.6 mg  0.6 mg Oral DAILY    amiodarone (CORDARONE) 450 mg in dextrose 5% 250 mL infusion  0.5 mg/min IntraVENous CONTINUOUS    sodium chloride (NS) flush 5-10 mL  5-10 mL IntraVENous Q8H    sodium chloride (NS) flush 5-10 mL  5-10 mL IntraVENous PRN    aspirin chewable tablet 81 mg  81 mg Oral DAILY    fluticasone-vilanterol (BREO ELLIPTA) 100mcg-25mcg/puff  1 Puff Inhalation DAILY    clopidogrel (PLAVIX) tablet 75 mg  75 mg Oral DAILY    umeclidinium (INCRUSE ELLIPTA) 62.5 mcg/actuation  1 Puff Inhalation DAILY    albuterol (PROVENTIL HFA, VENTOLIN HFA, PROAIR HFA) inhaler 2 Puff  2 Puff Inhalation Q6H PRN    PHENYLephrine (NEOSYNEPHRINE) 30,000 mcg in 0.9% sodium chloride 250 mL infusion   mcg/min IntraVENous TITRATE       Objective:      Physical Exam:  General Appearance:  WNWD AAF in no acute distress  Chest:   Clear  Cardiovascular:  irr, irr  no murmur.   Abdomen:   Soft, non-tender, bowel sounds are active.   Extremities: no peripheral edema  Skin:  Warm and dry.     Data Review:   Recent Labs      05/10/17   0454  05/09/17   1704   WBC  6.1  7.1   HGB  11.3*  13.1   HCT  34.3*  38.9   PLT  230  275     Recent Labs      05/11/17   0338  05/10/17   1037  05/10/17   0454  05/09/17   1722  05/09/17   1704   NA  139  138  140   --   138   K  4.2  3.9  4.0   --   3.6   CL  108  105  107   --   104   CO2  22  25  25   --   24   GLU  252*  178*  135*   --   97   BUN  11  10  11   --   13   CREA  1.03*  0.83  0.78   --   1.04*   CA  8.4*  8.3*  8.8   --   9.1   MG  2.2   --   2.2   --   1.7   PHOS   --    --   3.1   --    --    ALB   --    --    --    --   3.5   TBILI   --    --    --    --   0.5   SGOT   --    --    --    --   77*   ALT   --    --    --    --   60   INR  3.5*   --   2.1*  2.7*  3.0*       Recent Labs      05/10/17   1822  05/10/17   1037  05/09/17   1704   TROIQ  0.09*  0.10*  0.13*   CPK   --   62   --    CKMB   --   1.8   --          Intake/Output Summary (Last 24 hours) at 05/11/17 1057  Last data filed at 05/11/17 1032   Gross per 24 hour   Intake          1614.79 ml   Output             1650 ml   Net           -35.21 ml        Telemetry: afib with RVR      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 (6/2/2010)      Cardiac pacemaker in situ (7/5/2012)      Mixed hyperlipidemia (7/5/2012)      S/P coronary artery stent placement (7/4/2014)      Overview: 4/7/17 PCI/ROSALINO Diagonal      Type 2 diabetes mellitus with diabetic neuropathy, without long-term current use of insulin ([facility] [address] (1/31/2017)      S/P ablation of atrial fibrillation (5/2/2017)      Overview: 5/1/17      Tachycardia (5/9/2017)      Chronic systolic HF (heart failure) ([address] (5/10/2017)      Overview: 4/2017 EF 25-30%      History of Clostridium difficile infection (5/10/2017)      Overview: 4/2017 CDiff positive      Junctional tachycardia ([address] (5/10/2017)      Hypotension (5/10/2017)        Plan:     Arrhythmia/afib with RVR:  Admission ECG ST, repeat ECG with junctional tachycardia  Overnight developed afib with RVR (s/p PVI afib ablation on 5/1/17)  Receiving IV BB prn for rate control  Continuing on amiodarone but will plan for cardioversion this afternoon (not a good candidate for amio due to hx of elevated LFTs)  Etiology of arrhythmia possible r/t CDiff infection  Symptoms of SOB likely r/t tachyarrhythmia with low EF - supply/demand.  Her symptoms improved with slowing rate, but not tolerating AV victoria blocking meds with hypotension  INR supratherapeutic - holding tonight  Once cardioverted and BP stable, will start Dofetilide for management of rhythm     Chronic systolic and diastolic HF:  Overall stable not volume overloaded  PTA was on ARB, diuretic, BB (sotalol - which is not recommended for low EFs), now held due to hypotension  Newly diagnosed with systolic HF 1/1/83, repeat echo yesterday with continuing low EF  S/p PCI 4/7/17 had hoped for improvement in EF, but remains <35%  AICD candidate in July 2017 if EF remains <35%  Lifevest at discharge  Monitor I/Os, daily weights, labs - states her weight is up 3#s - ??accuracy, will follow     CAD:  S/p PCI 4/5/17, will require Plavix for at least 1 year  ASA as patient 1 month post PCI and on \"triple therapy\" meds (Plavix, ASA, coumadin). D/Cing ASA decreases risk of bleeds, and has similar outcomes with Plavix and AC post PCI  Holding Lipitor with mildly elevated LFTs. On Repatha for management  BB on hold, once able to restart, consider Coreg/Toprol XL with HF dx          [facility]    5/11/2017         Agree with note as outlined by  NP. I confirm findings in history and physical exam. No additional findings noted. Agree with plan as outlined above.      Celeste Roy MD

## 2017-05-11 NOTE — DIABETES MGMT
DTC Progress Note    Recommendations/ Comments: Pt discussed with rounding team and Dr. Han Hinton. Plan to begin NPH 8units Q8hrs timed with steroids and change correctional insulin to Q6hrs. Chart reviewed on Jasmyn Pires during Multidisciplinary Rounds. A1c:   Lab Results   Component Value Date/Time    Hemoglobin A1c 6.9 10/31/2016 12:00 AM           Recent Glucose Results: Lab Results   Component Value Date/Time     (H) 05/11/2017 03:38 AM     (H) 05/10/2017 10:37 AM    GLUCPOC 188 (H) 05/11/2017 08:33 AM    GLUCPOC 316 (H) 05/10/2017 09:25 PM    GLUCPOC 242 (H) 05/10/2017 04:31 PM        Lab Results   Component Value Date/Time    Creatinine 1.03 05/11/2017 03:38 AM       Active Orders   Diet    DIET NPO        PO intake: Patient Vitals for the past 72 hrs:   % Diet Eaten   05/10/17 1829 100 %   05/10/17 1329 75 %   05/10/17 1046 75 %       Will continue to follow as needed. Thank you.   EVETTE Parker, RN, [address]

## 2017-05-11 NOTE — PROGRESS NOTES
Attended Interdisciplinary rounds in Critical Care Unit, where patient care was discussed. Visit by: Melina Escamilla. Bhavesh Bee.  Annie Hong MA, [address]

## 2017-05-12 LAB
ALP SERPL-CCNC: 108 U/L (ref 45–117)
ALT SERPL-CCNC: 42 U/L (ref 12–78)
ANION GAP BLD CALC-SCNC: 10 MMOL/L (ref 5–15)
AST SERPL W P-5'-P-CCNC: 35 U/L (ref 15–37)
BACTERIA SPEC CULT: NORMAL
BILIRUB SERPL-MCNC: 0.5 MG/DL (ref 0.2–1)
BUN SERPL-MCNC: 10 MG/DL (ref 6–20)
BUN/CREAT SERPL: 10 (ref 12–20)
C JEJUNI+C COLI AG STL QL: NEGATIVE
CALCIUM SERPL-MCNC: 8.8 MG/DL (ref 8.5–10.1)
CHLORIDE SERPL-SCNC: 109 MMOL/L (ref 97–108)
CO2 SERPL-SCNC: 23 MMOL/L (ref 21–32)
CREAT SERPL-MCNC: 0.98 MG/DL (ref 0.55–1.02)
E COLI SXT1+2 STL IA: NEGATIVE
ERYTHROCYTE [DISTWIDTH] IN BLOOD BY AUTOMATED COUNT: 14.8 % (ref 11.5–14.5)
GLUCOSE BLD STRIP.AUTO-MCNC: 143 MG/DL (ref 65–100)
GLUCOSE BLD STRIP.AUTO-MCNC: 147 MG/DL (ref 65–100)
GLUCOSE BLD STRIP.AUTO-MCNC: 175 MG/DL (ref 65–100)
GLUCOSE BLD STRIP.AUTO-MCNC: 175 MG/DL (ref 65–100)
GLUCOSE BLD STRIP.AUTO-MCNC: 181 MG/DL (ref 65–100)
GLUCOSE BLD STRIP.AUTO-MCNC: 237 MG/DL (ref 65–100)
GLUCOSE SERPL-MCNC: 185 MG/DL (ref 65–100)
HCT VFR BLD AUTO: 33.5 % (ref 35–47)
HGB BLD-MCNC: 10.9 G/DL (ref 11.5–16)
INR PPP: 3.3 (ref 0.9–1.1)
MAGNESIUM SERPL-MCNC: 2.3 MG/DL (ref 1.6–2.4)
MCH RBC QN AUTO: 27.9 PG (ref 26–34)
MCHC RBC AUTO-ENTMCNC: 32.5 G/DL (ref 30–36.5)
MCV RBC AUTO: 85.9 FL (ref 80–99)
O+P SPEC MICRO: NORMAL
O+P STL CONC: NORMAL
PLATELET # BLD AUTO: 223 K/UL (ref 150–400)
POTASSIUM SERPL-SCNC: 4.1 MMOL/L (ref 3.5–5.1)
PROTHROMBIN TIME: 34.1 SEC (ref 9–11.1)
RBC # BLD AUTO: 3.9 M/UL (ref 3.8–5.2)
SERVICE CMNT-IMP: ABNORMAL
SERVICE CMNT-IMP: NORMAL
SODIUM SERPL-SCNC: 142 MMOL/L (ref 136–145)
SPECIMEN SOURCE: NORMAL
WBC # BLD AUTO: 11 K/UL (ref 3.6–11)

## 2017-05-12 PROCEDURE — 3331090001 HH PPS REVENUE CREDIT

## 2017-05-12 PROCEDURE — 84075 ASSAY ALKALINE PHOSPHATASE: CPT | Performed by: INTERNAL MEDICINE

## 2017-05-12 PROCEDURE — 74011250637 HC RX REV CODE- 250/637: Performed by: EMERGENCY MEDICINE

## 2017-05-12 PROCEDURE — 74011250636 HC RX REV CODE- 250/636: Performed by: INTERNAL MEDICINE

## 2017-05-12 PROCEDURE — 74011250637 HC RX REV CODE- 250/637: Performed by: INTERNAL MEDICINE

## 2017-05-12 PROCEDURE — 74011636637 HC RX REV CODE- 636/637: Performed by: INTERNAL MEDICINE

## 2017-05-12 PROCEDURE — 84460 ALANINE AMINO (ALT) (SGPT): CPT | Performed by: INTERNAL MEDICINE

## 2017-05-12 PROCEDURE — 5A2204Z RESTORATION OF CARDIAC RHYTHM, SINGLE: ICD-10-PCS | Performed by: INTERNAL MEDICINE

## 2017-05-12 PROCEDURE — 82247 BILIRUBIN TOTAL: CPT | Performed by: INTERNAL MEDICINE

## 2017-05-12 PROCEDURE — 82962 GLUCOSE BLOOD TEST: CPT

## 2017-05-12 PROCEDURE — 65660000000 HC RM CCU STEPDOWN

## 2017-05-12 PROCEDURE — 74011250637 HC RX REV CODE- 250/637: Performed by: FAMILY MEDICINE

## 2017-05-12 PROCEDURE — 3331090002 HH PPS REVENUE DEBIT

## 2017-05-12 PROCEDURE — 83735 ASSAY OF MAGNESIUM: CPT | Performed by: FAMILY MEDICINE

## 2017-05-12 PROCEDURE — 84450 TRANSFERASE (AST) (SGOT): CPT | Performed by: INTERNAL MEDICINE

## 2017-05-12 PROCEDURE — 85610 PROTHROMBIN TIME: CPT | Performed by: FAMILY MEDICINE

## 2017-05-12 PROCEDURE — 80048 BASIC METABOLIC PNL TOTAL CA: CPT | Performed by: FAMILY MEDICINE

## 2017-05-12 PROCEDURE — 36415 COLL VENOUS BLD VENIPUNCTURE: CPT | Performed by: FAMILY MEDICINE

## 2017-05-12 PROCEDURE — 85027 COMPLETE CBC AUTOMATED: CPT | Performed by: FAMILY MEDICINE

## 2017-05-12 RX ORDER — AMIODARONE HYDROCHLORIDE 200 MG/1
200 TABLET ORAL 2 TIMES DAILY
Status: DISCONTINUED | OUTPATIENT
Start: 2017-05-12 | End: 2017-05-22 | Stop reason: HOSPADM

## 2017-05-12 RX ORDER — DEXTROSE 50 % IN WATER (D50W) INTRAVENOUS SYRINGE
12.5-25 AS NEEDED
Status: DISCONTINUED | OUTPATIENT
Start: 2017-05-12 | End: 2017-05-13

## 2017-05-12 RX ORDER — COLCHICINE 0.6 MG/1
0.6 TABLET ORAL ONCE
Status: COMPLETED | OUTPATIENT
Start: 2017-05-12 | End: 2017-05-12

## 2017-05-12 RX ORDER — COLCHICINE 0.6 MG/1
1.2 TABLET ORAL DAILY
Status: DISCONTINUED | OUTPATIENT
Start: 2017-05-12 | End: 2017-05-12

## 2017-05-12 RX ORDER — COLCHICINE 0.6 MG/1
0.6 TABLET ORAL DAILY
Status: DISCONTINUED | OUTPATIENT
Start: 2017-05-13 | End: 2017-05-12

## 2017-05-12 RX ORDER — COLCHICINE 0.6 MG/1
0.6 TABLET ORAL DAILY
Status: DISCONTINUED | OUTPATIENT
Start: 2017-05-12 | End: 2017-05-22 | Stop reason: HOSPADM

## 2017-05-12 RX ORDER — FUROSEMIDE 10 MG/ML
20 INJECTION INTRAMUSCULAR; INTRAVENOUS ONCE
Status: COMPLETED | OUTPATIENT
Start: 2017-05-12 | End: 2017-05-12

## 2017-05-12 RX ADMIN — AMIODARONE HYDROCHLORIDE 200 MG: 200 TABLET ORAL at 18:15

## 2017-05-12 RX ADMIN — VANCOMYCIN HYDROCHLORIDE 125 MG: 1 INJECTION, POWDER, LYOPHILIZED, FOR SOLUTION INTRAVENOUS at 00:57

## 2017-05-12 RX ADMIN — INSULIN LISPRO 2 UNITS: 100 INJECTION, SOLUTION INTRAVENOUS; SUBCUTANEOUS at 01:01

## 2017-05-12 RX ADMIN — VANCOMYCIN HYDROCHLORIDE 125 MG: 1 INJECTION, POWDER, LYOPHILIZED, FOR SOLUTION INTRAVENOUS at 18:52

## 2017-05-12 RX ADMIN — VANCOMYCIN HYDROCHLORIDE 125 MG: 1 INJECTION, POWDER, LYOPHILIZED, FOR SOLUTION INTRAVENOUS at 23:23

## 2017-05-12 RX ADMIN — MUPIROCIN: 20 OINTMENT TOPICAL at 08:27

## 2017-05-12 RX ADMIN — AMIODARONE HYDROCHLORIDE 200 MG: 200 TABLET ORAL at 08:48

## 2017-05-12 RX ADMIN — INSULIN LISPRO 2 UNITS: 100 INJECTION, SOLUTION INTRAVENOUS; SUBCUTANEOUS at 18:15

## 2017-05-12 RX ADMIN — Medication 10 ML: at 21:39

## 2017-05-12 RX ADMIN — VANCOMYCIN HYDROCHLORIDE 125 MG: 1 INJECTION, POWDER, LYOPHILIZED, FOR SOLUTION INTRAVENOUS at 12:26

## 2017-05-12 RX ADMIN — INSULIN HUMAN 8 UNITS: 100 INJECTION, SUSPENSION SUBCUTANEOUS at 06:19

## 2017-05-12 RX ADMIN — CLOPIDOGREL BISULFATE 75 MG: 75 TABLET ORAL at 08:27

## 2017-05-12 RX ADMIN — VANCOMYCIN HYDROCHLORIDE 125 MG: 1 INJECTION, POWDER, LYOPHILIZED, FOR SOLUTION INTRAVENOUS at 07:05

## 2017-05-12 RX ADMIN — COLCHICINE 0.6 MG: 0.6 TABLET, FILM COATED ORAL at 01:08

## 2017-05-12 RX ADMIN — HYDROCORTISONE SODIUM SUCCINATE 50 MG: 100 INJECTION, POWDER, FOR SOLUTION INTRAMUSCULAR; INTRAVENOUS at 06:18

## 2017-05-12 RX ADMIN — MUPIROCIN: 20 OINTMENT TOPICAL at 21:39

## 2017-05-12 RX ADMIN — Medication 10 ML: at 06:19

## 2017-05-12 RX ADMIN — GABAPENTIN 800 MG: 100 CAPSULE ORAL at 18:14

## 2017-05-12 RX ADMIN — COLCHICINE 0.6 MG: 0.6 TABLET, FILM COATED ORAL at 12:26

## 2017-05-12 RX ADMIN — FUROSEMIDE 20 MG: 10 INJECTION, SOLUTION INTRAMUSCULAR; INTRAVENOUS at 09:57

## 2017-05-12 RX ADMIN — GABAPENTIN 800 MG: 100 CAPSULE ORAL at 21:38

## 2017-05-12 RX ADMIN — Medication 10 ML: at 18:52

## 2017-05-12 RX ADMIN — ACETAMINOPHEN 650 MG: 325 TABLET, FILM COATED ORAL at 06:18

## 2017-05-12 RX ADMIN — INSULIN LISPRO 2 UNITS: 100 INJECTION, SOLUTION INTRAVENOUS; SUBCUTANEOUS at 06:59

## 2017-05-12 RX ADMIN — GABAPENTIN 800 MG: 100 CAPSULE ORAL at 08:27

## 2017-05-12 RX ADMIN — INSULIN LISPRO 2 UNITS: 100 INJECTION, SOLUTION INTRAVENOUS; SUBCUTANEOUS at 12:26

## 2017-05-12 RX ADMIN — Medication 60 ML: at 14:10

## 2017-05-12 RX ADMIN — FLUTICASONE FUROATE AND VILANTEROL TRIFENATATE 1 PUFF: 100; 25 POWDER RESPIRATORY (INHALATION) at 08:28

## 2017-05-12 RX ADMIN — ALBUTEROL SULFATE 2 PUFF: 90 AEROSOL, METERED RESPIRATORY (INHALATION) at 21:38

## 2017-05-12 RX ADMIN — UMECLIDINIUM 1 PUFF: 62.5 AEROSOL, POWDER ORAL at 08:28

## 2017-05-12 NOTE — PROGRESS NOTES
Bedside and Verbal shift change report given to Adarsh Archuleta RN (oncoming nurse) by Manuel Villarreal Rn (offgoing nurse). Report included the following information SBAR, Kardex, Procedure Summary, Intake/Output, MAR, Recent Results, Med Rec Status and Cardiac Rhythm NSR.

## 2017-05-12 NOTE — PROGRESS NOTES
Pharmacy Dosing of Warfarin     Indication for Warfarin: AFib     Goal INR: 2-3     Warfarin PTA Regimen: 7.5 mg po daily     Concurrent anticoagulants & Platelet Inhibitors: Plavix  (ASA 81mg discontinued )    Major Interacting Medications:   Amiodarone started 5/10     Inpatient Warfarin Doses:    Date           INR        Warfarin Dose  17         2.7         7.5 mg  5/10/17       2.1         7.5 mg   17       3.5          Hold   Estimated Creatinine Clearance: 61.1 mL/min (based on Cr of 0.98). Estimated Creatinine Clearance (using IBW): 53.6 mL/min  Recent Labs      17   0616  17   0614  17   0338  05/10/17   1037  05/10/17   0454  17   1722  17   1704   CREA  0.98   --   1.03*  0.83  0.78   --   1.04*   BUN  10   --   11  10  11   --   13   WBC  11.0   --    --    --   6.1   --   7.1   NA  142   --   139  138  140   --   138   K  4.1   --   4.2  3.9  4.0   --   3.6   MG  2.3   --   2.2   --   2.2   --   1.7   CA  8.8   --   8.4*  8.3*  8.8   --   9.1   PHOS   --    --    --    --   3.1   --    --    ALB   --    --    --    --    --    --   3.5   HGB  10.9*   --    --    --   11.3*   --   13.1   HCT  33.5*   --    --    --   34.3*   --   38.9   PLT  223   --    --    --   230   --   275   INR  3.3*   --   3.5*   --   2.1*  2.7*  3.0*   ALT   --   42   --    --    --    --   60     Temp (24hrs), Av.7 °F (36.5 °C), Min:97.4 °F (36.3 °C), Max:98.2 °F (36.8 °C)    Warfarin sensitivity:  high (Amiodarone)    Impression/Plan:   INR appears to have reach a plateau from rise due to new Amiodarone initiation. Hold Warfarin today. Considering PTA Warfarin regimen of 7.5mg PO Daily, will consider re-evaluation  with 25-30% dose reduction once INR projected to be 3.0. Pharmacy will follow daily and adjust the dose as appropriate.   Thank you,  Marciano Tillman, [facility]

## 2017-05-12 NOTE — PROGRESS NOTES
[address] SHIFT NURSING NOTE    Bedside shift change report given to Anselmo Ty  (oncoming nurse) by [place]  (offgoing nurse). Report included the following information SBAR. SHIFT SUMMARY: patient resting this afternoon. No complaints of pain. Up to bsc with 1 bowel movement. Admission Date 5/9/2017   Admission Diagnosis Tachycardia   Consults IP CONSULT TO HOSPITALIST  IP CONSULT TO CARDIOLOGY  IP CONSULT TO PULMONOLOGY        Consults   [] PT   [] OT   [] Speech   [] Palliative      [] Hospice    [] Case Management   [] None   Cardiac Monitoring   [] Yes   [] No     Antibiotics   [] Yes   [] No   GI Prophylaxis  (Ex: Protonix, Pepcid, etc,.)   [] Yes   [] No          DVT Prophylaxis   SCDs:             Mayo stockings:         [] Medication (Ex: Lovenox, Eliquis, Brilinta, Coumadin,  Heparin, etc..)   [] Contraindicated   [] No VTE needed       Urinary Catheter             LDAs           Triple Lumen Central line 05/10/17 Right Neck (Active)   Central Line Being Utilized Yes 5/12/2017  6:17 PM   Criteria for Appropriate Use Hemodynamically unstable, requiring monitoring lines, vasopressors, or volume resuscitation 5/12/2017  6:17 PM   Site Assessment Clean;Dry 5/12/2017  6:17 PM   Infiltration Assessment 0 5/12/2017 10:29 AM   Affected Extremity/Extremities Color distal to insertion site pink (or appropriate for race) 5/12/2017 10:29 AM   Date of Last Dressing Change 05/09/17 5/12/2017  7:00 AM   Dressing Status Clean, dry, & intact 5/12/2017 10:29 AM   Dressing Type Disk with Chlorhexadine gluconate (CHG); Transparent 5/12/2017 10:29 AM   Action Taken Open ports on tubing capped 5/12/2017 10:29 AM   Proximal Hub Color/Line Status Brown 5/12/2017  6:55 PM   Positive Blood Return (Medial Site) Yes 5/12/2017  6:55 PM   Medial Hub Color/Line Status Blue 5/12/2017  6:55 PM   Positive Blood Return (Lateral Site) Yes 5/12/2017  6:55 PM   Distal Hub Color/Line Status White 5/12/2017  6:55 PM   Positive Blood Return (Site #3) Yes 5/12/2017  6:55 PM   Alcohol Cap Used Yes 5/12/2017 10:29 AM                          I/Os   Intake/Output Summary (Last 24 hours) at 05/1951  Last data filed at 05/12/17 1841   Gross per 24 hour   Intake           815.14 ml   Output             2650 ml   Net         -1834.86 ml         Activity Level Activity Level: Head of bed elevated (degrees)     Activity Assistance: Partial (one person)   Diet Active Orders   Diet    DIET CARDIAC Regular      Purposeful Rounding every 1-2 hour? [] Yes    Anthony Score  Total Score: 3   Bed Alarm (If score 3 or >)   [] Yes    [] Refused (See signed refusal form in chart)   Landon Score  Landon Score: 20       Landon Score (if score 14 or less)   [] PMT consult   [] Nutrition consult   [] Wound Care consult      []  Specialty bed         Influenza Vaccine Received Flu Vaccine for Current Season (usually Sept-March): Not Flu Season               Needs prior to discharge:   Home O2 required:    [] Yes   [] No     If yes, how much O2 required?     Other:    Last Bowel Movement Date: 05/10/17   Readmission Risk Assessment Tool Score High Risk            29       Total Score        3 Relationship with PCP    4 More than 1 Admission in calendar year    5 Patient Insurance is Medicare, Medicaid or Self Pay    17 Charlson Comorbidity Score        Criteria that do not apply:    Patient Living Status    Patient Length of Stay > 5       Expected Length of Stay 2d 14h   Actual Length of Stay 3

## 2017-05-12 NOTE — CARDIO/PULMONARY
Cardiopulmonary Rehab Nursing Entry:    Chart Reviewed. Pt admitted with Tachycardia and developed A fib overnight. Plan for Life Vest at Discharge. Echo from 5/9/17 LV Ef 25-30%.     PMH significant for:  -Chronic Systolic/Diastolic HF   -CAD  -COPD  -HTN Pt is a former smoker.      Pt received teaching last on 5/2/17. Pt transferred out from CCU, Pt denies knowledge of plans for Live Vest. Stated that she was told she had a bad valve and needs surgical evaluation. Pt has folder of cardiac teaching materials at bedside. Provided additional hand-outs on \"Guide to Understanding Heart Failure\". Pt able to report changing her diet to incorporate fresh fruits & vegetables, avoiding salt on food, states she has had [place]EN[place] since March and has been back & forth from doctors office and hospital.  Plans to return to work and caring for her brother. She quit smoking in 2009. Will continue to follow pt during this admission and teach as appropriate pending final cardiac treatment plan.

## 2017-05-12 NOTE — PROGRESS NOTES
[facility]  Pulmonary, Critical Care, and Sleep Medicine    Name: Terrance Del Valle MRN: [id number]   : 1950 Hospital: [facility]   Date: 2017          IMPRESSION:   · Systolic Heart Failure, s/p ablation, LVEF of 25%  · CVP of 14. · C Diff. · Has localized neck pain at site of CVC. · Hypotension was started on Antonio? · Noted to have diarrhea over night undergoing eval for C diff. · Dyspnea  · Sarcoid with ILD-pt does not remember having this per her report. Noted since . · Tachy with HR in the 150s  · On coumadin  · DM  · Critically ill, moderate risk of decompensation. 35 min CC, EOP.       RECOMMENDATIONS:   · Wean O2  · Off Antonio   · Cardioverted to NSR  · Oral vanco for CDiff  · Change amiodarone to po  · DC steroids  · Tylenol prn  · Monitor renal fx, improved  · Mobilize   · Transfer to tele today     Subjective/History:     No acute events overnight  No distress      Current Facility-Administered Medications   Medication Dose Route Frequency    colchicine tablet 1.2 mg  1.2 mg Oral DAILY    insulin lispro (HUMALOG) injection   SubCUTAneous Q6H    hydrocortisone Sod Succ (PF) (SOLU-CORTEF) injection 50 mg  50 mg IntraVENous Q8H    And    insulin NPH (NOVOLIN N, HUMULIN N) injection 8 Units  8 Units SubCUTAneous Q8H    WARFARIN INFORMATION NOTE (COUMADIN)   Other QPM    gabapentin (NEURONTIN) capsule 800 mg  800 mg Oral TID    mupirocin (BACTROBAN) 2 % ointment   Topical Q12H    vancomycin 50 mg/mL oral solution (compounded) 125 mg  125 mg Oral Q6H    amiodarone (CORDARONE) 450 mg in dextrose 5% 250 mL infusion  0.5 mg/min IntraVENous CONTINUOUS    sodium chloride (NS) flush 5-10 mL  5-10 mL IntraVENous Q8H    fluticasone-vilanterol (BREO ELLIPTA) 100mcg-25mcg/puff  1 Puff Inhalation DAILY    clopidogrel (PLAVIX) tablet 75 mg  75 mg Oral DAILY    umeclidinium (INCRUSE ELLIPTA) 62.5 mcg/actuation  1 Puff Inhalation DAILY    PHENYLephrine (NEOSYNEPHRINE) 30,000 mcg in 0.9% sodium chloride 250 mL infusion   mcg/min IntraVENous TITRATE          Review of Systems:  A comprehensive review of systems was negative. Objective:   Vital Signs:    Visit Vitals    BP (!) 116/95 (BP 1 Location: Left arm, BP Patient Position: At rest)    Pulse 79    Temp 98 °F (36.7 °C)    Resp 19    Ht 5' 6\" (1.676 m)    Wt 80.1 kg (176 lb 9.4 oz)    SpO2 99%    Breastfeeding No    BMI 28.5 kg/m2       O2 Device: Nasal cannula   O2 Flow Rate (L/min): 2 l/min   Temp (24hrs), Av.8 °F (36.6 °C), Min:97.5 °F (36.4 °C), Max:98.2 °F (36.8 °C)       Intake/Output:   Last shift:         Last 3 shifts: 05/10 190 -  07  In: 1959.9 [P.O.:560; I.V.:1399.9]  Out: 1700 [Urine:1700]    Intake/Output Summary (Last 24 hours) at 17 0806  Last data filed at 17 0700   Gross per 24 hour   Intake          1071. 95 ml   Output             1200 ml   Net          -128.05 ml     Hemodynamics:   PAP:   CO:     Wedge:   CI:     CVP:  CVP (mmHg): 14 mmHg (17 0700) SVR:       PVR:       Physical Exam:    General:  Alert, cooperative, no distress, appears stated age. Head:  Normocephalic, without obvious abnormality, atraumatic. Eyes:  Conjunctivae/corneas clear. PERRL, EOMs intact. Nose: Nares normal. Septum midline. Mucosa normal. No drainage or sinus tenderness. Throat: Lips, mucosa, and tongue normal. Teeth and gums normal.   Neck: Supple, symmetrical, trachea midline, no adenopathy, thyroid: no enlargment/tenderness/nodules, no carotid bruit and no JVD. CVC on right neck. Back:   Symmetric, no curvature. ROM normal.   Lungs:   Clear to auscultation bilaterally. Chest wall:  No tenderness or deformity. Heart:  Regular rate and rhythm, S1, S2 normal, no murmur, click, rub or gallop. Abdomen:   Soft, non-tender. Bowel sounds normal. No masses,  No organomegaly. Extremities: Extremities normal, atraumatic, no cyanosis or edema. Pulses: 2+ and symmetric all extremities.    Skin: Skin color, texture, turgor normal. No rashes or lesions   Lymph nodes: Cervical, supraclavicular, and axillary nodes normal.   Neurologic: Grossly nonfocal       Data:     Recent Results (from the past 24 hour(s))   GLUCOSE, POC    Collection Time: 05/11/17  8:33 AM   Result Value Ref Range    Glucose (POC) 188 (H) 65 - 100 mg/dL    Performed by Shanta Bsailio    GLUCOSE, POC    Collection Time: 05/11/17 12:50 PM   Result Value Ref Range    Glucose (POC) 175 (H) 65 - 100 mg/dL    Performed by Shanta VogelIMANINbassam    GLUCOSE, POC    Collection Time: 05/11/17  6:05 PM   Result Value Ref Range    Glucose (POC) 256 (H) 65 - 100 mg/dL    Performed by Shanta Basilio    BNP    Collection Time: 05/11/17  7:25 PM   Result Value Ref Range     (H) 0 - 100 pg/mL   GLUCOSE, POC    Collection Time: 05/12/17 12:59 AM   Result Value Ref Range    Glucose (POC) 237 (H) 65 - 100 mg/dL    Performed by Rakesh Garcia    METABOLIC PANEL, BASIC    Collection Time: 05/12/17  6:16 AM   Result Value Ref Range    Sodium 142 136 - 145 mmol/L    Potassium 4.1 3.5 - 5.1 mmol/L    Chloride 109 (H) 97 - 108 mmol/L    CO2 23 21 - 32 mmol/L    Anion gap 10 5 - 15 mmol/L    Glucose 185 (H) 65 - 100 mg/dL    BUN 10 6 - 20 MG/DL    Creatinine 0.98 0.55 - 1.02 MG/DL    BUN/Creatinine ratio 10 (L) 12 - 20      GFR est AA >60 >60 ml/min/1.73m2    GFR est non-AA 57 (L) >60 ml/min/1.73m2    Calcium 8.8 8.5 - 10.1 MG/DL   MAGNESIUM    Collection Time: 05/12/17  6:16 AM   Result Value Ref Range    Magnesium 2.3 1.6 - 2.4 mg/dL   PROTHROMBIN TIME + INR    Collection Time: 05/12/17  6:16 AM   Result Value Ref Range    INR 3.3 (H) 0.9 - 1.1      Prothrombin time 34.1 (H) 9.0 - 11.1 sec   CBC W/O DIFF    Collection Time: 05/12/17  6:16 AM   Result Value Ref Range    WBC 11.0 3.6 - 11.0 K/uL    RBC 3.90 3.80 - 5.20 M/uL    HGB 10.9 (L) 11.5 - 16.0 g/dL    HCT 33.5 (L) 35.0 - 47.0 %    MCV 85.9 80.0 - 99.0 FL    MCH 27.9 26.0 - 34.0 PG    MCHC 32.5 30.0 - 36.5 g/dL    RDW 14.8 (H) 11.5 - 14.5 %    PLATELET 572 385 - 794 K/uL   GLUCOSE, POC    Collection Time: 05/12/17  6:57 AM   Result Value Ref Range    Glucose (POC) 175 (H) 65 - 100 mg/dL    Performed by Miriam Kimball              Telemetry:normal sinus rhythm    Imaging:  I have personally reviewed the patients radiographs and have reviewed the reports:       Jaron Kelly MD

## 2017-05-12 NOTE — PROCEDURES
[address]   [address] [facility]  [address]       Name:  Stepan Portillo   MR#:  [id number]   :  1950   Account #:  [de-identified]        Date of Adm:  2017       INDICATION: Atrial fibrillation. DESCRIPTION OF PROCEDURE: After obtaining informed consent,   the procedure was performed in the intensive care unit. The patient   recently had a transesophageal echocardiogram with no evidence of   intracardiac thrombus. She has been on anticoagulation. The patient   was sedated with 2 mg of IV Versed, 25 mcg of IV fentanyl. Synchronized, biphasic electricity using 360 joules x1 was utilized, with   which the patient converted to sinus rhythm. Her heart rate, blood   pressure, and oxygen saturations were constantly monitored during the   procedure. ESTIMATED BLOOD LOSS: None. SPECIMENS REMOVED: None. COMPLICATIONS: None.         MD Victor M Cardona / Izabel Mayo   D:  2017   17:20   T:  2017   00:23   Job #:  [id number]

## 2017-05-12 NOTE — PROGRESS NOTES
Bedside and Verbal shift change report given to  (oncoming nurse) by Ann Melendez (offgoing nurse). Report included the following information SBAR, Kardex, Intake/Output, MAR and Recent Results.

## 2017-05-12 NOTE — ROUTINE PROCESS
[address] [facility] [facility]    Fax: [phone]        To Whom may it concern      This letter is to certify that  Piyush Cabrera   was admitted under our care on 5/9/2017 and still under our care as of 5/12/2017                     Piyush Cabrera  may not be able to go back to work until she is permitted by her outpatient physician (s). If you have any questions, please do not hesitate to contact me.         Dr. Nilo Van  [phone]

## 2017-05-12 NOTE — PROGRESS NOTES
Cardiology Progress Note      5/12/2017 9:15 AM    Admit Date: 5/9/2017    Admit Diagnosis: Tachycardia      Subjective:     Hong Wagner c/o chest tightness with activity.     Visit Vitals    /49    Pulse 79    Temp 98 °F (36.7 °C)    Resp 24    Ht 5' 6\" (1.676 m)    Wt 176 lb 9.4 oz (80.1 kg)    SpO2 98%    Breastfeeding No    BMI 28.5 kg/m2       Current Facility-Administered Medications   Medication Dose Route Frequency    colchicine tablet 1.2 mg  1.2 mg Oral DAILY    amiodarone (CORDARONE) tablet 200 mg  200 mg Oral BID    furosemide (LASIX) injection 20 mg  20 mg IntraVENous ONCE    insulin lispro (HUMALOG) injection   SubCUTAneous Q6H    WARFARIN INFORMATION NOTE (COUMADIN)   Other QPM    gabapentin (NEURONTIN) capsule 800 mg  800 mg Oral TID    acetaminophen (TYLENOL) tablet 650 mg  650 mg Oral Q4H PRN    glucose chewable tablet 16 g  4 Tab Oral PRN    dextrose (D50W) injection syrg 12.5-25 g  12.5-25 g IntraVENous PRN    glucagon (GLUCAGEN) injection 1 mg  1 mg IntraMUSCular PRN    mupirocin (BACTROBAN) 2 % ointment   Topical Q12H    vancomycin 50 mg/mL oral solution (compounded) 125 mg  125 mg Oral Q6H    sodium chloride (NS) flush 5-10 mL  5-10 mL IntraVENous Q8H    sodium chloride (NS) flush 5-10 mL  5-10 mL IntraVENous PRN    fluticasone-vilanterol (BREO ELLIPTA) 100mcg-25mcg/puff  1 Puff Inhalation DAILY    clopidogrel (PLAVIX) tablet 75 mg  75 mg Oral DAILY    umeclidinium (INCRUSE ELLIPTA) 62.5 mcg/actuation  1 Puff Inhalation DAILY    albuterol (PROVENTIL HFA, VENTOLIN HFA, PROAIR HFA) inhaler 2 Puff  2 Puff Inhalation Q6H PRN         Objective:      Physical Exam:  Visit Vitals    /49    Pulse 79    Temp 98 °F (36.7 °C)    Resp 24    Ht 5' 6\" (1.676 m)    Wt 176 lb 9.4 oz (80.1 kg)    SpO2 98%    Breastfeeding No    BMI 28.5 kg/m2     General Appearance:  Well developed, well nourished,alert and oriented x 3, and individual in no acute distress. Ears/Nose/Mouth/Throat:   Hearing grossly normal.         Neck: Supple. Chest:   Lungs clear to auscultation bilaterally, diminished BS. Cardiovascular:  Regular rate and rhythm, S1, S2 normal, no murmur. Abdomen:   Soft, non-tender, bowel sounds are active. Extremities: No edema bilaterally. Skin: Warm and dry.                Data Review:   Labs:    Recent Results (from the past 24 hour(s))   GLUCOSE, POC    Collection Time: 05/11/17 12:50 PM   Result Value Ref Range    Glucose (POC) 175 (H) 65 - 100 mg/dL    Performed by Lars Brunner    GLUCOSE, POC    Collection Time: 05/11/17  6:05 PM   Result Value Ref Range    Glucose (POC) 256 (H) 65 - 100 mg/dL    Performed by Lars Brunner    BNP    Collection Time: 05/11/17  7:25 PM   Result Value Ref Range     (H) 0 - 100 pg/mL   GLUCOSE, POC    Collection Time: 05/12/17 12:59 AM   Result Value Ref Range    Glucose (POC) 237 (H) 65 - 100 mg/dL    Performed by [facility]    METABOLIC PANEL, BASIC    Collection Time: 05/12/17  6:16 AM   Result Value Ref Range    Sodium 142 136 - 145 mmol/L    Potassium 4.1 3.5 - 5.1 mmol/L    Chloride 109 (H) 97 - 108 mmol/L    CO2 23 21 - 32 mmol/L    Anion gap 10 5 - 15 mmol/L    Glucose 185 (H) 65 - 100 mg/dL    BUN 10 6 - 20 MG/DL    Creatinine 0.98 0.55 - 1.02 MG/DL    BUN/Creatinine ratio 10 (L) 12 - 20      GFR est AA >60 >60 ml/min/1.73m2    GFR est non-AA 57 (L) >60 ml/min/1.73m2    Calcium 8.8 8.5 - 10.1 MG/DL   MAGNESIUM    Collection Time: 05/12/17  6:16 AM   Result Value Ref Range    Magnesium 2.3 1.6 - 2.4 mg/dL   PROTHROMBIN TIME + INR    Collection Time: 05/12/17  6:16 AM   Result Value Ref Range    INR 3.3 (H) 0.9 - 1.1      Prothrombin time 34.1 (H) 9.0 - 11.1 sec   CBC W/O DIFF    Collection Time: 05/12/17  6:16 AM   Result Value Ref Range    WBC 11.0 3.6 - 11.0 K/uL    RBC 3.90 3.80 - 5.20 M/uL    HGB 10.9 (L) 11.5 - 16.0 g/dL    HCT 33.5 (L) 35.0 - 47.0 %    MCV 85.9 80.0 - 99.0 FL    MCH 27.9 26.0 - 34.0 PG    MCHC 32.5 30.0 - 36.5 g/dL    RDW 14.8 (H) 11.5 - 14.5 %    PLATELET 389 418 - 110 K/uL   GLUCOSE, POC    Collection Time: 05/12/17  6:57 AM   Result Value Ref Range    Glucose (POC) 175 (H) 65 - 100 mg/dL    Performed by Krish Juan A        Telemetry: normal sinus rhythm      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 (6/2/2010)      Cardiac pacemaker in situ (7/5/2012)      Mixed hyperlipidemia (7/5/2012)      S/P coronary artery stent placement (7/4/2014)      Overview: 4/7/17 PCI/ROSALINO Diagonal      Type 2 diabetes mellitus with diabetic neuropathy, without long-term current use of insulin ([address] (1/31/2017)      S/P ablation of atrial fibrillation (5/2/2017)      Overview: 5/1/17      Tachycardia (5/9/2017)      Chronic systolic HF (heart failure) ([address] (5/10/2017)      Overview: 4/2017 EF 25-30%      History of Clostridium difficile infection (5/10/2017)      Overview: 4/2017 CDiff positive      Junctional tachycardia ([address] (5/10/2017)      Hypotension (5/10/2017)        Plan:     Switch to oral amiodarone. IV lasix x 1 now. Start low dose coreg. Watch BP. If continues to have chest tightness. Consider cath. She is unable to lay flat. Needs aggressive pulmonary treatment.     Larae Schwab, MD

## 2017-05-12 NOTE — PROGRESS NOTES
Hospitalist Progress Note    NAME: Nydia Coles   :  1950   MRN:  [id number]       Interim Hospital Summary: 73 y.o. female whom presented on 2017 with      Assessment / Plan:  Junctional Tachycardia/A.fib with RVR with associated hypotension  Managed in ICU, now transferred to tele  Switched to PO Amiodarone  Off Neosynephrine  S/p cardioversion in sinus rhythm  Cardiology is involved in patient's care  She had ablation 17)    Respiratory distress  Due to ILD vs Acute on chronic systolic heart failure with EF 25%  CTA chest with ILD, findings could be related to CHF  Diuresis as per cardiology  Cardiology and Pulmonary is involved in patient's care  Monitor fluid status closely while diuresed    Recurrent vs persistent C.diff  Continue PO vancomycin    DM type 2  holding metformin, got CTA  Continue SSI    Elevated troponin  due to strain from above tachycardia  Cardiology is following    Code Status: Full  Surrogate Decision Maker: Daughter     DVT Prophylaxis: on coumadin  GI Prophylaxis: not indicated     Baseline: ambulatory      Subjective: Pt seen and examined at bedside. Continue to feel short of breath. Overnight events d/w RN     CHIEF COMPLAINT: f/u \"Worsening SOB\"     Review of Systems:  Symptom Y/N Comments  Symptom Y/N Comments   Fever/Chills n   Chest Pain n    Poor Appetite    Edema     Cough n   Abdominal Pain n    Sputum    Joint Pain     SOB/[place] y   Pruritis/Rash     Nausea/vomit    Tolerating PT/OT     Diarrhea    Tolerating Diet y    Constipation    Other       Could NOT obtain due to:      Objective:     VITALS:   Last 24hrs VS reviewed since prior progress note.  Most recent are:  Patient Vitals for the past 24 hrs:   Temp Pulse Resp BP SpO2   17 1144 98 °F (36.7 °C) 75 16 107/53 -   17 - 79 24 118/49 98 %   17 - 81 15 117/65 98 %   17 - 79 22 128/73 97 %   17 0700 98 °F (36.7 °C) 79 19 (!) 116/95 99 %   17 - 81 16 127/57 100 %   05/12/17 0600 - 76 13 (!) 84/47 96 %   05/12/17 0530 - 76 13 96/46 97 %   05/12/17 0430 - 76 14 (!) 88/47 94 %   05/12/17 0400 97.4 °F (36.3 °C) 76 14 (!) 89/42 97 %   05/12/17 0330 - 76 14 94/43 97 %   05/12/17 0300 - 78 15 99/49 96 %   05/12/17 0230 - 77 20 131/57 96 %   05/12/17 0200 - 78 13 117/63 97 %   05/12/17 0130 - 83 25 120/57 91 %   05/12/17 0100 - 82 16 113/51 96 %   05/12/17 0030 - 79 17 112/61 97 %   05/12/17 0000 97.6 °F (36.4 °C) 77 21 106/56 96 %   05/11/17 2330 - 76 21 115/63 97 %   05/11/17 2300 97.6 °F (36.4 °C) 76 20 111/48 97 %   05/11/17 2208 - 78 - 113/54 -   05/11/17 2200 - 77 17 113/54 99 %   05/11/17 2130 - 76 21 116/88 98 %   05/11/17 2100 - 80 21 135/49 98 %   05/11/17 2005 - - - - 97 %   05/11/17 2000 97.8 °F (36.6 °C) 76 24 98/52 96 %   05/11/17 1930 - 78 17 92/64 99 %   05/11/17 1915 - 79 18 100/50 96 %   05/11/17 1900 - 77 23 109/50 99 %   05/11/17 1809 - 77 19 98/58 97 %   05/11/17 1800 - 76 17 (!) 166/108 98 %   05/11/17 1745 - 78 18 111/63 99 %   05/11/17 1730 - 76 21 108/59 100 %   05/11/17 1700 - 76 17 99/53 97 %   05/11/17 1648 - 76 16 104/67 98 %       Intake/Output Summary (Last 24 hours) at 05/12/17 1641  Last data filed at 05/12/17 1233   Gross per 24 hour   Intake          1388.77 ml   Output             2000 ml   Net          -611.23 ml        PHYSICAL EXAM:  General: WD, WN. Alert, cooperative, no acute distress    EENT:  EOMI. Anicteric sclerae. MMM  Resp:  Coarse breath sounds. No accessory muscle use  CV:  Irregularly irregular  rhythm,  No edema  GI:  Soft, Non distended, Non tender.  +Bowel sounds  Neurologic:  Alert and oriented X 3, normal speech,   Psych:   Good insight. Not anxious nor agitated  Skin:  No rashes.   No jaundice    Reviewed most current lab test results and cultures  YES  Reviewed most current radiology test results   YES  Review and summation of old records today    NO  Reviewed patient's current orders and MAR    YES  PMH/SH reviewed - no change compared to H&P  ________________________________________________________________________  Care Plan discussed with:    Comments   Patient y    Family      RN y    Care Manager     Consultant                        Multidiciplinary team rounds were held today with , nursing, pharmacist and clinical coordinator. Patient's plan of care was discussed; medications were reviewed and discharge planning was addressed. ________________________________________________________________________  Total NON critical care TIME:  35  Minutes    Total CRITICAL CARE TIME Spent:   Minutes non procedure based      Comments   >50% of visit spent in counseling and coordination of care     ________________________________________________________________________  Ovidio Salmon MD     Procedures: see electronic medical records for all procedures/Xrays and details which were not copied into this note but were reviewed prior to creation of Plan. LABS:  I reviewed today's most current labs and imaging studies.   Pertinent labs include:  Recent Labs      05/12/17   0616  05/10/17   0454  05/09/17   1704   WBC  11.0  6.1  7.1   HGB  10.9*  11.3*  13.1   HCT  33.5*  34.3*  38.9   PLT  223  230  275     Recent Labs      05/12/17   0616  05/12/17   0614  05/11/17   0338  05/10/17   1037  05/10/17   0454   05/09/17   1704   NA  142   --   139  138  140   --   138   K  4.1   --   4.2  3.9  4.0   --   3.6   CL  109*   --   108  105  107   --   104   CO2  23   --   22  25  25   --   24   GLU  185*   --   252*  178*  135*   --   97   BUN  10   --   11  10  11   --   13   CREA  0.98   --   1.03*  0.83  0.78   --   1.04*   CA  8.8   --   8.4*  8.3*  8.8   --   9.1   MG  2.3   --   2.2   --   2.2   --   1.7   PHOS   --    --    --    --   3.1   --    --    ALB   --    --    --    --    --    --   3.5   TBILI   --   0.5   --    --    --    --   0.5   SGOT   --   35   --    --    --    --   77*   ALT   --   42   -- --    --    --   60   INR  3.3*   --   3.5*   --   2.1*   < >  3.0*    < > = values in this interval not displayed.        Signed: Augustin Schmidt MD

## 2017-05-13 LAB
ANION GAP BLD CALC-SCNC: 6 MMOL/L (ref 5–15)
BUN SERPL-MCNC: 10 MG/DL (ref 6–20)
BUN/CREAT SERPL: 11 (ref 12–20)
CALCIUM SERPL-MCNC: 8.6 MG/DL (ref 8.5–10.1)
CHLORIDE SERPL-SCNC: 106 MMOL/L (ref 97–108)
CO2 SERPL-SCNC: 28 MMOL/L (ref 21–32)
CREAT SERPL-MCNC: 0.95 MG/DL (ref 0.55–1.02)
GLUCOSE BLD STRIP.AUTO-MCNC: 123 MG/DL (ref 65–100)
GLUCOSE BLD STRIP.AUTO-MCNC: 139 MG/DL (ref 65–100)
GLUCOSE BLD STRIP.AUTO-MCNC: 139 MG/DL (ref 65–100)
GLUCOSE BLD STRIP.AUTO-MCNC: 209 MG/DL (ref 65–100)
GLUCOSE SERPL-MCNC: 107 MG/DL (ref 65–100)
INR PPP: 2 (ref 0.9–1.1)
MAGNESIUM SERPL-MCNC: 2.1 MG/DL (ref 1.6–2.4)
POTASSIUM SERPL-SCNC: 3.7 MMOL/L (ref 3.5–5.1)
PROTHROMBIN TIME: 20.4 SEC (ref 9–11.1)
SERVICE CMNT-IMP: ABNORMAL
SODIUM SERPL-SCNC: 140 MMOL/L (ref 136–145)

## 2017-05-13 PROCEDURE — 80048 BASIC METABOLIC PNL TOTAL CA: CPT | Performed by: FAMILY MEDICINE

## 2017-05-13 PROCEDURE — 74011250637 HC RX REV CODE- 250/637: Performed by: INTERNAL MEDICINE

## 2017-05-13 PROCEDURE — 74011000250 HC RX REV CODE- 250: Performed by: NURSE PRACTITIONER

## 2017-05-13 PROCEDURE — 85610 PROTHROMBIN TIME: CPT | Performed by: FAMILY MEDICINE

## 2017-05-13 PROCEDURE — 74011250637 HC RX REV CODE- 250/637: Performed by: FAMILY MEDICINE

## 2017-05-13 PROCEDURE — 77030029684 HC NEB SM VOL KT MONA -A

## 2017-05-13 PROCEDURE — 74011636637 HC RX REV CODE- 636/637: Performed by: NURSE PRACTITIONER

## 2017-05-13 PROCEDURE — 82962 GLUCOSE BLOOD TEST: CPT

## 2017-05-13 PROCEDURE — 65660000000 HC RM CCU STEPDOWN

## 2017-05-13 PROCEDURE — 94640 AIRWAY INHALATION TREATMENT: CPT

## 2017-05-13 PROCEDURE — 77010033678 HC OXYGEN DAILY

## 2017-05-13 PROCEDURE — 83735 ASSAY OF MAGNESIUM: CPT | Performed by: FAMILY MEDICINE

## 2017-05-13 PROCEDURE — 74011250636 HC RX REV CODE- 250/636: Performed by: INTERNAL MEDICINE

## 2017-05-13 PROCEDURE — 36415 COLL VENOUS BLD VENIPUNCTURE: CPT | Performed by: FAMILY MEDICINE

## 2017-05-13 RX ORDER — INSULIN LISPRO 100 [IU]/ML
INJECTION, SOLUTION INTRAVENOUS; SUBCUTANEOUS
Status: DISCONTINUED | OUTPATIENT
Start: 2017-05-13 | End: 2017-05-22 | Stop reason: HOSPADM

## 2017-05-13 RX ORDER — DEXTROSE MONOHYDRATE 100 MG/ML
125-250 INJECTION, SOLUTION INTRAVENOUS AS NEEDED
Status: DISCONTINUED | OUTPATIENT
Start: 2017-05-13 | End: 2017-05-22 | Stop reason: HOSPADM

## 2017-05-13 RX ORDER — GUAIFENESIN 100 MG/5ML
81 LIQUID (ML) ORAL DAILY
Status: DISCONTINUED | OUTPATIENT
Start: 2017-05-13 | End: 2017-05-17

## 2017-05-13 RX ORDER — FUROSEMIDE 10 MG/ML
20 INJECTION INTRAMUSCULAR; INTRAVENOUS 2 TIMES DAILY
Status: DISCONTINUED | OUTPATIENT
Start: 2017-05-13 | End: 2017-05-17

## 2017-05-13 RX ORDER — IPRATROPIUM BROMIDE AND ALBUTEROL SULFATE 2.5; .5 MG/3ML; MG/3ML
3 SOLUTION RESPIRATORY (INHALATION)
Status: DISCONTINUED | OUTPATIENT
Start: 2017-05-13 | End: 2017-05-22 | Stop reason: HOSPADM

## 2017-05-13 RX ADMIN — Medication 10 ML: at 06:20

## 2017-05-13 RX ADMIN — GABAPENTIN 800 MG: 100 CAPSULE ORAL at 21:28

## 2017-05-13 RX ADMIN — MUPIROCIN: 20 OINTMENT TOPICAL at 21:31

## 2017-05-13 RX ADMIN — Medication 1 CAPSULE: at 12:24

## 2017-05-13 RX ADMIN — GABAPENTIN 800 MG: 100 CAPSULE ORAL at 10:16

## 2017-05-13 RX ADMIN — FUROSEMIDE 20 MG: 10 INJECTION, SOLUTION INTRAMUSCULAR; INTRAVENOUS at 12:23

## 2017-05-13 RX ADMIN — Medication 10 ML: at 21:29

## 2017-05-13 RX ADMIN — COLCHICINE 0.6 MG: 0.6 TABLET, FILM COATED ORAL at 10:15

## 2017-05-13 RX ADMIN — INSULIN LISPRO 2 UNITS: 100 INJECTION, SOLUTION INTRAVENOUS; SUBCUTANEOUS at 21:29

## 2017-05-13 RX ADMIN — CLOPIDOGREL BISULFATE 75 MG: 75 TABLET ORAL at 10:16

## 2017-05-13 RX ADMIN — AMIODARONE HYDROCHLORIDE 200 MG: 200 TABLET ORAL at 18:23

## 2017-05-13 RX ADMIN — Medication 10 ML: at 18:25

## 2017-05-13 RX ADMIN — UMECLIDINIUM 1 PUFF: 62.5 AEROSOL, POWDER ORAL at 10:17

## 2017-05-13 RX ADMIN — AMIODARONE HYDROCHLORIDE 200 MG: 200 TABLET ORAL at 10:16

## 2017-05-13 RX ADMIN — FLUTICASONE FUROATE AND VILANTEROL TRIFENATATE 1 PUFF: 100; 25 POWDER RESPIRATORY (INHALATION) at 10:17

## 2017-05-13 RX ADMIN — VANCOMYCIN HYDROCHLORIDE 125 MG: 1 INJECTION, POWDER, LYOPHILIZED, FOR SOLUTION INTRAVENOUS at 12:23

## 2017-05-13 RX ADMIN — ALBUTEROL SULFATE 2 PUFF: 90 AEROSOL, METERED RESPIRATORY (INHALATION) at 16:06

## 2017-05-13 RX ADMIN — ACETAMINOPHEN 650 MG: 325 TABLET, FILM COATED ORAL at 01:23

## 2017-05-13 RX ADMIN — VANCOMYCIN HYDROCHLORIDE 125 MG: 1 INJECTION, POWDER, LYOPHILIZED, FOR SOLUTION INTRAVENOUS at 06:19

## 2017-05-13 RX ADMIN — IPRATROPIUM BROMIDE AND ALBUTEROL SULFATE 3 ML: .5; 3 SOLUTION RESPIRATORY (INHALATION) at 16:50

## 2017-05-13 RX ADMIN — MUPIROCIN: 20 OINTMENT TOPICAL at 10:17

## 2017-05-13 RX ADMIN — VANCOMYCIN HYDROCHLORIDE 125 MG: 1 INJECTION, POWDER, LYOPHILIZED, FOR SOLUTION INTRAVENOUS at 18:24

## 2017-05-13 RX ADMIN — GABAPENTIN 800 MG: 100 CAPSULE ORAL at 18:22

## 2017-05-13 RX ADMIN — ASPIRIN 81 MG CHEWABLE TABLET 81 MG: 81 TABLET CHEWABLE at 12:24

## 2017-05-13 RX ADMIN — FUROSEMIDE 20 MG: 10 INJECTION, SOLUTION INTRAMUSCULAR; INTRAVENOUS at 18:23

## 2017-05-13 NOTE — PROGRESS NOTES
Cardiology Progress Note      5/13/2017 11:04 AM    Admit Date: 5/9/2017    Admit Diagnosis: Tachycardia      Subjective:     Terrance Del Valle c/o SOB. No edema. Maintaining sins rythm. Has slight hemoptysis.     Visit Vitals    /62 (BP 1 Location: Right arm, BP Patient Position: At rest)    Pulse 63    Temp 97.9 °F (36.6 °C)    Resp 18    Ht 5' 6\" (1.676 m)    Wt 174 lb (78.9 kg)    SpO2 96%    Breastfeeding No    BMI 28.08 kg/m2       Current Facility-Administered Medications   Medication Dose Route Frequency    dextrose 10% infusion 125-250 mL  125-250 mL IntraVENous PRN    aspirin chewable tablet 81 mg  81 mg Oral DAILY    furosemide (LASIX) injection 20 mg  20 mg IntraVENous BID    lactobac ac& pc-s.therm-b.anim (MARYAN Q/RISAQUAD)  1 Cap Oral DAILY    amiodarone (CORDARONE) tablet 200 mg  200 mg Oral BID    colchicine tablet 0.6 mg  0.6 mg Oral DAILY    insulin lispro (HUMALOG) injection   SubCUTAneous Q6H    gabapentin (NEURONTIN) capsule 800 mg  800 mg Oral TID    acetaminophen (TYLENOL) tablet 650 mg  650 mg Oral Q4H PRN    glucose chewable tablet 16 g  4 Tab Oral PRN    glucagon (GLUCAGEN) injection 1 mg  1 mg IntraMUSCular PRN    mupirocin (BACTROBAN) 2 % ointment   Topical Q12H    vancomycin 50 mg/mL oral solution (compounded) 125 mg  125 mg Oral Q6H    sodium chloride (NS) flush 5-10 mL  5-10 mL IntraVENous Q8H    sodium chloride (NS) flush 5-10 mL  5-10 mL IntraVENous PRN    fluticasone-vilanterol (BREO ELLIPTA) 100mcg-25mcg/puff  1 Puff Inhalation DAILY    clopidogrel (PLAVIX) tablet 75 mg  75 mg Oral DAILY    umeclidinium (INCRUSE ELLIPTA) 62.5 mcg/actuation  1 Puff Inhalation DAILY    albuterol (PROVENTIL HFA, VENTOLIN HFA, PROAIR HFA) inhaler 2 Puff  2 Puff Inhalation Q6H PRN         Objective:      Physical Exam:  Visit Vitals    /56    Pulse 95    Temp 98.1 °F (36.7 °C)    Resp 18    Ht 5' 6\" (1.676 m)    Wt 174 lb (78.9 kg)    SpO2 99%    Breastfeeding No    BMI 28.08 kg/m2     General Appearance:  Well developed, well nourished,alert and oriented x 3, and individual in no acute distress. Ears/Nose/Mouth/Throat:   Hearing grossly normal.         Neck: Supple. Chest:   Lungs clear to auscultation bilaterally. Cardiovascular:  Regular rate and rhythm, S1, S2 normal, no murmur. Abdomen:   Soft, non-tender, bowel sounds are active. Extremities: No edema bilaterally. Skin: Warm and dry.                Data Review:   Labs:    Recent Results (from the past 24 hour(s))   GLUCOSE, POC    Collection Time: 05/12/17 12:24 PM   Result Value Ref Range    Glucose (POC) 175 (H) 65 - 100 mg/dL    Performed by Alessandra Daniels    GLUCOSE, POC    Collection Time: 05/12/17  5:36 PM   Result Value Ref Range    Glucose (POC) 143 (H) 65 - 100 mg/dL    Performed by Carmen Perry    GLUCOSE, POC    Collection Time: 05/12/17  9:13 PM   Result Value Ref Range    Glucose (POC) 181 (H) 65 - 100 mg/dL    Performed by Lashay Hernandez    GLUCOSE, POC    Collection Time: 05/12/17 11:22 PM   Result Value Ref Range    Glucose (POC) 147 (H) 65 - 100 mg/dL    Performed by Mj Gottlieb, BASIC    Collection Time: 05/13/17  1:40 AM   Result Value Ref Range    Sodium 140 136 - 145 mmol/L    Potassium 3.7 3.5 - 5.1 mmol/L    Chloride 106 97 - 108 mmol/L    CO2 28 21 - 32 mmol/L    Anion gap 6 5 - 15 mmol/L    Glucose 107 (H) 65 - 100 mg/dL    BUN 10 6 - 20 MG/DL    Creatinine 0.95 0.55 - 1.02 MG/DL    BUN/Creatinine ratio 11 (L) 12 - 20      GFR est AA >60 >60 ml/min/1.73m2    GFR est non-AA 59 (L) >60 ml/min/1.73m2    Calcium 8.6 8.5 - 10.1 MG/DL   MAGNESIUM    Collection Time: 05/13/17  1:40 AM   Result Value Ref Range    Magnesium 2.1 1.6 - 2.4 mg/dL   PROTHROMBIN TIME + INR    Collection Time: 05/13/17  1:40 AM   Result Value Ref Range    INR 2.0 (H) 0.9 - 1.1      Prothrombin time 20.4 (H) 9.0 - 11.1 sec   GLUCOSE, POC    Collection Time: 05/13/17 6:18 AM   Result Value Ref Range    Glucose (POC) 123 (H) 65 - 100 mg/dL    Performed by [facility]        Telemetry: normal sinus rhythm      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 (6/2/2010)      Cardiac pacemaker in situ (7/5/2012)      Mixed hyperlipidemia (7/5/2012)      S/P coronary artery stent placement (7/4/2014)      Overview: 4/7/17 PCI/ROSALINO Diagonal      Type 2 diabetes mellitus with diabetic neuropathy, without long-term current use of insulin ([address] (1/31/2017)      S/P ablation of atrial fibrillation (5/2/2017)      Overview: 5/1/17      Tachycardia (5/9/2017)      Chronic systolic HF (heart failure) ([address] (5/10/2017)      Overview: 4/2017 EF 25-30%      History of Clostridium difficile infection (5/10/2017)      Overview: 4/2017 CDiff positive      Junctional tachycardia ([address] (5/10/2017)      Hypotension (5/10/2017)        Plan:     Hold coumadin until hemoptysis resolved. Continue ASA, plavix. Has extensive hilar  Lymphadenopathy, interstitial lung disease per CT. Further evaluation per pulmonary. Discussed with Dr. Yandy Quesada.     Kristian Campos MD

## 2017-05-13 NOTE — PROGRESS NOTES
[facility] [facility] SHIFT NURSING NOTE    Bedside and Verbal shift change report given to Lizzy Martin (oncoming nurse) by Elliot Friend (offgoing nurse). Report included the following information SBAR, Kardex, ED Summary, Procedure Summary, Intake/Output, MAR, Recent Results and Cardiac Rhythm NSR.    SHIFT SUMMARY:     908: paged dr Rashard Blevins. Notified MD of pt's c/o blood-tinged sputum with small blood clots times 6-7 occurrences since yesterday. MD recommendation to hold coumadinn and give plavix. MD request for pulmonology to be notified of pt's complaint. 9606: notified dr Kennedy Abernathy of the above conversation with dr Rashard Blevins. MD order to notify pulmonology of pt's condition. 2802: paged dr Dorys Dixon    7459: RN notified dr Dorys Dixon of pt's c/o blood-tinged sputum with small blood clots times 6-7 occurrences since yesterday. MD states this is expected finding of pt's pulmonary diagnosis and recommends continued diuresis and continued administration of coumadin and plavix. MD will not be rounding on pt today. 1014: notified dr Kennedy Abernathy of dr mehta's recommendations    421: gail to write neb tx order. 435: RT to come administer PRN breathing tx.      Admission Date 5/9/2017   Admission Diagnosis Tachycardia   Consults IP CONSULT TO HOSPITALIST  IP CONSULT TO CARDIOLOGY  IP CONSULT TO PULMONOLOGY        Consults   [] PT   [] OT   [] Speech   [] Palliative      [] Hospice    [] Case Management   [x] None   Cardiac Monitoring   [x] Yes   [] No     Antibiotics   [x] Yes   [] No   GI Prophylaxis  (Ex: Protonix, Pepcid, etc,.)   [] Yes   [x] No          DVT Prophylaxis   SCDs:             Mayo stockings:         [x] Medication (Ex: Lovenox, Eliquis, Brilinta, Coumadin,  Heparin, etc..)   [] Contraindicated   [] No VTE needed       Urinary Catheter             LDAs           Triple Lumen Central line 05/10/17 Right Neck (Active)   Central Line Being Utilized Yes 5/13/2017  3:31 AM   Criteria for Appropriate Use Hemodynamically unstable, requiring monitoring lines, vasopressors, or volume resuscitation 5/13/2017  3:31 AM   Site Assessment Clean, dry, & intact 5/13/2017  3:31 AM   Infiltration Assessment 0 5/13/2017  3:31 AM   Affected Extremity/Extremities Color distal to insertion site pink (or appropriate for race) 5/13/2017  3:31 AM   Date of Last Dressing Change 05/09/17 5/12/2017  7:00 AM   Dressing Status Clean, dry, & intact 5/13/2017  3:31 AM   Dressing Type Disk with Chlorhexadine gluconate (CHG) 5/13/2017  3:31 AM   Action Taken Open ports on tubing capped 5/12/2017 10:29 AM   Proximal Hub Color/Line Status Brown 5/13/2017  3:31 AM   Positive Blood Return (Medial Site) Yes 5/13/2017  3:31 AM   Medial Hub Color/Line Status Blue 5/13/2017  3:31 AM   Positive Blood Return (Lateral Site) Yes 5/13/2017  3:31 AM   Distal Hub Color/Line Status White 5/13/2017  3:31 AM   Positive Blood Return (Site #3) Yes 5/13/2017  3:31 AM   Alcohol Cap Used Yes 5/13/2017  3:31 AM                          I/Os   Intake/Output Summary (Last 24 hours) at 05/13/17 0639  Last data filed at 05/13/17 0258   Gross per 24 hour   Intake               50 ml   Output             2800 ml   Net            -2750 ml         Activity Level Activity Level: Up with Assistance     Activity Assistance: Partial (one person)   Diet Active Orders   Diet    DIET CARDIAC Regular      Purposeful Rounding every 1-2 hour? [x] Yes    Anthony Score  Total Score: 3   Bed Alarm (If score 3 or >)   [x] Yes    [] Refused (See signed refusal form in chart)   Landon Score  Landon Score: 20       Landon Score (if score 14 or less)   [] PMT consult   [] Nutrition consult   [] Wound Care consult      []  Specialty bed         Influenza Vaccine Received Flu Vaccine for Current Season (usually Sept-March): Not Flu Season               Needs prior to discharge:   Home O2 required:    [] Yes   [x] No     If yes, how much O2 required?     Other:    Last Bowel Movement Date: 05/11/17   Readmission Risk Assessment Tool Score High Risk            29       Total Score        3 Relationship with PCP    4 More than 1 Admission in calendar year    5 Patient Insurance is Medicare, Medicaid or Self Pay    17 Charlson Comorbidity Score        Criteria that do not apply:    Patient Living Status    Patient Length of Stay > 5       Expected Length of Stay 2d 14h   Actual Length of Stay 4

## 2017-05-13 NOTE — PROGRESS NOTES
Hospitalist Progress Note    NAME: Latonia Castillo   :  1950   MRN:  [id number]       Interim Hospital Summary: 73 y.o. female whom presented on 2017 with      Assessment / Plan:  Hemoptysis not POA  Spoke with cardiology Dr Melanie Galdamez, we have to continue ASA/Plavix due to recent stents  Will hold Coumadin for couple of days  Cardiology recommended pulmonary consultation, RN spoke to Dr Holden Arreaga who is oncall for pulmonary this weekend, he will not see the patient    Respiratory distress  Due to ILD vs Acute on chronic systolic heart failure with EF 25%  CTA chest with ILD, findings could be related to CHF  Spoke to cardiology, considering BP is better, will start her on 20mg IV lasix BID  Cardiology involved in patient's care, seems pulmonary signed off  Monitor fluid status closely while diuresed    Junctional Tachycardia/A.fib with RVR with associated hypotension  Initially Managed in ICU, now transferred to tele  Switched to PO Amiodarone  Off Neosynephrine  S/p cardioversion in sinus rhythm  Cardiology is involved in patient's care  She had ablation 17)    Sepsis POA   Due to Recurrent vs persistent C.diff  Recently completed 10 days coarse of PO flagyl  Diarrhea resolved, will complete 14 days coarse of PO vanco  Add regulo Q    DM type 2  holding metformin, got CTA  Continue SSI    Elevated troponin  due to strain from above tachycardia  Cardiology is following    Code Status: Full  Surrogate Decision Maker: Daughter     DVT Prophylaxis: on coumadin  GI Prophylaxis: not indicated     Baseline: ambulatory      Subjective: Pt seen and examined at bedside. Continue to feel short of breath.  Overnight events d/w RN     CHIEF COMPLAINT: f/u \"Worsening SOB\"     Review of Systems:  Symptom Y/N Comments  Symptom Y/N Comments   Fever/Chills n   Chest Pain n    Poor Appetite    Edema     Cough n   Abdominal Pain n    Sputum    Joint Pain     SOB/[place] y   Pruritis/Rash     Nausea/vomit    Tolerating PT/OT     Diarrhea    Tolerating Diet y    Constipation    Other       Could NOT obtain due to:      Objective:     VITALS:   Last 24hrs VS reviewed since prior progress note. Most recent are:  Patient Vitals for the past 24 hrs:   Temp Pulse Resp BP SpO2   05/13/17 0749 97.9 °F (36.6 °C) 63 18 120/62 96 %   05/13/17 0252 97.7 °F (36.5 °C) 83 16 124/60 97 %   05/12/17 2304 97.4 °F (36.3 °C) 77 20 139/58 96 %   05/12/17 2007 97.8 °F (36.6 °C) 79 20 111/55 99 %   05/12/17 1738 97.7 °F (36.5 °C) 75 20 122/50 99 %   05/12/17 1144 98 °F (36.7 °C) 75 16 107/53 -       Intake/Output Summary (Last 24 hours) at 05/13/17 1020  Last data filed at 05/13/17 0258   Gross per 24 hour   Intake                0 ml   Output             2800 ml   Net            -2800 ml        PHYSICAL EXAM:  General: WD, WN. Alert, cooperative, no acute distress    EENT:  EOMI. Anicteric sclerae. MMM  Resp:  Coarse breath sounds. No accessory muscle use  CV:  Irregularly irregular  rhythm,  No edema  GI:  Soft, Non distended, Non tender.  +Bowel sounds  Neurologic:  Alert and oriented X 3, normal speech,   Psych:   Good insight. Not anxious nor agitated  Skin:  No rashes. No jaundice    Reviewed most current lab test results and cultures  YES  Reviewed most current radiology test results   YES  Review and summation of old records today    NO  Reviewed patient's current orders and MAR    YES  PMH/ reviewed - no change compared to H&P  ________________________________________________________________________  Care Plan discussed with:    Comments   Patient y    Family      RN y    Care Manager     Consultant                        Multidiciplinary team rounds were held today with , nursing, pharmacist and clinical coordinator. Patient's plan of care was discussed; medications were reviewed and discharge planning was addressed.      ________________________________________________________________________  Total NON critical care TIME:  35 Minutes    Total CRITICAL CARE TIME Spent:   Minutes non procedure based      Comments   >50% of visit spent in counseling and coordination of care     ________________________________________________________________________  Rika Wall MD     Procedures: see electronic medical records for all procedures/Xrays and details which were not copied into this note but were reviewed prior to creation of Plan. LABS:  I reviewed today's most current labs and imaging studies.   Pertinent labs include:  Recent Labs      05/12/17   0616   WBC  11.0   HGB  10.9*   HCT  33.5*   PLT  223     Recent Labs      05/13/17   0140  05/12/17   0616  05/12/17   0614  05/11/17   0338   NA  140  142   --   139   K  3.7  4.1   --   4.2   CL  106  109*   --   108   CO2  28  23   --   22   GLU  107*  185*   --   252*   BUN  10  10   --   11   CREA  0.95  0.98   --   1.03*   CA  8.6  8.8   --   8.4*   MG  2.1  2.3   --   2.2   TBILI   --    --   0.5   --    SGOT   --    --   35   --    ALT   --    --   42   --    INR  2.0*  3.3*   --   3.5*       Signed: Rika Wall MD

## 2017-05-14 LAB
ANION GAP BLD CALC-SCNC: 11 MMOL/L (ref 5–15)
BACTERIA SPEC CULT: NORMAL
BUN SERPL-MCNC: 9 MG/DL (ref 6–20)
BUN/CREAT SERPL: 9 (ref 12–20)
CALCIUM SERPL-MCNC: 8.4 MG/DL (ref 8.5–10.1)
CHLORIDE SERPL-SCNC: 104 MMOL/L (ref 97–108)
CO2 SERPL-SCNC: 27 MMOL/L (ref 21–32)
CREAT SERPL-MCNC: 1.03 MG/DL (ref 0.55–1.02)
ERYTHROCYTE [DISTWIDTH] IN BLOOD BY AUTOMATED COUNT: 14.6 % (ref 11.5–14.5)
GLUCOSE BLD STRIP.AUTO-MCNC: 124 MG/DL (ref 65–100)
GLUCOSE BLD STRIP.AUTO-MCNC: 152 MG/DL (ref 65–100)
GLUCOSE BLD STRIP.AUTO-MCNC: 168 MG/DL (ref 65–100)
GLUCOSE BLD STRIP.AUTO-MCNC: 232 MG/DL (ref 65–100)
GLUCOSE SERPL-MCNC: 122 MG/DL (ref 65–100)
HCT VFR BLD AUTO: 35.2 % (ref 35–47)
HGB BLD-MCNC: 11.7 G/DL (ref 11.5–16)
INR PPP: 1.3 (ref 0.9–1.1)
MAGNESIUM SERPL-MCNC: 1.9 MG/DL (ref 1.6–2.4)
MCH RBC QN AUTO: 28.3 PG (ref 26–34)
MCHC RBC AUTO-ENTMCNC: 33.2 G/DL (ref 30–36.5)
MCV RBC AUTO: 85.2 FL (ref 80–99)
PLATELET # BLD AUTO: 253 K/UL (ref 150–400)
POTASSIUM SERPL-SCNC: 3.5 MMOL/L (ref 3.5–5.1)
PROTHROMBIN TIME: 13.4 SEC (ref 9–11.1)
RBC # BLD AUTO: 4.13 M/UL (ref 3.8–5.2)
SERVICE CMNT-IMP: ABNORMAL
SERVICE CMNT-IMP: NORMAL
SODIUM SERPL-SCNC: 142 MMOL/L (ref 136–145)
WBC # BLD AUTO: 9.4 K/UL (ref 3.6–11)

## 2017-05-14 PROCEDURE — 80048 BASIC METABOLIC PNL TOTAL CA: CPT | Performed by: FAMILY MEDICINE

## 2017-05-14 PROCEDURE — 77010033678 HC OXYGEN DAILY

## 2017-05-14 PROCEDURE — 74011250637 HC RX REV CODE- 250/637: Performed by: INTERNAL MEDICINE

## 2017-05-14 PROCEDURE — 82962 GLUCOSE BLOOD TEST: CPT

## 2017-05-14 PROCEDURE — 85027 COMPLETE CBC AUTOMATED: CPT | Performed by: FAMILY MEDICINE

## 2017-05-14 PROCEDURE — 74011250637 HC RX REV CODE- 250/637: Performed by: FAMILY MEDICINE

## 2017-05-14 PROCEDURE — 74011250636 HC RX REV CODE- 250/636: Performed by: ANESTHESIOLOGY

## 2017-05-14 PROCEDURE — 74011000250 HC RX REV CODE- 250: Performed by: NURSE PRACTITIONER

## 2017-05-14 PROCEDURE — 83735 ASSAY OF MAGNESIUM: CPT | Performed by: FAMILY MEDICINE

## 2017-05-14 PROCEDURE — 94640 AIRWAY INHALATION TREATMENT: CPT

## 2017-05-14 PROCEDURE — 74011636637 HC RX REV CODE- 636/637: Performed by: NURSE PRACTITIONER

## 2017-05-14 PROCEDURE — 36415 COLL VENOUS BLD VENIPUNCTURE: CPT | Performed by: FAMILY MEDICINE

## 2017-05-14 PROCEDURE — 65660000000 HC RM CCU STEPDOWN

## 2017-05-14 PROCEDURE — 85610 PROTHROMBIN TIME: CPT | Performed by: FAMILY MEDICINE

## 2017-05-14 PROCEDURE — 74011250636 HC RX REV CODE- 250/636: Performed by: INTERNAL MEDICINE

## 2017-05-14 RX ORDER — SODIUM CHLORIDE 0.9 % (FLUSH) 0.9 %
5-10 SYRINGE (ML) INJECTION EVERY 8 HOURS
Status: DISCONTINUED | OUTPATIENT
Start: 2017-05-14 | End: 2017-05-15

## 2017-05-14 RX ORDER — SODIUM CHLORIDE, SODIUM LACTATE, POTASSIUM CHLORIDE, CALCIUM CHLORIDE 600; 310; 30; 20 MG/100ML; MG/100ML; MG/100ML; MG/100ML
25 INJECTION, SOLUTION INTRAVENOUS CONTINUOUS
Status: DISCONTINUED | OUTPATIENT
Start: 2017-05-14 | End: 2017-05-15

## 2017-05-14 RX ORDER — GUAIFENESIN 600 MG/1
600 TABLET, EXTENDED RELEASE ORAL EVERY 12 HOURS
Status: DISCONTINUED | OUTPATIENT
Start: 2017-05-14 | End: 2017-05-15

## 2017-05-14 RX ORDER — SODIUM CHLORIDE 0.9 % (FLUSH) 0.9 %
5-10 SYRINGE (ML) INJECTION AS NEEDED
Status: DISCONTINUED | OUTPATIENT
Start: 2017-05-14 | End: 2017-05-15

## 2017-05-14 RX ORDER — LIDOCAINE HYDROCHLORIDE 10 MG/ML
0.1 INJECTION, SOLUTION EPIDURAL; INFILTRATION; INTRACAUDAL; PERINEURAL AS NEEDED
Status: DISCONTINUED | OUTPATIENT
Start: 2017-05-14 | End: 2017-05-15

## 2017-05-14 RX ADMIN — COLCHICINE 0.6 MG: 0.6 TABLET, FILM COATED ORAL at 11:06

## 2017-05-14 RX ADMIN — Medication 1 CAPSULE: at 10:31

## 2017-05-14 RX ADMIN — Medication 30 ML: at 15:41

## 2017-05-14 RX ADMIN — VANCOMYCIN HYDROCHLORIDE 125 MG: 1 INJECTION, POWDER, LYOPHILIZED, FOR SOLUTION INTRAVENOUS at 06:29

## 2017-05-14 RX ADMIN — SODIUM CHLORIDE, SODIUM LACTATE, POTASSIUM CHLORIDE, AND CALCIUM CHLORIDE 25 ML/HR: 600; 310; 30; 20 INJECTION, SOLUTION INTRAVENOUS at 11:06

## 2017-05-14 RX ADMIN — ASPIRIN 81 MG CHEWABLE TABLET 81 MG: 81 TABLET CHEWABLE at 10:32

## 2017-05-14 RX ADMIN — Medication 10 ML: at 21:29

## 2017-05-14 RX ADMIN — UMECLIDINIUM 1 PUFF: 62.5 AEROSOL, POWDER ORAL at 10:33

## 2017-05-14 RX ADMIN — VANCOMYCIN HYDROCHLORIDE 125 MG: 1 INJECTION, POWDER, LYOPHILIZED, FOR SOLUTION INTRAVENOUS at 18:11

## 2017-05-14 RX ADMIN — GABAPENTIN 800 MG: 100 CAPSULE ORAL at 10:31

## 2017-05-14 RX ADMIN — FLUTICASONE FUROATE AND VILANTEROL TRIFENATATE 1 PUFF: 100; 25 POWDER RESPIRATORY (INHALATION) at 10:33

## 2017-05-14 RX ADMIN — VANCOMYCIN HYDROCHLORIDE 125 MG: 1 INJECTION, POWDER, LYOPHILIZED, FOR SOLUTION INTRAVENOUS at 13:34

## 2017-05-14 RX ADMIN — AMIODARONE HYDROCHLORIDE 200 MG: 200 TABLET ORAL at 18:07

## 2017-05-14 RX ADMIN — FUROSEMIDE 20 MG: 10 INJECTION, SOLUTION INTRAMUSCULAR; INTRAVENOUS at 18:07

## 2017-05-14 RX ADMIN — GABAPENTIN 800 MG: 100 CAPSULE ORAL at 15:59

## 2017-05-14 RX ADMIN — GABAPENTIN 800 MG: 100 CAPSULE ORAL at 21:28

## 2017-05-14 RX ADMIN — GUAIFENESIN 600 MG: 600 TABLET, EXTENDED RELEASE ORAL at 13:26

## 2017-05-14 RX ADMIN — IPRATROPIUM BROMIDE AND ALBUTEROL SULFATE 3 ML: .5; 3 SOLUTION RESPIRATORY (INHALATION) at 09:58

## 2017-05-14 RX ADMIN — INSULIN LISPRO 2 UNITS: 100 INJECTION, SOLUTION INTRAVENOUS; SUBCUTANEOUS at 10:30

## 2017-05-14 RX ADMIN — Medication 10 ML: at 06:29

## 2017-05-14 RX ADMIN — GUAIFENESIN 600 MG: 600 TABLET, EXTENDED RELEASE ORAL at 21:28

## 2017-05-14 RX ADMIN — INSULIN LISPRO 3 UNITS: 100 INJECTION, SOLUTION INTRAVENOUS; SUBCUTANEOUS at 13:25

## 2017-05-14 RX ADMIN — FUROSEMIDE 20 MG: 10 INJECTION, SOLUTION INTRAMUSCULAR; INTRAVENOUS at 10:32

## 2017-05-14 RX ADMIN — CLOPIDOGREL BISULFATE 75 MG: 75 TABLET ORAL at 10:32

## 2017-05-14 RX ADMIN — VANCOMYCIN HYDROCHLORIDE 125 MG: 1 INJECTION, POWDER, LYOPHILIZED, FOR SOLUTION INTRAVENOUS at 01:08

## 2017-05-14 RX ADMIN — MUPIROCIN: 20 OINTMENT TOPICAL at 21:29

## 2017-05-14 RX ADMIN — AMIODARONE HYDROCHLORIDE 200 MG: 200 TABLET ORAL at 10:32

## 2017-05-14 RX ADMIN — Medication 10 ML: at 10:34

## 2017-05-14 NOTE — PROGRESS NOTES
[address] [facility] SHIFT NURSING NOTE    Bedside shift change report given to Sage Mina RN (oncoming nurse) by Rigo Iraheta RN (offgoing nurse). Report included the following information SBAR, Kardex, MAR, Recent Results and Cardiac Rhythm SR with pacing. SHIFT SUMMARY: 15:15 - Rec'd bedside report from Giselle Gallego, [address] [place] [address]. Assumed care of patient. Admission Date 5/9/2017   Admission Diagnosis Tachycardia   Consults IP CONSULT TO HOSPITALIST  IP CONSULT TO CARDIOLOGY  IP CONSULT TO PULMONOLOGY        Consults   [] PT   [] OT   [] Speech   [] Palliative      [] Hospice    [] Case Management   [x] None   Cardiac Monitoring   [x] Yes   [] No     Antibiotics   [x] Yes   [] No   GI Prophylaxis  (Ex: Protonix, Pepcid, etc,.)   [] Yes   [x] No          DVT Prophylaxis   SCDs:             Mayo stockings:         [] Medication (Ex: Lovenox, Eliquis, Brilinta, Coumadin,  Heparin, etc.. )   [x] Contraindicated- on hold due to hemoptysis   [] No VTE needed       Urinary Catheter             LDAs           Triple Lumen Central line 05/10/17 Right Neck (Active)   Central Line Being Utilized Yes 5/14/2017  3:50 PM   Criteria for Appropriate Use Hemodynamically unstable, requiring monitoring lines, vasopressors, or volume resuscitation 5/14/2017  3:50 PM   Site Assessment Clean, dry, & intact 5/14/2017  3:50 PM   Infiltration Assessment 0 5/14/2017  3:50 PM   Affected Extremity/Extremities Color distal to insertion site pink (or appropriate for race); Pulses palpable;Range of motion performed 5/14/2017  3:50 PM   Date of Last Dressing Change 05/09/17 5/14/2017  3:50 PM   Dressing Status Clean, dry, & intact; Occlusive 5/14/2017  3:50 PM   Dressing Type Disk with Chlorhexadine gluconate (CHG); Transparent 5/14/2017  3:50 PM   Action Taken Open ports on tubing capped 5/14/2017  3:35 AM   Proximal Hub Color/Line Status Brown;Flushed;Capped 5/14/2017  3:50 PM   Positive Blood Return (Medial Site) Yes 5/14/2017  3:50 PM   Medial Hub Color/Line Status Blue;Flushed; Infusing 5/14/2017  3:50 PM   Positive Blood Return (Lateral Site) Yes 5/14/2017  3:50 PM   Distal Hub Color/Line Status White;Flushed;Capped 5/14/2017  3:50 PM   Positive Blood Return (Site #3) Yes 5/14/2017  3:50 PM   Alcohol Cap Used Yes 5/14/2017  3:50 PM                          I/Os   Intake/Output Summary (Last 24 hours) at 05/14/17 1920  Last data filed at 05/14/17 1708   Gross per 24 hour   Intake                0 ml   Output             1220 ml   Net            -1220 ml         Activity Level Activity Level: Up with Assistance     Activity Assistance: Partial (one person)   Diet Active Orders   Diet    DIET CARDIAC Regular      Purposeful Rounding every 1-2 hour? [x] Yes    Anthony Score  Total Score: 2   Bed Alarm (If score 3 or >)   [] Yes    [] Refused (See signed refusal form in chart)   Landon Score  Landon Score: 20       Landon Score (if score 14 or less)   [] PMT consult   [] Nutrition consult   [] Wound Care consult      []  Specialty bed         Influenza Vaccine Received Flu Vaccine for Current Season (usually Sept-March): Not Flu Season               Needs prior to discharge:   Home O2 required:    [x] Yes   [] No     If yes, how much O2 required?  Wean as able    Other:    Last Bowel Movement Date: 05/13/17   Readmission Risk Assessment Tool Score High Risk            32       Total Score        3 Relationship with PCP    3 Patient Length of Stay > 5    4 More than 1 Admission in calendar year    5 Patient Insurance is Medicare, Medicaid or Self Pay    17 Charlson Comorbidity Score        Criteria that do not apply:    Patient Living Status       Expected Length of Stay 2d 14h   Actual Length of Stay 5

## 2017-05-14 NOTE — PROGRESS NOTES
Cardiology Progress Note      5/14/2017 12:14 PM    Admit Date: 5/9/2017    Admit Diagnosis: Tachycardia      Subjective:     Gregg Bennett feeling better, still has hemoptysis. Maintaining sinus rhythm.     Visit Vitals    /58 (BP 1 Location: Right arm)    Pulse 86    Temp 97.9 °F (36.6 °C)    Resp 17    Ht 5' 6\" (1.676 m)    Wt 168 lb 14 oz (76.6 kg)    SpO2 98%    Breastfeeding No    BMI 27.26 kg/m2       Current Facility-Administered Medications   Medication Dose Route Frequency    lactated ringers infusion  25 mL/hr IntraVENous CONTINUOUS    lidocaine (PF) (XYLOCAINE) 10 mg/mL (1 %) injection 0.1 mL  0.1 mL SubCUTAneous PRN    sodium chloride (NS) flush 5-10 mL  5-10 mL IntraVENous Q8H    sodium chloride (NS) flush 5-10 mL  5-10 mL IntraVENous PRN    guaiFENesin ER (MUCINEX) tablet 600 mg  600 mg Oral Q12H    dextrose 10% infusion 125-250 mL  125-250 mL IntraVENous PRN    aspirin chewable tablet 81 mg  81 mg Oral DAILY    furosemide (LASIX) injection 20 mg  20 mg IntraVENous BID    lactobac ac& pc-s.therm-b.anim (MARYAN Q/RISAQUAD)  1 Cap Oral DAILY    albuterol-ipratropium (DUO-NEB) 2.5 MG-0.5 MG/3 ML  3 mL Nebulization Q4H PRN    insulin lispro (HUMALOG) injection   SubCUTAneous AC&HS    amiodarone (CORDARONE) tablet 200 mg  200 mg Oral BID    colchicine tablet 0.6 mg  0.6 mg Oral DAILY    gabapentin (NEURONTIN) capsule 800 mg  800 mg Oral TID    acetaminophen (TYLENOL) tablet 650 mg  650 mg Oral Q4H PRN    glucose chewable tablet 16 g  4 Tab Oral PRN    glucagon (GLUCAGEN) injection 1 mg  1 mg IntraMUSCular PRN    mupirocin (BACTROBAN) 2 % ointment   Topical Q12H    vancomycin 50 mg/mL oral solution (compounded) 125 mg  125 mg Oral Q6H    fluticasone-vilanterol (BREO ELLIPTA) 100mcg-25mcg/puff  1 Puff Inhalation DAILY    clopidogrel (PLAVIX) tablet 75 mg  75 mg Oral DAILY    umeclidinium (INCRUSE ELLIPTA) 62.5 mcg/actuation  1 Puff Inhalation DAILY    albuterol (PROVENTIL HFA, VENTOLIN HFA, PROAIR HFA) inhaler 2 Puff  2 Puff Inhalation Q6H PRN         Objective:      Physical Exam:  Visit Vitals    /58 (BP 1 Location: Right arm)    Pulse 86    Temp 97.9 °F (36.6 °C)    Resp 17    Ht 5' 6\" (1.676 m)    Wt 168 lb 14 oz (76.6 kg)    SpO2 98%    Breastfeeding No    BMI 27.26 kg/m2     General Appearance:  Well developed, well nourished,alert and oriented x 3, and individual in no acute distress. Ears/Nose/Mouth/Throat:   Hearing grossly normal.         Neck: Supple. Chest:   Lungs clear to auscultation bilaterally. Cardiovascular:  Regular rate and rhythm, S1, S2 normal, no murmur. Abdomen:   Soft, non-tender, bowel sounds are active. Extremities: No edema bilaterally. Skin: Warm and dry.                Data Review:   Labs:    Recent Results (from the past 24 hour(s))   GLUCOSE, POC    Collection Time: 05/13/17  4:49 PM   Result Value Ref Range    Glucose (POC) 139 (H) 65 - 100 mg/dL    Performed by [facility] Ayana Rick, POC    Collection Time: 05/13/17  8:48 PM   Result Value Ref Range    Glucose (POC) 209 (H) 65 - 100 mg/dL    Performed by [facility], BASIC    Collection Time: 05/14/17  2:09 AM   Result Value Ref Range    Sodium 142 136 - 145 mmol/L    Potassium 3.5 3.5 - 5.1 mmol/L    Chloride 104 97 - 108 mmol/L    CO2 27 21 - 32 mmol/L    Anion gap 11 5 - 15 mmol/L    Glucose 122 (H) 65 - 100 mg/dL    BUN 9 6 - 20 MG/DL    Creatinine 1.03 (H) 0.55 - 1.02 MG/DL    BUN/Creatinine ratio 9 (L) 12 - 20      GFR est AA >60 >60 ml/min/1.73m2    GFR est non-AA 54 (L) >60 ml/min/1.73m2    Calcium 8.4 (L) 8.5 - 10.1 MG/DL   MAGNESIUM    Collection Time: 05/14/17  2:09 AM   Result Value Ref Range    Magnesium 1.9 1.6 - 2.4 mg/dL   PROTHROMBIN TIME + INR    Collection Time: 05/14/17  2:09 AM   Result Value Ref Range    INR 1.3 (H) 0.9 - 1.1      Prothrombin time 13.4 (H) 9.0 - 11.1 sec   CBC W/O DIFF    Collection Time: 05/14/17  2:09 AM Result Value Ref Range    WBC 9.4 3.6 - 11.0 K/uL    RBC 4.13 3.80 - 5.20 M/uL    HGB 11.7 11.5 - 16.0 g/dL    HCT 35.2 35.0 - 47.0 %    MCV 85.2 80.0 - 99.0 FL    MCH 28.3 26.0 - 34.0 PG    MCHC 33.2 30.0 - 36.5 g/dL    RDW 14.6 (H) 11.5 - 14.5 %    PLATELET 156 366 - 410 K/uL   GLUCOSE, POC    Collection Time: 05/14/17  7:36 AM   Result Value Ref Range    Glucose (POC) 152 (H) 65 - 100 mg/dL    Performed by Epi Sofia, POC    Collection Time: 05/14/17 11:19 AM   Result Value Ref Range    Glucose (POC) 232 (H) 65 - 100 mg/dL    Performed by Pillo Pierce        Telemetry: normal sinus rhythm      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 (6/2/2010)      Cardiac pacemaker in situ (7/5/2012)      Mixed hyperlipidemia (7/5/2012)      S/P coronary artery stent placement (7/4/2014)      Overview: 4/7/17 PCI/ROSALINO Diagonal      Type 2 diabetes mellitus with diabetic neuropathy, without long-term current use of insulin ([facility] [address] (1/31/2017)      S/P ablation of atrial fibrillation (5/2/2017)      Overview: 5/1/17      Tachycardia (5/9/2017)      Chronic systolic HF (heart failure) ([address] (5/10/2017)      Overview: 4/2017 EF 25-30%      History of Clostridium difficile infection (5/10/2017)      Overview: 4/2017 CDiff positive      Junctional tachycardia ([address] (5/10/2017)      Hypotension (5/10/2017)        Plan:     Continues to improve. Continue nebs, diuretics. If hemoptysis does not improve, will need pulmonary evaluation perhaps from another physician. May need bronchoscopy.     Hugo Cooley MD

## 2017-05-14 NOTE — PROGRESS NOTES
1930 Received report from KATIE Cardona. Assumed patient care. [address] SHIFT NURSING NOTE    Bedside shift change report given to KATIE Turk (oncoming nurse) by Terry Richards RN (offgoing nurse). Report included the following information SBAR, Kardex, Intake/Output, MAR, Recent Results and Cardiac Rhythm NSR.    SHIFT SUMMARY:         Admission Date 5/9/2017   Admission Diagnosis Tachycardia   Consults IP CONSULT TO HOSPITALIST  IP CONSULT TO CARDIOLOGY  IP CONSULT TO PULMONOLOGY        Consults   [] PT   [] OT   [] Speech   [] Palliative      [] Hospice    [] Case Management   [] None   Cardiac Monitoring   [x] Yes   [] No     Antibiotics   [x] Yes   [] No   GI Prophylaxis  (Ex: Protonix, Pepcid, etc,.)   [] Yes   [x] No          DVT Prophylaxis   SCDs:             Mayo stockings:         [x] Medication (Ex: Lovenox, Eliquis, Brilinta, Coumadin,  Heparin, etc..)   [] Contraindicated   [] No VTE needed       Urinary Catheter             LDAs           Triple Lumen Central line 05/10/17 Right Neck (Active)   Central Line Being Utilized Yes 5/14/2017  7:29 PM   Criteria for Appropriate Use Hemodynamically unstable, requiring monitoring lines, vasopressors, or volume resuscitation 5/14/2017  7:29 PM   Site Assessment Clean, dry, & intact 5/14/2017  7:29 PM   Infiltration Assessment 0 5/14/2017  7:29 PM   Affected Extremity/Extremities Color distal to insertion site pink (or appropriate for race); Pulses palpable;Range of motion performed 5/14/2017  7:29 PM   Date of Last Dressing Change 05/09/17 5/14/2017  7:29 PM   Dressing Status Clean, dry, & intact 5/14/2017  7:29 PM   Dressing Type Disk with Chlorhexadine gluconate (CHG); Transparent 5/14/2017  7:29 PM   Action Taken Open ports on tubing capped 5/14/2017  7:29 PM   Proximal Hub Color/Line Status Brown;Flushed;Capped 5/14/2017  7:29 PM   Positive Blood Return (Medial Site) Yes 5/14/2017  7:29 PM   Medial Hub Color/Line Status Blue; Infusing;Patent; Flushed 5/14/2017 7:29 PM   Positive Blood Return (Lateral Site) Yes 5/14/2017  7:29 PM   Distal Hub Color/Line Status White;Flushed;Patent;Capped 5/14/2017  7:29 PM   Positive Blood Return (Site #3) Yes 5/14/2017  7:29 PM   Alcohol Cap Used Yes 5/14/2017  7:29 PM                          I/Os   Intake/Output Summary (Last 24 hours) at 05/15/17 0046  Last data filed at 05/15/17 0028   Gross per 24 hour   Intake           844.16 ml   Output             2195 ml   Net         -1350.84 ml         Activity Level Activity Level: Up with Assistance     Activity Assistance: Partial (one person)   Diet Active Orders   Diet    DIET CARDIAC Regular      Purposeful Rounding every 1-2 hour? [x] Yes    Anthony Score  Total Score: 2   Bed Alarm (If score 3 or >)   [] Yes    [] Refused (See signed refusal form in chart)   Landon Score  Landon Score: 20       Landon Score (if score 14 or less)   [] PMT consult   [] Nutrition consult   [] Wound Care consult      []  Specialty bed         Influenza Vaccine Received Flu Vaccine for Current Season (usually Sept-March): Not Flu Season               Needs prior to discharge:   Home O2 required:    [] Yes   [x] No     If yes, how much O2 required?     Other:    Last Bowel Movement Date: 05/13/17   Readmission Risk Assessment Tool Score High Risk            32       Total Score        3 Relationship with PCP    3 Patient Length of Stay > 5    4 More than 1 Admission in calendar year    5 Patient Insurance is Medicare, Medicaid or Self Pay    17 Charlson Comorbidity Score        Criteria that do not apply:    Patient Living Status       Expected Length of Stay 2d 14h   Actual Length of Stay 6

## 2017-05-14 NOTE — PROGRESS NOTES
Hospitalist Progress Note    NAME: Davis Fleming   :  1950   MRN:  [id number]       Interim Hospital Summary: 73 y.o. female whom presented on 2017 with      Assessment / Plan:  Hemoptysis Not POA  We have to continue ASA/Plavix due to recent stents  Holding Coumadin   Cardiology recommended pulmonary consultation, RN spoke to Dr Veronica Parekh yesterday and I spoke to Dr Veronica Parekh today, as per him there is nothing to add so he will not see the patient  Add mucinex  Diurese as below    Respiratory distress  Due to ILD vs Acute on chronic systolic heart failure with EF 25%  CTA chest with ILD, findings could be related to CHF  Continue her on 20mg IV lasix BID  Cardiology involved in patient's care, seems pulmonary signed off  Monitor fluid status closely while diuresed    Junctional Tachycardia/A.fib with RVR with associated hypotension  Initially Managed in ICU, now transferred to tele  Switched to PO Amiodarone  Off Neosynephrine  S/p cardioversion in sinus rhythm  Cardiology is involved in patient's care  She had ablation 17)    Sepsis POA   Due to Recurrent vs persistent C.diff  Recently completed 10 days coarse of PO flagyl  Diarrhea resolved, will complete 14 days coarse of PO vanco  Add regulo Q    DM type 2  holding metformin, got CTA  Continue SSI    Elevated troponin  due to strain from above tachycardia  Cardiology is following    Code Status: Full  Surrogate Decision Maker: Daughter     DVT Prophylaxis: on coumadin  GI Prophylaxis: not indicated     Baseline: ambulatory      Subjective: Pt seen and examined at bedside. Continue to feel short of breath.  Overnight events d/w RN     CHIEF COMPLAINT: f/u \"Worsening SOB\"     Review of Systems:  Symptom Y/N Comments  Symptom Y/N Comments   Fever/Chills n   Chest Pain n    Poor Appetite    Edema     Cough n   Abdominal Pain n    Sputum    Joint Pain     SOB/GREENFIELD y   Pruritis/Rash     Nausea/vomit    Tolerating PT/OT     Diarrhea    Tolerating Diet y    Constipation    Other       Could NOT obtain due to:      Objective:     VITALS:   Last 24hrs VS reviewed since prior progress note. Most recent are:  Patient Vitals for the past 24 hrs:   Temp Pulse Resp BP SpO2   05/14/17 0956 - - - - 100 %   05/14/17 0832 - 98 17 137/65 100 %   05/14/17 0335 97.9 °F (36.6 °C) 95 18 (!) 125/99 97 %   05/13/17 1851 98.1 °F (36.7 °C) 77 20 129/54 100 %   05/13/17 1649 - - - - 99 %   05/13/17 1633 98.1 °F (36.7 °C) 95 26 115/56 99 %   05/13/17 1606 - - - - 100 %   05/13/17 1234 97.9 °F (36.6 °C) 72 18 132/60 98 %       Intake/Output Summary (Last 24 hours) at 05/14/17 1043  Last data filed at 05/14/17 0225   Gross per 24 hour   Intake                0 ml   Output              450 ml   Net             -450 ml        PHYSICAL EXAM:  General: WD, WN. Alert, cooperative, no acute distress    EENT:  EOMI. Anicteric sclerae. MMM  Resp:  Coarse breath sounds. No accessory muscle use  CV:  Irregularly irregular  rhythm,  No edema  GI:  Soft, Non distended, Non tender.  +Bowel sounds  Neurologic:  Alert and oriented X 3, normal speech,   Psych:   Good insight. Not anxious nor agitated  Skin:  No rashes. No jaundice    Reviewed most current lab test results and cultures  YES  Reviewed most current radiology test results   YES  Review and summation of old records today    NO  Reviewed patient's current orders and MAR    YES  PMH/SH reviewed - no change compared to H&P  ________________________________________________________________________  Care Plan discussed with:    Comments   Patient y    Family      RN y    Care Manager     Consultant                        Multidiciplinary team rounds were held today with , nursing, pharmacist and clinical coordinator. Patient's plan of care was discussed; medications were reviewed and discharge planning was addressed.      ________________________________________________________________________  Total NON critical care TIME:  35 Minutes    Total CRITICAL CARE TIME Spent:   Minutes non procedure based      Comments   >50% of visit spent in counseling and coordination of care     ________________________________________________________________________  Leon Ortiz MD     Procedures: see electronic medical records for all procedures/Xrays and details which were not copied into this note but were reviewed prior to creation of Plan. LABS:  I reviewed today's most current labs and imaging studies.   Pertinent labs include:  Recent Labs      05/14/17 0209 05/12/17   0616   WBC  9.4  11.0   HGB  11.7  10.9*   HCT  35.2  33.5*   PLT  253  223     Recent Labs      05/14/17 0209 05/13/17   0140  05/12/17 0616  05/12/17   0614   NA  142  140  142   --    K  3.5  3.7  4.1   --    CL  104  106  109*   --    CO2  27  28  23   --    GLU  122*  107*  185*   --    BUN  9  10  10   --    CREA  1.03*  0.95  0.98   --    CA  8.4*  8.6  8.8   --    MG  1.9  2.1  2.3   --    TBILI   --    --    --   0.5   SGOT   --    --    --   35   ALT   --    --    --   42   INR  1.3*  2.0*  3.3*   --        Signed: Leon Ortiz MD

## 2017-05-15 ENCOUNTER — APPOINTMENT (OUTPATIENT)
Dept: GENERAL RADIOLOGY | Age: 67
DRG: 871 | End: 2017-05-15
Attending: INTERNAL MEDICINE
Payer: COMMERCIAL

## 2017-05-15 LAB
ANION GAP BLD CALC-SCNC: 8 MMOL/L (ref 5–15)
BUN SERPL-MCNC: 11 MG/DL (ref 6–20)
BUN/CREAT SERPL: 11 (ref 12–20)
CALCIUM SERPL-MCNC: 8.6 MG/DL (ref 8.5–10.1)
CHLORIDE SERPL-SCNC: 101 MMOL/L (ref 97–108)
CO2 SERPL-SCNC: 28 MMOL/L (ref 21–32)
CREAT SERPL-MCNC: 0.99 MG/DL (ref 0.55–1.02)
GLUCOSE BLD STRIP.AUTO-MCNC: 166 MG/DL (ref 65–100)
GLUCOSE BLD STRIP.AUTO-MCNC: 182 MG/DL (ref 65–100)
GLUCOSE BLD STRIP.AUTO-MCNC: 190 MG/DL (ref 65–100)
GLUCOSE BLD STRIP.AUTO-MCNC: 212 MG/DL (ref 65–100)
GLUCOSE SERPL-MCNC: 172 MG/DL (ref 65–100)
INR PPP: 1.1 (ref 0.9–1.1)
MAGNESIUM SERPL-MCNC: 2 MG/DL (ref 1.6–2.4)
POTASSIUM SERPL-SCNC: 3.5 MMOL/L (ref 3.5–5.1)
PROTHROMBIN TIME: 11.4 SEC (ref 9–11.1)
SERVICE CMNT-IMP: ABNORMAL
SODIUM SERPL-SCNC: 137 MMOL/L (ref 136–145)

## 2017-05-15 PROCEDURE — 74011250637 HC RX REV CODE- 250/637: Performed by: INTERNAL MEDICINE

## 2017-05-15 PROCEDURE — 80048 BASIC METABOLIC PNL TOTAL CA: CPT | Performed by: INTERNAL MEDICINE

## 2017-05-15 PROCEDURE — 85610 PROTHROMBIN TIME: CPT | Performed by: INTERNAL MEDICINE

## 2017-05-15 PROCEDURE — 83735 ASSAY OF MAGNESIUM: CPT | Performed by: INTERNAL MEDICINE

## 2017-05-15 PROCEDURE — 74011250637 HC RX REV CODE- 250/637: Performed by: FAMILY MEDICINE

## 2017-05-15 PROCEDURE — 94640 AIRWAY INHALATION TREATMENT: CPT

## 2017-05-15 PROCEDURE — 82962 GLUCOSE BLOOD TEST: CPT

## 2017-05-15 PROCEDURE — 74011250636 HC RX REV CODE- 250/636: Performed by: INTERNAL MEDICINE

## 2017-05-15 PROCEDURE — 65660000000 HC RM CCU STEPDOWN

## 2017-05-15 PROCEDURE — 77030027138 HC INCENT SPIROMETER -A

## 2017-05-15 PROCEDURE — 77010033678 HC OXYGEN DAILY

## 2017-05-15 PROCEDURE — 36415 COLL VENOUS BLD VENIPUNCTURE: CPT | Performed by: INTERNAL MEDICINE

## 2017-05-15 PROCEDURE — 74011000250 HC RX REV CODE- 250: Performed by: INTERNAL MEDICINE

## 2017-05-15 PROCEDURE — 74011636637 HC RX REV CODE- 636/637: Performed by: NURSE PRACTITIONER

## 2017-05-15 PROCEDURE — 71020 XR CHEST PA LAT: CPT

## 2017-05-15 RX ORDER — ALBUTEROL SULFATE 0.83 MG/ML
2.5 SOLUTION RESPIRATORY (INHALATION)
Status: DISCONTINUED | OUTPATIENT
Start: 2017-05-15 | End: 2017-05-20

## 2017-05-15 RX ORDER — GABAPENTIN 300 MG/1
600 CAPSULE ORAL DAILY
Status: DISCONTINUED | OUTPATIENT
Start: 2017-05-16 | End: 2017-05-22 | Stop reason: HOSPADM

## 2017-05-15 RX ORDER — CARVEDILOL 3.12 MG/1
3.12 TABLET ORAL 2 TIMES DAILY WITH MEALS
Status: DISCONTINUED | OUTPATIENT
Start: 2017-05-15 | End: 2017-05-17

## 2017-05-15 RX ADMIN — GABAPENTIN 800 MG: 100 CAPSULE ORAL at 09:02

## 2017-05-15 RX ADMIN — VANCOMYCIN HYDROCHLORIDE 125 MG: 1 INJECTION, POWDER, LYOPHILIZED, FOR SOLUTION INTRAVENOUS at 00:03

## 2017-05-15 RX ADMIN — VANCOMYCIN HYDROCHLORIDE 125 MG: 1 INJECTION, POWDER, LYOPHILIZED, FOR SOLUTION INTRAVENOUS at 06:07

## 2017-05-15 RX ADMIN — FLUTICASONE FUROATE AND VILANTEROL TRIFENATATE 1 PUFF: 100; 25 POWDER RESPIRATORY (INHALATION) at 12:44

## 2017-05-15 RX ADMIN — AMIODARONE HYDROCHLORIDE 200 MG: 200 TABLET ORAL at 08:50

## 2017-05-15 RX ADMIN — ALBUTEROL SULFATE 2.5 MG: 2.5 SOLUTION RESPIRATORY (INHALATION) at 15:33

## 2017-05-15 RX ADMIN — FUROSEMIDE 20 MG: 10 INJECTION, SOLUTION INTRAMUSCULAR; INTRAVENOUS at 17:50

## 2017-05-15 RX ADMIN — ALBUTEROL SULFATE 2.5 MG: 2.5 SOLUTION RESPIRATORY (INHALATION) at 19:35

## 2017-05-15 RX ADMIN — GABAPENTIN 400 MG: 300 CAPSULE ORAL at 21:18

## 2017-05-15 RX ADMIN — ACETAMINOPHEN 650 MG: 325 TABLET, FILM COATED ORAL at 17:49

## 2017-05-15 RX ADMIN — Medication 1 CAPSULE: at 08:55

## 2017-05-15 RX ADMIN — ALBUTEROL SULFATE 2.5 MG: 2.5 SOLUTION RESPIRATORY (INHALATION) at 11:23

## 2017-05-15 RX ADMIN — GUAIFENESIN 600 MG: 600 TABLET, EXTENDED RELEASE ORAL at 08:54

## 2017-05-15 RX ADMIN — VANCOMYCIN HYDROCHLORIDE 125 MG: 1 INJECTION, POWDER, LYOPHILIZED, FOR SOLUTION INTRAVENOUS at 15:46

## 2017-05-15 RX ADMIN — AMIODARONE HYDROCHLORIDE 200 MG: 200 TABLET ORAL at 17:49

## 2017-05-15 RX ADMIN — ASPIRIN 81 MG CHEWABLE TABLET 81 MG: 81 TABLET CHEWABLE at 08:53

## 2017-05-15 RX ADMIN — CLOPIDOGREL BISULFATE 75 MG: 75 TABLET ORAL at 08:53

## 2017-05-15 RX ADMIN — UMECLIDINIUM 1 PUFF: 62.5 AEROSOL, POWDER ORAL at 12:44

## 2017-05-15 RX ADMIN — FUROSEMIDE 20 MG: 10 INJECTION, SOLUTION INTRAMUSCULAR; INTRAVENOUS at 09:04

## 2017-05-15 RX ADMIN — INSULIN LISPRO 2 UNITS: 100 INJECTION, SOLUTION INTRAVENOUS; SUBCUTANEOUS at 17:57

## 2017-05-15 RX ADMIN — INSULIN LISPRO 3 UNITS: 100 INJECTION, SOLUTION INTRAVENOUS; SUBCUTANEOUS at 09:02

## 2017-05-15 RX ADMIN — COLCHICINE 0.6 MG: 0.6 TABLET, FILM COATED ORAL at 08:54

## 2017-05-15 RX ADMIN — CARVEDILOL 3.12 MG: 3.12 TABLET, FILM COATED ORAL at 15:45

## 2017-05-15 RX ADMIN — INSULIN LISPRO 2 UNITS: 100 INJECTION, SOLUTION INTRAVENOUS; SUBCUTANEOUS at 12:39

## 2017-05-15 RX ADMIN — GABAPENTIN 800 MG: 100 CAPSULE ORAL at 15:45

## 2017-05-15 NOTE — PROGRESS NOTES
Pt was admitted through the ED after her cardiologist suggested she come to the ED. Pt is a pleasant alert and oriented female who live in her two story home with her brother, who had CP, and her 12 yr old grandson. Pt states that she is independent with all ADLs and IADLs. She drives and works outside the home for [employer]. She is out on FMLA. Pt anticipates discharging home with MD follow up. CM will follow for d/c needs. Care Management Interventions  PCP Verified by CM:  Yes  Transition of Care Consult (CM Consult): Discharge Planning  Discharge Durable Medical Equipment: No  Physical Therapy Consult: No  Occupational Therapy Consult: No  Speech Therapy Consult: No  Current Support Network: Own Home (her brother and 12 yr old grandson live with her)  Confirm Follow Up Transport: Self  Plan discussed with Pt/Family/Caregiver: Yes  Discharge Location  Discharge Placement: [address] , [address] [place] [address]

## 2017-05-15 NOTE — PROGRESS NOTES
[facility]  Pulmonary, Critical Care, and Sleep Medicine    Name: Paulie Dunlap MRN: [id number]   : 1950 Hospital: [facility]   Date: 5/15/2017        IMPRESSION:   · Hemoptysis due to pulmonary edema in the setting of anticoagulation and underlying emphysema  · She does not have interstitial lung disease - she has emphysema with superimposed pulmonary edema - CT scan can look like ILD in this setting, but there is no convincing evidence of new onset ILD (CT with Kerley B lines, etc) - I have followed her for her emphysema for years  · COPD/emphysema - FEV1 1.29 L (59% predicted), NORMAL lung volumes, 2015 - no convincing evidence of exacerbation  · Acute systolic heart failure - new onset CHF with EF 25% in April of this year, now s/p stenting  · Atrial fibrillation  · C diff colitis  · Diabetes - glucose control is very difficult when she is on systemic steroids  · H/O tobacco use      PLAN:   · O2 - wean as tolerated  · Diurese  · Hold warfarin; ASA and Plavix will have to continue due to recent stents - will have to tolerate some hemoptysis until pulmonary edema better  · Check sputum culture for completeness  · Check chest X-ray as this has not been done since hemoptysis started  · No role for bronchoscopy in this setting  · Bronchodilators  · Will need a repeat CT scan in 8-12 weeks for completeness  · Will follow     Subjective/Interval History:   I have reviewed the flowsheet and previous days notes. Asked to reevaluate patient for hemoptysis. Seen earlier in hospitalization by my partners. She has been in the hospital with heart failure and afib, complicated by C diff colitis. Underwent cardioversion on 17 and since that time she has had hemoptysis, coughing up dark red clots. She actually feels less short of breath since the cardioversion, but she is understandably concerned about the hemoptysis.   She has had hemoptysis from bronchitis in the past, though not as much blood as she is currently coughing. Review of Systems   Constitutional: Negative. HENT: Negative. Eyes: Negative. Respiratory: Positive for shortness of breath. Cardiovascular: Negative. Gastrointestinal: Negative. Objective:   Vital Signs:    Visit Vitals    /61 (BP 1 Location: Right arm, BP Patient Position: At rest)    Pulse 65    Temp 98.2 °F (36.8 °C)    Resp 18    Ht 5' 6\" (1.676 m)    Wt 75.6 kg (166 lb 11.2 oz)    SpO2 98%    Breastfeeding No    BMI 26.91 kg/m2       O2 Device: Nasal cannula   O2 Flow Rate (L/min): 2 l/min   Temp (24hrs), Av.8 °F (36.6 °C), Min:97.3 °F (36.3 °C), Max:98.2 °F (36.8 °C)       Intake/Output:   Last shift:         Last 3 shifts:  1901 - 05/15 0700  In: 844.2 [P.O.:510; I.V.:334.2]  Out: 2114 [Urine:2595]    Intake/Output Summary (Last 24 hours) at 05/15/17 1038  Last data filed at 05/15/17 0610   Gross per 24 hour   Intake           844.16 ml   Output             1825 ml   Net          -980.84 ml      Physical Exam   Constitutional: She is oriented to person, place, and time. She is cooperative. No distress. HENT:   Head: Normocephalic and atraumatic. Mouth/Throat: No oropharyngeal exudate. Eyes: No scleral icterus. Cardiovascular: Normal rate and regular rhythm. Pulmonary/Chest: She has no wheezes. She has rales. Abdominal: Soft. Bowel sounds are normal. She exhibits no distension. There is no tenderness. Musculoskeletal: She exhibits no edema. Neurological: She is alert and oriented to person, place, and time. Skin: Skin is warm and dry. No rash noted.      Data:   Labs:  Recent Labs      17   0209   WBC  9.4   HGB  11.7   HCT  35.2   PLT  253     Recent Labs      05/15/17   0246  17   0209  17   0140   NA  137  142  140   K  3.5  3.5  3.7   CL  101  104  106   CO2  28  27  28   GLU  172*  122*  107*   BUN  11  9  10   CREA  0.99  1.03*  0.95   CA  8.6 8.4*  8.6   MG  2.0  1.9  2.1   INR  1.1  1.3*  2.0*     No results for input(s): PH, PCO2, PO2, HCO3, FIO2 in the last 72 hours.     Imaging:  I have personally reviewed the patients radiographs:  None today, prior films reviewed in depth, back to 2015        Asia Spencer MD

## 2017-05-15 NOTE — PROGRESS NOTES
Renal dosing Adjustment  PTA Regimen:  Gabapentin 800mg PO TID    Estimated Creatinine Clearance: 58.8 mL/min (based on Cr of 0.99).   Estimated Creatinine Clearance (using IBW): 53.0 mL/min  Recent Labs      05/15/17   0246  17   0209  17   0140   CREA  0.99  1.03*  0.95   BUN  11  9  10   WBC   --   9.4   --    NA  137  142  140   K  3.5  3.5  3.7   MG  2.0  1.9  2.1   CA  8.6  8.4*  8.6   HGB   --   11.7   --    HCT   --   35.2   --    PLT   --   253   --    INR  1.1  1.3*  2.0*     Temp (24hrs), Av.7 °F (36.5 °C), Min:97.2 °F (36.2 °C), Max:98.2 °F (36.8 °C)    Renal Dosing Adjustment:  Gabapentin Renal dosing regimen:  400mg PO BID, 600mg PO daily at noon  (approx 1400mg max dose for current renal function)    Thank you,  Nathaly Andrea, [facility]

## 2017-05-15 NOTE — PROGRESS NOTES
Nutrition Services      Nutrition Screen:  Wt Readings from Last 10 Encounters:   05/15/17 75.6 kg (166 lb 11.2 oz)   05/09/17 77.2 kg (170 lb 3.2 oz)   05/01/17 77.1 kg (170 lb)   04/25/17 78.1 kg (172 lb 3.2 oz)   04/18/17 76.3 kg (168 lb 3.2 oz)   04/18/17 76.6 kg (168 lb 12.8 oz)   04/17/17 78.1 kg (172 lb 3.2 oz)   04/12/17 76.7 kg (169 lb)   04/10/17 77.2 kg (170 lb 3.1 oz)   02/14/17 82.9 kg (182 lb 12.2 oz)     Body mass index is 26.91 kg/(m^2). Supplements:                        _____ ordered ______  declined. __ __  Pt is nutritionally stable at this time, will rescreen in 7 days. _x __    Pt is at nutritional risk and will be rescreened in 3-5 days. __ __  Pt is at moderate or high nutritional risk, will refer to RD for assessment.        Tiffani Reynoso  Dietetic Technician, Registered

## 2017-05-15 NOTE — PROGRESS NOTES
Hospitalist Progress Note    NAME: Gregg Bennett   :  1950   MRN:  [id number]       Interim Hospital Summary: 73 y.o. female whom presented on 2017 with      Assessment / Plan:  Hemoptysis Not POA  We have to continue ASA/Plavix due to recent stents  Holding Coumadin   Cardiology recommended pulmonary consultation, I spoke to Dr Baljinder Goldman who will be seeing the patient today  Added mucinex  Diurese as below    Respiratory distress  Due to ILD vs Acute on chronic systolic heart failure with EF 25%  CTA chest with ILD, findings could be related to CHF  Continue her on 20mg IV lasix BID  Cardiology involved in patient's care, seems pulmonary signed off  Monitor fluid status closely while diuresed    Junctional Tachycardia/A.fib with RVR with associated hypotension  Initially Managed in ICU, now transferred to tele  Switched to PO Amiodarone  Off Neosynephrine  S/p cardioversion in sinus rhythm  Cardiology is involved in patient's care  She had ablation 17)    Sepsis POA   Due to Recurrent vs persistent C.diff  Recently completed 10 days coarse of PO flagyl  Diarrhea resolved, will complete 14 days coarse of PO vanco  Add regulo Q    DM type 2  holding metformin, got CTA  Continue SSI    Elevated troponin  due to strain from above tachycardia  Cardiology is following    Code Status: Full  Surrogate Decision Maker: Daughter     DVT Prophylaxis: on coumadin  GI Prophylaxis: not indicated     Baseline: ambulatory      Subjective: Pt seen and examined at bedside. Continue to feel short of breath.  Overnight events d/w RN     CHIEF COMPLAINT: f/u \"Worsening SOB\"     Review of Systems:  Symptom Y/N Comments  Symptom Y/N Comments   Fever/Chills n   Chest Pain n    Poor Appetite    Edema     Cough n   Abdominal Pain n    Sputum    Joint Pain     SOB/GREENFIELD y   Pruritis/Rash     Nausea/vomit    Tolerating PT/OT     Diarrhea    Tolerating Diet y    Constipation    Other       Could NOT obtain due to: Objective:     VITALS:   Last 24hrs VS reviewed since prior progress note. Most recent are:  Patient Vitals for the past 24 hrs:   Temp Pulse Resp BP SpO2   05/15/17 0815 98.2 °F (36.8 °C) 65 18 120/61 98 %   05/15/17 0331 98.1 °F (36.7 °C) (!) 104 18 105/60 98 %   05/14/17 2359 97.3 °F (36.3 °C) (!) 103 18 117/71 100 %   05/14/17 1929 97.8 °F (36.6 °C) (!) 102 16 112/59 97 %   05/14/17 1543 97.5 °F (36.4 °C) 97 18 120/59 96 %   05/14/17 1109 - 86 17 116/58 98 %       Intake/Output Summary (Last 24 hours) at 05/15/17 1005  Last data filed at 05/15/17 0610   Gross per 24 hour   Intake           844.16 ml   Output             1825 ml   Net          -980.84 ml        PHYSICAL EXAM:  General: WD, WN. Alert, cooperative, no acute distress    EENT:  EOMI. Anicteric sclerae. MMM  Resp:  Coarse breath sounds. No accessory muscle use  CV:  Irregularly irregular  rhythm,  No edema  GI:  Soft, Non distended, Non tender.  +Bowel sounds  Neurologic:  Alert and oriented X 3, normal speech,   Psych:   Good insight. Not anxious nor agitated  Skin:  No rashes. No jaundice    Reviewed most current lab test results and cultures  YES  Reviewed most current radiology test results   YES  Review and summation of old records today    NO  Reviewed patient's current orders and MAR    YES  PMH/ reviewed - no change compared to H&P  ________________________________________________________________________  Care Plan discussed with:    Comments   Patient y    Family      RN y    Care Manager     Consultant                        Multidiciplinary team rounds were held today with , nursing, pharmacist and clinical coordinator. Patient's plan of care was discussed; medications were reviewed and discharge planning was addressed.      ________________________________________________________________________  Total NON critical care TIME:  35 Minutes    Total CRITICAL CARE TIME Spent:   Minutes non procedure based      Comments >50% of visit spent in counseling and coordination of care     ________________________________________________________________________  Joyce Oseguera MD     Procedures: see electronic medical records for all procedures/Xrays and details which were not copied into this note but were reviewed prior to creation of Plan. LABS:  I reviewed today's most current labs and imaging studies.   Pertinent labs include:  Recent Labs      05/14/17   0209   WBC  9.4   HGB  11.7   HCT  35.2   PLT  253     Recent Labs      05/15/17   0246  05/14/17   0209  05/13/17   0140   NA  137  142  140   K  3.5  3.5  3.7   CL  101  104  106   CO2  28  27  28   GLU  172*  122*  107*   BUN  11  9  10   CREA  0.99  1.03*  0.95   CA  8.6  8.4*  8.6   MG  2.0  1.9  2.1   INR  1.1  1.3*  2.0*       Signed: Joyce Oseguera MD

## 2017-05-15 NOTE — PROGRESS NOTES
[address] SHIFT NURSING NOTE    Bedside shift change report given to zachariah (oncoming nurse) by Georgia  (offgoing nurse). Report included the following information SBAR. SHIFT SUMMARY: patient up without assistance to [facility]. Now she is off her 02.no bowel movements today. Admission Date 5/9/2017   Admission Diagnosis Tachycardia   Consults IP CONSULT TO HOSPITALIST  IP CONSULT TO CARDIOLOGY  IP CONSULT TO PULMONOLOGY        Consults   [] PT   [] OT   [] Speech   [] Palliative      [] Hospice    [] Case Management   [] None   Cardiac Monitoring   [] Yes   [] No     Antibiotics   [] Yes   [] No   GI Prophylaxis  (Ex: Protonix, Pepcid, etc,.)   [] Yes   [] No          DVT Prophylaxis   SCDs:             Mayo stockings:         [] Medication (Ex: Lovenox, Eliquis, Brilinta, Coumadin,  Heparin, etc..)   [] Contraindicated   [] No VTE needed       Urinary Catheter             LDAs           Triple Lumen Central line 05/10/17 Right Neck (Active)   Central Line Being Utilized Yes 5/15/2017  3:31 AM   Criteria for Appropriate Use Hemodynamically unstable, requiring monitoring lines, vasopressors, or volume resuscitation 5/15/2017  3:31 AM   Site Assessment Clean, dry, & intact 5/15/2017  3:31 AM   Infiltration Assessment 0 5/15/2017  3:31 AM   Affected Extremity/Extremities Color distal to insertion site pink (or appropriate for race); Pulses palpable;Range of motion performed 5/15/2017  3:31 AM   Date of Last Dressing Change 05/09/17 5/15/2017  3:31 AM   Dressing Status Clean, dry, & intact 5/15/2017  3:31 AM   Dressing Type Disk with Chlorhexadine gluconate (CHG); Transparent 5/15/2017  3:31 AM   Action Taken Open ports on tubing capped 5/15/2017  3:31 AM   Proximal Hub Color/Line Status Brown;Flushed;Patent 5/15/2017  3:31 AM   Positive Blood Return (Medial Site) Yes 5/15/2017  3:31 AM   Medial Hub Color/Line Status Blue;Flushed;Patent 5/15/2017  3:31 AM   Positive Blood Return (Lateral Site) Yes 5/15/2017  3:31 AM Distal Hub Color/Line Status White;Flushed;Patent 5/15/2017  3:31 AM   Positive Blood Return (Site #3) Yes 5/15/2017  3:31 AM   Alcohol Cap Used Yes 5/15/2017  3:31 AM                          I/Os   Intake/Output Summary (Last 24 hours) at 05/15/17 1610  Last data filed at 05/15/17 1554   Gross per 24 hour   Intake          1384.16 ml   Output             1825 ml   Net          -440.84 ml         Activity Level Activity Level: Head of bed elevated (degrees), Up with Assistance     Activity Assistance: Partial (one person)   Diet Active Orders   Diet    DIET CARDIAC Regular      Purposeful Rounding every 1-2 hour? [] Yes    Anthony Score  Total Score: 2   Bed Alarm (If score 3 or >)   [] Yes    [] Refused (See signed refusal form in chart)   Landon Score  Landon Score: 19       Landon Score (if score 14 or less)   [] PMT consult   [] Nutrition consult   [] Wound Care consult      []  Specialty bed         Influenza Vaccine Received Flu Vaccine for Current Season (usually Sept-March): Not Flu Season               Needs prior to discharge:   Home O2 required:    [] Yes   [] No     If yes, how much O2 required?     Other:    Last Bowel Movement Date: 05/13/17   Readmission Risk Assessment Tool Score High Risk            32       Total Score        3 Relationship with PCP    3 Patient Length of Stay > 5    4 More than 1 Admission in calendar year    5 Patient Insurance is Medicare, Medicaid or Self Pay    17 Charlson Comorbidity Score        Criteria that do not apply:    Patient Living Status       Expected Length of Stay 3d 19h   Actual Length of Stay 6

## 2017-05-15 NOTE — PROGRESS NOTES
Patient has been up int he chair this am. Pharmacy may change the dose of there Neurontin according to her renal function.

## 2017-05-15 NOTE — CARDIO/PULMONARY
Cardiopulmonary Rehab Nursing Entry:     Chart Reviewed. Pt admitted with Tachycardia and developed A fib overnight. Plan for Life Vest at Discharge. Echo from 5/9/17 LV Ef 25-30%.     PMH significant for:  -Chronic Systolic/Diastolic HF   -CAD  -COPD  -HTN Pt is a former smoker. This was a follow up CHF visit. Pt was able to verbalize weight parameters. When inquired how many mg of NA to limit in her diet? She replied,\" I eat foods without salt\". Advised pt to read food labels to note NA content. Demonstrated with a snack she had in her room pt able to return demonstration. She said she is unable to exercise outside of her home but was given exercises she can perform while sitting. Pt without questions but reinforcement recommended. Lorenso Frankel

## 2017-05-16 LAB
ANION GAP BLD CALC-SCNC: 9 MMOL/L (ref 5–15)
BUN SERPL-MCNC: 11 MG/DL (ref 6–20)
BUN/CREAT SERPL: 12 (ref 12–20)
CALCIUM SERPL-MCNC: 8.6 MG/DL (ref 8.5–10.1)
CHLORIDE SERPL-SCNC: 103 MMOL/L (ref 97–108)
CO2 SERPL-SCNC: 26 MMOL/L (ref 21–32)
CREAT SERPL-MCNC: 0.89 MG/DL (ref 0.55–1.02)
ERYTHROCYTE [DISTWIDTH] IN BLOOD BY AUTOMATED COUNT: 14.4 % (ref 11.5–14.5)
GLUCOSE BLD STRIP.AUTO-MCNC: 125 MG/DL (ref 65–100)
GLUCOSE BLD STRIP.AUTO-MCNC: 163 MG/DL (ref 65–100)
GLUCOSE BLD STRIP.AUTO-MCNC: 169 MG/DL (ref 65–100)
GLUCOSE BLD STRIP.AUTO-MCNC: 172 MG/DL (ref 65–100)
GLUCOSE SERPL-MCNC: 135 MG/DL (ref 65–100)
HCT VFR BLD AUTO: 36.1 % (ref 35–47)
HGB BLD-MCNC: 11.7 G/DL (ref 11.5–16)
INR PPP: 1.1 (ref 0.9–1.1)
MAGNESIUM SERPL-MCNC: 2 MG/DL (ref 1.6–2.4)
MCH RBC QN AUTO: 27.7 PG (ref 26–34)
MCHC RBC AUTO-ENTMCNC: 32.4 G/DL (ref 30–36.5)
MCV RBC AUTO: 85.3 FL (ref 80–99)
PLATELET # BLD AUTO: 258 K/UL (ref 150–400)
POTASSIUM SERPL-SCNC: 3.6 MMOL/L (ref 3.5–5.1)
PROTHROMBIN TIME: 11.2 SEC (ref 9–11.1)
RBC # BLD AUTO: 4.23 M/UL (ref 3.8–5.2)
SERVICE CMNT-IMP: ABNORMAL
SODIUM SERPL-SCNC: 138 MMOL/L (ref 136–145)
WBC # BLD AUTO: 6.6 K/UL (ref 3.6–11)

## 2017-05-16 PROCEDURE — 83735 ASSAY OF MAGNESIUM: CPT | Performed by: INTERNAL MEDICINE

## 2017-05-16 PROCEDURE — 74011250637 HC RX REV CODE- 250/637: Performed by: INTERNAL MEDICINE

## 2017-05-16 PROCEDURE — 74011250637 HC RX REV CODE- 250/637: Performed by: HOSPITALIST

## 2017-05-16 PROCEDURE — 74011250637 HC RX REV CODE- 250/637: Performed by: FAMILY MEDICINE

## 2017-05-16 PROCEDURE — 85610 PROTHROMBIN TIME: CPT | Performed by: INTERNAL MEDICINE

## 2017-05-16 PROCEDURE — 74011000250 HC RX REV CODE- 250: Performed by: INTERNAL MEDICINE

## 2017-05-16 PROCEDURE — 36415 COLL VENOUS BLD VENIPUNCTURE: CPT | Performed by: INTERNAL MEDICINE

## 2017-05-16 PROCEDURE — 74011636637 HC RX REV CODE- 636/637: Performed by: NURSE PRACTITIONER

## 2017-05-16 PROCEDURE — 94640 AIRWAY INHALATION TREATMENT: CPT

## 2017-05-16 PROCEDURE — 74011250636 HC RX REV CODE- 250/636: Performed by: INTERNAL MEDICINE

## 2017-05-16 PROCEDURE — 85027 COMPLETE CBC AUTOMATED: CPT | Performed by: INTERNAL MEDICINE

## 2017-05-16 PROCEDURE — 65660000000 HC RM CCU STEPDOWN

## 2017-05-16 PROCEDURE — 80048 BASIC METABOLIC PNL TOTAL CA: CPT | Performed by: INTERNAL MEDICINE

## 2017-05-16 PROCEDURE — 82962 GLUCOSE BLOOD TEST: CPT

## 2017-05-16 RX ORDER — GUAIFENESIN 600 MG/1
1200 TABLET, EXTENDED RELEASE ORAL 2 TIMES DAILY
Status: DISCONTINUED | OUTPATIENT
Start: 2017-05-16 | End: 2017-05-17

## 2017-05-16 RX ORDER — BENZONATATE 100 MG/1
100 CAPSULE ORAL
Status: DISCONTINUED | OUTPATIENT
Start: 2017-05-16 | End: 2017-05-22 | Stop reason: HOSPADM

## 2017-05-16 RX ORDER — GUAIFENESIN 100 MG/5ML
100 SOLUTION ORAL
Status: DISCONTINUED | OUTPATIENT
Start: 2017-05-16 | End: 2017-05-16

## 2017-05-16 RX ADMIN — VANCOMYCIN HYDROCHLORIDE 125 MG: 1 INJECTION, POWDER, LYOPHILIZED, FOR SOLUTION INTRAVENOUS at 00:19

## 2017-05-16 RX ADMIN — GUAIFENESIN 100 MG: 100 SOLUTION ORAL at 11:44

## 2017-05-16 RX ADMIN — VANCOMYCIN HYDROCHLORIDE 125 MG: 1 INJECTION, POWDER, LYOPHILIZED, FOR SOLUTION INTRAVENOUS at 11:44

## 2017-05-16 RX ADMIN — Medication 1 CAPSULE: at 08:42

## 2017-05-16 RX ADMIN — CLOPIDOGREL BISULFATE 75 MG: 75 TABLET ORAL at 08:42

## 2017-05-16 RX ADMIN — VANCOMYCIN HYDROCHLORIDE 125 MG: 1 INJECTION, POWDER, LYOPHILIZED, FOR SOLUTION INTRAVENOUS at 17:38

## 2017-05-16 RX ADMIN — COLCHICINE 0.6 MG: 0.6 TABLET, FILM COATED ORAL at 08:43

## 2017-05-16 RX ADMIN — VANCOMYCIN HYDROCHLORIDE 125 MG: 1 INJECTION, POWDER, LYOPHILIZED, FOR SOLUTION INTRAVENOUS at 06:37

## 2017-05-16 RX ADMIN — ACETAMINOPHEN 650 MG: 325 TABLET, FILM COATED ORAL at 11:55

## 2017-05-16 RX ADMIN — INSULIN LISPRO 2 UNITS: 100 INJECTION, SOLUTION INTRAVENOUS; SUBCUTANEOUS at 08:41

## 2017-05-16 RX ADMIN — AMIODARONE HYDROCHLORIDE 200 MG: 200 TABLET ORAL at 08:42

## 2017-05-16 RX ADMIN — ALBUTEROL SULFATE 2.5 MG: 2.5 SOLUTION RESPIRATORY (INHALATION) at 07:40

## 2017-05-16 RX ADMIN — GABAPENTIN 400 MG: 300 CAPSULE ORAL at 08:42

## 2017-05-16 RX ADMIN — BENZONATATE 100 MG: 100 CAPSULE ORAL at 17:42

## 2017-05-16 RX ADMIN — BENZONATATE 100 MG: 100 CAPSULE ORAL at 22:07

## 2017-05-16 RX ADMIN — CARVEDILOL 3.12 MG: 3.12 TABLET, FILM COATED ORAL at 08:42

## 2017-05-16 RX ADMIN — ASPIRIN 81 MG CHEWABLE TABLET 81 MG: 81 TABLET CHEWABLE at 08:42

## 2017-05-16 RX ADMIN — INSULIN LISPRO 2 UNITS: 100 INJECTION, SOLUTION INTRAVENOUS; SUBCUTANEOUS at 12:07

## 2017-05-16 RX ADMIN — ALBUTEROL SULFATE 2.5 MG: 2.5 SOLUTION RESPIRATORY (INHALATION) at 19:10

## 2017-05-16 RX ADMIN — ALBUTEROL SULFATE 2.5 MG: 2.5 SOLUTION RESPIRATORY (INHALATION) at 11:41

## 2017-05-16 RX ADMIN — ACETAMINOPHEN 650 MG: 325 TABLET, FILM COATED ORAL at 19:43

## 2017-05-16 RX ADMIN — FUROSEMIDE 20 MG: 10 INJECTION, SOLUTION INTRAMUSCULAR; INTRAVENOUS at 17:39

## 2017-05-16 RX ADMIN — GABAPENTIN 400 MG: 300 CAPSULE ORAL at 22:06

## 2017-05-16 RX ADMIN — UMECLIDINIUM 1 PUFF: 62.5 AEROSOL, POWDER ORAL at 08:45

## 2017-05-16 RX ADMIN — GUAIFENESIN 1200 MG: 600 TABLET, EXTENDED RELEASE ORAL at 17:38

## 2017-05-16 RX ADMIN — FUROSEMIDE 20 MG: 10 INJECTION, SOLUTION INTRAMUSCULAR; INTRAVENOUS at 08:42

## 2017-05-16 RX ADMIN — FLUTICASONE FUROATE AND VILANTEROL TRIFENATATE 1 PUFF: 100; 25 POWDER RESPIRATORY (INHALATION) at 08:44

## 2017-05-16 RX ADMIN — AMIODARONE HYDROCHLORIDE 200 MG: 200 TABLET ORAL at 17:38

## 2017-05-16 RX ADMIN — GABAPENTIN 600 MG: 300 CAPSULE ORAL at 11:45

## 2017-05-16 RX ADMIN — CARVEDILOL 3.12 MG: 3.12 TABLET, FILM COATED ORAL at 17:38

## 2017-05-16 NOTE — PROGRESS NOTES
[address] [facility] SHIFT NURSING NOTE    Bedside shift change report given to RN (oncoming nurse) by Karol Jhaveri (offgoing nurse). Report included the following information SBAR, Kardex, ED Summary, Recent Results, Med Rec Status and Cardiac Rhythm Sinus Tach. SHIFT SUMMARY: Comfortable throughout shift - up at nicolas to bedside commode. Remains on contact precautions. No loose stools on shift. Pt states mild tingling in feet due to Neurontin dose change per renal function, though tolerable/not painful. Pt denies productive cough with sputum throughout shift. Sputum cultures to be collected. Admission Date 5/9/2017   Admission Diagnosis Tachycardia   Consults IP CONSULT TO HOSPITALIST  IP CONSULT TO CARDIOLOGY  IP CONSULT TO PULMONOLOGY        Consults   [] PT   [] OT   [] Speech   [] Palliative      [] Hospice    [] Case Management   [] None   Cardiac Monitoring   [x] Yes   [] No     Antibiotics   [x] Yes   [] No   GI Prophylaxis  (Ex: Protonix, Pepcid, etc,.)   [] Yes   [x] No          DVT Prophylaxis   SCDs:             Mayo stockings:         [x] Medication (Ex: Lovenox, Eliquis, Brilinta, Coumadin,  Heparin, etc..)   [] Contraindicated   [x] No VTE needed       Urinary Catheter             LDAs           Triple Lumen Central line 05/10/17 Right Neck (Active)   Central Line Being Utilized Yes 5/16/2017  2:13 AM   Criteria for Appropriate Use Hemodynamically unstable, requiring monitoring lines, vasopressors, or volume resuscitation 5/16/2017  2:13 AM   Site Assessment Clean, dry, & intact 5/16/2017  2:13 AM   Infiltration Assessment 0 5/16/2017  2:13 AM   Affected Extremity/Extremities Color distal to insertion site pink (or appropriate for race); Pulses palpable 5/16/2017  2:13 AM   Date of Last Dressing Change 05/09/17 5/16/2017  2:13 AM   Dressing Status Clean, dry, & intact 5/16/2017  2:13 AM   Dressing Type Transparent 5/16/2017  2:13 AM   Action Taken Open ports on tubing capped 5/15/2017  3:31 AM   Proximal Hub Color/Line Status Brown;Flushed;Capped 5/16/2017  2:13 AM   Positive Blood Return (Medial Site) Yes 5/16/2017  2:13 AM   Medial Hub Color/Line Status Blue;Flushed;Capped 5/16/2017  2:13 AM   Positive Blood Return (Lateral Site) Yes 5/16/2017  2:13 AM   Distal Hub Color/Line Status White;Flushed;Capped 5/16/2017  2:13 AM   Positive Blood Return (Site #3) Yes 5/16/2017  2:13 AM   Alcohol Cap Used Yes 5/16/2017  2:13 AM                          I/Os   Intake/Output Summary (Last 24 hours) at 05/16/17 0508  Last data filed at 05/16/17 0037   Gross per 24 hour   Intake             1020 ml   Output             2150 ml   Net            -1130 ml         Activity Level Activity Level: Up with Assistance     Activity Assistance: Partial (one person)   Diet Active Orders   Diet    DIET CARDIAC Regular      Purposeful Rounding every 1-2 hour? [x] Yes    Anthony Score  Total Score: 2   Bed Alarm (If score 3 or >)   [x] Yes    [] Refused (See signed refusal form in chart)   Landon Score  Landon Score: 19       Landon Score (if score 14 or less)   [] PMT consult   [] Nutrition consult   [] Wound Care consult      []  Specialty bed         Influenza Vaccine Received Flu Vaccine for Current Season (usually Sept-March): Not Flu Season               Needs prior to discharge:   Home O2 required:    [] Yes   [x] No     If yes, how much O2 required?     Other:    Last Bowel Movement Date: 05/13/17   Readmission Risk Assessment Tool Score High Risk            32       Total Score        3 Relationship with PCP    3 Patient Length of Stay > 5    4 More than 1 Admission in calendar year    5 Patient Insurance is Medicare, Medicaid or Self Pay    17 Charlson Comorbidity Score        Criteria that do not apply:    Patient Living Status       Expected Length of Stay 3d 19h   Actual Length of Stay 7

## 2017-05-16 NOTE — PROGRESS NOTES
[facility]  Pulmonary, Critical Care, and Sleep Medicine    Name: Wes Lake MRN: [id number]   : 1950 Hospital: [facility]   Date: 2017        IMPRESSION:   · Hemoptysis due to pulmonary edema in the setting of anticoagulation and underlying emphysema  · She does not have interstitial lung disease - she has emphysema with superimposed pulmonary edema - CT scan can look like ILD in this setting, but there is no convincing evidence of new onset ILD (CT with Kerley B lines, etc) - I have followed her for her emphysema for years  · COPD/emphysema - FEV1 1.29 L (59% predicted), NORMAL lung volumes, 2015 - no convincing evidence of exacerbation  · Acute systolic heart failure - new onset CHF with EF 25% in April of this year, now s/p stenting  · Atrial fibrillation  · C diff colitis  · Diabetes - glucose control is very difficult when she is on systemic steroids  · H/O tobacco use      PLAN:   · O2 - wean as tolerated  · Continue diuresis  · Hold warfarin; ASA and Plavix will have to continue due to recent stents - might restart warfarin in a day or so  · Follow up sputum culture for completeness  · No role for bronchoscopy in this setting  · Bronchodilators  · Will need a repeat CT scan in 8-12 weeks for completeness     Subjective/Interval History:   I have reviewed the flowsheet and previous days notes. Breathing better today. Hemoptysis resolved. Coughing up yellow sputum only at this point. Review of Systems   Constitutional: Negative. HENT: Negative. Eyes: Negative. Respiratory: Positive for shortness of breath. Cardiovascular: Negative. Gastrointestinal: Negative.       Objective:   Vital Signs:    Visit Vitals    /57 (BP 1 Location: Right arm, BP Patient Position: At rest)    Pulse (!) 108    Temp 97.9 °F (36.6 °C)    Resp 20    Ht 5' 6\" (1.676 m)    Wt 75.7 kg (166 lb 14.2 oz)    SpO2 98%    Breastfeeding No    BMI 26.94 kg/m2       O2 Device: Room air   O2 Flow Rate (L/min): 1 l/min   Temp (24hrs), Av.6 °F (36.4 °C), Min:97.2 °F (36.2 °C), Max:97.9 °F (36.6 °C)       Intake/Output:   Last shift:         Last 3 shifts:  1901 -  0700  In: 1624.2 [P.O.:1290; I.V.:334.2]  Out: 3125 [Urine:3125]    Intake/Output Summary (Last 24 hours) at 17 0820  Last data filed at 17 0037   Gross per 24 hour   Intake             1020 ml   Output             1750 ml   Net             -730 ml      Physical Exam   Constitutional: She is oriented to person, place, and time. She is cooperative. No distress. HENT:   Head: Normocephalic and atraumatic. Mouth/Throat: No oropharyngeal exudate. Eyes: No scleral icterus. Cardiovascular: Normal rate and regular rhythm. Pulmonary/Chest: She has no wheezes. She has rales. Abdominal: Soft. Bowel sounds are normal. She exhibits no distension. There is no tenderness. Musculoskeletal: She exhibits no edema. Neurological: She is alert and oriented to person, place, and time. Skin: Skin is warm and dry. No rash noted. Data:   Labs:  Recent Labs      17   0353  17   0209   WBC  6.6  9.4   HGB  11.7  11.7   HCT  36.1  35.2   PLT  258  253     Recent Labs      17   0353  05/15/17   0246  17   0209   NA  138  137  142   K  3.6  3.5  3.5   CL  103  101  104   CO2  26  28  27   GLU  135*  172*  122*   BUN  11  11  9   CREA  0.89  0.99  1.03*   CA  8.6  8.6  8.4*   MG  2.0  2.0  1.9   INR  1.1  1.1  1.3*     No results for input(s): PH, PCO2, PO2, HCO3, FIO2 in the last 72 hours.     Imaging:  I have personally reviewed the patients radiographs:  No acute findings yesterday        Olivia Cordova MD

## 2017-05-16 NOTE — PROGRESS NOTES
Hospitalist Progress Note    NAME: Steven Felix   :  1950   MRN:  [id number]       Interim Hospital Summary: 73 y.o. female whom presented on 2017 with      Assessment / Plan:  Hemoptysis Not POA  Appears to resolve now   Continue ASA/Plavix due to recent stents  Holding Coumadin   Pulmonary help appreciated: follow sputum cultures for completeness; no role for bronchoscopy now; repeat CT in 8-12 weeks   Scheduled mucinex + tessalon perls     Respiratory distress due to Acute on chronic systolic heart failure with EF 25%  Diuresing well, Wt 176--> 166   CTA chest with ILD but per pulmonary no ILD, findings could be related to CHF  Continue her on 20mg IV lasix BID ( cr, electrolytes are stable)   Cardiology help appreciated     Junctional Tachycardia/A.fib with RVR with associated hypotension  Elevated troponin  Initially Managed in ICU, now transferred to tele  Cardiology help appreciated   Switched to PO Amiodarone  S/p cardioversion in sinus rhythm  S/p ablation 17)    Sepsis POA resolved   Due to Recurrent vs persistent C.diff  Recently completed 10 days coarse of PO flagyl  Diarrhea resolved, will complete 14 days coarse of PO vanco ( last dose )   regulo Q    DM type 2  /170th   Holding metformin  Continue SSI        Code Status: Full  Surrogate Decision Maker: Daughter  DVT Prophylaxis: on coumadin       Baseline:lives with her brother and grandson; independent   Disposition:     L IJ       Subjective:      CHIEF COMPLAINT: following sepsis/ DM/ respiratory distress   Hemoptysis: resolved   Pt c/o cough  + dyspnea      Overnight events d/w RN       Review of Systems:  Symptom Y/N Comments  Symptom Y/N Comments   Fever/Chills n   Chest Pain n    Poor Appetite    Edema     Cough y   Abdominal Pain n    Sputum    Joint Pain     SOB/[place] y   Pruritis/Rash     Nausea/vomit    Tolerating PT/OT     Diarrhea    Tolerating Diet y    Constipation    Other       Could NOT obtain due to: Objective:     VITALS:   Last 24hrs VS reviewed since prior progress note. Most recent are:  Patient Vitals for the past 24 hrs:   Temp Pulse Resp BP SpO2   05/16/17 1146 97.6 °F (36.4 °C) (!) 106 - 97/50 98 %   05/16/17 1141 - - - - 100 %   05/16/17 0803 97.9 °F (36.6 °C) (!) 108 20 112/57 98 %   05/16/17 0743 - - - - 98 %   05/16/17 0346 97.7 °F (36.5 °C) (!) 104 19 120/59 98 %   05/16/17 0015 97.5 °F (36.4 °C) (!) 106 19 118/65 97 %   05/15/17 1956 97.8 °F (36.6 °C) (!) 104 18 96/56 97 %   05/15/17 1934 - - - - 99 %   05/15/17 1538 97.2 °F (36.2 °C) (!) 105 18 109/57 99 %   05/15/17 1533 - - - - 100 %       Intake/Output Summary (Last 24 hours) at 05/16/17 1524  Last data filed at 05/16/17 0037   Gross per 24 hour   Intake              580 ml   Output             1750 ml   Net            -1170 ml        PHYSICAL EXAM:  General: WD, WN. Alert, cooperative, no acute distress    EENT:  EOMI. Anicteric sclerae. MMM  Resp:  Coarse breath sounds. No accessory muscle use  CV:  Irregularly irregular  rhythm,  No edema  GI:  Soft, Non distended, Non tender.  +Bowel sounds  Neurologic:  Alert and oriented X 3, normal speech,   Psych:   Good insight. Not anxious nor agitated  Skin:  No rashes. No jaundice    Reviewed most current lab test results and cultures  YES  Reviewed most current radiology test results   YES  Review and summation of old records today    NO  Reviewed patient's current orders and MAR    YES  PMH/SH reviewed - no change compared to H&P  ________________________________________________________________________  Care Plan discussed with:    Comments   Patient y    Family      RN y    Care Manager     Consultant                        Multidiciplinary team rounds were held today with , nursing, pharmacist and clinical coordinator. Patient's plan of care was discussed; medications were reviewed and discharge planning was addressed. ________________________________________________________________________  Total NON critical care TIME:  25 Minutes    Total CRITICAL CARE TIME Spent:   Minutes non procedure based      Comments   >50% of visit spent in counseling and coordination of care     ________________________________________________________________________  Phi Smith MD     Procedures: see electronic medical records for all procedures/Xrays and details which were not copied into this note but were reviewed prior to creation of Plan. LABS:  I reviewed today's most current labs and imaging studies.   Pertinent labs include:  Recent Labs      05/16/17   0353  05/14/17   0209   WBC  6.6  9.4   HGB  11.7  11.7   HCT  36.1  35.2   PLT  258  253     Recent Labs      05/16/17   0353  05/15/17   0246  05/14/17   0209   NA  138  137  142   K  3.6  3.5  3.5   CL  103  101  104   CO2  26  28  27   GLU  135*  172*  122*   BUN  11  11  9   CREA  0.89  0.99  1.03*   CA  8.6  8.6  8.4*   MG  2.0  2.0  1.9   INR  1.1  1.1  1.3*       Signed: Phi Smith MD

## 2017-05-16 NOTE — CARDIO/PULMONARY
Cardiopulmonary Rehab Nursing Entry:      Chart Reviewed. Pt admitted with Tachycardia and developed A fib overnight. Plan for Life Vest at Discharge. Echo from 5/9/17 LV Ef 25-30%.     PMH significant for:  -Chronic Systolic/Diastolic HF   -CAD  -COPD  -HTN Pt is a former smoker. Attempted to see pt for teaching reinforcement. Primary nurse at bedside administering medications, Teaching deferred.

## 2017-05-16 NOTE — PROGRESS NOTES
Received bedside report from Jack Portillo, [facility] SHIFT NURSING NOTE    Bedside and Verbal shift change report given to Jack Portillo  (oncoming nurse) by Krystle Amaro (offgoing nurse). Report included the following information SBAR, Kardex, Intake/Output and Med Rec Status. SHIFT SUMMARY:         Admission Date 5/9/2017   Admission Diagnosis Tachycardia   Consults IP CONSULT TO HOSPITALIST  IP CONSULT TO CARDIOLOGY  IP CONSULT TO PULMONOLOGY        Consults   [] PT   [] OT   [] Speech   [] Palliative      [] Hospice    [x] Case Management   [] None   Cardiac Monitoring   [x] Yes   [] No     Antibiotics   [x] Yes   [] No   GI Prophylaxis  (Ex: Protonix, Pepcid, etc,.)   [] Yes   [] No          DVT Prophylaxis   SCDs:             Mayo stockings:         [x] Medication (Ex: Lovenox, Eliquis, Brilinta, Coumadin,  Heparin, etc..)   [] Contraindicated   [] No VTE needed       Urinary Catheter             LDAs           Triple Lumen Central line 05/10/17 Right Neck (Active)   Central Line Being Utilized Yes 5/16/2017  3:53 PM   Criteria for Appropriate Use Limited/no vessel suitable for conventional peripheral access 5/16/2017  3:53 PM   Site Assessment Clean, dry, & intact 5/16/2017  3:53 PM   Infiltration Assessment 0 5/16/2017  2:13 AM   Affected Extremity/Extremities Color distal to insertion site pink (or appropriate for race); Pulses palpable 5/16/2017  2:13 AM   Date of Last Dressing Change 05/09/17 5/16/2017  8:03 AM   Dressing Status Clean, dry, & intact 5/16/2017  3:53 PM   Dressing Type Disk with Chlorhexadine gluconate (CHG) 5/16/2017  3:53 PM   Action Taken Open ports on tubing capped 5/15/2017  3:31 AM   Proximal Hub Color/Line Status Brown;Flushed 5/16/2017  3:53 PM   Positive Blood Return (Medial Site) Yes 5/16/2017  3:53 PM   Medial Hub Color/Line Status Blue;Flushed 5/16/2017  3:53 PM   Positive Blood Return (Lateral Site) Yes 5/16/2017  3:53 PM   Distal Hub Color/Line Status White;Flushed 5/16/2017  3:53 PM Positive Blood Return (Site #3) Yes 5/16/2017  3:53 PM   Alcohol Cap Used Yes 5/16/2017  3:53 PM                          I/Os   Intake/Output Summary (Last 24 hours) at 05/16/17 1657  Last data filed at 05/16/17 0037   Gross per 24 hour   Intake              480 ml   Output             1750 ml   Net            -1270 ml         Activity Level Activity Level: Up with Assistance     Activity Assistance: Partial (one person)   Diet Active Orders   Diet    DIET CARDIAC Regular      Purposeful Rounding every 1-2 hour? [x] Yes    Anthony Score  Total Score: 2   Bed Alarm (If score 3 or >)   [] Yes    [] Refused (See signed refusal form in chart)   Landon Score  Landon Score: 20       Landon Score (if score 14 or less)   [] PMT consult   [] Nutrition consult   [] Wound Care consult      []  Specialty bed         Influenza Vaccine Received Flu Vaccine for Current Season (usually Sept-March): Not Flu Season               Needs prior to discharge:   Home O2 required:    [] Yes   [x] No     If yes, how much O2 required?     Other:    Last Bowel Movement Date: 05/16/17   Readmission Risk Assessment Tool Score High Risk            32       Total Score        3 Relationship with PCP    3 Patient Length of Stay > 5    4 More than 1 Admission in calendar year    5 Patient Insurance is Medicare, Medicaid or Self Pay    17 Charlson Comorbidity Score        Criteria that do not apply:    Patient Living Status       Expected Length of Stay 3d 19h   Actual Length of Stay 7

## 2017-05-16 NOTE — PROGRESS NOTES
[address] [place] [address]  [phone]      Cardiology Progress Note      5/16/2017 9:00 AM    Admit Date: 5/9/2017    Admit Diagnosis:   Tachycardia    Subjective:     Roscoe Tyree c/o \"EYBIDG a cold\", cough. Dr. Francis Winston feels hemoptysis r/t fluid and use of AC vs pulmonary process, and agrees with diuresis. No further hemoptysis, but still awaiting sputum cx.     Visit Vitals    /57 (BP 1 Location: Right arm, BP Patient Position: At rest)    Pulse (!) 108    Temp 97.9 °F (36.6 °C)    Resp 20    Ht 5' 6\" (1.676 m)    Wt 75.7 kg (166 lb 14.2 oz)    SpO2 98%    Breastfeeding No    BMI 26.94 kg/m2       Current Facility-Administered Medications   Medication Dose Route Frequency    albuterol (PROVENTIL VENTOLIN) nebulizer solution 2.5 mg  2.5 mg Nebulization QID RT    carvedilol (COREG) tablet 3.125 mg  3.125 mg Oral BID WITH MEALS    gabapentin (NEURONTIN) capsule 400 mg  400 mg Oral BID    gabapentin (NEURONTIN) capsule 600 mg  600 mg Oral DAILY    dextrose 10% infusion 125-250 mL  125-250 mL IntraVENous PRN    aspirin chewable tablet 81 mg  81 mg Oral DAILY    furosemide (LASIX) injection 20 mg  20 mg IntraVENous BID    lactobac ac& pc-s.therm-b.anim (MARYAN Q/RISAQUAD)  1 Cap Oral DAILY    albuterol-ipratropium (DUO-NEB) 2.5 MG-0.5 MG/3 ML  3 mL Nebulization Q4H PRN    insulin lispro (HUMALOG) injection   SubCUTAneous AC&HS    amiodarone (CORDARONE) tablet 200 mg  200 mg Oral BID    colchicine tablet 0.6 mg  0.6 mg Oral DAILY    acetaminophen (TYLENOL) tablet 650 mg  650 mg Oral Q4H PRN    glucose chewable tablet 16 g  4 Tab Oral PRN    glucagon (GLUCAGEN) injection 1 mg  1 mg IntraMUSCular PRN    vancomycin 50 mg/mL oral solution (compounded) 125 mg  125 mg Oral Q6H    fluticasone-vilanterol (BREO ELLIPTA) 100mcg-25mcg/puff  1 Puff Inhalation DAILY    clopidogrel (PLAVIX) tablet 75 mg  75 mg Oral DAILY    umeclidinium (INCRUSE ELLIPTA) 62.5 mcg/actuation  1 Puff Inhalation DAILY    albuterol (PROVENTIL HFA, VENTOLIN HFA, PROAIR HFA) inhaler 2 Puff  2 Puff Inhalation Q6H PRN       Objective:      Physical Exam:  General Appearance:  WNWD AAF in no acute distress  Chest:   Clear  Cardiovascular:  Regular rate and rhythm, no murmur.   Abdomen:   Soft, non-tender, bowel sounds are active.   Extremities:   Skin:  Warm and dry.     Data Review:   Recent Labs      05/16/17   0353  05/14/17   0209   WBC  6.6  9.4   HGB  11.7  11.7   HCT  36.1  35.2   PLT  258  253     Recent Labs      05/16/17   0353  05/15/17   0246  05/14/17   0209   NA  138  137  142   K  3.6  3.5  3.5   CL  103  101  104   CO2  26  28  27   GLU  135*  172*  122*   BUN  11  11  9   CREA  0.89  0.99  1.03*   CA  8.6  8.6  8.4*   MG  2.0  2.0  1.9   INR  1.1  1.1  1.3*       No results for input(s): TROIQ, CPK, CKMB in the last 72 hours.       Intake/Output Summary (Last 24 hours) at 05/16/17 1044  Last data filed at 05/16/17 0037   Gross per 24 hour   Intake              920 ml   Output             1750 ml   Net             -830 ml        Telemetry: SR    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 (6/2/2010)      Cardiac pacemaker in situ (7/5/2012)      Mixed hyperlipidemia (7/5/2012)      S/P coronary artery stent placement (7/4/2014)      Overview: 4/7/17 PCI/ROSALINO Diagonal      Type 2 diabetes mellitus with diabetic neuropathy, without long-term current use of insulin ([facility] [address] (1/31/2017)      S/P ablation of atrial fibrillation (5/2/2017)      Overview: 5/1/17      Tachycardia (5/9/2017)      Chronic systolic HF (heart failure) ([address] (5/10/2017)      Overview: 4/2017 EF 25-30%      History of Clostridium difficile infection (5/10/2017)      Overview: 4/2017 CDiff positive      Junctional tachycardia ([address] (5/10/2017)      Hypotension (5/10/2017)        Plan:     PAF:  Remains in SR, off AC until sputum cx resulted. Incentive spirometer and Tussalon for cough and mucous. Chronic systolic HF:  Continue with diuresis neg 5L, tolerating Coreg, consider adding Entresto in future. [facility] Cardiology    5/16/2017         Agree with note as outlined by  NP. I confirm findings in history and physical exam. No additional findings noted. Agree with plan as outlined above.      Melina Aguilar MD

## 2017-05-17 LAB
ANION GAP BLD CALC-SCNC: 9 MMOL/L (ref 5–15)
BUN SERPL-MCNC: 13 MG/DL (ref 6–20)
BUN/CREAT SERPL: 15 (ref 12–20)
CALCIUM SERPL-MCNC: 8.6 MG/DL (ref 8.5–10.1)
CHLORIDE SERPL-SCNC: 102 MMOL/L (ref 97–108)
CO2 SERPL-SCNC: 27 MMOL/L (ref 21–32)
CREAT SERPL-MCNC: 0.89 MG/DL (ref 0.55–1.02)
GLUCOSE BLD STRIP.AUTO-MCNC: 120 MG/DL (ref 65–100)
GLUCOSE BLD STRIP.AUTO-MCNC: 147 MG/DL (ref 65–100)
GLUCOSE BLD STRIP.AUTO-MCNC: 178 MG/DL (ref 65–100)
GLUCOSE BLD STRIP.AUTO-MCNC: 186 MG/DL (ref 65–100)
GLUCOSE SERPL-MCNC: 153 MG/DL (ref 65–100)
INR PPP: 1 (ref 0.9–1.1)
MAGNESIUM SERPL-MCNC: 2.2 MG/DL (ref 1.6–2.4)
POTASSIUM SERPL-SCNC: 3.5 MMOL/L (ref 3.5–5.1)
PROTHROMBIN TIME: 10.5 SEC (ref 9–11.1)
SERVICE CMNT-IMP: ABNORMAL
SODIUM SERPL-SCNC: 138 MMOL/L (ref 136–145)

## 2017-05-17 PROCEDURE — 74011250637 HC RX REV CODE- 250/637: Performed by: HOSPITALIST

## 2017-05-17 PROCEDURE — 65660000000 HC RM CCU STEPDOWN

## 2017-05-17 PROCEDURE — 74011636637 HC RX REV CODE- 636/637: Performed by: NURSE PRACTITIONER

## 2017-05-17 PROCEDURE — 74011250637 HC RX REV CODE- 250/637: Performed by: INTERNAL MEDICINE

## 2017-05-17 PROCEDURE — 85610 PROTHROMBIN TIME: CPT | Performed by: INTERNAL MEDICINE

## 2017-05-17 PROCEDURE — 74011250637 HC RX REV CODE- 250/637: Performed by: FAMILY MEDICINE

## 2017-05-17 PROCEDURE — 74011000250 HC RX REV CODE- 250: Performed by: INTERNAL MEDICINE

## 2017-05-17 PROCEDURE — 82962 GLUCOSE BLOOD TEST: CPT

## 2017-05-17 PROCEDURE — 36415 COLL VENOUS BLD VENIPUNCTURE: CPT | Performed by: INTERNAL MEDICINE

## 2017-05-17 PROCEDURE — 83735 ASSAY OF MAGNESIUM: CPT | Performed by: INTERNAL MEDICINE

## 2017-05-17 PROCEDURE — 74011250636 HC RX REV CODE- 250/636: Performed by: INTERNAL MEDICINE

## 2017-05-17 PROCEDURE — 74011250637 HC RX REV CODE- 250/637: Performed by: NURSE PRACTITIONER

## 2017-05-17 PROCEDURE — 94640 AIRWAY INHALATION TREATMENT: CPT

## 2017-05-17 PROCEDURE — 80048 BASIC METABOLIC PNL TOTAL CA: CPT | Performed by: INTERNAL MEDICINE

## 2017-05-17 RX ORDER — CODEINE PHOSPHATE AND GUAIFENESIN 10; 100 MG/5ML; MG/5ML
5 SOLUTION ORAL
Status: DISCONTINUED | OUTPATIENT
Start: 2017-05-17 | End: 2017-05-22 | Stop reason: HOSPADM

## 2017-05-17 RX ORDER — FUROSEMIDE 20 MG/1
20 TABLET ORAL 2 TIMES DAILY
Status: DISCONTINUED | OUTPATIENT
Start: 2017-05-17 | End: 2017-05-18

## 2017-05-17 RX ORDER — WARFARIN 7.5 MG/1
7.5 TABLET ORAL ONCE
Status: COMPLETED | OUTPATIENT
Start: 2017-05-17 | End: 2017-05-17

## 2017-05-17 RX ORDER — CARVEDILOL 6.25 MG/1
6.25 TABLET ORAL 2 TIMES DAILY WITH MEALS
Status: DISCONTINUED | OUTPATIENT
Start: 2017-05-17 | End: 2017-05-22 | Stop reason: HOSPADM

## 2017-05-17 RX ORDER — SODIUM CHLORIDE 0.9 % (FLUSH) 0.9 %
10-30 SYRINGE (ML) INJECTION EVERY 8 HOURS
Status: DISCONTINUED | OUTPATIENT
Start: 2017-05-17 | End: 2017-05-22 | Stop reason: HOSPADM

## 2017-05-17 RX ORDER — SODIUM CHLORIDE 0.9 % (FLUSH) 0.9 %
10-30 SYRINGE (ML) INJECTION AS NEEDED
Status: DISCONTINUED | OUTPATIENT
Start: 2017-05-17 | End: 2017-05-22 | Stop reason: HOSPADM

## 2017-05-17 RX ORDER — ATORVASTATIN CALCIUM 20 MG/1
20 TABLET, FILM COATED ORAL
Status: DISCONTINUED | OUTPATIENT
Start: 2017-05-17 | End: 2017-05-19

## 2017-05-17 RX ADMIN — GUAIFENESIN AND CODEINE PHOSPHATE 5 ML: 100; 10 SOLUTION ORAL at 23:06

## 2017-05-17 RX ADMIN — INSULIN LISPRO 2 UNITS: 100 INJECTION, SOLUTION INTRAVENOUS; SUBCUTANEOUS at 08:46

## 2017-05-17 RX ADMIN — Medication 30 ML: at 21:36

## 2017-05-17 RX ADMIN — VANCOMYCIN HYDROCHLORIDE 125 MG: 1 INJECTION, POWDER, LYOPHILIZED, FOR SOLUTION INTRAVENOUS at 13:06

## 2017-05-17 RX ADMIN — VANCOMYCIN HYDROCHLORIDE 125 MG: 1 INJECTION, POWDER, LYOPHILIZED, FOR SOLUTION INTRAVENOUS at 23:09

## 2017-05-17 RX ADMIN — AMIODARONE HYDROCHLORIDE 200 MG: 200 TABLET ORAL at 08:44

## 2017-05-17 RX ADMIN — FLUTICASONE FUROATE AND VILANTEROL TRIFENATATE 1 PUFF: 100; 25 POWDER RESPIRATORY (INHALATION) at 08:49

## 2017-05-17 RX ADMIN — BENZONATATE 100 MG: 100 CAPSULE ORAL at 22:16

## 2017-05-17 RX ADMIN — GUAIFENESIN AND CODEINE PHOSPHATE 5 ML: 100; 10 SOLUTION ORAL at 19:02

## 2017-05-17 RX ADMIN — COLCHICINE 0.6 MG: 0.6 TABLET, FILM COATED ORAL at 08:57

## 2017-05-17 RX ADMIN — ALBUTEROL SULFATE 2.5 MG: 2.5 SOLUTION RESPIRATORY (INHALATION) at 07:34

## 2017-05-17 RX ADMIN — ACETAMINOPHEN 650 MG: 325 TABLET, FILM COATED ORAL at 22:16

## 2017-05-17 RX ADMIN — GUAIFENESIN 1200 MG: 600 TABLET, EXTENDED RELEASE ORAL at 08:46

## 2017-05-17 RX ADMIN — WARFARIN SODIUM 7.5 MG: 7.5 TABLET ORAL at 19:03

## 2017-05-17 RX ADMIN — UMECLIDINIUM 1 PUFF: 62.5 AEROSOL, POWDER ORAL at 08:49

## 2017-05-17 RX ADMIN — GABAPENTIN 400 MG: 300 CAPSULE ORAL at 08:46

## 2017-05-17 RX ADMIN — GABAPENTIN 600 MG: 300 CAPSULE ORAL at 13:05

## 2017-05-17 RX ADMIN — AMIODARONE HYDROCHLORIDE 200 MG: 200 TABLET ORAL at 19:03

## 2017-05-17 RX ADMIN — FUROSEMIDE 20 MG: 10 INJECTION, SOLUTION INTRAMUSCULAR; INTRAVENOUS at 08:49

## 2017-05-17 RX ADMIN — CLOPIDOGREL BISULFATE 75 MG: 75 TABLET ORAL at 08:48

## 2017-05-17 RX ADMIN — ATORVASTATIN CALCIUM 20 MG: 20 TABLET, FILM COATED ORAL at 21:08

## 2017-05-17 RX ADMIN — VANCOMYCIN HYDROCHLORIDE 125 MG: 1 INJECTION, POWDER, LYOPHILIZED, FOR SOLUTION INTRAVENOUS at 00:51

## 2017-05-17 RX ADMIN — VANCOMYCIN HYDROCHLORIDE 125 MG: 1 INJECTION, POWDER, LYOPHILIZED, FOR SOLUTION INTRAVENOUS at 06:24

## 2017-05-17 RX ADMIN — Medication 1 CAPSULE: at 08:48

## 2017-05-17 RX ADMIN — VANCOMYCIN HYDROCHLORIDE 125 MG: 1 INJECTION, POWDER, LYOPHILIZED, FOR SOLUTION INTRAVENOUS at 19:03

## 2017-05-17 RX ADMIN — ALBUTEROL SULFATE 2.5 MG: 2.5 SOLUTION RESPIRATORY (INHALATION) at 11:11

## 2017-05-17 RX ADMIN — FUROSEMIDE 20 MG: 20 TABLET ORAL at 19:03

## 2017-05-17 RX ADMIN — CARVEDILOL 3.12 MG: 3.12 TABLET, FILM COATED ORAL at 08:47

## 2017-05-17 RX ADMIN — ALBUTEROL SULFATE 2.5 MG: 2.5 SOLUTION RESPIRATORY (INHALATION) at 19:17

## 2017-05-17 RX ADMIN — INSULIN LISPRO 2 UNITS: 100 INJECTION, SOLUTION INTRAVENOUS; SUBCUTANEOUS at 13:05

## 2017-05-17 RX ADMIN — ASPIRIN 81 MG CHEWABLE TABLET 81 MG: 81 TABLET CHEWABLE at 08:41

## 2017-05-17 RX ADMIN — GABAPENTIN 400 MG: 300 CAPSULE ORAL at 21:09

## 2017-05-17 NOTE — PROGRESS NOTES
Hospitalist Progress Note    NAME: Piyush Cabrera   :  1950   MRN:  [id number]       Interim Hospital Summary: 73 y.o. female whom presented on 2017 with      Assessment / Plan:  Hemoptysis Not POA resolved   Continue ASA/Plavix due to recent stents  Restarted on Coumadin  ( OK with pulmonary)   Pulmonary help appreciated:no role for bronchoscopy now; repeat CT in 8-12 weeks   Scheduled mucinex + tessalon perls - not effective; adding mucinex + codeine     Respiratory distress due to Acute on chronic systolic heart failure with EF 25%  Diuresing well, Wt 176--> 166 x last 3 days   On RA   CTA chest with ILD but per pulmonary no ILD, findings could be related to CHF  Diuretic: IV --> PO  ( cr, electrolytes are stable)   Cardiology help appreciated     Junctional Tachycardia/A.fib with RVR with associated hypotension  Elevated troponin  Initially Managed in ICU, now transferred to tele  Cardiology help appreciated   Switched to PO Amiodarone  S/p cardioversion in sinus rhythm  S/p ablation 17    Sepsis POA resolved   Due to Recurrent vs persistent C.diff  Recently completed 10 days coarse of PO flagyl  Diarrhea resolved, will complete 14 days coarse of PO vanco ( last dose )   regulo Q    DM type 2  /170th   Holding metformin  Continue SSI        Code Status: Full  Surrogate Decision Maker: Daughter  DVT Prophylaxis: on coumadin       Baseline:lives with her brother and grandson; independent   Disposition: hopefully soon, PT/OT for disposition     L IJ       Subjective:      CHIEF COMPLAINT: following sepsis/ DM/ respiratory distress   Hemoptysis: no recurrent   Cough: continuous, bothering her a lot     Overnight events d/w RN       Review of Systems:  Symptom Y/N Comments  Symptom Y/N Comments   Fever/Chills n   Chest Pain n    Poor Appetite    Edema     Cough y   Abdominal Pain n    Sputum    Joint Pain     SOB/[place] y   Pruritis/Rash     Nausea/vomit    Tolerating PT/OT     Diarrhea Tolerating Diet y    Constipation    Other       Could NOT obtain due to:      Objective:     VITALS:   Last 24hrs VS reviewed since prior progress note. Most recent are:  Patient Vitals for the past 24 hrs:   Temp Pulse Resp BP SpO2   05/17/17 1504 97.8 °F (36.6 °C) (!) 103 20 106/51 100 %   05/17/17 1110 - - - - 100 %   05/17/17 1052 97.9 °F (36.6 °C) (!) 102 22 103/55 100 %   05/17/17 0758 97.7 °F (36.5 °C) (!) 105 22 105/78 100 %   05/17/17 0735 - - - - 98 %   05/17/17 0323 98.4 °F (36.9 °C) (!) 106 18 100/54 95 %   05/17/17 0048 97.7 °F (36.5 °C) (!) 108 17 98/71 99 %   05/16/17 1928 97.8 °F (36.6 °C) (!) 105 18 111/72 97 %   05/16/17 1910 - - - - 97 %   05/16/17 1637 98 °F (36.7 °C) (!) 103 18 93/52 98 %       Intake/Output Summary (Last 24 hours) at 05/17/17 1536  Last data filed at 05/17/17 0544   Gross per 24 hour   Intake              360 ml   Output             1675 ml   Net            -1315 ml        PHYSICAL EXAM:  General: WD, WN. Alert, cooperative, no acute distress    EENT:  EOMI. Anicteric sclerae. MMM  Resp:  Coarse breath sounds. No accessory muscle use  CV:  Irregularly irregular  rhythm,  No edema  GI:  Soft, Non distended, Non tender.  +Bowel sounds  Neurologic:  Alert and oriented X 3, normal speech,   Psych:   Good insight. Not anxious nor agitated  Skin:  No rashes. No jaundice    Reviewed most current lab test results and cultures  YES  Reviewed most current radiology test results   YES  Review and summation of old records today    NO  Reviewed patient's current orders and MAR    YES  PMH/SH reviewed - no change compared to H&P  ________________________________________________________________________  Care Plan discussed with:    Comments   Patient y    Family      RN y    Care Manager     Consultant                        Multidiciplinary team rounds were held today with , nursing, pharmacist and clinical coordinator.   Patient's plan of care was discussed; medications were reviewed and discharge planning was addressed. ________________________________________________________________________  Total NON critical care TIME:  25 Minutes    Total CRITICAL CARE TIME Spent:   Minutes non procedure based      Comments   >50% of visit spent in counseling and coordination of care     ________________________________________________________________________  Clint Last MD     Procedures: see electronic medical records for all procedures/Xrays and details which were not copied into this note but were reviewed prior to creation of Plan. LABS:  I reviewed today's most current labs and imaging studies.   Pertinent labs include:  Recent Labs      05/16/17   0353   WBC  6.6   HGB  11.7   HCT  36.1   PLT  258     Recent Labs      05/17/17   0330  05/16/17   0353  05/15/17   0246   NA  138  138  137   K  3.5  3.6  3.5   CL  102  103  101   CO2  27  26  28   GLU  153*  135*  172*   BUN  13  11  11   CREA  0.89  0.89  0.99   CA  8.6  8.6  8.6   MG  2.2  2.0  2.0   INR  1.0  1.1  1.1       Signed: Clint Last MD

## 2017-05-17 NOTE — PROGRESS NOTES
1100:  Cardiopulmonary Care Interdisciplinary rounds were held today to discuss patient plan of care and outcomes. The following members were present: PT, NP/Physician, Pharmacy, Nursing, Nutritionist and Case Management.       Plan of Care: Continue current treatment plan

## 2017-05-17 NOTE — PROGRESS NOTES
1377  Report received from Katy Rachel, [facility]. SBAR, Kardex, ED Summary, Procedure Summary, Intake/Output, MAR, Accordion, Recent Results, Med Rec Status and Cardiac Rhythm ST were discussed. Rob Coffey        [address] [place] [address] SHIFT NURSING NOTE    Bedside shift change report given to Jazzmine Brito (oncoming nurse) by Tina Crouch (offgoing nurse). Report included the following information SBAR, Kardex, ED Summary, Procedure Summary and Intake/Output. SHIFT SUMMARY:   Went to administer Coreg dose this evening and BP was 92/49. Held evening dose. Would appreciate parameters for coreg.         Admission Date 5/9/2017   Admission Diagnosis Tachycardia   Consults IP CONSULT TO HOSPITALIST  IP CONSULT TO CARDIOLOGY  IP CONSULT TO PULMONOLOGY        Consults   [x] PT   [x] OT   [] Speech   [] Palliative      [] Hospice    [x] Case Management   [] None   Cardiac Monitoring   [x] Yes   [] No     Antibiotics   [x] Yes   [] No   GI Prophylaxis  (Ex: Protonix, Pepcid, etc,.)   [x] Yes   [] No          DVT Prophylaxis   SCDs:             Mayo stockings:         [x] Medication (Ex: Lovenox, Eliquis, Brilinta, Coumadin,  Heparin, etc..)   [] Contraindicated   [] No VTE needed       Urinary Catheter             LDAs           Triple Lumen Central line 05/10/17 Right Neck (Active)   Central Line Being Utilized No 5/17/2017  8:00 AM   Criteria for Appropriate Use Hemodynamically unstable, requiring monitoring lines, vasopressors, or volume resuscitation 5/17/2017  8:00 AM   Site Assessment Clean, dry, & intact 5/17/2017  3:05 PM   Infiltration Assessment 0 5/17/2017  3:05 PM   Affected Extremity/Extremities Color distal to insertion site pink (or appropriate for race) 5/17/2017  3:05 PM   Date of Last Dressing Change 05/17/17 5/17/2017  3:05 PM   Dressing Status Clean, dry, & intact 5/17/2017  3:05 PM   Dressing Type Transparent 5/17/2017  3:05 PM   Action Taken Open ports on tubing capped 5/15/2017  3:31 AM   Proximal Hub Color/Line Status Brown;Cap end changed; Flushed 5/17/2017  8:00 AM   Positive Blood Return (Medial Site) Yes 5/17/2017  8:00 AM   Medial Hub Color/Line Status Blue;Capped 5/17/2017  8:00 AM   Positive Blood Return (Lateral Site) Yes 5/17/2017  2:24 AM   Distal Hub Color/Line Status White;Capped 5/17/2017  8:00 AM   Positive Blood Return (Site #3) Yes 5/17/2017  2:24 AM   Alcohol Cap Used Yes 5/17/2017  8:00 AM                          I/Os   Intake/Output Summary (Last 24 hours) at 05/17/17 1554  Last data filed at 05/17/17 0544   Gross per 24 hour   Intake              360 ml   Output             1675 ml   Net            -1315 ml         Activity Level Activity Level: Up ad nicolas     Activity Assistance: No assistance needed   Diet Active Orders   Diet    DIET CARDIAC Regular      Purposeful Rounding every 1-2 hour? [x] Yes    Anthony Score  Total Score: 1   Bed Alarm (If score 3 or >)   [] Yes    [] Refused (See signed refusal form in chart)   Landon Score  Landon Score: 21       Landon Score (if score 14 or less)   [] PMT consult   [] Nutrition consult   [] Wound Care consult      []  Specialty bed         Influenza Vaccine Received Flu Vaccine for Current Season (usually Sept-March): Not Flu Season               Needs prior to discharge:   Home O2 required:    [] Yes   [] No     If yes, how much O2 required?     Other:    Last Bowel Movement Date: 05/17/17   Readmission Risk Assessment Tool Score High Risk            32       Total Score        3 Relationship with PCP    3 Patient Length of Stay > 5    4 More than 1 Admission in calendar year    5 Patient Insurance is Medicare, Medicaid or Self Pay    17 Charlson Comorbidity Score        Criteria that do not apply:    Patient Living Status       Expected Length of Stay 4d 21h   Actual Length of Stay 8

## 2017-05-17 NOTE — PROGRESS NOTES
[address] SHIFT NURSING NOTE    Bedside shift change report given to RN (oncoming nurse) by Tobi Suero (offgoing nurse). Report included the following information SBAR, Kardex, Recent Results, Med Rec Status and Cardiac Rhythm Sinus tach. SHIFT SUMMARY:     Comfortable throughout shift - cough continues. PRN Tessalon capsule given x 1 - effective. Remains on oral Vanc for c-diff. Formed stool noted on shift.      Admission Date 5/9/2017   Admission Diagnosis Tachycardia   Consults IP CONSULT TO HOSPITALIST  IP CONSULT TO CARDIOLOGY  IP CONSULT TO PULMONOLOGY        Consults   [] PT   [] OT   [] Speech   [] Palliative      [] Hospice    [] Case Management   [] None   Cardiac Monitoring   [x] Yes   [] No     Antibiotics   [x] Yes   [] No   GI Prophylaxis  (Ex: Protonix, Pepcid, etc,.)   [] Yes   [x] No          DVT Prophylaxis   SCDs:             Mayo stockings:         [x] Medication (Ex: Lovenox, Eliquis, Brilinta, Coumadin,  Heparin, etc..)   [] Contraindicated   [x] No VTE needed       Urinary Catheter             LDAs           Triple Lumen Central line 05/10/17 Right Neck (Active)   Central Line Being Utilized Yes 5/16/2017  7:41 PM   Criteria for Appropriate Use Hemodynamically unstable, requiring monitoring lines, vasopressors, or volume resuscitation 5/16/2017  7:41 PM   Site Assessment Clean, dry, & intact 5/16/2017  7:41 PM   Infiltration Assessment 0 5/16/2017  7:41 PM   Affected Extremity/Extremities Color distal to insertion site pink (or appropriate for race) 5/16/2017  7:41 PM   Date of Last Dressing Change 05/09/17 5/16/2017  7:41 PM   Dressing Status Clean, dry, & intact 5/16/2017  7:41 PM   Dressing Type Transparent 5/16/2017  7:41 PM   Action Taken Open ports on tubing capped 5/15/2017  3:31 AM   Proximal Hub Color/Line Status Brown;Flushed;Capped 5/16/2017  7:41 PM   Positive Blood Return (Medial Site) Yes 5/16/2017  7:41 PM   Medial Hub Color/Line Status Blue;Flushed;Capped 5/16/2017  7:41 PM Positive Blood Return (Lateral Site) Yes 5/16/2017  7:41 PM   Distal Hub Color/Line Status White;Flushed;Capped 5/16/2017  7:41 PM   Positive Blood Return (Site #3) Yes 5/16/2017  7:41 PM   Alcohol Cap Used Yes 5/16/2017  7:41 PM                          I/Os   Intake/Output Summary (Last 24 hours) at 05/16/17 9632  Last data filed at 05/16/17 1941   Gross per 24 hour   Intake              360 ml   Output             1225 ml   Net             -865 ml         Activity Level Activity Level: Up with Assistance     Activity Assistance: Partial (one person)   Diet Active Orders   Diet    DIET CARDIAC Regular      Purposeful Rounding every 1-2 hour? [x] Yes    Anthony Score  Total Score: 2   Bed Alarm (If score 3 or >)   [x] Yes    [] Refused (See signed refusal form in chart)   Landon Score  Landon Score: 20       Landon Score (if score 14 or less)   [] PMT consult   [] Nutrition consult   [] Wound Care consult      []  Specialty bed         Influenza Vaccine Received Flu Vaccine for Current Season (usually Sept-March): Not Flu Season               Needs prior to discharge:   Home O2 required:    [] Yes   [x] No     If yes, how much O2 required?     Other:    Last Bowel Movement Date: 05/16/17   Readmission Risk Assessment Tool Score High Risk            32       Total Score        3 Relationship with PCP    3 Patient Length of Stay > 5    4 More than 1 Admission in calendar year    5 Patient Insurance is Medicare, Medicaid or Self Pay    17 Charlson Comorbidity Score        Criteria that do not apply:    Patient Living Status       Expected Length of Stay 3d 19h   Actual Length of Stay 7

## 2017-05-17 NOTE — CARDIO/PULMONARY
Cardiopulmonary Rehab Nursing Entry:     Chart Reviewed. Pt admitted with Tachycardia and developed A fib overnight. Plan for [facility] at [facility] significant for:  -Chronic Systolic/Diastolic HF   -CAD  -COPD  -HTN   Echo from 5/9/17 LV Ef 25-30%. Former smoker. Attempted to see patient for follow up CHF teaching. Staff in room. Will continue to follow.

## 2017-05-17 NOTE — PROGRESS NOTES
8351  MEWS 3 d/t increased HR. This is patients baseline and is currently being treated. Will continue to monitor closely.

## 2017-05-17 NOTE — PROGRESS NOTES
[facility]  Pulmonary, Critical Care, and Sleep Medicine    Name: Sharon Lindo MRN: [id number]   : 1950 Hospital: [facility]   Date: 2017        IMPRESSION:   · Hemoptysis due to pulmonary edema in the setting of anticoagulation and underlying emphysema  · She does not have interstitial lung disease - she has emphysema with superimposed pulmonary edema - CT scan can look like ILD in this setting, but there is no convincing evidence of new onset ILD (CT with Kerley B lines, etc) - I have followed her for her emphysema for years  · COPD/emphysema - FEV1 1.29 L (59% predicted), NORMAL lung volumes, 2015 - no convincing evidence of exacerbation  · Acute systolic heart failure - new onset CHF with EF 25% in April of this year, now s/p stenting  · Atrial fibrillation  · C diff colitis  · Diabetes - glucose control is very difficult when she is on systemic steroids  · H/O tobacco use      PLAN:   · On room air  · Continue diuresis  · Warfarin currently on hold; ASA and Plavix will have to continue due to recent stents - at this time it is probably OK to restart warfarin if desired; discussed with Dr. Flower Pena  · Follow up sputum culture for completeness  · No role for bronchoscopy in this setting  · Bronchodilators  · Will need a repeat CT scan in 8-12 weeks for completeness     Subjective/Interval History:   I have reviewed the flowsheet and previous days notes. Breathing continues to improve. Less sputum. No hemoptysis. Review of Systems   Constitutional: Negative. HENT: Negative. Eyes: Negative. Respiratory: Positive for shortness of breath. Cardiovascular: Negative. Gastrointestinal: Negative.       Objective:   Vital Signs:    Visit Vitals    /78    Pulse (!) 105    Temp 98.4 °F (36.9 °C)    Resp 22    Ht 5' 6\" (1.676 m)    Wt 75.7 kg (166 lb 14.2 oz)    SpO2 100%    Breastfeeding No    BMI 26.94 kg/m2       O2 Device: Room air   O2 Flow Rate (L/min): 1 l/min   Temp (24hrs), Av.9 °F (36.6 °C), Min:97.6 °F (36.4 °C), Max:98.4 °F (36.9 °C)       Intake/Output:   Last shift:         Last 3 shifts: 05/15 1901 -  0700  In: 840 [P.O.:840]  Out: 2625 [Urine:2625]    Intake/Output Summary (Last 24 hours) at 17 0826  Last data filed at 17 0544   Gross per 24 hour   Intake              360 ml   Output             1675 ml   Net            -1315 ml      Physical Exam   Constitutional: She is oriented to person, place, and time. She is cooperative. No distress. HENT:   Head: Normocephalic and atraumatic. Mouth/Throat: No oropharyngeal exudate. Eyes: No scleral icterus. Cardiovascular: Normal rate and regular rhythm. Pulmonary/Chest: She has no wheezes. She has rales. Abdominal: Soft. Bowel sounds are normal. She exhibits no distension. There is no tenderness. Musculoskeletal: She exhibits no edema. Neurological: She is alert and oriented to person, place, and time. Skin: Skin is warm and dry. No rash noted. Data:   Labs:  Recent Labs      17   0353   WBC  6.6   HGB  11.7   HCT  36.1   PLT  258     Recent Labs      17   0330  17   0353  05/15/17   0246   NA  138  138  137   K  3.5  3.6  3.5   CL  102  103  101   CO2  27  26  28   GLU  153*  135*  172*   BUN  13  11  11   CREA  0.89  0.89  0.99   CA  8.6  8.6  8.6   MG  2.2  2.0  2.0   INR  1.0  1.1  1.1     No results for input(s): PH, PCO2, PO2, HCO3, FIO2 in the last 72 hours.     Imaging:  I have personally reviewed the patients radiographs:  None today        Consuelo Downey MD

## 2017-05-17 NOTE — PROGRESS NOTES
[address] [facility]  [phone]      Cardiology Progress Note      5/17/2017 8:30AM    Admit Date: 5/9/2017    Admit Diagnosis:   Tachycardia    Subjective:     Steven Felix is feeling much better. Ambulating in room with no difficulty. Still coughing but improved. Cleared by pulmonary to restart coumadin today.      Visit Vitals    /55 (BP 1 Location: Right arm, BP Patient Position: Sitting)    Pulse (!) 102    Temp 97.9 °F (36.6 °C)    Resp 22    Ht 5' 6\" (1.676 m)    Wt 75.7 kg (166 lb 14.2 oz)    SpO2 100%    Breastfeeding No    BMI 26.94 kg/m2       Current Facility-Administered Medications   Medication Dose Route Frequency    furosemide (LASIX) tablet 20 mg  20 mg Oral BID    benzonatate (TESSALON) capsule 100 mg  100 mg Oral TID PRN    guaiFENesin ER (MUCINEX) tablet 1,200 mg  1,200 mg Oral BID    albuterol (PROVENTIL VENTOLIN) nebulizer solution 2.5 mg  2.5 mg Nebulization QID RT    carvedilol (COREG) tablet 3.125 mg  3.125 mg Oral BID WITH MEALS    gabapentin (NEURONTIN) capsule 400 mg  400 mg Oral BID    gabapentin (NEURONTIN) capsule 600 mg  600 mg Oral DAILY    dextrose 10% infusion 125-250 mL  125-250 mL IntraVENous PRN    aspirin chewable tablet 81 mg  81 mg Oral DAILY    lactobac ac& pc-s.therm-b.anim (MARYAN Q/RISAQUAD)  1 Cap Oral DAILY    albuterol-ipratropium (DUO-NEB) 2.5 MG-0.5 MG/3 ML  3 mL Nebulization Q4H PRN    insulin lispro (HUMALOG) injection   SubCUTAneous AC&HS    amiodarone (CORDARONE) tablet 200 mg  200 mg Oral BID    colchicine tablet 0.6 mg  0.6 mg Oral DAILY    acetaminophen (TYLENOL) tablet 650 mg  650 mg Oral Q4H PRN    glucose chewable tablet 16 g  4 Tab Oral PRN    glucagon (GLUCAGEN) injection 1 mg  1 mg IntraMUSCular PRN    vancomycin 50 mg/mL oral solution (compounded) 125 mg  125 mg Oral Q6H    fluticasone-vilanterol (BREO ELLIPTA) 100mcg-25mcg/puff  1 Puff Inhalation DAILY    clopidogrel (PLAVIX) tablet 75 mg  75 mg Oral DAILY    umeclidinium (INCRUSE ELLIPTA) 62.5 mcg/actuation  1 Puff Inhalation DAILY    albuterol (PROVENTIL HFA, VENTOLIN HFA, PROAIR HFA) inhaler 2 Puff  2 Puff Inhalation Q6H PRN       Objective:      Physical Exam:  General Appearance:  WNWD AAF in no acute distress  Chest:   Clear  Cardiovascular:  tachy, no murmur.   Abdomen:   Soft, non-tender, bowel sounds are active.   Extremities: no peripheral edema  Skin:  Warm and dry.     Data Review:   Recent Labs      05/16/17   0353   WBC  6.6   HGB  11.7   HCT  36.1   PLT  258     Recent Labs      05/17/17   0330  05/16/17   0353  05/15/17   0246   NA  138  138  137   K  3.5  3.6  3.5   CL  102  103  101   CO2  27  26  28   GLU  153*  135*  172*   BUN  13  11  11   CREA  0.89  0.89  0.99   CA  8.6  8.6  8.6   MG  2.2  2.0  2.0   INR  1.0  1.1  1.1       No results for input(s): TROIQ, CPK, CKMB in the last 72 hours.       Intake/Output Summary (Last 24 hours) at 05/17/17 1245  Last data filed at 05/17/17 0544   Gross per 24 hour   Intake              360 ml   Output             1675 ml   Net            -1315 ml        Telemetry: ST       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 (6/2/2010)      Cardiac pacemaker in situ (7/5/2012)      Mixed hyperlipidemia (7/5/2012)      S/P coronary artery stent placement (7/4/2014)      Overview: 4/7/17 PCI/ROSALINO Diagonal      Type 2 diabetes mellitus with diabetic neuropathy, without long-term current use of insulin ([facility] (1/31/2017)      S/P ablation of atrial fibrillation (5/2/2017)      Overview: 5/1/17      Tachycardia (5/9/2017)      Chronic systolic HF (heart failure) ([facility] [facility] (5/10/2017)      Overview: 4/2017 EF 25-30%      History of Clostridium difficile infection (5/10/2017)      Overview: 4/2017 CDiff positive      Junctional tachycardia ([facility] (5/10/2017)      Hypotension (5/10/2017)        Plan:     Junctional tachycardia/afib:  No further episodes, remains in SR  Continue on amiodarone 200mg daily and Coreg   Started back on coumadin with clearance from pulmonary reference hemoptysis  INR 2-3, does not bridging with lovenox     Chronic systolic and diastolic HF:  Stable, continues to diurese, neg 7L. Change IV lasix to PO  Consider starting Entresto in AM if patient can afford - CM evaluating  Newly diagnosed with systolic HF 0/8/48, repeat echo yesterday with continuing low EF  S/p PCI 4/7/17 had hoped for improvement in EF, but remains <35%  AICD candidate in July 2017 if EF remains <35%  Lifevest at discharge  Monitor I/Os, daily weights, labs     CAD:  S/p PCI 4/5/17, will require Plavix for at least 1 year  Discontinued on ASA as patient is 1 month post PCI and on \"triple therapy\" meds (Plavix, ASA, coumadin). D/Cing ASA decreases risk of bleeds, and has similar outcomes with Plavix and AC post PCI  Restarting Lipitor as LFTs stable, and is on Repatha bi-weekly for management  Continue on Coreg     PAF with PVI afib ablation:   No afib on telemetry, other tachyarrhythmia   Continue with coumadin, amiodorone and BB    Hopefully home within next few days. [facility]    5/17/2017         Agree with note as outlined by  NP. I confirm findings in history and physical exam. No additional findings noted. Agree with plan as outlined above.      [address] Filib[address] Simms MD

## 2017-05-17 NOTE — PROGRESS NOTES
Pharmacy Dosing of Warfarin     Indication for Warfarin: AFib     Goal INR: 2-3     Warfarin PTA Regimen: 7.5 mg po daily     Concurrent anticoagulants & Platelet Inhibitors: Plavix (ASA 81mg discontinued )     Major Interacting Medications:   Amiodarone started 5/10     Inpatient Warfarin Doses:     Date       INR       Warfarin Dose  17     2.7        7.5 mg  5/10/17   2.1        7.5 mg   17   3.5        on hold since 5/10    Estimated Creatinine Clearance: 65.6 mL/min (based on Cr of 0.89). Estimated Creatinine Clearance (using IBW): 59.0 mL/min  Recent Labs      17   0330  17   0353  05/15/17   0246   CREA  0.89  0.89  0.99   BUN  13  11  11   WBC   --   6.6   --    NA  138  138  137   K  3.5  3.6  3.5   MG  2.2  2.0  2.0   CA  8.6  8.6  8.6   HGB   --   11.7   --    HCT   --   36.1   --    PLT   --   258   --    INR  1.0  1.1  1.1     Temp (24hrs), Av °F (36.7 °C), Min:97.7 °F (36.5 °C), Max:98.4 °F (36.9 °C)  .7 °F (36.5 °C), Min:97.4 °F (36.3 °C), Max:98.2 °F (36.8 °C)     Warfarin sensitivity: high (Amiodarone)   Impression/Plan:   Resuming Warfarin today after hold in regimen since 5/10 due to hemptysis. No Lovenox bridge required since pt in NSR. Daily INR ordered. Warfarin 7.5mg today      Pharmacy will follow daily and adjust the dose as appropriate.   Thank you,  Walker Bates, [place]

## 2017-05-18 LAB
ANION GAP BLD CALC-SCNC: 10 MMOL/L (ref 5–15)
BUN SERPL-MCNC: 11 MG/DL (ref 6–20)
BUN/CREAT SERPL: 11 (ref 12–20)
CALCIUM SERPL-MCNC: 8.8 MG/DL (ref 8.5–10.1)
CHLORIDE SERPL-SCNC: 104 MMOL/L (ref 97–108)
CO2 SERPL-SCNC: 25 MMOL/L (ref 21–32)
CREAT SERPL-MCNC: 0.99 MG/DL (ref 0.55–1.02)
ERYTHROCYTE [DISTWIDTH] IN BLOOD BY AUTOMATED COUNT: 14.5 % (ref 11.5–14.5)
GLUCOSE BLD STRIP.AUTO-MCNC: 152 MG/DL (ref 65–100)
GLUCOSE BLD STRIP.AUTO-MCNC: 171 MG/DL (ref 65–100)
GLUCOSE BLD STRIP.AUTO-MCNC: 200 MG/DL (ref 65–100)
GLUCOSE BLD STRIP.AUTO-MCNC: 310 MG/DL (ref 65–100)
GLUCOSE SERPL-MCNC: 131 MG/DL (ref 65–100)
HCT VFR BLD AUTO: 36.2 % (ref 35–47)
HGB BLD-MCNC: 11.7 G/DL (ref 11.5–16)
INR PPP: 1.1 (ref 0.9–1.1)
MAGNESIUM SERPL-MCNC: 2.1 MG/DL (ref 1.6–2.4)
MCH RBC QN AUTO: 27.5 PG (ref 26–34)
MCHC RBC AUTO-ENTMCNC: 32.3 G/DL (ref 30–36.5)
MCV RBC AUTO: 85 FL (ref 80–99)
PLATELET # BLD AUTO: 275 K/UL (ref 150–400)
POTASSIUM SERPL-SCNC: 3.9 MMOL/L (ref 3.5–5.1)
PROTHROMBIN TIME: 10.6 SEC (ref 9–11.1)
RBC # BLD AUTO: 4.26 M/UL (ref 3.8–5.2)
SERVICE CMNT-IMP: ABNORMAL
SODIUM SERPL-SCNC: 139 MMOL/L (ref 136–145)
WBC # BLD AUTO: 6.7 K/UL (ref 3.6–11)

## 2017-05-18 PROCEDURE — 74011250636 HC RX REV CODE- 250/636: Performed by: HOSPITALIST

## 2017-05-18 PROCEDURE — 74011636637 HC RX REV CODE- 636/637: Performed by: HOSPITALIST

## 2017-05-18 PROCEDURE — 74011250637 HC RX REV CODE- 250/637: Performed by: INTERNAL MEDICINE

## 2017-05-18 PROCEDURE — 74011250637 HC RX REV CODE- 250/637: Performed by: FAMILY MEDICINE

## 2017-05-18 PROCEDURE — 36415 COLL VENOUS BLD VENIPUNCTURE: CPT | Performed by: INTERNAL MEDICINE

## 2017-05-18 PROCEDURE — 94640 AIRWAY INHALATION TREATMENT: CPT

## 2017-05-18 PROCEDURE — 82962 GLUCOSE BLOOD TEST: CPT

## 2017-05-18 PROCEDURE — 74011250637 HC RX REV CODE- 250/637: Performed by: HOSPITALIST

## 2017-05-18 PROCEDURE — 85610 PROTHROMBIN TIME: CPT | Performed by: INTERNAL MEDICINE

## 2017-05-18 PROCEDURE — 97165 OT EVAL LOW COMPLEX 30 MIN: CPT | Performed by: OCCUPATIONAL THERAPIST

## 2017-05-18 PROCEDURE — 80048 BASIC METABOLIC PNL TOTAL CA: CPT | Performed by: INTERNAL MEDICINE

## 2017-05-18 PROCEDURE — 74011000250 HC RX REV CODE- 250: Performed by: INTERNAL MEDICINE

## 2017-05-18 PROCEDURE — 83735 ASSAY OF MAGNESIUM: CPT | Performed by: INTERNAL MEDICINE

## 2017-05-18 PROCEDURE — 74011250637 HC RX REV CODE- 250/637: Performed by: NURSE PRACTITIONER

## 2017-05-18 PROCEDURE — 65660000000 HC RM CCU STEPDOWN

## 2017-05-18 PROCEDURE — 85027 COMPLETE CBC AUTOMATED: CPT | Performed by: INTERNAL MEDICINE

## 2017-05-18 PROCEDURE — 74011636637 HC RX REV CODE- 636/637: Performed by: NURSE PRACTITIONER

## 2017-05-18 RX ORDER — WARFARIN 7.5 MG/1
7.5 TABLET ORAL ONCE
Status: COMPLETED | OUTPATIENT
Start: 2017-05-18 | End: 2017-05-18

## 2017-05-18 RX ORDER — FUROSEMIDE 20 MG/1
20 TABLET ORAL ONCE
Status: COMPLETED | OUTPATIENT
Start: 2017-05-18 | End: 2017-05-18

## 2017-05-18 RX ORDER — FUROSEMIDE 40 MG/1
40 TABLET ORAL DAILY
Status: DISCONTINUED | OUTPATIENT
Start: 2017-05-19 | End: 2017-05-22 | Stop reason: HOSPADM

## 2017-05-18 RX ADMIN — GUAIFENESIN AND CODEINE PHOSPHATE 5 ML: 100; 10 SOLUTION ORAL at 06:09

## 2017-05-18 RX ADMIN — Medication 20 ML: at 12:26

## 2017-05-18 RX ADMIN — Medication 1 CAPSULE: at 09:08

## 2017-05-18 RX ADMIN — AMIODARONE HYDROCHLORIDE 200 MG: 200 TABLET ORAL at 17:59

## 2017-05-18 RX ADMIN — VANCOMYCIN HYDROCHLORIDE 125 MG: 1 INJECTION, POWDER, LYOPHILIZED, FOR SOLUTION INTRAVENOUS at 12:30

## 2017-05-18 RX ADMIN — Medication 30 ML: at 06:09

## 2017-05-18 RX ADMIN — GABAPENTIN 400 MG: 300 CAPSULE ORAL at 09:08

## 2017-05-18 RX ADMIN — VANCOMYCIN HYDROCHLORIDE 125 MG: 1 INJECTION, POWDER, LYOPHILIZED, FOR SOLUTION INTRAVENOUS at 17:59

## 2017-05-18 RX ADMIN — METHYLPREDNISOLONE SODIUM SUCCINATE 125 MG: 125 INJECTION, POWDER, FOR SOLUTION INTRAMUSCULAR; INTRAVENOUS at 15:05

## 2017-05-18 RX ADMIN — ALBUTEROL SULFATE 2.5 MG: 2.5 SOLUTION RESPIRATORY (INHALATION) at 15:17

## 2017-05-18 RX ADMIN — INSULIN HUMAN 5 UNITS: 100 INJECTION, SUSPENSION SUBCUTANEOUS at 23:45

## 2017-05-18 RX ADMIN — METHYLPREDNISOLONE SODIUM SUCCINATE 80 MG: 125 INJECTION, POWDER, FOR SOLUTION INTRAMUSCULAR; INTRAVENOUS at 23:44

## 2017-05-18 RX ADMIN — FUROSEMIDE 20 MG: 20 TABLET ORAL at 12:30

## 2017-05-18 RX ADMIN — INSULIN HUMAN 5 UNITS: 100 INJECTION, SUSPENSION SUBCUTANEOUS at 17:59

## 2017-05-18 RX ADMIN — GABAPENTIN 400 MG: 300 CAPSULE ORAL at 21:09

## 2017-05-18 RX ADMIN — VANCOMYCIN HYDROCHLORIDE 125 MG: 1 INJECTION, POWDER, LYOPHILIZED, FOR SOLUTION INTRAVENOUS at 23:45

## 2017-05-18 RX ADMIN — INSULIN LISPRO 3 UNITS: 100 INJECTION, SOLUTION INTRAVENOUS; SUBCUTANEOUS at 17:59

## 2017-05-18 RX ADMIN — CARVEDILOL 6.25 MG: 6.25 TABLET, FILM COATED ORAL at 17:59

## 2017-05-18 RX ADMIN — Medication 10 ML: at 21:10

## 2017-05-18 RX ADMIN — FLUTICASONE FUROATE AND VILANTEROL TRIFENATATE 1 PUFF: 100; 25 POWDER RESPIRATORY (INHALATION) at 09:09

## 2017-05-18 RX ADMIN — WARFARIN SODIUM 7.5 MG: 7.5 TABLET ORAL at 17:59

## 2017-05-18 RX ADMIN — ALBUTEROL SULFATE 2.5 MG: 2.5 SOLUTION RESPIRATORY (INHALATION) at 19:20

## 2017-05-18 RX ADMIN — INSULIN LISPRO 4 UNITS: 100 INJECTION, SOLUTION INTRAVENOUS; SUBCUTANEOUS at 21:09

## 2017-05-18 RX ADMIN — CLOPIDOGREL BISULFATE 75 MG: 75 TABLET ORAL at 09:08

## 2017-05-18 RX ADMIN — INSULIN LISPRO 2 UNITS: 100 INJECTION, SOLUTION INTRAVENOUS; SUBCUTANEOUS at 12:31

## 2017-05-18 RX ADMIN — ALBUTEROL SULFATE 2.5 MG: 2.5 SOLUTION RESPIRATORY (INHALATION) at 11:11

## 2017-05-18 RX ADMIN — INSULIN LISPRO 2 UNITS: 100 INJECTION, SOLUTION INTRAVENOUS; SUBCUTANEOUS at 09:07

## 2017-05-18 RX ADMIN — VANCOMYCIN HYDROCHLORIDE 125 MG: 1 INJECTION, POWDER, LYOPHILIZED, FOR SOLUTION INTRAVENOUS at 06:09

## 2017-05-18 RX ADMIN — CARVEDILOL 6.25 MG: 6.25 TABLET, FILM COATED ORAL at 09:07

## 2017-05-18 RX ADMIN — UMECLIDINIUM 1 PUFF: 62.5 AEROSOL, POWDER ORAL at 09:08

## 2017-05-18 RX ADMIN — ACETAMINOPHEN 650 MG: 325 TABLET, FILM COATED ORAL at 11:33

## 2017-05-18 RX ADMIN — FUROSEMIDE 20 MG: 20 TABLET ORAL at 09:08

## 2017-05-18 RX ADMIN — COLCHICINE 0.6 MG: 0.6 TABLET, FILM COATED ORAL at 09:07

## 2017-05-18 RX ADMIN — GABAPENTIN 600 MG: 300 CAPSULE ORAL at 11:33

## 2017-05-18 RX ADMIN — AMIODARONE HYDROCHLORIDE 200 MG: 200 TABLET ORAL at 09:07

## 2017-05-18 RX ADMIN — Medication 30 ML: at 04:40

## 2017-05-18 RX ADMIN — ALBUTEROL SULFATE 2.5 MG: 2.5 SOLUTION RESPIRATORY (INHALATION) at 07:14

## 2017-05-18 NOTE — PROGRESS NOTES
Hospitalist Progress Note    NAME: Tal Miramontes   :  1950   MRN:  [id number]       Interim Hospital Summary: 73 y.o. female whom presented on 2017 with      Assessment / Plan:  Hemoptysis Not POA resolved   Continue ASA/Plavix due to recent stents  Restarted on Coumadin  ( OK with pulmonary)   Pulmonary help appreciated:no role for bronchoscopy now; repeat CT in 8-12 weeks   Scheduled mucinex + tessalon perls - not effective; adding mucinex + codeine     Respiratory distress due to Acute on chronic systolic heart failure with EF 25%  Diuresing well, Wt 176--> 166 x last 3 days   On RA   CTA chest with ILD but per pulmonary no ILD, findings could be related to CHF  Diuretic: IV --> PO  ( cr, electrolytes are stable)   Cardiology help appreciated: lifevest at discharge     Addendum:   D/w cardiology: not ready for DC yet. Cough is persisting. Difficult to tell cardiac vs pulmonary. Trial of solumedrol.  C/w NPH for DM with steroids     Junctional Tachycardia/A.fib with RVR with associated hypotension  Elevated troponin/ CAD s/p PCI 17  Continue amiodarone 200 mg daily, coreg   Cardiology help appreciated: AICD planned 2017 if EF remains < 35 %   S/p cardioversion in sinus rhythm  S/p ablation 17  Continue coumadin     Sepsis POA resolved   Due to Recurrent vs persistent C.diff  Recently completed 10 days coarse of PO flagyl  Diarrhea resolved, will complete 14 days coarse of PO vanco ( last dose )   regulo Q    DM type 2  /170th   Holding metformin  Continue SSI        Code Status: Full  Surrogate Decision Maker: Daughter  DVT Prophylaxis: on coumadin       Baseline:lives with her brother and grandson; independent   Disposition: hopefully soon, PT/OT for disposition     L IJ       Subjective:      CHIEF COMPLAINT: following sepsis/ DM/ respiratory distress   Hemoptysis: no recurrent   Cough: reports some improvement      Overnight events d/w RN       Review of Systems:  Symptom Y/N Comments  Symptom Y/N Comments   Fever/Chills n   Chest Pain n    Poor Appetite    Edema     Cough y   Abdominal Pain n    Sputum    Joint Pain     SOB/[place] y   Pruritis/Rash     Nausea/vomit    Tolerating PT/OT     Diarrhea    Tolerating Diet y    Constipation    Other       Could NOT obtain due to:      Objective:     VITALS:   Last 24hrs VS reviewed since prior progress note. Most recent are:  Patient Vitals for the past 24 hrs:   Temp Pulse Resp BP SpO2   05/18/17 1220 98.5 °F (36.9 °C) 73 20 124/55 100 %   05/18/17 1111 - - - - 100 %   05/18/17 0835 - 85 - 122/52 100 %   05/18/17 0740 97.8 °F (36.6 °C) 81 16 (!) 87/61 100 %   05/18/17 0734 - - - - 97 %   05/18/17 0714 - - - - 100 %   05/18/17 0437 97.4 °F (36.3 °C) 91 16 91/68 99 %   05/17/17 2306 98.1 °F (36.7 °C) 90 16 108/71 100 %   05/17/17 1938 97.7 °F (36.5 °C) (!) 106 20 92/49 100 %   05/17/17 1921 - - - - 100 %   05/17/17 1504 97.8 °F (36.6 °C) (!) 103 20 106/51 100 %       Intake/Output Summary (Last 24 hours) at 05/18/17 1324  Last data filed at 05/18/17 1222   Gross per 24 hour   Intake              360 ml   Output              600 ml   Net             -240 ml        PHYSICAL EXAM:  General: WD, WN. Alert, cooperative, no acute distress    EENT:  EOMI. Anicteric sclerae. MMM  Resp:  CTA. No accessory muscle use  CV:  Irregularly irregular  rhythm,  No edema  GI:  Soft, Non distended, Non tender.  +Bowel sounds  Neurologic:  Alert and oriented X 3, normal speech,   Psych:   Good insight. Not anxious nor agitated  Skin:  No rashes.   No jaundice    Reviewed most current lab test results and cultures  YES  Reviewed most current radiology test results   YES  Review and summation of old records today    NO  Reviewed patient's current orders and MAR    YES  PMH/SH reviewed - no change compared to H&P  ________________________________________________________________________  Care Plan discussed with:    Comments   Patient y    Family RN y    Care Manager     Consultant  y Cardiology                      Multidiciplinary team rounds were held today with , nursing, pharmacist and clinical coordinator. Patient's plan of care was discussed; medications were reviewed and discharge planning was addressed. ________________________________________________________________________  Total NON critical care TIME:  25 Minutes    Total CRITICAL CARE TIME Spent:   Minutes non procedure based      Comments   >50% of visit spent in counseling and coordination of care     ________________________________________________________________________  Sivan Paul MD     Procedures: see electronic medical records for all procedures/Xrays and details which were not copied into this note but were reviewed prior to creation of Plan. LABS:  I reviewed today's most current labs and imaging studies.   Pertinent labs include:  Recent Labs      05/18/17   0443  05/16/17   0353   WBC  6.7  6.6   HGB  11.7  11.7   HCT  36.2  36.1   PLT  275  258     Recent Labs      05/18/17   0443  05/17/17   0330  05/16/17   0353   NA  139  138  138   K  3.9  3.5  3.6   CL  104  102  103   CO2  25  27  26   GLU  131*  153*  135*   BUN  11  13  11   CREA  0.99  0.89  0.89   CA  8.8  8.6  8.6   MG  2.1  2.2  2.0   INR  1.1  1.0  1.1       Signed: Sivan Paul MD

## 2017-05-18 NOTE — PROGRESS NOTES
Spoke to OT who reported patient up ad nicolas independently without any needs to assess prior to DC to home. Confirmed such with patient, who spoke to having poor living situation given reports of 'mice' within the household and a landlord that 'doesn't take care of things'. Author provided empathetic listening as well as education on how home health therapy does not assist with these concerns as she initially reported desire for home therapy. Upon further explanation, patient then confirmed no needs at DC after reviewed brief home safety checklist without concern. Patient encouraged to continue ad nicolas status within room (given contact precautions) as much as possible throughout remainder of admission to avoid ill effects of bed-rest. Patient in agreement. No further PT needs or concerns. Jeovanny Post PT, DPT.

## 2017-05-18 NOTE — PROGRESS NOTES
CM was consulted for to please investigate cost for Entresto medication for this Pt. CM called John [phone]. [address] was not sure of the dose or Quantity but, [employer] indicated that If Pt needed Entresto 24/26 mg 1 tab per day. Pt has meet OOP expenses for the year and should not have a copay for a 30 day supply. [address] indicated that when she starts a new year, if she is still on it that it is a Tier 2 medication so her copay would be $30 for medication per month supply. During IDR team discussed that Pt would be DC on PO Antibiotics. Therapy indicated no HH at discharge. Pt would be discharged on a Lifevest which would be set up by Cardiology. CM will continue to monitor discharge plan.

## 2017-05-18 NOTE — PROGRESS NOTES
Pharmacy Dosing of Warfarin     Indication for Warfarin: AFib     Goal INR: 2-3     Warfarin PTA Regimen: 7.5 mg po daily     Concurrent anticoagulants & Platelet Inhibitors: Plavix (ASA 81mg discontinued )     Major Interacting Medications:   Amiodarone started 5/10     Inpatient Warfarin Doses:  Date            INR      Warfarin Dose  17          2.7       7.5 mg  5/10/17        2.1       7.5 mg   17        3.5       on hold since 5/10   5/17            1.0       7.5mg        Estimated Creatinine Clearance: 58.8 mL/min (based on Cr of 0.99). Estimated Creatinine Clearance (using IBW): 53.0 mL/min  Recent Labs      17   0443  17   0330  17   0353   CREA  0.99  0.89  0.89   BUN  11  13  11   WBC  6.7   --   6.6   NA  139  138  138   K  3.9  3.5  3.6   MG  2.1  2.2  2.0   CA  8.8  8.6  8.6   HGB  11.7   --   11.7   HCT  36.2   --   36.1   PLT  275   --   258   INR  1.1  1.0  1.1     Temp (24hrs), Av.8 °F (36.6 °C), Min:97.4 °F (36.3 °C), Max:98.1 °F (36.7 °C)    Warfarin sensitivity: high (Amiodarone)   Impression/Plan:  Warfarin resumed   No Lovenox bridge required since pt in NSR. Daily INR ordered. Warfarin 7.5mg today and will continued to watch closely given Amiodarone initiation since PTA dosing adjustment may be required. Pharmacy will follow daily and adjust the dose as appropriate.   Thank you,  Asmita Shields, [facility]

## 2017-05-18 NOTE — PROGRESS NOTES
[address] [place] [address]  [phone]      Cardiology Progress Note      5/18/2017 10:00AM    Admit Date: 5/9/2017    Admit Diagnosis:   Tachycardia    Subjective:     Steven Felix still c/o cough, feels worse, cant even talk without coughing.   Pulmonary following, not wanting to start steroids if possible, but may require    Visit Vitals    /52 (BP 1 Location: Left arm, BP Patient Position: Sitting)    Pulse 85    Temp 97.8 °F (36.6 °C)    Resp 16    Ht 5' 6\" (1.676 m)    Wt 75.3 kg (166 lb 1.6 oz)    SpO2 100%    Breastfeeding No    BMI 26.81 kg/m2       Current Facility-Administered Medications   Medication Dose Route Frequency    warfarin (COUMADIN) tablet 7.5 mg  7.5 mg Oral ONCE    WARFARIN INFORMATION NOTE (COUMADIN)   Other QPM    furosemide (LASIX) tablet 20 mg  20 mg Oral BID    carvedilol (COREG) tablet 6.25 mg  6.25 mg Oral BID WITH MEALS    atorvastatin (LIPITOR) tablet 20 mg  20 mg Oral QHS    guaiFENesin-codeine (ROBITUSSIN AC) 100-10 mg/5 mL solution 5 mL  5 mL Oral Q4H PRN    SALINE PERIPHERAL FLUSH Q8H soln 10-30 mL  10-30 mL InterCATHeter Q8H    saline peripheral flush soln 10-30 mL  10-30 mL InterCATHeter PRN    benzonatate (TESSALON) capsule 100 mg  100 mg Oral TID PRN    albuterol (PROVENTIL VENTOLIN) nebulizer solution 2.5 mg  2.5 mg Nebulization QID RT    gabapentin (NEURONTIN) capsule 400 mg  400 mg Oral BID    gabapentin (NEURONTIN) capsule 600 mg  600 mg Oral DAILY    dextrose 10% infusion 125-250 mL  125-250 mL IntraVENous PRN    lactobac ac& pc-s.therm-b.anim (MARYAN Q/RISAQUAD)  1 Cap Oral DAILY    albuterol-ipratropium (DUO-NEB) 2.5 MG-0.5 MG/3 ML  3 mL Nebulization Q4H PRN    insulin lispro (HUMALOG) injection   SubCUTAneous AC&HS    amiodarone (CORDARONE) tablet 200 mg  200 mg Oral BID    colchicine tablet 0.6 mg  0.6 mg Oral DAILY    acetaminophen (TYLENOL) tablet 650 mg  650 mg Oral Q4H PRN    glucose chewable tablet 16 g  4 Tab Oral PRN    glucagon (GLUCAGEN) injection 1 mg  1 mg IntraMUSCular PRN    vancomycin 50 mg/mL oral solution (compounded) 125 mg  125 mg Oral Q6H    fluticasone-vilanterol (BREO ELLIPTA) 100mcg-25mcg/puff  1 Puff Inhalation DAILY    clopidogrel (PLAVIX) tablet 75 mg  75 mg Oral DAILY    umeclidinium (INCRUSE ELLIPTA) 62.5 mcg/actuation  1 Puff Inhalation DAILY    albuterol (PROVENTIL HFA, VENTOLIN HFA, PROAIR HFA) inhaler 2 Puff  2 Puff Inhalation Q6H PRN       Objective:      Physical Exam:  General Appearance:  WNWD AAF in no acute distress  Chest:   tighter today with slight rales at right base  Cardiovascular:  Regular rate and rhythm, no murmur.   Abdomen:   Soft, non-tender, bowel sounds are active.   Extremities: no peripheral edema  Skin:  Warm and dry.     Data Review:   Recent Labs      05/18/17   0443  05/16/17   0353   WBC  6.7  6.6   HGB  11.7  11.7   HCT  36.2  36.1   PLT  275  258     Recent Labs      05/18/17   0443  05/17/17   0330  05/16/17   0353   NA  139  138  138   K  3.9  3.5  3.6   CL  104  102  103   CO2  25  27  26   GLU  131*  153*  135*   BUN  11  13  11   CREA  0.99  0.89  0.89   CA  8.8  8.6  8.6   MG  2.1  2.2  2.0   INR  1.1  1.0  1.1       No results for input(s): TROIQ, CPK, CKMB in the last 72 hours.     No intake or output data in the 24 hours ending 05/18/17 1145     Telemetry: SR    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 (6/2/2010)      Cardiac pacemaker in situ (7/5/2012)      Mixed hyperlipidemia (7/5/2012)      S/P coronary artery stent placement (7/4/2014)      Overview: 4/7/17 PCI/ROSALINO Diagonal      Type 2 diabetes mellitus with diabetic neuropathy, without long-term current use of insulin ([facility] (1/31/2017)      S/P ablation of atrial fibrillation (5/2/2017)      Overview: 5/1/17      Tachycardia (5/9/2017)      Chronic systolic HF (heart failure) ([facility] (5/10/2017)      Overview: 4/2017 EF 25-30%      History of Clostridium difficile infection (5/10/2017)      Overview: 4/2017 CDiff positive      Junctional tachycardia ([facility] (5/10/2017)      Hypotension (5/10/2017)        Plan:     Junctional tachycardia/afib:  S/p Aifb ablation on 5/1/17  No further episodes, remains in SR  Continue on amiodarone 200mg daily and Coreg   Started back on coumadin with clearance from pulmonary reference hemoptysis  INR1.0 does not need bridging with lovenox     Chronic systolic and diastolic HF:  Stable, continues to diurese, neg 7L. Lasix 40mg daily  Consider starting Entresto once pulm problems resolve  Newly diagnosed with systolic HF 2/7/70, repeat echo EF <35%  S/p PCI 4/7/17 had hoped for improvement in EF  AICD candidate in July 2017 if EF remains <35%  Lifevest at discharge  Monitor I/Os, daily weights, labs      CAD:  S/p PCI 4/5/17, continue on Plavix, BB, statin  Discontinued on ASA as patient is 1 month post PCI and on \"triple therapy\" meds (Plavix, ASA, coumadin). D/Cing ASA decreases risk of bleeds, and has similar outcomes with Plavix and AC post PCI      PAF with PVI afib ablation:   No afib on telemetry  Continue with coumadin, amiodorone and BB     COPD/Emphysema  Continues on nebs but does not seem to be improving, appears worse today with cough  Possibly steroids will help, discuss further with Pulmonary      [facility] Cardiology    5/18/2017         Agree with note as outlined by  NP. I confirm findings in history and physical exam. No additional findings noted. Agree with plan as outlined above.      Hugo Cooley MD

## 2017-05-18 NOTE — ROUTINE PROCESS
Bedside and Verbal shift change report given to Perfecto Osler, RN (oncoming nurse) by Sally Ahumada, RN (offgoing nurse). Report included the following information SBAR, Kardex, Intake/Output and MAR.

## 2017-05-18 NOTE — PROGRESS NOTES
1900 Received report from [place]. Assumed patient care. 2104 In with patient to give bedtime medications. Lipitor was to be administered but pt refused because it causes diarrhea. This drug is listed on her allergy list. Pt did not receive Lipitor. I will discuss with day shift nurse at change of shift. 0000 Report given to Morenita Morrell RN.

## 2017-05-18 NOTE — PROGRESS NOTES
Spiritual Care Assessment/Progress Notes    Davis Fleming [id number]  [id number]    1950  73 y.o.  female    Patient Telephone Number: [phone] (home)   Sikh Affiliation: Tye Jones   Language: English   Extended Emergency Contact Information  Primary Emergency Contact: Yue Torres Phone: [phone]  Work Phone: [phone]  Relation: Child  Secondary Emergency Contact: Trudi Christensen Phone: [phone]  Relation: Other Relative   Patient Active Problem List    Diagnosis Date Noted    Chronic systolic HF (heart failure) ([address] 05/10/2017    History of Clostridium difficile infection 05/10/2017    Junctional tachycardia ([address] 05/10/2017    Hypotension 05/10/2017    Tachycardia 05/09/2017    S/P ablation of atrial fibrillation 05/02/2017    Fear associated with illness and body function 04/07/2017    Counseling regarding advanced care planning and goals of care 95/33/7759    Systolic CHF, acute ([facility] 53/04/2574    Acute systolic CHF (congestive heart failure) ([facility] [address] 04/06/2017    CHF (congestive heart failure) ([facility] 04/04/2017    Type 2 diabetes mellitus with diabetic neuropathy, without long-term current use of insulin ([address] 01/31/2017    Anticoagulation monitoring, INR range 2-3 72/23/7636    Diastolic CHF, acute on chronic ([address] 09/22/2014    S/P coronary artery stent placement 07/04/2014    DNR (do not resuscitate) 06/30/2014    Back pain 11/14/2012    Sinoatrial node dysfunction ([address] 07/05/2012    Cardiac pacemaker in situ 07/05/2012    Mixed hyperlipidemia 07/05/2012    Chest pain 09/21/2011    Anemia 07/25/2011    Sick sinus syndrome ([address] 02/25/2011    S/P angioplasty with stent 02/07/2011    Hypokalemia 07/20/2010    Hip pain 06/08/2010    Hypertension--essential 06/02/2010    Atrial fibrillation--paroxysmal 06/02/2010    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        Date: 5/18/2017       Level of Advent/Spiritual Activity:  []         Involved in bernardo tradition/spiritual practice    []         Not involved in bernardo tradition/spiritual practice  [x]         Spiritually oriented    []         Claims no spiritual orientation    []         seeking spiritual identity  []         Feels alienated from Yarsanism practice/tradition  []         Feels angry about Yarsanism practice/tradition  []         Spirituality/Yarsanism tradition  a resource for coping at this time. []         Not able to assess due to medical condition    Services Provided Today:  []         crisis intervention    []         reading Scriptures  [x]         spiritual assessment    []         prayer  [x]         empathic listening/emotional support  []         rites and rituals (cite in comments)  []         life review     []         Yarsanism support  []         theological development   []         advocacy  []         ethical dialog     []         blessing  []         bereavement support    []         support to family  []         anticipatory grief support   []         help with AMD  []         spiritual guidance    []         meditation      Spiritual Care Needs  []         Emotional Support  []         Spiritual/Advent Care  []         Loss/Adjustment  []         Advocacy/Referral                /Ethics  [x]         No needs expressed at               this time  []         Other: (note in               comments)  [address]  []         Follow up visits with               pt/family  []         Provide materials  []         Schedule sacraments  []         Contact Community               Clergy  [x]         Follow up as needed  []         Other: (note in               comments)     Comments:  initiated visit due to pt's length of stay. Pt shared that she was doing better today then before but had this constant cough when she talked.  Pt shared that she appreciates  support but asked  to return at a later time when she is feeling better.  acknowledged pt's request and advised her of  availability as well as the assurance of continued support as needed/ desired. Shashi Mccrary M.S.   Spiritual Care Department  If needs arise please call [facility] ([phone])

## 2017-05-18 NOTE — PROGRESS NOTES
1055  Patient MEWS 3 d/t increased HR. Patient being treated for this and MD is aware. Will continue to monitor closely.

## 2017-05-18 NOTE — PROGRESS NOTES
Occupational Therapy EVALUATION/discharge  Patient: Raghu Giron (58 y.o. female)  Date: 2017  Primary Diagnosis: Tachycardia        Precautions:   Contact (c-diff)    ASSESSMENT:   Based on the objective data described below, the patient presents with stable BP and HR with activity. She presents at her baseline independent level of function for all mobility and ADLs/IADLs without assist devices. She was able to obtain objects from floor and straighten room without assist.  Further skilled acute occupational therapy is not indicated at this time. Discharge Recommendations: home without services  Further Equipment Recommendations for Discharge: none      SUBJECTIVE:   Patient stated I have been up in the room taking care of myself. I feel much better.     OBJECTIVE DATA SUMMARY:   HISTORY:   Past Medical History:   Diagnosis Date    Atrial fibrillation ([address] 2010    CAD (coronary artery disease)     stent    Chronic diastolic heart failure ([address] 2014    Chronic systolic HF (heart failure) ([address] 5/10/2017    2017 EF 25-30%    COPD     COPD (chronic obstructive pulmonary disease) ([facility]) 2010    Diabetes ([address]     Fibroid     Heart failure ([facility])     History of Clostridium difficile infection 5/10/2017    2017 CDiff positive    Hypertension 2010    Hypotension 5/10/2017    Junctional tachycardia ([address] 5/10/2017    NIDDM (non-insulin dependent diabetes mellitus) 2010    Screening mammogram 5/4/10    SOB (shortness of breath) 2014     Past Surgical History:   Procedure Laterality Date    HX  SECTION      HX OTHER SURGICAL      adrenal gland removed    HX PACEMAKER      NV EXCISE ADRENAL GLAND         Prior Level of Function/Home Situation: independent without assist devices; works a desk job; drives   Expanded or extensive additional review of patient history:     Home Situation  Home Environment: Private residence  # Steps to Enter: 0  One/Two Story Residence: One story  Living Alone: No  Support Systems: Family member(s), [employer]  Patient Expects to be Discharged to[de-identified] Private residence  Current DME Used/Available at Home: None  Tub or Shower Type: Tub/Shower combination  [x]  Right hand dominant   []  Left hand dominant    EXAMINATION OF PERFORMANCE DEFICITS:  Cognitive/Behavioral Status:  Neurologic State: Alert  Orientation Level: Oriented X4  Cognition: Appropriate decision making; Appropriate for age attention/concentration; Appropriate safety awareness  Perception: Appears intact  Perseveration: No perseveration noted  Safety/Judgement: Awareness of environment; Fall prevention;Home safety        Hearing: Auditory  Auditory Impairment: None    Vision/Perceptual:    Tracking: Able to track stimulus in all quadrants w/o difficulty                           Corrective Lenses: Glasses    Range of Motion:    AROM: Within functional limits  PROM: Within functional limits                      Strength:    Strength: Within functional limits                Coordination:  Coordination: Within functional limits  Fine Motor Skills-Upper: Left Intact; Right Intact    Gross Motor Skills-Upper: Left Intact; Right Intact    Tone & Sensation:    Tone: Normal  Sensation: Intact                      Balance:  Sitting: Intact  Standing: Intact    Functional Mobility and Transfers for ADLs:  Bed Mobility:  Rolling: Independent  Supine to Sit: Independent  Sit to Supine: Independent  Scooting: Independent    Transfers:  Sit to Stand: Independent  Stand to Sit: Independent  Bed to Chair: Independent  Toilet Transfer : Independent    ADL Assessment:  Feeding: Independent    Oral Facial Hygiene/Grooming: Independent    Bathing: Independent    Upper Body Dressing: Independent    Lower Body Dressing: Independent    Toileting: Independent                  Cognitive Retraining  Safety/Judgement: Awareness of environment; Fall prevention;Home safety    Therapeutic Exercise:     Functional Measure:  Barthel Index:    Bathin  Bladder: 10  Bowels: 10  Groomin  Dressing: 10  Feeding: 10  Mobility: 15  Stairs: 10  Toilet Use: 10  Transfer (Bed to Chair and Back): 15  Total: 100       Barthel and G-code impairment scale:  Percentage of impairment CH  0% CI  1-19% CJ  20-39% CK  40-59% CL  60-79% CM  80-99% CN  100%   Barthel Score 0-100 100 99-80 79-60 59-40 20-39 1-19   0   Barthel Score 0-20 20 17-19 13-16 9-12 5-8 1-4 0      The Barthel ADL Index: Guidelines  1. The index should be used as a record of what a patient does, not as a record of what a patient could do. 2. The main aim is to establish degree of independence from any help, physical or verbal, however minor and for whatever reason. 3. The need for supervision renders the patient not independent. 4. A patient's performance should be established using the best available evidence. Asking the patient, friends/relatives and nurses are the usual sources, but direct observation and common sense are also important. However direct testing is not needed. 5. Usually the patient's performance over the preceding 24-48 hours is important, but occasionally longer periods will be relevant. 6. Middle categories imply that the patient supplies over 50 per cent of the effort. 7. Use of aids to be independent is allowed. Cloe Noguera., Barthel, DRileyW. (8339). Functional evaluation: the Barthel Index. [facility] (14)2. BRAD Gonsales, Paco Dale., [facility], [address] [place] [address] (). Measuring the change indisability after inpatient rehabilitation; comparison of the responsiveness of the Barthel Index and Functional [place] Measure. Journal of Neurology, Neurosurgery, and Psychiatry, 66(4), 365-998. SIVAN Valadez.ELKIN, MAL Bedolla, & Elicoe Ba MRileyA. (2004.) Assessment of post-stroke quality of life in cost-effectiveness studies: The usefulness of the Barthel Index and the EuroQoL-5D. Quality of Life Research, 13, 772-20         G codes: In compliance with CMSs Claims Based Outcome Reporting, the following G-code set was chosen for this patient based on their primary functional limitation being treated: The outcome measure chosen to determine the severity of the functional limitation was the barthel with a score of 100/100 which was correlated with the impairment scale. ? Self Care:     - CURRENT STATUS: CH - 0% impaired, limited or restricted    - GOAL STATUS: CH - 0% impaired, limited or restricted    - D/C STATUS:  CH - 0% impaired, limited or restricted     Occupational Therapy Evaluation Charge Determination   History Examination Decision-Making   LOW Complexity : Brief history review  LOW Complexity : 1-3 performance deficits relating to physical, cognitive , or psychosocial skils that result in activity limitations and / or participation restrictions  LOW Complexity : No comorbidities that affect functional and no verbal or physical assistance needed to complete eval tasks       Based on the above components, the patient evaluation is determined to be of the following complexity level: LOW   Pain:  Pain Scale 1: Numeric (0 - 10)  Pain Intensity 1: 0              Activity Tolerance:     Please refer to the flowsheet for vital signs taken during this treatment. After treatment:   []  Patient left in no apparent distress sitting up in chair  [x]  Patient left in no apparent distress in bed  [x]  Call bell left within reach  [x]  Nursing notified  []  Caregiver present  []  Bed alarm activated    COMMUNICATION/EDUCATION:   Communication/Collaboration:  [x]      Home safety education was provided and the patient/caregiver indicated understanding. [x]      Patient have participated as able and agree with findings and recommendations. []      Patient is unable to participate in plan of care at this time.   Findings and recommendations were discussed with: Registered Nurse and patient    Isatu Miller, OTR/L  Time Calculation: 10 mins

## 2017-05-18 NOTE — PROGRESS NOTES
[facility]  Pulmonary, Critical Care, and Sleep Medicine    Name: Paulie Dunlap MRN: [id number]   : 1950 Hospital: [facility]   Date: 2017        IMPRESSION:   · Hemoptysis due to pulmonary edema in the setting of anticoagulation and underlying emphysema  · She does not have interstitial lung disease - she has emphysema with superimposed pulmonary edema - CT scan can look like ILD in this setting, but there is no convincing evidence of new onset ILD (CT with Kerley B lines, etc) - I have followed her for her emphysema for years  · COPD/emphysema - FEV1 1.29 L (59% predicted), NORMAL lung volumes, 2015 - no convincing evidence of exacerbation  · Acute systolic heart failure - new onset CHF with EF 25% in April of this year, now s/p stenting  · Atrial fibrillation  · C diff colitis  · Diabetes - glucose control is very difficult when she is on systemic steroids  · H/O tobacco use      PLAN:   · On room air  · Continue diuresis  · Warfarin currently on hold; ASA and Plavix will have to continue due to recent stents - at this time it is probably OK to restart warfarin if desired; discussed with Dr. Saurabh Montez  · Bronchodilators  · Will need a repeat CT scan in 8-12 weeks for completeness  · OK for outpatient therapy from pulmonary perspective     Subjective/Interval History:   I have reviewed the flowsheet and previous days notes. Breathing continues to improve. Less sputum though still coughing. No hemoptysis. Review of Systems   Constitutional: Negative. HENT: Negative. Eyes: Negative. Respiratory: Positive for shortness of breath. Cardiovascular: Negative. Gastrointestinal: Negative.       Objective:   Vital Signs:    Visit Vitals    /52 (BP 1 Location: Left arm, BP Patient Position: Sitting)    Pulse 85    Temp 97.8 °F (36.6 °C)    Resp 16    Ht 5' 6\" (1.676 m)    Wt 75.3 kg (166 lb 1.6 oz)    SpO2 100%    Breastfeeding No    BMI 26.81 kg/m2       O2 Device: Room air   O2 Flow Rate (L/min): 1 l/min   Temp (24hrs), Av.8 °F (36.6 °C), Min:97.4 °F (36.3 °C), Max:98.1 °F (36.7 °C)       Intake/Output:   Last shift:         Last 3 shifts:  190 -  0700  In: 360 [P.O.:360]  Out: 900 [Urine:900]  No intake or output data in the 24 hours ending 17 0850   Physical Exam   Constitutional: She is oriented to person, place, and time. She is cooperative. No distress. HENT:   Head: Normocephalic and atraumatic. Mouth/Throat: No oropharyngeal exudate. Eyes: No scleral icterus. Cardiovascular: Normal rate and regular rhythm. Pulmonary/Chest: She has no wheezes. She has rales. Abdominal: Soft. Bowel sounds are normal. She exhibits no distension. There is no tenderness. Musculoskeletal: She exhibits no edema. Neurological: She is alert and oriented to person, place, and time. Skin: Skin is warm and dry. No rash noted. Data:   Labs:  Recent Labs      17   0443  17   0353   WBC  6.7  6.6   HGB  11.7  11.7   HCT  36.2  36.1   PLT  275  258     Recent Labs      17   0443  17   0330  17   0353   NA  139  138  138   K  3.9  3.5  3.6   CL  104  102  103   CO2  25  27  26   GLU  131*  153*  135*   BUN  11  13  11   CREA  0.99  0.89  0.89   CA  8.8  8.6  8.6   MG  2.1  2.2  2.0   INR  1.1  1.0  1.1     No results for input(s): PH, PCO2, PO2, HCO3, FIO2 in the last 72 hours.     Imaging:  I have personally reviewed the patients radiographs:  None today        Charly Garg MD

## 2017-05-19 LAB
ANION GAP BLD CALC-SCNC: 12 MMOL/L (ref 5–15)
BUN SERPL-MCNC: 15 MG/DL (ref 6–20)
BUN/CREAT SERPL: 14 (ref 12–20)
CALCIUM SERPL-MCNC: 8.9 MG/DL (ref 8.5–10.1)
CHLORIDE SERPL-SCNC: 99 MMOL/L (ref 97–108)
CO2 SERPL-SCNC: 23 MMOL/L (ref 21–32)
CREAT SERPL-MCNC: 1.08 MG/DL (ref 0.55–1.02)
GLUCOSE BLD STRIP.AUTO-MCNC: 259 MG/DL (ref 65–100)
GLUCOSE BLD STRIP.AUTO-MCNC: 296 MG/DL (ref 65–100)
GLUCOSE BLD STRIP.AUTO-MCNC: 354 MG/DL (ref 65–100)
GLUCOSE BLD STRIP.AUTO-MCNC: 390 MG/DL (ref 65–100)
GLUCOSE SERPL-MCNC: 288 MG/DL (ref 65–100)
INR PPP: 1.3 (ref 0.9–1.1)
MAGNESIUM SERPL-MCNC: 2.1 MG/DL (ref 1.6–2.4)
POTASSIUM SERPL-SCNC: 4.1 MMOL/L (ref 3.5–5.1)
PROTHROMBIN TIME: 12.7 SEC (ref 9–11.1)
SERVICE CMNT-IMP: ABNORMAL
SODIUM SERPL-SCNC: 134 MMOL/L (ref 136–145)

## 2017-05-19 PROCEDURE — 74011250637 HC RX REV CODE- 250/637: Performed by: INTERNAL MEDICINE

## 2017-05-19 PROCEDURE — 74011636637 HC RX REV CODE- 636/637: Performed by: NURSE PRACTITIONER

## 2017-05-19 PROCEDURE — 74011250636 HC RX REV CODE- 250/636: Performed by: HOSPITALIST

## 2017-05-19 PROCEDURE — 74011250637 HC RX REV CODE- 250/637: Performed by: HOSPITALIST

## 2017-05-19 PROCEDURE — 74011250637 HC RX REV CODE- 250/637: Performed by: FAMILY MEDICINE

## 2017-05-19 PROCEDURE — 36415 COLL VENOUS BLD VENIPUNCTURE: CPT | Performed by: INTERNAL MEDICINE

## 2017-05-19 PROCEDURE — 82962 GLUCOSE BLOOD TEST: CPT

## 2017-05-19 PROCEDURE — 74011000250 HC RX REV CODE- 250: Performed by: INTERNAL MEDICINE

## 2017-05-19 PROCEDURE — 65660000000 HC RM CCU STEPDOWN

## 2017-05-19 PROCEDURE — 94640 AIRWAY INHALATION TREATMENT: CPT

## 2017-05-19 PROCEDURE — 74011250637 HC RX REV CODE- 250/637: Performed by: NURSE PRACTITIONER

## 2017-05-19 PROCEDURE — 85610 PROTHROMBIN TIME: CPT | Performed by: INTERNAL MEDICINE

## 2017-05-19 PROCEDURE — 80048 BASIC METABOLIC PNL TOTAL CA: CPT | Performed by: INTERNAL MEDICINE

## 2017-05-19 PROCEDURE — 74011636637 HC RX REV CODE- 636/637: Performed by: HOSPITALIST

## 2017-05-19 PROCEDURE — 83735 ASSAY OF MAGNESIUM: CPT | Performed by: INTERNAL MEDICINE

## 2017-05-19 RX ORDER — WARFARIN 7.5 MG/1
7.5 TABLET ORAL
Status: COMPLETED | OUTPATIENT
Start: 2017-05-19 | End: 2017-05-19

## 2017-05-19 RX ORDER — MORPHINE SULFATE 4 MG/ML
1 INJECTION, SOLUTION INTRAMUSCULAR; INTRAVENOUS ONCE
Status: ACTIVE | OUTPATIENT
Start: 2017-05-19 | End: 2017-05-19

## 2017-05-19 RX ORDER — FLUTICASONE PROPIONATE 50 MCG
2 SPRAY, SUSPENSION (ML) NASAL DAILY
Status: DISCONTINUED | OUTPATIENT
Start: 2017-05-19 | End: 2017-05-22 | Stop reason: HOSPADM

## 2017-05-19 RX ORDER — PRAVASTATIN SODIUM 10 MG/1
20 TABLET ORAL
Status: DISCONTINUED | OUTPATIENT
Start: 2017-05-19 | End: 2017-05-22 | Stop reason: HOSPADM

## 2017-05-19 RX ADMIN — UMECLIDINIUM 1 PUFF: 62.5 AEROSOL, POWDER ORAL at 09:40

## 2017-05-19 RX ADMIN — VANCOMYCIN HYDROCHLORIDE 125 MG: 1 INJECTION, POWDER, LYOPHILIZED, FOR SOLUTION INTRAVENOUS at 08:02

## 2017-05-19 RX ADMIN — GABAPENTIN 400 MG: 300 CAPSULE ORAL at 21:17

## 2017-05-19 RX ADMIN — INSULIN LISPRO 5 UNITS: 100 INJECTION, SOLUTION INTRAVENOUS; SUBCUTANEOUS at 09:39

## 2017-05-19 RX ADMIN — METHYLPREDNISOLONE SODIUM SUCCINATE 60 MG: 125 INJECTION, POWDER, FOR SOLUTION INTRAMUSCULAR; INTRAVENOUS at 21:16

## 2017-05-19 RX ADMIN — Medication 10 ML: at 21:18

## 2017-05-19 RX ADMIN — ALBUTEROL SULFATE 2.5 MG: 2.5 SOLUTION RESPIRATORY (INHALATION) at 21:47

## 2017-05-19 RX ADMIN — CARVEDILOL 6.25 MG: 6.25 TABLET, FILM COATED ORAL at 17:24

## 2017-05-19 RX ADMIN — COLCHICINE 0.6 MG: 0.6 TABLET, FILM COATED ORAL at 09:41

## 2017-05-19 RX ADMIN — ALBUTEROL SULFATE 2.5 MG: 2.5 SOLUTION RESPIRATORY (INHALATION) at 12:00

## 2017-05-19 RX ADMIN — AMIODARONE HYDROCHLORIDE 200 MG: 200 TABLET ORAL at 09:37

## 2017-05-19 RX ADMIN — CARVEDILOL 6.25 MG: 6.25 TABLET, FILM COATED ORAL at 09:38

## 2017-05-19 RX ADMIN — HUMAN INSULIN 5 UNITS: 100 INJECTION, SUSPENSION SUBCUTANEOUS at 09:44

## 2017-05-19 RX ADMIN — INSULIN LISPRO 5 UNITS: 100 INJECTION, SOLUTION INTRAVENOUS; SUBCUTANEOUS at 21:18

## 2017-05-19 RX ADMIN — FUROSEMIDE 40 MG: 40 TABLET ORAL at 09:37

## 2017-05-19 RX ADMIN — AMIODARONE HYDROCHLORIDE 200 MG: 200 TABLET ORAL at 17:24

## 2017-05-19 RX ADMIN — CLOPIDOGREL BISULFATE 75 MG: 75 TABLET ORAL at 09:38

## 2017-05-19 RX ADMIN — GABAPENTIN 600 MG: 300 CAPSULE ORAL at 13:12

## 2017-05-19 RX ADMIN — VANCOMYCIN HYDROCHLORIDE 125 MG: 1 INJECTION, POWDER, LYOPHILIZED, FOR SOLUTION INTRAVENOUS at 08:01

## 2017-05-19 RX ADMIN — FLUTICASONE FUROATE AND VILANTEROL TRIFENATATE 1 PUFF: 100; 25 POWDER RESPIRATORY (INHALATION) at 09:40

## 2017-05-19 RX ADMIN — INSULIN HUMAN 10 UNITS: 100 INJECTION, SUSPENSION SUBCUTANEOUS at 21:19

## 2017-05-19 RX ADMIN — GABAPENTIN 400 MG: 300 CAPSULE ORAL at 09:39

## 2017-05-19 RX ADMIN — VANCOMYCIN HYDROCHLORIDE 125 MG: 1 INJECTION, POWDER, LYOPHILIZED, FOR SOLUTION INTRAVENOUS at 13:15

## 2017-05-19 RX ADMIN — Medication 1 CAPSULE: at 09:38

## 2017-05-19 RX ADMIN — Medication 10 ML: at 08:02

## 2017-05-19 RX ADMIN — INSULIN LISPRO 5 UNITS: 100 INJECTION, SOLUTION INTRAVENOUS; SUBCUTANEOUS at 13:12

## 2017-05-19 RX ADMIN — ALBUTEROL SULFATE 2.5 MG: 2.5 SOLUTION RESPIRATORY (INHALATION) at 07:42

## 2017-05-19 RX ADMIN — INSULIN LISPRO 12 UNITS: 100 INJECTION, SOLUTION INTRAVENOUS; SUBCUTANEOUS at 17:32

## 2017-05-19 RX ADMIN — WARFARIN SODIUM 7.5 MG: 7.5 TABLET ORAL at 13:12

## 2017-05-19 RX ADMIN — PRAVASTATIN SODIUM 20 MG: 10 TABLET ORAL at 21:18

## 2017-05-19 RX ADMIN — VANCOMYCIN HYDROCHLORIDE 125 MG: 1 INJECTION, POWDER, LYOPHILIZED, FOR SOLUTION INTRAVENOUS at 17:32

## 2017-05-19 NOTE — PROGRESS NOTES
Pharmacy Dosing of Warfarin     Indication for Warfarin: AFib     Goal INR: 2-3     Warfarin PTA Regimen: 7.5 mg po daily     Concurrent anticoagulants & Platelet Inhibitors: Plavix (ASA 81mg discontinued )     Major Interacting Medications:   Amiodarone started 5/10     Diet:  Cardiac  Inpatient Warfarin Doses:  Date INR Warfarin Dose                2.7     7.5 mg  5/10            2.1     7.5 mg               3.5      on hold 5/10 -             3.3              1.0      7.5mg               1.1      7.5mg      Estimated Creatinine Clearance: 53.8 mL/min (based on Cr of 1.08). Estimated Creatinine Clearance (using IBW): 48.6 mL/min  Recent Labs      17   0428  17   0443  17   0330   CREA  1.08*  0.99  0.89   BUN  15  11  13   WBC   --   6.7   --    NA  134*  139  138   K  4.1  3.9  3.5   MG  2.1  2.1  2.2   CA  8.9  8.8  8.6   HGB   --   11.7   --    HCT   --   36.2   --    PLT   --   275   --    INR  1.3*  1.1  1.0     Temp (24hrs), Av.3 °F (36.8 °C), Min:97.2 °F (36.2 °C), Max:100.4 °F (38 °C)    Warfarin sensitivity: moderate based on recent INR response  (Amiodarone)     Impression/Plan:  Warfarin resumed   No Lovenox bridge required since pt in NSR. Daily INR ordered. Repeat Warfarin 7.5mg today (now) and will continued to watch closely given Amiodarone initiation since PTA dosing adjustment may be required. Once INR 1.8, consider Warfarin 5mg x 1-2 days given new Amiodarone start during admission. Pharmacy will follow daily and adjust the dose as appropriate.   Thank you,  Jennifer Roca, [facility]

## 2017-05-19 NOTE — PROGRESS NOTES
5:22 PM Patients . MD paged. 5:33 PM Per MD give 12 units humalog to patient. [address] SHIFT NURSING NOTE    Bedside shift change report given to Radhika (oncoming nurse) by Naina Stuart (offgoing nurse). Report included the following information SBAR, Kardex, Procedure Summary, Intake/Output, MAR and Recent Results.     SHIFT SUMMARY:         Admission Date 5/9/2017   Admission Diagnosis Tachycardia   Consults IP CONSULT TO CARDIOLOGY  IP CONSULT TO PULMONOLOGY        Consults   [] PT   [] OT   [] Speech   [] Palliative      [] Hospice    [] Case Management   [] None   Cardiac Monitoring   [x] Yes   [] No     Antibiotics   [x] Yes   [] No   GI Prophylaxis  (Ex: Protonix, Pepcid, etc,.)   [x] Yes   [] No          DVT Prophylaxis   SCDs:             Mayo stockings:         [x] Medication (Ex: Lovenox, Eliquis, Brilinta, Coumadin,  Heparin, etc..)   [] Contraindicated   [] No VTE needed       Urinary Catheter             LDAs           Triple Lumen Central line 05/10/17 Right Neck (Active)   Central Line Being Utilized Yes 5/19/2017  4:41 PM   Criteria for Appropriate Use Limited/no vessel suitable for conventional peripheral access 5/19/2017  4:41 PM   Site Assessment Clean, dry, & intact 5/19/2017  4:41 PM   Infiltration Assessment 0 5/19/2017  4:41 PM   Affected Extremity/Extremities Color distal to insertion site pink (or appropriate for race) 5/19/2017  4:41 PM   Date of Last Dressing Change 05/17/17 5/19/2017  4:41 PM   Dressing Status Clean, dry, & intact 5/19/2017  4:41 PM   Dressing Type Disk with Chlorhexadine gluconate (CHG) 5/19/2017  4:41 PM   Action Taken Open ports on tubing capped 5/19/2017 12:00 AM   Proximal Hub Color/Line Status Brown;Flushed 5/19/2017  4:41 PM   Positive Blood Return (Medial Site) Yes 5/19/2017  4:41 PM   Medial Hub Color/Line Status Blue;Flushed 5/19/2017  4:41 PM   Positive Blood Return (Lateral Site) Yes 5/19/2017  4:41 PM   Distal Hub Color/Line Status White;Flushed 5/19/2017  4:41 PM   Positive Blood Return (Site #3) Yes 5/19/2017  4:41 PM   Alcohol Cap Used Yes 5/19/2017  4:41 PM                          I/Os   Intake/Output Summary (Last 24 hours) at 05/19/17 1818  Last data filed at 05/19/17 1438   Gross per 24 hour   Intake             1000 ml   Output             2850 ml   Net            -1850 ml         Activity Level Activity Level: Up ad nicolas     Activity Assistance: No assistance needed   Diet Active Orders   Diet    DIET CARDIAC Regular; Consistent Carb 1800kcal      Purposeful Rounding every 1-2 hour? [x] Yes    Anthony Score  Total Score: 1   Bed Alarm (If score 3 or >)   [] Yes    [] Refused (See signed refusal form in chart)   Landon Score  Landon Score: 22       Landon Score (if score 14 or less)   [] PMT consult   [] Nutrition consult   [] Wound Care consult      []  Specialty bed         Influenza Vaccine Received Flu Vaccine for Current Season (usually Sept-March): Not Flu Season               Needs prior to discharge:   Home O2 required:    [] Yes   [x] No     If yes, how much O2 required?     Other:    Last Bowel Movement Date: 05/18/17   Readmission Risk Assessment Tool Score High Risk            32       Total Score        3 Relationship with PCP    3 Patient Length of Stay > 5    4 More than 1 Admission in calendar year    5 Patient Insurance is Medicare, Medicaid or Self Pay    17 Charlson Comorbidity Score        Criteria that do not apply:    Patient Living Status       Expected Length of Stay 4d 21h   Actual Length of Stay 10

## 2017-05-19 NOTE — PROGRESS NOTES
Initial Nutrition Assessment:    INTERVENTIONS/RECOMMENDATIONS:   · Meals/Snacks: Modify diet/texture/consistency/nutrients: Continue cardiac diet; will add CCD to optimize BG control     ASSESSMENT:   Patient medically noted for CHF, c-diff, hypotension, and persistent cough. PMH for HTN, COPD, AFIB, and DM. Patient reports a great appetite; she never stops wanting to eat. PO mostly % of meals. Patient now receiving steroid and BG elevated. Will add consistent carbohydrate diet. Using menu and room service. MD hoping for discharge Monday. Will monitor appetite/PO and BG. Diet Order: Cardiac  % Eaten:  Patient Vitals for the past 72 hrs:   % Diet Eaten   05/19/17 0900 100 %   05/19/17 0621 100 %   05/18/17 1814 100 %   05/18/17 1221 50 %   05/18/17 0930 75 %     Pertinent Medications: [x]Reviewed []Other:Amiodarone, Coreg, Plavix, Lasix, Humalog, Paula Q, NPH, Solu-medrol, Pravastatin, Coumadin  Pertinent Labs: [x]Reviewed []Other: -[phone], Na 134  Food Allergies: [x]None []Other   Last BM: 5/18   [x]Active     []Hyperactive  []Hypoactive       [] Absent BS  Skin:    [x] Intact   [] Incision  [] Breakdown  [] Other:    Anthropometrics:   Height: 5' 6\" (167.6 cm) Weight: 75 kg (165 lb 5.5 oz)   IBW (%IBW):   ( ) UBW (%UBW):   (  %)   Last Weight Metrics:  Weight Loss Metrics 5/19/2017 5/9/2017 5/9/2017 5/9/2017 5/1/2017 4/25/2017 4/18/2017   Today's Wt 165 lb 5.5 oz - 170 lb 3.2 oz - 170 lb 172 lb 3.2 oz 168 lb 12.8 oz   BMI - 26.69 kg/m2 - 27.47 kg/m2 27.44 kg/m2 27.38 kg/m2 26.84 kg/m2       BMI: Body mass index is 26.69 kg/(m^2). This BMI is indicative of:   []Underweight    []Normal    [x]Overweight    [] Obesity   [] Extreme Obesity (BMI>40)     Estimated Nutrition Needs (Based on):   1705 Kcals/day (BMR (1312) x 1. 3AF) , 75 g (-83g (1.0-1.1 g/kg bw)) Protein  Carbohydrate:  At Least 130 g/day  Fluids: 1700 mL/day (1ml/kcal)    Pt expected to meet estimated nutrient needs: [x]Yes []No    NUTRITION DIAGNOSES:   Problem:  Altered nutrition-related lab values      Etiology: related to DM, steroids     Signs/Symptoms: as evidenced by [phone]      NUTRITION INTERVENTIONS:  Meals/Snacks: Modify diet/texture/consistency/nutrients                  GOAL:   PO intake >70% of meals and BG trend <180 next 4-6 days    LEARNING NEEDS (Diet, Food/Nutrient-Drug Interaction):    [x] None Identified   [] Identified and Education Provided/Documented   [] Identified and Pt declined/was not appropriate     Cultureal, Druze, OR Ethnic Dietary Needs:    [x] None Identified   [] Identified and Addressed     [x] Interdisciplinary Care Plan Reviewed/Documented    [x] Discharge Planning:  Consistent carbohydrate/heart healthy diet     MONITORING /EVALUATION:   Food/Nutrient Intake Outcomes:  Total energy intake  Physical Signs/Symptoms Outcomes: Weight/weight change, Glucose profile, Electrolyte and renal profile    NUTRITION RISK:    [] High              [] Moderate           [x]  Low  []  Minimal/Uncompromised    PT SEEN FOR:    []  MD Consult: []Calorie Count      []Diabetic Diet Education        []Diet Education     []Electrolyte Management     []General Nutrition Management and Supplements     []Management of Tube Feeding     []TPN Recommendations    []  RN Referral:  []MST score >=2     []Enteral/Parenteral Nutrition PTA     []Pregnant: Gestational DM or Multigestation     []Pressure Ulcer/Wound Care needs        []  Low BMI  [x]  DTR Referral       Kortney Simpson  Pager [phone]                 Weekend Pager [phone]

## 2017-05-19 NOTE — PROGRESS NOTES
Called telehosplist and requested medication for increased pain in legs related to new  steroid dosing. One mm of morphine was given and patient refused to take any narcotics.

## 2017-05-19 NOTE — PROGRESS NOTES
1900 Received report from Nati Abdul RN. Assumed patient care. 0905 Pt . Contacted on call physician. Pt to receive Humalog 5 units per Dr Campbell Chol. [address] SHIFT NURSING NOTE    Bedside shift change report given to [address] [address], RN (oncoming nurse) by Julian Irene RN (offgoing nurse). Report included the following information SBAR, Kardex, Intake/Output, MAR, Recent Results and Cardiac Rhythm NSR.    SHIFT SUMMARY:         Admission Date 5/9/2017   Admission Diagnosis Tachycardia   Consults IP CONSULT TO CARDIOLOGY  IP CONSULT TO PULMONOLOGY        Consults   [] PT   [] OT   [] Speech   [] Palliative      [] Hospice    [x] Case Management   [] None   Cardiac Monitoring   [x] Yes   [] No     Antibiotics   [] Yes   [x] No   GI Prophylaxis  (Ex: Protonix, Pepcid, etc,.)   [] Yes   [x] No          DVT Prophylaxis   SCDs:             Mayo stockings:         [x] Medication (Ex: Lovenox, Eliquis, Brilinta, Coumadin,  Heparin, etc..)   [] Contraindicated   [] No VTE needed       Urinary Catheter             LDAs           Triple Lumen Central line 05/10/17 Right Neck (Active)   Central Line Being Utilized Yes 5/19/2017  7:01 PM   Criteria for Appropriate Use Limited/no vessel suitable for conventional peripheral access 5/19/2017  7:01 PM   Site Assessment Clean, dry, & intact 5/19/2017  7:01 PM   Infiltration Assessment 0 5/19/2017  7:01 PM   Affected Extremity/Extremities Color distal to insertion site pink (or appropriate for race) 5/19/2017  7:01 PM   Date of Last Dressing Change 05/17/17 5/19/2017  7:01 PM   Dressing Status Clean, dry, & intact 5/19/2017  7:01 PM   Dressing Type Disk with Chlorhexadine gluconate (CHG); Transparent 5/19/2017  7:01 PM   Action Taken Open ports on tubing capped 5/19/2017  7:01 PM   Proximal Hub Color/Line Status Brown;Flushed;Patent 5/19/2017  7:01 PM   Positive Blood Return (Medial Site) Yes 5/19/2017  7:01 PM   Medial Hub Color/Line Status Blue;Flushed;Patent 5/19/2017  7:01 PM Positive Blood Return (Lateral Site) Yes 5/19/2017  7:01 PM   Distal Hub Color/Line Status White;Flushed;Patent 5/19/2017  7:01 PM   Positive Blood Return (Site #3) Yes 5/19/2017  7:01 PM   Alcohol Cap Used Yes 5/19/2017  7:01 PM                          I/Os   Intake/Output Summary (Last 24 hours) at 05/19/17 2324  Last data filed at 05/19/17 2021   Gross per 24 hour   Intake             1080 ml   Output             2950 ml   Net            -1870 ml         Activity Level Activity Level: Up ad nicolas     Activity Assistance: No assistance needed   Diet Active Orders   Diet    DIET CARDIAC Regular; Consistent Carb 1800kcal      Purposeful Rounding every 1-2 hour? [x] Yes    Anthony Score  Total Score: 1   Bed Alarm (If score 3 or >)   [] Yes    [] Refused (See signed refusal form in chart)   Landon Score  Landon Score: 22       Landon Score (if score 14 or less)   [] PMT consult   [] Nutrition consult   [] Wound Care consult      []  Specialty bed         Influenza Vaccine Received Flu Vaccine for Current Season (usually Sept-March): Not Flu Season               Needs prior to discharge:   Home O2 required:    [] Yes   [x] No     If yes, how much O2 required?     Other:    Last Bowel Movement Date: 05/19/17   Readmission Risk Assessment Tool Score High Risk            32       Total Score        3 Relationship with PCP    3 Patient Length of Stay > 5    4 More than 1 Admission in calendar year    5 Patient Insurance is Medicare, Medicaid or Self Pay    17 Charlson Comorbidity Score        Criteria that do not apply:    Patient Living Status       Expected Length of Stay 4d 21h   Actual Length of Stay 10

## 2017-05-19 NOTE — PROGRESS NOTES
Hospitalist Progress Note    NAME: Wes Lake   :  1950   MRN:  [id number]       Interim Hospital Summary: 73 y.o. female whom presented on 2017 with      Assessment / Plan:  Likely COPD exacerbation   Clinically: cough is better and more loose s/p starting steroids   Will slowly taper down   Cont jet nebs   mucinex + codeine prn is effective     Hemoptysis Not POA resolved   Likely was related to acute HF   Restarted on Coumadin  ( OK with pulmonary)   Pulmonary help appreciated:no role for bronchoscopy now; repeat CT in 8-12 weeks      Respiratory distress due to Acute on chronic systolic heart failure with EF 25%  Diuresed well, Wt 176--> 165.  On RA   CTA chest with ILD but per pulmonary no ILD, findings could be related to CHF  Diuretic: IV --> PO  ( cr, electrolytes are stable)   Cardiology help appreciated: lifevest at discharge     Junctional Tachycardia/A.fib with RVR with associated hypotension  Elevated troponin/ CAD s/p PCI 17  Continue amiodarone 200 mg daily, coreg   Cardiology help appreciated: AICD planned 2017 if EF remains < 35 %   S/p cardioversion in sinus rhythm  S/p ablation 17  Continue coumadin     Sepsis POA resolved   Due to Recurrent vs persistent C.diff  Recently completed 10 days coarse of PO flagyl  Diarrhea resolved, will complete 14 days coarse of PO vanco ( last dose )   regulo Q    DM type 2   - 300 due to steroids   Expecting to improve with tapering steroids    C/w NPH while on steroids   Holding metformin  Continue SSI        Code Status: Full  Surrogate Decision Maker: Daughter  DVT Prophylaxis: on coumadin       Baseline:lives with her brother and grandson; independent   Disposition: hopefully will be able to DC on Monday once respiratory status improve; will need lifewest on DC     L IJ       Subjective:      CHIEF COMPLAINT: following sepsis/ DM/ respiratory distress   Hemoptysis: no recurrent   Cough:reports feeling better after starting steroids yesterday     Overnight events d/w RN       Review of Systems:  Symptom Y/N Comments  Symptom Y/N Comments   Fever/Chills n   Chest Pain n    Poor Appetite    Edema     Cough y   Abdominal Pain n    Sputum    Joint Pain     SOB/GREENFIELD y   Pruritis/Rash     Nausea/vomit    Tolerating PT/OT     Diarrhea    Tolerating Diet y    Constipation    Other       Could NOT obtain due to:      Objective:     VITALS:   Last 24hrs VS reviewed since prior progress note. Most recent are:  Patient Vitals for the past 24 hrs:   Temp Pulse Resp BP SpO2   05/19/17 0817 - 98 18 145/74 99 %   05/19/17 0321 97.7 °F (36.5 °C) 89 18 123/55 100 %   05/18/17 2229 97.2 °F (36.2 °C) 75 18 119/50 100 %   05/18/17 1921 - - - - 99 %   05/18/17 1910 97.8 °F (36.6 °C) 84 18 117/49 100 %   05/18/17 1518 - - - - 100 %   05/18/17 1500 98.4 °F (36.9 °C) 74 18 124/52 99 %   05/18/17 1220 98.5 °F (36.9 °C) 73 20 124/55 100 %   05/18/17 1111 - - - - 100 %       Intake/Output Summary (Last 24 hours) at 05/19/17 0932  Last data filed at 05/19/17 4091   Gross per 24 hour   Intake              880 ml   Output             2625 ml   Net            -1745 ml        PHYSICAL EXAM:  General: WD, WN. Alert, cooperative, no acute distress    EENT:  EOMI. Anicteric sclerae. MMM  Resp:  CTA. No accessory muscle use  CV:  Irregularly irregular  rhythm,  No edema  GI:  Soft, Non distended, Non tender.  +Bowel sounds  Neurologic:  Alert and oriented X 3, normal speech,   Psych:   Good insight. Not anxious nor agitated  Skin:  No rashes.   No jaundice    Reviewed most current lab test results and cultures  YES  Reviewed most current radiology test results   YES  Review and summation of old records today    NO  Reviewed patient's current orders and MAR    YES  PMH/ reviewed - no change compared to H&P  ________________________________________________________________________  Care Plan discussed with:    Comments   Patient y    Family      RN y    Care Manager     Consultant  [facility] / pulmonary                      Multidiciplinary team rounds were held today with , nursing, pharmacist and clinical coordinator. Patient's plan of care was discussed; medications were reviewed and discharge planning was addressed. ________________________________________________________________________  Total NON critical care TIME:  35  Minutes    Total CRITICAL CARE TIME Spent:   Minutes non procedure based      Comments   >50% of visit spent in counseling and coordination of care     ________________________________________________________________________  Suzy Yeh MD     Procedures: see electronic medical records for all procedures/Xrays and details which were not copied into this note but were reviewed prior to creation of Plan. LABS:  I reviewed today's most current labs and imaging studies.   Pertinent labs include:  Recent Labs      05/18/17   0443   WBC  6.7   HGB  11.7   HCT  36.2   PLT  275     Recent Labs      05/19/17   0428  05/18/17   0443  05/17/17   0330   NA  134*  139  138   K  4.1  3.9  3.5   CL  99  104  102   CO2  23  25  27   GLU  288*  131*  153*   BUN  15  11  13   CREA  1.08*  0.99  0.89   CA  8.9  8.8  8.6   MG  2.1  2.1  2.2   INR  1.3*  1.1  1.0       Signed: Suzy Yeh MD

## 2017-05-19 NOTE — PROGRESS NOTES
Cardiology Progress Note      5/19/2017 9:35 AM    Admit Date: 5/9/2017    Admit Diagnosis: Tachycardia      Subjective:     Cali Bai is feeling better with addition of steroids.     Visit Vitals    /74 (BP 1 Location: Right arm, BP Patient Position: Sitting)    Pulse 98    Temp 97.7 °F (36.5 °C)    Resp 18    Ht 5' 6\" (1.676 m)    Wt 165 lb 5.5 oz (75 kg)    SpO2 99%    Breastfeeding No    BMI 26.69 kg/m2       Current Facility-Administered Medications   Medication Dose Route Frequency    morphine injection 1 mg  1 mg IntraVENous ONCE    fluticasone (FLONASE) 50 mcg/actuation nasal spray 2 Spray  2 Spray Both Nostrils DAILY    WARFARIN INFORMATION NOTE (COUMADIN)   Other QPM    furosemide (LASIX) tablet 40 mg  40 mg Oral DAILY    insulin NPH (NOVOLIN N, HUMULIN N) injection 5 Units  5 Units SubCUTAneous Q8H    And    methylPREDNISolone (PF) (SOLU-MEDROL) injection 80 mg  80 mg IntraVENous Q8H    carvedilol (COREG) tablet 6.25 mg  6.25 mg Oral BID WITH MEALS    atorvastatin (LIPITOR) tablet 20 mg  20 mg Oral QHS    guaiFENesin-codeine (ROBITUSSIN AC) 100-10 mg/5 mL solution 5 mL  5 mL Oral Q4H PRN    SALINE PERIPHERAL FLUSH Q8H soln 10-30 mL  10-30 mL InterCATHeter Q8H    saline peripheral flush soln 10-30 mL  10-30 mL InterCATHeter PRN    benzonatate (TESSALON) capsule 100 mg  100 mg Oral TID PRN    albuterol (PROVENTIL VENTOLIN) nebulizer solution 2.5 mg  2.5 mg Nebulization QID RT    gabapentin (NEURONTIN) capsule 400 mg  400 mg Oral BID    gabapentin (NEURONTIN) capsule 600 mg  600 mg Oral DAILY    dextrose 10% infusion 125-250 mL  125-250 mL IntraVENous PRN    lactobac ac& pc-s.therm-b.anim (MARYAN Q/RISAQUAD)  1 Cap Oral DAILY    albuterol-ipratropium (DUO-NEB) 2.5 MG-0.5 MG/3 ML  3 mL Nebulization Q4H PRN    insulin lispro (HUMALOG) injection   SubCUTAneous AC&HS    amiodarone (CORDARONE) tablet 200 mg  200 mg Oral BID    colchicine tablet 0.6 mg  0.6 mg Oral DAILY    acetaminophen (TYLENOL) tablet 650 mg  650 mg Oral Q4H PRN    glucose chewable tablet 16 g  4 Tab Oral PRN    glucagon (GLUCAGEN) injection 1 mg  1 mg IntraMUSCular PRN    vancomycin 50 mg/mL oral solution (compounded) 125 mg  125 mg Oral Q6H    fluticasone-vilanterol (BREO ELLIPTA) 100mcg-25mcg/puff  1 Puff Inhalation DAILY    clopidogrel (PLAVIX) tablet 75 mg  75 mg Oral DAILY    umeclidinium (INCRUSE ELLIPTA) 62.5 mcg/actuation  1 Puff Inhalation DAILY    albuterol (PROVENTIL HFA, VENTOLIN HFA, PROAIR HFA) inhaler 2 Puff  2 Puff Inhalation Q6H PRN         Objective:      Physical Exam:  Visit Vitals    /45 (BP 1 Location: Right arm, BP Patient Position: Sitting)    Pulse 91    Temp 97.4 °F (36.3 °C)    Resp 19    Ht 5' 6\" (1.676 m)    Wt 165 lb 5.5 oz (75 kg)    SpO2 98%    Breastfeeding No    BMI 26.69 kg/m2     General Appearance:  Well developed, well nourished,alert and oriented x 3, and individual in no acute distress. Ears/Nose/Mouth/Throat:   Hearing grossly normal.         Neck: Supple. Chest:   Lungs clear to auscultation bilaterally. Cardiovascular:  Regular rate and rhythm, S1, S2 normal, no murmur. Abdomen:   Soft, non-tender, bowel sounds are active. Extremities: No edema bilaterally. Skin: Warm and dry.                Data Review:   Labs:    Recent Results (from the past 24 hour(s))   GLUCOSE, POC    Collection Time: 05/18/17 12:24 PM   Result Value Ref Range    Glucose (POC) 152 (H) 65 - 100 mg/dL    Performed by Sophie Suggs, POC    Collection Time: 05/18/17  4:01 PM   Result Value Ref Range    Glucose (POC) 200 (H) 65 - 100 mg/dL    Performed by [address] [place] [address], POC    Collection Time: 05/18/17  8:35 PM   Result Value Ref Range    Glucose (POC) 310 (H) 65 - 100 mg/dL    Performed by [address] [place] [address], BASIC    Collection Time: 05/19/17  4:28 AM   Result Value Ref Range    Sodium 134 (L) 136 - 145 mmol/L Potassium 4.1 3.5 - 5.1 mmol/L    Chloride 99 97 - 108 mmol/L    CO2 23 21 - 32 mmol/L    Anion gap 12 5 - 15 mmol/L    Glucose 288 (H) 65 - 100 mg/dL    BUN 15 6 - 20 MG/DL    Creatinine 1.08 (H) 0.55 - 1.02 MG/DL    BUN/Creatinine ratio 14 12 - 20      GFR est AA >60 >60 ml/min/1.73m2    GFR est non-AA 51 (L) >60 ml/min/1.73m2    Calcium 8.9 8.5 - 10.1 MG/DL   MAGNESIUM    Collection Time: 05/19/17  4:28 AM   Result Value Ref Range    Magnesium 2.1 1.6 - 2.4 mg/dL   PROTHROMBIN TIME + INR    Collection Time: 05/19/17  4:28 AM   Result Value Ref Range    INR 1.3 (H) 0.9 - 1.1      Prothrombin time 12.7 (H) 9.0 - 11.1 sec   GLUCOSE, POC    Collection Time: 05/19/17  8:44 AM   Result Value Ref Range    Glucose (POC) 259 (H) 65 - 100 mg/dL    Performed by Marquis Felix        Telemetry: normal sinus rhythm      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 (6/2/2010)      Cardiac pacemaker in situ (7/5/2012)      Mixed hyperlipidemia (7/5/2012)      S/P coronary artery stent placement (7/4/2014)      Overview: 4/7/17 PCI/ROSALINO Diagonal      Type 2 diabetes mellitus with diabetic neuropathy, without long-term current use of insulin ([facility] (1/31/2017)      S/P ablation of atrial fibrillation (5/2/2017)      Overview: 5/1/17      Tachycardia (5/9/2017)      Chronic systolic HF (heart failure) ([facility] [address] (5/10/2017)      Overview: 4/2017 EF 25-30%      History of Clostridium difficile infection (5/10/2017)      Overview: 4/2017 CDiff positive      Junctional tachycardia ([address] (5/10/2017)      Hypotension (5/10/2017)        Plan:     Continue current therapy. Will check back Monday. Dr. Stephanie Sanchez will be availabale if needed.     [address] Simms MD

## 2017-05-20 LAB
ANION GAP BLD CALC-SCNC: 12 MMOL/L (ref 5–15)
BUN SERPL-MCNC: 15 MG/DL (ref 6–20)
BUN/CREAT SERPL: 15 (ref 12–20)
CALCIUM SERPL-MCNC: 9.4 MG/DL (ref 8.5–10.1)
CHLORIDE SERPL-SCNC: 104 MMOL/L (ref 97–108)
CO2 SERPL-SCNC: 23 MMOL/L (ref 21–32)
CREAT SERPL-MCNC: 1.01 MG/DL (ref 0.55–1.02)
ERYTHROCYTE [DISTWIDTH] IN BLOOD BY AUTOMATED COUNT: 14.2 % (ref 11.5–14.5)
GLUCOSE BLD STRIP.AUTO-MCNC: 248 MG/DL (ref 65–100)
GLUCOSE BLD STRIP.AUTO-MCNC: 367 MG/DL (ref 65–100)
GLUCOSE BLD STRIP.AUTO-MCNC: 376 MG/DL (ref 65–100)
GLUCOSE BLD STRIP.AUTO-MCNC: 381 MG/DL (ref 65–100)
GLUCOSE SERPL-MCNC: 272 MG/DL (ref 65–100)
HCT VFR BLD AUTO: 32.5 % (ref 35–47)
HGB BLD-MCNC: 11 G/DL (ref 11.5–16)
INR PPP: 1.9 (ref 0.9–1.1)
MAGNESIUM SERPL-MCNC: 2.4 MG/DL (ref 1.6–2.4)
MCH RBC QN AUTO: 28.5 PG (ref 26–34)
MCHC RBC AUTO-ENTMCNC: 33.8 G/DL (ref 30–36.5)
MCV RBC AUTO: 84.2 FL (ref 80–99)
PLATELET # BLD AUTO: 274 K/UL (ref 150–400)
POTASSIUM SERPL-SCNC: 4 MMOL/L (ref 3.5–5.1)
PROTHROMBIN TIME: 19.6 SEC (ref 9–11.1)
RBC # BLD AUTO: 3.86 M/UL (ref 3.8–5.2)
SERVICE CMNT-IMP: ABNORMAL
SODIUM SERPL-SCNC: 139 MMOL/L (ref 136–145)
WBC # BLD AUTO: 15.9 K/UL (ref 3.6–11)

## 2017-05-20 PROCEDURE — 85027 COMPLETE CBC AUTOMATED: CPT | Performed by: INTERNAL MEDICINE

## 2017-05-20 PROCEDURE — 74011250637 HC RX REV CODE- 250/637: Performed by: INTERNAL MEDICINE

## 2017-05-20 PROCEDURE — 74011250637 HC RX REV CODE- 250/637: Performed by: NURSE PRACTITIONER

## 2017-05-20 PROCEDURE — 80048 BASIC METABOLIC PNL TOTAL CA: CPT | Performed by: INTERNAL MEDICINE

## 2017-05-20 PROCEDURE — 83735 ASSAY OF MAGNESIUM: CPT | Performed by: INTERNAL MEDICINE

## 2017-05-20 PROCEDURE — 74011250637 HC RX REV CODE- 250/637: Performed by: HOSPITALIST

## 2017-05-20 PROCEDURE — 94640 AIRWAY INHALATION TREATMENT: CPT

## 2017-05-20 PROCEDURE — 65660000000 HC RM CCU STEPDOWN

## 2017-05-20 PROCEDURE — 74011636637 HC RX REV CODE- 636/637: Performed by: HOSPITALIST

## 2017-05-20 PROCEDURE — 74011250637 HC RX REV CODE- 250/637: Performed by: FAMILY MEDICINE

## 2017-05-20 PROCEDURE — 36415 COLL VENOUS BLD VENIPUNCTURE: CPT | Performed by: INTERNAL MEDICINE

## 2017-05-20 PROCEDURE — 74011636637 HC RX REV CODE- 636/637: Performed by: INTERNAL MEDICINE

## 2017-05-20 PROCEDURE — 82962 GLUCOSE BLOOD TEST: CPT

## 2017-05-20 PROCEDURE — 85610 PROTHROMBIN TIME: CPT | Performed by: INTERNAL MEDICINE

## 2017-05-20 PROCEDURE — 74011250636 HC RX REV CODE- 250/636: Performed by: HOSPITALIST

## 2017-05-20 PROCEDURE — 74011000250 HC RX REV CODE- 250: Performed by: INTERNAL MEDICINE

## 2017-05-20 PROCEDURE — 74011636637 HC RX REV CODE- 636/637: Performed by: NURSE PRACTITIONER

## 2017-05-20 RX ORDER — ALBUTEROL SULFATE 0.83 MG/ML
2.5 SOLUTION RESPIRATORY (INHALATION)
Status: DISCONTINUED | OUTPATIENT
Start: 2017-05-20 | End: 2017-05-22 | Stop reason: HOSPADM

## 2017-05-20 RX ORDER — PREDNISONE 20 MG/1
40 TABLET ORAL
Status: DISCONTINUED | OUTPATIENT
Start: 2017-05-21 | End: 2017-05-21

## 2017-05-20 RX ORDER — INSULIN LISPRO 100 [IU]/ML
12 INJECTION, SOLUTION INTRAVENOUS; SUBCUTANEOUS ONCE
Status: COMPLETED | OUTPATIENT
Start: 2017-05-20 | End: 2017-05-20

## 2017-05-20 RX ORDER — WARFARIN 2.5 MG/1
2.5 TABLET ORAL ONCE
Status: COMPLETED | OUTPATIENT
Start: 2017-05-20 | End: 2017-05-20

## 2017-05-20 RX ADMIN — INSULIN LISPRO 12 UNITS: 100 INJECTION, SOLUTION INTRAVENOUS; SUBCUTANEOUS at 19:00

## 2017-05-20 RX ADMIN — VANCOMYCIN HYDROCHLORIDE 125 MG: 1 INJECTION, POWDER, LYOPHILIZED, FOR SOLUTION INTRAVENOUS at 19:01

## 2017-05-20 RX ADMIN — CARVEDILOL 6.25 MG: 6.25 TABLET, FILM COATED ORAL at 08:53

## 2017-05-20 RX ADMIN — FUROSEMIDE 40 MG: 40 TABLET ORAL at 08:53

## 2017-05-20 RX ADMIN — GABAPENTIN 600 MG: 300 CAPSULE ORAL at 13:31

## 2017-05-20 RX ADMIN — INSULIN HUMAN 10 UNITS: 100 INJECTION, SOLUTION PARENTERAL at 23:03

## 2017-05-20 RX ADMIN — Medication 10 ML: at 08:53

## 2017-05-20 RX ADMIN — FLUTICASONE FUROATE AND VILANTEROL TRIFENATATE 1 PUFF: 100; 25 POWDER RESPIRATORY (INHALATION) at 10:35

## 2017-05-20 RX ADMIN — VANCOMYCIN HYDROCHLORIDE 125 MG: 1 INJECTION, POWDER, LYOPHILIZED, FOR SOLUTION INTRAVENOUS at 00:04

## 2017-05-20 RX ADMIN — CLOPIDOGREL BISULFATE 75 MG: 75 TABLET ORAL at 08:53

## 2017-05-20 RX ADMIN — ALBUTEROL SULFATE 2.5 MG: 2.5 SOLUTION RESPIRATORY (INHALATION) at 11:40

## 2017-05-20 RX ADMIN — CARVEDILOL 6.25 MG: 6.25 TABLET, FILM COATED ORAL at 19:01

## 2017-05-20 RX ADMIN — INSULIN LISPRO 3 UNITS: 100 INJECTION, SOLUTION INTRAVENOUS; SUBCUTANEOUS at 08:52

## 2017-05-20 RX ADMIN — WARFARIN SODIUM 2.5 MG: 2.5 TABLET ORAL at 19:01

## 2017-05-20 RX ADMIN — VANCOMYCIN HYDROCHLORIDE 125 MG: 1 INJECTION, POWDER, LYOPHILIZED, FOR SOLUTION INTRAVENOUS at 05:46

## 2017-05-20 RX ADMIN — METHYLPREDNISOLONE SODIUM SUCCINATE 60 MG: 125 INJECTION, POWDER, FOR SOLUTION INTRAMUSCULAR; INTRAVENOUS at 08:54

## 2017-05-20 RX ADMIN — COLCHICINE 0.6 MG: 0.6 TABLET, FILM COATED ORAL at 10:35

## 2017-05-20 RX ADMIN — ALBUTEROL SULFATE 2.5 MG: 2.5 SOLUTION RESPIRATORY (INHALATION) at 07:10

## 2017-05-20 RX ADMIN — ALBUTEROL SULFATE 2.5 MG: 2.5 SOLUTION RESPIRATORY (INHALATION) at 17:56

## 2017-05-20 RX ADMIN — PRAVASTATIN SODIUM 20 MG: 10 TABLET ORAL at 23:03

## 2017-05-20 RX ADMIN — FLUTICASONE PROPIONATE 2 SPRAY: 50 SPRAY, METERED NASAL at 14:12

## 2017-05-20 RX ADMIN — INSULIN HUMAN 10 UNITS: 100 INJECTION, SUSPENSION SUBCUTANEOUS at 19:00

## 2017-05-20 RX ADMIN — Medication 20 ML: at 14:00

## 2017-05-20 RX ADMIN — VANCOMYCIN HYDROCHLORIDE 125 MG: 1 INJECTION, POWDER, LYOPHILIZED, FOR SOLUTION INTRAVENOUS at 13:36

## 2017-05-20 RX ADMIN — GABAPENTIN 400 MG: 300 CAPSULE ORAL at 08:53

## 2017-05-20 RX ADMIN — GABAPENTIN 400 MG: 300 CAPSULE ORAL at 20:26

## 2017-05-20 RX ADMIN — Medication 10 ML: at 05:46

## 2017-05-20 RX ADMIN — AMIODARONE HYDROCHLORIDE 200 MG: 200 TABLET ORAL at 08:53

## 2017-05-20 RX ADMIN — INSULIN HUMAN 10 UNITS: 100 INJECTION, SUSPENSION SUBCUTANEOUS at 08:52

## 2017-05-20 RX ADMIN — Medication 1 CAPSULE: at 08:53

## 2017-05-20 RX ADMIN — AMIODARONE HYDROCHLORIDE 200 MG: 200 TABLET ORAL at 19:01

## 2017-05-20 RX ADMIN — VANCOMYCIN HYDROCHLORIDE 125 MG: 1 INJECTION, POWDER, LYOPHILIZED, FOR SOLUTION INTRAVENOUS at 23:03

## 2017-05-20 RX ADMIN — Medication 10 ML: at 23:04

## 2017-05-20 RX ADMIN — HUMAN INSULIN 12 UNITS: 100 INJECTION, SOLUTION SUBCUTANEOUS at 13:00

## 2017-05-20 RX ADMIN — UMECLIDINIUM 1 PUFF: 62.5 AEROSOL, POWDER ORAL at 10:35

## 2017-05-20 NOTE — PROGRESS NOTES
Bedside and Verbal shift change report given to Emeterio Escobar (oncoming nurse) by Wai Mccracken (offgoing nurse). Report included the following information SBAR, Kardex, Procedure Summary, Intake/Output, MAR, Recent Results, Med Rec Status and Cardiac Rhythm Sinus Rhythm.

## 2017-05-20 NOTE — PROGRESS NOTES
Pharmacy Dosing of Warfarin     Indication for Warfarin: AFib     Goal INR: 2-3     Warfarin PTA Regimen: 7.5 mg po daily     Concurrent anticoagulants & Platelet Inhibitors: Plavix (ASA 81mg discontinued )     Major Interacting Medications:   Amiodarone started 5/10    Diet:  Cardiac    Inpatient Warfarin Doses:  Date INR Warfarin Dose                2.7     7.5 mg  5/10            2.1     7.5 mg               3.5      on hold 5/10 -             3.3              1.0      7.5mg               1.1      7.5mg               1.3      7.5              1.9      2.5     Estimated Creatinine Clearance: 53.8 mL/min (based on Cr of 1.08). Estimated Creatinine Clearance (using IBW): 48.6 mL/min  Recent Labs      17   0245  17   0428  17   0443   CREA  1.01  1.08*  0.99   BUN  15  15  11   WBC  15.9*   --   6.7   NA  139  134*  139   K  4.0  4.1  3.9   MG  2.4  2.1  2.1   CA  9.4  8.9  8.8   HGB  11.0*   --   11.7   HCT  32.5*   --   36.2   PLT  274   --   275   INR  1.9*  1.3*  1.1     Temp (24hrs), Av.3 °F (36.8 °C), Min:97.2 °F (36.2 °C), Max:100.4 °F (38 °C)    Warfarin sensitivity: high (Amiodarone)   Impression/Plan:  Warfarin resumed on     INR jumped from 1.3 to 1.9 and will decrease the dose to warfarin 2.5 mg for tonight      Pharmacy will follow daily and adjust the dose as appropriate.   Thank you,  Natacha Granados, PHARMD

## 2017-05-20 NOTE — PROGRESS NOTES
Hospitalist Progress Note    NAME: Batsheva Gomez   :  1950   MRN:  [id number]       Interim Hospital Summary: 73 y.o. female whom presented on 2017 with      Assessment / Plan:  Likely COPD exacerbation   Clinically: feeling better    Changing IV solumedrol to PO   Cont jet nebs   mucinex + codeine prn is effective     Hemoptysis Not POA resolved   Likely was related to acute HF   Restarted on Coumadin  ( OK with pulmonary)   Pulmonary help appreciated:no role for bronchoscopy now; repeat CT in 8-12 weeks      Respiratory distress due to Acute on chronic systolic heart failure with EF 25%  Diuresed well, Wt 176--> 165.  On RA   CTA chest with ILD but per pulmonary no ILD, findings could be related to CHF  Diuretic: IV --> PO  ( cr, electrolytes are stable)   Cardiology help appreciated: lifevest at discharge     Junctional Tachycardia/A.fib with RVR with associated hypotension  Elevated troponin/ CAD s/p PCI 17  Continue amiodarone 200 mg daily, coreg   Cardiology help appreciated: AICD planned 2017 if EF remains < 35 %   S/p cardioversion in sinus rhythm  S/p ablation 17  Continue coumadin     Sepsis POA resolved   Due to Recurrent vs persistent C.diff  Recently completed 10 days coarse of PO flagyl  Diarrhea resolved, will complete 14 days coarse of PO vanco ( last dose )   regulo Q    DM type 2   - 300 due to steroids   Expecting to improve with tapering steroids    Holding metformin  Continue SSI        Code Status: Full  Surrogate Decision Maker: Daughter  DVT Prophylaxis: on coumadin       Baseline:lives with her brother and grandson; independent   Disposition: hopefully will be able to DC on Monday once respiratory status improve; will need lifewest on DC     L IJ       Subjective:      CHIEF COMPLAINT: following sepsis/ DM/ respiratory distress   C/o feeling more dyspneic this am   Cough:much better since on solumedrol, sputum is loose now     Overnight events d/w RN       Review of Systems:  Symptom Y/N Comments  Symptom Y/N Comments   Fever/Chills n   Chest Pain n    Poor Appetite    Edema     Cough y Better   Abdominal Pain n    Sputum    Joint Pain     SOB/GREENFIELD y   Pruritis/Rash     Nausea/vomit    Tolerating PT/OT     Diarrhea    Tolerating Diet y    Constipation    Other       Could NOT obtain due to:      Objective:     VITALS:   Last 24hrs VS reviewed since prior progress note. Most recent are:  Patient Vitals for the past 24 hrs:   Temp Pulse Resp BP SpO2   05/20/17 1245 97.3 °F (36.3 °C) 76 18 102/59 100 %   05/20/17 1140 - - - - 100 %   05/20/17 0806 97.3 °F (36.3 °C) 77 18 109/55 96 %   05/20/17 0710 - - - - 100 %   05/20/17 0321 97.7 °F (36.5 °C) 75 18 104/50 98 %   05/19/17 2233 97.5 °F (36.4 °C) 76 18 130/53 100 %   05/19/17 2146 - - - - 100 %   05/19/17 1901 98.2 °F (36.8 °C) 81 18 105/54 100 %   05/19/17 1553 97.4 °F (36.3 °C) 91 19 127/45 98 %       Intake/Output Summary (Last 24 hours) at 05/20/17 1341  Last data filed at 05/20/17 1251   Gross per 24 hour   Intake              800 ml   Output             2775 ml   Net            -1975 ml        PHYSICAL EXAM:  General: WD, WN. Alert, cooperative, no acute distress    EENT:  EOMI. Anicteric sclerae. MMM  Resp:  Harsh bronchial sound. No accessory muscle use  CV:  Irregularly irregular  rhythm,  No edema  GI:  Soft, Non distended, Non tender.  +Bowel sounds  Neurologic:  Alert and oriented X 3, normal speech,   Psych:   Good insight. Not anxious nor agitated  Skin:  No rashes.   No jaundice    Reviewed most current lab test results and cultures  YES  Reviewed most current radiology test results   YES  Review and summation of old records today    NO  Reviewed patient's current orders and MAR    YES  PMH/SH reviewed - no change compared to H&P  ________________________________________________________________________  Care Plan discussed with:    Comments   Patient y    Family      RN y    Care Manager Consultant                        Multidiciplinary team rounds were held today with , nursing, pharmacist and clinical coordinator. Patient's plan of care was discussed; medications were reviewed and discharge planning was addressed. ________________________________________________________________________  Total NON critical care TIME:  25  Minutes    Total CRITICAL CARE TIME Spent:   Minutes non procedure based      Comments   >50% of visit spent in counseling and coordination of care     ________________________________________________________________________  Darshan Rodriguez MD     Procedures: see electronic medical records for all procedures/Xrays and details which were not copied into this note but were reviewed prior to creation of Plan. LABS:  I reviewed today's most current labs and imaging studies.   Pertinent labs include:  Recent Labs      05/20/17   0245  05/18/17   0443   WBC  15.9*  6.7   HGB  11.0*  11.7   HCT  32.5*  36.2   PLT  274  275     Recent Labs      05/20/17   0245  05/19/17   0428  05/18/17   0443   NA  139  134*  139   K  4.0  4.1  3.9   CL  104  99  104   CO2  23  23  25   GLU  272*  288*  131*   BUN  15  15  11   CREA  1.01  1.08*  0.99   CA  9.4  8.9  8.8   MG  2.4  2.1  2.1   INR  1.9*  1.3*  1.1       Signed: Darshan Rodriguez MD

## 2017-05-20 NOTE — PROGRESS NOTES
[facility] AEROSOL PROTOCAL - ASSESS    Patient: Cony Rapp, 5/20/2017  11:46 AM    Breath Sounds:  RUL: Clear  RML: Clear  RLL: Clear  ANGELITA: Clear  LLL: Clear    Breathing Pattern:  Regular    Respiratory Rate: 16        Cough:  Productive,  Strong,  Dry      Heart Rate:  70    Repiratory Rate: 16    Oxygen: Room air    SPO2:  Without oxygen:  100%    Nebulizer Therapy:    Current:  Albuterol  Every Qid Hours Scheduled    Changed:  Yes, changed to: Albuterol  Every 4 Hours PRN    Smoking history:  Former smoker    Problem List:    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 J44.9    Hip pain M25.559    Hypokalemia E87.6    S/P angioplasty with stent Z95.9    Sick sinus syndrome (HCC) I49.5    Anemia D64.9    Chest pain R07.9    Sinoatrial node dysfunction ([facility]) I49.5    Cardiac pacemaker in situ Z95.0    Mixed hyperlipidemia E78.2    Back pain M54.9    DNR (do not resuscitate) Z66    S/P coronary artery stent placement P55.7    Diastolic CHF, acute on chronic ([facility]) I50.33    Type 2 diabetes mellitus with diabetic neuropathy, without long-term current use of insulin ([facility]) E11.40    Anticoagulation monitoring, INR range 2-3 Z79.01    CHF (congestive heart failure) ([facility]) X59.2    Systolic CHF, acute ([facility]) E87.68    Acute systolic CHF (congestive heart failure) ([facility]) I50.21    Fear associated with illness and body function F40.8    Counseling regarding advanced care planning and goals of care Z71.89    S/P ablation of atrial fibrillation Z98.890, Z86.79    Tachycardia R00.0    Chronic systolic HF (heart failure) ([facility]) I50.22    History of Clostridium difficile infection Z86.19    Junctional tachycardia ([facility]) I47.1    Hypotension I95.9       Respiratory Therapist: Hulon Klinefelter, RT

## 2017-05-21 LAB
ANION GAP BLD CALC-SCNC: 8 MMOL/L (ref 5–15)
BUN SERPL-MCNC: 21 MG/DL (ref 6–20)
BUN/CREAT SERPL: 20 (ref 12–20)
CALCIUM SERPL-MCNC: 8.9 MG/DL (ref 8.5–10.1)
CHLORIDE SERPL-SCNC: 105 MMOL/L (ref 97–108)
CO2 SERPL-SCNC: 27 MMOL/L (ref 21–32)
CREAT SERPL-MCNC: 1.05 MG/DL (ref 0.55–1.02)
GLUCOSE BLD STRIP.AUTO-MCNC: 139 MG/DL (ref 65–100)
GLUCOSE BLD STRIP.AUTO-MCNC: 220 MG/DL (ref 65–100)
GLUCOSE BLD STRIP.AUTO-MCNC: 241 MG/DL (ref 65–100)
GLUCOSE BLD STRIP.AUTO-MCNC: 366 MG/DL (ref 65–100)
GLUCOSE SERPL-MCNC: 164 MG/DL (ref 65–100)
INR PPP: 2.2 (ref 0.9–1.1)
MAGNESIUM SERPL-MCNC: 2.2 MG/DL (ref 1.6–2.4)
POTASSIUM SERPL-SCNC: 3.3 MMOL/L (ref 3.5–5.1)
PROTHROMBIN TIME: 22.6 SEC (ref 9–11.1)
SERVICE CMNT-IMP: ABNORMAL
SODIUM SERPL-SCNC: 140 MMOL/L (ref 136–145)

## 2017-05-21 PROCEDURE — 83735 ASSAY OF MAGNESIUM: CPT | Performed by: INTERNAL MEDICINE

## 2017-05-21 PROCEDURE — 94640 AIRWAY INHALATION TREATMENT: CPT

## 2017-05-21 PROCEDURE — 74011636637 HC RX REV CODE- 636/637: Performed by: INTERNAL MEDICINE

## 2017-05-21 PROCEDURE — 74011250637 HC RX REV CODE- 250/637: Performed by: INTERNAL MEDICINE

## 2017-05-21 PROCEDURE — 74011250637 HC RX REV CODE- 250/637: Performed by: HOSPITALIST

## 2017-05-21 PROCEDURE — 74011636637 HC RX REV CODE- 636/637: Performed by: HOSPITALIST

## 2017-05-21 PROCEDURE — 74011000250 HC RX REV CODE- 250: Performed by: NURSE PRACTITIONER

## 2017-05-21 PROCEDURE — 74011000250 HC RX REV CODE- 250: Performed by: INTERNAL MEDICINE

## 2017-05-21 PROCEDURE — 36415 COLL VENOUS BLD VENIPUNCTURE: CPT | Performed by: INTERNAL MEDICINE

## 2017-05-21 PROCEDURE — 80048 BASIC METABOLIC PNL TOTAL CA: CPT | Performed by: INTERNAL MEDICINE

## 2017-05-21 PROCEDURE — 74011250637 HC RX REV CODE- 250/637: Performed by: NURSE PRACTITIONER

## 2017-05-21 PROCEDURE — 74011250637 HC RX REV CODE- 250/637: Performed by: FAMILY MEDICINE

## 2017-05-21 PROCEDURE — 85610 PROTHROMBIN TIME: CPT | Performed by: INTERNAL MEDICINE

## 2017-05-21 PROCEDURE — 74011636637 HC RX REV CODE- 636/637: Performed by: NURSE PRACTITIONER

## 2017-05-21 PROCEDURE — 82962 GLUCOSE BLOOD TEST: CPT

## 2017-05-21 PROCEDURE — 65660000000 HC RM CCU STEPDOWN

## 2017-05-21 RX ORDER — WARFARIN SODIUM 5 MG/1
5 TABLET ORAL ONCE
Status: COMPLETED | OUTPATIENT
Start: 2017-05-21 | End: 2017-05-21

## 2017-05-21 RX ORDER — INSULIN LISPRO 100 [IU]/ML
12 INJECTION, SOLUTION INTRAVENOUS; SUBCUTANEOUS ONCE
Status: COMPLETED | OUTPATIENT
Start: 2017-05-21 | End: 2017-05-21

## 2017-05-21 RX ADMIN — UMECLIDINIUM 1 PUFF: 62.5 AEROSOL, POWDER ORAL at 10:02

## 2017-05-21 RX ADMIN — GABAPENTIN 600 MG: 300 CAPSULE ORAL at 17:10

## 2017-05-21 RX ADMIN — AMIODARONE HYDROCHLORIDE 200 MG: 200 TABLET ORAL at 09:58

## 2017-05-21 RX ADMIN — GABAPENTIN 400 MG: 300 CAPSULE ORAL at 21:43

## 2017-05-21 RX ADMIN — IPRATROPIUM BROMIDE AND ALBUTEROL SULFATE 3 ML: .5; 3 SOLUTION RESPIRATORY (INHALATION) at 19:18

## 2017-05-21 RX ADMIN — PREDNISONE 40 MG: 20 TABLET ORAL at 09:59

## 2017-05-21 RX ADMIN — INSULIN LISPRO 2 UNITS: 100 INJECTION, SOLUTION INTRAVENOUS; SUBCUTANEOUS at 12:43

## 2017-05-21 RX ADMIN — AMIODARONE HYDROCHLORIDE 200 MG: 200 TABLET ORAL at 17:10

## 2017-05-21 RX ADMIN — CARVEDILOL 6.25 MG: 6.25 TABLET, FILM COATED ORAL at 09:59

## 2017-05-21 RX ADMIN — Medication 30 ML: at 22:00

## 2017-05-21 RX ADMIN — VANCOMYCIN HYDROCHLORIDE 125 MG: 1 INJECTION, POWDER, LYOPHILIZED, FOR SOLUTION INTRAVENOUS at 17:20

## 2017-05-21 RX ADMIN — BENZONATATE 100 MG: 100 CAPSULE ORAL at 03:22

## 2017-05-21 RX ADMIN — INSULIN LISPRO 3 UNITS: 100 INJECTION, SOLUTION INTRAVENOUS; SUBCUTANEOUS at 17:21

## 2017-05-21 RX ADMIN — VANCOMYCIN HYDROCHLORIDE 125 MG: 1 INJECTION, POWDER, LYOPHILIZED, FOR SOLUTION INTRAVENOUS at 12:43

## 2017-05-21 RX ADMIN — FLUTICASONE FUROATE AND VILANTEROL TRIFENATATE 1 PUFF: 100; 25 POWDER RESPIRATORY (INHALATION) at 10:00

## 2017-05-21 RX ADMIN — FUROSEMIDE 40 MG: 40 TABLET ORAL at 09:58

## 2017-05-21 RX ADMIN — INSULIN LISPRO 12 UNITS: 100 INJECTION, SOLUTION INTRAVENOUS; SUBCUTANEOUS at 21:42

## 2017-05-21 RX ADMIN — PRAVASTATIN SODIUM 10 MG: 10 TABLET ORAL at 21:43

## 2017-05-21 RX ADMIN — CARVEDILOL 6.25 MG: 6.25 TABLET, FILM COATED ORAL at 17:10

## 2017-05-21 RX ADMIN — GABAPENTIN 400 MG: 300 CAPSULE ORAL at 10:00

## 2017-05-21 RX ADMIN — Medication 1 CAPSULE: at 09:58

## 2017-05-21 RX ADMIN — ALBUTEROL SULFATE 2.5 MG: 2.5 SOLUTION RESPIRATORY (INHALATION) at 07:54

## 2017-05-21 RX ADMIN — CLOPIDOGREL BISULFATE 75 MG: 75 TABLET ORAL at 09:58

## 2017-05-21 RX ADMIN — FLUTICASONE PROPIONATE 2 SPRAY: 50 SPRAY, METERED NASAL at 09:59

## 2017-05-21 RX ADMIN — BENZONATATE 100 MG: 100 CAPSULE ORAL at 22:06

## 2017-05-21 RX ADMIN — Medication 30 ML: at 14:00

## 2017-05-21 RX ADMIN — COLCHICINE 0.6 MG: 0.6 TABLET, FILM COATED ORAL at 10:02

## 2017-05-21 RX ADMIN — Medication 10 ML: at 06:00

## 2017-05-21 RX ADMIN — VANCOMYCIN HYDROCHLORIDE 125 MG: 1 INJECTION, POWDER, LYOPHILIZED, FOR SOLUTION INTRAVENOUS at 08:25

## 2017-05-21 RX ADMIN — WARFARIN SODIUM 5 MG: 5 TABLET ORAL at 17:10

## 2017-05-21 NOTE — PROGRESS NOTES
Pharmacy Dosing of Warfarin     Indication for Warfarin: AFib     Goal INR: 2-3     Warfarin PTA Regimen: 7.5 mg po daily     Concurrent anticoagulants & Platelet Inhibitors: Plavix (ASA 81mg discontinued )     Major Interacting Medications:   Amiodarone started 5/10    Diet:  Cardiac    Inpatient Warfarin Doses:  Date INR Warfarin Dose                2.7     7.5 mg  5/10            2.1     7.5 mg               3.5      on hold 5/10 -             3.3              1.0      7.5mg               1.1      7.5mg               1.3      7.5              1.9      2.5              2.2       5     Estimated Creatinine Clearance: 55.9 mL/min (based on Cr of 1.05). Estimated Creatinine Clearance (using IBW): 50.0 mL/min  Recent Labs      17   0404  17   0245  17   0428   CREA  1.05*  1.01  1.08*   BUN  21*  15  15   WBC   --   15.9*   --    NA  140  139  134*   K  3.3*  4.0  4.1   MG  2.2  2.4  2.1   CA  8.9  9.4  8.9   HGB   --   11.0*   --    HCT   --   32.5*   --    PLT   --   274   --    INR  2.2*  1.9*  1.3*     Temp (24hrs), Av.7 °F (36.5 °C), Min:97.3 °F (36.3 °C), Max:98.5 °F (36.9 °C)      Warfarin sensitivity: high (Amiodarone)   Impression/Plan:  Warfarin resumed on     INR jumped from 1.3 to 1.9 to 2.2 in the last three days. Warfarin 5 mg for tonight      Pharmacy will follow daily and adjust the dose as appropriate.   Thank you,  Geena Lugo, PHARMD

## 2017-05-21 NOTE — PROGRESS NOTES
Cardiology Progress Note            [address] [address]  [phone]    5/21/2017 11:08 AM    Admit Date: 5/9/2017    Admit Diagnosis: Tachycardia    Subjective:     Hong Wagner  's breathing is improving.  Using the incentive spirometry    Visit Vitals    /47 (BP 1 Location: Right arm, BP Patient Position: Sitting)    Pulse 75    Temp 97.7 °F (36.5 °C)    Resp 18    Ht 5' 6\" (1.676 m)    Wt 169 lb 1.5 oz (76.7 kg)    SpO2 98%    Breastfeeding No    BMI 27.29 kg/m2     Current Facility-Administered Medications   Medication Dose Route Frequency    albuterol (PROVENTIL VENTOLIN) nebulizer solution 2.5 mg  2.5 mg Nebulization Q4H PRN    predniSONE (DELTASONE) tablet 40 mg  40 mg Oral DAILY WITH BREAKFAST    fluticasone (FLONASE) 50 mcg/actuation nasal spray 2 Spray  2 Spray Both Nostrils DAILY    pravastatin (PRAVACHOL) tablet 20 mg  20 mg Oral QHS    WARFARIN INFORMATION NOTE (COUMADIN)   Other QPM    furosemide (LASIX) tablet 40 mg  40 mg Oral DAILY    carvedilol (COREG) tablet 6.25 mg  6.25 mg Oral BID WITH MEALS    guaiFENesin-codeine (ROBITUSSIN AC) 100-10 mg/5 mL solution 5 mL  5 mL Oral Q4H PRN    SALINE PERIPHERAL FLUSH Q8H soln 10-30 mL  10-30 mL InterCATHeter Q8H    saline peripheral flush soln 10-30 mL  10-30 mL InterCATHeter PRN    benzonatate (TESSALON) capsule 100 mg  100 mg Oral TID PRN    gabapentin (NEURONTIN) capsule 400 mg  400 mg Oral BID    gabapentin (NEURONTIN) capsule 600 mg  600 mg Oral DAILY    dextrose 10% infusion 125-250 mL  125-250 mL IntraVENous PRN    lactobac ac& pc-s.therm-b.anim (MARYAN Q/RISAQUAD)  1 Cap Oral DAILY    albuterol-ipratropium (DUO-NEB) 2.5 MG-0.5 MG/3 ML  3 mL Nebulization Q4H PRN    insulin lispro (HUMALOG) injection   SubCUTAneous AC&HS    amiodarone (CORDARONE) tablet 200 mg  200 mg Oral BID    colchicine tablet 0.6 mg  0.6 mg Oral DAILY    acetaminophen (TYLENOL) tablet 650 mg  650 mg Oral Q4H PRN  glucose chewable tablet 16 g  4 Tab Oral PRN    glucagon (GLUCAGEN) injection 1 mg  1 mg IntraMUSCular PRN    vancomycin 50 mg/mL oral solution (compounded) 125 mg  125 mg Oral Q6H    fluticasone-vilanterol (BREO ELLIPTA) 100mcg-25mcg/puff  1 Puff Inhalation DAILY    clopidogrel (PLAVIX) tablet 75 mg  75 mg Oral DAILY    umeclidinium (INCRUSE ELLIPTA) 62.5 mcg/actuation  1 Puff Inhalation DAILY    albuterol (PROVENTIL HFA, VENTOLIN HFA, PROAIR HFA) inhaler 2 Puff  2 Puff Inhalation Q6H PRN         Objective:      Visit Vitals    /47 (BP 1 Location: Right arm, BP Patient Position: Sitting)    Pulse 75    Temp 97.7 °F (36.5 °C)    Resp 18    Ht 5' 6\" (1.676 m)    Wt 169 lb 1.5 oz (76.7 kg)    SpO2 98%    Breastfeeding No    BMI 27.29 kg/m2       Physical Exam:  Abdomen: soft, non-tender  Extremities: extremities normal  Heart: regular rate and rhythm  Lungs: rhonchi throughout  Pulses: 2+ and symmetric    Data Review:   Labs:    Recent Labs      05/20/17   0245   WBC  15.9*   HGB  11.0*   HCT  32.5*   PLT  274     Recent Labs      05/21/17   0404  05/20/17   0245  05/19/17   0428   NA  140  139  134*   K  3.3*  4.0  4.1   CL  105  104  99   CO2  27  23  23   GLU  164*  272*  288*   BUN  21*  15  15   CREA  1.05*  1.01  1.08*   CA  8.9  9.4  8.9   MG  2.2  2.4  2.1   INR  2.2*  1.9*  1.3*       No results for input(s): TROIQ, CPK, CKMB in the last 72 hours.       Intake/Output Summary (Last 24 hours) at 05/21/17 1108  Last data filed at 05/21/17 0352   Gross per 24 hour   Intake              400 ml   Output             1075 ml   Net             -675 ml        Telemetry: a paced    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 (6/2/2010)      Cardiac pacemaker in situ (7/5/2012)      Mixed hyperlipidemia (7/5/2012)      S/P coronary artery stent placement (7/4/2014)      Overview: 4/7/17 PCI/ROSALINO Diagonal      Type 2 diabetes mellitus with diabetic neuropathy, without long-term current use of insulin ([address] (1/31/2017)      S/P ablation of atrial fibrillation (5/2/2017)      Overview: 5/1/17      Tachycardia (5/9/2017)      Chronic systolic HF (heart failure) ([address] (5/10/2017)      Overview: 4/2017 EF 25-30%      History of Clostridium difficile infection (5/10/2017)      Overview: 4/2017 CDiff positive      Junctional tachycardia ([address] (5/10/2017)      Hypotension (5/10/2017)        Plan:     Raghu Giron is A paced. Her pulm status is improving. Amio is not the best long term solution for her due to her COPD. Lifevest arranged and will reassess her LVEF in July to see whether she needs upgrade to ICD.      Aviva Jo MD, [facility]    5/21/2017

## 2017-05-21 NOTE — PROGRESS NOTES
Hospitalist Progress Note    NAME: Mireya Moe   :  1950   MRN:  [id number]       Interim Hospital Summary: 73 y.o. female whom presented on 2017 with      Assessment / Plan:  Likely COPD exacerbation   Clinically: improving     Steroids: tapering down , on PO   Cont jet nebs   mucinex + codeine prn is effective     Hemoptysis Not POA resolved   Likely was related to acute HF. Following closely on restarted coumadin   Restarted on Coumadin  ( OK with pulmonary)   Pulmonary help appreciated:no role for bronchoscopy now; repeat CT in 8-12 weeks      Respiratory distress due to Acute on chronic systolic heart failure with EF 25%  Diuresed well, Wt 176--> 165, 169 today.  On RA   CTA chest with ILD but per pulmonary no ILD, findings could be related to CHF  Diuretic: IV --> PO  ( cr, electrolytes are stable)   Cardiology help appreciated: lifevest at discharge     Junctional Tachycardia/A.fib with RVR with associated hypotension  Elevated troponin/ CAD s/p PCI 17  Continue amiodarone 200 mg daily, coreg   Cardiology help appreciated: AICD planned 2017 if EF remains < 35 %   S/p cardioversion in sinus rhythm  S/p ablation 17  Continue coumadin     Sepsis POA resolved   Due to Recurrent vs persistent C.diff  Recently completed 10 days coarse of PO flagyl  Diarrhea resolved, will complete 14 days coarse of PO vanco ( last dose )   regulo Q    DM type 2  BS improved on tapering steroids     Holding metformin  Continue SSI        Code Status: Full  Surrogate Decision Maker: Daughter  DVT Prophylaxis: on coumadin       Baseline:lives with her brother and grandson; independent   Disposition: hopefully will be able to DC on Monday if ok with pulmonary /cardiology  lifewest was delivered in the room     L IJ       Subjective:      CHIEF COMPLAINT: following sepsis/ DM/ respiratory distress   Dyspnea resolved   Feeling overall much better     Overnight events d/w RN       Review of Systems:  Symptom Y/N Comments  Symptom Y/N Comments   Fever/Chills n   Chest Pain n    Poor Appetite    Edema     Cough y Better   Abdominal Pain n    Sputum    Joint Pain     SOB/[place] y Better   Pruritis/Rash     Nausea/vomit    Tolerating PT/OT     Diarrhea    Tolerating Diet y    Constipation    Other       Could NOT obtain due to:      Objective:     VITALS:   Last 24hrs VS reviewed since prior progress note. Most recent are:  Patient Vitals for the past 24 hrs:   Temp Pulse Resp BP SpO2   05/21/17 1003 97.7 °F (36.5 °C) 75 18 113/47 98 %   05/21/17 0819 98.5 °F (36.9 °C) 76 18 142/53 100 %   05/21/17 0755 - - - - 95 %   05/21/17 0350 97.6 °F (36.4 °C) 74 16 141/71 100 %   05/20/17 2310 97.3 °F (36.3 °C) 84 18 114/45 100 %   05/20/17 1858 97.7 °F (36.5 °C) 84 16 117/52 100 %   05/20/17 1756 - - - - 99 %   05/20/17 1535 97.4 °F (36.3 °C) 76 18 110/50 100 %   05/20/17 1245 97.3 °F (36.3 °C) 76 18 102/59 100 %   05/20/17 1140 - - - - 100 %       Intake/Output Summary (Last 24 hours) at 05/21/17 1114  Last data filed at 05/21/17 0352   Gross per 24 hour   Intake              400 ml   Output             1075 ml   Net             -675 ml        PHYSICAL EXAM:  General: WD, WN. Alert, cooperative, no acute distress    EENT:  EOMI. Anicteric sclerae. MMM  Resp:  Harsh bronchial sound. No accessory muscle use  CV:  Irregularly irregular  rhythm,  No edema  GI:  Soft, Non distended, Non tender.  +Bowel sounds  Neurologic:  Alert and oriented X 3, normal speech,   Psych:   Good insight. Not anxious nor agitated  Skin:  No rashes.   No jaundice    Reviewed most current lab test results and cultures  YES  Reviewed most current radiology test results   YES  Review and summation of old records today    NO  Reviewed patient's current orders and MAR    YES  PMH/SH reviewed - no change compared to H&P  ________________________________________________________________________  Care Plan discussed with:    Comments   Patient y Family      RN y    Care Manager     Consultant                        Multidiciplinary team rounds were held today with , nursing, pharmacist and clinical coordinator. Patient's plan of care was discussed; medications were reviewed and discharge planning was addressed. ________________________________________________________________________  Total NON critical care TIME:  25  Minutes    Total CRITICAL CARE TIME Spent:   Minutes non procedure based      Comments   >50% of visit spent in counseling and coordination of care     ________________________________________________________________________  Kimberly Bass MD     Procedures: see electronic medical records for all procedures/Xrays and details which were not copied into this note but were reviewed prior to creation of Plan. LABS:  I reviewed today's most current labs and imaging studies.   Pertinent labs include:  Recent Labs      05/20/17   0245   WBC  15.9*   HGB  11.0*   HCT  32.5*   PLT  274     Recent Labs      05/21/17   0404  05/20/17   0245  05/19/17   0428   NA  140  139  134*   K  3.3*  4.0  4.1   CL  105  104  99   CO2  27  23  23   GLU  164*  272*  288*   BUN  21*  15  15   CREA  1.05*  1.01  1.08*   CA  8.9  9.4  8.9   MG  2.2  2.4  2.1   INR  2.2*  1.9*  1.3*       Signed: Kimberly Bass MD

## 2017-05-21 NOTE — PROGRESS NOTES
0730: Report received from [facility], [address] [facility], [facility] [address], ED SUMMARY, [facility], RECENT RESULTS were discussed.     Alexis Iraheta RN

## 2017-05-22 ENCOUNTER — HOME HEALTH ADMISSION (OUTPATIENT)
Dept: HOME HEALTH SERVICES | Facility: HOME HEALTH | Age: 67
End: 2017-05-22
Payer: COMMERCIAL

## 2017-05-22 VITALS
OXYGEN SATURATION: 100 % | RESPIRATION RATE: 18 BRPM | HEIGHT: 66 IN | WEIGHT: 168.3 LBS | DIASTOLIC BLOOD PRESSURE: 50 MMHG | SYSTOLIC BLOOD PRESSURE: 117 MMHG | HEART RATE: 70 BPM | TEMPERATURE: 98.1 F | BODY MASS INDEX: 27.05 KG/M2

## 2017-05-22 LAB
ANION GAP BLD CALC-SCNC: 9 MMOL/L (ref 5–15)
BUN SERPL-MCNC: 22 MG/DL (ref 6–20)
BUN/CREAT SERPL: 21 (ref 12–20)
CALCIUM SERPL-MCNC: 8.2 MG/DL (ref 8.5–10.1)
CHLORIDE SERPL-SCNC: 104 MMOL/L (ref 97–108)
CO2 SERPL-SCNC: 28 MMOL/L (ref 21–32)
CREAT SERPL-MCNC: 1.04 MG/DL (ref 0.55–1.02)
ERYTHROCYTE [DISTWIDTH] IN BLOOD BY AUTOMATED COUNT: 13.9 % (ref 11.5–14.5)
GLUCOSE BLD STRIP.AUTO-MCNC: 138 MG/DL (ref 65–100)
GLUCOSE BLD STRIP.AUTO-MCNC: 197 MG/DL (ref 65–100)
GLUCOSE BLD STRIP.AUTO-MCNC: 303 MG/DL (ref 65–100)
GLUCOSE SERPL-MCNC: 160 MG/DL (ref 65–100)
HCT VFR BLD AUTO: 32.3 % (ref 35–47)
HGB BLD-MCNC: 10.8 G/DL (ref 11.5–16)
INR PPP: 1.8 (ref 0.9–1.1)
MAGNESIUM SERPL-MCNC: 2 MG/DL (ref 1.6–2.4)
MCH RBC QN AUTO: 28.1 PG (ref 26–34)
MCHC RBC AUTO-ENTMCNC: 33.4 G/DL (ref 30–36.5)
MCV RBC AUTO: 83.9 FL (ref 80–99)
PLATELET # BLD AUTO: 271 K/UL (ref 150–400)
POTASSIUM SERPL-SCNC: 3.3 MMOL/L (ref 3.5–5.1)
PROTHROMBIN TIME: 18.9 SEC (ref 9–11.1)
RBC # BLD AUTO: 3.85 M/UL (ref 3.8–5.2)
SERVICE CMNT-IMP: ABNORMAL
SODIUM SERPL-SCNC: 141 MMOL/L (ref 136–145)
WBC # BLD AUTO: 13 K/UL (ref 3.6–11)

## 2017-05-22 PROCEDURE — 85610 PROTHROMBIN TIME: CPT | Performed by: INTERNAL MEDICINE

## 2017-05-22 PROCEDURE — 74011250637 HC RX REV CODE- 250/637: Performed by: INTERNAL MEDICINE

## 2017-05-22 PROCEDURE — 80048 BASIC METABOLIC PNL TOTAL CA: CPT | Performed by: INTERNAL MEDICINE

## 2017-05-22 PROCEDURE — 74011636637 HC RX REV CODE- 636/637: Performed by: HOSPITALIST

## 2017-05-22 PROCEDURE — 82962 GLUCOSE BLOOD TEST: CPT

## 2017-05-22 PROCEDURE — 74011636637 HC RX REV CODE- 636/637: Performed by: NURSE PRACTITIONER

## 2017-05-22 PROCEDURE — 83735 ASSAY OF MAGNESIUM: CPT | Performed by: INTERNAL MEDICINE

## 2017-05-22 PROCEDURE — 36415 COLL VENOUS BLD VENIPUNCTURE: CPT | Performed by: INTERNAL MEDICINE

## 2017-05-22 PROCEDURE — 74011250637 HC RX REV CODE- 250/637: Performed by: HOSPITALIST

## 2017-05-22 PROCEDURE — 74011250637 HC RX REV CODE- 250/637: Performed by: NURSE PRACTITIONER

## 2017-05-22 PROCEDURE — 85027 COMPLETE CBC AUTOMATED: CPT | Performed by: INTERNAL MEDICINE

## 2017-05-22 PROCEDURE — 74011250637 HC RX REV CODE- 250/637: Performed by: FAMILY MEDICINE

## 2017-05-22 RX ORDER — BENZONATATE 100 MG/1
100 CAPSULE ORAL
Qty: 20 CAP | Refills: 0 | Status: SHIPPED | OUTPATIENT
Start: 2017-05-22 | End: 2017-05-29

## 2017-05-22 RX ORDER — AMIODARONE HYDROCHLORIDE 200 MG/1
200 TABLET ORAL 2 TIMES DAILY
Qty: 60 TAB | Refills: 0 | Status: SHIPPED | OUTPATIENT
Start: 2017-05-22 | End: 2017-06-16 | Stop reason: SDUPTHER

## 2017-05-22 RX ORDER — CARVEDILOL 6.25 MG/1
6.25 TABLET ORAL 2 TIMES DAILY WITH MEALS
Qty: 60 TAB | Refills: 0 | Status: SHIPPED | OUTPATIENT
Start: 2017-05-22 | End: 2017-06-16 | Stop reason: SDUPTHER

## 2017-05-22 RX ORDER — WARFARIN 7.5 MG/1
7.5 TABLET ORAL ONCE
Status: COMPLETED | OUTPATIENT
Start: 2017-05-22 | End: 2017-05-22

## 2017-05-22 RX ORDER — PREDNISONE 10 MG/1
TABLET ORAL
Qty: 6 TAB | Refills: 0 | Status: SHIPPED | OUTPATIENT
Start: 2017-05-22 | End: 2017-05-30 | Stop reason: ALTCHOICE

## 2017-05-22 RX ORDER — FUROSEMIDE 40 MG/1
TABLET ORAL
Qty: 30 TAB | Refills: 0 | Status: SHIPPED | OUTPATIENT
Start: 2017-05-22 | End: 2017-06-16 | Stop reason: SDUPTHER

## 2017-05-22 RX ADMIN — VANCOMYCIN HYDROCHLORIDE 125 MG: 1 INJECTION, POWDER, LYOPHILIZED, FOR SOLUTION INTRAVENOUS at 06:00

## 2017-05-22 RX ADMIN — GABAPENTIN 600 MG: 300 CAPSULE ORAL at 11:25

## 2017-05-22 RX ADMIN — VANCOMYCIN HYDROCHLORIDE 125 MG: 1 INJECTION, POWDER, LYOPHILIZED, FOR SOLUTION INTRAVENOUS at 00:48

## 2017-05-22 RX ADMIN — Medication 20 ML: at 06:00

## 2017-05-22 RX ADMIN — Medication 10 ML: at 14:00

## 2017-05-22 RX ADMIN — INSULIN LISPRO 2 UNITS: 100 INJECTION, SOLUTION INTRAVENOUS; SUBCUTANEOUS at 11:24

## 2017-05-22 RX ADMIN — FUROSEMIDE 40 MG: 40 TABLET ORAL at 08:38

## 2017-05-22 RX ADMIN — AMIODARONE HYDROCHLORIDE 200 MG: 200 TABLET ORAL at 17:04

## 2017-05-22 RX ADMIN — WARFARIN SODIUM 7.5 MG: 7.5 TABLET ORAL at 17:09

## 2017-05-22 RX ADMIN — INSULIN LISPRO 7 UNITS: 100 INJECTION, SOLUTION INTRAVENOUS; SUBCUTANEOUS at 17:04

## 2017-05-22 RX ADMIN — FLUTICASONE FUROATE AND VILANTEROL TRIFENATATE 1 PUFF: 100; 25 POWDER RESPIRATORY (INHALATION) at 08:39

## 2017-05-22 RX ADMIN — BENZONATATE 100 MG: 100 CAPSULE ORAL at 11:25

## 2017-05-22 RX ADMIN — PREDNISONE 30 MG: 10 TABLET ORAL at 08:38

## 2017-05-22 RX ADMIN — INSULIN HUMAN 10 UNITS: 100 INJECTION, SUSPENSION SUBCUTANEOUS at 12:40

## 2017-05-22 RX ADMIN — Medication 1 CAPSULE: at 08:38

## 2017-05-22 RX ADMIN — VANCOMYCIN HYDROCHLORIDE 125 MG: 1 INJECTION, POWDER, LYOPHILIZED, FOR SOLUTION INTRAVENOUS at 17:04

## 2017-05-22 RX ADMIN — FLUTICASONE PROPIONATE 2 SPRAY: 50 SPRAY, METERED NASAL at 08:39

## 2017-05-22 RX ADMIN — VANCOMYCIN HYDROCHLORIDE 125 MG: 1 INJECTION, POWDER, LYOPHILIZED, FOR SOLUTION INTRAVENOUS at 12:39

## 2017-05-22 RX ADMIN — UMECLIDINIUM 1 PUFF: 62.5 AEROSOL, POWDER ORAL at 08:39

## 2017-05-22 RX ADMIN — COLCHICINE 0.6 MG: 0.6 TABLET, FILM COATED ORAL at 08:38

## 2017-05-22 RX ADMIN — AMIODARONE HYDROCHLORIDE 200 MG: 200 TABLET ORAL at 08:38

## 2017-05-22 RX ADMIN — CLOPIDOGREL BISULFATE 75 MG: 75 TABLET ORAL at 08:38

## 2017-05-22 RX ADMIN — CARVEDILOL 6.25 MG: 6.25 TABLET, FILM COATED ORAL at 08:38

## 2017-05-22 RX ADMIN — GABAPENTIN 400 MG: 300 CAPSULE ORAL at 08:38

## 2017-05-22 RX ADMIN — CARVEDILOL 6.25 MG: 6.25 TABLET, FILM COATED ORAL at 17:04

## 2017-05-22 NOTE — PROGRESS NOTES
0730: Report received from [facility] . 0800: Pt. Has a abrasion/ skin tear to her buttock. It looks to now be healing. Pt. Denies pain. Blanchable. Pt. Does not wish for wound care to be consulted as pt. Is leaving today.

## 2017-05-22 NOTE — DISCHARGE SUMMARY
Hospitalist Discharge Summary     Patient ID:  Michael Wei  [id number]  73 y.o.  1950    PCP on record: Keturah Dotson NP    Admit date: 5/9/2017  Discharge date and time: 5/22/2017      DISCHARGE DIAGNOSIS:      Respiratory distress POA resolved due to Acute on chronic systolic heart failure with EF 25%  Likely COPD exacerbation   Hemoptysis Not POA resolved   Junctional Tachycardia/A.fib with RVR with associated hypotension  Elevated troponin  CAD s/p PCI 4/5/17  Sepsis POA resolved   Due to Recurrent vs persistent C.diff  DM type 2  Code Status: Full      CONSULTATIONS:  IP CONSULT TO CARDIOLOGY  IP CONSULT TO PULMONOLOGY    Excerpted HPI from H&P of Sameera Pablo MD:    HISTORY OF PRESENT ILLNESS:   Raheel Greenwood is a 73 y.o.  female who presents with Worsening SOB. Pt with h/o diastolic HF, DM, SSS s/p PPM, recent ablation 5/1 comes with above. Pt had ablation 5/1 and had f/u with Dr Darrian Bueno at his office with worsening SOB. Denies fever/cough/CP. Had some diarrhea today. She was sent here to Ed for further eval.  In ED, HR in the 150's sinus tach, cr 1.04, lactate 1.5, trop 0.13, INR 2.7. ECHO with EF 25-30%. CTA chest negative for PE, shows Extensive interstitial lung disease bilaterally, advanced since 2015 with nodules as described above and borderline enlarged hilar and mediastinal lymph nodes, many of which were present in 2015. Correlate with known clinical  pulmonary history for sarcoidosis or other explanations for these findings. Compared to prior chest imaging. If none is available, follow-up chest CT would  be recommended in 3-6 months.     Had ECHO in ED that was reviewed by Dr Darrian Bueno.     We were asked to admit for work up and evaluation of the above problems. ______________________________________________________________________  DISCHARGE SUMMARY/HOSPITAL COURSE:  for full details see H&P, daily progress notes, labs, consult notes.      Likely COPD exacerbation not POA   Clinically:doing well   Steroids: tapering down quickly   Cont jet nebs at home      Hemoptysis Not POA resolved   Likely was related to acute HF. No recurrent event on restarted coumadin   Restarted on Coumadin 5/17 ( OK with pulmonary)   Pulmonary help appreciated:no role for bronchoscopy now; repeat CT in 8-12 weeks      Respiratory distress due to Acute on chronic systolic heart failure with EF 25%  Diuresed well, Wt 176--> 165, 169 today. On RA   CTA chest with ILD but per pulmonary no ILD, findings could be related to CHF  Diuretic: IV --> PO ( cr, electrolytes are stable)   Cardiology help appreciated: lifevest at discharge      Junctional Tachycardia/A.fib with RVR with associated hypotension  Elevated troponin/ CAD s/p PCI 4/5/17  Continue amiodarone 200 mg daily, coreg   Cardiology help appreciated: AICD planned July 2017 if EF remains < 35 %   S/p cardioversion in sinus rhythm  S/p ablation 5/1/17  Continue coumadin      Sepsis POA resolved   Due to Recurrent vs persistent C.diff  Recently completed 10 days coarse of PO flagyl  Diarrhea resolved, will complete 14 days coarse of PO vanco ( last dose 5/24)   regulo Q     DM type 2  BS improved on tapering steroids   Will give NPH dose prior to DC to cover for dose of prednisone this am   Restart home metformin  Continue SSI        Code Status: Full  Surrogate Decision Maker: Daughter  DVT Prophylaxis: on coumadin         Baseline:lives with her brother and grandson; independent   Disposition: DC today; will order [facility] RN ; Lupistyron Blair was delivered in the room      L IJ , will dc today         _______________________________________________________________________  Patient seen and examined by me on discharge day. PHYSICAL EXAM:  General: WD, WN. Alert, cooperative, no acute distress    EENT:  EOMI. Anicteric sclerae. MMM  Resp:  CTA.  No accessory muscle use  CV:  Irregularly irregular rhythm,  No edema  GI:  Soft, Non distended, Non tender.  +Bowel sounds  Neurologic:  Alert and oriented X 3, normal speech,   Psych:   Good insight. Not anxious nor agitated  Skin:  No rashes. No jaundice  _______________________________________________________________________  DISCHARGE MEDICATIONS:   Current Discharge Medication List      START taking these medications    Details   amiodarone (CORDARONE) 200 mg tablet Take 1 Tab by mouth two (2) times a day. Qty: 60 Tab, Refills: 0      benzonatate (TESSALON) 100 mg capsule Take 1 Cap by mouth three (3) times daily as needed for Cough for up to 7 days. Qty: 20 Cap, Refills: 0      carvedilol (COREG) 6.25 mg tablet Take 1 Tab by mouth two (2) times daily (with meals). Qty: 60 Tab, Refills: 0      vancomycin 50 mg/mL oral solution (compounded) Take 2.5 mL by mouth every six (6) hours for 3 days. Qty: 30 mL, Refills: 0      predniSONE (DELTASONE) 10 mg tablet Take  2 Tab daily x 2 days then 1 Tab daily x 2 days  Qty: 6 Tab, Refills: 0         CONTINUE these medications which have CHANGED    Details   furosemide (LASIX) 40 mg tablet Take 40 mg daily  Qty: 30 Tab, Refills: 0         CONTINUE these medications which have NOT CHANGED    Details   pravastatin (PRAVACHOL) 20 mg tablet Take 1 Tab by mouth nightly. Qty: 90 Tab, Refills: 3    Associated Diagnoses: Hypercholesteremia      clopidogrel (PLAVIX) 75 mg tab Take 1 Tab by mouth daily. Qty: 30 Tab, Refills: 0      warfarin (COUMADIN) 5 mg tablet Take 7.5 mg by mouth daily.       gabapentin (NEURONTIN) 800 mg tablet TAKE ONE TABLET BY MOUTH THREE TIMES DAILY  Qty: 90 Tab, Refills: 11    Associated Diagnoses: Polyneuropathy associated with underlying disease (HCC)      tiotropium (SPIRIVA WITH HANDIHALER) 18 mcg inhalation capsule INHALE THE CONTENTS OF 1 CAPSULE THROUGH HANDIHALER DEVICE DAILY  Qty: 90 Cap, Refills: 3    Associated Diagnoses: Chronic obstructive pulmonary disease, unspecified COPD type (HCC)      metFORMIN (GLUCOPHAGE) 1,000 mg tablet TAKE 1 TABLET BY MOUTH TWICE DAILY WITH MEALS  Indications: PREVENTION OF TYPE 2 DIABETES MELLITUS  Qty: 180 Tab, Refills: 3    Associated Diagnoses: Type 2 diabetes mellitus without complication, unspecified long term insulin use status      budesonide-formoterol (SYMBICORT) 160-4.5 mcg/actuation HFA inhaler Take 1 Puff by inhalation two (2) times a day. Qty: 3 Inhaler, Refills: 3    Associated Diagnoses: Chronic obstructive pulmonary disease with acute exacerbation (HCC)      albuterol (PROVENTIL VENTOLIN) 2.5 mg /3 mL (0.083 %) nebulizer solution 3 mL by Nebulization route every four (4) hours as needed for Wheezing. Qty: 1 Package, Refills: 5    Associated Diagnoses: SOB (shortness of breath)      FOLIC ACID/MULTIVIT-MIN/LUTEIN (CENTRUM SILVER PO) Take 1 Tab by mouth daily. Takes one po daily. albuterol (VENTOLIN HFA) 90 mcg/actuation inhaler Take 2 Puffs by inhalation every six (6) hours as needed for Wheezing. Qty: 1 Inhaler, Refills: 11      Azelastine (ASTEPRO) 0.15 % (205.5 mcg) nasal spray 1 [place] by Both Nostrils route daily. cod liver oil cap Take 1 Cap by mouth daily. HYDROcodone-acetaminophen (NORCO) 5-325 mg per tablet       fluticasone (FLONASE) 50 mcg/actuation nasal spray 2 Sprays by Both Nostrils route every evening. Blood-Glucose Meter monitoring kit Check blood sugar daily. May substitute for insurance preferred meter  Qty: 1 Kit, Refills: 0    Associated Diagnoses: Type 2 diabetes mellitus with diabetic neuropathy, without long-term current use of insulin (HCC)      colchicine 0.6 mg tablet Take 1 Tab by mouth two (2) times a day.   Qty: 60 Tab, Refills: 5    Associated Diagnoses: Chronic gout involving toe of left foot without tophus, unspecified cause         STOP taking these medications       valsartan (DIOVAN) 40 mg tablet Comments:   Reason for Stopping:         sotalol (BETAPACE) 120 mg tablet Comments:   Reason for Stopping:         aspirin 81 mg chewable tablet Comments:   Reason for Stopping:         atorvastatin (LIPITOR) 20 mg tablet Comments:   Reason for Stopping:         evolocumab (Ivana Heater) 420 mg/3.5 mL Injt Comments:   Reason for Stopping:               My Recommended Diet, Activity, Wound Care, and follow-up labs are listed in the patient's Discharge Insturctions which I have personally completed and reviewed.     _______________________________________________________________________  DISPOSITION:    Home with Family:    Home with HH/PT/OT/RN: y   SNF/LTC:    CLARENCE:    OTHER:        Condition at Discharge:  Stable  _______________________________________________________________________  Follow up with:   PCP : Shonna Garcia NP  Follow-up Information     Follow up With Details Comments 500 Saranya Bowling NP In 1 week  [address] [facility] [address] [place] [address]      Felisha Joel MD In 1 week  [address] [facility] Janett Sweet MD In 2 months  [address]  [facility]  [facility] [address]  [phone]                Total time in minutes spent coordinating this discharge (includes going over instructions, follow-up, prescriptions, and preparing report for sign off to her PCP) :  > 30  minutes    Signed:  Monika Moore MD

## 2017-05-22 NOTE — PROGRESS NOTES
[address] SHIFT NURSING NOTE    Bedside and Verbal shift change report given to Jasvir Walters RN (oncoming nurse) by Samira Marie RN (offgoing nurse). Report included the following information SBAR, Kardex, ED Summary, Procedure Summary, Intake/Output, MAR, Accordion, Recent Results, Med Rec Status and Cardiac Rhythm Paced. SHIFT SUMMARY: 5/21/17 Report from Bobby Borges RN        Admission Date 5/9/2017   Admission Diagnosis Tachycardia   Consults IP CONSULT TO CARDIOLOGY  IP CONSULT TO PULMONOLOGY        Consults   [] PT   [] OT   [] Speech   [] Palliative      [] Hospice    [] Case Management   [] None   Cardiac Monitoring   [] Yes   [] No     Antibiotics   [] Yes   [] No   GI Prophylaxis  (Ex: Protonix, Pepcid, etc,.)   [] Yes   [] No          DVT Prophylaxis   SCDs:             Mayo stockings:         [] Medication (Ex: Lovenox, Eliquis, Brilinta, Coumadin,  Heparin, etc..)   [] Contraindicated   [] No VTE needed       Urinary Catheter             LDAs           Triple Lumen Central line 05/10/17 Right Neck (Active)   Central Line Being Utilized Yes 5/21/2017 10:47 PM   Criteria for Appropriate Use Limited/no vessel suitable for conventional peripheral access 5/21/2017 10:47 PM   Site Assessment Clean, dry, & intact 5/21/2017 10:47 PM   Infiltration Assessment 0 5/21/2017 10:47 PM   Affected Extremity/Extremities Color distal to insertion site pink (or appropriate for race) 5/21/2017  2:45 PM   Date of Last Dressing Change 05/17/17 5/21/2017  2:45 PM   Dressing Status Clean, dry, & intact 5/21/2017 10:47 PM   Dressing Type Transparent;Tape 5/21/2017 10:47 PM   Action Taken Open ports on tubing capped 5/20/2017  3:21 AM   Proximal Hub Color/Line Status Patent;Brown 5/21/2017 10:47 PM   Positive Blood Return (Medial Site) Yes 5/21/2017 10:47 PM   Medial Hub Color/Line Status Blue;Patent 5/21/2017 10:47 PM   Positive Blood Return (Lateral Site) Yes 5/21/2017 10:47 PM   Distal Hub Color/Line Status Patent; White 5/21/2017 10:47 PM Positive Blood Return (Site #3) Yes 5/21/2017 10:47 PM   Alcohol Cap Used Yes 5/21/2017 10:47 PM                          I/Os   Intake/Output Summary (Last 24 hours) at 05/22/17 0302  Last data filed at 05/21/17 2247   Gross per 24 hour   Intake              880 ml   Output             3100 ml   Net            -2220 ml         Activity Level Activity Level: Up with Assistance     Activity Assistance: No assistance needed   Diet Active Orders   Diet    DIET CARDIAC Regular; Consistent Carb 1800kcal      Purposeful Rounding every 1-2 hour? [] Yes    Anthony Score  Total Score: 1   Bed Alarm (If score 3 or >)   [] Yes    [] Refused (See signed refusal form in chart)   Landon Score  Landon Score: 19       Landon Score (if score 14 or less)   [] PMT consult   [] Nutrition consult   [] Wound Care consult      []  Specialty bed         Influenza Vaccine Received Flu Vaccine for Current Season (usually Sept-March): Not Flu Season               Needs prior to discharge:   Home O2 required:    [] Yes   [] No     If yes, how much O2 required?     Other:    Last Bowel Movement Date: 05/20/17   Readmission Risk Assessment Tool Score High Risk            32       Total Score        3 Relationship with PCP    3 Patient Length of Stay > 5    4 More than 1 Admission in calendar year    5 Patient Insurance is Medicare, Medicaid or Self Pay    17 Charlson Comorbidity Score        Criteria that do not apply:    Patient Living Status       Expected Length of Stay 4d 21h   Actual Length of Stay 13

## 2017-05-22 NOTE — PROGRESS NOTES
Pharmacy Dosing of Warfarin     Indication for Warfarin: AFib     Goal INR: 2-3     Warfarin PTA Regimen: 7.5 mg po daily     Concurrent anticoagulants & Platelet Inhibitors: Plavix 75 mg daily     Major Interacting Medications:   Amiodarone started 5/10    Diet:  Cardiac    Inpatient Warfarin Doses:  Date INR Warfarin Dose                2.7     7.5 mg  5/10            2.1     7.5 mg               3.5      on hold 5/10 -             3.3              1.0      7.5mg               1.1      7.5mg               1.3      7.5              1.9      2.5              2.2       5              1.8       7.5     Estimated Creatinine Clearance: 56.3 mL/min (based on Cr of 1.04). Estimated Creatinine Clearance (using IBW): 50.5 mL/min  Recent Labs      17   0345  17   0404  17   0245   CREA  1.04*  1.05*  1.01   BUN  22*  21*  15   WBC  13.0*   --   15.9*   NA  141  140  139   K  3.3*  3.3*  4.0   MG  2.0  2.2  2.4   CA  8.2*  8.9  9.4   HGB  10.8*   --   11.0*   HCT  32.3*   --   32.5*   PLT  271   --   274   INR  1.8*  2.2*  1.9*     Temp (24hrs), Av.8 °F (36.6 °C), Min:97.5 °F (36.4 °C), Max:98 °F (36.7 °C)      Warfarin sensitivity: high (Amiodarone)   Impression/Plan:  Will order 7.5 mg tonight. Pharmacy will follow daily and adjust the dose as appropriate.   Thank you,  Sabi Rodriguez, PHARMD

## 2017-05-22 NOTE — PROGRESS NOTES
Hospitalist Progress Note    NAME: Steven Felix   :  1950   MRN:  [id number]       Interim Hospital Summary: 73 y.o. female whom presented on 2017 with      Assessment / Plan:  Likely COPD exacerbation   Clinically:doing well   Steroids: tapering down quickly    Cont jet nebs at home     Hemoptysis Not POA resolved   Likely was related to acute HF. No recurrent event on restarted coumadin   Restarted on Coumadin  ( OK with pulmonary)   Pulmonary help appreciated:no role for bronchoscopy now; repeat CT in 8-12 weeks      Respiratory distress due to Acute on chronic systolic heart failure with EF 25%  Diuresed well, Wt 176--> 165, 169 today.  On RA   CTA chest with ILD but per pulmonary no ILD, findings could be related to CHF  Diuretic: IV --> PO  ( cr, electrolytes are stable)   Cardiology help appreciated: lifevest at discharge     Junctional Tachycardia/A.fib with RVR with associated hypotension  Elevated troponin/ CAD s/p PCI 17  Continue amiodarone 200 mg daily, coreg   Cardiology help appreciated: AICD planned 2017 if EF remains < 35 %   S/p cardioversion in sinus rhythm  S/p ablation 17  Continue coumadin     Sepsis POA resolved   Due to Recurrent vs persistent C.diff  Recently completed 10 days coarse of PO flagyl  Diarrhea resolved, will complete 14 days coarse of PO vanco ( last dose )   regulo Q    DM type 2  BS improved on tapering steroids    Will give NPH dose prior to DC to cover for dose of prednisone this am    Restart home metformin  Continue SSI        Code Status: Full  Surrogate Decision Maker: Daughter  DVT Prophylaxis: on coumadin       Baseline:lives with her brother and grandson; independent   Disposition: DC today; will order [address] RN ; lifewest was delivered in the room     L IJ       Subjective:      CHIEF COMPLAINT: following sepsis/ DM/ respiratory distress   Continue doing well     Overnight events d/w RN       Review of Systems:  Symptom Y/N Comments Symptom Y/N Comments   Fever/Chills n   Chest Pain n    Poor Appetite    Edema     Cough n Resolved   Abdominal Pain n    Sputum    Joint Pain     SOB/[place] n Resolved    Pruritis/Rash     Nausea/vomit    Tolerating PT/OT     Diarrhea    Tolerating Diet y    Constipation    Other       Could NOT obtain due to:      Objective:     VITALS:   Last 24hrs VS reviewed since prior progress note. Most recent are:  Patient Vitals for the past 24 hrs:   Temp Pulse Resp BP SpO2   05/22/17 1046 97.4 °F (36.3 °C) 73 18 108/81 98 %   05/22/17 0807 97.6 °F (36.4 °C) 73 18 138/67 98 %   05/22/17 0430 - - - 116/55 -   05/22/17 0402 97.8 °F (36.6 °C) 80 18 93/46 99 %   05/21/17 2247 97.5 °F (36.4 °C) 75 18 128/49 100 %   05/21/17 2108 - - - 133/59 -   05/21/17 1950 97.9 °F (36.6 °C) 76 18 102/41 96 %   05/21/17 1914 - - - - 96 %   05/21/17 1533 98 °F (36.7 °C) 80 18 110/57 97 %       Intake/Output Summary (Last 24 hours) at 05/22/17 1130  Last data filed at 05/22/17 0800   Gross per 24 hour   Intake              480 ml   Output             3900 ml   Net            -3420 ml        PHYSICAL EXAM:  General: WD, WN. Alert, cooperative, no acute distress    EENT:  EOMI. Anicteric sclerae. MMM  Resp:  CTA. No accessory muscle use  CV:  Irregularly irregular  rhythm,  No edema  GI:  Soft, Non distended, Non tender.  +Bowel sounds  Neurologic:  Alert and oriented X 3, normal speech,   Psych:   Good insight. Not anxious nor agitated  Skin:  No rashes.   No jaundice    Reviewed most current lab test results and cultures  YES  Reviewed most current radiology test results   YES  Review and summation of old records today    NO  Reviewed patient's current orders and MAR    YES  PMH/ reviewed - no change compared to H&P  ________________________________________________________________________  Care Plan discussed with:    Comments   Patient y    Family      RN y    Care Manager y    Consultant  y Cardiology                      Multidiciplinary team rounds were held today with , nursing, pharmacist and clinical coordinator. Patient's plan of care was discussed; medications were reviewed and discharge planning was addressed. ________________________________________________________________________  Total NON critical care TIME:  35  Minutes    Total CRITICAL CARE TIME Spent:   Minutes non procedure based      Comments   >50% of visit spent in counseling and coordination of care y Dc coordination    ________________________________________________________________________  Nataliia Callaway MD     Procedures: see electronic medical records for all procedures/Xrays and details which were not copied into this note but were reviewed prior to creation of Plan. LABS:  I reviewed today's most current labs and imaging studies.   Pertinent labs include:  Recent Labs      05/22/17   0345  05/20/17   0245   WBC  13.0*  15.9*   HGB  10.8*  11.0*   HCT  32.3*  32.5*   PLT  271  274     Recent Labs      05/22/17   0345  05/21/17   0404  05/20/17   0245   NA  141  140  139   K  3.3*  3.3*  4.0   CL  104  105  104   CO2  28  27  23   GLU  160*  164*  272*   BUN  22*  21*  15   CREA  1.04*  1.05*  1.01   CA  8.2*  8.9  9.4   MG  2.0  2.2  2.4   INR  1.8*  2.2*  1.9*       Signed: Nataliia Callaway MD

## 2017-05-22 NOTE — PROGRESS NOTES
Cardiology Progress Note      5/22/2017 1:18 PM    Admit Date: 5/9/2017    Admit Diagnosis: Tachycardia      Subjective:     [place] is feeling well.     Visit Vitals    /81 (BP 1 Location: Right arm, BP Patient Position: Sitting)    Pulse 73    Temp 97.4 °F (36.3 °C)    Resp 18    Ht 5' 6\" (1.676 m)    Wt 168 lb 4.8 oz (76.3 kg)    SpO2 98%    Breastfeeding No    BMI 27.16 kg/m2       Current Facility-Administered Medications   Medication Dose Route Frequency    warfarin (COUMADIN) tablet 7.5 mg  7.5 mg Oral ONCE    predniSONE (DELTASONE) tablet 30 mg  30 mg Oral DAILY WITH BREAKFAST    albuterol (PROVENTIL VENTOLIN) nebulizer solution 2.5 mg  2.5 mg Nebulization Q4H PRN    fluticasone (FLONASE) 50 mcg/actuation nasal spray 2 Spray  2 Spray Both Nostrils DAILY    pravastatin (PRAVACHOL) tablet 20 mg  20 mg Oral QHS    WARFARIN INFORMATION NOTE (COUMADIN)   Other QPM    furosemide (LASIX) tablet 40 mg  40 mg Oral DAILY    carvedilol (COREG) tablet 6.25 mg  6.25 mg Oral BID WITH MEALS    guaiFENesin-codeine (ROBITUSSIN AC) 100-10 mg/5 mL solution 5 mL  5 mL Oral Q4H PRN    SALINE PERIPHERAL FLUSH Q8H soln 10-30 mL  10-30 mL InterCATHeter Q8H    saline peripheral flush soln 10-30 mL  10-30 mL InterCATHeter PRN    benzonatate (TESSALON) capsule 100 mg  100 mg Oral TID PRN    gabapentin (NEURONTIN) capsule 400 mg  400 mg Oral BID    gabapentin (NEURONTIN) capsule 600 mg  600 mg Oral DAILY    dextrose 10% infusion 125-250 mL  125-250 mL IntraVENous PRN    lactobac ac& pc-s.therm-b.anim (MARYAN Q/RISAQUAD)  1 Cap Oral DAILY    albuterol-ipratropium (DUO-NEB) 2.5 MG-0.5 MG/3 ML  3 mL Nebulization Q4H PRN    insulin lispro (HUMALOG) injection   SubCUTAneous AC&HS    amiodarone (CORDARONE) tablet 200 mg  200 mg Oral BID    colchicine tablet 0.6 mg  0.6 mg Oral DAILY    acetaminophen (TYLENOL) tablet 650 mg  650 mg Oral Q4H PRN    glucose chewable tablet 16 g  4 Tab Oral PRN    glucagon (GLUCAGEN) injection 1 mg  1 mg IntraMUSCular PRN    vancomycin 50 mg/mL oral solution (compounded) 125 mg  125 mg Oral Q6H    fluticasone-vilanterol (BREO ELLIPTA) 100mcg-25mcg/puff  1 Puff Inhalation DAILY    clopidogrel (PLAVIX) tablet 75 mg  75 mg Oral DAILY    umeclidinium (INCRUSE ELLIPTA) 62.5 mcg/actuation  1 Puff Inhalation DAILY    albuterol (PROVENTIL HFA, VENTOLIN HFA, PROAIR HFA) inhaler 2 Puff  2 Puff Inhalation Q6H PRN         Objective:      Physical Exam:  Visit Vitals    /81 (BP 1 Location: Right arm, BP Patient Position: Sitting)    Pulse 73    Temp 97.4 °F (36.3 °C)    Resp 18    Ht 5' 6\" (1.676 m)    Wt 168 lb 4.8 oz (76.3 kg)    SpO2 98%    Breastfeeding No    BMI 27.16 kg/m2     General Appearance:  Well developed, well nourished,alert and oriented x 3, and individual in no acute distress. Ears/Nose/Mouth/Throat:   Hearing grossly normal.         Neck: Supple. Chest:   Lungs clear to auscultation bilaterally. Cardiovascular:  Regular rate and rhythm, S1, S2 normal, no murmur. Abdomen:   Soft, non-tender, bowel sounds are active. Extremities: No edema bilaterally. Skin: Warm and dry.                Data Review:   Labs:    Recent Results (from the past 24 hour(s))   GLUCOSE, POC    Collection Time: 05/21/17  4:57 PM   Result Value Ref Range    Glucose (POC) 241 (H) 65 - 100 mg/dL    Performed by [facility]    GLUCOSE, POC    Collection Time: 05/21/17  8:56 PM   Result Value Ref Range    Glucose (POC) 366 (H) 65 - 100 mg/dL    Performed by [facility]    METABOLIC PANEL, BASIC    Collection Time: 05/22/17  3:45 AM   Result Value Ref Range    Sodium 141 136 - 145 mmol/L    Potassium 3.3 (L) 3.5 - 5.1 mmol/L    Chloride 104 97 - 108 mmol/L    CO2 28 21 - 32 mmol/L    Anion gap 9 5 - 15 mmol/L    Glucose 160 (H) 65 - 100 mg/dL    BUN 22 (H) 6 - 20 MG/DL    Creatinine 1.04 (H) 0.55 - 1.02 MG/DL    BUN/Creatinine ratio 21 (H) 12 - 20      GFR est AA >60 >60 ml/min/1.73m2    GFR est non-AA 53 (L) >60 ml/min/1.73m2    Calcium 8.2 (L) 8.5 - 10.1 MG/DL   MAGNESIUM    Collection Time: 05/22/17  3:45 AM   Result Value Ref Range    Magnesium 2.0 1.6 - 2.4 mg/dL   PROTHROMBIN TIME + INR    Collection Time: 05/22/17  3:45 AM   Result Value Ref Range    INR 1.8 (H) 0.9 - 1.1      Prothrombin time 18.9 (H) 9.0 - 11.1 sec   CBC W/O DIFF    Collection Time: 05/22/17  3:45 AM   Result Value Ref Range    WBC 13.0 (H) 3.6 - 11.0 K/uL    RBC 3.85 3.80 - 5.20 M/uL    HGB 10.8 (L) 11.5 - 16.0 g/dL    HCT 32.3 (L) 35.0 - 47.0 %    MCV 83.9 80.0 - 99.0 FL    MCH 28.1 26.0 - 34.0 PG    MCHC 33.4 30.0 - 36.5 g/dL    RDW 13.9 11.5 - 14.5 %    PLATELET 956 202 - 720 K/uL   GLUCOSE, POC    Collection Time: 05/22/17  8:09 AM   Result Value Ref Range    Glucose (POC) 138 (H) 65 - 100 mg/dL    Performed by Cora Lee, POC    Collection Time: 05/22/17 11:03 AM   Result Value Ref Range    Glucose (POC) 197 (H) 65 - 100 mg/dL    Performed by Rosita Hayden        Telemetry: normal sinus rhythm      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 (6/2/2010)      Cardiac pacemaker in situ (7/5/2012)      Mixed hyperlipidemia (7/5/2012)      S/P coronary artery stent placement (7/4/2014)      Overview: 4/7/17 PCI/ROSALINO Diagonal      Type 2 diabetes mellitus with diabetic neuropathy, without long-term current use of insulin ([facility] (1/31/2017)      S/P ablation of atrial fibrillation (5/2/2017)      Overview: 5/1/17      Tachycardia (5/9/2017)      Chronic systolic HF (heart failure) ([facility] (5/10/2017)      Overview: 4/2017 EF 25-30%      History of Clostridium difficile infection (5/10/2017)      Overview: 4/2017 CDiff positive      Junctional tachycardia ([facility] (5/10/2017)      Hypotension (5/10/2017)        Plan:     CHF well compensated. F/u next week.     Krish Wiley MD

## 2017-05-22 NOTE — DISCHARGE INSTRUCTIONS
Patient Discharge Instructions    Minerva Cortez / [id number] : 1950    Admitted 2017 Discharged: 2017         DISCHARGE DIAGNOSIS:       COPD exacerbation   Follow with pulmonology in 2 months to repeat chest CT     Heart Failure   Atrial fibrillation      Recurrent Clostridium difficile  Complete vancomycin   Take probiotics  X 1 month ( over the counter)               Take Home Medications     General drug facts     If you have a very bad allergy, wear an allergy ID at all times. It is important that you take the medication exactly as they are prescribed. Keep your medication in the bottles provided by the pharmacist.  Keep a list of all your drugs (prescription, natural products, vitamins, OTC) with you. Give this list to your doctor. Do not take other medications without consulting your doctor. Do not share your drugs with others and do not take anyone else's drugs. Keep all drugs out of the reach of children and pets. Most drugs may be thrown away in household trash after mixing with coffee grounds or jewel litter and sealing in a plastic bag. Keep a list Call your doctor for help with any side effects. If in the [place] you may also call the FDA at 9-861-OBU-0303    Talk with the doctor before starting any new drug, including OTC, natural products, or vitamins. What to do at Home    1. Recommended diet:diabetic     2. Recommended activity: Activity as tolerated    3. If you experience any of the following symptoms then please call your primary care physician or return to the emergency room if you cannot get hold of your doctor: worsening dyspnea/ chest pain     4. You should weigh yourself daily. You should measure your weight first thing in the morning, after you use the restroom. If you gain more than 3 pounds in 1 day or 5 pounds in 1 week or you are feeling more short of breath then usual you should take an extra lasix in the afternoon.  If you require extra lasix more than 3 days in a row, call your cardiology. Record your daily weights in a notebook. Bring this with you to all your doctor's appointments. 5. Lab work: INR need to be checked 5/25 ( home health care nurse will be drawing blood )     6. Bring these papers with you to your follow up appointments. The papers will help your doctors be sure to continue the care plan from the hospital.      Follow-up with:   PCP: Citlalli Mitchell NP  Follow-up Information     Follow up With Details Comments 500 Saranya Bowling NP In 1 week  [address]      Betty Coles MD In 1 week  [address]  [address] [facility]      Gilberto Miller MD In 2 months  [address] [facility]  [address] [place] [address]  [phone]             Please call for your own appointment        Information obtained by :  I understand that if any problems occur once I am at home I am to contact my physician. I understand and acknowledge receipt of the instructions indicated above.                                                                                                                                            Physician's or R.N.'s Signature                                                                  Date/Time                                                                                                                                              Patient or Representative Signature                                                          Date/Time

## 2017-05-22 NOTE — PROGRESS NOTES
Home Health Care Discharge Planning: [facility]  Face to Face Encounter      NAME: Sharon Lindo   :  1950   MRN:  [id number]     Primary Diagnosis: COPD / HF     Date of Face to Face:  2017 3:27 PM                                  Face to Face Encounter findings are related to primary reason for home care:   YES    1. I certify that the patient needs intermittent skilled nursing care, physical therapy and/or speech therapy. I will not be following this patient in the Community and Dr. Sang Salazar, DEEPAK will be responsible for signing the [address] of Care. 2. Initial Orders for Care: Skilled Nursing    3. I certify that this patient is homebound because of illness or injury, need the aid of supportive devices such as crutches, canes, wheelchairs, and walkers; the use of special transportation; or the assistance of another person in order to leave their place of residence. There exists a normal inability to leave home and leaving home requires a considerable and taxing effort. 4. I certify that this patient is under my care and that I had a Face-to-Face Encounter that meets the physician Face-to-Face Encounter requirements.   Document the physical findings from the Face-to-Face Encounter that support the need for skilled services: Has new diagnosis that requires skilled nursing teaching and intervention , Has new medications that requires skilled nursing teaching and monitoring for understanding and compliance  and Needs skilled safety assessment and interventions     Malik Farias MD  Discharging Physician  Office: [phone]  Fax:   [phone]

## 2017-05-22 NOTE — PROGRESS NOTES
[address] [facility], [facility], [address] [facility]  [phone]      Cardiology Progress Note      5/22/2017 8:30AM    Admit Date: 5/9/2017    Admit Diagnosis:   Tachycardia    Subjective:     [place] feeling much better. Steroids helped significantly. Plan for discharge today.     Visit Vitals    /81 (BP 1 Location: Right arm, BP Patient Position: Sitting)    Pulse 73    Temp 97.4 °F (36.3 °C)    Resp 18    Ht 5' 6\" (1.676 m)    Wt 76.3 kg (168 lb 4.8 oz)    SpO2 98%    Breastfeeding No    BMI 27.16 kg/m2       Current Facility-Administered Medications   Medication Dose Route Frequency    warfarin (COUMADIN) tablet 7.5 mg  7.5 mg Oral ONCE    predniSONE (DELTASONE) tablet 30 mg  30 mg Oral DAILY WITH BREAKFAST    albuterol (PROVENTIL VENTOLIN) nebulizer solution 2.5 mg  2.5 mg Nebulization Q4H PRN    fluticasone (FLONASE) 50 mcg/actuation nasal spray 2 Spray  2 Spray Both Nostrils DAILY    pravastatin (PRAVACHOL) tablet 20 mg  20 mg Oral QHS    WARFARIN INFORMATION NOTE (COUMADIN)   Other QPM    furosemide (LASIX) tablet 40 mg  40 mg Oral DAILY    carvedilol (COREG) tablet 6.25 mg  6.25 mg Oral BID WITH MEALS    guaiFENesin-codeine (ROBITUSSIN AC) 100-10 mg/5 mL solution 5 mL  5 mL Oral Q4H PRN    SALINE PERIPHERAL FLUSH Q8H soln 10-30 mL  10-30 mL InterCATHeter Q8H    saline peripheral flush soln 10-30 mL  10-30 mL InterCATHeter PRN    benzonatate (TESSALON) capsule 100 mg  100 mg Oral TID PRN    gabapentin (NEURONTIN) capsule 400 mg  400 mg Oral BID    gabapentin (NEURONTIN) capsule 600 mg  600 mg Oral DAILY    dextrose 10% infusion 125-250 mL  125-250 mL IntraVENous PRN    lactobac ac& pc-s.therm-b.anim (MARYAN Q/RISAQUAD)  1 Cap Oral DAILY    albuterol-ipratropium (DUO-NEB) 2.5 MG-0.5 MG/3 ML  3 mL Nebulization Q4H PRN    insulin lispro (HUMALOG) injection   SubCUTAneous AC&HS    amiodarone (CORDARONE) tablet 200 mg  200 mg Oral BID    colchicine tablet 0.6 mg 0.6 mg Oral DAILY    acetaminophen (TYLENOL) tablet 650 mg  650 mg Oral Q4H PRN    glucose chewable tablet 16 g  4 Tab Oral PRN    glucagon (GLUCAGEN) injection 1 mg  1 mg IntraMUSCular PRN    vancomycin 50 mg/mL oral solution (compounded) 125 mg  125 mg Oral Q6H    fluticasone-vilanterol (BREO ELLIPTA) 100mcg-25mcg/puff  1 Puff Inhalation DAILY    clopidogrel (PLAVIX) tablet 75 mg  75 mg Oral DAILY    umeclidinium (INCRUSE ELLIPTA) 62.5 mcg/actuation  1 Puff Inhalation DAILY    albuterol (PROVENTIL HFA, VENTOLIN HFA, PROAIR HFA) inhaler 2 Puff  2 Puff Inhalation Q6H PRN       Objective:      Physical Exam:  General Appearance:    Chest:   Clear  Cardiovascular:  Regular rate and rhythm, no murmur.   Abdomen:   Soft, non-tender, bowel sounds are active.   Extremities:   Skin:  Warm and dry.     Data Review:   Recent Labs      05/22/17   0345  05/20/17   0245   WBC  13.0*  15.9*   HGB  10.8*  11.0*   HCT  32.3*  32.5*   PLT  271  274     Recent Labs      05/22/17   0345  05/21/17   0404  05/20/17   0245   NA  141  140  139   K  3.3*  3.3*  4.0   CL  104  105  104   CO2  28  27  23   GLU  160*  164*  272*   BUN  22*  21*  15   CREA  1.04*  1.05*  1.01   CA  8.2*  8.9  9.4   MG  2.0  2.2  2.4   INR  1.8*  2.2*  1.9*       No results for input(s): TROIQ, CPK, CKMB in the last 72 hours.       Intake/Output Summary (Last 24 hours) at 05/22/17 1335  Last data filed at 05/22/17 0800   Gross per 24 hour   Intake              240 ml   Output             2600 ml   Net            -2360 ml        Telemetry:   EKG:  Cxray:    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 (6/2/2010)      Cardiac pacemaker in situ (7/5/2012)      Mixed hyperlipidemia (7/5/2012)      S/P coronary artery stent placement (7/4/2014)      Overview: 4/7/17 PCI/ROSALINO Diagonal      Type 2 diabetes mellitus with diabetic neuropathy, without long-term current use of insulin ([facility] (1/31/2017)      S/P ablation of atrial fibrillation (5/2/2017)      Overview: 5/1/17      Tachycardia (5/9/2017)      Chronic systolic HF (heart failure) ([facility] (5/10/2017)      Overview: 4/2017 EF 25-30%      History of Clostridium difficile infection (5/10/2017)      Overview: 4/2017 CDiff positive      Junctional tachycardia ([facility] (5/10/2017)      Hypotension (5/10/2017)        Plan:      Junctional tachycardia/afib:  S/p Aifb ablation on 5/1/17  No further episodes, remains in SR  Continue on amiodarone 200mg daily and Coreg   Amiodarone not be the best long term medication for her due to COPD  Continue on coumadin, INR 2-3      Chronic systolic and diastolic HF:  Stable, continues to diurese, neg 13L since admission. Lasix 40mg daily  Consider starting Entresto once pulm problems resolve  Newly diagnosed with systolic HF 8/5/98, repeat echo EF <35%  S/p PCI 4/7/17 had hoped for improvement in EF  AICD candidate in July 2017 if EF remains <35%  Lifevest at discharge  Monitor I/Os, daily weights, labs      CAD:  S/p PCI 4/5/17, continue on Plavix, BB, statin  Discontinued on ASA as patient is 1 month post PCI and on \"triple therapy\" meds (Plavix, ASA, coumadin). D/Cing ASA decreases risk of bleeds, and has similar outcomes with Plavix and AC post PCI      COPD/Emphysema  Continues on nebs, steroids with taper  F/U with Pulmonary outpt.     OK for discharge, f/u with Dr. Carleen Patton next week.

## 2017-05-22 NOTE — DIABETES MGMT
DTC Progress Note    Recommendations/ Comments: If appropriate, please consider adding NPH 15 units timed with morning steroid dose, the NPH will peak around the same time as steroid to provide additional glucose control. Please link the two orders in [facility] and taper NPH dose as steroid is tapered. Chart reviewed on [facility] for hyperglycemia. Patient is a 73 y.o. female with known history of Type 2 Diabetes on Metformin 1,000mg bid at home. A1c:   Lab Results   Component Value Date/Time    Hemoglobin A1c 6.9 10/31/2016 12:00 AM    Hemoglobin A1c 8.0 07/01/2014 03:10 AM       Recent Glucose Results: Lab Results   Component Value Date/Time     (H) 05/22/2017 03:45 AM    GLUCPOC 138 (H) 05/22/2017 08:09 AM    GLUCPOC 366 (H) 05/21/2017 08:56 PM    GLUCPOC 241 (H) 05/21/2017 04:57 PM        Lab Results   Component Value Date/Time    Creatinine 1.04 05/22/2017 03:45 AM     Estimated Creatinine Clearance: 56.3 mL/min (based on Cr of 1.04). Active Orders   Diet    DIET CARDIAC Regular; Consistent Carb 1800kcal        PO intake: Patient Vitals for the past 72 hrs:   % Diet Eaten   05/21/17 0819 100 %   05/19/17 1818 100 %   05/19/17 1438 100 %       Current hospital DM medication:   -Humalog normal sensitivity correction  -Prednisone 30mg qam    Will continue to follow as needed.     Thank you    Minnie Mcgregor, [address] [place] [address]  Office: [phone]

## 2017-05-22 NOTE — PROGRESS NOTES
Pt is being discharged home today with orders for [facility]. Pt stated she has been open to [facility] and would like referral sent to them. Referral sent and accepted by [facility]. Pt being d/c on oral compounded vancomycin. Pt stated that she gets her medications filled at [facility] on [address]. I explained this was not a compounding pharmacy, she was very hesitant to go to another pharmacy and initially stated she would have to wait to get that medication filled because she does not drive at night and the MD will not let her drive at this time. I explained that she should not miss a dose of this medication. She then agreed to allow me to contact a compounding pharmacy for insurance coverage. I called [facility]. [facility] does not have the medication in stock. The pharmacist, Nai Isaac at [facility] does. Rx faxed with a copy of pt's insurance card to Nai Isaac at [facility]. Follow up with Nai Isaac turned out that after she spent 1 hour on phone with [employer] they will not cover the Vancomycin unless it is an injectable given in an MD's office. The cost of the medication without insurance is $32.38. Pt stated she could not afford another medication. Discussed covering this with Dominga Borrero CM and approved. Medication assistance forms faxed to Nai Isaac at [facility] who will have the medication ready for patient. Pt informed that [address] will cover the cost of the vancomycin only and she has to  this pm from [facility] prior to 9 pm today. Pt stated she has spoken with her niece who will be able to transport her to [facility] this pm t  medication. Pt asked if her other medications can be called in to [address]. I explained that Case Management could not call in her other medications. Nursing informed of above. Care Management Interventions  PCP Verified by CM:  Yes  Transition of Care Consult (MAGUI Consult): [address]: Yes  Discharge Durable Medical Equipment: No  Physical Therapy Consult: No  Occupational Therapy Consult: No  Speech Therapy Consult: No  Current Support Network: Own Home (her brother and 12 yr old grandson live with her)  Confirm Follow Up Transport: Family  Plan discussed with Pt/Family/Caregiver: Yes  Freedom of Choice Offered: Yes  Discharge Location  Discharge Placement: Home with home health      Anthony Driscoll, [address] [place] [address]

## 2017-05-22 NOTE — PROGRESS NOTES
Pharmacist Discharge Medication Reconciliation    Significant PMH:   Past Medical History:   Diagnosis Date    Atrial fibrillation ([facility] 6/2/2010    CAD (coronary artery disease)     stent    Chronic diastolic heart failure ([facility] 9/22/2014    Chronic systolic HF (heart failure) ([facility] 5/10/2017    4/2017 EF 25-30%    COPD     COPD (chronic obstructive pulmonary disease) ([facility] 6/2/2010    Diabetes ([facility]     Fibroid     Heart failure ([facility]     History of Clostridium difficile infection 5/10/2017    4/2017 CDiff positive    Hypertension 6/2/2010    Hypotension 5/10/2017    Junctional tachycardia ([facility] 5/10/2017    NIDDM (non-insulin dependent diabetes mellitus) 6/2/2010    Screening mammogram 5/4/10    SOB (shortness of breath) 9/22/2014     Chief Complaint for this Admission:   Chief Complaint   Patient presents with    Shortness of Breath     intermittent x last Monday when she had an ablation performed; reports that she was seen at her cardiologists office PTA and was referred over here for tachycardia and hypotension     Allergies: Crestor [rosuvastatin]; Levaquin [levofloxacin]; Lipitor [atorvastatin]; and Lyrica [pregabalin]    Discharge Medications:   Current Discharge Medication List        START taking these medications    Details   amiodarone (CORDARONE) 200 mg tablet Take 1 Tab by mouth two (2) times a day. Qty: 60 Tab, Refills: 0      benzonatate (TESSALON) 100 mg capsule Take 1 Cap by mouth three (3) times daily as needed for Cough for up to 7 days. Qty: 20 Cap, Refills: 0      carvedilol (COREG) 6.25 mg tablet Take 1 Tab by mouth two (2) times daily (with meals). Qty: 60 Tab, Refills: 0      vancomycin 50 mg/mL oral solution (compounded) Take 2.5 mL by mouth every six (6) hours for 3 days.   Qty: 30 mL, Refills: 0      predniSONE (DELTASONE) 10 mg tablet Take  2 Tab daily x 2 days then 1 Tab daily x 2 days  Qty: 6 Tab, Refills: 0           CONTINUE these medications which have CHANGED Details   furosemide (LASIX) 40 mg tablet Take 40 mg daily  Qty: 30 Tab, Refills: 0           CONTINUE these medications which have NOT CHANGED    Details   pravastatin (PRAVACHOL) 20 mg tablet Take 1 Tab by mouth nightly. Qty: 90 Tab, Refills: 3    Associated Diagnoses: Hypercholesteremia      clopidogrel (PLAVIX) 75 mg tab Take 1 Tab by mouth daily. Qty: 30 Tab, Refills: 0      warfarin (COUMADIN) 5 mg tablet Take 7.5 mg by mouth daily. gabapentin (NEURONTIN) 800 mg tablet TAKE ONE TABLET BY MOUTH THREE TIMES DAILY  Qty: 90 Tab, Refills: 11    Associated Diagnoses: Polyneuropathy associated with underlying disease (HCC)      tiotropium (SPIRIVA WITH HANDIHALER) 18 mcg inhalation capsule INHALE THE CONTENTS OF 1 CAPSULE THROUGH HANDIHALER DEVICE DAILY  Qty: 90 Cap, Refills: 3    Associated Diagnoses: Chronic obstructive pulmonary disease, unspecified COPD type (HCC)      metFORMIN (GLUCOPHAGE) 1,000 mg tablet TAKE 1 TABLET BY MOUTH TWICE DAILY WITH MEALS  Indications: PREVENTION OF TYPE 2 DIABETES MELLITUS  Qty: 180 Tab, Refills: 3    Associated Diagnoses: Type 2 diabetes mellitus without complication, unspecified long term insulin use status      budesonide-formoterol (SYMBICORT) 160-4.5 mcg/actuation HFA inhaler Take 1 Puff by inhalation two (2) times a day. Qty: 3 Inhaler, Refills: 3    Associated Diagnoses: Chronic obstructive pulmonary disease with acute exacerbation (HCC)      albuterol (PROVENTIL VENTOLIN) 2.5 mg /3 mL (0.083 %) nebulizer solution 3 mL by Nebulization route every four (4) hours as needed for Wheezing. Qty: 1 Package, Refills: 5    Associated Diagnoses: SOB (shortness of breath)      FOLIC ACID/MULTIVIT-MIN/LUTEIN (CENTRUM SILVER PO) Take 1 Tab by mouth daily. Takes one po daily. albuterol (VENTOLIN HFA) 90 mcg/actuation inhaler Take 2 Puffs by inhalation every six (6) hours as needed for Wheezing.   Qty: 1 Inhaler, Refills: 11      Azelastine (ASTEPRO) 0.15 % (205.5 mcg) nasal spray 1 [place] by Both Nostrils route daily. cod liver oil cap Take 1 Cap by mouth daily. HYDROcodone-acetaminophen (NORCO) 5-325 mg per tablet       fluticasone (FLONASE) 50 mcg/actuation nasal spray 2 Sprays by Both Nostrils route every evening. Blood-Glucose Meter monitoring kit Check blood sugar daily. May substitute for insurance preferred meter  Qty: 1 Kit, Refills: 0    Associated Diagnoses: Type 2 diabetes mellitus with diabetic neuropathy, without long-term current use of insulin (HCC)      colchicine 0.6 mg tablet Take 1 Tab by mouth two (2) times a day. Qty: 60 Tab, Refills: 5    Associated Diagnoses: Chronic gout involving toe of left foot without tophus, unspecified cause           STOP taking these medications       valsartan (DIOVAN) 40 mg tablet Comments:   Reason for Stopping:         sotalol (BETAPACE) 120 mg tablet Comments:   Reason for Stopping:         aspirin 81 mg chewable tablet Comments:   Reason for Stopping:         atorvastatin (LIPITOR) 20 mg tablet Comments:   Reason for Stopping:         evolocumab (REPATHA PUSHTRONEX) 420 mg/3.5 mL Injt Comments:   Reason for Stopping:               The patient's chart, MAR and AVS were reviewed by Robert Tamez PHARMD.  Admission medication reconciliation was completed by pharmacy.     Discharging Provider: Dr. Geraldine Feliciano    Thank You,     SAMSON Mujica

## 2017-05-23 ENCOUNTER — HOME CARE VISIT (OUTPATIENT)
Dept: SCHEDULING | Facility: HOME HEALTH | Age: 67
End: 2017-05-23
Payer: COMMERCIAL

## 2017-05-23 VITALS
WEIGHT: 165 LBS | SYSTOLIC BLOOD PRESSURE: 124 MMHG | HEIGHT: 67 IN | TEMPERATURE: 97.3 F | RESPIRATION RATE: 22 BRPM | OXYGEN SATURATION: 98 % | DIASTOLIC BLOOD PRESSURE: 72 MMHG | BODY MASS INDEX: 25.9 KG/M2 | HEART RATE: 72 BPM

## 2017-05-23 PROCEDURE — 3331090002 HH PPS REVENUE DEBIT

## 2017-05-23 PROCEDURE — 3331090001 HH PPS REVENUE CREDIT

## 2017-05-23 PROCEDURE — 400013 HH SOC

## 2017-05-23 PROCEDURE — G0299 HHS/HOSPICE OF RN EA 15 MIN: HCPCS

## 2017-05-23 NOTE — Clinical Note
I have admitted Ms Emily Greco for home health. .. She has the life vest, will have INR tomorrow, we plan on a Saturday visit this week also because of the many things she is trying to keep up with, including going back and forth from her home to her daughters. . When I saw her yesterday she was taking care of her brother, and babysitting a small child, 18 months or so. . She has all medicines, and knows how to look out for herelf, i just feel she is rushing to get back to work, she said the MD told her she could return to work next week. .. We cannot see her if she is working nor can we see her if she is driving. .. I wanted to pass this on to you, she did the same last month when we had her open for homehealth. . my number is . . Thank you

## 2017-05-24 ENCOUNTER — PATIENT OUTREACH (OUTPATIENT)
Dept: CARDIOLOGY CLINIC | Age: 67
End: 2017-05-24

## 2017-05-24 PROCEDURE — 3331090002 HH PPS REVENUE DEBIT

## 2017-05-24 PROCEDURE — 3331090001 HH PPS REVENUE CREDIT

## 2017-05-25 ENCOUNTER — HOME CARE VISIT (OUTPATIENT)
Dept: HOME HEALTH SERVICES | Facility: HOME HEALTH | Age: 67
End: 2017-05-25
Payer: COMMERCIAL

## 2017-05-25 ENCOUNTER — HOME CARE VISIT (OUTPATIENT)
Dept: SCHEDULING | Facility: HOME HEALTH | Age: 67
End: 2017-05-25
Payer: COMMERCIAL

## 2017-05-25 ENCOUNTER — TELEPHONE (OUTPATIENT)
Dept: INTERNAL MEDICINE CLINIC | Age: 67
End: 2017-05-25

## 2017-05-25 VITALS
TEMPERATURE: 97.8 F | RESPIRATION RATE: 18 BRPM | HEART RATE: 76 BPM | OXYGEN SATURATION: 96 % | BODY MASS INDEX: 26.17 KG/M2 | WEIGHT: 164.6 LBS | SYSTOLIC BLOOD PRESSURE: 128 MMHG | DIASTOLIC BLOOD PRESSURE: 60 MMHG

## 2017-05-25 LAB
INR BLD: 4.4 (ref 0.9–1.1)
PT POC: 52.4 SECONDS (ref 11.8–14.9)

## 2017-05-25 PROCEDURE — 3331090001 HH PPS REVENUE CREDIT

## 2017-05-25 PROCEDURE — G0299 HHS/HOSPICE OF RN EA 15 MIN: HCPCS

## 2017-05-25 PROCEDURE — 3331090002 HH PPS REVENUE DEBIT

## 2017-05-25 RX ORDER — CLOPIDOGREL BISULFATE 75 MG/1
75 TABLET ORAL DAILY
Qty: 30 TAB | Refills: 11 | Status: SHIPPED | OUTPATIENT
Start: 2017-05-25 | End: 2017-06-23 | Stop reason: SDUPTHER

## 2017-05-25 NOTE — TELEPHONE ENCOUNTER
Called home health back  They called about her INR which is 4.4  She has been taking coumadin 7.5 M-F and 10mg on Sat/Sun  Last checked on 5/10 and it was 1.8  She has not taken dose today  recc skip today and tomorrow  Resume sat and sun but at 7.5mg dose and recheck on Monday  ED if bleeding s/so  [facility]  Called:  [phone]

## 2017-05-25 NOTE — PROGRESS NOTES
This note will not be viewable in [address] [facility] Post Hospitalization     Called pt to follow up on hospital visit to [facility] from 17 to 17 with a diagnosis of acute resp distress. Pt verified  and address. NN role explained to pt and pt states understanding. Reviewed medications with pt and pt states that she is picking up plavix today as she just took last plavix last night. Pt reminded that she should take a probiotic due to taking antibiotic. Pt states understanding and states that she will discuss this with the pharmacist.  Pt reports that SOB is much improved and pt reports no edema. Pt reports that she is weighing every day and wt is unchanged. Patient reminded that there is a cardiologist on call 24 hours a day / 7 days a week (M-F 5pm to 8am and from Friday 5pm until Monday 8a for the weekend) should the patient have questions or concerns. Patient reminded to call 911 if situation is emergent or patient feels the situation is emergent. Pt states appreciation for call and reports that she has a follow up appt with cardiology.     Physician discharge summary states:  Discharge Summaries  Date of Service: 17 1157  Suzy Yeh MD   Internal Medicine      []Hide copied text    Hospitalist Discharge Summary      Patient ID:  Pallavia Deborah  [id number]  73 y.o.  1950     PCP on record: Anabel Alberts NP     Admit date: 2017  Discharge date and time: 2017        DISCHARGE DIAGNOSIS:        Respiratory distress POA resolved due to Acute on chronic systolic heart failure with EF 25%  Likely COPD exacerbation   Hemoptysis Not POA resolved   Junctional Tachycardia/A.fib with RVR with associated hypotension  Elevated troponin  CAD s/p PCI 17  Sepsis POA resolved   Due to Recurrent vs persistent C.diff  DM type 2  Code Status: Full        CONSULTATIONS:  IP CONSULT TO CARDIOLOGY  IP CONSULT TO PULMONOLOGY     Excerpted HPI from H&P of Zayda Obando MD:     HISTORY OF PRESENT ILLNESS:   Johnathan Redmond is a 73 y.o.  female who presents with Worsening SOB. Pt with h/o diastolic HF, DM, SSS s/p PPM, recent ablation 5/1 comes with above. Pt had ablation 5/1 and had f/u with Dr Chinmay John at his office with worsening SOB. Denies fever/cough/CP. Had some diarrhea today. She was sent here to Ed for further eval.  In ED, HR in the 150's sinus tach, cr 1.04, lactate 1.5, trop 0.13, INR 2.7. ECHO with EF 25-30%. CTA chest negative for PE, shows Extensive interstitial lung disease bilaterally, advanced since 2015 with nodules as described above and borderline enlarged hilar and mediastinal lymph nodes, many of which were present in 2015. Correlate with known clinical  pulmonary history for sarcoidosis or other explanations for these findings. Compared to prior chest imaging. If none is available, follow-up chest CT would  be recommended in 3-6 months.      Had ECHO in ED that was reviewed by Dr Bina Rojas  We were asked to admit for work up and evaluation of the above problems.      ______________________________________________________________________  DISCHARGE SUMMARY/HOSPITAL COURSE: for full details see H&P, daily progress notes, labs, consult notes.      Likely COPD exacerbation not POA   Clinically:doing well   Steroids: tapering down quickly   Cont jet nebs at home       Hemoptysis Not POA resolved   Likely was related to acute HF. No recurrent event on restarted coumadin   Restarted on Coumadin 5/17 ( OK with pulmonary)   Pulmonary help appreciated:no role for bronchoscopy now; repeat CT in 8-12 weeks       Respiratory distress due to Acute on chronic systolic heart failure with EF 25%  Diuresed well, Wt 176--> 165, 169 today.  On RA   CTA chest with ILD but per pulmonary no ILD, findings could be related to CHF  Diuretic: IV --> PO ( cr, electrolytes are stable)   Cardiology help appreciated: lifevest at discharge       Junctional Tachycardia/A.fib with RVR with associated hypotension  Elevated troponin/ CAD s/p PCI 4/5/17  Continue amiodarone 200 mg daily, coreg   Cardiology help appreciated: AICD planned July 2017 if EF remains < 35 %   S/p cardioversion in sinus rhythm  S/p ablation 5/1/17  Continue coumadin       Sepsis POA resolved   Due to Recurrent vs persistent C.diff  Recently completed 10 days coarse of PO flagyl  Diarrhea resolved, will complete 14 days coarse of PO vanco ( last dose 5/24)   regulo Q      DM type 2  BS improved on tapering steroids   Will give NPH dose prior to DC to cover for dose of prednisone this am   Restart home metformin  Continue SSI         Code Status: Full  Surrogate Decision Maker: Daughter  DVT Prophylaxis: on coumadin          Baseline:lives with her brother and grandson; independent   Disposition: DC today; will order Anna Tenorio RN ; Jessica Rowe was delivered in the room       L IJ , will dc today            _______________________________________________________________________  Patient seen and examined by me on discharge day. PHYSICAL EXAM:  General: WD, WN. Alert, cooperative, no acute distress    EENT: EOMI. Anicteric sclerae. MMM  Resp: CTA. No accessory muscle use  CV: Irregularly irregular rhythm,  No edema  GI: Soft, Non distended, Non tender.  +Bowel sounds  Neurologic:  Alert and oriented X 3, normal speech,   Psych:   Good insight. Not anxious nor agitated  Skin: No rashes. No jaundice  _______________________________________________________________________  DISCHARGE MEDICATIONS:         Current Discharge Medication List             START taking these medications     Details   amiodarone (CORDARONE) 200 mg tablet Take 1 Tab by mouth two (2) times a day. Qty: 60 Tab, Refills: 0       benzonatate (TESSALON) 100 mg capsule Take 1 Cap by mouth three (3) times daily as needed for Cough for up to 7 days.   Qty: 20 Cap, Refills: 0       carvedilol (COREG) 6.25 mg tablet Take 1 Tab by mouth two (2) times daily (with meals). Qty: 60 Tab, Refills: 0       vancomycin 50 mg/mL oral solution (compounded) Take 2.5 mL by mouth every six (6) hours for 3 days. Qty: 30 mL, Refills: 0       predniSONE (DELTASONE) 10 mg tablet Take 2 Tab daily x 2 days then 1 Tab daily x 2 days  Qty: 6 Tab, Refills: 0           CONTINUE these medications which have CHANGED     Details   furosemide (LASIX) 40 mg tablet Take 40 mg daily  Qty: 30 Tab, Refills: 0                 CONTINUE these medications which have NOT CHANGED     Details   pravastatin (PRAVACHOL) 20 mg tablet Take 1 Tab by mouth nightly. Qty: 90 Tab, Refills: 3     Associated Diagnoses: Hypercholesteremia       clopidogrel (PLAVIX) 75 mg tab Take 1 Tab by mouth daily. Qty: 30 Tab, Refills: 0       warfarin (COUMADIN) 5 mg tablet Take 7.5 mg by mouth daily.       gabapentin (NEURONTIN) 800 mg tablet TAKE ONE TABLET BY MOUTH THREE TIMES DAILY  Qty: 90 Tab, Refills: 11     Associated Diagnoses: Polyneuropathy associated with underlying disease (HCC)       tiotropium (SPIRIVA WITH HANDIHALER) 18 mcg inhalation capsule INHALE THE CONTENTS OF 1 CAPSULE THROUGH HANDIHALER DEVICE DAILY  Qty: 90 Cap, Refills: 3     Associated Diagnoses: Chronic obstructive pulmonary disease, unspecified COPD type (HCC)       metFORMIN (GLUCOPHAGE) 1,000 mg tablet TAKE 1 TABLET BY MOUTH TWICE DAILY WITH MEALS Indications: PREVENTION OF TYPE 2 DIABETES MELLITUS  Qty: 180 Tab, Refills: 3     Associated Diagnoses: Type 2 diabetes mellitus without complication, unspecified long term insulin use status       budesonide-formoterol (SYMBICORT) 160-4.5 mcg/actuation HFA inhaler Take 1 Puff by inhalation two (2) times a day. Qty: 3 Inhaler, Refills: 3     Associated Diagnoses: Chronic obstructive pulmonary disease with acute exacerbation (HCC)       albuterol (PROVENTIL VENTOLIN) 2.5 mg /3 mL (0.083 %) nebulizer solution 3 mL by Nebulization route every four (4) hours as needed for Wheezing.   Qty: 1 Package, Refills: 5   Associated Diagnoses: SOB (shortness of breath)       FOLIC ACID/MULTIVIT-MIN/LUTEIN (CENTRUM SILVER PO) Take 1 Tab by mouth daily. Takes one po daily.        albuterol (VENTOLIN HFA) 90 mcg/actuation inhaler Take 2 Puffs by inhalation every six (6) hours as needed for Wheezing. Qty: 1 Inhaler, Refills: 11       Azelastine (ASTEPRO) 0.15 % (205.5 mcg) nasal spray 1 [place] by Both Nostrils route daily.       cod liver oil cap Take 1 Cap by mouth daily.       HYDROcodone-acetaminophen (NORCO) 5-325 mg per tablet         fluticasone (FLONASE) 50 mcg/actuation nasal spray 2 Sprays by Both Nostrils route every evening.       Blood-Glucose Meter monitoring kit Check blood sugar daily. May substitute for insurance preferred meter  Qty: 1 Kit, Refills: 0     Associated Diagnoses: Type 2 diabetes mellitus with diabetic neuropathy, without long-term current use of insulin (HCC)       colchicine 0.6 mg tablet Take 1 Tab by mouth two (2) times a day.   Qty: 60 Tab, Refills: 5     Associated Diagnoses: Chronic gout involving toe of left foot without tophus, unspecified cause                STOP taking these medications         valsartan (DIOVAN) 40 mg tablet Comments:   Reason for Stopping:            sotalol (BETAPACE) 120 mg tablet Comments:   Reason for Stopping:            aspirin 81 mg chewable tablet Comments:   Reason for Stopping:            atorvastatin (LIPITOR) 20 mg tablet Comments:   Reason for Stopping:            evolocumab (REPATHA PUSHTRONEX) 420 mg/3.5 mL Injt Comments:   Reason for Stopping:                    My Recommended Diet, Activity, Wound Care, and follow-up labs are listed in the patient's Discharge Insturctions which I have personally completed and reviewed.     _______________________________________________________________________  DISPOSITION:     Home with Family:     Home with HH/PT/OT/RN: y   SNF/LTC:     CLARENCE:     OTHER:           Condition at Discharge: Stable  _______________________________________________________________________  Follow up with:   PCP : Prateek Camacho NP  Follow-up Information     Follow up With Details Comments [id number] [address] Carolina Torres NP In 1 week   [address]     Cristopher Stevens MD In 1 week   [address]  [address] [address] [phone]     Edmar Khalil MD In 2 months   [address] [facility] [address]  [facility]  Sly Moise [address] [place] [address]  [phone]                  Total time in minutes spent coordinating this discharge (includes going over instructions, follow-up, prescriptions, and preparing report for sign off to her PCP) : > 30  minutes     Signed:  Lucrecia Gastelum MD              AVS includes: You are allergic to the following     Allergen Reactions     Crestor (Rosuvastatin) Myalgia             Levaquin (Levofloxacin) Nausea Only     GI Upset             Lipitor (Atorvastatin) Diarrhea             Lyrica (Pregabalin) Myalgia      Recent Documentation     Height Weight Breastfeeding? BMI OB Status Smoking Status     1.676 m 76.3 kg No 27.16 kg/m2 Postmenopausal Former Smoker                          Emergency Contacts        Name Discharge Info Relation Home Work Mobile     Huong Glass   Child [2] [phone]       Tonja Bartholomew DISCHARGE CAREGIVER [3] Other Relative [6] [phone]         Huong Knox   Child [2] [phone]          About your hospitalization            You were admitted on: May 9, 2017 You last received care in the: [facility] [place] [facility]      You were discharged on: May 22, 2017 Unit phone number: [phone]        Why you were hospitalized           Your primary diagnosis was: Not on File      Your diagnoses also included:  Tachycardia, Chronic Systolic Hf (Heart Failure) (Hcc), Atrial Fibrillation (Hcc), Cardiac Pacemaker In Situ, Copd (Chronic Obstructive Pulmonary Disease) (Hcc), Hypertension, Mixed Hyperlipidemia, S/P Ablation Of Atrial Fibrillation, S/P Coronary Artery Stent Placement, Type 2 Diabetes Mellitus With Diabetic Neuropathy, Without Long-Term Current Use Of Insulin (Hcc), History Of Clostridium Difficile Infection, Junctional Tachycardia (Hcc), Hypotension                         Providers Seen During Your Hospitalizations     Provider Role Specialty Primary office phone     Remediso Marcum.  Yunier Albrecht MD Attending Provider Emergency Medicine [phone]     Nancy Dang MD Attending Provider Internal Medicine [phone]     Alvaro Gómez MD Attending Provider Internal Medicine [phone]      Your Primary Care Physician (PCP)     Primary Care Physician Office Phone Office Fax   [facility] [phone]      Follow-up Information     Follow up With Details Comments Contact Info     Claudia Zarco NP In 1 week   [place]  [address] [facility]  [address] [facility]  [phone]     Royce Jimenez MD In 1 week   [address] [facility]  [phone]     Colt Ureña MD In 2 months   [address] [facility]  [facility]  [address]  [facility]  [phone]                          Current Discharge Medication List       START taking these medications       Dose & Instructions Dispensing Information Comments   Morning Noon Evening Bedtime     amiodarone 200 mg tablet   Commonly known as: CORDARONE        Your last dose was:         Your next dose is:                Dose: 200 mg   Take 1 Tab by mouth two (2) times a day.     Quantity: 60 Tab   Refills: 0                                     benzonatate 100 mg capsule   Commonly known as: TESSALON        Your last dose was:         Your next dose is:                Dose: 100 mg   Take 1 Cap by mouth three (3) times daily as needed for Cough for up to 7 days.     Quantity: 20 Cap Refills: 0                                     carvedilol 6.25 mg tablet   Commonly known as: COREG        Your last dose was:         Your next dose is:                Dose: 6.25 mg   Take 1 Tab by mouth two (2) times daily (with meals).     Quantity: 60 Tab   Refills: 0                                     predniSONE 10 mg tablet   Commonly known as: DELTASONE        Your last dose was:         Your next dose is:                Take 2 Tab daily x 2 days then 1 Tab daily x 2 days     Quantity: 6 Tab   Refills: 0                                     vancomycin 50 mg/mL oral solution (compounded)        Your last dose was:         Your next dose is:                Dose: 125 mg   Take 2.5 mL by mouth every six (6) hours for 3 days.     Quantity: 30 mL   Refills: 0                                      CONTINUE these medications which have CHANGED       Dose & Instructions Dispensing Information Comments   Morning Noon Evening Bedtime     furosemide 40 mg tablet   Commonly known as: LASIX   What changed:   - medication strength  - how much to take  - how to take this  - when to take this  - additional instructions        Your last dose was:         Your next dose is:                Take 40 mg daily     Quantity: 30 Tab   Refills: 0                                      CONTINUE these medications which have NOT CHANGED       Dose & Instructions Dispensing Information Comments   Morning Noon Evening Bedtime     * albuterol 90 mcg/actuation inhaler   Commonly known as: VENTOLIN HFA        Your last dose was:         Your next dose is:                Dose: 2 Puff   Take 2 Puffs by inhalation every six (6) hours as needed for Wheezing.     Quantity: 1 Inhaler   Refills: 11                                     * albuterol 2.5 mg /3 mL (0.083 %) nebulizer solution   Commonly known as: PROVENTIL VENTOLIN        Your last dose was:         Your next dose is:                Dose: 2.5 mg   3 mL by Nebulization route every four (4) hours as needed for Wheezing.     Quantity: 1 Package   Refills: 5                                     Azelastine 0.15 % (205.5 mcg) nasal spray   Commonly known as: ASTEPRO        Your last dose was:         Your next dose is:                Dose: 1 Spray   1 [place] by Both Nostrils route daily.     Refills: 0                                     Blood-Glucose Meter monitoring kit        Your last dose was:         Your next dose is:                Check blood sugar daily. May substitute for insurance preferred meter     Quantity: 1 Kit   Refills: 0                                     budesonide-formoterol 160-4.5 mcg/actuation HFA inhaler   Commonly known as: SYMBICORT        Your last dose was:         Your next dose is:                Dose: 1 Puff   Take 1 Puff by inhalation two (2) times a day.     Quantity: 3 Inhaler   Refills: 3                                     CENTRUM SILVER PO        Your last dose was:         Your next dose is:                Dose: 1 Tab   Take 1 Tab by mouth daily.  Takes one po daily.     Refills: 0                                     clopidogrel 75 mg Tab   Commonly known as: PLAVIX        Your last dose was:         Your next dose is:                Dose: 75 mg   Take 1 Tab by mouth daily.     Quantity: 30 Tab   Refills: 0                                     cod liver oil Cap        Your last dose was:         Your next dose is:                Dose: 1 Cap   Take 1 Cap by mouth daily.     Refills: 0                                     colchicine 0.6 mg tablet        Your last dose was:         Your next dose is:                Dose: 0.6 mg   Take 1 Tab by mouth two (2) times a day.     Quantity: 60 Tab   Refills: 5                                     FLONASE 50 mcg/actuation nasal spray   Generic drug: fluticasone        Your last dose was:         Your next dose is:                Dose: 2 Spray   2 Sprays by Both Nostrils route every evening.     Refills: 0                                   gabapentin 800 mg tablet   Commonly known as: NEURONTIN        Your last dose was:         Your next dose is:                TAKE ONE TABLET BY MOUTH THREE TIMES DAILY     Quantity: 90 Tab   Refills: 11                                     HYDROcodone-acetaminophen 5-325 mg per tablet   Commonly known as: Rickey Gallon        Your last dose was:         Your next dose is:                  Refills: 0                                     metFORMIN 1,000 mg tablet   Commonly known as: GLUCOPHAGE        Your last dose was:         Your next dose is:                TAKE 1 TABLET BY MOUTH TWICE DAILY WITH MEALS Indications: PREVENTION OF TYPE 2 DIABETES MELLITUS     Quantity: 180 Tab   Refills: 3                                     pravastatin 20 mg tablet   Commonly known as: PRAVACHOL        Your last dose was:         Your next dose is:                Dose: 20 mg   Take 1 Tab by mouth nightly.     Quantity: 90 Tab   Refills: 3                                     tiotropium 18 mcg inhalation capsule   Commonly known as: [employer] [address]        Your last dose was:         Your next dose is:                INHALE THE CONTENTS OF 1 CAPSULE THROUGH HANDIHALER DEVICE DAILY     Quantity: 90 Cap   Refills: 3                                     warfarin 5 mg tablet   Commonly known as: COUMADIN        Your last dose was:         Your next dose is:                Dose: 7.5 mg   Take 7.5 mg by mouth daily.     Refills: 0                                     * Notice: This list has 2 medication(s) that are the same as other medications prescribed for you. Read the directions carefully, and ask your doctor or other care provider to review them with you.       STOP taking these medications           aspirin 81 mg chewable tablet               atorvastatin 20 mg tablet   Commonly known as: LIPITOR               evolocumab 420 mg/3.5 mL Injt   Commonly known as: REPATHA PUSHTRONEX               sotalol 120 mg tablet   Commonly known as: BETAPACE               valsartan 40 mg tablet   Commonly known as: DIOVAN                     Where to Get Your Medications       Information on where to get these meds will be given to you by the nurse or doctor.           ! Ask your nurse or doctor about these medications       amiodarone 200 mg tablet    benzonatate 100 mg capsule    carvedilol 6.25 mg tablet    furosemide 40 mg tablet    predniSONE 10 mg tablet    vancomycin 50 mg/mL oral solution (compounded)                        Discharge Instructions                  Patient Discharge Instructions     Davis Fleming / [id number] : 1950     Admitted 2017 Discharged: 2017            DISCHARGE DIAGNOSIS:         COPD exacerbation   Follow with pulmonology in 2 months to repeat chest CT      Heart Failure   Atrial fibrillation       Recurrent Clostridium difficile  Complete vancomycin   Take probiotics X 1 month ( over the counter)                   Take Home Medications      General drug facts      · If you have a very bad allergy, wear an allergy ID at all times. · It is important that you take the medication exactly as they are prescribed. · Keep your medication in the bottles provided by the pharmacist.  · Keep a list of all your drugs (prescription, natural products, vitamins, OTC) with you. Give this list to your doctor. · Do not take other medications without consulting your doctor. · Do not share your drugs with others and do not take anyone else's drugs. · Keep all drugs out of the reach of children and pets. · Most drugs may be thrown away in household trash after mixing with coffee grounds or jewel litter and sealing in a plastic bag. · Keep a list Call your doctor for help with any side effects. If in the [place] you may also call the FDA at 6-462-TVW-2689   · Talk with the doctor before starting any new drug, including OTC, natural products, or vitamins.           What to do at Home     1.  Recommended diet:diabetic      2. Recommended activity: Activity as tolerated     3. If you experience any of the following symptoms then please call your primary care physician or return to the emergency room if you cannot get hold of your doctor: worsening dyspnea/ chest pain      4. You should weigh yourself daily. You should measure your weight first thing in the morning, after you use the restroom. If you gain more than 3 pounds in 1 day or 5 pounds in 1 week or you are feeling more short of breath then usual you should take an extra lasix in the afternoon. If you require extra lasix more than 3 days in a row, call your cardiology. Record your daily weights in a notebook. Bring this with you to all your doctor's appointments.     5. Lab work: INR need to be checked 5/25 ( home health care nurse will be drawing blood )      6. Bring these papers with you to your follow up appointments. The papers will help your doctors be sure to continue the care plan from the hospital.        Follow-up with:   PCP: Malissa Mcfadden NP  Follow-up Information     Follow up With Details Comments [address] Saranya Bowling NP In 1 week   [address]  [phone]     Brandi Rodriguez MD In 1 week   [address]  [address]      Eugene Mayers MD In 2 months   [address] [facility]  [facility]  Sly Moise [address] [place] [address]  [phone]             Please call for your own appointment           Information obtained by :  I understand that if any problems occur once I am at home I am to contact my physician.     I understand and acknowledge receipt of the instructions indicated above.          Physician's or R.N.'s Signature Date/Time                Patient or Representative Signature Date/Time               Discharge Instructions Attachments/References           WARFARIN: LONG-TERM USE (ENGLISH)      Discharge Orders      None                  Risk Assessment score: High Risk            27       Total Score        3 Relationship with PCP    4 More than 1 Admission in calendar year    20 Charlson Comorbidity Score        Criteria that do not apply:    Patient Living Status    Patient Length of Stay > 5    Patient Insurance is Medicare, Medicaid or Self Pay      Advanced Care Plan:  Not on file    Barriers include:  None noted    Patient reminded that there is a cardiologist on call 24 hours a day / 7 days a week should the patient have questions or concerns. Patient reminded to call 911 if situation is emergent or patient feels the situation is emergent. Plan of Care:  Pt has appt with Dr. Aidan Rodriguez on 5/30/17. Will meet face to face with pt at appt.

## 2017-05-26 ENCOUNTER — TELEPHONE (OUTPATIENT)
Dept: CARDIOLOGY CLINIC | Age: 67
End: 2017-05-26

## 2017-05-26 PROCEDURE — 3331090002 HH PPS REVENUE DEBIT

## 2017-05-26 PROCEDURE — 3331090001 HH PPS REVENUE CREDIT

## 2017-05-26 NOTE — TELEPHONE ENCOUNTER
Pt  Feeling nausea and dizziness, thinks its a reaction to her meds. Please call. No chest pains or sob.      Thanks

## 2017-05-26 NOTE — TELEPHONE ENCOUNTER
Verified patient with two identifiers. Spoke with pt, c/o nausea. Has not taken any new meds, no c/p or SOB, advised her to keep herself hydrated, sounded like a stomach bug. Pt verbalized understanding.

## 2017-05-26 NOTE — PROGRESS NOTES
Thank you for the update. She is very independent. I will forward this message to her cardiology team also, as they are the providers clearing her to return to work next week, not myself.

## 2017-05-27 ENCOUNTER — HOME CARE VISIT (OUTPATIENT)
Dept: HOME HEALTH SERVICES | Facility: HOME HEALTH | Age: 67
End: 2017-05-27
Payer: COMMERCIAL

## 2017-05-27 PROCEDURE — 3331090001 HH PPS REVENUE CREDIT

## 2017-05-27 PROCEDURE — 3331090002 HH PPS REVENUE DEBIT

## 2017-05-28 PROCEDURE — 3331090001 HH PPS REVENUE CREDIT

## 2017-05-28 PROCEDURE — 3331090002 HH PPS REVENUE DEBIT

## 2017-05-29 PROCEDURE — 3331090002 HH PPS REVENUE DEBIT

## 2017-05-29 PROCEDURE — 3331090001 HH PPS REVENUE CREDIT

## 2017-05-30 ENCOUNTER — OFFICE VISIT (OUTPATIENT)
Dept: CARDIOLOGY CLINIC | Age: 67
End: 2017-05-30

## 2017-05-30 ENCOUNTER — PATIENT OUTREACH (OUTPATIENT)
Dept: CARDIOLOGY CLINIC | Age: 67
End: 2017-05-30

## 2017-05-30 VITALS
WEIGHT: 167.7 LBS | DIASTOLIC BLOOD PRESSURE: 50 MMHG | BODY MASS INDEX: 26.95 KG/M2 | SYSTOLIC BLOOD PRESSURE: 118 MMHG | HEART RATE: 71 BPM | RESPIRATION RATE: 20 BRPM | HEIGHT: 66 IN | OXYGEN SATURATION: 97 %

## 2017-05-30 DIAGNOSIS — Z86.19 HISTORY OF CLOSTRIDIUM DIFFICILE INFECTION: ICD-10-CM

## 2017-05-30 DIAGNOSIS — Z86.79 S/P ABLATION OF ATRIAL FIBRILLATION: ICD-10-CM

## 2017-05-30 DIAGNOSIS — E11.40 TYPE 2 DIABETES MELLITUS WITH DIABETIC NEUROPATHY, WITHOUT LONG-TERM CURRENT USE OF INSULIN (HCC): ICD-10-CM

## 2017-05-30 DIAGNOSIS — I50.42 CHRONIC COMBINED SYSTOLIC AND DIASTOLIC HEART FAILURE (HCC): Primary | ICD-10-CM

## 2017-05-30 DIAGNOSIS — Z98.890 S/P ABLATION OF ATRIAL FIBRILLATION: ICD-10-CM

## 2017-05-30 DIAGNOSIS — I47.1 JUNCTIONAL TACHYCARDIA (HCC): ICD-10-CM

## 2017-05-30 DIAGNOSIS — I48.91 ATRIAL FIBRILLATION, UNSPECIFIED TYPE (HCC): Chronic | ICD-10-CM

## 2017-05-30 DIAGNOSIS — Z95.0 CARDIAC PACEMAKER IN SITU: ICD-10-CM

## 2017-05-30 DIAGNOSIS — I49.5 SICK SINUS SYNDROME (HCC): ICD-10-CM

## 2017-05-30 DIAGNOSIS — J44.9 CHRONIC OBSTRUCTIVE PULMONARY DISEASE, UNSPECIFIED COPD TYPE (HCC): ICD-10-CM

## 2017-05-30 PROCEDURE — 3331090001 HH PPS REVENUE CREDIT

## 2017-05-30 PROCEDURE — 3331090002 HH PPS REVENUE DEBIT

## 2017-05-30 NOTE — PROGRESS NOTES
This note will not be viewable in [address] Met with pt at office visit. Pt states that she continues with stomach discomfort. Pt states that she has been eating bland if at all. Pt is sipping ginger ale. Pt states that her scale wt at home is down 3 lbs. Pt states that she ate greens on Sunday but seasoned with Mrs. Aceves. Pt states understanding to follow low Na diet and call with any questions or concerns.

## 2017-05-30 NOTE — MR AVS SNAPSHOT
Visit Information Date & Time Provider Department Dept. Phone Encounter #  
 5/30/2017 11:15 AM Felisha Joel MD [facility] [phone] [id number] Your Appointments 5/31/2017  9:20 AM  
Office Visit with Shonna Garcia NP  
[address]) Appt Note: Hosp f/u from [address] [place] [address] [facility] [address]  
[phone]  
  
   
 [facility] Upcoming Health Maintenance Date Due  
 EYE EXAM RETINAL OR DILATED Q1 6/25/2016 MICROALBUMIN Q1 3/22/2017 FOOT EXAM Q1 6/10/2017 INFLUENZA AGE 9 TO ADULT 8/1/2017 HEMOGLOBIN A1C Q6M 10/17/2017 FOBT Q 1 YEAR AGE 50-75 10/31/2017 MEDICARE YEARLY EXAM 11/1/2017 LIPID PANEL Q1 1/31/2018 GLAUCOMA SCREENING Q2Y 7/28/2018 BREAST CANCER SCRN MAMMOGRAM 2/23/2019 DTaP/Tdap/Td series (2 - Td) 7/16/2026 Allergies as of 5/30/2017  Review Complete On: 5/30/2017 By: Reinier Rios LPN Severity Noted Reaction Type Reactions Crestor [Rosuvastatin]  10/31/2016   Side Effect Myalgia Levaquin [Levofloxacin]  12/07/2015   Intolerance Nausea Only GI Upset Lipitor [Atorvastatin]  04/17/2017   Side Effect Diarrhea Lyrica [Pregabalin]  10/31/2016   Side Effect Myalgia Current Immunizations  Reviewed on 10/31/2016 Name Date H1N1 FLU VACCINE 10/20/2009 Influenza High Dose Vaccine PF 10/31/2016 Influenza Vaccine (Madin June Canine Kidney) PF 9/23/2014 11:26 AM  
 Influenza Vaccine Intradermal PF 11/27/2015 Influenza Vaccine Split 9/22/2011  6:25 AM  
 Influenza Vaccine Whole 10/20/2009 Pneumococcal Polysaccharide (PPSV-23) 6/25/2015 Pneumococcal Vaccine (Unspecified Type) 10/20/2009 Tdap 7/16/2016 11:47 PM  
  
 Not reviewed this visit You Were Diagnosed With   
  
 Codes Comments  Chronic combined systolic and diastolic heart failure ([address]    -  Primary ICD-10-CM: I50.42 
ICD-9-CM: 428.42 Sick sinus syndrome (HCC)     ICD-10-CM: I49.5 ICD-9-CM: 427.81   
 Junctional tachycardia ([facility]     ICD-10-CM: I47.1 ICD-9-CM: 427.89 Atrial fibrillation, unspecified type ([facility]     ICD-10-CM: I48.91 
ICD-9-CM: 427.31 Chronic obstructive pulmonary disease, unspecified COPD type ([facility]     ICD-10-CM: J44.9 ICD-9-CM: 267 Cardiac pacemaker in situ     ICD-10-CM: Z95.0 ICD-9-CM: V45.01 Type 2 diabetes mellitus with diabetic neuropathy, without long-term current use of insulin (HCC)     ICD-10-CM: E11.40 ICD-9-CM: 250.60, 357.2 S/P ablation of atrial fibrillation     ICD-10-CM: Z98.890, Z86.79 
ICD-9-CM: V45.89 History of Clostridium difficile infection     ICD-10-CM: Z86.19 ICD-9-CM: V12.09 Vitals BP Pulse Resp Height(growth percentile) Weight(growth percentile) SpO2  
 118/50 (BP 1 Location: Right arm, BP Patient Position: Sitting) 71 20 5' 5.5\" (1.664 m) 167 lb 11.2 oz (76.1 kg) 97% BMI OB Status Smoking Status 27.48 kg/m2 Postmenopausal Former Smoker Vitals History BMI and BSA Data Body Mass Index Body Surface Area  
 27.48 kg/m 2 1.88 m 2 Preferred Pharmacy Pharmacy Name Phone [facility] [address] [phone] Your Updated Medication List  
  
   
This list is accurate as of: 5/30/17 12:29 PM.  Always use your most recent med list.  
  
  
  
  
 * albuterol 90 mcg/actuation inhaler Commonly known as:  VENTOLIN HFA Take 2 Puffs by inhalation every six (6) hours as needed for Wheezing. * albuterol 2.5 mg /3 mL (0.083 %) nebulizer solution Commonly known as:  PROVENTIL VENTOLIN  
3 mL by Nebulization route every four (4) hours as needed for Wheezing. amiodarone 200 mg tablet Commonly known as:  CORDARONE Take 1 Tab by mouth two (2) times a day. Azelastine 0.15 % (205.5 mcg) nasal spray Commonly known as:  ASTEPRO  
1 Spray by Both Nostrils route daily. Blood-Glucose Meter monitoring kit Check blood sugar daily. May substitute for insurance preferred meter  
  
 budesonide-formoterol 160-4.5 mcg/actuation HFA inhaler Commonly known as:  SYMBICORT Take 1 Puff by inhalation two (2) times a day. carvedilol 6.25 mg tablet Commonly known as:  Esaw Math Take 1 Tab by mouth two (2) times daily (with meals). CENTRUM SILVER PO Take 1 Tab by mouth daily. Takes one po daily. clopidogrel 75 mg Tab Commonly known as:  PLAVIX Take 1 Tab by mouth daily. cod liver oil Cap Take 1 Cap by mouth daily. colchicine 0.6 mg tablet Take 1 Tab by mouth two (2) times a day. FLONASE 50 mcg/actuation nasal spray Generic drug:  fluticasone 2 Sprays by Both Nostrils route every evening. furosemide 40 mg tablet Commonly known as:  LASIX Take 40 mg daily  
  
 gabapentin 800 mg tablet Commonly known as:  NEURONTIN  
TAKE ONE TABLET BY MOUTH THREE TIMES DAILY HYDROcodone-acetaminophen 5-325 mg per tablet Commonly known as:  NORCO  
  
 metFORMIN 1,000 mg tablet Commonly known as:  GLUCOPHAGE  
TAKE 1 TABLET BY MOUTH TWICE DAILY WITH MEALS  Indications: PREVENTION OF TYPE 2 DIABETES MELLITUS  
  
 pravastatin 20 mg tablet Commonly known as:  PRAVACHOL Take 1 Tab by mouth nightly. tiotropium 18 mcg inhalation capsule Commonly known as:  [address] INHALE THE CONTENTS OF 1 CAPSULE THROUGH HANDIHALER DEVICE DAILY  
  
 warfarin 5 mg tablet Commonly known as:  COUMADIN Take 7.5 mg by mouth daily. * Notice: This list has 2 medication(s) that are the same as other medications prescribed for you. Read the directions carefully, and ask your doctor or other care provider to review them with you. We Performed the Following AMB POC EKG ROUTINE W/ 12 LEADS, INTER & REP [23906 CPT(R)] To-Do List   
 06/01/2017 To Be Determined Appointment with Ethan Jimenez at [place] [address]  
  
 06/05/2017 To Be Determined Appointment with Ethan Jimenez at [place] [address]  
  
 06/08/2017 To Be Determined Appointment with Ethan Jimenez at [address] & [facility] SERVICES! Dear Maddy Schmidt: Thank you for requesting a uSamp account. Our records indicate that you already have an active uSamp account. You can access your account anytime at [email or web address] Did you know that you can access your hospital and ER discharge instructions at any time in uSamp? You can also review all of your test results from your hospital stay or ER visit. Additional Information If you have questions, please visit the Frequently Asked Questions section of the uSamp website at [email or web address] Remember, uSamp is NOT to be used for urgent needs. For medical emergencies, dial 911. Now available from your iPhone and Android! Please provide this summary of care documentation to your next provider. Your primary care clinician is listed as CAROLINA Daniels. If you have any questions after today's visit, please call [phone].

## 2017-05-30 NOTE — PROGRESS NOTES
Subjective/HPI:     Tal Miramontes is a 73 y.o. female is here for f/u appt after hospitalization for acute on chronic systolic/diastolic HF. The patient denies chest pain/ shortness of breath, orthopnea, PND, LE edema, palpitations, syncope, presyncope or fatigue. Her primary complaint now is n/v and diarrhea. She has finished her abx for cdiff. Has appt with her PCP tomorrow. PCP Provider  Ramirez Vinson NP  Past Medical History:   Diagnosis Date    Atrial fibrillation ([address] 2010    CAD (coronary artery disease)     stent    Chronic diastolic heart failure ([address] 2014    Chronic systolic HF (heart failure) ([address] 5/10/2017    2017 EF 25-30%    COPD     COPD (chronic obstructive pulmonary disease) ([address] 2010    Diabetes ([address]     Fibroid     Heart failure ([address]     History of Clostridium difficile infection 5/10/2017    2017 CDiff positive    Hypertension 2010    Hypotension 5/10/2017    Junctional tachycardia ([address] 5/10/2017    NIDDM (non-insulin dependent diabetes mellitus) 2010    Screening mammogram 5/4/10    SOB (shortness of breath) 2014      Past Surgical History:   Procedure Laterality Date    HX  SECTION      HX OTHER SURGICAL      adrenal gland removed    HX PACEMAKER      VA EXCISE ADRENAL GLAND       Allergies   Allergen Reactions    Crestor [Rosuvastatin] Myalgia    Levaquin [Levofloxacin] Nausea Only     GI Upset    Lipitor [Atorvastatin] Diarrhea    Lyrica [Pregabalin] Myalgia      Family History   Problem Relation Age of Onset    Heart Disease Mother     Diabetes Father     Heart Disease Brother       Current Outpatient Prescriptions   Medication Sig    clopidogrel (PLAVIX) 75 mg tab Take 1 Tab by mouth daily.  amiodarone (CORDARONE) 200 mg tablet Take 1 Tab by mouth two (2) times a day.  carvedilol (COREG) 6.25 mg tablet Take 1 Tab by mouth two (2) times daily (with meals).     furosemide (LASIX) 40 mg tablet Take 40 mg daily    pravastatin (PRAVACHOL) 20 mg tablet Take 1 Tab by mouth nightly.  warfarin (COUMADIN) 5 mg tablet Take 7.5 mg by mouth daily.  fluticasone (FLONASE) 50 mcg/actuation nasal spray 2 Sprays by Both Nostrils route every evening.  gabapentin (NEURONTIN) 800 mg tablet TAKE ONE TABLET BY MOUTH THREE TIMES DAILY    Blood-Glucose Meter monitoring kit Check blood sugar daily. May substitute for insurance preferred meter    tiotropium (SPIRIVA WITH HANDIHALER) 18 mcg inhalation capsule INHALE THE CONTENTS OF 1 CAPSULE THROUGH HANDIHALER DEVICE DAILY    metFORMIN (GLUCOPHAGE) 1,000 mg tablet TAKE 1 TABLET BY MOUTH TWICE DAILY WITH MEALS  Indications: PREVENTION OF TYPE 2 DIABETES MELLITUS    colchicine 0.6 mg tablet Take 1 Tab by mouth two (2) times a day. (Patient taking differently: Take 0.6 mg by mouth two (2) times a day. Indications: GOUT)    budesonide-formoterol (SYMBICORT) 160-4.5 mcg/actuation HFA inhaler Take 1 Puff by inhalation two (2) times a day.  albuterol (PROVENTIL VENTOLIN) 2.5 mg /3 mL (0.083 %) nebulizer solution 3 mL by Nebulization route every four (4) hours as needed for Wheezing.  FOLIC ACID/MULTIVIT-MIN/LUTEIN (CENTRUM SILVER PO) Take 1 Tab by mouth daily. Takes one po daily.  albuterol (VENTOLIN HFA) 90 mcg/actuation inhaler Take 2 Puffs by inhalation every six (6) hours as needed for Wheezing.  Azelastine (ASTEPRO) 0.15 % (205.5 mcg) nasal spray 1 Badger by Both Nostrils route daily.  cod liver oil cap Take 1 Cap by mouth daily.  HYDROcodone-acetaminophen (NORCO) 5-325 mg per tablet      No current facility-administered medications for this visit.        Vitals:    05/30/17 1140 05/30/17 1159   BP: 112/50 118/50   Pulse: 71    Resp: 20    SpO2: 97%    Weight: 167 lb 11.2 oz (76.1 kg)    Height: 5' 5.5\" (1.664 m)      Social History     Social History    Marital status:      Spouse name: N/A    Number of children: N/A    Years of education: N/A     Occupational History    Not on file. Social History Main Topics    Smoking status: Former Smoker     Types: Cigarettes     Quit date: 12/31/2009    Smokeless tobacco: Never Used    Alcohol use No    Drug use: No    Sexual activity: Not Currently     Other Topics Concern    Not on file     Social History Narrative       I have reviewed the nurses notes, vitals, problem list, allergy list, medical history, family, social history and medications. Review of Symptoms:    General: Pt denies excessive weight gain or loss. Pt is able to conduct ADL's  HEENT: Denies blurred vision, headaches, epistaxis and difficulty swallowing. Respiratory: Denies shortness of breath, [place], wheezing or stridor. Cardiovascular: Denies precordial pain, palpitations, edema or PND  Gastrointestinal: + poor appetite, n/v, and diarrhea, denies indigestion, or blood in stool  Urinary: Denies dysuria, pyuria  Musculoskeletal: Denies pain or swelling from muscles or joints  Neurologic: Denies tremor, paresthesias, or sensory motor disturbance  Skin: Denies rash, itching or texture change. Psych: Denies depression        Physical Exam:      General: Well developed, in no acute distress, cooperative and alert. Lifevest in place  HEENT: No carotid bruits, no JVD, trach is midline. Neck Supple, PEERL, EOM intact. Heart:  Normal S1/S2 negative S3 or S4. Regular, no murmur, gallop or rub.   Respiratory: Clear bilaterally x 4, no wheezing or rales  Abdomen:   Soft, non-tender, no masses, bowel sounds are active.   Extremities:  No edema, normal cap refill, no cyanosis, atraumatic. Neuro: A&Ox3, speech clear, gait stable. Skin: Skin color is normal. No rashes or lesions. Non diaphoretic  Vascular: 2+ pulses symmetric in all extremities    Cardiographics    ECG: Sinus  Rhythm HR 76  -Intraventricular conduction defect -consider left ventricular hypertrophy. Nonspecific T-abnormality.      Results for orders placed or performed during the hospital encounter of 05/09/17   EKG, 12 LEAD, INITIAL   Result Value Ref Range    Ventricular Rate 157 BPM    Atrial Rate 157 BPM    P-R Interval 92 ms    QRS Duration 92 ms    Q-T Interval 256 ms    QTC Calculation (Bezet) 413 ms    Calculated P Axis 87 degrees    Calculated R Axis -22 degrees    Calculated T Axis 124 degrees    Diagnosis       Sinus tachycardia  Moderate voltage criteria for LVH, may be normal variant  Confirmed by Dawn Alvares ([id number]) on 5/10/2017 10:53:43 AM           Cardiology Labs:  Lab Results   Component Value Date/Time    Cholesterol, total 248 10/31/2016 12:00 AM    HDL Cholesterol 43 10/31/2016 12:00 AM    LDL, calculated 160 10/31/2016 12:00 AM    Triglyceride 227 10/31/2016 12:00 AM    CHOL/HDL Ratio 6.4 07/01/2014 03:10 AM       Lab Results   Component Value Date/Time    Sodium 141 05/22/2017 03:45 AM    Potassium 3.3 05/22/2017 03:45 AM    Chloride 104 05/22/2017 03:45 AM    CO2 28 05/22/2017 03:45 AM    Anion gap 9 05/22/2017 03:45 AM    Glucose 160 05/22/2017 03:45 AM    BUN 22 05/22/2017 03:45 AM    Creatinine 1.04 05/22/2017 03:45 AM    BUN/Creatinine ratio 21 05/22/2017 03:45 AM    GFR est AA >60 05/22/2017 03:45 AM    GFR est non-AA 53 05/22/2017 03:45 AM    Calcium 8.2 05/22/2017 03:45 AM    AST (SGOT) 35 05/12/2017 06:14 AM    Alk. phosphatase 108 05/12/2017 06:14 AM    Protein, total 8.8 05/09/2017 05:04 PM    Albumin 3.5 05/09/2017 05:04 PM    Globulin 5.3 05/09/2017 05:04 PM    A-G Ratio 0.7 05/09/2017 05:04 PM    ALT (SGPT) 42 05/12/2017 06:14 AM           Assessment:     Assessment:     Bg Lugo was seen today for irregular heart beat.     Diagnoses and all orders for this visit:    Chronic combined systolic and diastolic heart failure (HCC)    Sick sinus syndrome (HCC)    Junctional tachycardia (HCC)    Atrial fibrillation, unspecified type ([address]  -     AMB POC EKG ROUTINE W/ 12 LEADS, INTER & REP    Chronic obstructive pulmonary disease, unspecified COPD type ([facility]    Cardiac pacemaker in situ    Type 2 diabetes mellitus with diabetic neuropathy, without long-term current use of insulin ([facility])    S/P ablation of atrial fibrillation    History of Clostridium difficile infection        ICD-10-CM ICD-9-CM    1. Chronic combined systolic and diastolic heart failure (HCC) I50.42 428.42    2. Sick sinus syndrome (HCC) I49.5 427.81    3. Junctional tachycardia ([facility]) I47.1 427.89    4. Atrial fibrillation, unspecified type ([facility] I48.91 427.31 AMB POC EKG ROUTINE W/ 12 LEADS, INTER & REP   5. Chronic obstructive pulmonary disease, unspecified COPD type ([address] [facility] J44.9 496    6. Cardiac pacemaker in situ Z95.0 V45.01    7. Type 2 diabetes mellitus with diabetic neuropathy, without long-term current use of insulin ([facility]) E11.40 250.60      357.2    8. S/P ablation of atrial fibrillation Z98.890 V45.89     Z86.79     9. History of Clostridium difficile infection Z86.19 V12.09      Orders Placed This Encounter    AMB POC EKG ROUTINE W/ 12 LEADS, INTER & REP     Order Specific Question:   Reason for Exam:     Answer:   ROUTINE        Plan:     Junctional tachycardia/afib with Afib ablation on 5/1/17- remains in SR today  Continue on amiodarone 200mg daily and Coreg   Continue on coumadin, INR 2-3      Chronic systolic and diastolic HF:  Stable,  Lasix 40mg daily  Consider starting Entresto once pulm problems resolve  Diagnosed with systolic HF 0/1/66, repeat echo EF <35%  S/p PCI 4/7/17 had hoped for improvement in EF  AICD candidate in July 2017 if EF remains <35%  Lifevest in the interim      CAD:  S/p PCI 4/5/17, continue on Plavix, BB, statin      COPD/Emphysema  Managed by Pulmonary     N/V and diarrhea with cdiff. F/U with PCP tomorrow to discuss    Lynnette Ramirez NP      [facility]    5/30/2017         Agree with note as outlined by  NP. I confirm findings in history and physical exam. No additional findings noted. Agree with plan as outlined above. Hugo Cooley MD

## 2017-05-31 ENCOUNTER — OFFICE VISIT (OUTPATIENT)
Dept: INTERNAL MEDICINE CLINIC | Age: 67
End: 2017-05-31

## 2017-05-31 VITALS
SYSTOLIC BLOOD PRESSURE: 99 MMHG | RESPIRATION RATE: 18 BRPM | OXYGEN SATURATION: 95 % | DIASTOLIC BLOOD PRESSURE: 59 MMHG | WEIGHT: 166 LBS | HEIGHT: 66 IN | BODY MASS INDEX: 26.68 KG/M2 | HEART RATE: 75 BPM | TEMPERATURE: 97.2 F

## 2017-05-31 DIAGNOSIS — Z98.890 HISTORY OF CARDIOVERSION: ICD-10-CM

## 2017-05-31 DIAGNOSIS — D68.9 COAGULATION DEFECT (HCC): ICD-10-CM

## 2017-05-31 DIAGNOSIS — E11.40 TYPE 2 DIABETES MELLITUS WITH DIABETIC NEUROPATHY, WITHOUT LONG-TERM CURRENT USE OF INSULIN (HCC): ICD-10-CM

## 2017-05-31 DIAGNOSIS — J44.9 CHRONIC OBSTRUCTIVE PULMONARY DISEASE, UNSPECIFIED COPD TYPE (HCC): ICD-10-CM

## 2017-05-31 DIAGNOSIS — I48.91 ATRIAL FIBRILLATION, UNSPECIFIED TYPE (HCC): ICD-10-CM

## 2017-05-31 DIAGNOSIS — I50.22 CHRONIC SYSTOLIC CONGESTIVE HEART FAILURE (HCC): ICD-10-CM

## 2017-05-31 DIAGNOSIS — Z95.0 CARDIAC PACEMAKER IN SITU: ICD-10-CM

## 2017-05-31 DIAGNOSIS — R79.1 SUPRATHERAPEUTIC INR: Primary | ICD-10-CM

## 2017-05-31 DIAGNOSIS — K29.00 ACUTE GASTRITIS WITHOUT HEMORRHAGE, UNSPECIFIED GASTRITIS TYPE: ICD-10-CM

## 2017-05-31 LAB
GLUCOSE POC: 119 MG/DL
INR BLD: 8
PT POC: 96 SECONDS
VALID INTERNAL CONTROL?: YES

## 2017-05-31 PROCEDURE — 3331090001 HH PPS REVENUE CREDIT

## 2017-05-31 PROCEDURE — 3331090002 HH PPS REVENUE DEBIT

## 2017-05-31 RX ORDER — ZINC GLUCONATE 100 MG
100 TABLET ORAL DAILY
Qty: 2 TAB | Refills: 0 | Status: SHIPPED | OUTPATIENT
Start: 2017-05-31 | End: 2017-06-14 | Stop reason: SDUPTHER

## 2017-05-31 RX ORDER — LANCETS
EACH MISCELLANEOUS
Qty: 50 EACH | Refills: 11 | Status: SHIPPED | OUTPATIENT
Start: 2017-05-31 | End: 2017-09-22

## 2017-05-31 NOTE — PROGRESS NOTES
Pt is here for   Chief Complaint   Patient presents with   [facility] Follow Up     5/9/17 [facility] for tachycardia and C-Diff    Medication Refill     pt states she would like a refill of test strips sent to [address] [place] [address] Ave     Pt denies pain at this time    1. Have you been to the ER, urgent care clinic since your last visit? Hospitalized since your last visit? Yes When: 5/9/17 ED [facility] for tachycardia and C-Diff    2. Have you seen or consulted any other health care providers outside of the [address] [place] [address] since your last visit? Include any pap smears or colon screening. No    Pt states Thursday and Friday she was vomiting, Saturday and Sunday pt states she had Diarrhea.  Pt states she had a somewhat normal bowel movement this morning    Pt states she takes 7.5mg Monday through Friday and 10mg Saturday and Sunday   Lab Results   Component Value Date/Time    INR 1.8 05/22/2017 03:45 AM    INR 2.2 05/21/2017 04:04 AM    INR 1.9 05/20/2017 02:45 AM    INR POC 8.0 05/31/2017 10:16 AM    INR POC 4.4 05/25/2017 04:06 PM

## 2017-05-31 NOTE — PATIENT INSTRUCTIONS
Soft-Textured, [place] Diet: Care Instructions  Your Care Instructions  A soft-textured, bland diet is used when you need food that is easy to chew, swallow, and digest. You will need to choose soft foods that are low in spices and seasonings. You will need to avoid high-fat foods, as well as caffeine and alcohol. Your doctor or dietitian can help you plan a soft-textured, bland diet based on your health and what you prefer to eat. Ask your doctor how long you should stay on this diet. As you get better, you will probably be able to go back to a regular diet. Talk with your doctor or dietitian before you make changes in your diet. Follow-up care is a key part of your treatment and safety. Be sure to make and go to all appointments, and call your doctor if you are having problems. Its also a good idea to know your test results and keep a list of the medicines you take. How can you care for yourself at home? · Choose foods that are easy to chew and swallow. Good choices are mashed potatoes, soft breads and rolls, cream soups, oatmeal, and Cream of Wheat. · Choose soft, well-cooked vegetables and soft or canned fruits. Good choices are applesauce, ripe bananas, and non-citrus fruit juice. · Try milk, yogurt, or other milk products, if you can digest dairy without too many problems. Your doctor may limit milk and milk products for a while. If so, he or she may recommend a calcium and vitamin D supplement. · Choose soft protein foods such as eggs, tofu, steamed fish, chicken, and turkey. Slow-cooking methods, such as stewing, will help soften meat. Chopping meat in a  or  also will make it easier to eat. · Avoid nuts, raw vegetables, hard crackers, tough meats, and prunes and prune juice. · Avoid foods that are very spicy, such as foods seasoned with black pepper, chili peppers, horseradish, or hot sauce.   · Avoid highly acidic foods such as citrus fruits, citrus fruit juices, and tomato-based foods. · Avoid high-fat foods such as fried meat, chips, and rich desserts. · Check with your doctor before you drink alcohol or beverages that have caffeine, such as coffee, tea, and cola beverages. Where can you learn more? Go to http://rusty-marcela.info/. Enter T885 in the search box to learn more about \"Soft-Textured, Nati Maxwelloze Diet: Care Instructions. \"  Current as of: 2016  Content Version: 11.2  © 0919-5905 [employer]. Care instructions adapted under license by [employer] (which disclaims liability or warranty for this information). If you have questions about a medical condition or this instruction, always ask your healthcare professional. [address] any warranty or liability for your use of this information. Phytonadione (By injection)   Phytonadione (fye-toe-na-DYE-one)  Prevents bleeding problems by helping blood clot better. Also treats or prevents bleeding problems in  infants. Brand Name(s): Vitamin K1 Novaplus   There may be other brand names for this medicine. When This Medicine Should Not Be Used: This medicine is not right for everyone. Do not use this medicine if you had an allergic reaction to phytonadione or vitamin K. How to Use This Medicine:   Injectable  · Your doctor will prescribe your exact dose and tell you how often it should be given. This medicine is given as a shot under your skin, into a muscle, or into a vein. · A nurse or other health provider will give you this medicine. · You may be taught how to give your medicine at home. Make sure you understand all instructions before giving yourself an injection. Do not use more medicine or use it more often than your doctor tells you to. · Use a new needle and syringe each time you inject your medicine. · Missed dose: Call your doctor or pharmacist for instructions.   · If you store this medicine at home, keep it at room temperature, away from heat and direct light. · Throw away used needles in a hard, closed container that the needles cannot poke through. Keep this container away from children and pets. Drugs and Foods to Avoid:   Ask your doctor or pharmacist before using any other medicine, including over-the-counter medicines, vitamins, and herbal products. · Some medicines and foods can affect how this medicine works. Tell your doctor if you are also using a blood thinner, including warfarin. Warnings While Using This Medicine:   · Tell your doctor if you are pregnant or breastfeeding, or if you have liver disease or kidney disease. Tell your doctor if you had problems with blot clotting too much, such as a stroke or an embolism. · Your doctor will do lab tests at regular visits to check on the effects of this medicine. Keep all appointments. · You might also be given plasma or other blood products in addition to vitamin K.  · Keep all medicine out of the reach of children. Never share your medicine with anyone. Possible Side Effects While Using This Medicine:   Call your doctor right away if you notice any of these side effects:  · Allergic reaction: Itching or hives, swelling in your face or hands, swelling or tingling in your mouth or throat, chest tightness, trouble breathing  · Chest pain, sudden headache, trouble breathing, or pain in your leg  · Lightheadedness, dizziness, or fainting  If you notice these less serious side effects, talk with your doctor:   · Change in sense of taste  · Redness, pain, swelling, or a hard lump where the injection was given  · Warmth or redness in your face, neck, arms, or upper chest  If you notice other side effects that you think are caused by this medicine, tell your doctor. Call your doctor for medical advice about side effects.  You may report side effects to FDA at 9-604-FDA-6528  © 2017 [address] Information is for End User's use only and may not be sold, redistributed or otherwise used for commercial purposes. The above information is an  only. It is not intended as medical advice for individual conditions or treatments. Talk to your doctor, nurse or pharmacist before following any medical regimen to see if it is safe and effective for you.

## 2017-05-31 NOTE — MR AVS SNAPSHOT
Visit Information Date & Time Provider Department Dept. Phone Encounter #  
 5/31/2017  9:20 AM Rhea DEEPAK Ochoa [address] [phone] [id number] Follow-up Instructions Return in about 2 days (around 6/2/2017) for INR check. 4 weeks f/u. Your Appointments 6/16/2017  9:00 AM  
3 WEEK with Celeste Roy MD  
[facility] App[facility] Note: Per Dr. Kera Lobo [facility]  
[phone] 1 [address] Upcoming Health Maintenance Date Due  
 EYE EXAM RETINAL OR DILATED Q1 6/25/2016 MICROALBUMIN Q1 3/22/2017 FOOT EXAM Q1 6/10/2017 INFLUENZA AGE 9 TO ADULT 8/1/2017 HEMOGLOBIN A1C Q6M 10/17/2017 FOBT Q 1 YEAR AGE 50-75 10/31/2017 MEDICARE YEARLY EXAM 11/1/2017 LIPID PANEL Q1 1/31/2018 GLAUCOMA SCREENING Q2Y 7/28/2018 BREAST CANCER SCRN MAMMOGRAM 2/23/2019 DTaP/Tdap/Td series (2 - Td) 7/16/2026 Allergies as of 5/31/2017  Review Complete On: 5/31/2017 By: Luis Miguel Tenorio LPN Severity Noted Reaction Type Reactions Crestor [Rosuvastatin]  10/31/2016   Side Effect Myalgia Levaquin [Levofloxacin]  12/07/2015   Intolerance Nausea Only GI Upset Lipitor [Atorvastatin]  04/17/2017   Side Effect Diarrhea Lyrica [Pregabalin]  10/31/2016   Side Effect Myalgia Current Immunizations  Reviewed on 10/31/2016 Name Date H1N1 FLU VACCINE 10/20/2009 Influenza High Dose Vaccine PF 10/31/2016 Influenza Vaccine (Madin [place] Canine Kidney) PF 9/23/2014 11:26 AM  
 Influenza Vaccine Intradermal PF 11/27/2015 Influenza Vaccine Split 9/22/2011  6:25 AM  
 Influenza Vaccine Whole 10/20/2009 Pneumococcal Polysaccharide (PPSV-23) 6/25/2015 Pneumococcal Vaccine (Unspecified Type) 10/20/2009 Tdap 7/16/2016 11:47 PM  
  
 Not reviewed this visit You Were Diagnosed With   
  
 Codes Comments Supratherapeutic INR    -  Primary ICD-10-CM: R79.1 ICD-9-CM: 790.92 Type 2 diabetes mellitus with diabetic neuropathy, without long-term current use of insulin (HCC)     ICD-10-CM: E11.40 ICD-9-CM: 250.60, 357.2 Coagulation defect ([facility]     ICD-10-CM: D68.9 ICD-9-CM: 286. 9 Chronic systolic congestive heart failure (HCC)     ICD-10-CM: I50.22 ICD-9-CM: 428.22, 428.0 Cardiac pacemaker in situ     ICD-10-CM: Z95.0 ICD-9-CM: V45.01 Atrial fibrillation, unspecified type ([facility].)     ICD-10-CM: I48.91 
ICD-9-CM: 427.31 History of cardioversion     ICD-10-CM: Z98.890 ICD-9-CM: V15.1 Chronic obstructive pulmonary disease, unspecified COPD type ([facility].)     ICD-10-CM: J44.9 ICD-9-CM: 449 Acute gastritis without hemorrhage, unspecified gastritis type     ICD-10-CM: K29.00 ICD-9-CM: 535.00 Vitals BP Pulse Temp Resp Height(growth percentile) Weight(growth percentile) 99/59 (BP 1 Location: Left arm, BP Patient Position: Sitting) 75 97.2 °F (36.2 °C) (Oral) 18 5' 5.5\" (1.664 m) 166 lb (75.3 kg) SpO2 BMI OB Status Smoking Status 95% 27.2 kg/m2 Postmenopausal Former Smoker Vitals History BMI and BSA Data Body Mass Index Body Surface Area  
 27.2 kg/m 2 1.87 m 2 Preferred Pharmacy Pharmacy Name Phone [facility] [address], [address] [place] [address] Avenue [phone] Your Updated Medication List  
  
   
This list is accurate as of: 5/31/17 11:12 AM.  Always use your most recent med list.  
  
  
  
  
 * albuterol 90 mcg/actuation inhaler Commonly known as:  VENTOLIN HFA Take 2 Puffs by inhalation every six (6) hours as needed for Wheezing. * albuterol 2.5 mg /3 mL (0.083 %) nebulizer solution Commonly known as:  PROVENTIL VENTOLIN  
3 mL by Nebulization route every four (4) hours as needed for Wheezing. amiodarone 200 mg tablet Commonly known as:  CORDARONE  
 Take 1 Tab by mouth two (2) times a day. Azelastine 0.15 % (205.5 mcg) nasal spray Commonly known as:  ASTEPRO  
1 Spray by Both Nostrils route daily. Blood-Glucose Meter monitoring kit Check blood sugar daily. May substitute for insurance preferred meter  
  
 budesonide-formoterol 160-4.5 mcg/actuation HFA inhaler Commonly known as:  SYMBICORT Take 1 Puff by inhalation two (2) times a day. carvedilol 6.25 mg tablet Commonly known as:  Danielle Manzanilla Take 1 Tab by mouth two (2) times daily (with meals). CENTRUM SILVER PO Take 1 Tab by mouth daily. Takes one po daily. clopidogrel 75 mg Tab Commonly known as:  PLAVIX Take 1 Tab by mouth daily. cod liver oil Cap Take 1 Cap by mouth daily. colchicine 0.6 mg tablet Take 1 Tab by mouth two (2) times a day. FLONASE 50 mcg/actuation nasal spray Generic drug:  fluticasone 2 Sprays by Both Nostrils route every evening. furosemide 40 mg tablet Commonly known as:  LASIX Take 40 mg daily  
  
 gabapentin 800 mg tablet Commonly known as:  NEURONTIN  
TAKE ONE TABLET BY MOUTH THREE TIMES DAILY  
  
 glucose blood VI test strips strip Commonly known as:  blood glucose test  
Check blood sugar 1 times daily: E11.9, may substitute for insurance preferred strips. Lancets Misc Check blood sugar 1 times daily. May substitute for insurance preferred lancets. metFORMIN 1,000 mg tablet Commonly known as:  GLUCOPHAGE  
TAKE 1 TABLET BY MOUTH TWICE DAILY WITH MEALS  Indications: PREVENTION OF TYPE 2 DIABETES MELLITUS  
  
 pravastatin 20 mg tablet Commonly known as:  PRAVACHOL Take 1 Tab by mouth nightly. tiotropium 18 mcg inhalation capsule Commonly known as:  [address] INHALE THE CONTENTS OF 1 CAPSULE THROUGH HANDIHALER DEVICE DAILY  
  
 vitamin k 100 mcg tablet Take 1 Tab by mouth daily. warfarin 5 mg tablet Commonly known as:  COUMADIN  
 Take 7.5 mg by mouth daily. * Notice: This list has 2 medication(s) that are the same as other medications prescribed for you. Read the directions carefully, and ask your doctor or other care provider to review them with you. Prescriptions Sent to Pharmacy Refills Lancets misc 11 Sig: Check blood sugar 1 times daily. May substitute for insurance preferred lancets. Class: Normal  
 Pharmacy: [address] [place] [address] [employer] [address], [address] Ph #: [phone]  
 glucose blood VI test strips (BLOOD GLUCOSE TEST) strip 11 Sig: Check blood sugar 1 times daily: E11.9, may substitute for insurance preferred strips. Class: Normal  
 Pharmacy: [address] [place] [address] [address] Ph #: [phone]  
 vitamin k 100 mcg tablet 0 Sig: Take 1 Tab by mouth daily. Class: Normal  
 Pharmacy: [address] , [address] [place] [address] Ph #: [phone] Route: Oral  
  
We Performed the Following AMB POC GLUCOSE BLOOD, BY GLUCOSE MONITORING DEVICE [88651 CPT(R)] AMB POC PT/INR [68429 CPT(R)] Follow-up Instructions Return in about 2 days (around 6/2/2017) for INR check. 4 weeks f/u. To-Do List   
 06/01/2017 To Be Determined Appointment with Cory Simpson at [place] [address]  
  
 06/05/2017 To Be Determined Appointment with Cory Simpson at [address] [place] [address]  
  
 06/08/2017 To Be Determined Appointment with Cory Simpson at [address] [address] Patient Instructions Soft-Textured, [place] Diet: Care Instructions Your Care Instructions A soft-textured, bland diet is used when you need food that is easy to chew, swallow, and digest. You will need to choose soft foods that are low in spices and seasonings. You will need to avoid high-fat foods, as well as caffeine and alcohol. Your doctor or dietitian can help you plan a soft-textured, bland diet based on your health and what you prefer to eat. Ask your doctor how long you should stay on this diet. As you get better, you will probably be able to go back to a regular diet. Talk with your doctor or dietitian before you make changes in your diet. Follow-up care is a key part of your treatment and safety. Be sure to make and go to all appointments, and call your doctor if you are having problems. Its also a good idea to know your test results and keep a list of the medicines you take. How can you care for yourself at home? · Choose foods that are easy to chew and swallow. Good choices are mashed potatoes, soft breads and rolls, cream soups, oatmeal, and Cream of Wheat. · Choose soft, well-cooked vegetables and soft or canned fruits. Good choices are applesauce, ripe bananas, and non-citrus fruit juice. · Try milk, yogurt, or other milk products, if you can digest dairy without too many problems. Your doctor may limit milk and milk products for a while. If so, he or she may recommend a calcium and vitamin D supplement. · Choose soft protein foods such as eggs, tofu, steamed fish, chicken, and turkey. Slow-cooking methods, such as stewing, will help soften meat. Chopping meat in a  or  also will make it easier to eat. · Avoid nuts, raw vegetables, hard crackers, tough meats, and prunes and prune juice. · Avoid foods that are very spicy, such as foods seasoned with black pepper, chili peppers, horseradish, or hot sauce. · Avoid highly acidic foods such as citrus fruits, citrus fruit juices, and tomato-based foods. · Avoid high-fat foods such as fried meat, chips, and rich desserts. · Check with your doctor before you drink alcohol or beverages that have caffeine, such as coffee, tea, and cola beverages. Where can you learn more? Go to http://aguila.info/. Enter Y805 in the search box to learn more about \"Soft-Textured, [place] Diet: Care Instructions. \" Current as of: 2016 Content Version: 11.2 © 4584-3838 IDINCU. Care instructions adapted under license by [employer] (which disclaims liability or warranty for this information). If you have questions about a medical condition or this instruction, always ask your healthcare professional. [place] [address] any warranty or liability for your use of this information. Phytonadione (By injection) Phytonadione (fye-toe-na-DYE-one) Prevents bleeding problems by helping blood clot better. Also treats or prevents bleeding problems in  infants. Brand Name(s): Vitamin K1 Novaplus There may be other brand names for this medicine. When This Medicine Should Not Be Used: This medicine is not right for everyone. Do not use this medicine if you had an allergic reaction to phytonadione or vitamin K. How to Use This Medicine:  
Injectable · Your doctor will prescribe your exact dose and tell you how often it should be given. This medicine is given as a shot under your skin, into a muscle, or into a vein. · A nurse or other health provider will give you this medicine. · You may be taught how to give your medicine at home. Make sure you understand all instructions before giving yourself an injection. Do not use more medicine or use it more often than your doctor tells you to. · Use a new needle and syringe each time you inject your medicine. · Missed dose: Call your doctor or pharmacist for instructions. · If you store this medicine at home, keep it at room temperature, away from heat and direct light. · Throw away used needles in a hard, closed container that the needles cannot poke through. Keep this container away from children and pets. Drugs and Foods to Avoid: Ask your doctor or pharmacist before using any other medicine, including over-the-counter medicines, vitamins, and herbal products. · Some medicines and foods can affect how this medicine works. Tell your doctor if you are also using a blood thinner, including warfarin. Warnings While Using This Medicine: · Tell your doctor if you are pregnant or breastfeeding, or if you have liver disease or kidney disease. Tell your doctor if you had problems with blot clotting too much, such as a stroke or an embolism. · Your doctor will do lab tests at regular visits to check on the effects of this medicine. Keep all appointments. · You might also be given plasma or other blood products in addition to vitamin K. 
· Keep all medicine out of the reach of children. Never share your medicine with anyone. Possible Side Effects While Using This Medicine:  
Call your doctor right away if you notice any of these side effects: · Allergic reaction: Itching or hives, swelling in your face or hands, swelling or tingling in your mouth or throat, chest tightness, trouble breathing · Chest pain, sudden headache, trouble breathing, or pain in your leg · Lightheadedness, dizziness, or fainting If you notice these less serious side effects, talk with your doctor: · Change in sense of taste · Redness, pain, swelling, or a hard lump where the injection was given · Warmth or redness in your face, neck, arms, or upper chest 
If you notice other side effects that you think are caused by this medicine, tell your doctor. Call your doctor for medical advice about side effects. You may report side effects to FDA at 6-769-FDA-7793 © 2017 [facility] Information is for End User's use only and may not be sold, redistributed or otherwise used for commercial purposes. The above information is an  only. It is not intended as medical advice for individual conditions or treatments.  Talk to your doctor, nurse or pharmacist before following any medical regimen to see if it is safe and effective for you. Introducing [facility]! Dear Sherron Alves: Thank you for requesting a [employer] account. Our records indicate that you already have an active [employer] account. You can access your account anytime at [email or web address] "[email or web address]"/Relify Did you know that you can access your hospital and ER discharge instructions at any time in [employer]? You can also review all of your test results from your hospital stay or ER visit. Additional Information If you have questions, please visit the Frequently Asked Questions section of the [employer] website at [email or web address] Remember, [employer] is NOT to be used for urgent needs. For medical emergencies, dial 911. Now available from your iPhone and Android! Please provide this summary of care documentation to your next provider. Your primary care clinician is listed as CAROLINA Oates. If you have any questions after today's visit, please call [phone].

## 2017-06-01 PROCEDURE — 3331090001 HH PPS REVENUE CREDIT

## 2017-06-01 PROCEDURE — 3331090002 HH PPS REVENUE DEBIT

## 2017-06-02 ENCOUNTER — CLINICAL SUPPORT (OUTPATIENT)
Dept: INTERNAL MEDICINE CLINIC | Age: 67
End: 2017-06-02

## 2017-06-02 DIAGNOSIS — Z79.01 ANTICOAGULATION MONITORING, INR RANGE 2-3: Primary | ICD-10-CM

## 2017-06-02 LAB
INR BLD: 2.7
PT POC: 32.5 SECONDS
VALID INTERNAL CONTROL?: YES

## 2017-06-02 PROCEDURE — 3331090001 HH PPS REVENUE CREDIT

## 2017-06-02 PROCEDURE — 3331090002 HH PPS REVENUE DEBIT

## 2017-06-02 NOTE — PROGRESS NOTES
Pt is here for   Chief Complaint   Patient presents with    Coagulation disorder     INR check     Pt INR on today visit was 2.7, it was 8.0 on the last visit  Lab Results   Component Value Date/Time    INR 1.8 05/22/2017 03:45 AM    INR 2.2 05/21/2017 04:04 AM    INR 1.9 05/20/2017 02:45 AM    INR POC 2.7 06/02/2017 11:02 AM    INR POC 8.0 05/31/2017 10:16 AM    INR POC 4.4 05/25/2017 04:06 PM      Pt was instructed to resume warfarin at 5mg, and to watch for signs of bleeding. And to return on Monday for INR check to determine if medication needs to be adjusted.

## 2017-06-03 ENCOUNTER — HOME CARE VISIT (OUTPATIENT)
Dept: SCHEDULING | Facility: HOME HEALTH | Age: 67
End: 2017-06-03
Payer: COMMERCIAL

## 2017-06-03 PROCEDURE — 3331090002 HH PPS REVENUE DEBIT

## 2017-06-03 PROCEDURE — 3331090003 HH PPS REVENUE ADJ

## 2017-06-03 PROCEDURE — 3331090001 HH PPS REVENUE CREDIT

## 2017-06-05 ENCOUNTER — HOME CARE VISIT (OUTPATIENT)
Dept: HOME HEALTH SERVICES | Facility: HOME HEALTH | Age: 67
End: 2017-06-05
Payer: COMMERCIAL

## 2017-06-06 ENCOUNTER — CLINICAL SUPPORT (OUTPATIENT)
Dept: INTERNAL MEDICINE CLINIC | Age: 67
End: 2017-06-06

## 2017-06-06 ENCOUNTER — PATIENT OUTREACH (OUTPATIENT)
Dept: CARDIOLOGY CLINIC | Age: 67
End: 2017-06-06

## 2017-06-06 DIAGNOSIS — Z79.01 ANTICOAGULATION MONITORING, INR RANGE 2-3: ICD-10-CM

## 2017-06-06 DIAGNOSIS — I48.91 ATRIAL FIBRILLATION, UNSPECIFIED TYPE (HCC): Primary | Chronic | ICD-10-CM

## 2017-06-06 LAB
INR BLD: 3
PT POC: 19.6 SECONDS
VALID INTERNAL CONTROL?: YES

## 2017-06-06 NOTE — PROGRESS NOTES
This note will not be viewable in [address] Called pt to follow up on hospital visit. Pt reports that she is doing well. Pt states that she is at work on light duty. Pt reports that her wt continues to decrease. Pt states that she is following a low Na diet \"to a jagdeep. \"  Pt reports that she is feeling well and walking around that building at work in the morning. Pt states that she has no needs currently. Pt reminded of follow up appt with Dr. Asad Huerta on 6/16/17. Pt states that I may continue to follow.

## 2017-06-06 NOTE — PROGRESS NOTES
Pt advised to hold for today since level 3.0 following Vit K use and holding x 2 days and resume tomorrow at 5 mg daily. Return on Friday for INR check. No signs of bleeding or dietary changes reported.

## 2017-06-14 ENCOUNTER — CLINICAL SUPPORT (OUTPATIENT)
Dept: INTERNAL MEDICINE CLINIC | Age: 67
End: 2017-06-14

## 2017-06-14 DIAGNOSIS — R79.1 SUPRATHERAPEUTIC INR: ICD-10-CM

## 2017-06-14 DIAGNOSIS — R79.1 ABNORMAL INR: Primary | ICD-10-CM

## 2017-06-14 LAB
INR BLD: NORMAL
PT POC: 8 SECONDS
VALID INTERNAL CONTROL?: YES

## 2017-06-14 RX ORDER — ZINC GLUCONATE 100 MG
100 TABLET ORAL DAILY
Qty: 2 TAB | Refills: 0 | Status: SHIPPED | OUTPATIENT
Start: 2017-06-14 | End: 2017-07-31 | Stop reason: ALTCHOICE

## 2017-06-14 NOTE — PROGRESS NOTES
Pt with elevated INR of 8.0 taking 5 mg of coumadin as advised. No additional dietary or med changes reported. No signs of bleeding reported either. Advised pt to HOLD med x 2 days and TAKE VITAMIN K, return in 2 days. Will likely resume at 2mg coumadin tabs and see if insurance will cover xarelto or eliquis instead.

## 2017-06-15 DIAGNOSIS — Z79.01 ANTICOAGULANT LONG-TERM USE: ICD-10-CM

## 2017-06-15 DIAGNOSIS — I48.91 ATRIAL FIBRILLATION, UNSPECIFIED TYPE (HCC): Primary | Chronic | ICD-10-CM

## 2017-06-16 ENCOUNTER — CLINICAL SUPPORT (OUTPATIENT)
Dept: INTERNAL MEDICINE CLINIC | Age: 67
End: 2017-06-16

## 2017-06-16 ENCOUNTER — OFFICE VISIT (OUTPATIENT)
Dept: CARDIOLOGY CLINIC | Age: 67
End: 2017-06-16

## 2017-06-16 VITALS
SYSTOLIC BLOOD PRESSURE: 104 MMHG | DIASTOLIC BLOOD PRESSURE: 58 MMHG | OXYGEN SATURATION: 93 % | HEART RATE: 76 BPM | HEIGHT: 66 IN | RESPIRATION RATE: 18 BRPM | BODY MASS INDEX: 27.1 KG/M2 | WEIGHT: 168.6 LBS

## 2017-06-16 DIAGNOSIS — I50.22 CHRONIC SYSTOLIC HF (HEART FAILURE) (HCC): ICD-10-CM

## 2017-06-16 DIAGNOSIS — I48.91 ATRIAL FIBRILLATION, UNSPECIFIED TYPE (HCC): Primary | Chronic | ICD-10-CM

## 2017-06-16 DIAGNOSIS — I50.22 CHRONIC SYSTOLIC CONGESTIVE HEART FAILURE (HCC): ICD-10-CM

## 2017-06-16 DIAGNOSIS — Z79.01 ANTICOAGULATION MONITORING, INR RANGE 2-3: Primary | ICD-10-CM

## 2017-06-16 DIAGNOSIS — I47.1 JUNCTIONAL TACHYCARDIA (HCC): ICD-10-CM

## 2017-06-16 DIAGNOSIS — I50.21 SYSTOLIC CHF, ACUTE (HCC): ICD-10-CM

## 2017-06-16 DIAGNOSIS — I50.21 ACUTE SYSTOLIC CHF (CONGESTIVE HEART FAILURE) (HCC): ICD-10-CM

## 2017-06-16 LAB
INR BLD: 7.7
PT POC: NORMAL SECONDS
VALID INTERNAL CONTROL?: YES

## 2017-06-16 RX ORDER — WARFARIN SODIUM 5 MG/1
5 TABLET ORAL DAILY
COMMUNITY
Start: 2017-05-31 | End: 2017-07-19

## 2017-06-16 RX ORDER — PREDNISONE 10 MG/1
TABLET ORAL
COMMUNITY
Start: 2017-05-22 | End: 2017-06-16 | Stop reason: ALTCHOICE

## 2017-06-16 RX ORDER — LANCETS 33 GAUGE
EACH MISCELLANEOUS
COMMUNITY
Start: 2017-06-01 | End: 2017-09-15

## 2017-06-16 RX ORDER — AMIODARONE HYDROCHLORIDE 200 MG/1
200 TABLET ORAL 2 TIMES DAILY
Qty: 60 TAB | Refills: 0 | Status: SHIPPED | OUTPATIENT
Start: 2017-06-16 | End: 2017-07-25 | Stop reason: SDUPTHER

## 2017-06-16 RX ORDER — FUROSEMIDE 40 MG/1
TABLET ORAL
Qty: 30 TAB | Refills: 0 | Status: SHIPPED | OUTPATIENT
Start: 2017-06-16 | End: 2017-07-16 | Stop reason: SDUPTHER

## 2017-06-16 RX ORDER — CARVEDILOL 6.25 MG/1
6.25 TABLET ORAL 2 TIMES DAILY WITH MEALS
Qty: 60 TAB | Refills: 11 | Status: SHIPPED | OUTPATIENT
Start: 2017-06-16 | End: 2018-06-19 | Stop reason: SDUPTHER

## 2017-06-16 NOTE — PROGRESS NOTES
NAME:  Alexei Brooks   :   1950   MRN:   [id number]   PCP:  Malissa Mcfadden NP           Subjective: The patient is a 73y.o. year old female  who returns for a routine follow-up on CHF, ASHD. Since the last visit, patient reports no change in exercise tolerance, chest pain, edema, medication intolerance, palpitations, shortness of breath, PND/orthopnea wheezing, sputum, syncope, dizziness or light headedness. Notes sinus drainage and allergies. Using inhaler and nasal steroids. PCP would like to change her coumadin to [address] [place] [address] if ok with cardiology. Past Medical History:   Diagnosis Date    Atrial fibrillation ([address] 2010    CAD (coronary artery disease)     stent    Chronic diastolic heart failure ([address] 2014    Chronic systolic HF (heart failure) ([address] 5/10/2017    2017 EF 25-30%    COPD     COPD (chronic obstructive pulmonary disease) ([address] 2010    Diabetes ([address]     Fibroid     Heart failure (HCC)     History of Clostridium difficile infection 5/10/2017    2017 CDiff positive    Hypertension 2010    Hypotension 5/10/2017    Junctional tachycardia ([address] 5/10/2017    NIDDM (non-insulin dependent diabetes mellitus) 2010    Screening mammogram 5/4/10    SOB (shortness of breath) 2014       Social History   Substance Use Topics    Smoking status: Former Smoker     Types: Cigarettes     Quit date: 2009    Smokeless tobacco: Never Used    Alcohol use No      Family History   Problem Relation Age of Onset    Heart Disease Mother     Diabetes Father     Heart Disease Brother         Review of Systems  Constitutional: Negative for fever, chills, and diaphoresis. Respiratory: Negative for cough, hemoptysis, sputum production, shortness of breath and wheezing. Cardiovascular: Negative for chest pain, palpitations, orthopnea, claudication, leg swelling and PND. Gastrointestinal: Negative for heartburn, nausea, vomiting, blood in stool and melena. Genitourinary: Negative for dysuria and flank pain. Musculoskeletal: Negative for joint pain and back pain. Skin: Negative for rash. Neurological: Negative for focal weakness, seizures, loss of consciousness, weakness and headaches. Endo/Heme/Allergies: Does not bruise/bleed easily. Psychiatric/Behavioral: Negative for memory loss. The patient does not have insomnia. Objective:       Vitals:    06/16/17 0856 06/16/17 0913 06/16/17 0914   BP: 112/58 102/62 104/58   Pulse: 76     Resp: 18     SpO2: 93%     Weight: 168 lb 9.6 oz (76.5 kg)     Height: 5' 5.5\" (1.664 m)      Body mass index is 27.63 kg/(m^2). General PE    Gen: NAD     Mental Status - Alert. General Appearance - Not in acute distress. Neck - no JVD     Chest and Lung Exam     Inspection: Accessory muscles - No use of accessory muscles in breathing. Auscultation:   Breath sounds: - Normal.     Cardiovascular   Inspection: Jugular vein - Bilateral - Inspection Normal.   Palpation/Percussion:   Apical Impulse: - Normal.   Auscultation: Rhythm - Regular. Heart Sounds - S1 WNL and S2 WNL. No S3 or S4. Murmurs & Other Heart Sounds: Auscultation of the heart reveals - No Murmurs. Peripheral Vascular   Upper Extremity: Inspection - Bilateral - No Cyanotic nailbeds or Digital clubbing. Lower Extremity:   Palpation: Edema - Bilateral - No edema. Abdomen: Soft, non-tender, bowel sounds are active. Neuro: A&O times 3, CN and motor grossly WNL      Data Review:     EKG -  Sinus  Rhythm   -Left bundle branch block. Allergies reviewed  Allergies   Allergen Reactions    Crestor [Rosuvastatin] Myalgia    Levaquin [Levofloxacin] Nausea Only     GI Upset    Lipitor [Atorvastatin] Diarrhea    Lyrica [Pregabalin] Myalgia       Medications reviewed  Current Outpatient Prescriptions   Medication Sig    ONE TOUCH DELICA 33 gauge misc     warfarin (COUMADIN) 5 mg tablet Take 5 mg by mouth daily.     furosemide (LASIX) 40 mg tablet Take 40 mg daily    amiodarone (CORDARONE) 200 mg tablet Take 1 Tab by mouth two (2) times a day.  vitamin k 100 mcg tablet Take 1 Tab by mouth daily.  Lancets misc Check blood sugar 1 times daily. May substitute for insurance preferred lancets.  glucose blood VI test strips (BLOOD GLUCOSE TEST) strip Check blood sugar 1 times daily: E11.9, may substitute for insurance preferred strips.  clopidogrel (PLAVIX) 75 mg tab Take 1 Tab by mouth daily.  carvedilol (COREG) 6.25 mg tablet Take 1 Tab by mouth two (2) times daily (with meals).  pravastatin (PRAVACHOL) 20 mg tablet Take 1 Tab by mouth nightly.  fluticasone (FLONASE) 50 mcg/actuation nasal spray 2 Sprays by Both Nostrils route every evening.  gabapentin (NEURONTIN) 800 mg tablet TAKE ONE TABLET BY MOUTH THREE TIMES DAILY    Blood-Glucose Meter monitoring kit Check blood sugar daily. May substitute for insurance preferred meter    tiotropium (SPIRIVA WITH HANDIHALER) 18 mcg inhalation capsule INHALE THE CONTENTS OF 1 CAPSULE THROUGH HANDIHALER DEVICE DAILY    metFORMIN (GLUCOPHAGE) 1,000 mg tablet TAKE 1 TABLET BY MOUTH TWICE DAILY WITH MEALS  Indications: PREVENTION OF TYPE 2 DIABETES MELLITUS    colchicine 0.6 mg tablet Take 1 Tab by mouth two (2) times a day. (Patient taking differently: Take 0.6 mg by mouth two (2) times a day. Indications: GOUT)    budesonide-formoterol (SYMBICORT) 160-4.5 mcg/actuation HFA inhaler Take 1 Puff by inhalation two (2) times a day.  albuterol (PROVENTIL VENTOLIN) 2.5 mg /3 mL (0.083 %) nebulizer solution 3 mL by Nebulization route every four (4) hours as needed for Wheezing.  FOLIC ACID/MULTIVIT-MIN/LUTEIN (CENTRUM SILVER PO) Take 1 Tab by mouth daily. Takes one po daily.  albuterol (VENTOLIN HFA) 90 mcg/actuation inhaler Take 2 Puffs by inhalation every six (6) hours as needed for Wheezing.     Azelastine (ASTEPRO) 0.15 % (205.5 mcg) nasal spray 1 [place] by Both Nostrils route daily.    cod liver oil cap Take 1 Cap by mouth daily.  apixaban (ELIQUIS) 2.5 mg tablet Take 1 Tab by mouth two (2) times a day. Indications: PREVENT THROMBOEMBOLISM IN CHRONIC ATRIAL FIBRILLATION     No current facility-administered medications for this visit. Assessment:       ICD-10-CM ICD-9-CM    1. Atrial fibrillation, unspecified type (HCC) I48.91 427.31 AMB POC EKG ROUTINE W/ 12 LEADS, INTER & REP      2D ECHO COMPLETE ADULT (TTE) W OR WO CONTR   2. Chronic systolic HF (heart failure) (HCC) I50.22 428.22 2D ECHO COMPLETE ADULT (TTE) W OR WO CONTR   3. Junctional tachycardia (HCC) I47.1 427.89 2D ECHO COMPLETE ADULT (TTE) W OR WO CONTR   4. Systolic CHF, acute (HCC) I50.21 428.21 2D ECHO COMPLETE ADULT (TTE) W OR WO CONTR     428.0    5. Acute systolic CHF (congestive heart failure) (HCC) I50.21 428.21 2D ECHO COMPLETE ADULT (TTE) W OR WO CONTR     428.0    6. Chronic systolic congestive heart failure (HCC) I50.22 428.22 2D ECHO COMPLETE ADULT (TTE) W OR WO CONTR     428.0         Orders Placed This Encounter    AMB POC EKG ROUTINE W/ 12 LEADS, INTER & REP     Order Specific Question:   Reason for Exam:     Answer:   Routine    2D ECHO COMPLETE ADULT (TTE) W OR WO CONTR     Standing Status:   Future     Standing Expiration Date:   12/16/2017     Order Specific Question:   Reason for Exam:     Answer:   EF 35%, wearing life vest 90 days     Order Specific Question:   Contrast Enhancement (Bubble Study, Definity, Optison) may be used if criteria listed in established evidence-based protocol has been identified. Answer: Yes    ONE TOUCH DELICA 33 gauge misc    DISCONTD: predniSONE (DELTASONE) 10 mg tablet    warfarin (COUMADIN) 5 mg tablet     Sig: Take 5 mg by mouth daily.  furosemide (LASIX) 40 mg tablet     Sig: Take 40 mg daily     Dispense:  30 Tab     Refill:  0    amiodarone (CORDARONE) 200 mg tablet     Sig: Take 1 Tab by mouth two (2) times a day.      Dispense:  60 Tab Refill:  0       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 J44.9    Hip pain M25.559    Hypokalemia E87.6    S/P angioplasty with stent Z95.9    Sick sinus syndrome ([facility]) I49.5    Anemia D64.9    Chest pain R07.9    Sinoatrial node dysfunction ([facility]) I49.5    Cardiac pacemaker in situ Z95.0    Mixed hyperlipidemia E78.2    Back pain M54.9    DNR (do not resuscitate) Z66    S/P coronary artery stent placement L52.7    Diastolic CHF, acute on chronic ([facility]) I50.33    Type 2 diabetes mellitus with diabetic neuropathy, without long-term current use of insulin ([facility]) E11.40    Anticoagulation monitoring, INR range 2-3 Z79.01    CHF (congestive heart failure) ([facility]) J06.0    Systolic CHF, acute ([facility]) S22.79    Acute systolic CHF (congestive heart failure) ([facility]) I50.21    Fear associated with illness and body function F40.8    Counseling regarding advanced care planning and goals of care Z71.89    S/P ablation of atrial fibrillation Z98.890, Z86.79    Tachycardia R00.0    Chronic systolic HF (heart failure) ([facility]) I50.22    History of Clostridium difficile infection Z86.19    Junctional tachycardia ([facility]) I47.1    Hypotension I95.9       Plan:     Patient presents for follow up. Junctional tachycardia/afib with Afib ablation on 5/1/17- remains in SR today  Continue on amiodarone 200mg daily and Coreg   On coumadin, INR 2-3. Ok to change to [facility] - cost was an issue in the past.       Chronic systolic and diastolic HF:  Stable, Lasix 40mg daily  Consider starting Entresto once pulm problems resolve - has follow up CT with pulmonary soon. Diagnosed with systolic HF 6/5/58, repeat echo EF <35%  S/p PCI 4/7/17 had hoped for improvement in EF  AICD candidate in July 2017 if EF remains <35% - Echo 7/5/17 or later.    Lifevest in the interim      CAD:  S/p PCI 4/5/17, continue on Plavix, BB, statin      COPD/Emphysema  Managed by Pulmonary     Deborah Mehta, [address] [facility]    6/16/2017         Agree with note as outlined by  NP. I confirm findings in history and physical exam. No additional findings noted. Agree with plan as outlined above.      Efrain Kayser, MD

## 2017-06-16 NOTE — PROGRESS NOTES
Pt is off coumadin, is taking vitamin K. Slight bleeding in gums but none elsewhere. She has been approved for eliquis. Informed her to cont holding coumadin, take vitamin k recheck INR on Tuesday.  Will start eliquis when INR <2

## 2017-06-16 NOTE — MR AVS SNAPSHOT
Visit Information Date & Time Provider Department Dept. Phone Encounter #  
 6/16/2017  9:00 AM [address], MD [facility] [phone] [id number] Upcoming Health Maintenance Date Due  
 EYE EXAM RETINAL OR DILATED Q1 6/25/2016 MICROALBUMIN Q1 3/22/2017 FOOT EXAM Q1 6/10/2017 INFLUENZA AGE 9 TO ADULT 8/1/2017 HEMOGLOBIN A1C Q6M 10/17/2017 FOBT Q 1 YEAR AGE 50-75 10/31/2017 MEDICARE YEARLY EXAM 11/1/2017 LIPID PANEL Q1 1/31/2018 GLAUCOMA SCREENING Q2Y 7/28/2018 BREAST CANCER SCRN MAMMOGRAM 2/23/2019 DTaP/Tdap/Td series (2 - Td) 7/16/2026 Allergies as of 6/16/2017  Review Complete On: 6/16/2017 By: Ar Llamas Severity Noted Reaction Type Reactions Crestor [Rosuvastatin]  10/31/2016   Side Effect Myalgia Levaquin [Levofloxacin]  12/07/2015   Intolerance Nausea Only GI Upset Lipitor [Atorvastatin]  04/17/2017   Side Effect Diarrhea Lyrica [Pregabalin]  10/31/2016   Side Effect Myalgia Current Immunizations  Reviewed on 10/31/2016 Name Date H1N1 FLU VACCINE 10/20/2009 Influenza High Dose Vaccine PF 10/31/2016 Influenza Vaccine (Madin June Canine Kidney) PF 9/23/2014 11:26 AM  
 Influenza Vaccine Intradermal PF 11/27/2015 Influenza Vaccine Split 9/22/2011  6:25 AM  
 Influenza Vaccine Whole 10/20/2009 Pneumococcal Polysaccharide (PPSV-23) 6/25/2015 Pneumococcal Vaccine (Unspecified Type) 10/20/2009 Tdap 7/16/2016 11:47 PM  
  
 Not reviewed this visit You Were Diagnosed With   
  
 Codes Comments Atrial fibrillation, unspecified type ([facility]    -  Primary ICD-10-CM: I48.91 
ICD-9-CM: 427.31 Chronic systolic HF (heart failure) (HCC)     ICD-10-CM: I50.22 ICD-9-CM: 428.22   
 Junctional tachycardia ([facility]     ICD-10-CM: I47.1 ICD-9-CM: 427.89 Systolic CHF, acute ([facility]     ICD-10-CM: I50.21 ICD-9-CM: 428.21, 428.0 Acute systolic CHF (congestive heart failure) (HCC)     ICD-10-CM: I50.21 ICD-9-CM: 428.21, 428.0 Chronic systolic congestive heart failure (HCC)     ICD-10-CM: I50.22 ICD-9-CM: 428.22, 428.0 Vitals BP Pulse Resp Height(growth percentile) Weight(growth percentile) SpO2  
 104/58 (BP 1 Location: Left arm, BP Patient Position: Standing) 76 18 5' 5.5\" (1.664 m) 168 lb 9.6 oz (76.5 kg) 93% BMI OB Status Smoking Status 27.63 kg/m2 Postmenopausal Former Smoker Vitals History BMI and BSA Data Body Mass Index Body Surface Area  
 27.63 kg/m 2 1.88 m 2 Preferred Pharmacy Pharmacy Name Phone [facility] [address], [address] [place] [address] Avenue [phone] Your Updated Medication List  
  
   
This list is accurate as of: 6/16/17 10:12 AM.  Always use your most recent med list.  
  
  
  
  
 * albuterol 90 mcg/actuation inhaler Commonly known as:  VENTOLIN HFA Take 2 Puffs by inhalation every six (6) hours as needed for Wheezing. * albuterol 2.5 mg /3 mL (0.083 %) nebulizer solution Commonly known as:  PROVENTIL VENTOLIN  
3 mL by Nebulization route every four (4) hours as needed for Wheezing. amiodarone 200 mg tablet Commonly known as:  CORDARONE Take 1 Tab by mouth two (2) times a day. apixaban 2.5 mg tablet Commonly known as:  Pasty Ramal Take 1 Tab by mouth two (2) times a day. Indications: PREVENT THROMBOEMBOLISM IN CHRONIC ATRIAL FIBRILLATION Azelastine 0.15 % (205.5 mcg) nasal spray Commonly known as:  ASTEPRO  
1 Spray by Both Nostrils route daily. Blood-Glucose Meter monitoring kit Check blood sugar daily. May substitute for insurance preferred meter  
  
 budesonide-formoterol 160-4.5 mcg/actuation HFA inhaler Commonly known as:  SYMBICORT Take 1 Puff by inhalation two (2) times a day. carvedilol 6.25 mg tablet Commonly known as:  Mera Hartley  
 Take 1 Tab by mouth two (2) times daily (with meals). CENTRUM SILVER PO Take 1 Tab by mouth daily. Takes one po daily. clopidogrel 75 mg Tab Commonly known as:  PLAVIX Take 1 Tab by mouth daily. cod liver oil Cap Take 1 Cap by mouth daily. colchicine 0.6 mg tablet Take 1 Tab by mouth two (2) times a day. FLONASE 50 mcg/actuation nasal spray Generic drug:  fluticasone 2 Sprays by Both Nostrils route every evening. furosemide 40 mg tablet Commonly known as:  LASIX Take 40 mg daily  
  
 gabapentin 800 mg tablet Commonly known as:  NEURONTIN  
TAKE ONE TABLET BY MOUTH THREE TIMES DAILY  
  
 glucose blood VI test strips strip Commonly known as:  blood glucose test  
Check blood sugar 1 times daily: E11.9, may substitute for insurance preferred strips. * Lancets Misc Check blood sugar 1 times daily. May substitute for insurance preferred lancets. * One Touch Delica 33 gauge Misc Generic drug:  lancets  
  
 metFORMIN 1,000 mg tablet Commonly known as:  GLUCOPHAGE  
TAKE 1 TABLET BY MOUTH TWICE DAILY WITH MEALS  Indications: PREVENTION OF TYPE 2 DIABETES MELLITUS  
  
 pravastatin 20 mg tablet Commonly known as:  PRAVACHOL Take 1 Tab by mouth nightly. tiotropium 18 mcg inhalation capsule Commonly known as:  [address] INHALE THE CONTENTS OF 1 CAPSULE THROUGH HANDIHALER DEVICE DAILY  
  
 vitamin k 100 mcg tablet Take 1 Tab by mouth daily. warfarin 5 mg tablet Commonly known as:  COUMADIN Take 5 mg by mouth daily. * Notice: This list has 4 medication(s) that are the same as other medications prescribed for you. Read the directions carefully, and ask your doctor or other care provider to review them with you. Prescriptions Sent to Pharmacy Refills  
 furosemide (LASIX) 40 mg tablet 0 Sig: Take 40 mg daily  Class: Normal  
 Pharmacy: [address], [address] Ph #: [phone]  
 amiodarone (CORDARONE) 200 mg tablet 0 Sig: Take 1 Tab by mouth two (2) times a day. Class: Normal  
 Pharmacy: [address], [address] Ph #: [phone] Route: Oral  
  
We Performed the Following AMB POC EKG ROUTINE W/ 12 LEADS, INTER & REP [19831 CPT(R)] To-Do List   
 Around 06/16/2017 ECHO:  2D ECHO COMPLETE ADULT (TTE) W OR WO CONTR   
  
 06/19/2017 8:30 AM  
  Appointment with [facility] at [facility] ([phone]) NON-CONTRAST STUDY: 1. Bring any non [facility] facility films/images pertaining to the area of interest with you on the day of appointment. 2. Check in at registration at least 30 minutes before appt time unless you were instructed to do otherwise. 3. If you have to drink oral contrast please pick it up any weekday prior to your appointment, if you cannot please check in 2 hrs  before appt time. Introducing [facility]! Dear Epifanio Swartz: Thank you for requesting a [employer] account. Our records indicate that you already have an active [employer] account. You can access your account anytime at [email or web address] Did you know that you can access your hospital and ER discharge instructions at any time in [employer]? You can also review all of your test results from your hospital stay or ER visit. Additional Information If you have questions, please visit the Frequently Asked Questions section of the [employer] website at [email or web address] Remember, [employer] is NOT to be used for urgent needs. For medical emergencies, dial 911. Now available from your iPhone and Android! Please provide this summary of care documentation to your next provider. Your primary care clinician is listed as CAROLINA Lindsey.  If you have any questions after today's visit, please call [phone].

## 2017-06-16 NOTE — PROGRESS NOTES
Chief Complaint   Patient presents with    Irregular Heart Beat     3 wk f/u    Shortness of Breath    Dizziness

## 2017-06-17 NOTE — PROGRESS NOTES
Vitamin k and 2 day hold advised. Pt was to return to clinic today for recheck. Sent order for eliquis start.

## 2017-06-19 ENCOUNTER — HOSPITAL ENCOUNTER (OUTPATIENT)
Dept: CT IMAGING | Age: 67
Discharge: HOME | End: 2017-06-19
Attending: INTERNAL MEDICINE
Payer: COMMERCIAL

## 2017-06-19 ENCOUNTER — TELEPHONE (OUTPATIENT)
Dept: CARDIOLOGY CLINIC | Age: 67
End: 2017-06-19

## 2017-06-19 DIAGNOSIS — R91.1 SOLITARY PULMONARY NODULE: ICD-10-CM

## 2017-06-19 PROCEDURE — 71250 CT THORAX DX C-: CPT

## 2017-06-19 NOTE — TELEPHONE ENCOUNTER
Pt called stating she has a lump on the inside of her thigh about the size of a quarter, not red, but hurts to touch, when walking it makes her limp. Did not hit it woke up Saturday morning with the lump, has not gone down at all. No other symptoms, advised pt to call PCP. Pt verbalized understanding.

## 2017-06-20 ENCOUNTER — CLINICAL SUPPORT (OUTPATIENT)
Dept: INTERNAL MEDICINE CLINIC | Age: 67
End: 2017-06-20

## 2017-06-20 ENCOUNTER — TELEPHONE (OUTPATIENT)
Dept: INTERNAL MEDICINE CLINIC | Age: 67
End: 2017-06-20

## 2017-06-20 DIAGNOSIS — Z79.01 ANTICOAGULANT LONG-TERM USE: ICD-10-CM

## 2017-06-20 DIAGNOSIS — I48.91 ATRIAL FIBRILLATION, UNSPECIFIED TYPE (HCC): Primary | Chronic | ICD-10-CM

## 2017-06-20 DIAGNOSIS — I48.91 ATRIAL FIBRILLATION, UNSPECIFIED TYPE (HCC): Chronic | ICD-10-CM

## 2017-06-20 DIAGNOSIS — Z79.01 ANTICOAGULATION MONITORING, INR RANGE 2-3: ICD-10-CM

## 2017-06-20 LAB
INR BLD: 1.2
PT POC: NORMAL SECONDS
VALID INTERNAL CONTROL?: YES

## 2017-06-20 NOTE — TELEPHONE ENCOUNTER
Pt states you told her to start the Eliquis today . The rx was sent to [facility] by Melanie . Can you please send a rx to [facility]  on [facility])  for her please. pt # [phone]    Pt called back stating she is cancelling rx from [address] [place] [address]  And she said [place]  Stated they never received the rx.  She only wants to get it from [facility] please

## 2017-06-20 NOTE — PROGRESS NOTES
Pt saw DEEPAK Smith to have INR tested. Lab Results   Component Value Date/Time    INR 1.8 05/22/2017 03:45 AM    INR 2.2 05/21/2017 04:04 AM    INR 1.9 05/20/2017 02:45 AM    INR POC 1.2 06/20/2017 12:56 PM    INR POC 7.7 06/16/2017 01:21 PM    INR POC 3.0 06/06/2017 09:25 AM    pt INR on 6/16/17 was 7.7, Deepak Smith instructed pt to hold off on taking Eliquis until INR is lower than 7.7. INR on today's visit was 1.2 and the patient was able to start taking Eliquis. Pt states she will  from the pharmacy today.

## 2017-06-22 NOTE — TELEPHONE ENCOUNTER
Pt returned my call she states she has not  the medicine at this time because she did not have a ride, pt states she will pick it up this evening and start taking it this evening , She will take it around 5:00 pm that when she was taking the warfarin, I advise patient the importance of starting the Eliquis and to let writer know when she has the medication Ezekiel Reina LPN

## 2017-06-29 ENCOUNTER — TELEPHONE (OUTPATIENT)
Dept: INTERNAL MEDICINE CLINIC | Age: 67
End: 2017-06-29

## 2017-07-10 ENCOUNTER — TELEPHONE (OUTPATIENT)
Dept: INTERNAL MEDICINE CLINIC | Age: 67
End: 2017-07-10

## 2017-07-10 ENCOUNTER — CLINICAL SUPPORT (OUTPATIENT)
Dept: CARDIOLOGY CLINIC | Age: 67
End: 2017-07-10

## 2017-07-10 DIAGNOSIS — I50.21 SYSTOLIC CHF, ACUTE (HCC): ICD-10-CM

## 2017-07-10 DIAGNOSIS — I50.22 CHRONIC SYSTOLIC CONGESTIVE HEART FAILURE (HCC): ICD-10-CM

## 2017-07-10 DIAGNOSIS — I50.22 CHRONIC SYSTOLIC HF (HEART FAILURE) (HCC): ICD-10-CM

## 2017-07-10 DIAGNOSIS — I50.21 ACUTE SYSTOLIC CHF (CONGESTIVE HEART FAILURE) (HCC): ICD-10-CM

## 2017-07-10 DIAGNOSIS — I48.91 ATRIAL FIBRILLATION, UNSPECIFIED TYPE (HCC): Chronic | ICD-10-CM

## 2017-07-10 DIAGNOSIS — I47.1 JUNCTIONAL TACHYCARDIA (HCC): ICD-10-CM

## 2017-07-10 NOTE — TELEPHONE ENCOUNTER
Honestly, she has taken the right step to help resolve her diarrhea. If she has NOT already, she should be sure to eat SMALL meals frequently; avoiding greasy, acidic, tomato-based foods. She may also try pepcid or similar agent to help settle her stomach until her visit with me on 7/20. Otherwise she may need to be seen sooner.

## 2017-07-10 NOTE — TELEPHONE ENCOUNTER
Patient called and stated that for the past couple of weeks she has been having diarrhea and today she vomited for the first time. She took imodium yesterday but she didn't go to the bathroom again until last night. She has lost about 6-7 pounds since this has happened. She would like to know what she can do. The contact number is  [phone]. She can be reached there until 4pm and after 4 pm she can be reached at [phone].

## 2017-07-11 NOTE — PROGRESS NOTES
Verified patient with two identifiers. Spoke with patient regarding  ECHO test results. Advised pt she can stop life vest.    Jennifer Courser, pt will need a follow up appt with Dr. Kristen Rodríguez, 4 weeks from last visit on 6/16 or as soon after.  Thanks

## 2017-07-19 ENCOUNTER — HOSPITAL ENCOUNTER (EMERGENCY)
Age: 67
Discharge: HOME | End: 2017-07-19
Attending: EMERGENCY MEDICINE
Payer: COMMERCIAL

## 2017-07-19 ENCOUNTER — APPOINTMENT (OUTPATIENT)
Dept: CT IMAGING | Age: 67
End: 2017-07-19
Attending: EMERGENCY MEDICINE
Payer: COMMERCIAL

## 2017-07-19 VITALS
DIASTOLIC BLOOD PRESSURE: 90 MMHG | HEIGHT: 66 IN | SYSTOLIC BLOOD PRESSURE: 108 MMHG | OXYGEN SATURATION: 95 % | BODY MASS INDEX: 25.39 KG/M2 | HEART RATE: 76 BPM | WEIGHT: 158 LBS | RESPIRATION RATE: 16 BRPM | TEMPERATURE: 98.3 F

## 2017-07-19 DIAGNOSIS — R10.84 ABDOMINAL PAIN, GENERALIZED: Primary | ICD-10-CM

## 2017-07-19 DIAGNOSIS — R11.2 NON-INTRACTABLE VOMITING WITH NAUSEA, UNSPECIFIED VOMITING TYPE: ICD-10-CM

## 2017-07-19 LAB
ALBUMIN SERPL BCP-MCNC: 3.2 G/DL (ref 3.5–5)
ALBUMIN/GLOB SERPL: 0.6 {RATIO} (ref 1.1–2.2)
ALP SERPL-CCNC: 163 U/L (ref 45–117)
ALT SERPL-CCNC: 79 U/L (ref 12–78)
ANION GAP BLD CALC-SCNC: 9 MMOL/L (ref 5–15)
AST SERPL W P-5'-P-CCNC: 110 U/L (ref 15–37)
ATRIAL RATE: 75 BPM
BASOPHILS # BLD AUTO: 0 K/UL (ref 0–0.1)
BASOPHILS # BLD: 0 % (ref 0–1)
BILIRUB SERPL-MCNC: 0.4 MG/DL (ref 0.2–1)
BUN SERPL-MCNC: 11 MG/DL (ref 6–20)
BUN/CREAT SERPL: 9 (ref 12–20)
CALCIUM SERPL-MCNC: 8.3 MG/DL (ref 8.5–10.1)
CALCULATED P AXIS, ECG09: 103 DEGREES
CALCULATED R AXIS, ECG10: -32 DEGREES
CALCULATED T AXIS, ECG11: 102 DEGREES
CHLORIDE SERPL-SCNC: 101 MMOL/L (ref 97–108)
CO2 SERPL-SCNC: 26 MMOL/L (ref 21–32)
CREAT SERPL-MCNC: 1.25 MG/DL (ref 0.55–1.02)
DIAGNOSIS, 93000: NORMAL
EOSINOPHIL # BLD: 0.3 K/UL (ref 0–0.4)
EOSINOPHIL NFR BLD: 4 % (ref 0–7)
ERYTHROCYTE [DISTWIDTH] IN BLOOD BY AUTOMATED COUNT: 14.8 % (ref 11.5–14.5)
GLOBULIN SER CALC-MCNC: 5.3 G/DL (ref 2–4)
GLUCOSE SERPL-MCNC: 106 MG/DL (ref 65–100)
HCT VFR BLD AUTO: 36.5 % (ref 35–47)
HGB BLD-MCNC: 11.9 G/DL (ref 11.5–16)
LYMPHOCYTES # BLD AUTO: 38 % (ref 12–49)
LYMPHOCYTES # BLD: 2.5 K/UL (ref 0.8–3.5)
MAGNESIUM SERPL-MCNC: 1.4 MG/DL (ref 1.6–2.4)
MCH RBC QN AUTO: 27.4 PG (ref 26–34)
MCHC RBC AUTO-ENTMCNC: 32.6 G/DL (ref 30–36.5)
MCV RBC AUTO: 84.1 FL (ref 80–99)
MONOCYTES # BLD: 0.3 K/UL (ref 0–1)
MONOCYTES NFR BLD AUTO: 4 % (ref 5–13)
NEUTS SEG # BLD: 3.5 K/UL (ref 1.8–8)
NEUTS SEG NFR BLD AUTO: 54 % (ref 32–75)
NRBC # BLD: 0 K/UL (ref 0–0.01)
NRBC BLD-RTO: 0 PER 100 WBC
P-R INTERVAL, ECG05: 258 MS
PLATELET # BLD AUTO: 313 K/UL (ref 150–400)
POTASSIUM SERPL-SCNC: 3.6 MMOL/L (ref 3.5–5.1)
PROT SERPL-MCNC: 8.5 G/DL (ref 6.4–8.2)
Q-T INTERVAL, ECG07: 462 MS
QRS DURATION, ECG06: 142 MS
QTC CALCULATION (BEZET), ECG08: 515 MS
RBC # BLD AUTO: 4.34 M/UL (ref 3.8–5.2)
SODIUM SERPL-SCNC: 136 MMOL/L (ref 136–145)
TROPONIN I SERPL-MCNC: <0.04 NG/ML
VENTRICULAR RATE, ECG03: 75 BPM
WBC # BLD AUTO: 6.6 K/UL (ref 3.6–11)

## 2017-07-19 PROCEDURE — 74177 CT ABD & PELVIS W/CONTRAST: CPT

## 2017-07-19 PROCEDURE — 85025 COMPLETE CBC W/AUTO DIFF WBC: CPT | Performed by: EMERGENCY MEDICINE

## 2017-07-19 PROCEDURE — 99285 EMERGENCY DEPT VISIT HI MDM: CPT

## 2017-07-19 PROCEDURE — 87045 FECES CULTURE AEROBIC BACT: CPT | Performed by: EMERGENCY MEDICINE

## 2017-07-19 PROCEDURE — 74011636320 HC RX REV CODE- 636/320: Performed by: EMERGENCY MEDICINE

## 2017-07-19 PROCEDURE — 96375 TX/PRO/DX INJ NEW DRUG ADDON: CPT

## 2017-07-19 PROCEDURE — 74011250636 HC RX REV CODE- 250/636: Performed by: EMERGENCY MEDICINE

## 2017-07-19 PROCEDURE — 84484 ASSAY OF TROPONIN QUANT: CPT | Performed by: EMERGENCY MEDICINE

## 2017-07-19 PROCEDURE — 36415 COLL VENOUS BLD VENIPUNCTURE: CPT | Performed by: EMERGENCY MEDICINE

## 2017-07-19 PROCEDURE — 80053 COMPREHEN METABOLIC PANEL: CPT | Performed by: EMERGENCY MEDICINE

## 2017-07-19 PROCEDURE — 96365 THER/PROPH/DIAG IV INF INIT: CPT

## 2017-07-19 PROCEDURE — 93005 ELECTROCARDIOGRAM TRACING: CPT

## 2017-07-19 PROCEDURE — 83735 ASSAY OF MAGNESIUM: CPT | Performed by: EMERGENCY MEDICINE

## 2017-07-19 RX ORDER — MAGNESIUM SULFATE HEPTAHYDRATE 40 MG/ML
2 INJECTION, SOLUTION INTRAVENOUS
Status: COMPLETED | OUTPATIENT
Start: 2017-07-19 | End: 2017-07-19

## 2017-07-19 RX ORDER — SODIUM CHLORIDE 0.9 % (FLUSH) 0.9 %
10 SYRINGE (ML) INJECTION
Status: COMPLETED | OUTPATIENT
Start: 2017-07-19 | End: 2017-07-19

## 2017-07-19 RX ORDER — FENTANYL CITRATE 50 UG/ML
50 INJECTION, SOLUTION INTRAMUSCULAR; INTRAVENOUS
Status: COMPLETED | OUTPATIENT
Start: 2017-07-19 | End: 2017-07-19

## 2017-07-19 RX ORDER — TRAMADOL HYDROCHLORIDE 50 MG/1
50 TABLET ORAL
Qty: 10 TAB | Refills: 0 | Status: SHIPPED | OUTPATIENT
Start: 2017-07-19 | End: 2017-07-29

## 2017-07-19 RX ORDER — ONDANSETRON 4 MG/1
4 TABLET, FILM COATED ORAL
Qty: 20 TAB | Refills: 0 | Status: SHIPPED | OUTPATIENT
Start: 2017-07-19 | End: 2017-08-15 | Stop reason: SDUPTHER

## 2017-07-19 RX ORDER — SODIUM CHLORIDE 9 MG/ML
50 INJECTION, SOLUTION INTRAVENOUS
Status: COMPLETED | OUTPATIENT
Start: 2017-07-19 | End: 2017-07-19

## 2017-07-19 RX ORDER — ONDANSETRON 2 MG/ML
4 INJECTION INTRAMUSCULAR; INTRAVENOUS
Status: COMPLETED | OUTPATIENT
Start: 2017-07-19 | End: 2017-07-19

## 2017-07-19 RX ADMIN — MAGNESIUM SULFATE HEPTAHYDRATE 2 G: 40 INJECTION, SOLUTION INTRAVENOUS at 14:47

## 2017-07-19 RX ADMIN — FENTANYL CITRATE 50 MCG: 50 INJECTION, SOLUTION INTRAMUSCULAR; INTRAVENOUS at 12:44

## 2017-07-19 RX ADMIN — Medication 10 ML: at 14:32

## 2017-07-19 RX ADMIN — SODIUM CHLORIDE 50 ML/HR: 900 INJECTION, SOLUTION INTRAVENOUS at 14:32

## 2017-07-19 RX ADMIN — IOPAMIDOL 100 ML: 755 INJECTION, SOLUTION INTRAVENOUS at 14:32

## 2017-07-19 RX ADMIN — ONDANSETRON 4 MG: 2 INJECTION INTRAMUSCULAR; INTRAVENOUS at 12:44

## 2017-07-19 NOTE — ED NOTES
Discharge instructions given to patient by Dr. Irvin Posada. Patient verbalized understanding of discharge instructions. Pt discharged without difficulty. Pt. Discharged in stable condition via ambulation , accompanied by ronny.

## 2017-07-19 NOTE — LETTER
[address] 
[facility] EMERGENCY DEPT 
[address] [place] [address] [id number] [phone] Work/School Note Date: 7/19/2017 To Whom It May concern: 
 
Gutierrez Brandon was seen and treated today in the emergency room by the following provider(s): 
Attending Provider: Kinsey Huerta MD.   
 
Gutierrez Brandon may return to work on 07/21/2017.  
 
Sincerely, 
 
 
 
 
Forrest Aquino RN

## 2017-07-19 NOTE — ED TRIAGE NOTES
Patient brought in by EMS after patient began feeling dizzy at work today. Patient reports being weak for a few weeks now. Patient was admitted to the hospital on May 27th and was diagnosed with C. Diff and CHF. Today, patient reports some nausea, vomiting, and diarrhea along with her dizziness.

## 2017-07-19 NOTE — DISCHARGE INSTRUCTIONS

## 2017-07-19 NOTE — ED PROVIDER NOTES
HPI Comments: Wilton Kramer is a 78 y.o. female with PMHx significant for DM, CHF, CAD, COPD, HTN, who presents via EMS to [facility] ED with cc of worsening N/V/D for the last month. Pt also complains of appetite loss and general abdominal pain. She states that she had been in the hospital twice and discharged home with abx which she had finished, but afterwards had worsening sxs. She scores her pain as an 8/10. Pt denies any blood in stool, fever, dysuria, hematuria, CP, or SOB. Erickson Ledesma NP    Social Hx: - smoking; - EtOH; - illicit drug use    There are no other complains, changes, or physical findings at this time. The history is provided by the patient. Past Medical History:   Diagnosis Date    Atrial fibrillation ([address] 2010    CAD (coronary artery disease)     stent    Chronic diastolic heart failure ([address] 2014    Chronic systolic HF (heart failure) ([address] 5/10/2017    2017 EF 25-30%    COPD     COPD (chronic obstructive pulmonary disease) ([address] 2010    Diabetes ([address]     Fibroid     Heart failure ([address]     History of Clostridium difficile infection 5/10/2017    2017 CDiff positive    Hypertension 2010    Hypotension 5/10/2017    Junctional tachycardia ([address] 5/10/2017    NIDDM (non-insulin dependent diabetes mellitus) 2010    Screening mammogram 5/4/10    SOB (shortness of breath) 2014       Past Surgical History:   Procedure Laterality Date    HX  SECTION      HX OTHER SURGICAL      adrenal gland removed    HX PACEMAKER      [place] EXCISE ADRENAL GLAND           Family History:   Problem Relation Age of Onset    Heart Disease Mother     Diabetes Father     Heart Disease Brother        Social History     Social History    Marital status:      Spouse name: N/A    Number of children: N/A    Years of education: N/A     Occupational History    Not on file.      Social History Main Topics    Smoking status: Former Smoker Types: Cigarettes     Quit date: 12/31/2009    Smokeless tobacco: Never Used    Alcohol use No    Drug use: No    Sexual activity: Not Currently     Other Topics Concern    Not on file     Social History Narrative         ALLERGIES: Crestor [rosuvastatin]; Levaquin [levofloxacin]; Lipitor [atorvastatin]; and Lyrica [pregabalin]    Review of Systems   Constitutional: Positive for appetite change (decreased). Negative for fever. Respiratory: Negative for shortness of breath. Cardiovascular: Negative for chest pain. Gastrointestinal: Positive for abdominal pain, diarrhea, nausea and vomiting. Negative for blood in stool. Genitourinary: Negative for dysuria and hematuria. Patient Vitals for the past 12 hrs:   Temp Pulse Resp BP SpO2   07/19/17 1530 - 76 16 108/90 95 %   07/19/17 1500 - 76 18 113/69 94 %   07/19/17 1330 - 76 - 107/53 -   07/19/17 1300 - 76 - 106/47 95 %   07/19/17 1247 - 76 - 101/44 96 %   07/19/17 1210 98.3 °F (36.8 °C) 75 - 114/45 97 %       Physical Exam   Constitutional: She is oriented to person, place, and time. She appears well-developed and well-nourished. Pt appears uncomfortable. HENT:   Head: Normocephalic and atraumatic. Mouth/Throat: Mucous membranes are dry (very). Eyes: Conjunctivae and EOM are normal.   Neck: Normal range of motion. Neck supple. Cardiovascular: Normal rate and regular rhythm. Pulmonary/Chest: Effort normal and breath sounds normal. No respiratory distress. Lungs are clear. Abdominal: Soft. She exhibits no distension. There is tenderness (diffused). Musculoskeletal: Normal range of motion. Neurological: She is alert and oriented to person, place, and time. Skin: Skin is warm and dry. Psychiatric: She has a normal mood and affect. Nursing note and vitals reviewed.        MDM  Number of Diagnoses or Management Options  Abdominal pain, generalized:   History of Clostridium difficile:   Non-intractable vomiting with nausea, unspecified vomiting type:   Diagnosis management comments: Patient presents with abdominal pain. DDx: Gastroenteritis, SBO, appendicitis, colitis, IBD, diverticulitis, mesenteric ischemia, AAA or descending dissection, ACS, ureteral stone, worsening C. Diff with or without complication. Will get labs and CT Abdomen. Amount and/or Complexity of Data Reviewed  Clinical lab tests: ordered and reviewed  Tests in the radiology section of CPT®: ordered and reviewed  Tests in the medicine section of CPT®: ordered and reviewed  Review and summarize past medical records: yes  Independent visualization of images, tracings, or specimens: yes    Patient Progress  Patient progress: stable    ED Course       Procedures    EKG interpretation: (Preliminary)  12:16 PM  Rhythm: Atrial-paced rhythm with prolonged AV conduction with LBBB; and regular . Rate (approx.): 75 bpm; Axis: left axis deviation; OK interval: normal; QRS interval: normal ; ST/T wave: normal; Other findings: abnormal ekg. This note is prepared by Gómez Wang, acting as Scribe for Diane Hutson M.D.    2:37 PM  Pt able to drink 2 cups of water before CT without difficulty, No single diarrhea episode here. 2:59 PM  Pt has a follow up appt with her PCP tomorrow. LABORATORY TESTS:  Recent Results (from the past 12 hour(s))   EKG, 12 LEAD, INITIAL    Collection Time: 07/19/17 12:16 PM   Result Value Ref Range    Ventricular Rate 75 BPM    Atrial Rate 75 BPM    P-R Interval 258 ms    QRS Duration 142 ms    Q-T Interval 462 ms    QTC Calculation (Bezet) 515 ms    Calculated P Axis 103 degrees    Calculated R Axis -32 degrees    Calculated T Axis 102 degrees    Diagnosis       Atrial-paced rhythm with prolonged AV conduction  Left axis deviation  Left bundle branch block  Abnormal ECG  When compared with ECG of 10-MAY-2017 16:05,  Electronic atrial pacemaker has replaced Atrial fibrillation  Vent.  rate has decreased BY  58 BPM  Left bundle branch block is now present     CBC WITH AUTOMATED DIFF    Collection Time: 07/19/17 12:33 PM   Result Value Ref Range    WBC 6.6 3.6 - 11.0 K/uL    RBC 4.34 3.80 - 5.20 M/uL    HGB 11.9 11.5 - 16.0 g/dL    HCT 36.5 35.0 - 47.0 %    MCV 84.1 80.0 - 99.0 FL    MCH 27.4 26.0 - 34.0 PG    MCHC 32.6 30.0 - 36.5 g/dL    RDW 14.8 (H) 11.5 - 14.5 %    PLATELET 126 280 - 504 K/uL    NEUTROPHILS 54 32 - 75 %    LYMPHOCYTES 38 12 - 49 %    MONOCYTES 4 (L) 5 - 13 %    EOSINOPHILS 4 0 - 7 %    BASOPHILS 0 0 - 1 %    ABS. NEUTROPHILS 3.5 1.8 - 8.0 K/UL    ABS. LYMPHOCYTES 2.5 0.8 - 3.5 K/UL    ABS. MONOCYTES 0.3 0.0 - 1.0 K/UL    ABS. EOSINOPHILS 0.3 0.0 - 0.4 K/UL    ABS. BASOPHILS 0.0 0.0 - 0.1 K/UL   METABOLIC PANEL, COMPREHENSIVE    Collection Time: 07/19/17 12:33 PM   Result Value Ref Range    Sodium 136 136 - 145 mmol/L    Potassium 3.6 3.5 - 5.1 mmol/L    Chloride 101 97 - 108 mmol/L    CO2 26 21 - 32 mmol/L    Anion gap 9 5 - 15 mmol/L    Glucose 106 (H) 65 - 100 mg/dL    BUN 11 6 - 20 MG/DL    Creatinine 1.25 (H) 0.55 - 1.02 MG/DL    BUN/Creatinine ratio 9 (L) 12 - 20      GFR est AA 52 (L) >60 ml/min/1.73m2    GFR est non-AA 43 (L) >60 ml/min/1.73m2    Calcium 8.3 (L) 8.5 - 10.1 MG/DL    Bilirubin, total 0.4 0.2 - 1.0 MG/DL    ALT (SGPT) 79 (H) 12 - 78 U/L    AST (SGOT) 110 (H) 15 - 37 U/L    Alk.  phosphatase 163 (H) 45 - 117 U/L    Protein, total 8.5 (H) 6.4 - 8.2 g/dL    Albumin 3.2 (L) 3.5 - 5.0 g/dL    Globulin 5.3 (H) 2.0 - 4.0 g/dL    A-G Ratio 0.6 (L) 1.1 - 2.2     TROPONIN I    Collection Time: 07/19/17 12:33 PM   Result Value Ref Range    Troponin-I, Qt. <0.04 <0.05 ng/mL   MAGNESIUM    Collection Time: 07/19/17 12:33 PM   Result Value Ref Range    Magnesium 1.4 (L) 1.6 - 2.4 mg/dL   NUCLEATED RBC    Collection Time: 07/19/17 12:33 PM   Result Value Ref Range    NRBC 0.0 0  WBC    ABSOLUTE NRBC 0.00 0.00 - 0.01 K/uL       IMAGING RESULTS:    CT Results  (Last 48 hours)               07/19/17 1427  CT ABD PELV W CONT Final result    Impression:  IMPRESSION:    1. No evidence of colitis or other acute abdominal or pelvic abnormality. 2. Small lower lobe lung nodules, resolution of small effusions. 3. Status post right adrenalectomy. 4. Small right hepatic cyst and small renal cysts. 5. Atherosclerotic abdominal aorta without aneurysm. 6. Lumbar spondylosis. .               Narrative:  EXAM: CT ABDOMEN PELVIS WITH CONTRAST   INDICATION:  History of Clostridium difficile colitis with worsening abdominal   pain and persistent diarrhea. COMPARISON: 4/13/2016. CT chest 6/19/2017   CONTRAST: 100 mL of Isovue-370. The patient was instructed to discontinue   metformin for 48 hours and check with her physician before resuming the   medication. TECHNIQUE:    Multislice helical CT was performed from the diaphragm to the symphysis pubis   during uneventful rapid bolus intravenous contrast administration. Oral contrast   was not administered. Contiguous 5 mm axial images were reconstructed and lung   and soft tissue windows were generated. Coronal and sagittal reformations were   generated. CT dose reduction was achieved through use of a standardized protocol   tailored for this examination and automatic exposure control for dose   modulation. FINDINGS:   There is a pacemaker in the left chest.     LOWER CHEST: There is interstitial disease at the lung bases with multiple   bilateral lung nodules. One of these is unchanged and at least one is new since   the recent chest CT examination. ABDOMEN:   Liver: Liver is normal in size and contour with no focal abnormality except for   a small low-attenuation lesion in the right lobe which is too small to   characterize but likely represents a cyst.   Gallbladder and bile ducts: There is no calcified gallstone or biliary   dilatation. Spleen: No abnormality. Pancreas: No abnormality. Adrenal glands: There are surgical clips in the right adrenal gland is absent. Kidneys: There are small low-attenuation lesions which are too small to   characterize but likely represent cysts. PELVIS:   Reproductive organs: The uterus is normal.    Bladder: The urinary bladder is distended but otherwise normal.    BOWEL AND MESENTERY: The small bowel is normal.  There is no mesenteric mass or   adenopathy. The appendix is normal.  There is some gas and stool throughout the   colon without colon wall thickening. PERITONEUM: There is no ascites or free intraperitoneal air. RETROPERITONEUM: The aorta is atherosclerotic and tapers without aneurysm. There   is no retroperitoneal adenopathy or mass. There is no pelvic mass or adenopathy. BONES AND SOFT TISSUES: There are mild degenerative changes of the spine. MEDICATIONS GIVEN:  Medications   magnesium sulfate 2 g/50 ml IVPB (premix or compounded) (2 g IntraVENous New Bag 7/19/17 1447)   ondansetron (ZOFRAN) injection 4 mg (4 mg IntraVENous Given 7/19/17 1244)   fentaNYL citrate (PF) injection 50 mcg (50 mcg IntraVENous Given 7/19/17 1244)   0.9% sodium chloride infusion (50 mL/hr IntraVENous New Bag 7/19/17 1432)   sodium chloride (NS) flush 10 mL (10 mL IntraVENous Given 7/19/17 1432)   iopamidol (ISOVUE-370) 76 % injection 100 mL (100 mL IntraVENous Given 7/19/17 1432)       IMPRESSION:  1. Abdominal pain, generalized    2. Non-intractable vomiting with nausea, unspecified vomiting type    3. History of Clostridium difficile        PLAN:  1. Current Discharge Medication List      START taking these medications    Details   ondansetron hcl (ZOFRAN, AS HYDROCHLORIDE,) 4 mg tablet Take 1 Tab by mouth every eight (8) hours as needed for Nausea. Qty: 20 Tab, Refills: 0      traMADol (ULTRAM) 50 mg tablet Take 1 Tab by mouth every six (6) hours as needed for Pain for up to 10 days. Max Daily Amount: 200 mg. Qty: 10 Tab, Refills: 0           2.    Follow-up Information     Follow up With Details Comments Contact Info Sunshine Bradshaw NP In 1 day  Bobby Rios  [facility] [id number]  [phone]          Return to ED if worse     DISCHARGE NOTE  3:06 PM  The patient has been re-evaluated and is ready for discharge. Reviewed available results with patient. Counseled patient on diagnosis and care plan. Patient has expressed understanding, and all questions have been answered. Patient agrees with plan and agrees to follow up as recommended, or return to the ED if their symptoms worsen. Discharge instructions have been provided and explained to the patient, along with reasons to return to the ED. ATTESTATION:  This note is prepared by Tess Grant, acting as Scribe for Rebeca Rangel M.D. Rebeca Rangel M.D: The scribe's documentation has been prepared under my direction and personally reviewed by me in its entirety. I confirm that the note above accurately reflects all work, treatment, procedures, and medical decision making performed by me.

## 2017-07-20 ENCOUNTER — OFFICE VISIT (OUTPATIENT)
Dept: INTERNAL MEDICINE CLINIC | Age: 67
End: 2017-07-20

## 2017-07-20 VITALS
TEMPERATURE: 98.7 F | WEIGHT: 158.5 LBS | SYSTOLIC BLOOD PRESSURE: 98 MMHG | BODY MASS INDEX: 25.47 KG/M2 | OXYGEN SATURATION: 92 % | RESPIRATION RATE: 18 BRPM | HEIGHT: 66 IN | DIASTOLIC BLOOD PRESSURE: 51 MMHG | HEART RATE: 76 BPM

## 2017-07-20 DIAGNOSIS — E78.2 MIXED HYPERLIPIDEMIA: ICD-10-CM

## 2017-07-20 DIAGNOSIS — R19.7 NAUSEA, VOMITING AND DIARRHEA: ICD-10-CM

## 2017-07-20 DIAGNOSIS — R11.2 NAUSEA, VOMITING AND DIARRHEA: ICD-10-CM

## 2017-07-20 DIAGNOSIS — Z86.19 HISTORY OF CLOSTRIDIUM DIFFICILE INFECTION: ICD-10-CM

## 2017-07-20 DIAGNOSIS — R20.0 NUMBNESS AND TINGLING OF FOOT: ICD-10-CM

## 2017-07-20 DIAGNOSIS — D50.0 IRON DEFICIENCY ANEMIA DUE TO CHRONIC BLOOD LOSS: ICD-10-CM

## 2017-07-20 DIAGNOSIS — Z79.01 ANTICOAGULATION MONITORING, INR RANGE 2-3: ICD-10-CM

## 2017-07-20 DIAGNOSIS — R09.89 PULSE WEAKNESS: ICD-10-CM

## 2017-07-20 DIAGNOSIS — E11.40 TYPE 2 DIABETES MELLITUS WITH DIABETIC NEUROPATHY, WITHOUT LONG-TERM CURRENT USE OF INSULIN (HCC): Primary | ICD-10-CM

## 2017-07-20 DIAGNOSIS — I48.91 ATRIAL FIBRILLATION, UNSPECIFIED TYPE (HCC): Chronic | ICD-10-CM

## 2017-07-20 DIAGNOSIS — R20.2 NUMBNESS AND TINGLING OF FOOT: ICD-10-CM

## 2017-07-20 LAB
CHOLEST SERPL-MCNC: 188 MG/DL
GLUCOSE POC: 134 MG/DL
HBA1C MFR BLD HPLC: 7 %
HDLC SERPL-MCNC: 26 MG/DL
HGB BLD-MCNC: 11.2 G/DL
LDL CHOLESTEROL POC: 162 MG/DL
TCHOL/HDL RATIO (POC): 7.4
TRIGL SERPL-MCNC: 240 MG/DL
VLDLC SERPL CALC-MCNC: 114 MG/DL

## 2017-07-20 NOTE — MR AVS SNAPSHOT
Visit Information Date & Time Provider Department Dept. Phone Encounter #  
 7/20/2017  8:00 AM Lenore Chowdary NP [address] [facility] [phone] [id number] Follow-up Instructions Return in about 3 months (around 10/20/2017) for HTN, Lipids, DM. Routing History Your Appointments 7/31/2017  3:30 PM  
1 MONTH with Jyoti Simms MD  
[facility] [address]) Appt Note: $0CP 7/11/17ksr 9[address]  
[phone] 9[address] Upcoming Health Maintenance Date Due  
 EYE EXAM RETINAL OR DILATED Q1 8/19/2017* INFLUENZA AGE 9 TO ADULT 8/1/2017 HEMOGLOBIN A1C Q6M 10/17/2017 FOBT Q 1 YEAR AGE 50-75 10/31/2017 MEDICARE YEARLY EXAM 11/1/2017 LIPID PANEL Q1 1/31/2018 FOOT EXAM Q1 7/20/2018 MICROALBUMIN Q1 7/20/2018 GLAUCOMA SCREENING Q2Y 7/28/2018 BREAST CANCER SCRN MAMMOGRAM 2/23/2019 DTaP/Tdap/Td series (2 - Td) 7/16/2026 *Topic was postponed. The date shown is not the original due date. Allergies as of 7/20/2017  Review Complete On: 7/20/2017 By: Lenore Chowdary NP Severity Noted Reaction Type Reactions Crestor [Rosuvastatin]  10/31/2016   Side Effect Myalgia Levaquin [Levofloxacin]  12/07/2015   Intolerance Nausea Only GI Upset Lipitor [Atorvastatin]  04/17/2017   Side Effect Diarrhea Lyrica [Pregabalin]  10/31/2016   Side Effect Myalgia Current Immunizations  Reviewed on 10/31/2016 Name Date H1N1 FLU VACCINE 10/20/2009 Influenza High Dose Vaccine PF 10/31/2016 Influenza Vaccine (Madin June Canine Kidney) PF 9/23/2014 11:26 AM  
 Influenza Vaccine Intradermal PF 11/27/2015 Influenza Vaccine Split 9/22/2011  6:25 AM  
 Influenza Vaccine Whole 10/20/2009 Pneumococcal Polysaccharide (PPSV-23) 6/25/2015 Pneumococcal Vaccine (Unspecified Type) 10/20/2009 Tdap 7/16/2016 11:47 PM  
  
 Not reviewed this visit You Were Diagnosed With   
  
 Codes Comments Type 2 diabetes mellitus with diabetic neuropathy, without long-term current use of insulin (HCC)    -  Primary ICD-10-CM: E11.40 ICD-9-CM: 250.60, 357.2 Anticoagulation monitoring, INR range 2-3     ICD-10-CM: Z79.01 
ICD-9-CM: V58.61 Mixed hyperlipidemia     ICD-10-CM: E78.2 ICD-9-CM: 272.2 Iron deficiency anemia due to chronic blood loss     ICD-10-CM: D50.0 ICD-9-CM: 280.0 Numbness and tingling of foot     ICD-10-CM: R20.0, R20.2 ICD-9-CM: 782.0 Pulse weakness     ICD-10-CM: R09.89 ICD-9-CM: 785.9 History of Clostridium difficile infection     ICD-10-CM: Z86.19 ICD-9-CM: V12.09 Nausea, vomiting and diarrhea     ICD-10-CM: R11.2, R19.7 ICD-9-CM: 787.91, 787.01 Atrial fibrillation, unspecified type ([facility]     ICD-10-CM: I48.91 
ICD-9-CM: 427.31 Vitals BP Pulse Temp Resp Height(growth percentile) Weight(growth percentile) 98/51 (BP 1 Location: Left arm, BP Patient Position: Sitting) 76 98.7 °F (37.1 °C) (Oral) 18 5' 6\" (1.676 m) 158 lb 8 oz (71.9 kg) SpO2 BMI OB Status Smoking Status 92% 25.58 kg/m2 Postmenopausal Former Smoker Vitals History BMI and BSA Data Body Mass Index Body Surface Area 25.58 kg/m 2 1.83 m 2 Preferred Pharmacy Pharmacy Name Phone [facility] [address], [address] [place] [address] Avenue [phone] Your Updated Medication List  
  
   
This list is accurate as of: 7/20/17  9:32 AM.  Always use your most recent med list.  
  
  
  
  
 * albuterol 90 mcg/actuation inhaler Commonly known as:  VENTOLIN HFA Take 2 Puffs by inhalation every six (6) hours as needed for Wheezing. * albuterol 2.5 mg /3 mL (0.083 %) nebulizer solution Commonly known as:  PROVENTIL VENTOLIN  
3 mL by Nebulization route every four (4) hours as needed for Wheezing. amiodarone 200 mg tablet Commonly known as:  CORDARONE Take 1 Tab by mouth two (2) times a day. apixaban 2.5 mg tablet Commonly known as:  Jann Gentleman Take 1 Tab by mouth two (2) times a day. Indications: PREVENT THROMBOEMBOLISM IN CHRONIC ATRIAL FIBRILLATION Azelastine 0.15 % (205.5 mcg) nasal spray Commonly known as:  ASTEPRO  
1 Spray by Both Nostrils route daily. Blood-Glucose Meter monitoring kit Check blood sugar daily. May substitute for insurance preferred meter  
  
 budesonide-formoterol 160-4.5 mcg/actuation HFA inhaler Commonly known as:  SYMBICORT Take 1 Puff by inhalation two (2) times a day. carvedilol 6.25 mg tablet Commonly known as:  Francenia Elders Take 1 Tab by mouth two (2) times daily (with meals). CENTRUM SILVER PO Take 1 Tab by mouth daily. Takes one po daily. clopidogrel 75 mg Tab Commonly known as:  PLAVIX Take 1 Tab by mouth daily. cod liver oil Cap Take 1 Cap by mouth daily. colchicine 0.6 mg tablet Take 1 Tab by mouth two (2) times a day. FLONASE 50 mcg/actuation nasal spray Generic drug:  fluticasone 2 Sprays by Both Nostrils route every evening. furosemide 40 mg tablet Commonly known as:  LASIX TAKE ONE TABLET BY MOUTH ONCE DAILY  
  
 gabapentin 800 mg tablet Commonly known as:  NEURONTIN  
TAKE ONE TABLET BY MOUTH THREE TIMES DAILY  
  
 glucose blood VI test strips strip Commonly known as:  blood glucose test  
Check blood sugar 1 times daily: E11.9, may substitute for insurance preferred strips. * Lancets Misc Check blood sugar 1 times daily. May substitute for insurance preferred lancets. * One Touch Delica 33 gauge Misc Generic drug:  lancets  
  
 metFORMIN 1,000 mg tablet Commonly known as:  GLUCOPHAGE  
TAKE 1 TABLET BY MOUTH TWICE DAILY WITH MEALS  Indications: PREVENTION OF TYPE 2 DIABETES MELLITUS  
  
 ondansetron hcl 4 mg tablet Commonly known as:  ZOFRAN (AS HYDROCHLORIDE) Take 1 Tab by mouth every eight (8) hours as needed for Nausea. pravastatin 20 mg tablet Commonly known as:  PRAVACHOL Take 1 Tab by mouth nightly. tiotropium 18 mcg inhalation capsule Commonly known as:  [address] INHALE THE CONTENTS OF 1 CAPSULE THROUGH HANDIHALER DEVICE DAILY  
  
 traMADol 50 mg tablet Commonly known as:  ULTRAM  
Take 1 Tab by mouth every six (6) hours as needed for Pain for up to 10 days. Max Daily Amount: 200 mg.  
  
 vitamin k 100 mcg tablet Take 1 Tab by mouth daily. * Notice: This list has 4 medication(s) that are the same as other medications prescribed for you. Read the directions carefully, and ask your doctor or other care provider to review them with you. We Performed the Following AMB POC GLUCOSE BLOOD, BY GLUCOSE MONITORING DEVICE [59918 CPT(R)] AMB POC HEMOGLOBIN (HGB) [57282 CPT(R)] AMB POC HEMOGLOBIN A1C [60936 CPT(R)] AMB POC LIPID PROFILE [70717 CPT(R)] REFERRAL TO GASTROENTEROLOGY [RYE10 Custom] Comments:  
 Please evaluate patient for barium swallow or upper/lower GI series. Prolonged N/V/D following C-Diff in May. REFERRAL TO VASCULAR SURGERY [LST590 Custom] Comments:  
 Please evaluate patient for BL foot numbness, tingling, and thready pulses. Follow-up Instructions Return in about 3 months (around 10/20/2017) for HTN, Lipids, DM. Referral Information Referral ID Referred By Referred To  
  
 [id number] Sy De Los Santos MD   
   [address] [place] [address] [facility] Phone: [phone] Fax: [phone] Visits Status Start Date End Date 1 New Request 7/20/17 7/20/18 If your referral has a status of pending review or denied, additional information will be sent to support the outcome of this decision. Referral ID Referred By Referred To [id number] [facility] [address] [place], [address] [facility] Visits Status Start Date End Date 1 New Request 7/20/17 7/20/18 If your referral has a status of pending review or denied, additional information will be sent to support the outcome of this decision. Patient Instructions Swallowing Study: About This Test 
What is it? A swallowing study is a test that shows what your throat and esophagus do while you swallow. The test uses X-rays in real time (fluoroscopy) to film as you swallow. You'll swallow a substance called barium that is mixed with liquid and food. The barium shows the movements of your throat and esophagus on the X-ray while you swallow. Why is this test done? The test helps your doctor see why you're having trouble swallowing. After treatment, it can also show your doctor if the treatment worked. How can you prepare for the test? 
· Tell your doctor if: 
Juliocesar Boop You are taking any medicines. ¨ You are allergic to any medicines, barium, or any other X-ray contrast material. 
¨ You are or might be pregnant. ¨ You are breastfeeding. · Your doctor may tell you not to eat anything after midnight the night before the test. 
What happens before the test? 
· Remove any jewelry that might get in the way of the X-ray picture. · You may need to take off all or most of your clothes around the area being X-rayed. You may be given a gown to wear during the test. 
· A lead shield will be placed over your pelvic area to protect it from radiation. What happens during the test? 
· You will stand or sit in front of the X-ray machine and hold very still while the test is done. · The doctor and a speech pathologist will guide you through a series of swallowing steps. You will swallow liquids and then solids, some containing barium.  
· While you swallow, the doctor and speech pathologist watch the video screen. They may ask you to take different positions to see how they affect your swallowing. The X-rays are recorded so they can be looked at later. What else should you know about the test? 
· The barium in the food is not harmful. · You won't feel any pain from the X-ray. How long does the test take? · The test will take about 20 to 30 minutes. What happens after the test? 
· You will probably be able to go home right away. · You can go back to your usual activities right away. · You may have light-colored stools for a few days after the test while the barium leaves your body. · The barium may cause constipation. Drink plenty of water for a couple of days after the test. You may take a laxative if needed. Call your doctor if you haven't had a bowel movement in 2 to 3 days after the test. 
Follow-up care is a key part of your treatment and safety. Be sure to make and go to all appointments, and call your doctor if you are having problems. It's also a good idea to keep a list of the medicines you take. Ask your doctor when you can expect to have your test results. Where can you learn more? Go to http://rusty-marcela.info/. Enter H256 in the search box to learn more about \"Swallowing Study: About This Test.\" Current as of: October 14, 2016 Content Version: 11.3 © 8348-9461 [employer]WeTrip. Care instructions adapted under license by CloudAccess (which disclaims liability or warranty for this information). If you have questions about a medical condition or this instruction, always ask your healthcare professional. [place] [address] any warranty or liability for your use of this information. Upper GI Series: About This Test 
What is it? An upper gastrointestinal (GI) series looks at the upper and middle sections of the gastrointestinal tract. The test uses barium contrast material, fluoroscopy, and X-ray. Fluoroscopy is a kind of X-ray. Why is this test done? An upper GI series is done to: · Find the cause of gastrointestinal symptoms, such as vomiting, burping up food, trouble swallowing, or belly pain. · Find inflamed areas of the intestine. · Find narrow spots (strictures) in the upper intestinal tract or find ulcers, tumors, polyps, or pyloric stenosis. · Find swallowed objects. How can you prepare for the test? 
Tell your doctor if: 
· You are taking any medicine. · You are allergic to any medicines, barium, or any other X-ray contrast material. 
· You are or might be pregnant. This test is not done during pregnancy because of the risk of radiation to the baby (fetus). Your doctor may ask you to do one or all of the following: 
· Eat a low-fiber diet for a few days before the test. 
· Stop eating for 12 hours before the test. 
· Take a laxative to help clean out your intestines the evening before the test. 
· Stop taking certain medicines. What happens before the test? 
The test is usually done in a clinic or the X-ray department of a hospital. 
· You will need to take off your clothes and put on a hospital gown. · Take out any dentures, and take off any jewelry. What happens during the test? 
· You will lie on your back on an X-ray table. · You will have an X-ray taken before you drink the barium mix. Then you'll take small swallows repeatedly during the series of X-rays that follow. · The doctor watches the barium pass through your GI tract using fluoroscopy and X-ray pictures. The table is tilted at different positions, and you may change positions to help spread the barium. What else should you know about the test? 
· You may be given a laxative or enema to flush the barium out of your intestines after the test. This prevents constipation. · It's a good idea to drink a lot of fluids for a few days to flush out the barium. · For 1 to 3 days after the test, your stool will look white from the barium. How long does the test take? · The test will take about 30 to 40 minutes. If you are also having a small bowel study, the test will take 2 to 6 hours. In some cases, you may be asked to come back after 24 hours to have more X-rays taken. What happens after the test? 
· You will probably be able to go home right away. Results of the test are usually ready in 1 to 3 days. · You can go back to your usual activities right away. You may eat and drink whatever you like, unless your doctor tells you not to. When should you call for help? Watch closely for changes in your health, and be sure to contact your doctor if: 
· You aren't able to have a bowel movement in 2 to 3 days after the test. 
Follow-up care is a key part of your treatment and safety. Be sure to make and go to all appointments, and call your doctor if you are having problems. It's also a good idea to keep a list of the medicines you take. Ask your doctor when you can expect to have your test results. Where can you learn more? Go to http://rusty-marcela.info/. Enter J416 in the search box to learn more about \"Upper GI Series: About This Test.\" Current as of: October 14, 2016 Content Version: 11.3 © 0852-7195 [employer]IS Research[employer] Incorporated. Care instructions adapted under license by GetSocial (which disclaims liability or warranty for this information). If you have questions about a medical condition or this instruction, always ask your healthcare professional. [place] [address] any warranty or liability for your use of this information. Introducing [facility]! Dear Matheus Files: Thank you for requesting a [employer]ingData account. Our records indicate that you already have an active [employer]ingData account. You can access your account anytime at [email or web address] Did you know that you can access your hospital and ER discharge instructions at any time in [employer]? You can also review all of your test results from your hospital stay or ER visit. Additional Information If you have questions, please visit the Frequently Asked Questions section of the [employer] website at [email or web address] Remember, [employer] is NOT to be used for urgent needs. For medical emergencies, dial 911. Now available from your iPhone and Android! Please provide this summary of care documentation to your next provider. Your primary care clinician is listed as CAROLINA Lee. If you have any questions after today's visit, please call [phone].

## 2017-07-20 NOTE — Clinical Note
Hi,  I saw our pt today, she mentioned she was advised to stop the Repatha. I just wanted to reach out and ensure this was correct and also to find out why? I'm always eager to learn and find out new information on medications for other providers.

## 2017-07-20 NOTE — PATIENT INSTRUCTIONS
Swallowing Study: About This Test  What is it? A swallowing study is a test that shows what your throat and esophagus do while you swallow. The test uses X-rays in real time (fluoroscopy) to film as you swallow. You'll swallow a substance called barium that is mixed with liquid and food. The barium shows the movements of your throat and esophagus on the X-ray while you swallow. Why is this test done? The test helps your doctor see why you're having trouble swallowing. After treatment, it can also show your doctor if the treatment worked. How can you prepare for the test?  · Tell your doctor if:  Ana Cristina Pérezoskar You are taking any medicines. ¨ You are allergic to any medicines, barium, or any other X-ray contrast material.  ¨ You are or might be pregnant. ¨ You are breastfeeding. · Your doctor may tell you not to eat anything after midnight the night before the test.  What happens before the test?  · Remove any jewelry that might get in the way of the X-ray picture. · You may need to take off all or most of your clothes around the area being X-rayed. You may be given a gown to wear during the test.  · A lead shield will be placed over your pelvic area to protect it from radiation. What happens during the test?  · You will stand or sit in front of the X-ray machine and hold very still while the test is done. · The doctor and a speech pathologist will guide you through a series of swallowing steps. You will swallow liquids and then solids, some containing barium. · While you swallow, the doctor and speech pathologist watch the video screen. They may ask you to take different positions to see how they affect your swallowing. The X-rays are recorded so they can be looked at later. What else should you know about the test?  · The barium in the food is not harmful. · You won't feel any pain from the X-ray. How long does the test take? · The test will take about 20 to 30 minutes.   What happens after the test?  · You will probably be able to go home right away. · You can go back to your usual activities right away. · You may have light-colored stools for a few days after the test while the barium leaves your body. · The barium may cause constipation. Drink plenty of water for a couple of days after the test. You may take a laxative if needed. Call your doctor if you haven't had a bowel movement in 2 to 3 days after the test.  Follow-up care is a key part of your treatment and safety. Be sure to make and go to all appointments, and call your doctor if you are having problems. It's also a good idea to keep a list of the medicines you take. Ask your doctor when you can expect to have your test results. Where can you learn more? Go to http://rusty-marcela.info/. Enter P400 in the search box to learn more about \"Swallowing Study: About This Test.\"  Current as of: October 14, 2016  Content Version: 11.3  © 0864-4598 iDiDiD. Care instructions adapted under license by Smartling (which disclaims liability or warranty for this information). If you have questions about a medical condition or this instruction, always ask your healthcare professional. [place] [address] any warranty or liability for your use of this information. Upper GI Series: About This Test  What is it? An upper gastrointestinal (GI) series looks at the upper and middle sections of the gastrointestinal tract. The test uses barium contrast material, fluoroscopy, and X-ray. Fluoroscopy is a kind of X-ray. Why is this test done? An upper GI series is done to:  · Find the cause of gastrointestinal symptoms, such as vomiting, burping up food, trouble swallowing, or belly pain. · Find inflamed areas of the intestine. · Find narrow spots (strictures) in the upper intestinal tract or find ulcers, tumors, polyps, or pyloric stenosis. · Find swallowed objects.   How can you prepare for the test?  Tell your doctor if:  · You are taking any medicine. · You are allergic to any medicines, barium, or any other X-ray contrast material.  · You are or might be pregnant. This test is not done during pregnancy because of the risk of radiation to the baby (fetus). Your doctor may ask you to do one or all of the following:  · Eat a low-fiber diet for a few days before the test.  · Stop eating for 12 hours before the test.  · Take a laxative to help clean out your intestines the evening before the test.  · Stop taking certain medicines. What happens before the test?  The test is usually done in a clinic or the X-ray department of a hospital.  · You will need to take off your clothes and put on a hospital gown. · Take out any dentures, and take off any jewelry. What happens during the test?  · You will lie on your back on an X-ray table. · You will have an X-ray taken before you drink the barium mix. Then you'll take small swallows repeatedly during the series of X-rays that follow. · The doctor watches the barium pass through your GI tract using fluoroscopy and X-ray pictures. The table is tilted at different positions, and you may change positions to help spread the barium. What else should you know about the test?  · You may be given a laxative or enema to flush the barium out of your intestines after the test. This prevents constipation. · It's a good idea to drink a lot of fluids for a few days to flush out the barium. · For 1 to 3 days after the test, your stool will look white from the barium. How long does the test take? · The test will take about 30 to 40 minutes. If you are also having a small bowel study, the test will take 2 to 6 hours. In some cases, you may be asked to come back after 24 hours to have more X-rays taken. What happens after the test?  · You will probably be able to go home right away. Results of the test are usually ready in 1 to 3 days.   · You can go back to your usual activities right away. You may eat and drink whatever you like, unless your doctor tells you not to. When should you call for help? Watch closely for changes in your health, and be sure to contact your doctor if:  · You aren't able to have a bowel movement in 2 to 3 days after the test.  Follow-up care is a key part of your treatment and safety. Be sure to make and go to all appointments, and call your doctor if you are having problems. It's also a good idea to keep a list of the medicines you take. Ask your doctor when you can expect to have your test results. Where can you learn more? Go to http://rusty-marcela.info/. Enter D735 in the search box to learn more about \"Upper GI Series: About This Test.\"  Current as of: October 14, 2016  Content Version: 11.3  © 0248-7350 iStorez, Incorporated. Care instructions adapted under license by Sweetspot Intelligence (which disclaims liability or warranty for this information). If you have questions about a medical condition or this instruction, always ask your healthcare professional. [address] any warranty or liability for your use of this information.

## 2017-07-20 NOTE — PROGRESS NOTES
Daisha Barragan is a 78 y.o. female and presents with ED Follow-up (for dizziness [facility]) and Diabetes (follow up)    Subjective:  Pt here with several complaints. Firstly, she has numbness and tingling in both feet for past year. Onset simultaneous with gout. Worsening for past 6 months, interrupting sleep. No cool sensation reported. Has seen podiatry for symptoms, told she had fungal infection. Associated with burning sensation intermittently to ankles only. No vascular or neuro workup reported. Taking gabapentin 800 mg as prescribed, tried Lyrica in past but had SE. Next, she continues to have abdominal pain, nausea, vomiting, and diarrhea. Ongoing since C-Diff in May. Finished antibiotics as prescribed, but symptoms persisted. Seen in ER yesterday after dizziness and near syncope. Given magnesium and pain medication. abd pain with some relief. All symptoms resolved thus far today. Tried immodium, modified diet, zofran, and activia without relief. Hypertension Review:  The patient has hypertension  Diet and Lifestyle: generally does try to follow a  low sodium diet, exercises sporadically   Home BP Monitoring: is not measured at home. Pertinent ROS: taking medications as instructed, no medication side effects noted. No TIA's, chest pain on exertion, dyspnea on exertion, or swelling of ankles.    BP Readings from Last 3 Encounters:   07/20/17 98/51   07/19/17 108/90   06/16/17 104/58     Diabetes Mellitus Review:  She has diabetes mellitus, NON-INSULIN  Diabetic ROS -POSITIVE for polyphagia. negative for polyuria,polydipsia, and heat intolerance  Current diabetic medications include oral agents, NOT insulin    Medication compliance: compliant MOST of the time  Diabetic diet compliance: compliant MOST of the time,   Yearly visit with dietician: no  Current exercise: walking, only at work  Home glucose monitoring: is performed daily, averaging 110-130's, FBS  Any episodes of hypoglycemia? no  Known diabetic complications: neuropathy  Cardiovascular risk factors: family history, dyslipidemia, obesity, hypertension  Yearly foot exam: today  Eye exam current (within one year): Summer 2016  Weight trend: stable   Is She on ACE inhibitor or angiotensin II receptor blocker? Yes   Lab review: orders written for new lab studies as appropriate; see orders. Dyslipidemia Review:  Patient presents for evaluation of lipids. Compliance with treatment thus far has been good. Denies myalgias, slurring speech, unilateral weakness, and chest pain. A repeat non-fasting lipid profile was done. The patient does use medications that may worsen dyslipidemias (corticosteroids, progestins, anabolic steroids, diuretics, beta-blockers, amiodarone, cyclosporine, olanzapine). The patient does NOT exercise regularly. The patient is NOT known to have coexisting coronary artery disease. Taking Aspirin daily as prescribed/advised. Anticoagulation monitoring:  Indication: Atrial Fibrillation and CAD  INR Goal: n/a  Current dose:  Eliquis 2.5 mg, just resumed 2 days ago after staying off x 2 wk for presumed SE. However symptoms persisted after stopping, so resumed. Medication Changes:  No  Dietary Changes:  no    Symptoms: taking Eliquis appropriately without any bleeding. Review of Systems  Constitutional: +fatigue. negative for fevers, chills, anorexia and weight loss  Eyes:   negative for visual disturbance, drainage, and irritation  ENT:   +hoarseness.  negative for tinnitus,sore throat,nasal congestion,ear pain  Respiratory:  negative for cough, hemoptysis, dyspnea, and wheezing  CV:   negative for chest pain, palpitations, and lower extremity edema  GI:   negative for nausea, vomiting, diarrhea, abdominal pain, and melena  Endo:               negative for polyuria,polydipsia,polyphagia, and heat intolerance  Genitourinary: negative for frequency, urgency, dysuria, retention, and hematuria  Integument:  negative for rash, ulcerations, and pruritus  Hematologic:  negative for easy bruising and bleeding  Musculoskel: negative for arthralgias, muscle weakness,and joint pain/swelling  Neurological:  negative for headaches, dizziness, vertigo,and memory/gait problems  Behavl/Psych: negative for feelings of anxiety, depression, suicide, and mood changes    Past Medical History:   Diagnosis Date    Atrial fibrillation ([facility] 2010    CAD (coronary artery disease)     stent    Chronic diastolic heart failure ([facility] 2014    Chronic systolic HF (heart failure) ([facility] 5/10/2017    2017 EF 25-30%    COPD     COPD (chronic obstructive pulmonary disease) ([facility] 2010    Diabetes ([facility]     Fibroid     Heart failure ([facility]     History of Clostridium difficile infection 5/10/2017    2017 CDiff positive    Hypertension 2010    Hypotension 5/10/2017    Junctional tachycardia ([facility] 5/10/2017    NIDDM (non-insulin dependent diabetes mellitus) 2010    Screening mammogram 5/4/10    SOB (shortness of breath) 2014     Past Surgical History:   Procedure Laterality Date    HX  SECTION      HX OTHER SURGICAL      adrenal gland removed    HX PACEMAKER      [place] EXCISE ADRENAL GLAND       Social History     Social History    Marital status:      Spouse name: N/A    Number of children: N/A    Years of education: N/A     Social History Main Topics    Smoking status: Former Smoker     Types: Cigarettes     Quit date: 2009    Smokeless tobacco: Never Used    Alcohol use No    Drug use: No    Sexual activity: Not Currently     Other Topics Concern    None     Social History Narrative     Family History   Problem Relation Age of Onset    Heart Disease Mother     Diabetes Father     Heart Disease Brother      Current Outpatient Prescriptions   Medication Sig Dispense Refill    furosemide (LASIX) 40 mg tablet TAKE ONE TABLET BY MOUTH ONCE DAILY 30 Tab 6    clopidogrel (PLAVIX) 75 mg tab Take 1 Tab by mouth daily. 30 Tab 11    apixaban (ELIQUIS) 2.5 mg tablet Take 1 Tab by mouth two (2) times a day. Indications: PREVENT THROMBOEMBOLISM IN CHRONIC ATRIAL FIBRILLATION 60 Tab 11    amiodarone (CORDARONE) 200 mg tablet Take 1 Tab by mouth two (2) times a day. 60 Tab 0    carvedilol (COREG) 6.25 mg tablet Take 1 Tab by mouth two (2) times daily (with meals). 60 Tab 11    vitamin k 100 mcg tablet Take 1 Tab by mouth daily. 2 Tab 0    Lancets misc Check blood sugar 1 times daily. May substitute for insurance preferred lancets. 50 Each 11    glucose blood VI test strips (BLOOD GLUCOSE TEST) strip Check blood sugar 1 times daily: E11.9, may substitute for insurance preferred strips. 50 Strip 11    pravastatin (PRAVACHOL) 20 mg tablet Take 1 Tab by mouth nightly. 90 Tab 3    fluticasone (FLONASE) 50 mcg/actuation nasal spray 2 Sprays by Both Nostrils route every evening.  gabapentin (NEURONTIN) 800 mg tablet TAKE ONE TABLET BY MOUTH THREE TIMES DAILY 90 Tab 11    tiotropium (SPIRIVA WITH HANDIHALER) 18 mcg inhalation capsule INHALE THE CONTENTS OF 1 CAPSULE THROUGH HANDIHALER DEVICE DAILY 90 Cap 3    metFORMIN (GLUCOPHAGE) 1,000 mg tablet TAKE 1 TABLET BY MOUTH TWICE DAILY WITH MEALS  Indications: PREVENTION OF TYPE 2 DIABETES MELLITUS 180 Tab 3    colchicine 0.6 mg tablet Take 1 Tab by mouth two (2) times a day. (Patient taking differently: Take 0.6 mg by mouth two (2) times a day. Indications: GOUT) 60 Tab 5    budesonide-formoterol (SYMBICORT) 160-4.5 mcg/actuation HFA inhaler Take 1 Puff by inhalation two (2) times a day. 3 Inhaler 3    albuterol (PROVENTIL VENTOLIN) 2.5 mg /3 mL (0.083 %) nebulizer solution 3 mL by Nebulization route every four (4) hours as needed for Wheezing. 1 Package 5    FOLIC ACID/MULTIVIT-MIN/LUTEIN (CENTRUM SILVER PO) Take 1 Tab by mouth daily. Takes one po daily.        albuterol (VENTOLIN HFA) 90 mcg/actuation inhaler Take 2 Puffs by inhalation every six (6) hours as needed for Wheezing. 1 Inhaler 11    Azelastine (ASTEPRO) 0.15 % (205.5 mcg) nasal spray 1 [place] by Both Nostrils route daily.  cod liver oil cap Take 1 Cap by mouth daily.  ondansetron hcl (ZOFRAN, AS HYDROCHLORIDE,) 4 mg tablet Take 1 Tab by mouth every eight (8) hours as needed for Nausea. 20 Tab 0    traMADol (ULTRAM) 50 mg tablet Take 1 Tab by mouth every six (6) hours as needed for Pain for up to 10 days. Max Daily Amount: 200 mg. 10 Tab 0    ONE TOUCH DELICA 33 gauge misc       Blood-Glucose Meter monitoring kit Check blood sugar daily. May substitute for insurance preferred meter 1 Kit 0     Allergies   Allergen Reactions    Crestor [Rosuvastatin] Myalgia    Levaquin [Levofloxacin] Nausea Only     GI Upset    Lipitor [Atorvastatin] Diarrhea    Lyrica [Pregabalin] Myalgia       Objective:  Visit Vitals    BP 98/51 (BP 1 Location: Left arm, BP Patient Position: Sitting)    Pulse 76    Temp 98.7 °F (37.1 °C) (Oral)    Resp 18    Ht 5' 6\" (1.676 m)    Wt 158 lb 8 oz (71.9 kg)    SpO2 92%    BMI 25.58 kg/m2     Wt Readings from Last 3 Encounters:   07/20/17 158 lb 8 oz (71.9 kg)   07/19/17 158 lb (71.7 kg)   06/16/17 168 lb 9.6 oz (76.5 kg)     Physical Exam:   General appearance - alert, well appearing, and in no distress. Mental status - A/O x 4, normal mood and affect. Neck -Supple ,normal CSP. FROM, non-tender. No significant adenopathy/thyromegaly. No JVD. Chest - CTA. Symmetric chest rise. No wheezing, rales or rhonchi. Heart - Normal rate, regular rhythm. Normal S1, S2. No murmur  Abdomen - Soft,non-distended. Normoactive BS in all quadrants. Diffusely TTP, no mass or HSM. +small umbilical hernia. Ext- Radial pulses, 2+ bilaterally. No pedal edema, clubbing, or cyanosis. Skin-Warm and dry. No hyperpigmentation, ulcerations, or suspicious lesions. Neuro - Normal speech, no focal findings or movement disorder. Normal strength, gait, and muscle tone.         Diabetic foot exam performed by Ginger Nguyen NP     Measurement  Response Physician Comment   Monofilament  R - 4/6  L - 4/6    Pulse DP R - 1+ (weak)  L - 1+ (weak)    Pulse TP R - absent  L - absent    Structural deformity R - None  L - None    Skin Integrity / Deformity R - None  L - None                  Results for orders placed or performed in visit on 07/20/17   AMB POC GLUCOSE BLOOD, BY GLUCOSE MONITORING DEVICE   Result Value Ref Range    Glucose  mg/dL   AMB POC HEMOGLOBIN A1C   Result Value Ref Range    Hemoglobin A1c (POC) 7.0 %   AMB POC LIPID PROFILE   Result Value Ref Range    Cholesterol (POC) 188     Triglycerides (POC) 240     HDL Cholesterol (POC) 26     VLDL (POC) 114 MG/DL    LDL Cholesterol (POC) 162 MG/DL    TChol/HDL Ratio (POC) 7.4    AMB POC HEMOGLOBIN (HGB)   Result Value Ref Range    Hemoglobin (POC) 11.2        Assessment/Plan:  Referred to vasc and GI. Modified diet advised to continue at this time. Continue eliquis also. I spent greater than 50% of 40 minute visit counseling patient about above mentioned topics. Medication Side Effects and Warnings were discussed with patient: yes   Patient Labs were reviewed: yes  Patient Past Records were reviewed: yes    See below for other orders   Follow-up Disposition:  Return in about 3 months (around 10/20/2017) for HTN, Lipids, DM. ICD-10-CM ICD-9-CM    1. Type 2 diabetes mellitus with diabetic neuropathy, without long-term current use of insulin (HCC) E11.40 250.60 AMB POC GLUCOSE BLOOD, BY GLUCOSE MONITORING DEVICE     357.2 AMB POC HEMOGLOBIN A1C      HM DIABETES FOOT EXAM   2. Anticoagulation monitoring, INR range 2-3 Z79.01 V58.61 CANCELED: AMB POC PT/INR   3. Mixed hyperlipidemia E78.2 272.2 AMB POC LIPID PROFILE   4. Iron deficiency anemia due to chronic blood loss D50.0 280.0 AMB POC HEMOGLOBIN (HGB)   5. Numbness and tingling of foot R20.0 782.0 REFERRAL TO VASCULAR SURGERY    R20.2     6.  Pulse weakness R09.89 785.9 REFERRAL TO VASCULAR SURGERY   7. History of Clostridium difficile infection Z86.19 V12.09 REFERRAL TO GASTROENTEROLOGY   8. Nausea, vomiting and diarrhea R11.2 787.91 REFERRAL TO GASTROENTEROLOGY    R19.7 787.01    9. Atrial fibrillation, unspecified type ([facility] I48.91 427.31      Orders Placed This Encounter    REFERRAL TO VASCULAR SURGERY     Referral Priority:   Urgent     Referral Type:   Consultation     Referral Reason:   Specialty Services Required     Referred to Provider:   Estefania Galeana MD    REFERRAL TO GASTROENTEROLOGY     Referral Priority:   Track     Referral Type:   Consultation     Referral Reason:   Specialty Services Required     Referral Location:   [facility]     Referred to Provider:   Miri Awad MD    AMB POC GLUCOSE BLOOD, BY GLUCOSE MONITORING DEVICE    AMB POC HEMOGLOBIN A1C    AMB POC LIPID PROFILE    AMB POC HEMOGLOBIN (HGB)    HM DIABETES FOOT EXAM       Brielle Ashanti expressed understanding of plan. An After Visit Summary was offered/printed and given to the patient.

## 2017-07-21 ENCOUNTER — PATIENT OUTREACH (OUTPATIENT)
Dept: CARDIOLOGY CLINIC | Age: 67
End: 2017-07-21

## 2017-07-21 NOTE — PROGRESS NOTES
This note will not be viewable in [address] Noted that pt was in the ED and has followed up with PCP. VALERIY episode from May 2017 will be closed.

## 2017-07-25 ENCOUNTER — OFFICE VISIT (OUTPATIENT)
Dept: CARDIOLOGY CLINIC | Age: 67
End: 2017-07-25

## 2017-07-25 VITALS
RESPIRATION RATE: 16 BRPM | HEART RATE: 74 BPM | DIASTOLIC BLOOD PRESSURE: 60 MMHG | BODY MASS INDEX: 25.58 KG/M2 | SYSTOLIC BLOOD PRESSURE: 110 MMHG | WEIGHT: 159.2 LBS | OXYGEN SATURATION: 95 % | HEIGHT: 66 IN

## 2017-07-25 DIAGNOSIS — Z86.79 S/P ABLATION OF ATRIAL FIBRILLATION: ICD-10-CM

## 2017-07-25 DIAGNOSIS — I73.9 PAD (PERIPHERAL ARTERY DISEASE) (HCC): Primary | ICD-10-CM

## 2017-07-25 DIAGNOSIS — Z98.890 S/P ABLATION OF ATRIAL FIBRILLATION: ICD-10-CM

## 2017-07-25 DIAGNOSIS — E11.40 TYPE 2 DIABETES MELLITUS WITH DIABETIC NEUROPATHY, WITHOUT LONG-TERM CURRENT USE OF INSULIN (HCC): ICD-10-CM

## 2017-07-25 DIAGNOSIS — Z95.5 S/P CORONARY ARTERY STENT PLACEMENT: ICD-10-CM

## 2017-07-25 DIAGNOSIS — R09.89 DECREASED PULSES IN FEET: ICD-10-CM

## 2017-07-25 DIAGNOSIS — J44.9 CHRONIC OBSTRUCTIVE PULMONARY DISEASE, UNSPECIFIED COPD TYPE (HCC): ICD-10-CM

## 2017-07-25 DIAGNOSIS — E78.2 MIXED HYPERLIPIDEMIA: ICD-10-CM

## 2017-07-25 NOTE — PROGRESS NOTES
7/25/2017 4:46 PM      Subjective:     Silvestre Hudson is seen in office today for further evaluation of b/l feet numbness and poor circulation. No previous vascular evaluation. Denies any leg swelling, h/o DVT. Referred by PCP. F/u with Dr. Tatiana Payne for cardiac care. Visit Vitals    /60 (BP 1 Location: Left arm, BP Patient Position: Sitting)    Pulse 74    Resp 16    Ht 5' 6\" (1.676 m)    Wt 159 lb 3.2 oz (72.2 kg)    SpO2 95%    BMI 25.7 kg/m2     Current Outpatient Prescriptions   Medication Sig    amiodarone (CORDARONE) 200 mg tablet Take 1 Tab by mouth daily.  ondansetron hcl (ZOFRAN, AS HYDROCHLORIDE,) 4 mg tablet Take 1 Tab by mouth every eight (8) hours as needed for Nausea.  furosemide (LASIX) 40 mg tablet TAKE ONE TABLET BY MOUTH ONCE DAILY    clopidogrel (PLAVIX) 75 mg tab Take 1 Tab by mouth daily.  apixaban (ELIQUIS) 2.5 mg tablet Take 1 Tab by mouth two (2) times a day. Indications: PREVENT THROMBOEMBOLISM IN CHRONIC ATRIAL FIBRILLATION    ONE TOUCH DELICA 33 gauge misc     carvedilol (COREG) 6.25 mg tablet Take 1 Tab by mouth two (2) times daily (with meals).  vitamin k 100 mcg tablet Take 1 Tab by mouth daily.  Lancets misc Check blood sugar 1 times daily. May substitute for insurance preferred lancets.  glucose blood VI test strips (BLOOD GLUCOSE TEST) strip Check blood sugar 1 times daily: E11.9, may substitute for insurance preferred strips.  pravastatin (PRAVACHOL) 20 mg tablet Take 1 Tab by mouth nightly.  fluticasone (FLONASE) 50 mcg/actuation nasal spray 2 Sprays by Both Nostrils route every evening.  gabapentin (NEURONTIN) 800 mg tablet TAKE ONE TABLET BY MOUTH THREE TIMES DAILY    Blood-Glucose Meter monitoring kit Check blood sugar daily.  May substitute for insurance preferred meter    tiotropium (SPIRIVA WITH HANDIHALER) 18 mcg inhalation capsule INHALE THE CONTENTS OF 1 CAPSULE THROUGH HANDIHALER DEVICE DAILY    metFORMIN (GLUCOPHAGE) 1,000 mg tablet TAKE 1 TABLET BY MOUTH TWICE DAILY WITH MEALS  Indications: PREVENTION OF TYPE 2 DIABETES MELLITUS    colchicine 0.6 mg tablet Take 1 Tab by mouth two (2) times a day. (Patient taking differently: Take 0.6 mg by mouth two (2) times a day. Indications: GOUT)    budesonide-formoterol (SYMBICORT) 160-4.5 mcg/actuation HFA inhaler Take 1 Puff by inhalation two (2) times a day.  albuterol (PROVENTIL VENTOLIN) 2.5 mg /3 mL (0.083 %) nebulizer solution 3 mL by Nebulization route every four (4) hours as needed for Wheezing.  FOLIC ACID/MULTIVIT-MIN/LUTEIN (CENTRUM SILVER PO) Take 1 Tab by mouth daily. Takes one po daily.  albuterol (VENTOLIN HFA) 90 mcg/actuation inhaler Take 2 Puffs by inhalation every six (6) hours as needed for Wheezing.  Azelastine (ASTEPRO) 0.15 % (205.5 mcg) nasal spray 1 [place] by Both Nostrils route daily.  cod liver oil cap Take 1 Cap by mouth daily.  traMADol (ULTRAM) 50 mg tablet Take 1 Tab by mouth every six (6) hours as needed for Pain for up to 10 days. Max Daily Amount: 200 mg. No current facility-administered medications for this visit.           Objective:      Visit Vitals    /60 (BP 1 Location: Left arm, BP Patient Position: Sitting)    Pulse 74    Resp 16    Ht 5' 6\" (1.676 m)    Wt 159 lb 3.2 oz (72.2 kg)    SpO2 95%    BMI 25.7 kg/m2       Data Review:     Reviewed and/or ordered active problem list, medication list tests    Past Medical History:   Diagnosis Date    Atrial fibrillation ([address] 6/2/2010    CAD (coronary artery disease)     stent    Chronic diastolic heart failure ([address] 9/22/2014    Chronic systolic HF (heart failure) ([facility] 5/10/2017    4/2017 EF 25-30%    COPD     COPD (chronic obstructive pulmonary disease) (HCC) 6/2/2010    Diabetes (HCC)     Fibroid     Heart failure (HCC)     History of Clostridium difficile infection 5/10/2017    4/2017 CDiff positive    Hypertension 6/2/2010    Hypotension 5/10/2017    Junctional tachycardia ([address] 5/10/2017    NIDDM (non-insulin dependent diabetes mellitus) 2010    Screening mammogram 5/4/10    SOB (shortness of breath) 2014      Past Surgical History:   Procedure Laterality Date    HX  SECTION      HX OTHER SURGICAL      adrenal gland removed    HX PACEMAKER      [place] EXCISE ADRENAL GLAND       Allergies   Allergen Reactions    Crestor [Rosuvastatin] Myalgia    Levaquin [Levofloxacin] Nausea Only     GI Upset    Lipitor [Atorvastatin] Diarrhea    Lyrica [Pregabalin] Myalgia      Family History   Problem Relation Age of Onset    Heart Disease Mother     Diabetes Father     Heart Disease Brother       Social History     Social History    Marital status:      Spouse name: N/A    Number of children: N/A    Years of education: N/A     Occupational History    Not on file. Social History Main Topics    Smoking status: Former Smoker     Types: Cigarettes     Quit date: 2009    Smokeless tobacco: Never Used    Alcohol use No    Drug use: No    Sexual activity: Not Currently     Other Topics Concern    Not on file     Social History Narrative       Review of Systems     General: Not Present- Anorexia, Chills, Dietary Changes, Fatigue, Fever, Medication Changes, Night Sweats, Weight Gain > 10lbs. and Weight Loss > 10lbs. .  Skin: Not Present- Bruising and Excessive Sweating. HEENT: Not Present- Headache, Visual Loss and Vertigo. Respiratory: Not Present- Cough, Decreased Exercise Tolerance, Difficulty Breathing, Snoring and Wheezing. Cardiovascular: Not Present- Abnormal Blood Pressure, Chest Pain, Difficulty Breathing On Exertion, Edema, Fainting / Blacking Out, Irregular Heart Beat, Orthopnea, Palpitations, Paroxysmal Nocturnal Dyspnea, Rapid Heart Rate, Shortness of Breath and Swelling of Extremities.   Gastrointestinal: Not Present- Black, Tarry Stool, Bloody Stool, Diarrhea, Hematemesis, Rectal Bleeding and Vomiting. Musculoskeletal: Not Present- Muscle Pain and Muscle Weakness. Neurological: Not Present- Dizziness. Psychiatric: Not Present- Depression. Endocrine: Not Present- Cold Intolerance, Heat Intolerance and Thyroid Problems. Hematology: Not Present- Abnormal Bleeding, Anemia, Blood Clots and Easy Bruising.       Physical Exam   The physical exam findings are as follows:       General   Mental Status - Alert. General Appearance - Cooperative and Well groomed. Not in acute distress. Orientation - Oriented to time, Oriented to place and Oriented to person. Build & Nutrition - Well developed. HEENT  Head - Normal.  Eye - Normal.      Neck   Carotid Arteries - normal upstroke. No Bruits. Chest and Lung Exam   Inspection:   Chest Wall: - Normal. Accessory muscles - No use of accessory muscles in breathing. Auscultation:   Breath sounds: - Normal.      Cardiovascular   Inspection: Jugular vein - Bilateral - Inspection Normal.  Palpation/Percussion:   Apical Impulse: - Normal.  Auscultation: Rhythm - Regular. Heart Sounds - S1 WNL and S2 WNL. No S3 or S4. Murmurs & Other Heart Sounds: Auscultation of the heart reveals - No Murmurs. Abdomen   Palpation/Percussion: Palpation and Percussion of the abdomen reveal - No Palpable abdominal masses. Auscultation: Auscultation of the abdomen reveals - Bowel sounds normal.      Peripheral Vascular   Upper Extremity: Inspection - Bilateral - No Cyanotic nailbeds or Digital clubbing. Palpation: Radial pulse - Bilateral - Normal.  Lower Extremity:   Palpation: Femoral pulse - Bilateral - Normal. Dorsalis pedis pulse - Bilateral - NP. Posterior tibia pulse - Bilateral - NP. Edema - Bilateral - No edema. Auscultation - No Bilateral Groin Bruits. Neurologic   Mental Status: Affect - normal.  Motor: - Normal. Gait - Normal. B/l feet numbness and loss of fine touch. Assessment:       ICD-10-CM ICD-9-CM    1.  PAD (peripheral artery disease) ([facility] I73.9 443. 9 ANKLE BRACHIAL INDEX   2. Decreased pulses in feet R09.89 785. 9 ANKLE BRACHIAL INDEX   3. Mixed hyperlipidemia E78.2 272.2    4. Chronic obstructive pulmonary disease, unspecified COPD type ([facility] J44.9 496    5. S/P coronary artery stent placement Z95.5 V45.82    6. Type 2 diabetes mellitus with diabetic neuropathy, without long-term current use of insulin ([facility]) E11.40 250.60      357.2    7. S/P ablation of atrial fibrillation Z98.890 V45.89     Z86.79         Plan:     1. Feet and leg pain, numbness and decrease pulses: symptoms appear to be multifactorial. Check MILLA to assess for PAD. Also appears to have peripheral neuropathy. F/u with podiatry and PCP. 2. BP controlled. 3. On statin. 4. CAD: f/u with Dr. Christina Shah for cardiac care.

## 2017-07-25 NOTE — MR AVS SNAPSHOT
Visit Information Date & Time Provider Department Dept. Phone Encounter #  
 7/25/2017  4:00 PM Radha Chávez, [address] [facility] [phone] [id number] Your Appointments 7/31/2017  3:30 PM  
1 MONTH with MD [facility]) Appt Note: $0CP 7/11/17ksr [address] [facility]  
[phone] [address] [facility]  
  
    
 8/15/2017 10:00 AM  
Office Visit with ECHO, [facility] Appt Note: ANKLE BRACHIAL INDEX [id number] (Order [id number]) [address] [facility]  
[phone] [address] [facility]  
  
    
 10/20/2017  8:00 AM  
ROUTINE CARE with Chichi Jon NP  
[address] [facility]) Appt Note: htn, lipids and dm [facility] [facility] [address]  
[phone]  
  
   
 [address] [facility] Upcoming Health Maintenance Date Due MICROALBUMIN Q1 3/22/2017 EYE EXAM RETINAL OR DILATED Q1 8/19/2017* INFLUENZA AGE 9 TO ADULT 8/1/2017 FOBT Q 1 YEAR AGE 50-75 10/31/2017 MEDICARE YEARLY EXAM 11/1/2017 HEMOGLOBIN A1C Q6M 1/20/2018 LIPID PANEL Q1 1/31/2018 FOOT EXAM Q1 7/20/2018 GLAUCOMA SCREENING Q2Y 7/28/2018 BREAST CANCER SCRN MAMMOGRAM 2/23/2019 DTaP/Tdap/Td series (2 - Td) 7/16/2026 *Topic was postponed. The date shown is not the original due date. Allergies as of 7/25/2017  Review Complete On: 7/25/2017 By: Radha Chávez MD  
  
 Severity Noted Reaction Type Reactions Crestor [Rosuvastatin]  10/31/2016   Side Effect Myalgia Levaquin [Levofloxacin]  12/07/2015   Intolerance Nausea Only GI Upset Lipitor [Atorvastatin]  04/17/2017   Side Effect Diarrhea Lyrica [Pregabalin]  10/31/2016   Side Effect Myalgia Current Immunizations  Reviewed on 10/31/2016 Name Date H1N1 FLU VACCINE 10/20/2009 Influenza High Dose Vaccine PF 10/31/2016 Influenza Vaccine (Madin [place] Canine Kidney) PF 9/23/2014 11:26 AM  
 Influenza Vaccine Intradermal PF 11/27/2015 Influenza Vaccine Split 9/22/2011  6:25 AM  
 Influenza Vaccine Whole 10/20/2009 Pneumococcal Polysaccharide (PPSV-23) 6/25/2015 Tdap 7/16/2016 11:47 PM  
 ZZZ-RETIRED (DO NOT USE) Pneumococcal Vaccine (Unspecified Type) 10/20/2009 Not reviewed this visit You Were Diagnosed With   
  
 Codes Comments PAD (peripheral artery disease) ([facility])    -  Primary ICD-10-CM: I73.9 ICD-9-CM: 443.9 Decreased pulses in feet     ICD-10-CM: R09.89 ICD-9-CM: 785.9 Mixed hyperlipidemia     ICD-10-CM: E78.2 ICD-9-CM: 272.2 Chronic obstructive pulmonary disease, unspecified COPD type ([facility]     ICD-10-CM: J44.9 ICD-9-CM: 092 S/P coronary artery stent placement     ICD-10-CM: Z95.5 ICD-9-CM: V45.82 Type 2 diabetes mellitus with diabetic neuropathy, without long-term current use of insulin ([facility])     ICD-10-CM: E11.40 ICD-9-CM: 250.60, 357.2 S/P ablation of atrial fibrillation     ICD-10-CM: Z98.890, Z86.79 
ICD-9-CM: V45.89 Vitals BP Pulse Resp Height(growth percentile) Weight(growth percentile) SpO2  
 110/60 (BP 1 Location: Left arm, BP Patient Position: Sitting) 74 16 5' 6\" (1.676 m) 159 lb 3.2 oz (72.2 kg) 95% BMI OB Status Smoking Status 25.7 kg/m2 Postmenopausal Former Smoker BMI and BSA Data Body Mass Index Body Surface Area 25.7 kg/m 2 1.83 m 2 Preferred Pharmacy Pharmacy Name Phone [facility] [address] [place] [address] [phone] Your Updated Medication List  
  
   
This list is accurate as of: 7/25/17  4:47 PM.  Always use your most recent med list.  
  
  
  
  
 * albuterol 90 mcg/actuation inhaler Commonly known as:  VENTOLIN HFA Take 2 Puffs by inhalation every six (6) hours as needed for Wheezing. * albuterol 2.5 mg /3 mL (0.083 %) nebulizer solution Commonly known as:  PROVENTIL VENTOLIN  
3 mL by Nebulization route every four (4) hours as needed for Wheezing. amiodarone 200 mg tablet Commonly known as:  CORDARONE Take 1 Tab by mouth daily. apixaban 2.5 mg tablet Commonly known as:  [place] Take 1 Tab by mouth two (2) times a day. Indications: PREVENT THROMBOEMBOLISM IN CHRONIC ATRIAL FIBRILLATION Azelastine 0.15 % (205.5 mcg) nasal spray Commonly known as:  ASTEPRO  
1 Spray by Both Nostrils route daily. Blood-Glucose Meter monitoring kit Check blood sugar daily. May substitute for insurance preferred meter  
  
 budesonide-formoterol 160-4.5 mcg/actuation HFA inhaler Commonly known as:  SYMBICORT Take 1 Puff by inhalation two (2) times a day. carvedilol 6.25 mg tablet Commonly known as:  Kathryn Alstrom Take 1 Tab by mouth two (2) times daily (with meals). CENTRUM SILVER PO Take 1 Tab by mouth daily. Takes one po daily. clopidogrel 75 mg Tab Commonly known as:  PLAVIX Take 1 Tab by mouth daily. cod liver oil Cap Take 1 Cap by mouth daily. colchicine 0.6 mg tablet Take 1 Tab by mouth two (2) times a day. FLONASE 50 mcg/actuation nasal spray Generic drug:  fluticasone 2 Sprays by Both Nostrils route every evening. furosemide 40 mg tablet Commonly known as:  LASIX TAKE ONE TABLET BY MOUTH ONCE DAILY  
  
 gabapentin 800 mg tablet Commonly known as:  NEURONTIN  
TAKE ONE TABLET BY MOUTH THREE TIMES DAILY  
  
 glucose blood VI test strips strip Commonly known as:  blood glucose test  
Check blood sugar 1 times daily: E11.9, may substitute for insurance preferred strips. * Lancets Misc Check blood sugar 1 times daily.  May substitute for insurance preferred lancets. * One Touch Delica 33 gauge Misc Generic drug:  lancets  
  
 metFORMIN 1,000 mg tablet Commonly known as:  GLUCOPHAGE  
TAKE 1 TABLET BY MOUTH TWICE DAILY WITH MEALS  Indications: PREVENTION OF TYPE 2 DIABETES MELLITUS  
  
 ondansetron hcl 4 mg tablet Commonly known as:  ZOFRAN (AS HYDROCHLORIDE) Take 1 Tab by mouth every eight (8) hours as needed for Nausea. pravastatin 20 mg tablet Commonly known as:  PRAVACHOL Take 1 Tab by mouth nightly. tiotropium 18 mcg inhalation capsule Commonly known as:  [address] [place] [address] [address] INHALE THE CONTENTS OF 1 CAPSULE THROUGH HANDIHALER DEVICE DAILY  
  
 traMADol 50 mg tablet Commonly known as:  ULTRAM  
Take 1 Tab by mouth every six (6) hours as needed for Pain for up to 10 days. Max Daily Amount: 200 mg.  
  
 vitamin k 100 mcg tablet Take 1 Tab by mouth daily. * Notice: This list has 4 medication(s) that are the same as other medications prescribed for you. Read the directions carefully, and ask your doctor or other care provider to review them with you. To-Do List   
 07/25/2017 Imaging:  ANKLE BRACHIAL INDEX Introducing [facility] & [facility] SERVICES! Dear Jazmin Love: Thank you for requesting a [employer] account. Our records indicate that you already have an active [employer] account. You can access your account anytime at [email or web address] Did you know that you can access your hospital and ER discharge instructions at any time in [employer]? You can also review all of your test results from your hospital stay or ER visit. Additional Information If you have questions, please visit the Frequently Asked Questions section of the [employer] website at [email or web address] Remember, [employer] is NOT to be used for urgent needs. For medical emergencies, dial 911. Now available from your iPhone and Android! Please provide this summary of care documentation to your next provider. Your primary care clinician is listed as CAROLINA Hale. If you have any questions after today's visit, please call [phone].

## 2017-07-31 ENCOUNTER — OFFICE VISIT (OUTPATIENT)
Dept: CARDIOLOGY CLINIC | Age: 67
End: 2017-07-31

## 2017-07-31 VITALS
OXYGEN SATURATION: 95 % | HEART RATE: 75 BPM | BODY MASS INDEX: 25.76 KG/M2 | SYSTOLIC BLOOD PRESSURE: 110 MMHG | RESPIRATION RATE: 16 BRPM | HEIGHT: 66 IN | WEIGHT: 160.3 LBS | DIASTOLIC BLOOD PRESSURE: 58 MMHG

## 2017-07-31 DIAGNOSIS — Z79.01 ANTICOAGULANT LONG-TERM USE: ICD-10-CM

## 2017-07-31 DIAGNOSIS — I48.0 PAROXYSMAL ATRIAL FIBRILLATION (HCC): Primary | Chronic | ICD-10-CM

## 2017-07-31 DIAGNOSIS — I50.22 CHRONIC SYSTOLIC HF (HEART FAILURE) (HCC): ICD-10-CM

## 2017-07-31 DIAGNOSIS — I48.91 ATRIAL FIBRILLATION, UNSPECIFIED TYPE (HCC): Chronic | ICD-10-CM

## 2017-07-31 DIAGNOSIS — I50.22 CHRONIC SYSTOLIC CONGESTIVE HEART FAILURE (HCC): ICD-10-CM

## 2017-07-31 DIAGNOSIS — Z98.890 S/P ABLATION OF ATRIAL FIBRILLATION: ICD-10-CM

## 2017-07-31 DIAGNOSIS — Z86.79 S/P ABLATION OF ATRIAL FIBRILLATION: ICD-10-CM

## 2017-07-31 RX ORDER — CLOPIDOGREL BISULFATE 75 MG/1
TABLET ORAL
COMMUNITY
Start: 2017-07-25 | End: 2017-07-31 | Stop reason: SDUPTHER

## 2017-07-31 RX ORDER — AMIODARONE HYDROCHLORIDE 100 MG/1
100 TABLET ORAL DAILY
Qty: 90 TAB | Refills: 3 | Status: SHIPPED | OUTPATIENT
Start: 2017-07-31 | End: 2017-09-22 | Stop reason: DRUGHIGH

## 2017-07-31 RX ORDER — WARFARIN SODIUM 5 MG/1
TABLET ORAL
COMMUNITY
Start: 2017-05-31 | End: 2017-07-31

## 2017-07-31 RX ORDER — PREDNISONE 10 MG/1
TABLET ORAL
COMMUNITY
Start: 2017-05-22 | End: 2017-07-31 | Stop reason: ALTCHOICE

## 2017-07-31 NOTE — PROGRESS NOTES
Chief Complaint   Patient presents with    Irregular Heart Beat     1 mo f/u    Dizziness     constant

## 2017-07-31 NOTE — MR AVS SNAPSHOT
Visit Information Date & Time Provider Department Dept. Phone Encounter #  
 7/31/2017  3:30 PM [address] Simms, 1024 [facility] [phone] [id number] Your Appointments 8/15/2017 10:00 AM  
Office Visit with ECHO, [facility] [address]) Appt Note: ANKLE BRACHIAL INDEX [id number] (Order [id number]) [address] [facility]  
[phone] [address] [facility]  
  
    
 10/20/2017  8:00 AM  
ROUTINE CARE with Isis Pinzon NP  
[address] [facility] [address]) Appt Note: htn, lipids and dm [facility] [address]  
[phone]  
  
   
 [address] [place]  
  
    
 11/1/2017 10:30 AM  
3 MONTH with [address] Simms MD  
[facility] [address] [facility]) Appt Note: $40CP 7/31/17ksr [address] [facility]  
[phone] [address] [facility] Upcoming Health Maintenance Date Due MICROALBUMIN Q1 3/22/2017 EYE EXAM RETINAL OR DILATED Q1 8/19/2017* INFLUENZA AGE 9 TO ADULT 8/1/2017 FOBT Q 1 YEAR AGE 50-75 10/31/2017 MEDICARE YEARLY EXAM 11/1/2017 HEMOGLOBIN A1C Q6M 1/20/2018 LIPID PANEL Q1 1/31/2018 FOOT EXAM Q1 7/20/2018 GLAUCOMA SCREENING Q2Y 7/28/2018 BREAST CANCER SCRN MAMMOGRAM 2/23/2019 DTaP/Tdap/Td series (2 - Td) 7/16/2026 *Topic was postponed. The date shown is not the original due date. Allergies as of 7/31/2017  Review Complete On: 7/31/2017 By: Ethyl Search, NP Severity Noted Reaction Type Reactions Crestor [Rosuvastatin]  10/31/2016   Side Effect Myalgia Levaquin [Levofloxacin]  12/07/2015   Intolerance Nausea Only GI Upset Lipitor [Atorvastatin]  04/17/2017   Side Effect Diarrhea Lyrica [Pregabalin]  10/31/2016   Side Effect Myalgia Current Immunizations  Reviewed on 10/31/2016 Name Date H1N1 FLU VACCINE 10/20/2009 Influenza High Dose Vaccine PF 10/31/2016 Influenza Vaccine (Madin [place] Canine Kidney) PF 9/23/2014 11:26 AM  
 Influenza Vaccine Intradermal PF 11/27/2015 Influenza Vaccine Split 9/22/2011  6:25 AM  
 Influenza Vaccine Whole 10/20/2009 Pneumococcal Polysaccharide (PPSV-23) 6/25/2015 Tdap 7/16/2016 11:47 PM  
 ZZZ-RETIRED (DO NOT USE) Pneumococcal Vaccine (Unspecified Type) 10/20/2009 Not reviewed this visit You Were Diagnosed With   
  
 Codes Comments Paroxysmal atrial fibrillation (HCC)    -  Primary ICD-10-CM: I48.0 ICD-9-CM: 427.31 Atrial fibrillation, unspecified type ([facility]     ICD-10-CM: I48.91 
ICD-9-CM: 427.31 Chronic systolic congestive heart failure (HCC)     ICD-10-CM: I50.22 ICD-9-CM: 428.22, 428.0 Chronic systolic HF (heart failure) (HCC)     ICD-10-CM: I50.22 ICD-9-CM: 428.22 S/P ablation of atrial fibrillation     ICD-10-CM: Z98.890, Z86.79 
ICD-9-CM: V45.89 Anticoagulant long-term use     ICD-10-CM: Z79.01 
ICD-9-CM: V58.61 Vitals BP Pulse Resp Height(growth percentile) Weight(growth percentile) SpO2  
 110/58 (BP 1 Location: Right arm, BP Patient Position: Standing) 75 16 5' 6\" (1.676 m) 160 lb 4.8 oz (72.7 kg) 95% BMI OB Status Smoking Status 25.87 kg/m2 Postmenopausal Former Smoker Vitals History BMI and BSA Data Body Mass Index Body Surface Area  
 25.87 kg/m 2 1.84 m 2 Preferred Pharmacy Pharmacy Name Phone [facility] [address], [address] [place] [address] Avenue [phone] Your Updated Medication List  
  
   
This list is accurate as of: 7/31/17  4:23 PM.  Always use your most recent med list.  
  
  
  
  
 * albuterol 90 mcg/actuation inhaler Commonly known as:  VENTOLIN HFA Take 2 Puffs by inhalation every six (6) hours as needed for Wheezing. * albuterol 2.5 mg /3 mL (0.083 %) nebulizer solution Commonly known as:  PROVENTIL VENTOLIN  
3 mL by Nebulization route every four (4) hours as needed for Wheezing. amiodarone 100 mg tablet Commonly known as:  Devyn Macias Take 1 Tab by mouth daily. Dose reduced 7/31/17  
  
 apixaban 5 mg tablet Commonly known as:  Sindhu Blackmon Take 1 Tab by mouth two (2) times a day. Indications: PREVENT THROMBOEMBOLISM IN CHRONIC ATRIAL FIBRILLATION Azelastine 0.15 % (205.5 mcg) nasal spray Commonly known as:  ASTEPRO  
1 Spray by Both Nostrils route daily. Blood-Glucose Meter monitoring kit Check blood sugar daily. May substitute for insurance preferred meter  
  
 budesonide-formoterol 160-4.5 mcg/actuation HFA inhaler Commonly known as:  SYMBICORT Take 1 Puff by inhalation two (2) times a day. carvedilol 6.25 mg tablet Commonly known as:  Braeden Kurt Take 1 Tab by mouth two (2) times daily (with meals). CENTRUM SILVER PO Take 1 Tab by mouth daily. Takes one po daily. clopidogrel 75 mg Tab Commonly known as:  PLAVIX Take 1 Tab by mouth daily. cod liver oil Cap Take 1 Cap by mouth daily. colchicine 0.6 mg tablet Take 1 Tab by mouth two (2) times a day. FLONASE 50 mcg/actuation nasal spray Generic drug:  fluticasone 2 Sprays by Both Nostrils route every evening. furosemide 40 mg tablet Commonly known as:  LASIX TAKE ONE TABLET BY MOUTH ONCE DAILY  
  
 gabapentin 800 mg tablet Commonly known as:  NEURONTIN  
TAKE ONE TABLET BY MOUTH THREE TIMES DAILY  
  
 glucose blood VI test strips strip Commonly known as:  blood glucose test  
Check blood sugar 1 times daily: E11.9, may substitute for insurance preferred strips. * Lancets Misc Check blood sugar 1 times daily. May substitute for insurance preferred lancets. * One Touch Delica 33 gauge Misc Generic drug:  lancets  
  
 metFORMIN 1,000 mg tablet Commonly known as:  GLUCOPHAGE  
TAKE 1 TABLET BY MOUTH TWICE DAILY WITH MEALS  Indications: PREVENTION OF TYPE 2 DIABETES MELLITUS  
  
 ondansetron hcl 4 mg tablet Commonly known as:  ZOFRAN (AS HYDROCHLORIDE) Take 1 Tab by mouth every eight (8) hours as needed for Nausea. pravastatin 20 mg tablet Commonly known as:  PRAVACHOL Take 1 Tab by mouth nightly. tiotropium 18 mcg inhalation capsule Commonly known as:  [employer] [address] INHALE THE CONTENTS OF 1 CAPSULE THROUGH HANDIHALER DEVICE DAILY * Notice: This list has 4 medication(s) that are the same as other medications prescribed for you. Read the directions carefully, and ask your doctor or other care provider to review them with you. Prescriptions Sent to Pharmacy Refills  
 amiodarone (PACERONE) 100 mg tablet 3 Sig: Take 1 Tab by mouth daily. Dose reduced 7/31/17 Class: Normal  
 Pharmacy: [address] , [address] [place] [address] Ph #: [phone] Route: Oral  
 apixaban (ELIQUIS) 5 mg tablet 3 Sig: Take 1 Tab by mouth two (2) times a day. Indications: PREVENT THROMBOEMBOLISM IN CHRONIC ATRIAL FIBRILLATION Class: Normal  
 Pharmacy: [address] , [address] [place] [address] Ph #: [phone] Route: Oral  
  
We Performed the Following AMB POC EKG ROUTINE W/ 12 LEADS, INTER & REP [57289 CPT(R)] Introducing [facility] & HEALTH SERVICES! Dear Miroslava Dc: Thank you for requesting a [employer] account. Our records indicate that you already have an active [employer] account. You can access your account anytime at [email or web address] Did you know that you can access your hospital and ER discharge instructions at any time in [employer]? You can also review all of your test results from your hospital stay or ER visit. Additional Information If you have questions, please visit the Frequently Asked Questions section of the [employer] website at [email or web address] Remember, [employer] is NOT to be used for urgent needs. For medical emergencies, dial 911. Now available from your iPhone and Android! Please provide this summary of care documentation to your next provider. Your primary care clinician is listed as CAROLINA Huff. If you have any questions after today's visit, please call [phone].

## 2017-08-02 ENCOUNTER — PATIENT MESSAGE (OUTPATIENT)
Dept: INTERNAL MEDICINE CLINIC | Age: 67
End: 2017-08-02

## 2017-08-03 NOTE — TELEPHONE ENCOUNTER
From: Priscila Pastor  To: Jeannie Murillo NP  Sent: 8/2/2017 2:36 PM EDT  Subject: Non-Urgent Medical Question    Ms. Carlie Odonnell I'm writing because for awhile I find myself walking like i'm drunk, My sugar first thing in the morning has been under 100 which is out of the ordinary, I called the Cardiologist office because I wanted to see if maybe the new medicine is causing it but they told me to contact you because you keep up my blood sugar. To be honest with you I don't think it's my sugar I can't explain it I get dizzy sometimes and i'm walking into walls like i'm drunk I've even almost fall what suggestions do you have. I'm so sorry to be bothering you but I'm tired of not feeling well, i'm fighting my stomach problem still having diarrhea and I've lost 11 lbs because I don't have an appetite anyway the drunk walk is annoying.

## 2017-08-14 ENCOUNTER — TELEPHONE (OUTPATIENT)
Dept: CARDIOLOGY CLINIC | Age: 67
End: 2017-08-14

## 2017-08-14 ENCOUNTER — OFFICE VISIT (OUTPATIENT)
Dept: CARDIOLOGY CLINIC | Age: 67
End: 2017-08-14

## 2017-08-14 DIAGNOSIS — R20.0 NUMBNESS IN FEET: Primary | ICD-10-CM

## 2017-08-14 NOTE — TELEPHONE ENCOUNTER
Verified patient with two identifiers. Pt informed of MILLA study results. Arterial doppler scheduled per Dr Gunnar Bear.

## 2017-08-15 RX ORDER — FUROSEMIDE 40 MG/1
20 TABLET ORAL DAILY
Qty: 60 TAB | Refills: 6 | Status: SHIPPED | OUTPATIENT
Start: 2017-08-15 | End: 2017-08-16 | Stop reason: SDUPTHER

## 2017-08-15 RX ORDER — ONDANSETRON 4 MG/1
4 TABLET, FILM COATED ORAL
Qty: 20 TAB | Refills: 0 | Status: SHIPPED | OUTPATIENT
Start: 2017-08-15 | End: 2017-09-05 | Stop reason: SDUPTHER

## 2017-08-16 RX ORDER — FUROSEMIDE 40 MG/1
TABLET ORAL
Qty: 60 TAB | Refills: 6 | Status: SHIPPED | OUTPATIENT
Start: 2017-08-16 | End: 2017-09-15

## 2017-08-16 NOTE — TELEPHONE ENCOUNTER
Please call patient in regards to prescrip for furosemide (LASIX) 40 mg tablet   She states the wrong dose was sent in.     Thanks

## 2017-08-16 NOTE — TELEPHONE ENCOUNTER
Patient called stating that the pharmacy she gets her medication from cannot get furosemide 40mg. She would like an Rx for furosemide 20mg called into pharmacy on file: [facility] [phone].      If you need to speak with patient she can be reached at [phone]. Thank you    Pt is completely is out of med since Monday.

## 2017-08-22 DIAGNOSIS — M1A.9XX0 CHRONIC GOUT INVOLVING TOE OF LEFT FOOT WITHOUT TOPHUS, UNSPECIFIED CAUSE: ICD-10-CM

## 2017-08-22 RX ORDER — COLCHICINE 0.6 MG/1
TABLET ORAL
Qty: 60 TAB | Refills: 5 | Status: SHIPPED | OUTPATIENT
Start: 2017-08-22 | End: 2017-09-22 | Stop reason: DRUGHIGH

## 2017-09-01 LAB — HEMOCCULT STL QL IA: NEGATIVE

## 2017-09-05 RX ORDER — ONDANSETRON 4 MG/1
4 TABLET, FILM COATED ORAL
Qty: 20 TAB | Refills: 0 | Status: SHIPPED | OUTPATIENT
Start: 2017-09-05 | End: 2017-09-15

## 2017-09-05 RX ORDER — ONDANSETRON 4 MG/1
4 TABLET, FILM COATED ORAL
Qty: 20 TAB | Refills: 0 | Status: CANCELLED | OUTPATIENT
Start: 2017-09-05

## 2017-09-06 ENCOUNTER — TELEPHONE (OUTPATIENT)
Dept: INTERNAL MEDICINE CLINIC | Age: 67
End: 2017-09-06

## 2017-09-06 NOTE — TELEPHONE ENCOUNTER
Layne @ Dr. Arabella Antonio (GI )office is calling for stool culture results on this patient please.  Phone #[phone] and fax # [phone]

## 2017-09-08 ENCOUNTER — HOSPITAL ENCOUNTER (OUTPATIENT)
Dept: LAB | Age: 67
Discharge: HOME | End: 2017-09-08

## 2017-09-08 PROCEDURE — 99001 SPECIMEN HANDLING PT-LAB: CPT | Performed by: INTERNAL MEDICINE

## 2017-09-12 ENCOUNTER — TELEPHONE (OUTPATIENT)
Dept: INTERNAL MEDICINE CLINIC | Age: 67
End: 2017-09-12

## 2017-09-12 NOTE — TELEPHONE ENCOUNTER
Called the GI office and was told to disregard message because the patient gave incorrect information. Pt was suppose to take the form that was given to her to [facility] but came to our office and requested a FIT test saying that's what the GI Office stated she needed, but it wasn't. Pt came into the office on Friday 9/8/17 with another form that was given to her by the GI office and I instructed her to take it to [facility] so that she can get the correct testing.

## 2017-09-12 NOTE — TELEPHONE ENCOUNTER
Pt wants you to call her please. She has been having frequent falls and today she fell in the tub .  Pt # [phone]

## 2017-09-13 ENCOUNTER — HOSPITAL ENCOUNTER (INPATIENT)
Age: 67
LOS: 2 days | Discharge: HOME | DRG: 683 | End: 2017-09-15
Attending: EMERGENCY MEDICINE | Admitting: INTERNAL MEDICINE
Payer: COMMERCIAL

## 2017-09-13 ENCOUNTER — APPOINTMENT (OUTPATIENT)
Dept: CT IMAGING | Age: 67
DRG: 683 | End: 2017-09-13
Attending: EMERGENCY MEDICINE
Payer: COMMERCIAL

## 2017-09-13 DIAGNOSIS — Z91.81 HISTORY OF RECENT FALL: Primary | ICD-10-CM

## 2017-09-13 DIAGNOSIS — E86.9 VOLUME DEPLETION: ICD-10-CM

## 2017-09-13 DIAGNOSIS — R19.7 DIARRHEA, UNSPECIFIED TYPE: ICD-10-CM

## 2017-09-13 DIAGNOSIS — N17.9 ACUTE RENAL FAILURE, UNSPECIFIED ACUTE RENAL FAILURE TYPE (HCC): Primary | ICD-10-CM

## 2017-09-13 DIAGNOSIS — E87.20 LACTIC ACIDOSIS: ICD-10-CM

## 2017-09-13 LAB
ALBUMIN SERPL-MCNC: 2.9 G/DL (ref 3.5–5)
ALBUMIN/GLOB SERPL: 0.5 {RATIO} (ref 1.1–2.2)
ALP SERPL-CCNC: 231 U/L (ref 45–117)
ALT SERPL-CCNC: 64 U/L (ref 12–78)
ANION GAP SERPL CALC-SCNC: 9 MMOL/L (ref 5–15)
APPEARANCE UR: ABNORMAL
AST SERPL-CCNC: 122 U/L (ref 15–37)
BACTERIA URNS QL MICRO: ABNORMAL /HPF
BASOPHILS # BLD: 0 K/UL (ref 0–0.1)
BASOPHILS NFR BLD: 0 % (ref 0–1)
BILIRUB SERPL-MCNC: 0.5 MG/DL (ref 0.2–1)
BILIRUB UR QL: NEGATIVE
BUN SERPL-MCNC: 20 MG/DL (ref 6–20)
BUN/CREAT SERPL: 9 (ref 12–20)
C DIFF TOX GENS STL QL NAA+PROBE: NEGATIVE
CALCIUM SERPL-MCNC: 8.7 MG/DL (ref 8.5–10.1)
CHLORIDE SERPL-SCNC: 102 MMOL/L (ref 97–108)
CO2 SERPL-SCNC: 22 MMOL/L (ref 21–32)
COLOR UR: ABNORMAL
CREAT SERPL-MCNC: 2.15 MG/DL (ref 0.55–1.02)
CRYPTOSP AG STL QL: NEGATIVE
EOSINOPHIL # BLD: 0 K/UL (ref 0–0.4)
EOSINOPHIL NFR BLD: 1 % (ref 0–7)
EPITH CASTS URNS QL MICRO: ABNORMAL /LPF
ERYTHROCYTE [DISTWIDTH] IN BLOOD BY AUTOMATED COUNT: 16.5 % (ref 11.5–14.5)
G LAMBLIA AG STL QL: NEGATIVE
GLOBULIN SER CALC-MCNC: 5.9 G/DL (ref 2–4)
GLUCOSE BLD STRIP.AUTO-MCNC: 80 MG/DL (ref 65–100)
GLUCOSE SERPL-MCNC: 86 MG/DL (ref 65–100)
GLUCOSE UR STRIP.AUTO-MCNC: NEGATIVE MG/DL
HCT VFR BLD AUTO: 33.6 % (ref 35–47)
HGB BLD-MCNC: 11.2 G/DL (ref 11.5–16)
HGB UR QL STRIP: NEGATIVE
KETONES UR QL STRIP.AUTO: NEGATIVE MG/DL
LACTATE SERPL-SCNC: 1.9 MMOL/L (ref 0.4–2)
LACTATE SERPL-SCNC: 3.9 MMOL/L (ref 0.4–2)
LEUKOCYTE ESTERASE UR QL STRIP.AUTO: ABNORMAL
LIPASE SERPL-CCNC: 74 U/L (ref 73–393)
LYMPHOCYTES # BLD: 1.6 K/UL (ref 0.8–3.5)
LYMPHOCYTES NFR BLD: 26 % (ref 12–49)
MAGNESIUM SERPL-MCNC: 0.9 MG/DL (ref 1.6–2.4)
MCH RBC QN AUTO: 28.2 PG (ref 26–34)
MCHC RBC AUTO-ENTMCNC: 33.3 G/DL (ref 30–36.5)
MCV RBC AUTO: 84.6 FL (ref 80–99)
MONOCYTES # BLD: 1.2 K/UL (ref 0–1)
MONOCYTES NFR BLD: 19 % (ref 5–13)
NEUTS SEG # BLD: 3.3 K/UL (ref 1.8–8)
NEUTS SEG NFR BLD: 54 % (ref 32–75)
NITRITE UR QL STRIP.AUTO: NEGATIVE
PH UR STRIP: 5.5 [PH] (ref 5–8)
PLATELET # BLD AUTO: 296 K/UL (ref 150–400)
POTASSIUM SERPL-SCNC: 5 MMOL/L (ref 3.5–5.1)
PROT SERPL-MCNC: 8.8 G/DL (ref 6.4–8.2)
PROT UR STRIP-MCNC: NEGATIVE MG/DL
RBC # BLD AUTO: 3.97 M/UL (ref 3.8–5.2)
RBC #/AREA URNS HPF: ABNORMAL /HPF (ref 0–5)
SERVICE CMNT-IMP: NORMAL
SODIUM SERPL-SCNC: 133 MMOL/L (ref 136–145)
SP GR UR REFRACTOMETRY: 1.01 (ref 1–1.03)
UA: UC IF INDICATED,UAUC: ABNORMAL
UROBILINOGEN UR QL STRIP.AUTO: 0.2 EU/DL (ref 0.2–1)
WBC # BLD AUTO: 6.1 K/UL (ref 3.6–11)
WBC URNS QL MICRO: ABNORMAL /HPF (ref 0–4)

## 2017-09-13 PROCEDURE — 74011250636 HC RX REV CODE- 250/636: Performed by: EMERGENCY MEDICINE

## 2017-09-13 PROCEDURE — 82962 GLUCOSE BLOOD TEST: CPT

## 2017-09-13 PROCEDURE — 96361 HYDRATE IV INFUSION ADD-ON: CPT

## 2017-09-13 PROCEDURE — 83605 ASSAY OF LACTIC ACID: CPT | Performed by: EMERGENCY MEDICINE

## 2017-09-13 PROCEDURE — 89055 LEUKOCYTE ASSESSMENT FECAL: CPT | Performed by: EMERGENCY MEDICINE

## 2017-09-13 PROCEDURE — 36415 COLL VENOUS BLD VENIPUNCTURE: CPT | Performed by: EMERGENCY MEDICINE

## 2017-09-13 PROCEDURE — 83735 ASSAY OF MAGNESIUM: CPT | Performed by: EMERGENCY MEDICINE

## 2017-09-13 PROCEDURE — 83690 ASSAY OF LIPASE: CPT | Performed by: EMERGENCY MEDICINE

## 2017-09-13 PROCEDURE — 74011250637 HC RX REV CODE- 250/637: Performed by: INTERNAL MEDICINE

## 2017-09-13 PROCEDURE — 87086 URINE CULTURE/COLONY COUNT: CPT | Performed by: EMERGENCY MEDICINE

## 2017-09-13 PROCEDURE — 85025 COMPLETE CBC W/AUTO DIFF WBC: CPT | Performed by: EMERGENCY MEDICINE

## 2017-09-13 PROCEDURE — 80053 COMPREHEN METABOLIC PANEL: CPT | Performed by: EMERGENCY MEDICINE

## 2017-09-13 PROCEDURE — 87329 GIARDIA AG IA: CPT | Performed by: EMERGENCY MEDICINE

## 2017-09-13 PROCEDURE — 87493 C DIFF AMPLIFIED PROBE: CPT | Performed by: EMERGENCY MEDICINE

## 2017-09-13 PROCEDURE — 87177 OVA AND PARASITES SMEARS: CPT | Performed by: EMERGENCY MEDICINE

## 2017-09-13 PROCEDURE — 81001 URINALYSIS AUTO W/SCOPE: CPT | Performed by: EMERGENCY MEDICINE

## 2017-09-13 PROCEDURE — 99284 EMERGENCY DEPT VISIT MOD MDM: CPT

## 2017-09-13 PROCEDURE — 96360 HYDRATION IV INFUSION INIT: CPT

## 2017-09-13 PROCEDURE — 74176 CT ABD & PELVIS W/O CONTRAST: CPT

## 2017-09-13 PROCEDURE — 70450 CT HEAD/BRAIN W/O DYE: CPT

## 2017-09-13 PROCEDURE — 74011250636 HC RX REV CODE- 250/636: Performed by: INTERNAL MEDICINE

## 2017-09-13 PROCEDURE — 65270000015 HC RM PRIVATE ONCOLOGY

## 2017-09-13 RX ORDER — FLUTICASONE FUROATE AND VILANTEROL 100; 25 UG/1; UG/1
1 POWDER RESPIRATORY (INHALATION) DAILY
Status: DISCONTINUED | OUTPATIENT
Start: 2017-09-14 | End: 2017-09-15 | Stop reason: HOSPADM

## 2017-09-13 RX ORDER — AMIODARONE HYDROCHLORIDE 200 MG/1
100 TABLET ORAL DAILY
Status: DISCONTINUED | OUTPATIENT
Start: 2017-09-14 | End: 2017-09-15 | Stop reason: HOSPADM

## 2017-09-13 RX ORDER — GABAPENTIN 300 MG/1
600 CAPSULE ORAL 3 TIMES DAILY
Status: DISCONTINUED | OUTPATIENT
Start: 2017-09-13 | End: 2017-09-15 | Stop reason: HOSPADM

## 2017-09-13 RX ORDER — DEXTROSE MONOHYDRATE 100 MG/ML
125-250 INJECTION, SOLUTION INTRAVENOUS AS NEEDED
Status: DISCONTINUED | OUTPATIENT
Start: 2017-09-13 | End: 2017-09-15 | Stop reason: HOSPADM

## 2017-09-13 RX ORDER — ONDANSETRON 4 MG/1
8 TABLET, ORALLY DISINTEGRATING ORAL
Status: DISCONTINUED | OUTPATIENT
Start: 2017-09-13 | End: 2017-09-15 | Stop reason: HOSPADM

## 2017-09-13 RX ORDER — ENOXAPARIN SODIUM 100 MG/ML
40 INJECTION SUBCUTANEOUS EVERY 24 HOURS
Status: DISCONTINUED | OUTPATIENT
Start: 2017-09-13 | End: 2017-09-14

## 2017-09-13 RX ORDER — DEXTROSE 50 % IN WATER (D50W) INTRAVENOUS SYRINGE
12.5-25 AS NEEDED
Status: DISCONTINUED | OUTPATIENT
Start: 2017-09-13 | End: 2017-09-13 | Stop reason: SDUPTHER

## 2017-09-13 RX ORDER — INSULIN LISPRO 100 [IU]/ML
INJECTION, SOLUTION INTRAVENOUS; SUBCUTANEOUS
Status: DISCONTINUED | OUTPATIENT
Start: 2017-09-13 | End: 2017-09-15 | Stop reason: HOSPADM

## 2017-09-13 RX ORDER — PRAVASTATIN SODIUM 10 MG/1
20 TABLET ORAL
Status: DISCONTINUED | OUTPATIENT
Start: 2017-09-13 | End: 2017-09-15 | Stop reason: HOSPADM

## 2017-09-13 RX ORDER — MAGNESIUM SULFATE 100 %
4 CRYSTALS MISCELLANEOUS AS NEEDED
Status: DISCONTINUED | OUTPATIENT
Start: 2017-09-13 | End: 2017-09-15 | Stop reason: HOSPADM

## 2017-09-13 RX ORDER — SODIUM CHLORIDE 9 MG/ML
75 INJECTION, SOLUTION INTRAVENOUS CONTINUOUS
Status: DISCONTINUED | OUTPATIENT
Start: 2017-09-13 | End: 2017-09-15 | Stop reason: HOSPADM

## 2017-09-13 RX ORDER — ALBUTEROL SULFATE 0.83 MG/ML
2.5 SOLUTION RESPIRATORY (INHALATION)
Status: DISCONTINUED | OUTPATIENT
Start: 2017-09-13 | End: 2017-09-15 | Stop reason: HOSPADM

## 2017-09-13 RX ORDER — ALBUTEROL SULFATE 90 UG/1
2 AEROSOL, METERED RESPIRATORY (INHALATION)
Status: DISCONTINUED | OUTPATIENT
Start: 2017-09-13 | End: 2017-09-15 | Stop reason: HOSPADM

## 2017-09-13 RX ORDER — SODIUM CHLORIDE 0.9 % (FLUSH) 0.9 %
5-10 SYRINGE (ML) INJECTION AS NEEDED
Status: DISCONTINUED | OUTPATIENT
Start: 2017-09-13 | End: 2017-09-15 | Stop reason: HOSPADM

## 2017-09-13 RX ORDER — COLCHICINE 0.6 MG/1
0.6 TABLET ORAL ONCE
Status: COMPLETED | OUTPATIENT
Start: 2017-09-13 | End: 2017-09-13

## 2017-09-13 RX ORDER — MAGNESIUM SULFATE HEPTAHYDRATE 40 MG/ML
2 INJECTION, SOLUTION INTRAVENOUS ONCE
Status: COMPLETED | OUTPATIENT
Start: 2017-09-13 | End: 2017-09-13

## 2017-09-13 RX ORDER — SODIUM CHLORIDE 0.9 % (FLUSH) 0.9 %
5-10 SYRINGE (ML) INJECTION EVERY 8 HOURS
Status: DISCONTINUED | OUTPATIENT
Start: 2017-09-13 | End: 2017-09-15 | Stop reason: HOSPADM

## 2017-09-13 RX ORDER — AZELASTINE 1 MG/ML
1 SPRAY, METERED NASAL DAILY
Status: DISCONTINUED | OUTPATIENT
Start: 2017-09-14 | End: 2017-09-15 | Stop reason: HOSPADM

## 2017-09-13 RX ORDER — CARVEDILOL 3.12 MG/1
6.25 TABLET ORAL 2 TIMES DAILY WITH MEALS
Status: DISCONTINUED | OUTPATIENT
Start: 2017-09-13 | End: 2017-09-15

## 2017-09-13 RX ORDER — CLOPIDOGREL BISULFATE 75 MG/1
75 TABLET ORAL DAILY
Status: DISCONTINUED | OUTPATIENT
Start: 2017-09-14 | End: 2017-09-15 | Stop reason: HOSPADM

## 2017-09-13 RX ORDER — FLUTICASONE PROPIONATE 50 MCG
2 SPRAY, SUSPENSION (ML) NASAL EVERY EVENING
Status: DISCONTINUED | OUTPATIENT
Start: 2017-09-13 | End: 2017-09-15 | Stop reason: HOSPADM

## 2017-09-13 RX ORDER — ACETAMINOPHEN 325 MG/1
650 TABLET ORAL
Status: DISCONTINUED | OUTPATIENT
Start: 2017-09-13 | End: 2017-09-15 | Stop reason: HOSPADM

## 2017-09-13 RX ADMIN — MAGNESIUM SULFATE IN WATER 2 G: 40 INJECTION, SOLUTION INTRAVENOUS at 19:09

## 2017-09-13 RX ADMIN — SODIUM CHLORIDE 75 ML/HR: 900 INJECTION, SOLUTION INTRAVENOUS at 18:55

## 2017-09-13 RX ADMIN — PRAVASTATIN SODIUM 20 MG: 10 TABLET ORAL at 23:03

## 2017-09-13 RX ADMIN — COLCHICINE 0.6 MG: 0.6 TABLET, FILM COATED ORAL at 23:04

## 2017-09-13 RX ADMIN — GABAPENTIN 600 MG: 300 CAPSULE ORAL at 23:03

## 2017-09-13 RX ADMIN — SODIUM CHLORIDE 2067 ML: 900 INJECTION, SOLUTION INTRAVENOUS at 14:38

## 2017-09-13 RX ADMIN — Medication 5 ML: at 23:04

## 2017-09-13 RX ADMIN — APIXABAN 5 MG: 5 TABLET, FILM COATED ORAL at 23:03

## 2017-09-13 NOTE — H&P
Hospitalist Admission Note    NAME: Billy Sanderson   :  1950   MRN:  [id number]     Date/Time:  2017 5:42 PM    Patient PCP: Arron Lofton NP  ________________________________________________________________________    My assessment of this patient's clinical condition and my plan of care is as follows. Assessment / Plan:  Acute Renal Failure, POA  Generalized Weakness,  Dehydration and continued use of Lasix            Patient is at high risk for further deterioration and needs to be admitted to the hospital for work up and treatment           Betina IV Hydration   Stop at 2 L fluid , hold Lasix and follow BMP    Lactic Acidosis, POA  Likely 2nd to poor perfusion from volume depletion        Hydrate and follow        Chronic Diarrhea, post C. DIff         Contact Isolate, R/O C Diff, Crypto,          STOP METFORMIN  , STOP any Lactose intake         GI consult, ? Colonoscopy if r/o for infectious source    Hypokalemia, POA  Replete    Chronic Anemia, POA  Follow H/H with hydration    Chronic systolic CHF known EF is 93-29% Echo of  , not active  However will have to hold Lasix and Hydrate so Follow Closely                        Code Status: FULL CODE  Surrogate Decision Maker:  daughter    DVT Prophylaxis:   GI Prophylaxis: not indicated    Baseline: FULL CODE        Subjective:   CHIEF COMPLAINT: Diarrhea  And generalized weakness    HISTORY OF PRESENT ILLNESS:     Payal Fernández is a 78 y.o.  female who presents with  Diarhea  And Generalized weakness. Patient states that she was treated for C. Diff in April-May of this year and her diarrhea stopped 2 weeks after she finished   Antibiotics. Thereafter it  Started about  4-6 x a day. She has been on PRN Immodium up to 4 pills a day without relief. She was recently started on ? Cholestyramine by her GI and has been taking this for about 2-3 days without relief.   She feels weak and has been falling as a result so she comes to the ED where she is noted to be dry and in Renal Failure    We were asked to admit for work up and evaluation of the above problems. Past Medical History:   Diagnosis Date    Atrial fibrillation ([facility] 2010    CAD (coronary artery disease)     stent    Chronic diastolic heart failure ([facility] 2014    Chronic systolic HF (heart failure) ([facility] 5/10/2017    2017 EF 25-30%    COPD     COPD (chronic obstructive pulmonary disease) ([facility] 2010    Diabetes ([facility]     Fibroid     Heart failure ([facility]     History of Clostridium difficile infection 5/10/2017    2017 CDiff positive    Hypertension 2010    Hypotension 5/10/2017    Junctional tachycardia ([facility] 5/10/2017    NIDDM (non-insulin dependent diabetes mellitus) 2010    Screening mammogram 5/4/10    SOB (shortness of breath) 2014        Past Surgical History:   Procedure Laterality Date    HX  SECTION      HX OTHER SURGICAL      adrenal gland removed    HX PACEMAKER      GA EXCISE ADRENAL GLAND         Social History   Substance Use Topics    Smoking status: Former Smoker     Types: Cigarettes     Quit date: 2009    Smokeless tobacco: Never Used    Alcohol use No        Family History   Problem Relation Age of Onset    Heart Disease Mother     Diabetes Father     Heart Disease Brother      Allergies   Allergen Reactions    Crestor [Rosuvastatin] Myalgia    Levaquin [Levofloxacin] Nausea Only     GI Upset    Lipitor [Atorvastatin] Diarrhea    Lyrica [Pregabalin] Myalgia        Prior to Admission medications    Medication Sig Start Date End Date Taking? Authorizing Provider   ondansetron hcl (ZOFRAN, AS HYDROCHLORIDE,) 4 mg tablet Take 1 Tab by mouth every eight (8) hours as needed for Nausea.  17   Raghavendra Shepard NP   colchicine 0.6 mg tablet TAKE ONE TABLET BY MOUTH TWICE DAILY 17   Raghavendra Shepard NP   furosemide (LASIX) 40 mg tablet Pt is to take 2 tabs daily = 40 mg of Lasix daily. 8/16/17   Mina Dwyer NP   amiodarone (PACERONE) 100 mg tablet Take 1 Tab by mouth daily. Dose reduced 7/31/17 7/31/17   Richad Mortimer, NP   apixaban (ELIQUIS) 5 mg tablet Take 1 Tab by mouth two (2) times a day. Indications: PREVENT THROMBOEMBOLISM IN CHRONIC ATRIAL FIBRILLATION 7/31/17   Richad Mortimer, NP   clopidogrel (PLAVIX) 75 mg tab Take 1 Tab by mouth daily. 6/23/17   CLINTON Mclaughlin   ONE TOUCH DELICA 33 gauge misc  8/1/31   Historical Provider   carvedilol (COREG) 6.25 mg tablet Take 1 Tab by mouth two (2) times daily (with meals). 6/16/17   Mina Dwyer NP   Lancets misc Check blood sugar 1 times daily. May substitute for insurance preferred lancets. 5/31/17   Christiano Pagan NP   glucose blood VI test strips (BLOOD GLUCOSE TEST) strip Check blood sugar 1 times daily: E11.9, may substitute for insurance preferred strips. 5/31/17   Christiano Pagan NP   pravastatin (PRAVACHOL) 20 mg tablet Take 1 Tab by mouth nightly. 4/17/17   Christiano Pagan NP   fluticasone (FLONASE) 50 mcg/actuation nasal spray 2 Sprays by Both Nostrils route every evening. Shannan Castro MD   gabapentin (NEURONTIN) 800 mg tablet TAKE ONE TABLET BY MOUTH THREE TIMES DAILY 3/6/17   Christiano Pagan NP   Blood-Glucose Meter monitoring kit Check blood sugar daily. May substitute for insurance preferred meter 1/31/17   Christiano Pagan NP   tiotropium (SPIRIVA WITH HANDIHALER) 18 mcg inhalation capsule INHALE THE CONTENTS OF 1 CAPSULE THROUGH HANDIHALER DEVICE DAILY 10/31/16   Christiano Pagan NP   metFORMIN (GLUCOPHAGE) 1,000 mg tablet TAKE 1 TABLET BY MOUTH TWICE DAILY WITH MEALS  Indications: PREVENTION OF TYPE 2 DIABETES MELLITUS 10/31/16   Christiano Pagan NP   budesonide-formoterol (SYMBICORT) 160-4.5 mcg/actuation HFA inhaler Take 1 Puff by inhalation two (2) times a day.  7/12/16   Christiano Pagan NP   albuterol (PROVENTIL VENTOLIN) 2.5 mg /3 mL (0.083 %) nebulizer solution 3 mL by Nebulization route every four (4) hours as needed for Wheezing. 5/19/16   Lovely Osman NP   FOLIC ACID/MULTIVIT-MIN/LUTEIN (CENTRUM SILVER PO) Take 1 Tab by mouth daily. Takes one po daily. Historical Provider   albuterol (VENTOLIN HFA) 90 mcg/actuation inhaler Take 2 Puffs by inhalation every six (6) hours as needed for Wheezing. 3/8/16   Ya Tapia MD   Azelastine (ASTEPRO) 0.15 % (205.5 mcg) nasal spray 1 Nelson by Both Nostrils route daily. 1/21/15   Historical Provider   cod liver oil cap Take 1 Cap by mouth daily. Historical Provider       REVIEW OF SYSTEMS:     I am not able to complete the review of systems because:    The patient is intubated and sedated    The patient has altered mental status due to his acute medical problems    The patient has baseline aphasia from prior stroke(s)    The patient has baseline dementia and is not reliable historian    The patient is in acute medical distress and unable to provide information           Total of 12 systems reviewed as follows:       POSITIVE= underlined text  Negative = text not underlined  General:  fever, chills, sweats, generalized weakness, weight loss/gain,      loss of appetite   Eyes:    blurred vision, eye pain, loss of vision, double vision  ENT:    rhinorrhea, pharyngitis   Respiratory:   cough, sputum production, SOB, [place], wheezing, pleuritic pain   Cardiology:   chest pain, palpitations, orthopnea, PND, edema, syncope   Gastrointestinal:  abdominal pain , N/V, diarrhea, dysphagia, constipation, bleeding   Genitourinary:  frequency, urgency, dysuria, hematuria, incontinence   Muskuloskeletal :  arthralgia, myalgia, back pain  Hematology:  easy bruising, nose or gum bleeding, lymphadenopathy   Dermatological: rash, ulceration, pruritis, color change / jaundice  Endocrine:   hot flashes or polydipsia   Neurological:  headache, dizziness, confusion, focal weakness, paresthesia,     Speech difficulties, memory loss, gait difficulty  Psychological: Feelings of anxiety, depression, agitation    Objective:   VITALS:    Visit Vitals    /50    Pulse 76    Temp 97.5 °F (36.4 °C)    Resp 18    Ht 5' 6\" (1.676 m)    Wt 68.9 kg (151 lb 14.4 oz)    SpO2 98%    BMI 24.52 kg/m2       PHYSICAL EXAM:    General:    Alert, cooperative, no distress, appears stated age. HEENT: Atraumatic, anicteric sclerae, pink conjunctivae     No oral ulcers, mucosa moist, throat clear, dentition fair  Neck:  Supple, symmetrical,  thyroid: non tender  Lungs:   Clear to auscultation bilaterally. No Wheezing or Rhonchi. No rales. Chest wall:  No tenderness  No Accessory muscle use. Heart:   Regular  rhythm,  No  murmur   No edema  Abdomen:   Soft, non-tender. Not distended. Bowel sounds normal  Extremities: No cyanosis. No clubbing,      Skin turgor POOR, Capillary refill normal, Radial dial pulse 2+  Skin:     Not pale. Not Jaundiced  No rashes   Psych:  Good insight. Not depressed. Not anxious or agitated. Neurologic: EOMs intact. No facial asymmetry. No aphasia or slurred speech. Symmetrical strength, Sensation grossly intact.  Alert and oriented X 4.     _______________________________________________________________________  Care Plan discussed with:    Comments   Patient y    Family      RN y    Care Manager                    Consultant:      _______________________________________________________________________  Expected  Disposition:   Home with Family y   HH/tPT/OT/RN    SNF/LTC    [place]    ________________________________________________________________________  TOTAL TIME:  36 Minutes    Critical Care Provided     Minutes non procedure based      Comments    y Reviewed previous records   >50% of visit spent in counseling and coordination of care  Discussion with patient and/or family and questions answered       ________________________________________________________________________  Signed: Erik Lopez MD    Procedures: see electronic medical records for all procedures/Xrays and details which were not copied into this note but were reviewed prior to creation of Plan. LAB DATA REVIEWED:    Recent Results (from the past 24 hour(s))   CBC WITH AUTOMATED DIFF    Collection Time: 09/13/17  1:35 PM   Result Value Ref Range    WBC 6.1 3.6 - 11.0 K/uL    RBC 3.97 3.80 - 5.20 M/uL    HGB 11.2 (L) 11.5 - 16.0 g/dL    HCT 33.6 (L) 35.0 - 47.0 %    MCV 84.6 80.0 - 99.0 FL    MCH 28.2 26.0 - 34.0 PG    MCHC 33.3 30.0 - 36.5 g/dL    RDW 16.5 (H) 11.5 - 14.5 %    PLATELET 349 520 - 578 K/uL    NEUTROPHILS 54 32 - 75 %    LYMPHOCYTES 26 12 - 49 %    MONOCYTES 19 (H) 5 - 13 %    EOSINOPHILS 1 0 - 7 %    BASOPHILS 0 0 - 1 %    ABS. NEUTROPHILS 3.3 1.8 - 8.0 K/UL    ABS. LYMPHOCYTES 1.6 0.8 - 3.5 K/UL    ABS. MONOCYTES 1.2 (H) 0.0 - 1.0 K/UL    ABS. EOSINOPHILS 0.0 0.0 - 0.4 K/UL    ABS. BASOPHILS 0.0 0.0 - 0.1 K/UL   METABOLIC PANEL, COMPREHENSIVE    Collection Time: 09/13/17  1:35 PM   Result Value Ref Range    Sodium 133 (L) 136 - 145 mmol/L    Potassium 5.0 3.5 - 5.1 mmol/L    Chloride 102 97 - 108 mmol/L    CO2 22 21 - 32 mmol/L    Anion gap 9 5 - 15 mmol/L    Glucose 86 65 - 100 mg/dL    BUN 20 6 - 20 MG/DL    Creatinine 2.15 (H) 0.55 - 1.02 MG/DL    BUN/Creatinine ratio 9 (L) 12 - 20      GFR est AA 28 (L) >60 ml/min/1.73m2    GFR est non-AA 23 (L) >60 ml/min/1.73m2    Calcium 8.7 8.5 - 10.1 MG/DL    Bilirubin, total 0.5 0.2 - 1.0 MG/DL    ALT (SGPT) 64 12 - 78 U/L    AST (SGOT) 122 (H) 15 - 37 U/L    Alk.  phosphatase 231 (H) 45 - 117 U/L    Protein, total 8.8 (H) 6.4 - 8.2 g/dL    Albumin 2.9 (L) 3.5 - 5.0 g/dL    Globulin 5.9 (H) 2.0 - 4.0 g/dL    A-G Ratio 0.5 (L) 1.1 - 2.2     LACTIC ACID    Collection Time: 09/13/17  1:35 PM   Result Value Ref Range    Lactic acid 3.9 (HH) 0.4 - 2.0 MMOL/L   MAGNESIUM    Collection Time: 09/13/17  1:35 PM   Result Value Ref Range    Magnesium 0.9 (LL) 1.6 - 2.4 mg/dL   LIPASE    Collection Time: 09/13/17  1:35 PM   Result Value Ref Range    Lipase 74 73 - 393 U/L   URINALYSIS W/ REFLEX CULTURE    Collection Time: 09/13/17  3:30 PM   Result Value Ref Range    Color YELLOW/STRAW      Appearance CLOUDY (A) CLEAR      Specific gravity 1.013 1.003 - 1.030      pH (UA) 5.5 5.0 - 8.0      Protein NEGATIVE  NEG mg/dL    Glucose NEGATIVE  NEG mg/dL    Ketone NEGATIVE  NEG mg/dL    Bilirubin NEGATIVE  NEG      Blood NEGATIVE  NEG      Urobilinogen 0.2 0.2 - 1.0 EU/dL    Nitrites NEGATIVE  NEG      Leukocyte Esterase MODERATE (A) NEG      WBC 5-10 0 - 4 /hpf    RBC 0-5 0 - 5 /hpf    Epithelial cells FEW FEW /lpf    Bacteria 1+ (A) NEG /hpf    UA:UC IF INDICATED URINE CULTURE ORDERED (A) CNI

## 2017-09-13 NOTE — PROGRESS NOTES
Pharmacy Monitoring for Apixaban    Pharmacy review for this 78 y.o. female ordered Apixaban for AFib  Major Interacting Medications: enoxaparin 40 mg daily  Platelet Inhibitors: NA    Recent Labs      09/13/17   1335   HGB  11.2*   PLT  296   ALB  2.9*   SGOT  122*   TBILI  0.5   CREA  2.15*   BUN  20       Child Rivera Score, if applicable (Avoid if level C): A      Impression/Plan:   Age <80, SCr >1.5, Wt >60 kg, dose okay, continue.  CBC and BMP ordered every other day     Thanks    SAMSON Figueroa

## 2017-09-13 NOTE — ED PROVIDER NOTES
HPI Comments: Murphy Patel is a 78 y.o. female with PMhx significant for C. Diff infections, CAD, COPD, and CHF who presents ambulatory to the ED with cc of diarrhea with weakness  ongoing 3-4 months. Today, she also complains of abdominal pain. PCP referred her to GI, Dr. Delmi Millard who took stool cultures. Pt does not yet know the results. She was placed on ammonia pills. Pt also complains decreased appetite, and notes that she has lost 12 lbs recently. Pt notes that she has been falling more in the past 3 weeks due to weakness. She fell yesterday and hit her head and shoulders. Her PCP thinks that her falls are due to diarrhea and wants her to receive PT. Pt also complains of SOB with walking. Pt takes Eliquis for CHF/a fib. Pt was diagnosed with C diff infections in May but has not taken Abx since. Pt specifically denies syncope, neck pain, lightheadedness, dizziness. Social Hx: former Tobacco, - EtOH, - Illicit Drugs    PCP: Maria Luz Cheung NP  GI: Dr. Delmi Millard    There are no other complaints, changes or physical findings at this time. The history is provided by the patient. No  was used.       Past Medical History:   Diagnosis Date    Atrial fibrillation ([address] 2010    CAD (coronary artery disease)     stent    Chronic diastolic heart failure ([address] 2014    Chronic systolic HF (heart failure) ([address] 5/10/2017    2017 EF 25-30%    COPD     COPD (chronic obstructive pulmonary disease) ([address] 2010    Diabetes ([address]     Fibroid     Heart failure ([address]     History of Clostridium difficile infection 5/10/2017    2017 CDiff positive    Hypertension 2010    Hypotension 5/10/2017    Junctional tachycardia ([address] 5/10/2017    NIDDM (non-insulin dependent diabetes mellitus) 2010    Screening mammogram 5/4/10    SOB (shortness of breath) 2014       Past Surgical History:   Procedure Laterality Date    HX  SECTION      HX OTHER SURGICAL      adrenal gland removed    HX PACEMAKER      MS EXCISE ADRENAL GLAND       Family History:   Problem Relation Age of Onset    Heart Disease Mother     Diabetes Father     Heart Disease Brother      Social History     Social History    Marital status:      Spouse name: N/A    Number of children: N/A    Years of education: N/A     Occupational History    Not on file. Social History Main Topics    Smoking status: Former Smoker     Types: Cigarettes     Quit date: 12/31/2009    Smokeless tobacco: Never Used    Alcohol use No    Drug use: No    Sexual activity: Not Currently     Other Topics Concern    Not on file     Social History Narrative     ALLERGIES: Crestor [rosuvastatin]; Levaquin [levofloxacin]; Lipitor [atorvastatin]; and Lyrica [pregabalin]    Review of Systems   Constitutional: Positive for appetite change (decreased) and unexpected weight change (loss 12 lbs). Negative for chills and fever. HENT: Negative for congestion and sore throat. Eyes: Negative for visual disturbance. Respiratory: Negative for cough and shortness of breath. Cardiovascular: Negative for chest pain and leg swelling. Gastrointestinal: Positive for abdominal pain and diarrhea. Negative for blood in stool and nausea. Endocrine: Negative for polyuria. Genitourinary: Negative for dysuria, flank pain, vaginal bleeding and vaginal discharge. Musculoskeletal: Negative for myalgias and neck pain. Skin: Negative for rash. Allergic/Immunologic: Negative for immunocompromised state. Neurological: Positive for weakness and headaches. Negative for dizziness and light-headedness. Psychiatric/Behavioral: Negative for dysphoric mood.      Vitals:    09/13/17 1532 09/13/17 1615 09/13/17 1629 09/13/17 1901   BP: 113/50 101/56  100/66   Pulse:  76 76 76   Resp:  16 19 18   Temp:    98.2 °F (36.8 °C)   SpO2:  98% 98% 97%   Weight:       Height:              Physical Exam   Constitutional: She is oriented to person, place, and time. She appears well-developed and well-nourished. HENT:   Head: Normocephalic and atraumatic. Dry mucous membranes   Eyes: Conjunctivae are normal. Pupils are equal, round, and reactive to light. Right eye exhibits no discharge. Left eye exhibits no discharge. Neck: Normal range of motion. Neck supple. No tracheal deviation present. Cardiovascular: Normal rate, regular rhythm and normal heart sounds. No murmur heard. Pulmonary/Chest: Effort normal and breath sounds normal. No respiratory distress. Abdominal: Soft. Bowel sounds are normal. There is no tenderness. There is no rebound and no guarding. No significant abdominal tenderness   Musculoskeletal: Normal range of motion. She exhibits no edema. Neurological: She is alert and oriented to person, place, and time. Skin: Skin is warm and dry. No rash noted. No erythema. Psychiatric: Her behavior is normal.   Nursing note and vitals reviewed. MDM  Number of Diagnoses or Management Options  Acute renal failure, unspecified acute renal failure type ([facility]):   Diarrhea, unspecified type:   Lactic acidosis:   Volume depletion:   Diagnosis management comments: Infectious colitis, concern for toxic megacolon, doubt ischemic colitis  Hepatitis possible given elevated LFT, lactic, plan will be for imaging, hospitalization, IV fluids. Will hold Abx until seen by GI and hospitalist         Amount and/or Complexity of Data Reviewed  Clinical lab tests: ordered and reviewed  Tests in the radiology section of CPT®: ordered and reviewed  Review and summarize past medical records: yes  Discuss the patient with other providers: yes (GI  Hospitalist)    Patient Progress  Patient progress: stable    ED Course     Procedures    CONSULT NOTE:  2:35 PM  Pina Guthrie DO spoke with Dr. Michael Noguera,  Specialty: GI  Discussed pt's hx, disposition, and available diagnostic and imaging results. Reviewed care plans. Consultant recommends admitting the pt.  Hold on Abx for now. He will discuss with hospitalist later if she needs Abx. LABORATORY TESTS:  Recent Results (from the past 12 hour(s))   CBC WITH AUTOMATED DIFF    Collection Time: 09/13/17  1:35 PM   Result Value Ref Range    WBC 6.1 3.6 - 11.0 K/uL    RBC 3.97 3.80 - 5.20 M/uL    HGB 11.2 (L) 11.5 - 16.0 g/dL    HCT 33.6 (L) 35.0 - 47.0 %    MCV 84.6 80.0 - 99.0 FL    MCH 28.2 26.0 - 34.0 PG    MCHC 33.3 30.0 - 36.5 g/dL    RDW 16.5 (H) 11.5 - 14.5 %    PLATELET 542 273 - 048 K/uL    NEUTROPHILS 54 32 - 75 %    LYMPHOCYTES 26 12 - 49 %    MONOCYTES 19 (H) 5 - 13 %    EOSINOPHILS 1 0 - 7 %    BASOPHILS 0 0 - 1 %    ABS. NEUTROPHILS 3.3 1.8 - 8.0 K/UL    ABS. LYMPHOCYTES 1.6 0.8 - 3.5 K/UL    ABS. MONOCYTES 1.2 (H) 0.0 - 1.0 K/UL    ABS. EOSINOPHILS 0.0 0.0 - 0.4 K/UL    ABS. BASOPHILS 0.0 0.0 - 0.1 K/UL   METABOLIC PANEL, COMPREHENSIVE    Collection Time: 09/13/17  1:35 PM   Result Value Ref Range    Sodium 133 (L) 136 - 145 mmol/L    Potassium 5.0 3.5 - 5.1 mmol/L    Chloride 102 97 - 108 mmol/L    CO2 22 21 - 32 mmol/L    Anion gap 9 5 - 15 mmol/L    Glucose 86 65 - 100 mg/dL    BUN 20 6 - 20 MG/DL    Creatinine 2.15 (H) 0.55 - 1.02 MG/DL    BUN/Creatinine ratio 9 (L) 12 - 20      GFR est AA 28 (L) >60 ml/min/1.73m2    GFR est non-AA 23 (L) >60 ml/min/1.73m2    Calcium 8.7 8.5 - 10.1 MG/DL    Bilirubin, total 0.5 0.2 - 1.0 MG/DL    ALT (SGPT) 64 12 - 78 U/L    AST (SGOT) 122 (H) 15 - 37 U/L    Alk.  phosphatase 231 (H) 45 - 117 U/L    Protein, total 8.8 (H) 6.4 - 8.2 g/dL    Albumin 2.9 (L) 3.5 - 5.0 g/dL    Globulin 5.9 (H) 2.0 - 4.0 g/dL    A-G Ratio 0.5 (L) 1.1 - 2.2     LACTIC ACID    Collection Time: 09/13/17  1:35 PM   Result Value Ref Range    Lactic acid 3.9 (HH) 0.4 - 2.0 MMOL/L   MAGNESIUM    Collection Time: 09/13/17  1:35 PM   Result Value Ref Range    Magnesium 0.9 (LL) 1.6 - 2.4 mg/dL   LIPASE    Collection Time: 09/13/17  1:35 PM   Result Value Ref Range    Lipase 74 73 - 393 U/L   URINALYSIS W/ REFLEX CULTURE    Collection Time: 09/13/17  3:30 PM   Result Value Ref Range    Color YELLOW/STRAW      Appearance CLOUDY (A) CLEAR      Specific gravity 1.013 1.003 - 1.030      pH (UA) 5.5 5.0 - 8.0      Protein NEGATIVE  NEG mg/dL    Glucose NEGATIVE  NEG mg/dL    Ketone NEGATIVE  NEG mg/dL    Bilirubin NEGATIVE  NEG      Blood NEGATIVE  NEG      Urobilinogen 0.2 0.2 - 1.0 EU/dL    Nitrites NEGATIVE  NEG      Leukocyte Esterase MODERATE (A) NEG      WBC 5-10 0 - 4 /hpf    RBC 0-5 0 - 5 /hpf    Epithelial cells FEW FEW /lpf    Bacteria 1+ (A) NEG /hpf    UA:UC IF INDICATED URINE CULTURE ORDERED (A) CNI     CRYPTOSPOR/GIARDIA AG    Collection Time: 09/13/17  4:08 PM   Result Value Ref Range    Giardia Ag NEGATIVE  NEG      Cryptosporidium Ag NEGATIVE      LACTIC ACID    Collection Time: 09/13/17  6:25 PM   Result Value Ref Range    Lactic acid 1.9 0.4 - 2.0 MMOL/L   GLUCOSE, POC    Collection Time: 09/13/17  6:56 PM   Result Value Ref Range    Glucose (POC) 80 65 - 100 mg/dL    Performed by [facility]      IMAGING RESULTS:  CT Results  (Last 48 hours)               09/13/17 1652  CT HEAD WO CONT Final result    Impression:  IMPRESSION:    1. No evidence of acute intracranial abnormality. 2. Complete opacification of the visualized maxillary sinuses. Narrative:  EXAM:  CT HEAD WO CONT       INDICATION:   trauma. Fall last night       COMPARISON: None. CONTRAST:  None. TECHNIQUE: Unenhanced CT of the head was performed using 5 mm images. Brain and   bone windows were generated. CT dose reduction was achieved through use of a   standardized protocol tailored for this examination and automatic exposure   control for dose modulation. FINDINGS:   The ventricles and sulci are normal in size, shape and configuration and   midline. There is no significant white matter disease. There is no intracranial   hemorrhage, extra-axial collection, mass, mass effect or midline shift.   The basilar cisterns are open. No acute infarct is identified. The bone windows   demonstrate no abnormalities. The visualized maxillary sinuses appear   opacified. Avril Kelley 09/13/17 1652  CT ABD PELV WO CONT Final result    Impression:  IMPRESSION:   No evidence of acute process. Incidentals as above. Narrative:  EXAM:  CT ABD PELV WO CONT       INDICATION: Abdominal pain. Fall       COMPARISON: 7/19/2017       CONTRAST:  None. TECHNIQUE:    Thin axial images were obtained through the abdomen and pelvis. Coronal and   sagittal reconstructions were generated. Oral contrast was not administered. CT   dose reduction was achieved through use of a standardized protocol tailored for   this examination and automatic exposure control for dose modulation. The absence of intravenous contrast material reduces the sensitivity for   evaluation of the solid parenchymal organs of the abdomen. FINDINGS:    LUNG BASES: Significant fibrosis is seen in the lung bases. INCIDENTALLY IMAGED HEART AND MEDIASTINUM: The heart is enlarged. A pacemaker is   present. LIVER: No mass or biliary dilatation. Several surgical clips are seen along the   posterior right hepatic lobe. GALLBLADDER: Hyperdensity is seen within the gallbladder which may related to   biliary sludge. No evidence of a focal stone. SPLEEN: No mass. PANCREAS: No mass or ductal dilatation. ADRENALS: The right adrenal gland is surgically absent. KIDNEYS/URETERS: No mass, calculus, or hydronephrosis. STOMACH: Unremarkable. SMALL BOWEL: No dilatation or wall thickening. COLON: No dilatation or wall thickening. APPENDIX: Unremarkable. PERITONEUM: No ascites or pneumoperitoneum. RETROPERITONEUM: No lymphadenopathy or aortic aneurysm. REPRODUCTIVE ORGANS: A calcification in the uterus is likely related to a small   fibroid. The uterus is otherwise unremarkable. URINARY BLADDER: Mildly distended.    BONES: No destructive bone lesion. ADDITIONAL COMMENTS: There is a fat-containing umbilical hernia. MEDICATIONS GIVEN:  Medications   sodium chloride (NS) flush 5-10 mL (not administered)   albuterol (PROVENTIL VENTOLIN) nebulizer solution 2.5 mg (not administered)   albuterol (PROVENTIL HFA, VENTOLIN HFA, PROAIR HFA) inhaler 2 Puff (not administered)   amiodarone (CORDARONE) tablet 100 mg (not administered)   apixaban (ELIQUIS) tablet 5 mg (not administered)   azelastine (ASTELIN) 137mcg/spray nasal spray (not administered)   carvedilol (COREG) tablet 6.25 mg (0 mg Oral Held 9/13/17 1808)   clopidogrel (PLAVIX) tablet 75 mg (not administered)   colchicine tablet 0.6 mg (not administered)   fluticasone (FLONASE) 50 mcg/actuation nasal spray 2 Spray (not administered)   gabapentin (NEURONTIN) capsule 600 mg (not administered)   ondansetron (ZOFRAN ODT) tablet 8 mg (not administered)   pravastatin (PRAVACHOL) tablet 20 mg (not administered)   umeclidinium (INCRUSE ELLIPTA) 62.5 mcg/actuation (not administered)   sodium chloride (NS) flush 5-10 mL (not administered)   sodium chloride (NS) flush 5-10 mL (not administered)   acetaminophen (TYLENOL) tablet 650 mg (not administered)   enoxaparin (LOVENOX) injection 40 mg (not administered)   0.9% sodium chloride infusion (75 mL/hr IntraVENous New Bag 9/13/17 1855)   insulin lispro (HUMALOG) injection (not administered)   glucose chewable tablet 16 g (not administered)   glucagon (GLUCAGEN) injection 1 mg (not administered)   fluticasone-vilanterol (BREO ELLIPTA) 100mcg-25mcg/puff (not administered)   dextrose 10% infusion 125-250 mL (not administered)   sodium chloride 0.9 % bolus infusion 2,067 mL (0 mL/kg × 68.9 kg IntraVENous IV Completed 9/13/17 1803)   magnesium sulfate 2 g/50 ml IVPB (premix or compounded) (2 g IntraVENous New Bag 9/13/17 1909)       IMPRESSION:  1. Acute renal failure, unspecified acute renal failure type ([address]    2. Lactic acidosis    3.  Diarrhea, unspecified type    4. Volume depletion        PLAN: Admit to Hospitalist    Admit Note:  3:30 PM  Patient is being admitted to the hospital by Dr. Melecio Schrader. The results of their tests and reasons for their admission have been discussed with them and/or available family. They convey agreement and understanding for the need to be admitted and for their admission diagnosis. Consultation has been made with the inpatient physician specialist for hospitalization. Attestation: This note is prepared by Ferdinand Montano, acting as Scribe for DO Michael Rivera DO: The scribe's documentation has been prepared under my direction and personally reviewed by me in its entirety. I confirm that the note above accurately reflects all work, treatment, procedures, and medical decision making performed by me.

## 2017-09-13 NOTE — PROGRESS NOTES
Oncology Nursing Communication Tool      7:23 PM  9/13/2017     Bedside shift change report given to [address], RN (incoming nurse) by Emperatriz Burgos (outgoing nurse) on Silvestre Hudson a 78 y.o. female who was admitted on 9/13/2017  1:57 PM. Report included the following information SBAR, Kardex, Intake/Output, MAR and Recent Results. Significant changes during shift: admit to oncology      Issues for physician to address:             Code Status: Full Code     Infections: C-difficile (Confirmed)     Allergies: Crestor [rosuvastatin]; Levaquin [levofloxacin]; Lipitor [atorvastatin]; and Lyrica [pregabalin]     Current diet: DIET FULL LIQUID       Pain Controlled [] yes [] no   Bowel Movement [] yes [] no   Last Bowel Movement (date)     9/13/17            Vital Signs:   Patient Vitals for the past 12 hrs:   Temp Pulse Resp BP SpO2   09/13/17 1901 98.2 °F (36.8 °C) 76 18 100/66 97 %   09/13/17 1629 - 76 19 - 98 %   09/13/17 1615 - 76 16 101/56 98 %   09/13/17 1532 - - - 113/50 -   09/13/17 1307 97.5 °F (36.4 °C) 76 18 113/60 98 %      Intake & Output:   No intake or output data in the 24 hours ending 09/13/17 1923   Laboratory Results:     Recent Results (from the past 12 hour(s))   CBC WITH AUTOMATED DIFF    Collection Time: 09/13/17  1:35 PM   Result Value Ref Range    WBC 6.1 3.6 - 11.0 K/uL    RBC 3.97 3.80 - 5.20 M/uL    HGB 11.2 (L) 11.5 - 16.0 g/dL    HCT 33.6 (L) 35.0 - 47.0 %    MCV 84.6 80.0 - 99.0 FL    MCH 28.2 26.0 - 34.0 PG    MCHC 33.3 30.0 - 36.5 g/dL    RDW 16.5 (H) 11.5 - 14.5 %    PLATELET 304 269 - 156 K/uL    NEUTROPHILS 54 32 - 75 %    LYMPHOCYTES 26 12 - 49 %    MONOCYTES 19 (H) 5 - 13 %    EOSINOPHILS 1 0 - 7 %    BASOPHILS 0 0 - 1 %    ABS. NEUTROPHILS 3.3 1.8 - 8.0 K/UL    ABS. LYMPHOCYTES 1.6 0.8 - 3.5 K/UL    ABS. MONOCYTES 1.2 (H) 0.0 - 1.0 K/UL    ABS. EOSINOPHILS 0.0 0.0 - 0.4 K/UL    ABS.  BASOPHILS 0.0 0.0 - 0.1 K/UL   METABOLIC PANEL, COMPREHENSIVE Collection Time: 09/13/17  1:35 PM   Result Value Ref Range    Sodium 133 (L) 136 - 145 mmol/L    Potassium 5.0 3.5 - 5.1 mmol/L    Chloride 102 97 - 108 mmol/L    CO2 22 21 - 32 mmol/L    Anion gap 9 5 - 15 mmol/L    Glucose 86 65 - 100 mg/dL    BUN 20 6 - 20 MG/DL    Creatinine 2.15 (H) 0.55 - 1.02 MG/DL    BUN/Creatinine ratio 9 (L) 12 - 20      GFR est AA 28 (L) >60 ml/min/1.73m2    GFR est non-AA 23 (L) >60 ml/min/1.73m2    Calcium 8.7 8.5 - 10.1 MG/DL    Bilirubin, total 0.5 0.2 - 1.0 MG/DL    ALT (SGPT) 64 12 - 78 U/L    AST (SGOT) 122 (H) 15 - 37 U/L    Alk.  phosphatase 231 (H) 45 - 117 U/L    Protein, total 8.8 (H) 6.4 - 8.2 g/dL    Albumin 2.9 (L) 3.5 - 5.0 g/dL    Globulin 5.9 (H) 2.0 - 4.0 g/dL    A-G Ratio 0.5 (L) 1.1 - 2.2     LACTIC ACID    Collection Time: 09/13/17  1:35 PM   Result Value Ref Range    Lactic acid 3.9 (HH) 0.4 - 2.0 MMOL/L   MAGNESIUM    Collection Time: 09/13/17  1:35 PM   Result Value Ref Range    Magnesium 0.9 (LL) 1.6 - 2.4 mg/dL   LIPASE    Collection Time: 09/13/17  1:35 PM   Result Value Ref Range    Lipase 74 73 - 393 U/L   URINALYSIS W/ REFLEX CULTURE    Collection Time: 09/13/17  3:30 PM   Result Value Ref Range    Color YELLOW/STRAW      Appearance CLOUDY (A) CLEAR      Specific gravity 1.013 1.003 - 1.030      pH (UA) 5.5 5.0 - 8.0      Protein NEGATIVE  NEG mg/dL    Glucose NEGATIVE  NEG mg/dL    Ketone NEGATIVE  NEG mg/dL    Bilirubin NEGATIVE  NEG      Blood NEGATIVE  NEG      Urobilinogen 0.2 0.2 - 1.0 EU/dL    Nitrites NEGATIVE  NEG      Leukocyte Esterase MODERATE (A) NEG      WBC 5-10 0 - 4 /hpf    RBC 0-5 0 - 5 /hpf    Epithelial cells FEW FEW /lpf    Bacteria 1+ (A) NEG /hpf    UA:UC IF INDICATED URINE CULTURE ORDERED (A) CNI     LACTIC ACID    Collection Time: 09/13/17  6:25 PM   Result Value Ref Range    Lactic acid 1.9 0.4 - 2.0 MMOL/L   GLUCOSE, POC    Collection Time: 09/13/17  6:56 PM   Result Value Ref Range    Glucose (POC) 80 65 - 100 mg/dL    Performed by [facility]               Opportunity for questions and clarifications were given to the incoming nurse. Patient's bed is in low position, side rails x2, door open PRN, call bell within reach and patient not in distress.       Damaris Lowery

## 2017-09-13 NOTE — PROGRESS NOTES
Admission skin assessment completed by RNs Celestina ALVAREZ and Haily MELCHOR.  Skin intact, no impairments noted.

## 2017-09-13 NOTE — PROGRESS NOTES
Gi  Patient seen and examined  Rectal exam performed--no impaction  Ct images reviewed    Rec:  Hold imodium/ lomotil, continue questran  Check stool for c diff  Look for other causes of sirs/ acute renal failure     Full consult to follow ct report.      Jerome Rust MD  5:25 PM  9/13/2017

## 2017-09-13 NOTE — IP AVS SNAPSHOT
[address] [facility] 
[phone] Patient: Kalpana Ashley MRN: [id number] [facility]:0/72/2974 You are allergic to the following Allergen Reactions Crestor (Rosuvastatin) Myalgia Levaquin (Levofloxacin) Nausea Only GI Upset Lipitor (Atorvastatin) Diarrhea Lyrica (Pregabalin) Myalgia Recent Documentation Height Weight BMI OB Status Smoking Status 1.676 m 69.9 kg 24.87 kg/m2 Postmenopausal Former Smoker Unresulted Labs Order Current Status OVA & PARASITES, STOOL In process CULTURE, STOOL Preliminary result Emergency Contacts  (Rel.) Home Phone Work Phone Mobile Phone Chris Plata [phone] -- Tonja Bartholomew (Other Relative) [phone] -- -- About your hospitalization You were admitted on:  September 13, 2017 You last received care in the:  [address] You were discharged on:  September 15, 2017 Why you were hospitalized Your primary diagnosis was:  Not on File Your diagnoses also included:  Acute Renal Failure (Arf) (Hcc) Providers Seen During Your Hospitalizations Provider Role Specialty Primary office phone Noa Meléndez DO Attending Provider Emergency Medicine [phone] Cholo Iqbal MD Attending Provider Internal Medicine [phone] Marilee Adler MD Attending Provider Internal Medicine [phone] Your Primary Care Physician (PCP) Primary Care Physician Office Phone Office Fax Annia Walker I [phone] Follow-up Information Follow up With Details Comments Contact Info Mingo Corona NP   [facility] [place] [address] [facility] 7 [id number] 
[phone] Bia Palomino MD In 1 week  305 [facility] 202 [facility] 
[phone] Lou Mcdonald MD In 3 weeks  200 [facility] [address] [facility] 
[phone] Mini Rodriguez MD In 1 week  [address] [facility] 
[phone] Current Discharge Medication List  
  
START taking these medications Dose & Instructions Dispensing Information Comments Morning Noon Evening Bedtime  
 cholestyramine 4 gram packet Commonly known as:  Sawyer Shabazz Your last dose was: Your next dose is:    
   
   
 Dose:  4 g Take 1 Packet by mouth two (2) times daily (with meals). Quantity:  60 Packet Refills:  1 CONTINUE these medications which have CHANGED Dose & Instructions Dispensing Information Comments Morning Noon Evening Bedtime Lancets Misc What changed:  Another medication with the same name was removed. Continue taking this medication, and follow the directions you see here. Your last dose was: Your next dose is:    
   
   
 Check blood sugar 1 times daily. May substitute for insurance preferred lancets. Quantity:  50 Each Refills:  11 CONTINUE these medications which have NOT CHANGED Dose & Instructions Dispensing Information Comments Morning Noon Evening Bedtime * albuterol 90 mcg/actuation inhaler Commonly known as:  VENTOLIN HFA Your last dose was: Your next dose is:    
   
   
 Dose:  2 Puff Take 2 Puffs by inhalation every six (6) hours as needed for Wheezing. Quantity:  1 Inhaler Refills:  11  
     
   
   
   
  
 * albuterol 2.5 mg /3 mL (0.083 %) nebulizer solution Commonly known as:  PROVENTIL VENTOLIN Your last dose was: Your next dose is:    
   
   
 Dose:  2.5 mg  
3 mL by Nebulization route every four (4) hours as needed for Wheezing. Quantity:  1 Package Refills:  5  
     
   
   
   
  
 amiodarone 100 mg tablet Commonly known as:  Sandra Jackson Your last dose was: Your next dose is:    
   
   
 Dose:  100 mg Take 1 Tab by mouth daily. Dose reduced 7/31/17 Quantity:  90 Tab Refills:  3  
     
   
   
   
  
 apixaban 5 mg tablet Commonly known as:  Angelica Ramirez Your last dose was: Your next dose is:    
   
   
 Dose:  5 mg Take 1 Tab by mouth two (2) times a day. Indications: PREVENT THROMBOEMBOLISM IN CHRONIC ATRIAL FIBRILLATION Quantity:  180 Tab Refills:  3 Azelastine 0.15 % (205.5 mcg) nasal spray Commonly known as:  ASTEPRO Your last dose was: Your next dose is:    
   
   
 Dose:  1 Spray 1 [place] by Both Nostrils route daily. Refills:  0 Blood-Glucose Meter monitoring kit Your last dose was: Your next dose is:    
   
   
 Check blood sugar daily. May substitute for insurance preferred meter Quantity:  1 Kit Refills:  0  
     
   
   
   
  
 budesonide-formoterol 160-4.5 mcg/actuation HFA inhaler Commonly known as:  SYMBICORT Your last dose was: Your next dose is:    
   
   
 Dose:  1 Puff Take 1 Puff by inhalation two (2) times a day. Quantity:  3 Inhaler Refills:  3  
     
   
   
   
  
 carvedilol 6.25 mg tablet Commonly known as:  Sebastian Mood Your last dose was: Your next dose is:    
   
   
 Dose:  6.25 mg Take 1 Tab by mouth two (2) times daily (with meals). Quantity:  60 Tab Refills:  11 CENTRUM SILVER PO Your last dose was: Your next dose is:    
   
   
 Dose:  1 Tab Take 1 Tab by mouth daily. Takes one po daily. Refills:  0  
     
   
   
   
  
 clopidogrel 75 mg Tab Commonly known as:  PLAVIX Your last dose was: Your next dose is:    
   
   
 Dose:  75 mg Take 1 Tab by mouth daily. Quantity:  30 Tab Refills:  11  
     
   
   
   
  
 cod liver oil Cap Your last dose was: Your next dose is:    
   
   
 Dose:  1 Cap Take 1 Cap by mouth daily. Refills:  0  
     
   
   
   
  
 colchicine 0.6 mg tablet Your last dose was: Your next dose is: TAKE ONE TABLET BY MOUTH TWICE DAILY Quantity:  60 Tab Refills:  5 FLONASE 50 mcg/actuation nasal spray Generic drug:  fluticasone Your last dose was: Your next dose is:    
   
   
 Dose:  2 Spray 2 Sprays by Both Nostrils route every evening. Refills:  0  
     
   
   
   
  
 gabapentin 800 mg tablet Commonly known as:  NEURONTIN Your last dose was: Your next dose is: TAKE ONE TABLET BY MOUTH THREE TIMES DAILY Quantity:  90 Tab Refills:  11  
     
   
   
   
  
 glucose blood VI test strips strip Commonly known as:  blood glucose test  
   
Your last dose was: Your next dose is:    
   
   
 Check blood sugar 1 times daily: E11.9, may substitute for insurance preferred strips. Quantity:  50 Strip Refills:  11  
     
   
   
   
  
 metFORMIN 1,000 mg tablet Commonly known as:  GLUCOPHAGE Your last dose was: Your next dose is: TAKE 1 TABLET BY MOUTH TWICE DAILY WITH MEALS  Indications: PREVENTION OF TYPE 2 DIABETES MELLITUS Quantity:  180 Tab Refills:  3  
     
   
   
   
  
 pravastatin 20 mg tablet Commonly known as:  PRAVACHOL Your last dose was: Your next dose is:    
   
   
 Dose:  20 mg Take 1 Tab by mouth nightly. Quantity:  90 Tab Refills:  3  
     
   
   
   
  
 tiotropium 18 mcg inhalation capsule Commonly known as:  [address] Your last dose was: Your next dose is:    
   
   
 INHALE THE CONTENTS OF 1 CAPSULE THROUGH HANDIHALER DEVICE DAILY Quantity:  90 Cap Refills:  3  
     
   
   
   
  
 * Notice:   This list has 2 medication(s) that are the same as other medications prescribed for you. Read the directions carefully, and ask your doctor or other care provider to review them with you. STOP taking these medications   
 furosemide 40 mg tablet Commonly known as:  LASIX  
   
  
 ondansetron hcl 4 mg tablet Commonly known as:  ZOFRAN (AS HYDROCHLORIDE) Where to Get Your Medications Information on where to get these meds will be given to you by the nurse or doctor. ! Ask your nurse or doctor about these medications  
  cholestyramine 4 gram packet Discharge Instructions HOSPITALIST DISCHARGE INSTRUCTIONS 
 
NAME: Ronna Simon :  1950 MRN:  [id number] Date/Time:  9/15/2017 1:01 PM 
 
ADMIT DATE: 2017 DISCHARGE DATE: 9/15/2017 · It is important that you take the medication exactly as they are prescribed. · Keep your medication in the bottles provided by the pharmacist and keep a list of the medication names, dosages, and times to be taken in your wallet. · Do not take other medications without consulting your doctor. What to do at [facility] Recommended diet:  Cardiac Diet Recommended activity: Activity as tolerated If you have questions regarding the hospital related prescriptions or hospital related issues please call [facility] at [phone]. You can always direct your questions to your primary care doctor if you are unable to reach your hospital physician; your PCP works as an extension of your hospital doctor just like your hospital doctor is an extension of your PCP for your time at the hospital [facility], [facility]) If you experience any of the following symptoms then please call your primary care physician or return to the emergency room if you cannot get hold of your doctor: 
 
Fever, chills, nausea, vomiting, or persistent diarrhea Worsening weakness or new problems with your speech or balance Dark stools or visible blood in your stools New Leg swelling or shortness of breath as this could be signs of a clot Additional Instructions: 
 
 
Bring these papers with you to your follow up appointments. The papers will help your doctors be sure to continue the care plan from the hospital. 
 
 
 
 
 
 
Information obtained by : 
I understand that if any problems occur once I am at home I am to contact my physician. I understand and acknowledge receipt of the instructions indicated above. Physician's or R.N.'s Signature                                                                  Date/Time Patient or Representative Signature Discharge Orders None Introducing [facility] SERVICES! Dear Landon Salgado: Thank you for requesting a [employer] account. Our records indicate that you already have an active [employer] account. You can access your account anytime at [email or web address] Did you know that you can access your hospital and ER discharge instructions at any time in [employer]? You can also review all of your test results from your hospital stay or ER visit. Additional Information If you have questions, please visit the Frequently Asked Questions section of the [employer] website at [email or web address] Remember, [employer] is NOT to be used for urgent needs. For medical emergencies, dial 911. Now available from your iPhone and Android! General Information Please provide this summary of care documentation to your next provider. Patient Signature:  ____________________________________________________________ Date:  ____________________________________________________________  
  
Karma Lambing Provider Signature:  ____________________________________________________________ Date:  ____________________________________________________________

## 2017-09-14 LAB
ANION GAP SERPL CALC-SCNC: 6 MMOL/L (ref 5–15)
BUN SERPL-MCNC: 15 MG/DL (ref 6–20)
BUN/CREAT SERPL: 9 (ref 12–20)
CALCIUM SERPL-MCNC: 8.3 MG/DL (ref 8.5–10.1)
CHLORIDE SERPL-SCNC: 106 MMOL/L (ref 97–108)
CO2 SERPL-SCNC: 24 MMOL/L (ref 21–32)
CREAT SERPL-MCNC: 1.76 MG/DL (ref 0.55–1.02)
ERYTHROCYTE [DISTWIDTH] IN BLOOD BY AUTOMATED COUNT: 16.6 % (ref 11.5–14.5)
GLUCOSE BLD STRIP.AUTO-MCNC: 127 MG/DL (ref 65–100)
GLUCOSE BLD STRIP.AUTO-MCNC: 139 MG/DL (ref 65–100)
GLUCOSE BLD STRIP.AUTO-MCNC: 90 MG/DL (ref 65–100)
GLUCOSE SERPL-MCNC: 81 MG/DL (ref 65–100)
HCT VFR BLD AUTO: 33.2 % (ref 35–47)
HGB BLD-MCNC: 11 G/DL (ref 11.5–16)
LACTATE SERPL-SCNC: 1.5 MMOL/L (ref 0.4–2)
MCH RBC QN AUTO: 27.4 PG (ref 26–34)
MCHC RBC AUTO-ENTMCNC: 33.1 G/DL (ref 30–36.5)
MCV RBC AUTO: 82.6 FL (ref 80–99)
PLATELET # BLD AUTO: 289 K/UL (ref 150–400)
POTASSIUM SERPL-SCNC: 4.6 MMOL/L (ref 3.5–5.1)
RBC # BLD AUTO: 4.02 M/UL (ref 3.8–5.2)
SERVICE CMNT-IMP: ABNORMAL
SERVICE CMNT-IMP: ABNORMAL
SERVICE CMNT-IMP: NORMAL
SODIUM SERPL-SCNC: 136 MMOL/L (ref 136–145)
WBC # BLD AUTO: 6.4 K/UL (ref 3.6–11)
WBC #/AREA STL HPF: NORMAL /HPF (ref 0–4)

## 2017-09-14 PROCEDURE — 74011250637 HC RX REV CODE- 250/637: Performed by: INTERNAL MEDICINE

## 2017-09-14 PROCEDURE — 65270000015 HC RM PRIVATE ONCOLOGY

## 2017-09-14 PROCEDURE — 74011250637 HC RX REV CODE- 250/637: Performed by: SPECIALIST

## 2017-09-14 PROCEDURE — 74011250636 HC RX REV CODE- 250/636: Performed by: INTERNAL MEDICINE

## 2017-09-14 PROCEDURE — 82962 GLUCOSE BLOOD TEST: CPT

## 2017-09-14 PROCEDURE — 36415 COLL VENOUS BLD VENIPUNCTURE: CPT | Performed by: INTERNAL MEDICINE

## 2017-09-14 PROCEDURE — 87077 CULTURE AEROBIC IDENTIFY: CPT | Performed by: SPECIALIST

## 2017-09-14 PROCEDURE — 85027 COMPLETE CBC AUTOMATED: CPT | Performed by: INTERNAL MEDICINE

## 2017-09-14 PROCEDURE — 80048 BASIC METABOLIC PNL TOTAL CA: CPT | Performed by: INTERNAL MEDICINE

## 2017-09-14 PROCEDURE — 87045 FECES CULTURE AEROBIC BACT: CPT | Performed by: SPECIALIST

## 2017-09-14 PROCEDURE — 83605 ASSAY OF LACTIC ACID: CPT | Performed by: INTERNAL MEDICINE

## 2017-09-14 RX ORDER — CHOLESTYRAMINE 4 G/9G
4 POWDER, FOR SUSPENSION ORAL 2 TIMES DAILY WITH MEALS
Status: DISCONTINUED | OUTPATIENT
Start: 2017-09-14 | End: 2017-09-15 | Stop reason: HOSPADM

## 2017-09-14 RX ORDER — COLCHICINE 0.6 MG/1
0.6 TABLET ORAL DAILY
Status: DISCONTINUED | OUTPATIENT
Start: 2017-09-14 | End: 2017-09-15 | Stop reason: HOSPADM

## 2017-09-14 RX ADMIN — GABAPENTIN 600 MG: 300 CAPSULE ORAL at 15:06

## 2017-09-14 RX ADMIN — UMECLIDINIUM 1 PUFF: 62.5 AEROSOL, POWDER ORAL at 08:37

## 2017-09-14 RX ADMIN — CHOLESTYRAMINE 4 G: 4 POWDER, FOR SUSPENSION ORAL at 13:10

## 2017-09-14 RX ADMIN — FLUTICASONE PROPIONATE 2 SPRAY: 50 SPRAY, METERED NASAL at 17:24

## 2017-09-14 RX ADMIN — Medication 5 ML: at 05:22

## 2017-09-14 RX ADMIN — COLCHICINE 0.6 MG: 0.6 TABLET, FILM COATED ORAL at 13:09

## 2017-09-14 RX ADMIN — GABAPENTIN 600 MG: 300 CAPSULE ORAL at 08:24

## 2017-09-14 RX ADMIN — APIXABAN 5 MG: 5 TABLET, FILM COATED ORAL at 08:26

## 2017-09-14 RX ADMIN — AMIODARONE HYDROCHLORIDE 100 MG: 200 TABLET ORAL at 08:26

## 2017-09-14 RX ADMIN — GABAPENTIN 600 MG: 300 CAPSULE ORAL at 21:26

## 2017-09-14 RX ADMIN — SODIUM CHLORIDE 75 ML/HR: 900 INJECTION, SOLUTION INTRAVENOUS at 07:56

## 2017-09-14 RX ADMIN — Medication 10 ML: at 13:11

## 2017-09-14 RX ADMIN — Medication 5 ML: at 21:26

## 2017-09-14 RX ADMIN — CLOPIDOGREL BISULFATE 75 MG: 75 TABLET ORAL at 08:25

## 2017-09-14 RX ADMIN — AZELASTINE HYDROCHLORIDE 1 SPRAY: 137 SPRAY, METERED NASAL at 08:28

## 2017-09-14 RX ADMIN — ACETAMINOPHEN 650 MG: 325 TABLET ORAL at 17:57

## 2017-09-14 RX ADMIN — FLUTICASONE FUROATE AND VILANTEROL TRIFENATATE 1 PUFF: 100; 25 POWDER RESPIRATORY (INHALATION) at 08:36

## 2017-09-14 RX ADMIN — PRAVASTATIN SODIUM 20 MG: 10 TABLET ORAL at 21:26

## 2017-09-14 RX ADMIN — APIXABAN 5 MG: 5 TABLET, FILM COATED ORAL at 21:34

## 2017-09-14 NOTE — PROGRESS NOTES
Problem: Falls - Risk of  Goal: *Absence of Falls  Document Anthony Fall Risk and appropriate interventions in the flowsheet.    Outcome: Progressing Towards Goal  Fall Risk Interventions:  Mobility Interventions: Patient to call before getting OOB           Medication Interventions: Teach patient to arise slowly, Patient to call before getting OOB     Elimination Interventions: Call light in reach, Patient to call for help with toileting needs, Toileting schedule/hourly rounds     History of Falls Interventions: Door open when patient unattended, Room close to nurse's station

## 2017-09-14 NOTE — PROGRESS NOTES
Visited by Iglesia Chavez Partner on September 14, 2017.     VIVI Hensley, [facility], [address] [place] [address] [address]     Paging Service  [phone])

## 2017-09-14 NOTE — PROGRESS NOTES
Oncology Nursing Communication Tool      6:58 PM  9/14/2017     Bedside and Verbal shift change report given to [facility] SHON YATES RN (incoming nurse) by Darren Padgett RN (outgoing nurse) on Brenita Forward a 78 y.o. female who was admitted on 9/13/2017  1:57 PM. Report included the following information SBAR, Kardex, Intake/Output, MAR and Recent Results. Significant changes during shift: needs a stool culture      Issues for physician to address: none            Code Status: Full Code     Infections: No current active infections     Allergies: Crestor [rosuvastatin]; Levaquin [levofloxacin]; Lipitor [atorvastatin]; and Lyrica [pregabalin]     Current diet: DIET FULL LIQUID       Pain Controlled [x] yes [] no   Bowel Movement [] yes [x] no   Last Bowel Movement (date)     9/13/17            Vital Signs:   Patient Vitals for the past 12 hrs:   Temp Pulse Resp BP SpO2   09/14/17 1726 98.4 °F (36.9 °C) 74 18 109/44 95 %   09/14/17 0832 97.5 °F (36.4 °C) 80 18 93/58 96 %   09/14/17 0830 97.9 °F (36.6 °C) 77 18 100/58 90 %      Intake & Output:     Intake/Output Summary (Last 24 hours) at 09/14/17 1858  Last data filed at 09/14/17 6561   Gross per 24 hour   Intake           1452.5 ml   Output                0 ml   Net           1452.5 ml      Laboratory Results:     Recent Results (from the past 12 hour(s))   GLUCOSE, POC    Collection Time: 09/14/17  7:44 AM   Result Value Ref Range    Glucose (POC) 90 65 - 100 mg/dL    Performed by [facility] [facility], POC    Collection Time: 09/14/17 11:16 AM   Result Value Ref Range    Glucose (POC) 127 (H) 65 - 100 mg/dL    Performed by Judy Medina    GLUCOSE, POC    Collection Time: 09/14/17  3:49 PM   Result Value Ref Range    Glucose (POC) 139 (H) 65 - 100 mg/dL    Performed by Rodrick Lopez               Opportunity for questions and clarifications were given to the incoming nurse.  Patient's bed is in low position, side rails x2, door open PRN, call bell within reach and patient not in distress.       Adrianna Soriano, RN

## 2017-09-14 NOTE — PROGRESS NOTES
Hospitalist Progress Note    NAME: Kalpana Ashley   :  1950   MRN:  [id number]     Interim Hospital Summary: 78 y.o. female whom presented on 2017 with      Assessment / Plan:    MODE  -pre renal from GI fluid loses and diuretic use  -continue IVF hydration. Holding lasix     Lactic acidosis, resolved  -related to dehydration     Chronic diarrhea  Hx C diff  -c difficille negative, fecal leukocytes 0-4,  Stool culture pending  -questran added  -discussed with Dr Marcus Gilman. Will need to hold eliquis if colonoscopy planned    Reducible periumbilical incisional hernia  -appreciate Dr Holder's input. Follow up with him as OP or elective repair    Chronic systolic HF  EF 97%  CAD  Atrial fibrillation  -continue plavix, coreg and pravachol  -on eliquis    COPD  -inhalers and nebs    Body mass index is 24.52 kg/(m^2). Code status: Full  Prophylaxis: continue eliquis  Recommended Disposition: Home w/Family       Subjective:     Chief Complaint / Reason for Physician Visit  Follow up of MODE, chronic diarrhea, incisional hernia  Chart reviewed in detail. Discussed with RN events overnight. Review of Systems:  Symptom Y/N Comments  Symptom Y/N Comments   Fever/Chills    Chest Pain     Poor Appetite    Edema     Cough    Abdominal Pain     Sputum    Joint Pain     SOB/[place]    Pruritis/Rash     Nausea/vomit    Tolerating PT/OT     Diarrhea y   Tolerating Diet     Constipation    Other       Could NOT obtain due to:      PO intake: No data found. Objective:     VITALS:   Last 24hrs VS reviewed since prior progress note.  Most recent are:  Patient Vitals for the past 24 hrs:   Temp Pulse Resp BP SpO2   17 0832 - 80 18 93/58 96 %   17 0830 97.9 °F (36.6 °C) 77 18 100/58 90 %   17 0046 - 75 18 109/62 94 %   17 1901 98.2 °F (36.8 °C) 76 18 100/66 97 %   17 1629 - 76 19 - 98 %   17 1615 - 76 16 101/56 98 %   17 1532 - - - 113/50 -       Intake/Output Summary (Last 24 hours) at 09/14/17 1330  Last data filed at 09/14/17 0618   Gross per 24 hour   Intake           1452.5 ml   Output                0 ml   Net           1452.5 ml        PHYSICAL EXAM:  General: WD, WN. Alert, cooperative, no acute distress    EENT:  EOMI. Anicteric sclerae. MMM  Resp:  CTA bilaterally, no wheezing or rales. No accessory muscle use  CV:  Regular  rhythm,  No edema  GI:  Soft, Non distended, Non tender.  +Bowel sounds  Neurologic:  Alert and oriented X 3, normal speech,   Psych:   Good insight. Not anxious nor agitated  Skin:  No rashes. No jaundice    Reviewed most current lab test results and cultures  YES  Reviewed most current radiology test results   YES  Review and summation of old records today    NO  Reviewed patient's current orders and MAR    YES  PMH/ reviewed - no change compared to H&P  ________________________________________________________________________  Care Plan discussed with:    Comments   Patient x    Family      RN     Care Manager     Consultant                        Multidiciplinary team rounds were held today with , nursing, pharmacist and clinical coordinator. Patient's plan of care was discussed; medications were reviewed and discharge planning was addressed. ________________________________________________________________________  Total NON critical care TIME:  25   Minutes    Total CRITICAL CARE TIME Spent:   Minutes non procedure based      Comments   >50% of visit spent in counseling and coordination of care x     This includes time during multidisciplinary rounds if indicated above   ________________________________________________________________________  [place] Pljoann, MD     Procedures: see electronic medical records for all procedures/Xrays and details which were not copied into this note but were reviewed prior to creation of Plan.       LABS:  I reviewed today's most current labs and imaging studies.   Pertinent labs include:  Recent Labs      09/14/17   0456  09/13/17   1335   WBC  6.4  6.1   HGB  11.0*  11.2*   HCT  33.2*  33.6*   PLT  289  296     Recent Labs      09/14/17   0456  09/13/17   1335   NA  136  133*   K  4.6  5.0   CL  106  102   CO2  24  22   GLU  81  86   BUN  15  20   CREA  1.76*  2.15*   CA  8.3*  8.7   MG   --   0.9*   ALB   --   2.9*   TBILI   --   0.5   SGOT   --   122*   ALT   --   64

## 2017-09-14 NOTE — PROGRESS NOTES
F/U for diarrhea  Acute renal failure  History c diff  Abdominal pain/ tenderness    S: Ms. Shreya Garsia was seen by me today during rounds. At this time, she is resting + somewhat comfortably. Diarrhea is the same, two large volume, after eating. Eating is ok. Pain is slightly better, in the same place, by umbilicus. The patient has no new complaints today. Please see admission consult for details of ROS; there are no other changes today. O: Blood pressure 93/58, pulse 80, temperature 97.9 °F (36.6 °C), resp. rate 18, height 5' 6\" (1.676 m), weight 68.9 kg (151 lb 14.4 oz), SpO2 96 %. Date 09/13/17 0700 - 09/14/17 0659 09/14/17 0700 - 09/15/17 0659   Shift 5497-3171 1142-2847 24 Hour Total 9980-7003 1916-9643 24 Hour Total   I  N  T  A  K  E   P. O.  600 600         P. O.  600 600       I.V.  (mL/kg/hr)  852.5  (1) 852.5  (0.5)         Volume (0.9% sodium chloride infusion)  852.5 852.5       Shift Total  (mL/kg)  1452.5  (21.1) 1452.5  (21.1)      O  U  T  P  U  T   Stool            Stool Occurrence(s)  1 x 1 x 1 x  1 x    Shift Total  (mL/kg)         NET  1452.5 1452.5      Weight (kg) 68.9 68.9 68.9 68.9 68.9 68.9      Gen: Patient is in no acute distress. There is no jaundice. Lungs: Clear to auscultation bilaterally . Abd: Soft, + tender at umbilical hernia. No rebound, non-distended, bowel sounds present. Extremities: Warm.     Lab Results   Component Value Date/Time    WBC 6.4 09/14/2017 04:56 AM    Hemoglobin (POC) 11.2 07/20/2017 09:02 AM    HGB 11.0 09/14/2017 04:56 AM    HCT 33.2 09/14/2017 04:56 AM    PLATELET 278 98/74/3310 04:56 AM    MCV 82.6 09/14/2017 04:56 AM     Lab Results   Component Value Date/Time    Sodium 136 09/14/2017 04:56 AM    Potassium 4.6 09/14/2017 04:56 AM    Chloride 106 09/14/2017 04:56 AM    CO2 24 09/14/2017 04:56 AM    Anion gap 6 09/14/2017 04:56 AM    Glucose 81 09/14/2017 04:56 AM    BUN 15 09/14/2017 04:56 AM    Creatinine 1.76 09/14/2017 04:56 AM    BUN/Creatinine ratio 9 09/14/2017 04:56 AM    GFR est AA 35 09/14/2017 04:56 AM    GFR est non-AA 29 09/14/2017 04:56 AM    Calcium 8.3 09/14/2017 04:56 AM    Bilirubin, total 0.5 09/13/2017 01:35 PM    AST (SGOT) 122 09/13/2017 01:35 PM    Alk. phosphatase 231 09/13/2017 01:35 PM    Protein, total 8.8 09/13/2017 01:35 PM    Albumin 2.9 09/13/2017 01:35 PM    Globulin 5.9 09/13/2017 01:35 PM    A-G Ratio 0.5 09/13/2017 01:35 PM    ALT (SGPT) 64 09/13/2017 01:35 PM     c diff negative  Ova and parasites  pending  Fecal wbc pending  Stool culture:  Ordered  Cryptosporidium negative   Giardia ag negative   A: Active Problems:    Acute renal failure (ARF) ([address] (9/13/2017)        Comment:  Diarrhea/ incontinence is the same  Pain is better  Hernia is still the location of the pain. I reviewed images of last night's ct with a radiologist.  Negative (non contrast)  P:  ddx is c diff dna negative pseudomembranous colitis, lymphocytic colitis, other infection, other  Complex patient  Next step for chronic diarreha is colonoscopy with biopsy for lymphocytic colitis but she is in eiliquis. I spoke to Dr Sueann Bernheim in person.       I recommend the following    Questran bid  If no help the give her pepto bismol 2 tabs 4 x per day beginning 2 days and stop questran at that time  Await other micro studies  Consider teeing up for colonoscopy 9.18.2017 off eliquis, would probably do egd at same time  Continue eliquis for now while we are working on micro lab and empric rx    Thanks    Hardy Chairez MD  10:37 AM  9/14/2017

## 2017-09-14 NOTE — CONSULTS
Gastroenterology Consult  Requested by Irais Nicolas and Avani Marc  Cc:  Abdominal pain, diarrhea, fecal incontinence in an ill patient      Referring Physician: See above  PCP:  Sunshine Bradshaw NP  Gastroenterologist:  Srinivasa Redd 87 (was Jaylinrobert Romero)    Consult Date: 9/13/2017     Subjective:     Chief Complaint: abdominal pain and diarrhea. History of Present Illness: Dudley Chang is a 78 y.o. female who is seen in consultation for abdominal pain and diarrhea. She was hospitalized in May of this year with c diff and sent home on vancomyin and floraQ. She saw Dr. Cherelle Yoo on 8.25 with nausea, vomting and diarrhea   Watery stools no blood. c diff was ordered and has not returned. Fecal calprotectin was normal.    In the last several days she has crampy abdominal pain across the upper abdomen. No specific precipitating or relieving factors. There have been watery stools, small volume with incontinence. No bleeding. There have been fever (?) and chills. She felt unwell, came to the ed where acute renal failure was noted and was admitted. She has been taking Erica and also imodium 2 to 4 per day. Prescriptions Prior to Admission   Medication Sig    ondansetron hcl (ZOFRAN, AS HYDROCHLORIDE,) 4 mg tablet Take 1 Tab by mouth every eight (8) hours as needed for Nausea.  colchicine 0.6 mg tablet TAKE ONE TABLET BY MOUTH TWICE DAILY    furosemide (LASIX) 40 mg tablet Pt is to take 2 tabs daily = 40 mg of Lasix daily.  amiodarone (PACERONE) 100 mg tablet Take 1 Tab by mouth daily. Dose reduced 7/31/17    apixaban (ELIQUIS) 5 mg tablet Take 1 Tab by mouth two (2) times a day. Indications: PREVENT THROMBOEMBOLISM IN CHRONIC ATRIAL FIBRILLATION    clopidogrel (PLAVIX) 75 mg tab Take 1 Tab by mouth daily.  ONE TOUCH DELICA 33 gauge misc     carvedilol (COREG) 6.25 mg tablet Take 1 Tab by mouth two (2) times daily (with meals).  Lancets misc Check blood sugar 1 times daily.  May substitute for insurance preferred lancets.  glucose blood VI test strips (BLOOD GLUCOSE TEST) strip Check blood sugar 1 times daily: E11.9, may substitute for insurance preferred strips.  pravastatin (PRAVACHOL) 20 mg tablet Take 1 Tab by mouth nightly.  fluticasone (FLONASE) 50 mcg/actuation nasal spray 2 Sprays by Both Nostrils route every evening.  gabapentin (NEURONTIN) 800 mg tablet TAKE ONE TABLET BY MOUTH THREE TIMES DAILY    Blood-Glucose Meter monitoring kit Check blood sugar daily. May substitute for insurance preferred meter    tiotropium (SPIRIVA WITH HANDIHALER) 18 mcg inhalation capsule INHALE THE CONTENTS OF 1 CAPSULE THROUGH HANDIHALER DEVICE DAILY    metFORMIN (GLUCOPHAGE) 1,000 mg tablet TAKE 1 TABLET BY MOUTH TWICE DAILY WITH MEALS  Indications: PREVENTION OF TYPE 2 DIABETES MELLITUS    budesonide-formoterol (SYMBICORT) 160-4.5 mcg/actuation HFA inhaler Take 1 Puff by inhalation two (2) times a day.  albuterol (PROVENTIL VENTOLIN) 2.5 mg /3 mL (0.083 %) nebulizer solution 3 mL by Nebulization route every four (4) hours as needed for Wheezing.  FOLIC ACID/MULTIVIT-MIN/LUTEIN (CENTRUM SILVER PO) Take 1 Tab by mouth daily. Takes one po daily.  albuterol (VENTOLIN HFA) 90 mcg/actuation inhaler Take 2 Puffs by inhalation every six (6) hours as needed for Wheezing.  Azelastine (ASTEPRO) 0.15 % (205.5 mcg) nasal spray 1 [place] by Both Nostrils route daily.  cod liver oil cap Take 1 Cap by mouth daily.         Past Medical History:   Diagnosis Date    Atrial fibrillation ([address] 6/2/2010    CAD (coronary artery disease)     stent    Chronic diastolic heart failure ([address] 9/22/2014    Chronic systolic HF (heart failure) ([address] 5/10/2017    4/2017 EF 25-30%    COPD     COPD (chronic obstructive pulmonary disease) ([address] 6/2/2010    [facility]     Fibroid     [facility] failure ([facility]     History of Clostridium difficile infection 5/10/2017    4/2017 CDiff positive    Hypertension 2010    Hypotension 5/10/2017    Junctional tachycardia ([address] 5/10/2017    NIDDM (non-insulin dependent diabetes mellitus) 2010    Screening mammogram 5/4/10    SOB (shortness of breath) 2014     Past Surgical History:   Procedure Laterality Date    HX  SECTION      HX OTHER SURGICAL      adrenal gland removed    HX PACEMAKER      SD EXCISE ADRENAL GLAND        Family History   Problem Relation Age of Onset    Heart Disease Mother     Diabetes Father     Heart Disease Brother      Social History   Substance Use Topics    Smoking status: Former Smoker     Types: Cigarettes     Quit date: 2009    Smokeless tobacco: Never Used    Alcohol use No      Allergies   Allergen Reactions    Crestor [Rosuvastatin] Myalgia    Levaquin [Levofloxacin] Nausea Only     GI Upset    Lipitor [Atorvastatin] Diarrhea    Lyrica [Pregabalin] Myalgia     Current Facility-Administered Medications   Medication Dose Route Frequency    sodium chloride (NS) flush 5-10 mL  5-10 mL IntraVENous PRN    albuterol (PROVENTIL VENTOLIN) nebulizer solution 2.5 mg  2.5 mg Nebulization Q4H PRN    albuterol (PROVENTIL HFA, VENTOLIN HFA, PROAIR HFA) inhaler 2 Puff  2 Puff Inhalation Q6H PRN    [START ON 2017] amiodarone (CORDARONE) tablet 100 mg  100 mg Oral DAILY    apixaban (ELIQUIS) tablet 5 mg  5 mg Oral BID    [START ON 2017] azelastine (ASTELIN) 137mcg/spray nasal spray  1 Spray Both Nostrils DAILY    carvedilol (COREG) tablet 6.25 mg  6.25 mg Oral BID WITH MEALS    [START ON 2017] clopidogrel (PLAVIX) tablet 75 mg  75 mg Oral DAILY    colchicine tablet 0.6 mg  0.6 mg Oral ONCE    fluticasone (FLONASE) 50 mcg/actuation nasal spray 2 Spray  2 Spray Both Nostrils QPM    gabapentin (NEURONTIN) capsule 600 mg  600 mg Oral TID    ondansetron (ZOFRAN ODT) tablet 8 mg  8 mg Oral Q8H PRN    pravastatin (PRAVACHOL) tablet 20 mg  20 mg Oral QHS    [START ON 2017] umeclidinium (INCRUSE ELLIPTA) 62.5 mcg/actuation  1 Puff Inhalation DAILY    sodium chloride (NS) flush 5-10 mL  5-10 mL IntraVENous Q8H    sodium chloride (NS) flush 5-10 mL  5-10 mL IntraVENous PRN    acetaminophen (TYLENOL) tablet 650 mg  650 mg Oral Q6H PRN    enoxaparin (LOVENOX) injection 40 mg  40 mg SubCUTAneous Q24H    0.9% sodium chloride infusion  75 mL/hr IntraVENous CONTINUOUS    insulin lispro (HUMALOG) injection   SubCUTAneous TIDAC    glucose chewable tablet 16 g  4 Tab Oral PRN    glucagon (GLUCAGEN) injection 1 mg  1 mg IntraMUSCular PRN    [START ON 9/14/2017] fluticasone-vilanterol (BREO ELLIPTA) 100mcg-25mcg/puff  1 Puff Inhalation DAILY    dextrose 10% infusion 125-250 mL  125-250 mL IntraVENous PRN        Review of Systems:  A detailed 10 organ review of systems is obtained with pertinent positives as listed in the History of Present Illness and Past Medical History. All others are negative. Except as above, unchanged from two weeks ago when it was:    Review of Systems:   Cardiovascular: Denies chest pain, irregular heart beat, palpitations, peripheral edema, syncope, Sweats. Constitutional: Presents suffers from loss of appetite, weight loss. Denies fatigue, fever. ENMT: Denies nose bleeds, sore throat. Endocrine: Denies excessive thirst, heat intolerance. Gastrointestinal: Presents suffers from abdominal pain, Bloating/gas, stomach cramps, vomiting. Denies abdominal swelling, change in bowel habits, constipation, diarrhea, heartburn, jaundice, nausea, rectal bleeding, dysphagia, rectal pain, Stool incontinence. Genitourinary: Denies dark urine, dysuria, frequent urination, hematuria, incontinence. Hematologic/Lymphatic: Denies easy bruising, prolonged bleeding. Integumentary: Denies itching, rashes, sun sensitivity. Musculoskeletal: Denies arthritis, back pain, gout, joint pain, muscle weakness, stiffness.    Neurological: Denies dizziness, fainting, frequent headaches, memory loss.   Psychiatric: Denies anxiety, depression, difficulty sleeping, hallucinations, nervousness, panic attacks, paranoia. Respiratory: Denies cough, shortness of breath with exercise, wheezing. Objective:     Physical Exam:  Visit Vitals    /66    Pulse 76    Temp 98.2 °F (36.8 °C)    Resp 18    Ht 5' 6\" (1.676 m)    Wt 68.9 kg (151 lb 14.4 oz)    SpO2 97%    BMI 24.52 kg/m2      Gen ill appearing  No acute distress    Skin:  Extremities and face reveal no rashes. HEENT: Sclerae anicteric. Extra-occular muscles are intact. No oral ulcers. No abnormal pigmentation of the lips. The neck is supple. Cardiovascular: Regular rate and rhythm. No murmurs, gallops, or rubs. Respiratory:  Comfortable breathing with no accessory muscle use. Clear breath sounds with no wheezes, rales, or rhonchi. GI:  Abdomen nondistended, soft, and+ umbilical hernia 2 cm with tenderness at the hernia. bs +tender. Musculoskeletal:  No pitting edema of the lower legs. Extremities have good range of motion. No costovertebral tenderness. Rectal: tone + no mass, no impaction. Yellow stool on glove   Neurological:  Gross memory appears intact. Patient is alert and oriented. Psychiatric:  Mood appears appropriate with judgement intact. Lymphatic:  No cervical or supraclavicular adenopathy. Lab/Data Review:    Recent Labs      09/13/17   1335   WBC  6.1   HGB  11.2*   HCT  33.6*   PLT  296     Recent Labs      09/13/17   1335   NA  133*   K  5.0   CL  102   CO2  22   BUN  20   CREA  2.15*   GLU  86   CA  8.7   MG  0.9*     Recent Labs      09/13/17   1335   SGOT  122*   ALT  64   AP  231*   TBILI  0.5   TP  8.8*   ALB  2.9*   GLOB  5.9*   LPSE  74     No results for input(s): INR, PTP, APTT in the last 72 hours. No lab exists for component: INREXT   No results for input(s): FE, TIBC, PSAT, FERR in the last 72 hours.    No results found for: FOL, RBCF   No results for input(s): PH, PCO2, PO2 in the last 72 hours. No results for input(s): CPK, CKNDX, TROIQ in the last 72 hours. No lab exists for component: CPKMB  Lab Results   Component Value Date/Time    Cholesterol, total 248 10/31/2016 12:00 AM    HDL Cholesterol 43 10/31/2016 12:00 AM    LDL, calculated 160 10/31/2016 12:00 AM    Triglyceride 227 10/31/2016 12:00 AM    CHOL/HDL Ratio 6.4 07/01/2014 03:10 AM     Lab Results   Component Value Date/Time    Glucose (POC) 80 09/13/2017 06:56 PM    Glucose (POC) 303 05/22/2017 04:26 PM    Glucose (POC) 197 05/22/2017 11:03 AM    Glucose (POC) 138 05/22/2017 08:09 AM    Glucose (POC) 366 05/21/2017 08:56 PM    Glucose  07/20/2017 09:03 AM    Glucose  05/31/2017 10:16 AM     Lab Results   Component Value Date/Time    Color YELLOW/STRAW 09/13/2017 03:30 PM    Appearance CLOUDY 09/13/2017 03:30 PM    Specific gravity 1.013 09/13/2017 03:30 PM    pH (UA) 5.5 09/13/2017 03:30 PM    Protein NEGATIVE  09/13/2017 03:30 PM    Glucose NEGATIVE  09/13/2017 03:30 PM    Ketone NEGATIVE  09/13/2017 03:30 PM    Bilirubin NEGATIVE  09/13/2017 03:30 PM    Urobilinogen 0.2 09/13/2017 03:30 PM    Nitrites NEGATIVE  09/13/2017 03:30 PM    Leukocyte Esterase MODERATE 09/13/2017 03:30 PM    Epithelial cells FEW 09/13/2017 03:30 PM    Bacteria 1+ 09/13/2017 03:30 PM    WBC 5-10 09/13/2017 03:30 PM    RBC 0-5 09/13/2017 03:30 PM      Ct without contrast:  ADDITIONAL COMMENTS: There is a fat-containing umbilical hernia.     IMPRESSION  IMPRESSION:  No evidence of acute process. Incidentals as above.   Assessment/Plan:     Active Problems:    Acute renal failure (ARF) ([address] (9/13/2017)       Abdominal pain  History of c diff  Umbilical hernia  Diarrhea   Rec:  Surgery consult for the hernia  c diff  Stop imodium, continue questran  Will follow     Kathleen Ramirez MD  9:27 PM  9/13/2017

## 2017-09-14 NOTE — CONSULTS
Surgery Consult  Consulted by: Dr. Kishore Chery  PCP: Maria Luz Cheung NP    Thank you for allowing me to participate in your patient's care. Please call me with any questions. Assessment:   Reducible periumbilical incisional hernia. Symptomatic. No inflammation. No obstruction. Comorbid cardiomyopathy -- EF 20-25%, chronic anemia. S/p c/s and right adrenalectomy. Body mass index is 24.52 kg/(m^2). Plan:   Given the fact that it is symptomatic, the hernia should be repaired. There is no urgency and this can be done in an outpatient setting after her acute medical issues are resolved. Will need cardiology risk stratification as outpatient. Sees Dr. Jose A Sánchez. Would need to stop her antiplatelet therapy. Discussed the risk of surgery including bleeding, infection, injury to adjacent structures, and the risks of general anesthetic. The patient understands the risks; any and all questions were answered to the patient's satisfaction. Problem List:   Active Problems:    Acute renal failure (ARF) ([facility] (2017)        Subjective:      Murphy Patel is a 78 y.o. female who is seen in consultation at the request of Dr. Kishore Chery for umbilical hernia. Patient admitted yesterday with diarrhea and weakness. Has h/o C.diff but current stool tests negative. Work-up for continued diarrhea ongoing. Objective:   Blood pressure 93/58, pulse 80, temperature 97.9 °F (36.6 °C), resp. rate 18, height 5' 6\" (1.676 m), weight 68.9 kg (151 lb 14.4 oz), SpO2 96 %.   Temp (24hrs), Av.9 °F (36.6 °C), Min:97.5 °F (36.4 °C), Max:98.2 °F (36.8 °C)      Physical Exam:  PHYSICAL EXAM:  Gen:  [x]     A&O     [x]      No acute distress    [x]     non-toxic     []     ill apearing     []     Critical        HEENT:   [x]     anicteric    []      scleral icterus    [x]     moist mucosa     []     dry mucosa    RESP:   [x]     CTA bilaterally, no wheezing/rhonchi/rales/crackles    []     wheezing     [] rhonchi     []     crackles     []     use of accessory muscles    CARD:  [x]     regular rate and rhythm     [x]     No murmurs/rubs/gallops    []     irregular rhythm     []     Murmur     []     Rubs     []     Gallops    ABD:     Soft, reducible periumbilical hernia, NABS. TTP only at hernia site.       SKIN:   [x]     normal      []Rashes      []Ulcers     EXT:  [x]      No CCE     []2+ pulses throughout    []      Clubbing     []Cyanosis     []Edema     []diminished pulses    NEUR:  [x]     Strength normal     []weakness   []LUE     []RUE     []LLE     []RLE    [x]     follows commands          PSYCH:   insight []Poor   [x]good                      []     depressed     []     anxious     []     agitated    Past Medical History:   Diagnosis Date    Atrial fibrillation ([facility] 2010    CAD (coronary artery disease)     stent    Chronic diastolic heart failure ([facility] [facility] 2014    Chronic systolic HF (heart failure) ([facility] 5/10/2017    2017 EF 25-30%    COPD     COPD (chronic obstructive pulmonary disease) ([facility] [facility] 2010    Diabetes ([facility]     Fibroid     Heart failure ([facility]     History of Clostridium difficile infection 5/10/2017    2017 CDiff positive    Hypertension 2010    Hypotension 5/10/2017    Junctional tachycardia ([facility] [facility] 5/10/2017    NIDDM (non-insulin dependent diabetes mellitus) 2010    Screening mammogram 5/4/10    SOB (shortness of breath) 2014     Past Surgical History:   Procedure Laterality Date    HX  SECTION      HX OTHER SURGICAL      adrenal gland removed    HX PACEMAKER      SC EXCISE ADRENAL GLAND        Family History   Problem Relation Age of Onset    Heart Disease Mother     Diabetes Father     Heart Disease Brother      Social History     Social History    Marital status:      Spouse name: N/A    Number of children: N/A    Years of education: N/A     Social History Main Topics    Smoking status: Former Smoker     Types: Cigarettes Quit date: 12/31/2009    Smokeless tobacco: Never Used    Alcohol use No    Drug use: No    Sexual activity: Not Currently     Other Topics Concern    Not on file     Social History Narrative      Prior to Admission medications    Medication Sig Start Date End Date Taking? Authorizing Provider   ondansetron hcl (ZOFRAN, AS HYDROCHLORIDE,) 4 mg tablet Take 1 Tab by mouth every eight (8) hours as needed for Nausea. 9/5/17   Isis Pinzon NP   colchicine 0.6 mg tablet TAKE ONE TABLET BY MOUTH TWICE DAILY 8/22/17   Isis Pinzon NP   furosemide (LASIX) 40 mg tablet Pt is to take 2 tabs daily = 40 mg of Lasix daily. 8/16/17   Schuyler Washington NP   amiodarone (PACERONE) 100 mg tablet Take 1 Tab by mouth daily. Dose reduced 7/31/17 7/31/17   Cara Sullivan NP   apixaban (ELIQUIS) 5 mg tablet Take 1 Tab by mouth two (2) times a day. Indications: PREVENT THROMBOEMBOLISM IN CHRONIC ATRIAL FIBRILLATION 7/31/17   Cara Sullivan NP   clopidogrel (PLAVIX) 75 mg tab Take 1 Tab by mouth daily. 6/23/17   CLINTON Mckenna   ONE TOUCH DELICA 33 gauge misc  5/6/65   Historical Provider   carvedilol (COREG) 6.25 mg tablet Take 1 Tab by mouth two (2) times daily (with meals). 6/16/17   cShuyler Washington NP   Lancets misc Check blood sugar 1 times daily. May substitute for insurance preferred lancets. 5/31/17   Isis Pinzon NP   glucose blood VI test strips (BLOOD GLUCOSE TEST) strip Check blood sugar 1 times daily: E11.9, may substitute for insurance preferred strips. 5/31/17   Isis Pinzon NP   pravastatin (PRAVACHOL) 20 mg tablet Take 1 Tab by mouth nightly. 4/17/17   Isis Pinzon NP   fluticasone (FLONASE) 50 mcg/actuation nasal spray 2 Sprays by Both Nostrils route every evening. Shannan Castro MD   gabapentin (NEURONTIN) 800 mg tablet TAKE ONE TABLET BY MOUTH THREE TIMES DAILY 3/6/17   Isis Pinzon NP   Blood-Glucose Meter monitoring kit Check blood sugar daily.  May substitute for insurance preferred meter 1/31/17   [facility], NP   tiotropium (SPIRIVA WITH HANDIHALER) 18 mcg inhalation capsule INHALE THE CONTENTS OF 1 CAPSULE THROUGH HANDIHALER DEVICE DAILY 10/31/16   John Rajput NP   metFORMIN (GLUCOPHAGE) 1,000 mg tablet TAKE 1 TABLET BY MOUTH TWICE DAILY WITH MEALS  Indications: PREVENTION OF TYPE 2 DIABETES MELLITUS 10/31/16   John Rajput NP   budesonide-formoterol (SYMBICORT) 160-4.5 mcg/actuation HFA inhaler Take 1 Puff by inhalation two (2) times a day. 7/12/16   John Rajput NP   albuterol (PROVENTIL VENTOLIN) 2.5 mg /3 mL (0.083 %) nebulizer solution 3 mL by Nebulization route every four (4) hours as needed for Wheezing. 5/19/16   John Rajput NP   FOLIC ACID/MULTIVIT-MIN/LUTEIN (CENTRUM SILVER PO) Take 1 Tab by mouth daily. Takes one po daily. Historical Provider   albuterol (VENTOLIN HFA) 90 mcg/actuation inhaler Take 2 Puffs by inhalation every six (6) hours as needed for Wheezing. 3/8/16   Cristina Abad MD   Azelastine (ASTEPRO) 0.15 % (205.5 mcg) nasal spray 1 [place] by Both Nostrils route daily. 1/21/15   Historical Provider   cod liver oil cap Take 1 Cap by mouth daily.     Historical Provider     ALLERGIES:    Allergies   Allergen Reactions    Crestor [Rosuvastatin] Myalgia    Levaquin [Levofloxacin] Nausea Only     GI Upset    Lipitor [Atorvastatin] Diarrhea    Lyrica [Pregabalin] Myalgia       Review of Systems:  (unchecked were asked but negative)  Constitutional: [] Fever/chills   [] Sweats   [] Loss of appetite   [] Fatigue/weakness   [] Weight loss  Head & Neck:   [] Headaches   [] Visual loss   [] Hearing loss   [] Thyroid problems  Cardiovascular:   [] Stroke   [] Calf pain when walking   [] Chest pain   [] Rapid heart beat       [] Heart attack   [] Stents   [x] Congestive heart failure   [] Leg swelling   [] Murmur  Respiratory:   [] Sleep apnea   [] Cough/congestion   [] Shortness of breath   [] Wheezing/asthma      [] COPD/emphysema  Gastrointestinal:   [] Frequent indigestion   [] Trouble swallowing   [] Nausea   [] Vomiting   [] Bloating   [x] Abd pain       [x] Diarrhea   [] Constipation   [] Blood in stool   [] Ulcers   [] Intestinal disease   [] Hepatitis    Genitourinary:   [] Painful urination   [] Difficulty urinating   [] Frequent urination       [] Enlarged prostate   [] Vasectomy   [] Blood in urine   [] Dialysis  Musculoskeletal:  [] Muscle aches   [] Back pain   [] Joint pain  Neurologic:    [] Seizures   [] Dizziness   [] Numbness  Hematologic:   [] Nosebleeds   [] Easy bruising   [] Anemia   [] Easy bleeding  Psychiatric:    [] Depression   [] Anxiety   [] Bipolar disorder   [] Schizophrenia                                  []     Unable to obtain  ROS due to  []     mental status change  []     sedated   []     intubated    LABS:  Recent Labs      09/14/17 0456 09/13/17   1335   WBC  6.4  6.1   HGB  11.0*  11.2*   HCT  33.2*  33.6*   PLT  289  296     Recent Labs      09/14/17 0456 09/13/17   1335   NA  136  133*   K  4.6  5.0   CL  106  102   CO2  24  22   BUN  15  20   CREA  1.76*  2.15*   GLU  81  86   CA  8.3*  8.7   MG   --   0.9*     Recent Labs      09/13/17   1335   SGOT  122*   AP  231*   TP  8.8*   ALB  2.9*   GLOB  5.9*   LPSE  74     No results for input(s): INR, PTP, APTT in the last 72 hours.     No lab exists for component: INREXT      Signed By: Charley Keller MD     September 14, 2017

## 2017-09-15 VITALS
BODY MASS INDEX: 24.77 KG/M2 | TEMPERATURE: 98.3 F | HEIGHT: 66 IN | DIASTOLIC BLOOD PRESSURE: 50 MMHG | RESPIRATION RATE: 18 BRPM | HEART RATE: 75 BPM | WEIGHT: 154.1 LBS | OXYGEN SATURATION: 94 % | SYSTOLIC BLOOD PRESSURE: 106 MMHG

## 2017-09-15 LAB
ANION GAP SERPL CALC-SCNC: 7 MMOL/L (ref 5–15)
BACTERIA SPEC CULT: NORMAL
BUN SERPL-MCNC: 9 MG/DL (ref 6–20)
BUN/CREAT SERPL: 6 (ref 12–20)
CALCIUM SERPL-MCNC: 8.4 MG/DL (ref 8.5–10.1)
CC UR VC: NORMAL
CHLORIDE SERPL-SCNC: 107 MMOL/L (ref 97–108)
CO2 SERPL-SCNC: 24 MMOL/L (ref 21–32)
CREAT SERPL-MCNC: 1.54 MG/DL (ref 0.55–1.02)
GLUCOSE BLD STRIP.AUTO-MCNC: 101 MG/DL (ref 65–100)
GLUCOSE BLD STRIP.AUTO-MCNC: 94 MG/DL (ref 65–100)
GLUCOSE SERPL-MCNC: 94 MG/DL (ref 65–100)
MAGNESIUM SERPL-MCNC: 1.8 MG/DL (ref 1.6–2.4)
POTASSIUM SERPL-SCNC: 5 MMOL/L (ref 3.5–5.1)
SERVICE CMNT-IMP: ABNORMAL
SERVICE CMNT-IMP: NORMAL
SERVICE CMNT-IMP: NORMAL
SODIUM SERPL-SCNC: 138 MMOL/L (ref 136–145)

## 2017-09-15 PROCEDURE — 74011250637 HC RX REV CODE- 250/637: Performed by: INTERNAL MEDICINE

## 2017-09-15 PROCEDURE — 83735 ASSAY OF MAGNESIUM: CPT | Performed by: INTERNAL MEDICINE

## 2017-09-15 PROCEDURE — 36415 COLL VENOUS BLD VENIPUNCTURE: CPT | Performed by: INTERNAL MEDICINE

## 2017-09-15 PROCEDURE — 80048 BASIC METABOLIC PNL TOTAL CA: CPT | Performed by: INTERNAL MEDICINE

## 2017-09-15 PROCEDURE — 82962 GLUCOSE BLOOD TEST: CPT

## 2017-09-15 RX ORDER — CHOLESTYRAMINE 4 G/9G
4 POWDER, FOR SUSPENSION ORAL 2 TIMES DAILY WITH MEALS
Qty: 60 PACKET | Refills: 1 | Status: SHIPPED | OUTPATIENT
Start: 2017-09-15 | End: 2017-09-22 | Stop reason: ALTCHOICE

## 2017-09-15 RX ORDER — CARVEDILOL 3.12 MG/1
3.12 TABLET ORAL 2 TIMES DAILY WITH MEALS
Status: DISCONTINUED | OUTPATIENT
Start: 2017-09-15 | End: 2017-09-15 | Stop reason: HOSPADM

## 2017-09-15 RX ADMIN — FLUTICASONE FUROATE AND VILANTEROL TRIFENATATE 1 PUFF: 100; 25 POWDER RESPIRATORY (INHALATION) at 09:12

## 2017-09-15 RX ADMIN — AMIODARONE HYDROCHLORIDE 100 MG: 200 TABLET ORAL at 08:18

## 2017-09-15 RX ADMIN — Medication 5 ML: at 05:12

## 2017-09-15 RX ADMIN — COLCHICINE 0.6 MG: 0.6 TABLET, FILM COATED ORAL at 09:14

## 2017-09-15 RX ADMIN — GABAPENTIN 600 MG: 300 CAPSULE ORAL at 08:18

## 2017-09-15 RX ADMIN — UMECLIDINIUM 1 PUFF: 62.5 AEROSOL, POWDER ORAL at 09:12

## 2017-09-15 RX ADMIN — AZELASTINE HYDROCHLORIDE 1 SPRAY: 137 SPRAY, METERED NASAL at 09:13

## 2017-09-15 RX ADMIN — CLOPIDOGREL BISULFATE 75 MG: 75 TABLET ORAL at 08:19

## 2017-09-15 RX ADMIN — APIXABAN 5 MG: 5 TABLET, FILM COATED ORAL at 09:13

## 2017-09-15 RX ADMIN — ACETAMINOPHEN 650 MG: 325 TABLET ORAL at 07:19

## 2017-09-15 NOTE — DISCHARGE INSTRUCTIONS
HOSPITALIST DISCHARGE INSTRUCTIONS    NAME: Madiha Villasenor   :  1950   MRN:  [id number]     Date/Time:  9/15/2017 1:01 PM    ADMIT DATE: 2017   DISCHARGE DATE: 9/15/2017         · It is important that you take the medication exactly as they are prescribed. · Keep your medication in the bottles provided by the pharmacist and keep a list of the medication names, dosages, and times to be taken in your wallet. · Do not take other medications without consulting your doctor. What to do at [address]:  Cardiac Diet    Recommended activity: Activity as tolerated      If you have questions regarding the hospital related prescriptions or hospital related issues please call [facility] at [phone]. You can always direct your questions to your primary care doctor if you are unable to reach your hospital physician; your PCP works as an extension of your hospital doctor just like your hospital doctor is an extension of your PCP for your time at the hospital [facility], [facility])    If you experience any of the following symptoms then please call your primary care physician or return to the emergency room if you cannot get hold of your doctor:    Fever, chills, nausea, vomiting, or persistent diarrhea  Worsening weakness or new problems with your speech or balance  Dark stools or visible blood in your stools  New Leg swelling or shortness of breath as this could be signs of a clot    Additional Instructions:      Bring these papers with you to your follow up appointments. The papers will help your doctors be sure to continue the care plan from the hospital.              Information obtained by :  I understand that if any problems occur once I am at home I am to contact my physician. I understand and acknowledge receipt of the instructions indicated above. Physician's or R.N.'s Signature                                                                  Date/Time                                                                                                                                              Patient or Representative Signature

## 2017-09-15 NOTE — PROGRESS NOTES
Patient is ready for discharge. Pulled patients IV, she tolerated well. Went over discharge instructions with patient, side effects discussed with patient. Volunteer here to transport patient to her car.

## 2017-09-15 NOTE — DISCHARGE SUMMARY
Hospitalist Discharge Summary     Patient ID:  Ebony Quezada  [id number]  89 y.o.  1950    PCP on record: Lee Ruggiero NP    Admit date: 9/13/2017  Discharge date and time: 9/15/2017      DISCHARGE DIAGNOSIS:    MODE  Lactic acidosis, resolved  Chronic diarrhea  Hx C diff  Reducible periumbilical incisional hernia  Chronic sys/diastolic HF  CAD  Atrial fibrillation  COPD      CONSULTATIONS:  IP CONSULT TO GASTROENTEROLOGY  IP CONSULT TO GENERAL SURGERY    Excerpted HPI from H&P of Jyoti Walters MD:  CHIEF COMPLAINT: Diarrhea  And generalized weakness     HISTORY OF PRESENT ILLNESS:     Madhuri Caba is a 78 y.o.  female who presents with  Diarhea  And Generalized weakness.     Patient states that she was treated for C. Diff in April-May of this year and her diarrhea stopped 2 weeks after she finished   Antibiotics. Thereafter it  Started about  4-6 x a day. She has been on PRN Immodium up to 4 pills a day without relief. She was recently started on ? Cholestyramine by her GI and has been taking this for about 2-3 days without relief. She feels weak and has been falling as a result so she comes to the ED where she is noted to be dry and in Renal Failure     We were asked to admit for work up and evaluation of the above problems. ______________________________________________________________________  DISCHARGE SUMMARY/HOSPITAL COURSE:  for full details see H&P, daily progress notes, labs, consult notes. MODE  Relative hypotension  -pre renal from GI fluid loses and diuretic use  -resolving with IVFs. Oral intake stable. Will have her hold her lasix until she can be re evaluated next week by her cardiologist.      Lactic acidosis, resolved  -related to dehydration      Chronic diarrhea  Hx C diff  -c difficille negative, fecal leukocytes 0-4,  Stool culture negative  -questran added. Follow up with Dr Do Lozoya in a week.    She may need to be off eliquis and plavix for colonoscopy      Reducible periumbilical incisional hernia  -appreciate Dr Holder's input. Follow up with him as OP for elective repair     Chronic systolic/diastolic HF    CAD  Atrial fibrillation  -continue plavix, coreg and pravachol  -continue on eliquis  -last EF 55%. Holding lasix for a few days due to low BP and elevated Cr. Follow up with Dr Estefany John in a few days,      COPD  -inhalers and nebs    _______________________________________________________________________  Patient seen and examined by me on discharge day. Pertinent Findings:  Gen:    Not in distress  Chest: Clear lungs  CVS:   Regular rhythm. No edema  Abd:  Soft, not distended, not tender  Neuro:  Alert, Oriented x 4, grossly non focal exam  _______________________________________________________________________  DISCHARGE MEDICATIONS:   Current Discharge Medication List      START taking these medications    Details   cholestyramine (QUESTRAN) 4 gram packet Take 1 Packet by mouth two (2) times daily (with meals). 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 Dose reduced 7/31/17  Qty: 90 Tab, Refills: 3      apixaban (ELIQUIS) 5 mg tablet Take 1 Tab by mouth two (2) times a day. Indications: PREVENT THROMBOEMBOLISM IN CHRONIC ATRIAL FIBRILLATION  Qty: 180 Tab, Refills: 3    Associated Diagnoses: Atrial fibrillation, unspecified type ([address] Anticoagulant long-term use      clopidogrel (PLAVIX) 75 mg tab Take 1 Tab by mouth daily. Qty: 30 Tab, Refills: 11      carvedilol (COREG) 6.25 mg tablet Take 1 Tab by mouth two (2) times daily (with meals). Qty: 60 Tab, Refills: 11      Lancets misc Check blood sugar 1 times daily.  May substitute for insurance preferred lancets. Qty: 50 Each, Refills: 11      glucose blood VI test strips (BLOOD GLUCOSE TEST) strip Check blood sugar 1 times daily: E11.9, may substitute for insurance preferred strips. Qty: 50 Strip, Refills: 11      pravastatin (PRAVACHOL) 20 mg tablet Take 1 Tab by mouth nightly. Qty: 90 Tab, Refills: 3    Associated Diagnoses: Hypercholesteremia      fluticasone (FLONASE) 50 mcg/actuation nasal spray 2 Sprays by Both Nostrils route every evening.      gabapentin (NEURONTIN) 800 mg tablet TAKE ONE TABLET BY MOUTH THREE TIMES DAILY  Qty: 90 Tab, Refills: 11    Associated Diagnoses: Polyneuropathy associated with underlying disease ([email or web address]      Blood-Glucose Meter monitoring kit Check blood sugar daily. May substitute for insurance preferred meter  Qty: 1 Kit, Refills: 0    Associated Diagnoses: Type 2 diabetes mellitus with diabetic neuropathy, without long-term current use of insulin ([facility])      tiotropium (SPIRIVA WITH HANDIHALER) 18 mcg inhalation capsule INHALE THE CONTENTS OF 1 CAPSULE THROUGH HANDIHALER DEVICE DAILY  Qty: 90 Cap, Refills: 3    Associated Diagnoses: Chronic obstructive pulmonary disease, unspecified COPD type ([facility])      metFORMIN (GLUCOPHAGE) 1,000 mg tablet TAKE 1 TABLET BY MOUTH TWICE DAILY WITH MEALS  Indications: PREVENTION OF TYPE 2 DIABETES MELLITUS  Qty: 180 Tab, Refills: 3    Associated Diagnoses: Type 2 diabetes mellitus without complication, unspecified long term insulin use status ([facility])      budesonide-formoterol (SYMBICORT) 160-4.5 mcg/actuation HFA inhaler Take 1 Puff by inhalation two (2) times a day. Qty: 3 Inhaler, Refills: 3    Associated Diagnoses: Chronic obstructive pulmonary disease with acute exacerbation (HCC)      albuterol (PROVENTIL VENTOLIN) 2.5 mg /3 mL (0.083 %) nebulizer solution 3 mL by Nebulization route every four (4) hours as needed for Wheezing.   Qty: 1 Package, Refills: 5    Associated Diagnoses: SOB (shortness of breath) FOLIC ACID/MULTIVIT-MIN/LUTEIN (CENTRUM SILVER PO) Take 1 Tab by mouth daily. Takes one po daily. albuterol (VENTOLIN HFA) 90 mcg/actuation inhaler Take 2 Puffs by inhalation every six (6) hours as needed for Wheezing. Qty: 1 Inhaler, Refills: 11      Azelastine (ASTEPRO) 0.15 % (205.5 mcg) nasal spray 1 [place] by Both Nostrils route daily. cod liver oil cap Take 1 Cap by mouth daily. STOP taking these medications       ondansetron hcl (ZOFRAN, AS HYDROCHLORIDE,) 4 mg tablet Comments:   Reason for Stopping:         furosemide (LASIX) 40 mg tablet Comments:   Reason for Stopping:               My Recommended Diet, Activity, Wound Care, and follow-up labs are listed in the patient's Discharge Insturctions which I have personally completed and reviewed.     _______________________________________________________________________  DISPOSITION:    Home with Family: x   Home with HH/PT/OT/RN:    SNF/LTC:    CLARENCE:    OTHER:        Condition at Discharge:  Stable  _______________________________________________________________________  Follow up with:   PCP : Nicole Samayoa NP  Follow-up Information     Follow up With Details Comments 500 Saranya Bowling NP   [address] [place] [address]      Christa Zamora MD In 1 week  [address]  [address] [facility]  [address] [facility]      Odette Rodriguez MD In 3 weeks  [address] [address] [address]  [address] [facility]      Cecy Acevedo MD In 1 week  [address]  [address]                 Total time in minutes spent coordinating this discharge (includes going over instructions, follow-up, prescriptions, and preparing report for sign off to her PCP) :  35 minutes    Signed:  Jae Connors MD

## 2017-09-15 NOTE — PROGRESS NOTES
F/U for painful umbilical hernia  Diarrhea  Volume depletion/ pre renal azotemia    [place] [facility]     S: Ms. Murphy Patel was seen by me today during rounds. At this time, she is resting +un comfortably. The patient has no new complaints today. Please see admission consult for details of ROS; there are no changes today. Abdominal pain persists. 3 loose stools with some chunks yesterday. O: Blood pressure 97/58, pulse 74, temperature 98.5 °F (36.9 °C), resp. rate 16, height 5' 6\" (1.676 m), weight 69.9 kg (154 lb 1.6 oz), SpO2 92 %. Gen: Patient is in no acute distress. There is no jaundice. Lungs: Clear to auscultation bilaterally . Abd: Soft, + tender at umbilical hernia. Otherwise non tender, non-distended, bowel sounds present. Extremities: Warm. Lab Results   Component Value Date/Time    WBC 6.4 09/14/2017 04:56 AM    Hemoglobin (POC) 11.2 07/20/2017 09:02 AM    HGB 11.0 09/14/2017 04:56 AM    HCT 33.2 09/14/2017 04:56 AM    PLATELET 086 97/56/9196 04:56 AM    MCV 82.6 09/14/2017 04:56 AM     Lab Results   Component Value Date/Time    Sodium 136 09/14/2017 04:56 AM    Potassium 4.6 09/14/2017 04:56 AM    Chloride 106 09/14/2017 04:56 AM    CO2 24 09/14/2017 04:56 AM    Anion gap 6 09/14/2017 04:56 AM    Glucose 81 09/14/2017 04:56 AM    BUN 15 09/14/2017 04:56 AM    Creatinine 1.76 09/14/2017 04:56 AM    BUN/Creatinine ratio 9 09/14/2017 04:56 AM    GFR est AA 35 09/14/2017 04:56 AM    GFR est non-AA 29 09/14/2017 04:56 AM    Calcium 8.3 09/14/2017 04:56 AM    Bilirubin, total 0.5 09/13/2017 01:35 PM    AST (SGOT) 122 09/13/2017 01:35 PM    Alk.  phosphatase 231 09/13/2017 01:35 PM    Protein, total 8.8 09/13/2017 01:35 PM    Albumin 2.9 09/13/2017 01:35 PM    Globulin 5.9 09/13/2017 01:35 PM    A-G Ratio 0.5 09/13/2017 01:35 PM    ALT (SGPT) 64 09/13/2017 01:35 PM     Stool wbc 0 to 4  o and p pending   Stool culture pending     A: Active Problems:    [facility] renal [facility] ([facility]) [facility] (9/13/2017)     diarrhea  Tender umbilical hernia  Anticoagulants  Anti platelet agents    Comment:  She is stable. We do not have labs today  P: continue questran. If no improvement give her pepto bismol tomorrow and stop Erica. At some point she needs egd colonoscopy off plavix and eliquis if they can be safely stopped. Call partner(s) to see this weekend 9.16 and 9.17.     Thanks    Chris Edwards MD  8:07 AM  9/15/2017

## 2017-09-15 NOTE — PROGRESS NOTES
[address]            9/15/2017      RE: Ting Vital (YOB: 1950)    To Whom it May Concern: This is to certify that Ting Vital was admitted to [facility] from 9/13/2017 to 9/15/2017. She is advised to rest for a few days and may return to work on 9/18/2017 with no new restrictions. Please feel free to contact my office if you have any questions or concerns. Thank you for your assistance in this matter.         Mena Ruffin MD  [phone] office

## 2017-09-15 NOTE — PROGRESS NOTES
PCP f/u with Dr. Ian Hyman is scheduled for Sept 20 at 7;45am    A f/u with Dr. Osker Bloch is scheduled for Oct 5 at 10;00am    A f/u with Dr Dwain Mendez is scheduled for Oct 3 at 11;15am    A f/u with Dr. Marek Kim is scheduled for Oct 5 at 2;45pm

## 2017-09-18 LAB
O+P SPEC MICRO: NORMAL
O+P STL CONC: NORMAL
SPECIMEN SOURCE: NORMAL

## 2017-09-20 ENCOUNTER — HOSPITAL ENCOUNTER (EMERGENCY)
Age: 67
Discharge: HOME | End: 2017-09-20
Attending: EMERGENCY MEDICINE
Payer: COMMERCIAL

## 2017-09-20 ENCOUNTER — APPOINTMENT (OUTPATIENT)
Dept: GENERAL RADIOLOGY | Age: 67
End: 2017-09-20
Attending: EMERGENCY MEDICINE
Payer: COMMERCIAL

## 2017-09-20 VITALS
RESPIRATION RATE: 21 BRPM | HEART RATE: 76 BPM | OXYGEN SATURATION: 96 % | BODY MASS INDEX: 24.75 KG/M2 | WEIGHT: 154 LBS | HEIGHT: 66 IN | SYSTOLIC BLOOD PRESSURE: 115 MMHG | TEMPERATURE: 97.6 F | DIASTOLIC BLOOD PRESSURE: 66 MMHG

## 2017-09-20 DIAGNOSIS — J18.9 CAP (COMMUNITY ACQUIRED PNEUMONIA): Primary | ICD-10-CM

## 2017-09-20 LAB
ALBUMIN SERPL-MCNC: 2.5 G/DL (ref 3.5–5)
ALBUMIN/GLOB SERPL: 0.4 {RATIO} (ref 1.1–2.2)
ALP SERPL-CCNC: 308 U/L (ref 45–117)
ALT SERPL-CCNC: 62 U/L (ref 12–78)
ANION GAP SERPL CALC-SCNC: 13 MMOL/L (ref 5–15)
AST SERPL-CCNC: 145 U/L (ref 15–37)
ATRIAL RATE: 76 BPM
BASOPHILS # BLD: 0 K/UL (ref 0–0.1)
BASOPHILS NFR BLD: 0 % (ref 0–1)
BILIRUB SERPL-MCNC: 0.3 MG/DL (ref 0.2–1)
BNP SERPL-MCNC: 1225 PG/ML (ref 0–125)
BUN SERPL-MCNC: 22 MG/DL (ref 6–20)
BUN/CREAT SERPL: 12 (ref 12–20)
CALCIUM SERPL-MCNC: 7.9 MG/DL (ref 8.5–10.1)
CALCULATED P AXIS, ECG09: 81 DEGREES
CALCULATED R AXIS, ECG10: -27 DEGREES
CALCULATED T AXIS, ECG11: 111 DEGREES
CHLORIDE SERPL-SCNC: 103 MMOL/L (ref 97–108)
CK MB CFR SERPL CALC: 1.8 % (ref 0–2.5)
CK MB SERPL-MCNC: 2 NG/ML (ref 5–25)
CK SERPL-CCNC: 114 U/L (ref 26–192)
CO2 SERPL-SCNC: 20 MMOL/L (ref 21–32)
CREAT SERPL-MCNC: 1.77 MG/DL (ref 0.55–1.02)
DIAGNOSIS, 93000: NORMAL
EOSINOPHIL # BLD: 0.1 K/UL (ref 0–0.4)
EOSINOPHIL NFR BLD: 1 % (ref 0–7)
ERYTHROCYTE [DISTWIDTH] IN BLOOD BY AUTOMATED COUNT: 16.6 % (ref 11.5–14.5)
GLOBULIN SER CALC-MCNC: 5.6 G/DL (ref 2–4)
GLUCOSE SERPL-MCNC: 104 MG/DL (ref 65–100)
HCT VFR BLD AUTO: 31.2 % (ref 35–47)
HGB BLD-MCNC: 10.4 G/DL (ref 11.5–16)
LYMPHOCYTES # BLD: 1.3 K/UL (ref 0.8–3.5)
LYMPHOCYTES NFR BLD: 17 % (ref 12–49)
MCH RBC QN AUTO: 27.6 PG (ref 26–34)
MCHC RBC AUTO-ENTMCNC: 33.3 G/DL (ref 30–36.5)
MCV RBC AUTO: 82.8 FL (ref 80–99)
MONOCYTES # BLD: 1.1 K/UL (ref 0–1)
MONOCYTES NFR BLD: 13 % (ref 5–13)
NEUTS SEG # BLD: 5.5 K/UL (ref 1.8–8)
NEUTS SEG NFR BLD: 69 % (ref 32–75)
P-R INTERVAL, ECG05: 244 MS
PLATELET # BLD AUTO: 258 K/UL (ref 150–400)
POTASSIUM SERPL-SCNC: 3.7 MMOL/L (ref 3.5–5.1)
PROT SERPL-MCNC: 8.1 G/DL (ref 6.4–8.2)
Q-T INTERVAL, ECG07: 460 MS
QRS DURATION, ECG06: 144 MS
QTC CALCULATION (BEZET), ECG08: 517 MS
RBC # BLD AUTO: 3.77 M/UL (ref 3.8–5.2)
SODIUM SERPL-SCNC: 136 MMOL/L (ref 136–145)
TROPONIN I SERPL-MCNC: <0.04 NG/ML
VENTRICULAR RATE, ECG03: 76 BPM
WBC # BLD AUTO: 8 K/UL (ref 3.6–11)

## 2017-09-20 PROCEDURE — 83880 ASSAY OF NATRIURETIC PEPTIDE: CPT | Performed by: EMERGENCY MEDICINE

## 2017-09-20 PROCEDURE — 84484 ASSAY OF TROPONIN QUANT: CPT | Performed by: EMERGENCY MEDICINE

## 2017-09-20 PROCEDURE — 99285 EMERGENCY DEPT VISIT HI MDM: CPT

## 2017-09-20 PROCEDURE — 71020 XR CHEST PA LAT: CPT

## 2017-09-20 PROCEDURE — 36415 COLL VENOUS BLD VENIPUNCTURE: CPT | Performed by: EMERGENCY MEDICINE

## 2017-09-20 PROCEDURE — 94762 N-INVAS EAR/PLS OXIMTRY CONT: CPT

## 2017-09-20 PROCEDURE — 80053 COMPREHEN METABOLIC PANEL: CPT | Performed by: EMERGENCY MEDICINE

## 2017-09-20 PROCEDURE — 93005 ELECTROCARDIOGRAM TRACING: CPT

## 2017-09-20 PROCEDURE — 74011250637 HC RX REV CODE- 250/637: Performed by: EMERGENCY MEDICINE

## 2017-09-20 PROCEDURE — 82550 ASSAY OF CK (CPK): CPT | Performed by: EMERGENCY MEDICINE

## 2017-09-20 PROCEDURE — 85025 COMPLETE CBC W/AUTO DIFF WBC: CPT | Performed by: EMERGENCY MEDICINE

## 2017-09-20 RX ORDER — SODIUM CHLORIDE 0.9 % (FLUSH) 0.9 %
5-10 SYRINGE (ML) INJECTION AS NEEDED
Status: DISCONTINUED | OUTPATIENT
Start: 2017-09-20 | End: 2017-09-20 | Stop reason: HOSPADM

## 2017-09-20 RX ORDER — SODIUM CHLORIDE 0.9 % (FLUSH) 0.9 %
5-10 SYRINGE (ML) INJECTION EVERY 8 HOURS
Status: DISCONTINUED | OUTPATIENT
Start: 2017-09-20 | End: 2017-09-20 | Stop reason: HOSPADM

## 2017-09-20 RX ORDER — DOXYCYCLINE HYCLATE 100 MG
100 TABLET ORAL
Status: COMPLETED | OUTPATIENT
Start: 2017-09-20 | End: 2017-09-20

## 2017-09-20 RX ORDER — DOXYCYCLINE HYCLATE 100 MG
100 TABLET ORAL 2 TIMES DAILY
Qty: 14 TAB | Refills: 0 | Status: SHIPPED | OUTPATIENT
Start: 2017-09-20 | End: 2017-09-27

## 2017-09-20 RX ADMIN — DOXYCYCLINE HYCLATE 100 MG: 100 TABLET, COATED ORAL at 12:18

## 2017-09-20 NOTE — LETTER
[address] 
[facility] EMERGENCY DEPT 
[address] [facility] [id number] [phone] Work/School Note Date: 9/20/2017 To Whom It May concern: 
 
Dudley Chang was seen and treated today in the emergency room by the following provider(s): 
Attending Provider: Christine Mcnamara MD.   
 
Dudley Chang may return to work on 9/25/17.  
 
Sincerely, 
 
 
 
 
Christine Mcnamara MD

## 2017-09-20 NOTE — ED NOTES
Ambulated patient down ED hallway. Patient ambulated with a slow but steady gait.  Patients oxygen saturation remained between 94-97% on RA and pulse remained 75BPM.

## 2017-09-20 NOTE — DISCHARGE INSTRUCTIONS
Pneumonia: Care Instructions  Your Care Instructions    Pneumonia is an infection of the lungs. Most cases are caused by infections from bacteria or viruses. Pneumonia may be mild or very severe. If it is caused by bacteria, you will be treated with antibiotics. It may take a few weeks to a few months to recover fully from pneumonia, depending on how sick you were and whether your overall health is good. Follow-up care is a key part of your treatment and safety. Be sure to make and go to all appointments, and call your doctor if you are having problems. Its also a good idea to know your test results and keep a list of the medicines you take. How can you care for yourself at home? · Take your antibiotics exactly as directed. Do not stop taking the medicine just because you are feeling better. You need to take the full course of antibiotics. · Take your medicines exactly as prescribed. Call your doctor if you think you are having a problem with your medicine. · Get plenty of rest and sleep. You may feel weak and tired for a while, but your energy level will improve with time. · To prevent dehydration, drink plenty of fluids, enough so that your urine is light yellow or clear like water. Choose water and other caffeine-free clear liquids until you feel better. If you have kidney, heart, or liver disease and have to limit fluids, talk with your doctor before you increase the amount of fluids you drink. · Take care of your cough so you can rest. A cough that brings up mucus from your lungs is common with pneumonia. It is one way your body gets rid of the infection. But if coughing keeps you from resting or causes severe fatigue and chest-wall pain, talk to your doctor. He or she may suggest that you take a medicine to reduce the cough. · Use a vaporizer or humidifier to add moisture to your bedroom. Follow the directions for cleaning the machine. · Do not smoke or allow others to smoke around you.  Smoke will make your cough last longer. If you need help quitting, talk to your doctor about stop-smoking programs and medicines. These can increase your chances of quitting for good. · Take an over-the-counter pain medicine, such as acetaminophen (Tylenol), ibuprofen (Advil, Motrin), or naproxen (Aleve). Read and follow all instructions on the label. · Do not take two or more pain medicines at the same time unless the doctor told you to. Many pain medicines have acetaminophen, which is Tylenol. Too much acetaminophen (Tylenol) can be harmful. · If you were given a spirometer to measure how well your lungs are working, use it as instructed. This can help your doctor tell how your recovery is going. · To prevent pneumonia in the future, talk to your doctor about getting a flu vaccine (once a year) and a pneumococcal vaccine (one time only for most people). When should you call for help? Call 911 anytime you think you may need emergency care. For example, call if:  · You have severe trouble breathing. Call your doctor now or seek immediate medical care if:  · You cough up dark brown or bloody mucus (sputum). · You have new or worse trouble breathing. · You are dizzy or lightheaded, or you feel like you may faint. Watch closely for changes in your health, and be sure to contact your doctor if:  · You have a new or higher fever. · You are coughing more deeply or more often. · You are not getting better after 2 days (48 hours). · You do not get better as expected. Where can you learn more? Go to http://rusty-marcela.info/. Enter [id number] in the search box to learn more about \"Pneumonia: Care Instructions. \"  Current as of: March 25, 2017  Content Version: 11.3  © 0240-3372 [employer]. Care instructions adapted under license by [employer] (which disclaims liability or warranty for this information).  If you have questions about a medical condition or this instruction, always ask your healthcare professional. [place] [address] any warranty or liability for your use of this information.

## 2017-09-20 NOTE — ED PROVIDER NOTES
HPI Comments: Brian Hicks is a 78 y.o. female with hx of HTN, A-fib, CAD, CHF, DM, and COPD who presents via EMS to ED [facility] ED with CC of SOB x ~3-4 days, becoming progressively worse this morning. She also reports chest pain and tightness with exertion since onset of her symptoms, which she states improves while at rest. Pt reports orthopnea, and states her SOB is exacerbated by ambulation, specifically noting she had difficulty walking a short distance from her office to the hallway while at work today. She also c/o mild B/L foot pain and swelling since onset of her symptoms. Pt reports she was admitted to ED [facility] (9/13/17)-(9/15/17) for acute renal failure, during which she had her prescribed Lasix discontinued; pt states she began taking Lasix again ~2 days ago, with some improvement in her leg swelling. She also reports hx of neuropathy, for which she takes Gabapentin 800 mg TID. Pt reports hx of CHF, noting she had EF ~21% in (5/2017) and had to wear LifeVest, which she has since stopped wearing, as her EF has improved to ~60%. Pt also states she has been having diarrhea x \"3 months,\" noting she was scheduled for follow up with GI today. She specifically denies nausea, vomiting, and diaphoresis. PCP: Richelle Montiel NP  GI: Manjinder Avina MD    There are no other complaints, changes, or physical findings at this time. The history is provided by the patient.         Past Medical History:   Diagnosis Date    Atrial fibrillation ([address] 6/2/2010    CAD (coronary artery disease)     stent    Chronic diastolic heart failure ([address] 9/22/2014    Chronic systolic HF (heart failure) ([address] 5/10/2017    4/2017 EF 25-30%    COPD     COPD (chronic obstructive pulmonary disease) ([address] 6/2/2010    Diabetes ([address]     Fibroid     Heart failure ([address]     History of Clostridium difficile infection 5/10/2017    4/2017 CDiff positive    Hypertension 6/2/2010    Hypotension 5/10/2017    Junctional tachycardia ([address] 5/10/2017    NIDDM (non-insulin dependent diabetes mellitus) 2010    Screening mammogram 5/4/10    SOB (shortness of breath) 2014       Past Surgical History:   Procedure Laterality Date    HX  SECTION      HX OTHER SURGICAL      adrenal gland removed    HX PACEMAKER      PA EXCISE ADRENAL GLAND           Family History:   Problem Relation Age of Onset    Heart Disease Mother     Diabetes Father     Heart Disease Brother        Social History     Social History    Marital status:      Spouse name: N/A    Number of children: N/A    Years of education: N/A     Occupational History    Not on file. Social History Main Topics    Smoking status: Former Smoker     Types: Cigarettes     Quit date: 2009    Smokeless tobacco: Never Used    Alcohol use No    Drug use: No    Sexual activity: Not Currently     Other Topics Concern    Not on file     Social History Narrative         ALLERGIES: Crestor [rosuvastatin]; Levaquin [levofloxacin]; Lipitor [atorvastatin]; and Lyrica [pregabalin]    Review of Systems   Constitutional: Negative for chills, diaphoresis, fever and unexpected weight change. HENT: Negative for rhinorrhea and sore throat. Eyes: Negative for pain. Respiratory: Positive for chest tightness (with exertion) and shortness of breath. Negative for wheezing and stridor. +orthopnea   Cardiovascular: Positive for chest pain (with exertion) and leg swelling (mild; B/L). Gastrointestinal: Negative for abdominal pain, blood in stool, nausea and vomiting. Genitourinary: Negative for difficulty urinating, dysuria and flank pain. Musculoskeletal: Positive for myalgias (B/L feet). Negative for back pain and neck stiffness. Skin: Negative for rash. Neurological: Negative for seizures, syncope, weakness, light-headedness and headaches. Psychiatric/Behavioral: Negative for confusion.        Patient Vitals for the past 12 hrs:   Temp Pulse Resp BP SpO2 09/20/17 1249 - 76 21 115/66 96 %   09/20/17 1130 - 76 17 98/72 97 %   09/20/17 1100 - 76 23 99/53 97 %   09/20/17 0907 97.6 °F (36.4 °C) 75 16 105/60 96 %            Physical Exam   Constitutional: She is oriented to person, place, and time. She appears well-developed and well-nourished. No distress. HENT:   Nose: Nose normal.   Mouth/Throat: Oropharynx is clear and moist. No oropharyngeal exudate. Eyes: Conjunctivae and EOM are normal. Pupils are equal, round, and reactive to light. Right eye exhibits no discharge. Left eye exhibits no discharge. No scleral icterus. Neck: Normal range of motion. Neck supple. No JVD present. Cardiovascular: Normal rate, regular rhythm, normal heart sounds and intact distal pulses. No murmur heard. Pulmonary/Chest: Effort normal. No stridor. No respiratory distress. She has no wheezes. She has rales (trace; bibasilar). Abdominal: Soft. Bowel sounds are normal. She exhibits no distension. There is no tenderness. There is no rebound and no guarding. Musculoskeletal: She exhibits no tenderness. Trace pitting edema B/L lower extremities   Neurological: She is alert and oriented to person, place, and time. Skin: Skin is warm and dry. No rash noted. She is not diaphoretic. Psychiatric: She has a normal mood and affect. Nursing note and vitals reviewed. MDM  Number of Diagnoses or Management Options  CAP (community acquired pneumonia):   Diagnosis management comments: SOB secondary to CAP. Pt non-toxic, afebrile, with normal WBC. SpO2 WNL, including during ambulation. No evidence of fluid overload, although pt already on diuretics. Symptoms developed prior to her hospitalist, therefore do not feel she requires coverage for HCAP. Stable for discharge.        Amount and/or Complexity of Data Reviewed  Clinical lab tests: ordered and reviewed  Tests in the radiology section of CPT®: ordered and reviewed  Tests in the medicine section of CPT®: ordered and reviewed  Review and summarize past medical records: yes  Discuss the patient with other providers: yes (GI)  Independent visualization of images, tracings, or specimens: yes      ED Course       Procedures     EKG interpretation: 10:13  Rhythm: sinus rhythm with 1st degree AV block and LBBB. Rate (approx.): 76; Axis: normal; VA interval: 244 ms ; QRS interval: 144 ms; ST/T wave: normal; Other findings: QTc 517 ms. 10:27 AM  Pt re-evaluated, reports she has had cough producing a mild amount of mucus x ~2 weeks. 1:15 AM  Pt ambulated, maintained SpO2 >95%. 1:20 PM  Spoke with Dr. Eran Keating office; pt's appointment rescheduled for ~1245 on (9/29/17). LABORATORY TESTS:  Recent Results (from the past 12 hour(s))   CBC WITH AUTOMATED DIFF    Collection Time: 09/20/17 10:02 AM   Result Value Ref Range    WBC 8.0 3.6 - 11.0 K/uL    RBC 3.77 (L) 3.80 - 5.20 M/uL    HGB 10.4 (L) 11.5 - 16.0 g/dL    HCT 31.2 (L) 35.0 - 47.0 %    MCV 82.8 80.0 - 99.0 FL    MCH 27.6 26.0 - 34.0 PG    MCHC 33.3 30.0 - 36.5 g/dL    RDW 16.6 (H) 11.5 - 14.5 %    PLATELET 580 430 - 819 K/uL    NEUTROPHILS 69 32 - 75 %    LYMPHOCYTES 17 12 - 49 %    MONOCYTES 13 5 - 13 %    EOSINOPHILS 1 0 - 7 %    BASOPHILS 0 0 - 1 %    ABS. NEUTROPHILS 5.5 1.8 - 8.0 K/UL    ABS. LYMPHOCYTES 1.3 0.8 - 3.5 K/UL    ABS. MONOCYTES 1.1 (H) 0.0 - 1.0 K/UL    ABS. EOSINOPHILS 0.1 0.0 - 0.4 K/UL    ABS.  BASOPHILS 0.0 0.0 - 0.1 K/UL   CK W/ CKMB & INDEX    Collection Time: 09/20/17 10:02 AM   Result Value Ref Range     26 - 192 U/L    CK - MB 2.0 <3.6 NG/ML    CK-MB Index 1.8 0 - 2.5     METABOLIC PANEL, COMPREHENSIVE    Collection Time: 09/20/17 10:02 AM   Result Value Ref Range    Sodium 136 136 - 145 mmol/L    Potassium 3.7 3.5 - 5.1 mmol/L    Chloride 103 97 - 108 mmol/L    CO2 20 (L) 21 - 32 mmol/L    Anion gap 13 5 - 15 mmol/L    Glucose 104 (H) 65 - 100 mg/dL    BUN 22 (H) 6 - 20 MG/DL    Creatinine 1.77 (H) 0.55 - 1.02 MG/DL    BUN/Creatinine ratio 12 12 - 20      GFR est AA 35 (L) >60 ml/min/1.73m2    GFR est non-AA 29 (L) >60 ml/min/1.73m2    Calcium 7.9 (L) 8.5 - 10.1 MG/DL    Bilirubin, total 0.3 0.2 - 1.0 MG/DL    ALT (SGPT) 62 12 - 78 U/L    AST (SGOT) 145 (H) 15 - 37 U/L    Alk. phosphatase 308 (H) 45 - 117 U/L    Protein, total 8.1 6.4 - 8.2 g/dL    Albumin 2.5 (L) 3.5 - 5.0 g/dL    Globulin 5.6 (H) 2.0 - 4.0 g/dL    A-G Ratio 0.4 (L) 1.1 - 2.2     NT-PRO BNP    Collection Time: 09/20/17 10:02 AM   Result Value Ref Range    NT pro-BNP 1225 (H) 0 - 125 PG/ML   TROPONIN I    Collection Time: 09/20/17 10:02 AM   Result Value Ref Range    Troponin-I, Qt. <0.04 <0.05 ng/mL   EKG, 12 LEAD, INITIAL    Collection Time: 09/20/17 10:13 AM   Result Value Ref Range    Ventricular Rate 76 BPM    Atrial Rate 76 BPM    P-R Interval 244 ms    QRS Duration 144 ms    Q-T Interval 460 ms    QTC Calculation (Bezet) 517 ms    Calculated P Axis 81 degrees    Calculated R Axis -27 degrees    Calculated T Axis 111 degrees    Diagnosis       Sinus rhythm with 1st degree AV block  Left bundle branch block  When compared with ECG of 19-JUL-2017 12:16,  Sinus rhythm has replaced Electronic ventricular pacemaker         IMAGING RESULTS:  CXR Results  (Last 48 hours)               09/20/17 0947  XR CHEST PA LAT Final result    Impression:  Impression: Left lower lobe atelectasis or minimal infiltrate. Narrative:  Exam:  2 view chest       Indication: Shortness of breath       Comparison to 5/15/2017. PA and lateral views demonstrate normal heart size. Pacer leads are unchanged in   position. Left lower lobe atelectasis or minimal infiltrate is noted. The   osseous structures are unremarkable.                  MEDICATIONS GIVEN:  Medications   sodium chloride (NS) flush 5-10 mL (not administered)   sodium chloride (NS) flush 5-10 mL (not administered)   doxycycline (VIBRA-TABS) tablet 100 mg (100 mg Oral Given 9/20/17 1218)       IMPRESSION:  1. CAP (community acquired pneumonia)        PLAN:  1. Discharge Medication List as of 9/20/2017 12:39 PM      START taking these medications    Details   doxycycline (VIBRA-TABS) 100 mg tablet Take 1 Tab by mouth two (2) times a day for 7 days. , Normal, Disp-14 Tab, R-0         CONTINUE these medications which have NOT CHANGED    Details   cholestyramine (QUESTRAN) 4 gram packet Take 1 Packet by mouth two (2) times daily (with meals). , Print, Disp-60 Packet, R-1      colchicine 0.6 mg tablet TAKE ONE TABLET BY MOUTH TWICE DAILY, Normal, Disp-60 Tab, R-5      amiodarone (PACERONE) 100 mg tablet Take 1 Tab by mouth daily. Dose reduced 7/31/17, Normal, Disp-90 Tab, R-3      apixaban (ELIQUIS) 5 mg tablet Take 1 Tab by mouth two (2) times a day. Indications: PREVENT THROMBOEMBOLISM IN CHRONIC ATRIAL FIBRILLATION, Normal, Disp-180 Tab, R-3      clopidogrel (PLAVIX) 75 mg tab Take 1 Tab by mouth daily. , Normal, Disp-30 Tab, R-11      carvedilol (COREG) 6.25 mg tablet Take 1 Tab by mouth two (2) times daily (with meals). , Normal, Disp-60 Tab, R-11      Lancets misc Check blood sugar 1 times daily. May substitute for insurance preferred lancets., Normal, Disp-50 Each, R-11      glucose blood VI test strips (BLOOD GLUCOSE TEST) strip Check blood sugar 1 times daily: E11.9, may substitute for insurance preferred strips., Normal, Disp-50 Strip, R-11      pravastatin (PRAVACHOL) 20 mg tablet Take 1 Tab by mouth nightly., Normal, Disp-90 Tab, R-3      fluticasone (FLONASE) 50 mcg/actuation nasal spray 2 Sprays by Both Nostrils route every evening., Historical Med      gabapentin (NEURONTIN) 800 mg tablet TAKE ONE TABLET BY MOUTH THREE TIMES DAILY, Normal, Disp-90 Tab, R-11      Blood-Glucose Meter monitoring kit Check blood sugar daily.  May substitute for insurance preferred meter, Normal, Disp-1 Kit, R-0      tiotropium (SPIRIVA WITH HANDIHALER) 18 mcg inhalation capsule INHALE THE CONTENTS OF 1 CAPSULE THROUGH HANDIHALER DEVICE DAILY, Normal, Disp-90 Cap, R-3      metFORMIN (GLUCOPHAGE) 1,000 mg tablet TAKE 1 TABLET BY MOUTH TWICE DAILY WITH MEALS  Indications: PREVENTION OF TYPE 2 DIABETES MELLITUS, Normal, Disp-180 Tab, R-3      budesonide-formoterol (SYMBICORT) 160-4.5 mcg/actuation HFA inhaler Take 1 Puff by inhalation two (2) times a day., Normal, Disp-3 Inhaler, R-3      albuterol (PROVENTIL VENTOLIN) 2.5 mg /3 mL (0.083 %) nebulizer solution 3 mL by Nebulization route every four (4) hours as needed for Wheezing., Normal, Disp-1 Package, R-5      albuterol (VENTOLIN HFA) 90 mcg/actuation inhaler Take 2 Puffs by inhalation every six (6) hours as needed for Wheezing., Normal, Disp-1 Inhaler, R-11      Azelastine (ASTEPRO) 0.15 % (205.5 mcg) nasal spray 1 [place] by Both Nostrils route daily. , Historical Med      cod liver oil cap Take 1 Cap by mouth daily. , Historical Med         STOP taking these medications       FOLIC ACID/MULTIVIT-MIN/LUTEIN (CENTRUM SILVER PO) Comments:   Reason for Stoppin.   Follow-up Information     Follow up With Details Comments Contact Info    Maria Luz Cheung NP Call in 1 day  [facility] [id number] [phone]      [facility] EMERGENCY DEPT  As needed, If symptoms worsen [address] [place] [address]  [phone]        Return to ED if worse     DISCHARGE NOTE:  1:10 PM  The patient is ready for discharge. The patients signs, symptoms, diagnosis, and instructions for discharge have been discussed and the pt has conveyed their understanding. The patient is to follow up as recommended or return to the ER should their symptoms worsen. Plan has been discussed and patient has conveyed their agreement. This note is prepared by Cheryl Madera, acting as Scribe for Jamin Mccarthy MD.    Jamin Mccarthy MD: The scribe's documentation has been prepared under my direction and personally reviewed by me in its entirety.  I confirm that the note above accurately reflects all work, treatment, procedures, and medical decision making performed by me.

## 2017-09-20 NOTE — ED TRIAGE NOTES
Triage Note: Per , patient was on her way in from work and began having SOB and chest pain. Patient reports that she was taken off her lasix due to kidney failure and started back on lasix on Monday for swollen feet. No swelling noted to patients feet. Patient was hypotensive (104/64) and oxygen saturations remained 97-98% on room air for EMS. Patient reporting that she also has diarrhea.

## 2017-09-21 ENCOUNTER — TELEPHONE (OUTPATIENT)
Dept: INTERNAL MEDICINE CLINIC | Age: 67
End: 2017-09-21

## 2017-09-21 NOTE — TELEPHONE ENCOUNTER
Patient called to let you know she had to go to [facility] ED. She was told she has Pneumonia. Wanted to let you know. She is on antibiotic.

## 2017-09-22 ENCOUNTER — APPOINTMENT (OUTPATIENT)
Dept: CT IMAGING | Age: 67
DRG: 394 | End: 2017-09-22
Attending: EMERGENCY MEDICINE
Payer: COMMERCIAL

## 2017-09-22 ENCOUNTER — HOSPITAL ENCOUNTER (INPATIENT)
Age: 67
LOS: 5 days | Discharge: HOME HEALTH | DRG: 394 | End: 2017-09-27
Attending: EMERGENCY MEDICINE | Admitting: INTERNAL MEDICINE
Payer: COMMERCIAL

## 2017-09-22 ENCOUNTER — APPOINTMENT (OUTPATIENT)
Dept: GENERAL RADIOLOGY | Age: 67
DRG: 394 | End: 2017-09-22
Attending: INTERNAL MEDICINE
Payer: COMMERCIAL

## 2017-09-22 DIAGNOSIS — I95.9 HYPOTENSION, UNSPECIFIED HYPOTENSION TYPE: ICD-10-CM

## 2017-09-22 DIAGNOSIS — J44.9 CHRONIC OBSTRUCTIVE PULMONARY DISEASE, UNSPECIFIED COPD TYPE (HCC): ICD-10-CM

## 2017-09-22 DIAGNOSIS — K62.5 RECTAL BLEEDING: ICD-10-CM

## 2017-09-22 DIAGNOSIS — R19.7 DIARRHEA, UNSPECIFIED TYPE: Primary | ICD-10-CM

## 2017-09-22 DIAGNOSIS — K42.9 UMBILICAL HERNIA WITHOUT OBSTRUCTION AND WITHOUT GANGRENE: ICD-10-CM

## 2017-09-22 PROBLEM — N18.30 CKD (CHRONIC KIDNEY DISEASE) STAGE 3, GFR 30-59 ML/MIN (HCC): Status: ACTIVE | Noted: 2017-09-22

## 2017-09-22 LAB
ALBUMIN SERPL-MCNC: 2.7 G/DL (ref 3.5–5)
ALBUMIN/GLOB SERPL: 0.4 {RATIO} (ref 1.1–2.2)
ALP SERPL-CCNC: 302 U/L (ref 45–117)
ALT SERPL-CCNC: 61 U/L (ref 12–78)
ANION GAP SERPL CALC-SCNC: 9 MMOL/L (ref 5–15)
AST SERPL-CCNC: 140 U/L (ref 15–37)
BASOPHILS # BLD: 0 K/UL (ref 0–0.1)
BASOPHILS NFR BLD: 0 % (ref 0–1)
BILIRUB SERPL-MCNC: 0.6 MG/DL (ref 0.2–1)
BUN SERPL-MCNC: 19 MG/DL (ref 6–20)
BUN/CREAT SERPL: 13 (ref 12–20)
C DIFF TOX GENS STL QL NAA+PROBE: NEGATIVE
CALCIUM SERPL-MCNC: 8.4 MG/DL (ref 8.5–10.1)
CHLORIDE SERPL-SCNC: 106 MMOL/L (ref 97–108)
CO2 SERPL-SCNC: 22 MMOL/L (ref 21–32)
CREAT SERPL-MCNC: 1.44 MG/DL (ref 0.55–1.02)
EOSINOPHIL # BLD: 0.1 K/UL (ref 0–0.4)
EOSINOPHIL NFR BLD: 1 % (ref 0–7)
ERYTHROCYTE [DISTWIDTH] IN BLOOD BY AUTOMATED COUNT: 16.4 % (ref 11.5–14.5)
ERYTHROCYTE [DISTWIDTH] IN BLOOD BY AUTOMATED COUNT: 16.5 % (ref 11.5–14.5)
ERYTHROCYTE [DISTWIDTH] IN BLOOD BY AUTOMATED COUNT: 16.5 % (ref 11.5–14.5)
GLOBULIN SER CALC-MCNC: 6.3 G/DL (ref 2–4)
GLUCOSE BLD STRIP.AUTO-MCNC: 152 MG/DL (ref 65–100)
GLUCOSE BLD STRIP.AUTO-MCNC: 82 MG/DL (ref 65–100)
GLUCOSE SERPL-MCNC: 87 MG/DL (ref 65–100)
HCT VFR BLD AUTO: 32 % (ref 35–47)
HCT VFR BLD AUTO: 33.1 % (ref 35–47)
HCT VFR BLD AUTO: 35.3 % (ref 35–47)
HCT VFR BLD AUTO: 35.5 % (ref 35–47)
HEMOCCULT STL QL: POSITIVE
HGB BLD-MCNC: 10.6 G/DL (ref 11.5–16)
HGB BLD-MCNC: 10.9 G/DL (ref 11.5–16)
HGB BLD-MCNC: 11.9 G/DL (ref 11.5–16)
HGB BLD-MCNC: 12.1 G/DL (ref 11.5–16)
LACTATE SERPL-SCNC: 1.5 MMOL/L (ref 0.4–2)
LIPASE SERPL-CCNC: 105 U/L (ref 73–393)
LYMPHOCYTES # BLD: 1.7 K/UL (ref 0.8–3.5)
LYMPHOCYTES NFR BLD: 19 % (ref 12–49)
MCH RBC QN AUTO: 27.3 PG (ref 26–34)
MCH RBC QN AUTO: 27.5 PG (ref 26–34)
MCH RBC QN AUTO: 28.3 PG (ref 26–34)
MCHC RBC AUTO-ENTMCNC: 32.9 G/DL (ref 30–36.5)
MCHC RBC AUTO-ENTMCNC: 33.5 G/DL (ref 30–36.5)
MCHC RBC AUTO-ENTMCNC: 34.3 G/DL (ref 30–36.5)
MCV RBC AUTO: 82 FL (ref 80–99)
MCV RBC AUTO: 82.7 FL (ref 80–99)
MCV RBC AUTO: 82.8 FL (ref 80–99)
MONOCYTES # BLD: 1.5 K/UL (ref 0–1)
MONOCYTES NFR BLD: 17 % (ref 5–13)
NEUTS SEG # BLD: 5.6 K/UL (ref 1.8–8)
NEUTS SEG NFR BLD: 63 % (ref 32–75)
PLATELET # BLD AUTO: 277 K/UL (ref 150–400)
PLATELET # BLD AUTO: 289 K/UL (ref 150–400)
PLATELET # BLD AUTO: 301 K/UL (ref 150–400)
POTASSIUM SERPL-SCNC: 4 MMOL/L (ref 3.5–5.1)
PROT SERPL-MCNC: 9 G/DL (ref 6.4–8.2)
RBC # BLD AUTO: 4 M/UL (ref 3.8–5.2)
RBC # BLD AUTO: 4.27 M/UL (ref 3.8–5.2)
RBC # BLD AUTO: 4.33 M/UL (ref 3.8–5.2)
SERVICE CMNT-IMP: ABNORMAL
SERVICE CMNT-IMP: NORMAL
SODIUM SERPL-SCNC: 137 MMOL/L (ref 136–145)
TROPONIN I SERPL-MCNC: <0.04 NG/ML
WBC # BLD AUTO: 7.8 K/UL (ref 3.6–11)
WBC # BLD AUTO: 8.8 K/UL (ref 3.6–11)
WBC # BLD AUTO: 8.9 K/UL (ref 3.6–11)

## 2017-09-22 PROCEDURE — C9113 INJ PANTOPRAZOLE SODIUM, VIA: HCPCS | Performed by: EMERGENCY MEDICINE

## 2017-09-22 PROCEDURE — 74011250636 HC RX REV CODE- 250/636: Performed by: INTERNAL MEDICINE

## 2017-09-22 PROCEDURE — 85018 HEMOGLOBIN: CPT | Performed by: INTERNAL MEDICINE

## 2017-09-22 PROCEDURE — 77030032490 HC SLV COMPR SCD KNE COVD -B

## 2017-09-22 PROCEDURE — 74011636320 HC RX REV CODE- 636/320: Performed by: EMERGENCY MEDICINE

## 2017-09-22 PROCEDURE — 74176 CT ABD & PELVIS W/O CONTRAST: CPT

## 2017-09-22 PROCEDURE — 82272 OCCULT BLD FECES 1-3 TESTS: CPT | Performed by: EMERGENCY MEDICINE

## 2017-09-22 PROCEDURE — 80053 COMPREHEN METABOLIC PANEL: CPT | Performed by: EMERGENCY MEDICINE

## 2017-09-22 PROCEDURE — 85027 COMPLETE CBC AUTOMATED: CPT | Performed by: EMERGENCY MEDICINE

## 2017-09-22 PROCEDURE — 82962 GLUCOSE BLOOD TEST: CPT

## 2017-09-22 PROCEDURE — 71020 XR CHEST PA LAT: CPT

## 2017-09-22 PROCEDURE — 85025 COMPLETE CBC W/AUTO DIFF WBC: CPT | Performed by: EMERGENCY MEDICINE

## 2017-09-22 PROCEDURE — 36415 COLL VENOUS BLD VENIPUNCTURE: CPT | Performed by: EMERGENCY MEDICINE

## 2017-09-22 PROCEDURE — 89055 LEUKOCYTE ASSESSMENT FECAL: CPT | Performed by: EMERGENCY MEDICINE

## 2017-09-22 PROCEDURE — 84484 ASSAY OF TROPONIN QUANT: CPT | Performed by: EMERGENCY MEDICINE

## 2017-09-22 PROCEDURE — 99285 EMERGENCY DEPT VISIT HI MDM: CPT

## 2017-09-22 PROCEDURE — 96374 THER/PROPH/DIAG INJ IV PUSH: CPT

## 2017-09-22 PROCEDURE — 65660000000 HC RM CCU STEPDOWN

## 2017-09-22 PROCEDURE — 96361 HYDRATE IV INFUSION ADD-ON: CPT

## 2017-09-22 PROCEDURE — 74011250637 HC RX REV CODE- 250/637: Performed by: INTERNAL MEDICINE

## 2017-09-22 PROCEDURE — 83690 ASSAY OF LIPASE: CPT | Performed by: EMERGENCY MEDICINE

## 2017-09-22 PROCEDURE — 87045 FECES CULTURE AEROBIC BACT: CPT | Performed by: EMERGENCY MEDICINE

## 2017-09-22 PROCEDURE — 74011250636 HC RX REV CODE- 250/636: Performed by: EMERGENCY MEDICINE

## 2017-09-22 PROCEDURE — 83605 ASSAY OF LACTIC ACID: CPT | Performed by: EMERGENCY MEDICINE

## 2017-09-22 PROCEDURE — 0DJD7ZZ INSPECTION OF LOWER INTESTINAL TRACT, VIA NATURAL OR ARTIFICIAL OPENING: ICD-10-PCS | Performed by: EMERGENCY MEDICINE

## 2017-09-22 PROCEDURE — 96375 TX/PRO/DX INJ NEW DRUG ADDON: CPT

## 2017-09-22 PROCEDURE — 87493 C DIFF AMPLIFIED PROBE: CPT | Performed by: EMERGENCY MEDICINE

## 2017-09-22 RX ORDER — CLOPIDOGREL BISULFATE 75 MG/1
75 TABLET ORAL DAILY
Status: DISCONTINUED | OUTPATIENT
Start: 2017-09-23 | End: 2017-09-27 | Stop reason: HOSPADM

## 2017-09-22 RX ORDER — MECLIZINE HCL 12.5 MG 12.5 MG/1
25 TABLET ORAL
Status: COMPLETED | OUTPATIENT
Start: 2017-09-22 | End: 2017-09-22

## 2017-09-22 RX ORDER — DEXTROSE 50 % IN WATER (D50W) INTRAVENOUS SYRINGE
12.5-25 AS NEEDED
Status: DISCONTINUED | OUTPATIENT
Start: 2017-09-22 | End: 2017-09-22 | Stop reason: SDUPTHER

## 2017-09-22 RX ORDER — CARVEDILOL 6.25 MG/1
6.25 TABLET ORAL 2 TIMES DAILY WITH MEALS
Status: DISCONTINUED | OUTPATIENT
Start: 2017-09-22 | End: 2017-09-27 | Stop reason: HOSPADM

## 2017-09-22 RX ORDER — ONDANSETRON 2 MG/ML
4 INJECTION INTRAMUSCULAR; INTRAVENOUS
Status: COMPLETED | OUTPATIENT
Start: 2017-09-22 | End: 2017-09-22

## 2017-09-22 RX ORDER — INSULIN LISPRO 100 [IU]/ML
INJECTION, SOLUTION INTRAVENOUS; SUBCUTANEOUS
Status: DISCONTINUED | OUTPATIENT
Start: 2017-09-22 | End: 2017-09-27 | Stop reason: HOSPADM

## 2017-09-22 RX ORDER — AZELASTINE 1 MG/ML
1 SPRAY, METERED NASAL DAILY
Status: DISCONTINUED | OUTPATIENT
Start: 2017-09-23 | End: 2017-09-27 | Stop reason: HOSPADM

## 2017-09-22 RX ORDER — COLCHICINE 0.6 MG/1
2.4 TABLET ORAL
COMMUNITY
End: 2017-09-27

## 2017-09-22 RX ORDER — PANTOPRAZOLE SODIUM 40 MG/10ML
40 INJECTION, POWDER, LYOPHILIZED, FOR SOLUTION INTRAVENOUS
Status: COMPLETED | OUTPATIENT
Start: 2017-09-22 | End: 2017-09-22

## 2017-09-22 RX ORDER — ACETAMINOPHEN 500 MG
500 TABLET ORAL
COMMUNITY
End: 2018-09-21

## 2017-09-22 RX ORDER — AMIODARONE HYDROCHLORIDE 200 MG/1
100 TABLET ORAL
Status: DISCONTINUED | OUTPATIENT
Start: 2017-09-22 | End: 2017-09-27 | Stop reason: HOSPADM

## 2017-09-22 RX ORDER — SODIUM CHLORIDE 0.9 % (FLUSH) 0.9 %
5-10 SYRINGE (ML) INJECTION AS NEEDED
Status: DISCONTINUED | OUTPATIENT
Start: 2017-09-22 | End: 2017-09-27 | Stop reason: HOSPADM

## 2017-09-22 RX ORDER — ALBUTEROL SULFATE 0.83 MG/ML
2.5 SOLUTION RESPIRATORY (INHALATION)
Status: DISCONTINUED | OUTPATIENT
Start: 2017-09-22 | End: 2017-09-27 | Stop reason: HOSPADM

## 2017-09-22 RX ORDER — COLCHICINE 0.6 MG/1
0.6 TABLET ORAL DAILY
Status: DISCONTINUED | OUTPATIENT
Start: 2017-09-23 | End: 2017-09-27 | Stop reason: HOSPADM

## 2017-09-22 RX ORDER — SODIUM CHLORIDE 0.9 % (FLUSH) 0.9 %
5-10 SYRINGE (ML) INJECTION EVERY 8 HOURS
Status: DISCONTINUED | OUTPATIENT
Start: 2017-09-22 | End: 2017-09-27 | Stop reason: HOSPADM

## 2017-09-22 RX ORDER — MAGNESIUM SULFATE 100 %
4 CRYSTALS MISCELLANEOUS AS NEEDED
Status: DISCONTINUED | OUTPATIENT
Start: 2017-09-22 | End: 2017-09-27 | Stop reason: HOSPADM

## 2017-09-22 RX ORDER — SODIUM CHLORIDE 9 MG/ML
50 INJECTION, SOLUTION INTRAVENOUS CONTINUOUS
Status: DISCONTINUED | OUTPATIENT
Start: 2017-09-22 | End: 2017-09-25

## 2017-09-22 RX ORDER — FUROSEMIDE 20 MG/1
20 TABLET ORAL 2 TIMES DAILY
COMMUNITY
End: 2017-09-27

## 2017-09-22 RX ORDER — CLOPIDOGREL BISULFATE 75 MG/1
75 TABLET ORAL DAILY
COMMUNITY
End: 2018-08-02 | Stop reason: SDUPTHER

## 2017-09-22 RX ORDER — AMIODARONE HYDROCHLORIDE 100 MG/1
100 TABLET ORAL
COMMUNITY
End: 2017-12-05 | Stop reason: SDUPTHER

## 2017-09-22 RX ORDER — CHOLESTYRAMINE 4 G/4.8G
4 POWDER, FOR SUSPENSION ORAL
Status: DISCONTINUED | OUTPATIENT
Start: 2017-09-22 | End: 2017-09-27 | Stop reason: HOSPADM

## 2017-09-22 RX ORDER — FLUTICASONE FUROATE AND VILANTEROL 100; 25 UG/1; UG/1
1 POWDER RESPIRATORY (INHALATION) DAILY
Status: DISCONTINUED | OUTPATIENT
Start: 2017-09-23 | End: 2017-09-27 | Stop reason: HOSPADM

## 2017-09-22 RX ORDER — DOXYCYCLINE HYCLATE 100 MG
100 TABLET ORAL
Status: DISCONTINUED | OUTPATIENT
Start: 2017-09-22 | End: 2017-09-22

## 2017-09-22 RX ORDER — DEXTROSE MONOHYDRATE 100 MG/ML
125-250 INJECTION, SOLUTION INTRAVENOUS AS NEEDED
Status: DISCONTINUED | OUTPATIENT
Start: 2017-09-22 | End: 2017-09-27 | Stop reason: HOSPADM

## 2017-09-22 RX ORDER — COLCHICINE 0.6 MG/1
1.2 TABLET ORAL DAILY
COMMUNITY
End: 2018-04-03 | Stop reason: SDUPTHER

## 2017-09-22 RX ADMIN — AMIODARONE HYDROCHLORIDE 100 MG: 200 TABLET ORAL at 21:49

## 2017-09-22 RX ADMIN — GABAPENTIN 800 MG: 300 CAPSULE ORAL at 21:49

## 2017-09-22 RX ADMIN — SODIUM CHLORIDE 500 ML: 900 INJECTION, SOLUTION INTRAVENOUS at 07:39

## 2017-09-22 RX ADMIN — ONDANSETRON 4 MG: 2 INJECTION INTRAMUSCULAR; INTRAVENOUS at 07:39

## 2017-09-22 RX ADMIN — DIATRIZOATE MEGLUMINE AND DIATRIZOATE SODIUM 30 ML: 600; 100 SOLUTION ORAL; RECTAL at 07:39

## 2017-09-22 RX ADMIN — SODIUM CHLORIDE 1000 ML: 900 INJECTION, SOLUTION INTRAVENOUS at 11:11

## 2017-09-22 RX ADMIN — MECLIZINE 25 MG: 12.5 TABLET ORAL at 09:36

## 2017-09-22 RX ADMIN — CARVEDILOL 6.25 MG: 6.25 TABLET, FILM COATED ORAL at 16:45

## 2017-09-22 RX ADMIN — Medication 10 ML: at 15:56

## 2017-09-22 RX ADMIN — PANTOPRAZOLE SODIUM 40 MG: 40 INJECTION, POWDER, FOR SOLUTION INTRAVENOUS at 07:39

## 2017-09-22 RX ADMIN — Medication 10 ML: at 21:49

## 2017-09-22 RX ADMIN — GABAPENTIN 800 MG: 300 CAPSULE ORAL at 15:59

## 2017-09-22 RX ADMIN — SODIUM CHLORIDE 50 ML/HR: 900 INJECTION, SOLUTION INTRAVENOUS at 15:50

## 2017-09-22 NOTE — PROGRESS NOTES
[address]  SHIFT NURSING NOTE    Bedside shift change report given to Cali Chavez RN (oncoming nurse) by Roxana Osler, RN (offgoing nurse). Report included the following information SBAR, Kardex, [facility], Recent Results and Cardiac Rhythm Paced. SHIFT SUMMARY: 15:30 - Primary Nurse Lennie Stock RN and Chayito Fernandez RN performed a dual skin assessment on this patient No impairment noted (scabbed areas to BLE and L ankle)  Landon score is 18    16:00 - C Diff NEGATIVE per sample sent this AM.  16:45 - OK to give scheduled Coreg per NP, who will place parameters on med. 19:00 - No further black stools or clots noted with 2 BMs patient has had since arrival, though they are still watery. Admission Date 9/22/2017   Admission Diagnosis Hypotension   Consults IP CONSULT TO HOSPITALIST  IP CONSULT TO GASTROENTEROLOGY  IP CONSULT TO CARDIOLOGY        Consults   [] PT   [] OT   [] Speech   [] Palliative      [] Hospice    [] Case Management   [x] None   Cardiac Monitoring   [x] Yes   [] No     Antibiotics   [] Yes   [x] No   GI Prophylaxis  (Ex: Protonix, Pepcid, etc,.)   [x] Yes   [] No          DVT Prophylaxis   SCDs:  Sequential Compression Device: Bilateral          Mayo stockings:         [] Medication (Ex: Lovenox, Eliquis, Brilinta, Coumadin,  Heparin, etc..)   [] Contraindicated   [] No VTE needed       Urinary Catheter             LDAs               Peripheral IV 09/22/17 Right;Posterior (Active)   Site Assessment Clean, dry, & intact 9/22/2017  3:33 PM   Phlebitis Assessment 0 9/22/2017  3:33 PM   Infiltration Assessment 0 9/22/2017  3:33 PM   Dressing Status Clean, dry, & intact 9/22/2017  3:33 PM   Dressing Type Tape;Transparent 9/22/2017  3:33 PM   Hub Color/Line Status Pink 9/22/2017  3:33 PM                      I/Os No intake or output data in the 24 hours ending 09/22/17 1611      Activity Level Activity Level:  Up with Assistance     Activity Assistance: Partial (one person)   Diet Active Orders   Diet    DIET CLEAR LIQUID      Purposeful Rounding every 1-2 hour? [x] Yes    Anthony Score  Total Score: 3   Bed Alarm (If score 3 or >)   [] Yes    [] Refused (See signed refusal form in chart)   Landon Score  Landon Score: 18       Landon Score (if score 14 or less)   [] PMT consult   [] Nutrition consult   [] Wound Care consult      []  Specialty bed         Influenza Vaccine Received Flu Vaccine for Current Season (usually Sept-March): No    Patient/Guardian Refused (Notify MD): No          Needs prior to discharge:   Home O2 required:    [] Yes   [x] No     If yes, how much O2 required? Other:    Last Bowel Movement Date: 09/22/17   Readmission Risk Assessment Tool Score High Risk            33       Total Score        3 Has Seen PCP in Last 6 Months (Yes=3, No=0)    9 IP Visits Last 12 Months (1-3=4, 4=9, >4=11)    21 Charlson Comorbidity Score (Age + Comorbid Conditions)        Criteria that do not apply:    . Living with Significant Other. Assisted Living. LTAC. SNF. or   Rehab    Patient Length of Stay (>5 days = 3)    Pt.  Coverage (Medicare=5 , Medicaid, or Self-Pay=4)       Expected Length of Stay - - -   Actual Length of Stay 0

## 2017-09-22 NOTE — ED NOTES
TRANSFER - OUT REPORT:    Verbal report given to RN Feng Henry (name) on Gutierrez Brandon  being transferred to [facility](unit) for routine progression of care       Report consisted of patients Situation, Background, Assessment and   Recommendations(SBAR). Information from the following report(s) SBAR, ED Summary and MAR was reviewed with the receiving nurse. Lines:   Peripheral IV 09/22/17 Right;Posterior (Active)   Site Assessment Clean, dry, & intact 9/22/2017  7:02 AM   Phlebitis Assessment 0 9/22/2017  7:02 AM   Infiltration Assessment 0 9/22/2017  7:02 AM   Dressing Status Clean, dry, & intact; New 9/22/2017  7:02 AM   Dressing Type Transparent 9/22/2017  7:02 AM   Hub Color/Line Status Pink 9/22/2017  7:02 AM        Opportunity for questions and clarification was provided.       Patient transported with:   Whisper

## 2017-09-22 NOTE — ED PROVIDER NOTES
HPI Comments: Ken Munguia is a 78 y.o. female with PMhx significant for DM, HTN, atrial fibrillation, COPD, heart failure, and CAD who presents via EMS to the ED with cc of persistent dark stools with clots x 7, suprapubic pain, nausea, vomiting, and lightheadedness beginning 9/21 evening. She states she is unsure the quantity of blood, but notes dark blood filled the toilet bowl. Pt reports dizziness x 1 week with numerous falls, but denies any head trauma or LOC. Per chart review, pt was seen at [facility] ED on 9/20 for SOB and chest pain and dx with PNA. She states she took her doxycycline once, but yesterday was unable to tolerate any PO and discontinued the abx; she denies having any SOB or chest pain currently. She notes hx of CDiff in 4/2017 with intermittent diarrhea, but states she was CDiff negative at her most recent ED visit. Pt states she was started on Eliquis ~1.5-2 months prior and reports bilateral feet swelling and numbness since. Pt denies any recent travel or unusual foods that could contribute to her symptoms. She denies tobacco, EtOH, and illicit drug use. She specifically denies fever, chills, tinnitus. PCP: Christiano Pagan, NP    There are no other complaints, changes or physical findings at this time. The history is provided by the patient.         Past Medical History:   Diagnosis Date    Atrial fibrillation ([address] 6/2/2010    CAD (coronary artery disease)     stent    Chronic diastolic heart failure ([address] 9/22/2014    Chronic systolic HF (heart failure) ([address] 5/10/2017    4/2017 EF 25-30%    COPD     COPD (chronic obstructive pulmonary disease) ([address] 6/2/2010    Diabetes ([address]     Fibroid     Heart failure ([address]     History of Clostridium difficile infection 5/10/2017    4/2017 CDiff positive    Hypertension 6/2/2010    Hypotension 5/10/2017    Junctional tachycardia ([address] 5/10/2017    NIDDM (non-insulin dependent diabetes mellitus) 6/2/2010    Screening mammogram 5/4/10  SOB (shortness of breath) 2014       Past Surgical History:   Procedure Laterality Date    HX  SECTION      HX OTHER SURGICAL      adrenal gland removed    HX PACEMAKER      GA EXCISE ADRENAL GLAND           Family History:   Problem Relation Age of Onset    Heart Disease Mother     Diabetes Father     Heart Disease Brother        Social History     Social History    Marital status:      Spouse name: N/A    Number of children: N/A    Years of education: N/A     Occupational History    Not on file. Social History Main Topics    Smoking status: Former Smoker     Types: Cigarettes     Quit date: 2009    Smokeless tobacco: Never Used    Alcohol use No    Drug use: No    Sexual activity: Not Currently     Other Topics Concern    Not on file     Social History Narrative         ALLERGIES: Crestor [rosuvastatin]; Levaquin [levofloxacin]; Lipitor [atorvastatin]; and Lyrica [pregabalin]    Review of Systems   Constitutional: Negative for chills and fever. HENT: Negative for congestion and tinnitus. Eyes: Negative. Respiratory: Negative for shortness of breath. Cardiovascular: Positive for leg swelling (bilateral feet). Negative for chest pain. Gastrointestinal: Positive for abdominal pain (suprapubic), blood in stool, diarrhea, nausea and vomiting. Endocrine: Negative for heat intolerance. Musculoskeletal: Negative for back pain. Skin: Negative for rash. Allergic/Immunologic: Negative for immunocompromised state. Neurological: Positive for dizziness, light-headedness and numbness (bilateral feet). Hematological: Does not bruise/bleed easily. Psychiatric/Behavioral: Negative. All other systems reviewed and are negative.     Patient Vitals for the past 12 hrs:   Pulse Resp BP SpO2   17 1100 76 16 96/51 92 %   17 0930 76 17 (!) 102/38 95 %   17 0652 - - - 96 %   17 0651 - - 127/49 -   17 0649 77 16 127/49 96 % Physical Exam   Constitutional: She is oriented to person, place, and time. She appears well-developed and well-nourished. Mild to moderate distress   HENT:   Head: Normocephalic and atraumatic. Mouth/Throat: Oropharynx is clear and moist.   Eyes: EOM are normal.   Neck: Normal range of motion. Neck supple. Cardiovascular: Normal rate, regular rhythm and normal heart sounds. Pulmonary/Chest: Effort normal and breath sounds normal. No respiratory distress. Abdominal: Soft. Bowel sounds are normal. She exhibits no mass. There is no tenderness. Musculoskeletal: Normal range of motion. She exhibits no edema. BLE non-tender, sensation intact, no edema   Neurological: She is alert and oriented to person, place, and time. Coordination normal.   Skin: Skin is warm and dry. Psychiatric: She has a normal mood and affect. Nursing note and vitals reviewed. MDM  Number of Diagnoses or Management Options  Diarrhea, unspecified type:   Hypotension, unspecified hypotension type:   Rectal bleeding:   Umbilical hernia without obstruction and without gangrene:   Diagnosis management comments: DDx: GI bleed, colitis, diverticulitis, anemia, dehydration, electrolyte abnormality, gastroenteritis       Amount and/or Complexity of Data Reviewed  Clinical lab tests: ordered and reviewed  Tests in the radiology section of CPT®: ordered and reviewed  Review and summarize past medical records: yes  Discuss the patient with other providers: yes (Hospitalist)  Independent visualization of images, tracings, or specimens: yes    Critical Care  Total time providing critical care: 30-74 minutes    Patient Progress  Patient progress: improved    ED Course       Procedures    Procedure Note - Rectal Exam:   7:25 AM  Performed by: Bianca Rodriguez MD  Chaperoned by: Julio Lake RN  Rectal exam performed. Light brown stool was collected. Stool was collected and sent to the lab for Hemoccult testing.    The procedure took 1-15 minutes, and pt tolerated well. PROGRESS NOTE:  7:34 AM  Pt's stool is heme positive. Written by ALETHA Nortonibtheresa, as dictated by Scarlet Weaver MD.    PROGRESS NOTE:  8:44 AM  Pt had an episode of diarrhea in ED. Written by Gudelia Maynard ED Scribe, as dictated by Scarlet Weaver MD.    PROGRESS NOTE:  10:27 AM  Pt's CT show no acute findings. Will PO challenge. Written by ALETHA Nortonibe, as dictated by Scarlet Weaver MD.    PROGRESS NOTE:  10:38 AM  Pt reports two more episodes of diarrhea and her pressure has dropped to 86/46. Will consult with hospitalist regarding admission. Written by ALETHA Norton, as dictated by Scarlet Weaver MD.    CONSULT NOTE:   10:40 AM  Scarlet Weaver MD spoke with Herminio Villasenor MD   Specialty: Hospitalist  Discussed pt's hx, disposition, and available diagnostic and imaging results. Reviewed care plans. Consultant will evaluate pt for admission. Written by ALETHA Norton, as dictated by Scarlet Weaver MD.    CRITICAL CARE NOTE :    1:37 PM    IMPENDING DETERIORATION -Cardiovascular, Metabolic and Renal    ASSOCIATED RISK FACTORS - Hypotension, Bleeding, Metabolic changes and Dehydration    MANAGEMENT- Bedside Assessment and Supervision of Care    INTERPRETATION -  Xrays, CT Scan, ECG and Blood Pressure    INTERVENTIONS - hemodynamic mngmt and Metobolic interventions    CASE REVIEW - Hospitalist and Nursing    TREATMENT RESPONSE -Improved    PERFORMED BY - Self    NOTES   :    I have spent 55  minutes of critical care time involved in lab review, consultations with specialist, family decision- making, bedside attention and documentation. During this entire length of time I was immediately available to the patient .     Scarlet Weaver MD    LABORATORY TESTS:  Recent Results (from the past 12 hour(s))   CBC W/O DIFF    Collection Time: 09/22/17  7:01 AM   Result Value Ref Range    WBC 8.8 3.6 - 11.0 K/uL    RBC 4.27 3.80 - 5.20 M/uL    HGB 12.1 11.5 - 16.0 g/dL    HCT 35.3 35.0 - 47.0 %    MCV 82.7 80.0 - 99.0 FL    MCH 28.3 26.0 - 34.0 PG    MCHC 34.3 30.0 - 36.5 g/dL    RDW 16.4 (H) 11.5 - 14.5 %    PLATELET 531 794 - 678 K/uL   METABOLIC PANEL, COMPREHENSIVE    Collection Time: 09/22/17  7:01 AM   Result Value Ref Range    Sodium 137 136 - 145 mmol/L    Potassium 4.0 3.5 - 5.1 mmol/L    Chloride 106 97 - 108 mmol/L    CO2 22 21 - 32 mmol/L    Anion gap 9 5 - 15 mmol/L    Glucose 87 65 - 100 mg/dL    BUN 19 6 - 20 MG/DL    Creatinine 1.44 (H) 0.55 - 1.02 MG/DL    BUN/Creatinine ratio 13 12 - 20      GFR est AA 44 (L) >60 ml/min/1.73m2    GFR est non-AA 36 (L) >60 ml/min/1.73m2    Calcium 8.4 (L) 8.5 - 10.1 MG/DL    Bilirubin, total 0.6 0.2 - 1.0 MG/DL    ALT (SGPT) 61 12 - 78 U/L    AST (SGOT) 140 (H) 15 - 37 U/L    Alk. phosphatase 302 (H) 45 - 117 U/L    Protein, total 9.0 (H) 6.4 - 8.2 g/dL    Albumin 2.7 (L) 3.5 - 5.0 g/dL    Globulin 6.3 (H) 2.0 - 4.0 g/dL    A-G Ratio 0.4 (L) 1.1 - 2.2     LIPASE    Collection Time: 09/22/17  7:01 AM   Result Value Ref Range    Lipase 105 73 - 393 U/L   CBC WITH AUTOMATED DIFF    Collection Time: 09/22/17  7:01 AM   Result Value Ref Range    WBC 8.9 3.6 - 11.0 K/uL    RBC 4.33 3.80 - 5.20 M/uL    HGB 11.9 11.5 - 16.0 g/dL    HCT 35.5 35.0 - 47.0 %    MCV 82.0 80.0 - 99.0 FL    MCH 27.5 26.0 - 34.0 PG    MCHC 33.5 30.0 - 36.5 g/dL    RDW 16.5 (H) 11.5 - 14.5 %    PLATELET 192 144 - 257 K/uL    NEUTROPHILS 63 32 - 75 %    LYMPHOCYTES 19 12 - 49 %    MONOCYTES 17 (H) 5 - 13 %    EOSINOPHILS 1 0 - 7 %    BASOPHILS 0 0 - 1 %    ABS. NEUTROPHILS 5.6 1.8 - 8.0 K/UL    ABS. LYMPHOCYTES 1.7 0.8 - 3.5 K/UL    ABS. MONOCYTES 1.5 (H) 0.0 - 1.0 K/UL    ABS. EOSINOPHILS 0.1 0.0 - 0.4 K/UL    ABS.  BASOPHILS 0.0 0.0 - 0.1 K/UL   TROPONIN I    Collection Time: 09/22/17  7:01 AM   Result Value Ref Range    Troponin-I, Qt. <0.04 <0.05 ng/mL   OCCULT BLOOD, STOOL    Collection Time: 09/22/17  7:04 AM   Result Value Ref Range    Occult blood, stool POSITIVE (A) NEG     LACTIC ACID    Collection Time: 09/22/17  7:46 AM   Result Value Ref Range    Lactic acid 1.5 0.4 - 2.0 MMOL/L   CBC W/O DIFF    Collection Time: 09/22/17 11:30 AM   Result Value Ref Range    WBC 7.8 3.6 - 11.0 K/uL    RBC 4.00 3.80 - 5.20 M/uL    HGB 10.9 (L) 11.5 - 16.0 g/dL    HCT 33.1 (L) 35.0 - 47.0 %    MCV 82.8 80.0 - 99.0 FL    MCH 27.3 26.0 - 34.0 PG    MCHC 32.9 30.0 - 36.5 g/dL    RDW 16.5 (H) 11.5 - 14.5 %    PLATELET 964 381 - 657 K/uL       IMAGING RESULTS:  CT ABD PELV WO CONT   Final Result   Addendum: Addendum: Original report was lost and this will serve as the new  interpretation.      INDICATION: Diarrhea, pain, rectal bleeding.     Exam: CT of the abdomen and pelvis is performed with 5 mm collimation. The study  is performed with PO contrast only. Sagittal and coronal reformatted images were  also performed.     CT dose reduction was achieved through the use of a standardized protocol  tailored for this examination and automatic exposure control for dose  modulation.     Direct comparison is made to prior CT dated September 2017.     FINDINGS:     There is persistent, stable bibasilar honeycombing and bronchiectasis consistent  with fibrosis.     Abdomen:      Liver: The liver is normal on noncontrast images.      Spleen: The spleen is normal on noncontrast images.      Adrenals: The right adrenal gland is surgically absent. Left adrenal gland is  stable.     Pancreas: The pancreas is normal on noncontrast images.      Gallbladder: The gallbladder is normal on noncontrast images.      Kidneys: There is no perinephric stranding, hydronephrosis or hydroureter. No  renal, ureteral bladder calculus is visualized.      Bowel: No thickened or dilated loop of large or small bowel seen.      Appendix: The appendix is normal.     Pelvis: Urinary bladder is partially filled and grossly normal.     Miscellaneous:  There is a 2.2 cm x 2.6 cm small periumbilical hernia containing  intraperitoneal fat. There is no free intraperitoneal gas or fluid. There is no  focal fluid collection to suggest abscess. There is an 8 mm calcified fibroid in  the uterus.     IMPRESSION: No thickened large or small bowel. No bowel obstruction, ileus or  perforation. No intra-abdominal abscess. MEDICATIONS GIVEN:  Medications   diatrizoate meglumine-d.sodium (MD-GASTROVIEW,GASTROGRAFIN) 66-10 % contrast solution 30 mL (30 mL Oral Given 9/22/17 0739)   ondansetron (ZOFRAN) injection 4 mg (4 mg IntraVENous Given 9/22/17 0739)   pantoprazole (PROTONIX) injection 40 mg (40 mg IntraVENous Given 9/22/17 0739)   sodium chloride 0.9 % bolus infusion 500 mL (0 mL IntraVENous IV Completed 9/22/17 0856)   meclizine (ANTIVERT) tablet 25 mg (25 mg Oral Given 9/22/17 0936)   sodium chloride 0.9 % bolus infusion 1,000 mL (1,000 mL IntraVENous New Bag 9/22/17 1111)       IMPRESSION:  1. Diarrhea, unspecified type    2. Rectal bleeding    3. Hypotension, unspecified hypotension type    4. Umbilical hernia without obstruction and without gangrene        PLAN:  1. Admit to hospitalist    ADMIT NOTE:  10:43 AM  The patient is being admitted to the hospital by Lenise Curling, MD.  The results of their tests and reasons for their admission have been discussed with the patient and/or available family. They convey agreement and understanding for the need to be admitted and for their admission diagnosis. This note is prepared by Aguilar Griffin, acting as Scribe for David Humphrey MD.    David Humphrey MD: The scribe's documentation has been prepared under my direction and personally reviewed by me in its entirety. I confirm that the note above accurately reflects all work, treatment, procedures, and medical decision making performed by me.

## 2017-09-22 NOTE — IP AVS SNAPSHOT
[address] [facility] 
[phone] Patient: Aakash Lopez MRN: [id number] QAV:[id number] You are allergic to the following Allergen Reactions Crestor (Rosuvastatin) Myalgia Levaquin (Levofloxacin) Nausea Only GI Upset Lipitor (Atorvastatin) Diarrhea Lyrica (Pregabalin) Myalgia Immunizations Administered for This Admission Name Date Influenza Vaccine (Quad) PF 9/25/2017 Recent Documentation Height Weight Breastfeeding? BMI OB Status Smoking Status 1.676 m 70 kg No 24.91 kg/m2 Postmenopausal Former Smoker Unresulted Labs Order Current Status SAMPLE TO BLOOD BANK In process SAMPLE TO BLOOD BANK In process Emergency Contacts Name Discharge Info Relation Home Work Mobile Linda Aguilar  Child [2] [phone] [address] [facility] CAREGIVER [3] Other Relative [6] [phone] About your hospitalization You were admitted on:  September 22, 2017 You last received care in the:  [facility] 2 CARDIOPULMONARY CARE You were discharged on:  September 27, 2017 Unit phone number:  [phone] Why you were hospitalized Your primary diagnosis was:  Gib (Gastrointestinal Bleeding) Your diagnoses also included:  Hypotension, Type 2 Diabetes Mellitus With Diabetic Neuropathy, Without Long-Term Current Use Of Insulin ([facility]), Systolic Chf, Acute ([facility]), S/P Coronary Artery Stent Placement, Copd (Chronic Obstructive Pulmonary Disease) ([facility]), Atrial Fibrillation ([facility]), Ckd (Chronic Kidney Disease) Stage 3, Gfr 30-59 Ml/Min, Chronic Systolic Hf (Heart Failure) ([facility]) Providers Seen During Your Hospitalizations Provider Role Specialty Primary office phone Candie Sims MD Attending Provider Emergency Medicine [phone] Alyssa Lux MD Attending Provider Internal Medicine [phone] Augustin Bean MD Attending Provider Internal Medicine [phone] Your Primary Care Physician (PCP) Primary Care Physician Office Phone Office Fax Mariely Veras [phone] Follow-up Information Follow up With Details Comments Contact Info Bony Blakcwood NP Go on 10/5/2017 for PCP follow up appointment. 10:40 AM  [place] [address] [facility] [address] 
[phone] Saranya [address] On 9/28/2017 This is your Home Health Provider. If you have not heard from them within 24-48 hours please call them. [address] [facility] [address] 
[phone] Kimberly Sykes MD Go on 9/29/2017 Follow up at 12:45 PM [address] [facility] 202 [facility] 
[phone] Aby Eisenberg MD Schedule an appointment as soon as possible for a visit in 3 weeks  [address] [place] [address] 
[phone] Your Appointments Tuesday October 03, 2017 11:15 AM EDT HOSPITAL FOLLOW-UP with Aby Eisenberg MD  
[facility] [address] [place] [address]) [address]  
[phone] Thursday October 05, 2017 10:20 AM EDT New Patient with Remedios Thibodeaux MD  
[facility] Dr. Cachorro Bond [facility] ([address] [place] [address]) [address] [place] [address], [address] 2305 [facility]  
[phone] Thursday October 05, 2017 10:40 AM EDT Office Visit with Bony Blackwood NP  
[address] 8309 [facility] [address] [address]  
[phone] Friday October 20, 2017  8:00 AM EDT ROUTINE CARE with Bony Blackwood NP  
[facility] [address] [place] [address] 9381 [facility] [address] [address]  
[phone]  Wednesday November 01, 2017 10:30 AM EDT  
3 MONTH with Aby Eisenberg MD  
 [facility] [place]  
[phone] Current Discharge Medication List  
  
START taking these medications Dose & Instructions Dispensing Information Comments Morning Noon Evening Bedtime  
 cholestyramine-aspartame 4 gram packet Commonly known as:  Anceliceo Hutton Your last dose was: You have been refusing this medication Your next dose is: If you decide to take it, start with your next meal  
   
 Dose:  4 g Take 1 Packet by mouth three (3) times daily (with meals). Quantity:  90 Packet Refills:  0  
     
  
   
  
   
  
   
  
 famotidine 20 mg tablet Commonly known as:  PEPCID Your last dose was: Today Your next dose is:  Tomorrow Dose:  20 mg Take 1 Tab by mouth daily. Quantity:  60 Tab Refills:  0  
     
  
   
   
   
  
 hydrocortisone 25 mg Supp Commonly known as:  ANUSOL-HC Your last dose was: Today this AM  
Your next dose is: Today this PM  
   
 Dose:  25 mg Insert 1 Suppository into rectum every twelve (12) hours for 4 days. Quantity:  8 Suppository Refills:  0 CONTINUE these medications which have CHANGED Dose & Instructions Dispensing Information Comments Morning Noon Evening Bedtime  
 albuterol 2.5 mg /3 mL (0.083 %) nebulizer solution Commonly known as:  PROVENTIL VENTOLIN What changed:  Another medication with the same name was removed. Continue taking this medication, and follow the directions you see here. Your last dose was:  Prior to admission Your next dose is:  As needed Dose:  2.5 mg  
3 mL by Nebulization route every four (4) hours as needed for Wheezing. Quantity:  1 Package Refills:  5  
     
   
   
   
  
 amiodarone 100 mg tablet Commonly known as:  Amy Mayers What changed:  Another medication with the same name was removed.  Continue taking this medication, and follow the directions you see here. Your last dose was: Last night Your next dose is: Tonight Dose:  100 mg Take 100 mg by mouth nightly. Refills:  0  
     
   
   
   
  
  
 colchicine 0.6 mg tablet What changed:  Another medication with the same name was removed. Continue taking this medication, and follow the directions you see here. Your last dose was: Today Your next dose is:  Tomorrow Dose:  1.2 mg Take 1.2 mg by mouth daily. Patient takes 1.2 mg daily and 2.4 mg PRN flare up Refills:  0  
     
  
   
   
   
  
 furosemide 20 mg tablet Commonly known as:  LASIX What changed:  when to take this Your last dose was: Today Your next dose is:  Tomorrow Dose:  20 mg Take 1 Tab by mouth daily. Quantity:  30 Tab Refills:  0 CONTINUE these medications which have NOT CHANGED Dose & Instructions Dispensing Information Comments Morning Noon Evening Bedtime  
 acetaminophen 500 mg tablet Commonly known as:  TYLENOL Your last dose was:  Prior to admission Your next dose is:  As needed Dose:  500 mg Take 500 mg by mouth every six (6) hours as needed for Pain. Refills:  0  
     
   
   
   
  
 apixaban 5 mg tablet Commonly known as:  Sharlene Antonio Your last dose was:  Prior to admission Your next dose is: Today this PM  
   
 Dose:  5 mg Take 1 Tab by mouth two (2) times a day. Indications: PREVENT THROMBOEMBOLISM IN CHRONIC ATRIAL FIBRILLATION Quantity:  180 Tab Refills:  3 Azelastine 0.15 % (205.5 mcg) nasal spray Commonly known as:  ASTEPRO Your last dose was: Today Your next dose is:  Tomorrow Dose:  1 Spray 1 [place] by Both Nostrils route daily. Refills:  0  
     
  
   
   
   
  
 budesonide-formoterol 160-4.5 mcg/actuation Hfaa Commonly known as:  SYMBICORT Your last dose was:   Today this AM  
 Your next dose is: Today this PM  
   
 Dose:  1 Puff Take 1 Puff by inhalation two (2) times a day. Quantity:  3 Inhaler Refills:  3  
     
  
   
   
   
  
  
 carvedilol 6.25 mg tablet Commonly known as:  Angelita Peabody Your last dose was: Today this PM  
Your next dose is:  Tomorrow Dose:  6.25 mg Take 1 Tab by mouth two (2) times daily (with meals). Quantity:  60 Tab Refills:  11  
     
  
   
   
  
   
  
 cod liver oil Cap Your last dose was:  Prior to admission Dose:  1 Cap Take 1 Cap by mouth daily. Refills:  0  
     
  
   
   
   
  
 FLONASE 50 mcg/actuation nasal spray Generic drug:  fluticasone Your last dose was:  Prior to admission Your next dose is: This evening Dose:  2 Spray 2 Sprays by Both Nostrils route every evening. Refills:  0  
     
   
   
  
   
  
 gabapentin 800 mg tablet Commonly known as:  NEURONTIN Your last dose was: Today this AM  
Your next dose is: Today this PM  
   
 TAKE ONE TABLET BY MOUTH THREE TIMES DAILY Quantity:  90 Tab Refills:  11 Afternoon PLAVIX 75 mg Tab Generic drug:  clopidogrel Your last dose was: Today this AM  
Your next dose is:  Tomorrow Dose:  75 mg Take 75 mg by mouth daily. Refills:  0  
     
  
   
   
   
  
 tiotropium 18 mcg inhalation capsule Commonly known as:  [address] Your last dose was: Today Your next dose is:  Tomorrow INHALE THE CONTENTS OF 1 CAPSULE THROUGH HANDIHALER DEVICE DAILY Quantity:  90 Cap Refills:  3 STOP taking these medications   
 cholestyramine 4 gram packet Commonly known as:  QUESTRAN  
   
  
 doxycycline 100 mg tablet Commonly known as:  VIBRA-TABS  
   
  
 metFORMIN 1,000 mg tablet Commonly known as:  GLUCOPHAGE Where to Get Your Medications Information on where to get these meds will be given to you by the nurse or doctor. ! Ask your nurse or doctor about these medications  
  cholestyramine-aspartame 4 gram packet  
 famotidine 20 mg tablet  
 furosemide 20 mg tablet  
 hydrocortisone 25 mg Supp Discharge Instructions Patient Discharge Instructions Pt Name  Priscila Pastor Date of Birth 1950 Age  78 y.o. Medical Record Number  [id number] PCP Jeannie Murillo NP Admit date:  9/22/2017 @    [place] [address] Room Number  2216/01 Date of Discharge 9/27/2017 Admission Diagnoses:     GIB (gastrointestinal bleeding) Allergies Allergen Reactions  Crestor [Rosuvastatin] Myalgia  Levaquin [Levofloxacin] Nausea Only GI Upset  Lipitor [Atorvastatin] Diarrhea  Lyrica [Pregabalin] Myalgia You were admitted to [address] for  GIB (gastrointestinal bleeding) YOUR OTHER MEDICAL DIAGNOSES INCLUDE (BUT NOT LIMITED TO ): 
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 
 CKD (chronic kidney disease) stage 3, GFR 30-59 ml/min  GIB (gastrointestinal bleeding)  Chronic systolic HF (heart failure) ([address] DIET:  Gluten free diabetic diet Recommended activity: Activity as tolerated Gluten-Free Diet: Care Instructions Your Care Instructions To help your symptoms, your doctor has recommended a gluten-free diet. This means not eating foods that have gluten in them. Gluten is a kind of protein. It's found in wheat, barley, and rye. If you eat a gluten-free diet, you can help manage your symptoms and prevent long-term problems. You can also get all the nutrition you need. Follow-up care is a key part of your treatment and safety. Be sure to make and go to all appointments, and call your doctor if you are having problems. It's also a good idea to know your test results and keep a list of the medicines you take. How can you care for yourself at home? · Don't eat any foods that have gluten in them. These include bagels, bread, crackers, and some cereals. They also include pasta and pizza. · Carefully read food labels. Look for wheat or wheat products in ice cream and candy. You may also find them in salad dressing, canned and frozen soups and vegetables, and other processed foods. · Avoid all beer products unless the label says they are gluten-free. Beers with and without alcohol have gluten unless the labels say they are gluten-free. This includes lagers, ales, and stouts. · Avoid oats, at least at first. Oats may cause symptoms in some people, perhaps as a result of contamination with wheat, barley, or rye during processing. But many people who have celiac disease can eat moderate amounts of oats without having symptoms. Health professionals vary in their long-term recommendations regarding eating foods with oats. But most agree it is safe to eat oats labeled as gluten-free. · When you eat out, look for restaurants that serve gluten-free food. You can also ask if the  is familiar with gluten-free cooking. · Try to learn more about gluten-free options. Find grocery stores that sell gluten-free pizza and other foods. If you have access to the Internet, look online for gluten-free foods and recipes. · On a gluten-free eating plan, it's okay to have: 
¨ Eggs and dairy products. (But some dairy products may make your symptoms worse. Ask your doctor if you have questions about dairy products. Read ingredient labels carefully. Some processed cheeses contain gluten.) ¨ Flours and foods made with amaranth, arrowroot, beans, buckwheat, corn, cornmeal, flax, millet, potatoes, gluten-free nut and oat bran, quinoa, rice, sorghum, soybeans, tapioca, or teff. ¨ Fresh, frozen, or canned unprocessed meats. But avoid processed meats. Some examples of processed meats to avoid are hot dogs, salami, and deli meat. Read labels for additives that may contain gluten. ¨ Fresh, frozen, dried, or canned fruits and vegetables, if they do not have thickeners or other additives that contain gluten. ¨ Some alcohol drinks. These include wine, liqueurs, and ciders. They also include liquor like whiskey and nikki. When should you call for help? Watch closely for changes in your health, and be sure to contact your doctor if: 
· You have unexplained weight loss. · You have diarrhea that lasts longer than 1 to 2 weeks. · You have unusual fatigue or mood changes, especially if these last more than a week and are not related to any other illness, such as the flu. · Your symptoms come back again. · Your stomach pain gets worse. Where can you learn more? Go to http://rusty-marcela.info/. Enter 31 41 19 in the search box to learn more about \"Gluten-Free Diet: Care Instructions. \" Current as of: March 16, 2017 Content Version: 11.3 © 5488-3190 [employer], [employer]. Care instructions adapted under license by [employer] (which disclaims liability or warranty for this information). If you have questions about a medical condition or this instruction, always ask your healthcare professional. [place] [address] any warranty or liability for your use of this information. Follow up : Follow-up Information Follow up With Details Comments Contact Info Vanessa Caicedo NP Go on 10/5/2017 for PCP follow up appointment. 10:40 AM  [facility] [address] [facility] [address] 
[phone] [address] On 9/28/2017 This is your Home Health Provider.   If you have not heard from them within 24-48 hours please call them. [address] [address] 
[phone] Alexey Alvarado MD Schedule an appointment as soon as possible for a visit to be seen within 2-3 weeks [place] [address] [address] [facility] 
[phone] Dr. Priscila Gordillo is recommending to stay on gluten free diet until follow up with Dr. Priscila Gordillo. Please stop taking metformin until you follow up with her primary care physician. · It is important that you take the medication exactly as they are prescribed. · Keep your medication in the bottles provided by the pharmacist and keep a list of the medication names, dosages, and times to be taken in your wallet. · Do not take other medications without consulting your doctor. ADDITIONAL INFORMATION: If you experience any of the following symptoms or have any health problem not listed below, then please call your primary care physician or return to the emergency room if you cannot get hold of your doctor: Fever, chills, nausea, vomiting, diarrhea, change in mentation, falling, bleeding, shortness of breath. I understand that if any problems occur once I am discharged, I am supposed to call my Primary care physician for further care or seek help in the Emergency Department at the nearest Healthcare facility. I have had an opportunity to discuss my clinical issues with my doctor and nursing staff. I understand and acknowledge receipt of the above instructions. Physician's or R.N.'s Signature                                                            Date/Time Patient or Representative Signature                                                 Date/Time Discharge Instructions Attachments/References HEART FAILURE ZONES: GENERAL INFO (ENGLISH) HEART FAILURE: AVOIDING TRIGGERS (ENGLISH) Discharge Orders None Syndiant Announcement We are excited to announce that we are making your provider's discharge notes available to you in Syndiant. You will see these notes when they are completed and signed by the physician that discharged you from your recent hospital stay. If you have any questions or concerns about any information you see in Syndiant, please call the Health Information Department where you were seen or reach out to your Primary Care Provider for more information about your plan of care. Introducing [facility]! Dear Cally Hooper: Thank you for requesting a Syndiant account. Our records indicate that you already have an active Syndiant account. You can access your account anytime at [email or web address] Did you know that you can access your hospital and ER discharge instructions at any time in Syndiant? You can also review all of your test results from your hospital stay or ER visit. Additional Information If you have questions, please visit the Frequently Asked Questions section of the Syndiant website at [email or web address] Remember, Syndiant is NOT to be used for urgent needs. For medical emergencies, dial 911. Now available from your iPhone and Android! General Information Please provide this summary of care documentation to your next provider. Patient Signature:  ____________________________________________________________ Date:  ____________________________________________________________  
  
Claudene Born Provider Signature:  ____________________________________________________________ Date:  ____________________________________________________________ More Information Learning About Heart Failure Zones What are heart failure zones? Heart failure zones give you an easy way to see changes in your heart failure symptoms. They also tell you when you need to get help. Check every day to see which zone you are in. Green zone. You are doing well. This is where you want to be. · Your weight is stable. This means it is not going up or down. · You breathe easily. · You are sleeping well. You are able to lie flat without shortness of breath. · You can do your usual activities. Yellow zone. Be careful. Your symptoms are changing. Call your doctor. · You have new or increased shortness of breath. · You are dizzy or lightheaded, or you feel like you may faint. · You have sudden weight gain, such as more than 2 to 3 pounds in a day or 5 pounds in a week. (Your doctor may suggest a different range of weight gain.) · You have increased swelling in your legs, ankles, or feet. · You are so tired or weak that you cannot do your usual activities. · You are not sleeping well. Shortness of breath wakes you up at night. You need extra pillows. Your doctor's name: ____________________________________________________________ Your doctor's contact information: _________________________________________________ Red zone. This is an emergency. Call 911. You have symptoms of sudden heart failure, such as: 
· You have severe trouble breathing. · You cough up pink, foamy mucus. · You have a new irregular or fast heartbeat. You have symptoms of a heart attack. These may include: · Chest pain or pressure, or a strange feeling in the chest. 
· Sweating. · Shortness of breath. · Nausea or vomiting. · Pain, pressure, or a strange feeling in the back, neck, jaw, or upper belly or in one or both shoulders or arms. · Lightheadedness or sudden weakness. · A fast or irregular heartbeat. If you have symptoms of a heart attack: After you call 911, the  may tell you to chew 1 adult-strength or 2 to 4 low-dose aspirin. Wait for an ambulance. Do not try to drive yourself. Follow-up care is a key part of your treatment and safety. Be sure to make and go to all appointments, and call your doctor if you are having problems. It's also a good idea to know your test results and keep a list of the medicines you take. Where can you learn more? Go to http://rusty-marcela.info/. Enter T174 in the search box to learn more about \"Learning About Heart Failure Zones. \" Current as of: February 23, 2017 Content Version: 11.3 © 4428-8881 [employer]. Care instructions adapted under license by [employer] (which disclaims liability or warranty for this information). If you have questions about a medical condition or this instruction, always ask your healthcare professional. [place] [address] any warranty or liability for your use of this information. Avoiding Triggers With Heart Failure: Care Instructions Your Care Instructions Triggers are anything that make your heart failure flare up. A flare-up is also called \"sudden heart failure\" or \"acute heart failure. \" When you have a flare-up, fluid builds up in your lungs, and you have problems breathing. You might need to go to the hospital. By watching for changes in your condition and avoiding triggers, you can prevent heart failure flare-ups. Follow-up care is a key part of your treatment and safety. Be sure to make and go to all appointments, and call your doctor if you are having problems. It's also a good idea to know your test results and keep a list of the medicines you take. How can you care for yourself at home? Watch for changes in your weight and condition · Weigh yourself without clothing at the same time each day.  Record your weight. Call your doctor if you have sudden weight gain, such as more than 2 to 3 pounds in a day or 5 pounds in a week. (Your doctor may suggest a different range of weight gain.) A sudden weight gain may mean that your heart failure is getting worse. · Keep a daily record of your symptoms. Write down any changes in how you feel, such as new shortness of breath, cough, or problems eating. Also record if your ankles are more swollen than usual and if you feel more tired than usual. Note anything that you ate or did that could have triggered these changes. Limit sodium Sodium causes your body to hold on to extra water. This may cause your heart failure symptoms to get worse. People get most of their sodium from processed foods. Fast food and restaurant meals also tend to be very high in sodium. · Your doctor may suggest that you limit sodium to 2,000 milligrams (mg) a day or less. That is less than 1 teaspoon of salt a day, including all the salt you eat in cooking or in packaged foods. · Read food labels on cans and food packages. They tell you how much sodium you get in one serving. Check the serving size. If you eat more than one serving, you are getting more sodium. · Be aware that sodium can come in forms other than salt, including monosodium glutamate (MSG), sodium citrate, and sodium bicarbonate (baking soda). MSG is often added to Asian food. You can sometimes ask for food without MSG or salt. · Slowly reducing salt will help you adjust to the taste. Take the salt shaker off the table. · Flavor your food with garlic, lemon juice, onion, vinegar, herbs, and spices instead of salt. Do not use soy sauce, steak sauce, onion salt, garlic salt, mustard, or ketchup on your food, unless it is labeled \"low-sodium\" or \"low-salt. \" 
· Make your own salad dressings, sauces, and ketchup without adding salt.  
· Use fresh or frozen ingredients, instead of canned ones, whenever you can. Choose low-sodium canned goods. · Eat less processed food and food from restaurants, including fast food. Exercise as directed Moderate, regular exercise is very good for your heart. It improves your blood flow and helps control your weight. But too much exercise can stress your heart and cause a heart failure flare-up. · Check with your doctor before you start an exercise program. 
· Walking is an easy way to get exercise. Start out slowly. Gradually increase the length and pace of your walk. Swimming, riding a bike, and using a treadmill are also good forms of exercise. · When you exercise, watch for signs that your heart is working too hard. You are pushing yourself too hard if you cannot talk while you are exercising. If you become short of breath or dizzy or have chest pain, stop, sit down, and rest. 
· Do not exercise when you do not feel well. Take medicines correctly · Take your medicines exactly as prescribed. Call your doctor if you think you are having a problem with your medicine. · Make a list of all the medicines you take. Include those prescribed to you by other doctors and any over-the-counter medicines, vitamins, or supplements you take. Take this list with you when you go to any doctor. · Take your medicines at the same time every day. It may help you to post a list of all the medicines you take every day and what time of day you take them. · Make taking your medicine as simple as you can. Plan times to take your medicines when you are doing other things, such as eating a meal or getting ready for bed. This will make it easier to remember to take your medicines. · Get organized. Use helpful tools, such as daily or weekly pill containers. When should you call for help? Call 911 if you have symptoms of sudden heart failure such as: 
· You have severe trouble breathing. · You cough up pink, foamy mucus. · You have a new irregular or rapid heartbeat. Call your doctor now or seek immediate medical care if: 
· You have new or increased shortness of breath. · You are dizzy or lightheaded, or you feel like you may faint. · You have sudden weight gain, such as more than 2 to 3 pounds in a day or 5 pounds in a week. (Your doctor may suggest a different range of weight gain.) · You have increased swelling in your legs, ankles, or feet. · You are suddenly so tired or weak that you cannot do your usual activities. Watch closely for changes in your health, and be sure to contact your doctor if you develop new symptoms. Where can you learn more? Go to http://rusty-marcela.info/. Enter C453 in the search box to learn more about \"Avoiding Triggers With Heart Failure: Care Instructions. \" Current as of: February 23, 2017 Content Version: 11.3 © 3549-2124 [employer]. Care instructions adapted under license by [employer] (which disclaims liability or warranty for this information). If you have questions about a medical condition or this instruction, always ask your healthcare professional. [address] any warranty or liability for your use of this information.

## 2017-09-22 NOTE — CONSULTS
[phone]   [address] [facility]       Name:  Cesar Salgado   MR#:  [id number]   :  1950   Account #:  [de-identified]    Date of Consultation:  2017   Date of Adm:  2017       REQUESTING PHYSICIAN: Shayy Dye MD.  Gastroenterologist:  Mateo Patten COMPLAINT: I am asked to see for diarrhea and lower   gastrointestinal hemorrhage in the setting of hypotension. HISTORY OF PRESENT ILLNESS: The patient was seen by me on   2017. She had had C diff earlier this year. She presented at that   time with crampy abdominal pain and renal failure. C diff was negative,   her diuretics were adjusted and she was continued on empiric   Questran. She went home on 09/15/2017. Since going home, she has   had 3 times a day loose stools. There is crampy lower abdominal pain,   onset prior to a bowel movement, relief after a bowel movement. In the   last 24 hours, there has been bright red rectal bleeding of relatively   large quantity. She has been weak, dizzy, lightheaded. She states she   has had fever, chills, and sweats, and she came to the emergency   department and was admitted. She has atherosclerotic vascular disease. We deferred on colonoscopy   for the diarrhea without bleeding until she could be off her Plavix and   Eliquis. She is still on Eliquis, but states she is now off of Plavix, as that   was only given for a month and that has been discontinued. Her tender umbilical hernia was assessed by Dr. Elijah Thornton last   admission. PAST MEDICAL/SURGICAL HISTORY: In addition, positive for atrial   fibrillation, congestive heart failure, noninsulin-dependent diabetes. She   has had a pacemaker, an adrenal gland incision and a    section. FAMILY HISTORY: Positive for heart disease. SOCIAL HISTORY: She does not drink. She quit smoking in . ALLERGIES    1. CRESTOR. 2. LEVAQUIN.    3. LIPITOR. 4. LYRICA. PRIOR TO ADMISSION MEDICATIONS    1. Tylenol. 2. Fiber Tabs. 3. Fluticasone. 4. Cod liver oil. 5. Colchicine. 6. Amiodarone. 7. Lasix. 8. Eliquis. 9. Coreg. 10. Neurontin. 11. Spiriva. 12. Glucophage. 13. Symbicort. 14. Ventolin. 15. Albuterol. I do not see cholestyramine on this list.     REVIEW OF SYSTEMS: Otherwise unchanged from her recent   admission where it was negative in detail except gastrointestinal   symptoms, loss of appetite, weight loss, and some shortness of breath. PHYSICAL EXAMINATION   VITAL SIGNS: Temperature 97.8, pulse 76, blood pressure 109/52. GENERAL: Thin, no acute distress. EYES: No icterus. The extraocular motion is intact. ENMT: Lips, teeth, gums, oropharynx unremarkable. LYMPHATIC: No anterior, posterior cervical, supraclavicular, inguinal   lymphadenopathy. LUNGS: Clear to auscultation bilaterally. No use of access muscles. HEART: Regular rhythm, no abnormality sounds. ABDOMEN: There is some tenderness at an umbilical hernia. There is   minimal tenderness in right lower quadrant and left lower quadrant. EXTREMITIES: No edema. LABORATORY DATA: Reviewed. Creatinine is 1.44 which is improved   from her discharge value. Hemoglobin 10.9, which is about the same as   her discharge 09/14/2017 value. BUN is 19. CROSS SECTIONAL IMAGING: CT abdomen and pelvis without   contrast today, 2.2 x 2.6 small periumbilical hernia containing   intraperitoneal fat. No free intraperitoneal gas or fluid. No focal fluid   collection. No thickened large or small bowel, no bowel obstruction. No   abscess. Oral contrast only, no IV contrast.     IMPRESSION/REPORT/PLAN    1. Hematochezia. 2. Hypotension. 3. Diarrhea. 4. History of Clostridium difficile. RECOMMEND    1. Check Clostridium difficile. 2. Supportive care of aggressive yet closely monitored fluid.  Blood as   needed, she does not currently transfusion. 3. She is off Eliquis because of the bleeding. 4. We will see her daily this weekend, 09/23/17 and 09/24/2017, and   then set her up for EGD, colonoscopy by Dr. Laurie Holcomb on 09/25/2017.   5. If she has urgent bleeding this admission, then she should have a   tagged blood cell scan, consider CTA and consider angiography or more   urgent colonoscopy. 6. We will be able to do biopsies at the time of the colonoscopy for   colitis, including lymphocytic colitis. 7. Discussed in distail in with the patient. I thank Dr. Caleb Dalton for this interesting consultation.               Lisa Austin MD      [address] / [place]faro   D:  09/22/2017   16:36   T:  09/22/2017   17:19   Job #:  [id number]

## 2017-09-22 NOTE — PROGRESS NOTES
Problem: Falls - Risk of  Goal: *Absence of Falls  Document Anthony Fall Risk and appropriate interventions in the flowsheet.    Outcome: Progressing Towards Goal  Fall Risk Interventions:              Medication Interventions: Assess postural VS orthostatic hypotension, Bed/chair exit alarm, Evaluate medications/consider consulting pharmacy, Patient to call before getting OOB, Teach patient to arise slowly     Elimination Interventions: Bed/chair exit alarm, Call light in reach, Patient to call for help with toileting needs, Toilet paper/wipes in reach, Toileting schedule/hourly rounds     History of Falls Interventions: Bed/chair exit alarm, Door open when patient unattended, Evaluate medications/consider consulting pharmacy, Investigate reason for fall, Room close to nurse's station

## 2017-09-22 NOTE — PROGRESS NOTES
TRANSFER - IN REPORT:    Verbal report received from Kwan Wyatt RN(name) on Daisha Barragan  being received from ER(unit) for routine progression of care      Report consisted of patients Situation, Background, Assessment and   Recommendations(SBAR). Information from the following report(s) SBAR, ED Summary, Recent Results and Cardiac Rhythm NSR was reviewed with the receiving nurse. Opportunity for questions and clarification was provided. Assessment completed upon patients arrival to unit and care assumed.

## 2017-09-22 NOTE — H&P
Hospitalist Admission Note    NAME: Madiha Villasenor   :  1950   MRN:  [id number]     Date/Time:  2017 11:06 AM    Patient PCP: Chichi Jon NP  ________________________________________________________________________    My assessment of this patient's clinical condition and my plan of care is as follows. Assessment / Plan:  Hypotension (Noted to have BP of 86/41 in ED)  In settings of Persisting Diarrhea and Lower GI bleed  Has history of C.diff but recent C.diff negative  F/u stool studies ordered in ED  Hold Diuretics  IVF  Monitor fluid status closely as risk for volume overload  Start Questran to help with diarrhea (consider discharge on same if it works)  Diarrhea could be related to metformin as she is taking in settings of CKD3, stop metformin  Hold Eliquis  GI consult  Monitor H&H  PPIs  Clear liquid for now as reported intermittent nausea and vomiting  PRN Zofran    ? Recent PNA  Pt has mild chronic cough which seems to be part of COPD  CXR recently not a good quality suggested ?  Airspace disease vs atelactasis  Will Check PA/Lateral CXR for better view  Hold for on further abx while awaiting for CXR  No leukocytosis or fever    H/o Atrial fibrillation  Continue amiodarone  Holding Eliquis as above    CAD with h/o recent stents in april  Claims not on ASA/plavix any more, may be due to being on Eliquis  Will ask cardiology input    COPD (chronic obstructive pulmonary disease)  Stable   Continue Px inhalers and PRN nebs    Type 2 diabetes mellitus with diabetic neuropathy  Hold metformin due to CKD and possible side affect with diarrhea  SSI  Consider different regimen for DM upon discharge    Chronic Systolic/Diastolic CHF  EF 30% in the past but 50% on recent 2D echo  Holding diuretics  Monitor fluid status closely while on IVF and holding diuretics    CKD (chronic kidney disease) stage 3  Monitor lytes closely while she is here    Code Status: Full  Surrogate Decision Maker: Daughter    DVT Prophylaxis: SCDs    Baseline: functional        Subjective:   CHIEF COMPLAINT: Diarrhea and blood in stool    HISTORY OF PRESENT ILLNESS:     Lexi Rodriguez is a 78 y.o.  female who presents with diarrhea and noticing blood in stool. As per patient, she is been suffering from diarrhea for past several months and has been having 6-8 episodes of liquidy stool every day. Pt since yesterday evening noticing blood in stool. Pt also reports intermittent nausea and vomiting and claims to have 2 episode of vomiting yesterday but non today. Pt denies any fever, chills, chest pain, cough, shortness of breath, problems urination. In ED pt noted to have blood pressure trended down to 86/41. Pt noted to have history of a.fib and CAD with h/o stents in April of this year. We were asked to admit for work up and evaluation of the above problems.      Past Medical History:   Diagnosis Date    Atrial fibrillation ([address] 2010    CAD (coronary artery disease)     stent    Chronic diastolic heart failure ([address] 2014    Chronic systolic HF (heart failure) ([address] 5/10/2017    2017 EF 25-30%    COPD     COPD (chronic obstructive pulmonary disease) ([address] 2010    Diabetes ([address]     Fibroid     Heart failure ([address]     History of Clostridium difficile infection 5/10/2017    2017 CDiff positive    Hypertension 2010    Hypotension 5/10/2017    Junctional tachycardia ([address] 5/10/2017    NIDDM (non-insulin dependent diabetes mellitus) 2010    Screening mammogram 5/4/10    SOB (shortness of breath) 2014        Past Surgical History:   Procedure Laterality Date    HX  SECTION      HX OTHER SURGICAL      adrenal gland removed    HX PACEMAKER      CT EXCISE ADRENAL GLAND         Social History   Substance Use Topics    Smoking status: Former Smoker     Types: Cigarettes     Quit date: 2009    Smokeless tobacco: Never Used    Alcohol use No Family History   Problem Relation Age of Onset    Heart Disease Mother     Diabetes Father     Heart Disease Brother      Allergies   Allergen Reactions    Crestor [Rosuvastatin] Myalgia    Levaquin [Levofloxacin] Nausea Only     GI Upset    Lipitor [Atorvastatin] Diarrhea    Lyrica [Pregabalin] Myalgia        Prior to Admission medications    Medication Sig Start Date End Date Taking? Authorizing Provider   amiodarone (PACERONE) 100 mg tablet Take 100 mg by mouth nightly. Yes Shannan Castro MD   colchicine 0.6 mg tablet Take 1.2 mg by mouth daily. Patient takes 1.2 mg daily and 2.4 mg PRN flare up   Yes Shannan Castro MD   acetaminophen (TYLENOL) 500 mg tablet Take 500 mg by mouth every six (6) hours as needed for Pain. Yes Shannan Castro MD   furosemide (LASIX) 20 mg tablet Take 20 mg by mouth two (2) times a day. Yes Shannan Castro MD   doxycycline (VIBRA-TABS) 100 mg tablet Take 1 Tab by mouth two (2) times a day for 7 days. 9/20/17 9/27/17 Yes Jamil Hatfield MD   apixaban (ELIQUIS) 5 mg tablet Take 1 Tab by mouth two (2) times a day. Indications: PREVENT THROMBOEMBOLISM IN CHRONIC ATRIAL FIBRILLATION 7/31/17  Yes Iza Morton NP   carvedilol (COREG) 6.25 mg tablet Take 1 Tab by mouth two (2) times daily (with meals). 6/16/17  Yes Renan Solis NP   fluticasone (FLONASE) 50 mcg/actuation nasal spray 2 Sprays by Both Nostrils route every evening.    Yes Shannan Castro MD   gabapentin (NEURONTIN) 800 mg tablet TAKE ONE TABLET BY MOUTH THREE TIMES DAILY 3/6/17  Yes Lovely Osman NP   tiotropium (SPIRIVA WITH HANDIHALER) 18 mcg inhalation capsule INHALE THE CONTENTS OF 1 CAPSULE THROUGH HANDIHALER DEVICE DAILY 10/31/16  Yes Lovely Osman NP   metFORMIN (GLUCOPHAGE) 1,000 mg tablet TAKE 1 TABLET BY MOUTH TWICE DAILY WITH MEALS  Indications: PREVENTION OF TYPE 2 DIABETES MELLITUS 10/31/16  Yes Lovely Osman NP   budesonide-formoterol (SYMBICORT) 160-4.5 mcg/actuation HFA inhaler Take 1 Puff by inhalation two (2) times a day. 7/12/16  Yes Keeley Neumann NP   albuterol (VENTOLIN HFA) 90 mcg/actuation inhaler Take 2 Puffs by inhalation every six (6) hours as needed for Wheezing. 3/8/16  Yes Citlalli Gray MD   Azelastine (ASTEPRO) 0.15 % (205.5 mcg) nasal spray 1 [place] by Both Nostrils route daily. 1/21/15  Yes Historical Provider   cod liver oil cap Take 1 Cap by mouth daily. Yes Historical Provider   colchicine 0.6 mg tablet Take 2.4 mg by mouth daily as needed (glout flare up). Patient takes 1.2 mg daily and 2.4 mg PRN flare up    Phys Gloria, MD   albuterol (PROVENTIL VENTOLIN) 2.5 mg /3 mL (0.083 %) nebulizer solution 3 mL by Nebulization route every four (4) hours as needed for Wheezing. 5/19/16   Keeley Neumann NP       REVIEW OF SYSTEMS:     I am not able to complete the review of systems because:    The patient is intubated and sedated    The patient has altered mental status due to his acute medical problems    The patient has baseline aphasia from prior stroke(s)    The patient has baseline dementia and is not reliable historian    The patient is in acute medical distress and unable to provide information           Total of 12 systems reviewed as follows:       POSITIVE= underlined text  Negative = text not underlined  General:  fever, chills, sweats, generalized weakness, weight loss/gain,      loss of appetite   Eyes:    blurred vision, eye pain, loss of vision, double vision  ENT:    rhinorrhea, pharyngitis   Respiratory:   cough, sputum production, SOB, [place], wheezing, pleuritic pain   Cardiology:   chest pain, palpitations, orthopnea, PND, edema, syncope   Gastrointestinal:  abdominal pain , N/V, diarrhea, dysphagia, constipation, bleeding   Genitourinary:  frequency, urgency, dysuria, hematuria, incontinence   Muskuloskeletal :  arthralgia, myalgia, back pain  Hematology:  easy bruising, nose or gum bleeding, lymphadenopathy   Dermatological: rash, ulceration, pruritis, color change / jaundice  Endocrine: hot flashes or polydipsia   Neurological:  headache, dizziness, confusion, focal weakness, paresthesia,     Speech difficulties, memory loss, gait difficulty  Psychological: Feelings of anxiety, depression, agitation    Objective:   VITALS:    Visit Vitals    BP (!) 102/38 (BP 1 Location: Left arm, BP Patient Position: At rest)    Pulse 76    Resp 17    Ht 5' 6\" (1.676 m)    Wt 69.9 kg (154 lb 1.6 oz)    SpO2 95%    BMI 24.87 kg/m2       PHYSICAL EXAM:    General:    Alert, cooperative, no distress, appears stated age. HEENT: Atraumatic, anicteric sclerae, pink conjunctivae     No oral ulcers, mucosa dry, throat clear, dentition fair  Neck:  Supple, symmetrical,  thyroid: non tender  Lungs:   Clear to auscultation bilaterally. No Wheezing or Rhonchi. No rales. Chest wall:  No tenderness  No Accessory muscle use. Heart:   Regular  rhythm,  No  murmur   No edema  Abdomen:   Soft, non-tender. Not distended. Bowel sounds normal  Extremities: No cyanosis. No clubbing,      Skin turgor normal, Capillary refill normal, Radial dial pulse 2+  Skin:     Not pale. Not Jaundiced  No rashes   Psych:  Good insight. Not depressed. Not anxious or agitated. Neurologic: EOMs intact. No facial asymmetry. No aphasia or slurred speech. Symmetrical strength, Sensation grossly intact.  Alert and oriented X 4.     _______________________________________________________________________  Care Plan discussed with:    Comments   Patient y    Family      RN y    Care Manager                    Consultant:  jacqueline ED physician   _______________________________________________________________________  Expected  Disposition:   Home with Family y   HH/PT/OT/RN    SNF/LTC    [facility]    ________________________________________________________________________  TOTAL TIME: 61 Minutes    Critical Care Provided     Minutes non procedure based      Comments    y Reviewed previous records   >50% of visit spent in counseling and coordination of care y Discussion with patient questions answered       ________________________________________________________________________  Signed: Robb Martin MD    Procedures: see electronic medical records for all procedures/Xrays and details which were not copied into this note but were reviewed prior to creation of Plan. LAB DATA REVIEWED:    Recent Results (from the past 24 hour(s))   CBC W/O DIFF    Collection Time: 09/22/17  7:01 AM   Result Value Ref Range    WBC 8.8 3.6 - 11.0 K/uL    RBC 4.27 3.80 - 5.20 M/uL    HGB 12.1 11.5 - 16.0 g/dL    HCT 35.3 35.0 - 47.0 %    MCV 82.7 80.0 - 99.0 FL    MCH 28.3 26.0 - 34.0 PG    MCHC 34.3 30.0 - 36.5 g/dL    RDW 16.4 (H) 11.5 - 14.5 %    PLATELET 781 893 - 446 K/uL   METABOLIC PANEL, COMPREHENSIVE    Collection Time: 09/22/17  7:01 AM   Result Value Ref Range    Sodium 137 136 - 145 mmol/L    Potassium 4.0 3.5 - 5.1 mmol/L    Chloride 106 97 - 108 mmol/L    CO2 22 21 - 32 mmol/L    Anion gap 9 5 - 15 mmol/L    Glucose 87 65 - 100 mg/dL    BUN 19 6 - 20 MG/DL    Creatinine 1.44 (H) 0.55 - 1.02 MG/DL    BUN/Creatinine ratio 13 12 - 20      GFR est AA 44 (L) >60 ml/min/1.73m2    GFR est non-AA 36 (L) >60 ml/min/1.73m2    Calcium 8.4 (L) 8.5 - 10.1 MG/DL    Bilirubin, total 0.6 0.2 - 1.0 MG/DL    ALT (SGPT) 61 12 - 78 U/L    AST (SGOT) 140 (H) 15 - 37 U/L    Alk.  phosphatase 302 (H) 45 - 117 U/L    Protein, total 9.0 (H) 6.4 - 8.2 g/dL    Albumin 2.7 (L) 3.5 - 5.0 g/dL    Globulin 6.3 (H) 2.0 - 4.0 g/dL    A-G Ratio 0.4 (L) 1.1 - 2.2     LIPASE    Collection Time: 09/22/17  7:01 AM   Result Value Ref Range    Lipase 105 73 - 393 U/L   CBC WITH AUTOMATED DIFF    Collection Time: 09/22/17  7:01 AM   Result Value Ref Range    WBC 8.9 3.6 - 11.0 K/uL    RBC 4.33 3.80 - 5.20 M/uL    HGB 11.9 11.5 - 16.0 g/dL    HCT 35.5 35.0 - 47.0 %    MCV 82.0 80.0 - 99.0 FL    MCH 27.5 26.0 - 34.0 PG    MCHC 33.5 30.0 - 36.5 g/dL    RDW 16.5 (H) 11.5 - 14.5 %    PLATELET 887 955 - 177 K/uL NEUTROPHILS 63 32 - 75 %    LYMPHOCYTES 19 12 - 49 %    MONOCYTES 17 (H) 5 - 13 %    EOSINOPHILS 1 0 - 7 %    BASOPHILS 0 0 - 1 %    ABS. NEUTROPHILS 5.6 1.8 - 8.0 K/UL    ABS. LYMPHOCYTES 1.7 0.8 - 3.5 K/UL    ABS. MONOCYTES 1.5 (H) 0.0 - 1.0 K/UL    ABS. EOSINOPHILS 0.1 0.0 - 0.4 K/UL    ABS.  BASOPHILS 0.0 0.0 - 0.1 K/UL   TROPONIN I    Collection Time: 09/22/17  7:01 AM   Result Value Ref Range    Troponin-I, Qt. <0.04 <0.05 ng/mL   OCCULT BLOOD, STOOL    Collection Time: 09/22/17  7:04 AM   Result Value Ref Range    Occult blood, stool POSITIVE (A) NEG     LACTIC ACID    Collection Time: 09/22/17  7:46 AM   Result Value Ref Range    Lactic acid 1.5 0.4 - 2.0 MMOL/L

## 2017-09-22 NOTE — ED NOTES
Patient sleeping on stretcher with eyes closed. Easily arouses to name. Call bell remains within reach.  Will continue to monitor

## 2017-09-22 NOTE — PROGRESS NOTES
Readmission Assessment -     Patient previously hospitalized 9/13/2017 to 9/15/2017 for dehydration and acute renal failure. ED visi 9/20/2017 for SOB and pneumonia    Today, 78 y.o. female with PMhx significant for DM, HTN, atrial fibrillation, COPD, heart failure, and CAD who presents via EMS to the ED with cc of persistent dark stools with clots x 7, suprapubic pain, nausea, vomiting, and lightheadedness beginning 9/21 evening. She states she is unsure the quantity of blood, but notes dark blood filled the toilet bowl. Pt reports dizziness x 1 week with numerous falls, but denies any head trauma or LOC. Patient reports she has had diarrhea off and on since July of this year and has lost approximately 27 lbs. She has a GI appointment scheduled for 9/29/2017 with Dr. Marisel Calle. Patient states she has remained independent with all ADL's and does not have access to any DME at home but has not needed any. Patient states she has had follow-up with her PCP, office visits and phone calls. Care Management Interventions  PCP Verified by CM: Yes (PCP - NP - [facility] - [phone])  Transition of Care Consult (CM Consult): Other (Readmission Assessment )  MyChart Signup: No  Discharge Durable Medical Equipment: No (Patient denies any current DME)  Physical Therapy Consult: No  Occupational Therapy Consult: No  Speech Therapy Consult: No  Current Support Network: Own Home, Family Lives Nearby, Other (Patient's adult brother and 11 yo grandson live with her in 2 story home - daughter lives nearby)  Confirm Follow Up Transport: Family  Plan discussed with Pt/Family/Caregiver: Yes    CM will continue to follow for further needs.     Anna Talavera, RN, BSN, ACM  ED Case Manager  [phone]

## 2017-09-22 NOTE — PROGRESS NOTES
Pharmacy Clarification of Prior to Admission Medication Regimen     The patient was interviewed regarding clarification of the prior to admission medication regimen and was questioned regarding use of any other inhalers, topical products, over the counter medications, herbal medications, vitamin products or ophthalmic/nasal/otic medication use. Information Obtained From: Patient, RX Query    Pertinent Pharmacy Findings:   albuterol (PROVENTIL VENTOLIN) 2.5 mg /3 mL (0.083 %) nebulizer solution: Patient stated she has this agent at home but has not used it 'in a while'   colchicine 0.6 mg tablet, PRN: Patient stated she has this agent at home but has not used it 'in a while' for PRN uses, (but she does take 1.2 mg daily)   Identified High Alert Medication Information  o Current Anticoagulants:  - Name: apixaban (ELIQUIS) 5 mg tablet    PTA medication list was corrected to the following:     Prior to Admission Medications   Prescriptions Last Dose Informant Patient Reported? Taking? Azelastine (ASTEPRO) 0.15 % (205.5 mcg) nasal spray 2017 at Unknown time Self Yes Yes   Si [place] by Both Nostrils route daily. acetaminophen (TYLENOL) 500 mg tablet 2017 at Unknown time Self Yes Yes   Sig: Take 500 mg by mouth every six (6) hours as needed for Pain. albuterol (PROVENTIL VENTOLIN) 2.5 mg /3 mL (0.083 %) nebulizer solution Not Taking at Unknown time Self No No   Sig: 3 mL by Nebulization route every four (4) hours as needed for Wheezing. albuterol (VENTOLIN HFA) 90 mcg/actuation inhaler 2017 at Unknown time Self No Yes   Sig: Take 2 Puffs by inhalation every six (6) hours as needed for Wheezing. amiodarone (PACERONE) 100 mg tablet 2017 at Unknown time Self Yes Yes   Sig: Take 100 mg by mouth nightly. apixaban (ELIQUIS) 5 mg tablet 2017 at Unknown time Self No Yes   Sig: Take 1 Tab by mouth two (2) times a day.  Indications: PREVENT THROMBOEMBOLISM IN CHRONIC ATRIAL FIBRILLATION budesonide-formoterol (SYMBICORT) 160-4.5 mcg/actuation HFA inhaler 2017 at Unknown time Self No Yes   Sig: Take 1 Puff by inhalation two (2) times a day. carvedilol (COREG) 6.25 mg tablet 2017 at Unknown time Self No Yes   Sig: Take 1 Tab by mouth two (2) times daily (with meals). cod liver oil cap 2017 at Unknown time Self Yes Yes   Sig: Take 1 Cap by mouth daily. colchicine 0.6 mg tablet 2017 at Unknown time Self Yes Yes   Sig: Take 1.2 mg by mouth daily. Patient takes 1.2 mg daily and 2.4 mg PRN flare up   colchicine 0.6 mg tablet Not Taking at Unknown time Self Yes No   Sig: Take 2.4 mg by mouth daily as needed (glout flare up). Patient takes 1.2 mg daily and 2.4 mg PRN flare up   doxycycline (VIBRA-TABS) 100 mg tablet 2017 at 0500 Self No Yes   Sig: Take 1 Tab by mouth two (2) times a day for 7 days. fluticasone (FLONASE) 50 mcg/actuation nasal spray 2017 at Unknown time Self Yes Yes   Si Sprays by Both Nostrils route every evening.    furosemide (LASIX) 20 mg tablet 2017 at Unknown time Self Yes Yes   Sig: Take 20 mg by mouth two (2) times a day.   gabapentin (NEURONTIN) 800 mg tablet 2017 at Unknown time Self No Yes   Sig: TAKE ONE TABLET BY MOUTH THREE TIMES DAILY   metFORMIN (GLUCOPHAGE) 1,000 mg tablet 2017 at Unknown time Self No Yes   Sig: TAKE 1 TABLET BY MOUTH TWICE DAILY WITH MEALS  Indications: PREVENTION OF TYPE 2 DIABETES MELLITUS   tiotropium (SPIRIVA WITH HANDIHALER) 18 mcg inhalation capsule 2017 at Unknown time Self No Yes   Sig: INHALE THE CONTENTS OF 1 CAPSULE THROUGH HANDIHALER DEVICE DAILY      Facility-Administered Medications: None          Thank you,  Flower Pierre, [facility]  Medication History Pharmacy Technician

## 2017-09-22 NOTE — IP AVS SNAPSHOT
[address] [facility] 
[phone] Patient: Saravanan Richardson MRN: [id number] EOY:[id number] Current Discharge Medication List  
  
START taking these medications Dose & Instructions Dispensing Information Comments Morning Noon Evening Bedtime  
 cholestyramine-aspartame 4 gram packet Commonly known as:  Emilie Ambrocio Your last dose was: You have been refusing this medication Your next dose is: If you decide to take it, start with your next meal  
   
 Dose:  4 g Take 1 Packet by mouth three (3) times daily (with meals). Quantity:  90 Packet Refills:  0  
     
  
   
  
   
  
   
  
 famotidine 20 mg tablet Commonly known as:  PEPCID Your last dose was: Today Your next dose is:  Tomorrow Dose:  20 mg Take 1 Tab by mouth daily. Quantity:  60 Tab Refills:  0  
     
  
   
   
   
  
 hydrocortisone 25 mg Supp Commonly known as:  ANUSOL-HC Your last dose was: Today this AM  
Your next dose is: Today this PM  
   
 Dose:  25 mg Insert 1 Suppository into rectum every twelve (12) hours for 4 days. Quantity:  8 Suppository Refills:  0 CONTINUE these medications which have CHANGED Dose & Instructions Dispensing Information Comments Morning Noon Evening Bedtime  
 albuterol 2.5 mg /3 mL (0.083 %) nebulizer solution Commonly known as:  PROVENTIL VENTOLIN What changed:  Another medication with the same name was removed. Continue taking this medication, and follow the directions you see here. Your last dose was:  Prior to admission Your next dose is:  As needed Dose:  2.5 mg  
3 mL by Nebulization route every four (4) hours as needed for Wheezing. Quantity:  1 Package Refills:  5  
     
   
   
   
  
 amiodarone 100 mg tablet Commonly known as:  Anil Prater  
 What changed:  Another medication with the same name was removed. Continue taking this medication, and follow the directions you see here. Your last dose was: Last night Your next dose is: Tonight Dose:  100 mg Take 100 mg by mouth nightly. Refills:  0  
     
   
   
   
  
  
 colchicine 0.6 mg tablet What changed:  Another medication with the same name was removed. Continue taking this medication, and follow the directions you see here. Your last dose was: Today Your next dose is:  Tomorrow Dose:  1.2 mg Take 1.2 mg by mouth daily. Patient takes 1.2 mg daily and 2.4 mg PRN flare up Refills:  0  
     
  
   
   
   
  
 furosemide 20 mg tablet Commonly known as:  LASIX What changed:  when to take this Your last dose was: Today Your next dose is:  Tomorrow Dose:  20 mg Take 1 Tab by mouth daily. Quantity:  30 Tab Refills:  0 CONTINUE these medications which have NOT CHANGED Dose & Instructions Dispensing Information Comments Morning Noon Evening Bedtime  
 acetaminophen 500 mg tablet Commonly known as:  TYLENOL Your last dose was:  Prior to admission Your next dose is:  As needed Dose:  500 mg Take 500 mg by mouth every six (6) hours as needed for Pain. Refills:  0  
     
   
   
   
  
 apixaban 5 mg tablet Commonly known as:  Nellis Rashard Your last dose was:  Prior to admission Your next dose is: Today this PM  
   
 Dose:  5 mg Take 1 Tab by mouth two (2) times a day. Indications: PREVENT THROMBOEMBOLISM IN CHRONIC ATRIAL FIBRILLATION Quantity:  180 Tab Refills:  3 Azelastine 0.15 % (205.5 mcg) nasal spray Commonly known as:  ASTEPRO Your last dose was: Today Your next dose is:  Tomorrow Dose:  1 Spray 1 [place] by Both Nostrils route daily.   
 Refills:  0  
     
  
   
   
   
  
 budesonide-formoterol 160-4.5 mcg/actuation Hfaa  
 Commonly known as:  SYMBICORT Your last dose was: Today this AM  
Your next dose is: Today this PM  
   
 Dose:  1 Puff Take 1 Puff by inhalation two (2) times a day. Quantity:  3 Inhaler Refills:  3  
     
  
   
   
   
  
  
 carvedilol 6.25 mg tablet Commonly known as:  Chariton Pennant Your last dose was: Today this PM  
Your next dose is:  Tomorrow Dose:  6.25 mg Take 1 Tab by mouth two (2) times daily (with meals). Quantity:  60 Tab Refills:  11  
     
  
   
   
  
   
  
 cod liver oil Cap Your last dose was:  Prior to admission Dose:  1 Cap Take 1 Cap by mouth daily. Refills:  0  
     
  
   
   
   
  
 FLONASE 50 mcg/actuation nasal spray Generic drug:  fluticasone Your last dose was:  Prior to admission Your next dose is: This evening Dose:  2 Spray 2 Sprays by Both Nostrils route every evening. Refills:  0  
     
   
   
  
   
  
 gabapentin 800 mg tablet Commonly known as:  NEURONTIN Your last dose was: Today this AM  
Your next dose is: Today this PM  
   
 TAKE ONE TABLET BY MOUTH THREE TIMES DAILY Quantity:  90 Tab Refills:  11 Afternoon PLAVIX 75 mg Tab Generic drug:  clopidogrel Your last dose was: Today this AM  
Your next dose is:  Tomorrow Dose:  75 mg Take 75 mg by mouth daily. Refills:  0  
     
  
   
   
   
  
 tiotropium 18 mcg inhalation capsule Commonly known as:  IG [employer]uit[employer] Gui Credporta Apokalyyis Your last dose was: Today Your next dose is:  Tomorrow INHALE THE CONTENTS OF 1 CAPSULE THROUGH HANDIHALER DEVICE DAILY Quantity:  90 Cap Refills:  3 STOP taking these medications   
 cholestyramine 4 gram packet Commonly known as:  QUESTRAN  
   
  
 doxycycline 100 mg tablet Commonly known as:  VIBRA-TABS  
   
  
 metFORMIN 1,000 mg tablet Commonly known as:  GLUCOPHAGE Where to Get Your Medications Information on where to get these meds will be given to you by the nurse or doctor. ! Ask your nurse or doctor about these medications  
  cholestyramine-aspartame 4 gram packet  
 famotidine 20 mg tablet  
 furosemide 20 mg tablet  
 hydrocortisone 25 mg Supp

## 2017-09-22 NOTE — CONSULTS
[id number] [facility] [address] [facility]     Date of  Admission: 2017  6:44 AM     Admission type:Emergency    Consult for: GIB with recent stents and afib  Consult by: hospitalist      Subjective:     Reza Dawn is a 78 y.o. female with PMH a-fib s/p ablation (), HF, CAD s/p stent (), DM, HTN, COPD who was admitted for Hypotension. Per ED Provider notes, Ms. Ulysses Richards presented for c/o diarrhea with dark stool and clots. She was hypotensive. She had a recent admission last week, also for diarrhea and MODE from dehydration. Ms. Saman Wallace endorses diarrhea since . She also endorses n/v yesterday and nausea today. She has intermittent leg swelling neuropathy in her feet, which she felt correlated to beginning the Eliquis. There was some confusion in the chart on whether Ms. Ulysses Richards was taking her Plavix, but she has been, she just knows the medication by it's generic name instead of brand name. Ms. Ulysses Richards follows with Dr. Markel Patel for cardiology and Dr. Ebony Martinez for EP. Last ECHO    EF 55-60%; NWMA; LA mod dilated; mild MR; mild-mod AR. Cardioversion 17. Cath  with stent placement.      Cardiac risk factors: smoking/ tobacco exposure, family history, diabetes mellitus, hypertension, post-menopausal.      Patient Active Problem List    Diagnosis Date Noted    CKD (chronic kidney disease) stage 3, GFR 30-59 ml/min 2017    Acute renal failure (ARF) ([address] 2017    Chronic systolic HF (heart failure) ([address] 05/10/2017    History of Clostridium difficile infection 05/10/2017    Junctional tachycardia ([address] 05/10/2017    Hypotension 05/10/2017    Tachycardia 2017    S/P ablation of atrial fibrillation 2017    Fear associated with illness and body function 2017    Counseling regarding advanced care planning and goals of care     Systolic CHF, acute ([facility] 64/52/4488    Acute systolic CH[facility] (congestive heart failure) [facility] 04/06/2017    CHF (congestive heart failure) ([facility] 04/04/2017    Type 2 diabetes mellitus with diabetic neuropathy, without long-term current use of insulin ([facility] 01/31/2017    Anticoagulation monitoring, INR range 2-3 01/31/2017    GIB (gastrointestinal bleeding) 04/86/0995    Diastolic CHF, acute on chronic (HCC) 09/22/2014    S/P coronary artery stent placement 07/04/2014    Back pain 11/14/2012    Sinoatrial node dysfunction ([facility] 07/05/2012    Cardiac pacemaker in situ 07/05/2012    Mixed hyperlipidemia 07/05/2012    Chest pain 09/21/2011    Anemia 07/25/2011    Sick sinus syndrome ([facility] 02/25/2011    S/P angioplasty with stent 02/07/2011    Hypokalemia 07/20/2010    Hip pain 06/08/2010    Hypertension--essential 06/02/2010    Atrial fibrillation--paroxysmal 06/02/2010    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      Ginger Nguyen NP  Past Medical History:   Diagnosis Date    Atrial fibrillation ([facility] 6/2/2010    CAD (coronary artery disease)     stent    Chronic diastolic heart failure ([facility] 9/22/2014    Chronic systolic HF (heart failure) ([facility] 5/10/2017    4/2017 EF 25-30%    COPD     COPD (chronic obstructive pulmonary disease) ([facility] 6/2/2010    Diabetes ([facility]     Fibroid     Heart failure ([facility]     History of Clostridium difficile infection 5/10/2017    4/2017 CDiff positive    Hypertension 6/2/2010    Hypotension 5/10/2017    Junctional tachycardia ([address] 5/10/2017    NIDDM (non-insulin dependent diabetes mellitus) 6/2/2010    Screening mammogram 5/4/10    SOB (shortness of breath) 9/22/2014      Social History     Social History    Marital status:      Spouse name: N/A    Number of children: N/A    Years of education: N/A     Social History Main Topics    Smoking status: Former Smoker     Types: Cigarettes     Quit date: 12/31/2009    Smokeless tobacco: Never Used    Alcohol use No    Drug use: No    Sexual activity: Not Currently     Other Topics Concern    Not on file     Social History Narrative     Allergies   Allergen Reactions    Crestor [Rosuvastatin] Myalgia    Levaquin [Levofloxacin] Nausea Only     GI Upset    Lipitor [Atorvastatin] Diarrhea    Lyrica [Pregabalin] Myalgia      Family History   Problem Relation Age of Onset    Heart Disease Mother     Diabetes Father     Heart Disease Brother       Current Facility-Administered Medications   Medication Dose Route Frequency    0.9% sodium chloride infusion  50 mL/hr IntraVENous CONTINUOUS    sodium chloride (NS) flush 5-10 mL  5-10 mL IntraVENous Q8H    sodium chloride (NS) flush 5-10 mL  5-10 mL IntraVENous PRN    cholestyramine-aspartame (QUESTRAN LIGHT) packet 4 g  4 g Oral TID WITH MEALS    [START ON 9/23/2017] pantoprazole (PROTONIX) 40 mg in sodium chloride 0.9 % 10 mL injection  40 mg IntraVENous DAILY    albuterol (PROVENTIL VENTOLIN) nebulizer solution 2.5 mg  2.5 mg Nebulization Q4H PRN    amiodarone (CORDARONE) tablet 100 mg  100 mg Oral QHS    [START ON 9/23/2017] azelastine (ASTELIN) 137mcg/spray nasal spray  1 Spray Both Nostrils DAILY    [START ON 9/23/2017] fluticasone-vilanterol (BREO ELLIPTA) 100mcg-25mcg/puff  1 Puff Inhalation DAILY    carvedilol (COREG) tablet 6.25 mg  6.25 mg Oral BID WITH MEALS    [START ON 9/23/2017] colchicine tablet 0.6 mg  0.6 mg Oral DAILY    [START ON 9/23/2017] umeclidinium (INCRUSE ELLIPTA) 62.5 mcg/actuation  1 Puff Inhalation DAILY    insulin lispro (HUMALOG) injection   SubCUTAneous AC&HS    glucose chewable tablet 16 g  4 Tab Oral PRN    glucagon (GLUCAGEN) injection 1 mg  1 mg IntraMUSCular PRN    dextrose 10% infusion 125-250 mL  125-250 mL IntraVENous PRN    gabapentin (NEURONTIN) capsule 800 mg  800 mg Oral TID    [START ON 9/23/2017] influenza vaccine 2017-18 (3 yrs+)(PF) (FLUZONE QUAD/FLUARIX QUAD) injection 0.5 mL  0.5 mL IntraMUSCular PRIOR TO DISCHARGE        Review of Symptoms:   Constitutional: negative  Eyes: negative   Ears, nose, mouth, throat, and face: negative  Respiratory: negative   Cardiovascular: negative   Gastrointestinal: diarrhea, n/v   Genitourinary:negative   Musculoskeletal:intermittent leg swelling   Neurological: numbness in feet   Endocrine: negative          Objective:      Visit Vitals    /52 (BP 1 Location: Left arm, BP Patient Position: Sitting)    Pulse 76    Temp 97.8 °F (36.6 °C)    Resp 18    Ht 5' 6\" (1.676 m)    Wt 69.9 kg (154 lb 1.6 oz)    SpO2 95%    Breastfeeding No    BMI 24.87 kg/m2       Physical:   General: pleasant, AAF resting in bed in NAD   Heart: RRR, no m/S3/JVD  Lungs: clear   Abdomen: Soft, +BS, NTND   Extremities: weak distal pulses, no edema   Neurologic: Grossly normal    Data Review:   Recent Labs      09/22/17   1130  09/22/17   0701  09/20/17   1002   WBC  7.8  8.9  8.8  8.0   HGB  10.9*  11.9  12.1  10.4*   HCT  33.1*  35.5  35.3  31.2*   PLT  277  301  289  258     Recent Labs      09/22/17   0701  09/20/17   1002   NA  137  136   K  4.0  3.7   CL  106  103   CO2  22  20*   GLU  87  104*   BUN  19  22*   CREA  1.44*  1.77*   CA  8.4*  7.9*   ALB  2.7*  2.5*   TBILI  0.6  0.3   SGOT  140*  145*   ALT  61  62       Recent Labs      09/22/17   0701  09/20/17   1002   TROIQ  <0.04  <0.04   CPK   --   114   CKMB   --   2.0       No intake or output data in the 24 hours ending 09/22/17 1646     Cardiographics    Telemetry: regular a-paced   ECG: a-paced with 1st degree AVB;LBBB  Echocardiogram: last as above   CXRAY: no acute process        Assessment:       Active Problems:    Atrial fibrillation--paroxysmal (6/2/2010)      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 female who presented with dark stool with clots and n/v.  Endorses diarrhea since June. Recent admission for hypotension, diarrhea, MODE last week. Dehydration contributed to by the diarrhea and her diuretic for her chronic HF. Recent stent on plavix and on Eliquis for a-fib s/p ablation/cardioversion. · eliquis on hold for GIB. Await GI input. · Continue amiodarone and BB for a-fib  · Lasix on hold for dehydration  · Continue BB for HF/CAD and plavix for CAD/recent stent. Would ideally keep plavix uninterrupted for 1 year from stent placement in April. · LFTs elevated, so will hold home pravastatin   · DM, COPD per hospitalist        Thank you for consulting [facility]      Anny Shipley NP  DNP, RN, AGACNP-BC      Pt seen and examined in details. Agree with NP A&P. Hold NOAC for now due to concern of bleed. Would prefer DAPT due to previous stent. D/w pt Dr. En Galindo. Low dose aspirin for now. If clinical course determines that she is not candidate for long term oral AC, then watchman can be considered.      Catalina Mata MD

## 2017-09-23 LAB
ANION GAP SERPL CALC-SCNC: 10 MMOL/L (ref 5–15)
BASOPHILS # BLD: 0 K/UL (ref 0–0.1)
BASOPHILS NFR BLD: 0 % (ref 0–1)
BUN SERPL-MCNC: 11 MG/DL (ref 6–20)
BUN/CREAT SERPL: 9 (ref 12–20)
CALCIUM SERPL-MCNC: 7.6 MG/DL (ref 8.5–10.1)
CHLORIDE SERPL-SCNC: 111 MMOL/L (ref 97–108)
CO2 SERPL-SCNC: 19 MMOL/L (ref 21–32)
CREAT SERPL-MCNC: 1.29 MG/DL (ref 0.55–1.02)
EOSINOPHIL # BLD: 0.2 K/UL (ref 0–0.4)
EOSINOPHIL NFR BLD: 2 % (ref 0–7)
ERYTHROCYTE [DISTWIDTH] IN BLOOD BY AUTOMATED COUNT: 16.7 % (ref 11.5–14.5)
GLUCOSE BLD STRIP.AUTO-MCNC: 129 MG/DL (ref 65–100)
GLUCOSE BLD STRIP.AUTO-MCNC: 131 MG/DL (ref 65–100)
GLUCOSE BLD STRIP.AUTO-MCNC: 89 MG/DL (ref 65–100)
GLUCOSE BLD STRIP.AUTO-MCNC: 92 MG/DL (ref 65–100)
GLUCOSE SERPL-MCNC: 77 MG/DL (ref 65–100)
HCT VFR BLD AUTO: 29.3 % (ref 35–47)
HCT VFR BLD AUTO: 31.2 % (ref 35–47)
HCT VFR BLD AUTO: 34.3 % (ref 35–47)
HGB BLD-MCNC: 10.3 G/DL (ref 11.5–16)
HGB BLD-MCNC: 11.1 G/DL (ref 11.5–16)
HGB BLD-MCNC: 9.8 G/DL (ref 11.5–16)
LYMPHOCYTES # BLD: 1.4 K/UL (ref 0.8–3.5)
LYMPHOCYTES NFR BLD: 20 % (ref 12–49)
MCH RBC QN AUTO: 27.3 PG (ref 26–34)
MCHC RBC AUTO-ENTMCNC: 33 G/DL (ref 30–36.5)
MCV RBC AUTO: 82.8 FL (ref 80–99)
MONOCYTES # BLD: 1.2 K/UL (ref 0–1)
MONOCYTES NFR BLD: 18 % (ref 5–13)
NEUTS SEG # BLD: 4.1 K/UL (ref 1.8–8)
NEUTS SEG NFR BLD: 60 % (ref 32–75)
PLATELET # BLD AUTO: 275 K/UL (ref 150–400)
POTASSIUM SERPL-SCNC: 3.6 MMOL/L (ref 3.5–5.1)
RBC # BLD AUTO: 3.77 M/UL (ref 3.8–5.2)
SERVICE CMNT-IMP: ABNORMAL
SERVICE CMNT-IMP: ABNORMAL
SERVICE CMNT-IMP: NORMAL
SERVICE CMNT-IMP: NORMAL
SODIUM SERPL-SCNC: 140 MMOL/L (ref 136–145)
WBC # BLD AUTO: 6.9 K/UL (ref 3.6–11)
WBC #/AREA STL HPF: NORMAL /HPF (ref 0–4)

## 2017-09-23 PROCEDURE — 74011000250 HC RX REV CODE- 250: Performed by: INTERNAL MEDICINE

## 2017-09-23 PROCEDURE — 65660000000 HC RM CCU STEPDOWN

## 2017-09-23 PROCEDURE — 85018 HEMOGLOBIN: CPT | Performed by: INTERNAL MEDICINE

## 2017-09-23 PROCEDURE — 74011250637 HC RX REV CODE- 250/637: Performed by: INTERNAL MEDICINE

## 2017-09-23 PROCEDURE — C9113 INJ PANTOPRAZOLE SODIUM, VIA: HCPCS | Performed by: INTERNAL MEDICINE

## 2017-09-23 PROCEDURE — 74011250637 HC RX REV CODE- 250/637: Performed by: NURSE PRACTITIONER

## 2017-09-23 PROCEDURE — 80048 BASIC METABOLIC PNL TOTAL CA: CPT | Performed by: INTERNAL MEDICINE

## 2017-09-23 PROCEDURE — 85025 COMPLETE CBC W/AUTO DIFF WBC: CPT | Performed by: INTERNAL MEDICINE

## 2017-09-23 PROCEDURE — 36415 COLL VENOUS BLD VENIPUNCTURE: CPT | Performed by: INTERNAL MEDICINE

## 2017-09-23 PROCEDURE — 74011250636 HC RX REV CODE- 250/636: Performed by: INTERNAL MEDICINE

## 2017-09-23 PROCEDURE — 82962 GLUCOSE BLOOD TEST: CPT

## 2017-09-23 RX ORDER — GUAIFENESIN 100 MG/5ML
81 LIQUID (ML) ORAL DAILY
Status: DISCONTINUED | OUTPATIENT
Start: 2017-09-23 | End: 2017-09-27 | Stop reason: HOSPADM

## 2017-09-23 RX ADMIN — FLUTICASONE FUROATE AND VILANTEROL TRIFENATATE 1 PUFF: 100; 25 POWDER RESPIRATORY (INHALATION) at 09:36

## 2017-09-23 RX ADMIN — CARVEDILOL 6.25 MG: 6.25 TABLET, FILM COATED ORAL at 18:27

## 2017-09-23 RX ADMIN — AZELASTINE HYDROCHLORIDE 1 SPRAY: 137 SPRAY, METERED NASAL at 09:36

## 2017-09-23 RX ADMIN — UMECLIDINIUM 1 PUFF: 62.5 AEROSOL, POWDER ORAL at 09:35

## 2017-09-23 RX ADMIN — GABAPENTIN 800 MG: 300 CAPSULE ORAL at 09:26

## 2017-09-23 RX ADMIN — COLCHICINE 0.6 MG: 0.6 TABLET, FILM COATED ORAL at 09:36

## 2017-09-23 RX ADMIN — AMIODARONE HYDROCHLORIDE 100 MG: 200 TABLET ORAL at 21:04

## 2017-09-23 RX ADMIN — ASPIRIN 81 MG 81 MG: 81 TABLET ORAL at 13:15

## 2017-09-23 RX ADMIN — Medication 10 ML: at 21:04

## 2017-09-23 RX ADMIN — GABAPENTIN 800 MG: 300 CAPSULE ORAL at 21:04

## 2017-09-23 RX ADMIN — SODIUM CHLORIDE 40 MG: 9 INJECTION INTRAMUSCULAR; INTRAVENOUS; SUBCUTANEOUS at 09:25

## 2017-09-23 RX ADMIN — CLOPIDOGREL BISULFATE 75 MG: 75 TABLET, FILM COATED ORAL at 09:25

## 2017-09-23 RX ADMIN — GABAPENTIN 800 MG: 300 CAPSULE ORAL at 18:27

## 2017-09-23 RX ADMIN — Medication 10 ML: at 18:27

## 2017-09-23 NOTE — PROGRESS NOTES
Problem: Falls - Risk of  Goal: *Absence of Falls  Document Anthony Fall Risk and appropriate interventions in the flowsheet.    Outcome: Progressing Towards Goal  Fall Risk Interventions:              Medication Interventions: Bed/chair exit alarm, Evaluate medications/consider consulting pharmacy, Patient to call before getting OOB, Teach patient to arise slowly     Elimination Interventions: Bed/chair exit alarm, Call light in reach, Elevated toilet seat, Patient to call for help with toileting needs, Toilet paper/wipes in reach, Toileting schedule/hourly rounds     History of Falls Interventions: Consult care management for discharge planning, Door open when patient unattended, Evaluate medications/consider consulting pharmacy, Room close to nurse's station

## 2017-09-23 NOTE — PROGRESS NOTES
F/U for gi bleed  Kimo for Flor Starks     S: Ms. Mónica Rodas was seen by me today during rounds. At this time, she is resting + comfortably. The patient has no new complaints today. Please see admission consult for details of ROS; there are + changes today. She has diarrhea, but only with each meal will she have diarrhea and bleeding      O: Blood pressure 97/45, pulse 74, temperature 97.3 °F (36.3 °C), resp. rate 18, height 5' 6\" (1.676 m), weight 69.9 kg (154 lb 1.6 oz), SpO2 95 %, not currently breastfeeding. Gen: Patient is in no acute distress. There is no jaundice. Lungs: Clear to auscultation bilaterally . Heart +: RRR. Abd: Soft, non tender, non-distended, bowel sounds present. Extremities: Warm. Lab Results   Component Value Date/Time    WBC 6.9 09/23/2017 03:30 AM    Hemoglobin (POC) 11.2 07/20/2017 09:02 AM    HGB 10.3 09/23/2017 03:30 AM    HCT 31.2 09/23/2017 03:30 AM    PLATELET 302 63/80/3975 03:30 AM    MCV 82.8 09/23/2017 03:30 AM      Lab Results   Component Value Date/Time    Sodium 140 09/23/2017 03:30 AM    Potassium 3.6 09/23/2017 03:30 AM    Chloride 111 09/23/2017 03:30 AM    CO2 19 09/23/2017 03:30 AM    Anion gap 10 09/23/2017 03:30 AM    Glucose 77 09/23/2017 03:30 AM    BUN 11 09/23/2017 03:30 AM    Creatinine 1.29 09/23/2017 03:30 AM    BUN/Creatinine ratio 9 09/23/2017 03:30 AM    GFR est AA 50 09/23/2017 03:30 AM    GFR est non-AA 41 09/23/2017 03:30 AM    Calcium 7.6 09/23/2017 03:30 AM    Bilirubin, total 0.6 09/22/2017 07:01 AM    AST (SGOT) 140 09/22/2017 07:01 AM    Alk.  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   Continue plavix asa  Hold the anti coagulant  Diagnostic (+- one or two biopsies) egd and colonoscopy on 9.25 by Dr Nesha Stoddard MD  1:03 PM  9/23/2017

## 2017-09-23 NOTE — CARDIO/PULMONARY
Cardiopulmonary Rehab:    Chart reviewed. Pt is a 78 y.o. female with PMH a-fib s/p ablation (05/17), HF, CAD s/p stent (04/17), DM, HTN, COPD who was admitted for Hypotension/dehydration. Pt with GI bleed. Echo 7/10/17 indicated LVEF of 55-60%, up from 25-30% from 5/9/17. Pending EGD/colonoscopy monday. Pt visited. \" Heart failure: Avoiding Triggers and \"Heart Failure Zones\" handouts provided and reviewed. Discussed the CHF \"zones\" and subsequent actions Discussed Low NA diet, daily weights and when to call the doctor. States she hasn't had an appetite for several days. She is currently on a liquid diet, pending GI testing Monday. Pt verbalized understanding/without any questions at this time. Agus Mota

## 2017-09-23 NOTE — PROGRESS NOTES
Problem: Falls - Risk of  Goal: *Absence of Falls  Document Anthony Fall Risk and appropriate interventions in the flowsheet.    Outcome: Progressing Towards Goal  Fall Risk Interventions:              Medication Interventions: Evaluate medications/consider consulting pharmacy, Patient to call before getting OOB, Teach patient to arise slowly     Elimination Interventions: Bed/chair exit alarm, Call light in reach, Patient to call for help with toileting needs, Toileting schedule/hourly rounds     History of Falls Interventions: Consult care management for discharge planning, Door open when patient unattended, Evaluate medications/consider consulting pharmacy, Room close to nurse's station, Utilize gait belt for transfer/ambulation

## 2017-09-23 NOTE — PROGRESS NOTES
Visited by Iglesia Chavez Partner 9/23/17.     Romina Degree, MPS, [facility], [address] [place] [address] [phone]     Paging Service  [phone]

## 2017-09-23 NOTE — PROGRESS NOTES
[address] SHIFT NURSING NOTE    Bedside and Verbal shift change report given to [id number] SARARiley ANNRiley Treviño [facility] (oncoming nurse) by Bud Woo (offgoing nurse). Report included the following information SBAR. SHIFT SUMMARY:         Admission Date 9/22/2017   Admission Diagnosis Hypotension   Consults IP CONSULT TO HOSPITALIST  IP CONSULT TO GASTROENTEROLOGY  IP CONSULT TO CARDIOLOGY        Consults   [] PT   [] OT   [] Speech   [] Palliative      [] Hospice    [] Case Management   [] None   Cardiac Monitoring   [x] Yes   [] No     Antibiotics   [] Yes   [] No   GI Prophylaxis  (Ex: Protonix, Pepcid, etc,.)   [] Yes   [] No          DVT Prophylaxis   SCDs:  Sequential Compression Device: Bilateral          Mayo stockings:         [] Medication (Ex: Lovenox, Eliquis, Brilinta, Coumadin,  Heparin, etc..)   [] Contraindicated   [] No VTE needed       Urinary Catheter             LDAs               Peripheral IV 09/22/17 Right;Posterior (Active)   Site Assessment Clean, dry, & intact 9/22/2017  9:07 PM   Phlebitis Assessment 0 9/22/2017  9:07 PM   Infiltration Assessment 0 9/22/2017  9:07 PM   Dressing Status Clean, dry, & intact 9/22/2017  9:07 PM   Dressing Type Transparent;Tape 9/22/2017  9:07 PM   Hub Color/Line Status Pink; Infusing 9/22/2017  9:07 PM                      I/Os No intake or output data in the 24 hours ending 09/22/17 2110      Activity Level Activity Level: Up with Assistance     Activity Assistance: Partial (one person)   Diet Active Orders   Diet    DIET CLEAR LIQUID      Purposeful Rounding every 1-2 hour?    [x] Yes    Anthony Score  Total Score: 3   Bed Alarm (If score 3 or >)   [x] Yes    [] Refused (See signed refusal form in chart)   Landon Score  Landon Score: 18       Landon Score (if score 14 or less)   [] PMT consult   [] Nutrition consult   [] Wound Care consult      []  Specialty bed         Influenza Vaccine Received Flu Vaccine for Current Season (usually Sept-March): No    Patient/Guardian Refused (Notify MD): No          Needs prior to discharge:   Home O2 required:    [] Yes   [x] No     If yes, how much O2 required? Other:    Last Bowel Movement Date: 09/22/17   Readmission Risk Assessment Tool Score High Risk            36       Total Score        3 Has Seen PCP in Last 6 Months (Yes=3, No=0)    9 IP Visits Last 12 Months (1-3=4, 4=9, >4=11)    24 Charlson Comorbidity Score (Age + Comorbid Conditions)        Criteria that do not apply:    . Living with Significant Other. Assisted Living. LTAC. SNF. or   Rehab    Patient Length of Stay (>5 days = 3)    Pt.  Coverage (Medicare=5 , Medicaid, or Self-Pay=4)       Expected Length of Stay - - -   Actual Length of Stay 0

## 2017-09-23 NOTE — PROGRESS NOTES
Hospitalist Progress Note    NAME: Lesly Ricketts   :  1950   MRN:  [id number]       Interim Hospital Summary: 78 y.o. female whom presented on 2017 with      Assessment / Plan:  Hypotension (Noted to have BP of 86/41 in ED)  In settings of Persisting Diarrhea and Lower GI bleed   C.diff negative  Hold Diuretics  IVF los dose   Questran to help with diarrhea (consider discharge on same if it works)  Diarrhea could be related to metformin as she is taking in settings of CKD3, stop metformin  Hold Eliquis  GI following   egd and colonoscopy on  by Dr Ann De Jesus  Monitor H&H stable   PPIs  Clear liquid   PRN Zofran     ? Recent PNA   CXR  No infil  No leukocytosis or fever     H/o Atrial fibrillation  Continue amiodarone  Holding Eliquis as above     CAD with h/o recent stents in april  Holding eliquis  Cont asa/plvix per cardiology     COPD (chronic obstructive pulmonary disease) no issues  Stable   Continue Px inhalers and PRN nebs     Type 2 diabetes mellitus with diabetic neuropathy fs   Hold metformin   a1c  Cont ssi/gabpentin     Chronic Systolic/Diastolic CHF  EF 86% in the past but 50% on recent 2D echo  Holding diuretics  Monitor fluid status closely while on IVF low dose      CKD (chronic kidney disease) stage 3  Cr improving      Code Status: Full  Surrogate Decision Maker: Daughter     DVT Prophylaxis: SCDs     Baseline: functional           Subjective:     Chief Complaint / Reason for Physician Visit   + diarrhea, but only with each meal will she have diarrhea and bleeding  no c/p or sob. Discussed with RN events overnight.      Review of Systems:  Symptom Y/N Comments  Symptom Y/N Comments   Fever/Chills    Chest Pain     Poor Appetite    Edema     Cough    Abdominal Pain     Sputum    Joint Pain     SOB/GREENFIELD    Pruritis/Rash     Nausea/vomit    Tolerating PT/OT     Diarrhea    Tolerating Diet     Constipation    Other       Could NOT obtain due to:      Objective: VITALS:   Last 24hrs VS reviewed since prior progress note. Most recent are:  Patient Vitals for the past 24 hrs:   Temp Pulse Resp BP SpO2   09/23/17 1502 97.3 °F (36.3 °C) 75 18 100/48 91 %   09/23/17 1132 97.3 °F (36.3 °C) 74 18 97/45 95 %   09/23/17 0718 98.2 °F (36.8 °C) 75 18 98/52 92 %   09/23/17 0323 98.3 °F (36.8 °C) 66 16 106/50 93 %   09/22/17 2311 - - - 90/41 -   09/22/17 2300 97.9 °F (36.6 °C) 74 18 (!) 87/44 100 %   09/22/17 1933 98 °F (36.7 °C) 76 18 105/72 94 %       Intake/Output Summary (Last 24 hours) at 09/23/17 1620  Last data filed at 09/23/17 1505   Gross per 24 hour   Intake                0 ml   Output              550 ml   Net             -550 ml        PHYSICAL EXAM:  General: WD, WN. Alert, cooperative, no acute distress    EENT:  EOMI. Anicteric sclerae. MMM  Resp:  CTA bilaterally, no wheezing or rales. No accessory muscle use  CV:  Regular  rhythm,  No edema  GI:  Soft, Non distended, mild tender mid abd no rt.  +Bowel sounds  Neurologic:  Alert and oriented X 3, normal speech,   Psych:   Good insight. Not anxious nor agitated  Skin:  No rashes. No jaundice    Reviewed most current lab test results and cultures  YES  Reviewed most current radiology test results   YES  Review and summation of old records today    NO  Reviewed patient's current orders and MAR    YES  PMH/SH reviewed - no change compared to H&P  ________________________________________________________________________  Care Plan discussed with:    Comments   Patient x    Family      RN x    Care Manager     Consultant  x                      Multidiciplinary team rounds were held today with , nursing, pharmacist and clinical coordinator. Patient's plan of care was discussed; medications were reviewed and discharge planning was addressed.      ________________________________________________________________________  Total NON critical care TIME:  30   Minutes    Total CRITICAL CARE TIME Spent:   Minutes non procedure based      Comments   >50% of visit spent in counseling and coordination of care x    ________________________________________________________________________  Ayana Lopez MD     Procedures: see electronic medical records for all procedures/Xrays and details which were not copied into this note but were reviewed prior to creation of Plan. LABS:  I reviewed today's most current labs and imaging studies.   Pertinent labs include:  Recent Labs      09/23/17   1322  09/23/17   0330  09/22/17 2007 09/22/17   1130  09/22/17   0701   WBC   --   6.9   --   7.8  8.9  8.8   HGB  11.1*  10.3*  10.6*  10.9*  11.9  12.1   HCT  34.3*  31.2*  32.0*  33.1*  35.5  35.3   PLT   --   275   --   277  301  289     Recent Labs      09/23/17   0330 09/22/17   0701   NA  140  137   K  3.6  4.0   CL  111*  106   CO2  19*  22   GLU  77  87   BUN  11  19   CREA  1.29*  1.44*   CA  7.6*  8.4*   ALB   --   2.7*   TBILI   --   0.6   SGOT   --   140*   ALT   --   61       Signed: Ayana Lopez MD

## 2017-09-23 NOTE — PROGRESS NOTES
Problem: Falls - Risk of  Goal: *Absence of Falls  Document Anthony Fall Risk and appropriate interventions in the flowsheet.    Outcome: Progressing Towards Goal  Fall Risk Interventions:              Medication Interventions: Assess postural VS orthostatic hypotension, Teach patient to arise slowly, Patient to call before getting OOB     Elimination Interventions: Bed/chair exit alarm, Call light in reach     History of Falls Interventions: Bed/chair exit alarm, Door open when patient unattended, Room close to nurse's station

## 2017-09-23 NOTE — PROGRESS NOTES
[address] SHIFT NURSING NOTE    Bedside shift change report given to Reid Trujillo (oncoming nurse) by Kay Otoole (offgoing nurse). Report included the following information SBAR, Kardex, Intake/Output, MAR and Recent Results. SHIFT SUMMARY:         Admission Date 9/22/2017   Admission Diagnosis Hypotension   Consults IP CONSULT TO HOSPITALIST  IP CONSULT TO GASTROENTEROLOGY  IP CONSULT TO CARDIOLOGY        Consults   [] PT   [] OT   [] Speech   [] Palliative      [] Hospice    [x] Case Management   [] None   Cardiac Monitoring   [x] Yes   [] No     Antibiotics   [x] Yes   [] No   GI Prophylaxis  (Ex: Protonix, Pepcid, etc,.)   [x] Yes   [] No          DVT Prophylaxis   SCDs:  Sequential Compression Device: Bilateral          Mayo stockings:         [] Medication (Ex: Lovenox, Eliquis, Brilinta, Coumadin,  Heparin, etc.. )   [x] Contraindicated   [] No VTE needed       Urinary Catheter             LDAs               Peripheral IV 09/22/17 Right;Posterior (Active)   Site Assessment Clean, dry, & intact 9/23/2017  7:40 PM   Phlebitis Assessment 0 9/23/2017  7:40 PM   Infiltration Assessment 0 9/23/2017  7:40 PM   Dressing Status Clean, dry, & intact 9/23/2017  7:40 PM   Dressing Type Transparent 9/23/2017  7:40 PM   Hub Color/Line Status Pink; Infusing 9/23/2017  7:40 PM                      I/Os   Intake/Output Summary (Last 24 hours) at 09/23/17 1942  Last data filed at 09/23/17 1940   Gross per 24 hour   Intake          1391.67 ml   Output              550 ml   Net           841.67 ml         Activity Level Activity Level: Up with Assistance     Activity Assistance: Partial (one person)   Diet Active Orders   Diet    DIET CLEAR LIQUID      Purposeful Rounding every 1-2 hour?    [x] Yes    Anthony Score  Total Score: 3   Bed Alarm (If score 3 or >)   [x] Yes    [] Refused (See signed refusal form in chart)   Landon Score  Landon Score: 21       Landon Score (if score 14 or less)   [] PMT consult   [] Nutrition consult   [] Wound Care consult      []  Specialty bed         Influenza Vaccine Received Flu Vaccine for Current Season (usually Sept-March): No    Patient/Guardian Refused (Notify MD): No          Needs prior to discharge:   Home O2 required:    [] Yes   [x] No     If yes, how much O2 required? Other:    Last Bowel Movement Date: 09/22/17   Readmission Risk Assessment Tool Score High Risk            36       Total Score        3 Has Seen PCP in Last 6 Months (Yes=3, No=0)    9 IP Visits Last 12 Months (1-3=4, 4=9, >4=11)    24 Charlson Comorbidity Score (Age + Comorbid Conditions)        Criteria that do not apply:    . Living with Significant Other. Assisted Living. LTAC. SNF. or   Rehab    Patient Length of Stay (>5 days = 3)    Pt.  Coverage (Medicare=5 , Medicaid, or Self-Pay=4)       Expected Length of Stay - - -   Actual Length of Stay 1

## 2017-09-23 NOTE — PROGRESS NOTES
[address] SHIFT NURSING NOTE    Bedside and Verbal shift change report given to [facility] (oncoming nurse) by romeo (offgoing nurse). Report included the following information SBAR, Kardex, ED Summary, Procedure Summary, Intake/Output, MAR, Recent Results and Cardiac Rhythm nsr. SHIFT SUMMARY:   Pt takes 0.6 colchicine 2x/d    1010: paged dr Sam Vera. Dr Sam Vera notified of pt's home med usage noted above.      402 pm: paged dr Norine Favre

## 2017-09-24 ENCOUNTER — ANESTHESIA EVENT (OUTPATIENT)
Dept: ENDOSCOPY | Age: 67
DRG: 394 | End: 2017-09-24
Payer: COMMERCIAL

## 2017-09-24 LAB
ANION GAP SERPL CALC-SCNC: 10 MMOL/L (ref 5–15)
BACTERIA SPEC CULT: NORMAL
BASOPHILS # BLD: 0 K/UL (ref 0–0.1)
BASOPHILS NFR BLD: 0 % (ref 0–1)
BUN SERPL-MCNC: 6 MG/DL (ref 6–20)
BUN/CREAT SERPL: 5 (ref 12–20)
C JEJUNI+C COLI AG STL QL: NEGATIVE
CALCIUM SERPL-MCNC: 7.7 MG/DL (ref 8.5–10.1)
CHLORIDE SERPL-SCNC: 109 MMOL/L (ref 97–108)
CO2 SERPL-SCNC: 18 MMOL/L (ref 21–32)
CREAT SERPL-MCNC: 1.33 MG/DL (ref 0.55–1.02)
E COLI SXT1+2 STL IA: NEGATIVE
EOSINOPHIL # BLD: 0.1 K/UL (ref 0–0.4)
EOSINOPHIL NFR BLD: 2 % (ref 0–7)
ERYTHROCYTE [DISTWIDTH] IN BLOOD BY AUTOMATED COUNT: 16.6 % (ref 11.5–14.5)
EST. AVERAGE GLUCOSE BLD GHB EST-MCNC: 120 MG/DL
GLUCOSE BLD STRIP.AUTO-MCNC: 102 MG/DL (ref 65–100)
GLUCOSE BLD STRIP.AUTO-MCNC: 90 MG/DL (ref 65–100)
GLUCOSE BLD STRIP.AUTO-MCNC: 93 MG/DL (ref 65–100)
GLUCOSE BLD STRIP.AUTO-MCNC: 98 MG/DL (ref 65–100)
GLUCOSE SERPL-MCNC: 90 MG/DL (ref 65–100)
HBA1C MFR BLD: 5.8 % (ref 4.2–6.3)
HCT VFR BLD AUTO: 31.8 % (ref 35–47)
HCT VFR BLD AUTO: 31.8 % (ref 35–47)
HGB BLD-MCNC: 10.5 G/DL (ref 11.5–16)
HGB BLD-MCNC: 10.5 G/DL (ref 11.5–16)
LYMPHOCYTES # BLD: 1.8 K/UL (ref 0.8–3.5)
LYMPHOCYTES NFR BLD: 20 % (ref 12–49)
MCH RBC QN AUTO: 27.3 PG (ref 26–34)
MCHC RBC AUTO-ENTMCNC: 33 G/DL (ref 30–36.5)
MCV RBC AUTO: 82.8 FL (ref 80–99)
MONOCYTES # BLD: 1.1 K/UL (ref 0–1)
MONOCYTES NFR BLD: 12 % (ref 5–13)
NEUTS SEG # BLD: 6 K/UL (ref 1.8–8)
NEUTS SEG NFR BLD: 66 % (ref 32–75)
PLATELET # BLD AUTO: 252 K/UL (ref 150–400)
POTASSIUM SERPL-SCNC: 3.5 MMOL/L (ref 3.5–5.1)
RBC # BLD AUTO: 3.84 M/UL (ref 3.8–5.2)
SERVICE CMNT-IMP: ABNORMAL
SERVICE CMNT-IMP: NORMAL
SODIUM SERPL-SCNC: 137 MMOL/L (ref 136–145)
WBC # BLD AUTO: 9 K/UL (ref 3.6–11)

## 2017-09-24 PROCEDURE — 85018 HEMOGLOBIN: CPT | Performed by: INTERNAL MEDICINE

## 2017-09-24 PROCEDURE — 74011250637 HC RX REV CODE- 250/637: Performed by: INTERNAL MEDICINE

## 2017-09-24 PROCEDURE — 74011250637 HC RX REV CODE- 250/637: Performed by: NURSE PRACTITIONER

## 2017-09-24 PROCEDURE — C9113 INJ PANTOPRAZOLE SODIUM, VIA: HCPCS | Performed by: INTERNAL MEDICINE

## 2017-09-24 PROCEDURE — 74011250637 HC RX REV CODE- 250/637: Performed by: SPECIALIST

## 2017-09-24 PROCEDURE — 36415 COLL VENOUS BLD VENIPUNCTURE: CPT | Performed by: INTERNAL MEDICINE

## 2017-09-24 PROCEDURE — 65660000000 HC RM CCU STEPDOWN

## 2017-09-24 PROCEDURE — 74011250636 HC RX REV CODE- 250/636: Performed by: INTERNAL MEDICINE

## 2017-09-24 PROCEDURE — 74011000250 HC RX REV CODE- 250: Performed by: SPECIALIST

## 2017-09-24 PROCEDURE — 74011000250 HC RX REV CODE- 250: Performed by: INTERNAL MEDICINE

## 2017-09-24 PROCEDURE — 85025 COMPLETE CBC W/AUTO DIFF WBC: CPT | Performed by: INTERNAL MEDICINE

## 2017-09-24 PROCEDURE — 83036 HEMOGLOBIN GLYCOSYLATED A1C: CPT | Performed by: INTERNAL MEDICINE

## 2017-09-24 PROCEDURE — 80048 BASIC METABOLIC PNL TOTAL CA: CPT | Performed by: INTERNAL MEDICINE

## 2017-09-24 PROCEDURE — 82962 GLUCOSE BLOOD TEST: CPT

## 2017-09-24 RX ORDER — SODIUM CHLORIDE 0.9 % (FLUSH) 0.9 %
5-10 SYRINGE (ML) INJECTION EVERY 8 HOURS
Status: CANCELLED | OUTPATIENT
Start: 2017-09-24 | End: 2017-09-24

## 2017-09-24 RX ORDER — POLYETHYLENE GLYCOL 3350, SODIUM SULFATE ANHYDROUS, SODIUM BICARBONATE, SODIUM CHLORIDE, POTASSIUM CHLORIDE 236; 22.74; 6.74; 5.86; 2.97 G/4L; G/4L; G/4L; G/4L; G/4L
4000 POWDER, FOR SOLUTION ORAL ONCE
Status: COMPLETED | OUTPATIENT
Start: 2017-09-24 | End: 2017-09-24

## 2017-09-24 RX ORDER — SODIUM CHLORIDE 0.9 % (FLUSH) 0.9 %
5-10 SYRINGE (ML) INJECTION AS NEEDED
Status: CANCELLED | OUTPATIENT
Start: 2017-09-24 | End: 2017-09-24

## 2017-09-24 RX ORDER — SODIUM CHLORIDE 9 MG/ML
100 INJECTION, SOLUTION INTRAVENOUS CONTINUOUS
Status: CANCELLED | OUTPATIENT
Start: 2017-09-24 | End: 2017-09-24

## 2017-09-24 RX ORDER — BISACODYL 5 MG
10 TABLET, DELAYED RELEASE (ENTERIC COATED) ORAL
Status: COMPLETED | OUTPATIENT
Start: 2017-09-24 | End: 2017-09-24

## 2017-09-24 RX ADMIN — AZELASTINE HYDROCHLORIDE 1 SPRAY: 137 SPRAY, METERED NASAL at 08:41

## 2017-09-24 RX ADMIN — POLYETHYLENE GLYCOL 3350, SODIUM SULFATE ANHYDROUS, SODIUM BICARBONATE, SODIUM CHLORIDE, POTASSIUM CHLORIDE 4000 ML: 236; 22.74; 6.74; 5.86; 2.97 POWDER, FOR SOLUTION ORAL at 17:20

## 2017-09-24 RX ADMIN — ASPIRIN 81 MG 81 MG: 81 TABLET ORAL at 08:41

## 2017-09-24 RX ADMIN — SODIUM CHLORIDE 40 MG: 9 INJECTION INTRAMUSCULAR; INTRAVENOUS; SUBCUTANEOUS at 08:41

## 2017-09-24 RX ADMIN — AMIODARONE HYDROCHLORIDE 100 MG: 200 TABLET ORAL at 21:09

## 2017-09-24 RX ADMIN — CARVEDILOL 6.25 MG: 6.25 TABLET, FILM COATED ORAL at 08:40

## 2017-09-24 RX ADMIN — FLUTICASONE FUROATE AND VILANTEROL TRIFENATATE 1 PUFF: 100; 25 POWDER RESPIRATORY (INHALATION) at 08:42

## 2017-09-24 RX ADMIN — BISACODYL 10 MG: 5 TABLET, COATED ORAL at 14:46

## 2017-09-24 RX ADMIN — Medication 10 ML: at 17:28

## 2017-09-24 RX ADMIN — CLOPIDOGREL BISULFATE 75 MG: 75 TABLET, FILM COATED ORAL at 08:40

## 2017-09-24 RX ADMIN — UMECLIDINIUM 1 PUFF: 62.5 AEROSOL, POWDER ORAL at 08:42

## 2017-09-24 RX ADMIN — GABAPENTIN 800 MG: 300 CAPSULE ORAL at 08:40

## 2017-09-24 RX ADMIN — SODIUM CHLORIDE 50 ML/HR: 900 INJECTION, SOLUTION INTRAVENOUS at 17:16

## 2017-09-24 RX ADMIN — Medication 10 ML: at 02:16

## 2017-09-24 RX ADMIN — COLCHICINE 0.6 MG: 0.6 TABLET, FILM COATED ORAL at 08:40

## 2017-09-24 RX ADMIN — GABAPENTIN 800 MG: 300 CAPSULE ORAL at 21:10

## 2017-09-24 RX ADMIN — Medication 10 ML: at 21:19

## 2017-09-24 RX ADMIN — GABAPENTIN 800 MG: 300 CAPSULE ORAL at 17:29

## 2017-09-24 NOTE — PROGRESS NOTES
9/24/2017 9:43 AM    Admit Date: 9/22/2017    Admit Diagnosis: Hypotension    Subjective:     Aakash Clonts   denies chest pain, chest pressure/discomfort, dyspnea, palpitations, irregular heart beats, near-syncope.     Visit Vitals    /53 (BP 1 Location: Left arm, BP Patient Position: At rest)    Pulse 77    Temp 98.1 °F (36.7 °C)    Resp 18    Ht 5' 6\" (1.676 m)    Wt 160 lb 15 oz (73 kg)    SpO2 100%    Breastfeeding No    BMI 25.98 kg/m2     Current Facility-Administered Medications   Medication Dose Route Frequency    aspirin chewable tablet 81 mg  81 mg Oral DAILY    0.9% sodium chloride infusion  50 mL/hr IntraVENous CONTINUOUS    sodium chloride (NS) flush 5-10 mL  5-10 mL IntraVENous Q8H    sodium chloride (NS) flush 5-10 mL  5-10 mL IntraVENous PRN    cholestyramine-aspartame (QUESTRAN LIGHT) packet 4 g  4 g Oral TID WITH MEALS    pantoprazole (PROTONIX) 40 mg in sodium chloride 0.9 % 10 mL injection  40 mg IntraVENous DAILY    albuterol (PROVENTIL VENTOLIN) nebulizer solution 2.5 mg  2.5 mg Nebulization Q4H PRN    amiodarone (CORDARONE) tablet 100 mg  100 mg Oral QHS    azelastine (ASTELIN) 137mcg/spray nasal spray  1 Spray Both Nostrils DAILY    fluticasone-vilanterol (BREO ELLIPTA) 100mcg-25mcg/puff  1 Puff Inhalation DAILY    carvedilol (COREG) tablet 6.25 mg  6.25 mg Oral BID WITH MEALS    colchicine tablet 0.6 mg  0.6 mg Oral DAILY    umeclidinium (INCRUSE ELLIPTA) 62.5 mcg/actuation  1 Puff Inhalation DAILY    insulin lispro (HUMALOG) injection   SubCUTAneous AC&HS    glucose chewable tablet 16 g  4 Tab Oral PRN    glucagon (GLUCAGEN) injection 1 mg  1 mg IntraMUSCular PRN    dextrose 10% infusion 125-250 mL  125-250 mL IntraVENous PRN    gabapentin (NEURONTIN) capsule 800 mg  800 mg Oral TID    influenza vaccine 2017-18 (3 yrs+)(PF) (FLUZONE QUAD/FLUARIX QUAD) injection 0.5 mL  0.5 mL IntraMUSCular PRIOR TO DISCHARGE    clopidogrel (PLAVIX) tablet 75 mg  75 mg Oral DAILY         Objective:      Visit Vitals    /53 (BP 1 Location: Left arm, BP Patient Position: At rest)    Pulse 77    Temp 98.1 °F (36.7 °C)    Resp 18    Ht 5' 6\" (1.676 m)    Wt 160 lb 15 oz (73 kg)    SpO2 100%    Breastfeeding No    BMI 25.98 kg/m2       Physical Exam:  Abdomen: soft, non-tender. Bowel sounds normal.   Extremities: no cyanosis or edema  Heart: regular rate and rhythm, S1, S2 normal, no murmur, click, rub or gallop  Lungs: clear to auscultation bilaterally  Neurologic: Grossly normal    Data Review:   Labs:    Recent Results (from the past 24 hour(s))   GLUCOSE, POC    Collection Time: 09/23/17 11:23 AM   Result Value Ref Range    Glucose (POC) 129 (H) 65 - 100 mg/dL    Performed by Teja Lawrence    HGB & HCT    Collection Time: 09/23/17  1:22 PM   Result Value Ref Range    HGB 11.1 (L) 11.5 - 16.0 g/dL    HCT 34.3 (L) 35.0 - 47.0 %   GLUCOSE, POC    Collection Time: 09/23/17  4:40 PM   Result Value Ref Range    Glucose (POC) 89 65 - 100 mg/dL    Performed by [address] [place] [address], POC    Collection Time: 09/23/17  8:54 PM   Result Value Ref Range    Glucose (POC) 131 (H) 65 - 100 mg/dL    Performed by Jannette John (PCT)    HGB & HCT    Collection Time: 09/23/17  9:37 PM   Result Value Ref Range    HGB 9.8 (L) 11.5 - 16.0 g/dL    HCT 29.3 (L) 35.0 - 47.0 %   CBC WITH AUTOMATED DIFF    Collection Time: 09/24/17  2:17 AM   Result Value Ref Range    WBC 9.0 3.6 - 11.0 K/uL    RBC 3.84 3.80 - 5.20 M/uL    HGB 10.5 (L) 11.5 - 16.0 g/dL    HCT 31.8 (L) 35.0 - 47.0 %    MCV 82.8 80.0 - 99.0 FL    MCH 27.3 26.0 - 34.0 PG    MCHC 33.0 30.0 - 36.5 g/dL    RDW 16.6 (H) 11.5 - 14.5 %    PLATELET 498 281 - 465 K/uL    NEUTROPHILS 66 32 - 75 %    LYMPHOCYTES 20 12 - 49 %    MONOCYTES 12 5 - 13 %    EOSINOPHILS 2 0 - 7 %    BASOPHILS 0 0 - 1 %    ABS. NEUTROPHILS 6.0 1.8 - 8.0 K/UL    ABS. LYMPHOCYTES 1.8 0.8 - 3.5 K/UL    ABS.  MONOCYTES 1.1 (H) 0.0 - 1.0 K/UL ABS. EOSINOPHILS 0.1 0.0 - 0.4 K/UL    ABS. BASOPHILS 0.0 0.0 - 0.1 K/UL   METABOLIC PANEL, BASIC    Collection Time: 09/24/17  2:17 AM   Result Value Ref Range    Sodium 137 136 - 145 mmol/L    Potassium 3.5 3.5 - 5.1 mmol/L    Chloride 109 (H) 97 - 108 mmol/L    CO2 18 (L) 21 - 32 mmol/L    Anion gap 10 5 - 15 mmol/L    Glucose 90 65 - 100 mg/dL    BUN 6 6 - 20 MG/DL    Creatinine 1.33 (H) 0.55 - 1.02 MG/DL    BUN/Creatinine ratio 5 (L) 12 - 20      GFR est AA 48 (L) >60 ml/min/1.73m2    GFR est non-AA 40 (L) >60 ml/min/1.73m2    Calcium 7.7 (L) 8.5 - 10.1 MG/DL   GLUCOSE, POC    Collection Time: 09/24/17  7:25 AM   Result Value Ref Range    Glucose (POC) 102 (H) 65 - 100 mg/dL    Performed by Rupesh Robles        Telemetry: paced      Assessment:     Active Problems:    Atrial fibrillation--paroxysmal (6/2/2010)      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 (6/2/2010)      S/P coronary artery stent placement (7/4/2014)      Overview: 4/7/17 PCI/ROSALINO Diagonal      GIB (gastrointestinal bleeding) (4/12/2016)      Type 2 diabetes mellitus with diabetic neuropathy, without long-term current use of insulin (HCC) (9/48/6810)      Systolic CHF, acute ([address] (4/6/2017)      Hypotension (5/10/2017)      CKD (chronic kidney disease) stage 3, GFR 30-59 ml/min (9/22/2017)        Plan:     Continue DAPT and other meds. For endoscopic evaluation tomorrow.

## 2017-09-24 NOTE — PROGRESS NOTES
[address] SHIFT NURSING NOTE    Bedside and Verbal shift change report given to [facility] (oncoming nurse) by romeo (offgoing nurse). Report included the following information SBAR, Kardex, ED Summary, Procedure Summary, Intake/Output, MAR, Recent Results and Cardiac Rhythm paced. SHIFT SUMMARY:   507 pm: notified dr Valarie Alarcon that pt does not have IV d/t infiltration and that third person is trying now. MD request for ER staff to try if third staff cannot obtain access.

## 2017-09-24 NOTE — PROGRESS NOTES
Initial Nutrition Assessment:    INTERVENTIONS/RECOMMENDATIONS:   · Meals/Snacks: General/healthful diet:  RD will continue to follow. Await results of EGD. Continues on liquids for now. ASSESSMENT:   Chart reviewed; med noted for a-fib, weight loss recently with frequent diarrhea. Plans are for EGD tomorrow 9/25. Diet Order: Clear liquids  % Eaten:  Patient Vitals for the past 72 hrs:   % Diet Eaten   09/24/17 0846 80 %   09/23/17 0923 90 %     Pertinent Medications: [x]Reviewed []Other  Pertinent Labs: [x]Reviewed []Other  Food Allergies: [x]None []Other   Last BM:    [x]Active     []Hyperactive  []Hypoactive       [] Absent BS  Skin:    [x] Intact   [] Incision  [] Breakdown  [] Other:    Anthropometrics:   Height: 5' 6\" (167.6 cm) Weight: 73 kg (160 lb 15 oz)   IBW (%IBW):   ( ) UBW (%UBW):   (  %)   Last Weight Metrics:  Weight Loss Metrics 9/23/2017 9/20/2017 9/15/2017 9/13/2017 7/31/2017 7/25/2017 7/20/2017   Today's Wt 160 lb 15 oz 154 lb 154 lb 1.6 oz - 160 lb 4.8 oz 159 lb 3.2 oz 158 lb 8 oz   BMI 25.98 kg/m2 24.86 kg/m2 - 24.87 kg/m2 25.87 kg/m2 25.7 kg/m2 25.58 kg/m2       BMI: Body mass index is 25.98 kg/(m^2). This BMI is indicative of:   []Underweight    []Normal    [x]Overweight    [] Obesity   [] Extreme Obesity (BMI>40)     Estimated Nutrition Needs (Based on):   1667 Kcals/day (BMR (1283) x 1. 3AF) , 73 g (1.0 g/kg bw) Protein  Carbohydrate:  At Least 130 g/day  Fluids: 1700 mL/day (1ml/kcal)    Pt expected to meet estimated nutrient needs: [x]Yes []No    NUTRITION DIAGNOSES:   Problem:  Inadequate energy intake      Etiology: related to decreased appetite     Signs/Symptoms: as evidenced by weight loss, diarrhea      NUTRITION INTERVENTIONS:  Meals/Snacks: General/healthful diet                  GOAL:   PO at least 50% of meals next 3-5 days    LEARNING NEEDS (Diet, Food/Nutrient-Drug Interaction):    [x] None Identified   [] Identified and Education Provided/Documented   [] Identified and Pt declined/was not appropriate     Cultureal, Hindu, OR Ethnic Dietary Needs:    [x] None Identified   [] Identified and Addressed     [x] Interdisciplinary Care Plan Reviewed/Documented    [x] Discharge Planning:   Pending results of EGD    MONITORING /EVALUATION:      Food/Nutrient Intake Outcomes:  Total energy intake  Physical Signs/Symptoms Outcomes: Weight/weight change    NUTRITION RISK:    [] High              [x] Moderate           []  Low  []  Minimal/Uncompromised    PT SEEN FOR:    []  MD Consult: []Calorie Count      []Diabetic Diet Education        []Diet Education     []Electrolyte Management     []General Nutrition Management and Supplements     []Management of Tube Feeding     []TPN Recommendations    []  RN Referral:  [x]MST score >=2     []Enteral/Parenteral Nutrition PTA     []Pregnant: Gestational DM or Multigestation     []Pressure Ulcer/Wound Care needs        []  Low BMI  []  DTR Referral       Isai Donnelly, [address] [place] [address]  Pager [phone]  Weekend Pager [phone]

## 2017-09-24 NOTE — PROGRESS NOTES
F/U for diarrhea, rectal bleeding  [place] for Kristinatheresa Caban     S: Ms. Sameera Moraes was seen by me today during rounds. At this time, she is resting + comfortably. The patient has nonew complaints today. Please see admission consult for details of ROS; there are + changes today. She has diarrhea with each meal.  This includes her liquid meals started this hospitalzation    O: Blood pressure 93/45, pulse 78, temperature 97.8 °F (36.6 °C), resp. rate 18, height 5' 6\" (1.676 m), weight 73 kg (160 lb 15 oz), SpO2 92 %, not currently breastfeeding. Gen: Patient is in no acute distress. There is no jaundice. Lungs: Clear to auscultation bilaterally . Abd: Soft, non tender, non-distended, bowel sounds present. Extremities: Warm. Lab Results   Component Value Date/Time    WBC 9.0 09/24/2017 02:17 AM    Hemoglobin (POC) 11.2 07/20/2017 09:02 AM    HGB 10.5 09/24/2017 10:36 AM    HCT 31.8 09/24/2017 10:36 AM    PLATELET 890 59/53/6377 02:17 AM    MCV 82.8 09/24/2017 02:17 AM     Lab Results   Component Value Date/Time    Sodium 137 09/24/2017 02:17 AM    Potassium 3.5 09/24/2017 02:17 AM    Chloride 109 09/24/2017 02:17 AM    CO2 18 09/24/2017 02:17 AM    Anion gap 10 09/24/2017 02:17 AM    Glucose 90 09/24/2017 02:17 AM    BUN 6 09/24/2017 02:17 AM    Creatinine 1.33 09/24/2017 02:17 AM    BUN/Creatinine ratio 5 09/24/2017 02:17 AM    GFR est AA 48 09/24/2017 02:17 AM    GFR est non-AA 40 09/24/2017 02:17 AM    Calcium 7.7 09/24/2017 02:17 AM    Bilirubin, total 0.6 09/22/2017 07:01 AM    AST (SGOT) 140 09/22/2017 07:01 AM    Alk.  phosphatase 302 09/22/2017 07:01 AM    Protein, total 9.0 09/22/2017 07:01 AM    Albumin 2.7 09/22/2017 07:01 AM    Globulin 6.3 09/22/2017 07:01 AM    A-G Ratio 0.4 09/22/2017 07:01 AM    ALT (SGPT) 61 09/22/2017 07:01 AM        A: Active Problems:    Atrial fibrillation--paroxysmal (6/2/2010)      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 (6/2/2010) S/P coronary artery stent placement (7/4/2014)      Overview: 4/7/17 PCI/ROSALINO Diagonal      GIB (gastrointestinal bleeding) (4/12/2016)      Type 2 diabetes mellitus with diabetic neuropathy, without long-term current use of insulin ([facility] [address] (5/26/5676)      Systolic CHF, acute ([facility] [address] (4/6/2017)      Hypotension (5/10/2017)      CKD (chronic kidney disease) stage 3, GFR 30-59 ml/min (9/22/2017)          Comment:   She is stable    P.  Egd/ colonoscopy on plavix tomorrow :  Dr. Felipe Woods written    Jayme Choi MD  1:18 PM  9/24/2017

## 2017-09-24 NOTE — ANESTHESIA PREPROCEDURE EVALUATION
Anesthetic History   No history of anesthetic complications            Review of Systems / Medical History  Patient summary reviewed, nursing notes reviewed and pertinent labs reviewed    Pulmonary    COPD      Shortness of breath and smoker ( former)         Neuro/Psych              Cardiovascular    Hypertension: well controlled      CHF  Dysrhythmias ( paroxysmal) : atrial fibrillation  Pacemaker (for CHB), CAD, cardiac stents and hyperlipidemia      Comments: S/P A-fib ablation    TTE (7/10/17): Mild MR, mild-to-moderate AI, EF=55-60%. GI/Hepatic/Renal         Renal disease: CRI      Comments: Diarrhea  Rectal bleeding  H/O C. Difficile infection (5/2017)  CRI, Stage III Endo/Other    Diabetes: well controlled, type 2    Anemia     Other Findings   Comments: Back pain         Physical Exam    Airway  Mallampati: I  TM Distance: < 4 cm  Neck ROM: normal range of motion   Mouth opening: Normal     Cardiovascular    Rhythm: regular  Rate: normal      Pertinent negatives: No murmur   Dental  No notable dental hx       Pulmonary  Breath sounds clear to auscultation               Abdominal  GI exam deferred       Other Findings            Anesthetic Plan    ASA: 3  Anesthesia type: general and total IV anesthesia          Induction: Intravenous  Anesthetic plan and risks discussed with: Patient      preop glucose 94.  Took BB at 850 am

## 2017-09-24 NOTE — PROGRESS NOTES
Hospitalist Progress Note    NAME: Kelley Campos   :  1950   MRN:  [id number]       Interim Hospital Summary: 78 y.o. female whom presented on 2017 with      Assessment / Plan:      Hypotension (Noted to have BP of 86/41 in ED) resolved  In settings of Persisting Diarrhea and Lower GI bleed    C.diff negative  Hold Diuretics  IVF low dose   Questran to help with diarrhea (consider discharge on same if it works)  Diarrhea could be related to metformin as she is taking in settings of CKD3, stop metformin  Hold Eliquis  GI following   egd and colonoscopy on  by Dr Pierce Finely  Monitor H&H stable   PPIs  Clear liquid  Per gi  PRN Zofran      ? Recent PNA   CXR  No infil  No leukocytosis or fever no abx    ua never sent   H/o Atrial fibrillation  Continue amiodarone  Holding Eliquis as above      CAD with h/o recent stents in april  Holding eliquis  Cont asa/plvix per cardiology      COPD (chronic obstructive pulmonary disease) no issues  Stable   Continue Px inhalers and PRN nebs      Type 2 diabetes mellitus with diabetic neuropathy fs   May only need diet  Hold metformin    a1c  Cont ssi/gabpentin     Chronic Systolic/Diastolic CHF  EF 92% in the past but 50% on recent 2D echo  Holding diuretics  Monitor fluid status closely while on IVF low dose       CKD (chronic kidney disease) stage 3  Cr stable      Code Status: Full  Surrogate Decision Maker: Daughter      DVT Prophylaxis: SCDs      Baseline: functional         Subjective:     Chief Complaint / Reason for Physician Visit no c/o no bleeding still on/off diarrhea after eating   Discussed with RN events overnight.      Review of Systems:  Symptom Y/N Comments  Symptom Y/N Comments   Fever/Chills    Chest Pain     Poor Appetite    Edema     Cough    Abdominal Pain     Sputum    Joint Pain     SOB/GREENFIELD    Pruritis/Rash     Nausea/vomit    Tolerating PT/OT     Diarrhea    Tolerating Diet     Constipation    Other       Could NOT obtain due to: Objective:     VITALS:   Last 24hrs VS reviewed since prior progress note. Most recent are:  Patient Vitals for the past 24 hrs:   Temp Pulse Resp BP SpO2   09/24/17 0725 98.1 °F (36.7 °C) 77 18 109/53 100 %   09/24/17 0352 98.4 °F (36.9 °C) 76 19 90/48 90 %   09/23/17 2334 98.5 °F (36.9 °C) 76 18 93/51 90 %   09/23/17 1914 97.9 °F (36.6 °C) 75 18 102/47 100 %   09/23/17 1502 97.3 °F (36.3 °C) 75 18 100/48 91 %   09/23/17 1132 97.3 °F (36.3 °C) 74 18 97/45 95 %       Intake/Output Summary (Last 24 hours) at 09/24/17 1009  Last data filed at 09/24/17 0339   Gross per 24 hour   Intake          2030.84 ml   Output              550 ml   Net          1480.84 ml        PHYSICAL EXAM:  General: WD, WN. Alert, cooperative, no acute distress    EENT:  EOMI. Anicteric sclerae. MMM  Resp:  CTA bilaterally, no wheezing or rales. No accessory muscle use  CV:  Regular  rhythm,  No edema  GI:  Soft, Non distended, Non tender.  +Bowel sounds  Neurologic:  Alert and oriented X 3, normal speech,   Psych:   Good insight. Not anxious nor agitated  Skin:  No rashes. No jaundice    Reviewed most current lab test results and cultures  YES  Reviewed most current radiology test results   YES  Review and summation of old records today    NO  Reviewed patient's current orders and MAR    YES  PMH/ reviewed - no change compared to H&P  ________________________________________________________________________  Care Plan discussed with:    Comments   Patient x    Family      RN x    Care Manager     Consultant                        Multidiciplinary team rounds were held today with , nursing, pharmacist and clinical coordinator. Patient's plan of care was discussed; medications were reviewed and discharge planning was addressed.      ________________________________________________________________________  Total NON critical care TIME:  30   Minutes    Total CRITICAL CARE TIME Spent:   Minutes non procedure based      Comments >50% of visit spent in counseling and coordination of care x    ________________________________________________________________________  Mika Buenrostro MD     Procedures: see electronic medical records for all procedures/Xrays and details which were not copied into this note but were reviewed prior to creation of Plan. LABS:  I reviewed today's most current labs and imaging studies. Pertinent labs include:  Recent Labs      09/24/17 0217 09/23/17   2137  09/23/17   1322  09/23/17   0330   09/22/17   1130   WBC  9.0   --    --   6.9   --   7.8   HGB  10.5*  9.8*  11.1*  10.3*   < >  10.9*   HCT  31.8*  29.3*  34.3*  31.2*   < >  33.1*   PLT  252   --    --   275   --   277    < > = values in this interval not displayed.      Recent Labs      09/24/17 0217 09/23/17   0330  09/22/17   0701   NA  137  140  137   K  3.5  3.6  4.0   CL  109*  111*  106   CO2  18*  19*  22   GLU  90  77  87   BUN  6  11  19   CREA  1.33*  1.29*  1.44*   CA  7.7*  7.6*  8.4*   ALB   --    --   2.7*   TBILI   --    --   0.6   SGOT   --    --   140*   ALT   --    --   61       Signed: Mika Buenrostro MD

## 2017-09-25 ENCOUNTER — ANESTHESIA (OUTPATIENT)
Dept: ENDOSCOPY | Age: 67
DRG: 394 | End: 2017-09-25
Payer: COMMERCIAL

## 2017-09-25 LAB
ANION GAP SERPL CALC-SCNC: 11 MMOL/L (ref 5–15)
BUN SERPL-MCNC: 6 MG/DL (ref 6–20)
BUN/CREAT SERPL: 5 (ref 12–20)
CALCIUM SERPL-MCNC: 7.8 MG/DL (ref 8.5–10.1)
CHLORIDE SERPL-SCNC: 108 MMOL/L (ref 97–108)
CO2 SERPL-SCNC: 20 MMOL/L (ref 21–32)
CREAT SERPL-MCNC: 1.22 MG/DL (ref 0.55–1.02)
ERYTHROCYTE [DISTWIDTH] IN BLOOD BY AUTOMATED COUNT: 16.9 % (ref 11.5–14.5)
GLUCOSE BLD STRIP.AUTO-MCNC: 101 MG/DL (ref 65–100)
GLUCOSE BLD STRIP.AUTO-MCNC: 186 MG/DL (ref 65–100)
GLUCOSE BLD STRIP.AUTO-MCNC: 87 MG/DL (ref 65–100)
GLUCOSE BLD STRIP.AUTO-MCNC: 94 MG/DL (ref 65–100)
GLUCOSE SERPL-MCNC: 89 MG/DL (ref 65–100)
HCT VFR BLD AUTO: 31.9 % (ref 35–47)
HCT VFR BLD AUTO: 32.9 % (ref 35–47)
HGB BLD-MCNC: 10.6 G/DL (ref 11.5–16)
HGB BLD-MCNC: 10.8 G/DL (ref 11.5–16)
MCH RBC QN AUTO: 27.5 PG (ref 26–34)
MCHC RBC AUTO-ENTMCNC: 32.8 G/DL (ref 30–36.5)
MCV RBC AUTO: 83.7 FL (ref 80–99)
PLATELET # BLD AUTO: 270 K/UL (ref 150–400)
POTASSIUM SERPL-SCNC: 3.9 MMOL/L (ref 3.5–5.1)
RBC # BLD AUTO: 3.93 M/UL (ref 3.8–5.2)
SERVICE CMNT-IMP: ABNORMAL
SERVICE CMNT-IMP: ABNORMAL
SERVICE CMNT-IMP: NORMAL
SERVICE CMNT-IMP: NORMAL
SODIUM SERPL-SCNC: 139 MMOL/L (ref 136–145)
WBC # BLD AUTO: 8.9 K/UL (ref 3.6–11)

## 2017-09-25 PROCEDURE — 65660000000 HC RM CCU STEPDOWN

## 2017-09-25 PROCEDURE — 76030000002 HC AMB SURG OR TIME FIRST 0.: Performed by: INTERNAL MEDICINE

## 2017-09-25 PROCEDURE — 85027 COMPLETE CBC AUTOMATED: CPT | Performed by: INTERNAL MEDICINE

## 2017-09-25 PROCEDURE — 74011250636 HC RX REV CODE- 250/636: Performed by: NURSE PRACTITIONER

## 2017-09-25 PROCEDURE — 74011250636 HC RX REV CODE- 250/636: Performed by: ANESTHESIOLOGY

## 2017-09-25 PROCEDURE — 74011250636 HC RX REV CODE- 250/636

## 2017-09-25 PROCEDURE — 74011250637 HC RX REV CODE- 250/637: Performed by: NURSE PRACTITIONER

## 2017-09-25 PROCEDURE — 90471 IMMUNIZATION ADMIN: CPT

## 2017-09-25 PROCEDURE — 74011250637 HC RX REV CODE- 250/637: Performed by: INTERNAL MEDICINE

## 2017-09-25 PROCEDURE — 76450000000

## 2017-09-25 PROCEDURE — 85018 HEMOGLOBIN: CPT | Performed by: INTERNAL MEDICINE

## 2017-09-25 PROCEDURE — 82962 GLUCOSE BLOOD TEST: CPT

## 2017-09-25 PROCEDURE — 36415 COLL VENOUS BLD VENIPUNCTURE: CPT | Performed by: INTERNAL MEDICINE

## 2017-09-25 PROCEDURE — 76060000073 HC AMB SURG ANES FIRST 0.5 HR: Performed by: INTERNAL MEDICINE

## 2017-09-25 PROCEDURE — 74011000250 HC RX REV CODE- 250

## 2017-09-25 PROCEDURE — 74011000250 HC RX REV CODE- 250: Performed by: INTERNAL MEDICINE

## 2017-09-25 PROCEDURE — 74011250636 HC RX REV CODE- 250/636: Performed by: INTERNAL MEDICINE

## 2017-09-25 PROCEDURE — 77030010936 HC CLP LIG BSC -C: Performed by: INTERNAL MEDICINE

## 2017-09-25 PROCEDURE — 76210000034 HC AMBSU PH I REC 0.5 TO 1 HR: Performed by: INTERNAL MEDICINE

## 2017-09-25 PROCEDURE — 90686 IIV4 VACC NO PRSV 0.5 ML IM: CPT | Performed by: INTERNAL MEDICINE

## 2017-09-25 PROCEDURE — 80048 BASIC METABOLIC PNL TOTAL CA: CPT | Performed by: INTERNAL MEDICINE

## 2017-09-25 PROCEDURE — 77030020255 HC SOL INJ LR 1000ML BG: Performed by: INTERNAL MEDICINE

## 2017-09-25 PROCEDURE — 77030009426 HC FCPS BIOP ENDOSC BSC -B: Performed by: INTERNAL MEDICINE

## 2017-09-25 PROCEDURE — C9113 INJ PANTOPRAZOLE SODIUM, VIA: HCPCS | Performed by: INTERNAL MEDICINE

## 2017-09-25 PROCEDURE — 88305 TISSUE EXAM BY PATHOLOGIST: CPT | Performed by: INTERNAL MEDICINE

## 2017-09-25 PROCEDURE — 0DBL8ZX EXCISION OF TRANSVERSE COLON, VIA NATURAL OR ARTIFICIAL OPENING ENDOSCOPIC, DIAGNOSTIC: ICD-10-PCS | Performed by: INTERNAL MEDICINE

## 2017-09-25 PROCEDURE — 0W3P8ZZ CONTROL BLEEDING IN GASTROINTESTINAL TRACT, VIA NATURAL OR ARTIFICIAL OPENING ENDOSCOPIC: ICD-10-PCS | Performed by: INTERNAL MEDICINE

## 2017-09-25 PROCEDURE — 0DB98ZX EXCISION OF DUODENUM, VIA NATURAL OR ARTIFICIAL OPENING ENDOSCOPIC, DIAGNOSTIC: ICD-10-PCS | Performed by: INTERNAL MEDICINE

## 2017-09-25 RX ORDER — FLUMAZENIL 0.1 MG/ML
0.2 INJECTION INTRAVENOUS
Status: DISCONTINUED | OUTPATIENT
Start: 2017-09-25 | End: 2017-09-25 | Stop reason: ALTCHOICE

## 2017-09-25 RX ORDER — LIDOCAINE HYDROCHLORIDE 20 MG/ML
INJECTION, SOLUTION EPIDURAL; INFILTRATION; INTRACAUDAL; PERINEURAL AS NEEDED
Status: DISCONTINUED | OUTPATIENT
Start: 2017-09-25 | End: 2017-09-25 | Stop reason: HOSPADM

## 2017-09-25 RX ORDER — HYDROCORTISONE ACETATE 25 MG/1
25 SUPPOSITORY RECTAL EVERY 12 HOURS
Status: DISCONTINUED | OUTPATIENT
Start: 2017-09-25 | End: 2017-09-27 | Stop reason: HOSPADM

## 2017-09-25 RX ORDER — PROPOFOL 10 MG/ML
INJECTION, EMULSION INTRAVENOUS AS NEEDED
Status: DISCONTINUED | OUTPATIENT
Start: 2017-09-25 | End: 2017-09-25 | Stop reason: HOSPADM

## 2017-09-25 RX ORDER — FUROSEMIDE 10 MG/ML
20 INJECTION INTRAMUSCULAR; INTRAVENOUS ONCE
Status: COMPLETED | OUTPATIENT
Start: 2017-09-25 | End: 2017-09-25

## 2017-09-25 RX ORDER — NALOXONE HYDROCHLORIDE 0.4 MG/ML
0.4 INJECTION, SOLUTION INTRAMUSCULAR; INTRAVENOUS; SUBCUTANEOUS
Status: DISCONTINUED | OUTPATIENT
Start: 2017-09-25 | End: 2017-09-25 | Stop reason: ALTCHOICE

## 2017-09-25 RX ORDER — GLYCOPYRROLATE 0.2 MG/ML
INJECTION INTRAMUSCULAR; INTRAVENOUS AS NEEDED
Status: DISCONTINUED | OUTPATIENT
Start: 2017-09-25 | End: 2017-09-25 | Stop reason: HOSPADM

## 2017-09-25 RX ORDER — DEXTROMETHORPHAN/PSEUDOEPHED 2.5-7.5/.8
1.2 DROPS ORAL
Status: DISCONTINUED | OUTPATIENT
Start: 2017-09-25 | End: 2017-09-25 | Stop reason: ALTCHOICE

## 2017-09-25 RX ORDER — MIDAZOLAM HYDROCHLORIDE 1 MG/ML
.25-5 INJECTION, SOLUTION INTRAMUSCULAR; INTRAVENOUS
Status: DISCONTINUED | OUTPATIENT
Start: 2017-09-25 | End: 2017-09-25 | Stop reason: ALTCHOICE

## 2017-09-25 RX ORDER — EPINEPHRINE 0.1 MG/ML
1 INJECTION INTRACARDIAC; INTRAVENOUS
Status: DISCONTINUED | OUTPATIENT
Start: 2017-09-25 | End: 2017-09-25 | Stop reason: ALTCHOICE

## 2017-09-25 RX ORDER — FAMOTIDINE 20 MG/1
20 TABLET, FILM COATED ORAL DAILY
Status: DISCONTINUED | OUTPATIENT
Start: 2017-09-26 | End: 2017-09-27 | Stop reason: HOSPADM

## 2017-09-25 RX ORDER — MIDAZOLAM HYDROCHLORIDE 1 MG/ML
1-3 INJECTION, SOLUTION INTRAMUSCULAR; INTRAVENOUS
Status: DISCONTINUED | OUTPATIENT
Start: 2017-09-25 | End: 2017-09-25 | Stop reason: ALTCHOICE

## 2017-09-25 RX ORDER — FAMOTIDINE 20 MG/1
20 TABLET, FILM COATED ORAL 2 TIMES DAILY
Status: DISCONTINUED | OUTPATIENT
Start: 2017-09-26 | End: 2017-09-25 | Stop reason: DRUGHIGH

## 2017-09-25 RX ORDER — ONDANSETRON 2 MG/ML
4 INJECTION INTRAMUSCULAR; INTRAVENOUS
Status: DISCONTINUED | OUTPATIENT
Start: 2017-09-25 | End: 2017-09-27 | Stop reason: HOSPADM

## 2017-09-25 RX ORDER — ATROPINE SULFATE 0.1 MG/ML
0.5 INJECTION INTRAVENOUS
Status: DISCONTINUED | OUTPATIENT
Start: 2017-09-25 | End: 2017-09-25 | Stop reason: ALTCHOICE

## 2017-09-25 RX ORDER — PHENYLEPHRINE HCL IN 0.9% NACL 0.4MG/10ML
SYRINGE (ML) INTRAVENOUS AS NEEDED
Status: DISCONTINUED | OUTPATIENT
Start: 2017-09-25 | End: 2017-09-25 | Stop reason: HOSPADM

## 2017-09-25 RX ORDER — SODIUM CHLORIDE, SODIUM LACTATE, POTASSIUM CHLORIDE, CALCIUM CHLORIDE 600; 310; 30; 20 MG/100ML; MG/100ML; MG/100ML; MG/100ML
25 INJECTION, SOLUTION INTRAVENOUS CONTINUOUS
Status: DISCONTINUED | OUTPATIENT
Start: 2017-09-25 | End: 2017-09-25

## 2017-09-25 RX ADMIN — PROPOFOL 50 MG: 10 INJECTION, EMULSION INTRAVENOUS at 10:51

## 2017-09-25 RX ADMIN — Medication 10 ML: at 15:33

## 2017-09-25 RX ADMIN — UMECLIDINIUM 1 PUFF: 62.5 AEROSOL, POWDER ORAL at 09:01

## 2017-09-25 RX ADMIN — PROPOFOL 50 MG: 10 INJECTION, EMULSION INTRAVENOUS at 10:50

## 2017-09-25 RX ADMIN — HYDROCORTISONE ACETATE 25 MG: 25 SUPPOSITORY RECTAL at 15:33

## 2017-09-25 RX ADMIN — INFLUENZA VIRUS VACCINE 0.5 ML: 15; 15; 15; 15 SUSPENSION INTRAMUSCULAR at 18:36

## 2017-09-25 RX ADMIN — GABAPENTIN 800 MG: 300 CAPSULE ORAL at 15:33

## 2017-09-25 RX ADMIN — Medication 10 ML: at 12:22

## 2017-09-25 RX ADMIN — SODIUM CHLORIDE 40 MG: 9 INJECTION INTRAMUSCULAR; INTRAVENOUS; SUBCUTANEOUS at 08:52

## 2017-09-25 RX ADMIN — AMIODARONE HYDROCHLORIDE 100 MG: 200 TABLET ORAL at 23:50

## 2017-09-25 RX ADMIN — Medication 120 MCG: at 10:53

## 2017-09-25 RX ADMIN — FLUTICASONE FUROATE AND VILANTEROL TRIFENATATE 1 PUFF: 100; 25 POWDER RESPIRATORY (INHALATION) at 09:01

## 2017-09-25 RX ADMIN — ONDANSETRON 4 MG: 2 INJECTION INTRAMUSCULAR; INTRAVENOUS at 19:04

## 2017-09-25 RX ADMIN — Medication 200 MCG: at 11:00

## 2017-09-25 RX ADMIN — ASPIRIN 81 MG 81 MG: 81 TABLET ORAL at 09:00

## 2017-09-25 RX ADMIN — Medication 10 ML: at 08:57

## 2017-09-25 RX ADMIN — SODIUM CHLORIDE, SODIUM LACTATE, POTASSIUM CHLORIDE, AND CALCIUM CHLORIDE 25 ML/HR: 600; 310; 30; 20 INJECTION, SOLUTION INTRAVENOUS at 10:31

## 2017-09-25 RX ADMIN — GABAPENTIN 800 MG: 300 CAPSULE ORAL at 08:51

## 2017-09-25 RX ADMIN — FUROSEMIDE 20 MG: 10 INJECTION, SOLUTION INTRAMUSCULAR; INTRAVENOUS at 12:21

## 2017-09-25 RX ADMIN — LIDOCAINE HYDROCHLORIDE 100 MG: 20 INJECTION, SOLUTION EPIDURAL; INFILTRATION; INTRACAUDAL; PERINEURAL at 10:50

## 2017-09-25 RX ADMIN — COLCHICINE 0.6 MG: 0.6 TABLET, FILM COATED ORAL at 08:51

## 2017-09-25 RX ADMIN — CLOPIDOGREL BISULFATE 75 MG: 75 TABLET, FILM COATED ORAL at 09:00

## 2017-09-25 RX ADMIN — Medication 10 ML: at 19:04

## 2017-09-25 RX ADMIN — Medication 10 ML: at 06:38

## 2017-09-25 RX ADMIN — Medication 200 MCG: at 11:05

## 2017-09-25 RX ADMIN — GLYCOPYRROLATE 0.1 MG: 0.2 INJECTION INTRAMUSCULAR; INTRAVENOUS at 10:48

## 2017-09-25 RX ADMIN — Medication 10 ML: at 20:53

## 2017-09-25 RX ADMIN — CARVEDILOL 6.25 MG: 6.25 TABLET, FILM COATED ORAL at 18:36

## 2017-09-25 RX ADMIN — PROPOFOL 100 MG: 10 INJECTION, EMULSION INTRAVENOUS at 11:05

## 2017-09-25 RX ADMIN — GABAPENTIN 800 MG: 300 CAPSULE ORAL at 23:49

## 2017-09-25 RX ADMIN — CARVEDILOL 6.25 MG: 6.25 TABLET, FILM COATED ORAL at 08:50

## 2017-09-25 RX ADMIN — AZELASTINE HYDROCHLORIDE 1 SPRAY: 137 SPRAY, METERED NASAL at 09:01

## 2017-09-25 NOTE — PROGRESS NOTES
Hospitalist Progress Note    NAME: Daisha Barragan   :  1950   MRN:  [id number]       Interim Hospital Summary: 78 y.o. female whom presented on 2017 with      Assessment / Plan:    LGIB  Chronic diarrhea  -C.diff negative  -Hold Diuretics, continue low dose IVF  -Hold Eliquis due to bleeding  -GI consulted, for egd and colonoscopy today    Hypotension, resolved    Recent PNA vs ATX  -CXR Stable interstitial prominence. No acute abnormality. No leukocytosis or fever     H/o Atrial fibrillation  -continue amiodarone and holding Eliquis as above      CAD with h/o recent stents in april  -Cont asa/plavix per cardiology      COPD  -continue breo and incruse inhalaers with nebs prn      DM  -hold metformin . SSI prn  -A1c 5.8     Chronic Systolic/Diastolic CHF  -EF 12% in the past but 50% on recent 2D echo  -holding diuretics but monitor fluid status closely while on gentle hydration      CKD 3  -follow      Code Status: Full  Surrogate Decision Maker: Daughter      DVT Prophylaxis: SCDs      Baseline: functional         Subjective:     Chief Complaint / Reason for Physician   Discussed with RN events overnight. For scope today. Complained of some leg swelling - not much on exam    Review of Systems:  Symptom Y/N Comments  Symptom Y/N Comments   Fever/Chills n   Chest Pain n    Poor Appetite    Edema     Cough    Abdominal Pain     Sputum    Joint Pain     SOB/GREENFIELD n   Pruritis/Rash     Nausea/vomit    Tolerating PT/OT     Diarrhea    Tolerating Diet     Constipation    Other       Could NOT obtain due to:      Objective:     VITALS:   Last 24hrs VS reviewed since prior progress note.  Most recent are:  Patient Vitals for the past 24 hrs:   Temp Pulse Resp BP SpO2   17 0722 97.7 °F (36.5 °C) 75 17 113/50 91 %   17 0443 98 °F (36.7 °C) 75 18 105/50 95 %   17 2328 97.5 °F (36.4 °C) 76 18 111/53 95 %   17 98 °F (36.7 °C) 76 18 107/51 100 %   17 1525 97.8 °F (36.6 °C) 75 18 98/46 91 %   09/24/17 1114 97.8 °F (36.6 °C) 78 18 93/45 92 %     No intake or output data in the 24 hours ending 09/25/17 0829     PHYSICAL EXAM:  General: WD, WN. Alert, cooperative, no acute distress    EENT:  EOMI. Anicteric sclerae. MMM  Resp:  CTA bilaterally, no wheezing or rales. No accessory muscle use  CV:  Regular  rhythm,  No edema  GI:  Soft, Non distended, Non tender.  +Bowel sounds  Neurologic:  Alert and oriented X 3, normal speech,   Psych:   Good insight. Not anxious nor agitated  Skin:  No rashes. No jaundice    Reviewed most current lab test results and cultures  YES  Reviewed most current radiology test results   YES  Review and summation of old records today    NO  Reviewed patient's current orders and MAR    YES  PMH/SH reviewed - no change compared to H&P  ________________________________________________________________________  Care Plan discussed with:    Comments   Patient x    Family      RN x    Care Manager     Consultant                        Multidiciplinary team rounds were held today with , nursing, pharmacist and clinical coordinator. Patient's plan of care was discussed; medications were reviewed and discharge planning was addressed. ________________________________________________________________________  Total NON critical care TIME:  25   Minutes    Total CRITICAL CARE TIME Spent:   Minutes non procedure based      Comments   >50% of visit spent in counseling and coordination of care x    ________________________________________________________________________  Marino Oates MD     Procedures: see electronic medical records for all procedures/Xrays and details which were not copied into this note but were reviewed prior to creation of Plan. LABS:  I reviewed today's most current labs and imaging studies.   Pertinent labs include:  Recent Labs      09/25/17   0037  09/24/17   1036  09/24/17   0217   09/23/17   0330   WBC  8.9   --   9.0   --   6.9 HGB  10.8*  10.5*  10.5*   < >  10.3*   HCT  32.9*  31.8*  31.8*   < >  31.2*   PLT  270   --   252   --   275    < > = values in this interval not displayed.      Recent Labs      09/25/17   0037  09/24/17   0217  09/23/17   0330   NA  139  137  140   K  3.9  3.5  3.6   CL  108  109*  111*   CO2  20*  18*  19*   GLU  89  90  77   BUN  6  6  11   CREA  1.22*  1.33*  1.29*   CA  7.8*  7.7*  7.6*       Signed: Eliceo Aguiar MD

## 2017-09-25 NOTE — PROGRESS NOTES
Called Dr. Maria L Gray office to ask if he would come to speak to patient as per her request.  4:25pm discussed the results written in the chart regarding the colonoscopy. We discussed that it was written that the [address] [place] [address] Street might have been causing her preadmission diarrhea. She also has been on colchicine and this can cause diarrhea. She has been on it for 2 years. . She will discuss this with her doctor.

## 2017-09-25 NOTE — PROGRESS NOTES
CM noted Meghan Dotson, ED CM note. Pt is a readmit. CM sent a message to [address] and [facility] to let them know that Pt is in the hospital.  CM noted from the chart that Pt is getting EGD and Colonoscopy today. No DC plan yet but CM will continue to monitor discharge plan. 9:49 AM  Pt is also a CHF Bundle Pt. CM put FYI on the chart requesting him to consider a Palliative Care Consult since Pt has a RAT score of 36 which is greater than 21. Pt is also has a code status of Full Code. CM will look into [facility] options for Pt at discharge to help prevent a readmission at discharge at [facility] if Pt is agreeable to receive services. Pt lives with her brother, who has CP and her 12 yr old grandson in a two story home. Pt was I W ADLs and IADLs. Pt drives. Pt preferred pharmacy is [facility] on [address] 10 AM  CM ran into Dr. Vidhi Coronado and I spoke to him about Pt being a CHF Bundle Pt and asked him if he was interested in [address]. He said that was ok and that he would ask RN to put in Order for [address] today. Care Management Interventions  PCP Verified by CM:  Yes  Palliative Care Consult (Criteria: CHF and RRAT>21): Yes  Mode of Transport at Discharge:  (TBD)  Transition of Care Consult (CM Consult): Discharge Planning  MyChart Signup: No  Discharge Durable Medical Equipment: No  Physical Therapy Consult: No  Occupational Therapy Consult: No  Speech Therapy Consult: No  Current Support Network: Own Home, Family Lives Nearby, Relative's Home (Patient's adult brother and 11 yo grandson live with her in 2 story home - daughter lives nearby)  Confirm Follow Up Transport:  (TBD)  Plan discussed with Pt/Family/Caregiver: Yes  Discharge Location  Discharge Placement:  (TBD)    Umair Hong, [facility]   Ext 1960

## 2017-09-25 NOTE — CARDIO/PULMONARY
Cardiopulmonary Rehab Nursing Entry:    Chart reviewed. Pt is a 78 y.o. female with PMH a-fib s/p ablation (05/17), HF, CAD s/p stent (04/17), DM, HTN, COPD who was admitted for Hypotension/dehydration. Pt with GI bleed.     Echo 7/10/17 indicated LVEF of 55-60%, up from 25-30% from 5/9/17. Attempted to see pt for follow-up CHF teaching. Pt currently off-unit in EGD. Will continue to follow pt during this admission.

## 2017-09-25 NOTE — PROGRESS NOTES
[address]  [phone]      Cardiology Progress Note      9/25/2017 9:01 AM    Admit Date: 9/22/2017    Admit Diagnosis:   Hypotension  diarrhea, rectal bleeding  Colonoscopy    Subjective:     Neal Agee is a 78 y.o. female with PMH a-fib s/p ablation (05/17), HF, CAD s/p stent (04/17), DM, HTN, COPD who was admitted for Hypotension. Overnight events:  -plan for endo procedure today  -VSS  -H/H stable; creat down to 1.22  -weight up 3#; I/O not documented  -Ms. Haylee Baum states she's having some SOB this morning. She denies chest pain or palpitations.      Visit Vitals    /50    Pulse 75    Temp 97.7 °F (36.5 °C)    Resp 17    Ht 5' 6\" (1.676 m)    Wt 74.2 kg (163 lb 9.3 oz)    SpO2 91%    Breastfeeding No    BMI 26.4 kg/m2       Current Facility-Administered Medications   Medication Dose Route Frequency    furosemide (LASIX) injection 20 mg  20 mg IntraVENous ONCE    aspirin chewable tablet 81 mg  81 mg Oral DAILY    0.9% sodium chloride infusion  50 mL/hr IntraVENous CONTINUOUS    sodium chloride (NS) flush 5-10 mL  5-10 mL IntraVENous Q8H    sodium chloride (NS) flush 5-10 mL  5-10 mL IntraVENous PRN    cholestyramine-aspartame (QUESTRAN LIGHT) packet 4 g  4 g Oral TID WITH MEALS    pantoprazole (PROTONIX) 40 mg in sodium chloride 0.9 % 10 mL injection  40 mg IntraVENous DAILY    albuterol (PROVENTIL VENTOLIN) nebulizer solution 2.5 mg  2.5 mg Nebulization Q4H PRN    amiodarone (CORDARONE) tablet 100 mg  100 mg Oral QHS    azelastine (ASTELIN) 137mcg/spray nasal spray  1 Spray Both Nostrils DAILY    fluticasone-vilanterol (BREO ELLIPTA) 100mcg-25mcg/puff  1 Puff Inhalation DAILY    carvedilol (COREG) tablet 6.25 mg  6.25 mg Oral BID WITH MEALS    colchicine tablet 0.6 mg  0.6 mg Oral DAILY    umeclidinium (INCRUSE ELLIPTA) 62.5 mcg/actuation  1 Puff Inhalation DAILY    insulin lispro (HUMALOG) injection   SubCUTAneous AC&HS    glucose chewable tablet 16 g  4 Tab Oral PRN    glucagon (GLUCAGEN) injection 1 mg  1 mg IntraMUSCular PRN    dextrose 10% infusion 125-250 mL  125-250 mL IntraVENous PRN    gabapentin (NEURONTIN) capsule 800 mg  800 mg Oral TID    influenza vaccine 2017-18 (3 yrs+)(PF) (FLUZONE QUAD/FLUARIX QUAD) injection 0.5 mL  0.5 mL IntraMUSCular PRIOR TO DISCHARGE    clopidogrel (PLAVIX) tablet 75 mg  75 mg Oral DAILY       Objective:      Physical Exam:  General Appearance:  pleasant, AAF resting in bed in NAD  Chest:   Clear; crackles L base   Cardiovascular:  Regular rate and rhythm, no murmur.   Abdomen:   Soft, non-tender, bowel sounds are active.   Extremities: weak pulses BLE; trace edema feet  Skin:  Warm and dry.     Data Review:   Recent Labs      09/25/17   0037  09/24/17   1036  09/24/17 0217   09/23/17   0330   WBC  8.9   --   9.0   --   6.9   HGB  10.8*  10.5*  10.5*   < >  10.3*   HCT  32.9*  31.8*  31.8*   < >  31.2*   PLT  270   --   252   --   275    < > = values in this interval not displayed. Recent Labs      09/25/17   0037  09/24/17 0217  09/23/17   0330   NA  139  137  140   K  3.9  3.5  3.6   CL  108  109*  111*   CO2  20*  18*  19*   GLU  89  90  77   BUN  6  6  11   CREA  1.22*  1.33*  1.29*   CA  7.8*  7.7*  7.6*       No results for input(s): TROIQ, CPK, CKMB in the last 72 hours.     No intake or output data in the 24 hours ending 09/25/17 1001     Telemetry: regular a-paced   ECG: a-paced with 1st degree AVB;LBBB  CXRAY: no acute process     Assessment:     Active Problems:    Atrial fibrillation--paroxysmal (6/2/2010)      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 (6/2/2010)      S/P coronary artery stent placement (7/4/2014)      Overview: 4/7/17 PCI/ROSALINO Diagonal      GIB (gastrointestinal bleeding) (4/12/2016)      Type 2 diabetes mellitus with diabetic neuropathy, without long-term current use of insulin ([facility] (5/91/7867)      Systolic CHF, acute ([facility] (4/6/2017)      Hypotension (5/10/2017)      CKD (chronic kidney disease) stage 3, GFR 30-59 ml/min (9/22/2017)        Plan:     A-fib s/p ablation and cardioversion: on eliquis at home, but presented with GIB. H/H stable. · Endo procedure today  · eliquis on hold  · On ASA and plavix  · Continue amio and BB       CAD s/p recent stent (04/17):    · ASA and plavix while eliquis on hold  · Statin on hold b/c LFTs elevated on admission. · Continue BB    Chronic HF: last EF 55-60%. Came in dehydrated from diarrhea. Diuretics held in beginning of admission with IVF administered. · Patient SOB this morning with some basilar crackles - give 20mg IV lasix now prior to endo procedure so patient can tolerate better. · Will likely need to cut back on IVF and/or restart scheduled diuretic          Bethany Ortiz NP  DNP, RN, AGACNP-[facility]    9/25/2017         Agree with note as outlined by  NP. I confirm findings in history and physical exam. No additional findings noted. Agree with plan as outlined above.      Franky Castle MD

## 2017-09-25 NOTE — H&P
Pre-endoscopy H and P    The patient was seen and examined in the room/pre-op holding area. The airway was assessed and documented. The problem list, past medical history, and medications were reviewed. 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 J44.9    Hip pain M25.559    Hypokalemia E87.6    S/P angioplasty with stent Z95.9    Sick sinus syndrome ([facility]) I49.5    Anemia D64.9    Chest pain R07.9    Sinoatrial node dysfunction ([facility]) I49.5    Cardiac pacemaker in situ Z95.0    Mixed hyperlipidemia E78.2    Back pain M54.9    S/P coronary artery stent placement G16.6    Diastolic CHF, acute on chronic ([facility]) I50.33    GIB (gastrointestinal bleeding) K92.2    Type 2 diabetes mellitus with diabetic neuropathy, without long-term current use of insulin ([facility]) E11.40    Anticoagulation monitoring, INR range 2-3 Z79.01    CHF (congestive heart failure) ([facility]) Z67.2    Systolic CHF, acute ([facility]) G40.10    Acute systolic CHF (congestive heart failure) ([facility]) I50.21    Fear associated with illness and body function F40.8    Counseling regarding advanced care planning and goals of care Z71.89    S/P ablation of atrial fibrillation Z98.890, Z86.79    Tachycardia R00.0    Chronic systolic HF (heart failure) ([facility]) I50.22    History of Clostridium difficile infection Z86.19    Junctional tachycardia ([facility]) I47.1    Hypotension I95.9    Acute renal failure (ARF) ([facility]) N17.9    CKD (chronic kidney disease) stage 3, GFR 30-59 ml/min N18.3     Social History     Social History    Marital status:      Spouse name: N/A    Number of children: N/A    Years of education: N/A     Occupational History    Not on file.      Social History Main Topics    Smoking status: Former Smoker     Types: Cigarettes     Quit date: 12/31/2009    Smokeless tobacco: Never Used    Alcohol use No    Drug use: No    Sexual activity: Not Currently     Other Topics Concern    Not on file     Social History Narrative     Past Medical History:   Diagnosis Date    Atrial fibrillation ([facility] 2010    CAD (coronary artery disease)     stent    Chronic diastolic heart failure ([facility] 2014    Chronic systolic HF (heart failure) ([facility] 5/10/2017    2017 EF 25-30%    COPD     COPD (chronic obstructive pulmonary disease) ([facility] 2010    Diabetes ([facility]     Fibroid     Heart failure (HCC)     History of Clostridium difficile infection 5/10/2017    2017 CDiff positive    Hypertension 2010    Hypotension 5/10/2017    Junctional tachycardia ([facility] 5/10/2017    NIDDM (non-insulin dependent diabetes mellitus) 2010    Screening mammogram 5/4/10    SOB (shortness of breath) 2014         Prior to Admission Medications   Prescriptions Last Dose Informant Patient Reported? Taking? Azelastine (ASTEPRO) 0.15 % (205.5 mcg) nasal spray 2017 at Unknown time Self Yes Yes   Si [place] by Both Nostrils route daily. acetaminophen (TYLENOL) 500 mg tablet 2017 at Unknown time Self Yes Yes   Sig: Take 500 mg by mouth every six (6) hours as needed for Pain. albuterol (PROVENTIL VENTOLIN) 2.5 mg /3 mL (0.083 %) nebulizer solution Not Taking at Unknown time Self No No   Sig: 3 mL by Nebulization route every four (4) hours as needed for Wheezing. albuterol (VENTOLIN HFA) 90 mcg/actuation inhaler 2017 at Unknown time Self No Yes   Sig: Take 2 Puffs by inhalation every six (6) hours as needed for Wheezing. amiodarone (PACERONE) 100 mg tablet 2017 at Unknown time Self Yes Yes   Sig: Take 100 mg by mouth nightly. apixaban (ELIQUIS) 5 mg tablet 2017 at Unknown time Self No Yes   Sig: Take 1 Tab by mouth two (2) times a day.  Indications: PREVENT THROMBOEMBOLISM IN CHRONIC ATRIAL FIBRILLATION   budesonide-formoterol (SYMBICORT) 160-4.5 mcg/actuation HFA inhaler 2017 at Unknown time Self No Yes   Sig: Take 1 Puff by inhalation two (2) times a day. carvedilol (COREG) 6.25 mg tablet 2017 at Unknown time Self No Yes   Sig: Take 1 Tab by mouth two (2) times daily (with meals). clopidogrel (PLAVIX) 75 mg tab   Yes Yes   Sig: Take 75 mg by mouth daily. cod liver oil cap 2017 at Unknown time Self Yes Yes   Sig: Take 1 Cap by mouth daily. colchicine 0.6 mg tablet 2017 at Unknown time Self Yes Yes   Sig: Take 1.2 mg by mouth daily. Patient takes 1.2 mg daily and 2.4 mg PRN flare up   colchicine 0.6 mg tablet Not Taking at Unknown time Self Yes No   Sig: Take 2.4 mg by mouth daily as needed (glout flare up). Patient takes 1.2 mg daily and 2.4 mg PRN flare up   doxycycline (VIBRA-TABS) 100 mg tablet 2017 at 0500 Self No Yes   Sig: Take 1 Tab by mouth two (2) times a day for 7 days. fluticasone (FLONASE) 50 mcg/actuation nasal spray 2017 at Unknown time Self Yes Yes   Si Sprays by Both Nostrils route every evening. furosemide (LASIX) 20 mg tablet 2017 at Unknown time Self Yes Yes   Sig: Take 20 mg by mouth two (2) times a day.   gabapentin (NEURONTIN) 800 mg tablet 2017 at Unknown time Self No Yes   Sig: TAKE ONE TABLET BY MOUTH THREE TIMES DAILY   metFORMIN (GLUCOPHAGE) 1,000 mg tablet 2017 at Unknown time Self No Yes   Sig: TAKE 1 TABLET BY MOUTH TWICE DAILY WITH MEALS  Indications: PREVENTION OF TYPE 2 DIABETES MELLITUS   tiotropium (SPIRIVA WITH HANDIHALER) 18 mcg inhalation capsule 2017 at Unknown time Self No Yes   Sig: INHALE THE CONTENTS OF 1 CAPSULE THROUGH HANDIHALER DEVICE DAILY      Facility-Administered Medications: None           The review of systems is:  Negative  for shortness of breath or chest pain      The heart, lungs, and mental status were satisfactory for the administration of deep sedation and for the procedure.       I discussed with the patient the objectives, risks, consequences and alternatives to the procedure.       Kenrick Doshi MD  9/25/2017  10:37 AM

## 2017-09-25 NOTE — PROGRESS NOTES
Patient complaining of being short winded. Respirations wnl. Her lungs have crackles bilaterally in bases and right lung sounds more diminished than left. No LE swelling noted.  8:31 am Dr. Wendy Foster aware of above, no changes made

## 2017-09-25 NOTE — PROCEDURES
Colonoscopy Procedure Note    Sayra Berry  1950  [id number]        Pre-operative Diagnosis: Rectal Bleeding, diarrhea    Post-operative Diagnosis: Internal hemorrhoids    : Kevin Samson. John Page MD    Referring Provider: [facility], NP    Sedation:  MAC anesthesia Propofol        Procedure Details:    After detailed informed consent was obtained with all risks and benefits of procedure explained and preoperative exam completed, the patient was taken to the endoscopy suite and placed in the left lateral decubitus position. Upon sequential sedation as per above, a digital rectal exam was performed  And was normal.  The Olympus videocolonoscope  was inserted in the rectum and carefully advanced to the cecum, which was identified by the ileocecal valve. The quality of preparation was good. The colonoscope was slowly withdrawn with careful evaluation between folds. Retroflexion in the rectum was performed. Findings:     · The whole colon and terminal ileum are normal.The prep is good. There is no blood or bleeding seen any where. · There is no blood in the TI. · A single transverse colon biopsy resulted in oozing (hx of diarrhea; R/o microscopic colitis). This was controlled by applying a single BS Resolution 360 hemoclip. · Internal hemorrhoids are noted, likely the source of the rectal bleeding. · Easy mucosal trauma with scope passage (antiplatelet effect from medications) is noted. Therapies:  biopsy of colon: transverse colon x 1, clipping x 1    Specimen:  Specimens were collected as described above and sent to pathology. Complications: None were encountered during the procedure. EBL:  10 ml. Recommendations:     -Await pathology.  -Anusol HC supp W[place] x 7 days    -follow hgb      Gianfranco Page MD  9/25/2017  11:15 AM

## 2017-09-25 NOTE — PROGRESS NOTES
[address] SHIFT NURSING NOTE    Bedside shift change report given to Sybil Storey  (oncoming nurse) by [place]  (offgoing nurse). Report included the following information SBAR. SHIFT SUMMARY: patient medicated for nausea. Poor appetite. Voiding moderately. Up with assistance    Admission Date 9/22/2017   Admission Diagnosis Hypotension  diarrhea, rectal bleeding  Colonoscopy   Consults IP CONSULT TO HOSPITALIST  IP CONSULT TO GASTROENTEROLOGY  IP CONSULT TO PALLIATIVE CARE - PROVIDER  IP CONSULT TO CARDIOLOGY        Consults   [] PT   [] OT   [] Speech   [] Palliative      [] Hospice    [] Case Management   [] None   Cardiac Monitoring   [] Yes   [] No     Antibiotics   [] Yes   [] No   GI Prophylaxis  (Ex: Protonix, Pepcid, etc,.)   [] Yes   [] No          DVT Prophylaxis   SCDs:  Sequential Compression Device: Bilateral     Patient Refused VTE Prophylaxis: Yes    Mayo stockings:         [] Medication (Ex: Lovenox, Eliquis, Brilinta, Coumadin,  Heparin, etc..)   [] Contraindicated   [] No VTE needed       Urinary Catheter             LDAs               Peripheral IV 09/24/17 Right Arm (Active)   Site Assessment Clean, dry, & intact 9/25/2017 11:13 AM   Phlebitis Assessment 0 9/25/2017 11:13 AM   Infiltration Assessment 0 9/25/2017 11:13 AM   Dressing Status Clean, dry, & intact 9/25/2017 11:13 AM   Dressing Type Tape;Transparent 9/25/2017 11:13 AM   Hub Color/Line Status Blue; Infusing 9/25/2017 11:13 AM                      I/Os   Intake/Output Summary (Last 24 hours) at 09/25/17 1928  Last data filed at 09/25/17 1822   Gross per 24 hour   Intake              790 ml   Output              900 ml   Net             -110 ml         Activity Level Activity Level: Up with Assistance     Activity Assistance: Partial (one person)   Diet Active Orders   Diet    DIET GI LITE (POST SURGICAL)      Purposeful Rounding every 1-2 hour?    [] Yes    Anthony Score  Total Score: 3   Bed Alarm (If score 3 or >)   [] Yes    [] Refused (See signed refusal form in chart)   Landon Score  Landon Score: 19       Landon Score (if score 14 or less)   [] PMT consult   [] Nutrition consult   [] Wound Care consult      []  Specialty bed         Influenza Vaccine Received Flu Vaccine for Current Season (usually Sept-March): No    Patient/Guardian Refused (Notify MD): No          Needs prior to discharge:   Home O2 required:    [] Yes   [] No     If yes, how much O2 required? Other:    Last Bowel Movement Date: 09/25/17   Readmission Risk Assessment Tool Score High Risk            36       Total Score        3 Has Seen PCP in Last 6 Months (Yes=3, No=0)    9 IP Visits Last 12 Months (1-3=4, 4=9, >4=11)    24 Charlson Comorbidity Score (Age + Comorbid Conditions)        Criteria that do not apply:    . Living with Significant Other. Assisted Living. LTAC. SNF. or   Rehab    Patient Length of Stay (>5 days = 3)    Pt.  Coverage (Medicare=5 , Medicaid, or Self-Pay=4)       Expected Length of Stay 3d 4h   Actual Length of Stay 3

## 2017-09-25 NOTE — ROUTINE PROCESS
TRANSFER - OUT REPORT:    Verbal report given to [place] RN(name) on Laquita Parker  being transferred to [address](unit) for routine post - op       Report consisted of patients Situation, Background, Assessment and   Recommendations(SBAR). Information from the following report(s) Procedure Summary and Intake/Output was reviewed with the receiving nurse. Opportunity for questions and clarification was provided.       Patient transported with:   Monitor  Registered Nurse

## 2017-09-25 NOTE — PERIOP NOTES
TRANSFER - IN REPORT:    Verbal report received from [address] [place] [address] Street on Sameera Moraes  being received from [facility] (unit) for ordered procedure      Report consisted of patients Situation, Background, Assessment and   Recommendations(SBAR). Information from the following report(s) SBAR, MAR and Recent Results was reviewed with the receiving nurse. Opportunity for questions and clarification was provided. Assessment completed upon patients arrival to unit and care assumed. Pt was transported via stretcher to the ASU on the cardiac monitor, and pulse oximeter. Pt is A/O x 3.

## 2017-09-25 NOTE — PERIOP NOTES
Patient: Brian Hicks MRN: [id number]  SSN: [id number]   YOB: 1950  Age: 78 y.o. Sex: female     Patient is status post Procedure(s):  COLONOSCOPY  ESOPHAGOGASTRODUODENAL (EGD) BIOPSY  COLON BIOPSY  RESOLUTION CLIP X1 TRANSVERSE COLON.     Surgeon(s) and Role:     * Nadege Gillespie MD - Primary                      Peripheral IV 09/24/17 Right Arm (Active)   Site Assessment Clean, dry, & intact 9/25/2017  3:00 AM   Phlebitis Assessment 0 9/25/2017  3:00 AM   Infiltration Assessment 0 9/25/2017  3:00 AM   Dressing Status Clean, dry, & intact 9/25/2017  3:00 AM   Dressing Type Transparent;Tape 9/25/2017  3:00 AM   Hub Color/Line Status Blue;Flushed;Patent 9/25/2017  3:00 AM

## 2017-09-25 NOTE — PROGRESS NOTES
[address] SHIFT NURSING NOTE    Bedside and Verbal shift change report given to [address] (oncoming nurse) by Tamiko Shukla (offgoing nurse). Report included the following information SBAR, Kardex, Intake/Output, MAR, Recent Results and Cardiac Rhythm paced. SHIFT SUMMARY:         Admission Date 9/22/2017   Admission Diagnosis Hypotension  diarrhea, rectal bleeding   Consults IP CONSULT TO HOSPITALIST  IP CONSULT TO GASTROENTEROLOGY  IP CONSULT TO CARDIOLOGY        Consults   [] PT   [] OT   [] Speech   [] Palliative      [] Hospice    [] Case Management   [x] None   Cardiac Monitoring   [x] Yes   [] No     Antibiotics   [] Yes   [x] No   GI Prophylaxis  (Ex: Protonix, Pepcid, etc,.)   [] Yes   [] No          DVT Prophylaxis   SCDs:  Sequential Compression Device: Bilateral     Patient Refused VTE Prophylaxis: Yes    Mayo stockings:         [] Medication (Ex: Lovenox, Eliquis, Brilinta, Coumadin,  Heparin, etc..)   [] Contraindicated   [] No VTE needed       Urinary Catheter             LDAs               Peripheral IV 09/24/17 Right Arm (Active)   Site Assessment Clean, dry, & intact 9/25/2017  3:00 AM   Phlebitis Assessment 0 9/25/2017  3:00 AM   Infiltration Assessment 0 9/25/2017  3:00 AM   Dressing Status Clean, dry, & intact 9/25/2017  3:00 AM   Dressing Type Transparent;Tape 9/25/2017  3:00 AM   Hub Color/Line Status Blue;Flushed;Patent 9/25/2017  3:00 AM                      I/Os No intake or output data in the 24 hours ending 09/25/17 0815      Activity Level Activity Level: Up with Assistance     Activity Assistance: Partial (one person)   Diet Active Orders   Diet    DIET NPO      Purposeful Rounding every 1-2 hour?    [x] Yes    Anthony Score  Total Score: 4   Bed Alarm (If score 3 or >)   [x] Yes    [] Refused (See signed refusal form in chart)   Landon Score  Landon Score: 19       Landon Score (if score 14 or less)   [] PMT consult   [] Nutrition consult   [] Wound Care consult      []  Specialty bed Influenza Vaccine Received Flu Vaccine for Current Season (usually Sept-March): No    Patient/Guardian Refused (Notify MD): No          Needs prior to discharge:   Home O2 required:    [] Yes   [x] No     If yes, how much O2 required? Other:    Last Bowel Movement Date: 09/25/17   Readmission Risk Assessment Tool Score High Risk            36       Total Score        3 Has Seen PCP in Last 6 Months (Yes=3, No=0)    9 IP Visits Last 12 Months (1-3=4, 4=9, >4=11)    24 Charlson Comorbidity Score (Age + Comorbid Conditions)        Criteria that do not apply:    . Living with Significant Other. Assisted Living. LTAC. SNF. or   Rehab    Patient Length of Stay (>5 days = 3)    Pt.  Coverage (Medicare=5 , Medicaid, or Self-Pay=4)       Expected Length of Stay - - -   Actual Length of Stay 3

## 2017-09-25 NOTE — PROCEDURES
[place] Office: [phone]      Esophagogastroduodenoscopy Procedure Note      Brenita Forward  1950  [id number]    Indication: GI Bleed, anemia    : Lorna Luo MD    Referring Provider:  Sunshine Bradshaw NP    Sedation:  MAC anesthesia Propofol    Procedure Details:  After detailed informed consent was obtained for the procedure, with all risks and benefits of procedure explained the patient was taken to the endoscopy suite and placed in the left lateral decubitus position. Following sequential administration of sedation as per above, the endoscope was inserted into the mouth and advanced under direct vision to second portion of the duodenum. A careful inspection was made as the gastroscope was withdrawn, including a retroflexed view of the proximal stomach; findings and interventions are described below. Findings:     Esophagus: The esophageal mucosa in the proximal, mid and distal esophagus is normal.   The squamo-columnar junction is at 40 cm where the Z-line was noted. Stomach: The gastric mucosa has mild erythema in the body. There is no bleeding seen. The fundus was found to be normal with no lesions noted on retroflexion. The angularis is normal as well. Duodenum:   The bulb and post bulbar mucosa is normal in appearance. The duodenal folds are normal.   A single mucosal biopsy taken to r/o celiac disease. No bleeding after biopsy noted. Therapies:  biopsy of duodenal second portion    Specimen:  Specimens were collected as described and send to the laboratory. Complications:   None were encountered during the procedure. EBL:  None. Recommendations:     -Await pathology. ,  -Acid inhibition suggested,  -Follow clinical symptoms and laboratory studies for evidence of rebleeding. Gianfranco Yoo MD  9/25/2017  11:13 AM

## 2017-09-25 NOTE — ANESTHESIA POSTPROCEDURE EVALUATION
Post-Anesthesia Evaluation and Assessment    Patient: Billy Sanderson MRN: [id number]  SSN: [id number]    YOB: 1950  Age: 78 y.o. Sex: female       Cardiovascular Function/Vital Signs  Visit Vitals    /56 (BP 1 Location: Left arm, BP Patient Position: At rest)    Pulse 75    Temp 36.3 °C (97.4 °F)    Resp 17    Ht 5' 6\" (1.676 m)    Wt 73.9 kg (163 lb)    SpO2 92%    Breastfeeding No    BMI 26.31 kg/m2       Patient is status post general, total IV anesthesia anesthesia for Procedure(s):  COLONOSCOPY  ESOPHAGOGASTRODUODENAL (EGD) BIOPSY  COLON BIOPSY  RESOLUTION CLIP X1 TRANSVERSE COLON. Nausea/Vomiting: None    Postoperative hydration reviewed and adequate. Pain:  Pain Scale 1: Numeric (0 - 10) (09/25/17 1147)  Pain Intensity 1: 0 (09/25/17 1147)   Managed    Neurological Status:   Neuro (WDL): Exceptions to WDL (09/25/17 1113)  Neuro  Neurologic State: Alert (09/25/17 1147)  LUE Motor Response: Purposeful (09/25/17 1147)  LLE Motor Response: Purposeful (09/25/17 1147)  RUE Motor Response: Purposeful (09/25/17 1147)  RLE Motor Response: Purposeful (09/25/17 1147)   At baseline    Mental Status and Level of Consciousness: Arousable    Pulmonary Status:   O2 Device: Room air (09/25/17 1210)   Adequate oxygenation and airway patent    Complications related to anesthesia: None    Post-anesthesia assessment completed.  No concerns    Signed By: Christiano Nolan MD     September 25, 2017

## 2017-09-25 NOTE — CONSULTS
Palliative Medicine Consult  [place]: [phone] ([phone])    Patient Name: Brielle Burrell  YOB: 1950    Date of Initial Consult: 9/25/17  Reason for Consult: CHF Bundle  Requesting Provider: Dr. Dagoberto Krishnamurthy  Primary Care Physician: Tirsha Mendoza NP      SUMMARY:   Brielle Burrell is a 78 y.o. with a past history of COPD, DM, CHF/CAD (Stent 4/2017), s/p pacemaker, CKD stage 3, hx cdiff 4/2017, chronic diarrhea, who was admitted on 9/22/2017 from home with a diagnosis of diarrhea, dehydration, hypotension, rectal bleeding. Current medical issues leading to Palliative Medicine involvement include: CHF bundle triggered the consult, patient has debility from multiple medical issues,  Diarrhea, GI bleeding/hypotension. Patient lives with her brother, who has CP, and requires assistance with cooking. She has one adult daughter, Terrance Marquez and she had a son  Richi Field) who was murdered in 37 at age 21. She works full time at [employer], about to retire. PALLIATIVE DIAGNOSES:   1. Chronic diarrhea, ? Etiology. 2. Fatigue  3. Dyspnea with exertion, due to COPD  4. Coronary artery disease, diastolic dysfunction  5. GI Bleeding, ? Internal hemorrhoids         PLAN:   1. Consider medication side effect as cause of diarrhea (metformin, colchicine). Colon bx pending  2. Introduced palliative medicine services. 3. We talked about RAY alvarado.  She would like to complete this paperwork while she is here. 1. Patient tells me she would appoint her daughter Terrance Marqeuz as [place] and Toni Rivas (granddaughter) as alternate. 2. She's already  had tough conversations with her family about her wishes (at the time of her heart stents)  3. Made plan to return tomorrow to complete AMD  4. Initial consult note routed to primary continuity provider  5.  Communicated plan of care with: Palliative IDT       GOALS OF CARE / TREATMENT PREFERENCES:   [====Goals of Care====]  GOALS OF CARE:  Patient / health care proxy stated goals:   Recovery from acute issues      TREATMENT PREFERENCES:   Code Status: Full Code    Advance Care Planning:  Advance Care Planning 9/22/2017   Patient's Healthcare Decision Maker is: Verbal statement (Legal Next of Kin remains as decision maker)   Primary Decision Maker Name Iris Haque   Primary Decision Maker Phone Number [phone]; cell [phone]   Primary Decision Maker Relationship to Patient Adult child   Confirm Advance Directive None   Patient Would Like to Complete Advance Directive No       Other:    The palliative care team has discussed with patient / health care proxy about goals of care / treatment preferences for patient.  [====Goals of Care====]         HISTORY:     History obtained from: chart, patient    CHIEF COMPLAINT: diarrhea, rectal bleeding    HPI/SUBJECTIVE:    The patient is:   [x] Verbal and participatory  [] Non-participatory due to:     78year old female admitted with ongoing diarrhea, and new onset rectal bleeding. Patient says diarrhea has been keeping her up all night, with frequent trips to rest room. Diarrhea happens when she eats, and also at night. She initially improved in April after tx for cdiff, but in June and July the diarrhea became quite troublesome again. She has continued to work full time, but is very tired.       Clinical Pain Assessment (nonverbal scale for severity on nonverbal patients):   [++++ Clinical Pain Assessment++++]  [++++Pain Severity++++]: Pain: 0  [++++Pain Character++++]:   [++++Pain Duration++++]:   [++++Pain Effect++++]:   [++++Pain Factors++++]:   [++++Pain Frequency++++]:   [++++Pain Location++++]:   [++++ Clinical Pain Assessment++++]  Duration: for how long has pt been experiencing pain (e.g., 2 days, 1 month, years)  Frequency: how often pain is an issue (e.g., several times per day, once every few days, constant)     FUNCTIONAL ASSESSMENT:     Palliative Performance Scale (PPS):  PPS: 60 PSYCHOSOCIAL/SPIRITUAL SCREENING:     Advance Care Planning:  Advance Care Planning 9/22/2017   Patient's Healthcare Decision Maker is: Verbal statement (Legal Next of Kin remains as decision maker)   Primary Decision Maker Name Umu Wong   Primary Decision Maker Phone Number [phone]; cell [phone]   Primary Decision Maker Relationship to Patient Adult child   Confirm Advance Directive None   Patient Would Like to Complete Advance Directive No        Any spiritual / Christianity concerns:  [] Yes /  [x] No    Caregiver Burnout:  [] Yes /  [x] No /  [] No Caregiver Present      Anticipatory grief assessment:   [x] Normal  / [] Maladaptive       ESAS Anxiety: Anxiety: 2    ESAS Depression: Depression: 0        REVIEW OF SYSTEMS:     Positive and pertinent negative findings in ROS are noted above in HPI. The following systems were [x] reviewed / [] unable to be reviewed as noted in HPI  Other findings are noted below. Systems: constitutional, ears/nose/mouth/throat, respiratory, gastrointestinal, genitourinary, musculoskeletal, integumentary, neurologic, psychiatric, endocrine. Positive findings noted below. Modified ESAS Completed by: provider   Fatigue: 5 Drowsiness: 0   Depression: 0 Pain: 0   Anxiety: 2 Nausea: 0   Anorexia: 0 Dyspnea: 0     Constipation: No     Stool Occurrence(s): 5        PHYSICAL EXAM:     From RN flowsheet:  Wt Readings from Last 3 Encounters:   09/25/17 163 lb (73.9 kg)   09/20/17 154 lb (69.9 kg)   09/15/17 154 lb 1.6 oz (69.9 kg)     Blood pressure 117/90, pulse 76, temperature 97.2 °F (36.2 °C), resp. rate 16, height 5' 6\" (1.676 m), weight 163 lb (73.9 kg), SpO2 95 %, not currently breastfeeding.     Pain Scale 1: Numeric (0 - 10)  Pain Intensity 1: 0     Pain Location 1: Foot  Pain Orientation 1: Right, Left  Pain Description 1: Numb, Other (comment) (swollen)  Pain Intervention(s) 1: Position, Rest, Therapeutic presence  Last bowel movement, if known:     Constitutional: pt is awake, alert NAD  Eyes: pupils equal, anicteric  No respiratory distress  Speech fluent, normal affect  LE : no edema     HISTORY:     Active Problems:    Atrial fibrillation--paroxysmal (2010)      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 (2010)      S/P coronary artery stent placement (2014)      Overview: 17 PCI/ROSALINO Diagonal      GIB (gastrointestinal bleeding) (2016)      Type 2 diabetes mellitus with diabetic neuropathy, without long-term current use of insulin ([address] ()      Systolic CHF, acute ([address] (2017)      Hypotension (5/10/2017)      CKD (chronic kidney disease) stage 3, GFR 30-59 ml/min (2017)      Past Medical History:   Diagnosis Date    Atrial fibrillation ([address] 2010    CAD (coronary artery disease)     stent    Chronic diastolic heart failure ([address] 2014    Chronic systolic HF (heart failure) ([address] 5/10/2017    2017 EF 25-30%    COPD     COPD (chronic obstructive pulmonary disease) ([address] 2010    Diabetes ([address]     Fibroid     Heart failure (HCC)     History of Clostridium difficile infection 5/10/2017    2017 CDiff positive    Hypertension 2010    Hypotension 5/10/2017    Junctional tachycardia ([address] 5/10/2017    NIDDM (non-insulin dependent diabetes mellitus) 2010    Screening mammogram 5/4/10    SOB (shortness of breath) 2014      Past Surgical History:   Procedure Laterality Date    HX  SECTION      HX OTHER SURGICAL      adrenal gland removed    HX PACEMAKER      TX EXCISE ADRENAL GLAND        Family History   Problem Relation Age of Onset    Heart Disease Mother     Diabetes Father     Heart Disease Brother       History reviewed, no pertinent family history.   Social History   Substance Use Topics    Smoking status: Former Smoker     Types: Cigarettes     Quit date: 2009    Smokeless tobacco: Never Used    Alcohol use No     Allergies   Allergen Reactions    Crestor [Rosuvastatin] Myalgia    Levaquin [Levofloxacin] Nausea Only     GI Upset    Lipitor [Atorvastatin] Diarrhea    Lyrica [Pregabalin] Myalgia      Current Facility-Administered Medications   Medication Dose Route Frequency    hydrocortisone (ANUSOL-HC) suppository 25 mg  25 mg Rectal Q12H    aspirin chewable tablet 81 mg  81 mg Oral DAILY    sodium chloride (NS) flush 5-10 mL  5-10 mL IntraVENous Q8H    sodium chloride (NS) flush 5-10 mL  5-10 mL IntraVENous PRN    cholestyramine-aspartame (QUESTRAN LIGHT) packet 4 g  4 g Oral TID WITH MEALS    pantoprazole (PROTONIX) 40 mg in sodium chloride 0.9 % 10 mL injection  40 mg IntraVENous DAILY    albuterol (PROVENTIL VENTOLIN) nebulizer solution 2.5 mg  2.5 mg Nebulization Q4H PRN    amiodarone (CORDARONE) tablet 100 mg  100 mg Oral QHS    azelastine (ASTELIN) 137mcg/spray nasal spray  1 Spray Both Nostrils DAILY    fluticasone-vilanterol (BREO ELLIPTA) 100mcg-25mcg/puff  1 Puff Inhalation DAILY    carvedilol (COREG) tablet 6.25 mg  6.25 mg Oral BID WITH MEALS    colchicine tablet 0.6 mg  0.6 mg Oral DAILY    umeclidinium (INCRUSE ELLIPTA) 62.5 mcg/actuation  1 Puff Inhalation DAILY    insulin lispro (HUMALOG) injection   SubCUTAneous AC&HS    glucose chewable tablet 16 g  4 Tab Oral PRN    glucagon (GLUCAGEN) injection 1 mg  1 mg IntraMUSCular PRN    dextrose 10% infusion 125-250 mL  125-250 mL IntraVENous PRN    gabapentin (NEURONTIN) capsule 800 mg  800 mg Oral TID    influenza vaccine 2017-18 (3 yrs+)(PF) (FLUZONE QUAD/FLUARIX QUAD) injection 0.5 mL  0.5 mL IntraMUSCular PRIOR TO DISCHARGE    clopidogrel (PLAVIX) tablet 75 mg  75 mg Oral DAILY          LAB AND IMAGING FINDINGS:     Lab Results   Component Value Date/Time    WBC 8.9 09/25/2017 12:37 AM    HGB 10.6 09/25/2017 09:48 AM    PLATELET 321 65/76/7416 12:37 AM     Lab Results   Component Value Date/Time    Sodium 139 09/25/2017 12:37 AM    Potassium 3.9 09/25/2017 12:37 AM Chloride 108 09/25/2017 12:37 AM    CO2 20 09/25/2017 12:37 AM    BUN 6 09/25/2017 12:37 AM    Creatinine 1.22 09/25/2017 12:37 AM    Calcium 7.8 09/25/2017 12:37 AM    Magnesium 1.8 09/15/2017 08:12 AM    Phosphorus 3.1 05/10/2017 04:54 AM      Lab Results   Component Value Date/Time    AST (SGOT) 140 09/22/2017 07:01 AM    Alk. phosphatase 302 09/22/2017 07:01 AM    Protein, total 9.0 09/22/2017 07:01 AM    Albumin 2.7 09/22/2017 07:01 AM    Globulin 6.3 09/22/2017 07:01 AM     Lab Results   Component Value Date/Time    INR 1.8 05/22/2017 03:45 AM    INR POC 1.2 06/20/2017 12:56 PM    Prothrombin time 18.9 05/22/2017 03:45 AM    aPTT 27.1 04/12/2016 04:29 PM      Lab Results   Component Value Date/Time    Iron 59 06/30/2014 03:30 AM    TIBC 302 06/30/2014 03:30 AM    Iron % saturation 20 06/30/2014 03:30 AM      Lab Results   Component Value Date/Time    pH 7.45 06/18/2013 04:53 AM    PCO2 38 06/18/2013 04:53 AM    PO2 146 06/18/2013 04:53 AM     No components found for: [place]   Lab Results   Component Value Date/Time     09/20/2017 10:02 AM    CK - MB 2.0 09/20/2017 10:02 AM                Total time:   Counseling / coordination time, spent as noted above:   > 50% counseling / coordination?:     Prolonged service was provided for  []30 min   []75 min in face to face time in the presence of the patient, spent as noted above. Time Start:   Time End:   Note: this can only be billed with 89377 (initial) or 08291 (follow up). If multiple start / stop times, list each separately.

## 2017-09-25 NOTE — PROGRESS NOTES
Received report of patient regarding procedure. Patient has arrived to unit. No complaints. She is interested in eating.

## 2017-09-25 NOTE — PERIOP NOTES
Shreya Garsia  1950  [id number]    Situation:  Verbal report given from: ERNESTO Munoz RN and BILL Cardoza cRNA  Procedure: Procedure(s):  COLONOSCOPY  ESOPHAGOGASTRODUODENAL (EGD) BIOPSY  COLON BIOPSY  RESOLUTION CLIP X1 TRANSVERSE COLON    Background:    Preoperative diagnosis: Rectal Bleeding    Postoperative diagnosis: UPPER-MILD GASTRITIS  LOWER-HEMORRHOIDS    :  Dr. Cyn Deluca    Assistant(s): Circ-1: Cindy Mcneil RN  Circ-2: Jennifer Barone RN  Scrub Tech-1: Minerva     Specimens:   ID Type Source Tests Collected by Time Destination   1 : DUODENUM BIOPSY Preservative Duodenum  Gianfranco Chamberlain MD 9/25/2017 1052 Pathology   2 : RANDOM COLON BIOPSY Preservative Random colon  Saw Lau MD 9/25/2017 1102 Pathology       Assessment:  Intra-procedure medications   Propofol 200 mg      Anesthesia gave intra-procedure sedation and medications, see anesthesia flow sheet     Intravenous fluids: LR@ KVO     Vital signs stable     Abdominal assessment: round and soft       Recommendation:    Permission to share finding with family or friend yes    All side rails up, bed in low position, wheels locked. Nurse at bedside. 1154 Transported to room 2216 via stretcher with monitor/2 RNs Assisted to bed, telemetry monitor in place.

## 2017-09-25 NOTE — PROGRESS NOTES
TRANSFER - OUT REPORT:    Verbal report given to Ambulatory care center(name) on Ebony Quezada  being transferred to ambulatory care surgery. (unit) for ordered procedure       Report consisted of patients Situation, Background, Assessment and   Recommendations(SBAR). Information from the following report(s) SBAR was reviewed with the receiving nurse. Lines:   Peripheral IV 09/24/17 Right Arm (Active)   Site Assessment Clean, dry, & intact 9/25/2017  3:00 AM   Phlebitis Assessment 0 9/25/2017  3:00 AM   Infiltration Assessment 0 9/25/2017  3:00 AM   Dressing Status Clean, dry, & intact 9/25/2017  3:00 AM   Dressing Type Transparent;Tape 9/25/2017  3:00 AM   Hub Color/Line Status Blue;Flushed;Patent 9/25/2017  3:00 AM        Opportunity for questions and clarification was provided.       Patient transported with:   Monitor

## 2017-09-26 LAB
GLUCOSE BLD STRIP.AUTO-MCNC: 100 MG/DL (ref 65–100)
GLUCOSE BLD STRIP.AUTO-MCNC: 153 MG/DL (ref 65–100)
GLUCOSE BLD STRIP.AUTO-MCNC: 156 MG/DL (ref 65–100)
GLUCOSE BLD STRIP.AUTO-MCNC: 201 MG/DL (ref 65–100)
HCT VFR BLD AUTO: 29 % (ref 35–47)
HGB BLD-MCNC: 9.8 G/DL (ref 11.5–16)
SERVICE CMNT-IMP: ABNORMAL
SERVICE CMNT-IMP: NORMAL

## 2017-09-26 PROCEDURE — 82962 GLUCOSE BLOOD TEST: CPT

## 2017-09-26 PROCEDURE — 36415 COLL VENOUS BLD VENIPUNCTURE: CPT | Performed by: INTERNAL MEDICINE

## 2017-09-26 PROCEDURE — 85018 HEMOGLOBIN: CPT | Performed by: INTERNAL MEDICINE

## 2017-09-26 PROCEDURE — G8978 MOBILITY CURRENT STATUS: HCPCS | Performed by: PHYSICAL THERAPIST

## 2017-09-26 PROCEDURE — 65660000000 HC RM CCU STEPDOWN

## 2017-09-26 PROCEDURE — 74011250637 HC RX REV CODE- 250/637: Performed by: INTERNAL MEDICINE

## 2017-09-26 PROCEDURE — 97162 PT EVAL MOD COMPLEX 30 MIN: CPT | Performed by: PHYSICAL THERAPIST

## 2017-09-26 PROCEDURE — G8979 MOBILITY GOAL STATUS: HCPCS | Performed by: PHYSICAL THERAPIST

## 2017-09-26 PROCEDURE — 74011250637 HC RX REV CODE- 250/637: Performed by: NURSE PRACTITIONER

## 2017-09-26 PROCEDURE — 97116 GAIT TRAINING THERAPY: CPT | Performed by: PHYSICAL THERAPIST

## 2017-09-26 PROCEDURE — 74011636637 HC RX REV CODE- 636/637: Performed by: INTERNAL MEDICINE

## 2017-09-26 PROCEDURE — 74011250636 HC RX REV CODE- 250/636: Performed by: INTERNAL MEDICINE

## 2017-09-26 RX ORDER — FUROSEMIDE 20 MG/1
20 TABLET ORAL DAILY
Status: DISCONTINUED | OUTPATIENT
Start: 2017-09-26 | End: 2017-09-27 | Stop reason: HOSPADM

## 2017-09-26 RX ORDER — ACETAMINOPHEN 500 MG
500 TABLET ORAL
Status: DISCONTINUED | OUTPATIENT
Start: 2017-09-26 | End: 2017-09-27 | Stop reason: HOSPADM

## 2017-09-26 RX ADMIN — GABAPENTIN 800 MG: 300 CAPSULE ORAL at 17:56

## 2017-09-26 RX ADMIN — FAMOTIDINE 20 MG: 20 TABLET ORAL at 09:37

## 2017-09-26 RX ADMIN — FLUTICASONE FUROATE AND VILANTEROL TRIFENATATE 1 PUFF: 100; 25 POWDER RESPIRATORY (INHALATION) at 09:40

## 2017-09-26 RX ADMIN — Medication 10 ML: at 14:42

## 2017-09-26 RX ADMIN — INSULIN LISPRO 3 UNITS: 100 INJECTION, SOLUTION INTRAVENOUS; SUBCUTANEOUS at 11:58

## 2017-09-26 RX ADMIN — AZELASTINE HYDROCHLORIDE 1 SPRAY: 137 SPRAY, METERED NASAL at 09:40

## 2017-09-26 RX ADMIN — UMECLIDINIUM 1 PUFF: 62.5 AEROSOL, POWDER ORAL at 09:41

## 2017-09-26 RX ADMIN — FUROSEMIDE 20 MG: 20 TABLET ORAL at 09:35

## 2017-09-26 RX ADMIN — ASPIRIN 81 MG 81 MG: 81 TABLET ORAL at 09:35

## 2017-09-26 RX ADMIN — INSULIN LISPRO 2 UNITS: 100 INJECTION, SOLUTION INTRAVENOUS; SUBCUTANEOUS at 17:56

## 2017-09-26 RX ADMIN — COLCHICINE 0.6 MG: 0.6 TABLET, FILM COATED ORAL at 09:38

## 2017-09-26 RX ADMIN — ONDANSETRON 4 MG: 2 INJECTION INTRAMUSCULAR; INTRAVENOUS at 14:41

## 2017-09-26 RX ADMIN — Medication 10 ML: at 22:25

## 2017-09-26 RX ADMIN — AMIODARONE HYDROCHLORIDE 100 MG: 200 TABLET ORAL at 22:25

## 2017-09-26 RX ADMIN — GABAPENTIN 800 MG: 300 CAPSULE ORAL at 22:25

## 2017-09-26 RX ADMIN — CARVEDILOL 6.25 MG: 6.25 TABLET, FILM COATED ORAL at 17:57

## 2017-09-26 RX ADMIN — CLOPIDOGREL BISULFATE 75 MG: 75 TABLET, FILM COATED ORAL at 09:39

## 2017-09-26 RX ADMIN — Medication 10 ML: at 06:00

## 2017-09-26 RX ADMIN — HYDROCORTISONE ACETATE 25 MG: 25 SUPPOSITORY RECTAL at 12:00

## 2017-09-26 RX ADMIN — GABAPENTIN 800 MG: 300 CAPSULE ORAL at 09:37

## 2017-09-26 RX ADMIN — ACETAMINOPHEN 500 MG: 500 TABLET, FILM COATED ORAL at 04:45

## 2017-09-26 RX ADMIN — CARVEDILOL 6.25 MG: 6.25 TABLET, FILM COATED ORAL at 09:36

## 2017-09-26 NOTE — PROGRESS NOTES
Problem: Falls - Risk of  Goal: *Absence of Falls  Document Anthony Fall Risk and appropriate interventions in the flowsheet.    Outcome: Progressing Towards Goal  Fall Risk Interventions:  Mobility Interventions: Bed/chair exit alarm, Patient to call before getting OOB, PT Consult for mobility concerns, PT Consult for assist device competence, Utilize walker, cane, or other assitive device           Medication Interventions: Assess postural VS orthostatic hypotension, Bed/chair exit alarm, Evaluate medications/consider consulting pharmacy, Patient to call before getting OOB, Teach patient to arise slowly     Elimination Interventions: Bed/chair exit alarm, Call light in reach, Patient to call for help with toileting needs, Toileting schedule/hourly rounds     History of Falls Interventions: Bed/chair exit alarm, Door open when patient unattended, Consult care management for discharge planning, Evaluate medications/consider consulting pharmacy, Room close to nurse's station

## 2017-09-26 NOTE — PROGRESS NOTES
Gastroenterology Progress Note    9/26/2017    Admit Date: 9/22/2017    Subjective: Follow up for: diarrhea,blood [place]       Very happy that she does not have diarrhea anymore. Feels dizzy. Patient was seen in rounds by me today. Current Facility-Administered Medications   Medication Dose Route Frequency    acetaminophen (TYLENOL) tablet 500 mg  500 mg Oral Q6H PRN    furosemide (LASIX) tablet 20 mg  20 mg Oral DAILY    hydrocortisone (ANUSOL-HC) suppository 25 mg  25 mg Rectal Q12H    ondansetron (ZOFRAN) injection 4 mg  4 mg IntraVENous Q4H PRN    famotidine (PEPCID) tablet 20 mg  20 mg Oral DAILY    aspirin chewable tablet 81 mg  81 mg Oral DAILY    sodium chloride (NS) flush 5-10 mL  5-10 mL IntraVENous Q8H    sodium chloride (NS) flush 5-10 mL  5-10 mL IntraVENous PRN    cholestyramine-aspartame (QUESTRAN LIGHT) packet 4 g  4 g Oral TID WITH MEALS    albuterol (PROVENTIL VENTOLIN) nebulizer solution 2.5 mg  2.5 mg Nebulization Q4H PRN    amiodarone (CORDARONE) tablet 100 mg  100 mg Oral QHS    azelastine (ASTELIN) 137mcg/spray nasal spray  1 Spray Both Nostrils DAILY    fluticasone-vilanterol (BREO ELLIPTA) 100mcg-25mcg/puff  1 Puff Inhalation DAILY    carvedilol (COREG) tablet 6.25 mg  6.25 mg Oral BID WITH MEALS    colchicine tablet 0.6 mg  0.6 mg Oral DAILY    umeclidinium (INCRUSE ELLIPTA) 62.5 mcg/actuation  1 Puff Inhalation DAILY    insulin lispro (HUMALOG) injection   SubCUTAneous AC&HS    glucose chewable tablet 16 g  4 Tab Oral PRN    glucagon (GLUCAGEN) injection 1 mg  1 mg IntraMUSCular PRN    dextrose 10% infusion 125-250 mL  125-250 mL IntraVENous PRN    gabapentin (NEURONTIN) capsule 800 mg  800 mg Oral TID    clopidogrel (PLAVIX) tablet 75 mg  75 mg Oral DAILY        Objective:     Blood pressure 105/46, pulse 76, temperature 97.4 °F (36.3 °C), resp.  rate 18, height 5' 6\" (1.676 m), weight 70 kg (154 lb 5.2 oz), SpO2 97 %, not currently breastfeeding. 09/26 0701 - 09/26 1900  In: -   Out: 500 [Urine:500]    09/24 1901 - 09/26 0700  In: 790 [P.O.:390; I.V.:400]  Out: 1300 [Urine:1300]        Physical Examination:       General:AAO x 3,   HEENT:  EOMI, MMM  Chest:  CTA,   Heart: S1, S2, RRR  GI: Soft, NT, ND + bowel sounds      Data Review    Recent Results (from the past 24 hour(s))   GLUCOSE, POC    Collection Time: 09/25/17  3:49 PM   Result Value Ref Range    Glucose (POC) 186 (H) 65 - 100 mg/dL    Performed by Keila Dhillon (PCT)    GLUCOSE, POC    Collection Time: 09/25/17 11:48 PM   Result Value Ref Range    Glucose (POC) 101 (H) 65 - 100 mg/dL    Performed by Lisa Hunter    HGB & HCT    Collection Time: 09/26/17  3:13 AM   Result Value Ref Range    HGB 9.8 (L) 11.5 - 16.0 g/dL    HCT 29.0 (L) 35.0 - 47.0 %   GLUCOSE, POC    Collection Time: 09/26/17  7:25 AM   Result Value Ref Range    Glucose (POC) 100 65 - 100 mg/dL    Performed by Eddie SEN(C0N)    GLUCOSE, POC    Collection Time: 09/26/17 11:05 AM   Result Value Ref Range    Glucose (POC) 201 (H) 65 - 100 mg/dL    Performed by JAMIL SEN(C0N)      Recent Labs      09/26/17   0313  09/25/17   0948  09/25/17 0037 09/24/17 0217   WBC   --    --   8.9   --   9.0   HGB  9.8*  10.6*  10.8*   < >  10.5*   HCT  29.0*  31.9*  32.9*   < >  31.8*   PLT   --    --   270   --   252    < > = values in this interval not displayed. Recent Labs      09/25/17 0037 09/24/17 0217   NA  139  137   K  3.9  3.5   CL  108  109*   CO2  20*  18*   BUN  6  6   CREA  1.22*  1.33*   GLU  89  90   CA  7.8*  7.7*     No results for input(s): SGOT, GPT, AP, TBIL, TP, ALB, GLOB, GGT, AML, LPSE in the last 72 hours. No lab exists for component: AMYP, HLPSE  No results for input(s): INR, PTP, APTT in the last 72 hours. No lab exists for component: INREXT   No results for input(s): FE, TIBC, PSAT, FERR in the last 72 hours.    No results found for: FOL, RBCF   No results for input(s): PH, PCO2, PO2 in the last 72 hours. No results for input(s): CPK, CKNDX, TROIQ in the last 72 hours. No lab exists for component: CPKMB  Lab Results   Component Value Date/Time    Cholesterol, total 248 10/31/2016 12:00 AM    HDL Cholesterol 43 10/31/2016 12:00 AM    LDL, calculated 160 10/31/2016 12:00 AM    Triglyceride 227 10/31/2016 12:00 AM    CHOL/HDL Ratio 6.4 07/01/2014 03:10 AM     No components found for: [place]  Lab Results   Component Value Date/Time    Color YELLOW/STRAW 09/13/2017 03:30 PM    Appearance CLOUDY 09/13/2017 03:30 PM    Specific gravity 1.013 09/13/2017 03:30 PM    pH (UA) 5.5 09/13/2017 03:30 PM    Protein NEGATIVE  09/13/2017 03:30 PM    Glucose NEGATIVE  09/13/2017 03:30 PM    Ketone NEGATIVE  09/13/2017 03:30 PM    Bilirubin NEGATIVE  09/13/2017 03:30 PM    Urobilinogen 0.2 09/13/2017 03:30 PM    Nitrites NEGATIVE  09/13/2017 03:30 PM    Leukocyte Esterase MODERATE 09/13/2017 03:30 PM    Epithelial cells FEW 09/13/2017 03:30 PM    Bacteria 1+ 09/13/2017 03:30 PM    WBC 5-10 09/13/2017 03:30 PM    RBC 0-5 09/13/2017 03:30 PM        ROS: -CP, SOB, Dysuria, palpitations, cough. Assessment:    Active Problems:    Atrial fibrillation--paroxysmal (6/2/2010)      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 (6/2/2010)      S/P coronary artery stent placement (7/4/2014)      Overview: 4/7/17 PCI/ROSALINO Diagonal      GIB (gastrointestinal bleeding) (4/12/2016)      Type 2 diabetes mellitus with diabetic neuropathy, without long-term current use of insulin ([facility] (5/68/8323)      Systolic CHF, acute ([facility] (4/6/2017)      Hypotension (5/10/2017)      CKD (chronic kidney disease) stage 3, GFR 30-59 ml/min (9/22/2017)             Plan/Discussion:     1. On Questran TID and off of metformin with no diarrhea currently. Stool WBC, C and S and c diff- negative. Will advance diet to regular,lactose free low salt.    2. Await duodenal and colonic bx.  3. On colonoscopy she had:  The whole colon and terminal ileum are normal.The prep is good. There is no blood or bleeding seen any where. There is no blood in the TI. A single transverse colon biopsy resulted in oozing (hx of diarrhea; R/o microscopic colitis). This was controlled by applying a single BS Resolution 360 hemoclip. Internal hemorrhoids are noted, likely the source of the rectal bleeding. Easy mucosal trauma with scope passage (antiplatelet effect from medications) is noted. . See EGD report as well. Signed By: Cristi Marcum.  Shaye Dahl MD    9/26/2017  3:42 PM

## 2017-09-26 NOTE — PROGRESS NOTES
Problem: Falls - Risk of  Goal: *Absence of Falls  Document Anthony Fall Risk and appropriate interventions in the flowsheet.    Outcome: Progressing Towards Goal  Fall Risk Interventions:  Mobility Interventions: Bed/chair exit alarm, OT consult for ADLs, Patient to call before getting OOB, PT Consult for mobility concerns, PT Consult for assist device competence, Strengthening exercises (ROM-active/passive)           Medication Interventions: Bed/chair exit alarm, Evaluate medications/consider consulting pharmacy, Patient to call before getting OOB, Teach patient to arise slowly     Elimination Interventions: Call light in reach, Elevated toilet seat, Patient to call for help with toileting needs, Toilet paper/wipes in reach, Toileting schedule/hourly rounds     History of Falls Interventions: Bed/chair exit alarm, Consult care management for discharge planning, Door open when patient unattended, Evaluate medications/consider consulting pharmacy, Investigate reason for fall, Room close to nurse's station

## 2017-09-26 NOTE — CONSULTS
Palliative Medicine Consult  [place]: [phone] ([phone])    Patient Name: Marilai Pollack  YOB: 1950    Date of Initial Consult: 9/25/17  Reason for Consult: CHF Bundle  Requesting Provider: Dr. Samra Avelar  Primary Care Physician: Varun Carpenter NP      SUMMARY:   Marilia Pollack is a 78 y.o. with a past history of COPD, DM, CHF/CAD (Stent 4/2017), s/p pacemaker, CKD stage 3, hx cdiff 4/2017, chronic diarrhea, who was admitted on 9/22/2017 from home with a diagnosis of diarrhea, dehydration, hypotension, rectal bleeding. Current medical issues leading to Palliative Medicine involvement include: CHF bundle triggered the consult, patient has debility from multiple medical issues,  Diarrhea, GI bleeding/hypotension. Patient lives with her brother, who has CP, and requires assistance with cooking. She has one adult daughter, Amee Guillory and she had a son  Nhung Burrell) who was murdered in 37 at age 21. She works full time at [employer], about to retire. PALLIATIVE DIAGNOSES:   1. Chronic diarrhea, ? Etiology. 2. Fatigue  3. Dyspnea with exertion, due to COPD  4. Coronary artery disease, diastolic dysfunction  5. GI Bleeding, ? Internal hemorrhoids         PLAN:   1. Consider medication side effect as cause of diarrhea (metformin, colchicine). Colon bx pending  2. Introduced palliative medicine services. 3. We talked about mpoa, AMD.  -- patient completed this with our team today. 1. Patient  appointed her daughter Amee Guillory as mpoa and [employer] (granddaughter) as alternate. 2. Patient says she's already  had tough conversations with her family about her wishes (at the time of her heart stents)  3. Patient desires to be Full Code. She and her daughter have already talked about this. Patient presents as rather guarded in discussing her wishes, therefor further education about code status was not attempted during this visit.      4. Initial consult note routed to primary continuity provider  5. Communicated plan of care with: Palliative IDT    Will sign off, please call as needed [phone]   GOALS OF CARE / TREATMENT PREFERENCES:   [====Goals of Care====]  GOALS OF CARE:  Patient / health care proxy stated goals:   Recovery from acute issues      TREATMENT PREFERENCES:   Code Status: Full Code    Advance Care Planning:  Advance Care Planning 9/22/2017   Patient's Healthcare Decision Maker is: Verbal statement (Legal Next of Kin remains as decision maker)   Primary Decision Maker Name Grace Caruso   Primary Decision Maker Phone Number [phone]; cell [phone]   Primary Decision Maker Relationship to Patient Adult child   Confirm Advance Directive None   Patient Would Like to Complete Advance Directive No       Other:    The palliative care team has discussed with patient / health care proxy about goals of care / treatment preferences for patient.  [====Goals of Care====]         HISTORY:     History obtained from: chart, patient    CHIEF COMPLAINT: diarrhea, rectal bleeding    HPI/SUBJECTIVE:    The patient is:   [x] Verbal and participatory  [] Non-participatory due to:     78year old female admitted with ongoing diarrhea, and new onset rectal bleeding. Patient says diarrhea has been keeping her up all night, with frequent trips to rest room. Diarrhea happens when she eats, and also at night. She initially improved in April after tx for cdiff, but in June and July the diarrhea became quite troublesome again. She has continued to work full time, but is very tired.       Clinical Pain Assessment (nonverbal scale for severity on nonverbal patients):   [++++ Clinical Pain Assessment++++]  [++++Pain Severity++++]: Pain: 0  [++++Pain Character++++]:   [++++Pain Duration++++]:   [++++Pain Effect++++]:   [++++Pain Factors++++]:   [++++Pain Frequency++++]:   [++++Pain Location++++]:   [++++ Clinical Pain Assessment++++]  Duration: for how long has pt been experiencing pain (e.g., 2 days, 1 month, years)  Frequency: how often pain is an issue (e.g., several times per day, once every few days, constant)     FUNCTIONAL ASSESSMENT:     Palliative Performance Scale (PPS):  PPS: 60       PSYCHOSOCIAL/SPIRITUAL SCREENING:     Advance Care Planning:  Advance Care Planning 9/22/2017   Patient's Healthcare Decision Maker is: Verbal statement (Legal Next of Kin remains as decision maker)   Primary Decision Maker Name Pj Lynch   Primary Decision Maker Phone Number [phone]; cell [phone]   Primary Decision Maker Relationship to Patient Adult child   Confirm Advance Directive None   Patient Would Like to Complete Advance Directive No        Any spiritual / Judaism concerns:  [] Yes /  [x] No    Caregiver Burnout:  [] Yes /  [x] No /  [] No Caregiver Present      Anticipatory grief assessment:   [x] Normal  / [] Maladaptive       ESAS Anxiety: Anxiety: 2    ESAS Depression: Depression: 0        REVIEW OF SYSTEMS:     Positive and pertinent negative findings in ROS are noted above in HPI. The following systems were [x] reviewed / [] unable to be reviewed as noted in HPI  Other findings are noted below. Systems: constitutional, ears/nose/mouth/throat, respiratory, gastrointestinal, genitourinary, musculoskeletal, integumentary, neurologic, psychiatric, endocrine. Positive findings noted below. Modified ESAS Completed by: provider   Fatigue: 5 Drowsiness: 0   Depression: 0 Pain: 0   Anxiety: 2 Nausea: 0   Anorexia: 0 Dyspnea: 0     Constipation: No     Stool Occurrence(s): 5        PHYSICAL EXAM:     From RN flowsheet:  Wt Readings from Last 3 Encounters:   09/26/17 154 lb 5.2 oz (70 kg)   09/20/17 154 lb (69.9 kg)   09/15/17 154 lb 1.6 oz (69.9 kg)     Blood pressure 91/46, pulse 76, temperature 97.6 °F (36.4 °C), resp. rate 18, height 5' 6\" (1.676 m), weight 154 lb 5.2 oz (70 kg), SpO2 97 %, not currently breastfeeding.     Pain Scale 1: Numeric (0 - 10)  Pain Intensity 1: 7  Pain Onset 1: acute  Pain Location 1: Foot  Pain Orientation 1: Anterior  Pain Description 1: Numb, Sharp, Shooting  Pain Intervention(s) 1: Repositioned  Last bowel movement, if known:     Constitutional: pt is awake, alert NAD  Eyes: pupils equal, anicteric  No respiratory distress  Speech fluent, normal affect  LE : no edema     HISTORY:     Active Problems:    Atrial fibrillation--paroxysmal (2010)      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 (2010)      S/P coronary artery stent placement (2014)      Overview: 17 PCI/ROSALINO Diagonal      GIB (gastrointestinal bleeding) (2016)      Type 2 diabetes mellitus with diabetic neuropathy, without long-term current use of insulin ([address] (8623)      Systolic CHF, acute ([address] (2017)      Hypotension (5/10/2017)      CKD (chronic kidney disease) stage 3, GFR 30-59 ml/min (2017)      Past Medical History:   Diagnosis Date    Atrial fibrillation ([address] 2010    CAD (coronary artery disease)     stent    Chronic diastolic heart failure ([address] 2014    Chronic systolic HF (heart failure) ([address] 5/10/2017    2017 EF 25-30%    COPD     COPD (chronic obstructive pulmonary disease) ([address] 2010    Diabetes ([address]     Fibroid     Heart failure ([address]     History of Clostridium difficile infection 5/10/2017    2017 CDiff positive    Hypertension 2010    Hypotension 5/10/2017    Junctional tachycardia ([address] 5/10/2017    NIDDM (non-insulin dependent diabetes mellitus) 2010    Screening mammogram 5/4/10    SOB (shortness of breath) 2014      Past Surgical History:   Procedure Laterality Date    HX  SECTION      HX OTHER SURGICAL      adrenal gland removed    HX PACEMAKER      NV EXCISE ADRENAL GLAND        Family History   Problem Relation Age of Onset    Heart Disease Mother     Diabetes Father     Heart Disease Brother       History reviewed, no pertinent family history.   Social History Substance Use Topics    Smoking status: Former Smoker     Types: Cigarettes     Quit date: 12/31/2009    Smokeless tobacco: Never Used    Alcohol use No     Allergies   Allergen Reactions    Crestor [Rosuvastatin] Myalgia    Levaquin [Levofloxacin] Nausea Only     GI Upset    Lipitor [Atorvastatin] Diarrhea    Lyrica [Pregabalin] Myalgia      Current Facility-Administered Medications   Medication Dose Route Frequency    acetaminophen (TYLENOL) tablet 500 mg  500 mg Oral Q6H PRN    furosemide (LASIX) tablet 20 mg  20 mg Oral DAILY    hydrocortisone (ANUSOL-HC) suppository 25 mg  25 mg Rectal Q12H    ondansetron (ZOFRAN) injection 4 mg  4 mg IntraVENous Q4H PRN    famotidine (PEPCID) tablet 20 mg  20 mg Oral DAILY    aspirin chewable tablet 81 mg  81 mg Oral DAILY    sodium chloride (NS) flush 5-10 mL  5-10 mL IntraVENous Q8H    sodium chloride (NS) flush 5-10 mL  5-10 mL IntraVENous PRN    cholestyramine-aspartame (QUESTRAN LIGHT) packet 4 g  4 g Oral TID WITH MEALS    albuterol (PROVENTIL VENTOLIN) nebulizer solution 2.5 mg  2.5 mg Nebulization Q4H PRN    amiodarone (CORDARONE) tablet 100 mg  100 mg Oral QHS    azelastine (ASTELIN) 137mcg/spray nasal spray  1 Spray Both Nostrils DAILY    fluticasone-vilanterol (BREO ELLIPTA) 100mcg-25mcg/puff  1 Puff Inhalation DAILY    carvedilol (COREG) tablet 6.25 mg  6.25 mg Oral BID WITH MEALS    colchicine tablet 0.6 mg  0.6 mg Oral DAILY    umeclidinium (INCRUSE ELLIPTA) 62.5 mcg/actuation  1 Puff Inhalation DAILY    insulin lispro (HUMALOG) injection   SubCUTAneous AC&HS    glucose chewable tablet 16 g  4 Tab Oral PRN    glucagon (GLUCAGEN) injection 1 mg  1 mg IntraMUSCular PRN    dextrose 10% infusion 125-250 mL  125-250 mL IntraVENous PRN    gabapentin (NEURONTIN) capsule 800 mg  800 mg Oral TID    clopidogrel (PLAVIX) tablet 75 mg  75 mg Oral DAILY          LAB AND IMAGING FINDINGS:     Lab Results   Component Value Date/Time    WBC 8.9 09/25/2017 12:37 AM    HGB 9.8 09/26/2017 03:13 AM    PLATELET 783 33/69/7936 12:37 AM     Lab Results   Component Value Date/Time    Sodium 139 09/25/2017 12:37 AM    Potassium 3.9 09/25/2017 12:37 AM    Chloride 108 09/25/2017 12:37 AM    CO2 20 09/25/2017 12:37 AM    BUN 6 09/25/2017 12:37 AM    Creatinine 1.22 09/25/2017 12:37 AM    Calcium 7.8 09/25/2017 12:37 AM    Magnesium 1.8 09/15/2017 08:12 AM    Phosphorus 3.1 05/10/2017 04:54 AM      Lab Results   Component Value Date/Time    AST (SGOT) 140 09/22/2017 07:01 AM    Alk. phosphatase 302 09/22/2017 07:01 AM    Protein, total 9.0 09/22/2017 07:01 AM    Albumin 2.7 09/22/2017 07:01 AM    Globulin 6.3 09/22/2017 07:01 AM     Lab Results   Component Value Date/Time    INR 1.8 05/22/2017 03:45 AM    INR POC 1.2 06/20/2017 12:56 PM    Prothrombin time 18.9 05/22/2017 03:45 AM    aPTT 27.1 04/12/2016 04:29 PM      Lab Results   Component Value Date/Time    Iron 59 06/30/2014 03:30 AM    TIBC 302 06/30/2014 03:30 AM    Iron % saturation 20 06/30/2014 03:30 AM      Lab Results   Component Value Date/Time    pH 7.45 06/18/2013 04:53 AM    PCO2 38 06/18/2013 04:53 AM    PO2 146 06/18/2013 04:53 AM     No components found for: [place]   Lab Results   Component Value Date/Time     09/20/2017 10:02 AM    CK - MB 2.0 09/20/2017 10:02 AM                Total time:   Counseling / coordination time, spent as noted above:   > 50% counseling / coordination?:     Prolonged service was provided for  []30 min   []75 min in face to face time in the presence of the patient, spent as noted above. Time Start:   Time End:   Note: this can only be billed with 21638 (initial) or 54077 (follow up). If multiple start / stop times, list each separately.

## 2017-09-26 NOTE — PROGRESS NOTES
Bedside shift change report given to JAMES Gay RN (oncoming nurse) by Popeye Bennett RN (offgoing nurse). Report included the following information SBAR and Cardiac Rhythm Paced with 1st degree HB.     1435: Pt feeling dizzy and nauseated. Says that she has been dizzy since before she walked with physical therapy. Vitals obtained were stable. Pt currently sitting in chair at bedside. Symone Garcia NP notified and will keep patient another day. [facility] SHIFT NURSING NOTE    Bedside shift change report given to RN (oncoming nurse) by [employer], RN (offgoing nurse). Report included the following information SBAR and Cardiac Rhythm Atrial Paced w/1st degree HB. SHIFT SUMMARY: Pt still complaining of dizziness with intermittent nausea. Zofran was given to relieve. Pt very unsteady when ambulating, but less with walker.          Admission Date 9/22/2017   Admission Diagnosis Hypotension  diarrhea, rectal bleeding  Colonoscopy   Consults IP CONSULT TO GASTROENTEROLOGY  IP CONSULT TO CARDIOLOGY        Consults   [x] PT   [] OT   [] Speech   [x] Palliative      [] Hospice    [x] Case Management   [] None   Cardiac Monitoring   [x] Yes   [] No     Antibiotics   [] Yes   [x] No   GI Prophylaxis  (Ex: Protonix, Pepcid, etc,.)   [x] Yes   [] No          DVT Prophylaxis   SCDs:  Sequential Compression Device: Bilateral     Patient Refused VTE Prophylaxis: Yes    Mayo stockings:         [] Medication (Ex: Lovenox, Eliquis, Brilinta, Coumadin,  Heparin, etc..)   [] Contraindicated   [] No VTE needed       Urinary Catheter             LDAs               Peripheral IV 09/24/17 Right Arm (Active)   Site Assessment Clean, dry, & intact 9/26/2017  3:58 PM   Phlebitis Assessment 0 9/26/2017  3:58 PM   Infiltration Assessment 0 9/26/2017  3:58 PM   Dressing Status Clean, dry, & intact 9/26/2017  3:58 PM   Dressing Type Transparent;Tape 9/26/2017  3:58 PM   Hub Color/Line Status Blue 9/26/2017  3:58 PM                      I/Os   Intake/Output Summary (Last 24 hours) at 09/26/17 1605  Last data filed at 09/26/17 1304   Gross per 24 hour   Intake              150 ml   Output              900 ml   Net             -750 ml         Activity Level Activity Level: Up with Assistance     Activity Assistance: Partial (one person)   Diet Active Orders   Diet    DIET REGULAR 2 GM NA (House Low NA); Lactose Free      Purposeful Rounding every 1-2 hour? [x] Yes    Anthony Score  Total Score: 4   Bed Alarm (If score 3 or >)   [x] Yes    [] Refused (See signed refusal form in chart)   Landon Score  Landon Score: 20       Landon Score (if score 14 or less)   [] PMT consult   [] Nutrition consult   [] Wound Care consult      []  Specialty bed         Influenza Vaccine Received Flu Vaccine for Current Season (usually Sept-March): No    Patient/Guardian Refused (Notify MD): No          Needs prior to discharge:   Home O2 required:    [] Yes   [x] No     If yes, how much O2 required? Other:    Last Bowel Movement Date: 09/25/17   Readmission Risk Assessment Tool Score High Risk            36       Total Score        3 Has Seen PCP in Last 6 Months (Yes=3, No=0)    9 IP Visits Last 12 Months (1-3=4, 4=9, >4=11)    24 Charlson Comorbidity Score (Age + Comorbid Conditions)        Criteria that do not apply:    . Living with Significant Other. Assisted Living. LTAC. SNF. or   Rehab    Patient Length of Stay (>5 days = 3)    Pt.  Coverage (Medicare=5 , Medicaid, or Self-Pay=4)       Expected Length of Stay 3d 4h   Actual Length of Stay 4

## 2017-09-26 NOTE — PROGRESS NOTES
Problem: Mobility Impaired (Adult and Pediatric)  Goal: *Acute Goals and Plan of Care (Insert Text)  Physical Therapy Goals  Initiated 9/26/2017  1. Patient will move from supine to sit and sit to supine in bed with independence within 7 day(s). 2. Patient will transfer from bed to chair and chair to bed with independence using the least restrictive device within 7 day(s). 3. Patient will perform sit to stand with independence within 7 day(s). 4. Patient will ambulate with supervision/set-up for 150 feet with the least restrictive device within 7 day(s). 5. Patient will ascend/descend 1 stairs with no handrail(s) with minimal assistance/contact guard assist within 7 day(s). PHYSICAL THERAPY EVALUATION  Patient: Vannessa Vazquez (27 y.o. female)  Date: 9/26/2017  Primary Diagnosis: Hypotension  diarrhea, rectal bleeding  Colonoscopy  Procedure(s) (LRB):  COLONOSCOPY (N/A)  ESOPHAGOGASTRODUODENAL (EGD) BIOPSY (N/A)  COLON BIOPSY (N/A)  RESOLUTION CLIP X1 TRANSVERSE COLON (N/A) 1 Day Post-Op   Precautions: fall         ASSESSMENT :  Based on the objective data described below, the patient presents with impaired balance, generalized weakness, decreased activity tolerance, all contributing to decreased functional mobility skills. Bed mobility with supervision. Sit to stand with CGA. Patient unsteady upon standing. Ambulated 48' with HHA x 1; patient quite unsteady with one LOB requiring therapist to assist.  Returned with rolling walker; patient ambulated 20' with rolling walker. Balance improved, but still unsteady. Patient complains of feeling dizzy so returned to bed. /57, HR 75. Prior to admission, patient was independent with all mobility. Patient drives and works and lives with brother who has CP, per chart. Patient is currently a high fall risk, but unsure if she truly understands this. Patient likely not safe walking into to work (or driving).   Recommend assistance at home as well as rolling walker and HHPT. Patient will benefit from skilled intervention to address the above impairments. Patients rehabilitation potential is considered to be Good  Factors which may influence rehabilitation potential include:   [ ]         None noted  [ ]         Mental ability/status  [ ]         Medical condition  [X]         Home/family situation and support systems  [X]         Safety awareness  [ ]         Pain tolerance/management  [ ]         Other:        PLAN :  Recommendations and Planned Interventions:  [X]           Bed Mobility Training             [ ]    Neuromuscular Re-Education  [X]           Transfer Training                   [ ]    Orthotic/Prosthetic Training  [X]           Gait Training                         [ ]    Modalities  [X]           Therapeutic Exercises           [ ]    Edema Management/Control  [X]           Therapeutic Activities            [ ]    Patient and Family Training/Education  [ ]           Other (comment):     Frequency/Duration: Patient will be followed by physical therapy  4 times a week to address goals. Discharge Recommendations: Home Health  Further Equipment Recommendations for Discharge: rolling walker       SUBJECTIVE:   Patient stated I'm so weak.       OBJECTIVE DATA SUMMARY:   HISTORY:    Past Medical History:   Diagnosis Date    Atrial fibrillation ([facility] 6/2/2010    CAD (coronary artery disease)       stent    Chronic diastolic heart failure ([facility] 9/22/2014    Chronic systolic HF (heart failure) ([facility] 5/10/2017     4/2017 EF 25-30%    COPD      COPD (chronic obstructive pulmonary disease) ([facility] 6/2/2010    Diabetes ([facility]      Fibroid      Heart failure ([facility]      History of Clostridium difficile infection 5/10/2017     4/2017 CDiff positive    Hypertension 6/2/2010    Hypotension 5/10/2017    Junctional tachycardia ([address] 5/10/2017    NIDDM (non-insulin dependent diabetes mellitus) 6/2/2010    Screening mammogram 5/4/10    SOB (shortness of breath) 2014     Past Surgical History:   Procedure Laterality Date    HX  SECTION        HX OTHER SURGICAL         adrenal gland removed    HX PACEMAKER        [place] EXCISE ADRENAL GLAND         Prior Level of Function/Home Situation: Independent; works and drives  Personal factors and/or comorbidities impacting plan of care: numbness in feet affecting balance     Home Situation  Home Environment: Apartment  # Steps to Enter: 1  Rails to Enter: No  One/Two Story Residence: One story  Living Alone: No  Support Systems: Child(tracy), Family member(s), [employer] / [employer] community  Patient Expects to be Discharged to[de-identified] Apartment  Current DME Used/Available at Home: Nebulizer, Glucometer     EXAMINATION/PRESENTATION/DECISION MAKING:   Critical Behavior:  Neurologic State: Alert  Orientation Level: Oriented X4  Cognition: Appropriate decision making  Safety/Judgement: Awareness of environment, Decreased insight into deficits, Decreased awareness of need for assistance  Hearing:   Auditory  Auditory Impairment: None  Skin:  intact  Edema: in feet  Range Of Motion:  AROM: Within functional limits           PROM: Within functional limits           Strength:    Strength: Generally decreased, functional                    Tone & Sensation:   Tone: Normal              Sensation: Impaired (feet and hands)               Coordination:  Coordination: Within functional limits  Vision:      Functional Mobility:  Bed Mobility:  Rolling: Independent  Supine to Sit: Independent  Sit to Supine: Independent  Scooting: Independent  Transfers:  Sit to Stand: Contact guard assistance  Stand to Sit: Stand-by asssistance        Bed to Chair: Minimum assistance              Balance:   Sitting: Intact  Standing: Impaired  Standing - Static: Fair  Standing - Dynamic : Fair  Ambulation/Gait Training:  Distance (ft): 70 Feet (ft) (50' with no A.D.; 21' with RW)  Assistive Device: Gait belt;Walker, rolling  Ambulation - Level of Assistance: Minimal assistance;Assist x1 (CGA with walker)        Gait Abnormalities: Decreased step clearance        Base of Support: Widened     Speed/Cece: Pace decreased (<100 feet/min)  Step Length: Left shortened;Right shortened                                           :                                Functional Measure:     Elder Mobility Scale      11/20            EMS and G-code impairment scale:  Percentage of impairment CH  0% CI  1-19% CJ  20-39% CK  40-59% CL  60-79% CM  80-99% CN  100%   EMS Score 0-20 20 17-19 13-16 9-12 5-8 1-4 0      Scores under 10  generally these patients are dependent in mobility maneuvers; require help with  basic ADL, such as transfers, toileting and dressing. Scores between 10  13  generally these patients are borderline in terms of safe mobility and  independence in ADL i.e. they require some help with some mobility maneuvers. Scores over 14  Generally these patients are able to perform mobility maneuvers alone and safely  and are independent in basic ADL. G codes: In compliance with CMSs Claims Based Outcome Reporting, the following G-code set was chosen for this patient based on their primary functional limitation being treated: The outcome measure chosen to determine the severity of the functional limitation was the Elderly mobility Scale with a score of 11/20 which was correlated with the impairment scale.       · Mobility - Walking and Moving Around:               - CURRENT STATUS:    CK - 40%-59% impaired, limited or restricted               - GOAL STATUS:           CJ - 20%-39% impaired, limited or restricted               - D/C STATUS:                       ---------------To be determined---------------      Physical Therapy Evaluation Charge Determination   History Examination Presentation Decision-Making   MEDIUM  Complexity : 1-2 comorbidities / personal factors will impact the outcome/ POC  MEDIUM Complexity : 3 Standardized tests and measures addressing body structure, function, activity limitation and / or participation in recreation  MEDIUM Complexity : Evolving with changing characteristics  MEDIUM Complexity : FOTO score of 26-74      Based on the above components, the patient evaluation is determined to be of the following complexity level: MEDIUM     Pain:  Pain Scale 1: Numeric (0 - 10)  Pain Intensity 1: 7  Pain Location 1: Foot  Pain Orientation 1: Anterior  Pain Description 1: Numb; Anuja Manus; Shooting  Pain Intervention(s) 1: Repositioned  Activity Tolerance:   fair  Please refer to the flowsheet for vital signs taken during this treatment. After treatment:   [ ]         Patient left in no apparent distress sitting up in chair  [X]         Patient left in no apparent distress in bed  [X]         Call bell left within reach  [X]         Nursing notified  [ ]         Caregiver present  [X]         Bed alarm activated      COMMUNICATION/EDUCATION:   The patients plan of care was discussed with: Registered Nurse and . [X]         Fall prevention education was provided and the patient/caregiver indicated understanding. [ ]         Patient/family have participated as able in goal setting and plan of care. [X]         Patient/family agree to work toward stated goals and plan of care. [ ]         Patient understands intent and goals of therapy, but is neutral about his/her participation. [ ]         Patient is unable to participate in goal setting and plan of care.      Thank you for this referral.  Kuldeep Rodriguez, PT   Time Calculation: 21 mins

## 2017-09-26 NOTE — CARDIO/PULMONARY
Cardiopulmonary Rehab Nursing Entry:     Chart reviewed. Pt is a 78 y.o. female with PMH a-fib s/p ablation (05/17), HF, CAD s/p stent (04/17), DM, HTN, COPD who was admitted for Hypotension/dehydration. Pt with GI bleed.      Echo 7/10/17 indicated LVEF of 55-60%, up from 25-30% from 5/9/17. Met with patient who is sitting up in chair. This was a follow-up visit to answer questions and reinforce prior teaching re: CHF, S&Ss, medication management, Low NA diet, daily weights, when to call the doctor and balancing rest/activity. Patient is well versed in CHF diagnosis. She weighs herself daily and knows parameters to report to MD.  In addition she watches her salt intake. She has no questions at this time. Will continue to follow.

## 2017-09-26 NOTE — PROGRESS NOTES
Cardiopulmonary Care Interdisciplinary rounds were held today to discuss patient plan of care and outcomes. The following members were present: PT, NP/Physician, Pharmacy, Nursing, Nutritionist and Case Management.       Plan of Care: Continue current treatment plan  ?d/c today after PT eval

## 2017-09-26 NOTE — PROGRESS NOTES
[facility] SHIFT NURSING NOTE    Bedside and Verbal shift change report given to [employer]  (oncoming nurse) by Bob Valencia (offgoing nurse). Report included the following information SBAR, Kardex, Intake/Output, MAR, Recent Results and Cardiac Rhythm Paced. SHIFT SUMMARY:         Admission Date 9/22/2017   Admission Diagnosis Hypotension  diarrhea, rectal bleeding  Colonoscopy   Consults IP CONSULT TO GASTROENTEROLOGY  IP CONSULT TO PALLIATIVE CARE - PROVIDER  IP CONSULT TO CARDIOLOGY        Consults   [] PT   [] OT   [] Speech   [x] Palliative      [] Hospice    [] Case Management   [] None   Cardiac Monitoring   [x] Yes   [] No     Antibiotics   [] Yes   [x] No   GI Prophylaxis  (Ex: Protonix, Pepcid, etc,.)   [] Yes   [x] No          DVT Prophylaxis   SCDs:  Sequential Compression Device: Bilateral     Patient Refused VTE Prophylaxis: Yes    Mayo stockings:         [] Medication (Ex: Lovenox, Eliquis, Brilinta, Coumadin,  Heparin, etc..)   [] Contraindicated   [] No VTE needed       Urinary Catheter             LDAs               Peripheral IV 09/24/17 Right Arm (Active)   Site Assessment Clean, dry, & intact 9/26/2017  7:14 AM   Phlebitis Assessment 0 9/26/2017  4:46 AM   Infiltration Assessment 0 9/26/2017  7:14 AM   Dressing Status Clean, dry, & intact 9/26/2017  7:14 AM   Dressing Type Transparent 9/26/2017  7:14 AM   Hub Color/Line Status Pink 9/26/2017  7:14 AM                      I/Os   Intake/Output Summary (Last 24 hours) at 09/26/17 0811  Last data filed at 09/26/17 0238   Gross per 24 hour   Intake              790 ml   Output             1300 ml   Net             -510 ml         Activity Level Activity Level: Up with Assistance     Activity Assistance: Partial (one person)   Diet Active Orders   Diet    DIET GI LITE (POST SURGICAL)      Purposeful Rounding every 1-2 hour?    [x] Yes    Anthony Score  Total Score: 4   Bed Alarm (If score 3 or >)   [x] Yes    [] Refused (See signed refusal form in chart)   Landon Score  Landon Score: 19       Landon Score (if score 14 or less)   [] PMT consult   [] Nutrition consult   [] Wound Care consult      []  Specialty bed         Influenza Vaccine Received Flu Vaccine for Current Season (usually Sept-March): No    Patient/Guardian Refused (Notify MD): No          Needs prior to discharge:   Home O2 required:    [] Yes   [x] No     If yes, how much O2 required? Other:    Last Bowel Movement Date: 09/25/17   Readmission Risk Assessment Tool Score High Risk            36       Total Score        3 Has Seen PCP in Last 6 Months (Yes=3, No=0)    9 IP Visits Last 12 Months (1-3=4, 4=9, >4=11)    24 Charlson Comorbidity Score (Age + Comorbid Conditions)        Criteria that do not apply:    . Living with Significant Other. Assisted Living. LTAC. SNF. or   Rehab    Patient Length of Stay (>5 days = 3)    Pt.  Coverage (Medicare=5 , Medicaid, or Self-Pay=4)       Expected Length of Stay 3d 4h   Actual Length of Stay 4

## 2017-09-26 NOTE — PROGRESS NOTES
[id number] Paged on call hospitalist concerning patient having a headache, no tylenol ordered. [id number] to Dr. Reilly Arita. Tylenol ordered.

## 2017-09-26 NOTE — PROGRESS NOTES
Hospitalist Progress Note    NAME: Mónica Rodas   :  1950   MRN:  [id number]       Interim Hospital Summary: 78 y.o. female whom presented on 2017 with      Assessment / Plan:  LGIB  Chronic diarrhea  - C.diff negative, no diarrhea today so far  - colonoscopy: internal hemorrhoids; bx result pending.   - EGD: gastritis without bleeding  - waiting to hear from GI when to restart Eliquis  - GI following     Hypotension, resolved     Recent PNA vs ATX  - CXR Stable interstitial prominence. No acute abnormality. No leukocytosis or fever      H/o Atrial fibrillation  - continue amiodarone   - holding Eliquis; wait to hear from GI       CAD with h/o recent stents in april  - Cont asa/plavix per cardiology      COPD  - continue breo and incruse inhalaers with nebs prn      DM  - hold metformin . SSI prn  - A1c 5.8      Chronic Systolic/Diastolic CHF  - EF 65% in the past but 50% on recent 2D echo  - resumed home lasix with decreased dose per cardiology; 20mg daily      CKD 3  - creat trending down to 1.22 from 1.7      Code Status: Full  Surrogate Decision Maker: Daughter      DVT Prophylaxis: SCDs      Baseline: functional  Recommended Disposition: Home w/Family (plan to d/c home tomorrow)     Subjective:     Chief Complaint / Reason for Physician Visit  \"I get tire easily, I feel like my legs are tight\". Discussed with RN events overnight. Review of Systems:  Symptom Y/N Comments  Symptom Y/N Comments   Fever/Chills n   Chest Pain n    Poor Appetite    Edema     Cough n   Abdominal Pain     Sputum    Joint Pain     SOB/[place] y   Pruritis/Rash     Nausea/vomit n   Tolerating PT/OT     Diarrhea    Tolerating Diet     Constipation    Other       Could NOT obtain due to:      Objective:     VITALS:   Last 24hrs VS reviewed since prior progress note.  Most recent are:  Patient Vitals for the past 24 hrs:   Temp Pulse Resp BP SpO2   17 1628 97.7 °F (36.5 °C) 75 18 108/53 95 %   17 1435 97.4 °F (36.3 °C) 76 18 105/46 97 %   09/26/17 1109 97.6 °F (36.4 °C) 76 18 91/46 97 %   09/26/17 0714 97.9 °F (36.6 °C) 75 16 115/57 99 %   09/26/17 0311 97.8 °F (36.6 °C) 74 18 110/86 97 %   09/26/17 0035 98.2 °F (36.8 °C) 75 18 110/53 95 %   09/25/17 2118 97.8 °F (36.6 °C) 76 18 118/64 96 %       Intake/Output Summary (Last 24 hours) at 09/26/17 1721  Last data filed at 09/26/17 1304   Gross per 24 hour   Intake              150 ml   Output              900 ml   Net             -750 ml        PHYSICAL EXAM:  General: WD, WN. Alert, cooperative, no acute distress    EENT:  EOMI. Anicteric sclerae. MMM  Resp:  CTA bilaterally, no wheezing or rales. No accessory muscle use  CV:  Regular  rhythm,  No edema  GI:  Soft, Non distended, Non tender.  +Bowel sounds  Neurologic:  Alert and oriented X 3, normal speech,   Psych:   Good insight. Not anxious nor agitated  Skin:  No rashes. No jaundice    Reviewed most current lab test results and cultures  YES  Reviewed most current radiology test results   YES  Review and summation of old records today    NO  Reviewed patient's current orders and MAR    YES  PMH/ reviewed - no change compared to H&P  ________________________________________________________________________  Care Plan discussed with:    Comments   Patient y    Family      RN y    Care Manager y    Consultant  y cardiology                    y Multidiciplinary team rounds were held today with , nursing, pharmacist and clinical coordinator. Patient's plan of care was discussed; medications were reviewed and discharge planning was addressed.      ________________________________________________________________________  Total NON critical care TIME:  30   Minutes    Total CRITICAL CARE TIME Spent:   Minutes non procedure based      Comments   >50% of visit spent in counseling and coordination of care     ________________________________________________________________________  Xochilt Marti NP Procedures: see electronic medical records for all procedures/Xrays and details which were not copied into this note but were reviewed prior to creation of Plan. LABS:  I reviewed today's most current labs and imaging studies. Pertinent labs include:  Recent Labs      09/26/17 0313  09/25/17 0948  09/25/17 0037   09/24/17 0217   WBC   --    --   8.9   --   9.0   HGB  9.8*  10.6*  10.8*   < >  10.5*   HCT  29.0*  31.9*  32.9*   < >  31.8*   PLT   --    --   270   --   252    < > = values in this interval not displayed.      Recent Labs      09/25/17 0037 09/24/17 0217   NA  139  137   K  3.9  3.5   CL  108  109*   CO2  20*  18*   GLU  89  90   BUN  6  6   CREA  1.22*  1.33*   CA  7.8*  7.7*       Signed: )Shiv Estrada, NP

## 2017-09-26 NOTE — PROGRESS NOTES
CM met with Physical Therapist who indicated that they are recommending a RW at discharge d/t weakness and Pt has been dizzy here in the hospital.   They are also recommending [facility] RN/PT to evaluate and treat at discharge. CM went to speak with Pt about these recommendations. Pt indicated that she has been more dizzy today and had a headache that the RN just gave her something for. Pt indicated that she will probably stay with her DTR Terrance Marquez and her brother Ari Camargo will be with her 24/7. Her DTR address is [address] [place] [address]. DTR lives 5 miles away from her house. Pt can be reached at her cell phone [phone]. Pt voiced that she has had lots of falls at home d/t dehydration. Pt is open to trying to get a walker. CM will get order for RW and submit referral to [facility] via [employer]. Pt is open to receiving [facility] services at discharge. CM offerred [facility] for HH and Pt chose [facility]. CM will ask MD for orders and send to [facility] via [facility] with an tentative DC date of 9/27/17.    2:46 pm   CM asked CM specialist to work on MD appointments. CM will continue to monitor discharge plan.       Ana M Marc, [facility]   Ext [phone]

## 2017-09-26 NOTE — PROGRESS NOTES
[id number] [phone]      Cardiology Progress Note      9/26/2017 9:45 AM    Admit Date: 9/22/2017    Admit Diagnosis:   Hypotension  diarrhea, rectal bleeding  Colonoscopy    Subjective:     Brian Hicks is a 78 y.o. female with PMH a-fib s/p ablation (05/17), HF, CAD s/p stent (04/17), DM, HTN, COPD who was admitted for Hypotension. Overnight events:  -VSS  -H/H stable  -weight down 9# when standing scale used; I/O incomplete  -Ms. Sameera Garcia states she's still having some SOB this morning. She denies chest pain or palpitations. She has not had further diarrhea.      Visit Vitals    BP 91/46 (BP 1 Location: Left arm, BP Patient Position: Sitting)    Pulse 76    Temp 97.6 °F (36.4 °C)    Resp 18    Ht 5' 6\" (1.676 m)    Wt 70 kg (154 lb 5.2 oz)    SpO2 97%    Breastfeeding No    BMI 24.91 kg/m2       Current Facility-Administered Medications   Medication Dose Route Frequency    acetaminophen (TYLENOL) tablet 500 mg  500 mg Oral Q6H PRN    furosemide (LASIX) tablet 20 mg  20 mg Oral DAILY    hydrocortisone (ANUSOL-HC) suppository 25 mg  25 mg Rectal Q12H    ondansetron (ZOFRAN) injection 4 mg  4 mg IntraVENous Q4H PRN    famotidine (PEPCID) tablet 20 mg  20 mg Oral DAILY    aspirin chewable tablet 81 mg  81 mg Oral DAILY    sodium chloride (NS) flush 5-10 mL  5-10 mL IntraVENous Q8H    sodium chloride (NS) flush 5-10 mL  5-10 mL IntraVENous PRN    cholestyramine-aspartame (QUESTRAN LIGHT) packet 4 g  4 g Oral TID WITH MEALS    albuterol (PROVENTIL VENTOLIN) nebulizer solution 2.5 mg  2.5 mg Nebulization Q4H PRN    amiodarone (CORDARONE) tablet 100 mg  100 mg Oral QHS    azelastine (ASTELIN) 137mcg/spray nasal spray  1 Spray Both Nostrils DAILY    fluticasone-vilanterol (BREO ELLIPTA) 100mcg-25mcg/puff  1 Puff Inhalation DAILY    carvedilol (COREG) tablet 6.25 mg  6.25 mg Oral BID WITH MEALS    colchicine tablet 0.6 mg  0.6 mg Oral DAILY    umeclidinium (INCRUSE ELLIPTA) 62.5 mcg/actuation  1 Puff Inhalation DAILY    insulin lispro (HUMALOG) injection   SubCUTAneous AC&HS    glucose chewable tablet 16 g  4 Tab Oral PRN    glucagon (GLUCAGEN) injection 1 mg  1 mg IntraMUSCular PRN    dextrose 10% infusion 125-250 mL  125-250 mL IntraVENous PRN    gabapentin (NEURONTIN) capsule 800 mg  800 mg Oral TID    clopidogrel (PLAVIX) tablet 75 mg  75 mg Oral DAILY       Objective:      Physical Exam:  General Appearance:  pleasant, AAF sitting in chair in NAD  Chest:   Clear;   Cardiovascular:  Regular rate and rhythm, no murmur.   Abdomen:   Soft, non-tender, bowel sounds are active.   Extremities: weak pulses BLE; no edema  Skin:  Warm and dry.     Data Review:   Recent Labs      09/26/17   0313  09/25/17   0948  09/25/17   0037   09/24/17 0217   WBC   --    --   8.9   --   9.0   HGB  9.8*  10.6*  10.8*   < >  10.5*   HCT  29.0*  31.9*  32.9*   < >  31.8*   PLT   --    --   270   --   252    < > = values in this interval not displayed. Recent Labs      09/25/17   0037  09/24/17 0217   NA  139  137   K  3.9  3.5   CL  108  109*   CO2  20*  18*   GLU  89  90   BUN  6  6   CREA  1.22*  1.33*   CA  7.8*  7.7*       No results for input(s): TROIQ, CPK, CKMB in the last 72 hours.       Intake/Output Summary (Last 24 hours) at 09/26/17 1204  Last data filed at 09/26/17 0238   Gross per 24 hour   Intake              390 ml   Output             1300 ml   Net             -910 ml        Telemetry: regular a-paced   ECG: a-paced with 1st degree AVB;LBBB  CXRAY: no acute process     Assessment:     Active Problems:    Atrial fibrillation--paroxysmal (6/2/2010)      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 (6/2/2010)      S/P coronary artery stent placement (7/4/2014)      Overview: 4/7/17 PCI/ROSALINO Diagonal      GIB (gastrointestinal bleeding) (4/12/2016)      Type 2 diabetes mellitus with diabetic neuropathy, without long-term current use of insulin ([facility] (6/01/9095)      Systolic CHF, acute ([facility] (4/6/2017)      Hypotension (5/10/2017)      CKD (chronic kidney disease) stage 3, GFR 30-59 ml/min (9/22/2017)        Plan:     A-fib s/p ablation and cardioversion: on eliquis at home, but presented with GIB. H/H stable. · Endo procedure yesterday - internal hemorrhoids   · eliquis on hold- restart when okay with GI   · On ASA and plavix  · Continue amio and BB       CAD s/p recent stent (04/17):    · ASA and plavix while eliquis on hold. Stop ASA when eliquis restarted. · Statin on hold b/c LFTs elevated on admission. Restart after discharge. · Continue BB     Chronic HF: last EF 55-60%. Came in dehydrated from diarrhea. Diuretics held in beginning of admission with IVF administered. · Restarting daily lasix. · Continue BB        Poornima Amezquita NP  DNP, RN, [facility]    9/26/2017         Agree with note as outlined by  NP. I confirm findings in history and physical exam. No additional findings noted. Agree with plan as outlined above.      Aby Eisenberg MD

## 2017-09-27 ENCOUNTER — HOME HEALTH ADMISSION (OUTPATIENT)
Dept: HOME HEALTH SERVICES | Facility: HOME HEALTH | Age: 67
End: 2017-09-27
Payer: COMMERCIAL

## 2017-09-27 VITALS
HEART RATE: 76 BPM | WEIGHT: 154.32 LBS | HEIGHT: 66 IN | OXYGEN SATURATION: 96 % | SYSTOLIC BLOOD PRESSURE: 109 MMHG | DIASTOLIC BLOOD PRESSURE: 58 MMHG | RESPIRATION RATE: 20 BRPM | TEMPERATURE: 98.1 F | BODY MASS INDEX: 24.8 KG/M2

## 2017-09-27 LAB
ANION GAP SERPL CALC-SCNC: 10 MMOL/L (ref 5–15)
BASOPHILS # BLD: 0 K/UL (ref 0–0.1)
BASOPHILS NFR BLD: 0 % (ref 0–1)
BUN SERPL-MCNC: 9 MG/DL (ref 6–20)
BUN/CREAT SERPL: 7 (ref 12–20)
CALCIUM SERPL-MCNC: 8 MG/DL (ref 8.5–10.1)
CHLORIDE SERPL-SCNC: 107 MMOL/L (ref 97–108)
CO2 SERPL-SCNC: 23 MMOL/L (ref 21–32)
CREAT SERPL-MCNC: 1.31 MG/DL (ref 0.55–1.02)
EOSINOPHIL # BLD: 0.1 K/UL (ref 0–0.4)
EOSINOPHIL NFR BLD: 2 % (ref 0–7)
ERYTHROCYTE [DISTWIDTH] IN BLOOD BY AUTOMATED COUNT: 16.5 % (ref 11.5–14.5)
GLUCOSE BLD STRIP.AUTO-MCNC: 94 MG/DL (ref 65–100)
GLUCOSE BLD STRIP.AUTO-MCNC: 95 MG/DL (ref 65–100)
GLUCOSE SERPL-MCNC: 109 MG/DL (ref 65–100)
HCT VFR BLD AUTO: 31 % (ref 35–47)
HGB BLD-MCNC: 10.4 G/DL (ref 11.5–16)
LYMPHOCYTES # BLD: 1.6 K/UL (ref 0.8–3.5)
LYMPHOCYTES NFR BLD: 21 % (ref 12–49)
MCH RBC QN AUTO: 27.6 PG (ref 26–34)
MCHC RBC AUTO-ENTMCNC: 33.5 G/DL (ref 30–36.5)
MCV RBC AUTO: 82.2 FL (ref 80–99)
MONOCYTES # BLD: 1 K/UL (ref 0–1)
MONOCYTES NFR BLD: 13 % (ref 5–13)
NEUTS SEG # BLD: 4.9 K/UL (ref 1.8–8)
NEUTS SEG NFR BLD: 64 % (ref 32–75)
PLATELET # BLD AUTO: 256 K/UL (ref 150–400)
POTASSIUM SERPL-SCNC: 3.5 MMOL/L (ref 3.5–5.1)
RBC # BLD AUTO: 3.77 M/UL (ref 3.8–5.2)
SERVICE CMNT-IMP: NORMAL
SERVICE CMNT-IMP: NORMAL
SODIUM SERPL-SCNC: 140 MMOL/L (ref 136–145)
WBC # BLD AUTO: 7.7 K/UL (ref 3.6–11)

## 2017-09-27 PROCEDURE — 74011250637 HC RX REV CODE- 250/637: Performed by: INTERNAL MEDICINE

## 2017-09-27 PROCEDURE — 74011250637 HC RX REV CODE- 250/637: Performed by: NURSE PRACTITIONER

## 2017-09-27 PROCEDURE — 97116 GAIT TRAINING THERAPY: CPT

## 2017-09-27 PROCEDURE — 85025 COMPLETE CBC W/AUTO DIFF WBC: CPT | Performed by: NURSE PRACTITIONER

## 2017-09-27 PROCEDURE — 82962 GLUCOSE BLOOD TEST: CPT

## 2017-09-27 PROCEDURE — 36415 COLL VENOUS BLD VENIPUNCTURE: CPT | Performed by: NURSE PRACTITIONER

## 2017-09-27 PROCEDURE — 80048 BASIC METABOLIC PNL TOTAL CA: CPT | Performed by: NURSE PRACTITIONER

## 2017-09-27 RX ORDER — HYDROCORTISONE ACETATE 25 MG/1
25 SUPPOSITORY RECTAL EVERY 12 HOURS
Qty: 8 SUPPOSITORY | Refills: 0 | Status: SHIPPED | OUTPATIENT
Start: 2017-09-27 | End: 2017-10-01

## 2017-09-27 RX ORDER — FAMOTIDINE 20 MG/1
20 TABLET, FILM COATED ORAL DAILY
Qty: 60 TAB | Refills: 0 | Status: SHIPPED | OUTPATIENT
Start: 2017-09-27 | End: 2017-12-27

## 2017-09-27 RX ORDER — GUAIFENESIN 100 MG/5ML
81 LIQUID (ML) ORAL DAILY
Qty: 30 TAB | Refills: 0 | Status: SHIPPED | OUTPATIENT
Start: 2017-09-27 | End: 2017-09-27

## 2017-09-27 RX ORDER — FUROSEMIDE 20 MG/1
20 TABLET ORAL DAILY
Qty: 30 TAB | Refills: 0 | Status: SHIPPED | OUTPATIENT
Start: 2017-09-27 | End: 2018-09-28 | Stop reason: SDUPTHER

## 2017-09-27 RX ORDER — CHOLESTYRAMINE 4 G/4.8G
4 POWDER, FOR SUSPENSION ORAL
Qty: 90 PACKET | Refills: 0 | Status: SHIPPED | OUTPATIENT
Start: 2017-09-27 | End: 2017-10-27

## 2017-09-27 RX ADMIN — HYDROCORTISONE ACETATE 25 MG: 25 SUPPOSITORY RECTAL at 01:14

## 2017-09-27 RX ADMIN — AZELASTINE HYDROCHLORIDE 1 SPRAY: 137 SPRAY, METERED NASAL at 09:00

## 2017-09-27 RX ADMIN — Medication 10 ML: at 06:00

## 2017-09-27 RX ADMIN — CARVEDILOL 6.25 MG: 6.25 TABLET, FILM COATED ORAL at 16:11

## 2017-09-27 RX ADMIN — HYDROCORTISONE ACETATE 25 MG: 25 SUPPOSITORY RECTAL at 10:17

## 2017-09-27 RX ADMIN — COLCHICINE 0.6 MG: 0.6 TABLET, FILM COATED ORAL at 08:57

## 2017-09-27 RX ADMIN — CLOPIDOGREL BISULFATE 75 MG: 75 TABLET, FILM COATED ORAL at 08:57

## 2017-09-27 RX ADMIN — CARVEDILOL 6.25 MG: 6.25 TABLET, FILM COATED ORAL at 08:57

## 2017-09-27 RX ADMIN — GABAPENTIN 800 MG: 300 CAPSULE ORAL at 16:10

## 2017-09-27 RX ADMIN — Medication 10 ML: at 13:18

## 2017-09-27 RX ADMIN — FLUTICASONE FUROATE AND VILANTEROL TRIFENATATE 1 PUFF: 100; 25 POWDER RESPIRATORY (INHALATION) at 09:00

## 2017-09-27 RX ADMIN — UMECLIDINIUM 1 PUFF: 62.5 AEROSOL, POWDER ORAL at 08:59

## 2017-09-27 RX ADMIN — FAMOTIDINE 20 MG: 20 TABLET ORAL at 08:57

## 2017-09-27 RX ADMIN — GABAPENTIN 800 MG: 300 CAPSULE ORAL at 08:57

## 2017-09-27 RX ADMIN — FUROSEMIDE 20 MG: 20 TABLET ORAL at 08:57

## 2017-09-27 RX ADMIN — ASPIRIN 81 MG 81 MG: 81 TABLET ORAL at 08:57

## 2017-09-27 NOTE — DISCHARGE INSTRUCTIONS
Patient Discharge Instructions     Pt Name  Silvestre Hudson   Date of Birth 1950   Age  78 y.o. Medical Record Number  [id number]   PCP Albino Bowman NP    Admit date:  9/22/2017 @    [address] [place] [address]    Room Number  2216/01   Date of Discharge 9/27/2017     Admission Diagnoses:     GIB (gastrointestinal bleeding)          Allergies   Allergen Reactions    Crestor [Rosuvastatin] Myalgia    Levaquin [Levofloxacin] Nausea Only     GI Upset    Lipitor [Atorvastatin] Diarrhea    Lyrica [Pregabalin] Myalgia        You were admitted to [address] [place] [address] for  GIB (gastrointestinal bleeding)    YOUR OTHER MEDICAL DIAGNOSES INCLUDE (BUT NOT LIMITED TO ):  Present on Admission:   Hypotension   Type 2 diabetes mellitus with diabetic neuropathy, without long-term current use of insulin ([facility])   Systolic CHF, acute ([facility])   S/P coronary artery stent placement   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 This means not eating foods that have gluten in them. Gluten is a kind of protein. It's found in wheat, barley, and rye. If you eat a gluten-free diet, you can help manage your symptoms and prevent long-term problems. You can also get all the nutrition you need. Follow-up care is a key part of your treatment and safety. Be sure to make and go to all appointments, and call your doctor if you are having problems. It's also a good idea to know your test results and keep a list of the medicines you take. How can you care for yourself at home?   · Don't eat any foods that have gluten in them. These include bagels, bread, crackers, and some cereals. They also include pasta and pizza. · Carefully read food labels. Look for wheat or wheat products in ice cream and candy. You may also find them in salad dressing, canned and frozen soups and vegetables, and other processed foods. · Avoid all beer products unless the label says they are gluten-free. Beers with and without alcohol have gluten unless the labels say they are gluten-free. This includes lagers, ales, and stouts. · Avoid oats, at least at first. Oats may cause symptoms in some people, perhaps as a result of contamination with wheat, barley, or rye during processing. But many people who have celiac disease can eat moderate amounts of oats without having symptoms. Health professionals vary in their long-term recommendations regarding eating foods with oats. But most agree it is safe to eat oats labeled as gluten-free. · When you eat out, look for restaurants that serve gluten-free food. You can also ask if the  is familiar with gluten-free cooking. · Try to learn more about gluten-free options. Find grocery stores that sell gluten-free pizza and other foods. If you have access to the Internet, look online for gluten-free foods and recipes. · On a gluten-free eating plan, it's okay to have:  ¨ Eggs and dairy products. (But some dairy products may make your symptoms worse. Ask your doctor if you have questions about dairy products. Read ingredient labels carefully. Some processed cheeses contain gluten.)  ¨ Flours and foods made with amaranth, arrowroot, beans, buckwheat, corn, cornmeal, flax, millet, potatoes, gluten-free nut and oat bran, quinoa, rice, sorghum, soybeans, tapioca, or teff. ¨ Fresh, frozen, or canned unprocessed meats. But avoid processed meats. Some examples of processed meats to avoid are hot dogs, salami, and deli meat. Read labels for additives that may contain gluten.   ¨ Fresh, frozen, dried, or canned fruits and vegetables, if they do not have thickeners or other additives that contain gluten. ¨ Some alcohol drinks. These include wine, liqueurs, and ciders. They also include liquor like whiskey and nikki. When should you call for help? Watch closely for changes in your health, and be sure to contact your doctor if:  · You have unexplained weight loss. · You have diarrhea that lasts longer than 1 to 2 weeks. · You have unusual fatigue or mood changes, especially if these last more than a week and are not related to any other illness, such as the flu. · Your symptoms come back again. · Your stomach pain gets worse. Where can you learn more? Go to http://rustyBellaboxmarcela.info/. Enter 31 [phone] in the search box to learn more about \"Gluten-Free Diet: Care Instructions. \"  Current as of: March 16, 2017  Content Version: 11.3  © 1535-2120 [employer]. Care instructions adapted under license by [employer] (which disclaims liability or warranty for this information). If you have questions about a medical condition or this instruction, always ask your healthcare professional. [place] [address] any warranty or liability for your use of this information. Follow up : Follow-up Information     Follow up With Details Comments 500 Saranya Bowling NP Go on 10/5/2017 for PCP follow up appointment. 10:40 AM  [address] [address]  [phone]      [address] On 9/28/2017 This is your Home Health Provider. If you have not heard from them within 24-48 hours please call them.    [address]    Nelly Johnston MD Schedule an appointment as soon as possible for a visit to be seen within 2-3 weeks [address]  [phone]            Dr. Sameer Hendrix is recommending to stay on gluten free diet until follow up with Dr. Felix Freed. Please stop taking metformin until you follow up with her primary care physician. · It is important that you take the medication exactly as they are prescribed. · Keep your medication in the bottles provided by the pharmacist and keep a list of the medication names, dosages, and times to be taken in your wallet. · Do not take other medications without consulting your doctor. ADDITIONAL INFORMATION: If you experience any of the following symptoms or have any health problem not listed below, then please call your primary care physician or return to the emergency room if you cannot get hold of your doctor: Fever, chills, nausea, vomiting, diarrhea, change in mentation, falling, bleeding, shortness of breath. I understand that if any problems occur once I am discharged, I am supposed to call my Primary care physician for further care or seek help in the Emergency Department at the nearest Healthcare facility. I have had an opportunity to discuss my clinical issues with my doctor and nursing staff. I understand and acknowledge receipt of the above instructions.                                                                                                                                            Physician's or R.N.'s Signature                                                            Date/Time                                                                                                                                              Patient or Representative Signature                                                 Date/Time

## 2017-09-27 NOTE — PROGRESS NOTES
Gastroenterology Progress Note    9/27/2017    Admit Date: 9/22/2017    Subjective: Follow up for: diarrhea,blood OR       Had a solid BM. Wants to go home. Patient was seen in rounds by me today. Current Facility-Administered Medications   Medication Dose Route Frequency    acetaminophen (TYLENOL) tablet 500 mg  500 mg Oral Q6H PRN    furosemide (LASIX) tablet 20 mg  20 mg Oral DAILY    hydrocortisone (ANUSOL-HC) suppository 25 mg  25 mg Rectal Q12H    ondansetron (ZOFRAN) injection 4 mg  4 mg IntraVENous Q4H PRN    famotidine (PEPCID) tablet 20 mg  20 mg Oral DAILY    aspirin chewable tablet 81 mg  81 mg Oral DAILY    sodium chloride (NS) flush 5-10 mL  5-10 mL IntraVENous Q8H    sodium chloride (NS) flush 5-10 mL  5-10 mL IntraVENous PRN    cholestyramine-aspartame (QUESTRAN LIGHT) packet 4 g  4 g Oral TID WITH MEALS    albuterol (PROVENTIL VENTOLIN) nebulizer solution 2.5 mg  2.5 mg Nebulization Q4H PRN    amiodarone (CORDARONE) tablet 100 mg  100 mg Oral QHS    azelastine (ASTELIN) 137mcg/spray nasal spray  1 Spray Both Nostrils DAILY    fluticasone-vilanterol (BREO ELLIPTA) 100mcg-25mcg/puff  1 Puff Inhalation DAILY    carvedilol (COREG) tablet 6.25 mg  6.25 mg Oral BID WITH MEALS    colchicine tablet 0.6 mg  0.6 mg Oral DAILY    umeclidinium (INCRUSE ELLIPTA) 62.5 mcg/actuation  1 Puff Inhalation DAILY    insulin lispro (HUMALOG) injection   SubCUTAneous AC&HS    glucose chewable tablet 16 g  4 Tab Oral PRN    glucagon (GLUCAGEN) injection 1 mg  1 mg IntraMUSCular PRN    dextrose 10% infusion 125-250 mL  125-250 mL IntraVENous PRN    gabapentin (NEURONTIN) capsule 800 mg  800 mg Oral TID    clopidogrel (PLAVIX) tablet 75 mg  75 mg Oral DAILY        Objective:     Blood pressure 111/60, pulse 76, temperature 97.8 °F (36.6 °C), resp. rate 18, height 5' 6\" (1.676 m), weight 70 kg (154 lb 5.2 oz), SpO2 91 %, not currently breastfeeding.          09/25 1901 - 09/27 0700  In: 240 [P.O.:240]  Out: 1760 [Urine:1760]        Physical Examination:       General:AAO x 3,   HEENT:  EOMI, MMM  Chest:  CTA,   Heart: S1, S2, RRR  GI: Soft, NT, ND + bowel sounds      Data Review    Recent Results (from the past 24 hour(s))   GLUCOSE, POC    Collection Time: 09/26/17 11:05 AM   Result Value Ref Range    Glucose (POC) 201 (H) 65 - 100 mg/dL    Performed by Sameer Plain FRANCY(C0N)    GLUCOSE, POC    Collection Time: 09/26/17  4:33 PM   Result Value Ref Range    Glucose (POC) 156 (H) 65 - 100 mg/dL    Performed by [facility] [address], POC    Collection Time: 09/26/17  8:42 PM   Result Value Ref Range    Glucose (POC) 153 (H) 65 - 100 mg/dL    Performed by JORY CROWDER    CBC WITH AUTOMATED DIFF    Collection Time: 09/27/17  1:09 AM   Result Value Ref Range    WBC 7.7 3.6 - 11.0 K/uL    RBC 3.77 (L) 3.80 - 5.20 M/uL    HGB 10.4 (L) 11.5 - 16.0 g/dL    HCT 31.0 (L) 35.0 - 47.0 %    MCV 82.2 80.0 - 99.0 FL    MCH 27.6 26.0 - 34.0 PG    MCHC 33.5 30.0 - 36.5 g/dL    RDW 16.5 (H) 11.5 - 14.5 %    PLATELET 661 640 - 968 K/uL    NEUTROPHILS 64 32 - 75 %    LYMPHOCYTES 21 12 - 49 %    MONOCYTES 13 5 - 13 %    EOSINOPHILS 2 0 - 7 %    BASOPHILS 0 0 - 1 %    ABS. NEUTROPHILS 4.9 1.8 - 8.0 K/UL    ABS. LYMPHOCYTES 1.6 0.8 - 3.5 K/UL    ABS. MONOCYTES 1.0 0.0 - 1.0 K/UL    ABS. EOSINOPHILS 0.1 0.0 - 0.4 K/UL    ABS.  BASOPHILS 0.0 0.0 - 0.1 K/UL   METABOLIC PANEL, BASIC    Collection Time: 09/27/17  1:09 AM   Result Value Ref Range    Sodium 140 136 - 145 mmol/L    Potassium 3.5 3.5 - 5.1 mmol/L    Chloride 107 97 - 108 mmol/L    CO2 23 21 - 32 mmol/L    Anion gap 10 5 - 15 mmol/L    Glucose 109 (H) 65 - 100 mg/dL    BUN 9 6 - 20 MG/DL    Creatinine 1.31 (H) 0.55 - 1.02 MG/DL    BUN/Creatinine ratio 7 (L) 12 - 20      GFR est AA 49 (L) >60 ml/min/1.73m2    GFR est non-AA 41 (L) >60 ml/min/1.73m2    Calcium 8.0 (L) 8.5 - 10.1 MG/DL     Recent Labs      09/27/17   0109  09/26/17   0313   09/25/17   0037   WBC 7.7   --    --   8.9   HGB  10.4*  9.8*   < >  10.8*   HCT  31.0*  29.0*   < >  32.9*   PLT  256   --    --   270    < > = values in this interval not displayed. Recent Labs      09/27/17   0109  09/25/17   0037   NA  140  139   K  3.5  3.9   CL  107  108   CO2  23  20*   BUN  9  6   CREA  1.31*  1.22*   GLU  109*  89   CA  8.0*  7.8*     No results for input(s): SGOT, GPT, AP, TBIL, TP, ALB, GLOB, GGT, AML, LPSE in the last 72 hours. No lab exists for component: AMYP, HLPSE  No results for input(s): INR, PTP, APTT in the last 72 hours. No lab exists for component: INREXT, INREXT   No results for input(s): FE, TIBC, PSAT, FERR in the last 72 hours. No results found for: FOL, RBCF   No results for input(s): PH, PCO2, PO2 in the last 72 hours. No results for input(s): CPK, CKNDX, TROIQ in the last 72 hours. No lab exists for component: CPKMB  Lab Results   Component Value Date/Time    Cholesterol, total 248 10/31/2016 12:00 AM    HDL Cholesterol 43 10/31/2016 12:00 AM    LDL, calculated 160 10/31/2016 12:00 AM    Triglyceride 227 10/31/2016 12:00 AM    CHOL/HDL Ratio 6.4 07/01/2014 03:10 AM     No components found for: [facility]  Lab Results   Component Value Date/Time    Color YELLOW/STRAW 09/13/2017 03:30 PM    Appearance CLOUDY 09/13/2017 03:30 PM    Specific gravity 1.013 09/13/2017 03:30 PM    pH (UA) 5.5 09/13/2017 03:30 PM    Protein NEGATIVE  09/13/2017 03:30 PM    Glucose NEGATIVE  09/13/2017 03:30 PM    Ketone NEGATIVE  09/13/2017 03:30 PM    Bilirubin NEGATIVE  09/13/2017 03:30 PM    Urobilinogen 0.2 09/13/2017 03:30 PM    Nitrites NEGATIVE  09/13/2017 03:30 PM    Leukocyte Esterase MODERATE 09/13/2017 03:30 PM    Epithelial cells FEW 09/13/2017 03:30 PM    Bacteria 1+ 09/13/2017 03:30 PM    WBC 5-10 09/13/2017 03:30 PM    RBC 0-5 09/13/2017 03:30 PM        ROS: -CP, SOB, Dysuria, palpitations, cough.     Assessment:    Active Problems:    Atrial fibrillation--paroxysmal (6/2/2010)      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 (6/2/2010)      S/P coronary artery stent placement (7/4/2014)      Overview: 4/7/17 PCI/ROSALINO Diagonal      GIB (gastrointestinal bleeding) (4/12/2016)      Type 2 diabetes mellitus with diabetic neuropathy, without long-term current use of insulin ([address] (6/25/2830)      Systolic CHF, acute ([address] (4/6/2017)      Hypotension (5/10/2017)      CKD (chronic kidney disease) stage 3, GFR 30-59 ml/min (9/22/2017)             Plan/Discussion:     1. On Questran TID and off of metformin with resolution of diarrhea . Stool WBC, C and S and c diff- negative. On a regular,lactose free low salt. Change to GFD (see below)  2. Duodenal bx show increased IELs(can be seen in celiac disease) and normal colonic bx. Suggest a gluten free diet trial.  3. May go home from a GI perspective. Signed By: Paras Contreras.  David Caban MD    9/27/2017  3:42 PM

## 2017-09-27 NOTE — PROGRESS NOTES
Bedside shift change report given to JAMES Gay RN (oncoming nurse) by Markos Joseph RN (offgoing nurse). Report included the following information SBAR and Cardiac Rhythm Atrial Paced with 1st degree HB.     1100: Pt's transportation can not arrive to the hospital until 6pm for discharge. 1825: Pt being discharged home in stable condition. IV removed with tip intact. Telemetry removed. Discharge instructions explained and pt verbalizes understanding. Work excuse provided for patient. All belongings packed and taken. Pt leaving via wheelchair with staff.

## 2017-09-27 NOTE — PROGRESS NOTES
Pt is medically stable for dc home today with family. [facility] will follow for [facility]; f/u appmts made as indicated in AVS. RW was ordered through [place] and issued to pt.      Keiko Banda, RONEL

## 2017-09-27 NOTE — PROGRESS NOTES
[facility] September 27, 2017       RE: Billy Sanderson      To Whom It May Concern,    This is to certify that Billy Sanderson was admitted on 9/22/2017 and discharged to home on 9/27/2017. She may return to work after follow up with her primary care physician. We recommend her to follow up with her PCP within 1-2 weeks. Please feel free to contact my office if you have any questions or concerns. Thank you for your assistance in this matter.       Sincerely,  Shiv Butcher, DEEPAK   [phone]

## 2017-09-27 NOTE — DISCHARGE SUMMARY
Hospitalist Discharge Summary     Patient ID:  Saravanan Richardson  [id number]  82 y.o.  1950    PCP on record: Ophelia Luna NP    Admit date: 9/22/2017  Discharge date and time: 9/27/2017      DISCHARGE DIAGNOSIS:  LGIB  Chronic diarrhea  Hypotension, resolved  Recent PNA vs ATX  H/o Atrial fibrillation  CAD with h/o recent stents in april  COPD  DM II  Chronic Systolic/Diastolic CHF  CKD 3    CONSULTATIONS:  IP CONSULT TO GASTROENTEROLOGY  IP CONSULT TO CARDIOLOGY    Excerpted HPI from H&P of Sanjiv Ly MD:  Brenda Meléndez is a 78 y.o.  female who presents with diarrhea and noticing blood in stool. As per patient, she is been suffering from diarrhea for past several months and has been having 6-8 episodes of liquidy stool every day. Pt since yesterday evening noticing blood in stool. Pt also reports intermittent nausea and vomiting and claims to have 2 episode of vomiting yesterday but non today. Pt denies any fever, chills, chest pain, cough, shortness of breath, problems urination. In ED pt noted to have blood pressure trended down to 86/41. Pt noted to have history of a.fib and CAD with h/o stents in April of this year.       ______________________________________________________________________  DISCHARGE SUMMARY/HOSPITAL COURSE:  for full details see H&P, daily progress notes, labs, consult notes. LGIB  Chronic diarrhea  - C.diff negative, no diarrhea for the past 48 hours. Diarrhea could be r/t metformin. No diarrhea since metformin has been on hold  - colonoscopy: internal hemorrhoids; bx result pending.   - EGD: gastritis without bleeding  - Resume Eliquis  - Advised to follow up Dr. Belgica Patten within 2 weeks     Hypotension, resolved      Recent PNA vs ATX  - CXR Stable interstitial prominence.  No acute abnormality.  No leukocytosis or fever       H/o Atrial fibrillation  - continue amiodarone & Eliquis      CAD with h/o recent stents in april  - Cont asa/plavix per cardiology      COPD  - continue breo and incruse inhalaers with nebs prn      DM  - Metformin on hold due to worsening of renal function until she follows up with PCP. However, we also believed metformin might have contributed to her chronic diarrhea. We discussed about diabetic diet. - A1c 5.8      Chronic Systolic/Diastolic CHF  - EF 63% in the past but 50% on recent 2D echo  - resumed home lasix with decreased dose per cardiology; 20mg daily      CKD 3  - creat down to 1.3 from 1.7        _______________________________________________________________________  Patient seen and examined by me on discharge day. Pertinent Findings:  Gen:    Not in distress  Chest: Clear lungs  CVS:   Regular rhythm. No edema  Abd:  Soft, not distended, not tender  Neuro:  Alert, orient x 4  _______________________________________________________________________  DISCHARGE MEDICATIONS:   Current Discharge Medication List      START taking these medications    Details   hydrocortisone (ANUSOL-HC) 25 mg supp Insert 1 Suppository into rectum every twelve (12) hours for 4 days. Qty: 8 Suppository, Refills: 0      famotidine (PEPCID) 20 mg tablet Take 1 Tab by mouth daily. Qty: 60 Tab, Refills: 0      aspirin 81 mg chewable tablet Take 1 Tab by mouth daily. Qty: 30 Tab, Refills: 0      cholestyramine-aspartame (QUESTRAN LIGHT) 4 gram packet Take 1 Packet by mouth three (3) times daily (with meals). Qty: 90 Packet, Refills: 0         CONTINUE these medications which have CHANGED    Details   furosemide (LASIX) 20 mg tablet Take 1 Tab by mouth daily. Qty: 30 Tab, Refills: 0         CONTINUE these medications which have NOT CHANGED    Details   amiodarone (PACERONE) 100 mg tablet Take 100 mg by mouth nightly. colchicine 0.6 mg tablet Take 1.2 mg by mouth daily. Patient takes 1.2 mg daily and 2.4 mg PRN flare up      acetaminophen (TYLENOL) 500 mg tablet Take 500 mg by mouth every six (6) hours as needed for Pain. clopidogrel (PLAVIX) 75 mg tab Take 75 mg by mouth daily. apixaban (ELIQUIS) 5 mg tablet Take 1 Tab by mouth two (2) times a day. Indications: PREVENT THROMBOEMBOLISM IN CHRONIC ATRIAL FIBRILLATION  Qty: 180 Tab, Refills: 3    Associated Diagnoses: Atrial fibrillation, unspecified type ([address] Anticoagulant long-term use      carvedilol (COREG) 6.25 mg tablet Take 1 Tab by mouth two (2) times daily (with meals). Qty: 60 Tab, Refills: 11      fluticasone (FLONASE) 50 mcg/actuation nasal spray 2 Sprays by Both Nostrils route every evening.      gabapentin (NEURONTIN) 800 mg tablet TAKE ONE TABLET BY MOUTH THREE TIMES DAILY  Qty: 90 Tab, Refills: 11    Associated Diagnoses: Polyneuropathy associated with underlying disease (HCC)      tiotropium (SPIRIVA WITH HANDIHALER) 18 mcg inhalation capsule INHALE THE CONTENTS OF 1 CAPSULE THROUGH HANDIHALER DEVICE DAILY  Qty: 90 Cap, Refills: 3    Associated Diagnoses: Chronic obstructive pulmonary disease, unspecified COPD type (HCC)      budesonide-formoterol (SYMBICORT) 160-4.5 mcg/actuation HFA inhaler Take 1 Puff by inhalation two (2) times a day. Qty: 3 Inhaler, Refills: 3    Associated Diagnoses: Chronic obstructive pulmonary disease with acute exacerbation (HCC)      Azelastine (ASTEPRO) 0.15 % (205.5 mcg) nasal spray 1 [place] by Both Nostrils route daily. cod liver oil cap Take 1 Cap by mouth daily. albuterol (PROVENTIL VENTOLIN) 2.5 mg /3 mL (0.083 %) nebulizer solution 3 mL by Nebulization route every four (4) hours as needed for Wheezing.   Qty: 1 Package, Refills: 5    Associated Diagnoses: SOB (shortness of breath)         STOP taking these medications       doxycycline (VIBRA-TABS) 100 mg tablet Comments:   Reason for Stopping:         metFORMIN (GLUCOPHAGE) 1,000 mg tablet Comments:   Reason for Stopping:         albuterol (VENTOLIN HFA) 90 mcg/actuation inhaler Comments:   Reason for Stopping:         cholestyramine (QUESTRAN) 4 gram packet Comments:   Reason for Stopping:               My Recommended Diet, Activity, Wound Care, and follow-up labs are listed in the patient's Discharge Insturctions which I have personally completed and reviewed. _______________________________________________________________________  DISPOSITION:    Home with Family:    Home with HH/PT/OT/RN: y   SNF/LTC:    CLARENCE:    OTHER:        Condition at Discharge:  Stable  _______________________________________________________________________  Follow up with:   PCP : Richie Post NP  Follow-up Information     Follow up With Details Comments [address] Saranya Bowling NP Go on 10/5/2017 for PCP follow up appointment. 10:40 AM  [address] [facility] [address]  [phone]      [facility] [address] On 9/28/2017 This is your Home Health Provider. If you have not heard from them within 24-48 hours please call them.    [address]    Kenrick Doshi MD Schedule an appointment as soon as possible for a visit to be seen within 2-3 weeks [facility] [address]  [address] [facility]  [address] [phone]                Total time in minutes spent coordinating this discharge (includes going over instructions, follow-up, prescriptions, and preparing report for sign off to her PCP) :  30 minutes    Signed:  Shiv Paredes NP

## 2017-09-27 NOTE — PROGRESS NOTES
Problem: Falls - Risk of  Goal: *Absence of Falls  Document Anthony Fall Risk and appropriate interventions in the flowsheet.    Outcome: Resolved/Met Date Met:  09/27/17  Fall Risk Interventions:  Mobility Interventions: Bed/chair exit alarm, OT consult for ADLs, Patient to call before getting OOB, PT Consult for mobility concerns, PT Consult for assist device competence, Utilize walker, cane, or other assitive device           Medication Interventions: Assess postural VS orthostatic hypotension, Bed/chair exit alarm, Evaluate medications/consider consulting pharmacy, Patient to call before getting OOB, Teach patient to arise slowly     Elimination Interventions: Bed/chair exit alarm, Call light in reach, Patient to call for help with toileting needs, Toileting schedule/hourly rounds     History of Falls Interventions: Consult care management for discharge planning, Door open when patient unattended, Evaluate medications/consider consulting pharmacy, Room close to nurse's station

## 2017-09-27 NOTE — PROGRESS NOTES
Problem: Mobility Impaired (Adult and Pediatric)  Goal: *Acute Goals and Plan of Care (Insert Text)  Physical Therapy Goals  Initiated 9/26/2017  1. Patient will move from supine to sit and sit to supine in bed with independence within 7 day(s). 2. Patient will transfer from bed to chair and chair to bed with independence using the least restrictive device within 7 day(s). 3. Patient will perform sit to stand with independence within 7 day(s). 4. Patient will ambulate with supervision/set-up for 150 feet with the least restrictive device within 7 day(s). 5. Patient will ascend/descend 1 stairs with no handrail(s) with minimal assistance/contact guard assist within 7 day(s). PHYSICAL THERAPY TREATMENT  Patient: Gutierrez Brandon (70 y.o. female)  Date: 9/27/2017  Diagnosis: Hypotension  diarrhea, rectal bleeding  Colonoscopy GIB (gastrointestinal bleeding)  Procedure(s) (LRB):  COLONOSCOPY (N/A)  ESOPHAGOGASTRODUODENAL (EGD) BIOPSY (N/A)  COLON BIOPSY (N/A)  RESOLUTION CLIP X1 TRANSVERSE COLON (N/A) 2 Days Post-Op  Precautions:        ASSESSMENT:  Pt received supine in bed and agreeable to PT intervention. Pt cleared by nursing for mobility. She performed bed mobility independently and sit<>stand transfers with supervision. She did require education regarding safe use of RW during gait and transfers. She ambulated to the bathroom with SBA and use of RW. Performed ADLs at sink with supervision overall. She then ambulated 80' with SBA-CGA and RW. Pt is more steady with RW support, however still remains mildly unsteady as increased distance ambulated due to worsening fatigue. Gait is slow and shuffled throughout. Pt did require one brief standing rest break during gait due to fatigue and mild SOB. Educated pt on the importance of activity pacing during gait and with all activities. She requested to return to bed following therapy session.  Assisted pt back to bed and left her with all needs met, bed alarm on, and RN aware. Recommend pt return home with initial 24/7 support, HHPT, and use of RW. Progression toward goals:  [ ]    Improving appropriately and progressing toward goals  [X]    Improving slowly and progressing toward goals  [ ]    Not making progress toward goals and plan of care will be adjusted       PLAN:  Patient continues to benefit from skilled intervention to address the above impairments. Continue treatment per established plan of care. Discharge Recommendations:  Home Health  Further Equipment Recommendations for Discharge:  rolling walker       SUBJECTIVE:   Patient stated No one is around until 6 PM.      OBJECTIVE DATA SUMMARY:   Critical Behavior:  Neurologic State: Alert  Orientation Level: Oriented X4  Cognition: Follows commands, Appropriate decision making, Appropriate for age attention/concentration, Appropriate safety awareness  Safety/Judgement: Awareness of environment, Decreased insight into deficits, Decreased awareness of need for assistance  Functional Mobility Training:  Bed Mobility:  Rolling: Independent  Supine to Sit: Independent  Sit to Supine: Independent  Scooting: Independent        Transfers:  Sit to Stand: Supervision  Stand to Sit: Supervision        Bed to Chair: Stand-by asssistance                    Balance:  Sitting: Intact  Standing: Impaired; With support  Standing - Static: Good  Standing - Dynamic : Fair  Ambulation/Gait Training:  Distance (ft): 85 Feet (ft)  Assistive Device: Gait belt;Walker, rolling  Ambulation - Level of Assistance: Stand-by asssistance;Contact guard assistance; Adaptive equipment        Gait Abnormalities: Decreased step clearance        Base of Support: Widened     Speed/Cece: Pace decreased (<100 feet/min); Shuffled  Step Length: Left shortened;Right shortened     Pain:  Pain Scale 1: Numeric (0 - 10)  Pain Intensity 1: 0              Activity Tolerance:   Improving - increased activity; mild SOB with activity requiring one brief standing rest break during gait  Please refer to the flowsheet for vital signs taken during this treatment.   After treatment:   [ ]    Patient left in no apparent distress sitting up in chair  [X]    Patient left in no apparent distress in bed  [X]    Call bell left within reach  [X]    Nursing notified  [ ]    Caregiver present  [X]    Bed alarm activated      COMMUNICATION/COLLABORATION:   The patients plan of care was discussed with: Physical Therapist and Registered Nurse     Lilly Herr, PT, DPT   Time Calculation: 15 mins

## 2017-09-27 NOTE — PROGRESS NOTES
[id number] [phone]      Cardiology Progress Note      9/27/2017 230PM    Admit Date: 9/22/2017    Admit Diagnosis:   Hypotension  diarrhea, rectal bleeding  Colonoscopy    Subjective:     Priscila Pastor is a 78 y.o. female with PMH a-fib s/p ablation (05/17), HF, CAD s/p stent (04/17), DM, HTN, COPD who was admitted for Hypotension. Overnight events:  -VSS  -labs stable  -weight stable  -Ms. David Wade states she's feeling well and is ready to go home. No SOB, palpitations, chest pain, diarrhea.      Visit Vitals    /51 (BP 1 Location: Left arm, BP Patient Position: At rest)    Pulse 77    Temp 97.7 °F (36.5 °C)    Resp 20    Ht 5' 6\" (1.676 m)    Wt 70 kg (154 lb 5.2 oz)    SpO2 95%    Breastfeeding No    BMI 24.91 kg/m2       Current Facility-Administered Medications   Medication Dose Route Frequency    acetaminophen (TYLENOL) tablet 500 mg  500 mg Oral Q6H PRN    furosemide (LASIX) tablet 20 mg  20 mg Oral DAILY    hydrocortisone (ANUSOL-HC) suppository 25 mg  25 mg Rectal Q12H    ondansetron (ZOFRAN) injection 4 mg  4 mg IntraVENous Q4H PRN    famotidine (PEPCID) tablet 20 mg  20 mg Oral DAILY    aspirin chewable tablet 81 mg  81 mg Oral DAILY    sodium chloride (NS) flush 5-10 mL  5-10 mL IntraVENous Q8H    sodium chloride (NS) flush 5-10 mL  5-10 mL IntraVENous PRN    cholestyramine-aspartame (QUESTRAN LIGHT) packet 4 g  4 g Oral TID WITH MEALS    albuterol (PROVENTIL VENTOLIN) nebulizer solution 2.5 mg  2.5 mg Nebulization Q4H PRN    amiodarone (CORDARONE) tablet 100 mg  100 mg Oral QHS    azelastine (ASTELIN) 137mcg/spray nasal spray  1 Spray Both Nostrils DAILY    fluticasone-vilanterol (BREO ELLIPTA) 100mcg-25mcg/puff  1 Puff Inhalation DAILY    carvedilol (COREG) tablet 6.25 mg  6.25 mg Oral BID WITH MEALS    colchicine tablet 0.6 mg  0.6 mg Oral DAILY    umeclidinium (INCRUSE ELLIPTA) 62.5 mcg/actuation  1 Puff Inhalation DAILY    insulin lispro (HUMALOG) injection   SubCUTAneous AC&HS    glucose chewable tablet 16 g  4 Tab Oral PRN    glucagon (GLUCAGEN) injection 1 mg  1 mg IntraMUSCular PRN    dextrose 10% infusion 125-250 mL  125-250 mL IntraVENous PRN    gabapentin (NEURONTIN) capsule 800 mg  800 mg Oral TID    clopidogrel (PLAVIX) tablet 75 mg  75 mg Oral DAILY       Objective:      Physical Exam:  General Appearance:  pleasant, AAF resting in bed in NAD  Chest:   Clear;   Cardiovascular:  Regular rate and rhythm, no murmur.   Abdomen:   Soft, non-tender, bowel sounds are active.   Extremities: weak pulses BLE; no edema  Skin:  Warm and dry.     Data Review:   Recent Labs      09/27/17   0109  09/26/17   0313  09/25/17   0948  09/25/17   0037   WBC  7.7   --    --   8.9   HGB  10.4*  9.8*  10.6*  10.8*   HCT  31.0*  29.0*  31.9*  32.9*   PLT  256   --    --   270     Recent Labs      09/27/17   0109  09/25/17   0037   NA  140  139   K  3.5  3.9   CL  107  108   CO2  23  20*   GLU  109*  89   BUN  9  6   CREA  1.31*  1.22*   CA  8.0*  7.8*       No results for input(s): TROIQ, CPK, CKMB in the last 72 hours.       Intake/Output Summary (Last 24 hours) at 09/27/17 1527  Last data filed at 09/27/17 0800   Gross per 24 hour   Intake              480 ml   Output              860 ml   Net             -380 ml        Telemetry: regular a-paced   ECG: a-paced with 1st degree AVB;LBBB  CXRAY: no acute process     Assessment:     Principal Problem:    GIB (gastrointestinal bleeding) (4/12/2016)    Active Problems:    Atrial fibrillation--paroxysmal (6/2/2010)      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 (6/2/2010)      S/P coronary artery stent placement (7/4/2014)      Overview: 4/7/17 PCI/ROSALINO Diagonal      Type 2 diabetes mellitus with diabetic neuropathy, without long-term current use of insulin ([facility] (8/56/4392)      Systolic CHF, acute ([facility] (4/6/2017)      Chronic systolic HF (heart failure) ([facility] (5/10/2017)      Overview: 4/2017 EF 25-30%      Hypotension (5/10/2017)      CKD (chronic kidney disease) stage 3, GFR 30-59 ml/min (9/22/2017)        Plan:     A-fib s/p ablation and cardioversion: on eliquis at home, but presented with GIB. H/H stable. · Endo procedure - internal hemorrhoids   · Restarting eliquis   · Continue amio and BB       CAD s/p recent stent (04/17):    · Continue plavix. Stop ASA now that restarting eliquis   · Statin on hold b/c LFTs elevated on admission. Restart after discharge. · Continue BB     Chronic HF: last EF 55-60%. Came in dehydrated from diarrhea. Diuretics held in beginning of admission with IVF administered. · Continue daily lasix and BB      Patient okay for discharge from cardiology perspective. Follow-up with Dr. Renae Church within 1 month. Astrid Nicolas NP  DNP, RN, AGACNP-BC            [address] [facility]    9/27/2017         Agree with note as outlined by  NP. I confirm findings in history and physical exam. No additional findings noted. Agree with plan as outlined above.      Cecy Acevedo MD

## 2017-09-27 NOTE — PROGRESS NOTES
[address] SHIFT NURSING NOTE    Bedside and Verbal shift change report given to  (oncoming nurse) by Miguelina Thomson (offgoing nurse). Report included the following information SBAR, Procedure Summary, Intake/Output, MAR and Recent Results. SHIFT SUMMARY:           Admission Date 9/22/2017   Admission Diagnosis Hypotension  diarrhea, rectal bleeding  Colonoscopy   Consults IP CONSULT TO GASTROENTEROLOGY  IP CONSULT TO CARDIOLOGY        Consults   []PT   []OT   []Speech   []Case Management      [] Palliative       Cardiac Monitoring Order   []Yes   []No     GI Prophylaxis   []Yes   []No           DVT Prophylaxis   SCDs:  Sequential Compression Device: Bilateral     Patient Refused VTE Prophylaxis: Yes    Mayo stockings:         [] Medication   []Contraindicated   []None        Activity Level Activity Level: Up with Assistance     Activity Assistance: Partial (one person)   Purposeful Rounding every 1-2 hour? []Yes    Anthony Score  Total Score: 4   Bed Alarm (If score 3 or >)   []Yes   [] Refused (See signed refusal form in chart)   Landon Score  Landon Score: 21   Landon Score (if score 14 or less)   []PMT consult   []Wound Care consult      []Specialty bed   [] Nutrition consult          Needs prior to discharge:   Home O2 required:    []Yes   []No    If yes, how much O2 required?     Other:    Last Bowel Movement: Last Bowel Movement Date: 09/25/17        POST-OP SURGICAL VATS   []Yes   []N/A   Incentive Spirometer:   []Yes   []Refused   Coughing and deep breathing:     []Yes   []Refused   Oral care:     []Yes   []Refused   Understanding (patient education):     []Yes   []Refused   Getting out of bed Number times ambulated in hallway past shift:    Number of times OOB to chair past shift:     Head of bed elevation:     []Yes   []Refused      Influenza Vaccine Received Flu Vaccine for Current Season (usually Sept-March): No    Patient/Guardian Refused (Notify MD): No   Pneumonia Vaccine           Diet Active Orders   Diet DIET REGULAR 2 GM NA (House Low NA); Lactose Free      LDAs               Peripheral IV 09/24/17 Right Arm (Active)   Site Assessment Clean, dry, & intact 9/26/2017  7:42 PM   Phlebitis Assessment 0 9/26/2017  7:42 PM   Infiltration Assessment 0 9/26/2017  7:42 PM   Dressing Status Intact; Wet 9/26/2017  7:42 PM   Dressing Type Transparent;Tape 9/26/2017  7:42 PM   Hub Color/Line Status Blue;Flushed 9/26/2017  7:42 PM   Action Taken Dressing changed 9/26/2017  7:42 PM                      Urinary Catheter      Intake & Output   Date 09/26/17 0700 - 09/27/17 0659 09/27/17 0700 - 09/28/17 0659   Shift 3442-4954 9282-7875 24 Hour Total 3764-5689 5580-0029 24 Hour Total   I  N  T  A  K  E   Shift Total  (mL/kg)         O  U  T  P  U  T   Urine  (mL/kg/hr) 500  (0.6) 600 1100         Urine Voided          Urine Occurrence(s)  2 x 2 x       Shift Total  (mL/kg) 500  (7.1) 600  (8.6) 1100  (15.7)      NET -500 -600 -1100      Weight (kg) 70 70 70 70 70 70         Readmission Risk Assessment Tool Score High Risk            36       Total Score        3 Has Seen PCP in Last 6 Months (Yes=3, No=0)    9 IP Visits Last 12 Months (1-3=4, 4=9, >4=11)    24 Charlson Comorbidity Score (Age + Comorbid Conditions)        Criteria that do not apply:    . Living with Significant Other. Assisted Living. LTAC. SNF. or   Rehab    Patient Length of Stay (>5 days = 3)    Pt.  Coverage (Medicare=5 , Medicaid, or Self-Pay=4)       Expected Length of Stay 3d 4h   Actual Length of Stay 5

## 2017-09-29 ENCOUNTER — HOME CARE VISIT (OUTPATIENT)
Dept: SCHEDULING | Facility: HOME HEALTH | Age: 67
End: 2017-09-29
Payer: COMMERCIAL

## 2017-09-29 PROCEDURE — G0299 HHS/HOSPICE OF RN EA 15 MIN: HCPCS

## 2017-09-29 PROCEDURE — 400013 HH SOC

## 2017-10-02 ENCOUNTER — HOME CARE VISIT (OUTPATIENT)
Dept: SCHEDULING | Facility: HOME HEALTH | Age: 67
End: 2017-10-02
Payer: COMMERCIAL

## 2017-10-02 ENCOUNTER — TELEPHONE (OUTPATIENT)
Dept: CARDIOLOGY CLINIC | Age: 67
End: 2017-10-02

## 2017-10-02 VITALS
DIASTOLIC BLOOD PRESSURE: 60 MMHG | OXYGEN SATURATION: 95 % | RESPIRATION RATE: 18 BRPM | HEART RATE: 80 BPM | TEMPERATURE: 98 F | SYSTOLIC BLOOD PRESSURE: 112 MMHG

## 2017-10-02 VITALS
DIASTOLIC BLOOD PRESSURE: 60 MMHG | SYSTOLIC BLOOD PRESSURE: 122 MMHG | RESPIRATION RATE: 18 BRPM | OXYGEN SATURATION: 98 % | TEMPERATURE: 97.1 F | HEART RATE: 76 BPM

## 2017-10-02 PROCEDURE — G0151 HHCP-SERV OF PT,EA 15 MIN: HCPCS

## 2017-10-02 PROCEDURE — G0300 HHS/HOSPICE OF LPN EA 15 MIN: HCPCS

## 2017-10-02 NOTE — TELEPHONE ENCOUNTER
Spoke with Roland Parr @ [phone] from [facility], advising her to have pt take a total of 60 mg of lasix today. She verbalized understanding, will contact pt.         Henri Draper NP   You 9 minutes ago (1:12 PM)                 Take total of 60mg lasix today (usual 20mg and a 1 time dose of 40mg)  (

## 2017-10-02 NOTE — TELEPHONE ENCOUNTER
Lexus from [address] called, stating Pt has +1 pitting edema in right foot, pt took an extra lasix on sat. 110/60 HR 76 wt 150.8 lbs. C/o sob with chest pain pain would go from 8/10 down to 0. Crackles heard in left lower lobe. Pt has an appt with Dr. Nia Sahu on 10/3/17. Please advise.

## 2017-10-03 ENCOUNTER — OFFICE VISIT (OUTPATIENT)
Dept: CARDIOLOGY CLINIC | Age: 67
End: 2017-10-03

## 2017-10-03 VITALS
TEMPERATURE: 98.4 F | HEART RATE: 76 BPM | WEIGHT: 150.8 LBS | OXYGEN SATURATION: 96 % | RESPIRATION RATE: 18 BRPM | DIASTOLIC BLOOD PRESSURE: 60 MMHG | BODY MASS INDEX: 24.34 KG/M2 | SYSTOLIC BLOOD PRESSURE: 110 MMHG

## 2017-10-03 VITALS
HEART RATE: 76 BPM | OXYGEN SATURATION: 92 % | WEIGHT: 152.1 LBS | BODY MASS INDEX: 24.44 KG/M2 | DIASTOLIC BLOOD PRESSURE: 50 MMHG | HEIGHT: 66 IN | SYSTOLIC BLOOD PRESSURE: 100 MMHG | RESPIRATION RATE: 18 BRPM

## 2017-10-03 DIAGNOSIS — Z79.01 ANTICOAGULATION MONITORING, INR RANGE 2-3: ICD-10-CM

## 2017-10-03 DIAGNOSIS — I48.0 PAROXYSMAL ATRIAL FIBRILLATION (HCC): Primary | Chronic | ICD-10-CM

## 2017-10-03 DIAGNOSIS — Z98.890 S/P ABLATION OF ATRIAL FIBRILLATION: ICD-10-CM

## 2017-10-03 DIAGNOSIS — J44.9 CHRONIC OBSTRUCTIVE PULMONARY DISEASE, UNSPECIFIED COPD TYPE (HCC): ICD-10-CM

## 2017-10-03 DIAGNOSIS — Z95.0 CARDIAC PACEMAKER IN SITU: ICD-10-CM

## 2017-10-03 DIAGNOSIS — N18.30 CKD (CHRONIC KIDNEY DISEASE) STAGE 3, GFR 30-59 ML/MIN (HCC): ICD-10-CM

## 2017-10-03 DIAGNOSIS — Z86.79 S/P ABLATION OF ATRIAL FIBRILLATION: ICD-10-CM

## 2017-10-03 DIAGNOSIS — I50.22 CHRONIC SYSTOLIC HF (HEART FAILURE) (HCC): ICD-10-CM

## 2017-10-03 DIAGNOSIS — I50.22 CHRONIC SYSTOLIC CONGESTIVE HEART FAILURE (HCC): ICD-10-CM

## 2017-10-03 DIAGNOSIS — Z95.820 S/P ANGIOPLASTY WITH STENT: ICD-10-CM

## 2017-10-03 DIAGNOSIS — E11.40 TYPE 2 DIABETES MELLITUS WITH DIABETIC NEUROPATHY, WITHOUT LONG-TERM CURRENT USE OF INSULIN (HCC): ICD-10-CM

## 2017-10-03 NOTE — PROGRESS NOTES
Fernando Amor DNP, ANP-BC  Subjective/HPI:     Payton Salmon is a 78 y.o. female is here for routine f/u. Home health called yesterday with patient reporting some mild tachypnea with crackles at the bases as well as increased weight. She was given 60 mg total of furosemide yesterday and reports her chest fullness and shortness of breath have significantly improved. She had colonoscopy and is awaiting her biopsy results. SUMMARY:  Left ventricle: Systolic function was normal. Ejection fraction was  estimated in the range of 55 % to 60 %. There were no regional wall motion  abnormalities. Left atrium: The atrium was moderately dilated. Mitral valve: There was mild regurgitation. Aortic valve: There was mild to moderate regurgitation.       PCP Provider  Daya Billings MD  Past Medical History:   Diagnosis Date    Atrial fibrillation ([address] 2010    CAD (coronary artery disease)     stent    Chronic diastolic heart failure ([address] 2014    Chronic systolic HF (heart failure) ([address] 5/10/2017    2017 EF 25-30%    COPD     COPD (chronic obstructive pulmonary disease) ([address] 2010    Diabetes ([address]     Fibroid     Heart failure ([address]     History of Clostridium difficile infection 5/10/2017    2017 CDiff positive    Hypertension 2010    Hypotension 5/10/2017    Junctional tachycardia ([address] 5/10/2017    NIDDM (non-insulin dependent diabetes mellitus) 2010    Screening mammogram 5/4/10    SOB (shortness of breath) 2014      Past Surgical History:   Procedure Laterality Date    COLONOSCOPY N/A 2017    COLONOSCOPY performed by [facility]yah Macario [facility] at [facility] AMBULATORY OR    HX  SECTION      HX OTHER SURGICAL      adrenal gland removed    HX PACEMAKER      UT EXCISE ADRENAL GLAND       Allergies   Allergen Reactions    Crestor [Rosuvastatin] Myalgia    Levaquin [Levofloxacin] Nausea Only     GI Upset    Lipitor [Atorvastatin] Diarrhea    Lyrica [Pregabalin] Myalgia      Family History   Problem Relation Age of Onset    Heart Disease Mother     Diabetes Father     Heart Disease Brother       Current Outpatient Prescriptions   Medication Sig    aspirin delayed-release 81 mg tablet Take 81 mg by mouth daily.  famotidine (PEPCID) 20 mg tablet Take 1 Tab by mouth daily.  furosemide (LASIX) 20 mg tablet Take 1 Tab by mouth daily.  amiodarone (PACERONE) 100 mg tablet Take 100 mg by mouth nightly.  colchicine 0.6 mg tablet Take 1.2 mg by mouth daily. Patient takes 1.2 mg daily and 2.4 mg PRN flare up    acetaminophen (TYLENOL) 500 mg tablet Take 500 mg by mouth every six (6) hours as needed for Pain.  clopidogrel (PLAVIX) 75 mg tab Take 75 mg by mouth daily.  apixaban (ELIQUIS) 5 mg tablet Take 1 Tab by mouth two (2) times a day. Indications: PREVENT THROMBOEMBOLISM IN CHRONIC ATRIAL FIBRILLATION    carvedilol (COREG) 6.25 mg tablet Take 1 Tab by mouth two (2) times daily (with meals).  fluticasone (FLONASE) 50 mcg/actuation nasal spray 2 Sprays by Both Nostrils route every evening.  gabapentin (NEURONTIN) 800 mg tablet TAKE ONE TABLET BY MOUTH THREE TIMES DAILY    tiotropium (SPIRIVA WITH HANDIHALER) 18 mcg inhalation capsule INHALE THE CONTENTS OF 1 CAPSULE THROUGH HANDIHALER DEVICE DAILY    budesonide-formoterol (SYMBICORT) 160-4.5 mcg/actuation HFA inhaler Take 1 Puff by inhalation two (2) times a day.  albuterol (PROVENTIL VENTOLIN) 2.5 mg /3 mL (0.083 %) nebulizer solution 3 mL by Nebulization route every four (4) hours as needed for Wheezing.  Azelastine (ASTEPRO) 0.15 % (205.5 mcg) nasal spray 1 [place] by Both Nostrils route daily.  cod liver oil cap Take 1 Cap by mouth daily.  cholestyramine-aspartame (QUESTRAN LIGHT) 4 gram packet Take 1 Packet by mouth three (3) times daily (with meals). (Patient not taking: Reported on 9/29/2017)     No current facility-administered medications for this visit.        Vitals: 10/03/17 1115 10/03/17 1122   BP: 100/52 100/50   Pulse: 76    Resp: 18    SpO2: 92%    Weight: 152 lb 1.6 oz (69 kg)    Height: 5' 6\" (1.676 m)      Social History     Social History    Marital status:      Spouse name: N/A    Number of children: N/A    Years of education: N/A     Occupational History    Not on file. Social History Main Topics    Smoking status: Former Smoker     Types: Cigarettes     Quit date: 12/31/2009    Smokeless tobacco: Never Used    Alcohol use No    Drug use: No    Sexual activity: Not Currently     Other Topics Concern    Not on file     Social History Narrative       I have reviewed the nurses notes, vitals, problem list, allergy list, medical history, family, social history and medications. Review of Symptoms:    General: 30 lbs weight loss since C diff Dx  HEENT: Denies blurred vision, headaches, epistaxis and difficulty swallowing. Respiratory: Denies shortness of breath, + mild [place]EN[place], no wheezing or stridor. Cardiovascular: Denies precordial pain, palpitations, no edema or PND  Gastrointestinal: Denies poor appetite, indigestion, abdominal pain or blood in stool. Pt reports able to eat meals again without GI distress. Musculoskeletal: Denies pain or swelling from muscles or joints  Neurologic: Denies tremor, paresthesias, or sensory motor disturbance  Skin: Denies rash, itching or texture change. Physical Exam:      General: Well developed, in no acute distress, cooperative and alert  HEENT: No carotid bruits, no JVD, trach is midline. Neck Supple,. Heart:  Normal S1/S2 negative S3 or S4. Regular, no murmur, gallop or rub.   Respiratory: Scant fine crackles bilaterally at the bases. Abdomen:   Soft, non-tender, no masses, bowel sounds are active.   Extremities:  No edema, normal cap refill, no cyanosis, atraumatic. Neuro: A&Ox3, speech clear, gait stable. Skin: Skin color is normal. No rashes or lesions.  Non diaphoretic  Vascular: 2+ pulses symmetric in all extremities    Cardiographics    ECG: Sinus   Results for orders placed or performed during the hospital encounter of 09/20/17   EKG, 12 LEAD, INITIAL   Result Value Ref Range    Ventricular Rate 76 BPM    Atrial Rate 76 BPM    P-R Interval 244 ms    QRS Duration 144 ms    Q-T Interval 460 ms    QTC Calculation (Bezet) 517 ms    Calculated P Axis 81 degrees    Calculated R Axis -27 degrees    Calculated T Axis 111 degrees    Diagnosis       Atrial-paced rhythm with 1st degree AV block  Left bundle branch block  When compared with ECG of 19-JUL-2017 12:16,  No significant change was found  Confirmed by Kade Silva ([id number]) on 9/20/2017 5:23:53 PM           Cardiology Labs:  Lab Results   Component Value Date/Time    Cholesterol, total 248 10/31/2016 12:00 AM    HDL Cholesterol 43 10/31/2016 12:00 AM    LDL, calculated 160 10/31/2016 12:00 AM    Triglyceride 227 10/31/2016 12:00 AM    CHOL/HDL Ratio 6.4 07/01/2014 03:10 AM       Lab Results   Component Value Date/Time    Sodium 140 09/27/2017 01:09 AM    Potassium 3.5 09/27/2017 01:09 AM    Chloride 107 09/27/2017 01:09 AM    CO2 23 09/27/2017 01:09 AM    Anion gap 10 09/27/2017 01:09 AM    Glucose 109 09/27/2017 01:09 AM    BUN 9 09/27/2017 01:09 AM    Creatinine 1.31 09/27/2017 01:09 AM    BUN/Creatinine ratio 7 09/27/2017 01:09 AM    GFR est AA 49 09/27/2017 01:09 AM    GFR est non-AA 41 09/27/2017 01:09 AM    Calcium 8.0 09/27/2017 01:09 AM    Bilirubin, total 0.6 09/22/2017 07:01 AM    AST (SGOT) 140 09/22/2017 07:01 AM    Alk. phosphatase 302 09/22/2017 07:01 AM    Protein, total 9.0 09/22/2017 07:01 AM    Albumin 2.7 09/22/2017 07:01 AM    Globulin 6.3 09/22/2017 07:01 AM    A-G Ratio 0.4 09/22/2017 07:01 AM    ALT (SGPT) 61 09/22/2017 07:01 AM           Assessment:     Assessment:     Diagnoses and all orders for this visit:    1. Paroxysmal atrial fibrillation (HCC)  -     AMB POC EKG ROUTINE W/ 12 LEADS, INTER & REP    2.  Chronic systolic congestive heart failure ([facility]    3. Chronic systolic HF (heart failure) ([facility]    4. Chronic obstructive pulmonary disease, unspecified COPD type ([facility]    5. S/P angioplasty with stent    6. Cardiac pacemaker in situ    7. Type 2 diabetes mellitus with diabetic neuropathy, without long-term current use of insulin ([facility]    8. Anticoagulation monitoring, INR range 2-3    9. S/P ablation of atrial fibrillation    10. CKD (chronic kidney disease) stage 3, GFR 30-59 ml/min        ICD-10-CM ICD-9-CM    1. Paroxysmal atrial fibrillation (HCC) I48.0 427.31 AMB POC EKG ROUTINE W/ 12 LEADS, INTER & REP   2. Chronic systolic congestive heart failure (HCC) I50.22 428.22      428.0    3. Chronic systolic HF (heart failure) (HCC) I50.22 428.22    4. Chronic obstructive pulmonary disease, unspecified COPD type ([facility] J44.9 496    5. S/P angioplasty with stent Z95.9 V45.89    6. Cardiac pacemaker in situ Z95.0 V45.01    7. Type 2 diabetes mellitus with diabetic neuropathy, without long-term current use of insulin (HCC) E11.40 250.60      357.2    8. Anticoagulation monitoring, INR range 2-3 Z79.01 V58.61    9. S/P ablation of atrial fibrillation Z98.890 V45.89     Z86.79     10. CKD (chronic kidney disease) stage 3, GFR 30-59 ml/min N18.3 585.3      Orders Placed This Encounter    AMB POC EKG ROUTINE W/ 12 LEADS, INTER & REP     Order Specific Question:   Reason for Exam:     Answer:   ROUTINE        Plan:     1. Systolic heart failure: Ejection fraction 5560% on recent echocardiogram continue current medications  2. History of atrial fibrillation: Maintain Eliquis no recurrent GI bleeding patient had endoscopy colonoscopy. 3.  Atherosclerotic heart disease: Status post PTCA stenting April continue Plavix for 1 year  4. Clinically on exam she has scant crackles at the bases, will have her take 40 mg of Lasix ×3 days then return to 20 mg daily. Has home health care following. Follow-up in 3 months.     Eldon Ford, DEEPAK [facility]    10/3/2017         Agree with note as outlined by  NP. I confirm findings in history and physical exam. No additional findings noted. Agree with plan as outlined above.      Geno Mcnamara MD

## 2017-10-04 ENCOUNTER — HOME CARE VISIT (OUTPATIENT)
Dept: SCHEDULING | Facility: HOME HEALTH | Age: 67
End: 2017-10-04
Payer: COMMERCIAL

## 2017-10-04 ENCOUNTER — PATIENT OUTREACH (OUTPATIENT)
Dept: CARDIOLOGY CLINIC | Age: 67
End: 2017-10-04

## 2017-10-04 VITALS
DIASTOLIC BLOOD PRESSURE: 56 MMHG | SYSTOLIC BLOOD PRESSURE: 118 MMHG | HEART RATE: 75 BPM | TEMPERATURE: 97.1 F | OXYGEN SATURATION: 94 %

## 2017-10-04 PROCEDURE — G0151 HHCP-SERV OF PT,EA 15 MIN: HCPCS

## 2017-10-05 ENCOUNTER — OFFICE VISIT (OUTPATIENT)
Dept: INTERNAL MEDICINE CLINIC | Age: 67
End: 2017-10-05

## 2017-10-05 VITALS
BODY MASS INDEX: 23.75 KG/M2 | HEART RATE: 77 BPM | SYSTOLIC BLOOD PRESSURE: 90 MMHG | RESPIRATION RATE: 20 BRPM | OXYGEN SATURATION: 100 % | TEMPERATURE: 97.5 F | WEIGHT: 147.8 LBS | HEIGHT: 66 IN | DIASTOLIC BLOOD PRESSURE: 50 MMHG

## 2017-10-05 DIAGNOSIS — E11.40 TYPE 2 DIABETES MELLITUS WITH DIABETIC NEUROPATHY, WITHOUT LONG-TERM CURRENT USE OF INSULIN (HCC): ICD-10-CM

## 2017-10-05 DIAGNOSIS — J44.9 CHRONIC OBSTRUCTIVE PULMONARY DISEASE, UNSPECIFIED COPD TYPE (HCC): ICD-10-CM

## 2017-10-05 DIAGNOSIS — N18.30 CKD (CHRONIC KIDNEY DISEASE) STAGE 3, GFR 30-59 ML/MIN (HCC): ICD-10-CM

## 2017-10-05 DIAGNOSIS — Z95.0 CARDIAC PACEMAKER IN SITU: ICD-10-CM

## 2017-10-05 DIAGNOSIS — I50.22 CHRONIC SYSTOLIC HF (HEART FAILURE) (HCC): Primary | ICD-10-CM

## 2017-10-05 NOTE — PROGRESS NOTES
This note will not be viewable in [address] Heart Failure Follow Up Call    NN contacted the patient by telephone to perform CHF Follow Up. Verified  and address as identifiers. Pt verified  and address. Pt states that she was seen [facility] and was taking increased diuretics. Pt was taking too much diuretic. Pt advised that blood pressure may be low. Pt states understanding not to take additional diuretic. Pt reminded to let PCP know current situation. Pt reports that her chest hurts with breathing. Pt reports that she did cough up some blood streaked sputum yesterday as well as gray sputum. Pt was able to clear her throat and voice sounded stronger. Pt reports that she stays with her daughter when she is not feeling well and that she sees her daughter every day. Pt reports that her brother is driving her to PCP appt today. Pt states that she does still drive. Pt does have [facility] -  and PT that she sees at her daughter's house. Pt reports that hse is drinking glucerna. PCP: Karlee Acevedo  Cardiologist: Quinn Quinonez  Follow up appointment with Cardiology (3-5 days): 10/3/17  Follow up appointment with PCP (within 14 days): 10/5/17  Note:  Patient should be seen by cardiology or PCP monthly.      Cardiologist consult while IP: yes   [facility] Failure [facility] consult (if EF<35%): No     Palliative consult while IP:    yes     EF: 55% to 60%  on 7/10/17 (date)     Life Vest:   No     Type of HF:   combined     Disposition of patient:  Home, [facility] referral     [facility] Services/Tele Monitoring:  Yes, MARY DEXTER [facility]     Heart Failure Medications: Betablocker, Diuretic and Anticoagulat     Daily Weight (document daily weights in flowsheets):   did not discuss as pt was getting ready for PCP to follow up on lung congestion     Zone: yellow     Does patient have an Advance Directive:  yes , scanned into chart    Signs/Symptoms:  pt has been coughing and was seen at [facility] and diuretics increased to 40mg. Pt has been taking 60mg for 3 days. Pt reports some dizziness when standing and does have a walker. Advised to rise slowly. Pt to follow up with PCP for lung congestion and cough. Pt needs nebulizer machine. PCP NN notified via CC. Goals:     What goal would you like to achieve during this 90 day period :   Goals      Attends follow-up appointments as directed. 10/5/17  Pt had appt with cardiology on 10/3/17 and with PCP on 10/5/17. Pt states that she is continuing to cough and that she has chest pain with breathing. Pt to discuss this with PCP today at 11a. AFP       Establish PCP relationships and regularly scheduled appointments. Previous PCP(Dr. Mujica) left [facility] in [address] [place] [address].  Knowledge and adherence of prescribed medication (ie. action, side effects, missed dose, etc.). 10/5/17  Performed medication review with pt. Pt took too many lasix. Reviewed that today is the last day. Pt reports that nebulizer machine is broken. Note sent to PCP NN about this. Pt has all other medications and states understanding to watch for signs of bleeding. AFP       Patient verbalizes understanding of self -management goals of living with Diabetes.  Patient/Family verbalizes understanding of self-management of chronic disease.  Understands and adheres to diet. Pt reports following low Na diet and daily wts. Reported wt at 161/2 lbs on 6/6/17. Goals      Establish PCP relationships and regularly scheduled appointments. Previous PCP(Dr. Mujica) left [facility] in [address] [place] [address].  Patient verbalizes understanding of self -management goals of living with Diabetes.  Patient/Family verbalizes understanding of self-management of chronic disease.  Understands and adheres to diet. Pt reports following low Na diet and daily wts.   Reported wt at 161/2 lbs on 6/6/17. Patient verbalized understanding of all information discussed. Patient expressed no questions, concerns or needs at this time. Pt has my contact information for any further questions, concerns, or needs.

## 2017-10-05 NOTE — PROGRESS NOTES
1. Have you been to the ER, urgent care clinic since your last visit? Hospitalized since your last visit? Yes When: 9/22/17 [facility] gastrointestinal bleeding. 2. Have you seen or consulted any other health care providers outside of the [facility] since your last visit? Include any pap smears or colon screening.  No.  PHQ over the last two weeks 10/5/2017   Little interest or pleasure in doing things Not at all   Feeling down, depressed or hopeless Not at all   Total Score PHQ 2 0

## 2017-10-05 NOTE — MR AVS SNAPSHOT
Visit Information Date & Time Provider Department Dept. Phone Encounter #  
 10/5/2017 11:00 AM Melissa Oro, [address] [id number] Follow-up Instructions Return in about 2 months (around 12/5/2017). Your Appointments 10/17/2017  3:00 PM  
ESTABLISHED PATIENT with Elton Triplett MD  
[facility] [address] [facility] ([address]) Appt Note: saw dr lane in hospital for hernia  [employer]; .; .  
 [address] [facility], [address], [address] [phone]  
[address], [address] [facility] [address] [address]  
  
    
 10/20/2017  8:00 AM  
ROUTINE CARE with Shaye Belle [facility] [address]) Appt Note: htn, lipids and dm [address] [address]  
[phone]  
  
   
 [address] [facility]  
  
    
 11/1/2017 10:30 AM  
3 MONTH with Jevon Cyr MD  
[facility] [address] [facility]) Appt Note: $40CP 7/31/17ksr [address] [facility]  
[phone] [address] [facility] Upcoming Health Maintenance Date Due  
 EYE EXAM RETINAL OR DILATED Q1 6/25/2016 MICROALBUMIN Q1 3/22/2017 MEDICARE YEARLY EXAM 11/1/2017 LIPID PANEL Q1 1/31/2018 HEMOGLOBIN A1C Q6M 3/24/2018 FOOT EXAM Q1 7/20/2018 GLAUCOMA SCREENING Q2Y 7/28/2018 FOBT Q 1 YEAR AGE 50-75 9/22/2018 BREAST CANCER SCRN MAMMOGRAM 2/23/2019 Pneumococcal 65+ High/Highest Risk (2 of 2 - PPSV23) 6/25/2020 DTaP/Tdap/Td series (2 - Td) 7/16/2026 Allergies as of 10/5/2017  Review Complete On: 10/5/2017 By: Henrique Dejesus LPN Severity Noted Reaction Type Reactions Crestor [Rosuvastatin]  10/31/2016   Side Effect Myalgia Levaquin [Levofloxacin]  12/07/2015   Intolerance Nausea Only GI Upset Lipitor [Atorvastatin]  04/17/2017   Side Effect Diarrhea Lyrica [Pregabalin]  10/31/2016   Side Effect Myalgia Current Immunizations  Reviewed on 10/31/2016 Name Date H1N1 FLU VACCINE 10/20/2009 Influenza High Dose Vaccine PF 10/31/2016 Influenza Vaccine (Madin June Canine Kidney) PF 9/23/2014 11:26 AM  
 Influenza Vaccine (Quad) PF 9/25/2017  6:36 PM  
 Influenza Vaccine Intradermal PF 11/27/2015 Influenza Vaccine Split 9/22/2011  6:25 AM  
 Influenza Vaccine Whole 10/20/2009 Pneumococcal Polysaccharide (PPSV-23) 6/25/2015 Tdap 7/16/2016 11:47 PM  
 ZZZ-RETIRED (DO NOT USE) Pneumococcal Vaccine (Unspecified Type) 10/20/2009 Not reviewed this visit You Were Diagnosed With   
  
 Codes Comments Chronic systolic HF (heart failure) (HCC)    -  Primary ICD-10-CM: N46.63 ICD-9-CM: 428.22 Type 2 diabetes mellitus with diabetic neuropathy, without long-term current use of insulin (HCC)     ICD-10-CM: E11.40 ICD-9-CM: 250.60, 357.2 Chronic obstructive pulmonary disease, unspecified COPD type ([facility]     ICD-10-CM: J44.9 ICD-9-CM: 192 CKD (chronic kidney disease) stage 3, GFR 30-59 ml/min     ICD-10-CM: N18.3 ICD-9-CM: 798. 3 Cardiac pacemaker in situ     ICD-10-CM: Z95.0 ICD-9-CM: V45.01 Vitals BP Pulse Temp Resp Height(growth percentile) Weight(growth percentile) 90/50 (BP 1 Location: Left arm, BP Patient Position: Sitting) 77 97.5 °F (36.4 °C) (Oral) 20 5' 6\" (1.676 m) 147 lb 12.8 oz (67 kg) SpO2 BMI OB Status Smoking Status 100% 23.86 kg/m2 Postmenopausal Former Smoker Vitals History BMI and BSA Data Body Mass Index Body Surface Area  
 23.86 kg/m 2 1.77 m 2 Preferred Pharmacy Pharmacy Name Phone [facility] [address], [address] [place] [address] Avenue [phone] Your Updated Medication List  
  
   
This list is accurate as of: 10/5/17 11:45 AM.  Always use your most recent med list.  
 acetaminophen 500 mg tablet Commonly known as:  TYLENOL Take 500 mg by mouth every six (6) hours as needed for Pain. albuterol 2.5 mg /3 mL (0.083 %) nebulizer solution Commonly known as:  PROVENTIL VENTOLIN  
3 mL by Nebulization route every four (4) hours as needed for Wheezing. amiodarone 100 mg tablet Commonly known as:  Den Archuleta Take 100 mg by mouth nightly. apixaban 5 mg tablet Commonly known as:  Amberly Kellys Take 1 Tab by mouth two (2) times a day. Indications: PREVENT THROMBOEMBOLISM IN CHRONIC ATRIAL FIBRILLATION  
  
 aspirin delayed-release 81 mg tablet Take 81 mg by mouth daily. Azelastine 0.15 % (205.5 mcg) nasal spray Commonly known as:  ASTEPRO  
1 Spray by Both Nostrils route daily. budesonide-formoterol 160-4.5 mcg/actuation Hfaa Commonly known as:  SYMBICORT Take 1 Puff by inhalation two (2) times a day. carvedilol 6.25 mg tablet Commonly known as:  Hiram Pintos Take 1 Tab by mouth two (2) times daily (with meals). cholestyramine-aspartame 4 gram packet Commonly known as:  Allena  Take 1 Packet by mouth three (3) times daily (with meals). cod liver oil Cap Take 1 Cap by mouth daily. colchicine 0.6 mg tablet Take 1.2 mg by mouth daily. Patient takes 1.2 mg daily and 2.4 mg PRN flare up  
  
 famotidine 20 mg tablet Commonly known as:  PEPCID Take 1 Tab by mouth daily. FLONASE 50 mcg/actuation nasal spray Generic drug:  fluticasone 2 Sprays by Both Nostrils route every evening. furosemide 20 mg tablet Commonly known as:  LASIX Take 1 Tab by mouth daily. gabapentin 800 mg tablet Commonly known as:  NEURONTIN  
TAKE ONE TABLET BY MOUTH THREE TIMES DAILY PLAVIX 75 mg Tab Generic drug:  clopidogrel Take 75 mg by mouth daily. tiotropium 18 mcg inhalation capsule Commonly known as:  Bunndlea Loud3r  
 INHALE THE CONTENTS OF 1 CAPSULE THROUGH HANDIHALER DEVICE DAILY Follow-up Instructions Return in about 2 months (around 12/5/2017). To-Do List   
 10/06/2017 10:00 AM  
  Appointment with Daniel Rivas at [place] [address]  
  
 10/06/2017 10:30 AM  
  Appointment with Jose Choi at [place] [address]  
  
 10/09/2017 To Be Determined Appointment with Jose Choi at [place] [address]  
  
 10/09/2017 10:00 AM  
  Appointment with Daniel Rivas at [place] [address]  
  
 10/11/2017 To Be Determined Appointment with Daniel Rivas at [place] [address]  
  
 10/13/2017 To Be Determined Appointment with Mack Dos Santos RN at [address]! Dear Jeanette Myers: Thank you for requesting a [employer] account. Our records indicate that you already have an active [employer] account. You can access your account anytime at [email or web address] Did you know that you can access your hospital and ER discharge instructions at any time in [employer]? You can also review all of your test results from your hospital stay or ER visit. Additional Information If you have questions, please visit the Frequently Asked Questions section of the [employer] website at [email or web address] Remember, [employer] is NOT to be used for urgent needs. For medical emergencies, dial 911. Now available from your iPhone and Android! Please provide this summary of care documentation to your next provider. Your primary care clinician is listed as Shayne Vazquez. If you have any questions after today's visit, please call [phone].

## 2017-10-06 ENCOUNTER — HOME CARE VISIT (OUTPATIENT)
Dept: SCHEDULING | Facility: HOME HEALTH | Age: 67
End: 2017-10-06
Payer: COMMERCIAL

## 2017-10-06 VITALS
TEMPERATURE: 97.1 F | SYSTOLIC BLOOD PRESSURE: 104 MMHG | OXYGEN SATURATION: 98 % | RESPIRATION RATE: 20 BRPM | HEART RATE: 76 BPM | DIASTOLIC BLOOD PRESSURE: 60 MMHG

## 2017-10-06 VITALS
BODY MASS INDEX: 23.31 KG/M2 | OXYGEN SATURATION: 97 % | DIASTOLIC BLOOD PRESSURE: 60 MMHG | WEIGHT: 144.4 LBS | TEMPERATURE: 98.2 F | RESPIRATION RATE: 18 BRPM | SYSTOLIC BLOOD PRESSURE: 100 MMHG | HEART RATE: 74 BPM

## 2017-10-06 DIAGNOSIS — J44.9 CHRONIC OBSTRUCTIVE PULMONARY DISEASE, UNSPECIFIED COPD TYPE (HCC): Primary | ICD-10-CM

## 2017-10-06 PROCEDURE — G0300 HHS/HOSPICE OF LPN EA 15 MIN: HCPCS

## 2017-10-06 PROCEDURE — G0151 HHCP-SERV OF PT,EA 15 MIN: HCPCS

## 2017-10-06 NOTE — PROGRESS NOTES
Jeaneth May is a 78 y.o. female and presents with GI Problem and Hospital Follow Up [facility], [facility])  . Subjective:  *First visit w me  PMH: htn/cCAD/COPD/afib s/p ablation /DM/CHF-nml EF +diastolic dysfxn  Pt comes-in for hospital d/c f/u. Pt was admitted to hospital 9/22/17-9/27/17 for malaise, diarrhea, BRBPR and was found to be hypotensive. Pts metformin was held, her medication was adjusted. Colonoscopy revealed int hemorrhoids. Pt had cards f/u yesterday, pt is on furosemide 60mg x 4/5 days  Pt brings in disability forms to be filled. Pt thinks she may be going back to work next week. Pt is retiring in december    Pt w c/o rt sided cp w inspiration and cough. Upon further questioning, pt has had this discomfort on and off x weeks/months.     Review of Systems  Constitutional: positive for  weight loss  Respiratory:  posittive for cough,   CV:   negative for chest pain, palpitations, lower extremity edema  GI:   negative for diarrhea  Integument:  negative for rash and pruritus  Hematologic:  negative for easy bruising and gum/nose bleeding  Neurological:  positive for headaches, dizziness, vertigo, memory problems and gait   Behavl/Psych: positive for feelings of anxiety, depression, mood changes    Past Medical History:   Diagnosis Date    Atrial fibrillation ([address] 6/2/2010    CAD (coronary artery disease)     stent    Chronic diastolic heart failure ([address] 9/22/2014    Chronic systolic HF (heart failure) ([address] 5/10/2017    4/2017 EF 25-30%    COPD     COPD (chronic obstructive pulmonary disease) ([address] 6/2/2010    Diabetes ([address]     Fibroid     Heart failure ([address]     History of Clostridium difficile infection 5/10/2017    4/2017 CDiff positive    Hypertension 6/2/2010    Hypotension 5/10/2017    Junctional tachycardia ([address] 5/10/2017    NIDDM (non-insulin dependent diabetes mellitus) 6/2/2010    Screening mammogram 5/4/10    SOB (shortness of breath) 9/22/2014     Past Surgical History: Procedure Laterality Date    COLONOSCOPY N/A 2017    COLONOSCOPY performed by Jose [facility] at [facility] AMBULATORY OR    HX  SECTION      HX OTHER SURGICAL      adrenal gland removed    HX PACEMAKER      SD EXCISE ADRENAL GLAND       Social History     Social History    Marital status:      Spouse name: N/A    Number of children: N/A    Years of education: N/A     Social History Main Topics    Smoking status: Former Smoker     Types: Cigarettes     Quit date: 2009    Smokeless tobacco: Never Used    Alcohol use No    Drug use: No    Sexual activity: Not Currently     Other Topics Concern    None     Social History Narrative     Family History   Problem Relation Age of Onset    Heart Disease Mother     Diabetes Father     Heart Disease Brother      Current Outpatient Prescriptions   Medication Sig Dispense Refill    aspirin delayed-release 81 mg tablet Take 81 mg by mouth daily.  famotidine (PEPCID) 20 mg tablet Take 1 Tab by mouth daily. 60 Tab 0    furosemide (LASIX) 20 mg tablet Take 1 Tab by mouth daily. 30 Tab 0    amiodarone (PACERONE) 100 mg tablet Take 100 mg by mouth nightly.  colchicine 0.6 mg tablet Take 1.2 mg by mouth daily. Patient takes 1.2 mg daily and 2.4 mg PRN flare up      acetaminophen (TYLENOL) 500 mg tablet Take 500 mg by mouth every six (6) hours as needed for Pain.  clopidogrel (PLAVIX) 75 mg tab Take 75 mg by mouth daily.  apixaban (ELIQUIS) 5 mg tablet Take 1 Tab by mouth two (2) times a day. Indications: PREVENT THROMBOEMBOLISM IN CHRONIC ATRIAL FIBRILLATION 180 Tab 3    carvedilol (COREG) 6.25 mg tablet Take 1 Tab by mouth two (2) times daily (with meals). 60 Tab 11    fluticasone (FLONASE) 50 mcg/actuation nasal spray 2 Sprays by Both Nostrils route every evening.       gabapentin (NEURONTIN) 800 mg tablet TAKE ONE TABLET BY MOUTH THREE TIMES DAILY 90 Tab 11    tiotropium (SPIRIVA WITH HANDIHALER) 18 mcg inhalation capsule INHALE THE CONTENTS OF 1 CAPSULE THROUGH HANDIHALER DEVICE DAILY 90 Cap 3    budesonide-formoterol (SYMBICORT) 160-4.5 mcg/actuation HFA inhaler Take 1 Puff by inhalation two (2) times a day. 3 Inhaler 3    albuterol (PROVENTIL VENTOLIN) 2.5 mg /3 mL (0.083 %) nebulizer solution 3 mL by Nebulization route every four (4) hours as needed for Wheezing. 1 Package 5    Azelastine (ASTEPRO) 0.15 % (205.5 mcg) nasal spray 1 [place] by Both Nostrils route daily.  cod liver oil cap Take 1 Cap by mouth daily.  cholestyramine-aspartame (QUESTRAN LIGHT) 4 gram packet Take 1 Packet by mouth three (3) times daily (with meals). (Patient not taking: Reported on 9/29/2017) 90 Packet 0     Allergies   Allergen Reactions    Crestor [Rosuvastatin] Myalgia    Levaquin [Levofloxacin] Nausea Only     GI Upset    Lipitor [Atorvastatin] Diarrhea    Lyrica [Pregabalin] Myalgia       Objective:  Visit Vitals    BP 90/50 (BP 1 Location: Left arm, BP Patient Position: Sitting)    Pulse 77    Temp 97.5 °F (36.4 °C) (Oral)    Resp 20    Ht 5' 6\" (1.676 m)    Wt 147 lb 12.8 oz (67 kg)    SpO2 100%    BMI 23.86 kg/m2     Physical Exam:   General appearance - chronically ill-appearing, this lady looks older than stated age  Mental status - alert, oriented to person, place, and time  EYE-EOMI  Neck - supple,   Chest - symmetric air entry    CVS-reg  Abdomen - benign  Ext-no pedal edema, no clubbing or cyanosis  Skin-Warm and dry.  no hyperpigmentation, vitiligo, or suspicious lesions  Neuro -alert, oriented, normal speech, walks w aid of walker        Results for orders placed or performed during the hospital encounter of 09/22/17   CULTURE, STOOL   Result Value Ref Range    Special Requests: NO SPECIAL REQUESTS      Campylobacter antigen NEGATIVE      Shiga toxin-producing E. coli Ag NEGATIVE      Culture result:        NO ROUTINE ENTERIC PATHOGENS ISOLATED INCLUDING SALMONELLA, SHIGELLA, YERSINIA, VIBRIO OR SHIGA TOXIN PRODUCING E. COLI   C. DIFFICILE (DNA)   Result Value Ref Range    C. difficile (DNA) NEGATIVE  NEG     CBC W/O DIFF   Result Value Ref Range    WBC 8.8 3.6 - 11.0 K/uL    RBC 4.27 3.80 - 5.20 M/uL    HGB 12.1 11.5 - 16.0 g/dL    HCT 35.3 35.0 - 47.0 %    MCV 82.7 80.0 - 99.0 FL    MCH 28.3 26.0 - 34.0 PG    MCHC 34.3 30.0 - 36.5 g/dL    RDW 16.4 (H) 11.5 - 14.5 %    PLATELET 441 865 - 232 K/uL   METABOLIC PANEL, COMPREHENSIVE   Result Value Ref Range    Sodium 137 136 - 145 mmol/L    Potassium 4.0 3.5 - 5.1 mmol/L    Chloride 106 97 - 108 mmol/L    CO2 22 21 - 32 mmol/L    Anion gap 9 5 - 15 mmol/L    Glucose 87 65 - 100 mg/dL    BUN 19 6 - 20 MG/DL    Creatinine 1.44 (H) 0.55 - 1.02 MG/DL    BUN/Creatinine ratio 13 12 - 20      GFR est AA 44 (L) >60 ml/min/1.73m2    GFR est non-AA 36 (L) >60 ml/min/1.73m2    Calcium 8.4 (L) 8.5 - 10.1 MG/DL    Bilirubin, total 0.6 0.2 - 1.0 MG/DL    ALT (SGPT) 61 12 - 78 U/L    AST (SGOT) 140 (H) 15 - 37 U/L    Alk. phosphatase 302 (H) 45 - 117 U/L    Protein, total 9.0 (H) 6.4 - 8.2 g/dL    Albumin 2.7 (L) 3.5 - 5.0 g/dL    Globulin 6.3 (H) 2.0 - 4.0 g/dL    A-G Ratio 0.4 (L) 1.1 - 2.2     OCCULT BLOOD, STOOL   Result Value Ref Range    Occult blood, stool POSITIVE (A) NEG     LACTIC ACID   Result Value Ref Range    Lactic acid 1.5 0.4 - 2.0 MMOL/L   LIPASE   Result Value Ref Range    Lipase 105 73 - 393 U/L   CBC WITH AUTOMATED DIFF   Result Value Ref Range    WBC 8.9 3.6 - 11.0 K/uL    RBC 4.33 3.80 - 5.20 M/uL    HGB 11.9 11.5 - 16.0 g/dL    HCT 35.5 35.0 - 47.0 %    MCV 82.0 80.0 - 99.0 FL    MCH 27.5 26.0 - 34.0 PG    MCHC 33.5 30.0 - 36.5 g/dL    RDW 16.5 (H) 11.5 - 14.5 %    PLATELET 526 066 - 361 K/uL    NEUTROPHILS 63 32 - 75 %    LYMPHOCYTES 19 12 - 49 %    MONOCYTES 17 (H) 5 - 13 %    EOSINOPHILS 1 0 - 7 %    BASOPHILS 0 0 - 1 %    ABS. NEUTROPHILS 5.6 1.8 - 8.0 K/UL    ABS. LYMPHOCYTES 1.7 0.8 - 3.5 K/UL    ABS. MONOCYTES 1.5 (H) 0.0 - 1.0 K/UL    ABS.  EOSINOPHILS 0.1 0.0 - 0.4 K/UL    ABS. BASOPHILS 0.0 0.0 - 0.1 K/UL   TROPONIN I   Result Value Ref Range    Troponin-I, Qt. <0.04 <0.05 ng/mL   WBC, STOOL   Result Value Ref Range    White blood cells, stool 0 TO 4 0 - 4 /HPF   CBC W/O DIFF   Result Value Ref Range    WBC 7.8 3.6 - 11.0 K/uL    RBC 4.00 3.80 - 5.20 M/uL    HGB 10.9 (L) 11.5 - 16.0 g/dL    HCT 33.1 (L) 35.0 - 47.0 %    MCV 82.8 80.0 - 99.0 FL    MCH 27.3 26.0 - 34.0 PG    MCHC 32.9 30.0 - 36.5 g/dL    RDW 16.5 (H) 11.5 - 14.5 %    PLATELET 638 069 - 404 K/uL   HGB & HCT   Result Value Ref Range    HGB 10.6 (L) 11.5 - 16.0 g/dL    HCT 32.0 (L) 35.0 - 73.8 %   METABOLIC PANEL, BASIC   Result Value Ref Range    Sodium 140 136 - 145 mmol/L    Potassium 3.6 3.5 - 5.1 mmol/L    Chloride 111 (H) 97 - 108 mmol/L    CO2 19 (L) 21 - 32 mmol/L    Anion gap 10 5 - 15 mmol/L    Glucose 77 65 - 100 mg/dL    BUN 11 6 - 20 MG/DL    Creatinine 1.29 (H) 0.55 - 1.02 MG/DL    BUN/Creatinine ratio 9 (L) 12 - 20      GFR est AA 50 (L) >60 ml/min/1.73m2    GFR est non-AA 41 (L) >60 ml/min/1.73m2    Calcium 7.6 (L) 8.5 - 10.1 MG/DL   CBC WITH AUTOMATED DIFF   Result Value Ref Range    WBC 6.9 3.6 - 11.0 K/uL    RBC 3.77 (L) 3.80 - 5.20 M/uL    HGB 10.3 (L) 11.5 - 16.0 g/dL    HCT 31.2 (L) 35.0 - 47.0 %    MCV 82.8 80.0 - 99.0 FL    MCH 27.3 26.0 - 34.0 PG    MCHC 33.0 30.0 - 36.5 g/dL    RDW 16.7 (H) 11.5 - 14.5 %    PLATELET 628 271 - 796 K/uL    NEUTROPHILS 60 32 - 75 %    LYMPHOCYTES 20 12 - 49 %    MONOCYTES 18 (H) 5 - 13 %    EOSINOPHILS 2 0 - 7 %    BASOPHILS 0 0 - 1 %    ABS. NEUTROPHILS 4.1 1.8 - 8.0 K/UL    ABS. LYMPHOCYTES 1.4 0.8 - 3.5 K/UL    ABS. MONOCYTES 1.2 (H) 0.0 - 1.0 K/UL    ABS. EOSINOPHILS 0.2 0.0 - 0.4 K/UL    ABS.  BASOPHILS 0.0 0.0 - 0.1 K/UL   HGB & HCT   Result Value Ref Range    HGB 9.8 (L) 11.5 - 16.0 g/dL    HCT 29.3 (L) 35.0 - 47.0 %   HGB & HCT   Result Value Ref Range    HGB 11.1 (L) 11.5 - 16.0 g/dL    HCT 34.3 (L) 35.0 - 47.0 %   CBC WITH AUTOMATED DIFF Result Value Ref Range    WBC 9.0 3.6 - 11.0 K/uL    RBC 3.84 3.80 - 5.20 M/uL    HGB 10.5 (L) 11.5 - 16.0 g/dL    HCT 31.8 (L) 35.0 - 47.0 %    MCV 82.8 80.0 - 99.0 FL    MCH 27.3 26.0 - 34.0 PG    MCHC 33.0 30.0 - 36.5 g/dL    RDW 16.6 (H) 11.5 - 14.5 %    PLATELET 264 282 - 081 K/uL    NEUTROPHILS 66 32 - 75 %    LYMPHOCYTES 20 12 - 49 %    MONOCYTES 12 5 - 13 %    EOSINOPHILS 2 0 - 7 %    BASOPHILS 0 0 - 1 %    ABS. NEUTROPHILS 6.0 1.8 - 8.0 K/UL    ABS. LYMPHOCYTES 1.8 0.8 - 3.5 K/UL    ABS. MONOCYTES 1.1 (H) 0.0 - 1.0 K/UL    ABS. EOSINOPHILS 0.1 0.0 - 0.4 K/UL    ABS.  BASOPHILS 0.0 0.0 - 0.1 K/UL   METABOLIC PANEL, BASIC   Result Value Ref Range    Sodium 137 136 - 145 mmol/L    Potassium 3.5 3.5 - 5.1 mmol/L    Chloride 109 (H) 97 - 108 mmol/L    CO2 18 (L) 21 - 32 mmol/L    Anion gap 10 5 - 15 mmol/L    Glucose 90 65 - 100 mg/dL    BUN 6 6 - 20 MG/DL    Creatinine 1.33 (H) 0.55 - 1.02 MG/DL    BUN/Creatinine ratio 5 (L) 12 - 20      GFR est AA 48 (L) >60 ml/min/1.73m2    GFR est non-AA 40 (L) >60 ml/min/1.73m2    Calcium 7.7 (L) 8.5 - 10.1 MG/DL   HGB & HCT   Result Value Ref Range    HGB 10.5 (L) 11.5 - 16.0 g/dL    HCT 31.8 (L) 35.0 - 47.0 %   HEMOGLOBIN A1C WITH EAG   Result Value Ref Range    Hemoglobin A1c 5.8 4.2 - 6.3 %    Est. average glucose 120 mg/dL   HGB & HCT   Result Value Ref Range    HGB 10.6 (L) 11.5 - 16.0 g/dL    HCT 31.9 (L) 35.0 - 47.0 %   CBC W/O DIFF   Result Value Ref Range    WBC 8.9 3.6 - 11.0 K/uL    RBC 3.93 3.80 - 5.20 M/uL    HGB 10.8 (L) 11.5 - 16.0 g/dL    HCT 32.9 (L) 35.0 - 47.0 %    MCV 83.7 80.0 - 99.0 FL    MCH 27.5 26.0 - 34.0 PG    MCHC 32.8 30.0 - 36.5 g/dL    RDW 16.9 (H) 11.5 - 14.5 %    PLATELET 412 319 - 839 K/uL   METABOLIC PANEL, BASIC   Result Value Ref Range    Sodium 139 136 - 145 mmol/L    Potassium 3.9 3.5 - 5.1 mmol/L    Chloride 108 97 - 108 mmol/L    CO2 20 (L) 21 - 32 mmol/L    Anion gap 11 5 - 15 mmol/L    Glucose 89 65 - 100 mg/dL    BUN 6 6 - 20 MG/DL    Creatinine 1.22 (H) 0.55 - 1.02 MG/DL    BUN/Creatinine ratio 5 (L) 12 - 20      GFR est AA 53 (L) >60 ml/min/1.73m2    GFR est non-AA 44 (L) >60 ml/min/1.73m2    Calcium 7.8 (L) 8.5 - 10.1 MG/DL   HGB & HCT   Result Value Ref Range    HGB 9.8 (L) 11.5 - 16.0 g/dL    HCT 29.0 (L) 35.0 - 47.0 %   CBC WITH AUTOMATED DIFF   Result Value Ref Range    WBC 7.7 3.6 - 11.0 K/uL    RBC 3.77 (L) 3.80 - 5.20 M/uL    HGB 10.4 (L) 11.5 - 16.0 g/dL    HCT 31.0 (L) 35.0 - 47.0 %    MCV 82.2 80.0 - 99.0 FL    MCH 27.6 26.0 - 34.0 PG    MCHC 33.5 30.0 - 36.5 g/dL    RDW 16.5 (H) 11.5 - 14.5 %    PLATELET 241 196 - 943 K/uL    NEUTROPHILS 64 32 - 75 %    LYMPHOCYTES 21 12 - 49 %    MONOCYTES 13 5 - 13 %    EOSINOPHILS 2 0 - 7 %    BASOPHILS 0 0 - 1 %    ABS. NEUTROPHILS 4.9 1.8 - 8.0 K/UL    ABS. LYMPHOCYTES 1.6 0.8 - 3.5 K/UL    ABS. MONOCYTES 1.0 0.0 - 1.0 K/UL    ABS. EOSINOPHILS 0.1 0.0 - 0.4 K/UL    ABS.  BASOPHILS 0.0 0.0 - 0.1 K/UL   METABOLIC PANEL, BASIC   Result Value Ref Range    Sodium 140 136 - 145 mmol/L    Potassium 3.5 3.5 - 5.1 mmol/L    Chloride 107 97 - 108 mmol/L    CO2 23 21 - 32 mmol/L    Anion gap 10 5 - 15 mmol/L    Glucose 109 (H) 65 - 100 mg/dL    BUN 9 6 - 20 MG/DL    Creatinine 1.31 (H) 0.55 - 1.02 MG/DL    BUN/Creatinine ratio 7 (L) 12 - 20      GFR est AA 49 (L) >60 ml/min/1.73m2    GFR est non-AA 41 (L) >60 ml/min/1.73m2    Calcium 8.0 (L) 8.5 - 10.1 MG/DL   GLUCOSE, POC   Result Value Ref Range    Glucose (POC) 82 65 - 100 mg/dL    Performed by Leonor Rivas (PCT)    GLUCOSE, POC   Result Value Ref Range    Glucose (POC) 152 (H) 65 - 100 mg/dL    Performed by Jannette John (PCT)    GLUCOSE, POC   Result Value Ref Range    Glucose (POC) 92 65 - 100 mg/dL    Performed by Joseph Lawrence    GLUCOSE, POC   Result Value Ref Range    Glucose (POC) 129 (H) 65 - 100 mg/dL    Performed by Joseph Lawrence    GLUCOSE, POC   Result Value Ref Range    Glucose (POC) 89 65 - 100 mg/dL    Performed by Joseph Ridley Melinda    GLUCOSE, POC   Result Value Ref Range    Glucose (POC) 131 (H) 65 - 100 mg/dL    Performed by Wilder Ahumada (PCT)    GLUCOSE, POC   Result Value Ref Range    Glucose (POC) 102 (H) 65 - 100 mg/dL    Performed by James Watsontel    GLUCOSE, POC   Result Value Ref Range    Glucose (POC) 90 65 - 100 mg/dL    Performed by James Watsontel    GLUCOSE, POC   Result Value Ref Range    Glucose (POC) 93 65 - 100 mg/dL    Performed by James Watsontel    GLUCOSE, POC   Result Value Ref Range    Glucose (POC) 98 65 - 100 mg/dL    Performed by [facility], POC   Result Value Ref Range    Glucose (POC) 94 65 - 100 mg/dL    Performed by Anthony Bain (PCT)    GLUCOSE, POC   Result Value Ref Range    Glucose (POC) 87 65 - 100 mg/dL    Performed by Anthony Bain (PCT)    GLUCOSE, POC   Result Value Ref Range    Glucose (POC) 186 (H) 65 - 100 mg/dL    Performed by Anthony Bain (PCT)    GLUCOSE, POC   Result Value Ref Range    Glucose (POC) 101 (H) 65 - 100 mg/dL    Performed by Almas Woodson    GLUCOSE, POC   Result Value Ref Range    Glucose (POC) 100 65 - 100 mg/dL    Performed by Chaz SEN(C0N)    GLUCOSE, POC   Result Value Ref Range    Glucose (POC) 201 (H) 65 - 100 mg/dL    Performed by Chaz SEN(C0N)    GLUCOSE, POC   Result Value Ref Range    Glucose (POC) 156 (H) 65 - 100 mg/dL    Performed by [address], POC   Result Value Ref Range    Glucose (POC) 153 (H) 65 - 100 mg/dL    Performed by JORY CROWDER    GLUCOSE, POC   Result Value Ref Range    Glucose (POC) 95 65 - 100 mg/dL    Performed by [address], POC   Result Value Ref Range    Glucose (POC) 94 65 - 100 mg/dL    Performed by Stacey Cassidy        Assessment/Plan:    ICD-10-CM ICD-9-CM    1. Chronic systolic HF (heart failure) ([facility]) I50.22 428.22    2. Type 2 diabetes mellitus with diabetic neuropathy, without long-term current use of insulin ([facility]) E11.40 250.60      357.2    3.  Chronic obstructive pulmonary disease, unspecified COPD type ([facility] J44.9 496    4. CKD (chronic kidney disease) stage 3, GFR 30-59 ml/min N18.3 585.3    5. Cardiac pacemaker in situ Z95.0 V45.01      No orders of the defined types were placed in this encounter. guarded prognosis  continue present plan,  F/u cardiology routine  F/u w me in 2 mths  There are no Patient Instructions on file for this visit. Follow-up Disposition:  Return in about 2 months (around 12/5/2017). I have reviewed with the patient details of the assessment and plan and all questions were answered. Relevent patient education was performed. The most recent lab findings were reviewed with the patient. An After Visit Summary was printed and given to the patient.

## 2017-10-09 ENCOUNTER — HOME CARE VISIT (OUTPATIENT)
Dept: HOME HEALTH SERVICES | Facility: HOME HEALTH | Age: 67
End: 2017-10-09
Payer: COMMERCIAL

## 2017-10-09 ENCOUNTER — TELEPHONE (OUTPATIENT)
Dept: INTERNAL MEDICINE CLINIC | Age: 67
End: 2017-10-09

## 2017-10-09 ENCOUNTER — DOCUMENTATION ONLY (OUTPATIENT)
Dept: INTERNAL MEDICINE CLINIC | Age: 67
End: 2017-10-09

## 2017-10-09 DIAGNOSIS — J44.9 CHRONIC OBSTRUCTIVE PULMONARY DISEASE, UNSPECIFIED COPD TYPE (HCC): ICD-10-CM

## 2017-10-09 DIAGNOSIS — J44.1 CHRONIC OBSTRUCTIVE PULMONARY DISEASE WITH ACUTE EXACERBATION (HCC): ICD-10-CM

## 2017-10-09 NOTE — TELEPHONE ENCOUNTER
Christelle call requesting records for the nebulizer machine order, they needs something with you talking about why she needs it, is the office note from her last visit ok to fax over to them [phone]

## 2017-10-10 ENCOUNTER — PATIENT OUTREACH (OUTPATIENT)
Dept: CARDIOLOGY CLINIC | Age: 67
End: 2017-10-10

## 2017-10-10 ENCOUNTER — TELEPHONE (OUTPATIENT)
Dept: INTERNAL MEDICINE CLINIC | Age: 67
End: 2017-10-10

## 2017-10-10 ENCOUNTER — HOME CARE VISIT (OUTPATIENT)
Dept: SCHEDULING | Facility: HOME HEALTH | Age: 67
End: 2017-10-10
Payer: COMMERCIAL

## 2017-10-10 VITALS
DIASTOLIC BLOOD PRESSURE: 60 MMHG | SYSTOLIC BLOOD PRESSURE: 98 MMHG | TEMPERATURE: 97.1 F | HEART RATE: 76 BPM | OXYGEN SATURATION: 90 %

## 2017-10-10 PROCEDURE — G0151 HHCP-SERV OF PT,EA 15 MIN: HCPCS

## 2017-10-10 RX ORDER — BUDESONIDE AND FORMOTEROL FUMARATE DIHYDRATE 160; 4.5 UG/1; UG/1
1 AEROSOL RESPIRATORY (INHALATION) 2 TIMES DAILY
Qty: 3 INHALER | Refills: 3 | Status: SHIPPED | OUTPATIENT
Start: 2017-10-10 | End: 2018-10-25 | Stop reason: SDUPTHER

## 2017-10-10 NOTE — TELEPHONE ENCOUNTER
Writer call Rollo Cranker @ [phone] physical therapist  with [facility] and inform her per DR. Dileep Medley its ok for patient to have additional physical therapy.

## 2017-10-10 NOTE — PROGRESS NOTES
Called PCP LYNNETTE Guzman and LVM concerning pt. Called LYNNETTE Goldman who states that they can follow.

## 2017-10-10 NOTE — TELEPHONE ENCOUNTER
Yovanny Devlin a physical therapist with [facility] is requesting additional physical therapy for this patient.  Ting's # [phone]

## 2017-10-12 ENCOUNTER — PATIENT OUTREACH (OUTPATIENT)
Dept: INTERNAL MEDICINE CLINIC | Age: 67
End: 2017-10-12

## 2017-10-12 NOTE — PROGRESS NOTES
Transition of  Care  Navigator received referral from Cardiology Navigator Tobi Avila R.N.    Irma Porter is a 78 y.o. female. Navigator contacted the patient by telephone. HIPAA was verified by name and . The patient states she is feeling much better today. [place]/SOB has improved since the last PCP office visit. Summary of patients top three problems:     Problem 1: Systolic CHF: [id number] EF was 55%-60%. Weight trend from -= 2.2lb. Problem 2: Acute renal failure: GFR 49 . The patient is avoiding potatoes (gout trigger)     Problem 3: DM w/ neuropathy A1c 5.8% SEP17. The patient has adopted a gluten free diet. Patient's challenges to self management identified:  financial and transportation. Financial: the patient understands that there will be gap in her healthcare coverage in between her planned [facility] date in [place] and the start of her Medicare in [address] [place] [address]. She is planning to apply for [facility] via the online application once she returns to work in about 2 weeks. Transportation: She was able to drive prior to the last inpatient stay. Due to debility the patient is reliant on others for transportation. She is currently ambulating with a rollator. She is progressing with home PT/OT and hopes to regain her ability to drive in a few more weeks. Patients motivational level on a scale of 0-10: 7-8/10  Medication Management:  good adherence and good understanding  ACP: is on file     [employer], Referrals, and Durable Medical Equipment: referral to [facility] for case management. Follow up appointments:    : General surgery- Dr. Felipe Erazo: Cardiology- Dr. Aruna Tong: PCP- Dr. Hansel Murray Attends follow-up appointments as directed. 10/5/17  Pt had appt with cardiology on 10/3/17 and with PCP on 10/5/17. Pt states that she is continuing to cough and that she has chest pain with breathing. Pt to discuss this with PCP today at 11a. AFP    10/12/2017  Patient completed PCP appointment on 05OCT17.       Knowledge and adherence of prescribed medication (ie. action, side effects, missed dose, etc.). 10/5/17  Performed medication review with pt. Pt took too many lasix. Reviewed that today is the last day. Pt reports that nebulizer machine is broken. Note sent to PCP NN about this. Pt has all other medications and states understanding to watch for signs of bleeding. AFP    10/12/2017  The patient confirmed that she is taking the lower dose of Lasix. She is taking Lasix 20mg.  Patient verbalizes understanding of self -management goals of living with Diabetes. The patient will monitor her blood sugar daily. The patient will report fasting blood sugars <80mg/Dl and >200mg/Dl to the PCP office. Will assess weekly for at least the next 2 weeks. apy    10/12/2017  Fasting blood sugar= 98mg/Dl       Patient/Family verbalizes understanding of self-management of chronic disease. CHF  The patient shall weigh her self daily and report weight gain of >3lbs in 1-3 days to cardiology or the the PCP office. Plan: follow weekly for the next 2 weeks. apy  10/12/2017    Weight trend:  43LVN33- 144.4lb  02YYV03- 146.4lb  [id number]- 146.6lb       Understands and adheres to diet. Pt reports following low Na diet and daily wts. Reported wt at 161/2 lbs on 6/6/17. Patient verbalized understanding of all information discussed. Patient has this Nurse Navigators contact information for any further questions, concerns, or needs. Plan: Contact the patient in 5-7 days. Follow up on weight, current CHF symptoms, home PT progress.

## 2017-10-13 ENCOUNTER — HOME CARE VISIT (OUTPATIENT)
Dept: SCHEDULING | Facility: HOME HEALTH | Age: 67
End: 2017-10-13
Payer: COMMERCIAL

## 2017-10-13 VITALS
TEMPERATURE: 97.2 F | RESPIRATION RATE: 18 BRPM | DIASTOLIC BLOOD PRESSURE: 60 MMHG | HEART RATE: 76 BPM | SYSTOLIC BLOOD PRESSURE: 110 MMHG | OXYGEN SATURATION: 96 %

## 2017-10-13 PROCEDURE — G0151 HHCP-SERV OF PT,EA 15 MIN: HCPCS

## 2017-10-14 ENCOUNTER — HOME CARE VISIT (OUTPATIENT)
Dept: HOME HEALTH SERVICES | Facility: HOME HEALTH | Age: 67
End: 2017-10-14
Payer: COMMERCIAL

## 2017-10-16 ENCOUNTER — HOME CARE VISIT (OUTPATIENT)
Dept: SCHEDULING | Facility: HOME HEALTH | Age: 67
End: 2017-10-16
Payer: COMMERCIAL

## 2017-10-16 ENCOUNTER — HOME CARE VISIT (OUTPATIENT)
Dept: HOME HEALTH SERVICES | Facility: HOME HEALTH | Age: 67
End: 2017-10-16
Payer: COMMERCIAL

## 2017-10-16 VITALS
TEMPERATURE: 97.2 F | RESPIRATION RATE: 18 BRPM | DIASTOLIC BLOOD PRESSURE: 60 MMHG | SYSTOLIC BLOOD PRESSURE: 120 MMHG | OXYGEN SATURATION: 95 % | HEART RATE: 75 BPM

## 2017-10-16 PROCEDURE — G0151 HHCP-SERV OF PT,EA 15 MIN: HCPCS

## 2017-10-16 PROCEDURE — G0299 HHS/HOSPICE OF RN EA 15 MIN: HCPCS

## 2017-10-18 ENCOUNTER — TELEPHONE (OUTPATIENT)
Dept: SURGERY | Age: 67
End: 2017-10-18

## 2017-10-19 ENCOUNTER — HOME CARE VISIT (OUTPATIENT)
Dept: SCHEDULING | Facility: HOME HEALTH | Age: 67
End: 2017-10-19
Payer: COMMERCIAL

## 2017-10-19 VITALS
RESPIRATION RATE: 18 BRPM | TEMPERATURE: 97.2 F | OXYGEN SATURATION: 97 % | HEART RATE: 76 BPM | SYSTOLIC BLOOD PRESSURE: 98 MMHG | DIASTOLIC BLOOD PRESSURE: 54 MMHG

## 2017-10-19 VITALS
RESPIRATION RATE: 18 BRPM | DIASTOLIC BLOOD PRESSURE: 62 MMHG | HEART RATE: 72 BPM | TEMPERATURE: 97.1 F | SYSTOLIC BLOOD PRESSURE: 124 MMHG | OXYGEN SATURATION: 97 %

## 2017-10-19 PROCEDURE — G0151 HHCP-SERV OF PT,EA 15 MIN: HCPCS

## 2017-10-25 ENCOUNTER — HOME CARE VISIT (OUTPATIENT)
Dept: HOME HEALTH SERVICES | Facility: HOME HEALTH | Age: 67
End: 2017-10-25
Payer: COMMERCIAL

## 2017-10-27 ENCOUNTER — OFFICE VISIT (OUTPATIENT)
Dept: INTERNAL MEDICINE CLINIC | Age: 67
End: 2017-10-27

## 2017-10-27 VITALS
WEIGHT: 151 LBS | BODY MASS INDEX: 24.27 KG/M2 | DIASTOLIC BLOOD PRESSURE: 63 MMHG | RESPIRATION RATE: 18 BRPM | OXYGEN SATURATION: 98 % | HEART RATE: 75 BPM | TEMPERATURE: 97.5 F | HEIGHT: 66 IN | SYSTOLIC BLOOD PRESSURE: 114 MMHG

## 2017-10-27 DIAGNOSIS — M54.2 NECK PAIN: Primary | ICD-10-CM

## 2017-10-27 DIAGNOSIS — M25.511 ACUTE PAIN OF RIGHT SHOULDER: ICD-10-CM

## 2017-10-27 NOTE — PROGRESS NOTES
1. Have you been to the ER, urgent care clinic since your last visit? Hospitalized since your last visit? No .  2. Have you seen or consulted any other health care providers outside of the [address] [place] [address] since your last visit? Include any pap smears or colon screening.  No.

## 2017-10-27 NOTE — PATIENT INSTRUCTIONS
Rotator Cuff Injury: Care Instructions  Your Care Instructions  The rotator cuff is a group of tendons and muscles around the shoulder that keeps the upper arm bone in place. It keeps the shoulder joint stable and allows you to raise and rotate your arm. Damage to the rotator cuff can be caused by overuse, a fall, or a direct blow to the shoulder area, which can tear the tendons. Over time, everyday wear can damage the tendons and make injury more likely. Treatment can depend upon the amount of damage to the tendons. In a younger person, surgery may be the first choice. But if you are older than 25, you likely have some wear on your rotator cuff. Surgery may not be the most effective treatment. Your doctor may have you try physical therapy and exercise first.  Follow-up care is a key part of your treatment and safety. Be sure to make and go to all appointments, and call your doctor if you are having problems. It's also a good idea to know your test results and keep a list of the medicines you take. How can you care for yourself at home? · Rest your shoulder as much as you can. If your doctor put your arm in a sling or shoulder immobilizer, wear it as directed. Do not take it off before your doctor tells you to. If it is too tight, loosen it. · Be safe with medicines. Read and follow all instructions on the label. ¨ If the doctor gave you a prescription medicine for pain, take it as prescribed. ¨ If you are not taking a prescription pain medicine, ask your doctor if you can take an over-the-counter medicine. · Put ice or a cold pack on your shoulder for 10 to 20 minutes at a time. Try to do this every 1 to 2 hours for the next 3 days (when you are awake). Put a thin cloth between the ice pack and your skin. · After 3 days, put a warm, wet towel on your shoulder. This is to relax the muscles and help blood flow.   · While holding a warm, wet towel on your shoulder, lean forward so your arm hangs freely, and gently swing your arm back and forth like a pendulum. You also can do this standing under a warm shower. · Do not do anything that makes your pain worse. · Follow your doctor's advice about whether you need physical therapy. When should you call for help? Call your doctor now or seek immediate medical care if:  ? · You have severe pain. ? · You cannot move your shoulder or arm. ? · You have tingling or numbness in your arm or hand. ? · Your arm or hand is cool or pale. ? Watch closely for changes in your health, and be sure to contact your doctor if:  ? · Your pain gets worse. ? · You have new or worse swelling in your arm or hand. ? · You do not get better as expected. Where can you learn more? Go to http://rusty-marcela.info/. Enter [phone] in the search box to learn more about \"Rotator Cuff Injury: Care Instructions. \"  Current as of: March 21, 2017  Content Version: 11.4  © 6730-8558 [employer], [employer]. Care instructions adapted under license by [employer] (which disclaims liability or warranty for this information). If you have questions about a medical condition or this instruction, always ask your healthcare professional. [address] any warranty or liability for your use of this information.

## 2017-10-27 NOTE — PROGRESS NOTES
Eric Talavera is a 78 y.o. female and presents with Fall; Shoulder Pain; and Neck Pain  . Subjective:  Pt comes-in b/c she fell 1 week ago over an uneven sidewalk in front of he rbuilding. Pt w c/o neck and rt shoulder pain. Pt has been using tylenol for her pain. Pt w c/o decreased ability to raise her rt arm due to pain. Pt also suffered an abrasion to her rt knee.   ?litigation      Review of Systems  Constitutional: negative for fevers, chills, anorexia and weight loss  Respiratory:  negative for cough, hemoptysis, dyspnea,wheezing  CV:   negative for chest pain, palpitations, lower extremity edema  GI:   negative for nausea, vomiting, diarrhea, abdominal pain,melena  Musculoskel: positive for myalgias, arthralgias, back pain joint pain  Neurological:  negative for headaches, dizziness, vertigo, memory problems and gait   Behavl/Psych: negative for feelings of anxiety, depression, mood changes    Past Medical History:   Diagnosis Date    Atrial fibrillation ([address] 2010    CAD (coronary artery disease)     stent    Chronic diastolic heart failure ([address] 2014    Chronic systolic HF (heart failure) ([address] 5/10/2017    2017 EF 25-30%    COPD     COPD (chronic obstructive pulmonary disease) ([address] 2010    Diabetes ([address]     Fibroid     Heart failure ([address]     History of Clostridium difficile infection 5/10/2017    2017 CDiff positive    Hypertension 2010    Hypotension 5/10/2017    Junctional tachycardia ([address] 5/10/2017    NIDDM (non-insulin dependent diabetes mellitus) 2010    Screening mammogram 5/4/10    SOB (shortness of breath) 2014     Past Surgical History:   Procedure Laterality Date    COLONOSCOPY N/A 2017    COLONOS[facility] P[facility]ustos [facility] AM[facility] OR    HX  SECTION      HX OTHER SURGICAL      adrenal gland removed    HX PACEMAKER      NV EXCISE ADRENAL GLAND       Social History     Social History    Marital status:      Spouse name: N/A    Number of children: N/A    Years of education: N/A     Social History Main Topics    Smoking status: Former Smoker     Types: Cigarettes     Quit date: 12/31/2009    Smokeless tobacco: Never Used    Alcohol use No    Drug use: No    Sexual activity: Not Currently     Other Topics Concern    None     Social History Narrative     Family History   Problem Relation Age of Onset    Heart Disease Mother     Diabetes Father     Heart Disease Brother      Current Outpatient Prescriptions   Medication Sig Dispense Refill    budesonide-formoterol (SYMBICORT) 160-4.5 mcg/actuation HFAA Take 1 Puff by inhalation two (2) times a day. 3 Inhaler 3    tiotropium (SPIRIVA WITH HANDIHALER) 18 mcg inhalation capsule INHALE THE CONTENTS OF 1 CAPSULE THROUGH HANDIHALER DEVICE DAILY 90 Cap 3    aspirin delayed-release 81 mg tablet Take 81 mg by mouth daily.  famotidine (PEPCID) 20 mg tablet Take 1 Tab by mouth daily. 60 Tab 0    furosemide (LASIX) 20 mg tablet Take 1 Tab by mouth daily. 30 Tab 0    amiodarone (PACERONE) 100 mg tablet Take 100 mg by mouth nightly.  colchicine 0.6 mg tablet Take 1.2 mg by mouth daily. Patient takes 1.2 mg daily and 2.4 mg PRN flare up      acetaminophen (TYLENOL) 500 mg tablet Take 500 mg by mouth every six (6) hours as needed for Pain.  clopidogrel (PLAVIX) 75 mg tab Take 75 mg by mouth daily.  apixaban (ELIQUIS) 5 mg tablet Take 1 Tab by mouth two (2) times a day. Indications: PREVENT THROMBOEMBOLISM IN CHRONIC ATRIAL FIBRILLATION 180 Tab 3    carvedilol (COREG) 6.25 mg tablet Take 1 Tab by mouth two (2) times daily (with meals). 60 Tab 11    fluticasone (FLONASE) 50 mcg/actuation nasal spray 2 Sprays by Both Nostrils route every evening.       gabapentin (NEURONTIN) 800 mg tablet TAKE ONE TABLET BY MOUTH THREE TIMES DAILY 90 Tab 11    albuterol (PROVENTIL VENTOLIN) 2.5 mg /3 mL (0.083 %) nebulizer solution 3 mL by Nebulization route every four (4) hours as needed for Wheezing. 1 Package 5    Azelastine (ASTEPRO) 0.15 % (205.5 mcg) nasal spray 1 [place] by Both Nostrils route daily.  cod liver oil cap Take 1 Cap by mouth daily. Allergies   Allergen Reactions    Crestor [Rosuvastatin] Myalgia    Levaquin [Levofloxacin] Nausea Only     GI Upset    Lipitor [Atorvastatin] Diarrhea    Lyrica [Pregabalin] Myalgia       Objective:  Visit Vitals    /63 (BP 1 Location: Left arm, BP Patient Position: Sitting)    Pulse 75    Temp 97.5 °F (36.4 °C) (Oral)    Resp 18    Ht 5' 6\" (1.676 m)    Wt 151 lb (68.5 kg)    SpO2 98%    BMI 24.37 kg/m2     Physical Exam:   General appearance - alert, well appearing, and in no distress  Mental status - alert, oriented to person, place, and time  EYE-EOMI  Neck - supple, no significant adenopathy + tenderness over rt trapezius distribution  Ext-peripheral pulses normal, no pedal edema, no clubbing or cyanosis decreased abduction RUE nml pulses and strength  Skin-Warm and dry.  no hyperpigmentation, vitiligo, or suspicious lesions  Neuro -alert, oriented, normal speech, no focal findings or movement disorder noted      Results for orders placed or performed during the hospital encounter of 09/22/17   CULTURE, STOOL   Result Value Ref Range    Special Requests: NO SPECIAL REQUESTS      Campylobacter antigen NEGATIVE      Shiga toxin-producing E. coli Ag NEGATIVE      Culture result:        NO ROUTINE ENTERIC PATHOGENS ISOLATED INCLUDING SALMONELLA, SHIGELLA, YERSINIA, VIBRIO OR SHIGA TOXIN PRODUCING E. COLI   C. DIFFICILE (DNA)   Result Value Ref Range    C. difficile (DNA) NEGATIVE  NEG     CBC W/O DIFF   Result Value Ref Range    WBC 8.8 3.6 - 11.0 K/uL    RBC 4.27 3.80 - 5.20 M/uL    HGB 12.1 11.5 - 16.0 g/dL    HCT 35.3 35.0 - 47.0 %    MCV 82.7 80.0 - 99.0 FL    MCH 28.3 26.0 - 34.0 PG    MCHC 34.3 30.0 - 36.5 g/dL    RDW 16.4 (H) 11.5 - 14.5 %    PLATELET 662 586 - 794 K/uL   METABOLIC PANEL, COMPREHENSIVE   Result Value Ref Range    Sodium 137 136 - 145 mmol/L    Potassium 4.0 3.5 - 5.1 mmol/L    Chloride 106 97 - 108 mmol/L    CO2 22 21 - 32 mmol/L    Anion gap 9 5 - 15 mmol/L    Glucose 87 65 - 100 mg/dL    BUN 19 6 - 20 MG/DL    Creatinine 1.44 (H) 0.55 - 1.02 MG/DL    BUN/Creatinine ratio 13 12 - 20      GFR est AA 44 (L) >60 ml/min/1.73m2    GFR est non-AA 36 (L) >60 ml/min/1.73m2    Calcium 8.4 (L) 8.5 - 10.1 MG/DL    Bilirubin, total 0.6 0.2 - 1.0 MG/DL    ALT (SGPT) 61 12 - 78 U/L    AST (SGOT) 140 (H) 15 - 37 U/L    Alk. phosphatase 302 (H) 45 - 117 U/L    Protein, total 9.0 (H) 6.4 - 8.2 g/dL    Albumin 2.7 (L) 3.5 - 5.0 g/dL    Globulin 6.3 (H) 2.0 - 4.0 g/dL    A-G Ratio 0.4 (L) 1.1 - 2.2     OCCULT BLOOD, STOOL   Result Value Ref Range    Occult blood, stool POSITIVE (A) NEG     LACTIC ACID   Result Value Ref Range    Lactic acid 1.5 0.4 - 2.0 MMOL/L   LIPASE   Result Value Ref Range    Lipase 105 73 - 393 U/L   CBC WITH AUTOMATED DIFF   Result Value Ref Range    WBC 8.9 3.6 - 11.0 K/uL    RBC 4.33 3.80 - 5.20 M/uL    HGB 11.9 11.5 - 16.0 g/dL    HCT 35.5 35.0 - 47.0 %    MCV 82.0 80.0 - 99.0 FL    MCH 27.5 26.0 - 34.0 PG    MCHC 33.5 30.0 - 36.5 g/dL    RDW 16.5 (H) 11.5 - 14.5 %    PLATELET 876 976 - 981 K/uL    NEUTROPHILS 63 32 - 75 %    LYMPHOCYTES 19 12 - 49 %    MONOCYTES 17 (H) 5 - 13 %    EOSINOPHILS 1 0 - 7 %    BASOPHILS 0 0 - 1 %    ABS. NEUTROPHILS 5.6 1.8 - 8.0 K/UL    ABS. LYMPHOCYTES 1.7 0.8 - 3.5 K/UL    ABS. MONOCYTES 1.5 (H) 0.0 - 1.0 K/UL    ABS. EOSINOPHILS 0.1 0.0 - 0.4 K/UL    ABS.  BASOPHILS 0.0 0.0 - 0.1 K/UL   TROPONIN I   Result Value Ref Range    Troponin-I, Qt. <0.04 <0.05 ng/mL   WBC, STOOL   Result Value Ref Range    White blood cells, stool 0 TO 4 0 - 4 /HPF   CBC W/O DIFF   Result Value Ref Range    WBC 7.8 3.6 - 11.0 K/uL    RBC 4.00 3.80 - 5.20 M/uL    HGB 10.9 (L) 11.5 - 16.0 g/dL    HCT 33.1 (L) 35.0 - 47.0 %    MCV 82.8 80.0 - 99.0 FL    MCH 27.3 26.0 - 34.0 PG    MCHC 32.9 30.0 - 36.5 g/dL    RDW 16.5 (H) 11.5 - 14.5 %    PLATELET 383 403 - 488 K/uL   HGB & HCT   Result Value Ref Range    HGB 10.6 (L) 11.5 - 16.0 g/dL    HCT 32.0 (L) 35.0 - 67.9 %   METABOLIC PANEL, BASIC   Result Value Ref Range    Sodium 140 136 - 145 mmol/L    Potassium 3.6 3.5 - 5.1 mmol/L    Chloride 111 (H) 97 - 108 mmol/L    CO2 19 (L) 21 - 32 mmol/L    Anion gap 10 5 - 15 mmol/L    Glucose 77 65 - 100 mg/dL    BUN 11 6 - 20 MG/DL    Creatinine 1.29 (H) 0.55 - 1.02 MG/DL    BUN/Creatinine ratio 9 (L) 12 - 20      GFR est AA 50 (L) >60 ml/min/1.73m2    GFR est non-AA 41 (L) >60 ml/min/1.73m2    Calcium 7.6 (L) 8.5 - 10.1 MG/DL   CBC WITH AUTOMATED DIFF   Result Value Ref Range    WBC 6.9 3.6 - 11.0 K/uL    RBC 3.77 (L) 3.80 - 5.20 M/uL    HGB 10.3 (L) 11.5 - 16.0 g/dL    HCT 31.2 (L) 35.0 - 47.0 %    MCV 82.8 80.0 - 99.0 FL    MCH 27.3 26.0 - 34.0 PG    MCHC 33.0 30.0 - 36.5 g/dL    RDW 16.7 (H) 11.5 - 14.5 %    PLATELET 148 171 - 164 K/uL    NEUTROPHILS 60 32 - 75 %    LYMPHOCYTES 20 12 - 49 %    MONOCYTES 18 (H) 5 - 13 %    EOSINOPHILS 2 0 - 7 %    BASOPHILS 0 0 - 1 %    ABS. NEUTROPHILS 4.1 1.8 - 8.0 K/UL    ABS. LYMPHOCYTES 1.4 0.8 - 3.5 K/UL    ABS. MONOCYTES 1.2 (H) 0.0 - 1.0 K/UL    ABS. EOSINOPHILS 0.2 0.0 - 0.4 K/UL    ABS.  BASOPHILS 0.0 0.0 - 0.1 K/UL   HGB & HCT   Result Value Ref Range    HGB 9.8 (L) 11.5 - 16.0 g/dL    HCT 29.3 (L) 35.0 - 47.0 %   HGB & HCT   Result Value Ref Range    HGB 11.1 (L) 11.5 - 16.0 g/dL    HCT 34.3 (L) 35.0 - 47.0 %   CBC WITH AUTOMATED DIFF   Result Value Ref Range    WBC 9.0 3.6 - 11.0 K/uL    RBC 3.84 3.80 - 5.20 M/uL    HGB 10.5 (L) 11.5 - 16.0 g/dL    HCT 31.8 (L) 35.0 - 47.0 %    MCV 82.8 80.0 - 99.0 FL    MCH 27.3 26.0 - 34.0 PG    MCHC 33.0 30.0 - 36.5 g/dL    RDW 16.6 (H) 11.5 - 14.5 %    PLATELET 443 634 - 125 K/uL    NEUTROPHILS 66 32 - 75 %    LYMPHOCYTES 20 12 - 49 %    MONOCYTES 12 5 - 13 %    EOSINOPHILS 2 0 - 7 %    BASOPHILS 0 0 - 1 %    ABS. NEUTROPHILS 6.0 1.8 - 8.0 K/UL    ABS. LYMPHOCYTES 1.8 0.8 - 3.5 K/UL    ABS. MONOCYTES 1.1 (H) 0.0 - 1.0 K/UL    ABS. EOSINOPHILS 0.1 0.0 - 0.4 K/UL    ABS.  BASOPHILS 0.0 0.0 - 0.1 K/UL   METABOLIC PANEL, BASIC   Result Value Ref Range    Sodium 137 136 - 145 mmol/L    Potassium 3.5 3.5 - 5.1 mmol/L    Chloride 109 (H) 97 - 108 mmol/L    CO2 18 (L) 21 - 32 mmol/L    Anion gap 10 5 - 15 mmol/L    Glucose 90 65 - 100 mg/dL    BUN 6 6 - 20 MG/DL    Creatinine 1.33 (H) 0.55 - 1.02 MG/DL    BUN/Creatinine ratio 5 (L) 12 - 20      GFR est AA 48 (L) >60 ml/min/1.73m2    GFR est non-AA 40 (L) >60 ml/min/1.73m2    Calcium 7.7 (L) 8.5 - 10.1 MG/DL   HGB & HCT   Result Value Ref Range    HGB 10.5 (L) 11.5 - 16.0 g/dL    HCT 31.8 (L) 35.0 - 47.0 %   HEMOGLOBIN A1C WITH EAG   Result Value Ref Range    Hemoglobin A1c 5.8 4.2 - 6.3 %    Est. average glucose 120 mg/dL   HGB & HCT   Result Value Ref Range    HGB 10.6 (L) 11.5 - 16.0 g/dL    HCT 31.9 (L) 35.0 - 47.0 %   CBC W/O DIFF   Result Value Ref Range    WBC 8.9 3.6 - 11.0 K/uL    RBC 3.93 3.80 - 5.20 M/uL    HGB 10.8 (L) 11.5 - 16.0 g/dL    HCT 32.9 (L) 35.0 - 47.0 %    MCV 83.7 80.0 - 99.0 FL    MCH 27.5 26.0 - 34.0 PG    MCHC 32.8 30.0 - 36.5 g/dL    RDW 16.9 (H) 11.5 - 14.5 %    PLATELET 260 657 - 431 K/uL   METABOLIC PANEL, BASIC   Result Value Ref Range    Sodium 139 136 - 145 mmol/L    Potassium 3.9 3.5 - 5.1 mmol/L    Chloride 108 97 - 108 mmol/L    CO2 20 (L) 21 - 32 mmol/L    Anion gap 11 5 - 15 mmol/L    Glucose 89 65 - 100 mg/dL    BUN 6 6 - 20 MG/DL    Creatinine 1.22 (H) 0.55 - 1.02 MG/DL    BUN/Creatinine ratio 5 (L) 12 - 20      GFR est AA 53 (L) >60 ml/min/1.73m2    GFR est non-AA 44 (L) >60 ml/min/1.73m2    Calcium 7.8 (L) 8.5 - 10.1 MG/DL   HGB & HCT   Result Value Ref Range    HGB 9.8 (L) 11.5 - 16.0 g/dL    HCT 29.0 (L) 35.0 - 47.0 %   CBC WITH AUTOMATED DIFF   Result Value Ref Range    WBC 7.7 3.6 - 11.0 K/uL    RBC 3.77 (L) 3.80 - 5.20 M/uL    HGB 10.4 (L) 11.5 - 16.0 g/dL    HCT 31.0 (L) 35.0 - 47.0 %    MCV 82.2 80.0 - 99.0 FL    MCH 27.6 26.0 - 34.0 PG    MCHC 33.5 30.0 - 36.5 g/dL    RDW 16.5 (H) 11.5 - 14.5 %    PLATELET 610 060 - 824 K/uL    NEUTROPHILS 64 32 - 75 %    LYMPHOCYTES 21 12 - 49 %    MONOCYTES 13 5 - 13 %    EOSINOPHILS 2 0 - 7 %    BASOPHILS 0 0 - 1 %    ABS. NEUTROPHILS 4.9 1.8 - 8.0 K/UL    ABS. LYMPHOCYTES 1.6 0.8 - 3.5 K/UL    ABS. MONOCYTES 1.0 0.0 - 1.0 K/UL    ABS. EOSINOPHILS 0.1 0.0 - 0.4 K/UL    ABS.  BASOPHILS 0.0 0.0 - 0.1 K/UL   METABOLIC PANEL, BASIC   Result Value Ref Range    Sodium 140 136 - 145 mmol/L    Potassium 3.5 3.5 - 5.1 mmol/L    Chloride 107 97 - 108 mmol/L    CO2 23 21 - 32 mmol/L    Anion gap 10 5 - 15 mmol/L    Glucose 109 (H) 65 - 100 mg/dL    BUN 9 6 - 20 MG/DL    Creatinine 1.31 (H) 0.55 - 1.02 MG/DL    BUN/Creatinine ratio 7 (L) 12 - 20      GFR est AA 49 (L) >60 ml/min/1.73m2    GFR est non-AA 41 (L) >60 ml/min/1.73m2    Calcium 8.0 (L) 8.5 - 10.1 MG/DL   GLUCOSE, POC   Result Value Ref Range    Glucose (POC) 82 65 - 100 mg/dL    Performed by Joe Simon ([facility])    GLUCOSE, POC   Result Value Ref Range    Glucose (POC) 152 (H) 65 - 100 mg/dL    Performed by Jannette John ([facility])    GLUCOSE, POC   Result Value Ref Range    Glucose (POC) 92 65 - 100 mg/dL    [facility]formed by [facility]    GLUCOSE, POC   Result Value Ref Range    Glucose (POC) 129 (H) 65 - 100 mg/dL    [facility]    GLUCOSE, POC   Result Value Ref Range    Glucose (POC) 89 65 - 100 mg/dL    Performed by Diana Lawrence    GLUCOSE, POC   Result Value Ref Range    Glucose (POC) 131 (H) 65 - 100 mg/dL    Performed by Jannette John ([facility])    GLUCOSE, POC   Result Value Ref Range    Glucose (POC) 102 (H) 65 - 100 mg/dL    Performed by Diana Lawrence    GLUCOSE, POC   Result Value Ref Range    Glucose (POC) 90 65 - 100 mg/dL    Performed by Diana Lawrence    GLUCOSE, POC   Result Value Ref Range    Glucose (POC) 93 65 - 100 mg/dL    Performed by Hernandez Boateng    GLUCOSE, POC   Result Value Ref Range    Glucose (POC) 98 65 - 100 mg/dL    Performed by [address], POC   Result Value Ref Range    Glucose (POC) 94 65 - 100 mg/dL    Performed by Haydee Fall ([facility])    GLUCOSE, POC   Result Value Ref Range    Glucose (POC) 87 65 - 100 mg/dL    Performed by Haydee Fall ([facility])    GLUCOSE, POC   Result Value Ref Range    Glucose (POC) 186 (H) 65 - 100 mg/dL    Performed by Haydee Fall ([facility])    GLUCOSE, POC   Result Value Ref Range    Glucose (POC) 101 (H) 65 - 100 mg/dL    Performed by Jahaira Mandel    GLUCOSE, POC   Result Value Ref Range    Glucose (POC) 100 65 - 100 mg/dL    Performed by Eboni SEN(C0N)    GLUCOSE, POC   Result Value Ref Range    Glucose (POC) 201 (H) 65 - 100 mg/dL    Performed by Eboni SEN(C0N)    GLUCOSE, POC   Result Value Ref Range    Glucose (POC) 156 (H) 65 - 100 mg/dL    Performed by [address], POC   Result Value Ref Range    Glucose (POC) 153 (H) 65 - 100 mg/dL    Performed by JORY CROWDER    GLUCOSE, POC   Result Value Ref Range    Glucose (POC) 95 65 - 100 mg/dL    Performed by [address], POC   Result Value Ref Range    Glucose (POC) 94 65 - 100 mg/dL    Performed by Nancy Acevedo      *Note: Due to a large number of results and/or encounters for the requested time period, some results have not been displayed. A complete set of results can be found in Results Review. Assessment/Plan:    ICD-10-CM ICD-9-CM    1. Neck pain M54.2 723.1 XR SPINE CERV 4 OR 5 V      XR SHOULDER RT AP/LAT MIN 2 V   2.  Acute pain of right shoulder M25.511 719.41 XR SPINE CERV 4 OR 5 V      XR SHOULDER RT AP/LAT MIN 2 V     Orders Placed This Encounter    XR SPINE CERV 4 OR 5 V     Standing Status:   Future     Standing Expiration Date:   11/27/2018     Order Specific Question:   Reason for Exam     Answer:   s/p fall    XR SHOULDER RT AP/LAT MIN 2 V     Standing Status:   Future     Standing Expiration Date:   11/27/2018     Order Specific Question:   Reason for Exam     Answer:   s/p fall and pain     Tylenol prn  Warm compresseses  D/w pt frequent movement of RUE to prevent frozen shoulder  T/c (more) PT is not fully resolved in ~ 5 weeks  Patient Instructions          Rotator Cuff Injury: Care Instructions  Your Care Instructions  The rotator cuff is a group of tendons and muscles around the shoulder that keeps the upper arm bone in place. It keeps the shoulder joint stable and allows you to raise and rotate your arm. Damage to the rotator cuff can be caused by overuse, a fall, or a direct blow to the shoulder area, which can tear the tendons. Over time, everyday wear can damage the tendons and make injury more likely. Treatment can depend upon the amount of damage to the tendons. In a younger person, surgery may be the first choice. But if you are older than 25, you likely have some wear on your rotator cuff. Surgery may not be the most effective treatment. Your doctor may have you try physical therapy and exercise first.  Follow-up care is a key part of your treatment and safety. Be sure to make and go to all appointments, and call your doctor if you are having problems. It's also a good idea to know your test results and keep a list of the medicines you take. How can you care for yourself at home? · Rest your shoulder as much as you can. If your doctor put your arm in a sling or shoulder immobilizer, wear it as directed. Do not take it off before your doctor tells you to. If it is too tight, loosen it. · Be safe with medicines. Read and follow all instructions on the label. ¨ If the doctor gave you a prescription medicine for pain, take it as prescribed. ¨ If you are not taking a prescription pain medicine, ask your doctor if you can take an over-the-counter medicine. · Put ice or a cold pack on your shoulder for 10 to 20 minutes at a time.  Try to do this every 1 to 2 hours for the next 3 days (when you are awake). Put a thin cloth between the ice pack and your skin. · After 3 days, put a warm, wet towel on your shoulder. This is to relax the muscles and help blood flow. · While holding a warm, wet towel on your shoulder, lean forward so your arm hangs freely, and gently swing your arm back and forth like a pendulum. You also can do this standing under a warm shower. · Do not do anything that makes your pain worse. · Follow your doctor's advice about whether you need physical therapy. When should you call for help? Call your doctor now or seek immediate medical care if:  ? · You have severe pain. ? · You cannot move your shoulder or arm. ? · You have tingling or numbness in your arm or hand. ? · Your arm or hand is cool or pale. ? Watch closely for changes in your health, and be sure to contact your doctor if:  ? · Your pain gets worse. ? · You have new or worse swelling in your arm or hand. ? · You do not get better as expected. Where can you learn more? Go to http://rust[employer]marcela.info/. Enter [phone] in the search box to learn more about \"Rotator Cuff Injury: Care Instructions. \"  Current as of: March 21, 2017  Content Version: 11.4  © 5217-7576 [employer]. Care instructions adapted under license by [employer] (which disclaims liability or warranty for this information). If you have questions about a medical condition or this instruction, always ask your healthcare professional. [place] [address] any warranty or liability for your use of this information. Follow-up Disposition:  Return if symptoms worsen or fail to improve. I have reviewed with the patient details of the assessment and plan and all questions were answered. Relevent patient education was performed. The most recent lab findings were reviewed with the patient.     An After Visit Summary was printed and given to the patient.

## 2017-10-27 NOTE — MR AVS SNAPSHOT
Visit Information Date & Time Provider Department Dept. Phone Encounter #  
 10/27/2017  4:00 PM Brooklynn Gonzalez, [address] [id number] Follow-up Instructions Return if symptoms worsen or fail to improve. Your Appointments 11/1/2017 10:30 AM  
3 MONTH with True Miller MD  
[facility] [address] [facility]) Appt Note: $40CP 7/31/17ksr 932 [address]  
[phone] [address]  
  
    
 12/8/2017  8:20 AM  
Any with Brooklynn Gonzalez MD  
[address]) Appt Note: htn, lipids and dm; 2mo check up [place] [address] [facility] [phone]  
[phone]  
  
   
 [place] [address] [address] Upcoming Health Maintenance Date Due  
 EYE EXAM RETINAL OR DILATED Q1 6/25/2016 MICROALBUMIN Q1 3/22/2017 MEDICARE YEARLY EXAM 11/1/2017 HEMOGLOBIN A1C Q6M 3/24/2018 FOOT EXAM Q1 7/20/2018 LIPID PANEL Q1 7/20/2018 GLAUCOMA SCREENING Q2Y 7/28/2018 FOBT Q 1 YEAR AGE 50-75 9/22/2018 BREAST CANCER SCRN MAMMOGRAM 2/23/2019 Pneumococcal 65+ High/Highest Risk (2 of 2 - PPSV23) 6/25/2020 DTaP/Tdap/Td series (2 - Td) 7/16/2026 Allergies as of 10/27/2017  Review Complete On: 10/27/2017 By: Brooklynn Gonzalez MD  
  
 Severity Noted Reaction Type Reactions Crestor [Rosuvastatin]  10/31/2016   Side Effect Myalgia Levaquin [Levofloxacin]  12/07/2015   Intolerance Nausea Only GI Upset Lipitor [Atorvastatin]  04/17/2017   Side Effect Diarrhea Lyrica [Pregabalin]  10/31/2016   Side Effect Myalgia Current Immunizations  Reviewed on 10/31/2016 Name Date H1N1 FLU VACCINE 10/20/2009 Influenza High Dose Vaccine PF 10/31/2016  Influenza Vaccine ([place] June Canine Kidney) PF 9/23/2014 11:26 AM  
 Influenza Vaccine (Quad) PF 9/25/2017  6:36 PM  
 Influenza Vaccine Intradermal PF 11/27/2015 Influenza Vaccine Split 9/22/2011  6:25 AM  
 Influenza Vaccine Whole 10/20/2009 Pneumococcal Polysaccharide (PPSV-23) 6/25/2015 Tdap 7/16/2016 11:47 PM  
 ZZZ-RETIRED (DO NOT USE) Pneumococcal Vaccine (Unspecified Type) 10/20/2009 Not reviewed this visit You Were Diagnosed With   
  
 Codes Comments Neck pain    -  Primary ICD-10-CM: M54.2 ICD-9-CM: 723.1 Acute pain of right shoulder     ICD-10-CM: M25.511 ICD-9-CM: 719.41 Vitals BP Pulse Temp Resp Height(growth percentile) Weight(growth percentile) 114/63 (BP 1 Location: Left arm, BP Patient Position: Sitting) 75 97.5 °F (36.4 °C) (Oral) 18 5' 6\" (1.676 m) 151 lb (68.5 kg) SpO2 BMI OB Status Smoking Status 98% 24.37 kg/m2 Postmenopausal Former Smoker Vitals History BMI and BSA Data Body Mass Index Body Surface Area  
 24.37 kg/m 2 1.79 m 2 Preferred Pharmacy Pharmacy Name Phone [facility] [address], [facility] [place] [phone] Your Updated Medication List  
  
   
This list is accurate as of: 10/27/17  4:18 PM.  Always use your most recent med list.  
  
  
  
  
 acetaminophen 500 mg tablet Commonly known as:  TYLENOL Take 500 mg by mouth every six (6) hours as needed for Pain. albuterol 2.5 mg /3 mL (0.083 %) nebulizer solution Commonly known as:  PROVENTIL VENTOLIN  
3 mL by Nebulization route every four (4) hours as needed for Wheezing. amiodarone 100 mg tablet Commonly known as:  Yusra Ing Take 100 mg by mouth nightly. apixaban 5 mg tablet Commonly known as:  Landaverde Net Take 1 Tab by mouth two (2) times a day. Indications: PREVENT THROMBOEMBOLISM IN CHRONIC ATRIAL FIBRILLATION  
  
 aspirin delayed-release 81 mg tablet Take 81 mg by mouth daily. Azelastine 0.15 % (205.5 mcg) nasal spray Commonly known as:  ASTEPRO  
1 Spray by Both Nostrils route daily. budesonide-formoterol 160-4.5 mcg/actuation Hfaa Commonly known as:  SYMBICORT Take 1 Puff by inhalation two (2) times a day. carvedilol 6.25 mg tablet Commonly known as:  Gillermina Begun Take 1 Tab by mouth two (2) times daily (with meals). cod liver oil Cap Take 1 Cap by mouth daily. colchicine 0.6 mg tablet Take 1.2 mg by mouth daily. Patient takes 1.2 mg daily and 2.4 mg PRN flare up  
  
 famotidine 20 mg tablet Commonly known as:  PEPCID Take 1 Tab by mouth daily. FLONASE 50 mcg/actuation nasal spray Generic drug:  fluticasone 2 Sprays by Both Nostrils route every evening. furosemide 20 mg tablet Commonly known as:  LASIX Take 1 Tab by mouth daily. gabapentin 800 mg tablet Commonly known as:  NEURONTIN  
TAKE ONE TABLET BY MOUTH THREE TIMES DAILY PLAVIX 75 mg Tab Generic drug:  clopidogrel Take 75 mg by mouth daily. tiotropium 18 mcg inhalation capsule Commonly known as:  [place] PUDa [employer] INHALE THE CONTENTS OF 1 CAPSULE THROUGH HANDIHALER DEVICE DAILY Follow-up Instructions Return if symptoms worsen or fail to improve. To-Do List   
 10/30/2017 Imaging:  XR SHOULDER RT AP/LAT MIN 2 V   
  
 10/30/2017 Imaging:  XR SPINE CERV 4 OR 5 V Patient Instructions Rotator Cuff Injury: Care Instructions Your Care Instructions The rotator cuff is a group of tendons and muscles around the shoulder that keeps the upper arm bone in place. It keeps the shoulder joint stable and allows you to raise and rotate your arm. Damage to the rotator cuff can be caused by overuse, a fall, or a direct blow to the shoulder area, which can tear the tendons. Over time, everyday wear can damage the tendons and make injury more likely. Treatment can depend upon the amount of damage to the tendons.  In a younger person, surgery may be the first choice. But if you are older than 25, you likely have some wear on your rotator cuff. Surgery may not be the most effective treatment. Your doctor may have you try physical therapy and exercise first. 
Follow-up care is a key part of your treatment and safety. Be sure to make and go to all appointments, and call your doctor if you are having problems. It's also a good idea to know your test results and keep a list of the medicines you take. How can you care for yourself at home? · Rest your shoulder as much as you can. If your doctor put your arm in a sling or shoulder immobilizer, wear it as directed. Do not take it off before your doctor tells you to. If it is too tight, loosen it. · Be safe with medicines. Read and follow all instructions on the label. ¨ If the doctor gave you a prescription medicine for pain, take it as prescribed. ¨ If you are not taking a prescription pain medicine, ask your doctor if you can take an over-the-counter medicine. · Put ice or a cold pack on your shoulder for 10 to 20 minutes at a time. Try to do this every 1 to 2 hours for the next 3 days (when you are awake). Put a thin cloth between the ice pack and your skin. · After 3 days, put a warm, wet towel on your shoulder. This is to relax the muscles and help blood flow. · While holding a warm, wet towel on your shoulder, lean forward so your arm hangs freely, and gently swing your arm back and forth like a pendulum. You also can do this standing under a warm shower. · Do not do anything that makes your pain worse. · Follow your doctor's advice about whether you need physical therapy. When should you call for help? Call your doctor now or seek immediate medical care if: 
? · You have severe pain. ? · You cannot move your shoulder or arm. ? · You have tingling or numbness in your arm or hand. ? · Your arm or hand is cool or pale. ?Watch closely for changes in your health, and be sure to contact your doctor if: 
? · Your pain gets worse. ? · You have new or worse swelling in your arm or hand. ? · You do not get better as expected. Where can you learn more? Go to http://rusty-marcela.info/. Enter [phone] in the search box to learn more about \"Rotator Cuff Injury: Care Instructions. \" Current as of: March 21, 2017 Content Version: 11.4 © 9630-6681 [employer]. Care instructions adapted under license by [employer] (which disclaims liability or warranty for this information). If you have questions about a medical condition or this instruction, always ask your healthcare professional. [address] any warranty or liability for your use of this information. Introducing [facility]! Dear Stacey Hinojosa: Thank you for requesting a [employer] account. Our records indicate that you already have an active [employer] account. You can access your account anytime at [email or web address] Did you know that you can access your hospital and ER discharge instructions at any time in [employer]? You can also review all of your test results from your hospital stay or ER visit. Additional Information If you have questions, please visit the Frequently Asked Questions section of the [employer] website at [email or web address] Remember, [employer] is NOT to be used for urgent needs. For medical emergencies, dial 911. Now available from your iPhone and Android! Please provide this summary of care documentation to your next provider. Your primary care clinician is listed as Javan Will. If you have any questions after today's visit, please call [phone].

## 2017-10-30 ENCOUNTER — HOME CARE VISIT (OUTPATIENT)
Dept: HOME HEALTH SERVICES | Facility: HOME HEALTH | Age: 67
End: 2017-10-30
Payer: COMMERCIAL

## 2017-11-29 ENCOUNTER — TELEPHONE (OUTPATIENT)
Dept: CARDIOLOGY CLINIC | Age: 67
End: 2017-11-29

## 2017-11-29 NOTE — TELEPHONE ENCOUNTER
Verified patient with two identifiers. Spoke with pt, advised her to take an extra 20 mg of lasix today and tomorrow and call office on Friday. Elevate feet as much as possible, avoid salt. Pt verbalized understanding. Napoleon Wood, DEEPAK   You 1 hour ago (1:41 PM)                 Looks like she is up on wt, have her take a extra lasix 20mg today and tomorrow.

## 2017-11-29 NOTE — TELEPHONE ENCOUNTER
Verified patient with two identifiers. Pt c/o her legs \"feel\" like they are swollen, but doesn't appear swollen. No monitor at home to take BP. SOB last night, none today. Denies any c/p. Advised her to keep legs elevated as much as possible. On 20 mg of lasix daily do you want to increase or do you want to see her? Please advise.

## 2017-12-05 RX ORDER — AMIODARONE HYDROCHLORIDE 100 MG/1
100 TABLET ORAL
Qty: 30 TAB | Refills: 11 | Status: SHIPPED | OUTPATIENT
Start: 2017-12-05 | End: 2018-01-12 | Stop reason: SDUPTHER

## 2017-12-26 ENCOUNTER — HOSPITAL ENCOUNTER (EMERGENCY)
Age: 67
Discharge: HOME | End: 2017-12-26
Attending: EMERGENCY MEDICINE
Payer: COMMERCIAL

## 2017-12-26 VITALS
BODY MASS INDEX: 23.23 KG/M2 | SYSTOLIC BLOOD PRESSURE: 101 MMHG | OXYGEN SATURATION: 93 % | HEART RATE: 75 BPM | DIASTOLIC BLOOD PRESSURE: 46 MMHG | HEIGHT: 67 IN | WEIGHT: 148 LBS | RESPIRATION RATE: 18 BRPM

## 2017-12-26 DIAGNOSIS — R04.0 EPISTAXIS: Primary | ICD-10-CM

## 2017-12-26 LAB
ANION GAP SERPL CALC-SCNC: 5 MMOL/L (ref 5–15)
BASOPHILS # BLD: 0 K/UL (ref 0–0.1)
BASOPHILS NFR BLD: 0 % (ref 0–1)
BUN SERPL-MCNC: 14 MG/DL (ref 6–20)
BUN/CREAT SERPL: 9 (ref 12–20)
CALCIUM SERPL-MCNC: 8.9 MG/DL (ref 8.5–10.1)
CHLORIDE SERPL-SCNC: 104 MMOL/L (ref 97–108)
CO2 SERPL-SCNC: 27 MMOL/L (ref 21–32)
CREAT SERPL-MCNC: 1.63 MG/DL (ref 0.55–1.02)
EOSINOPHIL # BLD: 0.2 K/UL (ref 0–0.4)
EOSINOPHIL NFR BLD: 3 % (ref 0–7)
ERYTHROCYTE [DISTWIDTH] IN BLOOD BY AUTOMATED COUNT: 14.1 % (ref 11.5–14.5)
GLUCOSE SERPL-MCNC: 100 MG/DL (ref 65–100)
HCT VFR BLD AUTO: 37.6 % (ref 35–47)
HGB BLD-MCNC: 12.2 G/DL (ref 11.5–16)
INR PPP: 1.2 (ref 0.9–1.1)
LYMPHOCYTES # BLD: 2.4 K/UL (ref 0.8–3.5)
LYMPHOCYTES NFR BLD: 35 % (ref 12–49)
MCH RBC QN AUTO: 28 PG (ref 26–34)
MCHC RBC AUTO-ENTMCNC: 32.4 G/DL (ref 30–36.5)
MCV RBC AUTO: 86.4 FL (ref 80–99)
MONOCYTES # BLD: 1.1 K/UL (ref 0–1)
MONOCYTES NFR BLD: 16 % (ref 5–13)
NEUTS SEG # BLD: 3 K/UL (ref 1.8–8)
NEUTS SEG NFR BLD: 46 % (ref 32–75)
PLATELET # BLD AUTO: 210 K/UL (ref 150–400)
POTASSIUM SERPL-SCNC: 3.6 MMOL/L (ref 3.5–5.1)
PROTHROMBIN TIME: 12.6 SEC (ref 9–11.1)
RBC # BLD AUTO: 4.35 M/UL (ref 3.8–5.2)
SODIUM SERPL-SCNC: 136 MMOL/L (ref 136–145)
WBC # BLD AUTO: 6.7 K/UL (ref 3.6–11)

## 2017-12-26 PROCEDURE — 36415 COLL VENOUS BLD VENIPUNCTURE: CPT | Performed by: EMERGENCY MEDICINE

## 2017-12-26 PROCEDURE — 85610 PROTHROMBIN TIME: CPT | Performed by: EMERGENCY MEDICINE

## 2017-12-26 PROCEDURE — 74011250637 HC RX REV CODE- 250/637: Performed by: EMERGENCY MEDICINE

## 2017-12-26 PROCEDURE — 99283 EMERGENCY DEPT VISIT LOW MDM: CPT

## 2017-12-26 PROCEDURE — 80048 BASIC METABOLIC PNL TOTAL CA: CPT | Performed by: EMERGENCY MEDICINE

## 2017-12-26 PROCEDURE — 85025 COMPLETE CBC W/AUTO DIFF WBC: CPT | Performed by: EMERGENCY MEDICINE

## 2017-12-26 RX ORDER — OXYMETAZOLINE HCL 0.05 %
2 SPRAY, NON-AEROSOL (ML) NASAL
Status: DISCONTINUED | OUTPATIENT
Start: 2017-12-26 | End: 2017-12-26 | Stop reason: HOSPADM

## 2017-12-26 RX ADMIN — OXYMETAZOLINE HYDROCHLORIDE 2 SPRAY: 5 SPRAY NASAL at 16:04

## 2017-12-26 NOTE — ED PROVIDER NOTES
EMERGENCY DEPARTMENT HISTORY AND PHYSICAL EXAM      Date: 12/26/2017  Patient Name: Steffi Fuentes    History of Presenting Illness     Chief Complaint   Patient presents with    Epistaxis     started 2 hours PTA while watching TV. taking blood thinners for afib. History Provided By: Patient    HPI: Steffi Fuentes, 78 y.o. female with PMHx significant for DM, HTN, A-fib, COPD, CHF, CAD, presents via EMS to the ED with cc of an acute onset of an epistaxis that has been persistent x2-3 hours PTA. The pt reports associated sx of subjective melena x this morning as well. She expresses that at the onset of sx she attempted to use her prescribed Flonase WNR noting that she began to pass clots out of her nose leading her to the ED. The pt discloses that she is on Eliquis for her h/o A-fib. She denies any fevers, chills, chest pain, SOB, abdominal pain, nausea, vomiting, diarrhea,       PCP: Gretta Cordova MD    There are no other complaints, changes, or physical findings at this time. Current Facility-Administered Medications   Medication Dose Route Frequency Provider Last Rate Last Dose    oxymetazoline (AFRIN) 0.05 % nasal spray 2 Spray  2 Spray Both Nostrils BID PRN Xochitl Al,          Current Outpatient Prescriptions   Medication Sig Dispense Refill    amiodarone (PACERONE) 100 mg tablet Take 1 Tab by mouth nightly. 30 Tab 11    budesonide-formoterol (SYMBICORT) 160-4.5 mcg/actuation HFAA Take 1 Puff by inhalation two (2) times a day. 3 Inhaler 3    tiotropium (SPIRIVA WITH HANDIHALER) 18 mcg inhalation capsule INHALE THE CONTENTS OF 1 CAPSULE THROUGH HANDIHALER DEVICE DAILY 90 Cap 3    aspirin delayed-release 81 mg tablet Take 81 mg by mouth daily.  famotidine (PEPCID) 20 mg tablet Take 1 Tab by mouth daily. 60 Tab 0    furosemide (LASIX) 20 mg tablet Take 1 Tab by mouth daily. 30 Tab 0    colchicine 0.6 mg tablet Take 1.2 mg by mouth daily.  Patient takes 1.2 mg daily and 2.4 mg PRN flare up      acetaminophen (TYLENOL) 500 mg tablet Take 500 mg by mouth every six (6) hours as needed for Pain.  clopidogrel (PLAVIX) 75 mg tab Take 75 mg by mouth daily.  apixaban (ELIQUIS) 5 mg tablet Take 1 Tab by mouth two (2) times a day. Indications: PREVENT THROMBOEMBOLISM IN CHRONIC ATRIAL FIBRILLATION 180 Tab 3    carvedilol (COREG) 6.25 mg tablet Take 1 Tab by mouth two (2) times daily (with meals). 60 Tab 11    fluticasone (FLONASE) 50 mcg/actuation nasal spray 2 Sprays by Both Nostrils route every evening.  gabapentin (NEURONTIN) 800 mg tablet TAKE ONE TABLET BY MOUTH THREE TIMES DAILY 90 Tab 11    albuterol (PROVENTIL VENTOLIN) 2.5 mg /3 mL (0.083 %) nebulizer solution 3 mL by Nebulization route every four (4) hours as needed for Wheezing. 1 Package 5    Azelastine (ASTEPRO) 0.15 % (205.5 mcg) nasal spray 1 [place] by Both Nostrils route daily.  cod liver oil cap Take 1 Cap by mouth daily.          Past History     Past Medical History:  Past Medical History:   Diagnosis Date    Atrial fibrillation ([address] 2010    CAD (coronary artery disease)     stent    Chronic diastolic heart failure ([address] 2014    Chronic systolic HF (heart failure) ([address] 5/10/2017    2017 EF 25-30%    COPD     COPD (chronic obstructive pulmonary disease) ([address] 2010    Diabetes ([address]     Fibroid     Heart failure ([address]     History of Clostridium difficile infection 5/10/2017    2017 CDiff positive    Hypertension 2010    Hypotension 5/10/2017    Junctional tachycardia ([address] 5/10/2017    NIDDM (non-insulin dependent diabetes mellitus) 2010    Screening mammogram 5/4/10    SOB (shortness of breath) 2014       Past Surgical History:  Past Surgical History:   Procedure Laterality Date    COLONOSCOPY N/A 2017    COLONOSCOPY performed [facility] at [facility] AM[facility]UL[facility]ORY OR    HX  SECTION      HX OTHER SURGICAL      adrenal gland removed    HX PACEMAKER      FL EXCISE ADRENAL GLAND         Family History:  Family History   Problem Relation Age of Onset    Heart Disease Mother     Diabetes Father     Heart Disease Brother        Social History:  Social History   Substance Use Topics    Smoking status: Former Smoker     Types: Cigarettes     Quit date: 12/31/2009    Smokeless tobacco: Never Used    Alcohol use No       Allergies: Allergies   Allergen Reactions    Crestor [Rosuvastatin] Myalgia    Levaquin [Levofloxacin] Nausea Only     GI Upset    Lipitor [Atorvastatin] Diarrhea    Lyrica [Pregabalin] Myalgia         Review of Systems   Review of Systems   Constitutional: Negative for chills and fever. HENT: Positive for nosebleeds. Negative for congestion and sore throat. Eyes: Negative for visual disturbance. Respiratory: Negative for cough and shortness of breath. Cardiovascular: Negative for chest pain and leg swelling. Gastrointestinal: Positive for blood in stool (subjective melena ). Negative for abdominal pain, diarrhea, nausea and vomiting. Endocrine: Negative for polyuria. Genitourinary: Negative for dysuria, flank pain, vaginal bleeding and vaginal discharge. Musculoskeletal: Negative for myalgias. Skin: Negative for rash. Allergic/Immunologic: Negative for immunocompromised state. Neurological: Negative for weakness and headaches. Psychiatric/Behavioral: Negative for confusion. Physical Exam   Physical Exam   Constitutional: She is oriented to person, place, and time. She appears well-developed and well-nourished. HENT:   Head: Normocephalic and atraumatic. Nose: Epistaxis (left nare ) is observed. Moist mucous membranes   Eyes: Pupils are equal, round, and reactive to light. Right eye exhibits no discharge. Left eye exhibits no discharge. Pale conjunctiva      Neck: Normal range of motion. Neck supple. No tracheal deviation present.    Cardiovascular: Normal rate, regular rhythm and normal heart sounds. No murmur heard. Pulmonary/Chest: Effort normal and breath sounds normal. No respiratory distress. She has no wheezes. She has no rales. Abdominal: Soft. Bowel sounds are normal. There is no tenderness. There is no rebound and no guarding. Musculoskeletal: Normal range of motion. She exhibits no edema, tenderness or deformity. Neurological: She is alert and oriented to person, place, and time. Skin: Skin is warm and dry. No rash noted. No erythema. Psychiatric: Her behavior is normal.   Nursing note and vitals reviewed. Diagnostic Study Results     Labs -     Recent Results (from the past 12 hour(s))   CBC WITH AUTOMATED DIFF    Collection Time: 12/26/17  2:00 PM   Result Value Ref Range    WBC 6.7 3.6 - 11.0 K/uL    RBC 4.35 3.80 - 5.20 M/uL    HGB 12.2 11.5 - 16.0 g/dL    HCT 37.6 35.0 - 47.0 %    MCV 86.4 80.0 - 99.0 FL    MCH 28.0 26.0 - 34.0 PG    MCHC 32.4 30.0 - 36.5 g/dL    RDW 14.1 11.5 - 14.5 %    PLATELET 993 881 - 237 K/uL    NEUTROPHILS 46 32 - 75 %    LYMPHOCYTES 35 12 - 49 %    MONOCYTES 16 (H) 5 - 13 %    EOSINOPHILS 3 0 - 7 %    BASOPHILS 0 0 - 1 %    ABS. NEUTROPHILS 3.0 1.8 - 8.0 K/UL    ABS. LYMPHOCYTES 2.4 0.8 - 3.5 K/UL    ABS. MONOCYTES 1.1 (H) 0.0 - 1.0 K/UL    ABS. EOSINOPHILS 0.2 0.0 - 0.4 K/UL    ABS.  BASOPHILS 0.0 0.0 - 0.1 K/UL   PROTHROMBIN TIME + INR    Collection Time: 12/26/17  2:00 PM   Result Value Ref Range    INR 1.2 (H) 0.9 - 1.1      Prothrombin time 12.6 (H) 9.0 - 09.5 sec   METABOLIC PANEL, BASIC    Collection Time: 12/26/17  2:00 PM   Result Value Ref Range    Sodium 136 136 - 145 mmol/L    Potassium 3.6 3.5 - 5.1 mmol/L    Chloride 104 97 - 108 mmol/L    CO2 27 21 - 32 mmol/L    Anion gap 5 5 - 15 mmol/L    Glucose 100 65 - 100 mg/dL    BUN 14 6 - 20 MG/DL    Creatinine 1.63 (H) 0.55 - 1.02 MG/DL    BUN/Creatinine ratio 9 (L) 12 - 20      GFR est AA 38 (L) >60 ml/min/1.73m2    GFR est non-AA 32 (L) >60 ml/min/1.73m2    Calcium 8.9 8.5 - 10.1 MG/DL       Medical Decision Making   I am the first provider for this patient. I reviewed the vital signs, available nursing notes, past medical history, past surgical history, family history and social history. Vital Signs-Reviewed the patient's vital signs. Patient Vitals for the past 12 hrs:   Temp Pulse Resp BP SpO2   12/26/17 1730 - - - 101/46 93 %   12/26/17 1700 - - - 113/52 95 %   12/26/17 1440 - 75 18 (!) 83/47 95 %       Pulse Oximetry Analysis - 95% on room air    Cardiac Monitor:   Rate: 75 bpm  Rhythm: Normal Sinus Rhythm      Records Reviewed: Old Medical Records    Provider Notes (Medical Decision Making):     Epistaxis in setting of anticoagulation. The bleeding has appeared to have slowed. Will check basic labs including platelet count, CBC, and a chemistry. Will apply Afrin and pressure. If bleeding is stabilized and her vital signs remain stable the pt may be discharged home. ED Course:   Initial assessment performed. The patients presenting problems have been discussed, and they are in agreement with the care plan formulated and outlined with them. I have encouraged them to ask questions as they arise throughout their visit. Progress Notes:    7:33 PM  The pt has been re-evaluated. She has been updated on reassuring lab findings. The bleeding has stopped and her vitals have remained stable. The pt is stable for discharge. Disposition:  Discharge Note:  7:33 PM  The patient is ready for discharge. The patient's signs, symptoms, diagnosis, and discharge instruction have been discussed and the patient has conveyed their understanding. The patient is to follow up as recommended or return to the ER should their symptoms worsen. Plan has been discussed and the patient is in agreement. Written by Juliana Solomon, as dictated by Aguilar Gross DO     PLAN:  1. Current Discharge Medication List        2.    Follow-up Information     Follow up With Details Comments Contact Info    Kiley Quick MD Call in 1 day  [address]  [phone]          Return to ED if worse     Diagnosis     Clinical Impression:   1. Epistaxis        Attestations:    Attestation: This note is prepared by Bradly Benavides, acting as Scribe for El Beck DO. El Beck DO: The scribe's documentation has been prepared under my direction and personally reviewed by me in its entirety. I confirm that the note above accurately reflects all work, treatment, procedures, and medical decision making performed by me.

## 2017-12-26 NOTE — ED NOTES
Patient arrival VIA EMS for nosebleed that started 2 hours ago while watching TV, patient is taking blood thinners for CHF and A-fib. .

## 2017-12-27 ENCOUNTER — OFFICE VISIT (OUTPATIENT)
Dept: INTERNAL MEDICINE CLINIC | Age: 67
End: 2017-12-27

## 2017-12-27 ENCOUNTER — HOSPITAL ENCOUNTER (OUTPATIENT)
Dept: GENERAL RADIOLOGY | Age: 67
Discharge: HOME | End: 2017-12-27
Payer: COMMERCIAL

## 2017-12-27 VITALS
RESPIRATION RATE: 18 BRPM | BODY MASS INDEX: 24.64 KG/M2 | WEIGHT: 157 LBS | HEIGHT: 67 IN | HEART RATE: 77 BPM | TEMPERATURE: 97 F | SYSTOLIC BLOOD PRESSURE: 97 MMHG | OXYGEN SATURATION: 98 % | DIASTOLIC BLOOD PRESSURE: 56 MMHG

## 2017-12-27 DIAGNOSIS — N18.30 CKD (CHRONIC KIDNEY DISEASE) STAGE 3, GFR 30-59 ML/MIN (HCC): ICD-10-CM

## 2017-12-27 DIAGNOSIS — J44.9 CHRONIC OBSTRUCTIVE PULMONARY DISEASE, UNSPECIFIED COPD TYPE (HCC): ICD-10-CM

## 2017-12-27 DIAGNOSIS — Z95.820 S/P ANGIOPLASTY WITH STENT: ICD-10-CM

## 2017-12-27 DIAGNOSIS — I50.22 CHRONIC SYSTOLIC HF (HEART FAILURE) (HCC): ICD-10-CM

## 2017-12-27 DIAGNOSIS — E11.21 TYPE 2 DIABETES MELLITUS WITH NEPHROPATHY (HCC): Primary | ICD-10-CM

## 2017-12-27 DIAGNOSIS — I25.10 CORONARY ARTERY DISEASE INVOLVING NATIVE CORONARY ARTERY OF NATIVE HEART WITHOUT ANGINA PECTORIS: ICD-10-CM

## 2017-12-27 DIAGNOSIS — M25.511 ACUTE PAIN OF RIGHT SHOULDER: ICD-10-CM

## 2017-12-27 DIAGNOSIS — I50.32 DIASTOLIC CHF, CHRONIC (HCC): ICD-10-CM

## 2017-12-27 DIAGNOSIS — E78.2 MIXED HYPERLIPIDEMIA: ICD-10-CM

## 2017-12-27 DIAGNOSIS — M54.2 NECK PAIN: ICD-10-CM

## 2017-12-27 PROCEDURE — 72050 X-RAY EXAM NECK SPINE 4/5VWS: CPT

## 2017-12-27 PROCEDURE — 73030 X-RAY EXAM OF SHOULDER: CPT

## 2017-12-27 RX ORDER — SIMVASTATIN 10 MG/1
10 TABLET, FILM COATED ORAL
Qty: 30 TAB | Refills: 3 | Status: SHIPPED | OUTPATIENT
Start: 2017-12-27 | End: 2018-04-13 | Stop reason: SDUPTHER

## 2017-12-27 NOTE — MR AVS SNAPSHOT
Visit Information Date & Time Provider Department Dept. Phone Encounter #  
 12/27/2017  8:20 AM Levester Coad, [id number] [id number] Follow-up Instructions Return in about 3 months (around 3/27/2018) for routine f/u. Upcoming Health Maintenance Date Due  
 EYE EXAM RETINAL OR DILATED Q1 6/25/2016 MICROALBUMIN Q1 3/22/2017 MEDICARE YEARLY EXAM 11/1/2017 HEMOGLOBIN A1C Q6M 3/24/2018 FOOT EXAM Q1 7/20/2018 LIPID PANEL Q1 7/20/2018 GLAUCOMA SCREENING Q2Y 7/28/2018 FOBT Q 1 YEAR AGE 50-75 9/22/2018 Pneumococcal 65+ High/Highest Risk (2 of 2 - PPSV23) 6/25/2020 DTaP/Tdap/Td series (2 - Td) 7/16/2026 Allergies as of 12/27/2017  Review Complete On: 12/27/2017 By: Carlos Izaguirre, MIKY Severity Noted Reaction Type Reactions Crestor [Rosuvastatin]  10/31/2016   Side Effect Myalgia Levaquin [Levofloxacin]  12/07/2015   Intolerance Nausea Only GI Upset Lipitor [Atorvastatin]  04/17/2017   Side Effect Diarrhea Lyrica [Pregabalin]  10/31/2016   Side Effect Myalgia Current Immunizations  Reviewed on 10/31/2016 Name Date H1N1 FLU VACCINE 10/20/2009 Influenza High Dose Vaccine PF 10/31/2016 Influenza Vaccine (Madin Thrall Canine Kidney) PF 9/23/2014 11:26 AM  
 Influenza Vaccine (Quad) PF 9/25/2017  6:36 PM  
 Influenza Vaccine Intradermal PF 11/27/2015 Influenza Vaccine Split 9/22/2011  6:25 AM  
 Influenza Vaccine Whole 10/20/2009 Pneumococcal Polysaccharide (PPSV-23) 6/25/2015 Tdap 7/16/2016 11:47 PM  
 ZZZ-RETIRED (DO NOT USE) Pneumococcal Vaccine (Unspecified Type) 10/20/2009 Not reviewed this visit You Were Diagnosed With   
  
 Codes Comments Type 2 diabetes mellitus with nephropathy ([facility] [address]    -  Primary ICD-10-CM: E11.21 
ICD-9-CM: 250.40, 583.81 Chronic systolic HF (heart failure) (HCC)     ICD-10-CM: I50.22 ICD-9-CM: 428.22   
 S/P angioplasty with stent     ICD-10-CM: Z95.9 ICD-9-CM: V45.89 Chronic obstructive pulmonary disease, unspecified COPD type ([facility]     ICD-10-CM: J44.9 ICD-9-CM: 880 CKD (chronic kidney disease) stage 3, GFR 30-59 ml/min     ICD-10-CM: N18.3 ICD-9-CM: 572. 3 Mixed hyperlipidemia     ICD-10-CM: E78.2 ICD-9-CM: 272.2 Diastolic CHF, chronic (HCC)     ICD-10-CM: I50.32 
ICD-9-CM: 428.32, 428.0 Coronary artery disease involving native coronary artery of native heart without angina pectoris     ICD-10-CM: I25.10 ICD-9-CM: 414.01 Vitals BP Pulse Temp Resp Height(growth percentile) Weight(growth percentile) 97/56 (BP 1 Location: Right arm, BP Patient Position: Sitting) 77 97 °F (36.1 °C) (Oral) 18 5' 6.5\" (1.689 m) 157 lb (71.2 kg) SpO2 BMI OB Status Smoking Status 98% 24.96 kg/m2 Postmenopausal Former Smoker Vitals History BMI and BSA Data Body Mass Index Body Surface Area 24.96 kg/m 2 1.83 m 2 Preferred Pharmacy Pharmacy Name Phone [facility] [address] [place] [address] [phone] Your Updated Medication List  
  
   
This list is accurate as of: 12/27/17  9:15 AM.  Always use your most recent med list.  
  
  
  
  
 acetaminophen 500 mg tablet Commonly known as:  TYLENOL Take 500 mg by mouth every six (6) hours as needed for Pain. albuterol 2.5 mg /3 mL (0.083 %) nebulizer solution Commonly known as:  PROVENTIL VENTOLIN  
3 mL by Nebulization route every four (4) hours as needed for Wheezing. amiodarone 100 mg tablet Commonly known as:  Stacy Reynoldson Take 1 Tab by mouth nightly. apixaban 5 mg tablet Commonly known as:  Deng Galvez Take 1 Tab by mouth two (2) times a day. Indications: PREVENT THROMBOEMBOLISM IN CHRONIC ATRIAL FIBRILLATION Azelastine 0.15 % (205.5 mcg) nasal spray Commonly known as:  ASTEPRO  
1 Spray by Both Nostrils route daily. budesonide-formoterol 160-4.5 mcg/actuation Hfaa Commonly known as:  SYMBICORT Take 1 Puff by inhalation two (2) times a day. carvedilol 6.25 mg tablet Commonly known as:  Webb Servant Take 1 Tab by mouth two (2) times daily (with meals). cod liver oil Cap Take 1 Cap by mouth daily. colchicine 0.6 mg tablet Take 1.2 mg by mouth daily. Patient takes 1.2 mg daily and 2.4 mg PRN flare up FLONASE 50 mcg/actuation nasal spray Generic drug:  fluticasone 2 Sprays by Both Nostrils route every evening. furosemide 20 mg tablet Commonly known as:  LASIX Take 1 Tab by mouth daily. gabapentin 800 mg tablet Commonly known as:  NEURONTIN  
TAKE ONE TABLET BY MOUTH THREE TIMES DAILY PLAVIX 75 mg Tab Generic drug:  clopidogrel Take 75 mg by mouth daily. tiotropium 18 mcg inhalation capsule Commonly known as:  [address] INHALE THE CONTENTS OF 1 CAPSULE THROUGH HANDIHALER DEVICE DAILY Follow-up Instructions Return in about 3 months (around 3/27/2018) for routine f/u. To-Do List   
 02/12/2018 Lab:  LIPID PANEL   
  
 02/12/2018 Lab:  METABOLIC PANEL, COMPREHENSIVE Patient Instructions Low Sodium Diet (2,000 Milligram): Care Instructions Your Care Instructions Too much sodium causes your body to hold on to extra water. This can raise your blood pressure and force your heart and kidneys to work harder. In very serious cases, this could cause you to be put in the hospital. It might even be life-threatening. By limiting sodium, you will feel better and lower your risk of serious problems. The most common source of sodium is salt. People get most of the salt in their diet from canned, prepared, and packaged foods. Fast food and restaurant meals also are very high in sodium. Your doctor will probably limit your sodium to less than 2,000 milligrams (mg) a day.  This limit counts all the sodium in prepared and packaged foods and any salt you add to your food. Follow-up care is a key part of your treatment and safety. Be sure to make and go to all appointments, and call your doctor if you are having problems. It's also a good idea to know your test results and keep a list of the medicines you take. How can you care for yourself at home? Read food labels · Read labels on cans and food packages. The labels tell you how much sodium is in each serving. Make sure that you look at the serving size. If you eat more than the serving size, you have eaten more sodium. · Food labels also tell you the Percent Daily Value for sodium. Choose products with low Percent Daily Values for sodium. · Be aware that sodium can come in forms other than salt, including monosodium glutamate (MSG), sodium citrate, and sodium bicarbonate (baking soda). MSG is often added to Asian food. When you eat out, you can sometimes ask for food without MSG or added salt. Buy low-sodium foods · Buy foods that are labeled \"unsalted\" (no salt added), \"sodium-free\" (less than 5 mg of sodium per serving), or \"low-sodium\" (less than 140 mg of sodium per serving). Foods labeled \"reduced-sodium\" and \"light sodium\" may still have too much sodium. Be sure to read the label to see how much sodium you are getting. · Buy fresh vegetables, or frozen vegetables without added sauces. Buy low-sodium versions of canned vegetables, soups, and other canned goods. Prepare low-sodium meals · Cut back on the amount of salt you use in cooking. This will help you adjust to the taste. Do not add salt after cooking. One teaspoon of salt has about 2,300 mg of sodium. · Take the salt shaker off the table. · Flavor your food with garlic, lemon juice, onion, vinegar, herbs, and spices. Do not use soy sauce, lite soy sauce, steak sauce, onion salt, garlic salt, celery salt, mustard, or ketchup on your food. · Use low-sodium salad dressings, sauces, and ketchup. Or make your own salad dressings and sauces without adding salt. · Use less salt (or none) when recipes call for it. You can often use half the salt a recipe calls for without losing flavor. Other foods such as rice, pasta, and grains do not need added salt. · Rinse canned vegetables, and cook them in fresh water. This removes some-but not all-of the salt. · Avoid water that is naturally high in sodium or that has been treated with water softeners, which add sodium. Call your local water company to find out the sodium content of your water supply. If you buy bottled water, read the label and choose a sodium-free brand. Avoid high-sodium foods · Avoid eating: ¨ Smoked, cured, salted, and canned meat, fish, and poultry. ¨ Ham, dunne, hot dogs, and luncheon meats. ¨ Regular, hard, and processed cheese and regular peanut butter. ¨ Crackers with salted tops, and other salted snack foods such as pretzels, chips, and salted popcorn. ¨ Frozen prepared meals, unless labeled low-sodium. ¨ Canned and dried soups, broths, and bouillon, unless labeled sodium-free or low-sodium. ¨ Canned vegetables, unless labeled sodium-free or low-sodium. ¨ [place] fries, pizza, tacos, and other fast foods. ¨ Pickles, olives, ketchup, and other condiments, especially soy sauce, unless labeled sodium-free or low-sodium. Where can you learn more? Go to http://rusty-marcela.info/. Enter U920 in the search box to learn more about \"Low Sodium Diet (2,000 Milligram): Care Instructions. \" Current as of: May 12, 2017 Content Version: 11.4 © 3830-7271 [employer]. Care instructions adapted under license by [employer] (which disclaims liability or warranty for this information).  If you have questions about a medical condition or this instruction, always ask your healthcare professional. [employer] disclaims any warranty or liability for your use of this information. Introducing [facility]! Dear Emmie Silvestre: Thank you for requesting a [employer] account. Our records indicate that you already have an active [employer] account. You can access your account anytime at [email or web address] Did you know that you can access your hospital and ER discharge instructions at any time in [employer]? You can also review all of your test results from your hospital stay or ER visit. Additional Information If you have questions, please visit the Frequently Asked Questions section of the [employer] website at [email or web address] Remember, [employer] is NOT to be used for urgent needs. For medical emergencies, dial 911. Now available from your iPhone and Android! Please provide this summary of care documentation to your next provider. Your primary care clinician is listed as Ainsley Bernard. If you have any questions after today's visit, please call [phone].

## 2017-12-27 NOTE — PROGRESS NOTES
1. Have you been to the ER, urgent care clinic since your last visit? Hospitalized since your last visit? Yes When: 12/26/17 [facility] nose bleed. 2. Have you seen or consulted any other health care providers outside of the [address] [place] [address] since your last visit? Include any pap smears or colon screening.  No.  PHQ over the last two weeks 12/27/2017   Little interest or pleasure in doing things Not at all   Feeling down, depressed or hopeless Not at all   Total Score PHQ 2 0

## 2017-12-27 NOTE — DISCHARGE INSTRUCTIONS
Nosebleeds: Care Instructions  Your Care Instructions    Nosebleeds are common, especially if you have colds or allergies. Many things can cause a nosebleed. Some nosebleeds stop on their own with pressure. Others need packing. Some get cauterized (sealed). If you have gauze or other packing materials in your nose, you will need to follow up with your doctor to have the packing removed. You may need more treatment if you get nosebleeds a lot. The doctor has checked you carefully, but problems can develop later. If you notice any problems or new symptoms, get medical treatment right away. Follow-up care is a key part of your treatment and safety. Be sure to make and go to all appointments, and call your doctor if you are having problems. It's also a good idea to know your test results and keep a list of the medicines you take. How can you care for yourself at home? · If you get another nosebleed:  ¨ Sit up and tilt your head slightly forward. This keeps blood from going down your throat. ¨ Use your thumb and index finger to pinch your nose shut for 10 minutes. Use a clock. Do not check to see if the bleeding has stopped before the 10 minutes are up. If the bleeding has not stopped, pinch your nose shut for another 10 minutes. ¨ When the bleeding has stopped, try not to pick, rub, or blow your nose for 12 hours. Avoiding these things helps keep your nose from bleeding again. · If your doctor prescribed antibiotics, take them as directed. Do not stop taking them just because you feel better. You need to take the full course of antibiotics. To prevent nosebleeds  · Do not blow your nose too hard. · Try not to lift or strain after a nosebleed. · Raise your head on a pillow while you sleep. · Put a thin layer of a saline- or water-based nasal gel, such as NasoGel, inside your nose. Put it on the septum, which divides your nostrils. This will prevent dryness that can cause nosebleeds.   · Use a vaporizer or humidifier to add moisture to your bedroom. Follow the directions for cleaning the machine. · Do not use aspirin, ibuprofen (Advil, Motrin), or naproxen (Aleve) for 36 to 48 hours after a nosebleed unless your doctor tells you to. You can use acetaminophen (Tylenol) for pain relief. · Talk to your doctor about stopping any other medicines you are taking. Some medicines may make you more likely to get a nosebleed. · Do not use cold medicines or nasal sprays without first talking to your doctor. They can make your nose dry. When should you call for help? Call 911 anytime you think you may need emergency care. For example, call if:  ? · You passed out (lost consciousness). ?Call your doctor now or seek immediate medical care if:  ? · You get another nosebleed and your nose is still bleeding after you have applied pressure 3 times for 10 minutes each time (30 minutes total). ? · There is a lot of blood running down the back of your throat even after you pinch your nose and tilt your head forward. ? · You have a fever. ? · You have sinus pain. ? Watch closely for changes in your health, and be sure to contact your doctor if:  ? · You get nosebleeds often, even if they stop. ? · You do not get better as expected. Where can you learn more? Go to http://rusty-marcela.info/. Enter S156 in the search box to learn more about \"Nosebleeds: Care Instructions. \"  Current as of: March 20, 2017  Content Version: 11.4  © 1156-3948 [employer]. Care instructions adapted under license by [employer] (which disclaims liability or warranty for this information). If you have questions about a medical condition or this instruction, always ask your healthcare professional. [address] any warranty or liability for your use of this information.

## 2017-12-27 NOTE — PROGRESS NOTES
Abelardo Sharp is a 78 y.o. female and presents with Arm Pain and Hospital Follow Up  . Subjective:    Pt relays her glucose was elevated after she ate chhesy fries. Pt will be retiring in a few days    Hypertensive heart and kidney disease-pt has no complaints, stable on med. Relays med compliance  BP Readings from Last 3 Encounters:   12/27/17 97/56   12/26/17 101/46   10/27/17 114/63     CAD s/p stent-noted  CHF-systolic and diastolic-latest TG~37%  Wt Readings from Last 3 Encounters:   12/27/17 157 lb (71.2 kg)   12/26/17 148 lb (67.1 kg)   10/27/17 151 lb (68.5 kg)       CKD 3-noted  Lab Results   Component Value Date/Time    GFR est AA 38 12/26/2017 02:00 PM    GFR est non-AA 32 12/26/2017 02:00 PM    Creatinine 1.63 12/26/2017 02:00 PM    BUN 14 12/26/2017 02:00 PM    Sodium 136 12/26/2017 02:00 PM    Potassium 3.6 12/26/2017 02:00 PM    Chloride 104 12/26/2017 02:00 PM    CO2 27 12/26/2017 02:00 PM     COPD-quiescent  S/p pacemaker-noted  Hx PAF s/p ablation-on eliquis  Type 2 DM w nephropathy-diet controlled  Lab Results   Component Value Date/Time    Hemoglobin A1c 5.8 09/24/2017 10:36 AM    Hemoglobin A1c (POC) 7.0 07/20/2017 09:03 AM     Hyperlipidemia- cannot tolerate statins due to myalgia in the past. Will try low dose   -t/c PCSK9 inhibitor  Lab Results   Component Value Date/Time    Cholesterol, total 248 10/31/2016 12:00 AM    Cholesterol (POC) 188 07/20/2017 09:05 AM    HDL Cholesterol 43 10/31/2016 12:00 AM    HDL Cholesterol (POC) 26 07/20/2017 09:05 AM    LDL Cholesterol (POC) 162 07/20/2017 09:05 AM    LDL, calculated 160 10/31/2016 12:00 AM    VLDL, calculated 45 10/31/2016 12:00 AM    Triglyceride 227 10/31/2016 12:00 AM    Triglycerides (POC) 240 07/20/2017 09:05 AM    CHOL/HDL Ratio 6.4 07/01/2014 03:10 AM         Review of Systems  Review of systems (12) negative, except noted above.       Past Medical History:   Diagnosis Date    Atrial fibrillation ([facility] 6/2/2010    CAD (coronary artery disease)     stent    Chronic diastolic heart failure ([facility] 2014    Chronic systolic HF (heart failure) ([facility] 5/10/2017    2017 EF 25-30%    COPD     COPD (chronic obstructive pulmonary disease) ([facility] 2010    Diabetes ([facility]     Fibroid     Heart failure (HCC)     History of Clostridium difficile infection 5/10/2017    2017 CDiff positive    Hypertension 2010    Hypotension 5/10/2017    Junctional tachycardia ([facility] 5/10/2017    NIDDM (non-insulin dependent diabetes mellitus) 2010    Screening mammogram 5/4/10    SOB (shortness of breath) 2014     Past Surgical History:   Procedure Laterality Date    COLONOSCOPY N/A 2017    COLONOSCOPY performed by Aaliyah Macario MD at [facility] AMBULATORY OR    HX  SECTION      HX OTHER SURGICAL      adrenal gland removed    HX PACEMAKER      GA EXCISE ADRENAL GLAND       Social History     Social History    Marital status:      Spouse name: N/A    Number of children: N/A    Years of education: N/A     Social History Main Topics    Smoking status: Former Smoker     Types: Cigarettes     Quit date: 2009    Smokeless tobacco: Never Used    Alcohol use No    Drug use: No    Sexual activity: Not Currently     Other Topics Concern    None     Social History Narrative     Family History   Problem Relation Age of Onset    Heart Disease Mother     Diabetes Father     Heart Disease Brother      Current Outpatient Prescriptions   Medication Sig Dispense Refill    simvastatin (ZOCOR) 10 mg tablet Take 1 Tab by mouth nightly. 30 Tab 3    amiodarone (PACERONE) 100 mg tablet Take 1 Tab by mouth nightly. 30 Tab 11    budesonide-formoterol (SYMBICORT) 160-4.5 mcg/actuation HFAA Take 1 Puff by inhalation two (2) times a day.  3 Inhaler 3    tiotropium (SPIRIVA WITH HANDIHALER) 18 mcg inhalation capsule INHALE THE CONTENTS OF 1 CAPSULE THROUGH HANDIHALER DEVICE DAILY 90 Cap 3    furosemide (LASIX) 20 mg tablet Take 1 Tab by mouth daily. 30 Tab 0    colchicine 0.6 mg tablet Take 1.2 mg by mouth daily. Patient takes 1.2 mg daily and 2.4 mg PRN flare up      acetaminophen (TYLENOL) 500 mg tablet Take 500 mg by mouth every six (6) hours as needed for Pain.  clopidogrel (PLAVIX) 75 mg tab Take 75 mg by mouth daily.  apixaban (ELIQUIS) 5 mg tablet Take 1 Tab by mouth two (2) times a day. Indications: PREVENT THROMBOEMBOLISM IN CHRONIC ATRIAL FIBRILLATION 180 Tab 3    carvedilol (COREG) 6.25 mg tablet Take 1 Tab by mouth two (2) times daily (with meals). 60 Tab 11    fluticasone (FLONASE) 50 mcg/actuation nasal spray 2 Sprays by Both Nostrils route every evening.  gabapentin (NEURONTIN) 800 mg tablet TAKE ONE TABLET BY MOUTH THREE TIMES DAILY 90 Tab 11    albuterol (PROVENTIL VENTOLIN) 2.5 mg /3 mL (0.083 %) nebulizer solution 3 mL by Nebulization route every four (4) hours as needed for Wheezing. 1 Package 5    Azelastine (ASTEPRO) 0.15 % (205.5 mcg) nasal spray 1 [place] by Both Nostrils route daily.  cod liver oil cap Take 1 Cap by mouth daily.        Allergies   Allergen Reactions    Crestor [Rosuvastatin] Myalgia    Levaquin [Levofloxacin] Nausea Only     GI Upset    Lipitor [Atorvastatin] Diarrhea    Lyrica [Pregabalin] Myalgia       Objective:  Visit Vitals    BP 97/56 (BP 1 Location: Right arm, BP Patient Position: Sitting)    Pulse 77    Temp 97 °F (36.1 °C) (Oral)    Resp 18    Ht 5' 6.5\" (1.689 m)    Wt 157 lb (71.2 kg)    SpO2 98%    BMI 24.96 kg/m2     Physical Exam:   General appearance - alert, well appearing, and in no distress  Mental status - alert, oriented to person, place, and time  EYE-ROCK, EOMI, corneas normal, no foreign bodies  ENT-ENT exam normal, no neck nodes or sinus tenderness  Chest - clear to auscultation, no wheezes, rales or rhonchi, symmetric air entry   Heart - normal rate, regular rhythm, normal S1, S2, +systolic murmur +S4  Ext-peripheral pulses normal, no pedal edema, no clubbing or cyanosis  Skin-Warm and dry. no hyperpigmentation, vitiligo, or suspicious lesions  Neuro -alert, oriented, normal speech, no focal findings or movement disorder noted      Results for orders placed or performed during the hospital encounter of 12/26/17   CBC WITH AUTOMATED DIFF   Result Value Ref Range    WBC 6.7 3.6 - 11.0 K/uL    RBC 4.35 3.80 - 5.20 M/uL    HGB 12.2 11.5 - 16.0 g/dL    HCT 37.6 35.0 - 47.0 %    MCV 86.4 80.0 - 99.0 FL    MCH 28.0 26.0 - 34.0 PG    MCHC 32.4 30.0 - 36.5 g/dL    RDW 14.1 11.5 - 14.5 %    PLATELET 551 613 - 063 K/uL    NEUTROPHILS 46 32 - 75 %    LYMPHOCYTES 35 12 - 49 %    MONOCYTES 16 (H) 5 - 13 %    EOSINOPHILS 3 0 - 7 %    BASOPHILS 0 0 - 1 %    ABS. NEUTROPHILS 3.0 1.8 - 8.0 K/UL    ABS. LYMPHOCYTES 2.4 0.8 - 3.5 K/UL    ABS. MONOCYTES 1.1 (H) 0.0 - 1.0 K/UL    ABS. EOSINOPHILS 0.2 0.0 - 0.4 K/UL    ABS. BASOPHILS 0.0 0.0 - 0.1 K/UL   PROTHROMBIN TIME + INR   Result Value Ref Range    INR 1.2 (H) 0.9 - 1.1      Prothrombin time 12.6 (H) 9.0 - 08.6 sec   METABOLIC PANEL, BASIC   Result Value Ref Range    Sodium 136 136 - 145 mmol/L    Potassium 3.6 3.5 - 5.1 mmol/L    Chloride 104 97 - 108 mmol/L    CO2 27 21 - 32 mmol/L    Anion gap 5 5 - 15 mmol/L    Glucose 100 65 - 100 mg/dL    BUN 14 6 - 20 MG/DL    Creatinine 1.63 (H) 0.55 - 1.02 MG/DL    BUN/Creatinine ratio 9 (L) 12 - 20      GFR est AA 38 (L) >60 ml/min/1.73m2    GFR est non-AA 32 (L) >60 ml/min/1.73m2    Calcium 8.9 8.5 - 10.1 MG/DL     *Note: Due to a large number of results and/or encounters for the requested time period, some results have not been displayed. A complete set of results can be found in Results Review. Assessment/Plan:    ICD-10-CM ICD-9-CM    1. Type 2 diabetes mellitus with nephropathy (HCC) E11.21 250.40 LIPID PANEL     151.52 METABOLIC PANEL, COMPREHENSIVE      simvastatin (ZOCOR) 10 mg tablet   2.  Chronic systolic HF (heart failure) (HCC) I50.22 428.22 LIPID PANEL      METABOLIC PANEL, COMPREHENSIVE   3. S/P angioplasty with stent Z95.9 V45.89 LIPID PANEL      METABOLIC PANEL, COMPREHENSIVE   4. Chronic obstructive pulmonary disease, unspecified COPD type (HCC) J44.9 496 LIPID PANEL      METABOLIC PANEL, COMPREHENSIVE      AMB SUPPLY ORDER   5. CKD (chronic kidney disease) stage 3, GFR 30-59 ml/min N18.3 585.3 LIPID PANEL      METABOLIC PANEL, COMPREHENSIVE   6. Mixed hyperlipidemia E78.2 272.2 LIPID PANEL      METABOLIC PANEL, COMPREHENSIVE      simvastatin (ZOCOR) 10 mg tablet   7. Diastolic CHF, chronic ([facility]) I50.32 428.32 LIPID PANEL     304.1 METABOLIC PANEL, COMPREHENSIVE   8. Coronary artery disease involving native coronary artery of native heart without angina pectoris I25.10 414.01 LIPID PANEL      METABOLIC PANEL, COMPREHENSIVE      simvastatin (ZOCOR) 10 mg tablet     Orders Placed This Encounter    AMB SUPPLY ORDER     Nebulizer machine    LIPID PANEL     Standing Status:   Future     Standing Expiration Date:   1/44/4429    METABOLIC PANEL, COMPREHENSIVE     Standing Status:   Future     Standing Expiration Date:   6/27/2018    simvastatin (ZOCOR) 10 mg tablet     Sig: Take 1 Tab by mouth nightly. Dispense:  30 Tab     Refill:  3     continue present plan  rx nebulizer machine done  Patient Instructions        Low Sodium Diet (2,000 Milligram): Care Instructions  Your Care Instructions    Too much sodium causes your body to hold on to extra water. This can raise your blood pressure and force your heart and kidneys to work harder. In very serious cases, this could cause you to be put in the hospital. It might even be life-threatening. By limiting sodium, you will feel better and lower your risk of serious problems. The most common source of sodium is salt. People get most of the salt in their diet from canned, prepared, and packaged foods. Fast food and restaurant meals also are very high in sodium.  Your doctor will probably limit your sodium to less than 2,000 milligrams (mg) a day. This limit counts all the sodium in prepared and packaged foods and any salt you add to your food. Follow-up care is a key part of your treatment and safety. Be sure to make and go to all appointments, and call your doctor if you are having problems. It's also a good idea to know your test results and keep a list of the medicines you take. How can you care for yourself at home? Read food labels  · Read labels on cans and food packages. The labels tell you how much sodium is in each serving. Make sure that you look at the serving size. If you eat more than the serving size, you have eaten more sodium. · Food labels also tell you the Percent Daily Value for sodium. Choose products with low Percent Daily Values for sodium. · Be aware that sodium can come in forms other than salt, including monosodium glutamate (MSG), sodium citrate, and sodium bicarbonate (baking soda). MSG is often added to Asian food. When you eat out, you can sometimes ask for food without MSG or added salt. Buy low-sodium foods  · Buy foods that are labeled \"unsalted\" (no salt added), \"sodium-free\" (less than 5 mg of sodium per serving), or \"low-sodium\" (less than 140 mg of sodium per serving). Foods labeled \"reduced-sodium\" and \"light sodium\" may still have too much sodium. Be sure to read the label to see how much sodium you are getting. · Buy fresh vegetables, or frozen vegetables without added sauces. Buy low-sodium versions of canned vegetables, soups, and other canned goods. Prepare low-sodium meals  · Cut back on the amount of salt you use in cooking. This will help you adjust to the taste. Do not add salt after cooking. One teaspoon of salt has about 2,300 mg of sodium. · Take the salt shaker off the table. · Flavor your food with garlic, lemon juice, onion, vinegar, herbs, and spices.  Do not use soy sauce, lite soy sauce, steak sauce, onion salt, garlic salt, celery salt, mustard, or ketchup on your food. · Use low-sodium salad dressings, sauces, and ketchup. Or make your own salad dressings and sauces without adding salt. · Use less salt (or none) when recipes call for it. You can often use half the salt a recipe calls for without losing flavor. Other foods such as rice, pasta, and grains do not need added salt. · Rinse canned vegetables, and cook them in fresh water. This removes some-but not all-of the salt. · Avoid water that is naturally high in sodium or that has been treated with water softeners, which add sodium. Call your local water company to find out the sodium content of your water supply. If you buy bottled water, read the label and choose a sodium-free brand. Avoid high-sodium foods  · Avoid eating:  ¨ Smoked, cured, salted, and canned meat, fish, and poultry. ¨ Ham, dunne, hot dogs, and luncheon meats. ¨ Regular, hard, and processed cheese and regular peanut butter. ¨ Crackers with salted tops, and other salted snack foods such as pretzels, chips, and salted popcorn. ¨ Frozen prepared meals, unless labeled low-sodium. ¨ Canned and dried soups, broths, and bouillon, unless labeled sodium-free or low-sodium. ¨ Canned vegetables, unless labeled sodium-free or low-sodium. ¨ [place] fries, pizza, tacos, and other fast foods. ¨ Pickles, olives, ketchup, and other condiments, especially soy sauce, unless labeled sodium-free or low-sodium. Where can you learn more? Go to http://rusty-marcela.info/. Enter Z140 in the search box to learn more about \"Low Sodium Diet (2,000 Milligram): Care Instructions. \"  Current as of: May 12, 2017  Content Version: 11.4  © 3086-5741 [employer]. Care instructions adapted under license by [employer] (which disclaims liability or warranty for this information).  If you have questions about a medical condition or this instruction, always ask your healthcare professional. [place] [address] any warranty or liability for your use of this information. Follow-up Disposition:  Return in about 3 months (around 3/27/2018) for routine f/u. I have reviewed with the patient details of the assessment and plan and all questions were answered. Relevent patient education was performed. The most recent lab findings were reviewed with the patient. An After Visit Summary was printed and given to the patient.   Total encounter time was  minutes;>50% of time was spent counseling/coordinating care regarding @low salt diet/med compliance/dietary compliance

## 2017-12-27 NOTE — PATIENT INSTRUCTIONS

## 2017-12-27 NOTE — ED NOTES
MD Jalyn Castaneda has reviewed discharge instructions with the patient. The patient verbalized understanding. Pt discharged with written instructions. No further concerns at this time. Pt given prescriptions and ED excuse note. Pt ambulatory to exit with steady gait.

## 2018-01-12 RX ORDER — AMIODARONE HYDROCHLORIDE 200 MG/1
100 TABLET ORAL
Qty: 30 TAB | Refills: 6 | Status: SHIPPED | OUTPATIENT
Start: 2018-01-12 | End: 2018-08-02

## 2018-02-02 ENCOUNTER — TELEPHONE (OUTPATIENT)
Dept: INTERNAL MEDICINE CLINIC | Age: 68
End: 2018-02-02

## 2018-02-02 NOTE — TELEPHONE ENCOUNTER
Patient called stating that her great grandson has scabies, he had them for 2week, she wants to know is there something special she needs to do, do to her  health problems

## 2018-02-12 ENCOUNTER — TELEPHONE (OUTPATIENT)
Dept: CARDIOLOGY CLINIC | Age: 68
End: 2018-02-12

## 2018-02-12 DIAGNOSIS — Z95.820 S/P ANGIOPLASTY WITH STENT: ICD-10-CM

## 2018-02-12 DIAGNOSIS — N18.30 CKD (CHRONIC KIDNEY DISEASE) STAGE 3, GFR 30-59 ML/MIN (HCC): ICD-10-CM

## 2018-02-12 DIAGNOSIS — I48.91 ATRIAL FIBRILLATION, UNSPECIFIED TYPE (HCC): Chronic | ICD-10-CM

## 2018-02-12 DIAGNOSIS — Z79.01 ANTICOAGULANT LONG-TERM USE: ICD-10-CM

## 2018-02-12 DIAGNOSIS — J44.9 CHRONIC OBSTRUCTIVE PULMONARY DISEASE, UNSPECIFIED COPD TYPE (HCC): ICD-10-CM

## 2018-02-12 DIAGNOSIS — E78.2 MIXED HYPERLIPIDEMIA: ICD-10-CM

## 2018-02-12 DIAGNOSIS — I50.32 DIASTOLIC CHF, CHRONIC (HCC): ICD-10-CM

## 2018-02-12 DIAGNOSIS — I25.10 CORONARY ARTERY DISEASE INVOLVING NATIVE CORONARY ARTERY OF NATIVE HEART WITHOUT ANGINA PECTORIS: ICD-10-CM

## 2018-02-12 DIAGNOSIS — I50.22 CHRONIC SYSTOLIC HF (HEART FAILURE) (HCC): ICD-10-CM

## 2018-02-12 DIAGNOSIS — E11.21 TYPE 2 DIABETES MELLITUS WITH NEPHROPATHY (HCC): ICD-10-CM

## 2018-02-12 NOTE — TELEPHONE ENCOUNTER
Pt doesn't have insurance until March 1, 2018, her Eliquis if $1000 without insurance. She wants to know if there is something else she can take until then. If we have any samples she would like to get those as well.

## 2018-02-13 NOTE — TELEPHONE ENCOUNTER
Verified patient with two identifiers. Advised pt she can come by office to  samples. Pt verbalized understanding, stated she would be here Wednesday to .

## 2018-02-22 ENCOUNTER — APPOINTMENT (OUTPATIENT)
Dept: ULTRASOUND IMAGING | Age: 68
End: 2018-02-22
Attending: PHYSICIAN ASSISTANT
Payer: MEDICARE

## 2018-02-22 ENCOUNTER — HOSPITAL ENCOUNTER (EMERGENCY)
Age: 68
Discharge: HOME | End: 2018-02-22
Attending: EMERGENCY MEDICINE
Payer: MEDICARE

## 2018-02-22 VITALS
HEIGHT: 66 IN | BODY MASS INDEX: 25.94 KG/M2 | SYSTOLIC BLOOD PRESSURE: 124 MMHG | OXYGEN SATURATION: 100 % | RESPIRATION RATE: 14 BRPM | HEART RATE: 74 BPM | WEIGHT: 161.38 LBS | TEMPERATURE: 97.8 F | DIASTOLIC BLOOD PRESSURE: 62 MMHG

## 2018-02-22 DIAGNOSIS — M10.9 ACUTE GOUT OF RIGHT FOOT, UNSPECIFIED CAUSE: Primary | ICD-10-CM

## 2018-02-22 PROCEDURE — 74011250636 HC RX REV CODE- 250/636: Performed by: PHYSICIAN ASSISTANT

## 2018-02-22 PROCEDURE — 96374 THER/PROPH/DIAG INJ IV PUSH: CPT

## 2018-02-22 PROCEDURE — 74011250637 HC RX REV CODE- 250/637: Performed by: PHYSICIAN ASSISTANT

## 2018-02-22 PROCEDURE — 99283 EMERGENCY DEPT VISIT LOW MDM: CPT

## 2018-02-22 RX ORDER — HYDROCODONE BITARTRATE AND ACETAMINOPHEN 10; 325 MG/1; MG/1
1 TABLET ORAL
Status: DISCONTINUED | OUTPATIENT
Start: 2018-02-22 | End: 2018-02-22 | Stop reason: HOSPADM

## 2018-02-22 RX ORDER — PREDNISONE 10 MG/1
TABLET ORAL
Qty: 21 TAB | Refills: 0 | Status: SHIPPED | OUTPATIENT
Start: 2018-02-22 | End: 2018-03-20

## 2018-02-22 RX ORDER — HYDROCODONE BITARTRATE AND ACETAMINOPHEN 5; 325 MG/1; MG/1
1 TABLET ORAL
Qty: 10 TAB | Refills: 0 | OUTPATIENT
Start: 2018-02-22 | End: 2018-02-22

## 2018-02-22 RX ORDER — HYDROCODONE BITARTRATE AND ACETAMINOPHEN 5; 325 MG/1; MG/1
1 TABLET ORAL
Qty: 10 TAB | Refills: 0 | Status: SHIPPED | OUTPATIENT
Start: 2018-02-22 | End: 2018-03-20

## 2018-02-22 RX ORDER — COLCHICINE 0.6 MG/1
0.6 TABLET ORAL DAILY
Qty: 20 TAB | Refills: 0 | Status: SHIPPED | OUTPATIENT
Start: 2018-02-22 | End: 2018-03-14

## 2018-02-22 RX ORDER — COLCHICINE 0.6 MG/1
0.6 TABLET ORAL
Status: COMPLETED | OUTPATIENT
Start: 2018-02-22 | End: 2018-02-22

## 2018-02-22 RX ADMIN — METHYLPREDNISOLONE SODIUM SUCCINATE 125 MG: 125 INJECTION, POWDER, FOR SOLUTION INTRAMUSCULAR; INTRAVENOUS at 15:29

## 2018-02-22 RX ADMIN — COLCHICINE 0.6 MG: 0.6 TABLET, FILM COATED ORAL at 15:40

## 2018-02-22 NOTE — DISCHARGE INSTRUCTIONS
Thank you for allowing us to provide you with excellent care today. We hope we addressed all of your concerns and needs. We strive to provide excellent quality care in the Emergency Department. Please rate us as excellent, as anything less than excellent does not meet our expectations. If you feel that you have not received excellent quality care or timely care, please ask to speak to the nurse manager. Please choose us in the future for your continued health care needs. The exam and treatment you received in the Emergency Department were for an urgent problem and are not intended as complete care. It is important that you follow-up with a doctor, nurse practitioner, or physician assistant to:  (1) confirm your diagnosis,  (2) re-evaluation of changes in your illness and treatment, and  (3) for ongoing care. If your symptoms become worse or you do not improve as expected and you are unable to reach your usual health care provider, you should return to the Emergency Department. We are available 24 hours a day. Take this sheet with you when you go to your follow-up visit. If you have any problem arranging the follow-up visit, contact [address] [place] [address] 21 [phone])    Make an appointment with your Primary Care doctor for follow up of this visit. Return to the ER if you are unable to be seen in the time recommended on your discharge instructions. Gout: Care Instructions  Your Care Instructions    Gout is a form of arthritis caused by a buildup of uric acid crystals in a joint. It causes sudden attacks of pain, swelling, redness, and stiffness, usually in one joint, especially the big toe. Gout usually comes on without a cause. But it can be brought on by drinking alcohol (especially beer) or eating seafood and red meat. Taking certain medicines, such as diuretics or aspirin, also can bring on an attack of gout.   Taking your medicines as prescribed and following up with your doctor regularly can help you avoid gout attacks in the future. Follow-up care is a key part of your treatment and safety. Be sure to make and go to all appointments, and call your doctor if you are having problems. It's also a good idea to know your test results and keep a list of the medicines you take. How can you care for yourself at home? · If the joint is swollen, put ice or a cold pack on the area for 10 to 20 minutes at a time. Put a thin cloth between the ice and your skin. · Prop up the sore limb on a pillow when you ice it or anytime you sit or lie down during the next 3 days. Try to keep it above the level of your heart. This will help reduce swelling. · Rest sore joints. Avoid activities that put weight or strain on the joints for a few days. Take short rest breaks from your regular activities during the day. · Take your medicines exactly as prescribed. Call your doctor if you think you are having a problem with your medicine. · Take pain medicines exactly as directed. ¨ If the doctor gave you a prescription medicine for pain, take it as prescribed. ¨ If you are not taking a prescription pain medicine, ask your doctor if you can take an over-the-counter medicine. · Eat less seafood and red meat. · Check with your doctor before drinking alcohol. · Losing weight, if you are overweight, may help reduce attacks of gout. But do not go on a [employer]. \" Losing a lot of weight in a short amount of time can cause a gout attack. When should you call for help? Call your doctor now or seek immediate medical care if:  ? · You have a fever. ? · The joint is so painful you cannot use it. ? · You have sudden, unexplained swelling, redness, warmth, or severe pain in one or more joints. ? Watch closely for changes in your health, and be sure to contact your doctor if:  ? · You have joint pain. ? · Your symptoms get worse or are not improving after 2 or 3 days. Where can you learn more?   Go to http://rusty-marcela.info/. Enter S410 in the search box to learn more about \"Gout: Care Instructions. \"  Current as of: October 31, 2016  Content Version: 11.4  © 6057-9863 [employer]. Care instructions adapted under license by [employer] (which disclaims liability or warranty for this information). If you have questions about a medical condition or this instruction, always ask your healthcare professional. [place] [address] any warranty or liability for your use of this information.

## 2018-02-22 NOTE — ED PROVIDER NOTES
EMERGENCY DEPARTMENT HISTORY AND PHYSICAL EXAM      Date: 2/22/2018  Patient Name: Nguyễn Ureña    History of Presenting Illness     Chief Complaint   Patient presents with    Foot Swelling     Pt c/o RIGHT foot swelling x 2 days, denies injury       History Provided By: Patient    HPI: Nguyễn Ureña, 78 y.o. female with PMHx significant for HTN, DM, CHF, CAD, gout, presents ambulatory to the ED with cc of R foot swelling, constant severe pain and numbness x two days. She states the sxs are located near the first metatarsal. She also notes having a burning sensation in the R leg. Pt denies any recent falls, injuries or twists. She states she is not taking her eliquis and colchicine due to pending insurance. Pt states she has been taking tylenol without relief. Pt denies other modifying factors. Pt states he has a walker at home to use as needed. Pt specifically denies any fevers, chills, sore throat, rhinorrhea, SOB, CP, abdominal pain, N/V/D, dysuria, hematuria, HA, lightheadedness, dizziness, and rashes. Social hx: -(former) Tobacco, -EtOH, -Drugs    PCP: Erika Bingham NP    There are no other complaints, changes, or physical findings at this time. Current Outpatient Prescriptions   Medication Sig Dispense Refill    colchicine 0.6 mg tablet Take 1 Tab by mouth daily for 20 days. 20 Tab 0    predniSONE (STERAPRED DS) 10 mg dose pack Take as directed 21 Tab 0    HYDROcodone-acetaminophen (NORCO) 5-325 mg per tablet Take 1 Tab by mouth every four (4) hours as needed for Pain. Max Daily Amount: 6 Tabs. 10 Tab 0    apixaban (ELIQUIS) 5 mg tablet Take 1 Tab by mouth two (2) times a day. Indications: PREVENT THROMBOEMBOLISM IN CHRONIC ATRIAL FIBRILLATION 42 Tab 0    amiodarone (CORDARONE) 200 mg tablet Take 0.5 Tabs by mouth nightly. 30 Tab 6    simvastatin (ZOCOR) 10 mg tablet Take 1 Tab by mouth nightly.  30 Tab 3    budesonide-formoterol (SYMBICORT) 160-4.5 mcg/actuation HFAA Take 1 Puff by inhalation two (2) times a day. 3 Inhaler 3    tiotropium (SPIRIVA WITH HANDIHALER) 18 mcg inhalation capsule INHALE THE CONTENTS OF 1 CAPSULE THROUGH HANDIHALER DEVICE DAILY 90 Cap 3    furosemide (LASIX) 20 mg tablet Take 1 Tab by mouth daily. 30 Tab 0    colchicine 0.6 mg tablet Take 1.2 mg by mouth daily. Patient takes 1.2 mg daily and 2.4 mg PRN flare up      acetaminophen (TYLENOL) 500 mg tablet Take 500 mg by mouth every six (6) hours as needed for Pain.  clopidogrel (PLAVIX) 75 mg tab Take 75 mg by mouth daily.  carvedilol (COREG) 6.25 mg tablet Take 1 Tab by mouth two (2) times daily (with meals). 60 Tab 11    fluticasone (FLONASE) 50 mcg/actuation nasal spray 2 Sprays by Both Nostrils route every evening.  gabapentin (NEURONTIN) 800 mg tablet TAKE ONE TABLET BY MOUTH THREE TIMES DAILY 90 Tab 11    albuterol (PROVENTIL VENTOLIN) 2.5 mg /3 mL (0.083 %) nebulizer solution 3 mL by Nebulization route every four (4) hours as needed for Wheezing. 1 Package 5    Azelastine (ASTEPRO) 0.15 % (205.5 mcg) nasal spray 1 [place] by Both Nostrils route daily.  cod liver oil cap Take 1 Cap by mouth daily.          Past History     Past Medical History:  Past Medical History:   Diagnosis Date    Atrial fibrillation ([address] 6/2/2010    CAD (coronary artery disease)     stent    Chronic diastolic heart failure ([facility] [address] 9/22/2014    Chronic systolic HF (heart failure) ([address] 5/10/2017    4/2017 EF 25-30%    COPD     COPD (chronic obstructive pulmonary disease) ([address] 6/2/2010    Diabetes ([address]     Fibroid     Heart failure ([address]     History of Clostridium difficile infection 5/10/2017    4/2017 CDiff positive    Hypertension 6/2/2010    Hypotension 5/10/2017    Junctional tachycardia ([address] 5/10/2017    NIDDM (non-insulin dependent diabetes mellitus) 6/2/2010    Screening mammogram 5/4/10    SOB (shortness of breath) 9/22/2014       Past Surgical History:  Past Surgical History:   Procedure Laterality Date    COLONOSCOPY N/A 2017    COLONOS[facility] performed [facility] at [facility] AM[facility]    HX  SECTION      HX OTHER SURGICAL      adrenal gland removed    HX PACEMAKER      LA EXCISE ADRENAL GLAND         Family History:  Family History   Problem Relation Age of Onset    Heart Disease Mother     Diabetes Father     Heart Disease Brother        Social History:  Social History   Substance Use Topics    Smoking status: Former Smoker     Types: Cigarettes     Quit date: 2009    Smokeless tobacco: Never Used    Alcohol use No       Allergies: Allergies   Allergen Reactions    Crestor [Rosuvastatin] Myalgia    Levaquin [Levofloxacin] Nausea Only     GI Upset    Lipitor [Atorvastatin] Diarrhea    Lyrica [Pregabalin] Myalgia         Review of Systems   Review of Systems   Constitutional: Negative for chills and fever. HENT: Negative for sore throat. Eyes: Negative for pain. Respiratory: Negative for cough and shortness of breath. Cardiovascular: Negative for chest pain. Gastrointestinal: Negative for abdominal pain, diarrhea, nausea and vomiting. Genitourinary: Negative for dysuria and hematuria. Musculoskeletal:        Positive for R>L foot swelling and pain. Skin: Negative for rash. Neurological: Positive for numbness (BL feet). Negative for dizziness, light-headedness and headaches. Psychiatric/Behavioral: Negative for behavioral problems and confusion. Physical Exam   Physical Exam   Constitutional: She is oriented to person, place, and time. She appears well-developed and well-nourished. No distress. HENT:   Head: Normocephalic and atraumatic. Right Ear: External ear normal.   Left Ear: External ear normal.   Nose: Nose normal.   Eyes: Conjunctivae and EOM are normal.   Neck: Normal range of motion. Neck supple. Cardiovascular: Normal rate, regular rhythm and normal heart sounds. No murmur heard.   Pulmonary/Chest: Effort normal and breath sounds normal. She has no decreased breath sounds. She has no wheezes. Abdominal: Soft. Bowel sounds are normal. She exhibits no distension. There is no tenderness. There is no guarding. Musculoskeletal: Normal range of motion. She exhibits no edema or tenderness. Feet:    Swelling, tenderness and erythema over the distal first metatarsal joint of the R foot. No ankle or peripheral edema. 2+ DP and PT pulses. NVI. Nodule over the first metatarsal joint of left foot. No swelling, erythema or TTP. Neurological: She is alert and oriented to person, place, and time. Skin: Skin is warm and dry. No rash noted. She is not diaphoretic. Psychiatric: She has a normal mood and affect. Her behavior is normal. Judgment normal.   Nursing note and vitals reviewed. Diagnostic Study Results     Labs -   No results found for this or any previous visit (from the past 12 hour(s)). Radiologic Studies -   No orders to display         Medical Decision Making   I am the first provider for this patient. I reviewed the vital signs, available nursing notes, past medical history, past surgical history, family history and social history. Vital Signs-Reviewed the patient's vital signs. Patient Vitals for the past 12 hrs:   Temp Pulse Resp BP SpO2   02/22/18 1237 97.8 °F (36.6 °C) 74 14 124/62 100 %       Records Reviewed: Old Medical Records    Provider Notes (Medical Decision Making):   DDX: sprain, strain, gonococcal vs. nongonococcal arthritis, gout, pseudogout, bursitis, osteoarthritis. Low concern for reactive arthritis, septic arthritis, Lyme disease or psoriatic arthritis. ED Course:   Initial assessment performed. The patients presenting problems have been discussed, and they are in agreement with the care plan formulated and outlined with them. I have encouraged them to ask questions as they arise throughout their visit.     Critical Care Time:   0    Disposition:  DISCHARGE NOTE  3:42 PM  The patient has been re-evaluated and is ready for discharge. Reviewed available results with patient. Counseled pt on diagnosis and care plan. Pt has expressed understanding, and all questions have been answered. Pt agrees with plan and agrees to F/U as recommended, or return to the ED if their sxs worsen. Discharge instructions have been provided and explained to the pt, along with reasons to return to the ED. PLAN:  1. Discharge home  2. Medications as directed  3. Schedule f/u with PCP  4. Return precautions reviewed    Discharge Medication List as of 2/22/2018  3:42 PM      START taking these medications    Details   !! colchicine 0.6 mg tablet Take 1 Tab by mouth daily for 20 days. , Normal, Disp-20 Tab, R-0      predniSONE (STERAPRED DS) 10 mg dose pack Take as directed, Normal, Disp-21 Tab, R-0      HYDROcodone-acetaminophen (NORCO) 5-325 mg per tablet Take 1 Tab by mouth every four (4) hours as needed for Pain. Max Daily Amount: 6 Tabs., Print, Disp-10 Tab, R-0       !! - Potential duplicate medications found. Please discuss with provider. CONTINUE these medications which have NOT CHANGED    Details   apixaban (ELIQUIS) 5 mg tablet Take 1 Tab by mouth two (2) times a day. Indications: PREVENT THROMBOEMBOLISM IN CHRONIC ATRIAL FIBRILLATION, Sample, Disp-42 Tab, R-0      amiodarone (CORDARONE) 200 mg tablet Take 0.5 Tabs by mouth nightly., Normal, Disp-30 Tab, R-6      simvastatin (ZOCOR) 10 mg tablet Take 1 Tab by mouth nightly., Normal, Disp-30 Tab, R-3      budesonide-formoterol (SYMBICORT) 160-4.5 mcg/actuation HFAA Take 1 Puff by inhalation two (2) times a day., Normal, Disp-3 Inhaler, R-3      tiotropium (SPIRIVA WITH HANDIHALER) 18 mcg inhalation capsule INHALE THE CONTENTS OF 1 CAPSULE THROUGH HANDIHALER DEVICE DAILY, Normal, Disp-90 Cap, R-3      furosemide (LASIX) 20 mg tablet Take 1 Tab by mouth daily. , Print, Disp-30 Tab, R-0      !! colchicine 0.6 mg tablet Take 1.2 mg by mouth daily. Patient takes 1.2 mg daily and 2.4 mg PRN flare up, Historical Med      acetaminophen (TYLENOL) 500 mg tablet Take 500 mg by mouth every six (6) hours as needed for Pain., Historical Med      clopidogrel (PLAVIX) 75 mg tab Take 75 mg by mouth daily. , Historical Med      carvedilol (COREG) 6.25 mg tablet Take 1 Tab by mouth two (2) times daily (with meals). , Normal, Disp-60 Tab, R-11      fluticasone (FLONASE) 50 mcg/actuation nasal spray 2 Sprays by Both Nostrils route every evening., Historical Med      gabapentin (NEURONTIN) 800 mg tablet TAKE ONE TABLET BY MOUTH THREE TIMES DAILY, Normal, Disp-90 Tab, R-11      albuterol (PROVENTIL VENTOLIN) 2.5 mg /3 mL (0.083 %) nebulizer solution 3 mL by Nebulization route every four (4) hours as needed for Wheezing., Normal, Disp-1 Package, R-5      Azelastine (ASTEPRO) 0.15 % (205.5 mcg) nasal spray 1 [place] by Both Nostrils route daily. , Historical Med      cod liver oil cap Take 1 Cap by mouth daily. , Historical Med       !! - Potential duplicate medications found. Please discuss with provider. 2.   Follow-up Information     Follow up With Details Comments 500 Saranya Bowling NP Schedule an appointment as soon as possible for a visit  [address]  [phone]      [facility] EMER[facility]Y DEPT  As needed, If symptoms worsen [address] [place] [address]  [phone]        Return to ED if worse     Diagnosis     Clinical Impression:   1. Acute gout of right foot, unspecified cause        Attestations: This note is prepared by Marisel Mcintosh, acting as Scribe for Clementina Hoang PA-C. The scribe's documentation has been prepared under my direction and personally reviewed by me in its entirety. I confirm that the note above accurately reflects all work, treatment, procedures, and medical decision making performed by me.   Clementina Hoang PA-C

## 2018-03-06 ENCOUNTER — TELEPHONE (OUTPATIENT)
Dept: CARDIOLOGY CLINIC | Age: 68
End: 2018-03-06

## 2018-03-06 DIAGNOSIS — Z79.01 ANTICOAGULANT LONG-TERM USE: ICD-10-CM

## 2018-03-06 DIAGNOSIS — I48.91 ATRIAL FIBRILLATION, UNSPECIFIED TYPE (HCC): Chronic | ICD-10-CM

## 2018-03-06 NOTE — TELEPHONE ENCOUNTER
Verified patient with two identifiers. Spoke with pt, she will be getting insurance in the next few weeks, but needs Eliquis now to tide her over. Will give her samples and Patient assistance forms.

## 2018-03-06 NOTE — TELEPHONE ENCOUNTER
Pt needs more samples of eliquis if possible, she still doesn't have insurance and the samples previously given to her are almost gone, she has 4 left

## 2018-03-09 ENCOUNTER — DOCUMENTATION ONLY (OUTPATIENT)
Dept: CARDIOLOGY CLINIC | Age: 68
End: 2018-03-09

## 2018-03-09 NOTE — PROGRESS NOTES
Faxed patient assistance form for Eliquis to [address] @ [phone]

## 2018-03-12 ENCOUNTER — PATIENT MESSAGE (OUTPATIENT)
Dept: INTERNAL MEDICINE CLINIC | Age: 68
End: 2018-03-12

## 2018-03-12 DIAGNOSIS — G63 POLYNEUROPATHY ASSOCIATED WITH UNDERLYING DISEASE (HCC): ICD-10-CM

## 2018-03-12 RX ORDER — GABAPENTIN 800 MG/1
TABLET ORAL
Qty: 90 TAB | Refills: 5 | Status: SHIPPED | OUTPATIENT
Start: 2018-03-12 | End: 2018-03-12 | Stop reason: SDUPTHER

## 2018-03-12 RX ORDER — GABAPENTIN 800 MG/1
TABLET ORAL
Qty: 90 TAB | Refills: 3 | Status: SHIPPED | OUTPATIENT
Start: 2018-03-12 | End: 2018-12-19 | Stop reason: SDUPTHER

## 2018-03-12 NOTE — TELEPHONE ENCOUNTER
From: Terese Cabrales  Sent: 3/12/2018 9:46 AM EDT  Subject: Prescription Question    Dr Nick Mulligan i am out of my prescription for the Gabapention 800 mgs. I I don't have any left after today so if you could call it in for me to my Pharmacy dr. Yudy Mart because I do experience a lot of pain when the medication is missed. Thank you.

## 2018-03-20 ENCOUNTER — CLINICAL SUPPORT (OUTPATIENT)
Dept: CARDIOLOGY CLINIC | Age: 68
End: 2018-03-20

## 2018-03-20 ENCOUNTER — OFFICE VISIT (OUTPATIENT)
Dept: CARDIOLOGY CLINIC | Age: 68
End: 2018-03-20

## 2018-03-20 VITALS
OXYGEN SATURATION: 99 % | DIASTOLIC BLOOD PRESSURE: 60 MMHG | BODY MASS INDEX: 26.71 KG/M2 | HEART RATE: 80 BPM | HEIGHT: 66 IN | WEIGHT: 166.2 LBS | SYSTOLIC BLOOD PRESSURE: 110 MMHG | RESPIRATION RATE: 16 BRPM

## 2018-03-20 DIAGNOSIS — I50.22 CHRONIC SYSTOLIC CONGESTIVE HEART FAILURE (HCC): ICD-10-CM

## 2018-03-20 DIAGNOSIS — I48.0 PAROXYSMAL ATRIAL FIBRILLATION (HCC): Primary | Chronic | ICD-10-CM

## 2018-03-20 DIAGNOSIS — I10 ESSENTIAL HYPERTENSION: Chronic | ICD-10-CM

## 2018-03-20 DIAGNOSIS — J44.9 CHRONIC OBSTRUCTIVE PULMONARY DISEASE, UNSPECIFIED COPD TYPE (HCC): ICD-10-CM

## 2018-03-20 DIAGNOSIS — Z98.890 S/P ABLATION OF ATRIAL FIBRILLATION: ICD-10-CM

## 2018-03-20 DIAGNOSIS — I49.5 SINOATRIAL NODE DYSFUNCTION (HCC): ICD-10-CM

## 2018-03-20 DIAGNOSIS — Z86.79 S/P ABLATION OF ATRIAL FIBRILLATION: ICD-10-CM

## 2018-03-20 DIAGNOSIS — Z95.0 CARDIAC PACEMAKER IN SITU: Primary | ICD-10-CM

## 2018-03-20 DIAGNOSIS — I50.22 CHRONIC SYSTOLIC HF (HEART FAILURE) (HCC): ICD-10-CM

## 2018-03-20 NOTE — PROGRESS NOTES
Subjective:      Selma Coon is a 78 y.o. female is here for device follow up s/p AF ablation in 5/2017. She is doing well aside from baseline shortness of breath and palpitations. The patient denies chest pain/ shortness of breath, orthopnea, PND, LE edema, palpitations, syncope, presyncope or fatigue.        Patient Active Problem List    Diagnosis Date Noted    Type 2 diabetes mellitus with nephropathy ([facility] 12/27/2017    CKD (chronic kidney disease) stage 3, GFR 30-59 ml/min 09/22/2017    Acute renal failure (ARF) ([facility] 09/13/2017    Chronic systolic HF (heart failure) ([facility] 05/10/2017    History of Clostridium difficile infection 05/10/2017    Junctional tachycardia ([facility] 05/10/2017    Hypotension 05/10/2017    Tachycardia 05/09/2017    S/P ablation of atrial fibrillation 05/02/2017    Fear associated with illness and body function 04/07/2017    Counseling regarding advanced care planning and goals of care 19/95/6302    Systolic CHF, acute ([facility] 62/08/0581    Acute systolic CHF (congestive heart failure) ([facility] 04/06/2017    CHF (congestive heart failure) ([facility] 04/04/2017    Type 2 diabetes mellitus with diabetic neuropathy, without long-term current use of insulin ([facility] 01/31/2017    Anticoagulation monitoring, INR range 2-3 01/31/2017    GIB (gastrointestinal bleeding) 83/66/3566    Diastolic CHF, acute on chronic ([facility] 09/22/2014    S/P coronary artery stent placement 07/04/2014    Back pain 11/14/2012    Sinoatrial node dysfunction ([facility] 07/05/2012    Cardiac pacemaker in situ 07/05/2012    Mixed hyperlipidemia 07/05/2012    Chest pain 09/21/2011    Anemia 07/25/2011    Sick sinus syndrome ([address] 02/25/2011    S/P angioplasty with stent 02/07/2011    Hypokalemia 07/20/2010    Hip pain 06/08/2010    Hypertension--essential 06/02/2010    Atrial fibrillation--paroxysmal 06/02/2010    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 Adrián López MD  Past Medical History:   Diagnosis Date    Atrial fibrillation ([facility] 2010    CAD (coronary artery disease)     stent    Chronic diastolic heart failure ([facility] 2014    Chronic systolic HF (heart failure) ([facility] 5/10/2017    2017 EF 25-30%    COPD     COPD (chronic obstructive pulmonary disease) ([facility] 2010    Diabetes [facility]     Fibroid     Heart failure ([facility]     History of Clostridium difficile infection 5/10/2017    2017 CDiff positive    Hypertension 2010    Hypotension 5/10/2017    Junctional tachycardia ([facility] 5/10/2017    NIDDM (non-insulin dependent diabetes mellitus) 2010    Screening mammogram 5/4/10    SOB (shortness of breath) 2014      Past Surgical History:   Procedure Laterality Date    COLONOSCOPY N/A 2017    COLONOSCOPY performed by Keven Rajan MD at [facility] AMBULATORY OR      SECTION      HX OTHER SURGICAL      adrenal gland removed    HX PACEMAKER      MN EXCISE ADRENAL GLAND       Allergies   Allergen Reactions    Crestor [Rosuvastatin] Myalgia    Levaquin [Levofloxacin] Nausea Only     GI Upset    Lipitor [Atorvastatin] Diarrhea    Lyrica [Pregabalin] Myalgia      Family History   Problem Relation Age of Onset    Heart Disease Mother     Diabetes Father     Heart Disease Brother     negative for cardiac disease  Social History     Social History    Marital status:      Spouse name: N/A    Number of children: N/A    Years of education: N/A     Social History Main Topics    Smoking status: Former Smoker     Types: Cigarettes     Quit date: 2009    Smokeless tobacco: Never Used    Alcohol use No    Drug use: No    Sexual activity: Not Currently     Other Topics Concern    None     Social History Narrative     Current Outpatient Prescriptions   Medication Sig    gabapentin (NEURONTIN) 800 mg tablet TAKE ONE TABLET BY MOUTH THREE TIMES DAILY    apixaban (ELIQUIS) 5 mg tablet Take 1 Tab by mouth two (2) times a day. Indications: PREVENT THROMBOEMBOLISM IN CHRONIC ATRIAL FIBRILLATION    amiodarone (CORDARONE) 200 mg tablet Take 0.5 Tabs by mouth nightly.  simvastatin (ZOCOR) 10 mg tablet Take 1 Tab by mouth nightly.  budesonide-formoterol (SYMBICORT) 160-4.5 mcg/actuation HFAA Take 1 Puff by inhalation two (2) times a day.  tiotropium (SPIRIVA WITH HANDIHALER) 18 mcg inhalation capsule INHALE THE CONTENTS OF 1 CAPSULE THROUGH HANDIHALER DEVICE DAILY    furosemide (LASIX) 20 mg tablet Take 1 Tab by mouth daily.  colchicine 0.6 mg tablet Take 1.2 mg by mouth daily. Patient takes 1.2 mg daily and 2.4 mg PRN flare up    acetaminophen (TYLENOL) 500 mg tablet Take 500 mg by mouth every six (6) hours as needed for Pain.  clopidogrel (PLAVIX) 75 mg tab Take 75 mg by mouth daily.  carvedilol (COREG) 6.25 mg tablet Take 1 Tab by mouth two (2) times daily (with meals).  fluticasone (FLONASE) 50 mcg/actuation nasal spray 2 Sprays by Both Nostrils route every evening.  albuterol (PROVENTIL VENTOLIN) 2.5 mg /3 mL (0.083 %) nebulizer solution 3 mL by Nebulization route every four (4) hours as needed for Wheezing.  Azelastine (ASTEPRO) 0.15 % (205.5 mcg) nasal spray 1 [place] by Both Nostrils route daily.  cod liver oil cap Take 1 Cap by mouth daily. No current facility-administered medications for this visit. Vitals:    03/20/18 1154   BP: 110/60   Pulse: 80   Resp: 16   SpO2: 99%   Weight: 166 lb 3.2 oz (75.4 kg)   Height: 5' 6\" (1.676 m)       I have reviewed the nurses notes, vitals, problem list, allergy list, medical history, family, social history and medications. Review of Symptoms:    General: Pt denies excessive weight gain or loss. Pt is able to conduct ADL's  HEENT: Denies blurred vision, headaches, epistaxis and difficulty swallowing. Respiratory: +sob w exertion, Denies wheezing or stridor.   Cardiovascular: +palpitations, Denies precordial pain, edema or PND  Gastrointestinal: Denies poor appetite, indigestion, abdominal pain or blood in stool  Urinary: Denies dysuria, pyuria  Musculoskeletal: Denies pain or swelling from muscles or joints  Neurologic: Denies tremor, paresthesias, or sensory motor disturbance  Skin: Denies rash, itching or texture change. Psych: Denies depression      Physical Exam:      General: Well developed, in no acute distress. HEENT: Eyes - PERRL, no jvd  Heart:  Normal S1/S2 negative S3 or S4. Regular, no murmur, gallop or rub.   Respiratory: Clear bilaterally x 4, no wheezing or rales  Abdomen:   Soft, non-tender, bowel sounds are active.   Extremities:  No edema, normal cap refill, no cyanosis. Musculoskeletal: No clubbing  Neuro: A&Ox3, speech clear, gait stable. Skin: Skin color is normal. No rashes or lesions. Non diaphoretic  Vascular: 2+ pulses symmetric in all extremities    Cardiographics    Ekg: atrial pacing  187 episodes, longest episode: 12 hrs, 12 min. Results for orders placed or performed during the hospital encounter of 09/20/17   EKG, 12 LEAD, INITIAL   Result Value Ref Range    Ventricular Rate 76 BPM    Atrial Rate 76 BPM    P-R Interval 244 ms    QRS Duration 144 ms    Q-T Interval 460 ms    QTC Calculation (Bezet) 517 ms    Calculated P Axis 81 degrees    Calculated R Axis -27 degrees    Calculated T Axis 111 degrees    Diagnosis       Atrial-paced rhythm with 1st degree AV block  Left bundle branch block  When compared with ECG of 19-JUL-2017 12:16,  No significant change was found  Confirmed by Mayco Plants ([id number]) on 9/20/2017 5:23:53 PM       *Note: Due to a large number of results and/or encounters for the requested time period, some results have not been displayed. A complete set of results can be found in Results Review.          Lab Results   Component Value Date/Time    WBC 6.7 12/26/2017 02:00 PM    Hemoglobin (POC) 11.2 07/20/2017 09:02 AM    HGB 12.2 12/26/2017 02:00 PM    HCT 37.6 12/26/2017 02:00 PM    PLATELET 128 81/31/1386 02:00 PM    MCV 86.4 12/26/2017 02:00 PM      Lab Results   Component Value Date/Time    Sodium 136 12/26/2017 02:00 PM    Potassium 3.6 12/26/2017 02:00 PM    Chloride 104 12/26/2017 02:00 PM    CO2 27 12/26/2017 02:00 PM    Anion gap 5 12/26/2017 02:00 PM    Glucose 100 12/26/2017 02:00 PM    BUN 14 12/26/2017 02:00 PM    Creatinine 1.63 (H) 12/26/2017 02:00 PM    BUN/Creatinine ratio 9 (L) 12/26/2017 02:00 PM    GFR est AA 38 (L) 12/26/2017 02:00 PM    GFR est non-AA 32 (L) 12/26/2017 02:00 PM    Calcium 8.9 12/26/2017 02:00 PM    Bilirubin, total 0.6 09/22/2017 07:01 AM    AST (SGOT) 140 (H) 09/22/2017 07:01 AM    Alk. phosphatase 302 (H) 09/22/2017 07:01 AM    Protein, total 9.0 (H) 09/22/2017 07:01 AM    Albumin 2.7 (L) 09/22/2017 07:01 AM    Globulin 6.3 (H) 09/22/2017 07:01 AM    A-G Ratio 0.4 (L) 09/22/2017 07:01 AM    ALT (SGPT) 61 09/22/2017 07:01 AM         Assessment:     Assessment:        ICD-10-CM ICD-9-CM    1. Paroxysmal atrial fibrillation (HCC) I48.0 427.31 AMB POC EKG ROUTINE W/ 12 LEADS, INTER & REP      PROTHROMBIN TIME + INR      METABOLIC PANEL, COMPREHENSIVE      MAGNESIUM      CBC WITH AUTOMATED DIFF      XR CHEST PA LAT      CTA CHEST W OR W WO CONT   2. Essential hypertension I10 401.9    3. Chronic systolic HF (heart failure) (HCC) I50.22 428.22    4. Chronic obstructive pulmonary disease, unspecified COPD type ([facility] J44.9 496    5. S/P ablation of atrial fibrillation Z98.890 V45.89     Z86.79       Orders Placed This Encounter    XR CHEST PA LAT     Standing Status:   Future     Standing Expiration Date:   4/20/2019     Order Specific Question:   Reason for Exam     Answer:   AF ablation    CTA CHEST W OR W WO CONT     Standing Status:   Future     Standing Expiration Date:   4/20/2019     Order Specific Question:   Is Patient Allergic to Contrast Dye? Answer:   Unknown     Order Specific Question:   STAT Creatinine as indicated     Answer:    Yes    PROTHROMBIN TIME + INR    METABOLIC PANEL, COMPREHENSIVE    MAGNESIUM    CBC WITH AUTOMATED DIFF    AMB POC EKG ROUTINE W/ 12 LEADS, INTER & REP     Order Specific Question:   Reason for Exam:     Answer:   routine        Plan:   Ms. Ronny Patino is here for annual device follow up s/p AF ablation in 5/2017. She was on sotalol post ablation and then was switched to Amiodarone. She has shortness of breath r/t COPD but this is unchanged. Her device interrogation shows 85% Ap for her sick sinus, 1%RVP and 187 episodes of AF, longest lasting 12 hrs. She reports palpitations. She is a candidate for repeat AF ablation. I discussed the risks/benefits/alternatives of the procedure with the patient. Risks include (but are not limited to) bleeding, infection, cva/mi/tamponade/esophageal perforation/pv stenosis/death. The patient understands that there is a 0-3% major complication rate and agrees to proceed. Thank you for this interesting consultation. Continue medical management for HTN, COPD, HF. Thank you for allowing me to participate in Terese Marsh 's care.     MD Shanice Michaud MD, Luz Castaneda

## 2018-03-20 NOTE — PROGRESS NOTES
1. Have you been to the ER, urgent care clinic since your last visit? Hospitalized since your last visit? Yes When: 2/2018 [facility] gout    2. Have you seen or consulted any other health care providers outside of the [address] [place] [address] since your last visit? Include any pap smears or colon screening. Yes Dr Yeison Denise    Patient C/O SOB with recent cold.

## 2018-03-23 ENCOUNTER — HOSPITAL ENCOUNTER (OUTPATIENT)
Dept: LAB | Age: 68
Discharge: HOME | End: 2018-03-23
Payer: MEDICARE

## 2018-03-23 PROCEDURE — 80053 COMPREHEN METABOLIC PANEL: CPT

## 2018-03-23 PROCEDURE — 85610 PROTHROMBIN TIME: CPT

## 2018-03-23 PROCEDURE — 83735 ASSAY OF MAGNESIUM: CPT

## 2018-03-23 PROCEDURE — 36415 COLL VENOUS BLD VENIPUNCTURE: CPT

## 2018-03-23 PROCEDURE — 85025 COMPLETE CBC W/AUTO DIFF WBC: CPT

## 2018-03-24 LAB
ALBUMIN SERPL-MCNC: 3.7 G/DL (ref 3.6–4.8)
ALBUMIN/GLOB SERPL: 0.8 {RATIO} (ref 1.2–2.2)
ALP SERPL-CCNC: 157 IU/L (ref 39–117)
ALT SERPL-CCNC: 18 IU/L (ref 0–32)
AST SERPL-CCNC: 46 IU/L (ref 0–40)
BASOPHILS # BLD AUTO: 0 X10E3/UL (ref 0–0.2)
BASOPHILS NFR BLD AUTO: 0 %
BILIRUB SERPL-MCNC: 0.4 MG/DL (ref 0–1.2)
BUN SERPL-MCNC: 18 MG/DL (ref 8–27)
BUN/CREAT SERPL: 13 (ref 12–28)
CALCIUM SERPL-MCNC: 9.2 MG/DL (ref 8.7–10.3)
CHLORIDE SERPL-SCNC: 101 MMOL/L (ref 96–106)
CO2 SERPL-SCNC: 24 MMOL/L (ref 18–29)
CREAT SERPL-MCNC: 1.43 MG/DL (ref 0.57–1)
EOSINOPHIL # BLD AUTO: 0.3 X10E3/UL (ref 0–0.4)
EOSINOPHIL NFR BLD AUTO: 4 %
ERYTHROCYTE [DISTWIDTH] IN BLOOD BY AUTOMATED COUNT: 13.8 % (ref 12.3–15.4)
GFR SERPLBLD CREATININE-BSD FMLA CKD-EPI: 38 ML/MIN/1.73
GFR SERPLBLD CREATININE-BSD FMLA CKD-EPI: 44 ML/MIN/1.73
GLOBULIN SER CALC-MCNC: 4.4 G/DL (ref 1.5–4.5)
GLUCOSE SERPL-MCNC: 137 MG/DL (ref 65–99)
HCT VFR BLD AUTO: 36 % (ref 34–46.6)
HGB BLD-MCNC: 12 G/DL (ref 11.1–15.9)
IMM GRANULOCYTES # BLD: 0 X10E3/UL (ref 0–0.1)
IMM GRANULOCYTES NFR BLD: 1 %
INR PPP: 1.1 (ref 0.8–1.2)
INTERPRETATION: NORMAL
LYMPHOCYTES # BLD AUTO: 2 X10E3/UL (ref 0.7–3.1)
LYMPHOCYTES NFR BLD AUTO: 29 %
MAGNESIUM SERPL-MCNC: 1.9 MG/DL (ref 1.6–2.3)
MCH RBC QN AUTO: 28.8 PG (ref 26.6–33)
MCHC RBC AUTO-ENTMCNC: 33.3 G/DL (ref 31.5–35.7)
MCV RBC AUTO: 86 FL (ref 79–97)
MONOCYTES # BLD AUTO: 1.3 X10E3/UL (ref 0.1–0.9)
MONOCYTES NFR BLD AUTO: 18 %
NEUTROPHILS # BLD AUTO: 3.5 X10E3/UL (ref 1.4–7)
NEUTROPHILS NFR BLD AUTO: 48 %
PLATELET # BLD AUTO: 210 X10E3/UL (ref 150–379)
POTASSIUM SERPL-SCNC: 4.8 MMOL/L (ref 3.5–5.2)
PROT SERPL-MCNC: 8.1 G/DL (ref 6–8.5)
PROTHROMBIN TIME: 11.7 SEC (ref 9.1–12)
RBC # BLD AUTO: 4.17 X10E6/UL (ref 3.77–5.28)
SODIUM SERPL-SCNC: 139 MMOL/L (ref 134–144)
WBC # BLD AUTO: 7.1 X10E3/UL (ref 3.4–10.8)

## 2018-03-27 ENCOUNTER — DOCUMENTATION ONLY (OUTPATIENT)
Dept: CARDIOLOGY CLINIC | Age: 68
End: 2018-03-27

## 2018-03-27 NOTE — PROGRESS NOTES
Received fax from [address] [place], [place] [address]alexander Barry, pt has been approved for patient assistance, from 3/23/18 through 12/31/18. Spoke with pt letting her know.

## 2018-03-29 ENCOUNTER — OFFICE VISIT (OUTPATIENT)
Dept: INTERNAL MEDICINE CLINIC | Age: 68
End: 2018-03-29

## 2018-03-29 VITALS
HEART RATE: 73 BPM | SYSTOLIC BLOOD PRESSURE: 114 MMHG | HEIGHT: 66 IN | BODY MASS INDEX: 26.03 KG/M2 | RESPIRATION RATE: 20 BRPM | TEMPERATURE: 97.8 F | DIASTOLIC BLOOD PRESSURE: 58 MMHG | OXYGEN SATURATION: 99 % | WEIGHT: 162 LBS

## 2018-03-29 DIAGNOSIS — M25.511 RIGHT SHOULDER PAIN, UNSPECIFIED CHRONICITY: ICD-10-CM

## 2018-03-29 DIAGNOSIS — Z00.00 MEDICARE ANNUAL WELLNESS VISIT, SUBSEQUENT: ICD-10-CM

## 2018-03-29 DIAGNOSIS — E11.21 TYPE 2 DIABETES MELLITUS WITH NEPHROPATHY (HCC): Primary | ICD-10-CM

## 2018-03-29 DIAGNOSIS — E78.2 MIXED HYPERLIPIDEMIA: ICD-10-CM

## 2018-03-29 PROBLEM — N17.9 ACUTE RENAL FAILURE (ARF) (HCC): Status: RESOLVED | Noted: 2017-09-13 | Resolved: 2018-03-29

## 2018-03-29 PROBLEM — R00.0 TACHYCARDIA: Status: RESOLVED | Noted: 2017-05-09 | Resolved: 2018-03-29

## 2018-03-29 PROBLEM — Z79.01 ANTICOAGULATION MONITORING, INR RANGE 2-3: Status: RESOLVED | Noted: 2017-01-31 | Resolved: 2018-03-29

## 2018-03-29 LAB
CHOLEST SERPL-MCNC: 168 MG/DL
GLUCOSE POC: 149 MG/DL
HBA1C MFR BLD HPLC: 7.2 %
HDLC SERPL-MCNC: NORMAL MG/DL
LDL CHOLESTEROL POC: NORMAL MG/DL
NON-HDL GOAL (POC): NORMAL
TCHOL/HDL RATIO (POC): NORMAL
TRIGL SERPL-MCNC: NORMAL MG/DL

## 2018-03-29 RX ORDER — INSULIN PUMP SYRINGE, 3 ML
EACH MISCELLANEOUS
Qty: 1 KIT | Refills: 0 | Status: ON HOLD | OUTPATIENT
Start: 2018-03-29 | End: 2018-09-19

## 2018-03-29 RX ORDER — LANCETS
EACH MISCELLANEOUS
Qty: 200 EACH | Refills: 11 | Status: ON HOLD | OUTPATIENT
Start: 2018-03-29 | End: 2018-09-19

## 2018-03-29 NOTE — MR AVS SNAPSHOT
[address] [facility] [address] [address] 
[phone] Patient: Ralph Black MRN:  [id number] Visit Information Date & Time Provider Department Dept. Phone Encounter #  
 3/29/2018  8:00 AM Shanel Fitzgerald [address] [id number] Follow-up Instructions Return in about 3 months (around 6/29/2018) for DM/bp f/u. Your Appointments 9/18/2018 11:15 AM  
PROCEDURE with PACEMAKER, [facility] [address]) Appt Note: Biotronik DCPM 6mo check [id number] [facility]  
[phone] [address] [facility] Upcoming Health Maintenance Date Due  
 EYE EXAM RETINAL OR DILATED Q1 6/25/2016 MEDICARE YEARLY EXAM 3/14/2018 HEMOGLOBIN A1C Q6M 3/24/2018 FOOT EXAM Q1 7/20/2018 LIPID PANEL Q1 7/20/2018 GLAUCOMA SCREENING Q2Y 7/28/2018 FOBT Q 1 YEAR AGE 50-75 9/22/2018 BREAST CANCER SCRN MAMMOGRAM 2/23/2019 MICROALBUMIN Q1 3/29/2019 Pneumococcal 65+ High/Highest Risk (2 of 2 - PPSV23) 6/25/2020 DTaP/Tdap/Td series (2 - Td) 7/16/2026 Allergies as of 3/29/2018  Review Complete On: 3/29/2018 By: Michael Osuna LPN Severity Noted Reaction Type Reactions Crestor [Rosuvastatin]  10/31/2016   Side Effect Myalgia Levaquin [Levofloxacin]  12/07/2015   Intolerance Nausea Only GI Upset Lipitor [Atorvastatin]  04/17/2017   Side Effect Diarrhea Lyrica [Pregabalin]  10/31/2016   Side Effect Myalgia Current Immunizations  Reviewed on 10/31/2016 Name Date H1N1 FLU VACCINE 10/20/2009 Influenza High Dose Vaccine PF 10/31/2016 Influenza Vaccine ([place]in [place] Canine Kidney) PF 9/23/2014 11:26 AM  
 Influenza Vaccine (Quad) PF 9/25/2017  6:36 PM  
 Influenza Vaccine Intradermal PF 11/27/2015  Influenza Vaccine Split 9/22/2011  6:25 AM  
 Influenza Vaccine Whole 10/20/2009 Pneumococcal Polysaccharide (PPSV-23) 6/25/2015 Tdap 7/16/2016 11:47 PM  
 ZZZ-RETIRED (DO NOT USE) Pneumococcal Vaccine (Unspecified Type) 10/20/2009 Not reviewed this visit You Were Diagnosed With   
  
 Codes Comments Type 2 diabetes mellitus with nephropathy ([facility] [address]    -  Primary ICD-10-CM: E11.21 
ICD-9-CM: 250.40, 583.81 Medicare annual wellness visit, subsequent     ICD-10-CM: Z00.00 ICD-9-CM: V70.0 Right shoulder pain, unspecified chronicity     ICD-10-CM: M25.511 ICD-9-CM: 719.41 Mixed hyperlipidemia     ICD-10-CM: E78.2 ICD-9-CM: 272.2 Vitals BP Pulse Temp Resp Height(growth percentile) Weight(growth percentile) 114/58 (BP 1 Location: Left arm, BP Patient Position: Sitting) 73 97.8 °F (36.6 °C) (Oral) 20 5' 6\" (1.676 m) 162 lb (73.5 kg) SpO2 BMI OB Status Smoking Status 99% 26.15 kg/m2 Postmenopausal Former Smoker Vitals History BMI and BSA Data Body Mass Index Body Surface Area  
 26.15 kg/m 2 1.85 m 2 Preferred Pharmacy Pharmacy Name Phone [facility] [address], [address] [place] [address] Avenue [phone] Your Updated Medication List  
  
   
This list is accurate as of 3/29/18  8:28 AM.  Always use your most recent med list.  
  
  
  
  
 acetaminophen 500 mg tablet Commonly known as:  TYLENOL Take 500 mg by mouth every six (6) hours as needed for Pain. albuterol 2.5 mg /3 mL (0.083 %) nebulizer solution Commonly known as:  PROVENTIL VENTOLIN  
3 mL by Nebulization route every four (4) hours as needed for Wheezing. amiodarone 200 mg tablet Commonly known as:  CORDARONE Take 0.5 Tabs by mouth nightly. apixaban 5 mg tablet Commonly known as:  Beverlie Jackie Take 1 Tab by mouth two (2) times a day. Indications: PREVENT THROMBOEMBOLISM IN CHRONIC ATRIAL FIBRILLATION Azelastine 0.15 % (205.5 mcg) nasal spray Commonly known as:  ASTEPRO  
 1 [place] by Both Nostrils route daily. Blood-Glucose Meter monitoring kit Use as directed. Dx: 11.9 Whichever meter her insurance covers  
  
 budesonide-formoterol 160-4.5 mcg/actuation Hfaa Commonly known as:  SYMBICORT Take 1 Puff by inhalation two (2) times a day. carvedilol 6.25 mg tablet Commonly known as:  Clerance Barley Take 1 Tab by mouth two (2) times daily (with meals). cod liver oil Cap Take 1 Cap by mouth daily. colchicine 0.6 mg tablet Take 1.2 mg by mouth daily. Patient takes 1.2 mg daily and 2.4 mg PRN flare up FLONASE 50 mcg/actuation nasal spray Generic drug:  fluticasone 2 Sprays by Both Nostrils route every evening. furosemide 20 mg tablet Commonly known as:  LASIX Take 1 Tab by mouth daily. gabapentin 800 mg tablet Commonly known as:  NEURONTIN  
TAKE ONE TABLET BY MOUTH THREE TIMES DAILY  
  
 glucose blood VI test strips strip Commonly known as:  blood glucose test  
Use BID DxE11.9 Lancets Misc Use BID Dx: E11.9 PLAVIX 75 mg Tab Generic drug:  clopidogrel Take 75 mg by mouth daily. simvastatin 10 mg tablet Commonly known as:  ZOCOR Take 1 Tab by mouth nightly. tiotropium 18 mcg inhalation capsule Commonly known as:  [employer] INHALE THE CONTENTS OF 1 CAPSULE THROUGH HANDIHALER DEVICE DAILY Prescriptions Sent to Pharmacy Refills Blood-Glucose Meter monitoring kit 0 Sig: Use as directed. Dx: 11.9 Whichever meter her insurance covers Class: Normal  
 Pharmacy: [address] [place] [address] Dr Matamoros, [address] [facility] Ph #: [phone]  
 glucose blood VI test strips (BLOOD GLUCOSE TEST) strip 11 Sig: Use BID DxE11.9 Class: Normal  
 Pharmacy: [address] [place] [address] Dr Matamoros, [address] Ph #: [phone] Lancets misc 11 Sig: Use BID Dx: E11.9  Class: Normal  
 Pharmacy: [facility] [address] WAGNER ASHLEY [address], [address] [facility] [address]  #: [phone] We Performed the Following AMB POC GLUCOSE BLOOD, BY GLUCOSE MONITORING DEVICE [37489 CPT(R)] AMB POC HEMOGLOBIN A1C [80895 CPT(R)] AMB POC LIPID PROFILE [70469 CPT(R)] LIPID PANEL [21802 CPT(R)] REFERRAL TO PHYSICAL THERAPY [ULW46 Custom] Follow-up Instructions Return in about 3 months (around 6/29/2018) for DM/bp f/u. To-Do List   
 03/30/2018 12:15 PM  
  Appointment with [address] [place] [address] at [address] [facility] ([phone]) NPO AFTER MIDNIGHT! ROUTINE CASES:  Please arrive 2 hour prior to your scheduled appointment time. If your scheduled appointment is for 0730, 0800, 0815, please arrive by 0645. NON ROUTINE CASES:  PATIENTS WHO REQUIRE LABS, X-RAY, EKG, or MEDS:  PLEASE ARRIVE 3 HOURS PRIOR TO YOUR SCHEDULED APPOINTMENT. If you require hydration prior to your procedure, PLEASE ARRIVE 4 HOURS PRIOR TO YOUR APPOINTMENT  **** IT IS THE OFFICE SCHEDULARS RESPONSBILITY TO NOTIFY THE CATH LAB SCHEDULAR IF THE PATIENT WILL REQUIRE ANY ADDITIONAL TIME FOR PREP FROM ROUTINE CASE ***** Referral Information Referral ID Referred By Referred To  
  
 [id number] Matt Stark [facility]   
   [address], [address] Phone: [phone] Visits Status Start Date End Date 1 New Request 3/29/18 3/29/19 If your referral has a status of pending review or denied, additional information will be sent to support the outcome of this decision. Patient Instructions Heart-Healthy Diet: Care Instructions Your Care Instructions A heart-healthy diet has lots of vegetables, fruits, nuts, beans, and whole grains, and is low in salt. It limits foods that are high in saturated fat, such as meats, cheeses, and fried foods.  It may be hard to change your diet, but even small changes can lower your risk of heart attack and heart disease. Follow-up care is a key part of your treatment and safety. Be sure to make and go to all appointments, and call your doctor if you are having problems. It's also a good idea to know your test results and keep a list of the medicines you take. How can you care for yourself at home? Watch your portions · Learn what a serving is. A \"serving\" and a \"portion\" are not always the same thing. Make sure that you are not eating larger portions than are recommended. For example, a serving of pasta is ½ cup. A serving size of meat is 2 to 3 ounces. A 3-ounce serving is about the size of a deck of cards. Measure serving sizes until you are good at [place]" them. Keep in mind that restaurants often serve portions that are 2 or 3 times the size of one serving. · To keep your energy level up and keep you from feeling hungry, eat often but in smaller portions. · Eat only the number of calories you need to stay at a healthy weight. If you need to lose weight, eat fewer calories than your body burns (through exercise and other physical activity). Eat more fruits and vegetables · Eat a variety of fruit and vegetables every day. Dark green, deep orange, red, or yellow fruits and vegetables are especially good for you. Examples include spinach, carrots, peaches, and berries. · Keep carrots, celery, and other veggies handy for snacks. Buy fruit that is in season and store it where you can see it so that you will be tempted to eat it. · Cook dishes that have a lot of veggies in them, such as stir-fries and soups. Limit saturated and trans fat · Read food labels, and try to avoid saturated and trans fats. They increase your risk of heart disease. Trans fat is found in many processed foods such as cookies and crackers. · Use olive or canola oil when you cook. Try cholesterol-lowering spreads, such as Benecol or Take Control. · Bake, broil, grill, or steam foods instead of frying them. · Choose lean meats instead of high-fat meats such as hot dogs and sausages. Cut off all visible fat when you prepare meat. · Eat fish, skinless poultry, and meat alternatives such as soy products instead of high-fat meats. Soy products, such as tofu, may be especially good for your heart. · Choose low-fat or fat-free milk and dairy products. Eat fish · Eat at least two servings of fish a week. Certain fish, such as salmon and tuna, contain omega-3 fatty acids, which may help reduce your risk of heart attack. Eat foods high in fiber · Eat a variety of grain products every day. Include whole-grain foods that have lots of fiber and nutrients. Examples of whole-grain foods include oats, whole wheat bread, and brown rice. · Buy whole-grain breads and cereals, instead of white bread or pastries. Limit salt and sodium · Limit how much salt and sodium you eat to help lower your blood pressure. · Taste food before you salt it. Add only a little salt when you think you need it. With time, your taste buds will adjust to less salt. · Eat fewer snack items, fast foods, and other high-salt, processed foods. Check food labels for the amount of sodium in packaged foods. · Choose low-sodium versions of canned goods (such as soups, vegetables, and beans). Limit sugar · Limit drinks and foods with added sugar. These include candy, desserts, and soda pop. Limit alcohol · Limit alcohol to no more than 2 drinks a day for men and 1 drink a day for women. Too much alcohol can cause health problems. When should you call for help? Watch closely for changes in your health, and be sure to contact your doctor if: 
? · You would like help planning heart-healthy meals. Where can you learn more? Go to http://rusty-marcela.info/. Enter V137 in the search box to learn more about \"Heart-Healthy Diet: Care Instructions. \" Current as of: September 21, 2016 Content Version: 11.4 © 0027-8734 Healthwise, Incorporated. Care instructions adapted under license by [employer] (which disclaims liability or warranty for this information). If you have questions about a medical condition or this instruction, always ask your healthcare professional. [address] any warranty or liability for your use of this information. Introducing [facility]! Dear Gail Dunne: Thank you for requesting a RedT account. Our records indicate that you already have an active RedT account. You can access your account anytime at [email or web address] Did you know that you can access your hospital and ER discharge instructions at any time in RedT? You can also review all of your test results from your hospital stay or ER visit. Additional Information If you have questions, please visit the Frequently Asked Questions section of the RedT website at [email or web address] Remember, RedT is NOT to be used for urgent needs. For medical emergencies, dial 911. Now available from your iPhone and Android! Please provide this summary of care documentation to your next provider. Your primary care clinician is listed as Stacia Kim. If you have any questions after today's visit, please call [phone].

## 2018-03-29 NOTE — PATIENT INSTRUCTIONS

## 2018-03-29 NOTE — PROGRESS NOTES
Pt comes-in for medicare wellness, but c/o decreased ROM RUE since fall a few mths ago. Will refer for PT and ortho IF PT fails to improve  Pt is scheduled for ablation tomorrow for recurrent afib as picked-up by her pacemaker  Pts triglycerides elevated today as she ate this am. She will return to the lab and check a fasting sample. This is the Subsequent Medicare Annual Wellness Exam, performed 12 months or more after the Initial AWV or the last Subsequent AWV    I have reviewed the patient's medical history in detail and updated the computerized patient record. History     Past Medical History:   Diagnosis Date    Atrial fibrillation ([address] 2010    CAD (coronary artery disease)     stent    Chronic diastolic heart failure ([facility] [address] 2014    Chronic systolic HF (heart failure) ([address] 5/10/2017    2017 EF 25-30%    COPD     COPD (chronic obstructive pulmonary disease) ([address] 2010    Diabetes ([facility] [address]     Fibroid     Heart failure (HCC)     History of Clostridium difficile infection 5/10/2017    2017 CDiff positive    Hypertension 2010    Hypotension 5/10/2017    Junctional tachycardia ([address] 5/10/2017    NIDDM (non-insulin dependent diabetes mellitus) 2010    Screening mammogram 5/4/10    SOB (shortness of breath) 2014      Past Surgical History:   Procedure Laterality Date    COLONOSCOPY N/A 2017    COLONOSCOPY performed by Severino Murphy MD at [facility] AMBULATORY OR    HX  SECTION      HX OTHER SURGICAL      adrenal gland removed    HX PACEMAKER      DC EXCISE ADRENAL GLAND       Current Outpatient Prescriptions   Medication Sig Dispense Refill    Blood-Glucose Meter monitoring kit Use as directed.  Dx: 11.9 Whichever meter her insurance covers 1 Kit 0    glucose blood VI test strips (BLOOD GLUCOSE TEST) strip Use BID DxE11.9 200 Strip 11    Lancets misc Use BID Dx: E11.9 200 Each 11    gabapentin (NEURONTIN) 800 mg tablet TAKE ONE TABLET BY MOUTH THREE TIMES DAILY 90 Tab 3    apixaban (ELIQUIS) 5 mg tablet Take 1 Tab by mouth two (2) times a day. Indications: PREVENT THROMBOEMBOLISM IN CHRONIC ATRIAL FIBRILLATION 56 Tab 0    amiodarone (CORDARONE) 200 mg tablet Take 0.5 Tabs by mouth nightly. 30 Tab 6    simvastatin (ZOCOR) 10 mg tablet Take 1 Tab by mouth nightly. 30 Tab 3    budesonide-formoterol (SYMBICORT) 160-4.5 mcg/actuation HFAA Take 1 Puff by inhalation two (2) times a day. 3 Inhaler 3    tiotropium (SPIRIVA WITH HANDIHALER) 18 mcg inhalation capsule INHALE THE CONTENTS OF 1 CAPSULE THROUGH HANDIHALER DEVICE DAILY 90 Cap 3    furosemide (LASIX) 20 mg tablet Take 1 Tab by mouth daily. 30 Tab 0    colchicine 0.6 mg tablet Take 1.2 mg by mouth daily. Patient takes 1.2 mg daily and 2.4 mg PRN flare up      acetaminophen (TYLENOL) 500 mg tablet Take 500 mg by mouth every six (6) hours as needed for Pain.  clopidogrel (PLAVIX) 75 mg tab Take 75 mg by mouth daily.  carvedilol (COREG) 6.25 mg tablet Take 1 Tab by mouth two (2) times daily (with meals). 60 Tab 11    fluticasone (FLONASE) 50 mcg/actuation nasal spray 2 Sprays by Both Nostrils route every evening.  albuterol (PROVENTIL VENTOLIN) 2.5 mg /3 mL (0.083 %) nebulizer solution 3 mL by Nebulization route every four (4) hours as needed for Wheezing. 1 Package 5    Azelastine (ASTEPRO) 0.15 % (205.5 mcg) nasal spray 1 [place] by Both Nostrils route daily.  cod liver oil cap Take 1 Cap by mouth daily.        Allergies   Allergen Reactions    Crestor [Rosuvastatin] Myalgia    Levaquin [Levofloxacin] Nausea Only     GI Upset    Lipitor [Atorvastatin] Diarrhea    Lyrica [Pregabalin] Myalgia     Family History   Problem Relation Age of Onset    Heart Disease Mother     Diabetes Father     Heart Disease Brother      Social History   Substance Use Topics    Smoking status: Former Smoker     Types: Cigarettes     Quit date: 12/31/2009    Smokeless tobacco: Never Used   [employer] Alcohol use No     Patient Active Problem List   Diagnosis Code    Benign hypertensive heart and CKD, stage 3 (GFR 30-59), w CHF ([facility]) I13.0, N18.3, I50.9    Atrial fibrillation--paroxysmal I48.91    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 J44.9    Hip pain M25.559    Hypokalemia E87.6    S/P angioplasty with stent Z95.9    Sick sinus syndrome ([facility]) I49.5    Chest pain R07.9    Sinoatrial node dysfunction ([facility]) I49.5    Cardiac pacemaker in situ Z95.0    Mixed hyperlipidemia E78.2    Back pain M54.9    S/P coronary artery stent placement F15.5    Diastolic CHF, acute on chronic ([facility]) I50.33    GIB (gastrointestinal bleeding) K92.2    Type 2 diabetes mellitus with diabetic neuropathy, without long-term current use of insulin ([facility]) E11.40    CHF (congestive heart failure) ([facility]) E80.0    Systolic CHF, acute ([facility]) T89.75    Acute systolic CHF (congestive heart failure) ([facility]) I50.21    Fear associated with illness and body function F40.9    Counseling regarding advanced care planning and goals of care Z71.89    S/P ablation of atrial fibrillation Z98.890, Z86.79    Chronic systolic HF (heart failure) ([facility]) I50.22    History of Clostridium difficile infection Z86.19    Junctional tachycardia ([facility]) I47.1    Hypotension I95.9    CKD (chronic kidney disease) stage 3, GFR 30-59 ml/min N18.3    Type 2 diabetes mellitus with nephropathy ([facility]) E11.21       Depression Risk Factor Screening:     PHQ over the last two weeks 3/29/2018   Little interest or pleasure in doing things Not at all   Feeling down, depressed or hopeless Not at all   Total Score PHQ 2 0     Alcohol Risk Factor Screening: You do not drink alcohol or very rarely. Functional Ability and Level of Safety:   Hearing Loss  Hearing is good. Activities of Daily Living  The home contains: no safety equipment.   Patient needs help with:  shopping and housework    Fall Risk  Fall Risk Assessment, last 15 mths 3/29/2018   Able to walk? Yes   Fall in past 12 months? No   Fall with injury? -   Number of falls in past 12 months -   Fall Risk Score -       Abuse Screen  Patient is not abused    Cognitive Screening   Evaluation of Cognitive Function:  Has your family/caregiver stated any concerns about your memory: no  Normal    Patient Care Team   Patient Care Team:  Ghanshyam Santamaria MD as PCP - General (Internal Medicine)  Vanda St MD as Physician (Cardiology)  Zully Lewis MD as Physician (Cardiology)  Aleksandr Blair, RN as Ambulatory Care Navigator  Lucius Orlando RN as Ambulatory Care Navigator (Cardiology)  Martie Cheadle, MD as Physician (Cardiology)  Milagros Dejesus, RN as Ambulatory Care Navigator    Assessment/Plan   Education and counseling provided:  Are appropriate based on today's review and evaluation    Diagnoses and all orders for this visit:    1. Type 2 diabetes mellitus with nephropathy (HCC)  -     AMB POC GLUCOSE BLOOD, BY GLUCOSE MONITORING DEVICE  -     AMB POC HEMOGLOBIN A1C  -     AMB POC LIPID PROFILE  -     Blood-Glucose Meter monitoring kit; Use as directed. Dx: 11.9 Whichever meter her insurance covers  -     glucose blood VI test strips (BLOOD GLUCOSE TEST) strip; Use BID DxE11.9  -     Lancets misc; Use BID Dx: E11.9  -     LIPID PANEL    2. Medicare annual wellness visit, subsequent  -     [address]    3. Right shoulder pain, unspecified chronicity  -     [address]    4.  Mixed hyperlipidemia  -     LIPID PANEL        Health Maintenance Due   Topic Date Due    EYE EXAM RETINAL OR DILATED Q1  06/25/2016    MEDICARE YEARLY EXAM  03/14/2018    HEMOGLOBIN A1C Q6M  03/24/2018

## 2018-03-29 NOTE — PROGRESS NOTES
1. Have you been to the ER, urgent care clinic since your last visit? Hospitalized since your last visit? No    2. Have you seen or consulted any other health care providers outside of the [address] [place] [address] since your last visit? Include any pap smears or colon screening. No  PHQ over the last two weeks 3/29/2018   Little interest or pleasure in doing things Not at all   Feeling down, depressed or hopeless Not at all   Total Score PHQ 2 0     Blood glucose/A1C/Lipids verbal order DR. Parson/BERNARD Muhammad

## 2018-03-30 ENCOUNTER — ANESTHESIA (OUTPATIENT)
Dept: NON INVASIVE DIAGNOSTICS | Age: 68
End: 2018-03-30
Payer: MEDICARE

## 2018-03-30 ENCOUNTER — ANESTHESIA EVENT (OUTPATIENT)
Dept: NON INVASIVE DIAGNOSTICS | Age: 68
End: 2018-03-30
Payer: MEDICARE

## 2018-03-30 ENCOUNTER — HOSPITAL ENCOUNTER (OUTPATIENT)
Dept: NON INVASIVE DIAGNOSTICS | Age: 68
Setting detail: OBSERVATION
Discharge: HOME | End: 2018-03-31
Attending: INTERNAL MEDICINE | Admitting: INTERNAL MEDICINE
Payer: MEDICARE

## 2018-03-30 PROBLEM — I48.91 A-FIB (HCC): Status: ACTIVE | Noted: 2018-03-30

## 2018-03-30 LAB
ACT BLD: 153 SECS (ref 79–138)
ACT BLD: 456 SECS (ref 79–138)
ACT BLD: 461 SECS (ref 79–138)
GLUCOSE BLD STRIP.AUTO-MCNC: 100 MG/DL (ref 65–100)
GLUCOSE BLD STRIP.AUTO-MCNC: 92 MG/DL (ref 65–100)
SERVICE CMNT-IMP: NORMAL
SERVICE CMNT-IMP: NORMAL

## 2018-03-30 PROCEDURE — 77030035291 HC TBNG PMP SMARTABLATE J&J -B

## 2018-03-30 PROCEDURE — 82962 GLUCOSE BLOOD TEST: CPT

## 2018-03-30 PROCEDURE — 77030015398 HC CBL EP EXT STJU -C

## 2018-03-30 PROCEDURE — C1766 INTRO/SHEATH,STRBLE,NON-PEEL: HCPCS

## 2018-03-30 PROCEDURE — 77030011640 HC PAD GRND REM COVD -A

## 2018-03-30 PROCEDURE — C1759 CATH, INTRA ECHOCARDIOGRAPHY: HCPCS

## 2018-03-30 PROCEDURE — 76210000016 HC OR PH I REC 1 TO 1.5 HR

## 2018-03-30 PROCEDURE — 74011000250 HC RX REV CODE- 250

## 2018-03-30 PROCEDURE — C1731 CATH, EP, 20 OR MORE ELEC: HCPCS

## 2018-03-30 PROCEDURE — C1732 CATH, EP, DIAG/ABL, 3D/VECT: HCPCS

## 2018-03-30 PROCEDURE — 77030018729 HC ELECTRD DEFIB PAD CARD -B

## 2018-03-30 PROCEDURE — 74011250636 HC RX REV CODE- 250/636

## 2018-03-30 PROCEDURE — 93657 TX L/R ATRIAL FIB ADDL: CPT

## 2018-03-30 PROCEDURE — 99218 HC RM OBSERVATION: CPT

## 2018-03-30 PROCEDURE — 85347 COAGULATION TIME ACTIVATED: CPT

## 2018-03-30 PROCEDURE — 77030013797 HC KT TRNSDUC PRSSR EDWD -A

## 2018-03-30 PROCEDURE — 77030010880 HC CBL EP SUPRME STJU -C

## 2018-03-30 PROCEDURE — 76060000035 HC ANESTHESIA 2 TO 2.5 HR

## 2018-03-30 PROCEDURE — C1894 INTRO/SHEATH, NON-LASER: HCPCS

## 2018-03-30 PROCEDURE — 77030029065 HC DRSG HEMO QCLOT ZMED -B

## 2018-03-30 PROCEDURE — 74011250637 HC RX REV CODE- 250/637: Performed by: INTERNAL MEDICINE

## 2018-03-30 PROCEDURE — 77030029359 HC PRB ESOPH TEMP CATH ANTM -F

## 2018-03-30 PROCEDURE — C1730 CATH, EP, 19 OR FEW ELECT: HCPCS

## 2018-03-30 PROCEDURE — 77030027107 HC PTCH EXT REF CARTO3 J&J -F

## 2018-03-30 PROCEDURE — 77030013079 HC BLNKT BAIR HGGR 3M -A: Performed by: ANESTHESIOLOGY

## 2018-03-30 PROCEDURE — 74011250636 HC RX REV CODE- 250/636: Performed by: INTERNAL MEDICINE

## 2018-03-30 PROCEDURE — 77030034850

## 2018-03-30 PROCEDURE — C1893 INTRO/SHEATH, FIXED,NON-PEEL: HCPCS

## 2018-03-30 PROCEDURE — 77030021678 HC GLIDESCP STAT DISP VERT -B: Performed by: ANESTHESIOLOGY

## 2018-03-30 PROCEDURE — 77030008684 HC TU ET CUF COVD -B: Performed by: ANESTHESIOLOGY

## 2018-03-30 RX ORDER — ONDANSETRON 2 MG/ML
INJECTION INTRAMUSCULAR; INTRAVENOUS AS NEEDED
Status: DISCONTINUED | OUTPATIENT
Start: 2018-03-30 | End: 2018-03-30 | Stop reason: HOSPADM

## 2018-03-30 RX ORDER — SODIUM CHLORIDE 0.9 % (FLUSH) 0.9 %
5-10 SYRINGE (ML) INJECTION AS NEEDED
Status: CANCELLED | OUTPATIENT
Start: 2018-03-30

## 2018-03-30 RX ORDER — FENTANYL CITRATE 50 UG/ML
INJECTION, SOLUTION INTRAMUSCULAR; INTRAVENOUS
Status: COMPLETED
Start: 2018-03-30 | End: 2018-03-30

## 2018-03-30 RX ORDER — LIDOCAINE HYDROCHLORIDE 10 MG/ML
0.1 INJECTION, SOLUTION EPIDURAL; INFILTRATION; INTRACAUDAL; PERINEURAL AS NEEDED
Status: CANCELLED | OUTPATIENT
Start: 2018-03-30

## 2018-03-30 RX ORDER — HYDROMORPHONE HYDROCHLORIDE 1 MG/ML
0.2 INJECTION, SOLUTION INTRAMUSCULAR; INTRAVENOUS; SUBCUTANEOUS
Status: CANCELLED | OUTPATIENT
Start: 2018-03-30

## 2018-03-30 RX ORDER — MIDAZOLAM HYDROCHLORIDE 1 MG/ML
1-5 INJECTION, SOLUTION INTRAMUSCULAR; INTRAVENOUS
Status: DISCONTINUED | OUTPATIENT
Start: 2018-03-30 | End: 2018-03-31

## 2018-03-30 RX ORDER — PROPOFOL 10 MG/ML
INJECTION, EMULSION INTRAVENOUS AS NEEDED
Status: DISCONTINUED | OUTPATIENT
Start: 2018-03-30 | End: 2018-03-30 | Stop reason: HOSPADM

## 2018-03-30 RX ORDER — EPHEDRINE SULFATE 50 MG/ML
INJECTION, SOLUTION INTRAVENOUS AS NEEDED
Status: DISCONTINUED | OUTPATIENT
Start: 2018-03-30 | End: 2018-03-30 | Stop reason: HOSPADM

## 2018-03-30 RX ORDER — EPHEDRINE SULFATE 50 MG/ML
INJECTION, SOLUTION INTRAVENOUS
Status: COMPLETED
Start: 2018-03-30 | End: 2018-03-30

## 2018-03-30 RX ORDER — GLYCOPYRROLATE 0.2 MG/ML
INJECTION INTRAMUSCULAR; INTRAVENOUS AS NEEDED
Status: DISCONTINUED | OUTPATIENT
Start: 2018-03-30 | End: 2018-03-30 | Stop reason: HOSPADM

## 2018-03-30 RX ORDER — FUROSEMIDE 20 MG/1
20 TABLET ORAL DAILY
Status: DISCONTINUED | OUTPATIENT
Start: 2018-03-31 | End: 2018-03-31 | Stop reason: HOSPADM

## 2018-03-30 RX ORDER — HEPARIN SODIUM 1000 [USP'U]/ML
30000 INJECTION, SOLUTION INTRAVENOUS; SUBCUTANEOUS ONCE
Status: COMPLETED | OUTPATIENT
Start: 2018-03-30 | End: 2018-03-30

## 2018-03-30 RX ORDER — FENTANYL CITRATE 50 UG/ML
12.5-5 INJECTION, SOLUTION INTRAMUSCULAR; INTRAVENOUS
Status: DISCONTINUED | OUTPATIENT
Start: 2018-03-30 | End: 2018-03-31

## 2018-03-30 RX ORDER — PROTAMINE SULFATE 10 MG/ML
25-100 INJECTION, SOLUTION INTRAVENOUS
Status: DISCONTINUED | OUTPATIENT
Start: 2018-03-30 | End: 2018-03-31

## 2018-03-30 RX ORDER — ALBUTEROL SULFATE 0.83 MG/ML
2.5 SOLUTION RESPIRATORY (INHALATION)
Status: DISCONTINUED | OUTPATIENT
Start: 2018-03-30 | End: 2018-03-31 | Stop reason: HOSPADM

## 2018-03-30 RX ORDER — FLUTICASONE PROPIONATE 50 MCG
2 SPRAY, SUSPENSION (ML) NASAL EVERY EVENING
Status: DISCONTINUED | OUTPATIENT
Start: 2018-03-30 | End: 2018-03-31 | Stop reason: HOSPADM

## 2018-03-30 RX ORDER — HEPARIN SODIUM 1000 [USP'U]/ML
INJECTION, SOLUTION INTRAVENOUS; SUBCUTANEOUS
Status: COMPLETED
Start: 2018-03-30 | End: 2018-03-30

## 2018-03-30 RX ORDER — SODIUM CHLORIDE 0.9 % (FLUSH) 0.9 %
5-10 SYRINGE (ML) INJECTION EVERY 8 HOURS
Status: DISCONTINUED | OUTPATIENT
Start: 2018-03-30 | End: 2018-03-31 | Stop reason: HOSPADM

## 2018-03-30 RX ORDER — FENTANYL CITRATE 50 UG/ML
INJECTION, SOLUTION INTRAMUSCULAR; INTRAVENOUS AS NEEDED
Status: DISCONTINUED | OUTPATIENT
Start: 2018-03-30 | End: 2018-03-30 | Stop reason: HOSPADM

## 2018-03-30 RX ORDER — DIPHENHYDRAMINE HYDROCHLORIDE 50 MG/ML
12.5 INJECTION, SOLUTION INTRAMUSCULAR; INTRAVENOUS AS NEEDED
Status: CANCELLED | OUTPATIENT
Start: 2018-03-30 | End: 2018-03-30

## 2018-03-30 RX ORDER — ROCURONIUM BROMIDE 10 MG/ML
INJECTION, SOLUTION INTRAVENOUS AS NEEDED
Status: DISCONTINUED | OUTPATIENT
Start: 2018-03-30 | End: 2018-03-30 | Stop reason: HOSPADM

## 2018-03-30 RX ORDER — SODIUM CHLORIDE 9 MG/ML
50 INJECTION, SOLUTION INTRAVENOUS CONTINUOUS
Status: DISCONTINUED | OUTPATIENT
Start: 2018-03-31 | End: 2018-03-31 | Stop reason: HOSPADM

## 2018-03-30 RX ORDER — MIDAZOLAM HYDROCHLORIDE 1 MG/ML
INJECTION, SOLUTION INTRAMUSCULAR; INTRAVENOUS
Status: COMPLETED
Start: 2018-03-30 | End: 2018-03-30

## 2018-03-30 RX ORDER — HEPARIN SODIUM 10000 [USP'U]/100ML
12-25 INJECTION, SOLUTION INTRAVENOUS
Status: DISCONTINUED | OUTPATIENT
Start: 2018-03-30 | End: 2018-03-31 | Stop reason: HOSPADM

## 2018-03-30 RX ORDER — AMIODARONE HYDROCHLORIDE 200 MG/1
100 TABLET ORAL
Status: DISCONTINUED | OUTPATIENT
Start: 2018-03-30 | End: 2018-03-31 | Stop reason: HOSPADM

## 2018-03-30 RX ORDER — FENTANYL CITRATE 50 UG/ML
25 INJECTION, SOLUTION INTRAMUSCULAR; INTRAVENOUS
Status: CANCELLED | OUTPATIENT
Start: 2018-03-30

## 2018-03-30 RX ORDER — ALBUTEROL SULFATE 0.83 MG/ML
2.5 SOLUTION RESPIRATORY (INHALATION)
Status: DISCONTINUED | OUTPATIENT
Start: 2018-03-31 | End: 2018-03-31 | Stop reason: HOSPADM

## 2018-03-30 RX ORDER — CLOPIDOGREL BISULFATE 75 MG/1
75 TABLET ORAL DAILY
Status: DISCONTINUED | OUTPATIENT
Start: 2018-03-31 | End: 2018-03-31 | Stop reason: HOSPADM

## 2018-03-30 RX ORDER — HEPARIN SODIUM 200 [USP'U]/100ML
1500 INJECTION, SOLUTION INTRAVENOUS ONCE
Status: COMPLETED | OUTPATIENT
Start: 2018-03-30 | End: 2018-03-30

## 2018-03-30 RX ORDER — SODIUM CHLORIDE 0.9 % (FLUSH) 0.9 %
5-10 SYRINGE (ML) INJECTION AS NEEDED
Status: DISCONTINUED | OUTPATIENT
Start: 2018-03-30 | End: 2018-03-31 | Stop reason: HOSPADM

## 2018-03-30 RX ORDER — MIDAZOLAM HYDROCHLORIDE 1 MG/ML
INJECTION, SOLUTION INTRAMUSCULAR; INTRAVENOUS AS NEEDED
Status: DISCONTINUED | OUTPATIENT
Start: 2018-03-30 | End: 2018-03-30 | Stop reason: HOSPADM

## 2018-03-30 RX ORDER — SODIUM CHLORIDE, SODIUM LACTATE, POTASSIUM CHLORIDE, CALCIUM CHLORIDE 600; 310; 30; 20 MG/100ML; MG/100ML; MG/100ML; MG/100ML
25 INJECTION, SOLUTION INTRAVENOUS CONTINUOUS
Status: CANCELLED | OUTPATIENT
Start: 2018-03-30

## 2018-03-30 RX ORDER — HEPARIN SODIUM 10000 [USP'U]/100ML
INJECTION, SOLUTION INTRAVENOUS
Status: COMPLETED
Start: 2018-03-30 | End: 2018-03-30

## 2018-03-30 RX ORDER — HYDROCODONE BITARTRATE AND ACETAMINOPHEN 5; 325 MG/1; MG/1
1 TABLET ORAL
Status: DISCONTINUED | OUTPATIENT
Start: 2018-03-30 | End: 2018-03-31 | Stop reason: HOSPADM

## 2018-03-30 RX ORDER — PROTAMINE SULFATE 10 MG/ML
INJECTION, SOLUTION INTRAVENOUS
Status: COMPLETED
Start: 2018-03-30 | End: 2018-03-30

## 2018-03-30 RX ORDER — SODIUM CHLORIDE, SODIUM LACTATE, POTASSIUM CHLORIDE, CALCIUM CHLORIDE 600; 310; 30; 20 MG/100ML; MG/100ML; MG/100ML; MG/100ML
25 INJECTION, SOLUTION INTRAVENOUS CONTINUOUS
Status: CANCELLED | OUTPATIENT
Start: 2018-03-30 | End: 2018-03-31

## 2018-03-30 RX ORDER — BUDESONIDE AND FORMOTEROL FUMARATE DIHYDRATE 160; 4.5 UG/1; UG/1
1 AEROSOL RESPIRATORY (INHALATION) 2 TIMES DAILY
Status: DISCONTINUED | OUTPATIENT
Start: 2018-03-30 | End: 2018-03-31 | Stop reason: HOSPADM

## 2018-03-30 RX ORDER — NEOSTIGMINE METHYLSULFATE 1 MG/ML
INJECTION INTRAVENOUS AS NEEDED
Status: DISCONTINUED | OUTPATIENT
Start: 2018-03-30 | End: 2018-03-30 | Stop reason: HOSPADM

## 2018-03-30 RX ORDER — ACETAMINOPHEN 325 MG/1
650 TABLET ORAL
Status: DISCONTINUED | OUTPATIENT
Start: 2018-03-30 | End: 2018-03-31 | Stop reason: HOSPADM

## 2018-03-30 RX ORDER — SODIUM CHLORIDE 0.9 % (FLUSH) 0.9 %
5-10 SYRINGE (ML) INJECTION EVERY 8 HOURS
Status: CANCELLED | OUTPATIENT
Start: 2018-03-30

## 2018-03-30 RX ORDER — HEPARIN SODIUM 200 [USP'U]/100ML
INJECTION, SOLUTION INTRAVENOUS
Status: COMPLETED
Start: 2018-03-30 | End: 2018-03-30

## 2018-03-30 RX ORDER — CARVEDILOL 3.12 MG/1
6.25 TABLET ORAL 2 TIMES DAILY WITH MEALS
Status: DISCONTINUED | OUTPATIENT
Start: 2018-03-30 | End: 2018-03-31 | Stop reason: HOSPADM

## 2018-03-30 RX ORDER — ATORVASTATIN CALCIUM 40 MG/1
20 TABLET, FILM COATED ORAL
Status: DISCONTINUED | OUTPATIENT
Start: 2018-03-30 | End: 2018-03-31 | Stop reason: HOSPADM

## 2018-03-30 RX ORDER — DOBUTAMINE HYDROCHLORIDE 200 MG/100ML
INJECTION INTRAVENOUS
Status: DISCONTINUED | OUTPATIENT
Start: 2018-03-30 | End: 2018-03-30 | Stop reason: HOSPADM

## 2018-03-30 RX ORDER — PHENYLEPHRINE HCL IN 0.9% NACL 0.4MG/10ML
SYRINGE (ML) INTRAVENOUS AS NEEDED
Status: DISCONTINUED | OUTPATIENT
Start: 2018-03-30 | End: 2018-03-30 | Stop reason: HOSPADM

## 2018-03-30 RX ORDER — SODIUM CHLORIDE 9 MG/ML
INJECTION, SOLUTION INTRAVENOUS
Status: DISCONTINUED | OUTPATIENT
Start: 2018-03-30 | End: 2018-03-30 | Stop reason: HOSPADM

## 2018-03-30 RX ORDER — COLCHICINE 0.6 MG/1
1.2 TABLET ORAL DAILY
Status: DISCONTINUED | OUTPATIENT
Start: 2018-03-31 | End: 2018-03-31 | Stop reason: HOSPADM

## 2018-03-30 RX ADMIN — Medication 10 ML: at 18:43

## 2018-03-30 RX ADMIN — MIDAZOLAM HYDROCHLORIDE 2 MG: 1 INJECTION, SOLUTION INTRAMUSCULAR; INTRAVENOUS at 13:25

## 2018-03-30 RX ADMIN — Medication 10 ML: at 21:39

## 2018-03-30 RX ADMIN — CARVEDILOL 6.25 MG: 3.12 TABLET, FILM COATED ORAL at 18:43

## 2018-03-30 RX ADMIN — APIXABAN 5 MG: 5 TABLET, FILM COATED ORAL at 21:38

## 2018-03-30 RX ADMIN — SODIUM CHLORIDE 250 ML: 900 INJECTION, SOLUTION INTRAVENOUS at 23:41

## 2018-03-30 RX ADMIN — FENTANYL CITRATE 100 MCG: 50 INJECTION, SOLUTION INTRAMUSCULAR; INTRAVENOUS at 13:28

## 2018-03-30 RX ADMIN — ACETAMINOPHEN 650 MG: 325 TABLET ORAL at 18:43

## 2018-03-30 RX ADMIN — FLUTICASONE PROPIONATE 2 SPRAY: 50 SPRAY, METERED NASAL at 21:33

## 2018-03-30 RX ADMIN — GABAPENTIN 800 MG: 100 CAPSULE ORAL at 18:44

## 2018-03-30 RX ADMIN — GLYCOPYRROLATE 0.5 MG: 0.2 INJECTION INTRAMUSCULAR; INTRAVENOUS at 15:17

## 2018-03-30 RX ADMIN — ONDANSETRON 4 MG: 2 INJECTION INTRAMUSCULAR; INTRAVENOUS at 15:10

## 2018-03-30 RX ADMIN — DOBUTAMINE HYDROCHLORIDE 20 MCG/KG/MIN: 200 INJECTION INTRAVENOUS at 14:54

## 2018-03-30 RX ADMIN — GABAPENTIN 800 MG: 100 CAPSULE ORAL at 21:47

## 2018-03-30 RX ADMIN — AMIODARONE HYDROCHLORIDE 100 MG: 200 TABLET ORAL at 21:37

## 2018-03-30 RX ADMIN — SODIUM CHLORIDE: 9 INJECTION, SOLUTION INTRAVENOUS at 13:24

## 2018-03-30 RX ADMIN — HEPARIN SODIUM 12000 UNITS: 1000 INJECTION, SOLUTION INTRAVENOUS; SUBCUTANEOUS at 14:05

## 2018-03-30 RX ADMIN — Medication 200 MCG: at 13:33

## 2018-03-30 RX ADMIN — PROPOFOL 180 MG: 10 INJECTION, EMULSION INTRAVENOUS at 13:28

## 2018-03-30 RX ADMIN — HEPARIN SODIUM 5000 UNITS/HR: 10000 INJECTION, SOLUTION INTRAVENOUS at 14:05

## 2018-03-30 RX ADMIN — HEPARIN SODIUM 3000 UNITS: 200 INJECTION, SOLUTION INTRAVENOUS at 13:56

## 2018-03-30 RX ADMIN — PROTAMINE SULFATE 100 MG: 10 INJECTION, SOLUTION INTRAVENOUS at 14:56

## 2018-03-30 RX ADMIN — NEOSTIGMINE METHYLSULFATE 3 MG: 1 INJECTION INTRAVENOUS at 15:17

## 2018-03-30 RX ADMIN — EPHEDRINE SULFATE 15 MG: 50 INJECTION, SOLUTION INTRAVENOUS at 13:36

## 2018-03-30 RX ADMIN — ROCURONIUM BROMIDE 50 MG: 10 INJECTION, SOLUTION INTRAVENOUS at 13:28

## 2018-03-30 RX ADMIN — Medication 200 MCG: at 13:35

## 2018-03-30 NOTE — H&P (VIEW-ONLY)
Subjective:      Dwayne Alvarado is a 78 y.o. female is here for device follow up s/p AF ablation in 5/2017. She is doing well aside from baseline shortness of breath and palpitations. The patient denies chest pain/ shortness of breath, orthopnea, PND, LE edema, palpitations, syncope, presyncope or fatigue.        Patient Active Problem List    Diagnosis Date Noted    Type 2 [facility] mellitus with nephropathy [facility] 12/27/2017    CKD (chronic kidney disease) stage 3, GFR 30-59 ml/min 09/22/2017    Acute renal failure (ARF) ([facility] 09/13/2017    Chronic systolic HF (heart failure) ([facility] 05/10/2017    History of Clostridium difficile infection 05/10/2017    Junctional tachycardia ([facility] 05/10/2017    Hypotension 05/10/2017    Tachycardia 05/09/2017    S/P ablation of atrial fibrillation 05/02/2017    Fear associated with illness and body function 04/07/2017    Counseling regarding advanced care planning and goals of care 08/52/5957    Systolic CHF, acute ([facility] 69/62/7825    Acute systolic CHF (congestive heart failure) ([facility] 04/06/2017    CHF (congestive heart failure) ([facility] 04/04/2017    Type 2 diabetes mellitus with diabetic neuropathy, without long-term current use of insulin ([facility] 01/31/2017    Anticoagulation monitoring, INR range 2-3 01/31/2017    GIB (gastrointestinal bleeding) 26/27/9251    Diastolic CHF, acute on chronic ([facility] 09/22/2014    S/P coronary artery stent placement 07/04/2014    Back pain 11/14/2012    Sinoatrial node dysfunction ([address] 07/05/2012    Cardiac pacemaker in situ 07/05/2012    Mixed hyperlipidemia 07/05/2012    Chest pain 09/21/2011    Anemia 07/25/2011    Sick sinus syndrome ([address] 02/25/2011    S/P angioplasty with stent 02/07/2011    Hypokalemia 07/20/2010    Hip pain 06/08/2010    Hypertension--essential 06/02/2010    Atrial fibrillation--paroxysmal 06/02/2010    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 Kade Quintana MD  Past Medical History:   Diagnosis Date    Atrial fibrillation ([facility] 2010    CAD (coronary artery disease)     stent    Chronic diastolic heart failure ([facility] 2014    Chronic systolic HF (heart failure) ([facility] 5/10/2017    2017 EF 25-30%    COPD     COPD (chronic obstructive pulmonary disease) ([facility] 2010    Diabetes ([facility]     Fibroid     Heart failure ([facility]     History of Clostridium difficile infection 5/10/2017    2017 CDiff positive    Hypertension 2010    Hypotension 5/10/2017    Junctional tachycardia ([facility] 5/10/2017    NIDDM (non-insulin dependent diabetes mellitus) 2010    Screening mammogram 5/4/10    SOB (shortness of breath) 2014      Past Surgical History:   Procedure Laterality Date    COLONOSCOPY N/A 2017    COLONOSCOPY performed by Du Penaloza MD at [facility] AM[facility]UL[facility]ORY OR    HX  SECTION      HX OTHER SURGICAL      adrenal gland removed    HX PACEMAKER      PA EXCISE ADRENAL GLAND       Allergies   Allergen Reactions    Crestor [Rosuvastatin] Myalgia    Levaquin [Levofloxacin] Nausea Only     GI Upset    Lipitor [Atorvastatin] Diarrhea    Lyrica [Pregabalin] Myalgia      Family History   Problem Relation Age of Onset    Heart Disease Mother     Diabetes Father     Heart Disease Brother     negative for cardiac disease  Social History     Social History    Marital status:      Spouse name: N/A    Number of children: N/A    Years of education: N/A     Social History Main Topics    Smoking status: Former Smoker     Types: Cigarettes     Quit date: 2009    Smokeless tobacco: Never Used    Alcohol use No    Drug use: No    Sexual activity: Not Currently     Other Topics Concern    None     Social History Narrative     Current Outpatient Prescriptions   Medication Sig    gabapentin (NEURONTIN) 800 mg tablet TAKE ONE TABLET BY MOUTH THREE TIMES DAILY    apixaban (ELIQUIS) 5 mg tablet Take 1 Tab by mouth two (2) times a day. Indications: PREVENT THROMBOEMBOLISM IN CHRONIC ATRIAL FIBRILLATION    amiodarone (CORDARONE) 200 mg tablet Take 0.5 Tabs by mouth nightly.  simvastatin (ZOCOR) 10 mg tablet Take 1 Tab by mouth nightly.  budesonide-formoterol (SYMBICORT) 160-4.5 mcg/actuation HFAA Take 1 Puff by inhalation two (2) times a day.  tiotropium (SPIRIVA WITH HANDIHALER) 18 mcg inhalation capsule INHALE THE CONTENTS OF 1 CAPSULE THROUGH HANDIHALER DEVICE DAILY    furosemide (LASIX) 20 mg tablet Take 1 Tab by mouth daily.  colchicine 0.6 mg tablet Take 1.2 mg by mouth daily. Patient takes 1.2 mg daily and 2.4 mg PRN flare up    acetaminophen (TYLENOL) 500 mg tablet Take 500 mg by mouth every six (6) hours as needed for Pain.  clopidogrel (PLAVIX) 75 mg tab Take 75 mg by mouth daily.  carvedilol (COREG) 6.25 mg tablet Take 1 Tab by mouth two (2) times daily (with meals).  fluticasone (FLONASE) 50 mcg/actuation nasal spray 2 Sprays by Both Nostrils route every evening.  albuterol (PROVENTIL VENTOLIN) 2.5 mg /3 mL (0.083 %) nebulizer solution 3 mL by Nebulization route every four (4) hours as needed for Wheezing.  Azelastine (ASTEPRO) 0.15 % (205.5 mcg) nasal spray 1 Kirkman by Both Nostrils route daily.  cod liver oil cap Take 1 Cap by mouth daily. No current facility-administered medications for this visit. Vitals:    03/20/18 1154   BP: 110/60   Pulse: 80   Resp: 16   SpO2: 99%   Weight: 166 lb 3.2 oz (75.4 kg)   Height: 5' 6\" (1.676 m)       I have reviewed the nurses notes, vitals, problem list, allergy list, medical history, family, social history and medications. Review of Symptoms:    General: Pt denies excessive weight gain or loss. Pt is able to conduct ADL's  HEENT: Denies blurred vision, headaches, epistaxis and difficulty swallowing. Respiratory: +sob w exertion, Denies wheezing or stridor.   Cardiovascular: +palpitations, Denies precordial pain, edema or PND  Gastrointestinal: Denies poor appetite, indigestion, abdominal pain or blood in stool  Urinary: Denies dysuria, pyuria  Musculoskeletal: Denies pain or swelling from muscles or joints  Neurologic: Denies tremor, paresthesias, or sensory motor disturbance  Skin: Denies rash, itching or texture change. Psych: Denies depression      Physical Exam:      General: Well developed, in no acute distress. HEENT: Eyes - PERRL, no jvd  Heart:  Normal S1/S2 negative S3 or S4. Regular, no murmur, gallop or rub.   Respiratory: Clear bilaterally x 4, no wheezing or rales  Abdomen:   Soft, non-tender, bowel sounds are active.   Extremities:  No edema, normal cap refill, no cyanosis. Musculoskeletal: No clubbing  Neuro: A&Ox3, speech clear, gait stable. Skin: Skin color is normal. No rashes or lesions. Non diaphoretic  Vascular: 2+ pulses symmetric in all extremities    Cardiographics    Ekg: atrial pacing  187 episodes, longest episode: 12 hrs, 12 min. Results for orders placed or performed during the hospital encounter of 09/20/17   EKG, 12 LEAD, INITIAL   Result Value Ref Range    Ventricular Rate 76 BPM    Atrial Rate 76 BPM    P-R Interval 244 ms    QRS Duration 144 ms    Q-T Interval 460 ms    QTC Calculation (Bezet) 517 ms    Calculated P Axis 81 degrees    Calculated R Axis -27 degrees    Calculated T Axis 111 degrees    Diagnosis       Atrial-paced rhythm with 1st degree AV block  Left bundle branch block  When compared with ECG of 19-JUL-2017 12:16,  No significant change was found  Confirmed by Omi Dumont ([id number]) on 9/20/2017 5:23:53 PM       *Note: Due to a large number of results and/or encounters for the requested time period, some results have not been displayed. A complete set of results can be found in Results Review.          Lab Results   Component Value Date/Time    WBC 6.7 12/26/2017 02:00 PM    Hemoglobin (POC) 11.2 07/20/2017 09:02 AM    HGB 12.2 12/26/2017 02:00 PM    HCT 37.6 12/26/2017 02:00 PM    PLATELET 279 82/96/8084 02:00 PM    MCV 86.4 12/26/2017 02:00 PM      Lab Results   Component Value Date/Time    Sodium 136 12/26/2017 02:00 PM    Potassium 3.6 12/26/2017 02:00 PM    Chloride 104 12/26/2017 02:00 PM    CO2 27 12/26/2017 02:00 PM    Anion gap 5 12/26/2017 02:00 PM    Glucose 100 12/26/2017 02:00 PM    BUN 14 12/26/2017 02:00 PM    Creatinine 1.63 (H) 12/26/2017 02:00 PM    BUN/Creatinine ratio 9 (L) 12/26/2017 02:00 PM    GFR est AA 38 (L) 12/26/2017 02:00 PM    GFR est non-AA 32 (L) 12/26/2017 02:00 PM    Calcium 8.9 12/26/2017 02:00 PM    Bilirubin, total 0.6 09/22/2017 07:01 AM    AST (SGOT) 140 (H) 09/22/2017 07:01 AM    Alk. phosphatase 302 (H) 09/22/2017 07:01 AM    Protein, total 9.0 (H) 09/22/2017 07:01 AM    Albumin 2.7 (L) 09/22/2017 07:01 AM    Globulin 6.3 (H) 09/22/2017 07:01 AM    A-G Ratio 0.4 (L) 09/22/2017 07:01 AM    ALT (SGPT) 61 09/22/2017 07:01 AM         Assessment:     Assessment:        ICD-10-CM ICD-9-CM    1. Paroxysmal atrial fibrillation (HCC) I48.0 427.31 AMB POC EKG ROUTINE W/ 12 LEADS, INTER & REP      PROTHROMBIN TIME + INR      METABOLIC PANEL, COMPREHENSIVE      MAGNESIUM      CBC WITH AUTOMATED DIFF      XR CHEST PA LAT      CTA CHEST W OR W WO CONT   2. Essential hypertension I10 401.9    3. Chronic systolic HF (heart failure) (HCC) I50.22 428.22    4. Chronic obstructive pulmonary disease, unspecified COPD type ([facility] J44.9 496    5. S/P ablation of atrial fibrillation Z98.890 V45.89     Z86.79       Orders Placed This Encounter    XR CHEST PA LAT     Standing Status:   Future     Standing Expiration Date:   4/20/2019     Order Specific Question:   Reason for Exam     Answer:   AF ablation    CTA CHEST W OR W WO CONT     Standing Status:   Future     Standing Expiration Date:   4/20/2019     Order Specific Question:   Is Patient Allergic to Contrast Dye? Answer:   Unknown     Order Specific Question:   STAT Creatinine as indicated     Answer:    Yes    PROTHROMBIN TIME + INR    METABOLIC PANEL, COMPREHENSIVE    MAGNESIUM    CBC WITH AUTOMATED DIFF    AMB POC EKG ROUTINE W/ 12 LEADS, INTER & REP     Order Specific Question:   Reason for Exam:     Answer:   routine        Plan:   Ms. Ulysses Richards is here for annual device follow up s/p AF ablation in 5/2017. She was on sotalol post ablation and then was switched to Amiodarone. She has shortness of breath r/t COPD but this is unchanged. Her device interrogation shows 85% Ap for her sick sinus, 1%RVP and 187 episodes of AF, longest lasting 12 hrs. She reports palpitations. She is a candidate for repeat AF ablation. I discussed the risks/benefits/alternatives of the procedure with the patient. Risks include (but are not limited to) bleeding, infection, cva/mi/tamponade/esophageal perforation/pv stenosis/death. The patient understands that there is a 8-1% major complication rate and agrees to proceed. Thank you for this interesting consultation. Continue medical management for HTN, COPD, HF. Thank you for allowing me to participate in Reza Nereyda 's care.     MD Shaye Aguillon MD, Beau Davis

## 2018-03-30 NOTE — ANESTHESIA PREPROCEDURE EVALUATION
Anesthetic History   No history of anesthetic complications            Review of Systems / Medical History  Patient summary reviewed, nursing notes reviewed and pertinent labs reviewed    Pulmonary    COPD: mild      Shortness of breath and smoker      Comments: Former smoker   Neuro/Psych   Within defined limits           Cardiovascular    Hypertension: well controlled  Valvular problems/murmurs: aortic insufficiency    CHF  Dysrhythmias : atrial fibrillation  Pacemaker, CAD, cardiac stents and hyperlipidemia    Exercise tolerance: <4 METS  Comments: Sick sinus syndrome   Coronary stents  LBBB  1st degree AVB  EF 55-60%  Mild to moderate AI   GI/Hepatic/Renal         Renal disease: CRI       Endo/Other    Diabetes: type 2         Other Findings            Physical Exam    Airway  Mallampati: III  TM Distance: > 6 cm  Neck ROM: normal range of motion   Mouth opening: Normal     Cardiovascular    Rhythm: regular  Rate: normal        Comments: Paced  Dental    Dentition: Poor dentition  Comments: Prominent incisors   Pulmonary  Breath sounds clear to auscultation               Abdominal  GI exam deferred       Other Findings   Comments: Missing multiple teeth         Anesthetic Plan    ASA: 3  Anesthesia type: general    Monitoring Plan: BIS      Induction: Intravenous  Anesthetic plan and risks discussed with: Patient      .

## 2018-03-30 NOTE — INTERVAL H&P NOTE
H&P Update:   Ebony Quezada was seen and examined. History and physical has been reviewed. The patient has been examined.  There have been no significant clinical changes since the completion of the originally dated History and Physical.    Signed By: Lexi Dasilva MD     March 30, 2018 12:16 PM

## 2018-03-30 NOTE — PERIOP NOTES
TRANSFER - OUT REPORT:    Verbal report given to Francisca Davis RN(name) on Ronna Deeds  [facility] - [facility]       Report consisted of patients Situation, Background, Assessment and   Recommendations(SBAR). Information from the following report(s) SBAR, Procedure Summary, Intake/Output and MAR was reviewed with the receiving nurse. Opportunity for questions and clarification was provided.       Patient transported with:   Monitor  O2 @ 2 liters  Registered Nurse  [facility]

## 2018-03-30 NOTE — PROGRESS NOTES
Bedside and Verbal shift change report given to Allie (oncoming nurse) by Ulises Merchant (offgoing nurse). Report included the following information SBAR, Kardex, Intake/Output, MAR, Accordion and Recent Results.

## 2018-03-30 NOTE — PERIOP NOTES
Handoff Report from Operating Room to PACU    Report received from Mello Kunz and TEJA Noriega CRNA regarding Brian Hicks. Surgeon(s):  Case Anesthesia  And Procedure(s) (LRB):  PACU/RECOVERY FROM A-FIB ABLATION (N/A)  confirmed   with allergies and dressings discussed. Anesthesia type, drugs, patient history, complications, estimated blood loss, vital signs, intake and output, and last pain medication, lines and temperature were reviewed.

## 2018-03-30 NOTE — IP AVS SNAPSHOT
[address] [place] [address] 
[phone] Patient: Ken Munguia MRN: [id number] VSF:[id number] About your hospitalization You were admitted on:  March 30, 2018 You last received care in the:  [facility] 2 INTRVNTNL CARDIO You were discharged on:  March 31, 2018 Why you were hospitalized Your primary diagnosis was:  Not on File Your diagnoses also included:  A-Fib (Hcc) Follow-up Information Follow up With Details Comments Contact Info Sonu Benson MD   [address] [place] [address] [phone] 
[phone] Discharge Orders None A check thaddeus indicates which time of day the medication should be taken. My Medications CONTINUE taking these medications Instructions Each Dose to Equal  
 Morning Noon Evening Bedtime  
 acetaminophen 500 mg tablet Commonly known as:  TYLENOL Your last dose was: Your next dose is: Take 500 mg by mouth every six (6) hours as needed for Pain. 500 mg  
    
   
   
   
  
 albuterol 2.5 mg /3 mL (0.083 %) nebulizer solution Commonly known as:  PROVENTIL VENTOLIN Your last dose was: Your next dose is:    
   
   
 3 mL by Nebulization route every four (4) hours as needed for Wheezing. 2.5 mg  
    
   
   
   
  
 amiodarone 200 mg tablet Commonly known as:  CORDARONE Your last dose was: Your next dose is: Take 0.5 Tabs by mouth nightly. 100 mg  
    
   
   
   
  
 apixaban 5 mg tablet Commonly known as:  Arbutus Bon Your last dose was: Your next dose is: Take 1 Tab by mouth two (2) times a day. Indications: PREVENT THROMBOEMBOLISM IN CHRONIC ATRIAL FIBRILLATION  
 5 mg Azelastine 0.15 % (205.5 mcg) nasal spray Commonly known as:  ASTEPRO Your last dose was: Your next dose is: 1 [place] by Both Nostrils route daily. 1 Spray Blood-Glucose Meter monitoring kit Your last dose was: Your next dose is:    
   
   
 Use as directed. Dx: 11.9 Whichever meter her insurance covers  
     
   
   
   
  
 budesonide-formoterol 160-4.5 mcg/actuation Hfaa Commonly known as:  SYMBICORT Your last dose was: Your next dose is: Take 1 Puff by inhalation two (2) times a day. 1 Puff  
    
   
   
   
  
 carvedilol 6.25 mg tablet Commonly known as:  [place] Your last dose was: Your next dose is: Take 1 Tab by mouth two (2) times daily (with meals). 6.25 mg  
    
   
   
   
  
 cod liver oil Cap Your last dose was: Your next dose is: Take 1 Cap by mouth daily. 1 Cap  
    
   
   
   
  
 colchicine 0.6 mg tablet Your last dose was: Your next dose is: Take 1.2 mg by mouth daily. Patient takes 1.2 mg daily and 2.4 mg PRN flare up 1.2 mg  
    
   
   
   
  
 FLONASE 50 mcg/actuation nasal spray Generic drug:  fluticasone Your last dose was: Your next dose is: 2 Sprays by Both Nostrils route every evening. 2 Spray  
    
   
   
   
  
 furosemide 20 mg tablet Commonly known as:  LASIX Your last dose was: Your next dose is: Take 1 Tab by mouth daily. 20 mg  
    
   
   
   
  
 gabapentin 800 mg tablet Commonly known as:  NEURONTIN Your last dose was: Your next dose is: TAKE ONE TABLET BY MOUTH THREE TIMES DAILY  
     
   
   
   
  
 glucose blood VI test strips strip Commonly known as:  blood glucose test  
   
Your last dose was: Your next dose is:    
   
   
 Use BID DxE11.9 Lancets Misc Your last dose was: Your next dose is:    
   
   
 Use BID Dx: E11.9 PLAVIX 75 mg Tab Generic drug:  clopidogrel Your last dose was: Your next dose is: Take 75 mg by mouth daily. 75 mg  
    
   
   
   
  
 simvastatin 10 mg tablet Commonly known as:  ZOCOR Your last dose was: Your next dose is: Take 1 Tab by mouth nightly. 10 mg  
    
   
   
   
  
 tiotropium 18 mcg inhalation capsule Commonly known as:  [address] Your last dose was: Your next dose is:    
   
   
 INHALE THE CONTENTS OF 1 CAPSULE THROUGH HANDIHALER DEVICE DAILY Discharge Instructions [address]  [phone] ABLATION DISCHARGE INSTRUCTIONS Patient ID: 
Reza Dawn [id number] 
78 y.o. 
1950 Admit Date: 3/30/2018 Discharge Date: 3/30/2018 Admitting Physician: Shaye Kraft MD  
 
Discharge Physician: Shaye Kraft MD 
 
Admission Diagnoses:  
a fib PACU RECOVERY NEEDED A-fib ([address] Discharge Diagnoses: Active Problems: 
  A-fib ([address] (3/30/2018) Discharge Condition: Good Cardiology Procedures this Admission:  AF ablation Disposition: home Reference discharge instructions provided by nursing for diet and activity. Follow-up with Dr Ebony Martinez in one week. Call [phone] to make an appointment. Signed: 
Shaye Kraft MD 
3/30/2018 
3:19 PM 
 
S/P ABLATION DISCHARGE INSTRUCTIONS It is normal to feel tired the first couple days. Take it easy and follow the physicians instructions. CHECK THE CATHETER INSERTION SITE DAILY: 
You may shower 24 hours after the procedure, remove the bandage during showering. Wash with soap and water and pat dry. Gentle cleaning of the site with soap and water is sufficient, cover with a dry clean dressing or bandage. Do not apply creams or powders to the area.  
Do not sit in a bathtub or pool of water for 7 days or until wound has completely healed. Temporary bruising and discomfort is normal and may last a few weeks. You may have a  formation of a small lump at the site which may last up to 6 weeks. CALL THE PHYSICIAN: If the site becomes red, swollen or feels warm to the touch If there is bleeding or drainage or if there is unusual pain at the groin or down the leg. If there is any bleeding, lie down, apply pressure or have someone apply pressure with a clean cloth until the bleeding stops. If the bleeding continues, call 911 to be transported to the hospital. 
DO  [place] [address]. Activity: For the first 24-48 hours or as instructed by the physician: No lifting, pushing or pulling over 5 pounds and no straining the insertion site. Do not life grocery bags or the garbage can, do not run the vacuum  or  for 7 days. Start with short walks as in the hospital and gradually increase as tolerated each day. It is recommended to walk 30 minutes 5-7 days per week. Follow your physicians instructions on activity. Avoid walking outside in extremes of heat or cold. Walk inside when it is cold and windy or hot and humid. Things to keep in mind: 
No driving for at least 5 days, or as designated by your physician. Limit the number of times you go up and down the stairs Take rests and pace yourself with activity. Be careful and do not strain with bowel movements. Medications: Take all medications as prescribed Call your physician if you have any questions Keep an updated list of your medications with you at all times and give a list to your physician and pharmacist 
 
Signs and Symptoms: 
Be cautious of symptoms of angina or recurrent symptoms such as chest discomfort, unusual shortness of breath or fatigue, palpitations. After Care: Follow up with your physician as instructed.  
Follow a heart healthy diet with proper portion control, daily stress management, daily exercise, blood pressure and cholesterol control , and smoking cessation. Introducing [facility]! Dear Yuri Grant: Thank you for requesting a [employer] account. Our records indicate that you already have an active [employer] account. You can access your account anytime at [email or web address] Did you know that you can access your hospital and ER discharge instructions at any time in [employer]? You can also review all of your test results from your hospital stay or ER visit. Additional Information If you have questions, please visit the Frequently Asked Questions section of the [employer] website at [email or web address] Remember, [employer] is NOT to be used for urgent needs. For medical emergencies, dial 911. Now available from your iPhone and Android! Introducing [employer] As a [facility] patient, I wanted to make you aware of our electronic visit tool called [employer]. [facility] [employer] allows you to connect within minutes with a medical provider 24 hours a day, seven days a week via a mobile device or tablet or logging into a secure website from your computer. You can access [employer] from anywhere in the [place]. A virtual visit might be right for you when you have a simple condition and feel like you just dont want to get out of bed, or cant get away from work for an appointment, when your regular [facility] provider is not available (evenings, weekends or holidays), or when youre out of town and need minor care. Electronic visits cost only $49 and if the [facility] [employer] provider determines a prescription is needed to treat your condition, one can be electronically transmitted to a nearby pharmacy*. Please take a moment to enroll today if you have not already done so. The enrollment process is free and takes just a few minutes.   To enroll, please download the [employer] 24/7 jaylene to your tablet or phone, or visit [email or web address] to enroll on your computer. And, as an [address] [place] [address] patient with a [employer] account, the results of your visits will be scanned into your electronic medical record and your primary care provider will be able to view the scanned results. We urge you to continue to see your regular [employer] [facility] provider for your ongoing medical care. And while your primary care provider may not be the one available when you seek a "[employer] virtual visit, the peace of mind you get from getting a real diagnosis real time can be priceless. For more information on "[employer] view our Frequently Asked Questions (FAQs) at [email or web address]. Sincerely, 
 
Jud Colin MD 
Chief Medical Officer Justin Tesha Watt *:  certain medications cannot be prescribed via "[employer] Providers Seen During Your Hospitalization Provider Specialty Primary office phone Cheryn Jeans, MD Cardiology [phone] Your Primary Care Physician (PCP) Primary Care Physician Office Phone Office Fax Caleb Hernandez [phone] You are allergic to the following Allergen Reactions Crestor (Rosuvastatin) Myalgia Levaquin (Levofloxacin) Nausea Only GI Upset Lipitor (Atorvastatin) Diarrhea Lyrica (Pregabalin) Myalgia Recent Documentation Height Weight BMI OB Status Smoking Status 1.676 m 73 kg 25.99 kg/m2 Postmenopausal Former Smoker Emergency Contacts Name Discharge Info Relation Home Work Mobile Isabelle Garcia DISCHARGE CAREGIVER [3] Other Relative [6]   [phone] Huong Glass DISCHARGE CAREGIVER [3] Child [2] [phone] [facility] CAREGIVER [3] Other Relative [6] [phone] Huong Knox DISCHARGE CAREGIVER [3] Child [2] [phone] Patient Belongings The following personal items are in your possession at time of discharge: 
                             
 
  
  
 Please provide this summary of care documentation to your next provider. Signatures-by signing, you are acknowledging that this After Visit Summary has been reviewed with you and you have received a copy. Patient Signature:  ____________________________________________________________ Date:  ____________________________________________________________  
  
Jomar Emmanuel Provider Signature:  ____________________________________________________________ Date:  ____________________________________________________________

## 2018-03-30 NOTE — DISCHARGE INSTRUCTIONS
[address] [address]  [phone]        [facility] INSTRUCTIONS    Patient ID:  Marilia Pollack  [id number]  22 y.o.  1950    Admit Date: 3/30/2018    Discharge Date: 3/30/2018     Admitting Physician: Libby Phillips MD     Discharge Physician: Libby Phillips MD    Admission Diagnoses:   a [facility]  PACU RECOVERY NEEDED  [facility])    Discharge Diagnoses: Active Problems:    [facility]) (3/30/2018)        Discharge Condition: Good    Cardiology Procedures this Admission:  AF ablation    Disposition: home    Reference discharge instructions provided by nursing for diet and activity. Follow-up with Dr Amber Barba in one week. Call [phone] to make an appointment. Signed:  Libby Phillips MD  3/30/2018  3:19 PM    S/P ABLATION DISCHARGE INSTRUCTIONS    It is normal to feel tired the first couple days. Take it easy and follow the physicians instructions. CHECK THE CATHETER INSERTION SITE DAILY:  You may shower 24 hours after the procedure, remove the bandage during showering. Wash with soap and water and pat dry. Gentle cleaning of the site with soap and water is sufficient, cover with a dry clean dressing or bandage. Do not apply creams or powders to the area. Do not sit in a bathtub or pool of water for 7 days or until wound has completely healed. Temporary bruising and discomfort is normal and may last a few weeks. You may have a  formation of a small lump at the site which may last up to 6 weeks. CALL THE PHYSICIAN:  If the site becomes red, swollen or feels warm to the touch  If there is bleeding or drainage or if there is unusual pain at the groin or down the leg. If there is any bleeding, lie down, apply pressure or have someone apply pressure with a clean cloth until the bleeding stops.   If the bleeding continues, call [phone] to be transported to the hospital.  DO NOT DRIVE YOURSELF, Ciera 911.    Activity:      For the first 24-48 hours or as instructed by the physician:  No lifting, pushing or pulling over 5 pounds and no straining the insertion site. Do not life grocery bags or the garbage can, do not run the vacuum  or  for 7 days. Start with short walks as in the hospital and gradually increase as tolerated each day. It is recommended to walk 30 minutes 5-7 days per week. Follow your physicians instructions on activity. Avoid walking outside in extremes of heat or cold. Walk inside when it is cold and windy or hot and humid. Things to keep in mind:  No driving for at least 5 days, or as designated by your physician. Limit the number of times you go up and down the stairs  Take rests and pace yourself with activity. Be careful and do not strain with bowel movements. Medications: Take all medications as prescribed  Call your physician if you have any questions  Keep an updated list of your medications with you at all times and give a list to your physician and pharmacist    Signs and Symptoms:  Be cautious of symptoms of angina or recurrent symptoms such as chest discomfort, unusual shortness of breath or fatigue, palpitations. After Care: Follow up with your physician as instructed. Follow a heart healthy diet with proper portion control, daily stress management, daily exercise, blood pressure and cholesterol control , and smoking cessation.

## 2018-03-30 NOTE — PROCEDURES
[address] [address]  [phone]    Indications and Pre-Procedure Diagnosis:  Val Bryan is a 78 y.o. female with AF is referred for electr-physiologic evaluation and intervention. Post Procedure Diagnosis    AF  Atypical atrial flutter    Atrial Fibrillation Ablation Summary  Informed consent was obtained. The patient was brought to the EP lab where SIERRA demonstrated no thrombus in the left atrial appendage. All vascular access sites were prepped and draped in the usual sterile fashion and the Seldinger technique was used to catherize the RFV and LFV with multi-polar electrode catheters, which were placed in the appropriate intra-cardiac sites under fluoroscopic guidance (see catheter list). Right and left atrial pacing and recording, His bundle recording, and right ventricular pacing and recording were performed. Continuous pulse oximetry and cuff BP monitoring were performed. During the procedure, the patient received Versed, Fentanyl and Propofol for sedation per anesthesia personnel. Transeptal Puncture  A transeptal atrial puncture was performed using fluoroscopic and intracardiac ultrasound guidance. An SL1 sheath was dragged from the SVC along the septum until a sudden leftward displacement was observed. Engagement of the Fossa Ovalis was confirmed by the interatrial tenting seen under intracardiac ultrasound guidance. The Weber Nishant NRG needle was advanced into the left atrium and its positioned confirmed with contrast injection. The dilator and sheath were advanced carefully over the needle into the body of the left atrium and the dilator/needle removed. An Agilis sheath was exchanged over the wire for the SL1 sheath.  No pericardial effusion was appreciated on intracardiac ultrasound so a bolus injection of heparin 100 units/kg was administered, with a continuous heparin gtt of 5000 units/hr so that the activated clotting time maintained was between 300 and 400 seconds with additional boluses q 30 minutes as necessary. A 3D shell representing the left atrium and pulmonary veins was constructed with the electroanatomic mapping system. A Lasso mapping catheter was advanced into the left atrium through the Agilis sheath and a map of the body of the LA and the pulmonary veins was created. Pulmonary vein isolation was confirmed with exit and entrance block. A SmartJotkych SF large curve 8Fr/3.5mm catheter was then advanced into the left atrium. Additional Focus  The patient remained in atypical left atrial flutter and the RF catheter was used to create an anterior RF line from the RUPV to the mitral isthmus. Additional Focus  The patient remained in atypical atrial flutter and the RF catheter was used to create a posterior RF line from the RIPV to the LIPV. Additional Focus  The patient remained in atypical atrial flutter and the RF catheter was used to perform an anterior RF line from the MI to the LSPV/MAGGY junction. Additional Focus  The patient remained in atypical atrial flutter and the RF catheter was used to perform an anterior RF line from the RSPV down to the RIPV and the patient converted to sinus rhythm. Atrial Flutter Ablation  The sheath was pulled back to the right atrium and the RF catheter was advanced across the tricuspid valve and used to create a a RF line in the cavo-tricuspid isthmus with creation of bi-directional isthmus block. Radiofrequency energy was applied with a target power of 25-35W. Local sites were targeted until the local electrogram amplitude decreased to <0.15mv or by >50%. Autonomic manipulation with Dobutamine @ 20 mcg/min was performed during this procedure. Post ablation, rapid atrial pacing to 240 msec, on and off dobutamine, failed to induce any atrial arrhythmias. At the end of the procedure, all catheters were removed, heparin reversed, groin sheaths pulled and hemostasis achieved.  The patient left the laboratory in a stable condition. Fluoroscopy and procedure times were 19 and 75 minutes respectively. Estimated blood loss: <10 ml. Sharp counts: correct. Specimen (s) collected: none. The procedure related complication occurred: none. The following problems were encountered: none. Conduction intervals (ms)    A-A A-H H-V P-R QRS Q-T R-R V-V  1609 CHB CHB  510 2038 1503    AV victoria conduction    VA Block when pacing at 600 ms    Findings and Summary    This study demonstrates:  1. PVI of all 4 PVs confirmed  2. Additional RF focus of anterior RF line from RUPV to the MI  3. Additional RF focus of anterior RF line from the MI to the MAGGY/LUPV junction  4. Additional RF focus of posterior RF line from the RIPV to the LIPV  5. Additional RF focus of anterior RF line from RSPV down the RIPV converting the patient to sinus rhythm  6. Atrial flutter ablation with RFA of the CTI and confirmed bi-directional isthmus block  7. No further inducible atrial arrhythmias on dobutamine with rapid atrial pacing    Recommendation:  1. [address]  2. DDDR 75 bpm  3. Cont amiodarone    Thank you for allowing me to participate in this patients care.     Janay Blair MD, Kinsey Anderson

## 2018-03-31 ENCOUNTER — PATIENT MESSAGE (OUTPATIENT)
Dept: INTERNAL MEDICINE CLINIC | Age: 68
End: 2018-03-31

## 2018-03-31 VITALS
HEART RATE: 60 BPM | WEIGHT: 161 LBS | TEMPERATURE: 97.9 F | OXYGEN SATURATION: 93 % | RESPIRATION RATE: 20 BRPM | HEIGHT: 66 IN | DIASTOLIC BLOOD PRESSURE: 79 MMHG | SYSTOLIC BLOOD PRESSURE: 101 MMHG | BODY MASS INDEX: 25.88 KG/M2

## 2018-03-31 LAB
GLUCOSE BLD STRIP.AUTO-MCNC: 111 MG/DL (ref 65–100)
GLUCOSE BLD STRIP.AUTO-MCNC: 127 MG/DL (ref 65–100)
SERVICE CMNT-IMP: ABNORMAL
SERVICE CMNT-IMP: ABNORMAL

## 2018-03-31 PROCEDURE — 82962 GLUCOSE BLOOD TEST: CPT

## 2018-03-31 PROCEDURE — 74011000250 HC RX REV CODE- 250: Performed by: INTERNAL MEDICINE

## 2018-03-31 PROCEDURE — 94640 AIRWAY INHALATION TREATMENT: CPT

## 2018-03-31 PROCEDURE — 99218 HC RM OBSERVATION: CPT

## 2018-03-31 PROCEDURE — 74011250637 HC RX REV CODE- 250/637: Performed by: INTERNAL MEDICINE

## 2018-03-31 PROCEDURE — 74011250636 HC RX REV CODE- 250/636: Performed by: INTERNAL MEDICINE

## 2018-03-31 RX ADMIN — ALBUTEROL SULFATE 2.5 MG: 2.5 SOLUTION RESPIRATORY (INHALATION) at 11:27

## 2018-03-31 RX ADMIN — Medication 10 ML: at 06:35

## 2018-03-31 RX ADMIN — APIXABAN 5 MG: 5 TABLET, FILM COATED ORAL at 08:52

## 2018-03-31 RX ADMIN — SODIUM CHLORIDE 50 ML/HR: 900 INJECTION, SOLUTION INTRAVENOUS at 00:11

## 2018-03-31 RX ADMIN — UMECLIDINIUM 1 PUFF: 62.5 AEROSOL, POWDER ORAL at 08:53

## 2018-03-31 RX ADMIN — ALBUTEROL SULFATE 2.5 MG: 2.5 SOLUTION RESPIRATORY (INHALATION) at 08:22

## 2018-03-31 RX ADMIN — CARVEDILOL 6.25 MG: 3.12 TABLET, FILM COATED ORAL at 08:52

## 2018-03-31 RX ADMIN — CLOPIDOGREL BISULFATE 75 MG: 75 TABLET ORAL at 08:52

## 2018-03-31 RX ADMIN — FUROSEMIDE 20 MG: 20 TABLET ORAL at 08:52

## 2018-03-31 RX ADMIN — ACETAMINOPHEN 650 MG: 325 TABLET ORAL at 06:35

## 2018-03-31 RX ADMIN — GABAPENTIN 800 MG: 100 CAPSULE ORAL at 08:52

## 2018-03-31 RX ADMIN — COLCHICINE 1.2 MG: 0.6 TABLET, FILM COATED ORAL at 08:52

## 2018-03-31 NOTE — PROGRESS NOTES
Verbal and bedside report received from KATIE Kelley by [employer]. Resumed care of pt. VSS. Pt c/o of no pain. Bilateral groin sites dry and intact with no hematoma.

## 2018-03-31 NOTE — PROGRESS NOTES
Late Nsg Enrty     1900 Bedside report received from [facility]  and care assumed. Report given with SBAR , recent labs,    and MAR. Pt in no apparent distress St on monitor . Luna ionpalce draining clear yellow urine. Pt has congested cough but o2 sat at 95% on room air . 2100 pt made aware that Symbicort not formulary here at facility and pt should have family member bringing the morning. Pt verbalized understanding of same . Tristian Oxford SBP noted to be 80-90's pt states she has a low BP anyway. Pt asymptomatic. 2230 HR rhythm noted to change from [facility] with BBB to V paced BP  93/53  pt denies c/o SOB or flutter in chest .at 2230 back to [facility].      2300 SBP remains at 70-80 Dr. Newton Horan called SBAR done , orders received for NS bolus of 250 ml then continuous at 50 ml /hr . PRN order also obtained for neb tx for scattered wheezes with cough . Pt made aware of POC and reasoning for it , agreed with plan. 0000 SBP 93 now  Pt resting neb treatment not done pt sleeping O2 vai nasal cannula on .     0600 pt assisted up to bathroom , pt unable to void , bladder scanned 549 found in bladder. Pt states she experiences this at home in which she is unable to void till Lasix is taken. abd soft non distended. Pt made aware the possibility of straight cath if no void soon . Pt agreed requested to hot tea to help her process.     0720 Bedside shift change report given to Deborah WILSON  (oncoming nurse) by me (offgoing nurse). Report given with SBAR, MAR and Recent Results.

## 2018-03-31 NOTE — PROGRESS NOTES
Discharge instructions reviewed earlier by BILL Faria RN & pt given copy. IV & telemetry removed & pt taken to lobby into care of family.

## 2018-03-31 NOTE — PROGRESS NOTES
Problem: Falls - Risk of  Goal: *Absence of Falls  Document Anthony Fall Risk and appropriate interventions in the flowsheet.    Fall Risk Interventions:            Medication Interventions: Assess postural VS orthostatic hypotension, Bed/chair exit alarm, Evaluate medications/consider consulting pharmacy, Patient to call before getting OOB

## 2018-03-31 NOTE — PROGRESS NOTES
Cardiology Progress Note      3/31/2018 9:41 AM    Admit Date: 3/30/2018    Admit Diagnosis: a fib;PACU RECOVERY NEEDED; A-fib [facility])      Subjective:     Sayra Berry is doing well. Mild SOB, unchanged from before.     Visit Vitals    BP 98/76    Pulse 63    Temp 98 °F (36.7 °C)    Resp 18    Ht 5' 6\" (1.676 m)    Wt 161 lb (73 kg)    SpO2 94%    BMI 25.99 kg/m2       Current Facility-Administered Medications   Medication Dose Route Frequency    heparin 25,000 units in D5W 250 ml infusion  12-25 Units/kg/hr IntraVENous TITRATE    albuterol (PROVENTIL VENTOLIN) nebulizer solution 2.5 mg  2.5 mg Nebulization Q4H PRN    fluticasone (FLONASE) 50 mcg/actuation nasal spray 2 Spray  2 Spray Both Nostrils QPM    carvedilol (COREG) tablet 6.25 mg  6.25 mg Oral BID WITH MEALS    colchicine tablet 1.2 mg  1.2 mg Oral DAILY    clopidogrel (PLAVIX) tablet 75 mg  75 mg Oral DAILY    furosemide (LASIX) tablet 20 mg  20 mg Oral DAILY    umeclidinium (INCRUSE ELLIPTA) 62.5 mcg/actuation  1 Puff Inhalation DAILY    atorvastatin (LIPITOR) tablet 20 mg  20 mg Oral QHS    amiodarone (CORDARONE) tablet 100 mg  100 mg Oral QHS    apixaban (ELIQUIS) tablet 5 mg  5 mg Oral BID    gabapentin (NEURONTIN) 800 mg  800 mg Oral TID    budesonide-formoterol (SYMBICORT) 160-4.5 mcg/actuation HFA inhaler 1 Puff  1 Puff Inhalation BID    sodium chloride (NS) flush 5-10 mL  5-10 mL IntraVENous Q8H    sodium chloride (NS) flush 5-10 mL  5-10 mL IntraVENous PRN    acetaminophen (TYLENOL) tablet 650 mg  650 mg Oral Q4H PRN    HYDROcodone-acetaminophen (NORCO) 5-325 mg per tablet 1 Tab  1 Tab Oral Q4H PRN    albuterol (PROVENTIL VENTOLIN) nebulizer solution 2.5 mg  2.5 mg Nebulization Q4HWA RT    0.9% sodium chloride infusion  50 mL/hr IntraVENous CONTINUOUS         Objective:      Physical Exam:  Visit Vitals    BP 98/76    Pulse 63    Temp 98 °F (36.7 °C)    Resp 18    Ht 5' 6\" (1.676 m)    Wt 161 lb (73 kg)    SpO2 94%    BMI 25.99 kg/m2     General Appearance:  Well developed, well nourished,alert and oriented x 3, and individual in no acute distress. Ears/Nose/Mouth/Throat:   Hearing grossly normal.         Neck: Supple. Chest:   Lungs clear to auscultation bilaterally. Cardiovascular:  Regular rate and rhythm, S1, S2 normal, no murmur. Abdomen:   Soft, non-tender, bowel sounds are active. Extremities: No edema bilaterally. Skin: Warm and dry. Data Review:   Labs:    Recent Results (from the past 24 hour(s))   GLUCOSE, POC    Collection Time: 03/30/18  1:26 PM   Result Value Ref Range    Glucose (POC) 92 65 - 100 mg/dL    Performed by Lynsey Schulz [id number]    Collection Time: 03/30/18  2:18 PM   Result Value Ref Range    Activated Clotting Time (POC) 461 (H) 79 - 138 SECS   POC ACTIVATED CLOTTING TIME    Collection Time: 03/30/18  2:41 PM   Result Value Ref Range    Activated Clotting Time (POC) 456 (H) 79 - 138 SECS   POC ACTIVATED CLOTTING TIME    Collection Time: 03/30/18  3:08 PM   Result Value Ref Range    Activated Clotting Time (POC) 153 (H) 79 - 138 SECS   GLUCOSE, POC    Collection Time: 03/30/18  5:14 PM   Result Value Ref Range    Glucose (POC) 100 65 - 100 mg/dL    Performed by Malu Cronin , POC    Collection Time: 03/31/18  7:53 AM   Result Value Ref Range    Glucose (POC) 127 (H) 65 - 100 mg/dL    Performed by Sukhdev Glasgow        Telemetry: normal sinus rhythm      Assessment:     Active Problems:    A-fib ([address] (3/30/2018)        Plan:     Doing well post ablation.     F/u with Dr. Annie Gilliland MD

## 2018-04-03 DIAGNOSIS — Z86.79 S/P ABLATION OF ATRIAL FIBRILLATION: ICD-10-CM

## 2018-04-03 DIAGNOSIS — R06.09 OTHER FORM OF DYSPNEA: ICD-10-CM

## 2018-04-03 DIAGNOSIS — I48.0 PAROXYSMAL ATRIAL FIBRILLATION (HCC): Primary | ICD-10-CM

## 2018-04-03 DIAGNOSIS — Z98.890 S/P ABLATION OF ATRIAL FIBRILLATION: ICD-10-CM

## 2018-04-03 RX ORDER — COLCHICINE 0.6 MG/1
1.2 TABLET ORAL DAILY
Qty: 60 TAB | Refills: 5 | Status: SHIPPED | OUTPATIENT
Start: 2018-04-03 | End: 2018-10-29 | Stop reason: SDUPTHER

## 2018-04-03 NOTE — TELEPHONE ENCOUNTER
From: Vannessa Vazquez  Sent: 3/31/2018 11:21 AM EDT  Subject: Prescription Question    Dr. Lorna Dasilva I'm sending a message because I tried to get a refill on my colchicine which is my Gout medicine and I found out that it needs    a new prescription to be sent rather can you please do that for me the pharmacy is [facility] on [address] if you have any questions I am home and my number is [phone] thank you

## 2018-04-04 ENCOUNTER — CLINICAL SUPPORT (OUTPATIENT)
Dept: CARDIOLOGY CLINIC | Age: 68
End: 2018-04-04

## 2018-04-04 DIAGNOSIS — Z86.79 S/P ABLATION OF ATRIAL FIBRILLATION: ICD-10-CM

## 2018-04-04 DIAGNOSIS — I48.0 PAROXYSMAL ATRIAL FIBRILLATION (HCC): ICD-10-CM

## 2018-04-04 DIAGNOSIS — R06.09 OTHER FORM OF DYSPNEA: ICD-10-CM

## 2018-04-04 DIAGNOSIS — I50.31 ACUTE DIASTOLIC CHF (CONGESTIVE HEART FAILURE) (HCC): Primary | ICD-10-CM

## 2018-04-04 DIAGNOSIS — Z98.890 S/P ABLATION OF ATRIAL FIBRILLATION: ICD-10-CM

## 2018-04-05 ENCOUNTER — CLINICAL SUPPORT (OUTPATIENT)
Dept: CARDIOLOGY CLINIC | Age: 68
End: 2018-04-05

## 2018-04-05 ENCOUNTER — OFFICE VISIT (OUTPATIENT)
Dept: CARDIOLOGY CLINIC | Age: 68
End: 2018-04-05

## 2018-04-05 VITALS
SYSTOLIC BLOOD PRESSURE: 92 MMHG | WEIGHT: 165.2 LBS | RESPIRATION RATE: 16 BRPM | DIASTOLIC BLOOD PRESSURE: 52 MMHG | OXYGEN SATURATION: 100 % | HEIGHT: 66 IN | BODY MASS INDEX: 26.55 KG/M2 | HEART RATE: 72 BPM

## 2018-04-05 DIAGNOSIS — I48.91 ATRIAL FIBRILLATION, UNSPECIFIED TYPE (HCC): Primary | ICD-10-CM

## 2018-04-05 DIAGNOSIS — I49.5 SINOATRIAL NODE DYSFUNCTION (HCC): ICD-10-CM

## 2018-04-05 DIAGNOSIS — Z98.890 S/P ABLATION OF ATRIAL FIBRILLATION: ICD-10-CM

## 2018-04-05 DIAGNOSIS — I48.91 ATRIAL FIBRILLATION, UNSPECIFIED TYPE (HCC): Chronic | ICD-10-CM

## 2018-04-05 DIAGNOSIS — I50.21 SYSTOLIC CHF, ACUTE (HCC): ICD-10-CM

## 2018-04-05 DIAGNOSIS — Z79.01 ANTICOAGULANT LONG-TERM USE: ICD-10-CM

## 2018-04-05 DIAGNOSIS — Z95.0 CARDIAC PACEMAKER IN SITU: Primary | ICD-10-CM

## 2018-04-05 DIAGNOSIS — Z86.79 S/P ABLATION OF ATRIAL FIBRILLATION: ICD-10-CM

## 2018-04-05 DIAGNOSIS — J44.9 CHRONIC OBSTRUCTIVE PULMONARY DISEASE, UNSPECIFIED COPD TYPE (HCC): ICD-10-CM

## 2018-04-05 DIAGNOSIS — Z95.0 CARDIAC PACEMAKER IN SITU: ICD-10-CM

## 2018-04-05 DIAGNOSIS — I49.5 SICK SINUS SYNDROME (HCC): ICD-10-CM

## 2018-04-05 NOTE — PROGRESS NOTES
Subjective:      Kalpana Ashley is a 78 y.o. female is here for follow up s/p AF/AFL ablation 3/30/2018. She reports increased shortness of breath with exertion as well as chest tightness since her procedure. She denies edema or weight gain. The patient denies orthopnea, PND, LE edema, palpitations, syncope, presyncope or fatigue.        Patient Active Problem List    Diagnosis Date Noted    A-fib [facility]) 03/30/2018    Type 2 diabetes mellitus with nephropathy ([address] 12/27/2017    CKD (chronic kidney disease) stage 3, GFR 30-59 ml/min 09/22/2017    Chronic systolic HF (heart failure) ([address] 05/10/2017    History of Clostridium difficile infection 05/10/2017    Junctional tachycardia ([address] 05/10/2017    Hypotension 05/10/2017    S/P ablation of atrial fibrillation 05/02/2017    Fear associated with illness and body function 04/07/2017    Counseling regarding advanced care planning and goals of care 18/38/7875    Systolic CHF, acute ([address] 88/22/8270    Acute systolic CHF (congestive heart failure) ([address] 04/06/2017    CHF (congestive heart failure) ([address] 04/04/2017    Type 2 diabetes mellitus with diabetic neuropathy, without long-term current use of insulin ([address] 01/31/2017    GIB (gastrointestinal bleeding) 71/39/6381    Diastolic CHF, acute on chronic ([address] 09/22/2014    S/P coronary artery stent placement 07/04/2014    Back pain 11/14/2012    Sinoatrial node dysfunction ([address] 07/05/2012    Cardiac pacemaker in situ 07/05/2012    Mixed hyperlipidemia 07/05/2012    Chest pain 09/21/2011    Sick sinus syndrome ([address] 02/25/2011    S/P angioplasty with stent 02/07/2011    Hypokalemia 07/20/2010    Hip pain 06/08/2010    Benign hypertensive heart and CKD, stage 3 (GFR 30-59), w CHF ([address] 06/02/2010    Atrial fibrillation--paroxysmal 06/02/2010    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      Awilda Nogueira MD  Past Medical History: Diagnosis Date    Atrial fibrillation ([facility] 2010    CAD (coronary artery disease)     stent    [facility] 2014    Chronic systolic HF (heart failure) ([facility] 5/10/2017    2017 EF 25-30%    COPD     COPD (chronic obstructive pulmonary disease) ([facility] 2010    Diabetes ([facility]     Fibroid     Heart failure ([facility]     History of Clostridium difficile infection 5/10/2017    2017 CDiff positive    Hypertension 2010    Hypotension 5/10/2017    Junctional tachycardia ([facility] 5/10/2017    NIDDM (non-insulin dependent diabetes mellitus) 2010    Screening mammogram 5/4/10    SOB (shortness of breath) 2014      Past Surgical History:   Procedure Laterality Date    COLONOSCOPY N/A 2017    COL[facility] OR    HX  SECTION      HX OTHER SURGICAL      adrenal gland removed    HX PACEMAKER      FL EXCISE ADRENAL GLAND       Allergies   Allergen Reactions    Crestor [Rosuvastatin] Myalgia    Levaquin [Levofloxacin] Nausea Only     GI Upset    Lipitor [Atorvastatin] Diarrhea    Lyrica [Pregabalin] Myalgia      Family History   Problem Relation Age of Onset    Heart Disease Mother     Diabetes Father     Heart Disease Brother     negative for cardiac disease  Social History     Social History    Marital status:      Spouse name: N/A    Number of children: N/A    Years of education: N/A     Social History Main Topics    Smoking status: Former Smoker     Types: Cigarettes     Quit date: 2009    Smokeless tobacco: Never Used    Alcohol use No    Drug use: No    Sexual activity: Not Currently     Other Topics Concern    None     Social History Narrative     Current Outpatient Prescriptions   Medication Sig    colchicine 0.6 mg tablet Take 2 Tabs by mouth daily. Patient takes 1.2 mg daily and 2.4 mg PRN flare up    Blood-Glucose Meter monitoring kit Use as directed.  Dx: 11.9 Whichever meter her insurance covers    glucose blood VI test strips (BLOOD GLUCOSE TEST) strip Use BID DxE11.9    Lancets misc Use BID Dx: E11.9    gabapentin (NEURONTIN) 800 mg tablet TAKE ONE TABLET BY MOUTH THREE TIMES DAILY    apixaban (ELIQUIS) 5 mg tablet Take 1 Tab by mouth two (2) times a day. Indications: PREVENT THROMBOEMBOLISM IN CHRONIC ATRIAL FIBRILLATION    amiodarone (CORDARONE) 200 mg tablet Take 0.5 Tabs by mouth nightly.  simvastatin (ZOCOR) 10 mg tablet Take 1 Tab by mouth nightly.  budesonide-formoterol (SYMBICORT) 160-4.5 mcg/actuation HFAA Take 1 Puff by inhalation two (2) times a day.  tiotropium (SPIRIVA WITH HANDIHALER) 18 mcg inhalation capsule INHALE THE CONTENTS OF 1 CAPSULE THROUGH HANDIHALER DEVICE DAILY    furosemide (LASIX) 20 mg tablet Take 1 Tab by mouth daily.  acetaminophen (TYLENOL) 500 mg tablet Take 500 mg by mouth every six (6) hours as needed for Pain.  clopidogrel (PLAVIX) 75 mg tab Take 75 mg by mouth daily.  carvedilol (COREG) 6.25 mg tablet Take 1 Tab by mouth two (2) times daily (with meals).  fluticasone (FLONASE) 50 mcg/actuation nasal spray 2 Sprays by Both Nostrils route every evening.  albuterol (PROVENTIL VENTOLIN) 2.5 mg /3 mL (0.083 %) nebulizer solution 3 mL by Nebulization route every four (4) hours as needed for Wheezing.  Azelastine (ASTEPRO) 0.15 % (205.5 mcg) nasal spray 1 [place] by Both Nostrils route daily.  cod liver oil cap Take 1 Cap by mouth daily. No current facility-administered medications for this visit. Vitals:    04/05/18 0850 04/05/18 0901   BP: 90/52 92/52   Pulse: 72    Resp: 16    SpO2: 100%    Weight: 165 lb 3.2 oz (74.9 kg)    Height: 5' 6\" (1.676 m)        I have reviewed the nurses notes, vitals, problem list, allergy list, medical history, family, social history and medications. Review of Symptoms:    General: Pt denies excessive weight gain or loss.  Pt is able to conduct ADL's  HEENT: Denies blurred vision, headaches, epistaxis and difficulty swallowing. Respiratory: +sob with exertion, Denies  wheezing or stridor. Cardiovascular: +\"tightness\", Denies precordial pain, palpitations, edema or PND  Gastrointestinal: Denies poor appetite, indigestion, abdominal pain or blood in stool  Urinary: Denies dysuria, pyuria  Musculoskeletal: Denies pain or swelling from muscles or joints  Neurologic: Denies tremor, paresthesias, or sensory motor disturbance  Skin: Denies rash, itching or texture change. Psych: Denies depression      Physical Exam:      General: Well developed, in no acute distress. HEENT: Eyes - PERRL, no jvd  Heart:  Normal S1/S2 negative S3 or S4. Regular, no murmur, gallop or rub.   Respiratory: Clear bilaterally x 4, no wheezing or rales  Abdomen:   Soft, non-tender, bowel sounds are active.   Extremities:  No edema, normal cap refill, no cyanosis. Musculoskeletal: No clubbing  Neuro: A&Ox3, speech clear, gait stable. Skin: Skin color is normal. No rashes or lesions. Non diaphoretic  Vascular: 2+ pulses symmetric in all extremities    Cardiographics    Ekg: sinus rhythm with first degree AVB    Results for orders placed or performed during the hospital encounter of 09/20/17   EKG, 12 LEAD, INITIAL   Result Value Ref Range    Ventricular Rate 76 BPM    Atrial Rate 76 BPM    P-R Interval 244 ms    QRS Duration 144 ms    Q-T Interval 460 ms    QTC Calculation (Bezet) 517 ms    Calculated P Axis 81 degrees    Calculated R Axis -27 degrees    Calculated T Axis 111 degrees    Diagnosis       Atrial-paced rhythm with 1st degree AV block  Left bundle branch block  When compared with ECG of 19-JUL-2017 12:16,  No significant change was found  Confirmed by Jesse Galdamez ([id number]) on 9/20/2017 5:23:53 PM       *Note: Due to a large number of results and/or encounters for the requested time period, some results have not been displayed. A complete set of results can be found in Results Review. Lab Results   Component Value Date/Time    WBC 7.1 03/23/2018 08:40 AM    Hemoglobin (POC) 11.2 07/20/2017 09:02 AM    HGB 12.0 03/23/2018 08:40 AM    HCT 36.0 03/23/2018 08:40 AM    PLATELET 534 34/07/4656 08:40 AM    MCV 86 03/23/2018 08:40 AM      Lab Results   Component Value Date/Time    Sodium 139 03/23/2018 08:40 AM    Potassium 4.8 03/23/2018 08:40 AM    Chloride 101 03/23/2018 08:40 AM    CO2 24 03/23/2018 08:40 AM    Anion gap 5 12/26/2017 02:00 PM    Glucose 137 (H) 03/23/2018 08:40 AM    BUN 18 03/23/2018 08:40 AM    Creatinine 1.43 (H) 03/23/2018 08:40 AM    BUN/Creatinine ratio 13 03/23/2018 08:40 AM    GFR est AA 44 (L) 03/23/2018 08:40 AM    GFR est non-AA 38 (L) 03/23/2018 08:40 AM    Calcium 9.2 03/23/2018 08:40 AM    Bilirubin, total 0.4 03/23/2018 08:40 AM    AST (SGOT) 46 (H) 03/23/2018 08:40 AM    Alk. phosphatase 157 (H) 03/23/2018 08:40 AM    Protein, total 8.1 03/23/2018 08:40 AM    Albumin 3.7 03/23/2018 08:40 AM    Globulin 6.3 (H) 09/22/2017 07:01 AM    A-G Ratio 0.8 (L) 03/23/2018 08:40 AM    ALT (SGPT) 18 03/23/2018 08:40 AM         Assessment:     Assessment:        ICD-10-CM ICD-9-CM    1. Atrial fibrillation, unspecified type ([address] I48.91 427.31 AMB POC EKG ROUTINE W/ 12 LEADS, INTER & REP   2. Systolic CHF, acute (HCC) I50.21 428.21      428.0    3. Sinoatrial node dysfunction (HCC) I49.5 427.81    4. Chronic obstructive pulmonary disease, unspecified COPD type ([address] J44.9 496    5. Cardiac pacemaker in situ Z95.0 V45.01    6. S/P ablation of atrial fibrillation Z98.890 V45.89     Z86.79       Orders Placed This Encounter    AMB POC EKG ROUTINE W/ 12 LEADS, INTER & REP     Order Specific Question:   Reason for Exam:     Answer:   routine        Plan:   Ms. Robert Aparicio is here for follow up s/p AF/AFL ablation. She reports increased shortness of breath and chest tightness since procedure.  She is in sinus rhythm and her device interrogation demonstrates normal functioning (73% AP, 1% RVP, no events). She had an echocardiogram completed yesterday and preliminary findings reveal preserved LV function with trivial pericardial effusion. The on call cardiologist instructed her to take an extra dose of lasix due to symptoms, no relief. She denies weight gain through daily weight findings at home. Continue current medical therapy. Will obtain chest CT to rule out PV stenosis. Follow up in 3 months with Dr Mike Moran.     Kenna Austin for AF, CHF, COPD. Thank you for allowing me to participate in Silvestre Hudson 's care.     Jennifer Zelaya, DEEPAK

## 2018-04-13 ENCOUNTER — HOSPITAL ENCOUNTER (OUTPATIENT)
Dept: PHYSICAL THERAPY | Age: 68
Discharge: HOME | End: 2018-04-13
Payer: MEDICARE

## 2018-04-13 DIAGNOSIS — M25.511 RIGHT SHOULDER PAIN, UNSPECIFIED CHRONICITY: ICD-10-CM

## 2018-04-13 DIAGNOSIS — Z00.00 MEDICARE ANNUAL WELLNESS VISIT, SUBSEQUENT: ICD-10-CM

## 2018-04-13 PROCEDURE — G8979 MOBILITY GOAL STATUS: HCPCS | Performed by: PHYSICAL THERAPIST

## 2018-04-13 PROCEDURE — 97110 THERAPEUTIC EXERCISES: CPT

## 2018-04-13 PROCEDURE — 97140 MANUAL THERAPY 1/> REGIONS: CPT

## 2018-04-13 PROCEDURE — 97162 PT EVAL MOD COMPLEX 30 MIN: CPT

## 2018-04-13 PROCEDURE — G8978 MOBILITY CURRENT STATUS: HCPCS | Performed by: PHYSICAL THERAPIST

## 2018-04-13 NOTE — PROGRESS NOTES
[facility]  [address] [facility]    OUTPATIENT physical Therapy  [x]      Initial Evaluation []     30 day/10th visit progress note []     90 day/re-certification    NAME: Ting Vital AGE: 78 y.o. GENDER: female  DATE: 4/13/2018  REFERRING PHYSICIAN: Abi Torres MD    OBJECTIVE DATA SUMMARY:   Medical Diagnosis: medicare annual wellness visit, Subsequent (Z00.00)  Right shoulder pain, unspecified chronicity (M25.511)  arthritis  PT Diagnosis: right shoulder pain with limited ROM, impaired strength, referred pain, and dx of DJD  Date of Onset: increased 10/2017 after fall.  Irritated by falls post hospitalization 9/2017 and multiple falls with diarrhea, was utilizing RW initially  Mechanism of Injury/Chief Complaint:  Fall 10/2017, right shoulder pain from shoulder blade to [address] joint, some referral aching down arm to hand, xray with [address] OA  Present Symptoms: see above, right shoulder pain  Functional Deficits and Limitations:   [x]     Sitting: >30 min  [x]    Dressing:   [x]    Reaching:  [x]     Standing:   [x]     Bathing:   [x]    Lifting:  []     Walking:   []     Cooking:   []    Yardwork:  [x]     Sleeping: Wakes from pain after 3-4 hrs []     Cleaning:   [x]     Driving:  []     Work Tasks:  []     Recreation:   []    Other:    HISTORY:  Past Medical History:   Past Medical History:   Diagnosis Date    Atrial fibrillation ([address] 6/2/2010    CAD (coronary artery disease)     stent    Chronic diastolic heart failure ([address] 9/22/2014    Chronic systolic HF (heart failure) ([address] 5/10/2017    4/2017 EF 25-30%    COPD     COPD (chronic obstructive pulmonary disease) ([address] 6/2/2010    Diabetes ([address]     Fibroid     Heart failure ([address]     History of Clostridium difficile infection 5/10/2017    4/2017 CDiff positive    Hypertension 6/2/2010    Hypotension 5/10/2017    Junctional tachycardia ([address] 5/10/2017    NIDDM (non-insulin dependent diabetes mellitus) 2010    Screening mammogram 5/4/10    SOB (shortness of breath) 2014     Past Surgical History:   Procedure Laterality Date    COLONOSCOPY N/A 2017    COLONOS[facility]as [facility] OR    HX  SECTION      HX OTHER SURGICAL      adrenal gland removed    HX PACEMAKER      CA EXCISE ADRENAL GLAND       Precautions: DM, med controlled; pace maker  Current Medications:  Tylenol extra strength, gabapentin for neuropathy  Prior Level of Function/Home Situation: right hand dominant, independent for mobility since December  Personal factors and/or comorbidities impacting plan of care: lives with brother and grandson  Social/Work History:  Retired last year  Previous Therapy:  Yes, here for low back pain    SUBJECTIVE:   I can't even open a bottle of water practically  Patients goals for therapy: Be able to get rid of pain, improve use of arm    OBJECTIVE DATA SUMMARY:   EXAMINATION/PRESENTATION/DECISION MAKING:   Pain:  Location:  Quality: aching, pain, sharp and dull  Now: 9/10 with movement  Best: 9/10  Worst: 10/10 with activity  Factors that improve pain: heat and tylenol    Posture:   Rounded shoulders  Forward head    Strength:   Left shoulder grossly 4+/5  Right shoulder     Range of Motion:   Cervical WFL  Left WFL    Right all limited by pain  Flexion 145  Abduction 110  ER 15  IR to stomach  Spinal Assessment:   Cervical WNL      Joint Mobility:   Decreased and painful right GH glides    Palpation:   TTP GH, Right UT, and jann scapular     Neurologic Assessment:   Tone: normal   Sensation: normal   Reflexes: NT    Special Tests:    Too painful for positions, No gross instability    Mobility:   Transitional Movements: guarded right shoulder, decreased arm swing    Gait:     Balance:      Functional Measure:   QuickDASH: 61%  In compliance with CMSs Claims Based Outcome Reporting, the following G-code set was chosen for this patient based on their primary functional limitation being treated: The outcome measure chosen to determine the severity of the functional limitation was the QuickDASH with a score of 61% which was correlated with the impairment scale. ? Carrying, Moving, and Handling Objects:     - CURRENT STATUS: CL - 60%-79% impaired, limited or restricted    - GOAL STATUS: CL - 60%-79% impaired, limited or restricted    - D/C STATUS:  ---------------To be determined---------------      Physical Therapy Evaluation Charge Determination   History Examination Presentation Decision-Making   HIGH Complexity :3+ comorbidities / personal factors will impact the outcome/ POC  MEDIUM Complexity : 3 Standardized tests and measures addressing body structure, function, activity limitation and / or participation in recreation  MEDIUM Complexity : Evolving with changing characteristics  MEDIUM Complexity : FOTO score of 26-74      Based on the above components, the patient evaluation is determined to be of the following complexity level: MEDIUM    TREATMENT/INTERVENTION:  Modalities (Rationale): MHP x10 min post treatment to decrease pain and improve blood flow to area      Therapeutic Exercises:  Initial HEP:  Pendulums A/P   M/L   CW/CCW  Shoulder shrugs  Scapular retraction  Shoulder circles      Manual Therapy:  Right inferior GH glides Gr 1  Gentle right shoulder manual stretch, flexion, scaption, ER  STM R UT    Neuro Re-Education:      Balance Training:      Ambulation/Gait Training:      Activity tolerance and post treatment pain report:   Fair, motions pain limited but ROM  Education:  [x]     Home exercise program provided. Education was provided to the patient on the following topics: suspected problem and anticipated interventions, encouraged motion within a pain free ROM  Patient verbalized understanding of the topics presented.     ASSESSMENT:   Kelley aCmpos is a 78 y.o. female who presents with right shoulder pain since 10/2017 after multiple falls. Physical therapy problems based on objective data include: pain affecting function, decrease ROM, decrease strength, impaired gait/ balance, decrease ADL/ functional abilitiies, decrease activity tolerance and decrease flexibility/ joint mobility . Patient will benefit from skilled intervention to address these impairments. Rehabilitation potential is considered to be Good. Factors which may influence rehabilitation potential include chronicity of pain, good prior response to therapy . Patient will benefit from 16 physical therapy visits over 8 weeks to optimize improvement in these areas. PLAN OF CARE:   Recommendations and Planned Interventions:  [x]     Therapeutic Activities  [x]     Heat/Ice  [x]     Therapeutic Exercises  []     Ultrasound  [x]     Gait training  []     E-stim  [x]     Balance training  [x]     Home exercise program  [x]     Manual Therapy  []     TENS  [x]     Neuro Re-Ed  []     Edema management  [x]     Posture/Biomechanics  [x]     Pain management  []     Traction  []     Other:    Frequency/Duration:  Patient will be followed by physical therapy 2 times a week for  8 weeks to address goals. GOALS  Short term goals  Time frame: 4  1. Patient will be compliance and independent with the initial HEP as evidenced by being able to perform without cuing. 2. Patient will report a 30% improvement in symptoms. 3. Patient report a 50% improvement in sleeping. 4. Patient will have a  10 degree increase in right external rotation ROM to allow hair washing. 5. Patient will have an increased tolerance for sitting to allow 45 minutes of the activity before symptoms start. 6. Patient will tolerate 30 minutes of clinic activities before onset of symptoms. Long term goals  Time frame: 8  1. Patient will reports pain level decreased to 4/10 to allow increased ease of movement.   2. Patient will have an improved score on the quickDASH outcome measure by 15 points to demonstrate an increase in functional activity tolerance. 3. Patient will be independent in final individualized HEP. 4. Patient will have an increase in pain free shoulder flexion ROM to 160 to allow performance of overhead tasks. 5. Patient will have an increase in right shoulder strength to grossly 3/5 to allow performance of reaching tasks. 6. Patient will sleep 6-8 hours without being interrupted by pain. [x]     Patient has participated in goal setting and agrees to work toward plan of care. [x]     Patient was instructed to call if questions or concerns arise. Thank you for this referral.  Lars Huang, PT, DPT   Time Calculation: 65 mins    Patient Time in clinic:   Start Time: 0830   Stop Time: [id number]    TREATMENT PLAN EFFECTIVE DATES:   4/13/2018 TO 7/20/2018  I have read the above plan of care for Deirdre Woods and certify the need for skilled physical therapy services.     Physician Signature: ____________________________________________________    Date: _________________________________________________________________

## 2018-04-18 LAB
CHOLEST SERPL-MCNC: 177 MG/DL (ref 100–199)
HDLC SERPL-MCNC: 35 MG/DL
INTERPRETATION, 910389: NORMAL
LDLC SERPL CALC-MCNC: 101 MG/DL (ref 0–99)
TRIGL SERPL-MCNC: 203 MG/DL (ref 0–149)
VLDLC SERPL CALC-MCNC: 41 MG/DL (ref 5–40)

## 2018-04-19 ENCOUNTER — HOSPITAL ENCOUNTER (OUTPATIENT)
Dept: PHYSICAL THERAPY | Age: 68
Discharge: HOME | End: 2018-04-19
Payer: MEDICARE

## 2018-04-19 PROCEDURE — 97140 MANUAL THERAPY 1/> REGIONS: CPT

## 2018-04-19 PROCEDURE — 97110 THERAPEUTIC EXERCISES: CPT

## 2018-04-19 NOTE — PROGRESS NOTES
[facility]  [address], [address] [facility]    OUTPATIENT physical Therapy DAILY Treatment NOTe  Visit: 2    NAME: Deirdre Woods AGE: 78 y.o. GENDER: female  DATE: 4/19/2018  REFERRING PHYSICIAN: Austin Cabrera MD        GOALS  Short term goals  Time frame: 4  1. Patient will be compliance and independent with the initial HEP as evidenced by being able to perform without cuing. 2. Patient will report a 30% improvement in symptoms. 3. Patient report a 50% improvement in sleeping. 4. Patient will have a  10 degree increase in right external rotation ROM to allow hair washing. 5. Patient will have an increased tolerance for sitting to allow 45 minutes of the activity before symptoms start. 6. Patient will tolerate 30 minutes of clinic activities before onset of symptoms.      Long term goals  Time frame: 8  1. Patient will reports pain level decreased to 4/10 to allow increased ease of movement. 2. Patient will have an improved score on the quickDASH outcome measure by 15 points to demonstrate an increase in functional activity tolerance. 3. Patient will be independent in final individualized HEP. 4. Patient will have an increase in pain free shoulder flexion ROM to 160 to allow performance of overhead tasks. 5. Patient will have an increase in right shoulder strength to grossly 3/5 to allow performance of reaching tasks. 6. Patient will sleep 6-8 hours without being interrupted by pain. SUBJECTIVE:   \"At night I use the left arm to push the right one up over my head to see how long I can standing. \" (Asked her to avoid this and to attempt exercises to tolerance instead to better manage pain)   Pain In: 8/10 right shoulder to hand    OBJECTIVE DATA SUMMARY:     EXAMINATION/PRESENTATION/DECISION MAKING:   Pain:  Location:  Quality: aching, pain, sharp and dull  Now: 9/10 with movement  Best: 9/10  Worst: 10/10 with activity  Factors that improve pain: heat and tylenol     Posture:   Rounded shoulders  Forward head     Strength:   Left shoulder grossly 4+/5  Right shoulder      Range of Motion:   Cervical WFL  Left WFL     Right all limited by pain  Flexion 145  Abduction 110  ER 15  IR to stomach  Spinal Assessment:   Cervical WNL        Joint Mobility:   Decreased and painful right GH glides     Palpation:   TTP GH, Right UT, and jann scapular      Neurologic Assessment:                         Tone: normal                         Sensation: normal                         Reflexes: NT     Special Tests:    Too painful for positions, No gross instability     Mobility:                         Transitional Movements: guarded right shoulder, decreased arm swing                          Gait:      Balance:       Functional Measure:   QuickDASH: 61%  In compliance with CMSs Claims Based Outcome Reporting, the following G-code set was chosen for this patient based on their primary functional limitation being treated:     The outcome measure chosen to determine the severity of the functional limitation was the QuickDASH with a score of 61% which was correlated with the impairment scale.     · Carrying, Moving, and Handling Objects:                           - CURRENT STATUS:               CL - 60%-79% impaired, limited or restricted                          - GOAL STATUS:                      CL - 60%-79% impaired, limited or restricted                         - D/C STATUS:                                           ---------------To be determined---------------            Physical Therapy Evaluation Charge Determination   History Examination Presentation Decision-Making   HIGH Complexity :3+ comorbidities / personal factors will impact the outcome/ POC  MEDIUM Complexity : 3 Standardized tests and measures addressing body structure, function, activity limitation and / or participation in recreation  MEDIUM Complexity : Evolving with changing characteristics  MEDIUM Complexity : FOTO score of 26-74      Based on the above components, the patient evaluation is determined to be of the following complexity level: MEDIUM     TREATMENT/INTERVENTION:  Modalities (Rationale): MHP x10 min post treatment to decrease pain and improve blood flow to area        Therapeutic Exercises:  Exercises in BOLD completed this date:    Initial HEP:  Pendulums A/P                         M/L                          CW/CCW  Shoulder shrugs  Scapular retraction  Shoulder circles    Additioal clinical activities:  Attempted wand flexion changed after 3-4 reps d/t pain  Seated forward flexion and scaption on table x10 reps        Manual Therapy: 10 minutes in supine  Right inferior GH glides Gr 1  Gentle right shoulder manual stretch, flexion, scaption, ER  STM R UT and rhomboids (seated)  Bilateral pec stretch in supine  Gentle right UE long axis traction     Activity tolerance and post treatment pain report:   Fair activity tolerance d/t reported pain levels and difficulty finding a tolerable range  Pain Out: Same    Education:  Education was provided to the patient on the following topics: reinforced goal of ROM to improve motion within . []    No changes were made to the home exercise program.  [x]    The following changes were made to the home exercise program: See subjective, encouraged pendulums and to attempt seated flexion on table. Patient verbalized understanding of the topics presented. ASSESSMENT:   Patient returns for her first visit post IE with c/o similar pain and restriction. She reports minimal discomfort during exercises but elevated pain following. Reinforced working within a tolerable range at home to avoid further irritation. Difficulty with manual therapy ROM d/t heavy guarding.   Patients progression toward goals is as follows:  [x]     Improving appropriately and progressing toward goals  []     Improving slowly and progressing toward goals  [] Not making progress toward goals and plan of care will be adjusted    PLAN OF CARE:   Patient continues to benefit from skilled intervention to address the above impairments. []    Continue treatment per established plan of care.   [x]     Recommend the following changes to the plan of care:     Recommendations/Intent for next treatment: progress as able    Saad Schrader, PT, DPT   Time Calculation: 43 mins  Patient Time in clinic:   Start Time: 0802   Stop Time: 4207

## 2018-05-04 ENCOUNTER — HOSPITAL ENCOUNTER (OUTPATIENT)
Dept: PHYSICAL THERAPY | Age: 68
Discharge: HOME | End: 2018-05-04
Payer: MEDICARE

## 2018-05-04 PROCEDURE — 97110 THERAPEUTIC EXERCISES: CPT | Performed by: PHYSICAL THERAPIST

## 2018-05-04 NOTE — PROGRESS NOTES
[facility]  [address], [address] [facility]    OUTPATIENT physical Therapy DAILY Treatment NOTe  Visit: 3    NAME: Neal Agee AGE: 78 y.o. GENDER: female  DATE: 5/4/2018  REFERRING PHYSICIAN: Jessee Turpin MD      GOALS  Short term goals  Time frame: 4  1. Patient will be compliant and independent with the initial HEP as evidenced by being able to perform without cueing. 2. Patient will report a 30% improvement in symptoms. 3. Patient report a 50% improvement in sleeping. 4. Patient will have a  10 degree increase in right external rotation ROM to allow hair washing. 5. Patient will have an increased tolerance for sitting to allow 45 minutes of the activity before symptoms start. 6. Patient will tolerate 30 minutes of clinic activities before onset of symptoms.      Long term goals  Time frame: 8  1. Patient will report pain level decrease to 4/10 to allow increased ease of movement. 2. Patient will have an improved score on the quickDASH outcome measure by 15 points to demonstrate an increase in functional activity tolerance. 3. Patient will be independent in final individualized HEP. 4. Patient will have an increase in pain free shoulder flexion ROM to 160 to allow performance of overhead tasks. 5. Patient will have an increase in right shoulder strength to grossly 3/5 to allow performance of reaching tasks. 6. Patient will sleep 6-8 hours without being interrupted by pain. SUBJECTIVE:   \"\"I have the most pain at night. It's hard to wash up and I can't lift my arm overhead. \"    Pain In: 8/10 right shoulder to hand    OBJECTIVE DATA SUMMARY:   Objective data from initial evaluation:  EXAMINATION/PRESENTATION/DECISION MAKING:   Pain:  Location: Right shoulder  Quality: aching, pain, sharp and dull  Now: 9/10 with movement  Best: 9/10  Worst: 10/10 with activity  Factors that improve pain: heat and tylenol     Posture:   Rounded shoulders  Forward head     Strength:   Left shoulder grossly 4+/5    Resisted right shoulder flexion, abduction, and IR resulted in pain  No pain with right shoulder extension  Mild pain with right shoulder ER 3+/5  No pain with elbow flexion 5/5   Pain with elbow extension 4/5    Range of Motion:   Left shoulder WFL     Right all limited by pain  Flexion 145  Abduction 110  ER 15  IR to stomach    Spinal Assessment:   Cervical WNL     Joint Mobility:   Decreased and painful right GH glides     Palpation:   TTP GH, Right UT, and jann scapular      Neurologic Assessment:                         Tone: normal                         Sensation: normal                         Reflexes: NT     Special Tests:   (+) Drop Arm  (+) Neers     Mobility:                         Transitional Movements: guarded right shoulder, decreased arm swing                          Gait: WNL     Balance: Impaired; 3-4 falls per month  TU seconds     Functional Measure:   QuickDASH: 61% on evaluation    In compliance with CMSs Claims Based Outcome Reporting, the following G-code set was chosen for this patient based on their primary functional limitation being treated:     The outcome measure chosen to determine the severity of the functional limitation was the QuickDASH with a score of 61% which was correlated with the impairment scale.     · Carrying, Moving, and Handling Objects:                           - CURRENT STATUS:               CL - 60%-79% impaired, limited or restricted                          - GOAL STATUS:                      CL - 60%-79% impaired, limited or restricted                         - D/C STATUS:                                          ---------------To be determined---------------      TREATMENT/INTERVENTION:  Modalities (Rationale):  to decrease pain and improve blood flow to area  MHP to right shoulder/neck for 10 minutes at end of session; no skin irritation noted     Therapeutic Exercises:  Initial HEP: Pendulums (A/P, M/L, CW/CCW), Shoulder shrugs, scapular retraction, shoulder circles  Added to HEP 5/4/18: UT stretch, shoulder shrugs, scapulae retraction, shoulder ER/IR, wall push ups    Exercises in BOLD completed this date:    Pendulums A/P, M/L, CW/CCW  Shoulder shrugs: 10 reps  Scapular retraction with yellow TB: 10 reps  Shoulder IR/ER with yellow TB: 10 reps  Wall push ups: 5 reps  Wall circles CCW: 10 reps  Seated forward flexion and scaption on table: 10 reps    UBE: 2 minutes, level 1     Manual Therapy:  Right inferior GH glides Gr 1  Gentle right shoulder manual stretch, flexion, scaption, ER  STM R UT and rhomboids (seated)  Bilateral pec stretch in supine  Gentle right UE long axis traction     Activity tolerance and post treatment pain report:   Fair; 8/10    Education:  Education was provided to the patient on the following topics  []    No changes were made to the home exercise program.  [x]    The following changes were made to the home exercise program: UT stretch, shoulder shrugs, scapular retraction, shoulder ER/IR, wall push ups  Patient verbalized understanding of the topics presented. ASSESSMENT:   Patient presents with continued pain in right shoulder. Patient with impaired balance and reports 3-4 falls per month. Patient reports numerous falls onto right shoulder with pain starting 3 months ago. Patient has the most pain at night. Patient unable to reach overhead secondary to pain. Patient's symptoms consistent with rotator cuff injury. Patient tolerated exercises well today with new HEP provided. Reinforced working within a tolerable range at home to avoid further irritation.      Patients progression toward goals is as follows:  [x]     Improving appropriately and progressing toward goals  []     Improving slowly and progressing toward goals  []     Not making progress toward goals and plan of care will be adjusted    PLAN OF CARE:   Patient continues to benefit from skilled intervention to address the above impairments. []    Continue treatment per established plan of care.   [x]     Recommend the following changes to the plan of care:     Recommendations/Intent for next treatment: progress as able    Idelia Stage, PT   Time Calculation: 45 mins  Patient Time in clinic:   Start Time: 1015   Stop Time: 1100

## 2018-05-09 ENCOUNTER — HOSPITAL ENCOUNTER (OUTPATIENT)
Dept: PHYSICAL THERAPY | Age: 68
Discharge: HOME | End: 2018-05-09
Payer: MEDICARE

## 2018-05-09 PROCEDURE — 97110 THERAPEUTIC EXERCISES: CPT | Performed by: PHYSICAL THERAPIST

## 2018-05-09 NOTE — PROGRESS NOTES
Ms. Reema Vargas has been in physical therapy since 4/13/18 secondary to right shoulder pain. Patient reports fall in October 2017 onto right shoulder. Patient continues to report high pain levels (8-9/10). Patient reports most pain at night. Patient's right shoulder active and passive range of motion remains significantly limited secondary to pain. Recommend possible referral to orthopedics and/or right shoulder MRI as symptoms persist despite intervention.      Thank you,   Lucrecia Larry, PT

## 2018-05-09 NOTE — PROGRESS NOTES
[facility]  [address], [address] [facility]    OUTPATIENT physical Therapy DAILY Treatment NOTe  Visit: 4    NAME: Lesly Ricketts AGE: 78 y.o. GENDER: female  DATE: 5/9/2018  REFERRING PHYSICIAN: Cortney Washington MD      GOALS  Short term goals  Time frame: 4  1. Patient will be compliant and independent with the initial HEP as evidenced by being able to perform without cueing. 2. Patient will report a 30% improvement in symptoms. 3. Patient report a 50% improvement in sleeping. 4. Patient will have a  10 degree increase in right external rotation ROM to allow hair washing. 5. Patient will have an increased tolerance for sitting to allow 45 minutes of the activity before symptoms start. 6. Patient will tolerate 30 minutes of clinic activities before onset of symptoms.      Long term goals  Time frame: 8  1. Patient will report pain level decrease to 4/10 to allow increased ease of movement. 2. Patient will have an improved score on the quickDASH outcome measure by 15 points to demonstrate an increase in functional activity tolerance. 3. Patient will be independent in final individualized HEP. 4. Patient will have an increase in pain free shoulder flexion ROM to 160 to allow performance of overhead tasks. 5. Patient will have an increase in right shoulder strength to grossly 3/5 to allow performance of reaching tasks. 6. Patient will sleep 6-8 hours without being interrupted by pain.          SUBJECTIVE:   \"I had such a hard time sleeping last night\"    Pain In: 9/10 right shoulder to hand    OBJECTIVE DATA SUMMARY:   Objective data from initial evaluation:  EXAMINATION/PRESENTATION/DECISION MAKING:   Pain:  Location: Right shoulder  Quality: aching, pain, sharp and dull  Now: 9/10 with movement  Best: 9/10  Worst: 10/10 with activity  Factors that improve pain: heat and tylenol     Posture:   Rounded shoulders  Forward head     Strength:   Left shoulder grossly 4+/5    Resisted right shoulder flexion, abduction, and IR resulted in pain  No pain with right shoulder extension  Mild pain with right shoulder ER 3+/5  No pain with elbow flexion 5/5   Pain with elbow extension 4/5    Range of Motion:   Left shoulder WFL     Right all limited by pain  Flexion 145  Abduction 110  ER 15  IR to stomach    Spinal Assessment:   Cervical WNL     Joint Mobility:   Decreased and painful right GH glides     Palpation:   TTP GH, Right UT, and jann scapular      Neurologic Assessment:                         Tone: normal                         Sensation: normal                         Reflexes: NT     Special Tests:   (+) Drop Arm  (+) Neers     Mobility:                         Transitional Movements: guarded right shoulder, decreased arm swing                          Gait: WNL     Balance: Impaired; 3-4 falls per month  TU seconds     Functional Measure:   QuickDASH: 61% on evaluation    In compliance with CMSs Claims Based Outcome Reporting, the following G-code set was chosen for this patient based on their primary functional limitation being treated:     The outcome measure chosen to determine the severity of the functional limitation was the QuickDASH with a score of 61% which was correlated with the impairment scale.     · Carrying, Moving, and Handling Objects:                           - CURRENT STATUS:               CL - 60%-79% impaired, limited or restricted                          - GOAL STATUS:                      CL - 60%-79% impaired, limited or restricted                         - D/C STATUS:                                          ---------------To be determined---------------      TREATMENT/INTERVENTION:  Modalities (Rationale):  to decrease pain and improve blood flow to area  MHP to right shoulder/neck for 10 minutes at end of session; no skin irritation noted     Therapeutic Exercises:  Initial HEP: Pendulums (A/P, M/L, CW/CCW), Shoulder shrugs, scapular retraction, shoulder circles  Added to HEP 5/4/18: UT stretch, shoulder shrugs, scapulae retraction, shoulder ER/IR, wall push ups    Exercises in BOLD completed this date:    UT stretch: 5 reps with 10 second holds  Shoulder shrugs: 10 reps  Chin tucks: 10 reps    Pendulums A/P, M/L, CW/CCW  Shoulder shrugs: 10 reps  Scapular retraction with yellow TB: 10 reps  Shoulder IR/ER with yellow TB: 10 reps  Pull downs with yellow TB: 10 reps  Wall push ups: 5 reps  Wall circles CCW: 10 reps    Seated forward flexion, scaption, and abduction on table: 10 reps each direction  Supine shoulder flexion and abduction with wand: 5 reps; very difficult     UBE: 2 minutes, level 1     Manual Therapy:   Right inferior GH glides Gr 1  Gentle right shoulder manual stretch, flexion, scaption, ER  STM R UT and rhomboids (seated)  Bilateral pec stretch in supine  Gentle right UE long axis traction     Activity tolerance and post treatment pain report:   Fair; 8/10    Education:  Education was provided to the patient on the following topics  [x]    No changes were made to the home exercise program.  []    The following changes were made to the home exercise program:  Patient verbalized understanding of the topics presented. ASSESSMENT:   Patient presents with continued pain in right shoulder. Patient with impaired balance and reports 3-4 falls per month. Patient reports numerous falls onto right shoulder. Patient has the most pain at night. Patient unable to reach overhead secondary to pain. Patient's symptoms consistent with rotator cuff injury. Reinforced working within a tolerable range at home to avoid further irritation. Recommend orthopedic referral and/or MRI of right shoulder.     Patients progression toward goals is as follows:  [x]     Improving appropriately and progressing toward goals  []     Improving slowly and progressing toward goals  []     Not making progress toward goals and plan of care will be adjusted    PLAN OF CARE:   Patient continues to benefit from skilled intervention to address the above impairments. []    Continue treatment per established plan of care.   [x]     Recommend the following changes to the plan of care:     Recommendations/Intent for next treatment: progress as able    Nidia Rehman, PT   Time Calculation: 35 mins  Patient Time in clinic:   Start Time: 1030   Stop Time: 12

## 2018-05-10 ENCOUNTER — TELEPHONE (OUTPATIENT)
Dept: CARDIOLOGY CLINIC | Age: 68
End: 2018-05-10

## 2018-05-10 ENCOUNTER — OFFICE VISIT (OUTPATIENT)
Dept: INTERNAL MEDICINE CLINIC | Age: 68
End: 2018-05-10

## 2018-05-10 VITALS
TEMPERATURE: 98.2 F | BODY MASS INDEX: 26.52 KG/M2 | SYSTOLIC BLOOD PRESSURE: 102 MMHG | HEIGHT: 66 IN | RESPIRATION RATE: 18 BRPM | OXYGEN SATURATION: 95 % | WEIGHT: 165 LBS | HEART RATE: 76 BPM | DIASTOLIC BLOOD PRESSURE: 60 MMHG

## 2018-05-10 DIAGNOSIS — I73.9 PAD (PERIPHERAL ARTERY DISEASE) (HCC): ICD-10-CM

## 2018-05-10 DIAGNOSIS — M47.812 OSTEOARTHRITIS OF CERVICAL SPINE, UNSPECIFIED SPINAL OSTEOARTHRITIS COMPLICATION STATUS: ICD-10-CM

## 2018-05-10 DIAGNOSIS — M19.011 PRIMARY OSTEOARTHRITIS OF RIGHT SHOULDER: Primary | ICD-10-CM

## 2018-05-10 NOTE — TELEPHONE ENCOUNTER
Pt states her patient would like to know if she is able to take an antibiotic before having dental procedure done on June 22, 2018. Dentist has prescribed amoxicillin to her. Please call back to advise.     Thanks,    [facility]

## 2018-05-10 NOTE — PROGRESS NOTES
Medina Almanza is a 78 y.o. female and presents with Arm Pain (right)  . Subjective:  Pt is s/p PT for rt shoulder and arm pain w/o improvement. Pt was instructed to see ortho/obtain MRI. X-rays demonstrate OA shoulder and cervical spondylosis    Pt also requests a referral to f/u Dr. Sivan Stevens for PAD.       Review of Systems  Constitutional: negative for fevers, chills, anorexia and weight loss  Respiratory:  negative for cough, hemoptysis, dyspnea,wheezing  CV:   negative for chest pain, palpitations, lower extremity edema  GI:   negative for nausea, vomiting, diarrhea, abdominal pain,melena  Musculoskel: negative for myalgias, arthralgias, back pain, muscle weakness, joint pain  Neurological:  positive for headaches, dizziness, vertigo, memory problems and gait   Behavl/Psych: negative for feelings of anxiety, depression, mood changes    Past Medical History:   Diagnosis Date    Atrial fibrillation ([address] 2010    CAD (coronary artery disease)     stent    Chronic diastolic heart failure ([address] 2014    Chronic systolic HF (heart failure) ([address] 5/10/2017    2017 EF 25-30%    COPD     COPD (chronic obstructive pulmonary disease) ([address] 2010    Diabetes ([address]     Fibroid     Heart failure ([address]     History of Clostridium difficile infection 5/10/2017    2017 CDiff positive    Hypertension 2010    Hypotension 5/10/2017    Junctional tachycardia ([address] 5/10/2017    NIDDM (non-insulin dependent diabetes mellitus) 2010    Screening mammogram 5/4/10    SOB (shortness of breath) 2014     Past Surgical History:   Procedure Laterality Date    COLONOSCOPY N/A 2017    COLONOSCOPY performed by Marguerite Ortega [facility] at [facility] AMBULATORY OR    HX  SECTION      HX OTHER SURGICAL      adrenal gland removed    HX PACEMAKER      LA EXCISE ADRENAL GLAND       Social History     Social History    Marital status:      Spouse name: N/A    Number of children: N/A    Years of education: N/A     Social History Main Topics    Smoking status: Former Smoker     Types: Cigarettes     Quit date: 12/31/2009    Smokeless tobacco: Never Used    Alcohol use No    Drug use: No    Sexual activity: Not Currently     Other Topics Concern    None     Social History Narrative     Family History   Problem Relation Age of Onset    Heart Disease Mother     Diabetes Father     Heart Disease Brother      Current Outpatient Prescriptions   Medication Sig Dispense Refill    simvastatin (ZOCOR) 10 mg tablet Take 1 Tab by mouth nightly. 90 Tab 1    apixaban (ELIQUIS) 5 mg tablet Take 1 Tab by mouth two (2) times a day. Indications: PREVENT THROMBOEMBOLISM IN CHRONIC ATRIAL FIBRILLATION 42 Tab 0    colchicine 0.6 mg tablet Take 2 Tabs by mouth daily. Patient takes 1.2 mg daily and 2.4 mg PRN flare up 60 Tab 5    Blood-Glucose Meter monitoring kit Use as directed. Dx: 11.9 Whichever meter her insurance covers 1 Kit 0    glucose blood VI test strips (BLOOD GLUCOSE TEST) strip Use BID DxE11.9 200 Strip 11    Lancets misc Use BID Dx: E11.9 200 Each 11    gabapentin (NEURONTIN) 800 mg tablet TAKE ONE TABLET BY MOUTH THREE TIMES DAILY 90 Tab 3    amiodarone (CORDARONE) 200 mg tablet Take 0.5 Tabs by mouth nightly. 30 Tab 6    budesonide-formoterol (SYMBICORT) 160-4.5 mcg/actuation HFAA Take 1 Puff by inhalation two (2) times a day. 3 Inhaler 3    tiotropium (SPIRIVA WITH HANDIHALER) 18 mcg inhalation capsule INHALE THE CONTENTS OF 1 CAPSULE THROUGH HANDIHALER DEVICE DAILY 90 Cap 3    furosemide (LASIX) 20 mg tablet Take 1 Tab by mouth daily. 30 Tab 0    acetaminophen (TYLENOL) 500 mg tablet Take 500 mg by mouth every six (6) hours as needed for Pain.  clopidogrel (PLAVIX) 75 mg tab Take 75 mg by mouth daily.  carvedilol (COREG) 6.25 mg tablet Take 1 Tab by mouth two (2) times daily (with meals).  60 Tab 11    fluticasone (FLONASE) 50 mcg/actuation nasal spray 2 Sprays by Both Nostrils route every evening.  albuterol (PROVENTIL VENTOLIN) 2.5 mg /3 mL (0.083 %) nebulizer solution 3 mL by Nebulization route every four (4) hours as needed for Wheezing. 1 Package 5    Azelastine (ASTEPRO) 0.15 % (205.5 mcg) nasal spray 1 [place] by Both Nostrils route daily.  cod liver oil cap Take 1 Cap by mouth daily. Allergies   Allergen Reactions    Crestor [Rosuvastatin] Myalgia    Levaquin [Levofloxacin] Nausea Only     GI Upset    Lipitor [Atorvastatin] Diarrhea    Lyrica [Pregabalin] Myalgia       Objective:  Visit Vitals    /60 (BP 1 Location: Left arm, BP Patient Position: Sitting)    Pulse 76    Temp 98.2 °F (36.8 °C) (Oral)    Resp 18    Ht 5' 6\" (1.676 m)    Wt 165 lb (74.8 kg)    SpO2 95%    BMI 26.63 kg/m2     Physical Exam:   General appearance - alert, well appearing, and in no distress   Mental status - alert, oriented to person, place, and time  EYE-EOMI  Neck - supple,   Chest - symmetric air entry    Abdomen - obese  Ext-no pedal edema, no clubbing or cyanosis  Skin-Warm and dry.  no hyperpigmentation, vitiligo, or suspicious lesions  Neuro -alert, oriented, normal speech, no focal findings or movement disorder noted        Results for orders placed or performed during the hospital encounter of 03/30/18   GLUCOSE, POC   Result Value Ref Range    Glucose (POC) 92 65 - 100 mg/dL    Performed by Lynsey Schulz 852   Result Value Ref Range    Activated Clotting Time (POC) 461 (H) 79 - 138 SECS   POC ACTIVATED CLOTTING TIME   Result Value Ref Range    Activated Clotting Time (POC) 456 (H) 79 - 138 SECS   POC ACTIVATED CLOTTING TIME   Result Value Ref Range    Activated Clotting Time (POC) 153 (H) 79 - 138 SECS   GLUCOSE, POC   Result Value Ref Range    Glucose (POC) 100 65 - 100 mg/dL    Performed by RAYMOND SEYMOUR    GLUCOSE, POC   Result Value Ref Range    Glucose (POC) 127 (H) 65 - 100 mg/dL    Performed by [address] [place] [address] [address], [facility] Result Value Ref Range    Glucose (POC) 111 (H) 65 - 100 mg/dL    Performed by Jackson Marion      *Note: Due to a large number of results and/or encounters for the requested time period, some results have not been displayed. A complete set of results can be found in Results Review. Assessment/Plan:    ICD-10-CM ICD-9-CM    1. Primary osteoarthritis of right shoulder M19.011 715.11 REFERRAL TO ORTHOPEDICS   2. Osteoarthritis of cervical spine, unspecified spinal osteoarthritis complication status Y18.307 721.0 REFERRAL TO ORTHOPEDICS   3. PAD (peripheral artery disease) ([facility]) I73.9 443.9 REFERRAL TO CARDIOLOGY     Orders Placed This Encounter   [address]     Referral Priority:   Routine     Referral Type:   Consultation     Referral Reason:   Specialty Services Required     Referred to Provider:   Chaz Card MD    REFERRAL TO CARDIOLOGY     Referral Priority:   Routine     Referral Type:   Consultation     Referral Reason:   Specialty Services Required     Referred to Provider:   Robe Lujan MD     1. [facility] oste[facility] of right shoulder    - [facility]    2. Osteoarthritis of cervical spine, unspecified spinal osteoarthritis complication status    - [facility]    3. [facility]peripheral artery disease) [facility]    - REFERRAL TO CARDIOLOGY    There are no Patient Instructions on file for this visit. Follow-up Disposition:  Return for routine/prn. I have reviewed with the patient details of the assessment and plan and all questions were answered. Relevent patient education was performed. The most recent lab findings were reviewed with the patient. An After Visit Summary was printed and given to the patient.

## 2018-05-11 NOTE — TELEPHONE ENCOUNTER
No antibiotic needed from an EP standpoint for patient. Notified patient via phone, she verbalized understanding.

## 2018-05-14 ENCOUNTER — HOSPITAL ENCOUNTER (OUTPATIENT)
Dept: PHYSICAL THERAPY | Age: 68
Discharge: HOME | End: 2018-05-14
Payer: MEDICARE

## 2018-05-14 PROCEDURE — 97110 THERAPEUTIC EXERCISES: CPT

## 2018-05-14 PROCEDURE — G8979 MOBILITY GOAL STATUS: HCPCS | Performed by: PHYSICAL THERAPIST

## 2018-05-14 PROCEDURE — G8980 MOBILITY D/C STATUS: HCPCS | Performed by: PHYSICAL THERAPIST

## 2018-05-14 NOTE — PROGRESS NOTES
[facility]  [address], [address] [facility]    OUTPATIENT physical Therapy DAILY Treatment NOTe  Visit: 5    NAME: Sameera Moraes AGE: 78 y.o. GENDER: female  DATE: 5/14/2018  REFERRING PHYSICIAN: Keila Hennessy MD      GOALS  Short term goals  Time frame: 4  1. Patient will be compliant and independent with the initial HEP as evidenced by being able to perform without cueing. 2. Patient will report a 30% improvement in symptoms. 3. Patient report a 50% improvement in sleeping. 4. Patient will have a  10 degree increase in right external rotation ROM to allow hair washing. 5. Patient will have an increased tolerance for sitting to allow 45 minutes of the activity before symptoms start. 6. Patient will tolerate 30 minutes of clinic activities before onset of symptoms.      Long term goals  Time frame: 8  1. Patient will report pain level decrease to 4/10 to allow increased ease of movement. 2. Patient will have an improved score on the quickDASH outcome measure by 15 points to demonstrate an increase in functional activity tolerance. 3. Patient will be independent in final individualized HEP. 4. Patient will have an increase in pain free shoulder flexion ROM to 160 to allow performance of overhead tasks. 5. Patient will have an increase in right shoulder strength to grossly 3/5 to allow performance of reaching tasks. 6. Patient will sleep 6-8 hours without being interrupted by pain.          SUBJECTIVE:   \"I had a bad night   Is to see Dr Aaliyah Pham(Ortho) on May 29th\"    Pain In: 9/10 right shoulder to hand    OBJECTIVE DATA SUMMARY:   Objective data from initial evaluation:  EXAMINATION/PRESENTATION/DECISION MAKING:   Pain:  Location: Right shoulder  Quality: aching, pain, sharp and dull  Now: 9/10 with movement  Best: 9/10  Worst: 10/10 with activity  Factors that improve pain: heat and tylenol     Posture:   Rounded shoulders  Forward head     Strength: Left shoulder grossly 4+/5    Resisted right shoulder flexion, abduction, and IR resulted in pain  No pain with right shoulder extension  Mild pain with right shoulder ER 3+/5  No pain with elbow flexion 5/5   Pain with elbow extension 4/5    Range of Motion:   Left shoulder WFL     Right all limited by pain  Flexion 145  Abduction 110  ER 15  IR to stomach    Spinal Assessment:   Cervical WNL     Joint Mobility:   Decreased and painful right GH glides     Palpation:   TTP GH, Right UT, and jann scapular      Neurologic Assessment:                         Tone: normal                         Sensation: normal                         Reflexes: NT     Special Tests:   (+) Drop Arm  (+) Neers     Mobility:                         Transitional Movements: guarded right shoulder, decreased arm swing                          Gait: WNL     Balance: Impaired; 3-4 falls per month  TU seconds     Functional Measure:   QuickDASH: 61% on evaluation    In compliance with CMSs Claims Based Outcome Reporting, the following G-code set was chosen for this patient based on their primary functional limitation being treated:     The outcome measure chosen to determine the severity of the functional limitation was the QuickDASH with a score of 61% which was correlated with the impairment scale.     · Carrying, Moving, and Handling Objects:                           - CURRENT STATUS:               CL - 60%-79% impaired, limited or restricted                          - GOAL STATUS:                      CL - 60%-79% impaired, limited or restricted                         - D/C STATUS:                                          ---------------To be determined---------------      TREATMENT/INTERVENTION:  Modalities (Rationale):  to decrease pain and improve blood flow to area  MHP to right shoulder/neck for 10 minutes at end of session; no skin irritation noted     Therapeutic Exercises:  Initial HEP: Pendulums (A/P, M/L, CW/CCW), Shoulder shrugs, scapular retraction, shoulder circles  Added to HEP 5/4/18: UT stretch, shoulder shrugs, scapulae retraction, shoulder ER/IR, wall push ups    Exercises in BOLD completed this date:    UT stretch: 5 reps with 10 second holds  Shoulder shrugs: 10 reps  Chin tucks: 10 reps    Pendulums A/P, M/L, CW/CCW  Shoulder shrugs: 10 reps  Scapular retraction with yellow TB: 10 reps  Shoulder IR/ER with yellow TB: 10 reps (unable to perform d/t pain)  Pull downs with yellow TB: 10 reps  Wall push ups: 5 reps  Wall circles CCW: 10 reps    Seated forward flexion, scaption, and abduction on table: 10 reps each direction  Supine shoulder flexion and abduction with wand: 5 reps; very difficult     UBE: 2 minutes, level 1     Manual Therapy:   Right inferior GH glides Gr 1  Gentle right shoulder manual stretch, flexion, scaption, ER  STM R UT and rhomboids (seated)  Bilateral pec stretch in supine  Gentle right UE long axis traction     Activity tolerance and post treatment pain report:   Fair; 8/10    Education:  Education was provided to the patient on the following topics  [x]    No changes were made to the home exercise program.  []    The following changes were made to the home exercise program:  Patient verbalized understanding of the topics presented. ASSESSMENT:   Patient presents with continued pain in right shoulder. . Patient has the most pain at night and also unable to reach overhead secondary to pain. Patient's symptoms consistent with rotator cuff injury. Pt has been referred to Dr Kacey Lopez, Orthopedic, for further evaluation of right shoulder. Apt is May 29th. Will hold Therapy until pt sees Dr Kacey Lopez, she has a HEP that she will continue to perform.     Patients progression toward goals is as follows:  [x]     Improving appropriately and progressing toward goals  []     Improving slowly and progressing toward goals  []     Not making progress toward goals and plan of care will be adjusted    PLAN OF CARE:   Patient continues to benefit from skilled intervention to address the above impairments. []    Continue treatment per established plan of care.   [x]     Recommend the following changes to the plan of care:     Recommendations/Intent for next treatment: will hold Therapy at this time, pt referred to Orthopedic Dr. Thor Greenwood, PT   Time Calculation: 35 mins  Patient Time in clinic:   Start Time: 1030   Stop Time: [phone]

## 2018-05-22 ENCOUNTER — OFFICE VISIT (OUTPATIENT)
Dept: CARDIOLOGY CLINIC | Age: 68
End: 2018-05-22

## 2018-05-22 VITALS
DIASTOLIC BLOOD PRESSURE: 56 MMHG | HEART RATE: 64 BPM | WEIGHT: 165.2 LBS | BODY MASS INDEX: 26.55 KG/M2 | HEIGHT: 66 IN | RESPIRATION RATE: 18 BRPM | SYSTOLIC BLOOD PRESSURE: 90 MMHG

## 2018-05-22 DIAGNOSIS — Z95.5 S/P CORONARY ARTERY STENT PLACEMENT: ICD-10-CM

## 2018-05-22 DIAGNOSIS — E78.2 MIXED HYPERLIPIDEMIA: ICD-10-CM

## 2018-05-22 DIAGNOSIS — I73.9 PAD (PERIPHERAL ARTERY DISEASE) (HCC): Primary | ICD-10-CM

## 2018-05-22 DIAGNOSIS — I48.0 PAROXYSMAL ATRIAL FIBRILLATION (HCC): Chronic | ICD-10-CM

## 2018-05-22 DIAGNOSIS — I25.10 CORONARY ARTERY DISEASE INVOLVING NATIVE CORONARY ARTERY OF NATIVE HEART WITHOUT ANGINA PECTORIS: ICD-10-CM

## 2018-05-22 NOTE — MR AVS SNAPSHOT
[address] [facility] 
[phone] Patient: Lesly Ricketts MRN:  [id number] Visit Information Date & Time Provider Department Dept. Phone Encounter #  
 5/22/2018  4:15 PM Kylie Caballero, 1024 [facility] [phone] Follow-up Instructions Routing History Follow-up and Disposition History Your Appointments 5/30/2018 10:00 AM  
VASCULAR TEST with [facility] [address] [place] [address]) Appt Note: Per , DUPLEX LOWER EXT ARTERY BILAT [[id number]] (Order [id number]) -scc [address]  
[phone] [address] [address]  
  
    
 6/29/2018  8:00 AM  
ROUTINE CARE with Ana Maria Hoyos MD  
[address]) Appt Note: 3 month follow up on DM,bp [place] [address] [place] [address]  
[phone]  
  
   
 [address]  
  
    
 8/2/2018  9:15 AM  
PROCEDURE with PACEMAKER, [facility]) Appt Note: 3 month follow up per L horta 4/5/18 [place]; 3 month follow up per L horta 4/5/18 [place] [address]  
[phone] [address]  
  
    
 8/2/2018  9:15 AM  
ESTABLISHED PATIENT with Jessenia Tavarez MD  
[place] [facility] [address] [place] [address]) Appt Note: 3 month follow up per L horta 4/5/18 [place]; 3 month follow up per L horta 4/5/18 [place] [address]  
[phone] [address]  
  
    
 9/18/2018 11:15 AM  
PROCEDURE with PACEMAKER, [facility] [address] [place] [address]) Appt Note: [employer] DCPM 6mo check [address] [address] [phone] Upcoming Health Maintenance Date Due  
 EYE EXAM RETINAL OR DILATED Q1 6/25/2016 FOOT EXAM Q1 7/20/2018 GLAUCOMA SCREENING Q2Y 7/28/2018 Influenza Age 5 to Adult 8/1/2018 FOBT Q 1 YEAR AGE 50-75 9/22/2018 HEMOGLOBIN A1C Q6M 9/29/2018 BREAST CANCER SCRN MAMMOGRAM 2/23/2019 MICROALBUMIN Q1 3/29/2019 MEDICARE YEARLY EXAM 3/30/2019 LIPID PANEL Q1 4/17/2019 Pneumococcal 65+ High/Highest Risk (2 of 2 - PPSV23) 6/25/2020 DTaP/Tdap/Td series (2 - Td) 7/16/2026 Allergies as of 5/22/2018  Review Complete On: 5/22/2018 By: Osbaldo Good MD  
  
 Severity Noted Reaction Type Reactions Crestor [Rosuvastatin]  10/31/2016   Side Effect Myalgia Levaquin [Levofloxacin]  12/07/2015   Intolerance Nausea Only GI Upset Lipitor [Atorvastatin]  04/17/2017   Side Effect Diarrhea Lyrica [Pregabalin]  10/31/2016   Side Effect Myalgia Current Immunizations  Reviewed on 10/31/2016 Name Date H1N1 FLU VACCINE 10/20/2009 Influenza High Dose Vaccine PF 10/31/2016 Influenza Vaccine (Madin June Canine Kidney) PF 9/23/2014 11:26 AM  
 Influenza Vaccine (Quad) PF 9/25/2017  6:36 PM  
 Influenza Vaccine Intradermal PF 11/27/2015 Influenza Vaccine Split 9/22/2011  6:25 AM  
 Influenza Vaccine Whole 10/20/2009 Pneumococcal Polysaccharide (PPSV-23) 6/25/2015 Tdap 7/16/2016 11:47 PM  
 ZZZ-RETIRED (DO NOT USE) Pneumococcal Vaccine (Unspecified Type) 10/20/2009 Not reviewed this visit You Were Diagnosed With   
  
 Codes Comments PAD (peripheral artery disease) (HCC)    -  Primary ICD-10-CM: I73.9 ICD-9-CM: 443.9 Paroxysmal atrial fibrillation (HCC)     ICD-10-CM: I48.0 ICD-9-CM: 427.31 Mixed hyperlipidemia     ICD-10-CM: E78.2 ICD-9-CM: 272.2 Coronary artery disease involving native coronary artery of native heart without angina pectoris     ICD-10-CM: I25.10 ICD-9-CM: 414.01   
 S/P coronary artery stent placement     ICD-10-CM: Z95.5 ICD-9-CM: V45.82 Vitals BP Pulse Resp Height(growth percentile) Weight(growth percentile) BMI  
 90/56 (BP 1 Location: Left arm, BP Patient Position: Sitting) 64 18 5' 6\" (1.676 m) 165 lb 3.2 oz (74.9 kg) 26.66 kg/m2 OB Status Smoking Status Postmenopausal Former Smoker Vitals History BMI and BSA Data Body Mass Index Body Surface Area  
 26.66 kg/m 2 1.87 m 2 Preferred Pharmacy Pharmacy Name Phone [facility] [address] [place] [address] Dr Matamoros, [address] [phone] Your Updated Medication List  
  
   
This list is accurate as of 5/22/18  4:43 PM.  Always use your most recent med list.  
  
  
  
  
 acetaminophen 500 mg tablet Commonly known as:  TYLENOL Take 500 mg by mouth every six (6) hours as needed for Pain. albuterol 2.5 mg /3 mL (0.083 %) nebulizer solution Commonly known as:  PROVENTIL VENTOLIN  
3 mL by Nebulization route every four (4) hours as needed for Wheezing. amiodarone 200 mg tablet Commonly known as:  CORDARONE Take 0.5 Tabs by mouth nightly. apixaban 5 mg tablet Commonly known as:  Antoinette Anthony Take 1 Tab by mouth two (2) times a day. Indications: PREVENT THROMBOEMBOLISM IN CHRONIC ATRIAL FIBRILLATION  
  
 ATROVENT HFA 17 mcg/actuation inhaler Generic drug:  ipratropium Take 1 Puff by inhalation every six (6) hours as needed for Wheezing. Azelastine 0.15 % (205.5 mcg) nasal spray Commonly known as:  ASTEPRO  
1 Spray by Both Nostrils route daily. Blood-Glucose Meter monitoring kit Use as directed. Dx: 11.9 Whichever meter her insurance covers  
  
 budesonide-formoterol 160-4.5 mcg/actuation Hfaa Commonly known as:  SYMBICORT Take 1 Puff by inhalation two (2) times a day. carvedilol 6.25 mg tablet Commonly known as:  Dorian Fritter Take 1 Tab by mouth two (2) times daily (with meals). cod liver oil Cap Take 1 Cap by mouth daily. colchicine 0.6 mg tablet Take 2 Tabs by mouth daily. Patient takes 1.2 mg daily and 2.4 mg PRN flare up FLONASE 50 mcg/actuation nasal spray Generic drug:  fluticasone 2 Sprays by Both Nostrils route every evening. furosemide 20 mg tablet Commonly known as:  LASIX Take 1 Tab by mouth daily. gabapentin 800 mg tablet Commonly known as:  NEURONTIN  
TAKE ONE TABLET BY MOUTH THREE TIMES DAILY  
  
 glucose blood VI test strips strip Commonly known as:  blood glucose test  
Use BID DxE11.9 Lancets Misc Use BID Dx: E11.9 PLAVIX 75 mg Tab Generic drug:  clopidogrel Take 75 mg by mouth daily. simvastatin 10 mg tablet Commonly known as:  ZOCOR Take 1 Tab by mouth nightly. tiotropium 18 mcg inhalation capsule Commonly known as:  [address] INHALE THE CONTENTS OF 1 CAPSULE THROUGH HANDIHALER DEVICE DAILY To-Do List   
 05/22/2018 Imaging:  DUPLEX LOWER EXT ARTERY BILAT [facility]! Dear Shelly Brennan: Thank you for requesting a [employer] account. Our records indicate that you already have an active [employer] account. You can access your account anytime at [email or web address] Did you know that you can access your hospital and ER discharge instructions at any time in [employer]? You can also review all of your test results from your hospital stay or ER visit. Additional Information If you have questions, please visit the Frequently Asked Questions section of the [employer] website at [email or web address] Remember, [employer] is NOT to be used for urgent needs. For medical emergencies, dial 911. Now available from your iPhone and Android! Please provide this summary of care documentation to your next provider. Your primary care clinician is listed as Diana Ceron.  If you have any questions after today's visit, please call [phone].

## 2018-05-22 NOTE — PROGRESS NOTES
1. Have you been to the ER, urgent care clinic since your last visit? Hospitalized since your last visit? No.    2. Have you seen or consulted any other health care providers outside of the [address] [place] [address] since your last visit? Include any pap smears or colon screening.    No.      Chief Complaint   Patient presents with    Other     vascular consult- feet and legs get numb, swelling in ankles

## 2018-05-22 NOTE — PROGRESS NOTES
5/22/2018 4:39 PM      Subjective:     Ashley Shukla is seen in vascular clinic today with c/o progressively worsening LE claudication, heaviness and b/l feet numbness. After last visit in August last year, she did not follow up. She denies chest pain, chest pressure/discomfort, dyspnea, palpitations, irregular heart beats, near-syncope, syncope, fatigue, orthopnea, paroxysmal nocturnal dyspnea, exertional chest pressure/discomfort, lower extremity edema, tachypnea. Visit Vitals    BP 90/56 (BP 1 Location: Left arm, BP Patient Position: Sitting)    Pulse 64    Resp 18    Ht 5' 6\" (1.676 m)    Wt 165 lb 3.2 oz (74.9 kg)    BMI 26.66 kg/m2     Current Outpatient Prescriptions   Medication Sig    ipratropium (ATROVENT HFA) 17 mcg/actuation inhaler Take 1 Puff by inhalation every six (6) hours as needed for Wheezing.  simvastatin (ZOCOR) 10 mg tablet Take 1 Tab by mouth nightly.  apixaban (ELIQUIS) 5 mg tablet Take 1 Tab by mouth two (2) times a day. Indications: PREVENT THROMBOEMBOLISM IN CHRONIC ATRIAL FIBRILLATION    colchicine 0.6 mg tablet Take 2 Tabs by mouth daily. Patient takes 1.2 mg daily and 2.4 mg PRN flare up    Blood-Glucose Meter monitoring kit Use as directed. Dx: 11.9 Whichever meter her insurance covers    glucose blood VI test strips (BLOOD GLUCOSE TEST) strip Use BID DxE11.9    Lancets misc Use BID Dx: E11.9    gabapentin (NEURONTIN) 800 mg tablet TAKE ONE TABLET BY MOUTH THREE TIMES DAILY    amiodarone (CORDARONE) 200 mg tablet Take 0.5 Tabs by mouth nightly.  budesonide-formoterol (SYMBICORT) 160-4.5 mcg/actuation HFAA Take 1 Puff by inhalation two (2) times a day.  tiotropium (SPIRIVA WITH HANDIHALER) 18 mcg inhalation capsule INHALE THE CONTENTS OF 1 CAPSULE THROUGH HANDIHALER DEVICE DAILY    furosemide (LASIX) 20 mg tablet Take 1 Tab by mouth daily.     acetaminophen (TYLENOL) 500 mg tablet Take 500 mg by mouth every six (6) hours as needed for Pain.  clopidogrel (PLAVIX) 75 mg tab Take 75 mg by mouth daily.  carvedilol (COREG) 6.25 mg tablet Take 1 Tab by mouth two (2) times daily (with meals).  fluticasone (FLONASE) 50 mcg/actuation nasal spray 2 Sprays by Both Nostrils route every evening.  albuterol (PROVENTIL VENTOLIN) 2.5 mg /3 mL (0.083 %) nebulizer solution 3 mL by Nebulization route every four (4) hours as needed for Wheezing.  Azelastine (ASTEPRO) 0.15 % (205.5 mcg) nasal spray 1 [place] by Both Nostrils route daily.  cod liver oil cap Take 1 Cap by mouth daily. No current facility-administered medications for this visit.           Objective:      Visit Vitals    BP 90/56 (BP 1 Location: Left arm, BP Patient Position: Sitting)    Pulse 64    Resp 18    Ht 5' 6\" (1.676 m)    Wt 165 lb 3.2 oz (74.9 kg)    BMI 26.66 kg/m2       Data Review:     Reviewed and/or ordered active problem list, medication list tests    Past Medical History:   Diagnosis Date    Atrial fibrillation ([address] 2010    CAD (coronary artery disease)     stent    Chronic diastolic heart failure (HCC) 2014    Chronic systolic HF (heart failure) ([address] 5/10/2017    2017 EF 25-30%    COPD     COPD (chronic obstructive pulmonary disease) ([address] 2010    Diabetes [facility]     Fibroid     Heart failure (HCC)     History of Clostridium difficile infection 5/10/2017    2017 CDiff positive    Hypertension 2010    Hypotension 5/10/2017    Junctional tachycardia ([address] 5/10/2017    NIDDM (non-insulin dependent diabetes mellitus) 2010    Screening mammogram 5/4/10    SOB (shortness of breath) 2014      Past Surgical History:   Procedure Laterality Date    COLONOSCOPY N/A 2017    COLONOSCOPY performed [facility]rez [facility]    HX  SECTION      HX OTHER SURGICAL      adrenal gland removed    HX PACEMAKER      [place] EXCISE ADRENAL GLAND       Allergies   Allergen Reactions    Crestor [Rosuvastatin] Myalgia    Levaquin [Levofloxacin] Nausea Only     GI Upset    Lipitor [Atorvastatin] Diarrhea    Lyrica [Pregabalin] Myalgia      Family History   Problem Relation Age of Onset    Heart Disease Mother     Diabetes Father     Heart Disease Brother       Social History     Social History    Marital status:      Spouse name: N/A    Number of children: N/A    Years of education: N/A     Occupational History    Not on file. Social History Main Topics    Smoking status: Former Smoker     Types: Cigarettes     Quit date: 12/31/2009    Smokeless tobacco: Never Used    Alcohol use No    Drug use: No    Sexual activity: Not Currently     Other Topics Concern    Not on file     Social History Narrative         Review of Systems     General: Not Present- Anorexia, Chills, Dietary Changes, Fatigue, Fever, Medication Changes, Night Sweats, Weight Gain > 10lbs. and Weight Loss > 10lbs. .  Skin: Not Present- Bruising and Excessive Sweating. HEENT: Not Present- Headache, Visual Loss and Vertigo. Respiratory: Not Present- Cough, Decreased Exercise Tolerance, Difficulty Breathing, Snoring and Wheezing. Cardiovascular: Not Present- Abnormal Blood Pressure, Chest Pain, Difficulty Breathing On Exertion, Edema, Fainting / Blacking Out, Irregular Heart Beat, Orthopnea, Palpitations, Paroxysmal Nocturnal Dyspnea, Rapid Heart Rate, Shortness of Breath and Swelling of Extremities. Gastrointestinal: Not Present- Black, Tarry Stool, Bloody Stool, Diarrhea, Hematemesis, Rectal Bleeding and Vomiting. Musculoskeletal: Not Present- Muscle Pain and Muscle Weakness. Neurological: Not Present- Dizziness. Psychiatric: Not Present- Depression. Endocrine: Not Present- Cold Intolerance, Heat Intolerance and Thyroid Problems.   Hematology: Not Present- Abnormal Bleeding, Anemia, Blood Clots and Easy Bruising.       Physical Exam   The physical exam findings are as follows:       General   Mental Status - Alert. General Appearance - Not in acute distress. Chest and Lung Exam   Inspection: Accessory muscles - No use of accessory muscles in breathing. Auscultation:   Breath sounds: - Normal.      Cardiovascular   Inspection: Jugular vein - Bilateral - Inspection Normal.  Palpation/Percussion:   Apical Impulse: - Normal.  Auscultation: Rhythm - Regular. Heart Sounds - S1 WNL and S2 WNL. No S3 or S4. Murmurs & Other Heart Sounds: Auscultation of the heart reveals - No Murmurs. Carotid arteries - No Carotid bruit. Peripheral Vascular   Upper Extremity: Inspection - Bilateral - No Cyanotic nailbeds or Digital clubbing. Lower Extremity:   Palpation: Dorsalis pedis pulse - Bilateral - NP. Posterior tibia pulse - Bilateral - NP. Edema - Bilateral - No edema. Assessment:       ICD-10-CM ICD-9-CM    1. PAD (peripheral artery disease) ([facility]) I73.9 443.9 DUPLEX LOWER EXT ARTERY BILAT   2. Paroxysmal atrial fibrillation ([facility]) I48.0 427.31 DUPLEX LOWER EXT ARTERY BILAT   3. Mixed hyperlipidemia E78.2 272.2 DUPLEX LOWER EXT ARTERY BILAT   4. Coronary artery disease involving native coronary artery of native heart without angina pectoris I25.10 414.01 DUPLEX LOWER EXT ARTERY BILAT   5. S/P coronary artery stent placement Z95.5 V45.82 DUPLEX LOWER EXT ARTERY BILAT       Plan:     1. LE claudication, feet pain and numbness: mild PAD based upon last MILLA in 8/2017. Never had LE arterial doppler. Now symptoms appears to be getting worse. Check LE arterial doppler. Clinically appears to be infrapop disease. Also appears to have peripheral neuropathy. 2. BP controlled. 3. On statin. 4. CAD: f/u with Dr. Catherine Yi for cardiac care.

## 2018-06-04 ENCOUNTER — CLINICAL SUPPORT (OUTPATIENT)
Dept: CARDIOLOGY CLINIC | Age: 68
End: 2018-06-04

## 2018-06-04 DIAGNOSIS — I25.10 CORONARY ARTERY DISEASE INVOLVING NATIVE CORONARY ARTERY OF NATIVE HEART WITHOUT ANGINA PECTORIS: ICD-10-CM

## 2018-06-04 DIAGNOSIS — I73.9 PAD (PERIPHERAL ARTERY DISEASE) (HCC): ICD-10-CM

## 2018-06-04 DIAGNOSIS — I48.0 PAROXYSMAL ATRIAL FIBRILLATION (HCC): Chronic | ICD-10-CM

## 2018-06-04 DIAGNOSIS — Z95.5 S/P CORONARY ARTERY STENT PLACEMENT: ICD-10-CM

## 2018-06-04 DIAGNOSIS — E78.2 MIXED HYPERLIPIDEMIA: ICD-10-CM

## 2018-06-06 ENCOUNTER — APPOINTMENT (OUTPATIENT)
Dept: GENERAL RADIOLOGY | Age: 68
End: 2018-06-06
Attending: EMERGENCY MEDICINE
Payer: MEDICARE

## 2018-06-06 ENCOUNTER — HOSPITAL ENCOUNTER (EMERGENCY)
Age: 68
Discharge: HOME | End: 2018-06-06
Attending: EMERGENCY MEDICINE
Payer: MEDICARE

## 2018-06-06 VITALS
TEMPERATURE: 97.7 F | RESPIRATION RATE: 16 BRPM | OXYGEN SATURATION: 95 % | HEART RATE: 74 BPM | WEIGHT: 168 LBS | DIASTOLIC BLOOD PRESSURE: 63 MMHG | HEIGHT: 67 IN | SYSTOLIC BLOOD PRESSURE: 107 MMHG | BODY MASS INDEX: 26.37 KG/M2

## 2018-06-06 DIAGNOSIS — M25.511 PAIN IN JOINT OF RIGHT SHOULDER: ICD-10-CM
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
## 2018-06-06 DIAGNOSIS — M25.551 RIGHT HIP PAIN: ICD-10-CM

## 2018-06-06 DIAGNOSIS — R07.81 RIB PAIN ON RIGHT SIDE: ICD-10-CM

## 2018-06-06 PROCEDURE — 73502 X-RAY EXAM HIP UNI 2-3 VIEWS: CPT

## 2018-06-06 PROCEDURE — 74011250637 HC RX REV CODE- 250/637: Performed by: EMERGENCY MEDICINE

## 2018-06-06 PROCEDURE — 71101 X-RAY EXAM UNILAT RIBS/CHEST: CPT

## 2018-06-06 PROCEDURE — 73020 X-RAY EXAM OF SHOULDER: CPT

## 2018-06-06 PROCEDURE — 73030 X-RAY EXAM OF SHOULDER: CPT

## 2018-06-06 PROCEDURE — 99284 EMERGENCY DEPT VISIT MOD MDM: CPT

## 2018-06-06 RX ORDER — TRAMADOL HYDROCHLORIDE 50 MG/1
50 TABLET ORAL
Qty: 8 TAB | Refills: 0 | Status: SHIPPED | OUTPATIENT
Start: 2018-06-06 | End: 2018-06-29

## 2018-06-06 RX ORDER — ACETAMINOPHEN 325 MG/1
650 TABLET ORAL
Status: COMPLETED | OUTPATIENT
Start: 2018-06-06 | End: 2018-06-06

## 2018-06-06 RX ORDER — CYCLOBENZAPRINE HCL 10 MG
10 TABLET ORAL
Status: COMPLETED | OUTPATIENT
Start: 2018-06-06 | End: 2018-06-06

## 2018-06-06 RX ORDER — CYCLOBENZAPRINE HCL 5 MG
5 TABLET ORAL
Qty: 20 TAB | Refills: 0 | Status: SHIPPED | OUTPATIENT
Start: 2018-06-06 | End: 2018-06-29

## 2018-06-06 RX ADMIN — CYCLOBENZAPRINE HYDROCHLORIDE 10 MG: 10 TABLET, FILM COATED ORAL at 14:17

## 2018-06-06 RX ADMIN — ACETAMINOPHEN 650 MG: 325 TABLET ORAL at 14:17

## 2018-06-06 NOTE — ED TRIAGE NOTES
Assumed care of this patient. She is alert and oriented x4. Patient arrived via EMS and placed in starkey bed. She reports that she had a fall while leaving the doctor's office on Monday. She reports that she fell on the right side of her body. Patient c/o right shoulder, arm, rib, hip and knee pain. Abrasion noted to the right knee.

## 2018-06-06 NOTE — ED NOTES
Patient discharged by Dr. Mena Lamb. Patient provided with discharge instructions Rx and instructions on follow up care. Patient out of ED via wheelchair accompanied by self.

## 2018-06-06 NOTE — ED PROVIDER NOTES
EMERGENCY DEPARTMENT HISTORY AND PHYSICAL EXAM      Date: 6/6/2018  Patient Name: Sayra Berry    History of Presenting Illness     Chief Complaint   Patient presents with    Fall       History Provided By: Patient    HPI: Sayra Berry, 78 y.o. female with PMHx significant for HTN, DM, CAD, afib, CHF, and COPD, presents via EMS to [facility] ED with cc of progressively worsening right shoulder pain, right-sided ribs pain, and right hip pain s/p a mechanical GLF that occurred prior to arrival. The patient states that she was leaving her PCP office when she tripped and fell onto her right side. She states that her pain is exacerbated with movement and with deep inspiration. She notes undergoing Physical Therapy for prior right shoulder injuries. She reports being ambulatory since the incident. She specifically denies all other complaints at this time. There are no other complaints, changes, or physical findings at this time. PCP: Rita Ramirez MD    Current Outpatient Prescriptions   Medication Sig Dispense Refill    cyclobenzaprine (FLEXERIL) 5 mg tablet Take 1 Tab by mouth three (3) times daily as needed for Muscle Spasm(s). 20 Tab 0    traMADol (ULTRAM) 50 mg tablet Take 1 Tab by mouth every six (6) hours as needed for Pain. Max Daily Amount: 200 mg. Indications: Pain 8 Tab 0    ipratropium (ATROVENT HFA) 17 mcg/actuation inhaler Take 1 Puff by inhalation every six (6) hours as needed for Wheezing.  simvastatin (ZOCOR) 10 mg tablet Take 1 Tab by mouth nightly. 90 Tab 1    apixaban (ELIQUIS) 5 mg tablet Take 1 Tab by mouth two (2) times a day. Indications: PREVENT THROMBOEMBOLISM IN CHRONIC ATRIAL FIBRILLATION 42 Tab 0    colchicine 0.6 mg tablet Take 2 Tabs by mouth daily. Patient takes 1.2 mg daily and 2.4 mg PRN flare up 60 Tab 5    Blood-Glucose Meter monitoring kit Use as directed.  Dx: 11.9 Whichever meter her insurance covers 1 Kit 0    glucose blood VI test strips (BLOOD GLUCOSE TEST) strip Use BID DxE11.9 200 Strip 11    Lancets misc Use BID Dx: E11.9 200 Each 11    gabapentin (NEURONTIN) 800 mg tablet TAKE ONE TABLET BY MOUTH THREE TIMES DAILY 90 Tab 3    amiodarone (CORDARONE) 200 mg tablet Take 0.5 Tabs by mouth nightly. 30 Tab 6    budesonide-formoterol (SYMBICORT) 160-4.5 mcg/actuation HFAA Take 1 Puff by inhalation two (2) times a day. 3 Inhaler 3    tiotropium (SPIRIVA WITH HANDIHALER) 18 mcg inhalation capsule INHALE THE CONTENTS OF 1 CAPSULE THROUGH HANDIHALER DEVICE DAILY 90 Cap 3    furosemide (LASIX) 20 mg tablet Take 1 Tab by mouth daily. 30 Tab 0    acetaminophen (TYLENOL) 500 mg tablet Take 500 mg by mouth every six (6) hours as needed for Pain.  clopidogrel (PLAVIX) 75 mg tab Take 75 mg by mouth daily.  carvedilol (COREG) 6.25 mg tablet Take 1 Tab by mouth two (2) times daily (with meals). 60 Tab 11    fluticasone (FLONASE) 50 mcg/actuation nasal spray 2 Sprays by Both Nostrils route every evening.  albuterol (PROVENTIL VENTOLIN) 2.5 mg /3 mL (0.083 %) nebulizer solution 3 mL by Nebulization route every four (4) hours as needed for Wheezing. 1 Package 5    Azelastine (ASTEPRO) 0.15 % (205.5 mcg) nasal spray 1 [place] by Both Nostrils route daily.  cod liver oil cap Take 1 Cap by mouth daily.          Past History     Past Medical History:  Past Medical History:   Diagnosis Date    Atrial fibrillation ([address] 6/2/2010    CAD (coronary artery disease)     stent    Chronic diastolic heart failure ([address] 9/22/2014    Chronic systolic HF (heart failure) ([address] 5/10/2017    4/2017 EF 25-30%    COPD     COPD (chronic obstructive pulmonary disease) ([address] 6/2/2010    Diabetes [facility]     Fibroid     Heart failure ([address]     History of Clostridium difficile infection 5/10/2017    4/2017 CDiff positive    Hypertension 6/2/2010    Hypotension 5/10/2017    Junctional tachycardia ([address] 5/10/2017    NIDDM (non-insulin dependent diabetes mellitus) 6/2/2010  Screening mammogram 5/4/10    SOB (shortness of breath) 2014       Past Surgical History:  Past Surgical History:   Procedure Laterality Date    COLONOSCOPY N/A 2017    COLONOSCOPY performed by Fabienne Cardenas MD at [facility] AMBULAT[facility] OR    HX  SECTION      HX OTHER SURGICAL      adrenal gland removed    HX PACEMAKER      [place] EXCISE ADRENAL GLAND         Family History:  Family History   Problem Relation Age of Onset    Heart Disease Mother     Diabetes Father     Heart Disease Brother        Social History:  Social History   Substance Use Topics    Smoking status: Former Smoker     Types: Cigarettes     Quit date: 2009    Smokeless tobacco: Never Used    Alcohol use No       Allergies: Allergies   Allergen Reactions    Crestor [Rosuvastatin] Myalgia    Levaquin [Levofloxacin] Nausea Only     GI Upset    Lipitor [Atorvastatin] Diarrhea    Lyrica [Pregabalin] Myalgia       Review of Systems   Review of Systems   Constitutional: Negative for chills and fever. HENT: Negative. Respiratory: Negative for cough and shortness of breath. Cardiovascular: Negative for chest pain. Gastrointestinal: Negative. Negative for constipation, diarrhea, nausea and vomiting. Genitourinary: Negative. Musculoskeletal: Positive for arthralgias (Right shoulder, right ribs, right hip). Skin: Negative. Neurological: Negative for weakness and numbness. All other systems reviewed and are negative. Physical Exam   Physical Exam   Constitutional: She is oriented to person, place, and time. She appears well-developed and well-nourished. HENT:   Head: Normocephalic and atraumatic. Eyes: Conjunctivae and EOM are normal.   Neck: Normal range of motion. Neck supple. Cardiovascular: Normal rate and regular rhythm. Pulmonary/Chest: Effort normal and breath sounds normal. No respiratory distress. Abdominal: Soft. She exhibits no distension. There is no tenderness. Musculoskeletal:   Tenderness over right trapezius, baseline ROM of RUE, tenderness over right lateral chest wall, full ROM of right knee, abrasion over right anterior knee, ambulatory   Neurological: She is alert and oriented to person, place, and time. Skin: Skin is warm and dry. Psychiatric: She has a normal mood and affect. Nursing note and vitals reviewed. Diagnostic Study Results     Radiologic Studies -    EXAM:  XR SHOULDER RT AP/LAT MIN 2 V     INDICATION:   Trauma.     COMPARISON: None.     FINDINGS: Three views of the right shoulder demonstrate no fracture or  dislocation. There are mild degenerative changes AC joint. .     IMPRESSION  IMPRESSION:  No acute abnormality.             INDICATION:  trauma fall with right rib pain      Three views of the right ribs and a frontal CXR show no apparent rib fracture or  other rib lesion. The lungs are free of acute disease. There is no  pneumothorax. Heart size is normal.     IMPRESSION  IMPRESSION: No Right Rib Fracture; No Acute Cardiopulmonary Disease.             EXAM:  XR HIP RT W OR WO PELV 2-3 VWS     INDICATION:  Fall on Monday with pain in right shoulder, arm, rib, hip and knee.     FINDINGS: An AP view of the pelvis and a frogleg lateral view of the right hip  demonstrate no fracture, dislocation or other acute abnormality.     IMPRESSION  IMPRESSION:  No acute abnormality.                    Medical Decision Making   I am the first provider for this patient. I reviewed the vital signs, available nursing notes, past medical history, past surgical history, family history and social history. Vital Signs-Reviewed the patient's vital signs.   Patient Vitals for the past 12 hrs:   Temp Pulse Resp BP SpO2   06/06/18 1246 97.7 °F (36.5 °C) 74 16 107/63 95 %       Records Reviewed: Nursing Notes, Old Medical Records, Previous Radiology Studies and Previous Laboratory Studies    Provider Notes (Medical Decision Making):   Patient presents after fall with right shoulder pain, right sided ribs pain, and right hip pain. Will get imaging to further evaluate for fracture vs. Dislocation vs. Contusion. Will treat with analgesics at this time and continue to monitor for changes in mentation. ED Course:   Initial assessment performed. The patients presenting problems have been discussed, and they are in agreement with the care plan formulated and outlined with them. I have encouraged them to ask questions as they arise throughout their visit. Progress Note:  2:31 PM  Pt's imaging results have no acute fractures at this time. Will discharge with symptomatic management. Written by Viviane Butler ED Scribe, as dictated by Anton Grigsby MD.    Critical Care Time: 0 minutes    Disposition:  Discharge Note:  2:32 PM  The pt is ready for discharge. The pt's signs, symptoms, diagnosis, and discharge instructions have been discussed and pt has conveyed their understanding. The pt is to follow up as recommended or return to ER should their symptoms worsen. Plan has been discussed and pt is in agreement. PLAN:  1. Current Discharge Medication List      START taking these medications    Details   cyclobenzaprine (FLEXERIL) 5 mg tablet Take 1 Tab by mouth three (3) times daily as needed for Muscle Spasm(s). Qty: 20 Tab, Refills: 0    Associated Diagnoses: Pain in joint of right shoulder; Right hip pain; Rib pain on right side      traMADol (ULTRAM) 50 mg tablet Take 1 Tab by mouth every six (6) hours as needed for Pain. Max Daily Amount: 200 mg. Indications: Pain  Qty: 8 Tab, Refills: 0    Associated Diagnoses: Pain in joint of right shoulder; Right hip pain; Rib pain on right side           2. Follow-up Information     Follow up With Details Comments [address], [place]  As needed [address] [place] [address] Harpreet Panchal  [phone]          Return to ED if worse     Diagnosis     Clinical Impression:   1.  Fall, initial encounter    2. Pain in joint of right shoulder    3. Right hip pain    4. Rib pain on right side        Attestations: This note is prepared by Monse Suero, acting as a Scribe for Jenny Jackson MD.    Jenny Jackson MD: The scribe's documentation has been prepared under my direction and personally reviewed by me in its entirety. I confirm that the notes above accurately reflects all work, treatment, procedures, and medical decision making performed by me. This note will not be viewable in [address]

## 2018-06-08 ENCOUNTER — DOCUMENTATION ONLY (OUTPATIENT)
Dept: CARDIOLOGY CLINIC | Age: 68
End: 2018-06-08

## 2018-06-08 NOTE — PROGRESS NOTES
Received fax from [facility] stating pt has been approved for assistance with Eliquis free of charge through 12/31/18.

## 2018-06-14 DIAGNOSIS — R06.02 SOB (SHORTNESS OF BREATH): ICD-10-CM

## 2018-06-14 RX ORDER — ALBUTEROL SULFATE 0.83 MG/ML
2.5 SOLUTION RESPIRATORY (INHALATION)
Qty: 1 PACKAGE | Refills: 5 | Status: SHIPPED | OUTPATIENT
Start: 2018-06-14 | End: 2019-02-17

## 2018-06-14 NOTE — TELEPHONE ENCOUNTER
Patient called and stated that she needed a refill on her prescription. It is to be refilled at the [facility] on file.

## 2018-06-19 ENCOUNTER — OFFICE VISIT (OUTPATIENT)
Dept: CARDIOLOGY CLINIC | Age: 68
End: 2018-06-19

## 2018-06-19 VITALS
HEART RATE: 74 BPM | SYSTOLIC BLOOD PRESSURE: 98 MMHG | HEIGHT: 67 IN | RESPIRATION RATE: 16 BRPM | DIASTOLIC BLOOD PRESSURE: 58 MMHG | BODY MASS INDEX: 26.38 KG/M2 | WEIGHT: 168.1 LBS | OXYGEN SATURATION: 96 %

## 2018-06-19 DIAGNOSIS — I25.10 CORONARY ARTERY DISEASE INVOLVING NATIVE CORONARY ARTERY OF NATIVE HEART WITHOUT ANGINA PECTORIS: ICD-10-CM

## 2018-06-19 DIAGNOSIS — I48.0 PAROXYSMAL ATRIAL FIBRILLATION (HCC): Chronic | ICD-10-CM

## 2018-06-19 DIAGNOSIS — I73.9 PVD (PERIPHERAL VASCULAR DISEASE) WITH CLAUDICATION (HCC): Primary | ICD-10-CM

## 2018-06-19 DIAGNOSIS — E78.2 MIXED HYPERLIPIDEMIA: ICD-10-CM

## 2018-06-19 RX ORDER — CLEMASTINE FUMARATE 2.68 MG/1
TABLET ORAL
COMMUNITY
Start: 2018-06-04 | End: 2018-10-03 | Stop reason: CLARIF

## 2018-06-19 NOTE — PROGRESS NOTES
6/19/2018 3:38 PM      Subjective:     Val Bryan is here for f/u visit. Continues to c/o b/l LE limiting claudication and rest pain. On LE arterial doppler primarily infra pop disease. Also appears to have component of peripheral neuropathy. She denies chest pain, chest pressure/discomfort, dyspnea, palpitations, irregular heart beats, near-syncope, syncope, fatigue, orthopnea, paroxysmal nocturnal dyspnea, exertional chest pressure/discomfort, tachypnea. Visit Vitals    BP 98/58 (BP 1 Location: Right arm, BP Patient Position: Sitting)    Pulse 74    Resp 16    Ht 5' 7\" (1.702 m)    Wt 168 lb 1.6 oz (76.2 kg)    SpO2 96%    BMI 26.33 kg/m2     Current Outpatient Prescriptions   Medication Sig    clemastine 2.68 mg tab Indications: 1 tab in am and 1 tab in pm    ipratropium (ATROVENT HFA) 17 mcg/actuation inhaler Take 1 Puff by inhalation every six (6) hours as needed for Wheezing.  albuterol (PROVENTIL VENTOLIN) 2.5 mg /3 mL (0.083 %) nebulizer solution 3 mL by Nebulization route every four (4) hours as needed for Wheezing.  cyclobenzaprine (FLEXERIL) 5 mg tablet Take 1 Tab by mouth three (3) times daily as needed for Muscle Spasm(s).  traMADol (ULTRAM) 50 mg tablet Take 1 Tab by mouth every six (6) hours as needed for Pain. Max Daily Amount: 200 mg. Indications: Pain    simvastatin (ZOCOR) 10 mg tablet Take 1 Tab by mouth nightly.  apixaban (ELIQUIS) 5 mg tablet Take 1 Tab by mouth two (2) times a day. Indications: PREVENT THROMBOEMBOLISM IN CHRONIC ATRIAL FIBRILLATION    colchicine 0.6 mg tablet Take 2 Tabs by mouth daily. Patient takes 1.2 mg daily and 2.4 mg PRN flare up    Blood-Glucose Meter monitoring kit Use as directed.  Dx: 11.9 Whichever meter her insurance covers    glucose blood VI test strips (BLOOD GLUCOSE TEST) strip Use BID DxE11.9    Lancets misc Use BID Dx: E11.9    gabapentin (NEURONTIN) 800 mg tablet TAKE ONE TABLET BY MOUTH THREE TIMES DAILY    amiodarone (CORDARONE) 200 mg tablet Take 0.5 Tabs by mouth nightly.  budesonide-formoterol (SYMBICORT) 160-4.5 mcg/actuation HFAA Take 1 Puff by inhalation two (2) times a day.  tiotropium (SPIRIVA WITH HANDIHALER) 18 mcg inhalation capsule INHALE THE CONTENTS OF 1 CAPSULE THROUGH HANDIHALER DEVICE DAILY    furosemide (LASIX) 20 mg tablet Take 1 Tab by mouth daily.  acetaminophen (TYLENOL) 500 mg tablet Take 500 mg by mouth every six (6) hours as needed for Pain.  clopidogrel (PLAVIX) 75 mg tab Take 75 mg by mouth daily.  carvedilol (COREG) 6.25 mg tablet Take 1 Tab by mouth two (2) times daily (with meals).  fluticasone (FLONASE) 50 mcg/actuation nasal spray 2 Sprays by Both Nostrils route every evening.  Azelastine (ASTEPRO) 0.15 % (205.5 mcg) nasal spray 1 Monarch by Both Nostrils route daily.  cod liver oil cap Take 1 Cap by mouth daily. No current facility-administered medications for this visit.           Objective:      Visit Vitals    BP 98/58 (BP 1 Location: Right arm, BP Patient Position: Sitting)    Pulse 74    Resp 16    Ht 5' 7\" (1.702 m)    Wt 168 lb 1.6 oz (76.2 kg)    SpO2 96%    BMI 26.33 kg/m2       Data Review:     Reviewed and/or ordered active problem list, medication list tests    Past Medical History:   Diagnosis Date    Atrial fibrillation ([facility] [address] [address] 6/2/2010    CAD (coronary artery disease)     stent    Chronic diastolic heart failure (HCC) 9/22/2014    Chronic systolic HF (heart failure) ([address] 5/10/2017    4/2017 EF 25-30%    COPD     COPD (chronic obstructive pulmonary disease) ([address] 6/2/2010    Diabetes (HCC)     Fibroid     Heart failure (HCC)     History of Clostridium difficile infection 5/10/2017    4/2017 CDiff positive    Hypertension 6/2/2010    Hypotension 5/10/2017    Junctional tachycardia ([address] 5/10/2017    NIDDM (non-insulin dependent diabetes mellitus) 6/2/2010    Screening mammogram 5/4/10    SOB (shortness of breath) 2014      Past Surgical History:   Procedure Laterality Date    COLONOSCOPY N/A 2017    COLONOSCOPY performed by Vick [facility]undo [facility] at [facility] AM[facility]UL[facility]    HX  SECTION      HX OTHER SURGICAL      adrenal gland removed    HX PACEMAKER      LA EXCISE ADRENAL GLAND       Allergies   Allergen Reactions    Crestor [Rosuvastatin] Myalgia    Levaquin [Levofloxacin] Nausea Only     GI Upset    Lipitor [Atorvastatin] Diarrhea    Lyrica [Pregabalin] Myalgia      Family History   Problem Relation Age of Onset    Heart Disease Mother     Diabetes Father     Heart Disease Brother       Social History     Social History    Marital status:      Spouse name: N/A    Number of children: N/A    Years of education: N/A     Occupational History    Not on file. Social History Main Topics    Smoking status: Former Smoker     Types: Cigarettes     Quit date: 2009    Smokeless tobacco: Never Used    Alcohol use No    Drug use: No    Sexual activity: Not Currently     Other Topics Concern    Not on file     Social History Narrative         Review of Systems     General: Not Present- Anorexia, Chills, Dietary Changes, Fatigue, Fever, Medication Changes, Night Sweats, Weight Gain > 10lbs. and Weight Loss > 10lbs. .  Skin: Not Present- Bruising and Excessive Sweating. HEENT: Not Present- Headache, Visual Loss and Vertigo. Respiratory: Not Present- Cough, Decreased Exercise Tolerance, Difficulty Breathing, Snoring and Wheezing. Cardiovascular: Not Present- Abnormal Blood Pressure, Chest Pain, Difficulty Breathing On Exertion, Edema, Fainting / Blacking Out, Irregular Heart Beat, Orthopnea, Palpitations, Paroxysmal Nocturnal Dyspnea, Rapid Heart Rate, Shortness of Breath. Gastrointestinal: Not Present- Black, Tarry Stool, Bloody Stool, Diarrhea, Hematemesis, Rectal Bleeding and Vomiting. Musculoskeletal: Not Present- Muscle Pain and Muscle Weakness.   Neurological: Not Present- Dizziness. Psychiatric: Not Present- Depression. Endocrine: Not Present- Cold Intolerance, Heat Intolerance and Thyroid Problems. Hematology: Not Present- Abnormal Bleeding, Anemia, Blood Clots and Easy Bruising.       Physical Exam   The physical exam findings are as follows:       General   Mental Status - Alert. General Appearance - Not in acute distress. Chest and Lung Exam   Inspection: Accessory muscles - No use of accessory muscles in breathing. Auscultation:   Breath sounds: - Normal.      Cardiovascular   Inspection: Jugular vein - Bilateral - Inspection Normal.  Palpation/Percussion:   Apical Impulse: - Normal.  Auscultation: Rhythm - Regular. Heart Sounds - S1 WNL and S2 WNL. No S3 or S4. Murmurs & Other Heart Sounds: Auscultation of the heart reveals - No Murmurs. Carotid arteries - No Carotid bruit. Peripheral Vascular   Upper Extremity: Inspection - Bilateral - No Cyanotic nailbeds or Digital clubbing. Lower Extremity:   Palpation: Dorsalis pedis pulse - Bilateral - NP. Posterior tibia pulse - Bilateral - NP. Edema - Bilateral - No edema. Assessment:       ICD-10-CM ICD-9-CM    1. PVD (peripheral vascular disease) with claudication (HCC) I73.9 443.9 IR ANGIO EXT LOWER BI      CBC W/O DIFF      METABOLIC PANEL, COMPREHENSIVE      PROTHROMBIN TIME + INR   2. Paroxysmal atrial fibrillation (HCC) I48.0 427.31 IR ANGIO EXT LOWER BI      CBC W/O DIFF      METABOLIC PANEL, COMPREHENSIVE      PROTHROMBIN TIME + INR   3. Mixed hyperlipidemia E78.2 272.2 IR ANGIO EXT LOWER BI      CBC W/O DIFF      METABOLIC PANEL, COMPREHENSIVE      PROTHROMBIN TIME + INR   4. Coronary artery disease involving native coronary artery of native heart without angina pectoris I25.10 414.01 IR ANGIO EXT LOWER BI      CBC W/O DIFF      METABOLIC PANEL, COMPREHENSIVE      PROTHROMBIN TIME + INR       Plan:     1. PVD: limiting LE claudication, feet pain and numbness: will proceed with LE angio.  I discussed the risks/benefits/alternatives of the procedure with the patient. Risks include (but are not limited to) bleeding, infection, death. The patient understands and agrees to proceed. Also appears to have peripheral neuropathy. 2. BP controlled. 3. On statin. 4. CAD: f/u with Dr. Belen Shane for cardiac care.

## 2018-06-19 NOTE — MR AVS SNAPSHOT
[address] [facility] 
[phone] Patient: Sayra Berry MRN:  [id number] Visit Information Date & Time Provider Department Dept. Phone Encounter #  
 6/19/2018  3:15 PM Geronimo Mack, [address] [facility] 21  Your Appointments 6/29/2018  8:00 AM  
ROUTINE CARE with Rita Ramirez MD  
[facility] Appt Note: 3 month follow up on DM,bp [place] [address]  
[phone]  
  
   
 [address]  
  
    
 8/2/2018  9:15 AM  
PROCEDURE with PACEMAKER, [facility] Appt Note: 3 month follow up per L horta 4/5/18 [place]; 3 month follow up per L horta 4/5/18 [place] [address]  
[phone] [address]  
  
    
 8/2/2018  9:15 AM  
ESTABLISHED PATIENT with Eva Bowen MD  
[facility] [facility] Appt Note: 3 month follow up per L horta 4/5/18 [place]; 3 month follow up per L horta 4/5/18 [place] [address]  
[phone] [address]  
  
    
 9/18/2018 11:15 AM  
PROCEDURE with PACEMAKER, [facility] Appt Note: [employer] DCPM 6mo check [address]  
[phone] Upcoming Health Maintenance Date Due  
 EYE EXAM RETINAL OR DILATED Q1 6/25/2016 GLAUCOMA SCREENING Q2Y 7/28/2018 FOOT EXAM Q1 7/20/2018 Influenza Age 5 to Adult 8/1/2018 FOBT Q 1 YEAR AGE 50-75 9/22/2018 HEMOGLOBIN A1C Q6M 9/29/2018 BREAST CANCER SCRN MAMMOGRAM 2/23/2019 MICROALBUMIN Q1 3/29/2019 MEDICARE YEARLY EXAM 3/30/2019 LIPID PANEL Q1 4/17/2019 Pneumococcal 65+ High/Highest Risk (2 of 2 - PPSV23) 6/25/2020 DTaP/Tdap/Td series (2 - Td) 7/16/2026 Allergies as of 6/19/2018  Review Complete On: 6/19/2018 By: Son John MD  
  
 Severity Noted Reaction Type Reactions Crestor [Rosuvastatin]  10/31/2016   Side Effect Myalgia Levaquin [Levofloxacin]  12/07/2015   Intolerance Nausea Only GI Upset Lipitor [Atorvastatin]  04/17/2017   Side Effect Diarrhea Lyrica [Pregabalin]  10/31/2016   Side Effect Myalgia Current Immunizations  Reviewed on 10/31/2016 Name Date H1N1 FLU VACCINE 10/20/2009 Influenza High Dose Vaccine PF 10/31/2016 Influenza Vaccine (Madin June Canine Kidney) PF 9/23/2014 11:26 AM  
 Influenza Vaccine (Quad) PF 9/25/2017  6:36 PM  
 Influenza Vaccine Intradermal PF 11/27/2015 Influenza Vaccine Split 9/22/2011  6:25 AM  
 Influenza Vaccine Whole 10/20/2009 Pneumococcal Polysaccharide (PPSV-23) 6/25/2015 Tdap 7/16/2016 11:47 PM  
 ZZZ-RETIRED (DO NOT USE) Pneumococcal Vaccine (Unspecified Type) 10/20/2009 Not reviewed this visit Vitals BP Pulse Resp Height(growth percentile) Weight(growth percentile) SpO2  
 98/58 (BP 1 Location: Right arm, BP Patient Position: Sitting) 74 16 5' 7\" (1.702 m) 168 lb 1.6 oz (76.2 kg) 96% BMI OB Status Smoking Status 26.33 kg/m2 Postmenopausal Former Smoker Vitals History BMI and BSA Data Body Mass Index Body Surface Area  
 26.33 kg/m 2 1.9 m 2 Preferred Pharmacy Pharmacy Name Phone [facility] [address] [place] [address] Dr [place], [address] [phone] Your Updated Medication List  
  
   
This list is accurate as of 6/19/18  3:37 PM.  Always use your most recent med list.  
  
  
  
  
 acetaminophen 500 mg tablet Commonly known as:  TYLENOL Take 500 mg by mouth every six (6) hours as needed for Pain. albuterol 2.5 mg /3 mL (0.083 %) nebulizer solution Commonly known as:  PROVENTIL VENTOLIN  
3 mL by Nebulization route every four (4) hours as needed for Wheezing. amiodarone 200 mg tablet Commonly known as:  CORDARONE Take 0.5 Tabs by mouth nightly. apixaban 5 mg tablet Commonly known as:  Margarie Parag Take 1 Tab by mouth two (2) times a day. Indications: PREVENT THROMBOEMBOLISM IN CHRONIC ATRIAL FIBRILLATION Azelastine 0.15 % (205.5 mcg) nasal spray Commonly known as:  ASTEPRO  
1 Spray by Both Nostrils route daily. Blood-Glucose Meter monitoring kit Use as directed. Dx: 11.9 Whichever meter her insurance covers  
  
 budesonide-formoterol 160-4.5 mcg/actuation Hfaa Commonly known as:  SYMBICORT Take 1 Puff by inhalation two (2) times a day. carvedilol 6.25 mg tablet Commonly known as:  [place]  Take 1 Tab by mouth two (2) times daily (with meals). clemastine 2.68 mg Tab Indications: 1 tab in am and 1 tab in pm  
  
 cod liver oil Cap Take 1 Cap by mouth daily. colchicine 0.6 mg tablet Take 2 Tabs by mouth daily. Patient takes 1.2 mg daily and 2.4 mg PRN flare up  
  
 cyclobenzaprine 5 mg tablet Commonly known as:  FLEXERIL Take 1 Tab by mouth three (3) times daily as needed for Muscle Spasm(s). FLONASE 50 mcg/actuation nasal spray Generic drug:  fluticasone 2 Sprays by Both Nostrils route every evening. furosemide 20 mg tablet Commonly known as:  LASIX Take 1 Tab by mouth daily. gabapentin 800 mg tablet Commonly known as:  NEURONTIN  
TAKE ONE TABLET BY MOUTH THREE TIMES DAILY  
  
 glucose blood VI test strips strip Commonly known as:  blood glucose test  
Use BID DxE11.9  
  
 ipratropium 17 mcg/actuation inhaler Commonly known as:  ATROVENT HFA Take 1 Puff by inhalation every six (6) hours as needed for Wheezing. Lancets Misc Use BID Dx: E11.9 PLAVIX 75 mg Tab Generic drug:  clopidogrel Take 75 mg by mouth daily. simvastatin 10 mg tablet Commonly known as:  ZOCOR Take 1 Tab by mouth nightly. tiotropium 18 mcg inhalation capsule Commonly known as:  [address] INHALE THE CONTENTS OF 1 CAPSULE THROUGH HANDIHALER DEVICE DAILY  
  
 traMADol 50 mg tablet Commonly known as:  ULTRAM  
Take 1 Tab by mouth every six (6) hours as needed for Pain. Max Daily Amount: 200 mg. Indications: Pain To-Do List   
 06/22/2018 1:30 PM  
  Appointment with Mala Partida MD; [place] [address] at [address] [place] [address] ([phone]) 1. Blood thinners and platelet inhibitors must be stopped 3-5 days prior to procedure. Consult your ordering physician prior to stopping them. 2. Arrive 30 minutes prior to schedued exam time. 06/22/2018 2:00 PM  
  Appointment with [address] [place] [address] 1 at [facility] [address] ([phone]) CONTRAST STUDY: 1. The patient should not eat solid food four hours before the appointment but should be encouraged to drink clear liquids. 2. The patient will require IV access for contrast administration. 3. The patient should not take  Ibuprofen (Advil, Motrin, etc.) and Naproxen Sodium (Aleve, etc.)  on the day of the exam. Stopping non-steroidal anti-inflammatory agents (NSAIDs) like Ibuprofen decreases the risk of kidney damage from the x-ray contrast (dye). 4. Bring any non [facility] facility films/images pertaining to the area of interest with you on the day of appointment. 5. Bring current lab work if available(within last 90 days [facility]) ***[facility], labs will need to be done before appointment*** 6. Check in at registration at least 30 minutes before appt time unless you were instructed to do otherwise. 7. If you have to drink oral contrast please pick it up any weekday prior to your appointment, if you cannot please check in 2 hrs  before appt time.   
  
 06/25/2018 8:00 AM  
 Appointment with [facility] 3 at [facility] RAD CT ([phone]) NON-CONTRAST STUDY: 1. [facility] any non [facility] films/images pertaining to the area of interest with you on the day of appointment. 2. Check in at registration at least 30 minutes before appt time unless you were instructed to do otherwise. 3. If you have to drink oral contrast please pick it up any weekday prior to your appointment, if you cannot please check in 2 hrs  before appt time. Introducing [facility]! Dear Shayan Soliz: Thank you for requesting a [employer] account. Our records indicate that you already have an active [employer] account. You can access your account anytime at [email or web address] Did you know that you can access your hospital and ER discharge instructions at any time in [employer]? You can also review all of your test results from your hospital stay or ER visit. Additional Information If you have questions, please visit the Frequently Asked Questions section of the [employer] website at [email or web address] Remember, [employer] is NOT to be used for urgent needs. For medical emergencies, dial 911. Now available from your iPhone and Android! Please provide this summary of care documentation to your next provider. Your primary care clinician is listed as Flynn Camacho. If you have any questions after today's visit, please call [phone].

## 2018-06-19 NOTE — PROGRESS NOTES
1. Have you been to the ER, urgent care clinic since your last visit? Hospitalized since your last visit? Yes she went on 6/6/18 for fall here at office. 2. Have you seen or consulted any other health care providers outside of the [address] [place] [address] since your last visit? Include any pap smears or colon screening.      No.      Chief Complaint   Patient presents with    Results     vascular test results-

## 2018-06-21 RX ORDER — CARVEDILOL 6.25 MG/1
6.25 TABLET ORAL 2 TIMES DAILY WITH MEALS
Qty: 60 TAB | Refills: 11 | Status: SHIPPED | OUTPATIENT
Start: 2018-06-21 | End: 2019-07-05 | Stop reason: SDUPTHER

## 2018-06-22 ENCOUNTER — HOSPITAL ENCOUNTER (OUTPATIENT)
Dept: GENERAL RADIOLOGY | Age: 68
Discharge: HOME | End: 2018-06-22
Attending: ORTHOPAEDIC SURGERY
Payer: MEDICARE

## 2018-06-22 ENCOUNTER — HOSPITAL ENCOUNTER (OUTPATIENT)
Dept: CT IMAGING | Age: 68
Discharge: HOME | End: 2018-06-22
Attending: ORTHOPAEDIC SURGERY
Payer: MEDICARE

## 2018-06-22 DIAGNOSIS — M75.101 ROTATOR CUFF TEAR, RIGHT: ICD-10-CM

## 2018-06-22 PROCEDURE — 73201 CT UPPER EXTREMITY W/DYE: CPT

## 2018-06-22 PROCEDURE — 73200 CT UPPER EXTREMITY W/O DYE: CPT

## 2018-06-22 PROCEDURE — 23350 INJECTION FOR SHOULDER X-RAY: CPT

## 2018-06-22 PROCEDURE — 74011636320 HC RX REV CODE- 636/320: Performed by: RADIOLOGY

## 2018-06-22 PROCEDURE — 74011000250 HC RX REV CODE- 250: Performed by: RADIOLOGY

## 2018-06-22 RX ORDER — LIDOCAINE HYDROCHLORIDE 10 MG/ML
5 INJECTION, SOLUTION EPIDURAL; INFILTRATION; INTRACAUDAL; PERINEURAL
Status: COMPLETED | OUTPATIENT
Start: 2018-06-22 | End: 2018-06-22

## 2018-06-22 RX ORDER — SODIUM BICARBONATE 84 MG/ML
50 INJECTION, SOLUTION INTRAVENOUS
Status: COMPLETED | OUTPATIENT
Start: 2018-06-22 | End: 2018-06-22

## 2018-06-22 RX ADMIN — SODIUM BICARBONATE 50 MEQ: 84 INJECTION, SOLUTION INTRAVENOUS at 14:29

## 2018-06-22 RX ADMIN — IOPAMIDOL 35 ML: 612 INJECTION, SOLUTION INTRAVENOUS at 14:28

## 2018-06-22 RX ADMIN — LIDOCAINE HYDROCHLORIDE 5 ML: 10 INJECTION, SOLUTION EPIDURAL; INFILTRATION; INTRACAUDAL; PERINEURAL at 14:28

## 2018-06-28 ENCOUNTER — HOSPITAL ENCOUNTER (OUTPATIENT)
Dept: CT IMAGING | Age: 68
Discharge: HOME | End: 2018-06-28
Attending: INTERNAL MEDICINE
Payer: MEDICARE

## 2018-06-28 VITALS — WEIGHT: 168 LBS | HEIGHT: 67 IN | BODY MASS INDEX: 26.37 KG/M2

## 2018-06-28 DIAGNOSIS — Z87.891 PERSONAL HISTORY OF TOBACCO USE, PRESENTING HAZARDS TO HEALTH: ICD-10-CM

## 2018-06-28 PROCEDURE — G0297 LDCT FOR LUNG CA SCREEN: HCPCS

## 2018-06-29 ENCOUNTER — OFFICE VISIT (OUTPATIENT)
Dept: INTERNAL MEDICINE CLINIC | Age: 68
End: 2018-06-29

## 2018-06-29 VITALS
TEMPERATURE: 97.6 F | WEIGHT: 168 LBS | OXYGEN SATURATION: 100 % | BODY MASS INDEX: 26.37 KG/M2 | HEART RATE: 78 BPM | RESPIRATION RATE: 20 BRPM | DIASTOLIC BLOOD PRESSURE: 61 MMHG | HEIGHT: 67 IN | SYSTOLIC BLOOD PRESSURE: 104 MMHG

## 2018-06-29 DIAGNOSIS — I48.91 ATRIAL FIBRILLATION, UNSPECIFIED TYPE (HCC): ICD-10-CM

## 2018-06-29 DIAGNOSIS — N18.30 CKD (CHRONIC KIDNEY DISEASE) STAGE 3, GFR 30-59 ML/MIN (HCC): ICD-10-CM

## 2018-06-29 DIAGNOSIS — I50.22 CHRONIC SYSTOLIC HF (HEART FAILURE) (HCC): ICD-10-CM

## 2018-06-29 DIAGNOSIS — E11.21 TYPE 2 DIABETES MELLITUS WITH NEPHROPATHY (HCC): Primary | ICD-10-CM

## 2018-06-29 DIAGNOSIS — E78.01 FAMILIAL HYPERCHOLESTEROLEMIA: ICD-10-CM

## 2018-06-29 DIAGNOSIS — N18.30 BENIGN HYPERTENSIVE HEART AND CKD, STAGE 3 (GFR 30-59), W CHF (HCC): ICD-10-CM

## 2018-06-29 DIAGNOSIS — I13.0 BENIGN HYPERTENSIVE HEART AND CKD, STAGE 3 (GFR 30-59), W CHF (HCC): ICD-10-CM

## 2018-06-29 DIAGNOSIS — I73.9 PAD (PERIPHERAL ARTERY DISEASE) (HCC): ICD-10-CM

## 2018-06-29 NOTE — PROGRESS NOTES
1. Have you been to the ER, urgent care clinic since your last visit? Hospitalized since your last visit? Yes When: 6/6/18 ED [facility] ED fall/ side pain. 2. Have you seen or consulted any other health care providers outside of the [address] [place] [address] since your last visit? Include any pap smears or colon screening.  No.

## 2018-06-29 NOTE — PROGRESS NOTES
Brian Hicks is a 80 y.o. female and presents with Hypertension; Diabetes; and Follow-up  . Subjective:    Pt relays she fell in the waiting area of her vascular doctor. She tripped over her feet and fell on her right side on June 4th. . She had pain to her rt rib area. Pt subsequently went to ED and rib x-rays were nml    Hypertensive heart and kidney disease-pt has no complaints, stable on med.  Relays med compliance  BP Readings from Last 3 Encounters:   06/29/18 104/61   06/19/18 98/58   06/06/18 107/63     CAD s/p stent-noted  CHF-systolic and diastolic-latest AY~15%  Wt Readings from Last 3 Encounters:   06/29/18 168 lb (76.2 kg)   06/28/18 168 lb (76.2 kg)   06/19/18 168 lb 1.6 oz (76.2 kg)       CKD 3-noted  Lab Results   Component Value Date/Time    GFR est AA 44 (L) 03/23/2018 08:40 AM    GFR est non-AA 38 (L) 03/23/2018 08:40 AM    Creatinine 1.43 (H) 03/23/2018 08:40 AM    BUN 18 03/23/2018 08:40 AM    Sodium 139 03/23/2018 08:40 AM    Potassium 4.8 03/23/2018 08:40 AM    Chloride 101 03/23/2018 08:40 AM    CO2 24 03/23/2018 08:40 AM     COPD-quiescent  S/p pacemaker-noted  Hx PAF s/p ablation-on eliquis  Type 2 DM w nephropathy-diet controlled  Lab Results   Component Value Date/Time    Hemoglobin A1c 5.8 09/24/2017 10:36 AM    Hemoglobin A1c (POC) 7.2 03/29/2018 08:23 AM     Hyperlipidemia- cannot tolerate statins due to myalgia in the past. Will try low dose   -t/c PCSK9 inhibitor  Lab Results   Component Value Date/Time    Cholesterol, total 177 04/17/2018 08:51 AM    Cholesterol (POC) 168 03/29/2018 08:24 AM    HDL Cholesterol 35 (L) 04/17/2018 08:51 AM    HDL Cholesterol (POC) n/a 03/29/2018 08:24 AM    LDL Cholesterol (POC) n/a 03/29/2018 08:24 AM    LDL, calculated 101 (H) 04/17/2018 08:51 AM    VLDL, calculated 41 (H) 04/17/2018 08:51 AM    Triglyceride 203 (H) 04/17/2018 08:51 AM    Triglycerides (POC) )650 03/29/2018 08:24 AM    CHOL/HDL Ratio 6.4 (H) 07/01/2014 03:10 AM     PAD-scheduled for angiogram    MRI shoulder showed rotator cuff tear/biceps tendon tear. Pt is scheduled for cortisone inj as a bridge to surgery    Pt s/p lung CT scan for nodules    Review of Systems  Review of systems (12) negative, except noted above. Past Medical History:   Diagnosis Date    Atrial fibrillation ([address] 2010    CAD (coronary artery disease)     stent    Chronic diastolic heart failure ([address] 2014    Chronic systolic HF (heart failure) ([address] 5/10/2017    2017 EF 25-30%    COPD     COPD (chronic obstructive pulmonary disease) ([address] 2010    Diabetes ([address]     Fibroid     Heart failure ([address]     History of Clostridium difficile infection 5/10/2017    2017 CDiff positive    Hypertension 2010    Hypotension 5/10/2017    Junctional tachycardia ([address] 5/10/2017    NIDDM (non-insulin dependent diabetes mellitus) 2010    Screening mammogram 5/4/10    SOB (shortness of breath) 2014     Past Surgical History:   Procedure Laterality Date    COLONOSCOPY N/A 2017    COLONOSCOPY performed by Frantz Ledesma [facility] AM[facility] OR    HX  SECTION      HX OTHER SURGICAL      adrenal gland removed    HX PACEMAKER      IA EXCISE ADRENAL GLAND       Social History     Social History    Marital status:      Spouse name: N/A    Number of children: N/A    Years of education: N/A     Social History Main Topics    Smoking status: Former Smoker     Types: Cigarettes     Start date:      Quit date: 2009    Smokeless tobacco: Never Used    Alcohol use No    Drug use: No    Sexual activity: Not Currently     Other Topics Concern    None     Social History Narrative     Family History   Problem Relation Age of Onset    Heart Disease Mother     Diabetes Father     Heart Disease Brother      Current Outpatient Prescriptions   Medication Sig Dispense Refill    carvedilol (COREG) 6.25 mg tablet Take 1 Tab by mouth two (2) times daily (with meals). 60 Tab 11    clemastine 2.68 mg tab Indications: 1 tab in am and 1 tab in pm      ipratropium (ATROVENT HFA) 17 mcg/actuation inhaler Take 1 Puff by inhalation every six (6) hours as needed for Wheezing. 1 Inhaler 1    albuterol (PROVENTIL VENTOLIN) 2.5 mg /3 mL (0.083 %) nebulizer solution 3 mL by Nebulization route every four (4) hours as needed for Wheezing. 1 Package 5    simvastatin (ZOCOR) 10 mg tablet Take 1 Tab by mouth nightly. 90 Tab 1    apixaban (ELIQUIS) 5 mg tablet Take 1 Tab by mouth two (2) times a day. Indications: PREVENT THROMBOEMBOLISM IN CHRONIC ATRIAL FIBRILLATION 42 Tab 0    colchicine 0.6 mg tablet Take 2 Tabs by mouth daily. Patient takes 1.2 mg daily and 2.4 mg PRN flare up 60 Tab 5    Blood-Glucose Meter monitoring kit Use as directed. Dx: 11.9 Whichever meter her insurance covers 1 Kit 0    glucose blood VI test strips (BLOOD GLUCOSE TEST) strip Use BID DxE11.9 200 Strip 11    Lancets misc Use BID Dx: E11.9 200 Each 11    gabapentin (NEURONTIN) 800 mg tablet TAKE ONE TABLET BY MOUTH THREE TIMES DAILY 90 Tab 3    amiodarone (CORDARONE) 200 mg tablet Take 0.5 Tabs by mouth nightly. 30 Tab 6    budesonide-formoterol (SYMBICORT) 160-4.5 mcg/actuation HFAA Take 1 Puff by inhalation two (2) times a day. 3 Inhaler 3    tiotropium (SPIRIVA WITH HANDIHALER) 18 mcg inhalation capsule INHALE THE CONTENTS OF 1 CAPSULE THROUGH HANDIHALER DEVICE DAILY 90 Cap 3    furosemide (LASIX) 20 mg tablet Take 1 Tab by mouth daily. 30 Tab 0    acetaminophen (TYLENOL) 500 mg tablet Take 500 mg by mouth every six (6) hours as needed for Pain.  clopidogrel (PLAVIX) 75 mg tab Take 75 mg by mouth daily.  fluticasone (FLONASE) 50 mcg/actuation nasal spray 2 Sprays by Both Nostrils route every evening.  Azelastine (ASTEPRO) 0.15 % (205.5 mcg) nasal spray 1 [place] by Both Nostrils route daily.  cod liver oil cap Take 1 Cap by mouth daily.        Allergies   Allergen Reactions    Crestor [Rosuvastatin] Myalgia    Levaquin [Levofloxacin] Nausea Only     GI Upset    Lipitor [Atorvastatin] Diarrhea    Lyrica [Pregabalin] Myalgia       Objective:  Visit Vitals    /61 (BP 1 Location: Left arm, BP Patient Position: Sitting)    Pulse 78    Temp 97.6 °F (36.4 °C) (Oral)    Resp 20    Ht 5' 7\" (1.702 m)    Wt 168 lb (76.2 kg)    SpO2 100%    BMI 26.31 kg/m2     Physical Exam:   General appearance - alert, well appearing, and in no distress  Mental status - alert, oriented to person, place, and time  EYE-ROCK, EOMI, corneas normal, no foreign bodies  ENT-ENT exam normal, no neck nodes or sinus tenderness  Chest - bibasilar rales  Heart - normal rate, regular rhythm, normal S1, S2, +systolic murmur +S4  Ext-peripheral pulses normal, no pedal edema, no clubbing or cyanosis  Skin-Warm and dry. no hyperpigmentation, vitiligo, or suspicious lesions  Neuro -alert, oriented, normal speech, no focal findings walks w cane      Results for orders placed or performed during the hospital encounter of 03/30/18   GLUCOSE, POC   Result Value Ref Range    Glucose (POC) 92 65 - 100 mg/dL    Performed by Lynsey Schulz 852   Result Value Ref Range    Activated Clotting Time (POC) 461 (H) 79 - 138 SECS   POC ACTIVATED CLOTTING TIME   Result Value Ref Range    Activated Clotting Time (POC) 456 (H) 79 - 138 SECS   POC ACTIVATED CLOTTING TIME   Result Value Ref Range    Activated Clotting Time (POC) 153 (H) 79 - 138 SECS   GLUCOSE, POC   Result Value Ref Range    Glucose (POC) 100 65 - 100 mg/dL    Performed by RAYMOND SEYMOUR    GLUCOSE, POC   Result Value Ref Range    Glucose (POC) 127 (H) 65 - 100 mg/dL    Performed by Melanie Jensen    GLUCOSE, POC   Result Value Ref Range    Glucose (POC) 111 (H) 65 - 100 mg/dL    Performed by Melanie Jensen      *Note: Due to a large number of results and/or encounters for the requested time period, some results have not been displayed.  A complete set of results can be found in Results Review. Assessment/Plan:    ICD-10-CM ICD-9-CM    1. Type 2 diabetes mellitus with nephropathy (HCC) E11.21 250.40      583.81    2. CKD (chronic kidney disease) stage 3, GFR 30-59 ml/min N18.3 585.3    3. PAD (peripheral artery disease) (HCC) I73.9 443.9    4. Chronic systolic HF (heart failure) (HCC) I50.22 428.22    5. Benign hypertensive heart and CKD, stage 3 (GFR 30-59), w CHF (HCC) I13.0 404.11     N18.3 585.3      428.0    6. Atrial fibrillation, unspecified type ([facility] I48.91 427.31    7. Familial hypercholesterolemia E78.01 272.0      No orders of the defined types were placed in this encounter. continue present plan  F/u specialists  Pt is medically stable  There are no Patient Instructions on file for this visit. Follow-up Disposition:  Return in about 3 months (around 9/29/2018) for routine f/u. I have reviewed with the patient details of the assessment and plan and all questions were answered. Relevent patient education was performed. The most recent lab findings were reviewed with the patient. An After Visit Summary was printed and given to the patient.

## 2018-06-29 NOTE — MR AVS SNAPSHOT
[address] [place] [address] [address] [id number] 
[phone] Patient: Deirdre Woods MRN:  [id number] Visit Information Date & Time Provider Department Dept. Phone Encounter #  
 6/29/2018  8:00 AM Henrique Bojorquez, [address] [id number] Follow-up Instructions Return in about 3 months (around 9/29/2018). Your Appointments 8/2/2018  9:15 AM  
PROCEDURE with PACEMAKER, [facility] Appt Note: 3 month follow up per L horta 4/5/18 [employer] [place]  [employer] [place]; 3 month follow up per L horta 4/5/18 [employer] [place] [address]  
[phone] [address]  
  
    
 8/2/2018  9:15 AM  
ESTABLISHED PATIENT with Akash Bliss MD  
[facility] Appt Note: 3 month follow up per L horta 4/5/18 [employer] [place]  [employer] [place]; 3 month follow up per L horta 4/5/18 [employer] [place]  [employer] [place] [address]  
[phone] [address]  
  
    
 9/18/2018 11:15 AM  
PROCEDURE with PACEMAKER, [facility] Appt Note: [employer] DCPM 6mo check [address]  
[phone] Upcoming Health Maintenance Date Due  
 EYE EXAM RETINAL OR DILATED Q1 6/25/2016 FOOT EXAM Q1 7/20/2018 GLAUCOMA SCREENING Q2Y 7/28/2018 Influenza Age 5 to Adult 8/1/2018 FOBT Q 1 YEAR AGE 50-75 9/22/2018 HEMOGLOBIN A1C Q6M 9/29/2018 BREAST CANCER SCRN MAMMOGRAM 2/23/2019 MICROALBUMIN Q1 3/29/2019 MEDICARE YEARLY EXAM 3/30/2019 LIPID PANEL Q1 4/17/2019 Pneumococcal 65+ High/Highest Risk (2 of 2 - PPSV23) 6/25/2020 DTaP/Tdap/Td series (2 - Td) 7/16/2026 Allergies as of 6/29/2018  Review Complete On: 6/29/2018 By: Henrique Bojorquez MD  
  
 Severity Noted Reaction Type Reactions Crestor [Rosuvastatin]  10/31/2016   Side Effect Myalgia Levaquin [Levofloxacin]  12/07/2015   Intolerance Nausea Only GI Upset Lipitor [Atorvastatin]  04/17/2017   Side Effect Diarrhea Lyrica [Pregabalin]  10/31/2016   Side Effect Myalgia Current Immunizations  Reviewed on 10/31/2016 Name Date H1N1 FLU VACCINE 10/20/2009 Influenza High Dose Vaccine PF 10/31/2016 Influenza Vaccine (Madin June Canine Kidney) PF 9/23/2014 11:26 AM  
 Influenza Vaccine (Quad) PF 9/25/2017  6:36 PM  
 Influenza Vaccine Intradermal PF 11/27/2015 Influenza Vaccine Split 9/22/2011  6:25 AM  
 Influenza Vaccine Whole 10/20/2009 Pneumococcal Polysaccharide (PPSV-23) 6/25/2015 Tdap 7/16/2016 11:47 PM  
 ZZZ-RETIRED (DO NOT USE) Pneumococcal Vaccine (Unspecified Type) 10/20/2009 Not reviewed this visit You Were Diagnosed With   
  
 Codes Comments Type 2 diabetes mellitus with nephropathy (HCC)    -  Primary ICD-10-CM: E11.21 
ICD-9-CM: 250.40, 583.81   
 CKD (chronic kidney disease) stage 3, GFR 30-59 ml/min     ICD-10-CM: N18.3 ICD-9-CM: 585.3 PAD (peripheral artery disease) ([facility])     ICD-10-CM: I73.9 ICD-9-CM: 443. 9 Chronic systolic HF (heart failure) ([facility])     ICD-10-CM: I50.22 ICD-9-CM: 428.22 Benign hypertensive heart and CKD, stage 3 (GFR 30-59), w CHF ([facility]     ICD-10-CM: I13.0, N18.3 ICD-9-CM: 404.11, 585.3, 428.0 Atrial fibrillation, unspecified type ([facility]     ICD-10-CM: I48.91 
ICD-9-CM: 427.31 Familial hypercholesterolemia     ICD-10-CM: E78.01 
ICD-9-CM: 272.0 Vitals BP Pulse Temp Resp Height(growth percentile) Weight(growth percentile) 104/61 (BP 1 Location: Left arm, BP Patient Position: Sitting) 78 97.6 °F (36.4 °C) (Oral) 20 5' 7\" (1.702 m) 168 lb (76.2 kg) SpO2 BMI OB Status Smoking Status 100% 26.31 kg/m2 Postmenopausal Former Smoker Vitals History BMI and BSA Data Body Mass Index Body Surface Area  
 26.31 kg/m 2 1.9 m 2 Preferred Pharmacy Pharmacy Name Phone [facility] [address] [place] [address] Dr [place], [address] [phone] Your Updated Medication List  
  
   
This list is accurate as of 6/29/18  8:30 AM.  Always use your most recent med list.  
  
  
  
  
 acetaminophen 500 mg tablet Commonly known as:  TYLENOL Take 500 mg by mouth every six (6) hours as needed for Pain. albuterol 2.5 mg /3 mL (0.083 %) nebulizer solution Commonly known as:  PROVENTIL VENTOLIN  
3 mL by Nebulization route every four (4) hours as needed for Wheezing. amiodarone 200 mg tablet Commonly known as:  CORDARONE Take 0.5 Tabs by mouth nightly. apixaban 5 mg tablet Commonly known as:  Mikael Sayer Take 1 Tab by mouth two (2) times a day. Indications: PREVENT THROMBOEMBOLISM IN CHRONIC ATRIAL FIBRILLATION Azelastine 0.15 % (205.5 mcg) nasal spray Commonly known as:  ASTEPRO  
1 Spray by Both Nostrils route daily. Blood-Glucose Meter monitoring kit Use as directed. Dx: 11.9 Whichever meter her insurance covers  
  
 budesonide-formoterol 160-4.5 mcg/actuation Hfaa Commonly known as:  SYMBICORT Take 1 Puff by inhalation two (2) times a day. carvedilol 6.25 mg tablet Commonly known as:  Pennye Louder Take 1 Tab by mouth two (2) times daily (with meals). clemastine 2.68 mg Tab Indications: 1 tab in am and 1 tab in pm  
  
 cod liver oil Cap Take 1 Cap by mouth daily. colchicine 0.6 mg tablet Take 2 Tabs by mouth daily. Patient takes 1.2 mg daily and 2.4 mg PRN flare up FLONASE 50 mcg/actuation nasal spray Generic drug:  fluticasone 2 Sprays by Both Nostrils route every evening. furosemide 20 mg tablet Commonly known as:  LASIX Take 1 Tab by mouth daily. gabapentin 800 mg tablet Commonly known as:  NEURONTIN  
TAKE ONE TABLET BY MOUTH THREE TIMES DAILY glucose blood VI test strips strip Commonly known as:  blood glucose test  
Use BID DxE11.9  
  
 ipratropium 17 mcg/actuation inhaler Commonly known as:  ATROVENT HFA Take 1 Puff by inhalation every six (6) hours as needed for Wheezing. Lancets Misc Use BID Dx: E11.9 PLAVIX 75 mg Tab Generic drug:  clopidogrel Take 75 mg by mouth daily. simvastatin 10 mg tablet Commonly known as:  ZOCOR Take 1 Tab by mouth nightly. tiotropium 18 mcg inhalation capsule Commonly known as:  [address] INHALE THE CONTENTS OF 1 CAPSULE THROUGH HANDIHALER DEVICE DAILY Follow-up Instructions Return in about 3 months (around 9/29/2018). Introducing [facility]! Dear Alok Thomas: Thank you for requesting a [employer] account. Our records indicate that you already have an active [employer] account. You can access your account anytime at [email or web address] Did you know that you can access your hospital and ER discharge instructions at any time in [employer]? You can also review all of your test results from your hospital stay or ER visit. Additional Information If you have questions, please visit the Frequently Asked Questions section of the [employer] website at [email or web address] Remember, [employer] is NOT to be used for urgent needs. For medical emergencies, dial 911. Now available from your iPhone and Android! Please provide this summary of care documentation to your next provider. Your primary care clinician is listed as Torrie Byrne. If you have any questions after today's visit, please call [phone].

## 2018-07-08 RX ORDER — CLOPIDOGREL BISULFATE 75 MG/1
TABLET ORAL
Qty: 30 TAB | Refills: 11 | Status: SHIPPED | OUTPATIENT
Start: 2018-07-08 | End: 2019-07-15 | Stop reason: SDUPTHER

## 2018-07-19 LAB
ALBUMIN SERPL-MCNC: 3.9 G/DL (ref 3.6–4.8)
ALBUMIN/GLOB SERPL: 0.9 {RATIO} (ref 1.2–2.2)
ALP SERPL-CCNC: 175 IU/L (ref 39–117)
ALT SERPL-CCNC: 19 IU/L (ref 0–32)
AST SERPL-CCNC: 52 IU/L (ref 0–40)
BILIRUB SERPL-MCNC: 0.4 MG/DL (ref 0–1.2)
BUN SERPL-MCNC: 14 MG/DL (ref 8–27)
BUN/CREAT SERPL: 13 (ref 12–28)
CALCIUM SERPL-MCNC: 9.3 MG/DL (ref 8.7–10.3)
CHLORIDE SERPL-SCNC: 103 MMOL/L (ref 96–106)
CO2 SERPL-SCNC: 23 MMOL/L (ref 20–29)
CREAT SERPL-MCNC: 1.12 MG/DL (ref 0.57–1)
ERYTHROCYTE [DISTWIDTH] IN BLOOD BY AUTOMATED COUNT: 14.3 % (ref 12.3–15.4)
GLOBULIN SER CALC-MCNC: 4.2 G/DL (ref 1.5–4.5)
GLUCOSE SERPL-MCNC: 148 MG/DL (ref 65–99)
HCT VFR BLD AUTO: 37.2 % (ref 34–46.6)
HGB BLD-MCNC: 12.3 G/DL (ref 11.1–15.9)
INR PPP: 1.1 (ref 0.8–1.2)
INTERPRETATION: NORMAL
MCH RBC QN AUTO: 28.2 PG (ref 26.6–33)
MCHC RBC AUTO-ENTMCNC: 33.1 G/DL (ref 31.5–35.7)
MCV RBC AUTO: 85 FL (ref 79–97)
PLATELET # BLD AUTO: 197 X10E3/UL (ref 150–379)
POTASSIUM SERPL-SCNC: 4.1 MMOL/L (ref 3.5–5.2)
PROT SERPL-MCNC: 8.1 G/DL (ref 6–8.5)
PROTHROMBIN TIME: 11.6 SEC (ref 9.1–12)
RBC # BLD AUTO: 4.36 X10E6/UL (ref 3.77–5.28)
SODIUM SERPL-SCNC: 143 MMOL/L (ref 134–144)
WBC # BLD AUTO: 7.1 X10E3/UL (ref 3.4–10.8)

## 2018-07-20 ENCOUNTER — HOSPITAL ENCOUNTER (OUTPATIENT)
Dept: CARDIAC CATH/INVASIVE PROCEDURES | Age: 68
Discharge: HOME | End: 2018-07-20
Attending: INTERNAL MEDICINE | Admitting: INTERNAL MEDICINE
Payer: MEDICARE

## 2018-07-20 VITALS
HEIGHT: 66 IN | RESPIRATION RATE: 18 BRPM | DIASTOLIC BLOOD PRESSURE: 56 MMHG | BODY MASS INDEX: 26.52 KG/M2 | SYSTOLIC BLOOD PRESSURE: 115 MMHG | WEIGHT: 165 LBS | OXYGEN SATURATION: 98 % | TEMPERATURE: 98.3 F | HEART RATE: 76 BPM

## 2018-07-20 DIAGNOSIS — I48.91 ATRIAL FIBRILLATION, UNSPECIFIED TYPE (HCC): Chronic | ICD-10-CM

## 2018-07-20 DIAGNOSIS — I73.9 PVD (PERIPHERAL VASCULAR DISEASE) WITH CLAUDICATION (HCC): ICD-10-CM

## 2018-07-20 DIAGNOSIS — E78.2 MIXED HYPERLIPIDEMIA: ICD-10-CM

## 2018-07-20 DIAGNOSIS — I25.10 CORONARY ARTERY DISEASE INVOLVING NATIVE CORONARY ARTERY OF NATIVE HEART WITHOUT ANGINA PECTORIS: ICD-10-CM

## 2018-07-20 DIAGNOSIS — I48.0 PAROXYSMAL ATRIAL FIBRILLATION (HCC): Chronic | ICD-10-CM

## 2018-07-20 DIAGNOSIS — Z79.01 ANTICOAGULANT LONG-TERM USE: ICD-10-CM

## 2018-07-20 LAB
GLUCOSE BLD STRIP.AUTO-MCNC: 178 MG/DL (ref 65–100)
GLUCOSE BLD STRIP.AUTO-MCNC: 183 MG/DL (ref 65–100)
SERVICE CMNT-IMP: ABNORMAL
SERVICE CMNT-IMP: ABNORMAL

## 2018-07-20 PROCEDURE — C1724 CATH, TRANS ATHEREC,ROTATION: HCPCS

## 2018-07-20 PROCEDURE — 77030021533 HC CATH ANGI DX PRFMA MRTM -A

## 2018-07-20 PROCEDURE — 82962 GLUCOSE BLOOD TEST: CPT

## 2018-07-20 PROCEDURE — 74011250637 HC RX REV CODE- 250/637: Performed by: INTERNAL MEDICINE

## 2018-07-20 PROCEDURE — 74011000250 HC RX REV CODE- 250

## 2018-07-20 PROCEDURE — C1769 GUIDE WIRE: HCPCS

## 2018-07-20 PROCEDURE — C1894 INTRO/SHEATH, NON-LASER: HCPCS

## 2018-07-20 PROCEDURE — 74011636320 HC RX REV CODE- 636/320: Performed by: INTERNAL MEDICINE

## 2018-07-20 PROCEDURE — 74011250636 HC RX REV CODE- 250/636: Performed by: INTERNAL MEDICINE

## 2018-07-20 PROCEDURE — 74011250636 HC RX REV CODE- 250/636

## 2018-07-20 PROCEDURE — 85347 COAGULATION TIME ACTIVATED: CPT

## 2018-07-20 PROCEDURE — 74011250637 HC RX REV CODE- 250/637

## 2018-07-20 PROCEDURE — 77030022017 HC DRSG HEMO QCLOT ZMED -A

## 2018-07-20 PROCEDURE — C2623 CATH, TRANSLUMIN, DRUG-COAT: HCPCS

## 2018-07-20 PROCEDURE — 77030010852 HC CATH NB ADVNTG COOK -B

## 2018-07-20 PROCEDURE — 77030019697 HC SYR ANGI INFL MRTM -B

## 2018-07-20 PROCEDURE — 75716 ARTERY X-RAYS ARMS/LEGS: CPT

## 2018-07-20 PROCEDURE — 74011636320 HC RX REV CODE- 636/320

## 2018-07-20 RX ORDER — HEPARIN SODIUM 200 [USP'U]/100ML
500 INJECTION, SOLUTION INTRAVENOUS ONCE
Status: COMPLETED | OUTPATIENT
Start: 2018-07-20 | End: 2018-07-20

## 2018-07-20 RX ORDER — IODIXANOL 320 MG/ML
0-100 INJECTION, SOLUTION INTRAVASCULAR
Status: DISCONTINUED | OUTPATIENT
Start: 2018-07-20 | End: 2018-07-20

## 2018-07-20 RX ORDER — MIDAZOLAM HYDROCHLORIDE 1 MG/ML
INJECTION, SOLUTION INTRAMUSCULAR; INTRAVENOUS
Status: COMPLETED
Start: 2018-07-20 | End: 2018-07-20

## 2018-07-20 RX ORDER — HEPARIN SODIUM 200 [USP'U]/100ML
INJECTION, SOLUTION INTRAVENOUS
Status: COMPLETED
Start: 2018-07-20 | End: 2018-07-20

## 2018-07-20 RX ORDER — PROTAMINE SULFATE 10 MG/ML
INJECTION, SOLUTION INTRAVENOUS
Status: COMPLETED
Start: 2018-07-20 | End: 2018-07-20

## 2018-07-20 RX ORDER — LIDOCAINE HYDROCHLORIDE 10 MG/ML
1-20 INJECTION INFILTRATION; PERINEURAL ONCE
Status: COMPLETED | OUTPATIENT
Start: 2018-07-20 | End: 2018-07-20

## 2018-07-20 RX ORDER — MIDAZOLAM HYDROCHLORIDE 1 MG/ML
.5-2 INJECTION, SOLUTION INTRAMUSCULAR; INTRAVENOUS
Status: DISCONTINUED | OUTPATIENT
Start: 2018-07-20 | End: 2018-07-20 | Stop reason: HOSPADM

## 2018-07-20 RX ORDER — IODIXANOL 320 MG/ML
INJECTION, SOLUTION INTRAVASCULAR
Status: COMPLETED
Start: 2018-07-20 | End: 2018-07-20

## 2018-07-20 RX ORDER — VERAPAMIL HYDROCHLORIDE 2.5 MG/ML
INJECTION, SOLUTION INTRAVENOUS
Status: DISCONTINUED
Start: 2018-07-20 | End: 2018-07-20 | Stop reason: HOSPADM

## 2018-07-20 RX ORDER — FENTANYL CITRATE 50 UG/ML
INJECTION, SOLUTION INTRAMUSCULAR; INTRAVENOUS
Status: COMPLETED
Start: 2018-07-20 | End: 2018-07-20

## 2018-07-20 RX ORDER — SODIUM CHLORIDE 9 MG/ML
100 INJECTION, SOLUTION INTRAVENOUS CONTINUOUS
Status: DISCONTINUED | OUTPATIENT
Start: 2018-07-20 | End: 2018-07-20 | Stop reason: HOSPADM

## 2018-07-20 RX ORDER — HEPARIN SODIUM 1000 [USP'U]/ML
1000-10000 INJECTION, SOLUTION INTRAVENOUS; SUBCUTANEOUS
Status: DISCONTINUED | OUTPATIENT
Start: 2018-07-20 | End: 2018-07-20 | Stop reason: HOSPADM

## 2018-07-20 RX ORDER — HEPARIN SODIUM 1000 [USP'U]/ML
INJECTION, SOLUTION INTRAVENOUS; SUBCUTANEOUS
Status: COMPLETED
Start: 2018-07-20 | End: 2018-07-20

## 2018-07-20 RX ORDER — CLOPIDOGREL BISULFATE 75 MG/1
75 TABLET ORAL DAILY
Status: DISCONTINUED | OUTPATIENT
Start: 2018-07-20 | End: 2018-07-20 | Stop reason: HOSPADM

## 2018-07-20 RX ORDER — PROTAMINE SULFATE 10 MG/ML
25 INJECTION, SOLUTION INTRAVENOUS ONCE
Status: DISCONTINUED | OUTPATIENT
Start: 2018-07-20 | End: 2018-07-20 | Stop reason: HOSPADM

## 2018-07-20 RX ORDER — FENTANYL CITRATE 50 UG/ML
25-50 INJECTION, SOLUTION INTRAMUSCULAR; INTRAVENOUS
Status: DISCONTINUED | OUTPATIENT
Start: 2018-07-20 | End: 2018-07-20 | Stop reason: HOSPADM

## 2018-07-20 RX ORDER — CLOPIDOGREL BISULFATE 75 MG/1
TABLET ORAL
Status: COMPLETED
Start: 2018-07-20 | End: 2018-07-20

## 2018-07-20 RX ORDER — LIDOCAINE HYDROCHLORIDE 10 MG/ML
INJECTION INFILTRATION; PERINEURAL
Status: COMPLETED
Start: 2018-07-20 | End: 2018-07-20

## 2018-07-20 RX ADMIN — PROTAMINE SULFATE 25 MG: 10 INJECTION, SOLUTION INTRAVENOUS at 11:09

## 2018-07-20 RX ADMIN — GABAPENTIN 800 MG: 300 CAPSULE ORAL at 15:07

## 2018-07-20 RX ADMIN — IODIXANOL 100 ML: 320 INJECTION, SOLUTION INTRAVASCULAR at 11:06

## 2018-07-20 RX ADMIN — HEPARIN SODIUM 1000 UNITS: 200 INJECTION, SOLUTION INTRAVENOUS at 09:57

## 2018-07-20 RX ADMIN — CLOPIDOGREL BISULFATE 75 MG: 75 TABLET ORAL at 09:24

## 2018-07-20 RX ADMIN — IODIXANOL 80 ML: 320 INJECTION, SOLUTION INTRAVASCULAR at 10:14

## 2018-07-20 RX ADMIN — FENTANYL CITRATE 25 MCG: 50 INJECTION, SOLUTION INTRAMUSCULAR; INTRAVENOUS at 09:45

## 2018-07-20 RX ADMIN — HEPARIN SODIUM 7500 UNITS: 1000 INJECTION INTRAVENOUS; SUBCUTANEOUS at 10:35

## 2018-07-20 RX ADMIN — HEPARIN SODIUM 1000 UNITS: 200 INJECTION, SOLUTION INTRAVENOUS at 09:58

## 2018-07-20 RX ADMIN — IODIXANOL 80 ML: 320 INJECTION, SOLUTION INTRAVASCULAR at 11:00

## 2018-07-20 RX ADMIN — MIDAZOLAM HYDROCHLORIDE 1 MG: 1 INJECTION, SOLUTION INTRAMUSCULAR; INTRAVENOUS at 09:45

## 2018-07-20 RX ADMIN — LIDOCAINE HYDROCHLORIDE 8 ML: 10 INJECTION, SOLUTION INFILTRATION; PERINEURAL at 10:01

## 2018-07-20 RX ADMIN — SODIUM CHLORIDE 100 ML/HR: 900 INJECTION, SOLUTION INTRAVENOUS at 13:15

## 2018-07-20 RX ADMIN — HEPARIN SODIUM 7500 UNITS: 1000 INJECTION, SOLUTION INTRAVENOUS; SUBCUTANEOUS at 10:35

## 2018-07-20 RX ADMIN — LIDOCAINE HYDROCHLORIDE 8 ML: 10 INJECTION INFILTRATION; PERINEURAL at 10:01

## 2018-07-20 RX ADMIN — MIDAZOLAM 1 MG: 1 INJECTION INTRAMUSCULAR; INTRAVENOUS at 09:45

## 2018-07-20 NOTE — PROGRESS NOTES
CM attempted to complete initial assessment on Pt. Pt declined at this time. Pt indicated that she was fatigued from surgery. CM will continue to follow-up with discharge planning.         Manpreet Bedolla, [address] [place] [address]  [phone]

## 2018-07-20 NOTE — IP AVS SNAPSHOT
[address] [facility] 
[phone] Patient: Radhames Hackett MRN: [id number] PFH:[id number] About your hospitalization You were admitted on:  July 20, 2018 You last received care in the:  hospitals 2 [facility] CARD[facility] You were discharged on:  July 20, 2018 Why you were hospitalized Your primary diagnosis was:  Not on File Follow-up Information Follow up With Details Comments Contact Info Eron Bazan MD Go on 8/1/2018 For hospital follow up appointment at 11:00AM 12[address] [facility] 308 [facility] 7 [address] 
[phone] Your Scheduled Appointments Wednesday August 01, 2018 11:00 AM EDT TRANSITIONAL CARE MANAGEMENT with Eron Bazan MD  
[address] [place] [address] 308 [facility] 7 [address]  
[phone] Thursday August 02, 2018  9:15 AM EDT PROCEDURE with PACEMAKER, [facility] [address] [place] [address]) [address] [facility]  
[phone] Thursday August 02, 2018  9:15 AM EDT  
ESTABLISHED PATIENT with Adalberto Espinoza MD  
[facility] [address] [place] [address]) [address] [facility]  
[phone] Discharge Orders None A check thaddeus indicates which time of day the medication should be taken. My Medications CHANGE how you take these medications Instructions Each Dose to Equal  
 Morning Noon Evening Bedtime  
 apixaban 5 mg tablet Commonly known as:  Keerthi Montoya What changed:  additional instructions Your last dose was: Your next dose is: Take 1 Tab by mouth two (2) times a day. Resume from tomorrow 7/21/2018. Indications: PREVENT THROMBOEMBOLISM IN CHRONIC ATRIAL FIBRILLATION  
 5 mg CONTINUE taking these medications Instructions Each Dose to Equal  
 Morning Noon Evening Bedtime  
 acetaminophen 500 mg tablet Commonly known as:  TYLENOL Your last dose was: Your next dose is: Take 500 mg by mouth every six (6) hours as needed for Pain. 500 mg  
    
   
   
   
  
 albuterol 2.5 mg /3 mL (0.083 %) nebulizer solution Commonly known as:  PROVENTIL VENTOLIN Your last dose was: Your next dose is:    
   
   
 3 mL by Nebulization route every four (4) hours as needed for Wheezing. 2.5 mg  
    
   
   
   
  
 amiodarone 200 mg tablet Commonly known as:  CORDARONE Your last dose was: Your next dose is: Take 0.5 Tabs by mouth nightly. 100 mg Azelastine 0.15 % (205.5 mcg) nasal spray Commonly known as:  ASTEPRO Your last dose was: Your next dose is:    
   
   
 1 Spray by Both Nostrils route daily. 1 Spray Blood-Glucose Meter monitoring kit Your last dose was: Your next dose is:    
   
   
 Use as directed. Dx: 11.9 Whichever meter her insurance covers  
     
   
   
   
  
 budesonide-formoterol 160-4.5 mcg/actuation Hfaa Commonly known as:  SYMBICORT Your last dose was: Your next dose is: Take 1 Puff by inhalation two (2) times a day. 1 Puff  
    
   
   
   
  
 carvedilol 6.25 mg tablet Commonly known as:  Jinx Re Your last dose was: Your next dose is: Take 1 Tab by mouth two (2) times daily (with meals). 6.25 mg  
    
   
   
   
  
 clemastine 2.68 mg Tab Your last dose was: Your next dose is:    
   
   
 Indications: 1 tab in am and 1 tab in pm  
     
   
   
   
  
 cod liver oil Cap Your last dose was: Your next dose is: Take 1 Cap by mouth daily. 1 Cap  
    
   
   
   
  
 colchicine 0.6 mg tablet Your last dose was: Your next dose is: Take 2 Tabs by mouth daily. Patient takes 1.2 mg daily and 2.4 mg PRN flare up 1.2 mg  
    
   
   
   
  
 FLONASE 50 mcg/actuation nasal spray Generic drug:  fluticasone Your last dose was: Your next dose is: 2 Sprays by Both Nostrils route every evening. 2 Spray  
    
   
   
   
  
 furosemide 20 mg tablet Commonly known as:  LASIX Your last dose was: Your next dose is: Take 1 Tab by mouth daily. 20 mg  
    
   
   
   
  
 gabapentin 800 mg tablet Commonly known as:  NEURONTIN Your last dose was: Your next dose is: TAKE ONE TABLET BY MOUTH THREE TIMES DAILY  
     
   
   
   
  
 glucose blood VI test strips strip Commonly known as:  blood glucose test  
   
Your last dose was: Your next dose is:    
   
   
 Use BID DxE11.9  
     
   
   
   
  
 ipratropium 17 mcg/actuation inhaler Commonly known as:  ATROVENT HFA Your last dose was: Your next dose is: Take 1 Puff by inhalation every six (6) hours as needed for Wheezing. 1 Puff Lancets Misc Your last dose was: Your next dose is:    
   
   
 Use BID Dx: E11.9 * PLAVIX 75 mg Tab Generic drug:  clopidogrel Your last dose was: Your next dose is: Take 75 mg by mouth daily. 75 mg  
    
   
   
   
  
 * clopidogrel 75 mg Tab Commonly known as:  PLAVIX Your last dose was: Your next dose is: TAKE ONE TABLET BY MOUTH ONCE DAILY  
     
   
   
   
  
 simvastatin 10 mg tablet Commonly known as:  ZOCOR Your last dose was: Your next dose is: Take 1 Tab by mouth nightly. 10 mg  
    
   
   
   
  
 tiotropium 18 mcg inhalation capsule Commonly known as:  [address] Your last dose was: Your next dose is: INHALE THE CONTENTS OF 1 CAPSULE THROUGH HANDIHALER DEVICE DAILY * Notice: This list has 2 medication(s) that are the same as other medications prescribed for you. Read the directions carefully, and ask your doctor or other care provider to review them with you. Where to Get Your Medications Information on where to get these meds will be given to you by the nurse or doctor. ! Ask your nurse or doctor about these medications  
  apixaban 5 mg tablet Discharge Instructions [address] [place] [address]  [phone] CARDIOLOGY DISCHARGE INSTRUCTIONS Patient ID: 
Yonathan Hinton [id number] 
80 y.o. 
1950 Admit Date: 7/20/2018 Discharge Date: 7/20/2018 Admitting Physician: Ling Lujan MD  
 
Discharge Physician: Ling Lujan MD 
 
Admission Diagnoses:  
PVD (peripheral vascular disease) with claudication ([facility] [address] [I73.9] Paroxysmal atrial fibrillation (HCC) [I48.0] Mixed hyperlipidemia [E78.2] Coronary artery disease involving native coronary artery of native heart without angina pectoris [I25.10] Discharge Diagnoses: Active Problems: * No active hospital problems. * Discharge Condition: Good Cardiology Procedures this Admission:  LE angio and left SFA intervention Disposition: home Reference discharge instructions provided by nursing for diet and activity. Signed: Ling Lujan MD 
7/20/2018 11:17 AM 
PERIPHERAL CATHETERIZATION/PCI DISCHARGE INSTRUCTIONS It is normal to feel tired the first couple days. Take it easy and follow the physicians instructions. CHECK THE CATHETER INSERTION SITE DAILY: 
 
You may shower 24 hours after the procedure, remove the bandage during showering. Wash with soap and water and pat dry.  
Gentle cleaning of the site with soap and water is sufficient, cover with a dry clean dressing or bandage. Do not apply creams or powders to the area. Do not sit in a bathtub or pool of water for 7 days or until wound has completely healed. Temporary bruising and discomfort is normal and may last a few weeks. You may have a  formation of a small lump at the site which may last up to 6 weeks. CALL THE PHYSICIANS: 
 
If the site becomes red, swollen or feels warm to the touch If there is bleeding or drainage or if there is unusual pain at the groin or down the leg. If there is any bleeding, lie down, apply pressure or have someone apply pressure with a clean cloth until the bleeding stops. If the bleeding continues, call 911 to be transported to the hospital. 
DO  [place] [address] ACTIVITY: 
 
For the first 24-48 hours or as instructed by the physician: No lifting, pushing or pulling over 10 pounds and no straining the insertion site. Do not life grocery bags or the garbage can, do not run the vacuum  or  for 7 days. Start with short walks as in the hospital and gradually increase as tolerated each day. It is recommended to walk 30 minutes 5-7 days per week. Follow your physicians instructions on activity. Avoid walking outside in extremes of heat or cold. Walk inside when it is cold and windy or hot and humid. Things to keep in mind: 
No driving for at least 24 hours, or as designated by your physician. Limit the number of times you go up and down the stairs Take rests and pace yourself with activity. Be careful and do not strain with bowel movements. MEDICATIONS: 
 
Take all medications as prescribed Call your physician if you have any questions Keep an updated list of your medications with you at all times and give a list to your physician and pharmacist 
 
 
AFTER CARE: 
 
Follow up with your physician as instructed.  
Follow a heart healthy diet with proper portion control, daily stress management, daily exercise, blood pressure and cholesterol control , and smoking cessation. Introducing [facility]! Dear Lesa Hernandez: Thank you for requesting a HelloWallet account. Our records indicate that you already have an active HelloWallet account. You can access your account anytime at [email or web address] Did you know that you can access your hospital and ER discharge instructions at any time in HelloWallet? You can also review all of your test results from your hospital stay or ER visit. Additional Information If you have questions, please visit the Frequently Asked Questions section of the HelloWallet website at [email or web address] Remember, HelloWallet is NOT to be used for urgent needs. For medical emergencies, dial 911. Now available from your iPhone and Android! Introducing Epi Mendoza As a [facility] patient, I wanted to make you aware of our electronic visit tool called Epi Mendoza. [facility] 24/Smarterphone allows you to connect within minutes with a medical provider 24 hours a day, seven days a week via a mobile device or tablet or logging into a secure website from your computer. You can access Epi Mendoza from anywhere in the [place]. A virtual visit might be right for you when you have a simple condition and feel like you just dont want to get out of bed, or cant get away from work for an appointment, when your regular [facility] provider is not available (evenings, weekends or holidays), or when youre out of town and need minor care. Electronic visits cost only $49 and if the [facility] 24/Smarterphone provider determines a prescription is needed to treat your condition, one can be electronically transmitted to a nearby pharmacy*. Please take a moment to enroll today if you have not already done so. The enrollment process is free and takes just a few minutes.   To enroll, please download the [employer] 24/7 jaylene to your tablet or phone, or visit [email or web address] to enroll on your computer. And, as an [address] [place] [address] patient with a WinLocal account, the results of your visits will be scanned into your electronic medical record and your primary care provider will be able to view the scanned results. We urge you to continue to see your regular [employer] provider for your ongoing medical care. And while your primary care provider may not be the one available when you seek a Vivebio virtual visit, the peace of mind you get from getting a real diagnosis real time can be priceless. For more information on Vivebio, view our Frequently Asked Questions (FAQs) at [email or web address]. Sincerely, 
 
Ama Hopkins MD 
Chief Medical Officer [employer] *:  certain medications cannot be prescribed via Vivebio Providers Seen During Your Hospitalization Provider Specialty Primary office phone Samaria Martinez MD Cardiology [phone] Your Primary Care Physician (PCP) Primary Care Physician Office Phone Office Fax Rosemarie Francis [phone] You are allergic to the following Allergen Reactions Crestor (Rosuvastatin) Myalgia Levaquin (Levofloxacin) Nausea Only GI Upset Lipitor (Atorvastatin) Diarrhea Lyrica (Pregabalin) Myalgia Recent Documentation Height Weight BMI OB Status Smoking Status 1.676 m 74.8 kg 26.63 kg/m2 Postmenopausal Former Smoker Emergency Contacts Name Discharge Info Relation Home Work Mobile Huong Glass DISCHARGE CAREGIVER [3] Child [2] [phone] [facility] CAREGIVER [3] Other Relative [6] [phone] Huong Knox DISCHARGE CAREGIVER [3] Child [2] [phone] Patient Belongings The following personal items are in your possession at time of discharge: 
  Dental Appliances: None         Home Medications: None   Jewelry: Earrings  Clothing: At bedside    Other Valuables: None Please provide this summary of care documentation to your next provider. Signatures-by signing, you are acknowledging that this After Visit Summary has been reviewed with you and you have received a copy. Patient Signature:  ____________________________________________________________ Date:  ____________________________________________________________  
  
[facility] Signature:  ____________________________________________________________ Date:  ____________________________________________________________

## 2018-07-20 NOTE — PROCEDURES
[id number] [facility]    Tyree Gibbons  MR#: [id number]  : 1950  ACCOUNT #: [de-identified]   DATE OF SERVICE: 2018    PROCEDURES PERFORMED:  1. Abdominal aortogram.  2.  Bilateral lower extremity angiography. 3.  Third order catheter placement from right common femoral arterial access site into left superficial femoral artery. 4.  Ultrasound-guided arterial access. 5.  Moderate sedation for 1 hour. 6.  Left superficial femoral artery orbital atherectomy and drug-coated balloon dilatation. 7.  Ultrasound-guided arterial access. FINDINGS:  1. Abdominal aorta:  No evidence of any aortic stenosis or aortic aneurysm. 2.  Right common iliac artery:  Mild disease. 3.  Right internal iliac artery:  Mild disease. 4.  Right external iliac artery:  Mild disease. 5.  Right common femoral artery:  Mild disease. 6.  Right profunda femoris artery:  No significant stenosis. 7.  Right superficial femoral artery, mild disease, 40% stenosis in the distal segment noted. 8.  Right popliteal artery:  Mild disease. 9.  Right infrapopliteal vessels. Right anterior tibial artery is 100% occluded in the proximal section. Right tibioperoneal trunk, posterior tibial artery and peroneal artery has mild disease. Two-vessel distal runoff is noted into right plantar arch. 10.  Left common iliac artery:  Mild disease. 11.  Left internal iliac artery:  Mild disease. 12.  Left external iliac artery:  No significant stenosis. 13.  Left common femoral artery:  Mild disease. 14.  Right profunda femoris artery:  No significant stenosis. 15.  Left superficial femoral artery. Mild disease, 90% focal heavily calcified stenosis is noted in the mid to distal segment. Distal flow is sluggish due to the stenosis. 16.  Left popliteal artery:  Mild disease. 17.  Left infrapopliteal vessel. The left anterior tibial artery 100% occluded in the proximal segment.   Left tibioperoneal trunk, peroneal artery and posterior tibial artery has mild disease, with 2-vessel distal runoff in the left plantar arch. BRIEF PROCEDURE NOTE:  Right groin was prepped. Right femoral arterial access using ultrasound guidance. A 5-Monegasque femoral arterial sheath was placed. A 5-Monegasque pigtail catheter was advanced to abdominal aorta. Over Versacore wire, abdominal aortogram was performed. Dedicated angiogram of right lower extremity was performed through right common femoral arterial sheath. After which, RIM catheter and Versacore wire was used to cross into contralateral left lower extremity, dedicated angiogram of the left lower extremity was performed. Decision was made to proceed with intervention of left superficial femoral artery 90% stenosis. The patient was given IV heparin for anticoagulation. A 6-Monegasque 45 cm Destination sheath was placed over a Versacore wire into left superficial femoral artery. The lesion was crossed using a Viper wire. After which, 1.5 CSI orbital atherectomy catheter were used for atherectomy of the superficial femoral artery. Runs at 60,000, 90,000 and finally 120,000 RPMs were performed, which revealed significant improvement in the calcific stenosis. After which, pre-dilatation was performed using 4 x 40 Tsaile balloon. After which, a 6 x 60 Lutonix drug-coated balloon was placed across the lesion and was deployed at 7 mmHg for 2 minutes. Final angiogram was performed, which revealed excellent angiographic result, with no residual stenosis and significant improvement in distal flow. CONCLUSION:  Successful orbital atherectomy and drug-coated balloon dilatation of left superficial femoral artery. Pre-procedure stenosis of 90%, heavily calcified. Post-procedure stenosis of 0%. COMPLICATIONS:  None. AMOUNT OF BLOOD LOSS:  Minimal.    SPECIMEN REMOVED:  None. ANESTHESIA:  IV conscious sedation, local anesthesia.       Kacy Oates MD       [address] [place] [address] MIRANDA  D: 07/20/2018 11:14     T: 07/20/2018 11:47  JOB #: [id number]

## 2018-07-20 NOTE — DISCHARGE INSTRUCTIONS
[address] [place], [place] [phone]      CARDIOLOGY DISCHARGE INSTRUCTIONS      Patient ID:  Junie Oviedo  [id number]  37 y.o.  1950    Admit Date: 7/20/2018    Discharge Date: 7/20/2018     Admitting Physician: Onofre Pacheco MD     Discharge Physician: Onofre Pacheco MD    Admission Diagnoses:   PVD (peripheral vascular disease) with claudication ([address] [I73.9]  Paroxysmal atrial fibrillation ([address] [I48.0]  Mixed hyperlipidemia [E78.2]  Coronary artery disease involving native coronary artery of native heart without angina pectoris [I25.10]    Discharge Diagnoses: Active Problems:    * No active hospital problems. *      Discharge Condition: Good    Cardiology Procedures this Admission:  LE angio and left SFA intervention    Disposition: home    Reference discharge instructions provided by nursing for diet and activity. Signed: Onofre Pacheco MD  7/20/2018  11:17 AM  PERIPHERAL CATHETERIZATION/PCI DISCHARGE INSTRUCTIONS    It is normal to feel tired the first couple days. Take it easy and follow the physicians instructions. CHECK THE CATHETER INSERTION SITE DAILY:    You may shower 24 hours after the procedure, remove the bandage during showering. Wash with soap and water and pat dry. Gentle cleaning of the site with soap and water is sufficient, cover with a dry clean dressing or bandage. Do not apply creams or powders to the area. Do not sit in a bathtub or pool of water for 7 days or until wound has completely healed. Temporary bruising and discomfort is normal and may last a few weeks. You may have a  formation of a small lump at the site which may last up to 6 weeks. CALL THE PHYSICIANS:    If the site becomes red, swollen or feels warm to the touch  If there is bleeding or drainage or if there is unusual pain at the groin or down the leg.   If there is any bleeding, lie down, apply pressure or have someone apply pressure with a clean cloth until the bleeding stops. If the bleeding continues, call 911 to be transported to the hospital.  DO NOT DRIVE YOURSELF, [address]. ACTIVITY:    For the first 24-48 hours or as instructed by the physician:  No lifting, pushing or pulling over 10 pounds and no straining the insertion site. Do not life grocery bags or the garbage can, do not run the vacuum  or  for 7 days. Start with short walks as in the hospital and gradually increase as tolerated each day. It is recommended to walk 30 minutes 5-7 days per week. Follow your physicians instructions on activity. Avoid walking outside in extremes of heat or cold. Walk inside when it is cold and windy or hot and humid. Things to keep in mind:  No driving for at least 24 hours, or as designated by your physician. Limit the number of times you go up and down the stairs  Take rests and pace yourself with activity. Be careful and do not strain with bowel movements. MEDICATIONS:    Take all medications as prescribed  Call your physician if you have any questions  Keep an updated list of your medications with you at all times and give a list to your physician and pharmacist      AFTER CARE:    Follow up with your physician as instructed. Follow a heart healthy diet with proper portion control, daily stress management, daily exercise, blood pressure and cholesterol control , and smoking cessation.

## 2018-07-23 LAB
ACT BLD: 153 SECS (ref 79–138)
ACT BLD: 268 SECS (ref 79–138)

## 2018-08-01 ENCOUNTER — OFFICE VISIT (OUTPATIENT)
Dept: INTERNAL MEDICINE CLINIC | Age: 68
End: 2018-08-01

## 2018-08-01 VITALS
DIASTOLIC BLOOD PRESSURE: 58 MMHG | SYSTOLIC BLOOD PRESSURE: 114 MMHG | WEIGHT: 168.6 LBS | TEMPERATURE: 98.2 F | HEIGHT: 66 IN | OXYGEN SATURATION: 98 % | RESPIRATION RATE: 18 BRPM | BODY MASS INDEX: 27.1 KG/M2 | HEART RATE: 75 BPM

## 2018-08-01 DIAGNOSIS — I73.9 PAD (PERIPHERAL ARTERY DISEASE) (HCC): Primary | ICD-10-CM

## 2018-08-01 NOTE — PROGRESS NOTES
Terry Méndez is a 80 y.o. female and presents with Transitions Of Care  . Subjective:    Pt is booked for SUDHEER [facility]AVA appt s/p rosario le angiogram and rt fem artery angioplasty 7/20/18. She has an appt pending to see vascular TOMORROW. Pt feels fine. Pt is s/p front upper teeth pulled and will have dentures fitted    Hypertensive heart and kidney disease-pt has no complaints, stable on med.  Relays med compliance  BP Readings from Last 3 Encounters:   08/01/18 114/58   07/20/18 115/56   06/29/18 104/61     CAD s/p stent-noted  CHF-systolic and diastolic-latest PF~07%  Wt Readings from Last 3 Encounters:   08/01/18 168 lb 9.6 oz (76.5 kg)   07/20/18 165 lb (74.8 kg)   06/29/18 168 lb (76.2 kg)       CKD 3-noted  Lab Results   Component Value Date/Time    GFR est AA 59 (L) 07/18/2018 08:50 AM    GFR est non-AA 51 (L) 07/18/2018 08:50 AM    Creatinine 1.12 (H) 07/18/2018 08:50 AM    BUN 14 07/18/2018 08:50 AM    Sodium 143 07/18/2018 08:50 AM    Potassium 4.1 07/18/2018 08:50 AM    Chloride 103 07/18/2018 08:50 AM    CO2 23 07/18/2018 08:50 AM     COPD-quiescent  S/p pacemaker-noted  Hx PAF s/p ablation-on eliquis  Type 2 DM w nephropathy-diet controlled  Lab Results   Component Value Date/Time    Hemoglobin A1c 5.8 09/24/2017 10:36 AM    Hemoglobin A1c (POC) 7.2 03/29/2018 08:23 AM     Hyperlipidemia-  low dose statin   -t/c PCSK9 inhibitor  Lab Results   Component Value Date/Time    Cholesterol, total 177 04/17/2018 08:51 AM    Cholesterol (POC) 168 03/29/2018 08:24 AM    HDL Cholesterol 35 (L) 04/17/2018 08:51 AM    HDL Cholesterol (POC) n/a 03/29/2018 08:24 AM    LDL Cholesterol (POC) n/a 03/29/2018 08:24 AM    LDL, calculated 101 (H) 04/17/2018 08:51 AM    VLDL, calculated 41 (H) 04/17/2018 08:51 AM    Triglyceride 203 (H) 04/17/2018 08:51 AM    Triglycerides (POC) )650 03/29/2018 08:24 AM    CHOL/HDL Ratio 6.4 (H) 07/01/2014 03:10 AM         Review of Systems  Review of systems (12) negative, except noted above. Past Medical History:   Diagnosis Date    [facility] 2010    CAD (coronary artery disease)     stent    [facility] diastolic [facility] 2014    Chronic systolic HF (heart failure) ([facility] 5/10/2017    2017 EF 25-30%    COPD     COPD (chronic obstructive pulmonary disease) ([facility] 2010    Diabetes ([facility]     Fibroid     Heart failure ([facility]     History of Clostridium difficile infection 5/10/2017    2017 CDiff positive    Hypertension 2010    Hypotension 5/10/2017    Junctional tachycardia ([facility] 5/10/2017    NIDDM (non-insulin dependent diabetes mellitus) 2010    Screening mammogram 5/4/10    SOB (shortness of breath) 2014     Past Surgical History:   Procedure Laterality Date    COLONOSCOPY N/A 2017    COL[facility]    HX  SECTION      HX OTHER SURGICAL      adrenal gland removed    HX PACEMAKER      [place] EXCISE ADRENAL GLAND       Social History     Social History    Marital status:      Spouse name: N/A    Number of children: N/A    Years of education: N/A     Social History Main Topics    Smoking status: Former Smoker     Types: Cigarettes     Start date:      Quit date: 2009    Smokeless tobacco: Never Used    Alcohol use No    Drug use: No    Sexual activity: Not Currently     Other Topics Concern    None     Social History Narrative     Family History   Problem Relation Age of Onset    Heart Disease Mother     Diabetes Father     Heart Disease Brother      Current Outpatient Prescriptions   Medication Sig Dispense Refill    apixaban (ELIQUIS) 5 mg tablet Take 1 Tab by mouth two (2) times a day. Resume from tomorrow 2018.   Indications: PREVENT THROMBOEMBOLISM IN CHRONIC ATRIAL FIBRILLATION 42 Tab 0    clopidogrel (PLAVIX) 75 mg tab TAKE ONE TABLET BY MOUTH ONCE DAILY 30 Tab 11    carvedilol (COREG) 6.25 mg tablet Take 1 Tab by mouth two (2) times daily (with meals). 60 Tab 11    clemastine 2.68 mg tab Indications: 1 tab in am and 1 tab in pm      ipratropium (ATROVENT HFA) 17 mcg/actuation inhaler Take 1 Puff by inhalation every six (6) hours as needed for Wheezing. 1 Inhaler 1    albuterol (PROVENTIL VENTOLIN) 2.5 mg /3 mL (0.083 %) nebulizer solution 3 mL by Nebulization route every four (4) hours as needed for Wheezing. 1 Package 5    simvastatin (ZOCOR) 10 mg tablet Take 1 Tab by mouth nightly. 90 Tab 1    colchicine 0.6 mg tablet Take 2 Tabs by mouth daily. Patient takes 1.2 mg daily and 2.4 mg PRN flare up 60 Tab 5    Blood-Glucose Meter monitoring kit Use as directed. Dx: 11.9 Whichever meter her insurance covers 1 Kit 0    glucose blood VI test strips (BLOOD GLUCOSE TEST) strip Use BID DxE11.9 200 Strip 11    Lancets misc Use BID Dx: E11.9 200 Each 11    gabapentin (NEURONTIN) 800 mg tablet TAKE ONE TABLET BY MOUTH THREE TIMES DAILY 90 Tab 3    amiodarone (CORDARONE) 200 mg tablet Take 0.5 Tabs by mouth nightly. 30 Tab 6    budesonide-formoterol (SYMBICORT) 160-4.5 mcg/actuation HFAA Take 1 Puff by inhalation two (2) times a day. 3 Inhaler 3    tiotropium (SPIRIVA WITH HANDIHALER) 18 mcg inhalation capsule INHALE THE CONTENTS OF 1 CAPSULE THROUGH HANDIHALER DEVICE DAILY 90 Cap 3    furosemide (LASIX) 20 mg tablet Take 1 Tab by mouth daily. 30 Tab 0    acetaminophen (TYLENOL) 500 mg tablet Take 500 mg by mouth every six (6) hours as needed for Pain.  fluticasone (FLONASE) 50 mcg/actuation nasal spray 2 Sprays by Both Nostrils route every evening.  Azelastine (ASTEPRO) 0.15 % (205.5 mcg) nasal spray 1 [place] by Both Nostrils route daily.  cod liver oil cap Take 1 Cap by mouth daily.  clopidogrel (PLAVIX) 75 mg tab Take 75 mg by mouth daily.        Allergies   Allergen Reactions    Crestor [Rosuvastatin] Myalgia    Levaquin [Levofloxacin] Nausea Only     GI Upset    Lipitor [Atorvastatin] Diarrhea    Lyrica [Pregabalin] Myalgia       Objective:  Visit Vitals    /58 (BP 1 Location: Left arm, BP Patient Position: Sitting)    Pulse 75    Temp 98.2 °F (36.8 °C) (Oral)    Resp 18    Ht 5' 6\" (1.676 m)    Wt 168 lb 9.6 oz (76.5 kg)    SpO2 98%    BMI 27.21 kg/m2     Physical Exam:   General appearance - alert, well appearing, and in no distress  Mental status - alert, oriented to person, place, and time  EYE-ROCK, EOMI, corneas normal, no foreign bodies  ENT-ENT exam normal, no neck nodes or sinus tenderness  Chest - bibasilar rales  Heart - normal rate, regular rhythm, normal S1, S2, +systolic murmur +S4  Ext-peripheral pulses normal, no pedal edema, no clubbing or cyanosis  Skin-Warm and dry. no hyperpigmentation, vitiligo, or suspicious lesions  Neuro -alert, oriented, normal speech, no focal findings walks w cane      Results for orders placed or performed during the hospital encounter of 07/20/18   GLUCOSE, POC   Result Value Ref Range    Glucose (POC) 178 (H) 65 - 100 mg/dL    Performed by [facility]    GLUCOSE, POC   Result Value Ref Range    Glucose (POC) 183 (H) 65 - 100 mg/dL    Performed by Scott [facility]    POC ACTIVATED CLOTTING TIME   Result Value Ref Range    Activated Clotting Time (POC) 268 (H) 79 - 138 SECS   POC ACTIVATED CLOTTING TIME   Result Value Ref Range    Activated Clotting Time (POC) 153 (H) 79 - 138 SECS     *Note: Due to a large number of results and/or encounters for the requested time period, some results have not been displayed. A complete set of results can be found in Results Review. Assessment/Plan:    ICD-10-CM ICD-9-CM    1. PAD (peripheral artery disease) ([facility]) I73.9 443.9      No orders of the defined types were placed in this encounter. continue present plan    There are no Patient Instructions on file for this visit. Follow-up Disposition:  Return in about 3 months (around 11/1/2018) for routine f/u .       I have reviewed with the patient details of the assessment and plan and all questions were answered. Relevent patient education was performed. The most recent lab findings were reviewed with the patient. An After Visit Summary was printed and given to the patient.

## 2018-08-01 NOTE — PROGRESS NOTES
Chief Complaint   Patient presents with    Transitions Of Care     1. Have you been to the ER, urgent care clinic since your last visit? Hospitalized since your last visit? Yes When: 7/20/2018 [facility]    2. Have you seen or consulted any other health care providers outside of the [facility] since your last visit? Include any pap smears or colon screening.  No

## 2018-08-02 ENCOUNTER — CLINICAL SUPPORT (OUTPATIENT)
Dept: CARDIOLOGY CLINIC | Age: 68
End: 2018-08-02

## 2018-08-02 ENCOUNTER — OFFICE VISIT (OUTPATIENT)
Dept: CARDIOLOGY CLINIC | Age: 68
End: 2018-08-02

## 2018-08-02 VITALS
BODY MASS INDEX: 26.87 KG/M2 | HEART RATE: 76 BPM | DIASTOLIC BLOOD PRESSURE: 56 MMHG | WEIGHT: 167.2 LBS | HEIGHT: 66 IN | SYSTOLIC BLOOD PRESSURE: 118 MMHG | OXYGEN SATURATION: 98 %

## 2018-08-02 DIAGNOSIS — Z95.0 PACEMAKER: Primary | ICD-10-CM

## 2018-08-02 DIAGNOSIS — I49.5 SSS (SICK SINUS SYNDROME) (HCC): ICD-10-CM

## 2018-08-02 DIAGNOSIS — I10 ESSENTIAL HYPERTENSION: ICD-10-CM

## 2018-08-02 DIAGNOSIS — I47.1 JUNCTIONAL TACHYCARDIA (HCC): ICD-10-CM

## 2018-08-02 DIAGNOSIS — I48.91 ATRIAL FIBRILLATION, UNSPECIFIED TYPE (HCC): ICD-10-CM

## 2018-08-02 DIAGNOSIS — I50.22 CHRONIC SYSTOLIC HF (HEART FAILURE) (HCC): ICD-10-CM

## 2018-08-02 DIAGNOSIS — I50.21 SYSTOLIC CHF, ACUTE (HCC): ICD-10-CM

## 2018-08-02 DIAGNOSIS — I48.91 ATRIAL FIBRILLATION, UNSPECIFIED TYPE (HCC): Primary | ICD-10-CM

## 2018-08-02 NOTE — PROGRESS NOTES
See device report- [employer] in office device check, no charge but full check completed, 3 mo post op ablation check.

## 2018-08-02 NOTE — PROGRESS NOTES
Subjective:  
  
Amye Severance is a 80 y.o. female is here for EP follow up. The patient denies chest pain/ shortness of breath, orthopnea, PND, LE edema, palpitations, syncope, presyncope or fatigue. Patient Active Problem List  
 Diagnosis Date Noted  PAD (peripheral artery disease) ([facility] 05/22/2018  A-fib ([address] 03/30/2018  Type 2 diabetes mellitus with nephropathy ([address] 12/27/2017  CKD (chronic kidney disease) stage 3, GFR 30-59 ml/min 09/22/2017  Chronic systolic HF (heart failure) ([address] 05/10/2017  History of Clostridium difficile infection 05/10/2017  Junctional tachycardia ([address] 05/10/2017  Hypotension 05/10/2017  S/P ablation of atrial fibrillation 05/02/2017  Fear associated with illness and body function 04/07/2017  Counseling regarding advanced care planning and goals of care 04/07/2017  Systolic CHF, acute ([facility] 04/06/2017  Acute systolic CHF (congestive heart failure) ([facility] 04/06/2017  CHF (congestive heart failure) ([facility] 04/04/2017  Type 2 diabetes mellitus with diabetic neuropathy, without long-term current use of insulin ([address] 01/31/2017  GIB (gastrointestinal bleeding) 04/12/2016  Diastolic CHF, acute on chronic (HCC) 09/22/2014  S/P coronary artery stent placement 07/04/2014  Back pain 11/14/2012  Sinoatrial node dysfunction ([address] 07/05/2012  Cardiac pacemaker in situ 07/05/2012  Mixed hyperlipidemia 07/05/2012  Chest pain 09/21/2011  Sick sinus syndrome ([address] 02/25/2011  S/P angioplasty with stent 02/07/2011  Hypokalemia 07/20/2010  Hip pain 06/08/2010  Benign hypertensive heart and CKD, stage 3 (GFR 30-59), w CHF ([address] 06/02/2010  Atrial fibrillation--paroxysmal 06/02/2010  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 
Past Medical History:  
Diagnosis Date  Atrial fibrillation ([facility] 6/2/2010  CAD (coronary artery disease) stent  [facility] 2014  Chronic systolic HF (heart failure) ([facility] 5/10/2017  
 2017 EF 25-30%  COPD   
 COPD (chronic obstructive pulmonary disease) ([facility] 2010  Diabetes [facility]  Fibroid  Heart failure ([facility]  History of Clostridium difficile infection 5/10/2017  
 2017 CDiff positive  Hypertension 2010  Hypotension 5/10/2017  Junctional tachycardia ([facility] 5/10/2017  
 NIDDM (non-insulin dependent diabetes mellitus) 2010  Screening mammogram 5/4/10  
 SOB (shortness of breath) 2014 Past Surgical History:  
Procedure Laterality Date  COLONOSCOPY N/A 2017 COLONOSCOPY performed by Cheryl Rivas [facility] AM[facility]UL[facility]ORY OR  
 HX  SECTION    
 HX OTHER SURGICAL    
 adrenal gland removed  HX PACEMAKER    
 [place] EXCISE ADRENAL GLAND Allergies Allergen Reactions  Crestor [Rosuvastatin] Myalgia  Levaquin [Levofloxacin] Nausea Only GI Upset  Lipitor [Atorvastatin] Diarrhea  Lyrica [Pregabalin] Myalgia Family History Problem Relation Age of Onset  Heart Disease Mother  Diabetes Father  Heart Disease Brother   
 negative for cardiac disease Social History Social History  Marital status:  Spouse name: N/A  
 Number of children: N/A  
 Years of education: N/A Social History Main Topics  Smoking status: Former Smoker Types: Cigarettes Start date: 5 Quit date: 2009  Smokeless tobacco: Never Used  Alcohol use No  
 Drug use: No  
 Sexual activity: Not Currently Other Topics Concern  None Social History Narrative Current Outpatient Prescriptions Medication Sig  
 apixaban (ELIQUIS) 5 mg tablet Take 1 Tab by mouth two (2) times a day. Resume from tomorrow 2018. Indications: PREVENT THROMBOEMBOLISM IN CHRONIC ATRIAL FIBRILLATION  clopidogrel (PLAVIX) 75 mg tab TAKE ONE TABLET BY MOUTH ONCE DAILY  carvedilol (COREG) 6.25 mg tablet Take 1 Tab by mouth two (2) times daily (with meals).  clemastine 2.68 mg tab Indications: 1 tab in am and 1 tab in pm  
 ipratropium (ATROVENT HFA) 17 mcg/actuation inhaler Take 1 Puff by inhalation every six (6) hours as needed for Wheezing.  albuterol (PROVENTIL VENTOLIN) 2.5 mg /3 mL (0.083 %) nebulizer solution 3 mL by Nebulization route every four (4) hours as needed for Wheezing.  simvastatin (ZOCOR) 10 mg tablet Take 1 Tab by mouth nightly.  colchicine 0.6 mg tablet Take 2 Tabs by mouth daily. Patient takes 1.2 mg daily and 2.4 mg PRN flare up  Blood-Glucose Meter monitoring kit Use as directed. Dx: 11.9 Whichever meter her insurance covers  glucose blood VI test strips (BLOOD GLUCOSE TEST) strip Use BID DxE11.9  Lancets misc Use BID Dx: E11.9  
 gabapentin (NEURONTIN) 800 mg tablet TAKE ONE TABLET BY MOUTH THREE TIMES DAILY  amiodarone (CORDARONE) 200 mg tablet Take 0.5 Tabs by mouth nightly.  budesonide-formoterol (SYMBICORT) 160-4.5 mcg/actuation HFAA Take 1 Puff by inhalation two (2) times a day.  tiotropium (SPIRIVA WITH HANDIHALER) 18 mcg inhalation capsule INHALE THE CONTENTS OF 1 CAPSULE THROUGH HANDIHALER DEVICE DAILY  furosemide (LASIX) 20 mg tablet Take 1 Tab by mouth daily.  acetaminophen (TYLENOL) 500 mg tablet Take 500 mg by mouth every six (6) hours as needed for Pain.  fluticasone (FLONASE) 50 mcg/actuation nasal spray 2 Sprays by Both Nostrils route every evening.  Azelastine (ASTEPRO) 0.15 % (205.5 mcg) nasal spray 1 [place] by Both Nostrils route daily.  cod liver oil cap Take 1 Cap by mouth daily. No current facility-administered medications for this visit. Vitals:  
 08/02/18 9677 BP: 118/56 Pulse: 76 SpO2: 98% Weight: 167 lb 3.2 oz (75.8 kg) Height: 5' 6\" (1.676 m) I have reviewed the nurses notes, vitals, problem list, allergy list, medical history, family, social history and medications. Review of Symptoms: 
 
General: Pt denies excessive weight gain or loss. Pt is able to conduct ADL's HEENT: Denies blurred vision, headaches, epistaxis and difficulty swallowing. Respiratory: Denies shortness of breath, [place], wheezing or stridor. Cardiovascular: Denies precordial pain, palpitations, edema or PND Gastrointestinal: Denies poor appetite, indigestion, abdominal pain or blood in stool Urinary: Denies dysuria, pyuria Musculoskeletal: Denies pain or swelling from muscles or joints Neurologic: Denies tremor, paresthesias, or sensory motor disturbance Skin: Denies rash, itching or texture change. Psych: Denies depression Physical Exam:   
 
General: Well developed, in no acute distress. HEENT: Eyes - PERRL, no jvd Heart:  Normal S1/S2 negative S3 or S4. Regular, no murmur, gallop or rub.  
Respiratory: Clear bilaterally x 4, no wheezing or rales Extremities:  No edema, normal cap refill, no cyanosis. Musculoskeletal: No clubbing Neuro: A&Ox3, speech clear, gait stable. Skin: Skin color is normal. No rashes or lesions. Non diaphoretic Vascular: 2+ pulses symmetric in all extremities Cardiographics Ekg:  A paced. Results for orders placed or performed during the hospital encounter of 09/20/17 EKG, 12 LEAD, INITIAL Result Value Ref Range Ventricular Rate 76 BPM  
 Atrial Rate 76 BPM  
 P-R Interval 244 ms QRS Duration 144 ms Q-T Interval 460 ms QTC Calculation (Bezet) 517 ms Calculated P Axis 81 degrees Calculated R Axis -27 degrees Calculated T Axis 111 degrees Diagnosis Atrial-paced rhythm with 1st degree AV block Left bundle branch block When compared with ECG of 19-JUL-2017 12:16, No significant change was found Confirmed by Sandeep Martinez ([id number]) on 9/20/2017 5:23:53 PM 
  
 
*Note: Due to a large number of results and/or encounters for the requested time period, some results have not been displayed.  A complete set of results can be found in Results Review. Lab Results Component Value Date/Time WBC 7.1 07/18/2018 08:50 AM  
 Hemoglobin (POC) 11.2 07/20/2017 09:02 AM  
 HGB 12.3 07/18/2018 08:50 AM  
 HCT 37.2 07/18/2018 08:50 AM  
 PLATELET 612 49/25/7139 08:50 AM  
 MCV 85 07/18/2018 08:50 AM  
  
Lab Results Component Value Date/Time Sodium 143 07/18/2018 08:50 AM  
 Potassium 4.1 07/18/2018 08:50 AM  
 Chloride 103 07/18/2018 08:50 AM  
 CO2 23 07/18/2018 08:50 AM  
 Anion gap 5 12/26/2017 02:00 PM  
 Glucose 148 (H) 07/18/2018 08:50 AM  
 BUN 14 07/18/2018 08:50 AM  
 Creatinine 1.12 (H) 07/18/2018 08:50 AM  
 BUN/Creatinine ratio 13 07/18/2018 08:50 AM  
 GFR est AA 59 (L) 07/18/2018 08:50 AM  
 GFR est non-AA 51 (L) 07/18/2018 08:50 AM  
 Calcium 9.3 07/18/2018 08:50 AM  
 Bilirubin, total 0.4 07/18/2018 08:50 AM  
 AST (SGOT) 52 (H) 07/18/2018 08:50 AM  
 Alk. phosphatase 175 (H) 07/18/2018 08:50 AM  
 Protein, total 8.1 07/18/2018 08:50 AM  
 Albumin 3.9 07/18/2018 08:50 AM  
 Globulin 6.3 (H) 09/22/2017 07:01 AM  
 A-G Ratio 0.9 (L) 07/18/2018 08:50 AM  
 ALT (SGPT) 19 07/18/2018 08:50 AM  
  
Lab Results Component Value Date/Time TSH 2.74 05/09/2017 05:04 PM  
 
 
 Assessment: ICD-10-CM ICD-9-CM 1. Atrial fibrillation, unspecified type ([facility] I48.91 427.31 AMB POC EKG ROUTINE W/ 12 LEADS, INTER & REP Orders Placed This Encounter  AMB POC EKG ROUTINE W/ 12 LEADS, INTER & REP Order Specific Question:   Reason for Exam: Answer:   ROUTINE Plan: Ms Early is here for follow up, s/p AF/AFL ablation 3/30/2018. She is 94% AP with > 5 year battery life. Feels well overall. Continue with eliquis and stop her amiodarone. Follow up in 6 mo. Continue medical management for ASHD, SSS, AF. Thank you for allowing me to participate in Rand Mo 's care. Jose Alejandro Antonio NP Patient seen and examined. All pertinent data reviewed.  I have reviewed detailed note as outlined by Jearline Romberg, NP. Case discussed with Nursing/medical assistant staff and Jearline Romberg, NP. Plans as outlined. A paced and asymptomatic. No further AF. Will stop amio. Cont oac. F/u in 6 months.  
 
Melody Cason MD, Tj Malagon

## 2018-08-02 NOTE — PROGRESS NOTES
Chief Complaint Patient presents with  Irregular Heart Beat  
  3 MO. F/U  Leg Pain 1. Have you been to the ER, urgent care clinic since your last visit? [facility] your [id number] 2. Have you seen or consulted any other health care providers outside of the [address] [place] [address] since your last visit? Include any pap smears or colon screening.  NO

## 2018-08-02 NOTE — MR AVS SNAPSHOT
[address] [facility] 
[phone] Patient: Harriett Meigs MRN:  [id number] Visit Information Date & Time Provider Department Dept. Phone Encounter #  
 8/2/2018  9:15 AM Kiera Elizalde, 1024 [facility] [phone] [id number] Follow-up Instructions Return in about 6 months (around 2/2/2019). Routing History Follow-up and Disposition History Your Appointments 8/7/2018  2:00 PM  
ESTABLISHED PATIENT with Iona Rodas MD  
[facility] Appt Note: vascular - hospital f/u  
 [address]  
[phone] [address]  
  
    
 11/2/2018 10:30 AM  
ROUTINE CARE with Kate Hankins MD  
[facility] Appt Note: 3 month follow up; 3 month follow up [facility] [address] [place] [address]  
[phone]  
  
   
 [address]  
  
    
 2/7/2019  9:00 AM  
ESTABLISHED PATIENT with Wesly Curry MD  
[facility] Appt Note: 6 month f/u & device ck - pt prefers to see Dr. Haylee Jimenez not NP  
 [address]  
[phone] [address] Upcoming Health Maintenance Date Due  
 EYE EXAM RETINAL OR DILATED Q1 6/25/2016 FOOT EXAM Q1 7/20/2018 GLAUCOMA SCREENING Q2Y 7/28/2018 Influenza Age 5 to Adult 8/1/2018 FOBT Q 1 YEAR AGE 50-75 9/22/2018 HEMOGLOBIN A1C Q6M 9/29/2018 BREAST CANCER SCRN MAMMOGRAM 2/23/2019 MICROALBUMIN Q1 3/29/2019 MEDICARE YEARLY EXAM 3/30/2019 LIPID PANEL Q1 4/17/2019 Pneumococcal 65+ High/Highest Risk (2 of 2 - PPSV23) 6/25/2020 DTaP/Tdap/Td series (2 - Td) 7/16/2026 Allergies as of 8/2/2018  Review Complete On: 8/2/2018 By: Eagle Salcedo MD  
  
 Severity Noted Reaction Type Reactions Crestor [Rosuvastatin]  10/31/2016   Side Effect Myalgia Levaquin [Levofloxacin]  12/07/2015   Intolerance Nausea Only GI Upset Lipitor [Atorvastatin]  04/17/2017   Side Effect Diarrhea Lyrica [Pregabalin]  10/31/2016   Side Effect Myalgia Current Immunizations  Reviewed on 10/31/2016 Name Date H1N1 FLU VACCINE 10/20/2009 Influenza High Dose Vaccine PF 10/31/2016 Influenza Vaccine (Madin June Canine Kidney) PF 9/23/2014 11:26 AM  
 Influenza Vaccine (Quad) PF 9/25/2017  6:36 PM  
 Influenza Vaccine Intradermal PF 11/27/2015 Influenza Vaccine Split 9/22/2011  6:25 AM  
 Influenza Vaccine Whole 10/20/2009 Pneumococcal Polysaccharide (PPSV-23) 6/25/2015 Tdap 7/16/2016 11:47 PM  
 ZZZ-RETIRED (DO NOT USE) Pneumococcal Vaccine (Unspecified Type) 10/20/2009 Not reviewed this visit You Were Diagnosed With   
  
 Codes Comments Atrial fibrillation, unspecified type ([facility]    -  Primary ICD-10-CM: I48.91 
ICD-9-CM: 427.31   
 SSS (sick sinus syndrome) ([facility])     ICD-10-CM: I49.5 ICD-9-CM: 427.81 Essential hypertension     ICD-10-CM: I10 
ICD-9-CM: 401.9 Vitals BP Pulse Height(growth percentile) Weight(growth percentile) SpO2 BMI  
 118/56 (BP 1 Location: Left arm) 76 5' 6\" (1.676 m) 167 lb 3.2 oz (75.8 kg) 98% 26.99 kg/m2 OB Status Smoking Status Postmenopausal Former Smoker Vitals History BMI and BSA Data Body Mass Index Body Surface Area  
 26.99 kg/m 2 1.88 m 2 Preferred Pharmacy Pharmacy Name Phone [facility] [address], [address] [place] [address] Avenue [phone] Your Updated Medication List  
  
   
This list is accurate as of 8/2/18 10:02 AM.  Always use your most recent med list.  
  
  
  
  
 acetaminophen 500 mg tablet Commonly known as:  TYLENOL  
 Take 500 mg by mouth every six (6) hours as needed for Pain. albuterol 2.5 mg /3 mL (0.083 %) nebulizer solution Commonly known as:  PROVENTIL VENTOLIN  
3 mL by Nebulization route every four (4) hours as needed for Wheezing. apixaban 5 mg tablet Commonly known as:  Lindsay Shield Take 1 Tab by mouth two (2) times a day. Resume from tomorrow 7/21/2018. Indications: PREVENT THROMBOEMBOLISM IN CHRONIC ATRIAL FIBRILLATION Azelastine 0.15 % (205.5 mcg) nasal spray Commonly known as:  ASTEPRO  
1 Spray by Both Nostrils route daily. Blood-Glucose Meter monitoring kit Use as directed. Dx: 11.9 Whichever meter her insurance covers  
  
 budesonide-formoterol 160-4.5 mcg/actuation Hfaa Commonly known as:  SYMBICORT Take 1 Puff by inhalation two (2) times a day. carvedilol 6.25 mg tablet Commonly known as:  Dagmar Concha Take 1 Tab by mouth two (2) times daily (with meals). clemastine 2.68 mg Tab tablet Indications: 1 tab in am and 1 tab in pm  
  
 clopidogrel 75 mg Tab Commonly known as:  PLAVIX TAKE ONE TABLET BY MOUTH ONCE DAILY  
  
 cod liver oil Cap Take 1 Cap by mouth daily. colchicine 0.6 mg tablet Take 2 Tabs by mouth daily. Patient takes 1.2 mg daily and 2.4 mg PRN flare up FLONASE 50 mcg/actuation nasal spray Generic drug:  fluticasone 2 Sprays by Both Nostrils route every evening. furosemide 20 mg tablet Commonly known as:  LASIX Take 1 Tab by mouth daily. gabapentin 800 mg tablet Commonly known as:  NEURONTIN  
TAKE ONE TABLET BY MOUTH THREE TIMES DAILY  
  
 glucose blood VI test strips strip Commonly known as:  blood glucose test  
Use BID DxE11.9  
  
 ipratropium 17 mcg/actuation inhaler Commonly known as:  ATROVENT HFA Take 1 Puff by inhalation every six (6) hours as needed for Wheezing. Lancets Misc Use BID Dx: E11.9  
  
 simvastatin 10 mg tablet Commonly known as:  ZOCOR Take 1 Tab by mouth nightly. tiotropium 18 mcg inhalation capsule Commonly known as:  [address] INHALE THE CONTENTS OF 1 CAPSULE THROUGH HANDIHALER DEVICE DAILY We Performed the Following AMB POC EKG ROUTINE W/ 12 LEADS, INTER & REP [89616 CPT(R)] Follow-up Instructions Return in about 6 months (around 2/2/2019). Introducing [facility] & HEALTH SERVICES! Dear Allan Andres: Thank you for requesting a [employer] account. Our records indicate that you already have an active [employer] account. You can access your account anytime at [email or web address] Did you know that you can access your hospital and ER discharge instructions at any time in [employer]? You can also review all of your test results from your hospital stay or ER visit. Additional Information If you have questions, please visit the Frequently Asked Questions section of the [employer] website at [email or web address] Remember, [employer] is NOT to be used for urgent needs. For medical emergencies, dial 911. Now available from your iPhone and Android! Please provide this summary of care documentation to your next provider. Your primary care clinician is listed as Aidee Trinidad. If you have any questions after today's visit, please call [phone].

## 2018-08-08 NOTE — PROGRESS NOTES
Late Entry Physical Therapy Note  For discharge completed on 5/14/18    At the time of discharge G-codes were not filed. This was either due to patient to follow up with orthopedics but did not return to therapy. The objective data available and documentation present has been reviewed. The case was discussed with the evaluating therapist, where available. The G-code and impairment levels have been determined based on therapist discretion because an objective measure was not available. In compliance with CMS's Claims Based Outcome Reporting, the following G-code set was chosen for this patient based on the primary functional limitation being treated. The patient's impairment level was determined based on the following:      ?  Mobility - Walking and Moving Around:     - CURRENT STATUS: CL - 60%-79% impaired, limited or restricted    - GOAL STATUS: CL - 60%-79% impaired, limited or restricted    - D/C STATUS:  CL - 60%-79% impaired, limited or restricted     Kishor Woo, PT

## 2018-08-08 NOTE — PROGRESS NOTES
[facility]  [address], [address] [facility]    OUTPATIENT physical Therapy discharge note      8/8/2018:  Patient will be discharged from physical therapy at this time. Criteria for termination of care:    []           Patient has plateaued  []           Patient has not returned to therapy  []           Patient has missed three or more visits without prior notification  [x]           Other: instructed to follow up with doctor about referral to orthopedics about shoulder    Patient was seen for 5 visits from 4/13/18 to 5/14/18. Please refer to the most recent progress note for the latest PT info available. If you need anything further faxed to you, please contact us at [phone].     Thank you for this referral.  Minnie Christie, PT

## 2018-09-04 ENCOUNTER — OFFICE VISIT (OUTPATIENT)
Dept: CARDIOLOGY CLINIC | Age: 68
End: 2018-09-04

## 2018-09-04 VITALS
DIASTOLIC BLOOD PRESSURE: 60 MMHG | OXYGEN SATURATION: 95 % | BODY MASS INDEX: 27.45 KG/M2 | WEIGHT: 170.8 LBS | HEART RATE: 75 BPM | HEIGHT: 66 IN | RESPIRATION RATE: 18 BRPM | SYSTOLIC BLOOD PRESSURE: 110 MMHG

## 2018-09-04 DIAGNOSIS — I48.0 PAROXYSMAL ATRIAL FIBRILLATION (HCC): Chronic | ICD-10-CM

## 2018-09-04 DIAGNOSIS — E78.2 MIXED HYPERLIPIDEMIA: ICD-10-CM

## 2018-09-04 DIAGNOSIS — N18.30 BENIGN HYPERTENSIVE HEART AND CKD, STAGE 3 (GFR 30-59), W CHF (HCC): ICD-10-CM

## 2018-09-04 DIAGNOSIS — Z95.5 S/P CORONARY ARTERY STENT PLACEMENT: ICD-10-CM

## 2018-09-04 DIAGNOSIS — I73.9 PAD (PERIPHERAL ARTERY DISEASE) (HCC): Primary | ICD-10-CM

## 2018-09-04 DIAGNOSIS — I13.0 BENIGN HYPERTENSIVE HEART AND CKD, STAGE 3 (GFR 30-59), W CHF (HCC): ICD-10-CM

## 2018-09-04 NOTE — PROGRESS NOTES
9/4/2018 1:53 PM      Subjective:     Gautam Hidalgo is here for f/u visit. S/p left SFA intervention. Since then no c/o left calf or leg pain or heaviness. C/o left hip pain. She denies chest pain, chest pressure/discomfort, dyspnea, palpitations, irregular heart beats, near-syncope, syncope, fatigue, orthopnea, paroxysmal nocturnal dyspnea, exertional chest pressure/discomfort, lower extremity edema, tachypnea. Visit Vitals    /60 (BP 1 Location: Left arm, BP Patient Position: Sitting)    Pulse 75    Resp 18    Ht 5' 6\" (1.676 m)    Wt 170 lb 12.8 oz (77.5 kg)    SpO2 95%    BMI 27.57 kg/m2     Current Outpatient Prescriptions   Medication Sig    apixaban (ELIQUIS) 5 mg tablet Take 1 Tab by mouth two (2) times a day. Resume from tomorrow 7/21/2018. Indications: PREVENT THROMBOEMBOLISM IN CHRONIC ATRIAL FIBRILLATION    clopidogrel (PLAVIX) 75 mg tab TAKE ONE TABLET BY MOUTH ONCE DAILY    carvedilol (COREG) 6.25 mg tablet Take 1 Tab by mouth two (2) times daily (with meals).  clemastine 2.68 mg tab Indications: 1 tab in am and 1 tab in pm    ipratropium (ATROVENT HFA) 17 mcg/actuation inhaler Take 1 Puff by inhalation every six (6) hours as needed for Wheezing.  albuterol (PROVENTIL VENTOLIN) 2.5 mg /3 mL (0.083 %) nebulizer solution 3 mL by Nebulization route every four (4) hours as needed for Wheezing.  simvastatin (ZOCOR) 10 mg tablet Take 1 Tab by mouth nightly.  colchicine 0.6 mg tablet Take 2 Tabs by mouth daily. Patient takes 1.2 mg daily and 2.4 mg PRN flare up    Blood-Glucose Meter monitoring kit Use as directed.  Dx: 11.9 Whichever meter her insurance covers    glucose blood VI test strips (BLOOD GLUCOSE TEST) strip Use BID DxE11.9    Lancets misc Use BID Dx: E11.9    gabapentin (NEURONTIN) 800 mg tablet TAKE ONE TABLET BY MOUTH THREE TIMES DAILY    budesonide-formoterol (SYMBICORT) 160-4.5 mcg/actuation HFAA Take 1 Puff by inhalation two (2) times a day.  tiotropium (SPIRIVA WITH HANDIHALER) 18 mcg inhalation capsule INHALE THE CONTENTS OF 1 CAPSULE THROUGH HANDIHALER DEVICE DAILY    furosemide (LASIX) 20 mg tablet Take 1 Tab by mouth daily.  acetaminophen (TYLENOL) 500 mg tablet Take 500 mg by mouth every six (6) hours as needed for Pain.  fluticasone (FLONASE) 50 mcg/actuation nasal spray 2 Sprays by Both Nostrils route every evening.  Azelastine (ASTEPRO) 0.15 % (205.5 mcg) nasal spray 1 [place] by Both Nostrils route daily.  cod liver oil cap Take 1 Cap by mouth daily. No current facility-administered medications for this visit.           Objective:      Visit Vitals    /60 (BP 1 Location: Left arm, BP Patient Position: Sitting)    Pulse 75    Resp 18    Ht 5' 6\" (1.676 m)    Wt 170 lb 12.8 oz (77.5 kg)    SpO2 95%    BMI 27.57 kg/m2       Data Review:     Reviewed and/or ordered active problem list, allergies tests    Past Medical History:   Diagnosis Date    Atrial fibrillation ([address] 2010    CAD (coronary artery disease)     stent    Chronic diastolic heart failure (HCC) 2014    Chronic systolic HF (heart failure) ([address] 5/10/2017    2017 EF 25-30%    COPD     COPD [facility]chronic obstructive pulmonary disease) ([facility]) 2010    Diabetes ([facility])     Fibroid     Heart failure ([facility])     History of Clostridium difficile infection 5/10/2017    2017 CDiff positive    Hypertension 2010    Hypotension 5/10/2017    Junctional tachycardia ([address] 5/10/2017    NIDDM (non-insulin dependent diabetes mellitus) 2010    Screening mammogram 5/4/10    SOB (shortness of breath) 2014      Past Surgical History:   Procedure Laterality Date    COLONOSCOPY N/A 2017    COLONOSCOPY performed by Hi Marr MD at [facility] AMBULATORY OR    HX  SECTION      HX OTHER SURGICAL      adrenal gland removed    HX PACEMAKER      DE EXCISE ADRENAL GLAND       Allergies   Allergen Reactions    Crestor [Rosuvastatin] Myalgia    Levaquin [Levofloxacin] Nausea Only     GI Upset    Lipitor [Atorvastatin] Diarrhea    Lyrica [Pregabalin] Myalgia      Family History   Problem Relation Age of Onset    Heart Disease Mother     Diabetes Father     Heart Disease Brother       Social History     Social History    Marital status:      Spouse name: N/A    Number of children: N/A    Years of education: N/A     Occupational History    Not on file. Social History Main Topics    Smoking status: Former Smoker     Types: Cigarettes     Start date: 1967     Quit date: 12/31/2009    Smokeless tobacco: Never Used    Alcohol use No    Drug use: No    Sexual activity: Not Currently     Other Topics Concern    Not on file     Social History Narrative         Review of Systems     General: Not Present- Anorexia, Chills, Dietary Changes, Fatigue, Fever, Medication Changes, Night Sweats, Weight Gain > 10lbs. and Weight Loss > 10lbs. .  Skin: Not Present- Bruising and Excessive Sweating. HEENT: Not Present- Headache, Visual Loss and Vertigo. Respiratory: Not Present- Cough, Decreased Exercise Tolerance, Difficulty Breathing, Snoring and Wheezing. Cardiovascular: Not Present- Abnormal Blood Pressure, Chest Pain, Claudications, Difficulty Breathing On Exertion, Edema, Fainting / Blacking Out, Irregular Heart Beat, Night Cramps, Orthopnea, Palpitations, Paroxysmal Nocturnal Dyspnea, Rapid Heart Rate, Shortness of Breath and Swelling of Extremities. Gastrointestinal: Not Present- Black, Tarry Stool, Bloody Stool, Diarrhea, Hematemesis, Rectal Bleeding and Vomiting. Musculoskeletal: Not Present- Muscle Pain and Muscle Weakness. Neurological: Not Present- Dizziness. Psychiatric: Not Present- Depression. Endocrine: Not Present- Cold Intolerance, Heat Intolerance and Thyroid Problems.   Hematology: Not Present- Abnormal Bleeding, Anemia, Blood Clots and Easy Bruising.       Physical Exam   The physical exam findings are as follows:       General   Mental Status - Alert. General Appearance - Not in acute distress. Chest and Lung Exam   Inspection: Accessory muscles - No use of accessory muscles in breathing. Auscultation:   Breath sounds: - Normal.      Cardiovascular   Inspection: Jugular vein - Bilateral - Inspection Normal.  Palpation/Percussion:   Apical Impulse: - Normal.  Auscultation: Rhythm - Regular. Heart Sounds - S1 WNL and S2 WNL. No S3 or S4. Murmurs & Other Heart Sounds: Auscultation of the heart reveals - No Murmurs. Carotid arteries - No Carotid bruit. Peripheral Vascular   Upper Extremity: Inspection - Bilateral - No Cyanotic nailbeds or Digital clubbing. Lower Extremity:   Palpation: Pop b/l normal. Dorsalis pedis pulse - Bilateral - weak. Posterior tibia pulse - Bilateral - weak. Edema - Bilateral - No edema. Assessment:       ICD-10-CM ICD-9-CM    1. PAD (peripheral artery disease) ([facility]) I73.9 443. 9 ANKLE BRACHIAL INDEX   2. Paroxysmal atrial fibrillation ([facility]) I48.0 427.31    3. Benign hypertensive heart and CKD, stage 3 (GFR 30-59), w CHF ([facility]) I13.0 404.11 ANKLE BRACHIAL INDEX    N18.3 585.3      428.0    4. Mixed hyperlipidemia E78.2 272.2 ANKLE BRACHIAL INDEX   5. S/P coronary artery stent placement Z95.5 V45.82 ANKLE BRACHIAL INDEX       Plan:     1. PVD: s/p left SFA atherectomy and DCB. Clinically left hip pain does not appear to be vascular. Check MILLA. If pain persists, consider ortho evaluation.     2. BP controlled. 3. On statin. 4. CAD: f/u with Dr. Lakeisha Dc for cardiac care.

## 2018-09-04 NOTE — PROGRESS NOTES
1. Have you been to the ER, urgent care clinic since your last visit? Hospitalized since your last visit? Yes hospital f/u from [facility]. 2. Have you seen or consulted any other health care providers outside of the [address] [place] [address] since your last visit? Include any pap smears or colon screening.    No.      Chief Complaint   Patient presents with   [facility] Follow Up     f/u from [facility]- lf pain from hip down

## 2018-09-04 NOTE — MR AVS SNAPSHOT
Iglesia Gonzalez Yaniv [address] [facility] 
[phone] Patient: Nikky Walters MRN:  [id number] Visit Information Date & Time Provider Department Dept. Phone Encounter #  
 9/4/2018  1:30 PM Link Gallardo, 1024 [facility] [phone] [id number] Follow-up Instructions Return in about 6 months (around 3/4/2019). Routing History Follow-up and Disposition History Your Appointments 9/11/2018  2:15 PM  
ESTABLISHED PATIENT with Eldon Ford NP [facility] Edil Parikh) Appt Note: Spoke with pt, she is scheduled for rotator cuff surgery on 9/10. She will need to see Dr. Darrell Mancini to be cleared. Richelle Vann, please call pt to set up an apt. Thanks!; Spoke with pt, she is scheduled for rotator cuff surgery on 9/12. She will need to see Dr. Darrell Mancini to be cleared. [address]  
[phone] [address] [place] [address]  
  
    
 11/2/2018 10:30 AM  
ROUTINE CARE with Nikko Sheikh MD  
[address] [place] [address]) Appt Note: 3 month follow up; 3 month follow up [place] [address]  
[phone]  
  
   
 [address]  
  
    
 2/7/2019  9:00 AM  
ESTABLISHED PATIENT with Maria C Rogers MD  
[facility] Appt Note: 6 month f/u & device ck - pt prefers to see Dr. Funmi Blevins not NP  
 [address]  
[phone] [address] [address]  
  
    
 2/7/2019  9:00 AM  
PROCEDURE with PACEMAKER, [facility]) Appt Note: 6 month f/u with device ck [address]  
[phone] [address] [place] [address] Upcoming Health Maintenance Date Due  
 EYE EXAM RETINAL OR DILATED Q1 6/25/2016 FOOT EXAM Q1 7/20/2018 GLAUCOMA SCREENING Q2Y 7/28/2018 Influenza Age 5 to Adult 8/1/2018 FOBT Q 1 YEAR AGE 50-75 9/22/2018 HEMOGLOBIN A1C Q6M 9/29/2018 BREAST CANCER SCRN MAMMOGRAM 2/23/2019 MICROALBUMIN Q1 3/29/2019 MEDICARE YEARLY EXAM 3/30/2019 LIPID PANEL Q1 4/17/2019 Pneumococcal 65+ High/Highest Risk (2 of 2 - PPSV23) 6/25/2020 DTaP/Tdap/Td series (2 - Td) 7/16/2026 Allergies as of 9/4/2018  Review Complete On: 9/4/2018 By: Yovani Hackett MD  
  
 Severity Noted Reaction Type Reactions Crestor [Rosuvastatin]  10/31/2016   Side Effect Myalgia Levaquin [Levofloxacin]  12/07/2015   Intolerance Nausea Only GI Upset Lipitor [Atorvastatin]  04/17/2017   Side Effect Diarrhea Lyrica [Pregabalin]  10/31/2016   Side Effect Myalgia Current Immunizations  Reviewed on 10/31/2016 Name Date H1N1 FLU VACCINE 10/20/2009 Influenza High Dose Vaccine PF 10/31/2016 Influenza Vaccine (Madin [place] Canine Kidney) PF 9/23/2014 11:26 AM  
 Influenza Vaccine (Quad) PF 9/25/2017  6:36 PM  
 Influenza Vaccine Intradermal PF 11/27/2015 Influenza Vaccine Split 9/22/2011  6:25 AM  
 Influenza Vaccine Whole 10/20/2009 Pneumococcal Polysaccharide (PPSV-23) 6/25/2015 Tdap 7/16/2016 11:47 PM  
 ZZZ-RETIRED (DO NOT USE) Pneumococcal Vaccine (Unspecified Type) 10/20/2009 Not reviewed this visit You Were Diagnosed With   
  
 Codes Comments PAD (peripheral artery disease) (HCC)    -  Primary ICD-10-CM: I73.9 ICD-9-CM: 443.9 Paroxysmal atrial fibrillation (HCC)     ICD-10-CM: I48.0 ICD-9-CM: 427.31 Benign hypertensive heart and CKD, stage 3 (GFR 30-59), w CHF ([facility] [address]     ICD-10-CM: I13.0, N18.3 ICD-9-CM: 404.11, 585.3, 428.0 Mixed hyperlipidemia     ICD-10-CM: E78.2 ICD-9-CM: 272.2 S/P coronary artery stent placement     ICD-10-CM: Z95.5 ICD-9-CM: V45.82 Vitals BP Pulse Resp Height(growth percentile) Weight(growth percentile) SpO2  
 110/60 (BP 1 Location: Left arm, BP Patient Position: Sitting) 75 18 5' 6\" (1.676 m) 170 lb 12.8 oz (77.5 kg) 95% BMI OB Status Smoking Status 27.57 kg/m2 Postmenopausal Former Smoker Vitals History BMI and BSA Data Body Mass Index Body Surface Area  
 27.57 kg/m 2 1.9 m 2 Preferred Pharmacy Pharmacy Name Phone [facility] [address], [address] [place] [address] Avenue [phone] Your Updated Medication List  
  
   
This list is accurate as of 9/4/18  1:56 PM.  Always use your most recent med list.  
  
  
  
  
 acetaminophen 500 mg tablet Commonly known as:  TYLENOL Take 500 mg by mouth every six (6) hours as needed for Pain. albuterol 2.5 mg /3 mL (0.083 %) nebulizer solution Commonly known as:  PROVENTIL VENTOLIN  
3 mL by Nebulization route every four (4) hours as needed for Wheezing. apixaban 5 mg tablet Commonly known as:  Princella Ruiz Take 1 Tab by mouth two (2) times a day. Resume from tomorrow 7/21/2018. Indications: PREVENT THROMBOEMBOLISM IN CHRONIC ATRIAL FIBRILLATION Azelastine 0.15 % (205.5 mcg) nasal spray Commonly known as:  ASTEPRO  
1 Spray by Both Nostrils route daily. Blood-Glucose Meter monitoring kit Use as directed. Dx: 11.9 Whichever meter her insurance covers  
  
 budesonide-formoterol 160-4.5 mcg/actuation Hfaa Commonly known as:  SYMBICORT Take 1 Puff by inhalation two (2) times a day. carvedilol 6.25 mg tablet Commonly known as:  Mar McKinnon Take 1 Tab by mouth two (2) times daily (with meals). clemastine 2.68 mg Tab tablet Indications: 1 tab in am and 1 tab in pm  
  
 clopidogrel 75 mg Tab Commonly known as:  PLAVIX TAKE ONE TABLET BY MOUTH ONCE DAILY  
  
 cod liver oil Cap Take 1 Cap by mouth daily. colchicine 0.6 mg tablet Take 2 Tabs by mouth daily. Patient takes 1.2 mg daily and 2.4 mg PRN flare up FLONASE 50 mcg/actuation nasal spray Generic drug:  fluticasone 2 Sprays by Both Nostrils route every evening. furosemide 20 mg tablet Commonly known as:  LASIX Take 1 Tab by mouth daily. gabapentin 800 mg tablet Commonly known as:  NEURONTIN  
TAKE ONE TABLET BY MOUTH THREE TIMES DAILY  
  
 glucose blood VI test strips strip Commonly known as:  blood glucose test  
Use BID DxE11.9  
  
 ipratropium 17 mcg/actuation inhaler Commonly known as:  ATROVENT HFA Take 1 Puff by inhalation every six (6) hours as needed for Wheezing. Lancets Misc Use BID Dx: E11.9  
  
 simvastatin 10 mg tablet Commonly known as:  ZOCOR Take 1 Tab by mouth nightly. tiotropium 18 mcg inhalation capsule Commonly known as:  [employer] [address] INHALE THE CONTENTS OF 1 CAPSULE THROUGH HANDIHALER DEVICE DAILY Follow-up Instructions Return in about 6 months (around 3/4/2019). To-Do List   
 09/04/2018 Imaging:  ANKLE BRACHIAL INDEX Introducing [facility] & HEALTH SERVICES! Dear Beatriz Leroy: Thank you for requesting a Search to Phone account. Our records indicate that you already have an active Search to Phone account. You can access your account anytime at [email or web address] Did you know that you can access your hospital and ER discharge instructions at any time in Search to Phone? You can also review all of your test results from your hospital stay or ER visit. Additional Information If you have questions, please visit the Frequently Asked Questions section of the Search to Phone website at [email or web address] Remember, Search to Phone is NOT to be used for urgent needs. For medical emergencies, dial 911. Now available from your iPhone and Android! Please provide this summary of care documentation to your next provider. Your primary care clinician is listed as Marybeth Restrepo. If you have any questions after today's visit, please call [phone]. no indicators present

## 2018-09-06 NOTE — PERIOP NOTES
[facility]  Ambulatory Surgery Unit  Pre-operative Instructions    Surgery/Procedure Date  9/12/18          Tentative Arrival Time 1045 am      1. On the day of your surgery/procedure, please report to the Ambulatory Surgery Unit Registration Desk and sign in at your designated time. The Ambulatory Surgery Unit is located in [facility] on the [facility] side of the [facility] across from the [address] [place] [address]. Please have all of your health insurance cards and a photo ID. 2. You must have someone with you to drive you home, as you should not drive a car for 24 hours following anesthesia. Please make arrangements for a responsible adult friend or family member to stay with you for at least the first 24 hours after your surgery. 3. Do not have anything to eat or drink (including water, gum, mints, coffee, juice) after 11:59 p.m. on   9/11/2018. This may not apply to medications prescribed by your physician. (Please note below the special instructions with medications to take the morning of surgery, if applicable.)    4. We recommend you do not drink any alcoholic beverages for 24 hours before and after your surgery. 5. Contact your surgeons office for instructions on the following medications: non-steroidal anti-inflammatory drugs (i.e. Advil, Aleve), vitamins, and supplements. (Some surgeons will want you to stop these medications prior to surgery and others may allow you to take them)   **If you are currently taking Plavix, Coumadin, Aspirin and/or other blood-thinning agents, contact your surgeon for instructions. ** Your surgeon will partner with the physician prescribing these medications to determine if it is safe to stop or if you need to continue taking. Please do not stop taking these medications without instructions from your surgeon.     6. In an effort to help prevent surgical site infection, we ask that you shower with an anti-bacterial soap (i.e. Dial or Safeguard) for 3 days prior to and on the morning of surgery, using a fresh towel after each shower. (Please begin this process with fresh bed linens.) Do not apply any lotions, powders, or deodorants after the shower on the day of your procedure. If applicable, please do not shave the operative site for 48 hours prior to surgery. 7. Wear comfortable clothes. Wear glasses instead of contacts. Do not bring any jewelry or money (other than copays or fees as instructed). Do not wear make-up, particularly mascara, the morning of your surgery. Do not wear nail polish, particularly if you are having foot /hand surgery. Wear your hair loose or down, no ponytails, buns, christian pins or clips. All body piercings must be removed. 8. You should understand that if you do not follow these instructions your surgery may be cancelled. If your physical condition changes (i.e. fever, cold or flu) please contact your surgeon as soon as possible. 9. It is important that you be on time. If a situation occurs where you may be late, or if you have any questions or problems, please call [phone].    10. Your surgery time may be subject to change. You will receive a phone call the day prior to surgery to confirm your arrival time. 11. Pediatric patients: please bring a change of clothes, diapers, bottle/sippy cup, pacifier, etc.      Special Instructions: Take all medications and inhalers, as prescribed, on the morning of surgery with a sip of water EXCEPT: none    Patient understands that she must contact Dr. Abhishek Matt and her cardiologist for instructions regarding her Eliquis and Plavix prior to her procedure, RN enforced with patient not to stop taking any medications without physician/cardiology instruction. Please bring all inhalers with you on the day of your surgery. Insulin Dependent Diabetic patients: Take your diabetic medications as prescribed the day before surgery.   Hold all diabetic medications the day of surgery. If you are scheduled to arrive for surgery after 8:00 AM, and your AM blood sugar is >200, please call Ambulatory Surgery. I understand a pre-operative phone call will be made to verify my surgery time. In the event that I am not available, I give permission for a message to be left on my answering service and/or with another person?       YES     The above note was reviewed with patient during PAT phone call on 9/6/18, patient verbalized understanding.            ___________________      ___________________      ________________  (Signature of Patient)          (Witness)                   (Date and Time)

## 2018-09-06 NOTE — PERIOP NOTES
Per pt she has cardiology clearance appt scheduled for 9/11/18 however Dr. Gretta Gallegos office has requested she try to get in sooner to allow more time prior to surgery or they may need to reschedule her procedure. Pt also reports she has pulmonary clearance appt scheduled for 9/7/18. Pt informed that she will need to follow cardiology/surgeon instructions with regards to eliquis and plavix prior to surgery. 1518:  RN spoke with Regina Yi at Dr. Donavon Javier office. RN noted that Dr. Thompson Bonilla order says KATIE BASSETT reported to Regina Yi the allergies that are on file here and reactions including Crestor, Lyrica, Lipitor and Levaquin. Per Regina Yi she will send new order as patient will likely have to be rescheduled if she cannot move up her cardiology appt. 3    1524:  Patient's EASTON score 4 (pos for loud snoring, fatigue during day, HTN, age >47). Note sent to MARVIN Nolan NP for review. 1620:  Device form sent to Dr. Evelio Genao. 1650:  KATIE Ferraro at Dr. Evelio Genao office to confirm receipt of device form.

## 2018-09-07 ENCOUNTER — TELEPHONE (OUTPATIENT)
Dept: CARDIOLOGY CLINIC | Age: 68
End: 2018-09-07

## 2018-09-07 DIAGNOSIS — Z79.01 ANTICOAGULANT LONG-TERM USE: ICD-10-CM

## 2018-09-07 DIAGNOSIS — I48.91 ATRIAL FIBRILLATION, UNSPECIFIED TYPE (HCC): Chronic | ICD-10-CM

## 2018-09-07 RX ORDER — APIXABAN 5 MG/1
TABLET, FILM COATED ORAL
Qty: 180 TAB | Refills: 3 | Status: SHIPPED | OUTPATIENT
Start: 2018-09-07 | End: 2018-09-10 | Stop reason: SDUPTHER

## 2018-09-07 NOTE — TELEPHONE ENCOUNTER
Verified patient with two identifiers. Spoke with pt advising her to not take Aspirin with Eliquis and Plavix, pt verbalized understanding, stated she is not taking Aspirin.

## 2018-09-07 NOTE — TELEPHONE ENCOUNTER
Gee Romero from [facility] 0632 called and wanted to know if patient could take eliquis and plavix together. Per Roberto Mancilla, Yes. But only if she is not taking aspirin too. Gee Romero just wanted our office to make sure that the patient understood that she could not take aspirin with plavix and eliquis.     Thanks,    [employer]

## 2018-09-07 NOTE — PERIOP NOTES
RN spoke with patient, per pt she is unable to move up her cardiology clearance appt and says she has left message for Dr. Bud Rojas nurse to call her back. Per pt she did go to her pulmonary clearance appt today. 1515:  RN spoke with Natalie Staff at Dr. Bud Rojas office. Per Natalie Staff she is aware pt was unable to move up clearance appt and they will be rescheduling her procedure. 1517:  RN spoke with Amy at Dr. Cade Barrera office and informed of above. Per Amy RN needs to call Severo Conger at [facility] [employer] [facility] at [phone] as she is awaiting call expecting patient to have procedure on 9/12/18.    1519:  RN contacted Severo Conger with [employer] at [phone] and informed that per the surgeon's office the patient is going to be rescheduled. Per Severo Conger, please contact when patient is rescheduled to inform of new surgery date. Severo Conger can be reached at [phone]. negative

## 2018-09-10 ENCOUNTER — OFFICE VISIT (OUTPATIENT)
Dept: CARDIOLOGY CLINIC | Age: 68
End: 2018-09-10

## 2018-09-10 VITALS
HEART RATE: 76 BPM | WEIGHT: 167.3 LBS | RESPIRATION RATE: 16 BRPM | SYSTOLIC BLOOD PRESSURE: 100 MMHG | BODY MASS INDEX: 26.89 KG/M2 | DIASTOLIC BLOOD PRESSURE: 68 MMHG | OXYGEN SATURATION: 96 % | HEIGHT: 66 IN

## 2018-09-10 DIAGNOSIS — I25.10 ASHD (ARTERIOSCLEROTIC HEART DISEASE): Primary | ICD-10-CM

## 2018-09-10 DIAGNOSIS — I50.22 CHRONIC SYSTOLIC HF (HEART FAILURE) (HCC): ICD-10-CM

## 2018-09-10 DIAGNOSIS — Z86.79 S/P ABLATION OF ATRIAL FIBRILLATION: ICD-10-CM

## 2018-09-10 DIAGNOSIS — N18.30 BENIGN HYPERTENSIVE HEART AND CKD, STAGE 3 (GFR 30-59), W CHF (HCC): ICD-10-CM

## 2018-09-10 DIAGNOSIS — I48.0 PAROXYSMAL ATRIAL FIBRILLATION (HCC): Chronic | ICD-10-CM

## 2018-09-10 DIAGNOSIS — I25.10 CORONARY ARTERY DISEASE INVOLVING NATIVE CORONARY ARTERY OF NATIVE HEART WITHOUT ANGINA PECTORIS: ICD-10-CM

## 2018-09-10 DIAGNOSIS — Z95.0 CARDIAC PACEMAKER IN SITU: ICD-10-CM

## 2018-09-10 DIAGNOSIS — I13.0 BENIGN HYPERTENSIVE HEART AND CKD, STAGE 3 (GFR 30-59), W CHF (HCC): ICD-10-CM

## 2018-09-10 DIAGNOSIS — I49.5 SICK SINUS SYNDROME (HCC): ICD-10-CM

## 2018-09-10 DIAGNOSIS — E78.2 MIXED HYPERLIPIDEMIA: ICD-10-CM

## 2018-09-10 DIAGNOSIS — E11.21 TYPE 2 DIABETES MELLITUS WITH NEPHROPATHY (HCC): ICD-10-CM

## 2018-09-10 DIAGNOSIS — Z98.890 S/P ABLATION OF ATRIAL FIBRILLATION: ICD-10-CM

## 2018-09-10 RX ORDER — SIMVASTATIN 20 MG/1
20 TABLET, FILM COATED ORAL
Qty: 90 TAB | Refills: 3 | Status: SHIPPED | OUTPATIENT
Start: 2018-09-10 | End: 2019-03-06 | Stop reason: SDUPTHER

## 2018-09-10 NOTE — MR AVS SNAPSHOT
[address] [facility] 
[phone] Patient: Theron Moore MRN:  [id number] Visit Information Date & Time Provider Department Dept. Phone Encounter #  
 9/10/2018  1:30 PM Richi Boykin NP [facility] [phone] [id number] Your Appointments 9/11/2018 12:30 PM  
NUCLEAR MEDICINE with NUCLEAR, [facility] [address] [place] [address]) Appt Note: Per Leonard-STRESS TEST LEXISCAN/CARDIOLITE [[id number]] (Order [id number]) 5'6 167 lbs 9/10/18 kb  
 [address] [facility]  
[phone] [address] [facility]  
  
    
 10/10/2018  1:00 PM  
VASCULAR TEST with VASCULAR, [facility] [address] [place] [address]) Appt Note: ANKLE BRACHIAL INDEX [[id number]] (Order [id number]) 9/4/18 kb  
 [address] [facility]  
[phone] [address] [facility]  
  
    
 11/2/2018 10:30 AM  
ROUTINE CARE with Haile Clarke MD  
[address] [place] [address]) Appt Note: 3 month follow up; 3 month follow up [facility] [address]  
[phone]  
  
   
 [address]  
  
    
 2/7/2019  9:00 AM  
ESTABLISHED PATIENT with Jose Longoria MD  
[facility] [address]) Appt Note: 6 month f/u & device ck - pt prefers to see Dr. Kenneth Hull not NP  
 [address] [facility]  
[phone] [address] [facility] [address] [address]  
  
    
 2/7/2019  9:00 AM  
PROCEDURE with PACEMAKER, [facility] [address] [facility]) Appt Note: 6 month f/u with device ck [address] [facility]  
[phone] [address] [facility] Upcoming Health Maintenance Date Due  
 EYE EXAM RETINAL OR DILATED Q1 6/25/2016 FOOT EXAM Q1 7/20/2018 GLAUCOMA SCREENING Q2Y 7/28/2018 Influenza Age 5 to Adult 8/1/2018 FOBT Q 1 YEAR AGE 50-75 9/22/2018 HEMOGLOBIN A1C Q6M 9/29/2018 BREAST CANCER SCRN MAMMOGRAM 2/23/2019 MICROALBUMIN Q1 3/29/2019 MEDICARE YEARLY EXAM 3/30/2019 LIPID PANEL Q1 4/17/2019 Pneumococcal 65+ High/Highest Risk (2 of 2 - PPSV23) 6/25/2020 DTaP/Tdap/Td series (2 - Td) 7/16/2026 Allergies as of 9/10/2018  Review Complete On: 9/10/2018 By: Denia Wood NP Severity Noted Reaction Type Reactions Crestor [Rosuvastatin]  10/31/2016   Side Effect Myalgia Levaquin [Levofloxacin]  12/07/2015   Intolerance Nausea Only GI Upset Lipitor [Atorvastatin]  04/17/2017   Side Effect Diarrhea Lyrica [Pregabalin]  10/31/2016   Side Effect Myalgia Current Immunizations  Reviewed on 10/31/2016 Name Date H1N1 FLU VACCINE 10/20/2009 Influenza High Dose Vaccine PF 10/31/2016 Influenza Vaccine (Madin [place] Canine Kidney) PF 9/23/2014 11:26 AM  
 Influenza Vaccine (Quad) PF 9/25/2017  6:36 PM  
 Influenza Vaccine Intradermal PF 11/27/2015 Influenza Vaccine Split 9/22/2011  6:25 AM  
 Influenza Vaccine Whole 10/20/2009 Pneumococcal Polysaccharide (PPSV-23) 6/25/2015 Tdap 7/16/2016 11:47 PM  
 ZZZ-RETIRED (DO NOT USE) Pneumococcal Vaccine (Unspecified Type) 10/20/2009 Not reviewed this visit You Were Diagnosed With   
  
 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 
ICD-9-CM: V45.89 Vitals BP Pulse Resp Height(growth percentile) Weight(growth percentile) SpO2  
 100/68 (BP 1 Location: Left arm, BP Patient Position: Sitting) 76 16 5' 6\" (1.676 m) 167 lb 4.8 oz (75.9 kg) 96% BMI OB Status Smoking Status 27 kg/m2 Postmenopausal Former Smoker Vitals History BMI and BSA Data Body Mass Index Body Surface Area  
 27 kg/m 2 1.88 m 2 Preferred Pharmacy Pharmacy Name Phone [facility] [address], [address] [place] [address] Avenue [phone] Your Updated Medication List  
  
   
This list is accurate as of 9/10/18  2:13 PM.  Always use your most recent med list.  
  
  
  
  
 acetaminophen 500 mg tablet Commonly known as:  TYLENOL Take 500 mg by mouth every six (6) hours as needed for Pain. albuterol 2.5 mg /3 mL (0.083 %) nebulizer solution Commonly known as:  PROVENTIL VENTOLIN  
3 mL by Nebulization route every four (4) hours as needed for Wheezing. apixaban 5 mg tablet Commonly known as:  Timmothy Brentwood Take 1 Tab by mouth two (2) times a day. Resume from tomorrow 7/21/2018. Indications: PREVENT THROMBOEMBOLISM IN CHRONIC ATRIAL FIBRILLATION Azelastine 0.15 % (205.5 mcg) nasal spray Commonly known as:  ASTEPRO  
1 Spray by Both Nostrils route daily. Blood-Glucose Meter monitoring kit Use as directed. Dx: 11.9 Whichever meter her insurance covers  
  
 budesonide-formoterol 160-4.5 mcg/actuation Hfaa Commonly known as:  SYMBICORT Take 1 Puff by inhalation two (2) times a day. carvedilol 6.25 mg tablet Commonly known as:  Nigel Liter Take 1 Tab by mouth two (2) times daily (with meals). clemastine 2.68 mg Tab tablet Indications: 1 tab in am and 1 tab in pm  
  
 clopidogrel 75 mg Tab Commonly known as:  PLAVIX TAKE ONE TABLET BY MOUTH ONCE DAILY  
  
 cod liver oil Cap Take 1 Cap by mouth daily. colchicine 0.6 mg tablet Take 2 Tabs by mouth daily. Patient takes 1.2 mg daily and 2.4 mg PRN flare up FLONASE 50 mcg/actuation nasal spray Generic drug:  fluticasone 2 Sprays by Both Nostrils route every evening. furosemide 20 mg tablet Commonly known as:  LASIX Take 1 Tab by mouth daily. gabapentin 800 mg tablet Commonly known as:  NEURONTIN  
TAKE ONE TABLET BY MOUTH THREE TIMES DAILY  
  
 glucose blood VI test strips strip Commonly known as:  blood glucose test  
Use BID DxE11.9  
  
 ipratropium 17 mcg/actuation inhaler Commonly known as:  ATROVENT HFA Take 1 Puff by inhalation every six (6) hours as needed for Wheezing. Lancets Misc Use BID Dx: E11.9  
  
 simvastatin 10 mg tablet Commonly known as:  ZOCOR Take 1 Tab by mouth nightly. tiotropium 18 mcg inhalation capsule Commonly known as:  [address] INHALE THE CONTENTS OF 1 CAPSULE THROUGH HANDIHALER DEVICE DAILY We Performed the Following AMB POC EKG ROUTINE W/ 12 LEADS, INTER & REP [18571 CPT(R)] To-Do List   
 09/11/2018 ECG:  STRESS TEST LEXISCAN/CARDIOLITE Patient Instructions 1. Hold Eliquis 2 days before surgery 2. Hold Plavix 5 days prior to surgery Resume the following morning of your procedure unless told otherwuse by Orthopedic Surgery. Introducing [facility] & HEALTH SERVICES! Dear Francesco Up: Thank you for requesting a [employer] account. Our records indicate that you already have an active Actifio account. You can access your account anytime at [email or web address] Did you know that you can access your hospital and ER discharge instructions at any time in Actifio?   You can also review all of your test results from your hospital stay or ER visit. Additional Information If you have questions, please visit the Frequently Asked Questions section of the [employer] website at [email or web address] Remember, [employer] is NOT to be used for urgent needs. For medical emergencies, dial 911. Now available from your iPhone and Android! Please provide this summary of care documentation to your next provider. Your primary care clinician is listed as Gianluca Conn. If you have any questions after today's visit, please call [phone].

## 2018-09-10 NOTE — PATIENT INSTRUCTIONS
1. Hold Eliquis 2 days before surgery    2. Hold Plavix 5 days prior to surgery  Resume the following morning of your procedure unless told otherwuse by Orthopedic Surgery.

## 2018-09-10 NOTE — PROGRESS NOTES
Richi Boykin DNP, ANP-BC  Subjective/HPI:     Theron Moore is a 80 y.o. female is here for cardiac clearance pending right rotator cuff surgery next week. She reports feeling in her usual state of health denies dyspnea on exertion or chest pain, due to leg pain and shoulder pain she has not been doing much exertion. She reports no recent COPD exacerbations. She denies fluid retention.     PCP Provider  Haile Clarke MD  Past Medical History:   Diagnosis Date    Arthritis     left shoulder    Atrial fibrillation ([facility] 6/2/2010    Dr. Shae Yost CAD (coronary artery disease)     stent; Dr. Shae Yost Celiac disease     Chronic diastolic heart failure ([facility] 09/22/2014    Dr. Dickson Lozoya    Chronic kidney disease     per cardio note    Chronic pain     left thigh:  L SFA intervention by Dr. Margie Francisco 07/2018, as of 9/6/18:  still causing pain, pt to have MILLA checked per Dr. Margie Francisco note    Chronic systolic HF (heart failure) ([facility] 5/10/2017    4/2017 EF 25-30%    COPD (chronic obstructive pulmonary disease) ([facility] 6/2/2010    Dr. Cisneros Tiana     Diabetes [facility])     Dr. Mony Curtis; no current medication per pt    Emphysema, unspecified ([facility]     Dr. Reginald Snider Frequent falls 2017    Gout     Heart failure ([facility]     Dr. Shae Yost History of Clostridium difficile infection 5/10/2017    4/2017 CDiff positive    Hypertension 6/2/2010    Dr. Leonie Chavez Hypertensive heart and chronic kidney disease     per PCP note    Hypotension 5/10/2017    Junctional tachycardia ([address] 5/10/2017    Dr. Dickson Lozoya    Neuropathy     NIDDM (non-insulin dependent diabetes mellitus) 6/2/2010    PAD (peripheral artery disease) ([facility]     S/P ablation of atrial fibrillation 03/2018    Screening mammogram 5/4/10    SOB (shortness of breath) 09/22/2014    Dr. Hipolito Bolton (sick sinus syndrome) ([facility]     per pacemaker form 9/6/18    Weakness     per pt weakness from c-diff 2017, itzel spivey with injury to rotator cuff      Past Surgical History:   Procedure Laterality Date    CARDIAC SURG PROCEDURE UNLIST      3 cardiac stent placed     COLONOSCOPY N/A 2017    COLONOSCOPY with biopsy performed by Chester Parikh [facility]    HX AFIB ABLATION  2018    has had 2 ablations per patient    HX  SECTION      HX HEART CATHETERIZATION  2018    HX OTHER SURGICAL      adrenal gland removed    HX PACEMAKER Left     Biotronik model: Evia    MT EXCISE ADRENAL GLAND      VASCULAR SURGERY PROCEDURE UNLIST  2018    Left SFA intervention ; Dr. Iliana Grover [Rosuvastatin] Myalgia    Levaquin [Levofloxacin] Nausea Only     GI Upset    Lipitor [Atorvastatin] Diarrhea    Lyrica [Pregabalin] Myalgia      Family History   Problem Relation Age of Onset    Heart Disease Mother     Diabetes Father     Heart Disease Brother       Current Outpatient Prescriptions   Medication Sig    apixaban (ELIQUIS) 5 mg tablet Take 1 Tab by mouth two (2) times a day. Resume from tomorrow 2018. Indications: PREVENT THROMBOEMBOLISM IN CHRONIC ATRIAL FIBRILLATION    clopidogrel (PLAVIX) 75 mg tab TAKE ONE TABLET BY MOUTH ONCE DAILY    carvedilol (COREG) 6.25 mg tablet Take 1 Tab by mouth two (2) times daily (with meals).  clemastine 2.68 mg tab Indications: 1 tab in am and 1 tab in pm    ipratropium (ATROVENT HFA) 17 mcg/actuation inhaler Take 1 Puff by inhalation every six (6) hours as needed for Wheezing.  albuterol (PROVENTIL VENTOLIN) 2.5 mg /3 mL (0.083 %) nebulizer solution 3 mL by Nebulization route every four (4) hours as needed for Wheezing.  simvastatin (ZOCOR) 10 mg tablet Take 1 Tab by mouth nightly.  colchicine 0.6 mg tablet Take 2 Tabs by mouth daily.  Patient takes 1.2 mg daily and 2.4 mg PRN flare up (Patient taking differently: Take 1.2 mg by mouth as needed (18:  Per pt she only takes when she has a flare up). Patient takes 1.2 mg daily and 2.4 mg PRN flare up )    Blood-Glucose Meter monitoring kit Use as directed. Dx: 11.9 Whichever meter her insurance covers    glucose blood VI test strips (BLOOD GLUCOSE TEST) strip Use BID DxE11.9    Lancets misc Use BID Dx: E11.9    gabapentin (NEURONTIN) 800 mg tablet TAKE ONE TABLET BY MOUTH THREE TIMES DAILY    budesonide-formoterol (SYMBICORT) 160-4.5 mcg/actuation HFAA Take 1 Puff by inhalation two (2) times a day.  tiotropium (SPIRIVA WITH HANDIHALER) 18 mcg inhalation capsule INHALE THE CONTENTS OF 1 CAPSULE THROUGH HANDIHALER DEVICE DAILY    furosemide (LASIX) 20 mg tablet Take 1 Tab by mouth daily.  acetaminophen (TYLENOL) 500 mg tablet Take 500 mg by mouth every six (6) hours as needed for Pain.  fluticasone (FLONASE) 50 mcg/actuation nasal spray 2 Sprays by Both Nostrils route every evening.  Azelastine (ASTEPRO) 0.15 % (205.5 mcg) nasal spray 1 Bob White by Both Nostrils route daily.  cod liver oil cap Take 1 Cap by mouth daily. No current facility-administered medications for this visit. Vitals:    09/10/18 1337   BP: 100/68   Pulse: 76   Resp: 16   SpO2: 96%   Weight: 167 lb 4.8 oz (75.9 kg)   Height: 5' 6\" (1.676 m)     Social History     Social History    Marital status:      Spouse name: N/A    Number of children: N/A    Years of education: N/A     Occupational History    Not on file. Social History Main Topics    Smoking status: Former Smoker     Types: Cigarettes     Start date: 1967     Quit date: 12/31/2009    Smokeless tobacco: Never Used    Alcohol use No    Drug use: No    Sexual activity: Not Currently     Other Topics Concern    Not on file     Social History Narrative       I have reviewed the nurses notes, vitals, problem list, allergy list, medical history, family, social history and medications. Review of Symptoms:    General: Pt denies excessive weight gain or loss.  Pt is able to conduct ADL's  HEENT: Denies blurred vision, headaches, epistaxis and difficulty swallowing. Respiratory: Denies shortness of breath, [place], wheezing or stridor. Cardiovascular: Denies precordial pain, palpitations, edema or PND  Gastrointestinal: Denies poor appetite, indigestion, abdominal pain or blood in stool  Musculoskeletal: + Diffuse right shoulder pain and lower extremity musculoskeletal pain  Neurologic: Denies tremor, paresthesias, or sensory motor disturbance  Skin: Denies rash, itching or texture change. Physical Exam:      General: Well developed, in no acute distress, cooperative and alert  HEENT: No carotid bruits, no JVD, trach is midline. Neck Supple, PEERL,  Heart:  Normal S1/S2 negative S3 or S4. Regular, no murmur, gallop or rub.   Respiratory: Clear bilaterally x 4, no wheezing or rales  Abdomen:   Soft, non-tender, no masses, bowel sounds are active.   Extremities:  No edema, normal cap refill, no cyanosis, atraumatic. Neuro: A&Ox3, speech clear, gait stable. Skin: Skin color is normal. No rashes or lesions. Non diaphoretic  Vascular: 2+ pulses symmetric in all extremities    Cardiographics    ECG: Paced  Results for orders placed or performed during the hospital encounter of 09/20/17   EKG, 12 LEAD, INITIAL   Result Value Ref Range    Ventricular Rate 76 BPM    Atrial Rate 76 BPM    P-R Interval 244 ms    QRS Duration 144 ms    Q-T Interval 460 ms    QTC Calculation (Bezet) 517 ms    Calculated P Axis 81 degrees    Calculated R Axis -27 degrees    Calculated T Axis 111 degrees    Diagnosis       Atrial-paced rhythm with 1st degree AV block  Left bundle branch block  When compared with ECG of 19-JUL-2017 12:16,  No significant change was found  Confirmed by Maru Galvan ([id number]) on 9/20/2017 5:23:53 PM       *Note: Due to a large number of results and/or encounters for the requested time period, some results have not been displayed. A complete set of results can be found in Results Review. Cardiology Labs:  Lab Results   Component Value Date/Time    Cholesterol, total 177 04/17/2018 08:51 AM    HDL Cholesterol 35 (L) 04/17/2018 08:51 AM    LDL, calculated 101 (H) 04/17/2018 08:51 AM    Triglyceride 203 (H) 04/17/2018 08:51 AM    CHOL/HDL Ratio 6.4 (H) 07/01/2014 03:10 AM       Lab Results   Component Value Date/Time    Sodium 143 07/18/2018 08:50 AM    Potassium 4.1 07/18/2018 08:50 AM    Chloride 103 07/18/2018 08:50 AM    CO2 23 07/18/2018 08:50 AM    Anion gap 5 12/26/2017 02:00 PM    Glucose 148 (H) 07/18/2018 08:50 AM    BUN 14 07/18/2018 08:50 AM    Creatinine 1.12 (H) 07/18/2018 08:50 AM    BUN/Creatinine ratio 13 07/18/2018 08:50 AM    GFR est AA 59 (L) 07/18/2018 08:50 AM    GFR est non-AA 51 (L) 07/18/2018 08:50 AM    Calcium 9.3 07/18/2018 08:50 AM    Bilirubin, total 0.4 07/18/2018 08:50 AM    AST (SGOT) 52 (H) 07/18/2018 08:50 AM    Alk. phosphatase 175 (H) 07/18/2018 08:50 AM    Protein, total 8.1 07/18/2018 08:50 AM    Albumin 3.9 07/18/2018 08:50 AM    Globulin 6.3 (H) 09/22/2017 07:01 AM    A-G Ratio 0.9 (L) 07/18/2018 08:50 AM    ALT (SGPT) 19 07/18/2018 08:50 AM           Assessment:     Assessment:     Diagnoses and all orders for this visit:    1. ASHD (arteriosclerotic heart disease)  -     AMB POC EKG ROUTINE W/ 12 LEADS, INTER & REP  -     LEXISCAN/CARDIOLITE, Clinic Performed; Future    2. Paroxysmal atrial fibrillation (HCC)  -     AMB POC EKG ROUTINE W/ 12 LEADS, INTER & REP  -     LEXISCAN/CARDIOLITE, Clinic Performed; Future    3. Benign hypertensive heart and CKD, stage 3 (GFR 30-59), w CHF ([address]  -     LEXISCAN/CARDIOLITE, Clinic Performed; Future    4. Sick sinus syndrome ([facility]  -     LEXISCAN/CARDIOLITE, Clinic Performed; Future    5. Mixed hyperlipidemia  -     LEXISCAN/CARDIOLITE, Clinic Performed; Future    6. Chronic systolic HF (heart failure) ([facility]  -     LEXISCAN/CARDIOLITE, Clinic Performed; Future    7.  Cardiac pacemaker in situ  - LEXISCAN/CARDIOLITE, Clinic Performed; Future    8. S/P ablation of atrial fibrillation  -     LEXISCAN/CARDIOLITE, Clinic Performed; Future        ICD-10-CM ICD-9-CM    1. ASHD (arteriosclerotic heart disease) I25.10 414.00 AMB POC EKG ROUTINE W/ 12 LEADS, INTER & REP      STRESS TEST LEXISCAN/CARDIOLITE   2. Paroxysmal atrial fibrillation (HCC) I48.0 427.31 AMB POC EKG ROUTINE W/ 12 LEADS, INTER & REP      STRESS TEST LEXISCAN/CARDIOLITE   3. Benign hypertensive heart and CKD, stage 3 (GFR 30-59), w CHF ([facility]) I13.0 404.11 STRESS TEST LEXISCAN/CARDIOLITE    N18.3 585.3      428.0    4. Sick sinus syndrome (HCC) I49.5 427.81 STRESS TEST LEXISCAN/CARDIOLITE   5. Mixed hyperlipidemia E78.2 272.2 STRESS TEST LEXISCAN/CARDIOLITE   6. Chronic systolic HF (heart failure) ([facility]) I50.22 428.22 STRESS TEST LEXISCAN/CARDIOLITE   7. Cardiac pacemaker in situ Z95.0 V45.01 STRESS TEST LEXISCAN/CARDIOLITE   8. S/P ablation of atrial fibrillation Z98.890 V45.89 STRESS TEST LEXISCAN/CARDIOLITE    Z86.79       Orders Placed This Encounter    AMB POC EKG ROUTINE W/ 12 LEADS, INTER & REP     Order Specific Question:   Reason for Exam:     Answer:   routine    LEXISCAN/CARDIOLITE, Clinic Performed     Standing Status:   Future     Standing Expiration Date:   3/10/2019     Order Specific Question:   Reason for Exam:     Answer:   pre op CAD        Plan:     1. Atherosclerotic heart disease: It has been 4 years since last evaluation for CAD progression, due to some sedentary lifestyle secondary to leg and arm pain will evaluate for ischemia with Lexiscan nuclear stress test prior to surgery  2. Hypertension: Controlled continue current therapy  3. Hyperlipidemia:  goal less than or equal to 70 simvastatin increased to 20 mg nightly. 4.  Peripheral arterial disease: She is 2 months post intervention, she may hold Plavix 5 days prior to upcoming surgery  5.   Atrial fibrillation: Paced, A. fib ablation, on Eliquis samples provided and will contact [address] [place] [address] for patient assistance. She has been advised to hold Eliquis 2 days prior to surgery. Follow-up with general cardiology in 6 months  Final cardiac clearance dependent on satisfactory outcome of nuclear stress test which is scheduled for tomorrow. Lilo Glynn NP    This note was created using voice recognition software. Despite editing, there may be syntax errors. 9/14/18: Patient is cleared from cardiology for surgery. No significant ischemia on NST, small defect, she is asymptomatic and on appropriate therapy, discussed with Dr Yoan Monteiro.

## 2018-09-10 NOTE — PROGRESS NOTES
1. Have you been to the ER, urgent care clinic since your last visit? Hospitalized since your last visit? No.    2. Have you seen or consulted any other health care providers outside of the [address] [place] [address] since your last visit? Include any pap smears or colon screening. Dr.Dustin Kris Lucas at [facility] at [id number] [place] [address]. Chief Complaint   Patient presents with    Other     cardiac clearance for rotator cuff surgery on 9/19/18-pt denies any cardiac symptoms     HAS NOT HAD ELIQUIS SINCE Thursday DUE TO COST.  ASKING FOR SAMPLES

## 2018-09-10 NOTE — PERIOP NOTES
Patients procedure has been reshceduled to 9/19/18. RN faxed new device form to Dr. Vargas Gamez office. KATIE CONDE for Radha at Dr. Vargas Gamez office advising that another device form was faxed and requesting that she contact Fe Peterson at [employer] to advise of rescheduled surgery date as requested. 1225:  RN contacted Dr. Carmen Hough office and requested office note/clearance from patients 9/7/18 appt. 1235:  KATIE CONDE for Yvette at Dr. Anand Jose office requesting new orders as patient's procedure has been rescheduled.

## 2018-09-11 ENCOUNTER — CLINICAL SUPPORT (OUTPATIENT)
Dept: CARDIOLOGY CLINIC | Age: 68
End: 2018-09-11

## 2018-09-11 DIAGNOSIS — I50.22 CHRONIC SYSTOLIC HF (HEART FAILURE) (HCC): ICD-10-CM

## 2018-09-11 DIAGNOSIS — Z86.79 S/P ABLATION OF ATRIAL FIBRILLATION: ICD-10-CM

## 2018-09-11 DIAGNOSIS — I13.0 BENIGN HYPERTENSIVE HEART AND CKD, STAGE 3 (GFR 30-59), W CHF (HCC): ICD-10-CM

## 2018-09-11 DIAGNOSIS — I25.10 ASHD (ARTERIOSCLEROTIC HEART DISEASE): ICD-10-CM

## 2018-09-11 DIAGNOSIS — N18.30 BENIGN HYPERTENSIVE HEART AND CKD, STAGE 3 (GFR 30-59), W CHF (HCC): ICD-10-CM

## 2018-09-11 DIAGNOSIS — Z98.890 S/P ABLATION OF ATRIAL FIBRILLATION: ICD-10-CM

## 2018-09-11 DIAGNOSIS — E78.2 MIXED HYPERLIPIDEMIA: ICD-10-CM

## 2018-09-11 DIAGNOSIS — Z95.0 CARDIAC PACEMAKER IN SITU: ICD-10-CM

## 2018-09-11 DIAGNOSIS — I49.5 SICK SINUS SYNDROME (HCC): ICD-10-CM

## 2018-09-11 DIAGNOSIS — R93.1 ABNORMAL NUCLEAR CARDIAC IMAGING TEST: Primary | ICD-10-CM

## 2018-09-11 DIAGNOSIS — I48.0 PAROXYSMAL ATRIAL FIBRILLATION (HCC): Chronic | ICD-10-CM

## 2018-09-13 ENCOUNTER — TELEPHONE (OUTPATIENT)
Dept: CARDIOLOGY CLINIC | Age: 68
End: 2018-09-13

## 2018-09-13 NOTE — TELEPHONE ENCOUNTER
Spoke to patient using 2 identifiers. Per Johanna Ariza NP, pt was made aware stress test shows a very mild abnormality she is asymptomatic of atherosclerotic heart disease and is on good cardiac medications. Discussed her scan with Dr. Willie Avina and we are comfortable in clearing her for her rotator cuff surgery.

## 2018-09-14 ENCOUNTER — TELEPHONE (OUTPATIENT)
Dept: CARDIOLOGY CLINIC | Age: 68
End: 2018-09-14

## 2018-09-14 NOTE — TELEPHONE ENCOUNTER
Spoke with representative with [address], they are having a backup in the mailing dept of Eliquis. She will put a rush order on the next shipment for pt. Advised pt of this and she stated she has medication now for a while.

## 2018-09-14 NOTE — TELEPHONE ENCOUNTER
----- Message from Salina Lindsey NP sent at 9/10/2018  2:06 PM EDT -----  Regarding: Eliquis  Please follow up with patient assistance program for Eliquis, patient was approved and only sent 1 bottle.

## 2018-09-14 NOTE — TELEPHONE ENCOUNTER
Office note with clearance for rotator cuff surgery faxed to Shawna Christiansen at [phone] with confirmation received.

## 2018-09-17 ENCOUNTER — ANESTHESIA EVENT (OUTPATIENT)
Dept: SURGERY | Age: 68
End: 2018-09-17
Payer: MEDICARE

## 2018-09-17 NOTE — PERIOP NOTES
Dr Erasto Carter reviewed chart. Pt is not an appropriate patient for ambulatory unit. To be done in main OR.       Cristiana Campbell in Dr Richie Cade office notified

## 2018-09-18 NOTE — PERIOP NOTES
RN spoke with Greg Faulkner in Dr. Adell Holstein office regarding performing this procedure in the main OR per Dr. Agnes Lindsay request.  Per Greg Faulkner she will be contacting scheduling to change to main OR. .    5041:  RN attempted to reach patient to inform that procedure will be done in main OR. RN left message for patient return call to PAT to inform. 1728:  Patient returned call to PAT. RN informed patient that her case will be done in the main OR. Paper chart delivered to main PAT. Patient informed that this change may effect her scheduling time.

## 2018-09-18 NOTE — PERIOP NOTES
[facility]  Preoperative Instructions        Surgery Date 9/19/18          Time of Arrival 10:00am     1. On the day of your surgery, please report to the Surgical Services Registration Desk and sign in at your designated time. The Surgery Center is located to the right of the Emergency Room. 2. You must have someone with you to drive you home. You should not drive a car for 24 hours following surgery. Please make arrangements for a friend or family member to stay with you for the first 24 hours after your surgery. 3. Do not have anything to eat or drink (including water, gum, mints, coffee, juice) after midnight 9/18/18      . ? This may not apply to medications prescribed by your physician. ?(Please note below the special instructions with medications to take the morning of your procedure.)    4. We recommend you do not drink any alcoholic beverages for 24 hours before and after your surgery. 5. Contact your surgeons office for instructions on the following medications: non-steroidal anti-inflammatory drugs (i.e. Advil, Aleve), vitamins, and supplements. (Some surgeons will want you to stop these medications prior to surgery and others may allow you to take them)  **If you are currently taking Plavix, Coumadin, Aspirin and/or other blood-thinning agents, contact your surgeon for instructions. ** Your surgeon will partner with the physician prescribing these medications to determine if it is safe to stop or if you need to continue taking. Please do not stop taking these medications without instructions from your surgeon    6. Wear comfortable clothes. Wear glasses instead of contacts. Do not bring any money or jewelry. Please bring picture ID, insurance card, and any prearranged co-payment or hospital payment. Do not wear make-up, particularly mascara the morning of your surgery. Do not wear nail polish, particularly if you are having foot /hand surgery.   Wear your hair loose or down, no ponytails, buns, christian pins or clips. All body piercings must be removed. Please shower with antibacterial soap for three consecutive days before and on the morning of surgery, but do not apply any lotions, powders or deodorants after the shower on the day of surgery. Please use a fresh towels after each shower. Please sleep in clean clothes and change bed linens the night before surgery. Please do not shave for 48 hours prior to surgery. Shaving of the face is acceptable. 7. You should understand that if you do not follow these instructions your surgery may be cancelled. If your physical condition changes (I.e. fever, cold or flu) please contact your surgeon as soon as possible. 8. It is important that you be on time. If a situation occurs where you may be late, please call [phone] (OR Holding Area). 9. If you have any questions and or problems, please call [phone] (Pre-admission Testing). 10. Your surgery time may be subject to change. You will receive a phone call the evening prior if your time changes. 11.  If having outpatient surgery, you must have someone to drive you here, stay with you during the duration of your stay, and to drive you home at time of discharge. 12.   In an effort to improve the efficiency, privacy, and safety for all of our Pre-op patients visitors are not allowed in the Holding area. Once you arrive and are registered your family/visitors will be asked to remain in the waiting room. The Pre-op staff will get you from the Surgical Waiting Area and will explain to you and your family/visitors that the Pre-op phase is beginning. The staff will answer any questions and provide instructions for tracking of the patient, by use of the existing tracking number and color-coded status board in the waiting room.   At this time the staff will also ask for your designated spokesperson information in the event that the physician or staff need to provide an update or obtain any pertinent information. The designated spokesperson will be notified if the physician needs to speak to family during the pre-operative phase. If at any time your family/visitors has questions or concerns they may approach the volunteer desk in the waiting area for assistance. Special Instructions:Pt stated she stopped Eliquis 9/16 and Plavix 9/13 as directed. May have inhalers as needed    MEDICATIONS TO TAKE THE MORNING OF SURGERY WITH A SIP OF WATER:Coreg, Lasix, Gabapentin. May have Tylenol if needed. I understand a pre-operative phone call will be made to verify my surgery time. In the event that I am not available, I give permission for a message to be left on my answering service and/or with another person?  Yes          ___________________      __________   _________    (Signature of Patient)             (Witness)                (Date and Time)

## 2018-09-18 NOTE — PERIOP NOTES
Spoke to Ronny with [facility] to have rep reprogram device pre and post procedure. She states a rep will be here tomorrow between 10a-12p tomorrow.

## 2018-09-19 ENCOUNTER — ANESTHESIA (OUTPATIENT)
Dept: SURGERY | Age: 68
End: 2018-09-19
Payer: MEDICARE

## 2018-09-19 ENCOUNTER — HOSPITAL ENCOUNTER (OUTPATIENT)
Age: 68
Setting detail: OBSERVATION
Discharge: HOME HEALTH | End: 2018-09-21
Attending: ORTHOPAEDIC SURGERY | Admitting: ORTHOPAEDIC SURGERY
Payer: MEDICARE

## 2018-09-19 DIAGNOSIS — G89.18 POST-OP PAIN: Primary | ICD-10-CM

## 2018-09-19 PROBLEM — Z98.890 S/P ROTATOR CUFF REPAIR: Status: ACTIVE | Noted: 2018-09-19

## 2018-09-19 LAB
GLUCOSE BLD STRIP.AUTO-MCNC: 131 MG/DL (ref 65–100)
GLUCOSE BLD STRIP.AUTO-MCNC: 142 MG/DL (ref 65–100)
SERVICE CMNT-IMP: ABNORMAL
SERVICE CMNT-IMP: ABNORMAL

## 2018-09-19 PROCEDURE — 76060000035 HC ANESTHESIA 2 TO 2.5 HR: Performed by: ORTHOPAEDIC SURGERY

## 2018-09-19 PROCEDURE — 77030008496 HC TBNG ARTHSC IRR S&N -B: Performed by: ORTHOPAEDIC SURGERY

## 2018-09-19 PROCEDURE — 74011250637 HC RX REV CODE- 250/637: Performed by: NURSE PRACTITIONER

## 2018-09-19 PROCEDURE — 77030020782 HC GWN BAIR PAWS FLX 3M -B

## 2018-09-19 PROCEDURE — 74011250636 HC RX REV CODE- 250/636

## 2018-09-19 PROCEDURE — 77010033678 HC OXYGEN DAILY

## 2018-09-19 PROCEDURE — 74011250636 HC RX REV CODE- 250/636: Performed by: ORTHOPAEDIC SURGERY

## 2018-09-19 PROCEDURE — 74011250637 HC RX REV CODE- 250/637: Performed by: ORTHOPAEDIC SURGERY

## 2018-09-19 PROCEDURE — 74011250636 HC RX REV CODE- 250/636: Performed by: NURSE PRACTITIONER

## 2018-09-19 PROCEDURE — 77030032497 HC WRP SHLDR WO BGS SOLM -B

## 2018-09-19 PROCEDURE — 77030006884 HC BLD SHV INCIS S&N -B: Performed by: ORTHOPAEDIC SURGERY

## 2018-09-19 PROCEDURE — 77030008684 HC TU ET CUF COVD -B: Performed by: ANESTHESIOLOGY

## 2018-09-19 PROCEDURE — 76210000017 HC OR PH I REC 1.5 TO 2 HR: Performed by: ORTHOPAEDIC SURGERY

## 2018-09-19 PROCEDURE — 77030018835 HC SOL IRR LR ICUM -A: Performed by: ORTHOPAEDIC SURGERY

## 2018-09-19 PROCEDURE — 99218 HC RM OBSERVATION: CPT

## 2018-09-19 PROCEDURE — 77030032490 HC SLV COMPR SCD KNE COVD -B: Performed by: ORTHOPAEDIC SURGERY

## 2018-09-19 PROCEDURE — 77030038020 HC MANFLD NEPTUNE STRY -B: Performed by: ORTHOPAEDIC SURGERY

## 2018-09-19 PROCEDURE — 77030003598 HC NDL MULT/FIRE ARTH -C: Performed by: ORTHOPAEDIC SURGERY

## 2018-09-19 PROCEDURE — 77030025419 HC BLD SHV ACRMNZR LG S&N -B: Performed by: ORTHOPAEDIC SURGERY

## 2018-09-19 PROCEDURE — 76010000131 HC OR TIME 2 TO 2.5 HR: Performed by: ORTHOPAEDIC SURGERY

## 2018-09-19 PROCEDURE — 77030037837: Performed by: ORTHOPAEDIC SURGERY

## 2018-09-19 PROCEDURE — 36620 INSERTION CATHETER ARTERY: CPT

## 2018-09-19 PROCEDURE — C1713 ANCHOR/SCREW BN/BN,TIS/BN: HCPCS | Performed by: ORTHOPAEDIC SURGERY

## 2018-09-19 PROCEDURE — 77030013079 HC BLNKT BAIR HGGR 3M -A: Performed by: ANESTHESIOLOGY

## 2018-09-19 PROCEDURE — A4565 SLINGS: HCPCS

## 2018-09-19 PROCEDURE — 74011250636 HC RX REV CODE- 250/636: Performed by: ANESTHESIOLOGY

## 2018-09-19 PROCEDURE — 74011000250 HC RX REV CODE- 250

## 2018-09-19 PROCEDURE — 77030002916 HC SUT ETHLN J&J -A: Performed by: ORTHOPAEDIC SURGERY

## 2018-09-19 PROCEDURE — 77030010428: Performed by: ORTHOPAEDIC SURGERY

## 2018-09-19 PROCEDURE — 77030012711 HC WND ARTHRO ABLT S&N -D: Performed by: ORTHOPAEDIC SURGERY

## 2018-09-19 PROCEDURE — 82962 GLUCOSE BLOOD TEST: CPT

## 2018-09-19 PROCEDURE — 77030002966 HC SUT PDS J&J -A: Performed by: ORTHOPAEDIC SURGERY

## 2018-09-19 PROCEDURE — 77030026438 HC STYL ET INTUB CARD -A: Performed by: ANESTHESIOLOGY

## 2018-09-19 DEVICE — BIO-COMP, SWIVELOCK C, TT, 4.75X19.1MM
Type: IMPLANTABLE DEVICE | Site: SHOULDER | Status: FUNCTIONAL
Brand: ARTHREX®

## 2018-09-19 RX ORDER — SUCCINYLCHOLINE CHLORIDE 20 MG/ML
INJECTION INTRAMUSCULAR; INTRAVENOUS AS NEEDED
Status: DISCONTINUED | OUTPATIENT
Start: 2018-09-19 | End: 2018-09-19 | Stop reason: HOSPADM

## 2018-09-19 RX ORDER — MIDAZOLAM HYDROCHLORIDE 1 MG/ML
INJECTION, SOLUTION INTRAMUSCULAR; INTRAVENOUS AS NEEDED
Status: DISCONTINUED | OUTPATIENT
Start: 2018-09-19 | End: 2018-09-19 | Stop reason: HOSPADM

## 2018-09-19 RX ORDER — FAMOTIDINE 20 MG/1
20 TABLET, FILM COATED ORAL 2 TIMES DAILY
Status: DISCONTINUED | OUTPATIENT
Start: 2018-09-19 | End: 2018-09-19 | Stop reason: DRUGHIGH

## 2018-09-19 RX ORDER — SODIUM CHLORIDE 0.9 % (FLUSH) 0.9 %
5-10 SYRINGE (ML) INJECTION EVERY 8 HOURS
Status: DISCONTINUED | OUTPATIENT
Start: 2018-09-19 | End: 2018-09-21 | Stop reason: HOSPADM

## 2018-09-19 RX ORDER — DEXAMETHASONE SODIUM PHOSPHATE 4 MG/ML
INJECTION, SOLUTION INTRA-ARTICULAR; INTRALESIONAL; INTRAMUSCULAR; INTRAVENOUS; SOFT TISSUE AS NEEDED
Status: DISCONTINUED | OUTPATIENT
Start: 2018-09-19 | End: 2018-09-19 | Stop reason: HOSPADM

## 2018-09-19 RX ORDER — FUROSEMIDE 20 MG/1
20 TABLET ORAL DAILY
Status: DISCONTINUED | OUTPATIENT
Start: 2018-09-20 | End: 2018-09-21 | Stop reason: HOSPADM

## 2018-09-19 RX ORDER — POLYETHYLENE GLYCOL 3350 17 G/17G
17 POWDER, FOR SOLUTION ORAL DAILY
Status: DISCONTINUED | OUTPATIENT
Start: 2018-09-20 | End: 2018-09-21 | Stop reason: HOSPADM

## 2018-09-19 RX ORDER — FENTANYL CITRATE 50 UG/ML
25 INJECTION, SOLUTION INTRAMUSCULAR; INTRAVENOUS
Status: DISCONTINUED | OUTPATIENT
Start: 2018-09-19 | End: 2018-09-19 | Stop reason: HOSPADM

## 2018-09-19 RX ORDER — ONDANSETRON 2 MG/ML
INJECTION INTRAMUSCULAR; INTRAVENOUS AS NEEDED
Status: DISCONTINUED | OUTPATIENT
Start: 2018-09-19 | End: 2018-09-19 | Stop reason: HOSPADM

## 2018-09-19 RX ORDER — OXYCODONE HYDROCHLORIDE 5 MG/1
5 TABLET ORAL
Status: DISCONTINUED | OUTPATIENT
Start: 2018-09-19 | End: 2018-09-21 | Stop reason: HOSPADM

## 2018-09-19 RX ORDER — EPHEDRINE SULFATE 50 MG/ML
INJECTION, SOLUTION INTRAVENOUS AS NEEDED
Status: DISCONTINUED | OUTPATIENT
Start: 2018-09-19 | End: 2018-09-19 | Stop reason: HOSPADM

## 2018-09-19 RX ORDER — CLOPIDOGREL BISULFATE 75 MG/1
75 TABLET ORAL DAILY
Status: DISCONTINUED | OUTPATIENT
Start: 2018-09-20 | End: 2018-09-21 | Stop reason: HOSPADM

## 2018-09-19 RX ORDER — FAMOTIDINE 20 MG/1
20 TABLET, FILM COATED ORAL EVERY EVENING
Status: DISCONTINUED | OUTPATIENT
Start: 2018-09-19 | End: 2018-09-21 | Stop reason: HOSPADM

## 2018-09-19 RX ORDER — CEFAZOLIN SODIUM/WATER 2 G/20 ML
2 SYRINGE (ML) INTRAVENOUS ONCE
Status: COMPLETED | OUTPATIENT
Start: 2018-09-19 | End: 2018-09-19

## 2018-09-19 RX ORDER — PHENYLEPHRINE HCL IN 0.9% NACL 0.4MG/10ML
SYRINGE (ML) INTRAVENOUS AS NEEDED
Status: DISCONTINUED | OUTPATIENT
Start: 2018-09-19 | End: 2018-09-19 | Stop reason: HOSPADM

## 2018-09-19 RX ORDER — ROCURONIUM BROMIDE 10 MG/ML
INJECTION, SOLUTION INTRAVENOUS AS NEEDED
Status: DISCONTINUED | OUTPATIENT
Start: 2018-09-19 | End: 2018-09-19 | Stop reason: HOSPADM

## 2018-09-19 RX ORDER — HYDROMORPHONE HYDROCHLORIDE 2 MG/ML
0.5 INJECTION, SOLUTION INTRAMUSCULAR; INTRAVENOUS; SUBCUTANEOUS
Status: ACTIVE | OUTPATIENT
Start: 2018-09-19 | End: 2018-09-20

## 2018-09-19 RX ORDER — SODIUM CHLORIDE 0.9 % (FLUSH) 0.9 %
5-10 SYRINGE (ML) INJECTION AS NEEDED
Status: DISCONTINUED | OUTPATIENT
Start: 2018-09-19 | End: 2018-09-21 | Stop reason: HOSPADM

## 2018-09-19 RX ORDER — SODIUM CHLORIDE, SODIUM LACTATE, POTASSIUM CHLORIDE, CALCIUM CHLORIDE 600; 310; 30; 20 MG/100ML; MG/100ML; MG/100ML; MG/100ML
25 INJECTION, SOLUTION INTRAVENOUS CONTINUOUS
Status: DISCONTINUED | OUTPATIENT
Start: 2018-09-19 | End: 2018-09-19 | Stop reason: HOSPADM

## 2018-09-19 RX ORDER — ENOXAPARIN SODIUM 100 MG/ML
40 INJECTION SUBCUTANEOUS DAILY
Status: CANCELLED | OUTPATIENT
Start: 2018-09-20

## 2018-09-19 RX ORDER — DIPHENHYDRAMINE HYDROCHLORIDE 50 MG/ML
12.5 INJECTION, SOLUTION INTRAMUSCULAR; INTRAVENOUS
Status: DISCONTINUED | OUTPATIENT
Start: 2018-09-19 | End: 2018-09-19 | Stop reason: HOSPADM

## 2018-09-19 RX ORDER — PROPOFOL 10 MG/ML
INJECTION, EMULSION INTRAVENOUS AS NEEDED
Status: DISCONTINUED | OUTPATIENT
Start: 2018-09-19 | End: 2018-09-19 | Stop reason: HOSPADM

## 2018-09-19 RX ORDER — AMOXICILLIN 250 MG
1 CAPSULE ORAL 2 TIMES DAILY
Status: DISCONTINUED | OUTPATIENT
Start: 2018-09-19 | End: 2018-09-21 | Stop reason: HOSPADM

## 2018-09-19 RX ORDER — SODIUM CHLORIDE 0.9 % (FLUSH) 0.9 %
5-10 SYRINGE (ML) INJECTION AS NEEDED
Status: DISCONTINUED | OUTPATIENT
Start: 2018-09-19 | End: 2018-09-19 | Stop reason: HOSPADM

## 2018-09-19 RX ORDER — PRAVASTATIN SODIUM 40 MG/1
40 TABLET ORAL
Status: DISCONTINUED | OUTPATIENT
Start: 2018-09-19 | End: 2018-09-21 | Stop reason: HOSPADM

## 2018-09-19 RX ORDER — ONDANSETRON 2 MG/ML
4 INJECTION INTRAMUSCULAR; INTRAVENOUS AS NEEDED
Status: DISCONTINUED | OUTPATIENT
Start: 2018-09-19 | End: 2018-09-19 | Stop reason: HOSPADM

## 2018-09-19 RX ORDER — LIDOCAINE HYDROCHLORIDE 10 MG/ML
0.1 INJECTION, SOLUTION EPIDURAL; INFILTRATION; INTRACAUDAL; PERINEURAL AS NEEDED
Status: DISCONTINUED | OUTPATIENT
Start: 2018-09-19 | End: 2018-09-19 | Stop reason: HOSPADM

## 2018-09-19 RX ORDER — FENTANYL CITRATE 50 UG/ML
INJECTION, SOLUTION INTRAMUSCULAR; INTRAVENOUS AS NEEDED
Status: DISCONTINUED | OUTPATIENT
Start: 2018-09-19 | End: 2018-09-19 | Stop reason: HOSPADM

## 2018-09-19 RX ORDER — CARVEDILOL 6.25 MG/1
6.25 TABLET ORAL 2 TIMES DAILY WITH MEALS
Status: DISCONTINUED | OUTPATIENT
Start: 2018-09-19 | End: 2018-09-21 | Stop reason: HOSPADM

## 2018-09-19 RX ORDER — OXYCODONE HYDROCHLORIDE 5 MG/1
10 TABLET ORAL
Status: DISCONTINUED | OUTPATIENT
Start: 2018-09-19 | End: 2018-09-21 | Stop reason: HOSPADM

## 2018-09-19 RX ORDER — SODIUM CHLORIDE, SODIUM LACTATE, POTASSIUM CHLORIDE, CALCIUM CHLORIDE 600; 310; 30; 20 MG/100ML; MG/100ML; MG/100ML; MG/100ML
25 INJECTION, SOLUTION INTRAVENOUS CONTINUOUS
Status: DISCONTINUED | OUTPATIENT
Start: 2018-09-19 | End: 2018-09-19

## 2018-09-19 RX ORDER — ACETAMINOPHEN 325 MG/1
650 TABLET ORAL EVERY 6 HOURS
Status: DISCONTINUED | OUTPATIENT
Start: 2018-09-19 | End: 2018-09-21 | Stop reason: HOSPADM

## 2018-09-19 RX ORDER — LIDOCAINE HYDROCHLORIDE 20 MG/ML
INJECTION, SOLUTION EPIDURAL; INFILTRATION; INTRACAUDAL; PERINEURAL AS NEEDED
Status: DISCONTINUED | OUTPATIENT
Start: 2018-09-19 | End: 2018-09-19 | Stop reason: HOSPADM

## 2018-09-19 RX ORDER — OXYCODONE AND ACETAMINOPHEN 5; 325 MG/1; MG/1
1 TABLET ORAL
Qty: 50 TAB | Refills: 0 | Status: SHIPPED | OUTPATIENT
Start: 2018-09-19 | End: 2019-03-06

## 2018-09-19 RX ORDER — ALBUTEROL SULFATE 0.83 MG/ML
2.5 SOLUTION RESPIRATORY (INHALATION)
Status: DISCONTINUED | OUTPATIENT
Start: 2018-09-19 | End: 2018-09-21 | Stop reason: HOSPADM

## 2018-09-19 RX ORDER — MORPHINE SULFATE 10 MG/ML
2 INJECTION, SOLUTION INTRAMUSCULAR; INTRAVENOUS
Status: DISCONTINUED | OUTPATIENT
Start: 2018-09-19 | End: 2018-09-19 | Stop reason: HOSPADM

## 2018-09-19 RX ORDER — SODIUM CHLORIDE 9 MG/ML
125 INJECTION, SOLUTION INTRAVENOUS CONTINUOUS
Status: DISPENSED | OUTPATIENT
Start: 2018-09-19 | End: 2018-09-20

## 2018-09-19 RX ORDER — SODIUM CHLORIDE 0.9 % (FLUSH) 0.9 %
5-10 SYRINGE (ML) INJECTION EVERY 8 HOURS
Status: DISCONTINUED | OUTPATIENT
Start: 2018-09-19 | End: 2018-09-19 | Stop reason: HOSPADM

## 2018-09-19 RX ADMIN — Medication 80 MCG: at 12:41

## 2018-09-19 RX ADMIN — FENTANYL CITRATE 25 MCG: 50 INJECTION, SOLUTION INTRAMUSCULAR; INTRAVENOUS at 12:24

## 2018-09-19 RX ADMIN — DEXAMETHASONE SODIUM PHOSPHATE 4 MG: 4 INJECTION, SOLUTION INTRA-ARTICULAR; INTRALESIONAL; INTRAMUSCULAR; INTRAVENOUS; SOFT TISSUE at 12:43

## 2018-09-19 RX ADMIN — ACETAMINOPHEN 650 MG: 325 TABLET ORAL at 17:43

## 2018-09-19 RX ADMIN — CARVEDILOL 6.25 MG: 6.25 TABLET, FILM COATED ORAL at 17:42

## 2018-09-19 RX ADMIN — OXYCODONE HYDROCHLORIDE 5 MG: 5 TABLET ORAL at 18:39

## 2018-09-19 RX ADMIN — FAMOTIDINE 20 MG: 20 TABLET ORAL at 17:42

## 2018-09-19 RX ADMIN — SODIUM CHLORIDE 125 ML/HR: 900 INJECTION, SOLUTION INTRAVENOUS at 22:25

## 2018-09-19 RX ADMIN — PROPOFOL 30 MG: 10 INJECTION, EMULSION INTRAVENOUS at 13:52

## 2018-09-19 RX ADMIN — MIDAZOLAM HYDROCHLORIDE 0.5 MG: 1 INJECTION, SOLUTION INTRAMUSCULAR; INTRAVENOUS at 12:24

## 2018-09-19 RX ADMIN — Medication 80 MCG: at 12:43

## 2018-09-19 RX ADMIN — GABAPENTIN 800 MG: 300 CAPSULE ORAL at 22:19

## 2018-09-19 RX ADMIN — Medication 10 ML: at 17:43

## 2018-09-19 RX ADMIN — Medication 40 MCG: at 12:24

## 2018-09-19 RX ADMIN — SODIUM CHLORIDE, SODIUM LACTATE, POTASSIUM CHLORIDE, AND CALCIUM CHLORIDE 25 ML/HR: 600; 310; 30; 20 INJECTION, SOLUTION INTRAVENOUS at 11:15

## 2018-09-19 RX ADMIN — SENNOSIDES AND DOCUSATE SODIUM 1 TABLET: 8.6; 5 TABLET ORAL at 17:42

## 2018-09-19 RX ADMIN — Medication 80 MCG: at 12:29

## 2018-09-19 RX ADMIN — Medication 2 G: at 12:37

## 2018-09-19 RX ADMIN — PROPOFOL 40 MG: 10 INJECTION, EMULSION INTRAVENOUS at 12:25

## 2018-09-19 RX ADMIN — Medication 80 MCG: at 12:51

## 2018-09-19 RX ADMIN — ONDANSETRON 4 MG: 2 INJECTION INTRAMUSCULAR; INTRAVENOUS at 12:43

## 2018-09-19 RX ADMIN — EPHEDRINE SULFATE 10 MG: 50 INJECTION, SOLUTION INTRAVENOUS at 12:28

## 2018-09-19 RX ADMIN — GABAPENTIN 800 MG: 300 CAPSULE ORAL at 17:42

## 2018-09-19 RX ADMIN — Medication 80 MCG: at 12:28

## 2018-09-19 RX ADMIN — Medication 80 MCG: at 12:31

## 2018-09-19 RX ADMIN — MIDAZOLAM HYDROCHLORIDE 0.5 MG: 1 INJECTION, SOLUTION INTRAMUSCULAR; INTRAVENOUS at 11:47

## 2018-09-19 RX ADMIN — PRAVASTATIN SODIUM 40 MG: 40 TABLET ORAL at 22:19

## 2018-09-19 RX ADMIN — Medication 40 MCG: at 12:39

## 2018-09-19 RX ADMIN — Medication 80 MCG: at 12:30

## 2018-09-19 RX ADMIN — EPHEDRINE SULFATE 5 MG: 50 INJECTION, SOLUTION INTRAVENOUS at 12:43

## 2018-09-19 RX ADMIN — ROCURONIUM BROMIDE 5 MG: 10 INJECTION, SOLUTION INTRAVENOUS at 12:24

## 2018-09-19 RX ADMIN — Medication 10 ML: at 22:20

## 2018-09-19 RX ADMIN — SUCCINYLCHOLINE CHLORIDE 140 MG: 20 INJECTION INTRAMUSCULAR; INTRAVENOUS at 12:25

## 2018-09-19 RX ADMIN — LIDOCAINE HYDROCHLORIDE 60 MG: 20 INJECTION, SOLUTION EPIDURAL; INFILTRATION; INTRACAUDAL; PERINEURAL at 12:24

## 2018-09-19 RX ADMIN — SODIUM CHLORIDE 125 ML/HR: 900 INJECTION, SOLUTION INTRAVENOUS at 15:50

## 2018-09-19 RX ADMIN — LIDOCAINE HYDROCHLORIDE 40 MG: 20 INJECTION, SOLUTION EPIDURAL; INFILTRATION; INTRACAUDAL; PERINEURAL at 12:25

## 2018-09-19 RX ADMIN — FENTANYL CITRATE 25 MCG: 50 INJECTION, SOLUTION INTRAMUSCULAR; INTRAVENOUS at 11:47

## 2018-09-19 RX ADMIN — Medication 80 MCG: at 12:27

## 2018-09-19 RX ADMIN — PROPOFOL 80 MG: 10 INJECTION, EMULSION INTRAVENOUS at 12:24

## 2018-09-19 NOTE — ANESTHESIA PROCEDURE NOTES
Arterial Line Placement Start time: 9/19/2018 11:20 AM 
End time: 9/19/2018 11:31 AM 
Performed by: Fransisca Trotter Authorized by: Fransisca Trotter Pre-Procedure Indications:  Arterial pressure monitoring Timeout Time: 11:20 Procedure:  
Prep:  ChloraPrep Seldinger Technique?: Yes Orientation:  Left Location:  Radial artery Catheter size:  20 G Number of attempts:  1 Cont Cardiac Output Sensor: No   
 
Assessment:  
Post-procedure:  Line secured and sterile dressing applied Patient Tolerance:  Patient tolerated the procedure well with no immediate complications Comment:  
Performed with Ultrasound guidance

## 2018-09-19 NOTE — PROGRESS NOTES
Pharmacy Automatic Direct Oral Anticoagulant Renal Dosing Protocol    Patient currently receiving Apixaban 5 mg bid with an indication of Atrial Fibrillation. Start date: Resume from home    Major interacting medications:     Platelet inhibitors (dose/frequency):     Labs:  No results for input(s): CREA, HGB, PLT, HGBEXT, PLTEXT in the last 72 hours. Wt Readings from Last 1 Encounters:   09/19/18 75.8 kg (167 lb 1.7 oz)        Creatinine Clearance: 46 ml/min    Impression/Plan: Resume Apixaban 5mg BID     Pharmacy will follow daily and adjust as appropriate. Thank you,  Delisa Hwang [facility]       Child Rivera Score calculator  [employer].       Apixaban   Dabigatran   Edoxaban   Rivaroxaban    [email or web address]. [email or web address]

## 2018-09-19 NOTE — H&P
GENERIC PRE-OP HISTORY AND PHYSICAL    Subjective:     Patient is a 80 y.o.  female presented with a history of right shoulder pain. Onset of symptoms was abrupt with unchanged course since that time. She is being admitted for surgical management of this condition. The indications for the procedure include continued pain despite months of conservative treatment. Mri positive RTC tear and prox biceps tendonopathy, impingment.     Patient Active Problem List    Diagnosis Date Noted    PAD (peripheral artery disease) ([facility] 05/22/2018    A-fib ([address] 03/30/2018    Type 2 diabetes mellitus with nephropathy ([address] 12/27/2017    CKD (chronic kidney disease) stage 3, GFR 30-59 ml/min 09/22/2017    Chronic systolic HF (heart failure) ([address] 05/10/2017    History of Clostridium difficile infection 05/10/2017    Junctional tachycardia ([address] 05/10/2017    Hypotension 05/10/2017    S/P ablation of atrial fibrillation 05/02/2017    Fear associated with illness and body function 04/07/2017    Counseling regarding advanced care planning and goals of care 11/12/5233    Systolic CHF, acute ([address] 05/44/1955    Acute systolic CHF (congestive heart failure) ([address] 04/06/2017    CHF (congestive heart failure) ([address] 04/04/2017    Type 2 diabetes mellitus with diabetic neuropathy, without long-term current use of insulin ([address] 01/31/2017    GIB (gastrointestinal bleeding) 52/17/7546    Diastolic CHF, acute on chronic ([address] 09/22/2014    S/P coronary artery stent placement 07/04/2014    Back pain 11/14/2012    Sinoatrial node dysfunction ([address] 07/05/2012    Cardiac pacemaker in situ 07/05/2012    Mixed hyperlipidemia 07/05/2012    Chest pain 09/21/2011    Sick sinus syndrome ([address] 02/25/2011    S/P angioplasty with stent 02/07/2011    Hypokalemia 07/20/2010    Hip pain 06/08/2010    Benign hypertensive heart and CKD, stage 3 (GFR 30-59), w CHF ([address] 06/02/2010    Atrial fibrillation--paroxysmal 2010    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 2010     Past Medical History:   Diagnosis Date    Arthritis     left shoulder    Atrial fibrillation ([facility] 2010    Dr. Kathleen Brunson CAD (coronary artery disease)     stent; Dr. Kathleen Brunson Celiac disease     Chronic diastolic heart failure ([address] 2014    Dr. Angeline Nixon    Chronic kidney disease     per cardio note    Chronic pain     left thigh:  L SFA intervention by Dr. Roberta Velasquez 2018, as of 18:  still causing pain, pt to have MILLA checked per Dr. Roberta Velasquez note    Chronic systolic HF (heart failure) ([facility] 5/10/2017    2017 EF 25-30%    COPD (chronic obstructive pulmonary disease) ([facility] [facility] 2010    Dr. Edgardo Patterson     Diabetes [facility])     Dr. Iván Salmon; no current medication per pt    Emphysema, unspecified ([facility] [facility]     Dr. Chip Valdes Frequent falls     Gout     Heart failure ([facility] [facility]     Dr. Kathleen Brunson History of Clostridium difficile infection 5/10/2017    2017 CDiff positive    Hypertension 2010    Dr. Christiano Henriquez Hypertensive heart and chronic kidney disease     per PCP note    Hypotension 5/10/2017    Junctional tachycardia ([facility] 5/10/2017    Dr. Angeline Nixon    Neuropathy     NIDDM (non-insulin dependent diabetes mellitus) 2010    PAD (peripheral artery disease) ([facility]     S/P ablation of atrial fibrillation 2018    Screening mammogram 5/4/10    SOB (shortness of breath) 2014    Dr. Joseph Citizen (sick sinus syndrome) ([facility] [facility]     per pacemaker form 18    Weakness     per pt weakness from c-diff , mulitple falls with injury to rotator cuff      Past Surgical History:   Procedure Laterality Date    CARDIAC SURG PROCEDURE UNLIST      3 cardiac stent placed     COLONOSCOPY N/A 2017    COLONOSCOPY with biopsy performed by Oxana Mullins MD [facility] HX AFIB ABLATION  2018    has had 2 ablations per patient    HX  SECTION      HX HEART CATHETERIZATION  07/23/2018    HX OTHER SURGICAL      adrenal gland removed    HX PACEMAKER Left     Biotronik model: [place]    CA EXCISE ADRENAL GLAND      VASCULAR SURGERY PROCEDURE UNLIST  07/20/2018    Left SFA intervention ; Dr. Armen Patel      Prior to Admission medications    Medication Sig Start Date End Date Taking? Authorizing Provider   simvastatin (ZOCOR) 20 mg tablet Take 1 Tab by mouth nightly. Increased 9/1018 9/10/18  Yes Nick Winston NP   apixaban (ELIQUIS) 5 mg tablet Take 1 Tab by mouth two (2) times a day. Resume from tomorrow 7/21/2018. Indications: PREVENT THROMBOEMBOLISM IN CHRONIC ATRIAL FIBRILLATION 7/20/18  Yes Tracy Ndiaye MD   clopidogrel (PLAVIX) 75 mg tab TAKE ONE TABLET BY MOUTH ONCE DAILY 7/8/18  Yes Tracy Ndiaye MD   carvedilol (COREG) 6.25 mg tablet Take 1 Tab by mouth two (2) times daily (with meals). 6/21/18  Yes Brandi LI MD   clemastine 2.68 mg tab Indications: 1 tab in am and 1 tab in pm 6/4/18  Yes Historical Provider   ipratropium (ATROVENT HFA) 17 mcg/actuation inhaler Take 1 Puff by inhalation every six (6) hours as needed for Wheezing. 6/15/18  Yes Sarah Brown NP   colchicine 0.6 mg tablet Take 2 Tabs by mouth daily. Patient takes 1.2 mg daily and 2.4 mg PRN flare up  Patient taking differently: Take 1.2 mg by mouth as needed (9/6/18:  Per pt she only takes when she has a flare up). Patient takes 1.2 mg daily and 2.4 mg PRN flare up  4/3/18  Yes Brianne Lott MD   Blood-Glucose Meter monitoring kit Use as directed.  Dx: 11.9 Whichever meter her insurance covers 3/29/18  Yes Brianne Lott MD   glucose blood VI test strips (BLOOD GLUCOSE TEST) strip Use BID DxE11.9 3/29/18  Yes Brianne Lott MD   Lancets misc Use BID Dx: E11.9 3/29/18  Yes Brianne Lott MD   gabapentin (NEURONTIN) 800 mg tablet TAKE ONE TABLET BY MOUTH THREE TIMES DAILY 3/12/18  Yes Brianne Lott MD budesonide-formoterol (SYMBICORT) 160-4.5 mcg/actuation HFAA Take 1 Puff by inhalation two (2) times a day. 10/10/17  Yes Federica Wilkins MD   tiotropium (SPIRIVA WITH HANDIHALER) 18 mcg inhalation capsule INHALE THE CONTENTS OF 1 CAPSULE THROUGH HANDIHALER DEVICE DAILY 10/10/17  Yes Federica Wilkins MD   furosemide (LASIX) 20 mg tablet Take 1 Tab by mouth daily. 9/27/17  Yes Shiv Estrada NP   acetaminophen (TYLENOL) 500 mg tablet Take 500 mg by mouth every six (6) hours as needed for Pain. Yes Shannan Castro MD   fluticasone (FLONASE) 50 mcg/actuation nasal spray 2 Sprays by Both Nostrils route every evening. Yes Shannan Castro MD   Azelastine (ASTEPRO) 0.15 % (205.5 mcg) nasal spray 1 [place] by Both Nostrils route daily. 1/21/15  Yes Historical Provider   cod liver oil cap Take 1 Cap by mouth daily. Yes Historical Provider   albuterol (PROVENTIL VENTOLIN) 2.5 mg /3 mL (0.083 %) nebulizer solution 3 mL by Nebulization route every four (4) hours as needed for Wheezing. 6/14/18   Federica Wilkins MD     Allergies   Allergen Reactions    Crestor [Rosuvastatin] Myalgia    Levaquin [Levofloxacin] Nausea Only     GI Upset    Lipitor [Atorvastatin] Diarrhea    Lyrica [Pregabalin] Myalgia      Social History   Substance Use Topics    Smoking status: Former Smoker     Types: Cigarettes     Start date: 1967     Quit date: 12/31/2009    Smokeless tobacco: Never Used    Alcohol use No      Family History   Problem Relation Age of Onset    Heart Disease Mother     Diabetes Father     Heart Disease Brother       Review of Systems  Pertinent items are noted in HPI. cleared by pcp and pulmonologist    Objective:     Patient Vitals for the past 8 hrs:   BP Temp Pulse Resp SpO2 Height Weight   09/19/18 1056 109/56 97.9 °F (36.6 °C) 60 17 97 % 5' 6.5\" (1.689 m) 75.8 kg (167 lb 1.7 oz)     Physical Exam:  General:     Alert and oriented. No acute distress. Chest:     Respirations unlabored.     Abdomen: Extremities:   Soft, non-tender. No evidence of cyanosis. Pulses palpable in both upper and lower extremities. Ludmila's sign negative in bilateral lower extremities. Moving all extremities. Pain and weakness with attempted overhead ROM. Weak abduction. +jean-baptiste. +speeds/yergasons. Neurologic:  No motor deficits. Sensation stable. Neurovascular exam within normal limits. Imaging Review  Ct arthrogram shows rtc tear and acromial spur. +prox biceps tendonopathy    Assessment:     Active Problems:    * No active hospital problems. *    Right shoulder pain, impingement, rtc tear, prox biceps tendonopathy    Plan:     The various methods of treatment have been discussed with the patient and family. After consideration of risks, benefits and other options for treatment, the patient has consented to surgical interventions (R shoulder arthroscopy, rcr, acromioplasty, biceps tenotomy vs tenodesis). The risks of surgery were explained. Risks of infection, blood loss, neurovascular injury, anesthesia risks, and risks secondary to patient comorbidities were explained. Questions were answered and Pre-op teaching was done by nursing.

## 2018-09-19 NOTE — ANESTHESIA PROCEDURE NOTES
Peripheral Block Start time: 9/19/2018 11:47 AM 
End time: 9/19/2018 11:58 AM 
Performed by: Pravin Morgan Authorized by: Pravin Morgan Pre-procedure: Indications: post-op pain management Preanesthetic Checklist: patient identified, risks and benefits discussed, site marked, timeout performed and patient being monitored Timeout Time: 11:47 Block Type:  
Block Type:  Supraclavicular Monitoring:  Standard ASA monitoring, continuous pulse ox, frequent vital sign checks, heart rate, oxygen and responsive to questions Injection Technique:  Single shot Procedures: ultrasound guided Patient Position: supine Prep: DuraPrep Location:  Supraclavicular Needle Type:  Stimuplex Needle Gauge:  20 G Needle Localization:  Ultrasound guidance Medication Injected:  0.5% 
ropivacaine Volume (mL):  30 
 
Assessment: 
Number of attempts:  1 Injection Assessment:  Incremental injection every 5 mL, local visualized surrounding nerve on ultrasound, negative aspiration for blood, no intravascular symptoms, negative aspiration for CSF, no paresthesia and ultrasound image on chart Patient tolerance:  Patient tolerated the procedure well with no immediate complications

## 2018-09-19 NOTE — BRIEF OP NOTE
BRIEF OPERATIVE NOTE    Date of Procedure: 9/19/2018   Preoperative Diagnosis: RIGHT SHOULDER ROTATOR CUFF TEAR   ICEPS TENOPATHY  SHOULDER IMPINGEMENT   Postoperative Diagnosis: RIGHT SHOULDER ROTATOR CUFF TEAR     Procedure(s):  RIGHT SHOULDER ARTHROSCOPY ACROMIOPLASTY ROTATOR CUFF REPAIR PROXIMAL BICEPS TENODESIS (CHOICE ANES)  Surgeon(s) and Role:     * Saw Arthur, DO - Primary         Surgical Assistant:     Surgical Staff:  Circ-1: Tyrell Frias RN  Scrub Tech-1: Yan BryantRiverside  Surg Asst-1: Umang Sandhu  Event Time In   Incision Start 1246   Incision Close      Anesthesia: Other   Estimated Blood Loss: minimal  Specimens: * No specimens in log *   Findings: impingment, biceps tendonopathy, rct   Complications: none  Implants:   Implant Name Type Inv.  Item Serial No.  Lot No. LRB No. Used Action   [employer] WHT BLK LOOP --  - SN/A   [employer] WHT BLK LOOP --  N/A [employer] [id number] Right 1 Implanted

## 2018-09-19 NOTE — PROGRESS NOTES
Ortho/ NeuroSurgery NP Note    POD# 0  s/p RIGHT SHOULDER ARTHROSCOPY ACROMIOPLASTY ROTATOR CUFF REPAIR PROXIMAL BICEPS TENODESIS (CHOICE ANES)     Pt resting in bed. No complaints. States she isn't feeling anything. VSS Afebrile. Patient has had something to eat/drink. No nausea. Most Recent Labs:   Lab Results   Component Value Date/Time    HGB 12.3 07/18/2018 08:50 AM    Hemoglobin A1c 5.8 09/24/2017 10:36 AM       Body mass index is 26.57 kg/(m^2). Reference: BMI greater than 30 is classified as obesity and greater than 40 is classified as morbid obesity. STOP BANG Score: 1    Voiding status: Patient needs to void today. Dressing c.d.i    Calves soft and supple; No pain with passive stretch  Sensation and motor intact  SCDs for mechanical DVT proph    Plan:  1) OT BID starting tomorrow  2) Pepcid for GI Prophylaxis   3) SOB- patient states she uses her inhaler for this which was provided to her. Will monitor. She has CHF and is concerned about decompensation. Took her Lasix this AM and will receive tomorrow. 4) Discharge plans to home with her brother and grandson but they are limited with the assistance they can provide.     Sarah Villa NP

## 2018-09-19 NOTE — DISCHARGE INSTRUCTIONS
Shoulder Surgery:  Postoperative Instructions    Dr. Vimal Andrews may resume your regular diet as soon as possible    Medication     *Take the pain medication as prescribed   *Take pain medication with food   *While taking pain medications, you may NOT operate a vehicle, heavy  machinery, or appliances   *While taking pain medication, you may NOT drink alcoholic beverages   *If you have any reactions to your medications, stop taking them and call my office   *Please keep in mind that constipation is a very common side effect of taking narcotic pain medication. Take precautions to prevent constipation. *Drink plenty of water (6-8 glasses of 8 oz a day)    *Avoid alcohol, caffeine, and dairy products    *Eat plenty of fiber(friuts, vegetables, and whole grains)    *Take an over the counter stool softener (Colace or Dulcolax)    * Patients that have had upper extremity surgery should take ferquent  walks    Activity     *Minimize activity the day of surgery   *DO NOT USE HEAT    *Please keep ice applied to the shoulder for the first 72 hours or as long as pain or swelling persists. Do no apply ice directly to skin, or allow water to leak on your dressing. *Place a pillow behind your elbow while lying down or sleeping. Sleeping in a more upright position (recliner) may be more comfortable initially. You should NOT toll onto the operative shoulder. *You are in an immobilizer or sling and it should remain on at all times except when showering until your first postoperative visit. * Open and close your hand 10 times every day for 1 minute. You may also flex and extend your elbow each day when your sling is removed for showering. Be sure to keep your elbow at your side. *DO NOT actively (on your own) lift your operative arm away from the side of your body or rotate it our away from your body. *DO NOT use exercise equipment unless otherwise instructed.     Sling/Immoblizer     *Use the sling/immobilizer at all times and while sleeping until your next office appointment. *If you have difficulty with your shoulder immobilizer sling you may find this web video helpful:  https://[email or web address]    Showering   *You may shower 3 days after surgery unless told otherwise. DO NOT immerse the shoulder under water and DO NOT rub the incision. Place new Band-Aids over the sutures after showering. *You may sponge bathe for the first 72 hours, taking care to keep the dressing clean and dry. Dressing Care     *It is normal to get some bloody wound seepage through the bandage. DO NOT BE ALARMED. *If the dressing gets soaked with wound seepage, place more gauze over the dressing and secure with tape. If the soaks through remove the entire dressing and replace with STERILE gauze and tape. *Remove all dressings at 72 hours after surgery. If here is still some wound seepage, apply a fresh sterile gauze over the incisions and secure with tape. *DO NOT TOUCH OR REMOVE THE SUTURES!! Emergency/Follow-up   *Please notify my office at [phone] if you develop any fever (101 deg or above), unexpected warmth, redness or swelling. Please call if your fingers become cold, purple, numb, or there is excessive bleeding. *Please call the office within 24 hours at [phone] to schedule a follow up appt within 7-10 days from surgery. *Narcotic pain medication refills cannot be called into your pharmacy and the Rx must be picked up at our office. No pain medication refills can be provided after 3pm on Fridays or over the weekend.

## 2018-09-19 NOTE — ANESTHESIA POSTPROCEDURE EVALUATION
Post-Anesthesia Evaluation and Assessment Patient: Mateo Rogers MRN: [id number]  SSN: [id number] YOB: 1950  Age: 80 y.o. Sex: female Cardiovascular Function/Vital Signs Visit Vitals  /65  Pulse 76  Temp 36.3 °C (97.4 °F)  Resp 8  Ht 5' 6.5\" (1.689 m)  Wt 75.8 kg (167 lb 1.7 oz)  SpO2 100%  BMI 26.57 kg/m2 Patient is status post general, regional anesthesia for Procedure(s): RIGHT SHOULDER ARTHROSCOPY ACROMIOPLASTY ROTATOR CUFF REPAIR PROXIMAL BICEPS TENODESIS (CHOICE ANES). Nausea/Vomiting: None Postoperative hydration reviewed and adequate. Pain: 
Pain Scale 1: Numeric (0 - 10) (09/19/18 1445) Pain Intensity 1: 0 (09/19/18 1445) Managed Neurological Status:  
Neuro (WDL): Exceptions to WDL (09/19/18 1426) Neuro Neurologic State: Drowsy; Eyes open spontaneously (09/19/18 1426) Orientation Level: Oriented to person;Oriented to place;Oriented to situation (09/19/18 1426) Cognition: Follows commands (09/19/18 1426) Speech: Clear (09/19/18 1426) LUE Motor Response: Purposeful (09/19/18 1426) LLE Motor Response: Purposeful (09/19/18 1426) RUE Motor Response: Numbness (09/19/18 1426) RLE Motor Response: Purposeful (09/19/18 1426) At baseline Mental Status and Level of Consciousness: Arousable Pulmonary Status:  
O2 Device: Nasal cannula (09/19/18 1426) Adequate oxygenation and airway patent Complications related to anesthesia: None Post-anesthesia assessment completed. No concerns Signed By: Kathleen Bolton MD   
 September 19, 2018

## 2018-09-19 NOTE — PERIOP NOTES
1149 - timeout performed for right shoulder block with Dr Rob Rodríguez with anesthesia and myself.      1205 - block commenced

## 2018-09-19 NOTE — PERIOP NOTES
Per pt, there is noone at home to help care for pt. Call to Dr Bandar Khoury. md will admit to ortho for observation. Pt resting quietly w/out complaint. Vswnl. 1503  Call to pt's granddaughter, Eden Bundy at [phone], to update and let her know pt will be admitted overnight. Verbalized understanding. [address] dc'd w/out diff. Hand pressure held x 6 minutes. Site intact, no hematoma noted. 6069  Message left at [phone] for pt's granddaughter, Eden Bundy with pt's room number.

## 2018-09-19 NOTE — PROGRESS NOTES
Problem: Falls - Risk of  Goal: *Absence of Falls  Document Anthony Fall Risk and appropriate interventions in the flowsheet.    Outcome: Progressing Towards Goal  Fall Risk Interventions:

## 2018-09-19 NOTE — PROGRESS NOTES
TRANSFER - IN REPORT:    Verbal report received from Cayetano Tejeda rn(name) on Caitie Saul  being received from pacu(unit) for routine post - op      Report consisted of patients Situation, Background, Assessment and   Recommendations(SBAR). Information from the following report(s) SBAR, Procedure Summary, Intake/Output, MAR and Recent Results was reviewed with the receiving nurse. Opportunity for questions and clarification was provided. Assessment completed upon patients arrival to unit and care assumed.

## 2018-09-19 NOTE — ANESTHESIA PREPROCEDURE EVALUATION
Anesthetic History No history of anesthetic complications Review of Systems / Medical History Patient summary reviewed, nursing notes reviewed and pertinent labs reviewed Pulmonary COPD: moderate Shortness of breath and smoker Comments: Former smoker Neuro/Psych Within defined limits Cardiovascular Hypertension: well controlled Valvular problems/murmurs: pulmonic insufficiency, mitral insufficiency and aortic insufficiency CHF: dyspnea on exertion Dysrhythmias : atrial fibrillation Pacemaker, CAD, PAD, cardiac stents and hyperlipidemia Exercise tolerance: <4 METS Comments: Sick sinus syndrome Coronary stents LBBB 1st degree AVB 
EF 55-60% with mild MR and NC and moderate AR  (4/2018) but EF listed as 25% by Cardiologist this month 
  
GI/Hepatic/Renal 
  
 
 
Renal disease: CRI Endo/Other Diabetes: well controlled, type 2 Arthritis Other Findings Physical Exam 
 
Airway Mallampati: III 
TM Distance: > 6 cm Neck ROM: normal range of motion Mouth opening: Normal 
 
 Cardiovascular Rhythm: regular Rate: normal 
 
Murmur Comments: Paced  Dental 
 
Dentition: Poor dentition, Full upper dentures and Caps/crowns Comments: Prominent incisors Pulmonary Breath sounds clear to auscultation Abdominal 
GI exam deferred Other Findings Comments: Missing multiple teeth Anesthetic Plan ASA: 3 Anesthesia type: general and regional - supraclavicular block Monitoring Plan: BIS Induction: Intravenous Anesthetic plan and risks discussed with: Patient Shaina Yeung

## 2018-09-19 NOTE — PERIOP NOTES
TRANSFER - OUT REPORT:    Verbal report given to Amarjit Reina RN on Jessica Alvarez  being transferred to [address] [place] [address] for routine post - op       Report consisted of patients Situation, Background, Assessment and   Recommendations(SBAR). Information from the following report(s) OR Summary, Procedure Summary, Intake/Output and MAR was reviewed with the receiving nurse. Opportunity for questions and clarification was provided.       Patient transported with:   O2 @ 2 liters  Tech

## 2018-09-19 NOTE — PERIOP NOTES
Handoff Report from Operating Room to PACU    Report received from Pedro Barba RN and CB Tran CRNA regarding Caitie Saul. Surgeon(s):  Karie Ball DO  And Procedure(s) (LRB):  RIGHT SHOULDER ARTHROSCOPY ACROMIOPLASTY ROTATOR CUFF REPAIR PROXIMAL BICEPS TENODESIS (CHOICE ANES) (Right)  confirmed   with dressings discussed. Anesthesia type, drugs, patient history, complications, estimated blood loss, vital signs, intake and output, and last pain medication, lines and temperature were reviewed.

## 2018-09-19 NOTE — PERIOP NOTES
Enrique Abernathy here to changing pacemaker to program it, so it uses a magnet if wanted while in surgery so it will be asyncronous at about 90 beats per minute  once magnet is off it goes back to normal settings are low rate of 75 and high rate of 130.

## 2018-09-19 NOTE — OP NOTES
[address] 
OPERATIVE REPORT Eulalia Mckinnon 
MR#: [id number] : 1950 ACCOUNT #: [de-identified] DATE OF SERVICE: 2018 PREOPERATIVE DIAGNOSES: 
1. Right shoulder rotator cuff tear. 2.  Right shoulder biceps tendinopathy. 3.  Right shoulder impingement and acromial spur. POSTOPERATIVE DIAGNOSES: 
1. Right shoulder rotator cuff tear. 2.  Right shoulder biceps tendinopathy. 3.  Right shoulder impingement and acromial spur. PROCEDURES PERFORMED: 
1. Right shoulder arthroscopy with acromioplasty. 2.  Right shoulder arthroscopic rotator cuff repair. 3.  Right shoulder arthroscopic biceps tenotomy. SURGEON:  Marisela Ruiz DO 
 
ASSISTANT:  [id number] staff. ANESTHESIA:  General with a nerve block. ESTIMATED BLOOD LOSS:  Minimal. 
 
COMPLICATIONS:  None. SPECIMENS REMOVED:  None. DISPOSITION:  Stable to PACU. IMPLANTS:  Arthrex SwiveLock device. INDICATIONS FOR THE PROCEDURE:  The patient is a 56-year-old female who has been following in the office with right shoulder pain. She was found to have rotator cuff tear, proximal biceps tendon tear, tendinopathy and impingement based on workup in clinic, MRI and CT arthrogram.  We have discussed conservative versus surgical treatment options. She is not a very healthy individual and we have had concerns for surgical treatment. Therefore, we discussed risks of infection, blood loss, neurovascular injury, anesthesia risks and risks secondary to the patient's comorbidities. We also discussed the risk of continued shoulder pain despite this procedure. We discussed risk. We also made sure to obtain clearances from her pulmonologist and primary care physician.   As we began obtaining clearances and the patient was medically optimized for surgical procedure, she was seen in the preoperative holding area and prior to her surgery history and physical forms and consent forms were confirmed in the chart.  Her clearance letters were confirmed. She was seen by anesthesia and a nerve block was performed. We marked her operative extremity. OPERATION:  The patient was taken to the OR and transferred to the operating room table. She was placed in the supine position and all prominences were carefully padded. She was given sedation and intubated by anesthesia and then we were able to turn her up to the lateral decubitus position with the right shoulder facing upward. We again ensured all prominences were carefully padded and we did place an axillary roll to protect the nerve. Once she was carefully positioned and a beanbag was used to help hold this position, sterile prepping and draping was performed. After sterile prepping and draping, a timeout was called. The patient was identified by name, date of birth, operative site and operative procedure. After all were in agreement that the right shoulder was the operative extremity, an incision was made for the posterior shoulder arthroscopy portal after we placed her right arm in lateral traction device. Diagnostic arthroscopy at the glenohumeral joint showed evidence of early grade II chondral changes at the humeral head. We made an anterior portal through outside-in technique and performed a diagnostic arthroscopy. We did identify her proximal biceps tendon tearing and tendinopathy. Arthroscopic punch device was used to perform a proximal biceps tenotomy. We identified her rotator cuff tear. This seemed to be a near full thickness tear of the supraspinatus tendon. The remaining rotator cuff tendons were intact. Based on her CT arthrogram, this correlated with those findings. We used a tag stitch to help was identify the area of her rotator cuff tear once in the subacromial space. We localized this spot with a spinal needle and placed a PDS suture to tag this area.   We did perform a light debridement of the undersurface rotator cuff and of labral fraying. We then moved to the subacromial space where we made a lateral portal through an outside-in technique. We performed an acromioplasty and subacromial decompression using a cutting block technique while visualizing from the lateral portal and using a bur from the posterior portal.  Once the acromioplasty was performed, we decided not to perform a distal clavicle resection as she does have signs of an os acromiale on her CT arthrogram.  The os acromiale did seem relatively stable. We created an adequate space by performing this acromioplasty and we were able to visualize the area of her undersurface rotator cuff tear. There was thinning noted, but no obvious full thickness component to this supraspinatus rotator cuff tear. However, based on her CT arthrogram findings, we did use a probe to assess the thinnest area of the rotator cuff tear and we decided to perform a repair of this portion of the rotator cuff. The arthroscopic cautery and arthroscopic bur were used to prepare the rotator cuff footprint. Arthroscopic Scorpion device was then passed as we incorporated a FiberTape suture into the torn portion of the rotator cuff. We did have good thickness of her rotator cuff tissue. Once the FiberTape suture was passed through the rotator cuff, we were able to tap and place a SwiveLock Arthrex anchor device with the sutures incorporated into it. This gave us a nice repair of her rotator cuff at the supraspinatus insertion point. Final pictures were taken and arthroscopic fluid was drained from the shoulder. Closure with 3-0 nylon sutures was performed. Sterile dressings were applied. The patient will be awoken by anesthesia and transferred to PACU in stable condition. She will be monitored in the recovery unit and we will make a decision on discharging home versus keeping her here in the hospital based on her overall respiratory status.  
 
 
Maryse Serra, DO 
  
 
DCD / LN 
 D: 09/19/2018 13:58    
T: 09/19/2018 16:49 
JOB #: [id number]

## 2018-09-19 NOTE — IP AVS SNAPSHOT
[address] [facility] 
[phone] Patient: Petra Hoyt MRN: [id number] SZI:[id number] About your hospitalization You were admitted on:  September 19, 2018 You last received care in the:  [facility] 3 ORTHOPEDICS You were discharged on:  September 21, 2018 Why you were hospitalized Your primary diagnosis was:  Not on File Your diagnoses also included:  S/P Rotator Cuff Repair Follow-up Information Follow up With Details Comments Contact Info Cosme Lazaro MD   [address] [facility] [address] [address] 
[phone] [address] [place] [address] On 9/22/2018 PT/OT as ordered by orthopedic team [address] 
1st Floor [place] [address] 
[phone] [facility] On 9/21/2018 straight cane [facility] [address] 
[phone] Mateo Iraheta,  In 2 weeks  [facility] [address] [address] 
[phone] Your Scheduled Appointments Saturday September 22, 2018 To Be Determined PT ADMISSION with Amirah Sykes, PT  
[facility] [address] [facility] ([address] [facility]) [address] [facility]) Tuesday September 25, 2018 To Be Determined OT EVALUATION with Yaima Cooney, OT  
[facility] [address] [facility] ([address] [facility] [address]) [address] [facility] [facility]) Wednesday October 10, 2018  1:00 PM EDT  
VASCULAR TEST with VASCULAR, [facility]) [address] [facility]  
[phone] Friday November 02, 2018 10:30 AM EDT ROUTINE CARE with Cosme Lazaro MD  
[address] [facility] [address] [address]  
[phone] Discharge Orders None A check thaddeus indicates which time of day the medication should be taken. My Medications START taking these medications Instructions Each Dose to Equal  
 Morning Noon Evening Bedtime  
 oxyCODONE-acetaminophen 5-325 mg per tablet Commonly known as:  PERCOCET Your last dose was: Your next dose is: Take 1 Tab by mouth every four (4) hours as needed for Pain. Max Daily Amount: 6 Tabs. 1 Tab  
    
   
   
   
  
 polyethylene glycol 17 gram packet Commonly known as:  Leata Sans Your last dose was: Your next dose is: Take 1 Packet by mouth daily as needed (constipation) for up to 15 days. 17 g  
    
   
   
   
  
 senna-docusate 8.6-50 mg per tablet Commonly known as:  Bhargavi Heatha Your last dose was: Your next dose is: Take 1 Tab by mouth daily. 1 Tab CHANGE how you take these medications Instructions Each Dose to Equal  
 Morning Noon Evening Bedtime  
 colchicine 0.6 mg tablet What changed:   
- when to take this 
- reasons to take this 
- additional instructions Your last dose was: Your next dose is: Take 2 Tabs by mouth daily. Patient takes 1.2 mg daily and 2.4 mg PRN flare up 1.2 mg CONTINUE taking these medications Instructions Each Dose to Equal  
 Morning Noon Evening Bedtime * albuterol 90 mcg/actuation inhaler Commonly known as:  PROVENTIL HFA, VENTOLIN HFA, PROAIR HFA Your last dose was: Your next dose is: Take 1 Puff by inhalation every four (4) hours as needed for Wheezing or Shortness of Breath. 1 Puff * albuterol 2.5 mg /3 mL (0.083 %) nebulizer solution Commonly known as:  PROVENTIL VENTOLIN Your last dose was: Your next dose is: 3 mL by Nebulization route every four (4) hours as needed for Wheezing. 2.5 mg  
    
   
   
   
  
 apixaban 5 mg tablet Commonly known as:  Noemi Minus Your last dose was: Your next dose is: Take 1 Tab by mouth two (2) times a day. Resume from tomorrow 7/21/2018. Indications: PREVENT THROMBOEMBOLISM IN CHRONIC ATRIAL FIBRILLATION  
 5 mg Azelastine 0.15 % (205.5 mcg) nasal spray Commonly known as:  ASTEPRO Your last dose was: Your next dose is:    
   
   
 1 Spray by Both Nostrils route daily. 1 Spray  
    
   
   
   
  
 budesonide-formoterol 160-4.5 mcg/actuation Hfaa Commonly known as:  SYMBICORT Your last dose was: Your next dose is: Take 1 Puff by inhalation two (2) times a day. 1 Puff  
    
   
   
   
  
 carvedilol 6.25 mg tablet Commonly known as:  Sallie Buster Your last dose was: Your next dose is: Take 1 Tab by mouth two (2) times daily (with meals). 6.25 mg  
    
   
   
   
  
 clemastine 2.68 mg Tab tablet Your last dose was: Your next dose is:    
   
   
 Indications: 1 tab in am and 1 tab in pm  
     
   
   
   
  
 clopidogrel 75 mg Tab Commonly known as:  PLAVIX Your last dose was: Your next dose is: TAKE ONE TABLET BY MOUTH ONCE DAILY  
     
   
   
   
  
 cod liver oil Cap Your last dose was: Your next dose is: Take 1 Cap by mouth daily. 1 Cap FLONASE 50 mcg/actuation nasal spray Generic drug:  fluticasone Your last dose was: Your next dose is: 2 Sprays by Both Nostrils route every evening. 2 Spray  
    
   
   
   
  
 furosemide 20 mg tablet Commonly known as:  LASIX Your last dose was: Your next dose is: Take 1 Tab by mouth daily. 20 mg  
    
   
   
   
  
 gabapentin 800 mg tablet Commonly known as:  NEURONTIN Your last dose was: Your next dose is: TAKE ONE TABLET BY MOUTH THREE TIMES DAILY  
     
   
   
   
  
 simvastatin 20 mg tablet Commonly known as:  ZOCOR Your last dose was: Your next dose is: Take 1 Tab by mouth nightly. Increased 9/1018  
 20 mg  
    
   
   
   
  
 tiotropium 18 mcg inhalation capsule Commonly known as:  [address] Your last dose was: Your next dose is:    
   
   
 INHALE THE CONTENTS OF 1 CAPSULE THROUGH HANDIHALER DEVICE DAILY * Notice: This list has 2 medication(s) that are the same as other medications prescribed for you. Read the directions carefully, and ask your doctor or other care provider to review them with you. STOP taking these medications   
 acetaminophen 500 mg tablet Commonly known as:  TYLENOL Where to Get Your Medications Information on where to get these meds will be given to you by the nurse or doctor. ! Ask your nurse or doctor about these medications  
  oxyCODONE-acetaminophen 5-325 mg per tablet  
 polyethylene glycol 17 gram packet  
 senna-docusate 8.6-50 mg per tablet Opioid Education Prescription Opioids: What You Need to Know: 
 
Prescription opioids can be used to help relieve moderate-to-severe pain and are often prescribed following a surgery or injury, or for certain health conditions. These medications can be an important part of treatment but also come with serious risks. Opioids are strong pain medicines. Examples include hydrocodone, oxycodone, fentanyl, and morphine. Heroin is an example of an illegal opioid. It is important to work with your health care provider to make sure you are getting the safest, most effective care. WHAT ARE THE RISKS AND SIDE EFFECTS OF OPIOID USE?  
Prescription opioids carry serious risks of addiction and overdose, especially with prolonged use. An opioid overdose, often marked by slow breathing, can cause sudden death. The use of prescription opioids can have a number of side effects as well, even when taken as directed. · Tolerance-meaning you might need to take more of a medication for the same pain relief · Physical dependence-meaning you have symptoms of withdrawal when the medication is stopped. Withdrawal symptoms can include nausea, sweating, chills, diarrhea, stomach cramps, and muscle aches. Withdrawal can last up to several weeks, depending on which drug you took and how long you took it. · Increased sensitivity to pain · Constipation · Nausea, vomiting, and dry mouth · Sleepiness and dizziness · Confusion · Depression · Low levels of testosterone that can result in lower sex drive, energy, and strength · Itching and sweating RISKS ARE GREATER WITH:      
· History of drug misuse, substance use disorder, or overdose · Mental health conditions (such as depression or anxiety) · Sleep apnea · Older age (72 years or older) · Pregnancy Avoid alcohol while taking prescription opioids. Also, unless specifically advised by your health care provider, medications to avoid include: · Benzodiazepines (such as Xanax or Valium) · Muscle relaxants (such as Soma or Flexeril) · Hypnotics (such as Ambien or Lunesta) · Other prescription opioids KNOW YOUR OPTIONS Talk to your health care provider about ways to manage your pain that don't involve prescription opioids. Some of these options may actually work better and have fewer risks and side effects. Consult your physician before adding or stopping any medications, treatments, or physical activity. Options may include: 
· Pain relievers such as acetaminophen, ibuprofen, and naproxen · Some medications that are also used for depression or seizures · Physical therapy and exercise · Counseling to help patients learn how to cope better with triggers of pain and stress. · Application of heat or cold compress · Massage therapy · Relaxation techniques Be Informed Make sure you know the name of your medication, how much and how often to take it, and its potential risks & side effects. IF YOU ARE PRESCRIBED OPIOIDS FOR PAIN: 
· Never take opioids in greater amounts or more often than prescribed. Remember the goal is not to be pain-free but to manage your pain at a tolerable level. · Follow up with your primary care provider to: · Work together to create a plan on how to manage your pain. · Talk about ways to help manage your pain that don't involve prescription opioids. · Talk about any and all concerns and side effects. · Help prevent misuse and abuse. · Never sell or share prescription opioids · Help prevent misuse and abuse. · Store prescription opioids in a secure place and out of reach of others (this may include visitors, children, friends, and family). · Safely dispose of unused/unwanted prescription opioids: Find your community drug take-back program or your pharmacy mail-back program, or flush them down the toilet, following guidance from the Food and Drug Administration (www.fda.gov/Drugs/ResourcesForYou). · Visit www.cdc.gov/drugoverdose to learn about the risks of opioid abuse and overdose. · If you believe you may be struggling with addiction, tell your health care provider and ask for guidance or call [address] [place] [address] at [phone]. Discharge Instructions Shoulder Surgery:  Postoperative Instructions Dr. Los Pearce Diet *You may resume your regular diet as soon as possible Medication *Take the pain medication as prescribed *Take pain medication with food *While taking pain medications, you may NOT operate a vehicle, heavy  machinery, or appliances *While taking pain medication, you may NOT drink alcoholic beverages *If you have any reactions to your medications, stop taking them and call my office *Please keep in mind that constipation is a very common side effect of taking narcotic pain medication. Take precautions to prevent constipation. *Drink plenty of water (6-8 glasses of 8 oz a day) *Avoid alcohol, caffeine, and dairy products *Eat plenty of fiber(friuts, vegetables, and whole grains) *Take an over the counter stool softener (Colace or Dulcolax) * Patients that have had upper extremity surgery should take ferquent  walks Activity *Minimize activity the day of surgery *DO NOT USE HEAT  
 *Please keep ice applied to the shoulder for the first 72 hours or as long as pain or swelling persists. Do no apply ice directly to skin, or allow water to leak on your dressing. *Place a pillow behind your elbow while lying down or sleeping. Sleeping in a more upright position (recliner) may be more comfortable initially. You should NOT toll onto the operative shoulder. *You are in an immobilizer or sling and it should remain on at all times except when showering until your first postoperative visit. * Open and close your hand 10 times every day for 1 minute. You may also flex and extend your elbow each day when your sling is removed for showering. Be sure to keep your elbow at your side. *DO NOT actively (on your own) lift your operative arm away from the side of your body or rotate it our away from your body. *DO NOT use exercise equipment unless otherwise instructed. Sling/Immoblizer *Use the sling/immobilizer at all times and while sleeping until your next office appointment. *If you have difficulty with your shoulder immobilizer sling you may find this web video helpful:  https://taiwo/ Showering *You may shower 3 days after surgery unless told otherwise.   DO NOT immerse the shoulder under water and DO NOT rub the incision. Place new Band-Aids over the sutures after showering. *You may sponge bathe for the first 72 hours, taking care to keep the dressing clean and dry. Dressing Care *It is normal to get some bloody wound seepage through the bandage. DO NOT BE ALARMED. *If the dressing gets soaked with wound seepage, place more gauze over the dressing and secure with tape. If the soaks through remove the entire dressing and replace with STERILE gauze and tape. *Remove all dressings at 72 hours after surgery. If here is still some wound seepage, apply a fresh sterile gauze over the incisions and secure with tape. *DO NOT TOUCH OR REMOVE THE SUTURES!! Emergency/Follow-up *Please notify my office at [phone] if you develop any fever (101 deg or above), unexpected warmth, redness or swelling. Please call if your fingers become cold, purple, numb, or there is excessive bleeding. *Please call the office within 24 hours at [phone] to schedule a follow up appt within 7-10 days from surgery. *Narcotic pain medication refills cannot be called into your pharmacy and the Rx must be picked up at our office. No pain medication refills can be provided after 3pm on Fridays or over the weekend. SDNsquare Announcement We are excited to announce that we are making your provider's discharge notes available to you in SDNsquare. You will see these notes when they are completed and signed by the physician that discharged you from your recent hospital stay. If you have any questions or concerns about any information you see in SDNsquare, please call the Health Information Department where you were seen or reach out to your Primary Care Provider for more information about your plan of care. Introducing [facility]! Dear Brett Kat: Thank you for requesting a SDNsquare account.   Our records indicate that you already have an active Sitestar account. You can access your account anytime at [email or web address] Did you know that you can access your hospital and ER discharge instructions at any time in Sitestar? You can also review all of your test results from your hospital stay or ER visit. Additional Information If you have questions, please visit the Frequently Asked Questions section of the Sitestar website at [email or web address] Remember, Sitestar is NOT to be used for urgent needs. For medical emergencies, dial 911. Now available from your iPhone and Android! Introducing Epi Mendoza As a [employer] patient, I wanted to make you aware of our electronic visit tool called Epi Mendoza. [employer] 24/7 allows you to connect within minutes with a medical provider 24 hours a day, seven days a week via a mobile device or tablet or logging into a secure website from your computer. You can access Epi Mendoza from anywhere in the [place]. A virtual visit might be right for you when you have a simple condition and feel like you just dont want to get out of bed, or cant get away from work for an appointment, when your regular [employer] provider is not available (evenings, weekends or holidays), or when youre out of town and need minor care. Electronic visits cost only $49 and if the [employer] 24/7 provider determines a prescription is needed to treat your condition, one can be electronically transmitted to a nearby pharmacy*. Please take a moment to enroll today if you have not already done so. The enrollment process is free and takes just a few minutes. To enroll, please download the [employer] 24/7 jaylene to your tablet or phone, or visit [email or web address] to enroll on your computer.    
And, as an [address] [place] [address] patient with a [employer] account, the results of your visits will be scanned into your electronic medical record and your primary care provider will be able to view the scanned results. We urge you to continue to see your regular Andrea Olson provider for your ongoing medical care. And while your primary care provider may not be the one available when you seek a Epi Cochranfin virtual visit, the peace of mind you get from getting a real diagnosis real time can be priceless. For more information on Nexterralaurenfin, view our Frequently Asked Questions (FAQs) at [email or web address]. Sincerely, 
 
Maria Alejandra Pena MD 
Chief Medical Officer [address] *:  certain medications cannot be prescribed via Campaigner[employer] Providers Seen During Your Hospitalization Provider Specialty Primary office phone [facility], [address] [facility] [phone] Immunizations Administered for This Admission Name Date Influenza Vaccine (Quad) PF 9/21/2018 Your Primary Care Physician (PCP) Primary Care Physician Office Phone Office Fax Sammy [facility] [phone] You are allergic to the following Allergen Reactions Crestor (Rosuvastatin) Myalgia Levaquin (Levofloxacin) Nausea Only GI Upset Lipitor (Atorvastatin) Diarrhea Lyrica (Pregabalin) Myalgia Recent Documentation Height Weight Breastfeeding? BMI OB Status Smoking Status 1.689 m 75.8 kg No 26.57 kg/m2 Postmenopausal Former Smoker Emergency Contacts Name Discharge Info Relation Home Work Mobile Huong Glass DISCHARGE CAREGIVER [3] Child [2] [phone] [facility] CAREGIVER [3] Other Relative [6] [phone] Huong Knox DISCHARGE CAREGIVER [3] Child [2] [phone] Patient Belongings The following personal items are in your possession at time of discharge: Dental Appliances: At home, Uppers  Visual Aid: Glasses, With patient      Home Medications: None   Jewelry: Earrings, Ring  Clothing: Other (comment) (street clothes and shoes)    Other Valuables: Cell Phone, Eyeglasses, Wallet, Other (comment) (money, wallet, ID cards, insurance cards, credit cards, debit cards)  Personal Items Sent to Safe: money, wallet, ID cards, insurance cards, credit cards, debit cards, ring, earrings Please provide this summary of care documentation to your next provider. Signatures-by signing, you are acknowledging that this After Visit Summary has been reviewed with you and you have received a copy. Patient Signature:  ____________________________________________________________ Date:  ____________________________________________________________  
  
Kenyetta Mora Provider Signature:  ____________________________________________________________ Date:  ____________________________________________________________

## 2018-09-20 LAB — HGB BLD-MCNC: 11 G/DL (ref 11.5–16)

## 2018-09-20 PROCEDURE — 74011250637 HC RX REV CODE- 250/637: Performed by: NURSE PRACTITIONER

## 2018-09-20 PROCEDURE — 97535 SELF CARE MNGMENT TRAINING: CPT | Performed by: OCCUPATIONAL THERAPIST

## 2018-09-20 PROCEDURE — 99218 HC RM OBSERVATION: CPT

## 2018-09-20 PROCEDURE — G8978 MOBILITY CURRENT STATUS: HCPCS

## 2018-09-20 PROCEDURE — 74011000250 HC RX REV CODE- 250: Performed by: NURSE PRACTITIONER

## 2018-09-20 PROCEDURE — 74011250637 HC RX REV CODE- 250/637: Performed by: ORTHOPAEDIC SURGERY

## 2018-09-20 PROCEDURE — G8987 SELF CARE CURRENT STATUS: HCPCS | Performed by: OCCUPATIONAL THERAPIST

## 2018-09-20 PROCEDURE — 36415 COLL VENOUS BLD VENIPUNCTURE: CPT | Performed by: NURSE PRACTITIONER

## 2018-09-20 PROCEDURE — G8988 SELF CARE GOAL STATUS: HCPCS | Performed by: OCCUPATIONAL THERAPIST

## 2018-09-20 PROCEDURE — 97162 PT EVAL MOD COMPLEX 30 MIN: CPT

## 2018-09-20 PROCEDURE — 97116 GAIT TRAINING THERAPY: CPT

## 2018-09-20 PROCEDURE — 85018 HEMOGLOBIN: CPT | Performed by: NURSE PRACTITIONER

## 2018-09-20 PROCEDURE — 97165 OT EVAL LOW COMPLEX 30 MIN: CPT | Performed by: OCCUPATIONAL THERAPIST

## 2018-09-20 PROCEDURE — G8979 MOBILITY GOAL STATUS: HCPCS

## 2018-09-20 PROCEDURE — 97530 THERAPEUTIC ACTIVITIES: CPT

## 2018-09-20 PROCEDURE — 97530 THERAPEUTIC ACTIVITIES: CPT | Performed by: OCCUPATIONAL THERAPIST

## 2018-09-20 RX ADMIN — ACETAMINOPHEN 650 MG: 325 TABLET ORAL at 06:08

## 2018-09-20 RX ADMIN — OXYCODONE HYDROCHLORIDE 10 MG: 5 TABLET ORAL at 20:50

## 2018-09-20 RX ADMIN — OXYCODONE HYDROCHLORIDE 10 MG: 5 TABLET ORAL at 16:55

## 2018-09-20 RX ADMIN — PRAVASTATIN SODIUM 40 MG: 40 TABLET ORAL at 20:51

## 2018-09-20 RX ADMIN — ACETAMINOPHEN 650 MG: 325 TABLET ORAL at 12:03

## 2018-09-20 RX ADMIN — OXYCODONE HYDROCHLORIDE 5 MG: 5 TABLET ORAL at 09:58

## 2018-09-20 RX ADMIN — APIXABAN 5 MG: 5 TABLET, FILM COATED ORAL at 16:56

## 2018-09-20 RX ADMIN — ACETAMINOPHEN 650 MG: 325 TABLET ORAL at 00:18

## 2018-09-20 RX ADMIN — ACETAMINOPHEN 650 MG: 325 TABLET ORAL at 16:56

## 2018-09-20 RX ADMIN — FAMOTIDINE 20 MG: 20 TABLET ORAL at 16:56

## 2018-09-20 RX ADMIN — GABAPENTIN 800 MG: 300 CAPSULE ORAL at 09:58

## 2018-09-20 RX ADMIN — OXYCODONE HYDROCHLORIDE 5 MG: 5 TABLET ORAL at 07:00

## 2018-09-20 RX ADMIN — CARVEDILOL 6.25 MG: 6.25 TABLET, FILM COATED ORAL at 09:58

## 2018-09-20 RX ADMIN — CARVEDILOL 6.25 MG: 6.25 TABLET, FILM COATED ORAL at 16:56

## 2018-09-20 RX ADMIN — GABAPENTIN 800 MG: 300 CAPSULE ORAL at 20:50

## 2018-09-20 RX ADMIN — APIXABAN 5 MG: 5 TABLET, FILM COATED ORAL at 09:58

## 2018-09-20 RX ADMIN — POLYETHYLENE GLYCOL 3350 17 G: 17 POWDER, FOR SOLUTION ORAL at 09:59

## 2018-09-20 RX ADMIN — GABAPENTIN 800 MG: 300 CAPSULE ORAL at 16:57

## 2018-09-20 RX ADMIN — FUROSEMIDE 20 MG: 20 TABLET ORAL at 09:58

## 2018-09-20 RX ADMIN — OXYCODONE HYDROCHLORIDE 5 MG: 5 TABLET ORAL at 02:46

## 2018-09-20 RX ADMIN — OXYCODONE HYDROCHLORIDE 10 MG: 5 TABLET ORAL at 13:11

## 2018-09-20 RX ADMIN — SENNOSIDES AND DOCUSATE SODIUM 1 TABLET: 8.6; 5 TABLET ORAL at 09:58

## 2018-09-20 RX ADMIN — CLOPIDOGREL BISULFATE 75 MG: 75 TABLET ORAL at 09:57

## 2018-09-20 RX ADMIN — Medication 10 ML: at 20:52

## 2018-09-20 RX ADMIN — Medication 10 ML: at 13:11

## 2018-09-20 RX ADMIN — SENNOSIDES AND DOCUSATE SODIUM 1 TABLET: 8.6; 5 TABLET ORAL at 16:57

## 2018-09-20 NOTE — PROGRESS NOTES
Bedside shift change report given to Soha RN (oncoming nurse) by Carlota RN (offgoing nurse). Report included the following information SBAR, OR Summary, Intake/Output, MAR and Recent Results.

## 2018-09-20 NOTE — PROGRESS NOTES
Problem: Falls - Risk of  Goal: *Absence of Falls  Document Anthony Fall Risk and appropriate interventions in the flowsheet.    Outcome: Progressing Towards Goal  Fall Risk Interventions:            Medication Interventions: Assess postural VS orthostatic hypotension, Patient to call before getting OOB, Teach patient to arise slowly         History of Falls Interventions: Door open when patient unattended, Room close to nurse's station

## 2018-09-20 NOTE — PROGRESS NOTES
Occupational Therapy Goals  Initiated 9/20/2018  1. Patient will perform grooming standing at sink with independence within 7 day(s). 2.  Patient will perform upper body dressing with modified independence within 7 day(s). 3.  Patient will perform lower body dressing with modified independence within 7 day(s). 4.  Patient will perform toilet transfers with modified independence within 7 day(s). 5.  Patient will perform all aspects of toileting with modified independence within 7 day(s). 6.  Patient will perform ADL sling donning/doffing with independence within 7 day(s). 7.  Patient will perform RUE pendulum exercises with independence within 7 days. Occupational Therapy EVALUATION  Patient: Rand Escalante (57 y.o. female)  Date: 9/20/2018  Primary Diagnosis: RIGHT SHOULDER ROTATOR CUFF TEAR   ICEPS TENOPATHY  SHOULDER IMPINGEMENT   S/P rotator cuff repair  Procedure(s) (LRB):  RIGHT SHOULDER ARTHROSCOPY ACROMIOPLASTY ROTATOR CUFF REPAIR PROXIMAL BICEPS TENODESIS (CHOICE ANES) (Right) 1 Day Post-Op   Precautions:  NWB (RUE)    ASSESSMENT :  Based on the objective data described below, the patient presents s/p R shoulder arthroplasty with rotator cuff repair, now with RUE NWB precautions, RUE pendulums only permitted, and decreased standing balance which is impacting her functional independence. Upon completion of training and education provided during this intervention the patient was able to perform ADLs and functional mobility at an overall supervision/setup level. Patient also performing UE sling management and pendulum exercises for RUE at a supervision level. Patient will benefit from acute skilled intervention to address the above impairments, but no OT needs indicated for after discharge. Patient will have her granddaughter who is a CNA stay with her after discharge. She would also benefit from an acute PT evaluation prior to discharge home.    Patients rehabilitation potential is considered to be Excellent  Factors which may influence rehabilitation potential include:   [x]             None noted  []             Mental ability/status  []             Medical condition  []             Home/family situation and support systems  []             Safety awareness  []             Pain tolerance/management  []             Other:      PLAN :  Recommendations and Planned Interventions:  [x]               Self Care Training                  []        Therapeutic Activities  [x]               Functional Mobility Training    []        Cognitive Retraining  []               Therapeutic Exercises           [x]        Endurance Activities  [x]               Balance Training                   []        Neuromuscular Re-Education  []               Visual/Perceptual Training     [x]   Home Safety Training  [x]               Patient Education                 [x]        Family Training/Education  []               Other (comment):    Frequency/Duration: Patient will be followed by occupational therapy 5 times a week to address goals.   Discharge Recommendations: None         OBJECTIVE DATA SUMMARY:     Past Medical History:   Diagnosis Date    Arthritis     left shoulder    Atrial fibrillation ([facility] 6/2/2010    Dr. Kenny Hubbard CAD (coronary artery disease)     stent; Dr. Kenny Hubbard Celiac disease     Chronic diastolic heart failure ([facility] 09/22/2014    Dr. Valdo Tate    Chronic kidney disease     per cardio note    Chronic pain     left thigh:  L SFA intervention by Dr. Remberto Churchill 07/2018, as of 9/6/18:  still causing pain, pt to have MILLA checked per Dr. Sr Pro note    Chronic systolic HF (heart failure) ([facility] 5/10/2017    4/2017 EF 25-30%    COPD (chronic obstructive pulmonary disease) ([facility] 6/2/2010    Dr. Rebecca Sosa     Diabetes [facility])     Dr. Pepper Knife; no current medication per pt    Emphysema, unspecified ([facility]     Dr. Lobito Pickering Frequent falls 2017    Gout     Heart failure ([facility]     Dr. Valdo Tate  History of Clostridium difficile infection 5/10/2017    2017 CDiff positive    Hypertension 2010    Dr. Olamide Gill Hypertensive heart and chronic kidney disease     per PCP note    Hypotension 5/10/2017    Junctional tachycardia ([facility] 5/10/2017    Dr. Israel Cooley    Neuropathy     NIDDM (non-insulin dependent diabetes mellitus) 2010    PAD (peripheral artery disease) ([facility]     S/P ablation of atrial fibrillation 2018    Screening mammogram 5/4/10    SOB (shortness of breath) 2014    Dr. Regan Rey (sick sinus syndrome) ([facility]     per pacemaker form 18    Weakness     per pt weakness from c-diff , mulitple falls with injury to rotator cuff     Past Surgical History:   Procedure Laterality Date    CARDIAC SURG PROCEDURE UNLIST      3 cardiac stent placed     COLONOSCOPY N/A 2017    COLONOSCOPY with biopsy performed by Zo Kate MD at [facility] AMBULATORY OR    HX AFIB ABLATION  2018    has had 2 ablations per patient    HX  SECTION      HX HEART CATHETERIZATION  2018    HX OTHER SURGICAL      adrenal gland removed    HX PACEMAKER Left     Biotronik model: Evia    VA EXCISE ADRENAL GLAND      VASCULAR SURGERY PROCEDURE UNLIST  2018    Left SFA intervention ; Dr. 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  Expanded or extensive additional review of patient history:     Home Situation  Home Environment: Apartment  # Steps to Enter: 2  Rails to Enter: No  Wheelchair Ramp: No  One/Two Story Residence: One story  Living Alone: No  Support Systems: Family member(s)  Patient Expects to be Discharged to[de-identified] Apartment  Current DME Used/Available at Home: Nebulizer, Glucometer, Walker  Tub or Shower Type: Tub/Shower combination  [x]  Right hand dominant   []  Left hand dominant  Cognitive/Behavioral Status:  Neurologic State: Alert  Orientation Level: Oriented X4  Cognition: Appropriate decision making; Appropriate for age attention/concentration; Appropriate safety awareness; Follows commands        Safety/Judgement: Awareness of environment; Insight into deficits;Good awareness of safety precautions    Vision/Perceptual:      Acuity: Within Defined Limits    Corrective Lenses: Glasses  Range of Motion:  AROM: Within functional limits (with the exception of the RUE now s/p rotator cuff repair. )  PROM: Within functional limits (all but RUE which is immbilized in sling)  Strength:  Strength: Within functional limits (with the exception of the RUE now s/p rotator cuff repair. )  Coordination:  Coordination: Within functional limits (with the exception of the RUE now s/p rotator cuff repair. )  Fine Motor Skills-Upper: Left Intact; Right Impaired       Tone & Sensation:  Tone: Normal  Balance:  Sitting: Intact  Standing: Impaired  Standing - Static: Good;Occassional  Standing - Dynamic : Fair  Functional Mobility and Transfers for ADLs:  Bed Mobility:  Rolling: Independent  Supine to Sit: Independent  Sit to Supine: Stand-by assistance  Scooting: Independent  Transfers:  Sit to Stand: Supervision     Bed to Chair: Supervision          Toilet Transfer : Supervision              ADL Assessment:  Feeding: Supervision;Setup    Oral Facial Hygiene/Grooming: Supervision;Setup    Bathing: Supervision;Setup    Upper Body Dressing: Supervision;Setup    Lower Body Dressing: Supervision;Setup    Toileting: Supervision;Setup                Functional Measure:  Barthel Index:    Bathin  Bladder: 10  Bowels: 10  Groomin  Dressin  Feedin  Mobility: 10  Stairs: 5  Toilet Use: 5  Transfer (Bed to Chair and Back): 10  Total: 60       Barthel and G-code impairment scale:  Percentage of impairment CH  0% CI  1-19% CJ  20-39% CK  40-59% CL  60-79% CM  80-99% CN  100%   Barthel Score 0-100 100 99-80 79-60 59-40 20-39 1-19   0   Barthel Score 0-20 20 17-19 13-16 9-12 5-8 1-4 0      The Barthel ADL Index: Guidelines  1. The index should be used as a record of what a patient does, not as a record of what a patient could do. 2. The main aim is to establish degree of independence from any help, physical or verbal, however minor and for whatever reason. 3. The need for supervision renders the patient not independent. 4. A patient's performance should be established using the best available evidence. Asking the patient, friends/relatives and nurses are the usual sources, but direct observation and common sense are also important. However direct testing is not needed. 5. Usually the patient's performance over the preceding 24-48 hours is important, but occasionally longer periods will be relevant. 6. Middle categories imply that the patient supplies over 50 per cent of the effort. 7. Use of aids to be independent is allowed. Finn Hope., Barthel, DRileyW. (7018). Functional evaluation: the Barthel Index. [address] [facility] (14)2. Radha West ben BRAD Pelayo, Rozina Erazo., Elva Delgadillo., [place], [address] [place] [address] (1999). Measuring the change indisability after inpatient rehabilitation; comparison of the responsiveness of the Barthel Index and Functional [place] Measure. Journal of Neurology, Neurosurgery, and Psychiatry, 66(4), 541-678. Luis Malone, N.J.A, MAL Bedolla, & Lina Thompson M.A. (2004.) Assessment of post-stroke quality of life in cost-effectiveness studies: The usefulness of the Barthel Index and the EuroQoL-5D. Quality of Life Research, 13, 427-99       In compliance with CMSs Claims Based Outcome Reporting, the following G-code set was chosen for this patient based on their primary functional limitation being treated: The outcome measure chosen to determine the severity of the functional limitation was the Barthel Index with a score of 60/100 which was correlated with the impairment scale. ?  Self Care:     - CURRENT STATUS: CJ - 20%-39% impaired, limited or restricted    - GOAL STATUS:  CI - 1%-19% impaired, limited or restricted    - D/C STATUS:  ---------------To be determined---------------       ADL Intervention and task modifications:  Grooming  Washing Hands: Supervision/set-up (standing at sink)    Upper Body [address]: Supervision/ set-up (using single arm technigue )  Pullover Shirt: Supervision/set-up; Compensatory technique training (using single arm technigue )   Issued compensatory dressing hand hand out. Sling donning and doffing: performed with supervision. Instructional hand out issued. Lower Body Dressing Assistance  Underpants: Supervision/set-up; Compensatory technique training (using single arm technigue )  Pants With Button/Zipper: Supervision/set-up; Compensatory technique training (using single arm technigue )  Socks: Supervision/set-up; Compensatory technique training (using single arm technigue )  Slip on Shoes with Back: Supervision/set-up; Compensatory technique training (using single arm technigue )  Position Performed: Seated edge of bed;Standing  Cues: Verbal cues provided;Visual cues provided       Cognitive Retraining  Safety/Judgement: Awareness of environment; Insight into deficits;Good awareness of safety precautions    Therapeutic Exercise: Instructed patient in pendulum exercises for R shoulder. Patient performed 4/4 pendulum exercises for 1 set of 5 repetitions and supervision for proper performance. Pendulum exercise hand out issued. Pain:  Pain Scale 1: Numeric (0 - 10)  Pain Intensity 1: 8  Pain Location 1: Shoulder  Pain Orientation 1: Right  Pain Description 1: Aching  Pain Intervention(s) 1: Medication (see MAR); Ice;Rest;Repositioned  Activity Tolerance:   Good     After treatment:   [x] Patient left in no apparent distress sitting up in chair  [] Patient left in no apparent distress in bed  [x] Call bell left within reach  [x] Nursing notified  [] Caregiver present  [] Bed alarm activated    COMMUNICATION/EDUCATION:   The patients plan of care was discussed with: Physical Therapist, Registered Nurse and Nurse Practitioner . [x] Home safety education was provided and the patient/caregiver indicated understanding. [x] Patient/family have participated as able in goal setting and plan of care. [x] Patient/family agree to work toward stated goals and plan of care. [] Patient understands intent and goals of therapy, but is neutral about his/her participation. [] Patient is unable to participate in goal setting and plan of care. This patients plan of care is appropriate for delegation to [facility].     Thank you for this referral.  Flaco Martinez, OTR/L  Time Calculation: 49 mins

## 2018-09-20 NOTE — PROGRESS NOTES
Problem: Mobility Impaired (Adult and Pediatric)  Goal: *Acute Goals and Plan of Care (Insert Text)  Physical Therapy Goals  Initiated 9/20/2018  1. Patient will move from supine to sit and sit to supine , scoot up and down and roll side to side in bed with independence within 7 day(s). 2.  Patient will transfer from bed to chair and chair to bed with independence using the least restrictive device within 7 day(s). 3.  Patient will perform sit to stand with independence within 7 day(s). 4.  Patient will ambulate with modified independence for 600 feet with the least restrictive device within 7 day(s). 5.  Patient will ascend/descend 4 stairs with 1 handrail(s) with modified independence within 7 day(s). physical Therapy EVALUATION  Patient: Junie Oviedo (30 y.o. female)  Date: 9/20/2018  Primary Diagnosis: RIGHT SHOULDER ROTATOR CUFF TEAR   ICEPS TENOPATHY  SHOULDER IMPINGEMENT   S/P rotator cuff repair  Procedure(s) (LRB):  RIGHT SHOULDER ARTHROSCOPY ACROMIOPLASTY ROTATOR CUFF REPAIR PROXIMAL BICEPS TENODESIS (CHOICE ANES) (Right) 1 Day Post-Op   Precautions:  Fall, Other (comment) (NWB RUE; R arm in sling)    ASSESSMENT :  Based on the objective data described below, the patient presents with generalized weakness, decreased standing balance and decreased mobility skills following a R shoulder RTC repair and R biceps tendon repair yesterday. She was sitting in a bedside chair when PT arrived. Agreed to PT and was cleared by nursing for mobilization. PTA she reports living with her disabled brother as his primary caregiver in a 1 story apartment with 2 steps to enter and no rails. Her grandson lives there as well and her grand daughter is available to assist intermittently. She has a history of falls and a RW at home, but will be unable to use this now due to RUE being immobilized in a sling. Currently needs sba for sit to stand and sit to supine transfers. Bed to chair transfers need cg assistance. Gait needs min a x 1 with L hand hold assistance for 400 feet due to decreased step clearance, wide based gait pattern and path deviations with intermittent mild lob observed. Her O2 sats on RA was 93-94% post gait assessment and HR was 88. She was left in bed with RUE in sling and pillow under elbow for support - all needs met when PT eval ended. Recommend HH PT and OT upon discharge and a SPC for home use to assist balance and ambulation. She will need 24/7 supervision and assistance at home. Patient will benefit from skilled intervention to address the above impairments. Patients rehabilitation potential is considered to be Good  Factors which may influence rehabilitation potential include:   []         None noted  []         Mental ability/status  []         Medical condition  [x]         Home/family situation and support systems  []         Safety awareness  []         Pain tolerance/management  []         Other:      PLAN :  Recommendations and Planned Interventions:  [x]           Bed Mobility Training             [x]    Neuromuscular Re-Education  [x]           Transfer Training                   []    Orthotic/Prosthetic Training  [x]           Gait Training                         []    Modalities  [x]           Therapeutic Exercises           []    Edema Management/Control  [x]           Therapeutic Activities            [x]    Patient and Family Training/Education  []           Other (comment):    Frequency/Duration: Patient will be followed by physical therapy  daily to address goals. Discharge Recommendations: Home Health PT and OT  Further Equipment Recommendations for Discharge: straight cane     SUBJECTIVE:   Patient stated I feel really weak and tired.     OBJECTIVE DATA SUMMARY:   HISTORY:    Past Medical History:   Diagnosis Date    Arthritis     left shoulder    Atrial fibrillation ([facility] [address] 6/2/2010    Dr. Petros Oviedo    CAD (coronary artery disease)     stent; Dr. Teo Steiner Celiac disease     Chronic diastolic heart failure ([facility] [address] 2014    Dr. Lakeisha Dc    Chronic kidney disease     per cardio note    Chronic pain     left thigh:  L SFA intervention by Dr. Florene Homans 2018, as of 18:  still causing pain, pt to have MILLA checked per Dr. Florene Homans note    Chronic systolic HF (heart failure) ([facility] [address] 5/10/2017    2017 EF 25-30%    COPD (chronic obstructive pulmonary disease) ([facility] [address] 2010    Dr. Marquis Crane     Diabetes [facility])     Dr. Kelly Reyes; no current medication per pt    Emphysema, unspecified ([facility] [address]     Dr. Esther Metcalf Frequent falls     Gout     Heart failure ([facility] [address]     Dr. Amelie Hernandez History of Clostridium difficile infection 5/10/2017    2017 CDiff positive    Hypertension 2010    Dr. Leora Gallardo Hypertensive heart and chronic kidney disease     per PCP note    Hypotension 5/10/2017    Junctional tachycardia ([facility] [address] 5/10/2017    Dr. Lakeisha Dc    Neuropathy     NIDDM (non-insulin dependent diabetes mellitus) 2010    PAD (peripheral artery disease) ([facility] [address]     S/P ablation of atrial fibrillation 2018    Screening mammogram 5/4/10    SOB (shortness of breath) 2014    Dr. Oralia Ambriz (sick sinus syndrome) ([facility] [address]     per pacemaker form 18    Weakness     per pt weakness from c-diff , mulitple falls with injury to rotator cuff     Past Surgical History:   Procedure Laterality Date    CARDIAC SURG PROCEDURE UNLIST      3 cardiac stent placed     COLONOSCOPY N/A 2017    COLONOSCOPY with biopsy performed by Cristian Emmanuel MD at [facility] AMBULATORY OR    HX AFIB ABLATION  2018    has had 2 ablations per patient    HX  SECTION      HX HEART CATHETERIZATION  2018    HX OTHER SURGICAL      adrenal gland removed    HX PACEMAKER Left     [employer] model: Evia    NV EXCISE ADRENAL GLAND      VASCULAR SURGERY PROCEDURE UNLIST  2018    Left SFA intervention ; Dr. Florene Homans     Prior Level of Function/Home Situation: she reports living with her disabled brother as his primary caregiver in a 1 story apartment with 2 steps to enter and no rails. Her grandson lives there as well and her grand daughter is available to assist intermittently. She has a history of falls and a RW at home, but will be unable to use this now due to RUE being immobilized in a sling. Personal factors and/or comorbidities impacting plan of care: Will need 24/7 supervision and assistance at home. Home Situation  Home Environment: Apartment  # Steps to Enter: 2  Rails to Enter: No  Wheelchair Ramp: No  One/Two Story Residence: One story  Living Alone: No  Support Systems: Family member(s)  Patient Expects to be Discharged to[de-identified] Apartment  Current DME Used/Available at Home: Nebulizer, Glucometer, Walker  Tub or Shower Type: Tub/Shower combination    EXAMINATION/PRESENTATION/DECISION MAKING:   Critical Behavior:  Neurologic State: Alert  Orientation Level: Oriented X4  Cognition: Appropriate decision making, Appropriate for age attention/concentration, Appropriate safety awareness     Hearing:   Auditory  Auditory Impairment: None  Skin:    Edema:   Range Of Motion:  AROM: Generally decreased, functional           PROM: Within functional limits (all but RUE which is immbilized in sling)           Strength:    Strength: Generally decreased, functional                    Tone & Sensation:   Tone: Normal              Sensation: Intact               Coordination:  Coordination: Within functional limits  Vision:      Functional Mobility:  Bed Mobility:  Rolling: Supervision     Sit to Supine: Stand-by assistance  Scooting: Independent  Transfers:  Sit to Stand: Stand-by assistance  Stand to Sit: Stand-by assistance        Bed to Chair: Contact guard assistance              Balance:   Sitting: Intact  Standing: Impaired  Standing - Static: Good;Occassional  Standing - Dynamic : Fair  Ambulation/Gait Training:  Distance (ft): 400 Feet (ft)  Assistive Device: Gait belt  Ambulation - Level of Assistance: Minimal assistance;Assist x1;Other (comment) (hand hold on L)     Gait Description (WDL): Exceptions to WDL  Gait Abnormalities: Path deviations;Decreased step clearance        Base of Support: Widened     Speed/Cece: Pace decreased (<100 feet/min)  Step Length: Right shortened;Left shortened                     Stairs:  Number of Stairs Trained: 4  Stairs - Level of Assistance: Contact guard assistance   Rail Use: Left       Functional Measure:  Barthel Index:    Bathin  Bladder: 10  Bowels: 10  Groomin  Dressin  Feedin  Mobility: 10  Stairs: 5  Toilet Use: 5  Transfer (Bed to Chair and Back): 10  Total: 60       Barthel and G-code impairment scale:  Percentage of impairment CH  0% CI  1-19% CJ  20-39% CK  40-59% CL  60-79% CM  80-99% CN  100%   Barthel Score 0-100 100 99-80 79-60 59-40 20-39 1-19   0   Barthel Score 0-20 20 17-19 13-16 9-12 5-8 1-4 0      The Barthel ADL Index: Guidelines  1. The index should be used as a record of what a patient does, not as a record of what a patient could do. 2. The main aim is to establish degree of independence from any help, physical or verbal, however minor and for whatever reason. 3. The need for supervision renders the patient not independent. 4. A patient's performance should be established using the best available evidence. Asking the patient, friends/relatives and nurses are the usual sources, but direct observation and common sense are also important. However direct testing is not needed. 5. Usually the patient's performance over the preceding 24-48 hours is important, but occasionally longer periods will be relevant. 6. Middle categories imply that the patient supplies over 50 per cent of the effort. 7. Use of aids to be independent is allowed. Lala Flanagan., Barthel, D.W. (1717). Functional evaluation: the Barthel Index. [facility] (14)2.   BRAD Shea, Brandy Duong., Olga Bryan. (1999). Measuring the change indisability after inpatient rehabilitation; comparison of the responsiveness of the Barthel Index and Functional Austin Measure. Journal of Neurology, Neurosurgery, and Psychiatry, 66(4), 722-337. CHAR Gil, MAL Bedolla, & Juvencio Cosme M.A. (2004.) Assessment of post-stroke quality of life in cost-effectiveness studies: The usefulness of the Barthel Index and the EuroQoL-5D. Quality of Life Research, 13, 687-35         G codes: In compliance with CMSs Claims Based Outcome Reporting, the following G-code set was chosen for this patient based on their primary functional limitation being treated: The outcome measure chosen to determine the severity of the functional limitation was the Barthel with a score of 60/100 which was correlated with the impairment scale. ? Mobility - Walking and Moving Around:     - CURRENT STATUS: CJ - 20%-39% impaired, limited or restricted    - GOAL STATUS: CI - 1%-19% impaired, limited or restricted    - D/C STATUS:  ---------------To be determined---------------      Physical Therapy Evaluation Charge Determination   History Examination Presentation Decision-Making   HIGH Complexity :3+ comorbidities / personal factors will impact the outcome/ POC  MEDIUM Complexity : 3 Standardized tests and measures addressing body structure, function, activity limitation and / or participation in recreation  MEDIUM Complexity : Evolving with changing characteristics  Other outcome measures Barthel  MEDIUM      Based on the above components, the patient evaluation is determined to be of the following complexity level: MEDIUM    Pain:  Pain Scale 1: Numeric (0 - 10)  Pain Intensity 1: 5  Pain Location 1: Shoulder  Pain Orientation 1: Right  Pain Description 1: Aching  Pain Intervention(s) 1: Cold pack; Rest;Repositioned  Activity Tolerance:   Fair  Please refer to the flowsheet for vital signs taken during this treatment. After treatment:   []         Patient left in no apparent distress sitting up in chair  [x]         Patient left in no apparent distress in bed  [x]         Call bell left within reach  [x]         Nursing notified  []         Caregiver present  []         Bed alarm activated    COMMUNICATION/EDUCATION:   The patients plan of care was discussed with: Occupational Therapist, Registered Nurse,  and NP. [x]         Fall prevention education was provided and the patient/caregiver indicated understanding. [x]         Patient/family have participated as able in goal setting and plan of care. [x]         Patient/family agree to work toward stated goals and plan of care. []         Patient understands intent and goals of therapy, but is neutral about his/her participation. []         Patient is unable to participate in goal setting and plan of care.     Thank you for this referral.  Naina Current, PT   Time Calculation: 31 mins

## 2018-09-20 NOTE — PROGRESS NOTES
Reason for Admission:   RIGHT SHOULDR ROTATOR CUFF TEAR; ICEPS TENOPATHY; SHOULDER IMPIGEMENT; S/P rotator cuff repair                RRAT Score:     40 HIGh              Resources/supports as identified by patient/family:   Pt states she has a strong support system                 Top Challenges facing patient (as identified by patient/family and CM): Finances/Medication cost?  Pt has assistance with eliques prescription through her doctor               Transportation? No problems identified               Support system or lack thereof? Pt states she has a strong support system                      Living arrangements? Pt lives with her brother and granddaughter             Self-care/ADLs/Cognition? Pt was alert and oriented and is able to care for herself           Current Advanced Directive/Advance Care Plan: On file                           Plan for utilizing home health: Pt wishes to [facility] if needed                       Likelihood of readmission: High per acuity                  Transition of Care Plan:                Pt is a 80year old,  female, admitted with [facility]; ICEPS TENOPATHY; SHOULDER IMPIGEMENT; S/P rotator cuff repair. Pt was alert and oriented when meeting with CM. Pt confirmed address, emergency contact and PCP. Pt states she lives in a one level home with her brother and granddaughter. Pt states she has a walker but the halls are too small to use it in. Pt has had HH with [facility] and wishes to use them again. CM sent referral. Pt has not been to a SNF. Pt preferred pharmacy is [facility] at [facility]. Pt dtr will pick her up at time of discharge. Pt only concern for discharge is cooking for her brother. CM referred pt to get in contact with [employer] Whe[employer], her brother is in his late 63's has special needs. Pt has no further questions or concerns for CM at this time.  CM will continue to follow pt for discharge planning as needed. Care Management Interventions  PCP Verified by CM:  Yes  Mode of Transport at Discharge: Self  Transition of Care Consult (CM Consult): Discharge Planning  MyChart Signup: No  Discharge Durable Medical Equipment: No  Health Maintenance Reviewed: Yes  Physical Therapy Consult: Yes  Occupational Therapy Consult: Yes  Speech Therapy Consult: No  Current Support Network: Own Home  Confirm Follow Up Transport: Family  Plan discussed with Pt/Family/Caregiver: Yes  Discharge Location  Discharge Placement:  (TBD )     RONEL Lott, [address]    [phone]

## 2018-09-20 NOTE — PROGRESS NOTES
Ortho / Neurosurgery NP Note    POD# 1  s/p RIGHT SHOULDER ARTHROSCOPY ACROMIOPLASTY ROTATOR CUFF REPAIR PROXIMAL BICEPS TENODESIS (CHOICE ANES)   Pt seen in room awaiting team to discuss plan of care. Pt resting in bed. Pt reports pain is well managed with medication, however is starting to feel the throbbing coming on again. States the rest of the arm still feels like a \"block of wood\", discussed the nerve block and time to wear off can vary by individual, also discussed pain management and what to expect, requesting pain medications rather than scheduled medications. Patient endorses a good appetite and that she needs to be able to get back home as she is moving and needs to move as many factors that are impacting her health. Reports mold in apartment and uneven sidewalks and entryways that lead to falls. States has limited help at home and has requested that her grandchildren come to help her. States she cannot be placed in SNF or other prolonged stay environment as she will not be able to move. Pt also reports a recent hospitalization resulting in c.dif and dx of celiac disease. Pt reports bowels are much improved now and BS are present and active all quadrants. Lungs are diminished at bases and CTA anterior and posterior upper. VSS Afebrile. Voiding status: +voids     Labs  Lab Results   Component Value Date/Time    HGB 11.0 (L) 09/20/2018 06:11 AM      Lab Results   Component Value Date/Time    INR 1.1 07/18/2018 08:50 AM        Body mass index is 26.57 kg/(m^2). : A BMI > 30 is classified as obesity and > 40 is classified as morbid obesity. Bulky dressing c.d.i over right shoulder   Cryotherapy in place over incision  Right arm remains in sling, pt reports sensation however still limited ROM with numbness,  strength is 3/5 RUE and 5/5 LUE. Calves soft and supple;  No pain with passive stretch  Sensation and motor intact BLE  SCDs for mechanical DVT proph while in bed     PLAN:  1) PT BID  2) Home Eliquis and Plavix resumed  3) GI: Prophylaxis: Pepcid  4) Readniess for discharge:     [x] Vital Signs stable    [x] Hgb stable    [x] + Voiding    [x] Wound intact, drainage minimal    [x] Tolerating PO intake     [] Cleared by PT (OT if applicable)     [] Stair training completed (if applicable)    [] Independent / Contact Guard Assist (household distance)     [] Bed mobility     [] Car transfers     [] ADLs    [x] Adequate pain control on oral medication alone   Discharge to home when cleared by therapies  June Brice  BSN, RN, ACNP Student

## 2018-09-20 NOTE — PROGRESS NOTES
Bedside and Verbal shift change report given to Yamile (oncoming nurse) by Nita Desouza (offgoing nurse). Report included the following information SBAR, Kardex, Procedure Summary, Intake/Output and Recent Results.

## 2018-09-20 NOTE — INTERDISCIPLINARY ROUNDS
Bedside interdisciplinary rounds were held today to discuss patient plan of care and outcomes. The following members were present: Nurse Practitioner, Nurse, Clinical Care Leader, Pharmacy, Physical Therapy, and Case Management. Plan:  Continue PT/OT. BP borderline - lasix given this morning r/t PMH of CHF. Hopeful for home tomorrow.

## 2018-09-21 ENCOUNTER — HOME HEALTH ADMISSION (OUTPATIENT)
Dept: HOME HEALTH SERVICES | Facility: HOME HEALTH | Age: 68
End: 2018-09-21
Payer: MEDICARE

## 2018-09-21 VITALS
RESPIRATION RATE: 18 BRPM | SYSTOLIC BLOOD PRESSURE: 102 MMHG | TEMPERATURE: 97.7 F | OXYGEN SATURATION: 100 % | BODY MASS INDEX: 26.23 KG/M2 | HEART RATE: 75 BPM | HEIGHT: 67 IN | WEIGHT: 167.11 LBS | DIASTOLIC BLOOD PRESSURE: 63 MMHG

## 2018-09-21 PROCEDURE — 90686 IIV4 VACC NO PRSV 0.5 ML IM: CPT | Performed by: ORTHOPAEDIC SURGERY

## 2018-09-21 PROCEDURE — 77010033678 HC OXYGEN DAILY

## 2018-09-21 PROCEDURE — 74011250637 HC RX REV CODE- 250/637: Performed by: NURSE PRACTITIONER

## 2018-09-21 PROCEDURE — 97535 SELF CARE MNGMENT TRAINING: CPT | Performed by: OCCUPATIONAL THERAPIST

## 2018-09-21 PROCEDURE — 94760 N-INVAS EAR/PLS OXIMETRY 1: CPT

## 2018-09-21 PROCEDURE — 97110 THERAPEUTIC EXERCISES: CPT | Performed by: OCCUPATIONAL THERAPIST

## 2018-09-21 PROCEDURE — 74011000250 HC RX REV CODE- 250: Performed by: NURSE PRACTITIONER

## 2018-09-21 PROCEDURE — 99218 HC RM OBSERVATION: CPT

## 2018-09-21 PROCEDURE — 90471 IMMUNIZATION ADMIN: CPT

## 2018-09-21 PROCEDURE — 97530 THERAPEUTIC ACTIVITIES: CPT | Performed by: OCCUPATIONAL THERAPIST

## 2018-09-21 PROCEDURE — 74011250636 HC RX REV CODE- 250/636: Performed by: ORTHOPAEDIC SURGERY

## 2018-09-21 RX ORDER — POLYETHYLENE GLYCOL 3350 17 G/17G
17 POWDER, FOR SOLUTION ORAL
Qty: 15 PACKET | Refills: 0 | Status: SHIPPED | OUTPATIENT
Start: 2018-09-21 | End: 2018-10-06

## 2018-09-21 RX ORDER — FLUTICASONE FUROATE AND VILANTEROL 100; 25 UG/1; UG/1
1 POWDER RESPIRATORY (INHALATION) DAILY
Status: DISCONTINUED | OUTPATIENT
Start: 2018-09-21 | End: 2018-09-21 | Stop reason: HOSPADM

## 2018-09-21 RX ORDER — AMOXICILLIN 250 MG
1 CAPSULE ORAL DAILY
Qty: 30 TAB | Refills: 0 | Status: ON HOLD | OUTPATIENT
Start: 2018-09-21 | End: 2019-03-26

## 2018-09-21 RX ORDER — ALBUTEROL SULFATE 90 UG/1
1 AEROSOL, METERED RESPIRATORY (INHALATION)
Status: DISCONTINUED | OUTPATIENT
Start: 2018-09-21 | End: 2018-09-21 | Stop reason: HOSPADM

## 2018-09-21 RX ORDER — ALBUTEROL SULFATE 90 UG/1
1 AEROSOL, METERED RESPIRATORY (INHALATION)
COMMUNITY
End: 2019-02-17

## 2018-09-21 RX ADMIN — GABAPENTIN 800 MG: 300 CAPSULE ORAL at 10:04

## 2018-09-21 RX ADMIN — OXYCODONE HYDROCHLORIDE 5 MG: 5 TABLET ORAL at 15:40

## 2018-09-21 RX ADMIN — FUROSEMIDE 20 MG: 20 TABLET ORAL at 10:04

## 2018-09-21 RX ADMIN — POLYETHYLENE GLYCOL 3350 17 G: 17 POWDER, FOR SOLUTION ORAL at 10:06

## 2018-09-21 RX ADMIN — CARVEDILOL 6.25 MG: 6.25 TABLET, FILM COATED ORAL at 10:04

## 2018-09-21 RX ADMIN — APIXABAN 5 MG: 5 TABLET, FILM COATED ORAL at 10:05

## 2018-09-21 RX ADMIN — UMECLIDINIUM 1 PUFF: 62.5 AEROSOL, POWDER ORAL at 12:17

## 2018-09-21 RX ADMIN — CLOPIDOGREL BISULFATE 75 MG: 75 TABLET ORAL at 10:05

## 2018-09-21 RX ADMIN — OXYCODONE HYDROCHLORIDE 5 MG: 5 TABLET ORAL at 10:06

## 2018-09-21 RX ADMIN — ACETAMINOPHEN 650 MG: 325 TABLET ORAL at 06:00

## 2018-09-21 RX ADMIN — Medication 10 ML: at 05:12

## 2018-09-21 RX ADMIN — INFLUENZA VIRUS VACCINE 0.5 ML: 15; 15; 15; 15 SUSPENSION INTRAMUSCULAR at 10:58

## 2018-09-21 RX ADMIN — FLUTICASONE FUROATE AND VILANTEROL TRIFENATATE 1 PUFF: 100; 25 POWDER RESPIRATORY (INHALATION) at 12:18

## 2018-09-21 RX ADMIN — OXYCODONE HYDROCHLORIDE 10 MG: 5 TABLET ORAL at 06:12

## 2018-09-21 RX ADMIN — ALBUTEROL SULFATE 2.5 MG: 2.5 SOLUTION RESPIRATORY (INHALATION) at 10:10

## 2018-09-21 RX ADMIN — OXYCODONE HYDROCHLORIDE 10 MG: 5 TABLET ORAL at 00:09

## 2018-09-21 RX ADMIN — ACETAMINOPHEN 650 MG: 325 TABLET ORAL at 00:09

## 2018-09-21 RX ADMIN — SENNOSIDES AND DOCUSATE SODIUM 1 TABLET: 8.6; 5 TABLET ORAL at 10:05

## 2018-09-21 RX ADMIN — ACETAMINOPHEN 650 MG: 325 TABLET ORAL at 12:18

## 2018-09-21 NOTE — PROGRESS NOTES
POD 2 Days Post-Op    Procedure:  Procedure(s):  RIGHT SHOULDER ARTHROSCOPY ACROMIOPLASTY ROTATOR CUFF REPAIR PROXIMAL BICEPS TENODESIS (CHOICE ANES)    Subjective:     Patient doing well, complaining of appropriate post-op pain    Objective:     Blood pressure 102/63, pulse 75, temperature 97.7 °F (36.5 °C), resp. rate 18, height 5' 6.5\" (1.689 m), weight 75.8 kg (167 lb 1.7 oz), SpO2 100 %, not currently breastfeeding. Temp (24hrs), Av.8 °F (36.6 °C), Min:97.3 °F (36.3 °C), Max:98.2 °F (36.8 °C)      Physical Exam:  Examination of the right shoulder reveals that the incision is clean, dry and intact. Sensation is intact to light touch.  mild swelling. No calf pain.   NVSI    Labs:   Lab Results   Component Value Date/Time    HGB 11.0 (L) 2018 06:11 AM         Assessment:     Active Problems:    S/P rotator cuff repair (2018)        Procedure(s):  RIGHT SHOULDER ARTHROSCOPY ACROMIOPLASTY ROTATOR CUFF REPAIR PROXIMAL BICEPS TENODESIS (CHOICE ANES)    Plan/Recommendations/Medical Decision Making:     - pain control - percocet  - ice   - pt/ot  - dvt prophylaxis - loren/teds, eliquis  - d/c planning - home with [address] [place] [address], [address], [place]

## 2018-09-21 NOTE — PROGRESS NOTES
1950: Bedside and Verbal shift change report given to [address] (oncoming nurse) by Mellissa Chen RN (offgoing nurse). Report included the following information SBAR. Safety teaching done. Encouraged patient to call with needs. 2306: Bedside and Verbal shift change report given to [address] (oncoming nurse) by [address] (offgoing nurse). Report included the following information SBAR.

## 2018-09-21 NOTE — PROGRESS NOTES
Occupational Therapy Goals  Initiated 9/20/2018  1. Patient will perform grooming standing at sink with independence within 7 day(s). 2.  Patient will perform upper body dressing with modified independence within 7 day(s). 3.  Patient will perform lower body dressing with modified independence within 7 day(s). 4.  Patient will perform toilet transfers with modified independence within 7 day(s). 5.  Patient will perform all aspects of toileting with modified independence within 7 day(s). 6.  Patient will perform ADL sling donning/doffing with independence within 7 day(s). 7.  Patient will perform RUE pendulum exercises with independence within 7 days     Occupational Therapy TREATMENT  Patient: Andreia Pena (67 y.o. female)  Date: 9/21/2018  Diagnosis: RIGHT SHOULDER ROTATOR CUFF TEAR   ICEPS TENOPATHY  SHOULDER IMPINGEMENT   S/P rotator cuff repair <principal problem not specified>  Procedure(s) (LRB):  RIGHT SHOULDER ARTHROSCOPY ACROMIOPLASTY ROTATOR CUFF REPAIR PROXIMAL BICEPS TENODESIS (CHOICE ANES) (Right) 2 Days Post-Op  Precautions: NWB (RUE)    ASSESSMENT:  Patient very motivated and receptive to further ADL, pendulum exercise, functional mobility and safety training. Good overall adherence to RUE NWB and no AROM precautions for R shoulder during ADLs, bed mobility and transfers. She demonstrated accurate recall of compensatory technique training for UB dressing as well as the technique for donning doffing sling, able to perform at an independent to modified independent level. Patient also independent to mod I for functional mobility. Cueing required for proper performance of pendulum exercises. At this point she has made excellent progress, but would benefit from further OT and PT via [facility] to fully maximize her independence and safety.    Progression toward goals:  []       Improving appropriately and progressing toward goals  [x]       Improving slowly and progressing toward goals  []       Not making progress toward goals and plan of care will be adjusted     PLAN:  Patient continues to benefit from skilled intervention to address the above impairments. Continue treatment per established plan of care. Discharge Recommendations:  Home Health OT and PT         OBJECTIVE DATA SUMMARY:   Cognitive/Behavioral Status:  Neurologic State: Alert  Orientation Level: Oriented X4  Cognition: Appropriate decision making; Appropriate for age attention/concentration; Appropriate safety awareness; Follows commands        Safety/Judgement: Awareness of environment; Insight into deficits;Good awareness of safety precautions  Functional Mobility and Transfers for ADLs:  Bed Mobility:  Rolling: Independent  Supine to Sit: Independent     Scooting: Independent  Transfers:  Sit to Stand: Independent             Toilet Transfer : Modified independent      Balance:  Intact sitting balance, Good for static standing and Fair for dynamic standing. ADL Intervention:     Grooming  Washing Hands: Independent (standing at sink)    Upper Body Dressing Assistance  Dressing Assistance: Independent (to brittany/doff sling)  Pullover Shirt: Modified independent; Compensatory technique training (to brittany/doff)  Cues: Verbal cues provided    Toileting  Bladder Hygiene: Independent  Clothing Management: Modified independent    Cognitive Retraining  Safety/Judgement: Awareness of environment; Insight into deficits;Good awareness of safety precautions    Therapeutic R Shouler Exercises:   Performed 4/4 pendulum exercises with cueing for technique at a supervision level  Pain:  Pain Scale 1: Numeric (0 - 10)  Pain Intensity 1: 5  Pain Location 1: Shoulder  Pain Orientation 1: Right  Pain Description 1: Aching  Pain Intervention(s) 1: Medication (see MAR)    After treatment:   [] Patient left in no apparent distress sitting up in chair  [x] Patient left in no apparent distress in bed  [x] Call bell left within reach  [x] Nursing notified  [] Caregiver present  [] Bed alarm activated    COMMUNICATION/COLLABORATION:   The patients plan of care was discussed with: Registered Nurse    Ave Jones OTR/L  Time Calculation: 37 mins

## 2018-09-21 NOTE — PROGRESS NOTES
Bedside and Verbal shift change report given to Nettie (oncoming nurse) by Kamila Schwarz (offgoing nurse). Report included the following information SBAR, Kardex, MAR and Recent Results.

## 2018-09-21 NOTE — PROGRESS NOTES
Patient and family given discharge instructions. Patient given medications to be filled at the pharmacy of choice. IV was removed. Patient and family was given the opportunity to ask questions. Patient was wheeled out to personal vehicle to be discharged home with family.

## 2018-09-21 NOTE — PROGRESS NOTES
Order for straight cane sent to [facility] [facility], sent as attachment via "[employer]".   [facility] has accepted for homecare pending orders for services needed per orthopedic team.    RONEL Holloway

## 2018-09-21 NOTE — PROGRESS NOTES
Ortho / Neurosurgery NP Note    POD# 2 s/p RIGHT SHOULDER ARTHROSCOPY ACROMIOPLASTY ROTATOR CUFF REPAIR PROXIMAL BICEPS TENODESIS (CHOICE ANES)   Pt seen with nurse in room    Pt resting in bed. Pt reports pain is well managed with medication,   Pt did not get her dose of Spiriva or Symbicort this morning and upset that it does not look the same as her meds from home. She began to have some SOB and was placed on O2 via NC. I have asked pharmacist to confirm home meds and allow her to take those if she insists. VSS Afebrile. Voiding status: +voids     Labs  Lab Results   Component Value Date/Time    HGB 11.0 (L) 09/20/2018 06:11 AM      Lab Results   Component Value Date/Time    INR 1.1 07/18/2018 08:50 AM        Body mass index is 26.57 kg/(m^2). : A BMI > 30 is classified as obesity and > 40 is classified as morbid obesity. Bulky dressing c.d.i over right shoulder - may change to opsite  Cryotherapy in place over incision  Right arm remains in sling, pt reports sensation however still limited ROM with numbness,  strength is 3/5 RUE and 5/5 LUE. Calves soft and supple; No pain with passive stretch  Sensation and motor intact BLE  SCDs for mechanical DVT proph while in bed     PLAN:  1) PT BID  2) Home Eliquis and Plavix resumed  3) GI: Prophylaxis: Pepcid  4) Readniess for discharge:     [x] Vital Signs stable    [x] Hgb stable    [x] + Voiding    [x] Wound intact, drainage minimal    [x] Tolerating PO intake     [] Cleared by PT (OT if applicable)     [] Stair training completed (if applicable)    [] Independent / Contact Guard Assist (household distance)     [] Bed mobility     [] Car transfers     [] ADLs    [x] Adequate pain control on oral medication alone     Discharge to home when off O2 and cleared by therapies  HHOT and PT ordered.     Maxwell Galeana NP  BSN, RN, ACNP Student

## 2018-09-21 NOTE — PROGRESS NOTES
CM sent referral to [facility] to aide with community resources for meals on wheels and other programs pt may qualify for.      RONEL Bradford, [address]   [phone]

## 2018-09-21 NOTE — DISCHARGE SUMMARY
Ortho Discharge Summary    Patient ID:  Andreia Pena  [id number]  female  80 y.o.  1950    Admit date: 9/19/2018    Discharge date: 9/21/2018    Admitting Physician: Guero Maravilla DO     Consulting Physician(s):   Treatment Team: Attending Provider: Guero Maravilla DO; Utilization Review: Bruce Barajas RN; Nurse Practitioner: Kathie Montiel NP; Care Manager: Zaid Ferris; Care Manager: Merlinda Motes; Care Manager: RONEL Albright    Date of Surgery:   9/19/2018     Preoperative Diagnosis:  RIGHT SHOULDER ROTATOR CUFF TEAR   ICEPS TENOPATHY SHOULDER IMPINGEMENT     Postoperative Diagnosis:   RIGHT SHOULDER ROTATOR CUFF TEAR     Procedure(s):     RIGHT SHOULDER ARTHROSCOPY ACROMIOPLASTY ROTATOR CUFF REPAIR PROXIMAL BICEPS TENODESIS (CHOICE ANES)     Anesthesia Type: Other     Surgeon: Guero Maravilla DO                            HPI:  Pt is a 80 y.o. female who has a history of RIGHT SHOULDER ROTATOR CUFF TEAR   ICEPS TENOPATHY SHOULDER IMPINGEMENT   with pain and limitations of activities of daily living who presents at this time for a right shoulder arthroscopy and acromioplasty following the failure of conservative management.     PMH:   Past Medical History:   Diagnosis Date    Arthritis     left shoulder    Atrial fibrillation ([address] 6/2/2010    Dr. Kathleen Brunson CAD (coronary artery disease)     stent; Dr. Kathleen Brunson Celiac disease     Chronic diastolic heart failure ([address] 09/22/2014    Dr. Angeline Nixon    Chronic kidney disease     per cardio note    Chronic pain     left thigh:  L SFA intervention by Dr. Roberta Velasquez 07/2018, as of 9/6/18:  still causing pain, pt to have MILLA checked per Dr. Roberta Velasquez note    Chronic systolic HF (heart failure) ([address] 5/10/2017    4/2017 EF 25-30%    COPD (chronic obstructive pulmonary disease) ([address] 6/2/2010    Dr. Edgardo Patterson     Diabetes [facility])     Dr. Iván Salmon; no current medication per pt    Emphysema, unspecified ([address]     Dr. Chip Valdes Frequent falls     Gout     Heart failure (HCC)     Dr. Bandar Mcbride    History of Clostridium difficile infection 5/10/2017    2017 CDiff positive    Hypertension 2010    Dr. Pauly Burgos Hypertensive heart and chronic kidney disease     per PCP note    Hypotension 5/10/2017    Junctional tachycardia ([facility] 5/10/2017    Dr. Bandar Mcbride    Neuropathy     NIDDM (non-insulin dependent diabetes mellitus) 2010    PAD (peripheral artery disease) ([facility]     S/P ablation of atrial fibrillation 2018    Screening mammogram 5/4/10    SOB (shortness of breath) 2014    Dr. Brielle Jason (sick sinus syndrome) ([facility]     per pacemaker form 18    Weakness     per pt weakness from c-diff , mulitple falls with injury to rotator cuff       Body mass index is 26.57 kg/(m^2). : A BMI > 30 is classified as obesity and > 40 is classified as morbid obesity. Medications upon admission :   Prior to Admission Medications   Prescriptions Last Dose Informant Patient Reported? Taking? Azelastine (ASTEPRO) 0.15 % (205.5 mcg) nasal spray 2018 at 0800 Self Yes Yes   Si [place] by Both Nostrils route daily. acetaminophen (TYLENOL) 500 mg tablet 2018 at Unknown time Self Yes Yes   Sig: Take 500 mg by mouth every six (6) hours as needed for Pain. albuterol (PROVENTIL VENTOLIN) 2.5 mg /3 mL (0.083 %) nebulizer solution Unknown at Unknown time  No No   Sig: 3 mL by Nebulization route every four (4) hours as needed for Wheezing. apixaban (ELIQUIS) 5 mg tablet 2018  No Yes   Sig: Take 1 Tab by mouth two (2) times a day. Resume from tomorrow 2018. Indications: PREVENT THROMBOEMBOLISM IN CHRONIC ATRIAL FIBRILLATION   budesonide-formoterol (SYMBICORT) 160-4.5 mcg/actuation HFAA 2018 at 0700  No Yes   Sig: Take 1 Puff by inhalation two (2) times a day. carvedilol (COREG) 6.25 mg tablet 2018 at 0800  No Yes   Sig: Take 1 Tab by mouth two (2) times daily (with meals). clemastine 2.68 mg tab 2018 at 0800  Yes Yes   Sig: Indications: 1 tab in am and 1 tab in pm   clopidogrel (PLAVIX) 75 mg tab 2018  No Yes   Sig: TAKE ONE TABLET BY MOUTH ONCE DAILY   cod liver oil cap 2018 at Unknown time Self Yes Yes   Sig: Take 1 Cap by mouth daily. colchicine 0.6 mg tablet 2018 at 0800  No Yes   Sig: Take 2 Tabs by mouth daily. Patient takes 1.2 mg daily and 2.4 mg PRN flare up   Patient taking differently: Take 1.2 mg by mouth as needed (18:  Per pt she only takes when she has a flare up). Patient takes 1.2 mg daily and 2.4 mg PRN flare up    fluticasone (FLONASE) 50 mcg/actuation nasal spray Not Taking at Unknown time Self Yes No   Si Sprays by Both Nostrils route every evening. furosemide (LASIX) 20 mg tablet 2018 at 0800  No Yes   Sig: Take 1 Tab by mouth daily. gabapentin (NEURONTIN) 800 mg tablet 2018 at 0800  No Yes   Sig: TAKE ONE TABLET BY MOUTH THREE TIMES DAILY   ipratropium (ATROVENT HFA) 17 mcg/actuation inhaler 2018 at 0930  No Yes   Sig: Take 1 Puff by inhalation every six (6) hours as needed for Wheezing. simvastatin (ZOCOR) 20 mg tablet 2018 at Unknown time  No Yes   Sig: Take 1 Tab by mouth nightly. Increased 1018   tiotropium (SPIRIVA WITH HANDIHALER) 18 mcg inhalation capsule 2018 at 0800  No Yes   Sig: INHALE THE CONTENTS OF 1 CAPSULE THROUGH HANDIHALER DEVICE DAILY      Facility-Administered Medications: None        Allergies: Allergies   Allergen Reactions    Crestor [Rosuvastatin] Myalgia    Levaquin [Levofloxacin] Nausea Only     GI Upset    Lipitor [Atorvastatin] Diarrhea    Lyrica [Pregabalin] Myalgia        Hospital Course: The patient underwent surgery. Complications:  None; patient tolerated the procedure well. Was taken to the PACU in stable condition and then transferred to the ortho floor.       Perioperative Antibiotics:  Ancef     Postoperative Pain Management:  Oxycodone      DVT Prophylaxis: Home Eliquis and Plavix    Postoperative transfusions:    Number of units banked PRBCs =   none     Post Op complications: none    Hemoglobin at discharge:    Lab Results   Component Value Date/Time    HGB 11.0 (L) 09/20/2018 06:11 AM    INR 1.1 07/18/2018 08:50 AM       Dressing was changed on POD # 2. Incision - clean, dry and intact. No significant erythema or swelling. Neurovascular exam found to be within normal limits. Sling in place    Physical Therapy started on the day following surgery and participated in bed mobility, transfers and ambulation. Gait:  Gait  Base of Support: Widened  Speed/Cece: Pace decreased (<100 feet/min)  Step Length: Right shortened, Left shortened  Gait Abnormalities: Path deviations, Decreased step clearance  Ambulation - Level of Assistance: Minimal assistance, Assist x1, Other (comment) (hand hold on L)  Distance (ft): 400 Feet (ft)  Assistive Device: Gait belt  Rail Use: Left   Stairs - Level of Assistance: Contact guard assistance  Number of Stairs Trained: 4                   Discharged to: Home. Condition on Discharge:   stable    Discharge instructions:  - Anticoagulate with Home Eliquis and Plavix  - Take pain medications as prescribed  - Resume pre hospital diet      - Discharge activity: activity as tolerated  - Ambulate with assistive device as needed. - Weight bearing status - NWB through [address] [place] [address] Drive wound clean and dry. See discharge instruction sheet. -DISCHARGE MEDICATION LIST     Current Discharge Medication List      START taking these medications    Details   polyethylene glycol (MIRALAX) 17 gram packet Take 1 Packet by mouth daily as needed (constipation) for up to 15 days. Qty: 15 Packet, Refills: 0      senna-docusate (PERICOLACE) 8.6-50 mg per tablet Take 1 Tab by mouth daily.   Qty: 30 Tab, Refills: 0      oxyCODONE-acetaminophen (PERCOCET) 5-325 mg per tablet Take 1 Tab by mouth every four (4) hours as needed for Pain. Max Daily Amount: 6 Tabs. Qty: 50 Tab, Refills: 0    Associated Diagnoses: Post-op pain         CONTINUE these medications which have NOT CHANGED    Details   albuterol (PROVENTIL HFA, VENTOLIN HFA, PROAIR HFA) 90 mcg/actuation inhaler Take 1 Puff by inhalation every four (4) hours as needed for Wheezing or Shortness of Breath. simvastatin (ZOCOR) 20 mg tablet Take 1 Tab by mouth nightly. Increased 9/1018  Qty: 90 Tab, Refills: 3    Associated Diagnoses: Type 2 diabetes mellitus with nephropathy ([facility] Mixed hyperlipidemia; Coronary artery disease involving native coronary artery of native heart without angina pectoris      apixaban (ELIQUIS) 5 mg tablet Take 1 Tab by mouth two (2) times a day. Resume from tomorrow 7/21/2018. Indications: PREVENT THROMBOEMBOLISM IN CHRONIC ATRIAL FIBRILLATION  Qty: 42 Tab, Refills: 0    Associated Diagnoses: Atrial fibrillation, unspecified type ([facility] Anticoagulant long-term use      clopidogrel (PLAVIX) 75 mg tab TAKE ONE TABLET BY MOUTH ONCE DAILY  Qty: 30 Tab, Refills: 11    Comments: Please consider 90 day supplies to promote better adherence      carvedilol (COREG) 6.25 mg tablet Take 1 Tab by mouth two (2) times daily (with meals). Qty: 60 Tab, Refills: 11      clemastine 2.68 mg tab Indications: 1 tab in am and 1 tab in pm      colchicine 0.6 mg tablet Take 2 Tabs by mouth daily. Patient takes 1.2 mg daily and 2.4 mg PRN flare up  Qty: 60 Tab, Refills: 5      gabapentin (NEURONTIN) 800 mg tablet TAKE ONE TABLET BY MOUTH THREE TIMES DAILY  Qty: 90 Tab, Refills: 3    Associated Diagnoses: Polyneuropathy associated with underlying disease (HCC)      budesonide-formoterol (SYMBICORT) 160-4.5 mcg/actuation HFAA Take 1 Puff by inhalation two (2) times a day.   Qty: 3 Inhaler, Refills: 3    Associated Diagnoses: Chronic obstructive pulmonary disease with acute exacerbation (HCC)      tiotropium (SPIRIVA WITH HANDIHALER) 18 mcg inhalation capsule INHALE THE CONTENTS OF 1 CAPSULE THROUGH HANDIHALER DEVICE DAILY  Qty: 90 Cap, Refills: 3    Associated Diagnoses: Chronic obstructive pulmonary disease, unspecified COPD type (HCC)      furosemide (LASIX) 20 mg tablet Take 1 Tab by mouth daily. Qty: 30 Tab, Refills: 0      Azelastine (ASTEPRO) 0.15 % (205.5 mcg) nasal spray 1 [place] by Both Nostrils route daily. cod liver oil cap Take 1 Cap by mouth daily. albuterol (PROVENTIL VENTOLIN) 2.5 mg /3 mL (0.083 %) nebulizer solution 3 mL by Nebulization route every four (4) hours as needed for Wheezing. Qty: 1 Package, Refills: 5    Associated Diagnoses: SOB (shortness of breath)      fluticasone (FLONASE) 50 mcg/actuation nasal spray 2 Sprays by Both Nostrils route every evening. STOP taking these medications       acetaminophen (TYLENOL) 500 mg tablet Comments:   Reason for Stopping:            per medical continuation form      -Follow up in office in 2 weeks      Signed:  Asia Hernandez.  MILE Travis-BC, ONP-C  Orthopaedic Nurse Practitioner    9/21/2018  10:25 AM

## 2018-09-22 ENCOUNTER — HOME CARE VISIT (OUTPATIENT)
Dept: SCHEDULING | Facility: HOME HEALTH | Age: 68
End: 2018-09-22
Payer: MEDICARE

## 2018-09-22 PROCEDURE — 3331090001 HH PPS REVENUE CREDIT

## 2018-09-22 PROCEDURE — G0151 HHCP-SERV OF PT,EA 15 MIN: HCPCS

## 2018-09-22 PROCEDURE — 3331090002 HH PPS REVENUE DEBIT

## 2018-09-22 PROCEDURE — 400013 HH SOC

## 2018-09-23 PROCEDURE — 3331090002 HH PPS REVENUE DEBIT

## 2018-09-23 PROCEDURE — 3331090001 HH PPS REVENUE CREDIT

## 2018-09-24 VITALS
TEMPERATURE: 98.6 F | HEART RATE: 76 BPM | DIASTOLIC BLOOD PRESSURE: 58 MMHG | SYSTOLIC BLOOD PRESSURE: 95 MMHG | RESPIRATION RATE: 16 BRPM | OXYGEN SATURATION: 98 %

## 2018-09-24 PROCEDURE — 3331090002 HH PPS REVENUE DEBIT

## 2018-09-24 PROCEDURE — 3331090001 HH PPS REVENUE CREDIT

## 2018-09-25 ENCOUNTER — HOME CARE VISIT (OUTPATIENT)
Dept: SCHEDULING | Facility: HOME HEALTH | Age: 68
End: 2018-09-25
Payer: MEDICARE

## 2018-09-25 PROCEDURE — 3331090001 HH PPS REVENUE CREDIT

## 2018-09-25 PROCEDURE — G0151 HHCP-SERV OF PT,EA 15 MIN: HCPCS

## 2018-09-25 PROCEDURE — 3331090002 HH PPS REVENUE DEBIT

## 2018-09-26 ENCOUNTER — HOME CARE VISIT (OUTPATIENT)
Dept: SCHEDULING | Facility: HOME HEALTH | Age: 68
End: 2018-09-26
Payer: MEDICARE

## 2018-09-26 VITALS
OXYGEN SATURATION: 98 % | SYSTOLIC BLOOD PRESSURE: 128 MMHG | RESPIRATION RATE: 16 BRPM | TEMPERATURE: 98.3 F | HEART RATE: 75 BPM | DIASTOLIC BLOOD PRESSURE: 76 MMHG

## 2018-09-26 VITALS
DIASTOLIC BLOOD PRESSURE: 76 MMHG | HEART RATE: 76 BPM | OXYGEN SATURATION: 98 % | SYSTOLIC BLOOD PRESSURE: 125 MMHG | TEMPERATURE: 98 F

## 2018-09-26 PROCEDURE — 3331090001 HH PPS REVENUE CREDIT

## 2018-09-26 PROCEDURE — 3331090002 HH PPS REVENUE DEBIT

## 2018-09-26 PROCEDURE — G0152 HHCP-SERV OF OT,EA 15 MIN: HCPCS

## 2018-09-27 ENCOUNTER — HOME CARE VISIT (OUTPATIENT)
Dept: SCHEDULING | Facility: HOME HEALTH | Age: 68
End: 2018-09-27
Payer: MEDICARE

## 2018-09-27 PROCEDURE — 3331090001 HH PPS REVENUE CREDIT

## 2018-09-27 PROCEDURE — G0151 HHCP-SERV OF PT,EA 15 MIN: HCPCS

## 2018-09-27 PROCEDURE — 3331090002 HH PPS REVENUE DEBIT

## 2018-09-28 VITALS
DIASTOLIC BLOOD PRESSURE: 65 MMHG | HEART RATE: 77 BPM | RESPIRATION RATE: 16 BRPM | TEMPERATURE: 97.6 F | SYSTOLIC BLOOD PRESSURE: 121 MMHG | OXYGEN SATURATION: 96 %

## 2018-09-28 PROCEDURE — 3331090002 HH PPS REVENUE DEBIT

## 2018-09-28 PROCEDURE — 3331090001 HH PPS REVENUE CREDIT

## 2018-09-28 RX ORDER — FUROSEMIDE 20 MG/1
20 TABLET ORAL DAILY
Qty: 90 TAB | Refills: 1 | Status: SHIPPED | OUTPATIENT
Start: 2018-09-28 | End: 2019-03-05 | Stop reason: SDUPTHER

## 2018-09-29 PROCEDURE — 3331090001 HH PPS REVENUE CREDIT

## 2018-09-29 PROCEDURE — 3331090002 HH PPS REVENUE DEBIT

## 2018-09-30 PROCEDURE — 3331090001 HH PPS REVENUE CREDIT

## 2018-09-30 PROCEDURE — 3331090002 HH PPS REVENUE DEBIT

## 2018-10-01 PROCEDURE — 3331090001 HH PPS REVENUE CREDIT

## 2018-10-01 PROCEDURE — 3331090002 HH PPS REVENUE DEBIT

## 2018-10-02 ENCOUNTER — HOME CARE VISIT (OUTPATIENT)
Dept: SCHEDULING | Facility: HOME HEALTH | Age: 68
End: 2018-10-02
Payer: MEDICARE

## 2018-10-02 PROCEDURE — G0151 HHCP-SERV OF PT,EA 15 MIN: HCPCS

## 2018-10-02 PROCEDURE — 3331090001 HH PPS REVENUE CREDIT

## 2018-10-02 PROCEDURE — 3331090002 HH PPS REVENUE DEBIT

## 2018-10-03 VITALS
SYSTOLIC BLOOD PRESSURE: 98 MMHG | DIASTOLIC BLOOD PRESSURE: 60 MMHG | TEMPERATURE: 97.6 F | RESPIRATION RATE: 16 BRPM | OXYGEN SATURATION: 97 % | HEART RATE: 87 BPM

## 2018-10-03 PROCEDURE — 3331090002 HH PPS REVENUE DEBIT

## 2018-10-03 PROCEDURE — 3331090001 HH PPS REVENUE CREDIT

## 2018-10-04 PROCEDURE — 3331090001 HH PPS REVENUE CREDIT

## 2018-10-04 PROCEDURE — 3331090002 HH PPS REVENUE DEBIT

## 2018-10-05 PROCEDURE — 3331090002 HH PPS REVENUE DEBIT

## 2018-10-05 PROCEDURE — 3331090001 HH PPS REVENUE CREDIT

## 2018-10-06 PROCEDURE — 3331090001 HH PPS REVENUE CREDIT

## 2018-10-06 PROCEDURE — 3331090002 HH PPS REVENUE DEBIT

## 2018-10-07 PROCEDURE — 3331090001 HH PPS REVENUE CREDIT

## 2018-10-07 PROCEDURE — 3331090002 HH PPS REVENUE DEBIT

## 2018-10-08 PROCEDURE — 3331090001 HH PPS REVENUE CREDIT

## 2018-10-08 PROCEDURE — 3331090002 HH PPS REVENUE DEBIT

## 2018-10-08 NOTE — ADDENDUM NOTE
Addendum  created 10/07/18 2224 by Stacey Leija MD  
 Anesthesia Intra Blocks edited, Child order released for a procedure order, Sign clinical note

## 2018-10-08 NOTE — ANESTHESIA PROCEDURE NOTES
Peripheral Block Performed by: Alba Taylor Authorized by: Alba Taylor Pre-procedure: Indications: at surgeon's request and post-op pain management Block Type: Assessment: 
 
Injection Assessment:

## 2018-10-09 ENCOUNTER — HOME CARE VISIT (OUTPATIENT)
Dept: SCHEDULING | Facility: HOME HEALTH | Age: 68
End: 2018-10-09
Payer: MEDICARE

## 2018-10-09 VITALS
DIASTOLIC BLOOD PRESSURE: 68 MMHG | RESPIRATION RATE: 16 BRPM | TEMPERATURE: 98.6 F | HEART RATE: 78 BPM | SYSTOLIC BLOOD PRESSURE: 131 MMHG | OXYGEN SATURATION: 97 %

## 2018-10-09 PROCEDURE — 3331090001 HH PPS REVENUE CREDIT

## 2018-10-09 PROCEDURE — G0151 HHCP-SERV OF PT,EA 15 MIN: HCPCS

## 2018-10-09 PROCEDURE — 3331090002 HH PPS REVENUE DEBIT

## 2018-10-10 PROCEDURE — 3331090002 HH PPS REVENUE DEBIT

## 2018-10-10 PROCEDURE — 3331090001 HH PPS REVENUE CREDIT

## 2018-10-11 ENCOUNTER — HOME CARE VISIT (OUTPATIENT)
Dept: SCHEDULING | Facility: HOME HEALTH | Age: 68
End: 2018-10-11
Payer: MEDICARE

## 2018-10-11 PROCEDURE — G0151 HHCP-SERV OF PT,EA 15 MIN: HCPCS

## 2018-10-11 PROCEDURE — 3331090001 HH PPS REVENUE CREDIT

## 2018-10-11 PROCEDURE — 3331090002 HH PPS REVENUE DEBIT

## 2018-10-12 PROCEDURE — 3331090002 HH PPS REVENUE DEBIT

## 2018-10-12 PROCEDURE — 3331090001 HH PPS REVENUE CREDIT

## 2018-10-13 VITALS
OXYGEN SATURATION: 97 % | SYSTOLIC BLOOD PRESSURE: 127 MMHG | HEART RATE: 76 BPM | TEMPERATURE: 98.4 F | RESPIRATION RATE: 16 BRPM | DIASTOLIC BLOOD PRESSURE: 65 MMHG

## 2018-10-13 PROCEDURE — 3331090001 HH PPS REVENUE CREDIT

## 2018-10-13 PROCEDURE — 3331090002 HH PPS REVENUE DEBIT

## 2018-10-14 PROCEDURE — 3331090001 HH PPS REVENUE CREDIT

## 2018-10-14 PROCEDURE — 3331090002 HH PPS REVENUE DEBIT

## 2018-10-15 PROCEDURE — 3331090002 HH PPS REVENUE DEBIT

## 2018-10-15 PROCEDURE — 3331090001 HH PPS REVENUE CREDIT

## 2018-10-16 ENCOUNTER — HOME CARE VISIT (OUTPATIENT)
Dept: SCHEDULING | Facility: HOME HEALTH | Age: 68
End: 2018-10-16
Payer: MEDICARE

## 2018-10-16 VITALS
OXYGEN SATURATION: 98 % | TEMPERATURE: 98.6 F | RESPIRATION RATE: 16 BRPM | SYSTOLIC BLOOD PRESSURE: 136 MMHG | HEART RATE: 83 BPM | DIASTOLIC BLOOD PRESSURE: 75 MMHG

## 2018-10-16 PROCEDURE — 3331090002 HH PPS REVENUE DEBIT

## 2018-10-16 PROCEDURE — 3331090001 HH PPS REVENUE CREDIT

## 2018-10-16 PROCEDURE — G0151 HHCP-SERV OF PT,EA 15 MIN: HCPCS

## 2018-10-17 ENCOUNTER — CLINICAL SUPPORT (OUTPATIENT)
Dept: CARDIOLOGY CLINIC | Age: 68
End: 2018-10-17

## 2018-10-17 DIAGNOSIS — M25.552 PAIN OF LEFT HIP JOINT: Primary | ICD-10-CM

## 2018-10-17 PROCEDURE — 3331090002 HH PPS REVENUE DEBIT

## 2018-10-17 PROCEDURE — 3331090001 HH PPS REVENUE CREDIT

## 2018-10-18 ENCOUNTER — HOME CARE VISIT (OUTPATIENT)
Dept: SCHEDULING | Facility: HOME HEALTH | Age: 68
End: 2018-10-18
Payer: MEDICARE

## 2018-10-18 PROCEDURE — G0151 HHCP-SERV OF PT,EA 15 MIN: HCPCS

## 2018-10-18 PROCEDURE — 3331090002 HH PPS REVENUE DEBIT

## 2018-10-18 PROCEDURE — 3331090001 HH PPS REVENUE CREDIT

## 2018-10-19 VITALS
HEART RATE: 77 BPM | OXYGEN SATURATION: 98 % | DIASTOLIC BLOOD PRESSURE: 60 MMHG | SYSTOLIC BLOOD PRESSURE: 110 MMHG | RESPIRATION RATE: 16 BRPM | TEMPERATURE: 97.8 F

## 2018-10-19 PROCEDURE — 3331090001 HH PPS REVENUE CREDIT

## 2018-10-19 PROCEDURE — 3331090002 HH PPS REVENUE DEBIT

## 2018-10-20 PROCEDURE — 3331090001 HH PPS REVENUE CREDIT

## 2018-10-20 PROCEDURE — 3331090002 HH PPS REVENUE DEBIT

## 2018-10-21 PROCEDURE — 3331090002 HH PPS REVENUE DEBIT

## 2018-10-21 PROCEDURE — 3331090001 HH PPS REVENUE CREDIT

## 2018-10-22 PROCEDURE — 3331090001 HH PPS REVENUE CREDIT

## 2018-10-22 PROCEDURE — 3331090002 HH PPS REVENUE DEBIT

## 2018-10-23 ENCOUNTER — HOME CARE VISIT (OUTPATIENT)
Dept: SCHEDULING | Facility: HOME HEALTH | Age: 68
End: 2018-10-23
Payer: MEDICARE

## 2018-10-23 PROCEDURE — 3331090001 HH PPS REVENUE CREDIT

## 2018-10-23 PROCEDURE — 3331090002 HH PPS REVENUE DEBIT

## 2018-10-23 PROCEDURE — G0151 HHCP-SERV OF PT,EA 15 MIN: HCPCS

## 2018-10-24 VITALS
OXYGEN SATURATION: 98 % | RESPIRATION RATE: 16 BRPM | SYSTOLIC BLOOD PRESSURE: 121 MMHG | TEMPERATURE: 98.6 F | DIASTOLIC BLOOD PRESSURE: 67 MMHG | HEART RATE: 76 BPM

## 2018-10-24 PROCEDURE — 3331090002 HH PPS REVENUE DEBIT

## 2018-10-24 PROCEDURE — 3331090001 HH PPS REVENUE CREDIT

## 2018-10-25 ENCOUNTER — HOME CARE VISIT (OUTPATIENT)
Dept: SCHEDULING | Facility: HOME HEALTH | Age: 68
End: 2018-10-25
Payer: MEDICARE

## 2018-10-25 VITALS
SYSTOLIC BLOOD PRESSURE: 132 MMHG | HEART RATE: 78 BPM | RESPIRATION RATE: 16 BRPM | DIASTOLIC BLOOD PRESSURE: 68 MMHG | TEMPERATURE: 98.6 F | OXYGEN SATURATION: 98 %

## 2018-10-25 DIAGNOSIS — J44.1 CHRONIC OBSTRUCTIVE PULMONARY DISEASE WITH ACUTE EXACERBATION (HCC): ICD-10-CM

## 2018-10-25 PROCEDURE — 3331090002 HH PPS REVENUE DEBIT

## 2018-10-25 PROCEDURE — 3331090001 HH PPS REVENUE CREDIT

## 2018-10-25 PROCEDURE — 3331090003 HH PPS REVENUE ADJ

## 2018-10-25 PROCEDURE — G0151 HHCP-SERV OF PT,EA 15 MIN: HCPCS

## 2018-10-26 RX ORDER — BUDESONIDE AND FORMOTEROL FUMARATE DIHYDRATE 160; 4.5 UG/1; UG/1
AEROSOL RESPIRATORY (INHALATION)
Qty: 1 INHALER | Refills: 3 | Status: SHIPPED | OUTPATIENT
Start: 2018-10-26 | End: 2019-12-09 | Stop reason: SDUPTHER

## 2018-10-29 RX ORDER — COLCHICINE 0.6 MG/1
1.2 TABLET ORAL AS NEEDED
Qty: 30 TAB | Refills: 5 | Status: ON HOLD | OUTPATIENT
Start: 2018-10-29 | End: 2019-03-26 | Stop reason: CLARIF

## 2018-12-04 ENCOUNTER — HOSPITAL ENCOUNTER (OUTPATIENT)
Dept: PHYSICAL THERAPY | Age: 68
Discharge: HOME | End: 2018-12-04
Payer: MEDICARE

## 2018-12-04 PROCEDURE — 97161 PT EVAL LOW COMPLEX 20 MIN: CPT

## 2018-12-04 PROCEDURE — G8978 MOBILITY CURRENT STATUS: HCPCS

## 2018-12-04 PROCEDURE — G8979 MOBILITY GOAL STATUS: HCPCS

## 2018-12-04 PROCEDURE — 97110 THERAPEUTIC EXERCISES: CPT

## 2018-12-04 NOTE — PROGRESS NOTES
[address] OUTPATIENT physical Therapy [x]      Initial Evaluation []     30 day/10th visit progress note []     90 day/re-certification NAME: Lupe Lanier AGE: 80 y.o. GENDER: female DATE: 12/4/2018 REFERRING PHYSICIAN: López Osborne DO OBJECTIVE DATA SUMMARY:  
Medical Diagnosis: Rotator Cuff Surgery 9/19/2018 PT Diagnosis: Pain affecting function, decreased ROM Date of Onset: 9/19/2018, right rotator cuff surgery Mechanism of Injury/Chief Complaint:  Rotator cuff surgery 9/19/2018 Present Symptoms: Pain in shoulder with movement. Functional Deficits and Limitations:  
[]     Sitting:   []    Dressing:   []    Reaching: 
[]     Standing:   []     Bathing:   []    Lifting: 
[]     Walking:   []     Cooking:   []    Yardwork: 
[x]     Sleeping:   [x]     Cleaning:   [x]     Driving: 
[]     Work Tasks:  []     Recreation:   []    Other: 
HISTORY: 
Past Medical History:  
Past Medical History:  
Diagnosis Date  Arthritis   
 left shoulder  Atrial fibrillation ([facility] 6/2/2010 Dr. Charmayne Roll  CAD (coronary artery disease) stent; Dr. Charmayne Roll  Celiac disease  Chronic diastolic heart failure ([address] 09/22/2014 Dr. Charmayne Roll  Chronic kidney disease   
 per cardio note  Chronic pain   
 left thigh:  L SFA intervention by Dr. Cedric Garces 07/2018, as of 9/6/18:  still causing pain, pt to have MILLA checked per Dr. Cedric Garces note  Chronic systolic HF (heart failure) ([address] 5/10/2017  
 4/2017 EF 25-30%  COPD (chronic obstructive pulmonary disease) ([address] 6/2/2010 Dr. Kennedy Norton  Diabetes ([address] Dr. Lucía Jackson; no current medication per pt  Emphysema, unspecified ([address] Dr. Kennedy Norton  Fibroid  Frequent falls 2017  Gout  Heart failure ([address] Dr. Charmayne Roll  History of Clostridium difficile infection 5/10/2017  
 4/2017 CDiff positive  Hypertension 6/2/2010 Dr. Magnolia Cabrera  Hypertensive heart and chronic kidney disease   
 per PCP note  Hypotension 5/10/2017  Junctional tachycardia ([facility] [address] 5/10/2017 Dr. Malissa Escobar  Neuropathy  NIDDM (non-insulin dependent diabetes mellitus) 2010  PAD (peripheral artery disease) ([facility] [address] [facility]  S/P ablation of atrial fibrillation 2018  Screening mammogram 5/4/10  
 SOB (shortness of breath) 2014 Dr. Giovany Snow  SSS (sick sinus syndrome) ([facility] [facility]   
 per pacemaker form 18  Weakness   
 per pt weakness from c-diff , mulitple falls with injury to rotator cuff Past Surgical History:  
Procedure Laterality Date  CARDIAC SURG PROCEDURE UNLIST    
 3 cardiac stent placed  COLONOSCOPY N/A 2017 COLONOSCOPY with biopsy performed by Clementine Navarrete MD at [facility] AMBULATORY OR  
 HX AFIB ABLATION  2018  
 has had 2 ablations per patient  HX  SECTION    
 HX HEART CATHETERIZATION  2018  HX OTHER SURGICAL    
 adrenal gland removed  HX PACEMAKER Left [employer] model: Zaire Ramirez  NC EXCISE ADRENAL GLAND  VASCULAR SURGERY PROCEDURE UNLIST  2018 Left SFA intervention ; Dr. Cohn Postin Precautions: Post surgery Current Medications:  Tylenol Prior Level of Function/Home Situation: Independent, Personal factors and/or comorbidities impacting plan of care: Was in hospital for heart and got c diff. Lost a lot of weight and weak and had falls. Has regained some of weight and strength. Was in sling post surgery for 2 months, now out and driving but can not use right UE. Social/Work History:  Retired Previous Therapy:  Yes here for right shoulder prior to rotator cuff surgery SUBJECTIVE:  
Pt reports significant pain and limited motion right G-H. Pt 11 weeks post surgery. Had OT at home for 6 weeks post surgery. No Therapy since that time Patients goals for therapy: Get back to normal OBJECTIVE DATA SUMMARY:  
EXAMINATION/PRESENTATION/DECISION MAKING:  
Pain: Location: 
Quality: sharp Now: 7/10 Best: Worst: 
Factors that improve pain: rest lying down Posture:  
Rounded shoulders and head forward. Strength:  
Unable to test right muscle function d/t pain Range of Motion:  
Supine Passive flexion 80 degrees ER       10 degrees Spinal Assessment:  
WNL Joint Mobility: Cx segments hypomobile with distraction and SG 
 
Palpation:  
TTP right UT, infraspinatus, rhomboids Neurologic Assessment: 
 Tone: WNL Sensation: not tested Reflexes: not tested Special Tests:  
none Mobility: 
 Transitional Movements: WNL  Gait: WNL Balance:  Pt reports history of falls d/t episode of c diff, lost a lot of weight and weakened. Functional Measure: In compliance with CMSs Claims Based Outcome Reporting, the following G-code set was chosen for this patient based on their primary functional limitation being treated: The outcome measure chosen to determine the severity of the functional limitation was the Quick DASH with a score of 53/55 which was correlated with the impairment scale. ? Mobility - Walking and Moving Around:  
  - CURRENT STATUS: CM - 80%-99% impaired, limited or restricted  - GOAL STATUS: CI - 1%-19% impaired, limited or restricted  - D/C STATUS:  ---------------To be determined--------------- Physical Therapy Evaluation Charge Determination History Examination Presentation Decision-Making MEDIUM  Complexity : 1-2 comorbidities / personal factors will impact the outcome/ POC  LOW Complexity : 1-2 Standardized tests and measures addressing body structure, function, activity limitation and / or participation in recreation  LOW Complexity : Stable, uncomplicated  LOW Complexity : FOTO score of  Based on the above components, the patient evaluation is determined to be of the following complexity level: LOW  
 
TREATMENT/INTERVENTION: 
 Modalities (Rationale): MHP Cx and right shoulder post treatment to decrease pain and muscle guarding Therapeutic Exercises: HEP  Shoulder shrug, Shoulder blade squeeze, flexion stretch over table, wt bearing on elbow in sitting, supine ER with cane, left side lying ER/IR. Clinic Activities Seated Shoulder shrug Shoulder blade squeeze Pulleys, to 80 degrees flexion, focus on pull down with right Manual Therapy: TTp right infraspinatus, rhomboids, levator. Neuro Re-Education: 
Discussed importance of sitting posture. Instructed pt in correct sitting posture. Exercise in pain free range at this time. Balance Training: 
none Ambulation/Gait Training: 
none Activity tolerance and post treatment pain report:  
Poor tolerance for right G-H exercises. Education: 
[x]     Home exercise program provided. Education was provided to the patient on the following topics: importance of exercises. Patient verbalized understanding of the topics presented. ASSESSMENT:  
Beau Avila is a 80 y.o. female who presents with right shoulder pain following Rotator Cuff repair surgery. Physical therapy problems based on objective data include: pain affecting function and decrease ROM . Patient will benefit from skilled intervention to address these impairments. Rehabilitation potential is considered to be Good. Factors which may influence rehabilitation potential include Chronicity of right shoulder pain . Patient will benefit from 12 physical therapy visits over 6 weeks to optimize improvement in these areas. PLAN OF CARE:  
Recommendations and Planned Interventions: 
[x]     Therapeutic Activities  [x]     Heat/Ice [x]     Therapeutic Exercises  []     Ultrasound 
[]     Gait training  [x]     E-stim 
[]     Balance training  [x]     Home exercise program 
[x]     Manual Therapy  [x]     TENS [x]     Neuro Re-Ed  []     Edema management [x]     Posture/Biomechanics  [x]     Pain management 
[x]     Traction  []     Other: 
 
Frequency/Duration:  Patient will be followed by physical therapy 2 times a week for  6 weeks to address goals. GOALS Short term goals Time frame: 3 weeks 1. Patient will be compliance and independent with the initial HEP as evidenced by being able to perform without cuing. 2. Patient will report a 50% improvement in symptoms. 3. Patient report a 50% improvement in sleeping. 4. Patient will have a 50 degree increase in G-H ROM . 5. Patient will tolerate 15 minutes of clinic activities before onset of symptoms. Long term goals Time frame: 6 weeks 1. Patient will reports pain level decreased to 2/10 to allow increased ease of movement. 2. Patient will have an improved score on the Quick DASH outcome measure by 30 points to demonstrate an increase in functional activity tolerance. 3. Patient will be independent in final individualized HEP. 4. Patient will return to normal activities without being limited by symptoms. 5. Patient will sleep 6-8 hours without being interrupted by pain. [x]     Patient has participated in goal setting and agrees to work toward plan of care. []     Patient was instructed to call if questions or concerns arise. Thank you for this referral. 
Jesu Burch, PT Time Calculation: 55 mins Patient Time in clinic: 
 Start Time: 5453 Stop Time: 6154 TREATMENT PLAN EFFECTIVE DATES:  
12/4/2018 TO 2/4/2019 I have read the above plan of care for Felipe Plummer and certify the need for skilled physical therapy services. Physician Signature: ____________________________________________________ Date: _________________________________________________________________

## 2018-12-07 ENCOUNTER — HOSPITAL ENCOUNTER (OUTPATIENT)
Dept: PHYSICAL THERAPY | Age: 68
Discharge: HOME | End: 2018-12-07
Payer: MEDICARE

## 2018-12-07 PROCEDURE — 97140 MANUAL THERAPY 1/> REGIONS: CPT | Performed by: PHYSICAL THERAPIST

## 2018-12-07 PROCEDURE — 97110 THERAPEUTIC EXERCISES: CPT | Performed by: PHYSICAL THERAPIST

## 2018-12-07 NOTE — PROGRESS NOTES
[facility] 
[address] [place] [address] [address] [place] [address] OUTPATIENT physical Therapy DAILY Treatment NOTe Visit: 2 NAME: Molina Jones AGE: 80 y.o. GENDER: female DATE: 12/7/2018 REFERRING PHYSICIAN: Reynaldo Benson DO 
 
 
GOALS Short term goals Time frame: 3 weeks 1. Patient will be compliant and independent with the initial HEP as evidenced by being able to perform without cuing. 2. Patient will report a 50% improvement in symptoms. 3. Patient report a 50% improvement in sleeping. 4. Patient will have a 50 degree increase in G-H ROM . 5. Patient will tolerate 15 minutes of clinic activities before onset of symptoms.  
  
Long term goals Time frame: 6 weeks 1. Patient will report pain level decrease to 2/10 to allow increased ease of movement. 2. Patient will have an improved score on the Quick DASH outcome measure by 30 points to demonstrate an increase in functional activity tolerance. 3. Patient will be independent in final individualized HEP. 4. Patient will return to normal activities without being limited by symptoms. 5. Patient will sleep 6-8 hours without being interrupted by pain. SUBJECTIVE:  
\"I didn't have any transportation so that's why I wasn't able to do therapy sooner. I just started driving myself again last week. \" 
 
Pain In: 7/10  right shoulder OBJECTIVE DATA SUMMARY:  
 
S/p right Rotator Cuff Repair on 9/19/18 PMH: Pacemaker Objective data from initial evaluation: EXAMINATION/PRESENTATION/DECISION MAKING:  
Pain: 
Location: 
Quality: sharp Now: 7/10 Worst: 10/10 Factors that improve pain: rest lying down 
  
Posture:  
Rounded shoulders and head forward. 
  
Strength:  
Unable to test right muscle function d/t pain Dynamometer: 
Right : 25 lbs Left : 35 lbs 
  
Range of Motion:  
Passive shoulder ROM in supine: 12/7/18 Flexion: 100 degrees Abduction: 97 degrees ER: 30 degrees (in 90 degrees abduction) IR: 75 degree (in 90 degrees abduction) Spinal Assessment: WNL 
  
Joint Mobility: Cx segments hypomobile with distraction and SG 
  
Palpation:  
TTP right UT, infraspinatus, rhomboids 
  
Neurologic Assessment: 
             Tone: WNL Sensation: not tested Reflexes: not tested 
  
Special Tests:  
none 
  
Mobility: 
             Transitional Movements: WNL Gait: WNL 
  
Balance:  Pt reports history of falls d/t episode of c diff, lost a lot of weight and weakened. 
  
Functional Measure:  
  
In compliance with CMSs Claims Based Outcome Reporting, the following G-code set was chosen for this patient based on their primary functional limitation being treated: 
  
The outcome measure chosen to determine the severity of the functional limitation was the Quick DASH with a score of 53/55 which was correlated with the impairment scale. 
  
· Mobility - Walking and Moving Around:  
              - CURRENT STATUS:     CM - 80%-99% impaired, limited or restricted  - GOAL STATUS:            CI - 1%-19% impaired, limited or restricted  - D/C STATUS:                       ---------------To be determined---------------  
  
TREATMENT/INTERVENTION: 
Modalities (Rationale): MHP Cx and right shoulder for 10 minutes post treatment to decrease pain and muscle guarding; no skin irritation noted 
  Therapeutic Exercises: HEP  Shoulder shrug, Shoulder blade squeeze, flexion stretch over table, wt bearing on elbow in sitting, supine ER with cane, left side lying ER/IR. 
  
Clinic Activities in BOLD performed this date: 
 
UBE: 5 minutes, level 1, seat at 7; MHP for pain relief 
  
Seated Shoulder shrugs: 10 reps Shoulder blade squeeze: 15 reps Pulleys, shoulder flexion and abduction: 10 reps each Shoulder flexion stretch over blue physioball: 10 reps Isotonic shoulder ER/IR with 1# weight: 2 sets of 15 reps Cervical spine AROM: 10 reps Standing:  
Pendulums: 10 reps CW/CWW Low saw with 1# Shoulder extension with cane: 10 reps Shoulder ER with cane: 10 reps Supine:  
Shoulder flexion with cane; very difficult for patient  
  
 Manual Therapy: 
PA and AP GH joint mobs, inferior glide, grade 1 PROM with shaking into shoulder flexion, abduction, IR, and ER 
  
Neuro Re-Education: 
Discussed importance of sitting posture. Instructed pt in correct sitting posture. Exercise in pain free range at this time. Activity tolerance and post treatment pain report:  
Fair; improved tolerance to manual therapy and exercises today Pain Out: 7/10 Education: 
Education was provided to the patient on the following topics. [x]    No changes were made to the home exercise program. 
[]    The following changes were made to the home exercise program 
Patient verbalized understanding of the topics presented. ASSESSMENT:  
Patient returns following initial evaluation. Patient s/p right rotator cuff repair on 9/19/18. Patient had 6 weeks of home health OT and no therapy until 12/4/18 for her evaluation here. Patient had difficulty with transportation but is now driving herself again. Patient reports that she has been performing her exercises at home. Patient instructed to perform exercises at home after her shower to decrease pain and improve tolerance. Patient significantly limited in her right shoulder AROM. Patient was able to tolerate joint mobs and PROM stretches to right shoulder today. Patient educated on deep breathing during stretches. Patient also educated on the importance of stretches to prevent frozen shoulder and increased pain. Patient tolerated additional exercises today well. Patients progression toward goals is as follows: 
[x]     Improving appropriately and progressing toward goals 
[]     Improving slowly and progressing toward goals []     Not making progress toward goals and plan of care will be adjusted PLAN OF CARE:  
Patient continues to benefit from skilled intervention to address the above impairments. [x]    Continue treatment per established plan of care. []     Recommend the following changes to the plan of care Recommendations/Intent for next treatment: advance stretches and exercises as patient able Jose Luis June, PT Time Calculation: 80 mins Patient Time in clinic: 
 Start Time: 4325 Stop Time: 1100

## 2018-12-11 ENCOUNTER — HOSPITAL ENCOUNTER (OUTPATIENT)
Dept: PHYSICAL THERAPY | Age: 68
Discharge: HOME | End: 2018-12-11
Payer: MEDICARE

## 2018-12-11 NOTE — PROGRESS NOTES
[facility] 
[address] [place] [address] [address] [facility], [address] [facility] OUTPATIENT physical Therapy 12/11/2018: 
Naveed Shukla was not seen on this date for physical therapy for the following reasons: 
 
[x]     Patient called to cancel the visit for the following reasons: weather (snow) []     Patient missed the visit and did not call to cancel.  
 
Pallavi Frost, PT

## 2018-12-14 ENCOUNTER — HOSPITAL ENCOUNTER (OUTPATIENT)
Dept: PHYSICAL THERAPY | Age: 68
Discharge: HOME | End: 2018-12-14
Payer: MEDICARE

## 2018-12-14 NOTE — PROGRESS NOTES
[facility]  [address], [address] [facility]    OUTPATIENT physical Therapy      12/14/2018:  Shreya Garsia was not seen on this date for physical therapy for the following reasons:    [x]     Patient called to cancel the visit for the following reasons: Low back pain  []     Patient missed the visit and did not call to cancel.     Burt Mosuqera, PT

## 2018-12-17 ENCOUNTER — HOSPITAL ENCOUNTER (OUTPATIENT)
Dept: PHYSICAL THERAPY | Age: 68
Discharge: HOME | End: 2018-12-17
Payer: MEDICARE

## 2018-12-17 PROCEDURE — 97110 THERAPEUTIC EXERCISES: CPT | Performed by: PHYSICAL THERAPIST

## 2018-12-17 PROCEDURE — 97140 MANUAL THERAPY 1/> REGIONS: CPT | Performed by: PHYSICAL THERAPIST

## 2018-12-17 NOTE — PROGRESS NOTES
[facility]  [address], [address] [facility]    OUTPATIENT physical Therapy DAILY Treatment NOTe  Visit: 3    NAME: Corinne Grebe AGE: 80 y.o. GENDER: female  DATE: 12/17/2018  REFERRING PHYSICIAN: Roxanne BRENNAN, DO      GOALS  Short term goals  Time frame: 3 weeks  1. Patient will be compliant and independent with the initial HEP as evidenced by being able to perform without cuing. 2. Patient will report a 50% improvement in symptoms. 3. Patient report a 50% improvement in sleeping. 4. Patient will have a 50 degree increase in G-H ROM . 5. Patient will tolerate 15 minutes of clinic activities before onset of symptoms.      Long term goals  Time frame: 6 weeks  1. Patient will report pain level decrease to 2/10 to allow increased ease of movement. 2. Patient will have an improved score on the Quick DASH outcome measure by 30 points to demonstrate an increase in functional activity tolerance. 3. Patient will be independent in final individualized HEP. 4. Patient will return to normal activities without being limited by symptoms. 5. Patient will sleep 6-8 hours without being interrupted by pain. SUBJECTIVE:   \"I've been working on the exercises. \"    Pain In: 7/10  right shoulder     OBJECTIVE DATA SUMMARY:     S/p right Rotator Cuff Repair on 9/19/18  PMH: Pacemaker    Objective data from initial evaluation:   EXAMINATION/PRESENTATION/DECISION MAKING:   Pain:  Location:  Quality: sharp  Now: 7/10  Worst: 10/10  Factors that improve pain: rest lying down     Posture:   Rounded shoulders and head forward.     Strength:   Unable to test right muscle function d/t pain     Dynamometer:  Right : 25 lbs  Left : 35 lbs     Range of Motion:   Passive shoulder ROM in supine: 12/7/18  Flexion: 100 degrees  Abduction: 97 degrees  ER: 30 degrees (in 90 degrees abduction)  IR: 75 degree (in 90 degrees abduction)    Spinal Assessment:    WNL     Joint Mobility:   Cx segments hypomobile with distraction and SG     Palpation:   TTP right UT, infraspinatus, rhomboids     Neurologic Assessment:               Tone: WNL               Sensation: not tested                Reflexes: not tested     Special Tests:   none     Mobility:               Transitional Movements: WNL                Gait: WNL     Balance:  Pt reports history of falls d/t episode of c diff, lost a lot of weight and weakened.     Functional Measure:      In compliance with CMSs Claims Based Outcome Reporting, the following G-code set was chosen for this patient based on their primary functional limitation being treated:     The outcome measure chosen to determine the severity of the functional limitation was the Quick DASH with a score of 53/55 which was correlated with the impairment scale.     · Mobility - Walking and Moving Around:                 - CURRENT STATUS:     CM - 80%-99% impaired, limited or restricted                - GOAL STATUS:            CI - 1%-19% impaired, limited or restricted               - D/C STATUS:                       ---------------To be determined---------------      TREATMENT/INTERVENTION:  Modalities (Rationale): MHP Cx and right shoulder for 10 minutes post treatment to decrease pain and muscle guarding; no skin irritation noted     Therapeutic Exercises:  HEP  Shoulder shrug, Shoulder blade squeeze, flexion stretch over table, wt bearing on elbow in sitting, supine ER with cane, left side lying ER/IR.   Added to HEP 12/17/18: Rows and pull downs with yellow TB; isotonic shoulder IR/ER (1# weight); bicep curls (1-2# weight); pendulums      Clinic Activities in BOLD performed this date:    UBE: 5 minutes, level 1, seat at 7; MHP for pain relief     Seated  Shoulder shrugs: 10 reps  Shoulder blade squeeze: 15 reps  Pulleys, shoulder flexion and abduction: 10 reps each  Shoulder flexion stretch over blue physioball: 10 reps  Isotonic shoulder ER/IR with 1# weight: 2 sets of 15 reps  Bicep curls 1# weight: 10 reps  Cervical spine AROM: 10 reps    Standing:   Pendulums: 10 reps CW/CWW  Low saw with 1#  Shoulder extension with cane: 10 reps  Shoulder ER with cane: 10 reps  Rows with yellow TB: 10 reps  Pull downs with yellow TB: 20 reps  Crossover pull downs with yellow TB: 10 reps  Wall ladder (23): 2 reps    Supine:   Shoulder flexion with cane; very difficult for patient       Manual Therapy:  PA and AP GH joint mobs, inferior glide, grade 1  PROM with shaking into shoulder flexion, abduction, IR, and ER     Neuro Re-Education:  Discussed importance of sitting posture. Instructed pt in correct sitting posture. Exercise in pain free range at this time. Activity tolerance and post treatment pain report:   Fair; improved tolerance to manual therapy and exercises  Pain Out: 7/10    Education:  Education was provided to the patient on the following topics. []    No changes were made to the home exercise program.  [x]    The following changes were made to the home exercise program: added to HEP (see above)  Patient verbalized understanding of the topics presented. ASSESSMENT:   Patient s/p right rotator cuff repair on 9/19/18. Patient had 6 weeks of home health OT and no therapy until 12/4/18 for her evaluation here. Patient had difficulty with transportation but is now driving herself again. Patient reports that she has been performing her exercises at home. Patient instructed to perform exercises at home after her shower to decrease pain and improve tolerance. Patient tolerated additional exercises well today; added to HEP this date. Patient significantly limited in her right shoulder AROM. Patient was able to tolerate joint mobs and PROM stretches to right shoulder today. Patient educated on the importance of stretches to prevent frozen shoulder and increased pain.      Patients progression toward goals is as follows:  [x]     Improving appropriately and progressing toward goals  []     Improving slowly and progressing toward goals  []     Not making progress toward goals and plan of care will be adjusted    PLAN OF CARE:   Patient continues to benefit from skilled intervention to address the above impairments. [x]    Continue treatment per established plan of care.   []     Recommend the following changes to the plan of care    Recommendations/Intent for next treatment: advance stretches and exercises as patient able    Ishaan Olson, PT   Time Calculation: 57 mins  Patient Time in clinic:   Start Time: [address] [facility]   Stop Time: [phone]

## 2018-12-19 DIAGNOSIS — G63 POLYNEUROPATHY ASSOCIATED WITH UNDERLYING DISEASE (HCC): ICD-10-CM

## 2018-12-19 RX ORDER — GABAPENTIN 800 MG/1
TABLET ORAL
Qty: 90 TAB | Refills: 3 | Status: SHIPPED | OUTPATIENT
Start: 2018-12-19 | End: 2019-04-19 | Stop reason: SDUPTHER

## 2018-12-21 ENCOUNTER — HOSPITAL ENCOUNTER (OUTPATIENT)
Dept: PHYSICAL THERAPY | Age: 68
Discharge: HOME | End: 2018-12-21
Payer: MEDICARE

## 2018-12-21 PROCEDURE — 97110 THERAPEUTIC EXERCISES: CPT | Performed by: PHYSICAL THERAPIST

## 2018-12-21 PROCEDURE — 97140 MANUAL THERAPY 1/> REGIONS: CPT | Performed by: PHYSICAL THERAPIST

## 2018-12-21 NOTE — PROGRESS NOTES
[facility]  [address], [address] [facility]    OUTPATIENT physical Therapy DAILY Treatment NOTe  Visit: 4    NAME: Agapito Espinoza AGE: 80 y.o. GENDER: female  DATE: 12/21/2018  REFERRING PHYSICIAN: Soren BRENNAN, DO      GOALS  Short term goals  Time frame: 3 weeks  1. Patient will be compliant and independent with the initial HEP as evidenced by being able to perform without cuing. 2. Patient will report a 50% improvement in symptoms. 3. Patient report a 50% improvement in sleeping. 4. Patient will have a 50 degree increase in G-H ROM . 5. Patient will tolerate 15 minutes of clinic activities before onset of symptoms.      Long term goals  Time frame: 6 weeks  1. Patient will report pain level decrease to 2/10 to allow increased ease of movement. 2. Patient will have an improved score on the Quick DASH outcome measure by 30 points to demonstrate an increase in functional activity tolerance. 3. Patient will be independent in final individualized HEP. 4. Patient will return to normal activities without being limited by symptoms. 5. Patient will sleep 6-8 hours without being interrupted by pain. SUBJECTIVE:   \"It's coming along\"    Pain In: 6/10  right shoulder     OBJECTIVE DATA SUMMARY:     S/p right Rotator Cuff Repair on 9/19/18  PMH: Pacemaker    Objective data from initial evaluation:   EXAMINATION/PRESENTATION/DECISION MAKING:   Pain:  Location:  Quality: sharp  Now: 7/10  Worst: 10/10  Factors that improve pain: rest lying down     Posture:   Rounded shoulders and head forward.     Strength:   Unable to test right muscle function d/t pain     Dynamometer:  Right : 25 lbs  Left : 35 lbs     Range of Motion:   Passive shoulder ROM in supine: 12/7/18  Flexion: 100 degrees  Abduction: 97 degrees  ER: 30 degrees (in 90 degrees abduction)  IR: 75 degree (in 90 degrees abduction)    Spinal Assessment:    WNL     Joint Mobility:   Cx segments hypomobile with distraction and SG     Palpation:   TTP right UT, infraspinatus, rhomboids     Neurologic Assessment:               Tone: WNL               Sensation: not tested                Reflexes: not tested     Special Tests:   none     Mobility:               Transitional Movements: WNL                Gait: WNL     Balance:  Pt reports history of falls d/t episode of c diff, lost a lot of weight and weakened.     Functional Measure:      In compliance with CMSs Claims Based Outcome Reporting, the following G-code set was chosen for this patient based on their primary functional limitation being treated:     The outcome measure chosen to determine the severity of the functional limitation was the Quick DASH with a score of 53/55 which was correlated with the impairment scale.     · Mobility - Walking and Moving Around:                 - CURRENT STATUS:     CM - 80%-99% impaired, limited or restricted                - GOAL STATUS:            CI - 1%-19% impaired, limited or restricted               - D/C STATUS:                       ---------------To be determined---------------      TREATMENT/INTERVENTION:  Modalities (Rationale): Cold pack to neck and right shoulder for 10 minutes post treatment to decrease pain and muscle guarding; no skin irritation noted     Therapeutic Exercises:  HEP  Shoulder shrug, Shoulder blade squeeze, flexion stretch over table, wt bearing on elbow in sitting, supine ER with cane, left side lying ER/IR.   Added to HEP 12/17/18: Rows and pull downs with yellow TB; isotonic shoulder IR/ER (1# weight); bicep curls (1-2# weight); pendulums      Clinic Activities in BOLD performed this date:    UBE: 5 minutes, level 1, seat at 7; MHP for pain relief     Seated  Shoulder shrugs: 10 reps  Shoulder blade squeeze: 15 reps  Pulleys, shoulder flexion and abduction: 10 reps each  Shoulder flexion stretch over blue physioball: 10 reps  Isotonic shoulder ER/IR with 1# weight: 2 sets of 15 reps  Bicep curls 1# weight: 10 reps  Cervical spine AROM: 10 reps    Standing:   Pendulums: 10 reps CW/CWW  Low saw with 1#: 2 sets of 12 reps  Shoulder extension with cane: 10 reps  Shoulder ER with cane: 10 reps  Rows with yellow TB: 2 sets of 10 reps  Pull downs with yellow TB: 2 sets of 10 reps  Crossover pull downs with yellow TB: 2 sets of 10 reps  Wall ladder (23): 2 reps    Supine:   Shoulder flexion with cane; very difficult for patient       Manual Therapy:  PA and AP GH joint mobs, inferior glide, grade 1  PROM with shaking into shoulder flexion, abduction, IR, and ER  Instrument assisted soft tissue mobilization to bilateral UT and levator scapulae. Patient educated on purpose and affect of intervention. Patient visualized affect post intervention and verbalized good understanding.     Neuro Re-Education:  Discussed importance of sitting posture. Instructed pt in correct sitting posture. Exercise in pain free range at this time. Activity tolerance and post treatment pain report:   Fair  Pain Out: 6/10    Education:  Education was provided to the patient on the following topics. [x]    No changes were made to the home exercise program.  []    The following changes were made to the home exercise program  Patient verbalized understanding of the topics presented. ASSESSMENT:   Patient s/p right rotator cuff repair on 9/19/18. Patient had 6 weeks of home health OT and no therapy until 12/4/18 for her evaluation here. Patient had difficulty with transportation but is now driving herself again. Patient reports that she has been performing her exercises at home. Patient instructed to perform exercises at home after her shower to decrease pain and improve tolerance. Patient tolerated clinic exercises and manual therapy well. Pain relief with IASTM. Patient significantly limited in her right shoulder A/AAROM. Patient educated on the importance of stretches to prevent frozen shoulder and increased pain. Patients progression toward goals is as follows:  [x]     Improving appropriately and progressing toward goals  []     Improving slowly and progressing toward goals  []     Not making progress toward goals and plan of care will be adjusted    PLAN OF CARE:   Patient continues to benefit from skilled intervention to address the above impairments. [x]    Continue treatment per established plan of care.   []     Recommend the following changes to the plan of care    Recommendations/Intent for next treatment: advance stretches and exercises as patient able    Carlota Pringle, PT   Time Calculation: 35 mins  Patient Time in clinic:   Start Time: 0930   Stop Time: 1004

## 2018-12-24 ENCOUNTER — DOCUMENTATION ONLY (OUTPATIENT)
Dept: CARDIOLOGY CLINIC | Age: 68
End: 2018-12-24

## 2018-12-24 ENCOUNTER — HOSPITAL ENCOUNTER (OUTPATIENT)
Dept: PHYSICAL THERAPY | Age: 68
Discharge: HOME | End: 2018-12-24
Payer: MEDICARE

## 2018-12-27 ENCOUNTER — HOSPITAL ENCOUNTER (OUTPATIENT)
Dept: PHYSICAL THERAPY | Age: 68
Discharge: HOME | End: 2018-12-27
Payer: MEDICARE

## 2018-12-27 PROCEDURE — 97110 THERAPEUTIC EXERCISES: CPT | Performed by: PHYSICAL THERAPIST

## 2018-12-27 PROCEDURE — 97140 MANUAL THERAPY 1/> REGIONS: CPT | Performed by: PHYSICAL THERAPIST

## 2018-12-27 NOTE — PROGRESS NOTES
[facility]  [address], [address] [facility]    OUTPATIENT physical Therapy DAILY Treatment NOTe  Visit: 5    NAME: Osman Mendosa AGE: 80 y.o. GENDER: female  DATE: 12/27/2018  REFERRING PHYSICIAN: Christine BRENNAN, DO      GOALS  Short term goals  Time frame: 3 weeks  1. Patient will be compliant and independent with the initial HEP as evidenced by being able to perform without cuing. 2. Patient will report a 50% improvement in symptoms. 3. Patient report a 50% improvement in sleeping. 4. Patient will have a 50 degree increase in G-H ROM . 5. Patient will tolerate 15 minutes of clinic activities before onset of symptoms.      Long term goals  Time frame: 6 weeks  1. Patient will report pain level decrease to 2/10 to allow increased ease of movement. 2. Patient will have an improved score on the Quick DASH outcome measure by 30 points to demonstrate an increase in functional activity tolerance. 3. Patient will be independent in final individualized HEP. 4. Patient will return to normal activities without being limited by symptoms. 5. Patient will sleep 6-8 hours without being interrupted by pain. SUBJECTIVE:   \"It's sore at my biceps this morning.  That only started today\"    Pain In: 8/10 right shoulder     OBJECTIVE DATA SUMMARY:     S/p right Rotator Cuff Repair on 9/19/18  PMH: Pacemaker    Objective data from initial evaluation:   EXAMINATION/PRESENTATION/DECISION MAKING:   Pain:  Location:  Quality: sharp  Now: 7/10  Worst: 10/10  Factors that improve pain: rest lying down     Posture:   Rounded shoulders and head forward.     Strength:   Unable to test right muscle function d/t pain     Dynamometer:  Right : 25 lbs  Left : 35 lbs     Range of Motion:   Passive shoulder ROM in supine: 12/7/18  Flexion: 100 degrees  Abduction: 97 degrees  ER: 30 degrees (in 90 degrees abduction)  IR: 75 degree (in 90 degrees abduction)    Passive shoulder ROM in supine: 12/27/18  Flexion: 130 degrees  Abduction: 112 degrees  ER: 30 degrees (in 90 degrees abduction)  IR: 75 degree (in 90 degrees abduction)    Spinal Assessment: WNL     Joint Mobility:   Cx segments hypomobile with distraction and SG     Palpation:   TTP right UT, infraspinatus, rhomboids     Neurologic Assessment:               Tone: WNL               Sensation: not tested                Reflexes: not tested     Special Tests:   none     Mobility:               Transitional Movements: WNL                Gait: WNL     Balance:  Pt reports history of falls d/t episode of c diff, lost a lot of weight and weakened.     Functional Measure:      In compliance with CMSs Claims Based Outcome Reporting, the following G-code set was chosen for this patient based on their primary functional limitation being treated:     The outcome measure chosen to determine the severity of the functional limitation was the Quick DASH with a score of 53/55 which was correlated with the impairment scale.     · Mobility - Walking and Moving Around:                 - CURRENT STATUS:     CM - 80%-99% impaired, limited or restricted                - GOAL STATUS:            CI - 1%-19% impaired, limited or restricted               - D/C STATUS:                       ---------------To be determined---------------      TREATMENT/INTERVENTION:  Modalities (Rationale): MHP to neck and right shoulder for 10 minutes post treatment to decrease pain and muscle guarding; no skin irritation noted     Therapeutic Exercises:  HEP  Shoulder shrug, Shoulder blade squeeze, flexion stretch over table, wt bearing on elbow in sitting, supine ER with cane, left side lying ER/IR.   Added to HEP 12/17/18: Rows and pull downs with yellow TB; isotonic shoulder IR/ER (1# weight); bicep curls (1-2# weight); pendulums      Clinic Activities in BOLD performed this date:    UBE: 5 minutes, level 1, seat at 7; MHP for pain relief     Seated  Shoulder shrugs: 10 reps  Shoulder blade squeeze: 15 reps  Pulleys, shoulder flexion and abduction: 10 reps each  Shoulder flexion stretch over blue physioball: 10 reps  Isotonic shoulder ER/IR with 1# weight: 2 sets of 15 reps  Bicep curls 1# weight: 10 reps  Cervical spine AROM: 10 reps    Standing:   Pendulums: 10 reps CW/CWW  Low saw with 1#: 2 sets of 12 reps  Shoulder extension with cane: 10 reps  Shoulder ER with cane: 10 reps  Rows with red TB: 2 sets of 10 reps  Pull downs with red TB: 2 sets of 10 reps  Crossover pull downs with yellow TB: 2 sets of 10 reps  Wall ladder (23): 2 reps    Supine:   Shoulder flexion with cane; very difficult for patient      Manual Therapy:  PA and AP GH joint mobs, inferior glide, grade 1  PROM with shaking into shoulder flexion, abduction, IR, and ER  Instrument assisted soft tissue mobilization to bilateral UT and levator scapulae. Patient educated on purpose and affect of intervention. Patient visualized affect post intervention and verbalized good understanding.     Neuro Re-Education:  Discussed importance of sitting posture. Instructed pt in correct sitting posture. Exercise in pain free range at this time. Activity tolerance and post treatment pain report:   Fair  Pain Out: 6/10    Education:  Education was provided to the patient on the following topics. [x]    No changes were made to the home exercise program.  []    The following changes were made to the home exercise program  Patient verbalized understanding of the topics presented. ASSESSMENT:   Patient s/p right rotator cuff repair on 9/19/18. Patient had 6 weeks of home health OT and no therapy until 12/4/18 for her evaluation here. Patient reports that she has been performing her exercises at home. Patient instructed to perform exercises at home after her shower to decrease pain and improve tolerance. Patient tolerated clinic exercises and manual therapy well.   Patient with improvements in PROM of right shoulder flexion and abduction since evaluation. Most limited in shoulder ER ROM. Patient continues to have difficulty with reaching forward, overhead, and behind head. Patient will continue to greatly benefit from physical therapy to improve right shoulder strength and ROM in order to increased overall function with ADLs and household tasks. Patients progression toward goals is as follows:  [x]     Improving appropriately and progressing toward goals  []     Improving slowly and progressing toward goals  []     Not making progress toward goals and plan of care will be adjusted    PLAN OF CARE:   Patient continues to benefit from skilled intervention to address the above impairments. [x]    Continue treatment per established plan of care.   []     Recommend the following changes to the plan of care    Recommendations/Intent for next treatment: advance stretches and exercises as patient able    Patricia Bee, PT   Time Calculation: 40 mins  Patient Time in clinic:   Start Time: 451 [facility]   Stop Time: 21 [phone]

## 2019-01-02 ENCOUNTER — HOSPITAL ENCOUNTER (OUTPATIENT)
Dept: PHYSICAL THERAPY | Age: 69
Discharge: HOME | End: 2019-01-02
Payer: MEDICARE

## 2019-01-02 NOTE — PROGRESS NOTES
[facility] 
[address], [address] [facility] OUTPATIENT physical Therapy 1/2/2019: 
Mónica Clark was not seen on this date for physical therapy for the following reasons: 
 
[x]     Patient called to cancel the visit for the following reasons: swelling in legs 
[]     Patient missed the visit and did not call to cancel.  
 
Chin Green, PT

## 2019-01-04 ENCOUNTER — HOSPITAL ENCOUNTER (OUTPATIENT)
Dept: PHYSICAL THERAPY | Age: 69
Discharge: HOME | End: 2019-01-04
Payer: MEDICARE

## 2019-01-04 NOTE — PROGRESS NOTES
[address], [address] [facility] OUTPATIENT physical Therapy 1/4/2019: 
Barry Huitron was not seen on this date for physical therapy for the following reasons: 
 
[x]     Patient called to cancel the visit for the following reasons: sick []     Patient missed the visit and did not call to cancel.  
 
Holli Kennedy, PT

## 2019-01-15 ENCOUNTER — HOSPITAL ENCOUNTER (OUTPATIENT)
Dept: PHYSICAL THERAPY | Age: 69
Discharge: HOME | End: 2019-01-15
Payer: MEDICARE

## 2019-01-18 ENCOUNTER — HOSPITAL ENCOUNTER (OUTPATIENT)
Dept: PHYSICAL THERAPY | Age: 69
Discharge: HOME | End: 2019-01-18
Payer: MEDICARE

## 2019-01-18 ENCOUNTER — TELEPHONE (OUTPATIENT)
Dept: CARDIOLOGY CLINIC | Age: 69
End: 2019-01-18

## 2019-01-18 DIAGNOSIS — Z79.01 ANTICOAGULANT LONG-TERM USE: ICD-10-CM

## 2019-01-18 DIAGNOSIS — I48.91 ATRIAL FIBRILLATION, UNSPECIFIED TYPE (HCC): Chronic | ICD-10-CM

## 2019-01-18 PROCEDURE — 97110 THERAPEUTIC EXERCISES: CPT | Performed by: PHYSICAL THERAPIST

## 2019-01-18 PROCEDURE — 97140 MANUAL THERAPY 1/> REGIONS: CPT | Performed by: PHYSICAL THERAPIST

## 2019-01-18 NOTE — PROGRESS NOTES
[address] OUTPATIENT physical Therapy DAILY Treatment NOTe Visit: 6 NAME: Sayra Berry AGE: 80 y.o. GENDER: female DATE: 1/18/2019 REFERRING PHYSICIAN: Blanca Baron DO 
 
 
GOALS Short term goals Time frame: 3 weeks 1. Patient will be compliant and independent with the initial HEP as evidenced by being able to perform without cuing. 2. Patient will report a 50% improvement in symptoms. 3. Patient report a 50% improvement in sleeping. 4. Patient will have a 50 degree increase in G-H ROM . 5. Patient will tolerate 15 minutes of clinic activities before onset of symptoms.  
  
Long term goals Time frame: 6 weeks 1. Patient will report pain level decrease to 2/10 to allow increased ease of movement. 2. Patient will have an improved score on the Quick DASH outcome measure by 30 points to demonstrate an increase in functional activity tolerance. 3. Patient will be independent in final individualized HEP. 4. Patient will return to normal activities without being limited by symptoms. 5. Patient will sleep 6-8 hours without being interrupted by pain. SUBJECTIVE:  
\"It's not as bad today. I accidentally lifted my mattress with this arm and it really hurt over the weekend\" Pain In: 6/10 right shoulder OBJECTIVE DATA SUMMARY:  
 
S/p right Rotator Cuff Repair on 9/19/18 PMH: Pacemaker Objective data from initial evaluation: EXAMINATION/PRESENTATION/DECISION MAKING:  
Pain: 
Location: 
Quality: sharp Now: 7/10 Worst: 10/10 Factors that improve pain: rest lying down 
  
Posture:  
Rounded shoulders and head forward. 
  
Strength:  
Unable to test right muscle function d/t pain Dynamometer: 
Right : 25 lbs Left : 35 lbs 
  
Range of Motion:  
Passive shoulder ROM in supine: 12/7/18 Flexion: 100 degrees Abduction: 97 degrees ER: 30 degrees (in 90 degrees abduction) IR: 75 degree (in 90 degrees abduction) Passive shoulder ROM in supine: 12/27/18 Flexion: 130 degrees Abduction: 112 degrees ER: 30 degrees (in 90 degrees abduction) IR: 75 degree (in 90 degrees abduction) Spinal Assessment: WNL 
  
Joint Mobility: Cx segments hypomobile with distraction and SG 
  
Palpation:  
TTP right UT, infraspinatus, rhomboids 
  
Neurologic Assessment: 
             Tone: WNL Sensation: not tested Reflexes: not tested 
  
Special Tests:  
none 
  
Mobility: 
             Transitional Movements: WNL Gait: WNL 
  
Balance:  Pt reports history of falls d/t episode of c diff, lost a lot of weight and weakened. 
  
Functional Measure:  
  
In compliance with CMSs Claims Based Outcome Reporting, the following G-code set was chosen for this patient based on their primary functional limitation being treated: 
  
The outcome measure chosen to determine the severity of the functional limitation was the Quick DASH with a score of 53/55 which was correlated with the impairment scale. 
  
· Mobility - Walking and Moving Around:  
              - CURRENT STATUS:     CM - 80%-99% impaired, limited or restricted  - GOAL STATUS:            CI - 1%-19% impaired, limited or restricted  - D/C STATUS:                       ---------------To be determined---------------  
  
TREATMENT/INTERVENTION: 
Modalities (Rationale): MHP to neck and right shoulder for 10 minutes post treatment to decrease pain and muscle guarding; no skin irritation noted 
  Therapeutic Exercises: HEP  Shoulder shrug, Shoulder blade squeeze, flexion stretch over table, wt bearing on elbow in sitting, supine ER with cane, left side lying ER/IR. Added to HEP 12/17/18: Rows and pull downs with yellow TB; isotonic shoulder IR/ER (1# weight); bicep curls (1-2# weight); pendulums  
  
Clinic Activities in BOLD performed this date: UBE: 5 minutes, level 1, seat at 7; MHP for pain relief 
  
Seated: 
Shoulder shrugs: 10 reps Shoulder blade squeeze: 15 reps Pulleys, shoulder flexion and abduction: 10 reps each Shoulder flexion stretch over blue physioball: 10 reps Isotonic shoulder ER/IR with 1# weight: 2 sets of 15 reps Bicep curls 1# weight: 10 reps Cervical spine AROM: 10 reps UT stretch: 2 reps with 30 second holds B Shoulder abduction with cane: 10 reps Standing:  
Pendulums: 10 reps CW/CWW Low saw with 1#: 2 sets of 12 reps Shoulder extension with cane: 10 reps Shoulder ER with cane: 10 reps Shoulder IR with cane: 10 reps Rows with red TB: 2 sets of 10 reps Pull downs with red TB: 2 sets of 10 reps Crossover pull downs with red TB: 2 sets of 10 reps Shoulder IR and ER with red TB: 2 sets of 10 reps; cues for form, use of towel Wall ladder (23): 2 reps Supine:  
Shoulder flexion with cane: 7 reps 
  
Manual Therapy: 
PA and AP GH joint mobs, inferior glide, grade 2,3 PROM with shaking into shoulder flexion, abduction, IR, and ER 
STM to right UT and levator scapulae Instrument assisted soft tissue mobilization to bilateral UT and levator scapulae. Patient educated on purpose and affect of intervention. Patient visualized affect post intervention and verbalized good understanding. 
  
Neuro Re-Education: 
Discussed importance of sitting posture. Instructed pt in correct sitting posture. Exercise in pain free range at this time. Activity tolerance and post treatment pain report:  
Fair Pain Out: 5/10 Education: 
Education was provided to the patient on the following topics. [x]    No changes were made to the home exercise program. 
[]    The following changes were made to the home exercise program 
Patient verbalized understanding of the topics presented. ASSESSMENT:  
Patient s/p right rotator cuff repair on 9/19/18.  Patient had 6 weeks of home health OT which ended on 10/25/18 and no therapy until 12/4/18 for her evaluation here. Very slow progress noted. Patient presents with 6/10 pain in right shoulder. Patient reports that she accidentally tried to lift her mattress and experienced increased pain over the weekend as a result. Patient reports that she has been performing her exercises at home. Patient instructed to perform exercises at home after her shower to decrease pain and improve tolerance. Patient tolerated clinic exercises well, but remains limited with manual therapy due to right shoulder pain. Patient with improvements in PROM of right shoulder flexion and abduction since evaluation. Most limited in shoulder ER ROM. Patient is able to reach behind neck and behind her back fairly well. Very limited in active shoulder flexion and abduction. Patient will continue to greatly benefit from physical therapy to improve right shoulder strength and ROM in order to increased overall function with ADLs and household tasks. Patients progression toward goals is as follows: 
[x]     Improving appropriately and progressing toward goals 
[]     Improving slowly and progressing toward goals 
[]     Not making progress toward goals and plan of care will be adjusted PLAN OF CARE:  
Patient continues to benefit from skilled intervention to address the above impairments. [x]    Continue treatment per established plan of care. []     Recommend the following changes to the plan of care Recommendations/Intent for next treatment: advance stretches and exercises as patient able Kristy Serrano, PT Time Calculation: 37 mins Patient Time in clinic: 
 Start Time: [phone] Stop Time: [phone]

## 2019-01-18 NOTE — TELEPHONE ENCOUNTER
Pt would like to  know can she come in the office p/u samples of eliquis 5 mg well be out by the weekend please advise thx.

## 2019-01-18 NOTE — TELEPHONE ENCOUNTER
Verified patient with two identifiers. Spoke with pt, she is out of Eliquis, will give her samples and patient assistance forms.

## 2019-01-22 ENCOUNTER — HOSPITAL ENCOUNTER (OUTPATIENT)
Dept: PHYSICAL THERAPY | Age: 69
Discharge: HOME | End: 2019-01-22
Payer: MEDICARE

## 2019-01-22 NOTE — PROGRESS NOTES
[address] OUTPATIENT physical Therapy 1/22/2019: 
Ting Vital was not seen on this date for physical therapy for the following reasons: 
 
[x]     Patient called to cancel the visit for the following reasons: conflict 
[]     Patient missed the visit and did not call to cancel.  
 
Gustavo Hatfield, PT

## 2019-01-24 ENCOUNTER — HOSPITAL ENCOUNTER (OUTPATIENT)
Dept: PHYSICAL THERAPY | Age: 69
Discharge: HOME | End: 2019-01-24
Payer: MEDICARE

## 2019-01-24 NOTE — PROGRESS NOTES
[address] OUTPATIENT physical Therapy 1/24/2019: 
Murphy Patel was not seen on this date for physical therapy for the following reasons: 
 
[x]     Patient called to cancel the visit for the following reasons: schedule conflict 
[]     Patient missed the visit and did not call to cancel.  
 
Jania Ayala, PT

## 2019-02-08 ENCOUNTER — HOSPITAL ENCOUNTER (OUTPATIENT)
Dept: PHYSICAL THERAPY | Age: 69
Discharge: HOME | End: 2019-02-08

## 2019-02-08 NOTE — PROGRESS NOTES
[facility]  [address], [address] [facility]    OUTPATIENT physical Therapy      2/8/2019:  Kelley Campos was not seen on this date for physical therapy for the following reasons:    [x]     Patient called to cancel the visit for the following reasons: transportation  []     Patient missed the visit and did not call to cancel.     Piotr Del Cid, PT

## 2019-02-17 ENCOUNTER — HOSPITAL ENCOUNTER (EMERGENCY)
Age: 69
Discharge: HOME | End: 2019-02-17
Attending: EMERGENCY MEDICINE
Payer: MEDICARE

## 2019-02-17 ENCOUNTER — APPOINTMENT (OUTPATIENT)
Dept: GENERAL RADIOLOGY | Age: 69
End: 2019-02-17
Attending: EMERGENCY MEDICINE
Payer: MEDICARE

## 2019-02-17 VITALS
HEIGHT: 66 IN | HEART RATE: 76 BPM | OXYGEN SATURATION: 97 % | SYSTOLIC BLOOD PRESSURE: 108 MMHG | TEMPERATURE: 98.7 F | RESPIRATION RATE: 18 BRPM | WEIGHT: 170 LBS | DIASTOLIC BLOOD PRESSURE: 64 MMHG | BODY MASS INDEX: 27.32 KG/M2

## 2019-02-17 DIAGNOSIS — B34.9 VIRAL ILLNESS: Primary | ICD-10-CM

## 2019-02-17 DIAGNOSIS — R09.81 NASAL CONGESTION: ICD-10-CM

## 2019-02-17 DIAGNOSIS — R06.02 SOB (SHORTNESS OF BREATH): ICD-10-CM

## 2019-02-17 DIAGNOSIS — M79.10 MYALGIA: ICD-10-CM

## 2019-02-17 LAB
ANION GAP SERPL CALC-SCNC: 7 MMOL/L (ref 5–15)
BNP SERPL-MCNC: 328 PG/ML
BUN SERPL-MCNC: 17 MG/DL (ref 6–20)
BUN/CREAT SERPL: 13 (ref 12–20)
CALCIUM SERPL-MCNC: 8.9 MG/DL (ref 8.5–10.1)
CHLORIDE SERPL-SCNC: 105 MMOL/L (ref 97–108)
CO2 SERPL-SCNC: 24 MMOL/L (ref 21–32)
CREAT SERPL-MCNC: 1.26 MG/DL (ref 0.55–1.02)
ERYTHROCYTE [DISTWIDTH] IN BLOOD BY AUTOMATED COUNT: 13.2 % (ref 11.5–14.5)
FLUAV AG NPH QL IA: NEGATIVE
FLUBV AG NOSE QL IA: NEGATIVE
GLUCOSE SERPL-MCNC: 123 MG/DL (ref 65–100)
HCT VFR BLD AUTO: 39.3 % (ref 35–47)
HGB BLD-MCNC: 13.1 G/DL (ref 11.5–16)
MCH RBC QN AUTO: 28.1 PG (ref 26–34)
MCHC RBC AUTO-ENTMCNC: 33.3 G/DL (ref 30–36.5)
MCV RBC AUTO: 84.2 FL (ref 80–99)
NRBC # BLD: 0 K/UL (ref 0–0.01)
NRBC BLD-RTO: 0 PER 100 WBC
PLATELET # BLD AUTO: 159 K/UL (ref 150–400)
PMV BLD AUTO: 10.6 FL (ref 8.9–12.9)
POTASSIUM SERPL-SCNC: 3.7 MMOL/L (ref 3.5–5.1)
RBC # BLD AUTO: 4.67 M/UL (ref 3.8–5.2)
SODIUM SERPL-SCNC: 136 MMOL/L (ref 136–145)
TROPONIN I SERPL-MCNC: <0.05 NG/ML
WBC # BLD AUTO: 7.4 K/UL (ref 3.6–11)

## 2019-02-17 PROCEDURE — 36415 COLL VENOUS BLD VENIPUNCTURE: CPT

## 2019-02-17 PROCEDURE — 94760 N-INVAS EAR/PLS OXIMETRY 1: CPT

## 2019-02-17 PROCEDURE — 87804 INFLUENZA ASSAY W/OPTIC: CPT

## 2019-02-17 PROCEDURE — 74011000250 HC RX REV CODE- 250: Performed by: EMERGENCY MEDICINE

## 2019-02-17 PROCEDURE — 93005 ELECTROCARDIOGRAM TRACING: CPT

## 2019-02-17 PROCEDURE — 74011250637 HC RX REV CODE- 250/637: Performed by: EMERGENCY MEDICINE

## 2019-02-17 PROCEDURE — 96375 TX/PRO/DX INJ NEW DRUG ADDON: CPT

## 2019-02-17 PROCEDURE — 74011250636 HC RX REV CODE- 250/636: Performed by: EMERGENCY MEDICINE

## 2019-02-17 PROCEDURE — 94640 AIRWAY INHALATION TREATMENT: CPT

## 2019-02-17 PROCEDURE — 96374 THER/PROPH/DIAG INJ IV PUSH: CPT

## 2019-02-17 PROCEDURE — 71045 X-RAY EXAM CHEST 1 VIEW: CPT

## 2019-02-17 PROCEDURE — 77030029684 HC NEB SM VOL KT MONA -A

## 2019-02-17 PROCEDURE — 80048 BASIC METABOLIC PNL TOTAL CA: CPT

## 2019-02-17 PROCEDURE — 99284 EMERGENCY DEPT VISIT MOD MDM: CPT

## 2019-02-17 PROCEDURE — 85027 COMPLETE CBC AUTOMATED: CPT

## 2019-02-17 PROCEDURE — 84484 ASSAY OF TROPONIN QUANT: CPT

## 2019-02-17 PROCEDURE — 83880 ASSAY OF NATRIURETIC PEPTIDE: CPT

## 2019-02-17 RX ORDER — PREDNISONE 50 MG/1
50 TABLET ORAL DAILY
Qty: 3 TAB | Refills: 0 | Status: SHIPPED | OUTPATIENT
Start: 2019-02-17 | End: 2019-02-17

## 2019-02-17 RX ORDER — IPRATROPIUM BROMIDE AND ALBUTEROL SULFATE 2.5; .5 MG/3ML; MG/3ML
3 SOLUTION RESPIRATORY (INHALATION)
Status: COMPLETED | OUTPATIENT
Start: 2019-02-17 | End: 2019-02-17

## 2019-02-17 RX ORDER — ALBUTEROL SULFATE 90 UG/1
2 AEROSOL, METERED RESPIRATORY (INHALATION)
Qty: 1 INHALER | Refills: 1 | Status: SHIPPED | OUTPATIENT
Start: 2019-02-17 | End: 2019-10-28 | Stop reason: SDUPTHER

## 2019-02-17 RX ORDER — ALBUTEROL SULFATE 90 UG/1
2 AEROSOL, METERED RESPIRATORY (INHALATION)
Qty: 1 INHALER | Refills: 1 | Status: SHIPPED | OUTPATIENT
Start: 2019-02-17 | End: 2019-02-17

## 2019-02-17 RX ORDER — ONDANSETRON 2 MG/ML
4 INJECTION INTRAMUSCULAR; INTRAVENOUS
Status: COMPLETED | OUTPATIENT
Start: 2019-02-17 | End: 2019-02-17

## 2019-02-17 RX ORDER — ALBUTEROL SULFATE 0.83 MG/ML
2.5 SOLUTION RESPIRATORY (INHALATION)
Qty: 1 PACKAGE | Refills: 5 | Status: SHIPPED | OUTPATIENT
Start: 2019-02-17 | End: 2019-02-17

## 2019-02-17 RX ORDER — ALBUTEROL SULFATE 0.83 MG/ML
2.5 SOLUTION RESPIRATORY (INHALATION)
Qty: 1 PACKAGE | Refills: 5 | Status: SHIPPED | OUTPATIENT
Start: 2019-02-17 | End: 2020-01-01 | Stop reason: SDUPTHER

## 2019-02-17 RX ORDER — PREDNISONE 50 MG/1
50 TABLET ORAL DAILY
Qty: 3 TAB | Refills: 0 | Status: SHIPPED | OUTPATIENT
Start: 2019-02-17 | End: 2019-02-20

## 2019-02-17 RX ADMIN — GABAPENTIN 800 MG: 300 CAPSULE ORAL at 13:09

## 2019-02-17 RX ADMIN — METHYLPREDNISOLONE SODIUM SUCCINATE 125 MG: 125 INJECTION, POWDER, FOR SOLUTION INTRAMUSCULAR; INTRAVENOUS at 13:32

## 2019-02-17 RX ADMIN — IPRATROPIUM BROMIDE AND ALBUTEROL SULFATE 3 ML: .5; 3 SOLUTION RESPIRATORY (INHALATION) at 13:32

## 2019-02-17 RX ADMIN — ONDANSETRON 4 MG: 2 INJECTION INTRAMUSCULAR; INTRAVENOUS at 13:09

## 2019-02-17 NOTE — ED PROVIDER NOTES
EMERGENCY DEPARTMENT HISTORY AND PHYSICAL EXAM 
 
 
Date: 2/17/2019 Patient Name: Sayra Berry History of Presenting Illness Chief Complaint Patient presents with  Shortness of Breath Started yesterday with chest pain. EMS states lung sounds clear in route, with oxygen saturations in high 90s.  Numbness Left arm numbness, and generalized pain. Patient usually takes gabapentin, but is unable to because of increased nausea History Provided By: Patient and EMS 
 
HPI: Sayra Berry, 80 y.o. female with PMHx significant for COPD, emphysema, CHF, neuropathy, diabetes, HTN, CAD, Afib, PAD, CKD, and arthritis, presents via EMS to the ED with cc of new onset SOB since yesterday alongside body aches, congestion, nausea, and subjective fever. Per \"ED Vitals\" the pt had a body temperature of 98.7 F at 1159 in the ED. EMS reports that the pt's O2 sats were in the high 90s en route. The pt also reports experiencing a shock down her LUE and thinks that it is due to her pacemaker. She reports taking gabapentin but is unable to because of nausea. She denies being around sick individuals recently. She reports using an inhaler, Spiriva, and Symbicort. The pt denies taking her medications this morning. She reports getting a flu shot. She denies experiencing any exacerbating or alleviating factors. The pt specifically denies experiencing chills, cough, HA, CP, abdominal pain, vomiting, or diarrhea. PMHx: COPD, emphysema, CHF, neuropathy, diabetes, HTN, CAD, Afib, PAD, CKD, and arthritis PSHx: cardiac ablation x2, cardiac stent placement x3, heart catheterization, pacemaker placement SHx: - EtOH, - tobacco, - illicit drugs There are no other complaints, changes, or physical findings at this time. PCP: Gideon Zimmerman MD 
 
Current Outpatient Medications Medication Sig Dispense Refill  albuterol (PROVENTIL HFA, VENTOLIN HFA, PROAIR HFA) 90 mcg/actuation inhaler Take 2 Puffs by inhalation every four (4) hours as needed for Wheezing. 1 Inhaler 1  
 albuterol (PROVENTIL VENTOLIN) 2.5 mg /3 mL (0.083 %) nebulizer solution 3 mL by Nebulization route every four (4) hours as needed for Wheezing. 1 Package 5  predniSONE (DELTASONE) 50 mg tablet Take 1 Tab by mouth daily for 3 days. 3 Tab 0  
 apixaban (ELIQUIS) 5 mg tablet Take 1 Tab by mouth two (2) times a day. Resume from tomorrow 7/21/2018. 56 Tab 0  
 gabapentin (NEURONTIN) 800 mg tablet TAKE 1 TABLET BY MOUTH THREE TIMES DAILY 90 Tab 3  
 colchicine 0.6 mg tablet Take 2 Tabs by mouth as needed (9/6/18:  Per pt she only takes when she has a flare up). Patient takes 1.2 mg daily and 2.4 mg PRN flare up 30 Tab 5  
 SYMBICORT 160-4.5 mcg/actuation HFAA INHALE ONE PUFF BY MOUTH TWICE DAILY 1 Inhaler 3  clemastine 2.68 mg tab tablet Take 1 Tab by mouth daily. Indications: 1 tab in am and 1 tab in pm    
 furosemide (LASIX) 20 mg tablet Take 1 Tab by mouth daily. 90 Tab 1  
 senna-docusate (PERICOLACE) 8.6-50 mg per tablet Take 1 Tab by mouth daily. 30 Tab 0  
 oxyCODONE-acetaminophen (PERCOCET) 5-325 mg per tablet Take 1 Tab by mouth every four (4) hours as needed for Pain. Max Daily Amount: 6 Tabs. 50 Tab 0  
 simvastatin (ZOCOR) 20 mg tablet Take 1 Tab by mouth nightly. Increased 9/1018 90 Tab 3  clopidogrel (PLAVIX) 75 mg tab TAKE ONE TABLET BY MOUTH ONCE DAILY 30 Tab 11  
 carvedilol (COREG) 6.25 mg tablet Take 1 Tab by mouth two (2) times daily (with meals). 60 Tab 11  
 tiotropium (SPIRIVA WITH HANDIHALER) 18 mcg inhalation capsule INHALE THE CONTENTS OF 1 CAPSULE THROUGH HANDIHALER DEVICE DAILY 90 Cap 3  
 fluticasone (FLONASE) 50 mcg/actuation nasal spray 2 Sprays by Both Nostrils route every evening.  Azelastine (ASTEPRO) 0.15 % (205.5 mcg) nasal spray 1 [place] by Both Nostrils route daily.  cod liver oil cap Take 1 Cap by mouth daily. Past History Past Medical History: Past Medical History:  
Diagnosis Date  Arthritis   
 left shoulder  Atrial fibrillation ([facility] 2010 Dr. Petr Rabago  CAD (coronary artery disease) stent; Dr. Petr Rabago  Celiac disease  Chronic diastolic heart failure ([facility] 2014 Dr. Petr Rabago  Chronic kidney disease   
 per cardio note  Chronic pain   
 left thigh:  L SFA intervention by Dr. Carl Nolan 2018, as of 18:  still causing pain, pt to have MILLA checked per Dr. Carl Nolan note  Chronic systolic HF (heart failure) ([facility] 5/10/2017  
 2017 EF 25-30%  COPD (chronic obstructive pulmonary disease) ([facility] 2010 Dr. Mar Every  Diabetes ([facility] Dr. Elta [place]; no current medication per pt  Emphysema, unspecified ([facility] Dr. Mar Every  Fibroid  Frequent falls   Gout  Heart failure ([facility] Dr. Petr Rabago  History of Clostridium difficile infection 5/10/2017  
 2017 CDiff positive  Hypertension 2010 Dr. Mary Kimble  Hypertensive heart and chronic kidney disease   
 per PCP note  Hypotension 5/10/2017  Junctional tachycardia ([facility] 5/10/2017 Dr. Petr Rabago  Neuropathy  NIDDM (non-insulin dependent diabetes mellitus) 2010  PAD (peripheral artery disease) ([facility]  S/P ablation of atrial fibrillation 2018  Screening mammogram 5/4/10  
 SOB (shortness of breath) 2014 Dr. Mar Every  SSS (sick sinus syndrome) ([facility]   
 per pacemaker form 18  Weakness   
 per pt weakness from c-diff , mulitple falls with injury to rotator cuff Past Surgical History: 
Past Surgical History:  
Procedure Laterality Date  CARDIAC SURG PROCEDURE UNLIST    
 3 cardiac stent placed  COLONOSCOPY N/A 2017 COLONOSCOPY with biopsy performed by Vinod Mohan MD at [facility] AMBULATORY OR  
 HX AFIB ABLATION  2018  
 has had 2 ablations per patient  HX  SECTION    
 HX HEART CATHETERIZATION  2018  HX OTHER SURGICAL adrenal gland removed  HX PACEMAKER Left [employer] model: Alex De Luna  DC EXCISE ADRENAL GLAND  VASCULAR SURGERY PROCEDURE UNLIST  2018 Left SFA intervention ; Dr. Leidy Thakkar Family History: 
Family History Problem Relation Age of Onset  Heart Disease Mother  Diabetes Father  Heart Disease Brother Social History: 
Social History Tobacco Use  Smoking status: Former Smoker Types: Cigarettes Start date: 5 Last attempt to quit: 2009 Years since quittin.1  Smokeless tobacco: Never Used Substance Use Topics  Alcohol use: No  
 Drug use: No  
 
 
Allergies: Allergies Allergen Reactions  Crestor [Rosuvastatin] Myalgia  Levaquin [Levofloxacin] Nausea Only GI Upset  Lipitor [Atorvastatin] Diarrhea  Lyrica [Pregabalin] Myalgia Review of Systems Review of Systems Constitutional: Positive for fever. Negative for chills and diaphoresis. HENT: Positive for congestion. Negative for sore throat and trouble swallowing. Eyes: Negative. Negative for photophobia, pain and redness. Respiratory: Positive for shortness of breath. Negative for cough, chest tightness and wheezing. Cardiovascular: Negative. Negative for chest pain and palpitations. Gastrointestinal: Positive for nausea. Negative for abdominal pain, blood in stool, diarrhea and vomiting. Genitourinary: Negative for difficulty urinating, dysuria and frequency. Musculoskeletal: Positive for arthralgias and myalgias. Negative for neck pain and neck stiffness. Skin: Negative. Neurological: Negative. Negative for dizziness, tremors, seizures, syncope, speech difficulty, light-headedness and headaches. Psychiatric/Behavioral: Negative. Negative for confusion. The patient is not nervous/anxious. All other systems reviewed and are negative.  
 
 
Physical Exam  
Physical Exam  
 Constitutional: She is oriented to person, place, and time. She appears well-developed and well-nourished. Looks fatigued HENT:  
Head: Normocephalic and atraumatic. Eyes: Conjunctivae and EOM are normal.  
Neck: Normal range of motion. Neck supple. Cardiovascular: Normal rate and regular rhythm. Pulmonary/Chest: Effort normal. No respiratory distress. She has rales in the left middle field and the left lower field. Sounds congested Pacemaker site well-healed Abdominal: Soft. She exhibits no distension. There is no tenderness. Musculoskeletal: Normal range of motion. She exhibits no edema. Neurological: She is alert and oriented to person, place, and time. Skin: Skin is warm and dry. Psychiatric: She has a normal mood and affect. Nursing note and vitals reviewed. Diagnostic Study Results Labs - Recent Results (from the past 12 hour(s)) EKG, 12 LEAD, INITIAL Collection Time: 02/17/19 12:01 PM  
Result Value Ref Range Ventricular Rate 78 BPM  
 Atrial Rate 214 BPM  
 P-R Interval 248 ms QRS Duration 126 ms  
 Q-T Interval 406 ms QTC Calculation (Bezet) 462 ms Calculated R Axis -40 degrees Calculated T Axis 112 degrees Diagnosis Atrial-paced rhythm with prolonged AV conduction with premature ventricular  
or aberrantly conducted complexes Left axis deviation Left bundle branch block When compared with ECG of 20-SEP-2017 10:13, 
QT has shortened CBC W/O DIFF Collection Time: 02/17/19 12:22 PM  
Result Value Ref Range WBC 7.4 3.6 - 11.0 K/uL  
 RBC 4.67 3.80 - 5.20 M/uL  
 HGB 13.1 11.5 - 16.0 g/dL HCT 39.3 35.0 - 47.0 % MCV 84.2 80.0 - 99.0 FL  
 MCH 28.1 26.0 - 34.0 PG  
 MCHC 33.3 30.0 - 36.5 g/dL  
 RDW 13.2 11.5 - 14.5 % PLATELET 972 838 - 060 K/uL MPV 10.6 8.9 - 12.9 FL  
 NRBC 0.0 0  WBC ABSOLUTE NRBC 0.00 0.00 - 0.01 K/uL METABOLIC PANEL, BASIC Collection Time: 02/17/19 12:22 PM  
Result Value Ref Range Sodium 136 136 - 145 mmol/L Potassium 3.7 3.5 - 5.1 mmol/L Chloride 105 97 - 108 mmol/L  
 CO2 24 21 - 32 mmol/L Anion gap 7 5 - 15 mmol/L Glucose 123 (H) 65 - 100 mg/dL BUN 17 6 - 20 MG/DL Creatinine 1.26 (H) 0.55 - 1.02 MG/DL  
 BUN/Creatinine ratio 13 12 - 20 GFR est AA 51 (L) >60 ml/min/1.73m2 GFR est non-AA 42 (L) >60 ml/min/1.73m2 Calcium 8.9 8.5 - 10.1 MG/DL  
TROPONIN I Collection Time: 02/17/19 12:22 PM  
Result Value Ref Range Troponin-I, Qt. <0.05 <0.05 ng/mL NT-PRO BNP Collection Time: 02/17/19 12:22 PM  
Result Value Ref Range NT pro- (H) <125 PG/ML  
INFLUENZA A & B AG (RAPID TEST) Collection Time: 02/17/19 12:26 PM  
Result Value Ref Range Influenza A Antigen NEGATIVE  NEG Influenza B Antigen NEGATIVE  NEG Radiologic Studies -  
XR CHEST PORT Final Result IMPRESSION:  
No acute process. CT Results  (Last 48 hours) None CXR Results  (Last 48 hours) 02/17/19 1228  XR CHEST PORT Final result Impression:  IMPRESSION:  
No acute process. Narrative:  PORTABLE CHEST RADIOGRAPH/S: 2/17/2019 12:13 PM  
   
Clinical history: Dyspnea INDICATION:  Dyspnea COMPARISON: 6/6/2018 FINDINGS:  
AP portable upright view of the chest demonstrates stable  cardiopericardial  
silhouette. The lungs are adequately expanded. There is no edema, effusion,  
consolidation, or pneumothorax. Cardiac pacer leads unchanged in position. Jennifer Monte Patient is on a cardiac monitor. Medical Decision Making I am the first provider for this patient. I reviewed the vital signs, available nursing notes, past medical history, past surgical history, family history and social history. Vital Signs-Reviewed the patient's vital signs. Patient Vitals for the past 12 hrs: 
 Temp Pulse Resp BP SpO2  
02/17/19 1230  76 18 108/64 97 % 02/17/19 1159 98.7 °F (37.1 °C) 74 14 108/52 98 % Pulse Oximetry Analysis - 98% on RA Cardiac Monitor:  
Rate: 74 bpm 
Rhythm: Normal Sinus Rhythm EKG interpretation: (Preliminary) (12:01:09) Rhythm: atrial-paced rhythm w/ prolonged AV conduction w/ premature ventricular or aberrantly conducted complexes and LBBB; and regular . Rate (approx.): 78; Axis: left axis deviation; NM interval: normal; QRS interval: normal ; ST/T wave: normal; Other findings: none. Written by Alfredo León ED Scribe, as dictated by Tanvir Juan M.D. Records Reviewed: Nursing Notes and Old Medical Records Provider Notes (Medical Decision Making):  
Patient presents with SOB. DDx: flu, COPD/Asthma exacerbation, Bronchitis, CHF exacerbation, ACS, PE, PNA, PTX, Anxiety. Will get labs and cxr and ekg. ED Course:  
Initial assessment performed. The patients presenting problems have been discussed, and they are in agreement with the care plan formulated and outlined with them. I have encouraged them to ask questions as they arise throughout their visit. PROGRESS NOTE: 
1:21 PM 
Informed the pt about negative labs, chest XR, and flu test. Likely viral illness/acute bronchitis. Will tx w/ steriods and albuterol and have the pt f/u w/ her PCP. The LUE shooting pains are likely neuropathy. Written by Alfredo León ED Scribe, as dictated by Tanvir Juan M.D. Critical Care Time: 0 minutes Disposition: 
DISCHARGE NOTE 
1:21 PM 
The patient has been re-evaluated and is ready for discharge. Reviewed available results with patient. Counseled pt on diagnosis and care plan. Pt has expressed understanding, and all questions have been answered. Pt agrees with plan and agrees to F/U as recommended, or return to the ED if their sxs worsen. Discharge instructions have been provided and explained to the pt, along with reasons to return to the ED.  
Written by ALETHA Morseibe, as dictated by Tanvir Juan M.D. 
 
 PLAN: 1. Discharge Medication List as of 2/17/2019  1:21 PM  
  
START taking these medications Details  
predniSONE (DELTASONE) 50 mg tablet Take 1 Tab by mouth daily for 3 days. , Normal, Disp-3 Tab, R-0  
  
  
CONTINUE these medications which have CHANGED Details  
albuterol (PROVENTIL HFA, VENTOLIN HFA, PROAIR HFA) 90 mcg/actuation inhaler Take 2 Puffs by inhalation every four (4) hours as needed for Wheezing., Normal, Disp-1 Inhaler, R-1  
  
albuterol (PROVENTIL VENTOLIN) 2.5 mg /3 mL (0.083 %) nebulizer solution 3 mL by Nebulization route every four (4) hours as needed for Wheezing., Normal, Disp-1 Package, R-5  
  
  
CONTINUE these medications which have NOT CHANGED Details  
apixaban (ELIQUIS) 5 mg tablet Take 1 Tab by mouth two (2) times a day. Resume from tomorrow 7/21/2018., Sample, Disp-56 Tab, R-0  
  
gabapentin (NEURONTIN) 800 mg tablet TAKE 1 TABLET BY MOUTH THREE TIMES DAILY, NormalPlease consider 90 day supplies to promote better adherenceDisp-90 Tab, R-3  
  
colchicine 0.6 mg tablet Take 2 Tabs by mouth as needed (9/6/18:  Per pt she only takes when she has a flare up). Patient takes 1.2 mg daily and 2.4 mg PRN flare up, Normal, Disp-30 Tab, R-5  
  
SYMBICORT 160-4.5 mcg/actuation HFAA INHALE ONE PUFF BY MOUTH TWICE DAILY, Normal, Disp-1 Inhaler, R-3  
  
clemastine 2.68 mg tab tablet Take 1 Tab by mouth daily. Indications: 1 tab in am and 1 tab in pm, Historical Med  
  
furosemide (LASIX) 20 mg tablet Take 1 Tab by mouth daily. , Normal, Disp-90 Tab, R-1  
  
senna-docusate (PERICOLACE) 8.6-50 mg per tablet Take 1 Tab by mouth daily. , Print, Disp-30 Tab, R-0  
  
oxyCODONE-acetaminophen (PERCOCET) 5-325 mg per tablet Take 1 Tab by mouth every four (4) hours as needed for Pain. Max Daily Amount: 6 Tabs., Print, Disp-50 Tab, R-0  
  
simvastatin (ZOCOR) 20 mg tablet Take 1 Tab by mouth nightly.  Increased 9/1018, Normal, Disp-90 Tab, R-3  
  
 clopidogrel (PLAVIX) 75 mg tab TAKE ONE TABLET BY MOUTH ONCE DAILY, NormalPlease consider 90 day supplies to promote better adherenceDisp-30 Tab, R-11  
  
carvedilol (COREG) 6.25 mg tablet Take 1 Tab by mouth two (2) times daily (with meals). , Normal, Disp-60 Tab, R-11  
  
tiotropium (SPIRIVA WITH HANDIHALER) 18 mcg inhalation capsule INHALE THE CONTENTS OF 1 CAPSULE THROUGH HANDIHALER DEVICE DAILY, Normal, Disp-90 Cap, R-3  
  
fluticasone (FLONASE) 50 mcg/actuation nasal spray 2 Sprays by Both Nostrils route every evening., Historical Med Azelastine (ASTEPRO) 0.15 % (205.5 mcg) nasal spray 1 [place] by Both Nostrils route daily. , Historical Med  
  
cod liver oil cap Take 1 Cap by mouth daily. , Historical Med 2. Follow-up Information Follow up With Specialties Details Why Contact Info Keila Hennessy MD Internal Medicine  As needed [address] [place] [id number] [phone] Return to ED if worse Diagnosis Clinical Impression: 1. Viral illness 2. Myalgia 3. Nasal congestion 4. SOB (shortness of breath) Attestation: This note is prepared by Melvi Bennett, acting as Scribe for DEACON Rehman M.D: The scribe's documentation has been prepared under my direction and personally reviewed by me in its entirety. I confirm that the note above accurately reflects all work, treatment, procedures, and medical decision making performed by me. This note will not be viewable in [address]

## 2019-02-17 NOTE — DISCHARGE INSTRUCTIONS
Patient Education        Viral Infections: Care Instructions  Your Care Instructions    You don't feel well, but it's not clear what's causing it. You may have a viral infection. Viruses cause many illnesses, such as the common cold, influenza, fever, rashes, and the diarrhea, nausea, and vomiting that are often called \"stomach flu. \" You may wonder if antibiotic medicines could make you feel better. But antibiotics only treat infections caused by bacteria. They don't work on viruses. The good news is that viral infections usually aren't serious. Most will go away in a few days without medical treatment. In the meantime, there are a few things you can do to make yourself more comfortable. Follow-up care is a key part of your treatment and safety. Be sure to make and go to all appointments, and call your doctor if you are having problems. It's also a good idea to know your test results and keep a list of the medicines you take. How can you care for yourself at home? · Get plenty of rest if you feel tired. · Take an over-the-counter pain medicine if needed, such as acetaminophen (Tylenol), ibuprofen (Advil, Motrin), or naproxen (Aleve). Read and follow all instructions on the label. · Be careful when taking over-the-counter cold or flu medicines and Tylenol at the same time. Many of these medicines have acetaminophen, which is Tylenol. Read the labels to make sure that you are not taking more than the recommended dose. Too much acetaminophen (Tylenol) can be harmful. · Drink plenty of fluids, enough so that your urine is light yellow or clear like water. If you have kidney, heart, or liver disease and have to limit fluids, talk with your doctor before you increase the amount of fluids you drink. · Stay home from work, school, and other public places while you have a fever. When should you call for help? Call 911 anytime you think you may need emergency care.  For example, call if:    · You have severe trouble breathing.     · You passed out (lost consciousness).    Call your doctor now or seek immediate medical care if:    · You seem to be getting much sicker.     · You have a new or higher fever.     · You have blood in your stools.     · You have new belly pain, or your pain gets worse.     · You have a new rash.    Watch closely for changes in your health, and be sure to contact your doctor if:    · You start to get better and then get worse.     · You do not get better as expected. Where can you learn more? Go to http://rusty-marcela.info/. Enter J181 in the search box to learn more about \"Viral Infections: Care Instructions. \"  Current as of: July 30, 2018  Content Version: 11.9  © 8007-6678 [employer]. Care instructions adapted under license by [employer] (which disclaims liability or warranty for this information). If you have questions about a medical condition or this instruction, always ask your healthcare professional. [address] any warranty or liability for your use of this information.

## 2019-02-18 LAB
ATRIAL RATE: 214 BPM
CALCULATED R AXIS, ECG10: -40 DEGREES
CALCULATED T AXIS, ECG11: 112 DEGREES
DIAGNOSIS, 93000: NORMAL
P-R INTERVAL, ECG05: 248 MS
Q-T INTERVAL, ECG07: 406 MS
QRS DURATION, ECG06: 126 MS
QTC CALCULATION (BEZET), ECG08: 462 MS
VENTRICULAR RATE, ECG03: 78 BPM

## 2019-03-05 RX ORDER — FUROSEMIDE 20 MG/1
TABLET ORAL
Qty: 90 TAB | Refills: 1 | Status: SHIPPED | OUTPATIENT
Start: 2019-03-05 | End: 2020-01-06 | Stop reason: SDUPTHER

## 2019-03-06 ENCOUNTER — OFFICE VISIT (OUTPATIENT)
Dept: INTERNAL MEDICINE CLINIC | Age: 69
End: 2019-03-06

## 2019-03-06 VITALS
OXYGEN SATURATION: 96 % | BODY MASS INDEX: 27.32 KG/M2 | HEIGHT: 66 IN | RESPIRATION RATE: 19 BRPM | TEMPERATURE: 98.5 F | HEART RATE: 82 BPM | WEIGHT: 170 LBS | DIASTOLIC BLOOD PRESSURE: 42 MMHG | SYSTOLIC BLOOD PRESSURE: 110 MMHG

## 2019-03-06 DIAGNOSIS — E11.21 TYPE 2 DIABETES MELLITUS WITH NEPHROPATHY (HCC): ICD-10-CM

## 2019-03-06 DIAGNOSIS — I25.10 CORONARY ARTERY DISEASE INVOLVING NATIVE CORONARY ARTERY OF NATIVE HEART WITHOUT ANGINA PECTORIS: ICD-10-CM

## 2019-03-06 DIAGNOSIS — E78.2 MIXED HYPERLIPIDEMIA: ICD-10-CM

## 2019-03-06 DIAGNOSIS — K52.9 COLITIS: Primary | ICD-10-CM

## 2019-03-06 DIAGNOSIS — E11.40 TYPE 2 DIABETES MELLITUS WITH DIABETIC NEUROPATHY, WITHOUT LONG-TERM CURRENT USE OF INSULIN (HCC): ICD-10-CM

## 2019-03-06 LAB
GLUCOSE POC: 166 MG/DL
HBA1C MFR BLD HPLC: 7.5 %

## 2019-03-06 RX ORDER — LANCETS
EACH MISCELLANEOUS
Qty: 100 EACH | Refills: 11 | Status: ON HOLD | OUTPATIENT
Start: 2019-03-06 | End: 2019-03-26 | Stop reason: CLARIF

## 2019-03-06 RX ORDER — SIMVASTATIN 20 MG/1
20 TABLET, FILM COATED ORAL
Qty: 90 TAB | Refills: 1 | Status: SHIPPED | OUTPATIENT
Start: 2019-03-06 | End: 2019-09-25 | Stop reason: SDUPTHER

## 2019-03-06 RX ORDER — INSULIN PUMP SYRINGE, 3 ML
EACH MISCELLANEOUS
Qty: 1 KIT | Refills: 0 | Status: ON HOLD | OUTPATIENT
Start: 2019-03-06 | End: 2019-03-26 | Stop reason: CLARIF

## 2019-03-06 NOTE — PROGRESS NOTES
Chief Complaint   Patient presents with    Follow-up     PAD     1. Have you been to the ER, urgent care clinic since your last visit? Hospitalized since your last visit? No    2. Have you seen or consulted any other health care providers outside of the [address] [place] [address] since your last visit? Include any pap smears or colon screening.  No

## 2019-03-06 NOTE — PROGRESS NOTES
Sameera Moraes is a 80 y.o. female and presents with Follow-up (PAD)  . Subjective:  (Grandson is torrey Sandy)  Pt relays she feels 'fine\". She has been doing MUCH better since she retired. Pt is concerned bc she had a colonoscopy done 2017 and was told she had colitis (?celiac dz) and would like to f/u w GI. Hypertensive heart and kidney disease-pt has no complaints, stable on med.  Relays med compliance  BP Readings from Last 3 Encounters:   03/06/19 110/42   02/17/19 108/64   10/25/18 132/68     CAD s/p stent-noted  CHF-systolic and diastolic-latest MOISES~31%  Wt Readings from Last 3 Encounters:   03/06/19 170 lb (77.1 kg)   02/17/19 170 lb (77.1 kg)   09/19/18 167 lb 1.7 oz (75.8 kg)       CKD 3-noted  Lab Results   Component Value Date/Time    GFR est AA 51 (L) 02/17/2019 12:22 PM    GFR est non-AA 42 (L) 02/17/2019 12:22 PM    Creatinine 1.26 (H) 02/17/2019 12:22 PM    BUN 17 02/17/2019 12:22 PM    Sodium 136 02/17/2019 12:22 PM    Potassium 3.7 02/17/2019 12:22 PM    Chloride 105 02/17/2019 12:22 PM    CO2 24 02/17/2019 12:22 PM     COPD-quiescent  S/p pacemaker-noted  Hx PAF s/p ablation-on eliquis  Type 2 DM w nephropathy-diet controlled  Lab Results   Component Value Date/Time    Hemoglobin A1c 5.8 09/24/2017 10:36 AM    Hemoglobin A1c (POC) 7.5 03/06/2019 09:45 AM     Hyperlipidemia-  low dose statin   -t/c PCSK9 inhibitor  Lab Results   Component Value Date/Time    Cholesterol, total 177 04/17/2018 08:51 AM    Cholesterol (POC) 168 03/29/2018 08:24 AM    HDL Cholesterol 35 (L) 04/17/2018 08:51 AM    HDL Cholesterol (POC) n/a 03/29/2018 08:24 AM    LDL Cholesterol (POC) n/a 03/29/2018 08:24 AM    LDL, calculated 101 (H) 04/17/2018 08:51 AM    VLDL, calculated 41 (H) 04/17/2018 08:51 AM    Triglyceride 203 (H) 04/17/2018 08:51 AM    Triglycerides (POC) )650 03/29/2018 08:24 AM    CHOL/HDL Ratio 6.4 (H) 07/01/2014 03:10 AM         Review of Systems  Review of systems (12) negative, except noted above.       Past Medical History:   Diagnosis Date    Arthritis     left shoulder    Atrial fibrillation ([facility] [address] 2010    Dr. Sanket Camilo CAD (coronary artery disease)     stent; Dr. Sanket Camilo Celiac disease     Chronic diastolic heart failure ([facility] [address] 2014    Dr. Catherine Yi    Chronic kidney disease     per cardio note    Chronic pain     left thigh:  L SFA intervention by Dr. Lobito Harper 2018, as of 18:  still causing pain, pt to have MILLA checked per Dr. Lobito Harper note    Chronic systolic HF (heart failure) ([facility] [address] 5/10/2017    2017 EF 25-30%    COPD (chronic obstructive pulmonary disease) ([facility] [address] 2010    Dr. Kelley Metcalf     Diabetes [facility])     Dr. Jaclyn Galvan; no current medication per pt    Emphysema, unspecified ([facility] [address]     Dr. Liudmila Carpenter Frequent falls     Gout     Heart failure ([facility] [address]     Dr. Sanket Camilo History of Clostridium difficile infection 5/10/2017    2017 CDiff positive    Hypertension 2010    Dr. Rajni Edwards Hypertensive heart and chronic kidney disease     per PCP note    Hypotension 5/10/2017    Junctional tachycardia ([facility] [address] 5/10/2017    Dr. Catherine Yi    Neuropathy     NIDDM (non-insulin dependent diabetes mellitus) 2010    PAD (peripheral artery disease) ([facility] [address]     S/P ablation of atrial fibrillation 2018    Screening mammogram 5/4/10    SOB (shortness of breath) 2014    Dr. Young Marcum (sick sinus syndrome) ([facility] [address]     per pacemaker form 18    Weakness     per pt weakness from c-diff , mulitple falls with injury to rotator cuff     Past Surgical History:   Procedure Laterality Date    CARDIAC SURG PROCEDURE UNLIST      3 cardiac stent placed     COLONOSCOPY N/A 2017    COLONOSCOPY with biopsy performed by Du Penaloza MD at [address] HX AFIB ABLATION  2018    has had 2 ablations per patient    HX  SECTION      [address]  2018    HX OTHER SURGICAL      adrenal gland removed    HX PACEMAKER Left     [employer] model: Vaibhav    SUDHIR EXCISE ADRENAL GLAND      VASCULAR SURGERY PROCEDURE UNLIST  2018    Left SFA intervention ; Dr. Charity Irizarry History     Socioeconomic History    Marital status:      Spouse name: Not on file    Number of children: Not on file    Years of education: Not on file    Highest education level: Not on file   Tobacco Use    Smoking status: Former Smoker     Types: Cigarettes     Start date:      Last attempt to quit: 2009     Years since quittin.1    Smokeless tobacco: Never Used   Substance and Sexual Activity    Alcohol use: No    Drug use: No    Sexual activity: Not Currently     Family History   Problem Relation Age of Onset    Heart Disease Mother     Diabetes Father     Heart Disease Brother      Current Outpatient Medications   Medication Sig Dispense Refill    Blood-Glucose Meter monitoring kit Use as directed. Dx: E11.9 1 Kit 0    glucose blood VI test strips (BLOOD GLUCOSE TEST) strip Use BID DxE11.9 100 Strip 11    lancets misc Use BID. Dx: E11.9 100 Each 11    simvastatin (ZOCOR) 20 mg tablet Take 1 Tab by mouth nightly. Increased 1018 90 Tab 1    furosemide (LASIX) 20 mg tablet TAKE 1 TABLET BY MOUTH ONCE DAILY 90 Tab 1    albuterol (PROVENTIL HFA, VENTOLIN HFA, PROAIR HFA) 90 mcg/actuation inhaler Take 2 Puffs by inhalation every four (4) hours as needed for Wheezing. 1 Inhaler 1    albuterol (PROVENTIL VENTOLIN) 2.5 mg /3 mL (0.083 %) nebulizer solution 3 mL by Nebulization route every four (4) hours as needed for Wheezing. 1 Package 5    apixaban (ELIQUIS) 5 mg tablet Take 1 Tab by mouth two (2) times a day. Resume from tomorrow 2018. 56 Tab 0    gabapentin (NEURONTIN) 800 mg tablet TAKE 1 TABLET BY MOUTH THREE TIMES DAILY 90 Tab 3    colchicine 0.6 mg tablet Take 2 Tabs by mouth as needed (18:  Per pt she only takes when she has a flare up).  Patient takes 1.2 mg daily and 2.4 mg PRN flare up 30 Tab 5    SYMBICORT 160-4.5 mcg/actuation HFAA INHALE ONE PUFF BY MOUTH TWICE DAILY 1 Inhaler 3    clemastine 2.68 mg tab tablet Take 1 Tab by mouth daily. Indications: 1 tab in am and 1 tab in pm      senna-docusate (PERICOLACE) 8.6-50 mg per tablet Take 1 Tab by mouth daily. 30 Tab 0    clopidogrel (PLAVIX) 75 mg tab TAKE ONE TABLET BY MOUTH ONCE DAILY 30 Tab 11    carvedilol (COREG) 6.25 mg tablet Take 1 Tab by mouth two (2) times daily (with meals). 60 Tab 11    tiotropium (SPIRIVA WITH HANDIHALER) 18 mcg inhalation capsule INHALE THE CONTENTS OF 1 CAPSULE THROUGH HANDIHALER DEVICE DAILY 90 Cap 3    fluticasone (FLONASE) 50 mcg/actuation nasal spray 2 Sprays by Both Nostrils route every evening.  Azelastine (ASTEPRO) 0.15 % (205.5 mcg) nasal spray 1 [place] by Both Nostrils route daily.  cod liver oil cap Take 1 Cap by mouth daily. Allergies   Allergen Reactions    Crestor [Rosuvastatin] Myalgia    Levaquin [Levofloxacin] Nausea Only     GI Upset    Lipitor [Atorvastatin] Diarrhea    Lyrica [Pregabalin] Myalgia       Objective:  Visit Vitals  /42 (BP 1 Location: Left arm, BP Patient Position: Sitting)   Pulse 82   Temp 98.5 °F (36.9 °C) (Oral)   Resp 19   Ht 5' 6\" (1.676 m)   Wt 170 lb (77.1 kg)   SpO2 96%   BMI 27.44 kg/m²     Physical Exam:   General appearance - alert, well appearing, and in no distress  Mental status - alert, oriented to person, place, and time  EYE-ROCK, EOMI, corneas normal, no foreign bodies  ENT-ENT exam normal, no neck nodes or sinus tenderness  Chest - bibasilar rales  Heart - normal rate, regular rhythm, normal S1, S2, +systolic murmur +S4  Ext-peripheral pulses normal, no pedal edema, no clubbing or cyanosis  Skin-Warm and dry.  no hyperpigmentation, vitiligo, or suspicious lesions  Neuro -alert, oriented, normal speech, no focal findings walks w cane      Results for orders placed or performed in visit on 03/06/19   [facility] POC HEMOGLOBIN A1C   Result Value Ref Range    Hemoglobin A1c (POC) 7.5 %   AMB POC GLUCOSE BLOOD, BY GLUCOSE MONITORING DEVICE   Result Value Ref Range    Glucose  mg/dL     *Note: Due to a large number of results and/or encounters for the requested time period, some results have not been displayed. A complete set of results can be found in Results Review. Assessment/Plan:    ICD-10-CM ICD-9-CM    1. Colitis K52.9 558.9 REFERRAL TO GASTROENTEROLOGY   2. Type 2 diabetes mellitus with diabetic neuropathy, without long-term current use of insulin (HCC) E11.40 250.60 AMB POC HEMOGLOBIN A1C     357.2 AMB POC GLUCOSE BLOOD, BY GLUCOSE MONITORING DEVICE      AMB POC LIPID PROFILE   3. Type 2 diabetes mellitus with nephropathy (HCC) E11.21 250.40 Blood-Glucose Meter monitoring kit     583.81 glucose blood VI test strips (BLOOD GLUCOSE TEST) strip      lancets misc      simvastatin (ZOCOR) 20 mg tablet   4. Mixed hyperlipidemia E78.2 272.2 simvastatin (ZOCOR) 20 mg tablet   5. Coronary artery disease involving native coronary artery of native heart without angina pectoris I25.10 414.01 simvastatin (ZOCOR) 20 mg tablet     Orders Placed This Encounter   [address]     Referral Priority:   Routine     Referral Type:   Consultation     Referral Reason:   Specialty Services Required     Referral Location:   [facility]     Referred to Provider:   Ravi Kimball MD     Number of Visits Requested:   1    AMB POC HEMOGLOBIN A1C    AMB POC GLUCOSE BLOOD, BY GLUCOSE MONITORING DEVICE    AMB POC LIPID PROFILE    Blood-Glucose Meter monitoring kit     Sig: Use as directed. Dx: E11.9     Dispense:  1 Kit     Refill:  0    glucose blood VI test strips (BLOOD GLUCOSE TEST) strip     Sig: Use BID DxE11.9     Dispense:  100 Strip     Refill:  11    lancets misc     Sig: Use BID.  Dx: E11.9     Dispense:  100 Each     Refill:  11    simvastatin (ZOCOR) 20 mg tablet     Sig: Take 1 Tab by mouth nightly. Increased 9/1018     Dispense:  90 Tab     Refill:  1     1. Colitis    - REFERRAL TO GASTROENTEROLOGY    2. Type 2 diabetes mellitus with diabetic neuropathy, without long-term current use of insulin (HCC)  Controlled (<8%)  - AMB POC HEMOGLOBIN A1C  - AMB POC GLUCOSE BLOOD, BY GLUCOSE MONITORING DEVICE  - AMB POC LIPID PROFILE    3. Type 2 diabetes mellitus with nephropathy (HCC)    - Blood-Glucose Meter monitoring kit; Use as directed. Dx: E11.9  Dispense: 1 Kit; Refill: 0  - glucose blood VI test strips (BLOOD GLUCOSE TEST) strip; Use BID DxE11.9  Dispense: 100 Strip; Refill: 11  - lancets misc; Use BID. Dx: E11.9  Dispense: 100 Each; Refill: 11  - simvastatin (ZOCOR) 20 mg tablet; Take 1 Tab by mouth nightly. Increased 9/1018  Dispense: 90 Tab; Refill: 1    4. Mixed hyperlipidemia  Controlled on statin  - simvastatin (ZOCOR) 20 mg tablet; Take 1 Tab by mouth nightly. Increased 9/1018  Dispense: 90 Tab; Refill: 1    5. Coronary artery disease involving native coronary artery of native heart without angina pectoris  Asymptomatic  F/u cardiology  - simvastatin (ZOCOR) 20 mg tablet; Take 1 Tab by mouth nightly. Increased 9/1018  Dispense: 90 Tab; Refill: 1      There are no Patient Instructions on file for this visit. Follow-up Disposition:  Return in about 3 months (around 6/6/2019) for routine. I have reviewed with the patient details of the assessment and plan and all questions were answered. Relevent patient education was performed. The most recent lab findings were reviewed with the patient. An After Visit Summary was printed and given to the patient.

## 2019-03-24 ENCOUNTER — APPOINTMENT (OUTPATIENT)
Dept: CT IMAGING | Age: 69
DRG: 291 | End: 2019-03-24
Attending: EMERGENCY MEDICINE
Payer: MEDICARE

## 2019-03-24 ENCOUNTER — HOSPITAL ENCOUNTER (INPATIENT)
Age: 69
LOS: 3 days | Discharge: HOME | DRG: 291 | End: 2019-03-28
Attending: EMERGENCY MEDICINE | Admitting: INTERNAL MEDICINE
Payer: MEDICARE

## 2019-03-24 ENCOUNTER — APPOINTMENT (OUTPATIENT)
Dept: GENERAL RADIOLOGY | Age: 69
DRG: 291 | End: 2019-03-24
Attending: EMERGENCY MEDICINE
Payer: MEDICARE

## 2019-03-24 DIAGNOSIS — J44.1 ACUTE EXACERBATION OF CHRONIC OBSTRUCTIVE PULMONARY DISEASE (COPD) (HCC): ICD-10-CM

## 2019-03-24 DIAGNOSIS — I50.9 ACUTE CONGESTIVE HEART FAILURE, UNSPECIFIED HEART FAILURE TYPE (HCC): ICD-10-CM

## 2019-03-24 DIAGNOSIS — R79.89 ELEVATED LACTIC ACID LEVEL: ICD-10-CM

## 2019-03-24 DIAGNOSIS — J96.01 ACUTE RESPIRATORY FAILURE WITH HYPOXIA (HCC): Primary | ICD-10-CM

## 2019-03-24 DIAGNOSIS — R06.03 RESPIRATORY DISTRESS: ICD-10-CM

## 2019-03-24 DIAGNOSIS — E11.65 UNCONTROLLED TYPE 2 DIABETES MELLITUS WITH HYPERGLYCEMIA (HCC): ICD-10-CM

## 2019-03-24 LAB
ALBUMIN SERPL-MCNC: 3.2 G/DL (ref 3.5–5)
ALBUMIN/GLOB SERPL: 0.6 {RATIO} (ref 1.1–2.2)
ALP SERPL-CCNC: 130 U/L (ref 45–117)
ALT SERPL-CCNC: 19 U/L (ref 12–78)
ANION GAP SERPL CALC-SCNC: 10 MMOL/L (ref 5–15)
APTT PPP: 30.2 SEC (ref 22.1–32)
AST SERPL-CCNC: 37 U/L (ref 15–37)
BASOPHILS # BLD: 0 K/UL (ref 0–0.1)
BASOPHILS NFR BLD: 0 % (ref 0–1)
BILIRUB SERPL-MCNC: 0.5 MG/DL (ref 0.2–1)
BNP SERPL-MCNC: 717 PG/ML
BUN SERPL-MCNC: 12 MG/DL (ref 6–20)
BUN/CREAT SERPL: 8 (ref 12–20)
CALCIUM SERPL-MCNC: 9 MG/DL (ref 8.5–10.1)
CHLORIDE SERPL-SCNC: 103 MMOL/L (ref 97–108)
CO2 SERPL-SCNC: 22 MMOL/L (ref 21–32)
CREAT SERPL-MCNC: 1.49 MG/DL (ref 0.55–1.02)
DIFFERENTIAL METHOD BLD: ABNORMAL
EOSINOPHIL # BLD: 0.5 K/UL (ref 0–0.4)
EOSINOPHIL NFR BLD: 6 % (ref 0–7)
ERYTHROCYTE [DISTWIDTH] IN BLOOD BY AUTOMATED COUNT: 13 % (ref 11.5–14.5)
FLUAV AG NPH QL IA: NEGATIVE
FLUBV AG NOSE QL IA: NEGATIVE
GLOBULIN SER CALC-MCNC: 5.3 G/DL (ref 2–4)
GLUCOSE SERPL-MCNC: 166 MG/DL (ref 65–100)
HCT VFR BLD AUTO: 34.9 % (ref 35–47)
HGB BLD-MCNC: 11.5 G/DL (ref 11.5–16)
IMM GRANULOCYTES # BLD AUTO: 0.1 K/UL (ref 0–0.04)
IMM GRANULOCYTES NFR BLD AUTO: 1 % (ref 0–0.5)
INR PPP: 1.1 (ref 0.9–1.1)
LACTATE BLD-SCNC: 2.34 MMOL/L (ref 0.4–2)
LYMPHOCYTES # BLD: 2.2 K/UL (ref 0.8–3.5)
LYMPHOCYTES NFR BLD: 27 % (ref 12–49)
MCH RBC QN AUTO: 28 PG (ref 26–34)
MCHC RBC AUTO-ENTMCNC: 33 G/DL (ref 30–36.5)
MCV RBC AUTO: 84.9 FL (ref 80–99)
MONOCYTES # BLD: 1.4 K/UL (ref 0–1)
MONOCYTES NFR BLD: 17 % (ref 5–13)
NEUTS SEG # BLD: 4 K/UL (ref 1.8–8)
NEUTS SEG NFR BLD: 49 % (ref 32–75)
NRBC # BLD: 0 K/UL (ref 0–0.01)
NRBC BLD-RTO: 0 PER 100 WBC
PLATELET # BLD AUTO: 218 K/UL (ref 150–400)
PMV BLD AUTO: 10 FL (ref 8.9–12.9)
POTASSIUM SERPL-SCNC: 3.7 MMOL/L (ref 3.5–5.1)
PROT SERPL-MCNC: 8.5 G/DL (ref 6.4–8.2)
PROTHROMBIN TIME: 11.6 SEC (ref 9–11.1)
RBC # BLD AUTO: 4.11 M/UL (ref 3.8–5.2)
SODIUM SERPL-SCNC: 135 MMOL/L (ref 136–145)
THERAPEUTIC RANGE,PTTT: NORMAL SECS (ref 58–77)
TROPONIN I SERPL-MCNC: <0.05 NG/ML
WBC # BLD AUTO: 8.1 K/UL (ref 3.6–11)

## 2019-03-24 PROCEDURE — 80053 COMPREHEN METABOLIC PANEL: CPT

## 2019-03-24 PROCEDURE — 83605 ASSAY OF LACTIC ACID: CPT

## 2019-03-24 PROCEDURE — 74011250636 HC RX REV CODE- 250/636: Performed by: EMERGENCY MEDICINE

## 2019-03-24 PROCEDURE — 84484 ASSAY OF TROPONIN QUANT: CPT

## 2019-03-24 PROCEDURE — 74011000250 HC RX REV CODE- 250: Performed by: EMERGENCY MEDICINE

## 2019-03-24 PROCEDURE — 96374 THER/PROPH/DIAG INJ IV PUSH: CPT

## 2019-03-24 PROCEDURE — 87040 BLOOD CULTURE FOR BACTERIA: CPT

## 2019-03-24 PROCEDURE — 94640 AIRWAY INHALATION TREATMENT: CPT

## 2019-03-24 PROCEDURE — 85025 COMPLETE CBC W/AUTO DIFF WBC: CPT

## 2019-03-24 PROCEDURE — 87804 INFLUENZA ASSAY W/OPTIC: CPT

## 2019-03-24 PROCEDURE — 77030038269 HC DRN EXT URIN PURWCK BARD -A

## 2019-03-24 PROCEDURE — 83880 ASSAY OF NATRIURETIC PEPTIDE: CPT

## 2019-03-24 PROCEDURE — 74011250637 HC RX REV CODE- 250/637: Performed by: EMERGENCY MEDICINE

## 2019-03-24 PROCEDURE — 77030029684 HC NEB SM VOL KT MONA -A

## 2019-03-24 PROCEDURE — 71275 CT ANGIOGRAPHY CHEST: CPT

## 2019-03-24 PROCEDURE — 85730 THROMBOPLASTIN TIME PARTIAL: CPT

## 2019-03-24 PROCEDURE — 36415 COLL VENOUS BLD VENIPUNCTURE: CPT

## 2019-03-24 PROCEDURE — 93005 ELECTROCARDIOGRAM TRACING: CPT

## 2019-03-24 PROCEDURE — 85610 PROTHROMBIN TIME: CPT

## 2019-03-24 PROCEDURE — 74011636320 HC RX REV CODE- 636/320: Performed by: EMERGENCY MEDICINE

## 2019-03-24 PROCEDURE — 99285 EMERGENCY DEPT VISIT HI MDM: CPT

## 2019-03-24 PROCEDURE — 71045 X-RAY EXAM CHEST 1 VIEW: CPT

## 2019-03-24 RX ORDER — SODIUM CHLORIDE 0.9 % (FLUSH) 0.9 %
10 SYRINGE (ML) INJECTION
Status: COMPLETED | OUTPATIENT
Start: 2019-03-24 | End: 2019-03-25

## 2019-03-24 RX ORDER — IPRATROPIUM BROMIDE AND ALBUTEROL SULFATE 2.5; .5 MG/3ML; MG/3ML
3 SOLUTION RESPIRATORY (INHALATION)
Status: COMPLETED | OUTPATIENT
Start: 2019-03-24 | End: 2019-03-24

## 2019-03-24 RX ORDER — FUROSEMIDE 10 MG/ML
40 INJECTION INTRAMUSCULAR; INTRAVENOUS
Status: COMPLETED | OUTPATIENT
Start: 2019-03-24 | End: 2019-03-24

## 2019-03-24 RX ORDER — SODIUM CHLORIDE 0.9 % (FLUSH) 0.9 %
5-10 SYRINGE (ML) INJECTION AS NEEDED
Status: DISCONTINUED | OUTPATIENT
Start: 2019-03-24 | End: 2019-03-28 | Stop reason: HOSPADM

## 2019-03-24 RX ADMIN — IOPAMIDOL 100 ML: 755 INJECTION, SOLUTION INTRAVENOUS at 23:24

## 2019-03-24 RX ADMIN — FUROSEMIDE 40 MG: 10 INJECTION, SOLUTION INTRAMUSCULAR; INTRAVENOUS at 23:04

## 2019-03-24 RX ADMIN — NITROGLYCERIN 1 INCH: 20 OINTMENT TOPICAL at 23:04

## 2019-03-24 RX ADMIN — IPRATROPIUM BROMIDE AND ALBUTEROL SULFATE 3 ML: .5; 3 SOLUTION RESPIRATORY (INHALATION) at 22:01

## 2019-03-25 PROBLEM — J96.91 RESPIRATORY FAILURE WITH HYPOXIA (HCC): Status: ACTIVE | Noted: 2019-03-25

## 2019-03-25 LAB
ATRIAL RATE: 76 BPM
CALCULATED R AXIS, ECG10: -53 DEGREES
CALCULATED T AXIS, ECG11: 99 DEGREES
DIAGNOSIS, 93000: NORMAL
GLUCOSE BLD STRIP.AUTO-MCNC: 164 MG/DL (ref 65–100)
GLUCOSE BLD STRIP.AUTO-MCNC: 269 MG/DL (ref 65–100)
GLUCOSE BLD STRIP.AUTO-MCNC: 311 MG/DL (ref 65–100)
GLUCOSE BLD STRIP.AUTO-MCNC: 341 MG/DL (ref 65–100)
LACTATE SERPL-SCNC: 1.5 MMOL/L (ref 0.4–2)
P-R INTERVAL, ECG05: 236 MS
Q-T INTERVAL, ECG07: 396 MS
QRS DURATION, ECG06: 106 MS
QTC CALCULATION (BEZET), ECG08: 445 MS
SERVICE CMNT-IMP: ABNORMAL
VENTRICULAR RATE, ECG03: 76 BPM

## 2019-03-25 PROCEDURE — 94640 AIRWAY INHALATION TREATMENT: CPT

## 2019-03-25 PROCEDURE — 74011250636 HC RX REV CODE- 250/636: Performed by: INTERNAL MEDICINE

## 2019-03-25 PROCEDURE — 74011000250 HC RX REV CODE- 250: Performed by: INTERNAL MEDICINE

## 2019-03-25 PROCEDURE — 74011250637 HC RX REV CODE- 250/637: Performed by: EMERGENCY MEDICINE

## 2019-03-25 PROCEDURE — 74011636637 HC RX REV CODE- 636/637: Performed by: HOSPITALIST

## 2019-03-25 PROCEDURE — 36415 COLL VENOUS BLD VENIPUNCTURE: CPT

## 2019-03-25 PROCEDURE — 74011250637 HC RX REV CODE- 250/637: Performed by: INTERNAL MEDICINE

## 2019-03-25 PROCEDURE — 74011636637 HC RX REV CODE- 636/637: Performed by: INTERNAL MEDICINE

## 2019-03-25 PROCEDURE — 77030038269 HC DRN EXT URIN PURWCK BARD -A

## 2019-03-25 PROCEDURE — 77010033678 HC OXYGEN DAILY

## 2019-03-25 PROCEDURE — 77030029684 HC NEB SM VOL KT MONA -A

## 2019-03-25 PROCEDURE — 83605 ASSAY OF LACTIC ACID: CPT

## 2019-03-25 PROCEDURE — 82962 GLUCOSE BLOOD TEST: CPT

## 2019-03-25 PROCEDURE — 65660000000 HC RM CCU STEPDOWN

## 2019-03-25 RX ORDER — SODIUM CHLORIDE 0.9 % (FLUSH) 0.9 %
5-40 SYRINGE (ML) INJECTION EVERY 8 HOURS
Status: DISCONTINUED | OUTPATIENT
Start: 2019-03-25 | End: 2019-03-28 | Stop reason: HOSPADM

## 2019-03-25 RX ORDER — CLOPIDOGREL BISULFATE 75 MG/1
75 TABLET ORAL DAILY
Status: DISCONTINUED | OUTPATIENT
Start: 2019-03-25 | End: 2019-03-28 | Stop reason: HOSPADM

## 2019-03-25 RX ORDER — CARVEDILOL 6.25 MG/1
6.25 TABLET ORAL 2 TIMES DAILY WITH MEALS
Status: DISCONTINUED | OUTPATIENT
Start: 2019-03-25 | End: 2019-03-28 | Stop reason: HOSPADM

## 2019-03-25 RX ORDER — GUAIFENESIN 600 MG/1
600 TABLET, EXTENDED RELEASE ORAL 2 TIMES DAILY
Status: DISCONTINUED | OUTPATIENT
Start: 2019-03-25 | End: 2019-03-28 | Stop reason: HOSPADM

## 2019-03-25 RX ORDER — AMOXICILLIN 250 MG
1 CAPSULE ORAL DAILY
Status: DISCONTINUED | OUTPATIENT
Start: 2019-03-25 | End: 2019-03-28 | Stop reason: HOSPADM

## 2019-03-25 RX ORDER — IPRATROPIUM BROMIDE AND ALBUTEROL SULFATE 2.5; .5 MG/3ML; MG/3ML
3 SOLUTION RESPIRATORY (INHALATION)
Status: DISCONTINUED | OUTPATIENT
Start: 2019-03-25 | End: 2019-03-28 | Stop reason: HOSPADM

## 2019-03-25 RX ORDER — MAGNESIUM SULFATE 100 %
4 CRYSTALS MISCELLANEOUS AS NEEDED
Status: DISCONTINUED | OUTPATIENT
Start: 2019-03-25 | End: 2019-03-28 | Stop reason: HOSPADM

## 2019-03-25 RX ORDER — INSULIN LISPRO 100 [IU]/ML
INJECTION, SOLUTION INTRAVENOUS; SUBCUTANEOUS
Status: DISCONTINUED | OUTPATIENT
Start: 2019-03-25 | End: 2019-03-28 | Stop reason: HOSPADM

## 2019-03-25 RX ORDER — HEPARIN SODIUM 5000 [USP'U]/ML
5000 INJECTION, SOLUTION INTRAVENOUS; SUBCUTANEOUS EVERY 8 HOURS
Status: DISCONTINUED | OUTPATIENT
Start: 2019-03-25 | End: 2019-03-25

## 2019-03-25 RX ORDER — AZELASTINE 1 MG/ML
SPRAY, METERED NASAL DAILY
Status: DISCONTINUED | OUTPATIENT
Start: 2019-03-25 | End: 2019-03-28 | Stop reason: HOSPADM

## 2019-03-25 RX ORDER — ACETAMINOPHEN 325 MG/1
650 TABLET ORAL
Status: DISCONTINUED | OUTPATIENT
Start: 2019-03-25 | End: 2019-03-28 | Stop reason: HOSPADM

## 2019-03-25 RX ORDER — FUROSEMIDE 10 MG/ML
40 INJECTION INTRAMUSCULAR; INTRAVENOUS DAILY
Status: DISCONTINUED | OUTPATIENT
Start: 2019-03-25 | End: 2019-03-25

## 2019-03-25 RX ORDER — FUROSEMIDE 10 MG/ML
40 INJECTION INTRAMUSCULAR; INTRAVENOUS DAILY
Status: DISCONTINUED | OUTPATIENT
Start: 2019-03-26 | End: 2019-03-26

## 2019-03-25 RX ORDER — DEXTROSE 50 % IN WATER (D50W) INTRAVENOUS SYRINGE
25-50 AS NEEDED
Status: DISCONTINUED | OUTPATIENT
Start: 2019-03-25 | End: 2019-03-28 | Stop reason: HOSPADM

## 2019-03-25 RX ORDER — IPRATROPIUM BROMIDE AND ALBUTEROL SULFATE 2.5; .5 MG/3ML; MG/3ML
3 SOLUTION RESPIRATORY (INHALATION)
Status: DISCONTINUED | OUTPATIENT
Start: 2019-03-25 | End: 2019-03-27

## 2019-03-25 RX ORDER — SODIUM CHLORIDE 0.9 % (FLUSH) 0.9 %
5-40 SYRINGE (ML) INJECTION AS NEEDED
Status: DISCONTINUED | OUTPATIENT
Start: 2019-03-25 | End: 2019-03-28 | Stop reason: HOSPADM

## 2019-03-25 RX ORDER — INSULIN LISPRO 100 [IU]/ML
INJECTION, SOLUTION INTRAVENOUS; SUBCUTANEOUS EVERY 6 HOURS
Status: DISCONTINUED | OUTPATIENT
Start: 2019-03-25 | End: 2019-03-25

## 2019-03-25 RX ORDER — FLUTICASONE FUROATE AND VILANTEROL 100; 25 UG/1; UG/1
1 POWDER RESPIRATORY (INHALATION) DAILY
Status: DISCONTINUED | OUTPATIENT
Start: 2019-03-25 | End: 2019-03-28 | Stop reason: HOSPADM

## 2019-03-25 RX ORDER — PRAVASTATIN SODIUM 10 MG/1
20 TABLET ORAL
Status: DISCONTINUED | OUTPATIENT
Start: 2019-03-25 | End: 2019-03-28 | Stop reason: HOSPADM

## 2019-03-25 RX ADMIN — FUROSEMIDE 40 MG: 10 INJECTION, SOLUTION INTRAMUSCULAR; INTRAVENOUS at 08:52

## 2019-03-25 RX ADMIN — APIXABAN 5 MG: 5 TABLET, FILM COATED ORAL at 08:52

## 2019-03-25 RX ADMIN — Medication 10 ML: at 00:47

## 2019-03-25 RX ADMIN — GABAPENTIN 800 MG: 100 CAPSULE ORAL at 22:15

## 2019-03-25 RX ADMIN — GABAPENTIN 800 MG: 100 CAPSULE ORAL at 17:01

## 2019-03-25 RX ADMIN — HUMAN INSULIN 10 UNITS: 100 INJECTION, SUSPENSION SUBCUTANEOUS at 12:26

## 2019-03-25 RX ADMIN — PRAVASTATIN SODIUM 20 MG: 10 TABLET ORAL at 04:00

## 2019-03-25 RX ADMIN — INSULIN LISPRO 2 UNITS: 100 INJECTION, SOLUTION INTRAVENOUS; SUBCUTANEOUS at 09:01

## 2019-03-25 RX ADMIN — METHYLPREDNISOLONE SODIUM SUCCINATE 60 MG: 40 INJECTION, POWDER, FOR SOLUTION INTRAMUSCULAR; INTRAVENOUS at 06:58

## 2019-03-25 RX ADMIN — INSULIN LISPRO 4 UNITS: 100 INJECTION, SOLUTION INTRAVENOUS; SUBCUTANEOUS at 22:14

## 2019-03-25 RX ADMIN — HUMAN INSULIN 10 UNITS: 100 INJECTION, SUSPENSION SUBCUTANEOUS at 17:00

## 2019-03-25 RX ADMIN — IPRATROPIUM BROMIDE AND ALBUTEROL SULFATE 3 ML: .5; 3 SOLUTION RESPIRATORY (INHALATION) at 09:02

## 2019-03-25 RX ADMIN — FLUTICASONE FUROATE AND VILANTEROL TRIFENATATE 1 PUFF: 100; 25 POWDER RESPIRATORY (INHALATION) at 12:27

## 2019-03-25 RX ADMIN — IPRATROPIUM BROMIDE AND ALBUTEROL SULFATE 3 ML: .5; 3 SOLUTION RESPIRATORY (INHALATION) at 03:29

## 2019-03-25 RX ADMIN — IPRATROPIUM BROMIDE AND ALBUTEROL SULFATE 3 ML: .5; 3 SOLUTION RESPIRATORY (INHALATION) at 12:26

## 2019-03-25 RX ADMIN — INSULIN LISPRO 7 UNITS: 100 INJECTION, SOLUTION INTRAVENOUS; SUBCUTANEOUS at 17:00

## 2019-03-25 RX ADMIN — APIXABAN 5 MG: 5 TABLET, FILM COATED ORAL at 17:01

## 2019-03-25 RX ADMIN — GUAIFENESIN 600 MG: 600 TABLET, EXTENDED RELEASE ORAL at 17:01

## 2019-03-25 RX ADMIN — CARVEDILOL 6.25 MG: 6.25 TABLET, FILM COATED ORAL at 08:51

## 2019-03-25 RX ADMIN — CARVEDILOL 6.25 MG: 6.25 TABLET, FILM COATED ORAL at 17:01

## 2019-03-25 RX ADMIN — GABAPENTIN 800 MG: 300 CAPSULE ORAL at 02:23

## 2019-03-25 RX ADMIN — CLOPIDOGREL BISULFATE 75 MG: 75 TABLET, FILM COATED ORAL at 08:52

## 2019-03-25 RX ADMIN — Medication 10 ML: at 22:16

## 2019-03-25 RX ADMIN — INSULIN LISPRO 5 UNITS: 100 INJECTION, SOLUTION INTRAVENOUS; SUBCUTANEOUS at 12:26

## 2019-03-25 RX ADMIN — HUMAN INSULIN 10 UNITS: 100 INJECTION, SUSPENSION SUBCUTANEOUS at 06:54

## 2019-03-25 RX ADMIN — IPRATROPIUM BROMIDE AND ALBUTEROL SULFATE 3 ML: .5; 3 SOLUTION RESPIRATORY (INHALATION) at 22:21

## 2019-03-25 RX ADMIN — GABAPENTIN 800 MG: 100 CAPSULE ORAL at 08:51

## 2019-03-25 RX ADMIN — PRAVASTATIN SODIUM 20 MG: 10 TABLET ORAL at 22:15

## 2019-03-25 RX ADMIN — HUMAN INSULIN 10 UNITS: 100 INJECTION, SUSPENSION SUBCUTANEOUS at 22:14

## 2019-03-25 RX ADMIN — GUAIFENESIN 600 MG: 600 TABLET, EXTENDED RELEASE ORAL at 09:01

## 2019-03-25 RX ADMIN — METHYLPREDNISOLONE SODIUM SUCCINATE 60 MG: 40 INJECTION, POWDER, FOR SOLUTION INTRAMUSCULAR; INTRAVENOUS at 17:00

## 2019-03-25 RX ADMIN — Medication 10 ML: at 14:40

## 2019-03-25 RX ADMIN — METHYLPREDNISOLONE SODIUM SUCCINATE 60 MG: 40 INJECTION, POWDER, FOR SOLUTION INTRAMUSCULAR; INTRAVENOUS at 12:25

## 2019-03-25 NOTE — PROGRESS NOTES
Hospitalist Progress Note NAME: Reza Dawn :  1950 MRN:  [id number] Assessment / Plan: 
Acute hypoxic respiratory failure POA  
CAD with h/o stents 2017 H/o Junctional Tachycardia/A.fib / S/p ablation 17 
- + dyspnea : likely multifactorial HF/copd  
initially on CPAP by EMS, now maintaining sats on 2-4L O2 
-O2 to keep priya >90%, wean as tolerated, assess for home O2 on DC Not on home O2  
-pharmacy consulted for meds rec 
-diuresing with lasix IV, watch daily weight  
-cont coreg  
-primary cardiology: Dr Jean Baptiste [employer] consult as needed  
-CTA:No evidence of acute pulmonary embolus. Dilated main pulmonary artery suggests pulmonary hypertension. Emphysema. Extensive coronary artery calcifications. 
  
COPD exacerbation  
-flu negative  
-also likely contributing to her dyspnea 
-continue agressive jet nebs and steroids  
  
DM type II diet controlled  
- due to steroids -c/w SS + NPH  
 
H/o Recurrent C.diff 
  
 
 
Code Status: Full code Surrogate Decision Maker:children DVT Prophylaxis: Eliquis Baseline: ambulating with cane; lives with her brother and grandson Recommended Disposition: TBD Subjective: Chief Complaint / Reason for Physician Visit: following respiratory failure / HTN/ dm Seen in ED 
C/o dyspnea No CP Discussed with RN events overnight. Review of Systems: 
Symptom Y/N Comments  Symptom Y/N Comments Fever/Chills n   Chest Pain n   
Poor Appetite    Edema Cough n   Abdominal Pain Sputum    Joint Pain SOB/[place] y   Pruritis/Rash Nausea/vomit    Tolerating PT/OT Diarrhea    Tolerating Diet Constipation    Other Could NOT obtain due to:   
 
Objective: VITALS:  
Last 24hrs VS reviewed since prior progress note. Most recent are: 
Patient Vitals for the past 24 hrs: 
 Temp Pulse Resp BP SpO2  
19 0700  79 18 119/52 94 % 19 0656 98 °F (36.7 °C) 76 17 123/60 96 % 03/25/19 0500  78 27 105/49 93 % 03/25/19 0400  78 24 115/52 97 % 03/25/19 0300  77 20 109/51 98 % 03/25/19 0200  78 17 101/46 97 % 03/25/19 0115  81 25 115/47 98 % 03/25/19 0015  79 22 98/53 97 % 03/24/19 2345  77 29 110/58 97 % 03/24/19 2304  79  136/63   
03/24/19 2300 97.9 °F (36.6 °C) 79 20 96/50 96 % 03/24/19 2230  79 24 125/86 96 % 03/24/19 2203  76 (!) 32 129/60 95 % 03/24/19 2200  80 25 129/60 96 % Intake/Output Summary (Last 24 hours) at 3/25/2019 6491 Last data filed at 3/25/2019 3342 Gross per 24 hour Intake  Output 1025 ml Net -1025 ml PHYSICAL EXAM: 
General: WD, WN. Alert, cooperative, no acute distress   
EENT:  EOMI. Anicteric sclerae. MMM Resp:  Diminished BS bilaterally, no wheezing or rales. No accessory muscle use CV:  Regular  rhythm,  No edema GI:  Soft, Non distended, Non tender.  +Bowel sounds Neurologic:  Alert and oriented X 3, normal speech, Psych:   Good insight. Not anxious nor agitated Skin:  No rashes. No jaundice Reviewed most current lab test results and cultures  YES Reviewed most current radiology test results   YES Review and summation of old records today    NO Reviewed patient's current orders and MAR    YES 
PMH/ reviewed - no change compared to H&P 
________________________________________________________________________ Care Plan discussed with: 
  Comments Patient y Family RN y   
Care Manager Consultant Multidiciplinary team rounds were held today with , nursing, pharmacist and clinical coordinator. Patient's plan of care was discussed; medications were reviewed and discharge planning was addressed. ________________________________________________________________________ Total NON critical care TIME:  35   Minutes Total CRITICAL CARE TIME Spent:   Minutes non procedure based Comments >50% of visit spent in counseling and coordination of care    
________________________________________________________________________ Dayan Rehman MD  
 
Procedures: see electronic medical records for all procedures/Xrays and details which were not copied into this note but were reviewed prior to creation of Plan. LABS: 
I reviewed today's most current labs and imaging studies. Pertinent labs include: 
Recent Labs  
  03/24/19 2157 WBC 8.1 HGB 11.5 HCT 34.9*  
 Recent Labs  
  03/24/19 2157 * K 3.7  CO2 22 * BUN 12  
CREA 1.49* CA 9.0 ALB 3.2* TBILI 0.5 SGOT 37 ALT 19 INR 1.1 Signed: Dayan Rehman MD

## 2019-03-25 NOTE — ED TRIAGE NOTES
ED visit d/t SOB - onset of sxs this AM - pt given Duoneb by EMS - pt arrived on bipap - pt placed on 2 L NC with good sat

## 2019-03-25 NOTE — PROGRESS NOTES
TRANSFER - IN REPORT: 
 
Verbal report received from [address] [place] [address], RN(name) on Kalpana Betters  being received from ED(unit) for routine progression of care Report consisted of patients Situation, Background, Assessment and  
Recommendations(SBAR). Information from the following report(s) SBAR, Kardex, ED Summary, Intake/Output, MAR, Recent Results and Cardiac Rhythm NSR was reviewed with the receiving nurse. Opportunity for questions and clarification was provided. Assessment will be completed upon patients arrival to unit and care assumed.

## 2019-03-25 NOTE — ROUTINE PROCESS
TRANSFER - OUT REPORT: 
 
Verbal report given to [facility] [place] [place] by [facility] RN(name) on Amie Victoria  being transferred to PCU(unit) for routine progression of care Report consisted of patients Situation, Background, Assessment and  
Recommendations(SBAR). Information from the following report(s) SBAR, Kardex, ED Summary, Procedure Summary, Intake/Output, MAR, Accordion and Cardiac Rhythm NSr was reviewed with the receiving nurse. Lines:    
 
Opportunity for questions and clarification was provided. Patient transported with: 
 Registered Nurse

## 2019-03-25 NOTE — ED NOTES
Pt reports having chest pressure - 6/10 - will continue to monitor;  Javid Treviño MD made aware will continue to monitor;;

## 2019-03-25 NOTE — H&P
Hospitalist Admission NoteNAME: Ashley Shukla :  1950 MRN:  [id number] Date/Time:  3/25/2019 3:40 AM 
 
Patient PCP: Loni Lyles MD 
______________________________________________________________________ Given the patient's current clinical presentation, I have a high level of concern for decompensation if discharged from the emergency department. Complex decision making was performed, which includes reviewing the patient's available past medical records, laboratory results, and x-ray films. My assessment of this patient's clinical condition and my plan of care is as follows. Assessment / Plan: 
Acute hypoxic respiratory failure 
-NC to maintain sats > 90% 
-initially on CPAP by EMS, now maintaining sats on 2-4L O2 
-admit to tele 
-evaluate for O2 needs prior to DC AECOPD 
-duonebs scheduled and prn 
-pulm hygeine with bronchodilators, incentive spirometer, mucolytics, 
-IV steroids, give with NPH 
 
CHF/pulmonary edema 
-given dose of lasix in ED 
-CXR mild pulm edema 
-bnp only 717 
-start lasix 40mg IV daily DM 
-diet controlled 
-neuropathy 
-cont gabapentin Afib 
-cont eliquis, carvedilol CAD/HTN 
S/p PCI/stent 
-cont BB, statin, plavix PAD 
-cont statin/antiplatelet therapy Code Status: full Surrogate Decision Maker: DVT Prophylaxis: eliquis GI Prophylaxis: not indicated Baseline: no home O2 needs PTA, ambulates with cane Subjective: CHIEF COMPLAINT: SOB HISTORY OF PRESENT ILLNESS:    
Nataliia Sanchez is a 80 y.o. female withPMHx of COPD, combined diastolic and systolic heart failure and atrial fibrillation, who presents to the ED with a complaint of worsening SOB. Pt additionally notes that she has felt heaviness in her b/l legs and the \"sensation that they're swollen even though they don't look like it\".  She has a remote history of cigarette smoking of between 1/2ppd to a ppd, having successfully quit just over 10 years ago. She denies fevers/chills, N/V, or chest pain. The patient does admit to a cough, which has been mostly non-productive We were asked to admit for work up and evaluation of the above problems. Past Medical History:  
Diagnosis Date  Arthritis   
 left shoulder  Atrial fibrillation ([facility] [address] 6/2/2010 Dr. Petr Rabago  CAD (coronary artery disease) stent; Dr. Petr Rabago  Celiac disease  Chronic diastolic heart failure ([facility] [address] 09/22/2014 Dr. Petr Rabago  Chronic kidney disease   
 per cardio note  Chronic pain   
 left thigh:  L SFA intervention by Dr. Carl Nolan 07/2018, as of 9/6/18:  still causing pain, pt to have MILLA checked per Dr. Carl Nolan note  Chronic systolic HF (heart failure) ([facility] [address] 5/10/2017  
 4/2017 EF 25-30%  COPD (chronic obstructive pulmonary disease) ([facility] [address] 6/2/2010 Dr. Mar Every  Diabetes [facility] Dr. Elta Phoenix; no current medication per pt  Emphysema, unspecified ([facility] [address] Dr. Mar Every  Fibroid  Frequent falls 2017  Gout  Heart failure ([facility] [address] Dr. Petr Rabago  History of Clostridium difficile infection 5/10/2017  
 4/2017 CDiff positive  Hypertension 6/2/2010 Dr. Mary Kimble  Hypertensive heart and chronic kidney disease   
 per PCP note  Hypotension 5/10/2017  Junctional tachycardia ([facility] [address] 5/10/2017 Dr. Petr Rabago  Neuropathy  NIDDM (non-insulin dependent diabetes mellitus) 6/2/2010  PAD (peripheral artery disease) ([facility] [address]  S/P ablation of atrial fibrillation 03/2018  Screening mammogram 5/4/10  
 SOB (shortness of breath) 09/22/2014 Dr. Isabela Mays  SSS (sick sinus syndrome) ([facility] [address]   
 per pacemaker form 9/6/18  Weakness   
 per pt weakness from c-diff 2017, mulitple falls with injury to rotator cuff Past Surgical History:  
Procedure Laterality Date  CARDIAC SURG PROCEDURE UNLIST    
 3 cardiac stent placed  COLONOSCOPY N/A 9/25/2017 COLONOSCOPY with biopsy performed by Marguerite Ortega MD at [facility] AM[facility] OR  
 HX AFIB ABLATION  2018  
 has had 2 ablations per patient  HX  SECTION    
 HX HEART CATHETERIZATION  2018  HX OTHER SURGICAL    
 adrenal gland removed  HX PACEMAKER Left Biotronik model: [facility]  AK EXCISE ADRENAL GLAND  VASCULAR SURGERY PROCEDURE UNLIST  2018 Left SFA intervention ; Dr. Sivan Stevens Social History Tobacco Use  Smoking status: Former Smoker Types: Cigarettes Start date: 5 Last attempt to quit: 2009 Years since quittin.2  Smokeless tobacco: Never Used Substance Use Topics  Alcohol use: No  
  
 
Family History Problem Relation Age of Onset  Heart Disease Mother  Diabetes Father  Heart Disease Brother Allergies Allergen Reactions  Crestor [Rosuvastatin] Myalgia  Levaquin [Levofloxacin] Nausea Only GI Upset  Lipitor [Atorvastatin] Diarrhea  Lyrica [Pregabalin] Myalgia Prior to Admission medications Medication Sig Start Date End Date Taking? Authorizing Provider Blood-Glucose Meter monitoring kit Use as directed. Dx: E11.9 3/6/19   Georgie Gaucher, MD  
glucose blood VI test strips (BLOOD GLUCOSE TEST) strip Use BID DxE11.9 3/6/19   Georgie Gaucher, MD  
lancets misc Use BID. Dx: E11.9 3/6/19   Georgie Gaucher, MD  
simvastatin (ZOCOR) 20 mg tablet Take 1 Tab by mouth nightly. Increased 1018 3/6/19   Georgie Gaucher, MD  
furosemide (LASIX) 20 mg tablet TAKE 1 TABLET BY MOUTH ONCE DAILY 3/5/19   Georgie Gaucher, MD  
albuterol (PROVENTIL HFA, VENTOLIN HFA, PROAIR HFA) 90 mcg/actuation inhaler Take 2 Puffs by inhalation every four (4) hours as needed for Wheezing. 19   Emerald Bright MD  
albuterol (PROVENTIL VENTOLIN) 2.5 mg /3 mL (0.083 %) nebulizer solution 3 mL by Nebulization route every four (4) hours as needed for Wheezing. 2/17/19   Rosendo Cross MD  
apixaban (ELIQUIS) 5 mg tablet Take 1 Tab by mouth two (2) times a day. Resume from tomorrow 7/21/2018. 1/20/19   Lety Dawson MD  
gabapentin (NEURONTIN) 800 mg tablet TAKE 1 TABLET BY MOUTH THREE TIMES DAILY 12/19/18   Jessee Turpin MD  
colchicine 0.6 mg tablet Take 2 Tabs by mouth as needed (9/6/18:  Per pt she only takes when she has a flare up). Patient takes 1.2 mg daily and 2.4 mg PRN flare up 10/29/18   Jessee Turpin MD  
SYMBICORT 160-4.5 mcg/actuation HFAA INHALE ONE PUFF BY MOUTH TWICE DAILY 10/26/18   Jessee Turpin MD  
clemastine 2.68 mg tab tablet Take 1 Tab by mouth daily. Indications: 1 tab in am and 1 tab in pm 6/4/18   Provider, Historical  
senna-docusate (PERICOLACE) 8.6-50 mg per tablet Take 1 Tab by mouth daily. 9/21/18   Sherle Dakin, NP  
clopidogrel (PLAVIX) 75 mg tab TAKE ONE TABLET BY MOUTH ONCE DAILY 7/8/18   Loc Latham MD  
carvedilol (COREG) 6.25 mg tablet Take 1 Tab by mouth two (2) times daily (with meals). 6/21/18   Lety Dawson MD  
tiotropium (SPIRIVA WITH HANDIHALER) 18 mcg inhalation capsule INHALE THE CONTENTS OF 1 CAPSULE THROUGH HANDIHALER DEVICE DAILY 10/10/17   Jessee Turpin MD  
fluticasone [facility]) 50 mcg/actuation nasal spray 2 Sprays by Both Nostrils route every evening. Other, MD Shannan  
Azelastine (ASTEPRO) 0.15 % (205.5 mcg) nasal spray 1 [place] by Both Nostrils route daily. 1/21/15   Provider, Historical  
cod liver oil cap Take 1 Cap by mouth daily. Provider, Historical  
 
 
REVIEW OF SYSTEMS:    
I am not able to complete the review of systems because: The patient is intubated and sedated The patient has altered mental status due to his acute medical problems The patient has baseline aphasia from prior stroke(s) The patient has baseline dementia and is not reliable historian The patient is in acute medical distress and unable to provide information Total of 12 systems reviewed as follows:   
   POSITIVE= underlined text  Negative = text not underlined General:  fever, chills, sweats, generalized weakness, weight loss/gain,  
   loss of appetite Eyes:    blurred vision, eye pain, loss of vision, double vision ENT:    rhinorrhea, pharyngitis Respiratory:   cough, sputum production, SOB, [place], wheezing, pleuritic pain  
Cardiology:   chest pain, palpitations, orthopnea, PND, edema, syncope Gastrointestinal:  abdominal pain , N/V, diarrhea, dysphagia, constipation, bleeding Genitourinary:  frequency, urgency, dysuria, hematuria, incontinence Muskuloskeletal :  arthralgia, myalgia, back pain Hematology:  easy bruising, nose or gum bleeding, lymphadenopathy Dermatological: rash, ulceration, pruritis, color change / jaundice Endocrine:   hot flashes or polydipsia Neurological:  headache, dizziness, confusion, focal weakness, paresthesia, Speech difficulties, memory loss, gait difficulty Psychological: Feelings of anxiety, depression, agitation Objective: VITALS:   
Visit Vitals /46 Pulse 78 Temp 97.9 °F (36.6 °C) Resp 17 Ht 5' 6\" (1.676 m) Wt 77.1 kg (170 lb) SpO2 97% BMI 27.44 kg/m² PHYSICAL EXAM: 
 
General:    Alert, cooperative, no distress, appears stated age. HEENT: Atraumatic, anicteric sclerae, pink conjunctivae No oral ulcers, mucosa moist, throat clear, dentition fair Neck:  Supple, symmetrical,  thyroid: non tender Lungs:   Faint b/l end-exp wheeze, no rales/rhonchi Chest wall:  No tenderness  No Accessory muscle use. Heart:   Regular  rhythm,  No  murmur   No edema Abdomen:   Soft, non-tender. Not distended. Bowel sounds normal 
Extremities: No cyanosis. No clubbing,   
  Skin turgor normal, Capillary refill normal, Radial dial pulse 2+ Skin:     Not pale. Not Jaundiced  No rashes Psych:  Good insight. Not depressed. Not anxious or agitated. Neurologic: EOMs intact. No facial asymmetry. No aphasia or slurred speech. Symmetrical strength, Sensation grossly intact. Alert and oriented X 4.  
 
_______________________________________________________________________ Care Plan discussed with: 
  Comments Patient x Family RN x Care Manager Consultant:  sravan mar  
_______________________________________________________________________ Expected  Disposition:  
Home with Family x HH/PT/OT/RN x  
SNF/LTC   
[place]   
________________________________________________________________________ TOTAL TIME:  > 60 Minutes Critical Care Provided     Minutes non procedure based Comments  
 x Reviewed previous records  
>50% of visit spent in counseling and coordination of care x Discussion with patient and/or family and questions answered 
  
 
________________________________________________________________________ Signed: Della Alanis,  
 
Procedures: see electronic medical records for all procedures/Xrays and details which were not copied into this note but were reviewed prior to creation of Plan. LAB DATA REVIEWED:   
Recent Results (from the past 24 hour(s)) EKG, 12 LEAD, INITIAL Collection Time: 03/24/19  9:37 PM  
Result Value Ref Range Ventricular Rate 76 BPM  
 Atrial Rate 76 BPM  
 P-R Interval 236 ms QRS Duration 106 ms  
 Q-T Interval 396 ms QTC Calculation (Bezet) 445 ms Calculated R Axis -53 degrees Calculated T Axis 99 degrees Diagnosis Atrial-paced rhythm with prolonged AV conduction Left axis deviation T wave abnormality, consider lateral ischemia When compared with ECG of 17-FEB-2019 12:01, Left bundle branch block is no longer present METABOLIC PANEL, COMPREHENSIVE Collection Time: 03/24/19  9:57 PM  
Result Value Ref Range  Sodium 135 (L) 136 - 145 mmol/L  
 Potassium 3.7 3.5 - 5.1 mmol/L Chloride 103 97 - 108 mmol/L  
 CO2 22 21 - 32 mmol/L Anion gap 10 5 - 15 mmol/L Glucose 166 (H) 65 - 100 mg/dL BUN 12 6 - 20 MG/DL Creatinine 1.49 (H) 0.55 - 1.02 MG/DL  
 BUN/Creatinine ratio 8 (L) 12 - 20 GFR est AA 42 (L) >60 ml/min/1.73m2 GFR est non-AA 35 (L) >60 ml/min/1.73m2 Calcium 9.0 8.5 - 10.1 MG/DL Bilirubin, total 0.5 0.2 - 1.0 MG/DL  
 ALT (SGPT) 19 12 - 78 U/L  
 AST (SGOT) 37 15 - 37 U/L Alk. phosphatase 130 (H) 45 - 117 U/L Protein, total 8.5 (H) 6.4 - 8.2 g/dL Albumin 3.2 (L) 3.5 - 5.0 g/dL Globulin 5.3 (H) 2.0 - 4.0 g/dL A-G Ratio 0.6 (L) 1.1 - 2.2    
CBC WITH AUTOMATED DIFF Collection Time: 03/24/19  9:57 PM  
Result Value Ref Range WBC 8.1 3.6 - 11.0 K/uL  
 RBC 4.11 3.80 - 5.20 M/uL  
 HGB 11.5 11.5 - 16.0 g/dL HCT 34.9 (L) 35.0 - 47.0 % MCV 84.9 80.0 - 99.0 FL  
 MCH 28.0 26.0 - 34.0 PG  
 MCHC 33.0 30.0 - 36.5 g/dL  
 RDW 13.0 11.5 - 14.5 % PLATELET 748 454 - 080 K/uL MPV 10.0 8.9 - 12.9 FL  
 NRBC 0.0 0  WBC ABSOLUTE NRBC 0.00 0.00 - 0.01 K/uL NEUTROPHILS 49 32 - 75 % LYMPHOCYTES 27 12 - 49 % MONOCYTES 17 (H) 5 - 13 % EOSINOPHILS 6 0 - 7 % BASOPHILS 0 0 - 1 % IMMATURE GRANULOCYTES 1 (H) 0.0 - 0.5 % ABS. NEUTROPHILS 4.0 1.8 - 8.0 K/UL  
 ABS. LYMPHOCYTES 2.2 0.8 - 3.5 K/UL  
 ABS. MONOCYTES 1.4 (H) 0.0 - 1.0 K/UL  
 ABS. EOSINOPHILS 0.5 (H) 0.0 - 0.4 K/UL  
 ABS. BASOPHILS 0.0 0.0 - 0.1 K/UL  
 ABS. IMM. GRANS. 0.1 (H) 0.00 - 0.04 K/UL  
 DF AUTOMATED    
TROPONIN I Collection Time: 03/24/19  9:57 PM  
Result Value Ref Range Troponin-I, Qt. <0.05 <0.05 ng/mL NT-PRO BNP Collection Time: 03/24/19  9:57 PM  
Result Value Ref Range NT pro- (H) <125 PG/ML  
PTT Collection Time: 03/24/19  9:57 PM  
Result Value Ref Range  aPTT 30.2 22.1 - 32.0 sec  
 aPTT, therapeutic range     58.0 - 77.0 SECS  
PROTHROMBIN TIME + INR  
 Collection Time: 03/24/19  9:57 PM  
Result Value Ref Range INR 1.1 0.9 - 1.1 Prothrombin time 11.6 (H) 9.0 - 11.1 sec INFLUENZA A & B AG (RAPID TEST) Collection Time: 03/24/19  9:57 PM  
Result Value Ref Range Influenza A Antigen NEGATIVE  NEG Influenza B Antigen NEGATIVE  NEG    
POC LACTIC ACID Collection Time: 03/24/19 10:08 PM  
Result Value Ref Range Lactic Acid (POC) 2.34 (HH) 0.40 - 2.00 mmol/L  
LACTIC ACID Collection Time: 03/25/19  2:06 AM  
Result Value Ref Range  Lactic acid 1.5 0.4 - 2.0 MMOL/L

## 2019-03-25 NOTE — PROGRESS NOTES
PCU SHIFT NURSING NOTE Bedside shift change report given to Verónica Simms (oncoming nurse) by Mercedez De (offgoing nurse). Report included the following information SBAR, Kardex, Intake/Output and MAR. Shift Summary:  
2200- PM meds administered. Patient bathed. Resting comfortably. Dentures removed and placed in a denture cup at bedside per patient request. VSS. No needs identified. Will continue to monitor. 0530- Patient's morning bgl 428. Notified MD, new orders received. 0600- Patient ambulated to [facility] well, with 1x assist. Patient stated she feels constipated. Requesting Prune juice. Will continue to monitor. Admission Date 3/24/2019 Admission Diagnosis Respiratory failure with hypoxia ([facility] [address] [J96.91] Consults IP CONSULT TO HOSPITALIST Consults []PT []OT []Speech  
[]Case Management  
  
[] Palliative Cardiac Monitoring Order []Yes []No  
 
IV drips []Yes Drip:                            Dose: 
Drip:                            Dose: 
Drip:                            Dose:  
[]No  
 
GI Prophylaxis []Yes []No  
 
 
 
DVT Prophylaxis SCDs:     
     
 Mayo stockings:     
  
[] Medication []Contraindicated []None Activity Level Activity Level: Up with Assistance Activity Assistance: Partial (two people) Purposeful Rounding every 1-2 hour? []Yes Dunham Score  Total Score: 2 Bed Alarm (If score 3 or >) []Yes  
[] Refused (See signed refusal form in chart) Landon Score  Landon Score: 16 Landon Score (if score 14 or less) []PMT consult  
[]Wound Care consult []Specialty bed  
[] Nutrition consult Needs prior to discharge:  
Home O2 required:   
[]Yes []No  
 If yes, how much O2 required? Other:  
 Last Bowel Movement:    
  
Influenza Vaccine Received Flu Vaccine for Current Season (usually Sept-March): Yes Pneumonia Vaccine Diet Active Orders Diet DIET DIABETIC CONSISTENT CARB Regular; Gluten Free LDAs              
Peripheral IV 03/25/19 (Active) Site Assessment Clean, dry, & intact 3/25/2019  2:40 PM  
Phlebitis Assessment 0 3/25/2019  2:40 PM  
Infiltration Assessment 0 3/25/2019  2:40 PM  
Dressing Status Clean, dry, & intact 3/25/2019  2:40 PM  
Dressing Type Tape;Transparent 3/25/2019  2:40 PM  
Hub Color/Line Status Pink;Flushed;Capped 3/25/2019  2:40 PM  
      
External Female Catheter 03/25/19 (Active) Site Assessment Clean, dry, & intact 3/25/2019  9:00 AM  
Repositioned Yes 3/25/2019  9:00 AM  
Perineal Care Yes 3/25/2019  9:00 AM  
Wick Changed No 3/25/2019  9:00 AM  
Suction Canister/Tubing Changed Yes 3/25/2019  9:00 AM  
            
Urinary Catheter Intake & Output Date 03/24/19 1900 - 03/25/19 0659 03/25/19 0700 - 03/26/19 0292 Shift 8716-2690 24 Hour Total 3606-0575 2317-8817 24 Hour Total  
INTAKE Shift Total(mL/kg) OUTPUT Urine(mL/kg/hr) 1025 1025 700(0.8)  700 Urine Voided 1025 1025 700  700 Shift Total(mL/kg) 1025(13.3) 1025(13.3) 700(9.1)  700(9.1) NET -1025 -1025 -700  -700 Weight (kg) 77.1 77.1 77.1 77.1 77.1 Readmission Risk Assessment Tool Score High Risk 36 Total Score 3 Has Seen PCP in Last 6 Months (Yes=3, No=0)  
 4 IP Visits Last 12 Months (1-3=4, 4=9, >4=11) 5 Pt. Coverage (Medicare=5 , Medicaid, or Self-Pay=4) 28 Charlson Comorbidity Score (Age + Comorbid Conditions) Criteria that do not apply:  
 . Living with Significant Other. Assisted Living. LTAC. SNF. or  
Rehab Patient Length of Stay (>5 days = 3) Expected Length of Stay 3d 19h Actual Length of Stay 0

## 2019-03-25 NOTE — ED PROVIDER NOTES
EMERGENCY DEPARTMENT HISTORY AND PHYSICAL EXAM 
 
 
Date: 3/24/2019 Patient Name: Reza Dawn Patient Age and Sex: 80 y.o. female History of Presenting Illness Chief Complaint Patient presents with  Respiratory Distress History Provided By: Patient and EMS 
 
HPI: Reza Dawn, 80 y.o. female with PMHx significant for atrial fib on chronic anticoagulation, chronic diastolic heart failure, COPD, last ejection fraction 25-30% presents via EMS with complaints of acute respiratory distress. Patient states about 3 hours prior to arrival she noticed acute worsening lower extremity swelling as well as some swelling in her bilateral hands. She attempted to give herself a breathing treatment however symptoms continue to persist.  Patient called EMS due to the above. Upon EMS arrival, patient was hypoxic was placed on CPAP. Patient has been transitioned over to 4 L of oxygen currently. Patient reports chest tightness across her chest rated at 2 out of 10 in intensity. Patient reports giving herself an extra dose of Lasix 20 mg without much relief of symptoms. There are no other modifying factors to the H&P at this time. History is limited due to respiratory distress and acuity of condition. PCP: Rocco Johnson MD 
 
Past History Past Medical History: 
Past Medical History:  
Diagnosis Date  Arthritis   
 left shoulder  Atrial fibrillation ([address] 2010 Dr. Jean Baptiste   CAD (coronary artery disease) stent; Dr. Jean Baptiste   Celiac disease  Chronic diastolic heart failure ([address] 2014 Dr. Jean Baptiste [place]  Chronic kidney disease   
 per cardio note  Chronic pain   
 left thigh:  L SFA intervention by Dr. Alyson Rodriguez 2018, as of 18:  still causing pain, pt to have MILLA checked per Dr. Alyson Rodriguez note  Chronic systolic HF (heart failure) ([address] 5/10/2017  
 2017 EF 25-30%  COPD (chronic obstructive pulmonary disease) ([address] 2010 Dr. Junior Milian  Diabetes ([facility] Dr. Arthur Walden; no current medication per pt  Emphysema, unspecified ([facility] Dr. Shaye Tanner  Fibroid  Frequent falls   Gout  Heart failure ([facility] Dr. Tatiana Payne  History of Clostridium difficile infection 5/10/2017  
 2017 CDiff positive  Hypertension 2010 Dr. Dion Mitchell  Hypertensive heart and chronic kidney disease   
 per PCP note  Hypotension 5/10/2017  Junctional tachycardia ([facility] 5/10/2017 Dr. Tatiana Payne  Neuropathy  NIDDM (non-insulin dependent diabetes mellitus) 2010  PAD (peripheral artery disease) ([facility]  S/P ablation of atrial fibrillation 2018  Screening mammogram 5/4/10  
 SOB (shortness of breath) 2014 Dr. Shaye Tanner  SSS (sick sinus syndrome) ([facility]   
 per pacemaker form 18  Weakness   
 per pt weakness from c-diff , mulitple falls with injury to rotator cuff Past Surgical History: 
Past Surgical History:  
Procedure Laterality Date  CARDIAC SURG PROCEDURE UNLIST    
 3 cardiac stent placed  COLONOSCOPY N/A 2017 COLONOSCOPY with biopsy performed by Gabe Dumont MD at [facility]  
 HX AFIB ABLATION  2018  
 has had 2 ablations per patient  HX  SECTION    
 HX HEART CATHETERIZATION  2018  HX OTHER SURGICAL    
 adrenal gland removed  HX PACEMAKER Left [employer] model: Will Lone  VA EXCISE ADRENAL GLAND  VASCULAR SURGERY PROCEDURE UNLIST  2018 Left SFA intervention ; Dr. Sathya Camilo Family History: 
Family History Problem Relation Age of Onset  Heart Disease Mother  Diabetes Father  Heart Disease Brother Social History: 
Social History Tobacco Use  Smoking status: Former Smoker Types: Cigarettes Start date: 5 Last attempt to quit: 2009 Years since quittin.2  Smokeless tobacco: Never Used Substance Use Topics  Alcohol use: No  
 Drug use:  No  
 
 
 Allergies: Allergies Allergen Reactions  Crestor [Rosuvastatin] Myalgia  Levaquin [Levofloxacin] Nausea Only GI Upset  Lipitor [Atorvastatin] Diarrhea  Lyrica [Pregabalin] Myalgia Medications: No current facility-administered medications on file prior to encounter. Current Outpatient Medications on File Prior to Encounter Medication Sig Dispense Refill  apixaban (ELIQUIS) 5 mg tablet Take 1 Tab by mouth two (2) times a day. Resume from tomorrow 7/21/2018. 56 Tab 0  Blood-Glucose Meter monitoring kit Use as directed. Dx: E11.9 1 Kit 0  
 glucose blood VI test strips (BLOOD GLUCOSE TEST) strip Use BID DxE11.9 100 Strip 11  
 lancets misc Use BID. Dx: E11.9 100 Each 11  
 simvastatin (ZOCOR) 20 mg tablet Take 1 Tab by mouth nightly. Increased 9/1018 90 Tab 1  
 furosemide (LASIX) 20 mg tablet TAKE 1 TABLET BY MOUTH ONCE DAILY 90 Tab 1  
 albuterol (PROVENTIL HFA, VENTOLIN HFA, PROAIR HFA) 90 mcg/actuation inhaler Take 2 Puffs by inhalation every four (4) hours as needed for Wheezing. 1 Inhaler 1  
 albuterol (PROVENTIL VENTOLIN) 2.5 mg /3 mL (0.083 %) nebulizer solution 3 mL by Nebulization route every four (4) hours as needed for Wheezing. 1 Package 5  
 gabapentin (NEURONTIN) 800 mg tablet TAKE 1 TABLET BY MOUTH THREE TIMES DAILY 90 Tab 3  
 colchicine 0.6 mg tablet Take 2 Tabs by mouth as needed (9/6/18:  Per pt she only takes when she has a flare up). Patient takes 1.2 mg daily and 2.4 mg PRN flare up 30 Tab 5  
 SYMBICORT 160-4.5 mcg/actuation HFAA INHALE ONE PUFF BY MOUTH TWICE DAILY 1 Inhaler 3  clemastine 2.68 mg tab tablet Take 1 Tab by mouth daily. Indications: 1 tab in am and 1 tab in pm    
 senna-docusate (PERICOLACE) 8.6-50 mg per tablet Take 1 Tab by mouth daily. 30 Tab 0  clopidogrel (PLAVIX) 75 mg tab TAKE ONE TABLET BY MOUTH ONCE DAILY 30 Tab 11  
 carvedilol (COREG) 6.25 mg tablet Take 1 Tab by mouth two (2) times daily (with meals).  60 Tab 11  
  tiotropium (SPIRIVA WITH HANDIHALER) 18 mcg inhalation capsule INHALE THE CONTENTS OF 1 CAPSULE THROUGH HANDIHALER DEVICE DAILY 90 Cap 3  
 fluticasone (FLONASE) 50 mcg/actuation nasal spray 2 Sprays by Both Nostrils route every evening.  Azelastine (ASTEPRO) 0.15 % (205.5 mcg) nasal spray 1 [place] by Both Nostrils route daily.  cod liver oil cap Take 1 Cap by mouth daily. Review of Systems Review of Systems Unable to perform ROS: Severe respiratory distress Physical Exam  
Physical Exam  
Constitutional: She is oriented to person, place, and time. She appears toxic. She has a sickly appearance. She appears ill. She appears distressed. HENT:  
Head: Normocephalic and atraumatic. Mouth/Throat: Oropharynx is clear and moist. No oropharyngeal exudate. Eyes: Pupils are equal, round, and reactive to light. Conjunctivae and EOM are normal.  
Neck: Normal range of motion. Cardiovascular: Normal rate, regular rhythm and normal heart sounds. Exam reveals no gallop and no friction rub. No murmur heard. Pulmonary/Chest: Accessory muscle usage present. Tachypnea noted. She is in respiratory distress. She has wheezes. She has no rales. She exhibits no tenderness. Abdominal: Soft. Bowel sounds are normal. She exhibits no distension and no mass. There is no tenderness. There is no rebound and no guarding. Musculoskeletal: Normal range of motion. She exhibits no edema, tenderness or deformity. Neurological: She is alert and oriented to person, place, and time. She displays normal reflexes. No cranial nerve deficit. She exhibits normal muscle tone. Coordination normal.  
Skin: Skin is warm. No rash noted. She is diaphoretic. Psychiatric: She has a normal mood and affect. Nursing note and vitals reviewed. Diagnostic Study Results Labs - Recent Results (from the past 24 hour(s)) EKG, 12 LEAD, INITIAL Collection Time: 03/24/19  9:37 PM  
Result Value Ref Range Ventricular Rate 76 BPM  
 Atrial Rate 76 BPM  
 P-R Interval 236 ms QRS Duration 106 ms  
 Q-T Interval 396 ms QTC Calculation (Bezet) 445 ms Calculated R Axis -53 degrees Calculated T Axis 99 degrees Diagnosis Atrial-paced rhythm with prolonged AV conduction Left axis deviation T wave abnormality, consider lateral ischemia When compared with ECG of 17-FEB-2019 12:01, Left bundle branch block is no longer present Confirmed by Angel Briseno ([id number]) on 3/25/2019 4:41:71 PM 
  
METABOLIC PANEL, COMPREHENSIVE Collection Time: 03/24/19  9:57 PM  
Result Value Ref Range Sodium 135 (L) 136 - 145 mmol/L Potassium 3.7 3.5 - 5.1 mmol/L Chloride 103 97 - 108 mmol/L  
 CO2 22 21 - 32 mmol/L Anion gap 10 5 - 15 mmol/L Glucose 166 (H) 65 - 100 mg/dL BUN 12 6 - 20 MG/DL Creatinine 1.49 (H) 0.55 - 1.02 MG/DL  
 BUN/Creatinine ratio 8 (L) 12 - 20 GFR est AA 42 (L) >60 ml/min/1.73m2 GFR est non-AA 35 (L) >60 ml/min/1.73m2 Calcium 9.0 8.5 - 10.1 MG/DL Bilirubin, total 0.5 0.2 - 1.0 MG/DL  
 ALT (SGPT) 19 12 - 78 U/L  
 AST (SGOT) 37 15 - 37 U/L Alk. phosphatase 130 (H) 45 - 117 U/L Protein, total 8.5 (H) 6.4 - 8.2 g/dL Albumin 3.2 (L) 3.5 - 5.0 g/dL Globulin 5.3 (H) 2.0 - 4.0 g/dL A-G Ratio 0.6 (L) 1.1 - 2.2    
CBC WITH AUTOMATED DIFF Collection Time: 03/24/19  9:57 PM  
Result Value Ref Range WBC 8.1 3.6 - 11.0 K/uL  
 RBC 4.11 3.80 - 5.20 M/uL  
 HGB 11.5 11.5 - 16.0 g/dL HCT 34.9 (L) 35.0 - 47.0 % MCV 84.9 80.0 - 99.0 FL  
 MCH 28.0 26.0 - 34.0 PG  
 MCHC 33.0 30.0 - 36.5 g/dL  
 RDW 13.0 11.5 - 14.5 % PLATELET 235 832 - 673 K/uL MPV 10.0 8.9 - 12.9 FL  
 NRBC 0.0 0  WBC ABSOLUTE NRBC 0.00 0.00 - 0.01 K/uL NEUTROPHILS 49 32 - 75 % LYMPHOCYTES 27 12 - 49 % MONOCYTES 17 (H) 5 - 13 % EOSINOPHILS 6 0 - 7 % BASOPHILS 0 0 - 1 % IMMATURE GRANULOCYTES 1 (H) 0.0 - 0.5 % ABS. NEUTROPHILS 4.0 1.8 - 8.0 K/UL ABS. LYMPHOCYTES 2.2 0.8 - 3.5 K/UL  
 ABS. MONOCYTES 1.4 (H) 0.0 - 1.0 K/UL  
 ABS. EOSINOPHILS 0.5 (H) 0.0 - 0.4 K/UL  
 ABS. BASOPHILS 0.0 0.0 - 0.1 K/UL  
 ABS. IMM. GRANS. 0.1 (H) 0.00 - 0.04 K/UL  
 DF AUTOMATED    
TROPONIN I Collection Time: 03/24/19  9:57 PM  
Result Value Ref Range Troponin-I, Qt. <0.05 <0.05 ng/mL NT-PRO BNP Collection Time: 03/24/19  9:57 PM  
Result Value Ref Range NT pro- (H) <125 PG/ML  
PTT Collection Time: 03/24/19  9:57 PM  
Result Value Ref Range aPTT 30.2 22.1 - 32.0 sec  
 aPTT, therapeutic range     58.0 - 77.0 SECS  
PROTHROMBIN TIME + INR Collection Time: 03/24/19  9:57 PM  
Result Value Ref Range INR 1.1 0.9 - 1.1 Prothrombin time 11.6 (H) 9.0 - 11.1 sec INFLUENZA A & B AG (RAPID TEST) Collection Time: 03/24/19  9:57 PM  
Result Value Ref Range Influenza A Antigen NEGATIVE  NEG Influenza B Antigen NEGATIVE  NEG    
POC LACTIC ACID Collection Time: 03/24/19 10:08 PM  
Result Value Ref Range Lactic Acid (POC) 2.34 (HH) 0.40 - 2.00 mmol/L  
LACTIC ACID Collection Time: 03/25/19  2:06 AM  
Result Value Ref Range Lactic acid 1.5 0.4 - 2.0 MMOL/L  
GLUCOSE, POC Collection Time: 03/25/19  8:50 AM  
Result Value Ref Range Glucose (POC) 164 (H) 65 - 100 mg/dL Performed by Kiera Divreggie JOSELO   
GLUCOSE, POC Collection Time: 03/25/19 12:17 PM  
Result Value Ref Range Glucose (POC) 269 (H) 65 - 100 mg/dL Performed by Kiera Divreggie JOSELO   
GLUCOSE, POC Collection Time: 03/25/19  4:14 PM  
Result Value Ref Range Glucose (POC) 341 (H) 65 - 100 mg/dL Performed by Sasha Imus Radiologic Studies -  
CTA CHEST W OR W WO CONT Final Result IMPRESSION:  
No evidence of acute pulmonary embolus. Dilated main pulmonary artery suggests  
pulmonary hypertension. Emphysema. Extensive coronary artery calcifications. XR CHEST PORT Final Result IMPRESSION: Mild pulmonary interstitial edema. CT Results  (Last 48 hours) 03/25/19 0047  CTA [facility] CONT Final result Impression:  IMPRESSION:  
No evidence of acute pulmonary embolus. Dilated main pulmonary artery suggests  
pulmonary hypertension. Emphysema. Extensive coronary artery calcifications. Narrative:  INDICATION: Respiratory distress COMPARISON:June 28, 2018 TECHNIQUE:    
Routine noncontrast imaging the chest was performed for localization purposes. Then, following the uneventful intravenous administration of 100 cc [id number],  
thin helical axial images were obtained through the chest. 3D image  
postprocessing was performed. CT dose reduction was achieved through use of a  
standardized protocol tailored for this examination and automatic exposure  
control for dose modulation. FINDINGS:  
   
THYROID: No nodule. MEDIASTINUM: No mass or lymphadenopathy. BRIANNA: No mass or lymphadenopathy. THORACIC AORTA: Atherosclerotic. No dissection or aneurysm. PULMONARY ARTERIES: Main pulmonary artery is dilated, measuring 3.5 cm in  
diameter. No evidence of acute pulmonary emboli. TRACHEA/BRONCHI: Patent. ESOPHAGUS: No wall thickening or dilatation. HEART: Normal in size. Extensive coronary artery calcifications. PLEURA: No effusion or pneumothorax. LUNGS: Moderately severe centrilobular and paraseptal emphysema INCIDENTALLY IMAGED UPPER ABDOMEN: No focal abnormality. BONES: No destructive bone lesion. ADDITIONAL COMMENTS: N/A  
   
  
  
 
CXR Results  (Last 48 hours) 03/24/19 2203  XR CHEST PORT Final result Impression:  IMPRESSION: Mild pulmonary interstitial edema. Narrative:  INDICATION: Sepsis COMPARISON: 3/17/2019 FINDINGS: AP portable imaging of the chest performed at 9:39 PM demonstrates a  
stable cardiomediastinal silhouette.  Left-sided pacemaker is unchanged in  
 position. There is mild pulmonary interstitial edema. No focal consolidation or  
pleural effusion is evident. No significant osseous abnormalities are seen. Medical Decision Making I am the first provider for this patient. I reviewed the vital signs, available nursing notes, past medical history, past surgical history, family history and social history. Vital Signs-Reviewed the patient's vital signs. Patient Vitals for the past 24 hrs: 
 Temp Pulse Resp BP SpO2  
03/25/19 1910 98.1 °F (36.7 °C) 80 20 113/62 96 % 03/25/19 1436 97.5 °F (36.4 °C) 78 19 117/72 98 % 03/25/19 1400 97.5 °F (36.4 °C) 76 19 95/50 96 % 03/25/19 1300  77 20 98/50 97 % 03/25/19 1200 97.9 °F (36.6 °C) 79 19 (!) 103/36 96 % 03/25/19 1100  76 20 98/52 95 % 03/25/19 1000  78 17 (!) 83/62 96 % 03/25/19 0900 98.3 °F (36.8 °C) 77 15 107/76 98 % 03/25/19 0700  79 18 119/52 94 % 03/25/19 0656 98 °F (36.7 °C) 76 17 123/60 96 % 03/25/19 0500  78 27 105/49 93 % 03/25/19 0400  78 24 115/52 97 % 03/25/19 0300  77 20 109/51 98 % 03/25/19 0200  78 17 101/46 97 % 03/25/19 0115  81 25 115/47 98 % 03/25/19 0015  79 22 98/53 97 % 03/24/19 2345  77 29 110/58 97 % 03/24/19 2304  79  136/63   
03/24/19 2300 97.9 °F (36.6 °C) 79 20 96/50 96 % 03/24/19 2230  79 24 125/86 96 % 03/24/19 2203  76 (!) 32 129/60 95 % 03/24/19 2200  80 25 129/60 96 % Pulse Oximetry Analysis - 96% on BIPAP Cardiac Monitor:  
Rate: 80 bpm 
Rhythm: Normal Sinus Rhythm ED EKG interpretation: 
Rhythm: atrial-paced with prolonged AV conduction; and regular . Rate (approx.): 76; Axis: left axis deviation; P wave: normal; QRS interval: normal ; ST/T wave: non-specific changes; Other findings: normal. This EKG was interpreted by Clarissa Chiu M.D. Records Reviewed: Nursing Notes, Old Medical Records, Previous electrocardiograms, Previous Radiology Studies and Previous Laboratory Studies Provider Notes (Medical Decision Making):  
Patient presents with acute dyspnea. DDx: asthma, copd, pna, pulmonary edema, acute bronchitis, ACS, ptx, pna. Will obtain EKG, labs, CXR, provide O2 as needed for hypoxia, treat symptomatically and reassess. Will continue to monitor closely in ED. ED Course:  
Initial assessment performed. The patients presenting problems have been discussed, and they are in agreement with the care plan formulated and outlined with them. I have encouraged them to ask questions as they arise throughout their visit. HYPERTENSION COUNSELING Education was provided to the patient today regarding their hypertension. Patient is made aware of their elevated blood pressure and is instructed to follow up this week with their Primary Care for a recheck. Patient is counseled regarding consequences of chronic, uncontrolled hypertension including kidney disease, heart disease, stroke or even death. Patient states their understanding and agrees to follow up this week. Additionally, during their visit, I discussed sodium restriction, maintaining ideal body weight and regular exercise program as physiologic means to achieve blood pressure control. The patient will strive towards this. I reviewed our electronic medical record system for any past medical records that were available that may contribute to the patient's current condition, the nursing notes and vital signs from today's visit. Benjie Reyna MD 
Medications Administered During ED Course: 
Medications  
sodium chloride (NS) flush 5-10 mL (has no administration in time range)  
sodium chloride (NS) flush 5-40 mL (10 mL IntraVENous Given 3/26/19 1701)  
sodium chloride (NS) flush 5-40 mL (has no administration in time range)  
acetaminophen (TYLENOL) tablet 650 mg (has no administration in time range)  
apixaban (ELIQUIS) tablet 5 mg (5 mg Oral Given 3/26/19 1706) azelastine (ASTELIN) 137mcg/spray nasal spray ( Both Nostrils Given 3/26/19 1001) carvedilol (COREG) tablet 6.25 mg (6.25 mg Oral Given 3/26/19 1700) clopidogrel (PLAVIX) tablet 75 mg (75 mg Oral Given 3/26/19 0849)  
senna-docusate (PERICOLACE) 8.6-50 mg per tablet 1 Tab (1 Tab Oral Given 3/26/19 0850) pravastatin (PRAVACHOL) tablet 20 mg (20 mg Oral Given 3/25/19 2215)  
fluticasone-vilanterol (BREO ELLIPTA) 100mcg-25mcg/puff (1 Puff Inhalation Given 3/26/19 1002) albuterol-ipratropium (DUO-NEB) 2.5 MG-0.5 MG/3 ML (3 mL Nebulization Refused 3/26/19 1600)  
albuterol-ipratropium (DUO-NEB) 2.5 MG-0.5 MG/3 ML (3 mL Nebulization Given 3/25/19 0329)  
glucose chewable tablet 16 g (has no administration in time range) dextrose (D50W) injection syrg 12.5-25 g (has no administration in time range) glucagon (GLUCAGEN) injection 1 mg (has no administration in time range) guaiFENesin ER (MUCINEX) tablet 600 mg (600 mg Oral Given 3/26/19 1706) insulin lispro (HUMALOG) injection (8 Units SubCUTAneous Given 3/26/19 1701) insulin NPH (NOVOLIN N, HUMULIN N) injection 15 Units (15 Units SubCUTAneous Given 3/26/19 1700) gabapentin (NEURONTIN) capsule 400 mg (400 mg Oral Given 3/26/19 1700) methylPREDNISolone (PF) (SOLU-MEDROL) injection 40 mg (40 mg IntraVENous Given 3/26/19 1706) furosemide (LASIX) tablet 40 mg (has no administration in time range)  
albuterol-ipratropium (DUO-NEB) 2.5 MG-0.5 MG/3 ML (3 mL Nebulization Given 3/24/19 2201)  
nitroglycerin (NITROBID) 2 % ointment 1 Inch (1 Inch Topical Given 3/24/19 2304) furosemide (LASIX) injection 40 mg (40 mg IntraVENous Given 3/24/19 2304) iopamidol (ISOVUE-370) 76 % injection 100 mL (100 mL IntraVENous Given 3/24/19 2324)  
sodium chloride (NS) flush 10 mL (10 mL IntraVENous Given 3/25/19 0047)  
gabapentin (NEURONTIN) capsule 800 mg (800 mg Oral Given 3/25/19 0223) insulin lispro (HUMALOG) injection 10 Units (10 Units SubCUTAneous Given 3/26/19 0609) ED Course as of Mar 25 2021 Mon Mar 25, 2019 [id number] CONSULT NOTE:  
2:59 Jose David Long MD spoke with Dr. Carmelina Lugo, Specialty: Hospitalist 
Discussed pt's hx, disposition, and available diagnostic and imaging results. Reviewed care plans. Agree with management and plan thus far. Consultant will evaluate pt for admission. [HW] ED Course User Index 
[HW] Justin Escamilla MD  
 
PROGRESS NOTE Pt reassessed; ABG reviewed; weaned off BIPAP to 4L NC; pt given duoneb with some improvement of sx's; will continue to monitor as labs return. PROGRESS NOTE Evidence of pulmonary edema and fluid overload on exam; h/o diastolic CHF, afib s/p ablation in 5/2017; pt on Lasix at home, will give dose of IV Lasix here in ED; primary cardiologist is Dr. Renae Church. PROGRESS NOTE 
CTA negative for acute PE; will admit to Hospitalist at this time. Critical Care Time: CRITICAL CARE NOTE : 
IMPENDING DETERIORATION -Airway, Respiratory, Cardiovascular, Metabolic and Renal 
ASSOCIATED RISK FACTORS - Hypotension, Shock, Hypoxia, Dysrhythmia and Metabolic changes MANAGEMENT- Bedside Assessment and Supervision of Care INTERPRETATION -  Xrays, CT Scan, Blood Gases, ECG, Blood Pressure and Cardiac Output Measures INTERVENTIONS - hemodynamic mngmt and Metobolic interventions CASE REVIEW - Hospitalist, Nursing and Family TREATMENT RESPONSE -Improved PERFORMED BY - Self NOTES   : 
 
I have spent 70 minutes of critical care time involved in lab review, consultations with specialist, family decision- making, bedside attention and documentation. During this entire length of time I was immediately available to the patient . Critical Care: The reason for providing this level of medical care for this critically ill patient was due to a critical illness that impaired one or more vital organ systems, such that there was a high probability of imminent or life threatening deterioration in the patient's condition. This care involved high complexity decision making to assess, manipulate, and support vital system functions, to treat this degree of vital organ system failure, and to prevent further life threatening deterioration of the patients condition. Tom Coker MD 
 
 
Disposition:  ADMIT Patient is being admitted to the hospital.  The results of their tests and reasons for their admission have been discussed with them and/or available family. They convey agreement and understanding for the need to be admitted and for their admission diagnosis. Consultation has been made with the inpatient physician specialist for hospitalization. Diagnosis Clinical Impression: 1. Acute respiratory failure with hypoxia ([address] 2. Acute exacerbation of chronic obstructive pulmonary disease (COPD) ([address] 3. Acute congestive heart failure, unspecified heart failure type ([address] 4. Respiratory distress 5. Elevated lactic acid level 6. Uncontrolled type 2 diabetes mellitus with hyperglycemia ([address] Attestation: 
 
I personally performed the services described in this documentation on this date 3/24/2019 for patient Naveed Shukla. I have reviewed and verified that all the information is accurate and complete. Tom Coker MD 
 
 
This note will not be viewable in [address]

## 2019-03-26 LAB
ANION GAP SERPL CALC-SCNC: 12 MMOL/L (ref 5–15)
BUN SERPL-MCNC: 29 MG/DL (ref 6–20)
BUN/CREAT SERPL: 16 (ref 12–20)
CALCIUM SERPL-MCNC: 9.3 MG/DL (ref 8.5–10.1)
CHLORIDE SERPL-SCNC: 97 MMOL/L (ref 97–108)
CO2 SERPL-SCNC: 21 MMOL/L (ref 21–32)
CREAT SERPL-MCNC: 1.79 MG/DL (ref 0.55–1.02)
ERYTHROCYTE [DISTWIDTH] IN BLOOD BY AUTOMATED COUNT: 12.6 % (ref 11.5–14.5)
GLUCOSE BLD STRIP.AUTO-MCNC: 313 MG/DL (ref 65–100)
GLUCOSE BLD STRIP.AUTO-MCNC: 322 MG/DL (ref 65–100)
GLUCOSE BLD STRIP.AUTO-MCNC: 323 MG/DL (ref 65–100)
GLUCOSE BLD STRIP.AUTO-MCNC: 389 MG/DL (ref 65–100)
GLUCOSE BLD STRIP.AUTO-MCNC: 428 MG/DL (ref 65–100)
GLUCOSE SERPL-MCNC: 322 MG/DL (ref 65–100)
HCT VFR BLD AUTO: 35.6 % (ref 35–47)
HGB BLD-MCNC: 11.9 G/DL (ref 11.5–16)
MAGNESIUM SERPL-MCNC: 2.1 MG/DL (ref 1.6–2.4)
MCH RBC QN AUTO: 27.7 PG (ref 26–34)
MCHC RBC AUTO-ENTMCNC: 33.4 G/DL (ref 30–36.5)
MCV RBC AUTO: 82.8 FL (ref 80–99)
NRBC # BLD: 0 K/UL (ref 0–0.01)
NRBC BLD-RTO: 0 PER 100 WBC
PHOSPHATE SERPL-MCNC: 3 MG/DL (ref 2.6–4.7)
PLATELET # BLD AUTO: 215 K/UL (ref 150–400)
PMV BLD AUTO: 10.1 FL (ref 8.9–12.9)
POTASSIUM SERPL-SCNC: 4 MMOL/L (ref 3.5–5.1)
RBC # BLD AUTO: 4.3 M/UL (ref 3.8–5.2)
SERVICE CMNT-IMP: ABNORMAL
SODIUM SERPL-SCNC: 130 MMOL/L (ref 136–145)
WBC # BLD AUTO: 12.1 K/UL (ref 3.6–11)

## 2019-03-26 PROCEDURE — 74011636637 HC RX REV CODE- 636/637: Performed by: INTERNAL MEDICINE

## 2019-03-26 PROCEDURE — 84100 ASSAY OF PHOSPHORUS: CPT

## 2019-03-26 PROCEDURE — 83735 ASSAY OF MAGNESIUM: CPT

## 2019-03-26 PROCEDURE — 97530 THERAPEUTIC ACTIVITIES: CPT

## 2019-03-26 PROCEDURE — 74011250637 HC RX REV CODE- 250/637: Performed by: INTERNAL MEDICINE

## 2019-03-26 PROCEDURE — 65660000000 HC RM CCU STEPDOWN

## 2019-03-26 PROCEDURE — 82962 GLUCOSE BLOOD TEST: CPT

## 2019-03-26 PROCEDURE — 77010033678 HC OXYGEN DAILY

## 2019-03-26 PROCEDURE — 74011250637 HC RX REV CODE- 250/637: Performed by: EMERGENCY MEDICINE

## 2019-03-26 PROCEDURE — 80048 BASIC METABOLIC PNL TOTAL CA: CPT

## 2019-03-26 PROCEDURE — 74011250636 HC RX REV CODE- 250/636: Performed by: INTERNAL MEDICINE

## 2019-03-26 PROCEDURE — 36415 COLL VENOUS BLD VENIPUNCTURE: CPT

## 2019-03-26 PROCEDURE — 97161 PT EVAL LOW COMPLEX 20 MIN: CPT

## 2019-03-26 PROCEDURE — 85027 COMPLETE CBC AUTOMATED: CPT

## 2019-03-26 PROCEDURE — 94640 AIRWAY INHALATION TREATMENT: CPT

## 2019-03-26 PROCEDURE — 97116 GAIT TRAINING THERAPY: CPT

## 2019-03-26 PROCEDURE — 74011636637 HC RX REV CODE- 636/637: Performed by: HOSPITALIST

## 2019-03-26 PROCEDURE — 74011000250 HC RX REV CODE- 250: Performed by: INTERNAL MEDICINE

## 2019-03-26 PROCEDURE — 74011250636 HC RX REV CODE- 250/636: Performed by: HOSPITALIST

## 2019-03-26 RX ORDER — ACETAMINOPHEN 500 MG
1000 TABLET ORAL AS NEEDED
COMMUNITY
End: 2019-03-28

## 2019-03-26 RX ORDER — FUROSEMIDE 40 MG/1
40 TABLET ORAL DAILY
Status: DISCONTINUED | OUTPATIENT
Start: 2019-03-27 | End: 2019-03-27

## 2019-03-26 RX ORDER — INSULIN LISPRO 100 [IU]/ML
10 INJECTION, SOLUTION INTRAVENOUS; SUBCUTANEOUS ONCE
Status: COMPLETED | OUTPATIENT
Start: 2019-03-26 | End: 2019-03-26

## 2019-03-26 RX ORDER — COLCHICINE 0.6 MG/1
1.2 TABLET ORAL AS NEEDED
COMMUNITY
End: 2020-01-03

## 2019-03-26 RX ADMIN — INSULIN LISPRO 10 UNITS: 100 INJECTION, SOLUTION INTRAVENOUS; SUBCUTANEOUS at 06:09

## 2019-03-26 RX ADMIN — CARVEDILOL 6.25 MG: 6.25 TABLET, FILM COATED ORAL at 17:00

## 2019-03-26 RX ADMIN — APIXABAN 5 MG: 5 TABLET, FILM COATED ORAL at 17:06

## 2019-03-26 RX ADMIN — Medication 10 ML: at 05:30

## 2019-03-26 RX ADMIN — Medication 10 ML: at 21:46

## 2019-03-26 RX ADMIN — METHYLPREDNISOLONE SODIUM SUCCINATE 60 MG: 40 INJECTION, POWDER, FOR SOLUTION INTRAMUSCULAR; INTRAVENOUS at 00:22

## 2019-03-26 RX ADMIN — IPRATROPIUM BROMIDE AND ALBUTEROL SULFATE 3 ML: .5; 3 SOLUTION RESPIRATORY (INHALATION) at 23:47

## 2019-03-26 RX ADMIN — ACETAMINOPHEN 650 MG: 325 TABLET ORAL at 17:51

## 2019-03-26 RX ADMIN — FUROSEMIDE 40 MG: 10 INJECTION, SOLUTION INTRAMUSCULAR; INTRAVENOUS at 08:49

## 2019-03-26 RX ADMIN — GUAIFENESIN 600 MG: 600 TABLET, EXTENDED RELEASE ORAL at 08:49

## 2019-03-26 RX ADMIN — INSULIN LISPRO 8 UNITS: 100 INJECTION, SOLUTION INTRAVENOUS; SUBCUTANEOUS at 17:01

## 2019-03-26 RX ADMIN — Medication 10 ML: at 17:01

## 2019-03-26 RX ADMIN — IPRATROPIUM BROMIDE AND ALBUTEROL SULFATE 3 ML: .5; 3 SOLUTION RESPIRATORY (INHALATION) at 19:38

## 2019-03-26 RX ADMIN — METHYLPREDNISOLONE SODIUM SUCCINATE 60 MG: 40 INJECTION, POWDER, FOR SOLUTION INTRAMUSCULAR; INTRAVENOUS at 11:55

## 2019-03-26 RX ADMIN — INSULIN LISPRO 8 UNITS: 100 INJECTION, SOLUTION INTRAVENOUS; SUBCUTANEOUS at 11:55

## 2019-03-26 RX ADMIN — GABAPENTIN 400 MG: 100 CAPSULE ORAL at 17:00

## 2019-03-26 RX ADMIN — METHYLPREDNISOLONE SODIUM SUCCINATE 60 MG: 40 INJECTION, POWDER, FOR SOLUTION INTRAMUSCULAR; INTRAVENOUS at 05:30

## 2019-03-26 RX ADMIN — HUMAN INSULIN 15 UNITS: 100 INJECTION, SUSPENSION SUBCUTANEOUS at 17:00

## 2019-03-26 RX ADMIN — GABAPENTIN 400 MG: 100 CAPSULE ORAL at 21:44

## 2019-03-26 RX ADMIN — INSULIN LISPRO 4 UNITS: 100 INJECTION, SOLUTION INTRAVENOUS; SUBCUTANEOUS at 21:46

## 2019-03-26 RX ADMIN — GUAIFENESIN 600 MG: 600 TABLET, EXTENDED RELEASE ORAL at 17:06

## 2019-03-26 RX ADMIN — PRAVASTATIN SODIUM 20 MG: 10 TABLET ORAL at 21:45

## 2019-03-26 RX ADMIN — CARVEDILOL 6.25 MG: 6.25 TABLET, FILM COATED ORAL at 08:50

## 2019-03-26 RX ADMIN — HUMAN INSULIN 10 UNITS: 100 INJECTION, SUSPENSION SUBCUTANEOUS at 05:30

## 2019-03-26 RX ADMIN — INSULIN LISPRO 10 UNITS: 100 INJECTION, SOLUTION INTRAVENOUS; SUBCUTANEOUS at 08:49

## 2019-03-26 RX ADMIN — FLUTICASONE FUROATE AND VILANTEROL TRIFENATATE 1 PUFF: 100; 25 POWDER RESPIRATORY (INHALATION) at 10:02

## 2019-03-26 RX ADMIN — METHYLPREDNISOLONE SODIUM SUCCINATE 40 MG: 40 INJECTION, POWDER, FOR SOLUTION INTRAMUSCULAR; INTRAVENOUS at 17:06

## 2019-03-26 RX ADMIN — IPRATROPIUM BROMIDE AND ALBUTEROL SULFATE 3 ML: .5; 3 SOLUTION RESPIRATORY (INHALATION) at 11:46

## 2019-03-26 RX ADMIN — AZELASTINE HYDROCHLORIDE: 137 SPRAY, METERED NASAL at 10:01

## 2019-03-26 RX ADMIN — CLOPIDOGREL BISULFATE 75 MG: 75 TABLET, FILM COATED ORAL at 08:49

## 2019-03-26 RX ADMIN — DOCUSATE SODIUM AND SENNOSIDES 1 TABLET: 8.6; 5 TABLET, FILM COATED ORAL at 08:50

## 2019-03-26 RX ADMIN — APIXABAN 5 MG: 5 TABLET, FILM COATED ORAL at 08:50

## 2019-03-26 RX ADMIN — IPRATROPIUM BROMIDE AND ALBUTEROL SULFATE 3 ML: .5; 3 SOLUTION RESPIRATORY (INHALATION) at 06:42

## 2019-03-26 RX ADMIN — HUMAN INSULIN 15 UNITS: 100 INJECTION, SUSPENSION SUBCUTANEOUS at 11:56

## 2019-03-26 RX ADMIN — GABAPENTIN 800 MG: 100 CAPSULE ORAL at 08:50

## 2019-03-26 NOTE — PROGRESS NOTES
Reason for Admission:  Respiratory failure with hypoxia RRAT Score:   40 Resources/supports as identified by patient/family:   Pt has family support. Top Challenges facing patient (as identified by patient/family and CM): Finances/Medication cost?  No concerns relating to medication cost     
           
Transportation? Pt stated that she drives. Pt has family that can provide transport if necessary. Support system or lack thereof? Pt has supportive family. Living arrangements? Pt resides with her brother and grandson in a one level apartment with handicap access. Self-care/ADLs/Cognition? Pt is able to complete IADL's and ADL's with no assistance. Current Advanced Directive/Advance Care Plan:  Pt has no DNR on file but is full code status Plan for utilizing home health: Yes Likelihood of readmission:  High risk based RRAT score Transition of Care Plan:  Home w/ home health. CM met with pt to discuss d/c plan. CM verified with pt's demographics, insurance and PCP. Pt is a 81 y/o [place] American female admitted to [facility] on 3/25/19 for Respiratory failure with hypoxia. Pt's PCP is Dr. Miguel Ángel Lerner. Pt sees PCP every three months. Pt uses [address] on [address]. Pt resides with her brother and grandson in a one level apartment with handicap access. Pt is not on oxygen in the home. Pt is able to complete IADL's and ADL's with no assistance. Pt has a cane, nebulizer and walker. Pt is able to drive. Pt has supportive family and able to transport when necessary. Pt has had home health in the past with [facility] and outpatient rehab with [facility]. Pt has not been in a skilled nursing facility. Pt does not have DNR on file but is full code status. Pt's daughter will transport at d/c. Pt evaluated by PT and they recommended home health. Pt provided pt with a list of home care agencies. Pt selected [facility] [employer] as first choice and [facility] [address] as second choice. [address] document signed by pt and placed in chart. Referral sent to [facility] via [address]. CM will continue to follow pt for d/c planning needs. Care Management Interventions PCP Verified by CM: Yes(Pt's PCP is [facility]. Pt sees PCP every three months) Mode of Transport at Discharge: Other (see comment)(Pt's granddaughter will transport at d/c) Transition of Care Consult (CM Consult): Discharge Planning, Home Health Discharge Durable Medical Equipment: No(Pt has cane, nebulizer and walker. ) Physical Therapy Consult: Yes Occupational Therapy Consult: Yes Speech Therapy Consult: No 
Current Support Network: Relative's Home(Pt resides with her brother and grandson in a one level apartment with handicap access) Confirm Follow Up Transport: Family(Pt drives. Pt has supportive family and transports when necessary. ) Plan discussed with Pt/Family/Caregiver: Yes Freedom of Choice Offered: Yes Discharge Location Discharge Placement: Home with home health Amado Pollack, [address] [facility]

## 2019-03-26 NOTE — PROGRESS NOTES
Hospitalist Progress Note NAME: Alfredo Villegas :  1950 MRN:  [id number] Assessment / Plan: 
Acute hypoxic respiratory failure POA  On 2LNC, wean down as tolerated, air entry had improved. CAD with h/o stents 2017 H/o Junctional Tachycardia/A.fib / S/p ablation 17 
- + dyspnea : likely multifactorial HF/copd  
initially on CPAP by EMS, now maintaining sats on 2-4L O2 
-O2 to keep priya >90%, wean as tolerated, assess for home O2 on DC Not on home O2  
-pharmacy consulted for meds rec 
-diuresing with lasix IV, watch daily weight  
-cont coreg  
-primary cardiology: Dr Wendi Muñiz consult as needed  
-CTA:No evidence of acute pulmonary embolus. Dilated main pulmonary artery suggests pulmonary hypertension. Emphysema. Extensive coronary artery calcifications. MODE on CKD: will switch to lower dose of PO diuretic, weight is down, monitor COPD exacerbation  
-flu negative  
-also likely contributing to her dyspnea 
-continue agressive jet nebs and taper down steroids DM type II diet controlled BG up due to steroids, increase NPH, taper down steroids 
- due to steroids -c/w SS + NPH  
H/o Recurrent C.diff Code Status: Full code Surrogate Decision Maker:children DVT Prophylaxis: Eliquis Baseline: ambulating with cane; lives with her brother and grandson Recommended Disposition: TBD Subjective: Chief Complaint / Reason for Physician Visit: following respiratory failure / HTN/ dm Seen in ED 
C/o dyspnea No CP   \"My breathing is better\" Discussed with RN events overnight. Review of Systems: 
Symptom Y/N Comments  Symptom Y/N Comments Fever/Chills n   Chest Pain n   
Poor Appetite    Edema Cough n   Abdominal Pain Sputum    Joint Pain SOB/[place] y   Pruritis/Rash Nausea/vomit    Tolerating PT/OT Diarrhea    Tolerating Diet y Constipation    Other Could NOT obtain due to:   
 
Objective: VITALS:  
 Last 24hrs VS reviewed since prior progress note. Most recent are: 
Patient Vitals for the past 24 hrs: 
 Temp Pulse Resp BP SpO2  
03/26/19 1146     97 % 03/26/19 1121 97.5 °F (36.4 °C) 76 18 97/59 95 % 03/26/19 0929  77  116/66 96 % 03/26/19 0922  92  104/64 95 % 03/26/19 0917  75  119/52   
03/26/19 0900  80  126/56 96 % 03/26/19 0813 98.9 °F (37.2 °C) 76 18 (!) 138/117 94 % 03/26/19 0641     96 % 03/26/19 0339 98.1 °F (36.7 °C) 77 16 94/52 95 % 03/25/19 2310 98 °F (36.7 °C) 79 18 104/52 94 % 03/25/19 2220     96 % 03/25/19 1910 98.1 °F (36.7 °C) 80 20 113/62 96 % 03/25/19 1436 97.5 °F (36.4 °C) 78 19 117/72 98 % 03/25/19 1400 97.5 °F (36.4 °C) 76 19 95/50 96 % 03/25/19 1300  77 20 98/50 97 % Intake/Output Summary (Last 24 hours) at 3/26/2019 1234 Last data filed at 3/26/2019 0995 Gross per 24 hour Intake  Output 1100 ml Net -1100 ml PHYSICAL EXAM: 
General: WD, WN. Alert, cooperative, no acute distress   
EENT:  EOMI. Anicteric sclerae. MMM Resp:  Coarse BS, mild rhonchi, better air entry CV:  Regular  rhythm,  No edema GI:  Soft, Non distended, Non tender.  +Bowel sounds Neurologic:  Alert and oriented X 3, normal speech, Psych:   Good insight. Not anxious nor agitated Skin:  No rashes. No jaundice Reviewed most current lab test results and cultures  YES Reviewed most current radiology test results   YES Review and summation of old records today    NO Reviewed patient's current orders and MAR    YES 
PMH/SH reviewed - no change compared to H&P 
________________________________________________________________________ Care Plan discussed with: 
  Comments Patient y Family RN y   
Care Manager Consultant Multidiciplinary team rounds were held today with , nursing, pharmacist and clinical coordinator.   Patient's plan of care was discussed; medications were reviewed and discharge planning was addressed. ________________________________________________________________________ Total NON critical care TIME:  35   Minutes Total CRITICAL CARE TIME Spent:   Minutes non procedure based Comments >50% of visit spent in counseling and coordination of care    
________________________________________________________________________ Kendal Grover MD  
 
Procedures: see electronic medical records for all procedures/Xrays and details which were not copied into this note but were reviewed prior to creation of Plan. LABS: 
I reviewed today's most current labs and imaging studies. Pertinent labs include: 
Recent Labs  
  03/26/19 0252 03/24/19 2157 WBC 12.1* 8.1 HGB 11.9 11.5 HCT 35.6 34.9*  
 218 Recent Labs  
  03/26/19 0252 03/24/19 2157 * 135* K 4.0 3.7 CL 97 103 CO2 21 22 * 166* BUN 29* 12  
CREA 1.79* 1.49* CA 9.3 9.0 MG 2.1  --   
PHOS 3.0  --   
ALB  --  3.2* TBILI  --  0.5 SGOT  --  37 ALT  --  19 INR  --  1.1 Signed: Kendal Grover MD

## 2019-03-26 NOTE — PROGRESS NOTES
Occupational Therapy Chart reviewed; cleared for tx; patient declines OT eval due to current visitors who have arrived from out of town; note patient functional mobility with min A and recommendation for discharge to home pending acute care progress; will retry later today as able or tomorrow AM for OT eval. Tan Stewart OTR/L

## 2019-03-26 NOTE — PROGRESS NOTES
Problem: Diabetes Self-Management Goal: *Disease process and treatment process Description Define diabetes and identify own type of diabetes; list 3 options for treating diabetes. Outcome: Progressing Towards Goal 
Goal: *Incorporating nutritional management into lifestyle Description Describe effect of type, amount and timing of food on blood glucose; list 3 methods for planning meals. Outcome: Progressing Towards Goal 
Goal: *Incorporating physical activity into lifestyle Description State effect of exercise on blood glucose levels. Outcome: Progressing Towards Goal 
Goal: *Developing strategies to promote health/change behavior Description Define the ABC's of diabetes; identify appropriate screenings, schedule and personal plan for screenings. Outcome: Progressing Towards Goal 
Goal: *Using medications safely Description State effect of diabetes medications on diabetes; name diabetes medication taking, action and side effects. Outcome: Progressing Towards Goal 
Goal: *Monitoring blood glucose, interpreting and using results Description Identify recommended blood glucose targets  and personal targets. Outcome: Progressing Towards Goal 
Goal: *Prevention, detection, treatment of acute complications Description List symptoms of hyper- and hypoglycemia; describe how to treat low blood sugar and actions for lowering  high blood glucose level. Outcome: Progressing Towards Goal 
Goal: *Prevention, detection and treatment of chronic complications Description Define the natural course of diabetes and describe the relationship of blood glucose levels to long term complications of diabetes. Outcome: Progressing Towards Goal 
Goal: *Developing strategies to address psychosocial issues Description Describe feelings about living with diabetes; identify support needed and support network Outcome: Progressing Towards Goal 
Goal: *Insulin pump training Outcome: Progressing Towards Goal 
 Goal: *Sick day guidelines Outcome: Progressing Towards Goal 
Goal: *Patient Specific Goal (EDIT GOAL, INSERT TEXT) Outcome: Progressing Towards Goal 
  
Problem: Patient Education: Go to Patient Education Activity Goal: Patient/Family Education Outcome: Progressing Towards Goal 
  
Problem: Falls - Risk of 
Goal: *Absence of Falls Description Document Orvel Buffy Fall Risk and appropriate interventions in the flowsheet. Outcome: Progressing Towards Goal 
  
Problem: Patient Education: Go to Patient Education Activity Goal: Patient/Family Education Outcome: Progressing Towards Goal 
  
Problem: Pressure Injury - Risk of 
Goal: *Prevention of pressure injury Description Document Landon Scale and appropriate interventions in the flowsheet. Outcome: Progressing Towards Goal 
  
Problem: Patient Education: Go to Patient Education Activity Goal: Patient/Family Education Outcome: Progressing Towards Goal 
  
Problem: Patient Education: Go to Patient Education Activity Goal: Patient/Family Education Outcome: Progressing Towards Goal 
  
Problem: Heart Failure: Day 1 Goal: Off Pathway (Use only if patient is Off Pathway) Outcome: Progressing Towards Goal 
Goal: Activity/Safety Outcome: Progressing Towards Goal 
Goal: Consults, if ordered Outcome: Progressing Towards Goal 
Goal: Diagnostic Test/Procedures Outcome: Progressing Towards Goal 
Goal: Nutrition/Diet Outcome: Progressing Towards Goal 
Goal: Discharge Planning Outcome: Progressing Towards Goal 
Goal: Medications Outcome: Progressing Towards Goal 
Goal: Respiratory Outcome: Progressing Towards Goal 
Goal: Treatments/Interventions/Procedures Outcome: Progressing Towards Goal 
Goal: Psychosocial 
Outcome: Progressing Towards Goal 
Goal: *Oxygen saturation within defined limits Outcome: Progressing Towards Goal 
Goal: *Hemodynamically stable Outcome: Progressing Towards Goal 
Goal: *Optimal pain control at patient's stated goal 
 Outcome: Progressing Towards Goal 
Goal: *Anxiety reduced or absent Outcome: Progressing Towards Goal 
  
Problem: Heart Failure: Day 2 Goal: Off Pathway (Use only if patient is Off Pathway) Outcome: Progressing Towards Goal 
Goal: Activity/Safety Outcome: Progressing Towards Goal 
Goal: Consults, if ordered Outcome: Progressing Towards Goal 
Goal: Diagnostic Test/Procedures Outcome: Progressing Towards Goal 
Goal: Nutrition/Diet Outcome: Progressing Towards Goal 
Goal: Discharge Planning Outcome: Progressing Towards Goal 
Goal: Medications Outcome: Progressing Towards Goal 
Goal: Respiratory Outcome: Progressing Towards Goal 
Goal: Treatments/Interventions/Procedures Outcome: Progressing Towards Goal 
Goal: Psychosocial 
Outcome: Progressing Towards Goal 
Goal: *Oxygen saturation within defined limits Outcome: Progressing Towards Goal 
Goal: *Hemodynamically stable Outcome: Progressing Towards Goal 
Goal: *Optimal pain control at patient's stated goal 
Outcome: Progressing Towards Goal 
Goal: *Anxiety reduced or absent Outcome: Progressing Towards Goal 
Goal: *Demonstrates progressive activity Outcome: Progressing Towards Goal

## 2019-03-26 NOTE — PROGRESS NOTES
ADULT PROTOCOL: JET AEROSOL ASSESSMENT Patient  Aakash Lopez     80 y.o.   female     3/26/2019  4:37 AM 
 
Breath Sounds Pre Procedure: Right Breath Sounds: Clear, Diminished(faintly\) Left Breath Sounds: Clear, Diminished(faintly) Breath Sounds Post Procedure:   
                                 
 
Breathing pattern: Pre procedure Breathing Pattern: Regular Post procedure Heart Rate: Pre procedure Pulse: 79 Post procedure Resp Rate: Pre procedure Respirations: 18 Post procedure Peak Flow: Pre bronchodilator Post bronchodilator FVC/FEV1:  N/A Incentive Spirometry:    
     
 
Cough: Pre procedure Cough: Non-productive Post procedure Suctioned: NO Sputum: Pre procedure Post procedure Oxygen: O2 Device: Nasal cannula   Room air Changed: NO SpO2: Pre procedure SpO2: 96 %   without oxygen Post procedure    without oxygen Nebulizer Therapy: Current medications Aerosolized Medications: DuoNeb Changed: NO Smoking History:  
 Smoking status: Former Smoker  
    Types: Cigarettes  
    Start date: 5  
    Last attempt to quit: 2009  
    Years since quittin.2  Smokeless tobacco: Never Used Problem List:  
Patient Active Problem List  
Diagnosis Code  Benign hypertensive heart and CKD, stage 3 (GFR 30-59), w CHF (HCC) I13.0, N18.3  Atrial fibrillation--paroxysmal I48.91  
 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 J44.9  Hip pain M25.559  Hypokalemia E87.6  S/P angioplasty with stent Z95.9  Sick sinus syndrome (HCC) I49.5  Chest pain R07.9  Sinoatrial node dysfunction (HCC) I49.5  Cardiac pacemaker in situ Z95.0  Mixed hyperlipidemia E78.2  Back pain M54.9  S/P coronary artery stent placement Z95.5  Diastolic CHF, acute on chronic (HCC) I50.33  
  GIB (gastrointestinal bleeding) K92.2  Type 2 diabetes mellitus with diabetic neuropathy, without long-term current use of insulin ([facility]) E11.40  
 CHF (congestive heart failure) ([facility]) I50.9  Systolic CHF, acute ([facility]) U60.41  
 Acute systolic CHF (congestive heart failure) ([facility]) I50.21  
 Fear associated with illness and body function F40.9  Counseling regarding advanced care planning and goals of care Z71.89  
 S/P ablation of atrial fibrillation Z98.890, Z86.79  
 Chronic systolic HF (heart failure) ([facility]) I50.22  
 History of Clostridium difficile infection Z86.19  
 Junctional tachycardia ([facility]) I47.1  Hypotension I95.9  CKD (chronic kidney disease) stage 3, GFR 30-59 ml/min ([facility]) N18.3  Type 2 diabetes mellitus with nephropathy ([facility]) E11.21  
 A-fib ([facility]) I48.91  
 PAD (peripheral artery disease) ([facility]) I73.9  
 S/P rotator cuff repair H75.847  Respiratory failure with hypoxia ([facility]) J96.91 Respiratory Therapist: Nisha Erazo RT

## 2019-03-26 NOTE — PROGRESS NOTES
Pharmacy Medication Reconciliation The patient was interviewed regarding current PTA medication list, use and drug allergies. The patient was questioned regarding use of any other inhalers, topical products, over the counter medications, herbal medications, vitamin products or ophthalmic/nasal/otic medication use. Patient told pharmacy that she has not used any of her inhalers, Ventolin or Symbicort, since . Allergy Update: Crestor [rosuvastatin]; Levaquin [levofloxacin]; Lipitor [atorvastatin]; and Lyrica [pregabalin] Recommendations/Findings: The following amendments were made to the patient's active medication list on file at [address]:  
 
1) Additions: acetaminophen (TYLENOL EXTRA STRENGTH) 500 mg tablet 2) Deletions:  
- clemastine 
- senna-docusate 3) Changes:  none 
 
 
-Clarified PTA med list with patient. PTA medication list was corrected to the following:  
 
Prior to Admission Medications Prescriptions Last Dose Informant Patient Reported? Taking? Azelastine (ASTEPRO) 0.15 % (205.5 mcg) nasal spray 3/19/2019 at Unknown time Self Yes Yes [place] by Both Nostrils route daily as needed. SYMBICORT 160-4.5 mcg/actuation HFAA 3/24/2019 at Unknown time Self No Yes Sig: INHALE ONE PUFF BY MOUTH TWICE DAILY  
acetaminophen (TYLENOL EXTRA STRENGTH) 500 mg tablet 3/24/2019 at Unknown time Self Yes Yes Sig: Take 1,000 mg by mouth as needed (headache). albuterol (PROVENTIL HFA, VENTOLIN HFA, PROAIR HFA) 90 mcg/actuation inhaler 3/24/2019 at Unknown time Self No Yes Sig: Take 2 Puffs by inhalation every four (4) hours as needed for Wheezing. albuterol (PROVENTIL VENTOLIN) 2.5 mg /3 mL (0.083 %) nebulizer solution  Self No Yes Sig: 3 mL by Nebulization route every four (4) hours as needed for Wheezing. apixaban (ELIQUIS) 5 mg tablet 3/24/2019 at Unknown time Self No Yes Sig: Take 1 Tab by mouth two (2) times a day. Resume from tomorrow 2018. carvedilol (COREG) 6.25 mg tablet 3/24/2019 at Unknown time Self No Yes Sig: Take 1 Tab by mouth two (2) times daily (with meals). clopidogrel (PLAVIX) 75 mg tab 3/24/2019 at Unknown time Self No Yes Sig: TAKE ONE TABLET BY MOUTH ONCE DAILY  
cod liver oil cap 3/24/2019 at Unknown time Self Yes Yes Sig: Take 1 Cap by mouth daily. colchicine (COLCRYS) 0.6 mg tablet 3/22/2019 at Unknown time Self Yes Yes Sig: Take 1.2 mg by mouth as needed (for flare ups or when she would like to eat seafood (ie. shrimp)). fluticasone (FLONASE) 50 mcg/actuation nasal spray  Self Yes No  
Si Sprays by Both Nostrils route daily as needed. furosemide (LASIX) 20 mg tablet  Self No Yes Sig: TAKE 1 TABLET BY MOUTH ONCE DAILY  
gabapentin (NEURONTIN) 800 mg tablet  Self No Yes Sig: TAKE 1 TABLET BY MOUTH THREE TIMES DAILY  
simvastatin (ZOCOR) 20 mg tablet 3/24/2019 at Unknown time Self No Yes Sig: Take 1 Tab by mouth nightly. Increased 1018  
tiotropium (SPIRIVA WITH HANDIHALER) 18 mcg inhalation capsule 3/24/2019 at Unknown time Self No Yes Sig: INHALE THE CONTENTS OF 1 CAPSULE THROUGH HANDIHALER DEVICE DAILY Facility-Administered Medications: None Thank you, Hailey Naylor PharmD. Candidate, Class of 2019

## 2019-03-26 NOTE — PROGRESS NOTES
Problem: Mobility Impaired (Adult and Pediatric) Goal: *Acute Goals and Plan of Care (Insert Text) Description Physical Therapy Goals Initiated 3/26/2019 1. Patient will move from supine to sit and sit to supine , scoot up and down and roll side to side in bed with independence within 7 day(s). 2.  Patient will transfer from bed to chair and chair to bed with independence using the least restrictive device within 7 day(s). 3.  Patient will perform sit to stand with independence within 7 day(s). 4.  Patient will ambulate with independence for 150 feet with the least restrictive device within 7 day(s). Outcome: Not Progressing Towards Goal 
 PHYSICAL THERAPY EVALUATION Patient: Amie Victoria (17 y.o. female) Date: 3/26/2019 Primary Diagnosis: Respiratory failure with hypoxia ([facility] [address] [J96.91] Precautions:   Fall ASSESSMENT : 
Based on the objective data described below, the patient presents with generalized weakness, decreased gait quality, decreased standing balance, and decreased activity tolerance from recent illness and hospitalization. Pt demonstrates all mobility at cga this visit. Gait slow and steady with occasional deviation from path with cga to correct. Pt demonstrating good static standing balance with hand washing at the sink. Pt also demonstrating good LE strength with ability to perform sit<>stand at recliner and toilet without UE assist.  Pt refusing to use a rolling walker, despite education regarding how it will assist her with energy conservation and balance. Pt open to using a cane, as this is what she uses at baseline. VSS throughout the entire session, however pt with report of dizziness. Recommend HHPT at discharge pending pt's progress in the acute setting. Patient will benefit from skilled intervention to address the above impairments. Patients rehabilitation potential is considered to be: Good Factors which may influence rehabilitation potential include:  
? None noted ? Mental ability/status ? Medical condition ? Home/family situation and support systems ? Safety awareness 
? Pain tolerance/management 
? Other: PLAN : 
Recommendations and Planned Interventions: 
?           Bed Mobility Training             ? Neuromuscular Re-Education ? Transfer Training                   ? Orthotic/Prosthetic Training 
? Gait Training                         ? Modalities ? Therapeutic Exercises           ? Edema Management/Control ? Therapeutic Activities            ? Patient and Family Training/Education ? Other (comment): Frequency/Duration: Patient will be followed by physical therapy  4 times a week to address goals. Discharge Recommendations: Home Health Further Equipment Recommendations for Discharge: none SUBJECTIVE:  
Patient stated I feel ok OBJECTIVE DATA SUMMARY:  
HISTORY:   
Past Medical History:  
Diagnosis Date  Arthritis   
 left shoulder  Atrial fibrillation ([facility] [address] 6/2/2010 Dr. Petr Rabago  CAD (coronary artery disease) stent; Dr. Petr Rabago  Celiac disease  Chronic diastolic heart failure ([facility] [address] 09/22/2014 Dr. Petr Rabago  Chronic kidney disease   
 per cardio note  Chronic pain   
 left thigh:  L SFA intervention by Dr. Carl Nolan 07/2018, as of 9/6/18:  still causing pain, pt to have MILLA checked per Dr. Carl Nolan note  Chronic systolic HF (heart failure) ([facility] [address] 5/10/2017  
 4/2017 EF 25-30%  COPD (chronic obstructive pulmonary disease) ([facility] [address] 6/2/2010 Dr. Mar Every  Diabetes ([facility] [address] Dr. Elta Phoenix; no current medication per pt  Emphysema, unspecified ([facility] [address] Dr. Mar Every  Fibroid  Frequent falls 2017  Gout  Heart failure ([facility] [address] Dr. Petr Rabago  History of Clostridium difficile infection 5/10/2017 2017 CDiff positive  Hypertension 2010 Dr. Dileep Medley  Hypertensive heart and chronic kidney disease   
 per PCP note  Hypotension 5/10/2017  Junctional tachycardia ([facility] [address] 5/10/2017 Dr. Mayra Patel  Neuropathy  NIDDM (non-insulin dependent diabetes mellitus) 2010  PAD (peripheral artery disease) ([facility] [address]  S/P ablation of atrial fibrillation 2018  Screening mammogram 5/4/10  
 SOB (shortness of breath) 2014 Dr. Blanca Lindsay  SSS (sick sinus syndrome) ([facility] [address]   
 per pacemaker form 18  Weakness   
 per pt weakness from c-diff , mulitple falls with injury to rotator cuff Past Surgical History:  
Procedure Laterality Date  CARDIAC SURG PROCEDURE UNLIST    
 3 cardiac stent placed  COLONOSCOPY N/A 2017 COLONOSCOPY with biopsy performed by Dillan Kate MD at [facility] AMBULATORY OR  
 HX AFIB ABLATION  2018  
 has had 2 ablations per patient  HX  SECTION    
 HX HEART CATHETERIZATION  2018  HX OTHER SURGICAL    
 adrenal gland removed  HX PACEMAKER Left Biotronik model: Riki Simjohan  VA EXCISE ADRENAL GLAND  VASCULAR SURGERY PROCEDURE UNLIST  2018 Left SFA intervention ; Dr. Up Dose Prior Level of Function/Home Situation: Pt reports using a cane at baseline. Pt lives with her brother and grandson in a handicapped accessible apartment. Personal factors and/or comorbidities impacting plan of care:none Home Situation Home Environment: Private residence One/Two Story Residence: One story Living Alone: No 
Support Systems: Family member(s)(bother and grandson) Patient Expects to be Discharged to[de-identified] Private residence Current DME Used/Available at Home: Grab bars Tub or Shower Type: Tub/Shower combination EXAMINATION/PRESENTATION/DECISION MAKING:  
Critical Behavior: 
Neurologic State: Alert Hearing: Auditory Auditory Impairment: Hard of hearing, bilateral 
Range Of Motion: AROM: Generally decreased, functional 
  
  
  
PROM: Within functional limits Strength:   
Strength: Generally decreased, functional 
  
  
  
  
  
  
Tone & Sensation:  
Tone: Normal 
  
  
  
  
Sensation: Intact Coordination: 
Coordination: Within functional limits Vision:  
  
Functional Mobility: 
Bed Mobility: 
Rolling: Stand-by assistance Supine to Sit: Stand-by assistance Scooting: Stand-by assistance Transfers: 
Sit to Stand: Contact guard assistance Stand to Sit: Stand-by assistance Balance:  
Sitting: Intact; Without support Standing: Impaired; Without support Standing - Static: Good; Unsupported Standing - Dynamic : Fair Ambulation/Gait Training: 
Distance (ft): 100 Feet (ft) Assistive Device: Gait belt(refused to use RW, uses cane at baseline) Ambulation - Level of Assistance: Assist x1;Contact guard assistance Gait Abnormalities: Decreased step clearance; Path deviations Right Side Weight Bearing: Full Left Side Weight Bearing: Full Base of Support: Widened Speed/Cece: Slow Step Length: Left shortened;Right shortened Functional Measure: 
Barthel Index: 
 
Bathin Bladder: 10 Bowels: 10 
Groomin Dressing: 10 Feeding: 10 Mobility: 10 Stairs: 0 Toilet Use: 10 Transfer (Bed to Chair and Back): 10 Total: 80/100 Percentage of impairment  
0% 1-19% 20-39% 40-59% 60-79% 80-99% 100% Barthel Score 0-100 100 99-80 79-60 59-40 20-39 1-19 
 0 The Barthel ADL Index: Guidelines 1. The index should be used as a record of what a patient does, not as a record of what a patient could do. 2. The main aim is to establish degree of independence from any help, physical or verbal, however minor and for whatever reason. 3. The need for supervision renders the patient not independent.  
4. A patient's performance should be established using the best available evidence. Asking the patient, friends/relatives and nurses are the usual sources, but direct observation and common sense are also important. However direct testing is not needed. 5. Usually the patient's performance over the preceding 24-48 hours is important, but occasionally longer periods will be relevant. 6. Middle categories imply that the patient supplies over 50 per cent of the effort. 7. Use of aids to be independent is allowed. Rubie Osler., Barthel, D.W. (7123). Functional evaluation: the Barthel Index. [address] [facility] (14)2. Jaret Fajardo ben BRAD Pelayo, Lyndsey Gomez., Latanya [facility]., Ruth, [address] (1999). Measuring the change indisability after inpatient rehabilitation; comparison of the responsiveness of the Barthel Index and Functional [place] Measure. Journal of Neurology, Neurosurgery, and Psychiatry, 66(4), 248-664. SANDRA FrazierA, MAL Bedolla, & Allyson Shannon, MRileyA. (2004.) Assessment of post-stroke quality of life in cost-effectiveness studies: The usefulness of the Barthel Index and the EuroQoL-5D. [facility], 13, 922-35 Physical Therapy Evaluation Charge Determination History Examination Presentation Decision-Making LOW Complexity : Zero comorbidities / personal factors that will impact the outcome / POC LOW Complexity : 1-2 Standardized tests and measures addressing body structure, function, activity limitation and / or participation in recreation  LOW Complexity : Stable, uncomplicated  LOW Complexity : FOTO score of  Based on the above components, the patient evaluation is determined to be of the following complexity level: LOW Pain: 
Pain Scale 1: Numeric (0 - 10) Pain Intensity 1: 0 Activity Tolerance:  
Good Please refer to the flowsheet for vital signs taken during this treatment. After treatment:  
?         Patient left in no apparent distress sitting up in chair ?         Patient left in no apparent distress in bed 
? Call bell left within reach ? Nursing notified ? Caregiver present ? Bed alarm activated COMMUNICATION/EDUCATION:  
The patients plan of care was discussed with: Registered Nurse. ?         Fall prevention education was provided and the patient/caregiver indicated understanding. ? Patient/family have participated as able in goal setting and plan of care. ?         Patient/family agree to work toward stated goals and plan of care. ?         Patient understands intent and goals of therapy, but is neutral about his/her participation. ? Patient is unable to participate in goal setting and plan of care. Thank you for this referral. 
Alberto Wheeler, PT Time Calculation: 29 mins

## 2019-03-26 NOTE — PROGRESS NOTES
Orders received, chart reviewed and patient evaluated by physical therapy. Recommend patient to discharge to home with HHPT pending progress with skilled acute care physical therapy. Recommend with nursing patient to complete as able in order to maintain strength, endurance and independence: OOB to chair 3x/day with assist x 1 and ambulating with gait belt (pt refused use of RW and will try cane tomorrow). Thank you for your assistance. Full evaluation to follow.

## 2019-03-26 NOTE — PROGRESS NOTES
Pharmacy  Monitoring/Dosing Patient on Gabapentin 800 mg TID Recent Labs  
  19 
0252 19 
2157 CREA 1.79* 1.49* BUN 29* 12 INR  --  1.1 APTT  --  30.2 HGB 11.9 11.5  218 ALB  --  3.2* SGOT  --  37 ALT  --  19  
TBILI  --  0.5 Temp (24hrs), Av °F (36.7 °C), Min:97.5 °F (36.4 °C), Max:98.9 °F (37.2 °C) Creatinine Clearance (ml/min): 31 Impression/Plan:  
Scr trending up (on Furosemide 40 mg IV every day) Based on the current renal function, will adjust the gabapentin to 400 mg TID Pharmacy will follow daily and adjust as appropriate. Thanks for the consult, Anastasia Wick, PHARMD  
 
 
Gabapentin Renal Impairment Dosing Guidelines: CrCl  Recommendation  
> 30-59 ml/min Total dose range 400-1400 mg/day given in 2 evenly divided doses  
> 15-29 ml/min Total dose range 200-700 mg/day given in one daily dose  
= 15 ml/min Total dose range 100-300 mg/day given in one daily dose as 100, 125, 150, 200, or 300 mg  
< 15 ml/min Reduce daily dose in proportion to CrCl (e.g., CrCl =7.5 ml/min should receive one-half dose that patients with CrCl of 15 ml/min receive)

## 2019-03-26 NOTE — PROGRESS NOTES
Home Oxygen Test 
Date of test: 03/26 Time of test: 1800 Sa02 91 % on room air AT REST. Sa02 86 % on room air DURING AMBULATION. Sa02 92 % on 2 Liters DURING AMBULATION. Sa02 1 % on 1 Liters AT REST/AFTER AMBULATION.

## 2019-03-27 ENCOUNTER — HOME HEALTH ADMISSION (OUTPATIENT)
Dept: HOME HEALTH SERVICES | Facility: HOME HEALTH | Age: 69
End: 2019-03-27

## 2019-03-27 LAB
ALBUMIN SERPL-MCNC: 3.5 G/DL (ref 3.5–5)
ALBUMIN/GLOB SERPL: 0.6 {RATIO} (ref 1.1–2.2)
ALP SERPL-CCNC: 151 U/L (ref 45–117)
ALT SERPL-CCNC: 19 U/L (ref 12–78)
ANION GAP SERPL CALC-SCNC: 10 MMOL/L (ref 5–15)
AST SERPL-CCNC: 23 U/L (ref 15–37)
BASOPHILS # BLD: 0 K/UL (ref 0–0.1)
BASOPHILS NFR BLD: 0 % (ref 0–1)
BILIRUB SERPL-MCNC: 0.3 MG/DL (ref 0.2–1)
BUN SERPL-MCNC: 38 MG/DL (ref 6–20)
BUN/CREAT SERPL: 22 (ref 12–20)
CALCIUM SERPL-MCNC: 9.7 MG/DL (ref 8.5–10.1)
CHLORIDE SERPL-SCNC: 97 MMOL/L (ref 97–108)
CO2 SERPL-SCNC: 23 MMOL/L (ref 21–32)
CREAT SERPL-MCNC: 1.71 MG/DL (ref 0.55–1.02)
DIFFERENTIAL METHOD BLD: ABNORMAL
EOSINOPHIL # BLD: 0 K/UL (ref 0–0.4)
EOSINOPHIL NFR BLD: 0 % (ref 0–7)
ERYTHROCYTE [DISTWIDTH] IN BLOOD BY AUTOMATED COUNT: 12.9 % (ref 11.5–14.5)
GLOBULIN SER CALC-MCNC: 6.3 G/DL (ref 2–4)
GLUCOSE BLD STRIP.AUTO-MCNC: 296 MG/DL (ref 65–100)
GLUCOSE BLD STRIP.AUTO-MCNC: 301 MG/DL (ref 65–100)
GLUCOSE BLD STRIP.AUTO-MCNC: 317 MG/DL (ref 65–100)
GLUCOSE BLD STRIP.AUTO-MCNC: 318 MG/DL (ref 65–100)
GLUCOSE BLD STRIP.AUTO-MCNC: 319 MG/DL (ref 65–100)
GLUCOSE BLD STRIP.AUTO-MCNC: 419 MG/DL (ref 65–100)
GLUCOSE SERPL-MCNC: 300 MG/DL (ref 65–100)
HCT VFR BLD AUTO: 39.7 % (ref 35–47)
HGB BLD-MCNC: 13.1 G/DL (ref 11.5–16)
IMM GRANULOCYTES # BLD AUTO: 0.1 K/UL (ref 0–0.04)
IMM GRANULOCYTES NFR BLD AUTO: 1 % (ref 0–0.5)
LYMPHOCYTES # BLD: 1.2 K/UL (ref 0.8–3.5)
LYMPHOCYTES NFR BLD: 6 % (ref 12–49)
MAGNESIUM SERPL-MCNC: 2.6 MG/DL (ref 1.6–2.4)
MCH RBC QN AUTO: 27.8 PG (ref 26–34)
MCHC RBC AUTO-ENTMCNC: 33 G/DL (ref 30–36.5)
MCV RBC AUTO: 84.1 FL (ref 80–99)
MONOCYTES # BLD: 0.7 K/UL (ref 0–1)
MONOCYTES NFR BLD: 4 % (ref 5–13)
NEUTS SEG # BLD: 17.7 K/UL (ref 1.8–8)
NEUTS SEG NFR BLD: 89 % (ref 32–75)
NRBC # BLD: 0 K/UL (ref 0–0.01)
NRBC BLD-RTO: 0 PER 100 WBC
PLATELET # BLD AUTO: 269 K/UL (ref 150–400)
PMV BLD AUTO: 10.1 FL (ref 8.9–12.9)
POTASSIUM SERPL-SCNC: 4.2 MMOL/L (ref 3.5–5.1)
PROT SERPL-MCNC: 9.8 G/DL (ref 6.4–8.2)
RBC # BLD AUTO: 4.72 M/UL (ref 3.8–5.2)
SERVICE CMNT-IMP: ABNORMAL
SODIUM SERPL-SCNC: 130 MMOL/L (ref 136–145)
WBC # BLD AUTO: 19.8 K/UL (ref 3.6–11)

## 2019-03-27 PROCEDURE — 94640 AIRWAY INHALATION TREATMENT: CPT

## 2019-03-27 PROCEDURE — 82962 GLUCOSE BLOOD TEST: CPT

## 2019-03-27 PROCEDURE — 74011250636 HC RX REV CODE- 250/636: Performed by: INTERNAL MEDICINE

## 2019-03-27 PROCEDURE — 83735 ASSAY OF MAGNESIUM: CPT

## 2019-03-27 PROCEDURE — 97165 OT EVAL LOW COMPLEX 30 MIN: CPT | Performed by: OCCUPATIONAL THERAPIST

## 2019-03-27 PROCEDURE — 74011250637 HC RX REV CODE- 250/637: Performed by: INTERNAL MEDICINE

## 2019-03-27 PROCEDURE — 80053 COMPREHEN METABOLIC PANEL: CPT

## 2019-03-27 PROCEDURE — 97535 SELF CARE MNGMENT TRAINING: CPT | Performed by: OCCUPATIONAL THERAPIST

## 2019-03-27 PROCEDURE — 65660000000 HC RM CCU STEPDOWN

## 2019-03-27 PROCEDURE — 85025 COMPLETE CBC W/AUTO DIFF WBC: CPT

## 2019-03-27 PROCEDURE — 36415 COLL VENOUS BLD VENIPUNCTURE: CPT

## 2019-03-27 PROCEDURE — 74011000250 HC RX REV CODE- 250: Performed by: INTERNAL MEDICINE

## 2019-03-27 PROCEDURE — 74011636637 HC RX REV CODE- 636/637: Performed by: INTERNAL MEDICINE

## 2019-03-27 PROCEDURE — 97116 GAIT TRAINING THERAPY: CPT

## 2019-03-27 RX ORDER — PREDNISONE 20 MG/1
40 TABLET ORAL
Status: DISCONTINUED | OUTPATIENT
Start: 2019-03-27 | End: 2019-03-27

## 2019-03-27 RX ORDER — COLCHICINE 0.6 MG/1
1.2 TABLET ORAL
Status: DISCONTINUED | OUTPATIENT
Start: 2019-03-27 | End: 2019-03-28 | Stop reason: HOSPADM

## 2019-03-27 RX ORDER — IPRATROPIUM BROMIDE AND ALBUTEROL SULFATE 2.5; .5 MG/3ML; MG/3ML
3 SOLUTION RESPIRATORY (INHALATION)
Status: DISCONTINUED | OUTPATIENT
Start: 2019-03-27 | End: 2019-03-28 | Stop reason: HOSPADM

## 2019-03-27 RX ORDER — INSULIN GLARGINE 100 [IU]/ML
20 INJECTION, SOLUTION SUBCUTANEOUS DAILY
Status: DISCONTINUED | OUTPATIENT
Start: 2019-03-27 | End: 2019-03-27

## 2019-03-27 RX ORDER — PREDNISONE 20 MG/1
20 TABLET ORAL
Status: DISCONTINUED | OUTPATIENT
Start: 2019-03-28 | End: 2019-03-28 | Stop reason: HOSPADM

## 2019-03-27 RX ADMIN — FLUTICASONE FUROATE AND VILANTEROL TRIFENATATE 1 PUFF: 100; 25 POWDER RESPIRATORY (INHALATION) at 08:39

## 2019-03-27 RX ADMIN — COLCHICINE 1.2 MG: 0.6 TABLET, FILM COATED ORAL at 22:38

## 2019-03-27 RX ADMIN — CARVEDILOL 6.25 MG: 6.25 TABLET, FILM COATED ORAL at 17:38

## 2019-03-27 RX ADMIN — INSULIN LISPRO 10 UNITS: 100 INJECTION, SOLUTION INTRAVENOUS; SUBCUTANEOUS at 12:43

## 2019-03-27 RX ADMIN — GABAPENTIN 400 MG: 100 CAPSULE ORAL at 08:38

## 2019-03-27 RX ADMIN — PREDNISONE 40 MG: 20 TABLET ORAL at 08:39

## 2019-03-27 RX ADMIN — HUMAN INSULIN 15 UNITS: 100 INJECTION, SUSPENSION SUBCUTANEOUS at 21:18

## 2019-03-27 RX ADMIN — Medication 10 ML: at 21:18

## 2019-03-27 RX ADMIN — GABAPENTIN 400 MG: 100 CAPSULE ORAL at 17:38

## 2019-03-27 RX ADMIN — HUMAN INSULIN 15 UNITS: 100 INJECTION, SUSPENSION SUBCUTANEOUS at 12:44

## 2019-03-27 RX ADMIN — INSULIN LISPRO 8 UNITS: 100 INJECTION, SOLUTION INTRAVENOUS; SUBCUTANEOUS at 17:37

## 2019-03-27 RX ADMIN — GABAPENTIN 400 MG: 100 CAPSULE ORAL at 21:16

## 2019-03-27 RX ADMIN — DOCUSATE SODIUM AND SENNOSIDES 1 TABLET: 8.6; 5 TABLET, FILM COATED ORAL at 08:38

## 2019-03-27 RX ADMIN — METHYLPREDNISOLONE SODIUM SUCCINATE 40 MG: 40 INJECTION, POWDER, FOR SOLUTION INTRAMUSCULAR; INTRAVENOUS at 00:10

## 2019-03-27 RX ADMIN — APIXABAN 5 MG: 5 TABLET, FILM COATED ORAL at 08:38

## 2019-03-27 RX ADMIN — APIXABAN 5 MG: 5 TABLET, FILM COATED ORAL at 17:38

## 2019-03-27 RX ADMIN — HUMAN INSULIN 15 UNITS: 100 INJECTION, SUSPENSION SUBCUTANEOUS at 00:10

## 2019-03-27 RX ADMIN — Medication 10 ML: at 04:42

## 2019-03-27 RX ADMIN — ACETAMINOPHEN 650 MG: 325 TABLET ORAL at 11:23

## 2019-03-27 RX ADMIN — HUMAN INSULIN 15 UNITS: 100 INJECTION, SUSPENSION SUBCUTANEOUS at 04:41

## 2019-03-27 RX ADMIN — LINAGLIPTIN 5 MG: 5 TABLET, FILM COATED ORAL at 17:38

## 2019-03-27 RX ADMIN — IPRATROPIUM BROMIDE AND ALBUTEROL SULFATE 3 ML: .5; 3 SOLUTION RESPIRATORY (INHALATION) at 14:45

## 2019-03-27 RX ADMIN — HUMAN INSULIN 15 UNITS: 100 INJECTION, SUSPENSION SUBCUTANEOUS at 17:37

## 2019-03-27 RX ADMIN — GABAPENTIN 400 MG: 100 CAPSULE ORAL at 22:50

## 2019-03-27 RX ADMIN — GUAIFENESIN 600 MG: 600 TABLET, EXTENDED RELEASE ORAL at 08:38

## 2019-03-27 RX ADMIN — IPRATROPIUM BROMIDE AND ALBUTEROL SULFATE 3 ML: .5; 3 SOLUTION RESPIRATORY (INHALATION) at 19:37

## 2019-03-27 RX ADMIN — CLOPIDOGREL BISULFATE 75 MG: 75 TABLET, FILM COATED ORAL at 08:39

## 2019-03-27 RX ADMIN — ACETAMINOPHEN 650 MG: 325 TABLET ORAL at 18:26

## 2019-03-27 RX ADMIN — PRAVASTATIN SODIUM 20 MG: 10 TABLET ORAL at 21:16

## 2019-03-27 RX ADMIN — CARVEDILOL 6.25 MG: 6.25 TABLET, FILM COATED ORAL at 08:38

## 2019-03-27 RX ADMIN — INSULIN LISPRO 8 UNITS: 100 INJECTION, SOLUTION INTRAVENOUS; SUBCUTANEOUS at 08:38

## 2019-03-27 RX ADMIN — METHYLPREDNISOLONE SODIUM SUCCINATE 40 MG: 40 INJECTION, POWDER, FOR SOLUTION INTRAMUSCULAR; INTRAVENOUS at 04:41

## 2019-03-27 RX ADMIN — INSULIN LISPRO 3 UNITS: 100 INJECTION, SOLUTION INTRAVENOUS; SUBCUTANEOUS at 21:18

## 2019-03-27 RX ADMIN — IPRATROPIUM BROMIDE AND ALBUTEROL SULFATE 3 ML: .5; 3 SOLUTION RESPIRATORY (INHALATION) at 07:46

## 2019-03-27 RX ADMIN — Medication 10 ML: at 17:38

## 2019-03-27 RX ADMIN — GUAIFENESIN 600 MG: 600 TABLET, EXTENDED RELEASE ORAL at 17:38

## 2019-03-27 NOTE — PROGRESS NOTES
ADULT PROTOCOL: JET AEROSOL  REASSESSMENT Patient  Billy Sanderson     80 y.o.   female     3/27/2019  12:00 AM 
 
Breath Sounds Pre Procedure: Right Breath Sounds: Clear, Diminished Left Breath Sounds: Clear, Diminished Breath Sounds Post Procedure: Right Breath Sounds: Clear, Diminished Left Breath Sounds: Clear, Diminished Breathing pattern: Pre procedure Breathing Pattern: Regular Post procedure Breathing Pattern: Regular Heart Rate: Pre procedure Pulse: 75 Post procedure Pulse: 75 Resp Rate: Pre procedure Respirations: 18 Post procedure Respirations: 18 Peak Flow: Pre bronchodilator Post bronchodilator FVC/FEV1:  n/a Incentive Spirometry:    
     
 
Cough: Pre procedure Cough: Non-productive Post procedure Cough: Non-productive Suctioned: NO Sputum: Pre procedure Post procedure Oxygen: O2 Device: Room air   RA Changed: NO SpO2: Pre procedure SpO2: 97 %   without oxygen Post procedure SpO2: 95 %  without oxygen Nebulizer Therapy: Current medications Aerosolized Medications: DuoNeb Changed: YES, change frequency to Q6 per protocol Smoking History: n/a Problem List:  
Patient Active Problem List  
Diagnosis Code  Benign hypertensive heart and CKD, stage 3 (GFR 30-59), w CHF (HCC) I13.0, N18.3  Atrial fibrillation--paroxysmal I48.91  
 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 J44.9  Hip pain M25.559  Hypokalemia E87.6  S/P angioplasty with stent Z95.9  Sick sinus syndrome (HCC) I49.5  Chest pain R07.9  Sinoatrial node dysfunction (HCC) I49.5  Cardiac pacemaker in situ Z95.0  Mixed hyperlipidemia E78.2  Back pain M54.9  S/P coronary artery stent placement Z95.5  Diastolic CHF, acute on chronic (HCC) I50.33  
  GIB (gastrointestinal bleeding) K92.2  Type 2 diabetes mellitus with diabetic neuropathy, without long-term current use of insulin ([facility]) E11.40  
 CHF (congestive heart failure) ([facility]) I50.9  Systolic CHF, acute ([facility]) V61.92  
 Acute systolic CHF (congestive heart failure) ([facility]) I50.21  
 Fear associated with illness and body function F40.9  Counseling regarding advanced care planning and goals of care Z71.89  
 S/P ablation of atrial fibrillation Z98.890, Z86.79  
 Chronic systolic HF (heart failure) ([facility]) I50.22  
 History of Clostridium difficile infection Z86.19  
 Junctional tachycardia ([facility]) I47.1  Hypotension I95.9  CKD (chronic kidney disease) stage 3, GFR 30-59 ml/min ([facility]) N18.3  Type 2 diabetes mellitus with nephropathy ([facility]) E11.21  
 A-fib ([facility]) I48.91  
 PAD (peripheral artery disease) ([facility]) I73.9  
 S/P rotator cuff repair L88.522  Respiratory failure with hypoxia ([facility]) J96.91 Respiratory Therapist: Noa Mcneill RT

## 2019-03-27 NOTE — PROGRESS NOTES
CM contacted Dr. Quoc Saleh regarding orders for outpt pulmonary rehab and home health. Dr. Quoc Saleh wants pt to have [facility] first. CM informed [facility]. Raquel Bautista, [phone]

## 2019-03-27 NOTE — PROGRESS NOTES
Hospitalist Progress Note NAME: Ronna Siomn :  1950 MRN:  [id number] Assessment / Plan: 
Acute hypoxic respiratory failure POA  So far tolerating room air. CAD with h/o stents 2017 H/o Junctional Tachycardia/A.fib / S/p ablation 17 
- + dyspnea : likely multifactorial HF/copd  
initially on CPAP by EMS, now maintaining sats on 2-4L O2 
-O2 to keep priya >90%, wean as tolerated, assess for home O2 on DC Not on home O2  
-pharmacy consulted for meds rec 
-diuresing with lasix IV, watch daily weight  
-cont coreg  
-primary cardiology: Dr Christina Shah consult as needed  
-CTA:No evidence of acute pulmonary embolus. Dilated main pulmonary artery suggests pulmonary hypertension. Emphysema. Extensive coronary artery calcifications. Acute on Chronic Diastolic Heart Failure POA: EF 60%, weight is down, keeping negative balance, monitor MODE on CKD: creatinine is stable, monitor COPD exacerbation  (Emphysema with multiple blebs) 
-flu negative , tolerating room air 
-also likely contributing to her dyspnea 
-continue agressive jet nebs and taper down steroids to PO 
DM type II diet controlled BG up due to steroids, c/w  NPH, taper down steroids to PO,  due to steroids, c/w SS + NPH  HbA1C 7.5, start Tradjenta H/o Recurrent C.diff Code Status: Full code Surrogate Decision Maker:children DVT Prophylaxis: Eliquis Baseline: ambulating with cane; lives with her brother and grandson Recommended Disposition: TBD  
 
D/C plan for tomorrow if stable. Subjective: Chief Complaint / Reason for Physician Visit: following respiratory failure / HTN/ dm Seen in ED 
C/o dyspnea No CP   \"I feel better\" Discussed with RN events overnight. Review of Systems: 
Symptom Y/N Comments  Symptom Y/N Comments Fever/Chills n   Chest Pain n   
Poor Appetite    Edema Cough n   Abdominal Pain Sputum    Joint Pain SOB/[place] y   Pruritis/Rash Nausea/vomit    Tolerating PT/OT Diarrhea    Tolerating Diet y Constipation    Other Could NOT obtain due to:   
 
Objective: VITALS:  
Last 24hrs VS reviewed since prior progress note. Most recent are: 
Patient Vitals for the past 24 hrs: 
 Temp Pulse Resp BP SpO2  
03/27/19 1317 97.7 °F (36.5 °C) 78 20 100/52 98 % 03/27/19 0758 96.9 °F (36.1 °C) 76 20 115/54 96 % 03/27/19 0746     96 % 03/27/19 0440 97.6 °F (36.4 °C) 76 20 110/60 96 % 03/26/19 2348     97 % 03/26/19 2317 98 °F (36.7 °C) 80 20 104/57 97 % 03/26/19 1938     97 % 03/26/19 1926 97.5 °F (36.4 °C) 80 20 107/61 96 % 03/26/19 1550 97.6 °F (36.4 °C) 77 20 114/59 94 % Intake/Output Summary (Last 24 hours) at 3/27/2019 1359 Last data filed at 3/27/2019 2329 Gross per 24 hour Intake 1380 ml Output 1900 ml Net -520 ml PHYSICAL EXAM: 
General: WD, WN. Alert, cooperative, no acute distress   
EENT:  EOMI. Anicteric sclerae. MMM Resp:  Coarse BS, mild rhonchi, better air entry CV:  Regular  rhythm,  No edema GI:  Soft, Non distended, Non tender.  +Bowel sounds Neurologic:  Alert and oriented X 3, normal speech, Psych:   Good insight. Not anxious nor agitated Skin:  No rashes. No jaundice Reviewed most current lab test results and cultures  YES Reviewed most current radiology test results   YES Review and summation of old records today    NO Reviewed patient's current orders and MAR    YES 
PMH/SH reviewed - no change compared to H&P 
________________________________________________________________________ Care Plan discussed with: 
  Comments Patient y Family RN y   
Care Manager Consultant Multidiciplinary team rounds were held today with , nursing, pharmacist and clinical coordinator. Patient's plan of care was discussed; medications were reviewed and discharge planning was addressed. ________________________________________________________________________ Total NON critical care TIME:  30   Minutes Total CRITICAL CARE TIME Spent:   Minutes non procedure based Comments >50% of visit spent in counseling and coordination of care y   
________________________________________________________________________ López Gupta MD  
 
Procedures: see electronic medical records for all procedures/Xrays and details which were not copied into this note but were reviewed prior to creation of Plan. LABS: 
I reviewed today's most current labs and imaging studies. Pertinent labs include: 
Recent Labs  
  03/27/19 0437 03/26/19 0252 03/24/19 2157 WBC 19.8* 12.1* 8.1 HGB 13.1 11.9 11.5 HCT 39.7 35.6 34.9*  
 215 218 Recent Labs  
  03/27/19 0437 03/26/19 0252 03/24/19 2157 * 130* 135* K 4.2 4.0 3.7 CL 97 97 103 CO2 23 21 22 * 322* 166* BUN 38* 29* 12  
CREA 1.71* 1.79* 1.49* CA 9.7 9.3 9.0 MG 2.6* 2.1  --   
PHOS  --  3.0  --   
ALB 3.5  --  3.2* TBILI 0.3  --  0.5 SGOT 23  --  37 ALT 19  --  19 INR  --   --  1.1 Signed: López Gupta MD

## 2019-03-27 NOTE — DIABETES MGMT
DTC Progress Note Recommendations/ Comments: Consult received (and appreciated) for insulin instruction. Discussed with Dr. Trisha Yan regarding preference to demonstrate insulin given starting pt on tradjenta and pt with hgb A1c of 7.5%. Pt receiving NPH for steroids only and plan to discharge on oral DM meds as of now. Please re-consult DTC if needed. Consider discontinuing metformin if GFR falls below 30 ml/min - consider reduced home dose if GFR between 30 - 45 ml/min. Chart reviewed on [facility] for consult for insulin instruction. Patient is a 80 y.o. female with known history of Type 2 Diabetes on Metformin 1,000mg bid at home. Current hospital DM medication:  
-Humalog normal sensitivity correction 
-Prednisone 30mg qam 
 
 
A1c:  
Lab Results Component Value Date/Time Hemoglobin A1c 5.8 09/24/2017 10:36 AM  
 Hemoglobin A1c 6.9 (H) 10/31/2016 12:00 AM  
 
 
Recent Glucose Results:  
Lab Results Component Value Date/Time  (H) 03/27/2019 04:37 AM  
 GLUCPOC 419 (H) 03/27/2019 11:54 AM  
 GLUCPOC 317 (H) 03/27/2019 07:32 AM  
 GLUCPOC 301 (H) 03/27/2019 04:39 AM  
  
 
Lab Results Component Value Date/Time Creatinine 1.71 (H) 03/27/2019 04:37 AM  
 
Estimated Creatinine Clearance: 32.7 mL/min (A) (based on SCr of 1.71 mg/dL (H)). Active Orders Diet DIET DIABETIC CONSISTENT CARB Regular; Gluten Free PO intake:  
Patient Vitals for the past 72 hrs: 
 % Diet Eaten  
03/26/19 1816 100 % 03/26/19 1738 100 % Will continue to follow as needed. Thank you Aura Murcia [facility] Office: [phone]

## 2019-03-27 NOTE — PROGRESS NOTES
Occupational Therapy EVALUATION/discharge Patient: Brian Hicks (35 y.o. female) Date: 3/27/2019 Primary Diagnosis: Respiratory failure with hypoxia ([facility] [J96.91] Precautions:   Fall ASSESSMENT:  
Based on the objective data described below, the patient presents with slight unsteadiness with higher level ADL mobility without assist devices (pt does not want to use SPC or RW). Pt was able obtain supplies in room with distant supervision. Stood at sink to complete all grooming without assist.  Pt is performing ADLS at a modified independent level at this time. Pts main limitations in ADLS are in relation to mobility which PT is addressing. Educated pt on using [facility] for balance but pt is adamant that she will not use it. She aggress that OT services are not indicated at this time. Educated pt to be OOB in chair most of day and continuing to mobilize to bathroom for ADLS with nursing. Pt verbalized understanding. Further skilled acute occupational therapy is not indicated at this time. Discharge Recommendations: home with family assist 
Further Equipment Recommendations for Discharge: none SUBJECTIVE:  
Patient stated I am a little unsteady at times. I can do all of that (referring to ADLs).  OBJECTIVE DATA SUMMARY:  
HISTORY:  
Past Medical History:  
Diagnosis Date  Arthritis   
 left shoulder  Atrial fibrillation ([facility] [address] [address] 6/2/2010 Dr. Chidi Covarrubias  CAD (coronary artery disease) stent; Dr. Chidi Covarrubias  Celiac disease  Chronic diastolic heart failure ([facility] [address] 09/22/2014 Dr. Chidi Covarrubias  Chronic kidney disease   
 per cardio note  Chronic pain   
 left thigh:  L SFA intervention by Dr. Magy Alvarado 07/2018, as of 9/6/18:  still causing pain, pt to have MILLA checked per Dr. Magy Alvarado note  Chronic systolic HF (heart failure) ([facility] [address] [address] 5/10/2017  
 4/2017 EF 25-30%  COPD (chronic obstructive pulmonary disease) ([facility] 6/2/2010 Dr. Remington Roca  [facility] ([email or web address] Dr. Cori Evans; no current medication per pt  Emphysema, unspecified ([facility] Dr. Alex Mccormack  Fibroid  Frequent falls   Gout  Heart failure ([facility] Dr. Ramirez Huitron  History of Clostridium difficile infection 5/10/2017  
 2017 CDiff positive  Hypertension 2010 Dr. Yaritza Enrique  Hypertensive heart and chronic kidney disease   
 per PCP note  Hypotension 5/10/2017  Junctional tachycardia ([facility] [address] [facility] 5/10/2017 Dr. Ramirez Huitron  Neuropathy  NIDDM (non-insulin dependent diabetes mellitus) 2010  PAD (peripheral artery disease) ([facility]  S/P ablation of atrial fibrillation 2018  Screening mammogram 5/4/10  
 SOB (shortness of breath) 2014 Dr. Alex Mccormack  SSS (sick sinus syndrome) ([facility] [address]   
 per pacemaker form 18  Weakness   
 per pt weakness from c-diff , mulitple falls with injury to rotator cuff Past Surgical History:  
Procedure Laterality Date  CARDIAC SURG PROCEDURE UNLIST    
 3 cardiac stent placed  COLONOSCOPY N/A 2017 COLONOSCOPY with biopsy performed by Fl[facility] Linder [facility] at [facility] AM[facility]ULATORY OR  
 HX AFIB ABLATION  2018  
 has had 2 ablations per patient  HX  SECTION    
 HX HEART CATHETERIZATION  2018  HX OTHER SURGICAL    
 adrenal gland removed  HX PACEMAKER Left [employer] model: Gunnar Zapata  [place] EXCISE ADRENAL GLAND  VASCULAR SURGERY PROCEDURE UNLIST  2018 Left SFA intervention ; Dr. Angela Key Prior Level of Function/Environment/Context: ambulated without assist devices, performed all ADLS and IADLS without assist  
 
Expanded or extensive additional review of patient history:  
 
Home Situation Home Environment: Apartment # Steps to Enter: 0 One/Two Story Residence: One story Living Alone: No 
Support Systems: Family member(s)(granson and brother) Patient Expects to be Discharged to[de-identified] Private residence Current DME Used/Available at Home: Cane, straight, Grab bars, Shower chair, Walker, rolling Tub or Shower Type: Tub/Shower combination Hand dominance: Right EXAMINATION OF PERFORMANCE DEFICITS: 
Cognitive/Behavioral Status: 
Neurologic State: Alert Orientation Level: Oriented X4 Cognition: Appropriate decision making; Appropriate for age attention/concentration; Appropriate safety awareness; Follows commands Perception: Appears intact Perseveration: No perseveration noted Safety/Judgement: Awareness of environment;Good awareness of safety precautions; Fall prevention Hearing: Auditory Auditory Impairment: Hard of hearing, bilateral 
Vision/Perceptual:   
Tracking: Able to track stimulus in all quadrants w/o difficulty Corrective Lenses: Glasses Range of Motion: 
 
AROM: Within functional limits PROM: Within functional limits Strength: 
 
Strength: Generally decreased, functional 
  
  
  
  
 
Coordination: 
Coordination: Within functional limits Fine Motor Skills-Upper: Left Intact; Right Intact Gross Motor Skills-Upper: Left Intact; Right Intact Tone & Sensation: 
 
Tone: Normal 
Sensation: Intact Balance: 
Sitting: Intact Standing: Impaired Standing - Static: Good Standing - Dynamic : Fair Functional Mobility and Transfers for ADLs:Bed Mobility: 
  
 
Transfers: 
Sit to Stand: Independent Stand to Sit: Independent Bed to Chair: Stand-by assistance Bathroom Mobility: Stand-by assistance Toilet Transfer : Stand-by assistance ADL Assessment: 
Feeding: Independent Oral Facial Hygiene/Grooming: Modified Independent(standing) Bathing: Modified independent Upper Body Dressing: Independent Lower Body Dressing: Modified independent Toileting: Independent ADL Intervention and task modifications: 
  
 
  
 
  
 
  
 
  
 
  
 
  
 
Cognitive Retraining Safety/Judgement: Awareness of environment;Good awareness of safety precautions; Fall prevention Functional Measure: 
Barthel Index: 
 
Bathin Bladder: 10 Bowels: 10 
Groomin Dressing: 10 Feeding: 10 Mobility: 10 Stairs: 5 Toilet Use: 10 Transfer (Bed to Chair and Back): 10 Total: 85/100 Percentage of impairment  
0% 1-19% 20-39% 40-59% 60-79% 80-99% 100% Barthel Score 0-100 100 99-80 79-60 59-40 20-39 1-19 
 0 The Barthel ADL Index: Guidelines 1. The index should be used as a record of what a patient does, not as a record of what a patient could do. 2. The main aim is to establish degree of independence from any help, physical or verbal, however minor and for whatever reason. 3. The need for supervision renders the patient not independent. 4. A patient's performance should be established using the best available evidence. Asking the patient, friends/relatives and nurses are the usual sources, but direct observation and common sense are also important. However direct testing is not needed. 5. Usually the patient's performance over the preceding 24-48 hours is important, but occasionally longer periods will be relevant. 6. Middle categories imply that the patient supplies over 50 per cent of the effort. 7. Use of aids to be independent is allowed. Avril Dahl., Barthel, D.W. ([id number]). Functional evaluation: the Barthel Index. [facility] (14)2. BRAD Pak, Mena Barbosa., [facility], [address] [place] [address] (). Measuring the change indisability after inpatient rehabilitation; comparison of the responsiveness of the Barthel Index and Functional [place] Measure. Journal of Neurology, Neurosurgery, and Psychiatry, 66(4), 578-816. Gurmeet Mcgregor, N.J.A, STEFANY Bedolla.REENA, & Dustin Goldsmith MRileyA. (2004.) Assessment of post-stroke quality of life in cost-effectiveness studies: The usefulness of the Barthel Index and the EuroQoL-5D.  Quality of Life Research, 13, 543-74 Occupational Therapy Evaluation Charge Determination History Examination Decision-Making LOW Complexity : Brief history review  LOW Complexity : 1-3 performance deficits relating to physical, cognitive , or psychosocial skils that result in activity limitations and / or participation restrictions  LOW Complexity : No comorbidities that affect functional and no verbal or physical assistance needed to complete eval tasks Based on the above components, the patient evaluation is determined to be of the following complexity level: LOW Pain: 
Pain Scale 1: Numeric (0 - 10) Pain Intensity 1: 6 Pain Location 1: Head;Throat Pain Orientation 1: Mid 
Pain Description 1: Aching Pain Intervention(s) 1: Medication (see MAR) Activity Tolerance:  
 
Please refer to the flowsheet for vital signs taken during this treatment. After treatment:  
[x]  Patient left in no apparent distress sitting up in chair 
[]  Patient left in no apparent distress in bed 
[x]  Call bell left within reach [x]  Nursing notified 
[]  Caregiver present 
[]  Bed alarm activated COMMUNICATION/EDUCATION:  
Communication/Collaboration: 
[x]      Home safety education was provided and the patient/caregiver indicated understanding. [x]      Patient have participated as able and agree with findings and recommendations. []      Patient is unable to participate in plan of care at this time. Findings and recommendations were discussed with: Physical Therapist, Registered Nurse and patient\ ELTON Garcia/DORETHA Time Calculation: 18 mins

## 2019-03-27 NOTE — PROGRESS NOTES
Bedside shift change report given to [address] [place] [address] (oncoming nurse) by Myrna Griffin (offgoing nurse). Report included the following information SBAR.

## 2019-03-27 NOTE — PROGRESS NOTES
Problem: Mobility Impaired (Adult and Pediatric) Goal: *Acute Goals and Plan of Care (Insert Text) Description Physical Therapy Goals Initiated 3/26/2019 1. Patient will move from supine to sit and sit to supine , scoot up and down and roll side to side in bed with independence within 7 day(s). 2.  Patient will transfer from bed to chair and chair to bed with independence using the least restrictive device within 7 day(s). 3.  Patient will perform sit to stand with independence within 7 day(s). 4.  Patient will ambulate with independence for 150 feet with the least restrictive device within 7 day(s). Outcome: Progressing Towards Goal 
 PHYSICAL THERAPY TREATMENT Patient: Billy Sanderson (79 y.o. female) Date: 3/27/2019 Diagnosis: [facility] [J96.91] <principal problem not specified> Precautions: Fall Chart, physical therapy assessment, plan of care and goals were reviewed. ASSESSMENT: 
Patient was sitting in bedside chair when PT arrived. Reported a headache. Took vitals and all WNL and O2 sats at 95% on RA. She agreed to therapy and was cleared by her nurse. Demonstrates independent sit to stand transfers and sba bed to chair transfers. Gait progressed to 225 feet from 100 feet yesterday with sba using RW initially due to L knee pain, then the gait belt only. Gait speed, step lengths and stability all improved. O2 sats lowest reading on RA was 94% during ambulation. All other vitals were stable. She was left in bedside chair with all needs met and encouraged to walk a couple more times later today with nursing. Recommend HH PT upon discharged to assist patient in regaining prior level of functional independence. Progression toward goals: 
?    Improving appropriately and progressing toward goals ? Improving slowly and progressing toward goals ? Not making progress toward goals and plan of care will be adjusted PLAN: 
 Patient continues to benefit from skilled intervention to address the above impairments. Continue treatment per established plan of care. Discharge Recommendations:  Home Health PT Further Equipment Recommendations for Discharge: None SUBJECTIVE:  
Patient stated ? My head hurts this morning\" OBJECTIVE DATA SUMMARY:  
Critical Behavior: 
Neurologic State: Alert Orientation Level: Oriented X4 Cognition: Appropriate decision making, Appropriate for age attention/concentration, Appropriate safety awareness, Follows commands Safety/Judgement: Awareness of environment, Good awareness of safety precautions, Fall prevention Functional Mobility Training: 
Bed Mobility: 
  
  
  
  
  
  
Transfers: 
Sit to Stand: Independent Stand to Sit: Independent Bed to Chair: Stand-by assistance Balance: 
Sitting: Intact Standing: Impaired Standing - Static: Good Standing - Dynamic : Fair Ambulation/Gait Training: 
Distance (ft): 225 Feet (ft) Assistive Device: Gait belt;Walker, rolling(used walker part of time due to L knee pain;) Ambulation - Level of Assistance: Stand-by assistance Gait Abnormalities: Path deviations Speed/Cece: Pace decreased (<100 feet/min) Step Length: Right shortened;Left shortened Pain: 
Pain Scale 1: Numeric (0 - 10) Pain Intensity 1: 6 Pain Location 1: Head;Throat Pain Orientation 1: Mid 
Pain Description 1: Aching Pain Intervention(s) 1: Medication (see MAR) Activity Tolerance:  
Fair Please refer to the flowsheet for vital signs taken during this treatment. After treatment:  
?    Patient left in no apparent distress sitting up in chair ? Patient left in no apparent distress in bed 
? Call bell left within reach ? Nursing notified ? Caregiver present ? Bed alarm activated COMMUNICATION/COLLABORATION:  
The patient?s plan of care was discussed with: Occupational Therapist and Registered Nurse Stephanie Martínez, PT Time Calculation: 18 mins

## 2019-03-27 NOTE — CDMP QUERY
Dr. Bruno Greenfield : 
Patient admitted with \"Acute hypoxic respiratory failure\". Documentation reflects \"CHF/Pulmonary edema\" in H&P dated 3/25/19. If possible, please specify in the progress notes and d/c summary if \"CHF/Pulmonary edema\" was: 
 
? Ruled out after study ? Still Suspected after study ? Confirmed after study The medical record reflects the following: 
  Risk Factors: Hx CHF; HTN; COPD; CKD; DM; Emphysema Clinical Indicators: pBNP: 717; CXR: mild pulmonary interstitial edema; CTA Chest:dilated main pulmonary artery suggests pulmonary hypertension. Emphysema. Extensive coronary artery calcifications Treatment: Supplemental O2; IV Lasix 40mg x 2 doses in ED; IV Lasix 40mg daily Thank Kierra Kennedy [facility] 
[phone]

## 2019-03-27 NOTE — PROGRESS NOTES
Problem: Diabetes Self-Management Goal: *Disease process and treatment process Description Define diabetes and identify own type of diabetes; list 3 options for treating diabetes. Outcome: Progressing Towards Goal 
Goal: *Incorporating nutritional management into lifestyle Description Describe effect of type, amount and timing of food on blood glucose; list 3 methods for planning meals. Outcome: Progressing Towards Goal 
Goal: *Incorporating physical activity into lifestyle Description State effect of exercise on blood glucose levels. Outcome: Progressing Towards Goal 
Goal: *Developing strategies to promote health/change behavior Description Define the ABC's of diabetes; identify appropriate screenings, schedule and personal plan for screenings. Outcome: Progressing Towards Goal 
Goal: *Using medications safely Description State effect of diabetes medications on diabetes; name diabetes medication taking, action and side effects. Outcome: Progressing Towards Goal 
Goal: *Monitoring blood glucose, interpreting and using results Description Identify recommended blood glucose targets  and personal targets. Outcome: Progressing Towards Goal 
Goal: *Prevention, detection, treatment of acute complications Description List symptoms of hyper- and hypoglycemia; describe how to treat low blood sugar and actions for lowering  high blood glucose level. Outcome: Progressing Towards Goal 
Goal: *Prevention, detection and treatment of chronic complications Description Define the natural course of diabetes and describe the relationship of blood glucose levels to long term complications of diabetes. Outcome: Progressing Towards Goal 
Goal: *Developing strategies to address psychosocial issues Description Describe feelings about living with diabetes; identify support needed and support network Outcome: Progressing Towards Goal 
Goal: *Insulin pump training Outcome: Progressing Towards Goal 
 Goal: *Sick day guidelines Outcome: Progressing Towards Goal 
Goal: *Patient Specific Goal (EDIT GOAL, INSERT TEXT) Outcome: Progressing Towards Goal 
  
Problem: Patient Education: Go to Patient Education Activity Goal: Patient/Family Education Outcome: Progressing Towards Goal 
  
Problem: Falls - Risk of 
Goal: *Absence of Falls Description Document Bethany Saldana Fall Risk and appropriate interventions in the flowsheet. Outcome: Progressing Towards Goal 
  
Problem: Patient Education: Go to Patient Education Activity Goal: Patient/Family Education Outcome: Progressing Towards Goal 
  
Problem: Pressure Injury - Risk of 
Goal: *Prevention of pressure injury Description Document Landon Scale and appropriate interventions in the flowsheet. Outcome: Progressing Towards Goal 
  
Problem: Patient Education: Go to Patient Education Activity Goal: Patient/Family Education Outcome: Progressing Towards Goal 
  
Problem: Patient Education: Go to Patient Education Activity Goal: Patient/Family Education Outcome: Progressing Towards Goal 
  
Problem: Heart Failure: Day 1 Goal: Off Pathway (Use only if patient is Off Pathway) Outcome: Progressing Towards Goal 
Goal: Activity/Safety Outcome: Progressing Towards Goal 
Goal: Consults, if ordered Outcome: Progressing Towards Goal 
Goal: Diagnostic Test/Procedures Outcome: Progressing Towards Goal 
Goal: Nutrition/Diet Outcome: Progressing Towards Goal 
Goal: Discharge Planning Outcome: Progressing Towards Goal 
Goal: Medications Outcome: Progressing Towards Goal 
Goal: Respiratory Outcome: Progressing Towards Goal 
Goal: Treatments/Interventions/Procedures Outcome: Progressing Towards Goal 
Goal: Psychosocial 
Outcome: Progressing Towards Goal 
Goal: *Oxygen saturation within defined limits Outcome: Progressing Towards Goal 
Goal: *Hemodynamically stable Outcome: Progressing Towards Goal 
Goal: *Optimal pain control at patient's stated goal 
 Outcome: Progressing Towards Goal 
Goal: *Anxiety reduced or absent Outcome: Progressing Towards Goal 
  
Problem: Heart Failure: Day 2 Goal: Off Pathway (Use only if patient is Off Pathway) Outcome: Progressing Towards Goal 
Goal: Activity/Safety Outcome: Progressing Towards Goal 
Goal: Consults, if ordered Outcome: Progressing Towards Goal 
Goal: Diagnostic Test/Procedures Outcome: Progressing Towards Goal 
Goal: Nutrition/Diet Outcome: Progressing Towards Goal 
Goal: Discharge Planning Outcome: Progressing Towards Goal 
Goal: Medications Outcome: Progressing Towards Goal 
Goal: Respiratory Outcome: Progressing Towards Goal 
Goal: Treatments/Interventions/Procedures Outcome: Progressing Towards Goal 
Goal: Psychosocial 
Outcome: Progressing Towards Goal 
Goal: *Oxygen saturation within defined limits Outcome: Progressing Towards Goal 
Goal: *Hemodynamically stable Outcome: Progressing Towards Goal 
Goal: *Optimal pain control at patient's stated goal 
Outcome: Progressing Towards Goal 
Goal: *Anxiety reduced or absent Outcome: Progressing Towards Goal 
Goal: *Demonstrates progressive activity Outcome: Progressing Towards Goal 
  
Problem: Chronic Obstructive Pulmonary Disease (COPD) Goal: *Oxygen saturation during activity within specified parameters Outcome: Progressing Towards Goal 
Goal: *Absence of hypoxia Outcome: Progressing Towards Goal 
  
Problem: Patient Education: Go to Patient Education Activity Goal: Patient/Family Education Outcome: Progressing Towards Goal

## 2019-03-28 VITALS
SYSTOLIC BLOOD PRESSURE: 102 MMHG | HEART RATE: 75 BPM | RESPIRATION RATE: 20 BRPM | OXYGEN SATURATION: 93 % | WEIGHT: 162.04 LBS | BODY MASS INDEX: 26.04 KG/M2 | TEMPERATURE: 97.5 F | HEIGHT: 66 IN | DIASTOLIC BLOOD PRESSURE: 55 MMHG

## 2019-03-28 LAB
ALBUMIN SERPL-MCNC: 3.2 G/DL (ref 3.5–5)
ALBUMIN/GLOB SERPL: 0.6 {RATIO} (ref 1.1–2.2)
ALP SERPL-CCNC: 131 U/L (ref 45–117)
ALT SERPL-CCNC: 16 U/L (ref 12–78)
ANION GAP SERPL CALC-SCNC: 8 MMOL/L (ref 5–15)
AST SERPL-CCNC: 20 U/L (ref 15–37)
BASOPHILS # BLD: 0 K/UL (ref 0–0.1)
BASOPHILS NFR BLD: 0 % (ref 0–1)
BILIRUB SERPL-MCNC: 0.3 MG/DL (ref 0.2–1)
BUN SERPL-MCNC: 40 MG/DL (ref 6–20)
BUN/CREAT SERPL: 24 (ref 12–20)
CALCIUM SERPL-MCNC: 9.3 MG/DL (ref 8.5–10.1)
CHLORIDE SERPL-SCNC: 103 MMOL/L (ref 97–108)
CO2 SERPL-SCNC: 24 MMOL/L (ref 21–32)
CREAT SERPL-MCNC: 1.64 MG/DL (ref 0.55–1.02)
DIFFERENTIAL METHOD BLD: ABNORMAL
EOSINOPHIL # BLD: 0 K/UL (ref 0–0.4)
EOSINOPHIL NFR BLD: 0 % (ref 0–7)
ERYTHROCYTE [DISTWIDTH] IN BLOOD BY AUTOMATED COUNT: 12.9 % (ref 11.5–14.5)
GLOBULIN SER CALC-MCNC: 5.6 G/DL (ref 2–4)
GLUCOSE BLD STRIP.AUTO-MCNC: 108 MG/DL (ref 65–100)
GLUCOSE BLD STRIP.AUTO-MCNC: 156 MG/DL (ref 65–100)
GLUCOSE SERPL-MCNC: 129 MG/DL (ref 65–100)
HCT VFR BLD AUTO: 38 % (ref 35–47)
HGB BLD-MCNC: 12.4 G/DL (ref 11.5–16)
IMM GRANULOCYTES # BLD AUTO: 0.2 K/UL (ref 0–0.04)
IMM GRANULOCYTES NFR BLD AUTO: 1 % (ref 0–0.5)
LYMPHOCYTES # BLD: 2 K/UL (ref 0.8–3.5)
LYMPHOCYTES NFR BLD: 11 % (ref 12–49)
MAGNESIUM SERPL-MCNC: 2.8 MG/DL (ref 1.6–2.4)
MCH RBC QN AUTO: 27.5 PG (ref 26–34)
MCHC RBC AUTO-ENTMCNC: 32.6 G/DL (ref 30–36.5)
MCV RBC AUTO: 84.3 FL (ref 80–99)
MONOCYTES # BLD: 1.8 K/UL (ref 0–1)
MONOCYTES NFR BLD: 10 % (ref 5–13)
NEUTS SEG # BLD: 13.7 K/UL (ref 1.8–8)
NEUTS SEG NFR BLD: 78 % (ref 32–75)
NRBC # BLD: 0 K/UL (ref 0–0.01)
NRBC BLD-RTO: 0 PER 100 WBC
PLATELET # BLD AUTO: 250 K/UL (ref 150–400)
PMV BLD AUTO: 10.1 FL (ref 8.9–12.9)
POTASSIUM SERPL-SCNC: 3.9 MMOL/L (ref 3.5–5.1)
PROT SERPL-MCNC: 8.8 G/DL (ref 6.4–8.2)
RBC # BLD AUTO: 4.51 M/UL (ref 3.8–5.2)
SERVICE CMNT-IMP: ABNORMAL
SERVICE CMNT-IMP: ABNORMAL
SODIUM SERPL-SCNC: 135 MMOL/L (ref 136–145)
WBC # BLD AUTO: 17.8 K/UL (ref 3.6–11)

## 2019-03-28 PROCEDURE — 74011636637 HC RX REV CODE- 636/637: Performed by: INTERNAL MEDICINE

## 2019-03-28 PROCEDURE — 83735 ASSAY OF MAGNESIUM: CPT

## 2019-03-28 PROCEDURE — 36415 COLL VENOUS BLD VENIPUNCTURE: CPT

## 2019-03-28 PROCEDURE — 85025 COMPLETE CBC W/AUTO DIFF WBC: CPT

## 2019-03-28 PROCEDURE — 74011250637 HC RX REV CODE- 250/637: Performed by: INTERNAL MEDICINE

## 2019-03-28 PROCEDURE — 82962 GLUCOSE BLOOD TEST: CPT

## 2019-03-28 PROCEDURE — 74011000250 HC RX REV CODE- 250: Performed by: INTERNAL MEDICINE

## 2019-03-28 PROCEDURE — 97116 GAIT TRAINING THERAPY: CPT

## 2019-03-28 PROCEDURE — 80053 COMPREHEN METABOLIC PANEL: CPT

## 2019-03-28 PROCEDURE — 94640 AIRWAY INHALATION TREATMENT: CPT

## 2019-03-28 RX ORDER — PREDNISONE 20 MG/1
20 TABLET ORAL
Qty: 3 TAB | Refills: 0 | Status: SHIPPED | OUTPATIENT
Start: 2019-03-29 | End: 2019-05-23

## 2019-03-28 RX ORDER — GUAIFENESIN 600 MG/1
600 TABLET, EXTENDED RELEASE ORAL 2 TIMES DAILY
Qty: 20 TAB | Refills: 0 | Status: SHIPPED | OUTPATIENT
Start: 2019-03-28 | End: 2021-01-01

## 2019-03-28 RX ORDER — ACETAMINOPHEN 325 MG/1
650 TABLET ORAL
Qty: 40 TAB | Refills: 0 | Status: SHIPPED | OUTPATIENT
Start: 2019-03-28

## 2019-03-28 RX ADMIN — DOCUSATE SODIUM AND SENNOSIDES 1 TABLET: 8.6; 5 TABLET, FILM COATED ORAL at 08:29

## 2019-03-28 RX ADMIN — APIXABAN 5 MG: 5 TABLET, FILM COATED ORAL at 08:29

## 2019-03-28 RX ADMIN — ACETAMINOPHEN 650 MG: 325 TABLET ORAL at 08:29

## 2019-03-28 RX ADMIN — IPRATROPIUM BROMIDE AND ALBUTEROL SULFATE 3 ML: .5; 3 SOLUTION RESPIRATORY (INHALATION) at 14:17

## 2019-03-28 RX ADMIN — Medication 10 ML: at 03:46

## 2019-03-28 RX ADMIN — IPRATROPIUM BROMIDE AND ALBUTEROL SULFATE 3 ML: .5; 3 SOLUTION RESPIRATORY (INHALATION) at 01:34

## 2019-03-28 RX ADMIN — CARVEDILOL 6.25 MG: 6.25 TABLET, FILM COATED ORAL at 08:29

## 2019-03-28 RX ADMIN — IPRATROPIUM BROMIDE AND ALBUTEROL SULFATE 3 ML: .5; 3 SOLUTION RESPIRATORY (INHALATION) at 04:15

## 2019-03-28 RX ADMIN — GUAIFENESIN 600 MG: 600 TABLET, EXTENDED RELEASE ORAL at 08:29

## 2019-03-28 RX ADMIN — PREDNISONE 20 MG: 20 TABLET ORAL at 08:30

## 2019-03-28 RX ADMIN — GABAPENTIN 400 MG: 100 CAPSULE ORAL at 08:28

## 2019-03-28 RX ADMIN — CLOPIDOGREL BISULFATE 75 MG: 75 TABLET, FILM COATED ORAL at 08:28

## 2019-03-28 RX ADMIN — FLUTICASONE FUROATE AND VILANTEROL TRIFENATATE 1 PUFF: 100; 25 POWDER RESPIRATORY (INHALATION) at 08:31

## 2019-03-28 RX ADMIN — INSULIN LISPRO 2 UNITS: 100 INJECTION, SOLUTION INTRAVENOUS; SUBCUTANEOUS at 12:05

## 2019-03-28 RX ADMIN — LINAGLIPTIN 5 MG: 5 TABLET, FILM COATED ORAL at 08:29

## 2019-03-28 RX ADMIN — IPRATROPIUM BROMIDE AND ALBUTEROL SULFATE 3 ML: .5; 3 SOLUTION RESPIRATORY (INHALATION) at 07:52

## 2019-03-28 NOTE — FACE TO FACE
Home Health Care Discharge Planning: [facility] Face to Face Encounter NAME: Saravanan Richardson :  1950 MRN:  [id number] Primary Diagnosis: Respiratory Failure, COPD Exacerbation, Emphysema, CHF, MODE/CKD, DM, Junctional Tachycardia s/p Ablation, Date of Face to Face:  3/28/2019 12:45 PM        
                        
Face to Face Encounter findings are related to primary reason for home care:   YES 
 
1. I certify that the patient needs intermittent skilled nursing care, physical therapy and/or speech therapy. I will not be following this patient in the Community and Dr. Lauren Grande MD will be responsible for signing the [address] of Care. 2. Initial Orders for Care: Physical Therapy and Occupational Therapy 3. I certify that this patient is homebound because of illness or injury, need the aid of supportive devices such as crutches, canes, wheelchairs, and walkers; the use of special transportation; or the assistance of another person in order to leave their place of residence. There exists a normal inability to leave home and leaving home requires a considerable and taxing effort. 4. I certify that this patient is under my care and that I had a Face-to-Face Encounter that meets the physician Face-to-Face Encounter requirements. Document the physical findings from the Face-to-Face Encounter that support the need for skilled services: Has new diagnosis that requires skilled nursing teaching and intervention , Has new medications that requires skilled nursing teaching and monitoring for understanding and compliance , Needs skilled safety assessment and interventions  and Has new finding of weakness and altered mobility that requires skilled physical/occupational and/or speech therapy services for evaluation and interventions. Roxane Pastor MD 
Discharging Physician Office: [phone] Fax:   [phone]

## 2019-03-28 NOTE — PROGRESS NOTES
ADULT PROTOCOL: JET AEROSOL  REASSESSMENT Patient  Barry Huitron     80 y.o.   female     3/27/2019  10:28 PM 
 
Breath Sounds Pre Procedure: Right Breath Sounds: Diminished Left Breath Sounds: Diminished Breath Sounds Post Procedure: Right Breath Sounds: Diminished Left Breath Sounds: Diminished Breathing pattern: Pre procedure Breathing Pattern: Regular Post procedure Breathing Pattern: Regular Heart Rate: Pre procedure Pulse: 76 Post procedure Pulse: 78 Resp Rate: Pre procedure Respirations: 18 Post procedure Respirations: 20 Peak Flow: Pre bronchodilator Post bronchodilator FVC/FEV1:  n/a Incentive Spirometry:    
     
 
Cough: Pre procedure Cough: Non-productive Post procedure Cough: Non-productive Suctioned: NO Sputum: Pre procedure Post procedure Oxygen: O2 Device: Room air   RA Changed: NO SpO2: Pre procedure SpO2: 97 %   without oxygen Post procedure SpO2: 96 %  without oxygen Nebulizer Therapy: Current medications Aerosolized Medications: DuoNeb Changed: NO Smoking History: n/a Problem List:  
Patient Active Problem List  
Diagnosis Code  Benign hypertensive heart and CKD, stage 3 (GFR 30-59), w CHF (HCC) I13.0, N18.3  Atrial fibrillation--paroxysmal I48.91  
 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 J44.9  Hip pain M25.559  Hypokalemia E87.6  S/P angioplasty with stent Z95.9  Sick sinus syndrome (HCC) I49.5  Chest pain R07.9  Sinoatrial node dysfunction (HCC) I49.5  Cardiac pacemaker in situ Z95.0  Mixed hyperlipidemia E78.2  Back pain M54.9  S/P coronary artery stent placement Z95.5  Diastolic CHF, acute on chronic (HCC) I50.33  
 GIB (gastrointestinal bleeding) K92.2  Type 2 diabetes mellitus with diabetic neuropathy, without long-term current use of insulin ([facility]) E11.40  
 CHF (congestive heart failure) ([facility]) I50.9  Systolic CHF, acute ([facility]) L98.23  
 Acute systolic CHF (congestive heart failure) ([facility]) I50.21  
 Fear associated with illness and body function F40.9  Counseling regarding advanced care planning and goals of care Z71.89  
 S/P ablation of atrial fibrillation Z98.890, Z86.79  
 Chronic systolic HF (heart failure) ([facility]) I50.22  
 History of Clostridium difficile infection Z86.19  
 Junctional tachycardia ([facility]) I47.1  Hypotension I95.9  CKD (chronic kidney disease) stage 3, GFR 30-59 ml/min ([facility]) N18.3  Type 2 diabetes mellitus with nephropathy ([facility]) E11.21  
 A-fib ([facility]) I48.91  
 PAD (peripheral artery disease) ([facility]) I73.9  
 S/P rotator cuff repair U62.945  Respiratory failure with hypoxia ([facility]) J96.91 Respiratory Therapist: Katie Haddad RT

## 2019-03-28 NOTE — DISCHARGE SUMMARY
Hospitalist Discharge Summary Patient ID: 
Billy Sanderson [id number] 
80 y.o. 
1950 PCP on record: Ludwin Gracia MD 
 
Admit date: 3/24/2019 Discharge date and time: 3/28/2019 DISCHARGE DIAGNOSIS: 
 
Respiratory Failure, COPD Exacerbation, Emphysema, CHF, MODE/CKD, DM, Junctional Tachycardia s/p Ablation, CONSULTATIONS: 
None Excerpted HPI from H&P of Jimenez Zelaya, DO: 
Payal Fernández is a 80 y.o. female withPMHx of COPD, combined diastolic and systolic heart failure and atrial fibrillation, who presents to the ED with a complaint of worsening SOB. Pt additionally notes that she has felt heaviness in her b/l legs and the \"sensation that they're swollen even though they don't look like it\". She has a remote history of cigarette smoking of between 1/2ppd to a ppd, having successfully quit just over 10 years ago. She denies fevers/chills, N/V, or chest pain. The patient does admit to a cough, which has been mostly non-productive We were asked to admit for work up and evaluation of the above problems. ______________________________________________________________________ DISCHARGE SUMMARY/HOSPITAL COURSE:  for full details see H&P, daily progress notes, labs, consult notes. Acute hypoxic respiratory failure POA  So far tolerating room air. CAD with h/o stents 04/2017 H/o Junctional Tachycardia/A.fib / S/p ablation 5/1/17 
- + dyspnea : likely multifactorial HF/copd  
initially on CPAP by EMS, now maintaining sats on 2-4L O2 
-O2 to keep priya >90%, wean as tolerated, assess for home O2 on DC Not on home O2  
-pharmacy consulted for meds rec 
-diuresing with lasix IV, watch daily weight  
-cont coreg  
-primary cardiology: Dr Davida Crandall consult as needed  
-CTA:No evidence of acute pulmonary embolus. Dilated main pulmonary artery suggests pulmonary hypertension. Emphysema. Extensive coronary artery calcifications. Acute on Chronic Diastolic Heart Failure POA: EF 60%, weight is down, keeping negative balance, monitor MODE on CKD: creatinine is stable, monitor COPD exacerbation  (Emphysema with multiple blebs) 
-flu negative , tolerating room air 
-also likely contributing to her dyspnea 
-continue agressive jet nebs and taper down steroids to PO 
DM type II diet controlled BG up due to steroids, c/w  NPH, taper down steroids to PO,  due to steroids, c/w SS + NPH  HbA1C 7.5, start Tradjenta H/o Recurrent C.diff Code Status: Full code Surrogate Decision Maker:children DVT Prophylaxis: Eliquis Baseline: ambulating with cane; lives with her brother and grandson Recommended Disposition: TBD  
  
D/C Home with [address] today and F/U with PCP and Pulmonology 
_______________________________________________________________________ Patient seen and examined by me on discharge day. Pertinent Findings: 
Gen:    Not in distress Chest: Coarse BS 
CVS:   Regular rhythm. No edema Abd:  Soft, not distended, not tender Neuro:  Alert, GCS 15 
_______________________________________________________________________ DISCHARGE MEDICATIONS:  
Current Discharge Medication List  
  
START taking these medications Details  
guaiFENesin ER (MUCINEX) 600 mg ER tablet Take 1 Tab by mouth two (2) times a day. Qty: 20 Tab, Refills: 0 SITagliptin (JANUVIA) 50 mg tablet Take 1 Tab by mouth daily. Qty: 30 Tab, Refills: 1  
  
predniSONE (DELTASONE) 20 mg tablet Take 20 mg by mouth daily (with breakfast). Qty: 3 Tab, Refills: 0  
  
benzocaine-menthol (CHLORASEPTIC MAX) 15-10 mg lozg lozenge Take 1 Lozenge by mouth every four to six (4-6) hours as needed for Sore throat. Qty: 30 Each, Refills: 0 CONTINUE these medications which have CHANGED Details  
acetaminophen (TYLENOL) 325 mg tablet Take 2 Tabs by mouth every four (4) hours as needed for Pain or Fever. Qty: 40 Tab, Refills: 0 CONTINUE these medications which have NOT CHANGED Details  
colchicine (COLCRYS) 0.6 mg tablet Take 1.2 mg by mouth as needed (for flare ups or when she would like to eat seafood (ie. shrimp)). simvastatin (ZOCOR) 20 mg tablet Take 1 Tab by mouth nightly. Increased 9/1018 Qty: 90 Tab, Refills: 1 Associated Diagnoses: Type 2 diabetes mellitus with nephropathy ([facility] Mixed hyperlipidemia; Coronary artery disease involving native coronary artery of native heart without angina pectoris  
  
furosemide (LASIX) 20 mg tablet TAKE 1 TABLET BY MOUTH ONCE DAILY Qty: 90 Tab, Refills: 1  
  
albuterol (PROVENTIL HFA, VENTOLIN HFA, PROAIR HFA) 90 mcg/actuation inhaler Take 2 Puffs by inhalation every four (4) hours as needed for Wheezing. Qty: 1 Inhaler, Refills: 1  
  
albuterol (PROVENTIL VENTOLIN) 2.5 mg /3 mL (0.083 %) nebulizer solution 3 mL by Nebulization route every four (4) hours as needed for Wheezing. Qty: 1 Package, Refills: 5 Associated Diagnoses: SOB (shortness of breath)  
  
apixaban (ELIQUIS) 5 mg tablet Take 1 Tab by mouth two (2) times a day. Resume from tomorrow 7/21/2018. Qty: 56 Tab, Refills: 0 Associated Diagnoses: Atrial fibrillation, unspecified type ([facility] Anticoagulant long-term use  
  
gabapentin (NEURONTIN) 800 mg tablet TAKE 1 TABLET BY MOUTH THREE TIMES DAILY Qty: 90 Tab, Refills: 3 Comments: Please consider 90 day supplies to promote better adherence Associated Diagnoses: Polyneuropathy associated with underlying disease ([facility] SYMBICORT 160-4.5 mcg/actuation HFAA INHALE ONE PUFF BY MOUTH TWICE DAILY Qty: 1 Inhaler, Refills: 3 Associated Diagnoses: Chronic obstructive pulmonary disease with acute exacerbation ([facility] clopidogrel (PLAVIX) 75 mg tab TAKE ONE TABLET BY MOUTH ONCE DAILY Qty: 30 Tab, Refills: 11  Comments: Please consider 90 day supplies to promote better adherence  
  
carvedilol (COREG) 6.25 mg tablet Take 1 Tab by mouth two (2) times daily (with meals). Qty: 60 Tab, Refills: 11  
  
tiotropium (SPIRIVA WITH HANDIHALER) 18 mcg inhalation capsule INHALE THE CONTENTS OF 1 CAPSULE THROUGH HANDIHALER DEVICE DAILY Qty: 90 Cap, Refills: 3 Associated Diagnoses: Chronic obstructive pulmonary disease, unspecified COPD type ([address]  
  
cod liver oil cap Take 1 Cap by mouth daily. fluticasone (FLONASE) 50 mcg/actuation nasal spray 2 Sprays by Both Nostrils route daily as needed. STOP taking these medications Azelastine (ASTEPRO) 0.15 % (205.5 mcg) nasal spray Comments:  
Reason for Stopping:   
   
 senna-docusate (PERICOLACE) 8.6-50 mg per tablet Comments:  
Reason for Stopping: My Recommended Diet, Activity, Wound Care, and follow-up labs are listed in the patient's Discharge Insturctions which I have personally completed and reviewed. ______________________________________________________________________ Risk of deterioration: Moderate Condition at Discharge:  Stable 
______________________________________________________________________ Disposition Home with family and home health services 
______________________________________________________________________ Care Plan discussed with:  
Patient, Family, RN, Care Manager, Consultant 
 
______________________________________________________________________ Code Status: Full Code 
______________________________________________________________________ Follow up with: PCP : Rocco Johnson MD 
Follow-up Information Follow up With Specialties Details Why Contact Info Rocco Johnson MD Internal Medicine   [address] [place] [address] [phone] 
[phone] F/U PCP 
F/U Pulmonology Total time in minutes spent coordinating this discharge (includes going over instructions, follow-up, prescriptions, and preparing report for sign off to her PCP) :  35 minutes Signed: 
Megan Moran MD

## 2019-03-28 NOTE — ROUTINE PROCESS
The following appointments have been successfully scheduled: 
 
Date/time Wednesday, April 03, 2019 03:15 PM 
Patient  Wilmer Elmore 1950 ([id number] F) #[id number] #[id number] Department [facility] OFFICE Appointment type Transitional Care Provider Yas Connell

## 2019-03-28 NOTE — PROGRESS NOTES
Problem: Mobility Impaired (Adult and Pediatric) Goal: *Acute Goals and Plan of Care (Insert Text) Description Physical Therapy Goals Initiated 3/26/2019 1. Patient will move from supine to sit and sit to supine , scoot up and down and roll side to side in bed with independence within 7 day(s). 2.  Patient will transfer from bed to chair and chair to bed with independence using the least restrictive device within 7 day(s). 3.  Patient will perform sit to stand with independence within 7 day(s). 4.  Patient will ambulate with independence for 150 feet with the least restrictive device within 7 day(s). Outcome: Resolved/Met PHYSICAL THERAPY TREATMENT/DISCHARGE Patient: Amie Victoria (30 y.o. female) Date: 3/28/2019 Diagnosis: Respiratory failure with hypoxia ([facility] [J96.91] <principal problem not specified> Precautions: Fall Chart, physical therapy assessment, plan of care and goals were reviewed. ASSESSMENT: 
Patient sitting up in bed when PT arrived. Agreed to therapy and cleared by nursing. She reports getting oob to chair and bathroom ad nicolas now without any issues. Demonstrates independent skill with supine to sit to supine. Sit to standing and bed to chair transfers. Gait progressed to 250 feet on RA with mild path deviations, but no lob. Gait speed is reduced but functional. No further skilled need for PT at this time. Will sign off. Patient encouraged to be oob for all meals, move about room ad nicolas and ambulate to tolerance in all with nursing supervision. Recommend home without PT services. Progression toward goals: X    Improving appropriately and progressing toward goals ? Improving slowly and progressing toward goals ? Not making progress toward goals and plan of care will be adjusted PLAN: 
Patient will be discharged from acute skilled physical therapy at this time. Rationale for discharge: X Goals Achieved ? Rosana Justin ? Patient not participating in therapy ? Other: 
Discharge Recommendations:  None - home without PT services Further Equipment Recommendations for Discharge:  None SUBJECTIVE:  
Patient stated I've been getting up and using the bathroom without any issues.  OBJECTIVE DATA SUMMARY:  
Critical Behavior: 
Neurologic State: Alert Orientation Level: Oriented X4, Appropriate for age Cognition: Appropriate decision making, Appropriate for age attention/concentration, Appropriate safety awareness, Follows commands Safety/Judgement: Awareness of environment, Good awareness of safety precautions Functional Mobility Training: 
Bed Mobility: 
Rolling: Independent Supine to Sit: Independent Sit to Supine: Independent Scooting: Independent Transfers: 
Sit to Stand: Independent Stand to Sit: Independent Bed to Chair: Independent Balance: 
Sitting: Intact Standing: Intact Standing - Static: Good Standing - Dynamic : Good Ambulation/Gait Training: 
Distance (ft): 250 Feet (ft) Assistive Device: Gait belt Ambulation - Level of Assistance: Independent Gait Abnormalities: Path deviations Base of Support: Widened Speed/Cece: Pace decreased (<100 feet/min) Functional Measure: 
Barthel Index: 
 
Bathin Bladder: 10 Bowels: 10 
Groomin Dressing: 10 Feeding: 10 Mobility: 15 
Stairs: 5 Toilet Use: 5 Transfer (Bed to Chair and Back): 15 Total: 90/100 Percentage of impairment  
0% 1-19% 20-39% 40-59% 60-79% 80-99% 100% Barthel Score 0-100 100 99-80 79-60 59-40 20-39 1-19 
 0 The Barthel ADL Index: Guidelines 1. The index should be used as a record of what a patient does, not as a record of what a patient could do. 2. The main aim is to establish degree of independence from any help, physical or verbal, however minor and for whatever reason. 3. The need for supervision renders the patient not independent. 4. A patient's performance should be established using the best available evidence. Asking the patient, friends/relatives and nurses are the usual sources, but direct observation and common sense are also important. However direct testing is not needed. 5. Usually the patient's performance over the preceding 24-48 hours is important, but occasionally longer periods will be relevant. 6. Middle categories imply that the patient supplies over 50 per cent of the effort. 7. Use of aids to be independent is allowed. Renaldo Cartagena., Barthel, DRileyW. (8521). Functional evaluation: the Barthel Index. [address] [facility] (14)2. Lani Del Rosario ben DANIEL PelayoF, Bambi Dinh., Noel Sotelo., Aida Gibbs, [address] [facility] (1999). Measuring the change indisability after inpatient rehabilitation; comparison of the responsiveness of the Barthel Index and Functional [place] Measure. Journal of Neurology, Neurosurgery, and Psychiatry, 66(4), 553-108. Bonnie Tate, N.J.A, MAL Bedolla, & Radha Bai M.A. (2004.) Assessment of post-stroke quality of life in cost-effectiveness studies: The usefulness of the Barthel Index and the EuroQoL-5D. [facility], 84, 735-03 Pain: 
Pain Scale 1: Numeric (0 - 10) Pain Intensity 1: 0 Activity Tolerance:  
Good Please refer to the flowsheet for vital signs taken during this treatment. After treatment:  
? Patient left in no apparent distress sitting up in chair X Patient left in no apparent distress in bed X Call bell left within reach X Nursing notified ? Caregiver present ? Bed alarm activated COMMUNICATION/COLLABORATION:  
The patients plan of care was discussed with: Registered Nurse and  Stephanie Martínez PT Time Calculation: 10 mins

## 2019-03-28 NOTE — PROGRESS NOTES
Bedside shift change report given to [address] [place] [address] (oncoming nurse) by Baldev Thakkar (offgoing nurse). Report included the following information SBAR.

## 2019-03-28 NOTE — PROGRESS NOTES
PCU SHIFT NURSING NOTE Bedside shift change report given to [address] (oncoming nurse) by Dina Johnson (offgoing nurse). Report included the following information SBAR, Kardex and Recent Results. Shift Summary:  
2100- patient very upset when reviewing PM meds with patient that her gabapentin dose has been decreased and patient is complaining of BLE pain/neuropathy. Call placed to MD to notify of issue. New orders received, Will continue to monitor. 2300- One time dose of gabapentin administered per MD order. Will continue to monitor. 0600-Patient slept well over night. No acute events. Will continue to monitor. Admission Date 3/24/2019 Admission Diagnosis Respiratory failure with hypoxia ([facility] [J96.91] Consults None Consults []PT []OT []Speech  
[]Case Management  
  
[] Palliative Cardiac Monitoring Order []Yes []No  
 
IV drips []Yes Drip:                            Dose: 
Drip:                            Dose: 
Drip:                            Dose:  
[]No  
 
GI Prophylaxis []Yes []No  
 
 
 
DVT Prophylaxis SCDs:     
     
 Mayo stockings:     
  
[] Medication []Contraindicated []None Activity Level Activity Level: Up with Assistance Activity Assistance: Partial (one person) Purposeful Rounding every 1-2 hour? []Yes Dunham Score  Total Score: 3 Bed Alarm (If score 3 or >) []Yes  
[] Refused (See signed refusal form in chart) Landon Score  Landon Score: 19 Landon Score (if score 14 or less) []PMT consult  
[]Wound Care consult []Specialty bed  
[] Nutrition consult Needs prior to discharge:  
Home O2 required:   
[]Yes []No  
 If yes, how much O2 required? Other:  
 Last Bowel Movement: Last Bowel Movement Date: 03/26/19 Influenza Vaccine Received Flu Vaccine for Current Season (usually Sept-March): Yes Pneumonia Vaccine Diet Active Orders Diet DIET DIABETIC CONSISTENT CARB Regular; Gluten Free LDAs Peripheral IV 03/25/19 (Active) Site Assessment Clean, dry, & intact 3/27/2019  7:31 PM  
Phlebitis Assessment 0 3/27/2019  7:31 PM  
Infiltration Assessment 0 3/27/2019  7:31 PM  
Dressing Status Clean, dry, & intact 3/27/2019  7:31 PM  
Dressing Type Transparent 3/27/2019  7:31 PM  
Hub Color/Line Status Flushed 3/27/2019  7:31 PM  
Action Taken Other (comment) 3/27/2019  7:31 PM  
Alcohol Cap Used Yes 3/27/2019  7:31 PM  
      
External Female Catheter 03/25/19 (Active) Site Assessment Clean, dry, & intact 3/26/2019  3:39 AM  
Repositioned Yes 3/26/2019  3:39 AM  
Perineal Care No 3/26/2019  3:39 AM  
Wick Changed No 3/26/2019  3:39 AM  
Suction Canister/Tubing Changed No 3/26/2019 12:01 AM  
Urine Output (mL) 300 ml 3/26/2019  6:06 AM  
            
Urinary Catheter Intake & Output Date 03/26/19 1900 - 03/27/19 0659 03/27/19 0700 - 03/28/19 2307 Shift 0675-5379 24 Hour Total 8221-7447 2773-9687 24 Hour Total  
INTAKE  
P.O. 400 1380     
  P. O. 400 1380 Shift Total(mL/kg) 400(5.5) [id number]) OUTPUT Urine(mL/kg/hr) 900 2000 1700(1.9)  1700 Urine Voided 900 2000 1700  1700 Shift Total(mL/kg) 900(12.3) 9334(27.7) 1700(23.3)  1700(23.3) NET -500 -620 -1700  -1700 Weight (kg) 73.1 73.1 73.1 73.1 73.1 Readmission Risk Assessment Tool Score High Risk 36 Total Score 3 Has Seen PCP in Last 6 Months (Yes=3, No=0)  
 4 IP Visits Last 12 Months (1-3=4, 4=9, >4=11) 5 Pt. Coverage (Medicare=5 , Medicaid, or Self-Pay=4) 28 Charlson Comorbidity Score (Age + Comorbid Conditions) Criteria that do not apply:  
 . Living with Significant Other. Assisted Living. LTAC. SNF. or  
Rehab Patient Length of Stay (>5 days = 3) Expected Length of Stay 3d 19h Actual Length of Stay 2

## 2019-03-28 NOTE — PROGRESS NOTES
Home Oxygen Test 
Date of test: 03/28/19 Time of test: 1000 Sa02 98 % on room air AT REST. Sa02 91 % on room air DURING AMBULATION. Sa02 - % on - Liters DURING AMBULATION. Sa02 97 % on room air  AT REST/AFTER AMBULATION.

## 2019-03-28 NOTE — PROGRESS NOTES
Problem: Diabetes Self-Management Goal: *Disease process and treatment process Description Define diabetes and identify own type of diabetes; list 3 options for treating diabetes. Outcome: Progressing Towards Goal 
Goal: *Incorporating nutritional management into lifestyle Description Describe effect of type, amount and timing of food on blood glucose; list 3 methods for planning meals. Outcome: Progressing Towards Goal 
Goal: *Incorporating physical activity into lifestyle Description State effect of exercise on blood glucose levels. Outcome: Progressing Towards Goal 
Goal: *Developing strategies to promote health/change behavior Description Define the ABC's of diabetes; identify appropriate screenings, schedule and personal plan for screenings. Outcome: Progressing Towards Goal 
Goal: *Using medications safely Description State effect of diabetes medications on diabetes; name diabetes medication taking, action and side effects. Outcome: Progressing Towards Goal 
Goal: *Monitoring blood glucose, interpreting and using results Description Identify recommended blood glucose targets  and personal targets. Outcome: Progressing Towards Goal 
Goal: *Prevention, detection, treatment of acute complications Description List symptoms of hyper- and hypoglycemia; describe how to treat low blood sugar and actions for lowering  high blood glucose level. Outcome: Progressing Towards Goal 
Goal: *Prevention, detection and treatment of chronic complications Description Define the natural course of diabetes and describe the relationship of blood glucose levels to long term complications of diabetes. Outcome: Progressing Towards Goal 
Goal: *Developing strategies to address psychosocial issues Description Describe feelings about living with diabetes; identify support needed and support network Outcome: Progressing Towards Goal 
Goal: *Insulin pump training Outcome: Progressing Towards Goal 
 Goal: *Sick day guidelines Outcome: Progressing Towards Goal 
Goal: *Patient Specific Goal (EDIT GOAL, INSERT TEXT) Outcome: Progressing Towards Goal 
  
Problem: Patient Education: Go to Patient Education Activity Goal: Patient/Family Education Outcome: Progressing Towards Goal 
  
Problem: Falls - Risk of 
Goal: *Absence of Falls Description Document [place] Fall Risk and appropriate interventions in the flowsheet. Outcome: Progressing Towards Goal 
  
Problem: Patient Education: Go to Patient Education Activity Goal: Patient/Family Education Outcome: Progressing Towards Goal 
  
Problem: Pressure Injury - Risk of 
Goal: *Prevention of pressure injury Description Document Landon Scale and appropriate interventions in the flowsheet. Outcome: Progressing Towards Goal 
  
Problem: Patient Education: Go to Patient Education Activity Goal: Patient/Family Education Outcome: Progressing Towards Goal 
  
Problem: Patient Education: Go to Patient Education Activity Goal: Patient/Family Education Outcome: Progressing Towards Goal 
  
Problem: Heart Failure: Day 1 Goal: Off Pathway (Use only if patient is Off Pathway) Outcome: Progressing Towards Goal 
Goal: Activity/Safety Outcome: Progressing Towards Goal 
Goal: Consults, if ordered Outcome: Progressing Towards Goal 
Goal: Diagnostic Test/Procedures Outcome: Progressing Towards Goal 
Goal: Nutrition/Diet Outcome: Progressing Towards Goal 
Goal: Discharge Planning Outcome: Progressing Towards Goal 
Goal: Medications Outcome: Progressing Towards Goal 
Goal: Respiratory Outcome: Progressing Towards Goal 
Goal: Treatments/Interventions/Procedures Outcome: Progressing Towards Goal 
Goal: Psychosocial 
Outcome: Progressing Towards Goal 
Goal: *Oxygen saturation within defined limits Outcome: Progressing Towards Goal 
Goal: *Hemodynamically stable Outcome: Progressing Towards Goal 
Goal: *Optimal pain control at patient's stated goal 
 Outcome: Progressing Towards Goal 
Goal: *Anxiety reduced or absent Outcome: Progressing Towards Goal 
  
Problem: Heart Failure: Day 2 Goal: Off Pathway (Use only if patient is Off Pathway) Outcome: Progressing Towards Goal 
Goal: Activity/Safety Outcome: Progressing Towards Goal 
Goal: Consults, if ordered Outcome: Progressing Towards Goal 
Goal: Diagnostic Test/Procedures Outcome: Progressing Towards Goal 
Goal: Nutrition/Diet Outcome: Progressing Towards Goal 
Goal: Discharge Planning Outcome: Progressing Towards Goal 
Goal: Medications Outcome: Progressing Towards Goal 
Goal: Respiratory Outcome: Progressing Towards Goal 
Goal: Treatments/Interventions/Procedures Outcome: Progressing Towards Goal 
Goal: Psychosocial 
Outcome: Progressing Towards Goal 
Goal: *Oxygen saturation within defined limits Outcome: Progressing Towards Goal 
Goal: *Hemodynamically stable Outcome: Progressing Towards Goal 
Goal: *Optimal pain control at patient's stated goal 
Outcome: Progressing Towards Goal 
Goal: *Anxiety reduced or absent Outcome: Progressing Towards Goal 
Goal: *Demonstrates progressive activity Outcome: Progressing Towards Goal 
  
Problem: Chronic Obstructive Pulmonary Disease (COPD) Goal: *Oxygen saturation during activity within specified parameters Outcome: Progressing Towards Goal 
Goal: *Absence of hypoxia Outcome: Progressing Towards Goal 
  
Problem: Patient Education: Go to Patient Education Activity Goal: Patient/Family Education Outcome: Progressing Towards Goal

## 2019-03-28 NOTE — ROUTINE PROCESS
Neurontin 400mg x1 ordered as Pt is very upset that her neurontin was decreased by pharmacy due to renal dosing. 
- d/w nursing, Pt will need to address with primary Hospitalist in AM

## 2019-03-28 NOTE — PROGRESS NOTES
Medicare pt has received, reviewed, and signed 2nd IM letter informing them of their right to appeal the discharge. Signed copied has been placed on pt bedside chart. Rico Villasenor, [address]

## 2019-03-28 NOTE — DISCHARGE INSTRUCTIONS
HOSPITALIST DISCHARGE INSTRUCTIONS  NAME: Amie Victoria   :  1950   MRN:  [id number]     Date/Time:  3/28/2019 12:45 PM    ADMIT DATE: 3/24/2019     DISCHARGE DATE: 3/28/2019     DIAGNOSIS:  Respiratory Failure, COPD Exacerbation, Emphysema, CHF, MODE/CKD, DM, Junctional Tachycardia s/p Ablation,     MEDICATIONS:                As per medication reconciliation    · It is important that you take the medication exactly as they are prescribed. · Keep your medication in the bottles provided by the pharmacist and keep a list of the medication names, dosages, and times to be taken in your wallet. · Do not take other medications without consulting your doctor. Pain Management: per above medications    What to do at Home    Recommended diet:  Cardiac Diet and Diabetic Diet    Recommended activity: Activity as tolerated    If you experience any of the following symptoms then please call your primary care physician or return to the emergency room if you cannot get hold of your doctor:  Fever, chills, nausea, vomiting, diarrhea, change in mentation, falling, bleeding, shortness of breath. Follow Up:   PCP you are to call and set up an appointment to see them in 2 week. F/U Pulmonology      Information obtained by :  I understand that if any problems occur once I am at home I am to contact my physician. I understand and acknowledge receipt of the instructions indicated above.                                                                                                                                            Physician's or R.N.'s Signature                                                                  Date/Time                                                                                                                                              Patient or Representative Signature                                                          Date/Time

## 2019-03-28 NOTE — PROGRESS NOTES
Pt discharged to home, instructions reviewed with pt and copies given along with prescriptions. Pt's IV & telemetry removed. , opportunity for questions provided.

## 2019-03-29 ENCOUNTER — PATIENT OUTREACH (OUTPATIENT)
Dept: INTERNAL MEDICINE CLINIC | Age: 69
End: 2019-03-29

## 2019-03-29 LAB
BACTERIA SPEC CULT: NORMAL
SERVICE CMNT-IMP: NORMAL

## 2019-03-29 NOTE — PROGRESS NOTES
Hospital Discharge Follow-Up Date/Time:  3/29/2019 3:09 PM 
 
Patient was admitted to [facility] on 3/24 and discharged on 3/28 for Resp Failure. The physician discharge summary was available at the time of outreach. Patient was contacted within 1 business days of discharge. Top Challenges reviewed with the provider None identified at this time Method of communication with provider :none Inpatient RRAT score: 40 Was this a readmission? no  
Patient stated reason for the readmission: n/a Nurse Navigator (NN) contacted the patient by telephone to perform post hospital discharge assessment. Verified name and  with patient as identifiers. Provided introduction to self, and explanation of the Nurse Navigator role. Reviewed discharge instructions and red flags with patient who verbalized understanding. Patient given an opportunity to ask questions and does not have any further questions or concerns at this time. The patient agrees to contact the PCP office for questions related to their healthcare. NN provided contact information for future reference. Disease Specific:   COPD Summary of patient's top problems: 1. Resp Failure- chronic COPD, Emphysema, prior to adm felt as if she was retaining fluid but wasn't evident in lower extremities reports was seen on chest xray the fluid build up. hasn't smoked in >10 yrs, doesn't allow anyone to smoke around her, + Nebulizer and medication compliance. - home O2 needed 2. Diabetic- chronic, medication adjustment d/t elevated readings while IP 2nd to steroid use; pt monitors daily 95 this am 
3. Home Health orders at discharge: [facility] [address]: [address] Date of initial visit: 3/30 Durable Medical Equipment ordered/company: n/a Durable Medical Equipment received: n/a Barriers to care? None identified at this time Advance Care Planning: Does patient have an Advance Directive:  reviewed and current Medication(s):  
New Medications at Discharge:  
guaiFENesin ER (MUCINEX) 600 mg ER tablet Take 1 Tab by mouth two (2) times a day. Qty: 20 Tab, Refills: 0  
   
SITagliptin (JANUVIA) 50 mg tablet Take 1 Tab by mouth daily. Qty: 30 Tab, Refills: 1  
   
predniSONE (DELTASONE) 20 mg tablet Take 20 mg by mouth daily (with breakfast). Qty: 3 Tab, Refills: 0  
   
benzocaine-menthol (CHLORASEPTIC MAX) 15-10 mg lozg lozenge Take 1 Lozenge by mouth every four to six (4-6) hours as needed for Sore throat. Qty: 30 Each, Refills: 0 Changed Medications at Discharge:  
acetaminophen (TYLENOL) 325 mg tablet Take 2 Tabs by mouth every four (4) hours as needed for Pain or Fever. Discontinued Medications at Discharge:  
 Azelastine (ASTEPRO) 0.15 % (205.5 mcg) nasal spray Comments:  
Reason for Stopping:   
     
  senna-docusate (PERICOLACE) 8.6-50 mg per tablet Comments:  
Reason for Stopping:   
     
 
Medication reconciliation was performed with patient, who verbalizes understanding of administration of home medications. There were no barriers to obtaining medications identified at this time. Referral to Pharm D needed: no  
 
Current Outpatient Medications Medication Sig  
 acetaminophen (TYLENOL) 325 mg tablet Take 2 Tabs by mouth every four (4) hours as needed for Pain or Fever.  guaiFENesin ER (MUCINEX) 600 mg ER tablet Take 1 Tab by mouth two (2) times a day.  SITagliptin (JANUVIA) 50 mg tablet Take 1 Tab by mouth daily.  predniSONE (DELTASONE) 20 mg tablet Take 20 mg by mouth daily (with breakfast).  benzocaine-menthol (CHLORASEPTIC MAX) 15-10 mg lozg lozenge Take 1 Lozenge by mouth every four to six (4-6) hours as needed for Sore throat.  colchicine (COLCRYS) 0.6 mg tablet Take 1.2 mg by mouth as needed (for flare ups or when she would like to eat seafood (ie. shrimp)).  simvastatin (ZOCOR) 20 mg tablet Take 1 Tab by mouth nightly. Increased 9/1018  furosemide (LASIX) 20 mg tablet TAKE 1 TABLET BY MOUTH ONCE DAILY  albuterol (PROVENTIL HFA, VENTOLIN HFA, PROAIR HFA) 90 mcg/actuation inhaler Take 2 Puffs by inhalation every four (4) hours as needed for Wheezing.  albuterol (PROVENTIL VENTOLIN) 2.5 mg /3 mL (0.083 %) nebulizer solution 3 mL by Nebulization route every four (4) hours as needed for Wheezing.  apixaban (ELIQUIS) 5 mg tablet Take 1 Tab by mouth two (2) times a day. Resume from tomorrow 7/21/2018.  gabapentin (NEURONTIN) 800 mg tablet TAKE 1 TABLET BY MOUTH THREE TIMES DAILY  SYMBICORT 160-4.5 mcg/actuation HFAA INHALE ONE PUFF BY MOUTH TWICE DAILY  clopidogrel (PLAVIX) 75 mg tab TAKE ONE TABLET BY MOUTH ONCE DAILY  carvedilol (COREG) 6.25 mg tablet Take 1 Tab by mouth two (2) times daily (with meals).  tiotropium (SPIRIVA WITH HANDIHALER) 18 mcg inhalation capsule INHALE THE CONTENTS OF 1 CAPSULE THROUGH HANDIHALER DEVICE DAILY  fluticasone (FLONASE) 50 mcg/actuation nasal spray 2 Sprays by Both Nostrils route daily as needed.  cod liver oil cap Take 1 Cap by mouth daily. No current facility-administered medications for this visit. There are no discontinued medications. [facility] follow up appointment(s):  
Future Appointments Date Time Provider Bobby Blanca 3/30/2019 To Be Determined Renan Morrow LPN [address]  
4/8/2019 11:00 AM MD Lokesh Posadas  
5/23/2019 10:00 AM PACEMAKER, Rodrigez Pepe OBDULIO SCHED  
5/23/2019 10:15 AM Navneet Laguna MD [facility] OBDULIO SCHED  
6/6/2019 10:15 AM MD Lokesh Posadas [facility] follow up appointment(s):  
4/5 Pulmonology [facility]:  information provided as a resource Goals  Reduce risk of COPD Exacerbations (ie. managing triggers, health maintenance with COPD). Ms Alexander Lancaster has chronic COPD that's been pretty controlled until recent admission. Pt reports feelings of fluid retention but not visible to her. Upon evaluation pt reports being told there was a fluid build up in her lungs. At home, over the next 30 days, she as agreed to be compliant with all medications, will use her incentive spirometer during commercial breaks in repetitions of 10, practice good handwashing, and will inquire about her new landlords changing her air filters frequently d/t her diagnosis and allergy season approaching. Reassess by 4/11 (ms).

## 2019-03-30 ENCOUNTER — HOME CARE VISIT (OUTPATIENT)
Dept: SCHEDULING | Facility: HOME HEALTH | Age: 69
End: 2019-03-30

## 2019-03-30 PROCEDURE — G0299 HHS/HOSPICE OF RN EA 15 MIN: HCPCS

## 2019-04-05 ENCOUNTER — TELEPHONE (OUTPATIENT)
Dept: INTERNAL MEDICINE CLINIC | Age: 69
End: 2019-04-05

## 2019-04-05 NOTE — TELEPHONE ENCOUNTER
Pt called and stated she has a \"fever\" when I asked what her temp is she stated she did not take it. She have chills and she skin is warm. Provider made aware, pt was advised to take her temp and call the office. Pt stated she is going to call her granddaughter to have her bring a thermometer to take her temp.

## 2019-04-07 NOTE — PROGRESS NOTES
2:45 AM: TRANSFER - IN REPORT: Verbal report received from [facility] (name) on Charlotte Villalba  being received from ED (unit) for routine progression of care. Report consisted of patients Situation, Background, Assessment and  Recommendations(SBAR). Information from the following report(s) SBAR, Kardex, Intake/Output and Recent Results was reviewed with the receiving nurse. Opportunity for questions and clarification was provided. Assessment completed upon patients arrival to unit and care assumed. 3: 35 AM: patient arrived to unit accompanied by spouse. Currently admits to having 9/10 body pain throughout and feeling nauseous. Hospitalist will be contacted for orders. 7:01 AM patient started on his vancomycin- not given at ED.     7:17 AM patient c/o having a reaction to the vancomycin. States that she was falling asleep and then became fully alert and her mouth started feeling really dry. She admits to feeling lightheaded as well and her sinuses feel as though they are clogged. Vitals taken. Vancomycin infusion stopped- Hospitalist will be notified. 7:24 AM Beside shift change report given by HEDY Betancourt RN to Mikel Hillman RN. Report included the information from the Aditya Schneider I/O, and recent results. 8:58 AM [place] on phone with RELL Hart regarding patient's acuity level for receipt of packed RBCs later today. Patient feeling anxious and stated that she felt the same as when she had her allergic reaction to Neulasta, causing her to be in coma for 3 months and that is why she is panicking. Cardiology Progress Note      5/15/2017 12:38 PM    Admit Date: 5/9/2017    Admit Diagnosis: Tachycardia      Subjective:     Alfreda Connell is feeling better. Still has hemoptysis.     Visit Vitals    /67 (BP 1 Location: Right arm, BP Patient Position: At rest)    Pulse (!) 104    Temp 97.6 °F (36.4 °C)    Resp 18    Ht 5' 6\" (1.676 m)    Wt 166 lb 11.2 oz (75.6 kg)    SpO2 97%    Breastfeeding No    BMI 26.91 kg/m2       Current Facility-Administered Medications   Medication Dose Route Frequency    albuterol (PROVENTIL VENTOLIN) nebulizer solution 2.5 mg  2.5 mg Nebulization QID RT    dextrose 10% infusion 125-250 mL  125-250 mL IntraVENous PRN    aspirin chewable tablet 81 mg  81 mg Oral DAILY    furosemide (LASIX) injection 20 mg  20 mg IntraVENous BID    lactobac ac& pc-s.therm-b.anim (MARYAN Q/RISAQUAD)  1 Cap Oral DAILY    albuterol-ipratropium (DUO-NEB) 2.5 MG-0.5 MG/3 ML  3 mL Nebulization Q4H PRN    insulin lispro (HUMALOG) injection   SubCUTAneous AC&HS    amiodarone (CORDARONE) tablet 200 mg  200 mg Oral BID    colchicine tablet 0.6 mg  0.6 mg Oral DAILY    gabapentin (NEURONTIN) capsule 800 mg  800 mg Oral TID    acetaminophen (TYLENOL) tablet 650 mg  650 mg Oral Q4H PRN    glucose chewable tablet 16 g  4 Tab Oral PRN    glucagon (GLUCAGEN) injection 1 mg  1 mg IntraMUSCular PRN    vancomycin 50 mg/mL oral solution (compounded) 125 mg  125 mg Oral Q6H    fluticasone-vilanterol (BREO ELLIPTA) 100mcg-25mcg/puff  1 Puff Inhalation DAILY    clopidogrel (PLAVIX) tablet 75 mg  75 mg Oral DAILY    umeclidinium (INCRUSE ELLIPTA) 62.5 mcg/actuation  1 Puff Inhalation DAILY    albuterol (PROVENTIL HFA, VENTOLIN HFA, PROAIR HFA) inhaler 2 Puff  2 Puff Inhalation Q6H PRN         Objective:      Physical Exam:  Visit Vitals    /67 (BP 1 Location: Right arm, BP Patient Position: At rest)    Pulse (!) 104    Temp 97.6 °F (36.4 °C)    Resp 18    Ht 5' 6\" (1.676 m)    Wt 166 lb 11.2 oz (75.6 kg)    SpO2 97%    Breastfeeding No    BMI 26.91 kg/m2     General Appearance:  Well developed, well nourished,alert and oriented x 3, and individual in no acute distress. Ears/Nose/Mouth/Throat:   Hearing grossly normal.         Neck: Supple. Chest:   Lungs clear to auscultation bilaterally. Cardiovascular:  Regular rate and rhythm, S1, S2 normal, no murmur. Abdomen:   Soft, non-tender, bowel sounds are active. Extremities: No edema bilaterally. Skin: Warm and dry.                Data Review:   Labs:    Recent Results (from the past 24 hour(s))   GLUCOSE, POC    Collection Time: 05/14/17  4:36 PM   Result Value Ref Range    Glucose (POC) 124 (H) 65 - 100 mg/dL    Performed by Ross Barros    GLUCOSE, POC    Collection Time: 05/14/17  8:49 PM   Result Value Ref Range    Glucose (POC) 168 (H) 65 - 100 mg/dL    Performed by Gloria Snider    METABOLIC PANEL, BASIC    Collection Time: 05/15/17  2:46 AM   Result Value Ref Range    Sodium 137 136 - 145 mmol/L    Potassium 3.5 3.5 - 5.1 mmol/L    Chloride 101 97 - 108 mmol/L    CO2 28 21 - 32 mmol/L    Anion gap 8 5 - 15 mmol/L    Glucose 172 (H) 65 - 100 mg/dL    BUN 11 6 - 20 MG/DL    Creatinine 0.99 0.55 - 1.02 MG/DL    BUN/Creatinine ratio 11 (L) 12 - 20      GFR est AA >60 >60 ml/min/1.73m2    GFR est non-AA 56 (L) >60 ml/min/1.73m2    Calcium 8.6 8.5 - 10.1 MG/DL   MAGNESIUM    Collection Time: 05/15/17  2:46 AM   Result Value Ref Range    Magnesium 2.0 1.6 - 2.4 mg/dL   PROTHROMBIN TIME + INR    Collection Time: 05/15/17  2:46 AM   Result Value Ref Range    INR 1.1 0.9 - 1.1      Prothrombin time 11.4 (H) 9.0 - 11.1 sec   GLUCOSE, POC    Collection Time: 05/15/17  8:17 AM   Result Value Ref Range    Glucose (POC) 212 (H) 65 - 100 mg/dL    Performed by Jennifer Crane    GLUCOSE, POC    Collection Time: 05/15/17 10:57 AM   Result Value Ref Range    Glucose (POC) 166 (H) 65 - 100 mg/dL    Performed by Jennifer Crane        Telemetry: normal sinus rhythm      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 (6/2/2010)      Cardiac pacemaker in situ (7/5/2012)      Mixed hyperlipidemia (7/5/2012)      S/P coronary artery stent placement (7/4/2014)      Overview: 4/7/17 PCI/ROSALINO Diagonal      Type 2 diabetes mellitus with diabetic neuropathy, without long-term current use of insulin ([facility] [address] (1/31/2017)      S/P ablation of atrial fibrillation (5/2/2017)      Overview: 5/1/17      Tachycardia (5/9/2017)      Chronic systolic HF (heart failure) ([address] (5/10/2017)      Overview: 4/2017 EF 25-30%      History of Clostridium difficile infection (5/10/2017)      Overview: 4/2017 CDiff positive      Junctional tachycardia ([address] (5/10/2017)      Hypotension (5/10/2017)        Plan:     Appreciate pulmonary input. Continue diuresis. Will start low dose coreg. Add entresto if BP stable in a day or two.       María Levin MD

## 2019-04-08 ENCOUNTER — HOSPITAL ENCOUNTER (OUTPATIENT)
Dept: MAMMOGRAPHY | Age: 69
Discharge: HOME | End: 2019-04-08

## 2019-04-08 ENCOUNTER — APPOINTMENT (OUTPATIENT)
Dept: GENERAL RADIOLOGY | Age: 69
End: 2019-04-08
Attending: PHYSICIAN ASSISTANT
Payer: MEDICARE

## 2019-04-08 ENCOUNTER — HOSPITAL ENCOUNTER (EMERGENCY)
Age: 69
Discharge: HOME | End: 2019-04-08
Attending: EMERGENCY MEDICINE | Admitting: EMERGENCY MEDICINE
Payer: MEDICARE

## 2019-04-08 VITALS
HEART RATE: 76 BPM | WEIGHT: 162.48 LBS | BODY MASS INDEX: 27.07 KG/M2 | TEMPERATURE: 99.8 F | RESPIRATION RATE: 20 BRPM | SYSTOLIC BLOOD PRESSURE: 121 MMHG | OXYGEN SATURATION: 92 % | HEIGHT: 65 IN | DIASTOLIC BLOOD PRESSURE: 76 MMHG

## 2019-04-08 DIAGNOSIS — J20.9 ACUTE BRONCHITIS, UNSPECIFIED ORGANISM: Primary | ICD-10-CM

## 2019-04-08 DIAGNOSIS — Z12.31 SCREENING MAMMOGRAM, ENCOUNTER FOR: ICD-10-CM

## 2019-04-08 LAB
ALBUMIN SERPL-MCNC: 3.3 G/DL (ref 3.5–5)
ALBUMIN/GLOB SERPL: 0.6 {RATIO} (ref 1.1–2.2)
ALP SERPL-CCNC: 130 U/L (ref 45–117)
ALT SERPL-CCNC: 17 U/L (ref 12–78)
ANION GAP SERPL CALC-SCNC: 4 MMOL/L (ref 5–15)
ARTERIAL PATENCY WRIST A: ABNORMAL
AST SERPL-CCNC: 29 U/L (ref 15–37)
ATRIAL RATE: 82 BPM
BASE DEFICIT BLDV-SCNC: 4 MMOL/L
BASOPHILS # BLD: 0.1 K/UL (ref 0–0.1)
BASOPHILS NFR BLD: 1 % (ref 0–1)
BDY SITE: ABNORMAL
BILIRUB SERPL-MCNC: 0.7 MG/DL (ref 0.2–1)
BNP SERPL-MCNC: 605 PG/ML
BUN SERPL-MCNC: 13 MG/DL (ref 6–20)
BUN/CREAT SERPL: 9 (ref 12–20)
CALCIUM SERPL-MCNC: 9.1 MG/DL (ref 8.5–10.1)
CALCULATED R AXIS, ECG10: -15 DEGREES
CALCULATED T AXIS, ECG11: 150 DEGREES
CHLORIDE SERPL-SCNC: 105 MMOL/L (ref 97–108)
CK SERPL-CCNC: 123 U/L (ref 26–192)
CO2 SERPL-SCNC: 27 MMOL/L (ref 21–32)
CREAT SERPL-MCNC: 1.43 MG/DL (ref 0.55–1.02)
D DIMER PPP FEU-MCNC: 0.49 MG/L FEU (ref 0–0.65)
DIAGNOSIS, 93000: NORMAL
DIFFERENTIAL METHOD BLD: ABNORMAL
EOSINOPHIL # BLD: 0.6 K/UL (ref 0–0.4)
EOSINOPHIL NFR BLD: 6 % (ref 0–7)
ERYTHROCYTE [DISTWIDTH] IN BLOOD BY AUTOMATED COUNT: 13.1 % (ref 11.5–14.5)
FLUAV AG NPH QL IA: NEGATIVE
FLUBV AG NOSE QL IA: NEGATIVE
GAS FLOW.O2 O2 DELIVERY SYS: ABNORMAL L/MIN
GLOBULIN SER CALC-MCNC: 5.1 G/DL (ref 2–4)
GLUCOSE SERPL-MCNC: 159 MG/DL (ref 65–100)
HCO3 BLDV-SCNC: 20.4 MMOL/L (ref 23–28)
HCT VFR BLD AUTO: 36.6 % (ref 35–47)
HGB BLD-MCNC: 12.1 G/DL (ref 11.5–16)
IMM GRANULOCYTES # BLD AUTO: 0.1 K/UL (ref 0–0.04)
IMM GRANULOCYTES NFR BLD AUTO: 1 % (ref 0–0.5)
LACTATE SERPL-SCNC: 2 MMOL/L (ref 0.4–2)
LYMPHOCYTES # BLD: 1.7 K/UL (ref 0.8–3.5)
LYMPHOCYTES NFR BLD: 18 % (ref 12–49)
MCH RBC QN AUTO: 28.1 PG (ref 26–34)
MCHC RBC AUTO-ENTMCNC: 33.1 G/DL (ref 30–36.5)
MCV RBC AUTO: 84.9 FL (ref 80–99)
MONOCYTES # BLD: 1.5 K/UL (ref 0–1)
MONOCYTES NFR BLD: 16 % (ref 5–13)
NEUTS SEG # BLD: 5.5 K/UL (ref 1.8–8)
NEUTS SEG NFR BLD: 58 % (ref 32–75)
NRBC # BLD: 0 K/UL (ref 0–0.01)
NRBC BLD-RTO: 0 PER 100 WBC
P-R INTERVAL, ECG05: 236 MS
PCO2 BLDV: 31.4 MMHG (ref 41–51)
PH BLDV: 7.42 [PH] (ref 7.32–7.42)
PLATELET # BLD AUTO: 205 K/UL (ref 150–400)
PMV BLD AUTO: 9.6 FL (ref 8.9–12.9)
PO2 BLDV: 34 MMHG (ref 25–40)
POTASSIUM SERPL-SCNC: 4.6 MMOL/L (ref 3.5–5.1)
PROT SERPL-MCNC: 8.4 G/DL (ref 6.4–8.2)
Q-T INTERVAL, ECG07: 406 MS
QRS DURATION, ECG06: 104 MS
QTC CALCULATION (BEZET), ECG08: 468 MS
RBC # BLD AUTO: 4.31 M/UL (ref 3.8–5.2)
SAO2 % BLDV: 67 % (ref 65–88)
SODIUM SERPL-SCNC: 136 MMOL/L (ref 136–145)
SPECIMEN TYPE: ABNORMAL
TROPONIN I SERPL-MCNC: <0.05 NG/ML
VENTRICULAR RATE, ECG03: 80 BPM
WBC # BLD AUTO: 9.3 K/UL (ref 3.6–11)

## 2019-04-08 PROCEDURE — 85025 COMPLETE CBC W/AUTO DIFF WBC: CPT

## 2019-04-08 PROCEDURE — 71046 X-RAY EXAM CHEST 2 VIEWS: CPT

## 2019-04-08 PROCEDURE — 80053 COMPREHEN METABOLIC PANEL: CPT

## 2019-04-08 PROCEDURE — 82803 BLOOD GASES ANY COMBINATION: CPT

## 2019-04-08 PROCEDURE — 74011250636 HC RX REV CODE- 250/636: Performed by: EMERGENCY MEDICINE

## 2019-04-08 PROCEDURE — 94640 AIRWAY INHALATION TREATMENT: CPT

## 2019-04-08 PROCEDURE — 82550 ASSAY OF CK (CPK): CPT

## 2019-04-08 PROCEDURE — 96374 THER/PROPH/DIAG INJ IV PUSH: CPT

## 2019-04-08 PROCEDURE — 74011250636 HC RX REV CODE- 250/636: Performed by: PHYSICIAN ASSISTANT

## 2019-04-08 PROCEDURE — 83880 ASSAY OF NATRIURETIC PEPTIDE: CPT

## 2019-04-08 PROCEDURE — 74011250637 HC RX REV CODE- 250/637: Performed by: EMERGENCY MEDICINE

## 2019-04-08 PROCEDURE — 87040 BLOOD CULTURE FOR BACTERIA: CPT

## 2019-04-08 PROCEDURE — 99285 EMERGENCY DEPT VISIT HI MDM: CPT

## 2019-04-08 PROCEDURE — 77030029684 HC NEB SM VOL KT MONA -A

## 2019-04-08 PROCEDURE — 83605 ASSAY OF LACTIC ACID: CPT

## 2019-04-08 PROCEDURE — 96361 HYDRATE IV INFUSION ADD-ON: CPT

## 2019-04-08 PROCEDURE — 36415 COLL VENOUS BLD VENIPUNCTURE: CPT

## 2019-04-08 PROCEDURE — 87804 INFLUENZA ASSAY W/OPTIC: CPT

## 2019-04-08 PROCEDURE — 85379 FIBRIN DEGRADATION QUANT: CPT

## 2019-04-08 PROCEDURE — 74011000250 HC RX REV CODE- 250: Performed by: PHYSICIAN ASSISTANT

## 2019-04-08 PROCEDURE — 93005 ELECTROCARDIOGRAM TRACING: CPT

## 2019-04-08 PROCEDURE — 84484 ASSAY OF TROPONIN QUANT: CPT

## 2019-04-08 RX ORDER — IPRATROPIUM BROMIDE AND ALBUTEROL SULFATE 2.5; .5 MG/3ML; MG/3ML
3 SOLUTION RESPIRATORY (INHALATION)
Status: COMPLETED | OUTPATIENT
Start: 2019-04-08 | End: 2019-04-08

## 2019-04-08 RX ORDER — AZITHROMYCIN 250 MG/1
TABLET, FILM COATED ORAL
Qty: 6 TAB | Refills: 0 | Status: SHIPPED | OUTPATIENT
Start: 2019-04-08 | End: 2019-04-13

## 2019-04-08 RX ORDER — SODIUM CHLORIDE 9 MG/ML
1000 INJECTION, SOLUTION INTRAVENOUS CONTINUOUS
Status: DISCONTINUED | OUTPATIENT
Start: 2019-04-08 | End: 2019-04-08 | Stop reason: HOSPADM

## 2019-04-08 RX ORDER — PREDNISONE 50 MG/1
50 TABLET ORAL DAILY
Qty: 5 TAB | Refills: 0 | Status: SHIPPED | OUTPATIENT
Start: 2019-04-08 | End: 2019-04-13

## 2019-04-08 RX ADMIN — SODIUM CHLORIDE 1000 ML: 900 INJECTION, SOLUTION INTRAVENOUS at 15:08

## 2019-04-08 RX ADMIN — GABAPENTIN 800 MG: 100 CAPSULE ORAL at 18:18

## 2019-04-08 RX ADMIN — SODIUM CHLORIDE 1000 ML: 900 INJECTION, SOLUTION INTRAVENOUS at 16:20

## 2019-04-08 RX ADMIN — IPRATROPIUM BROMIDE AND ALBUTEROL SULFATE 3 ML: .5; 3 SOLUTION RESPIRATORY (INHALATION) at 14:09

## 2019-04-08 RX ADMIN — IPRATROPIUM BROMIDE AND ALBUTEROL SULFATE 3 ML: .5; 3 SOLUTION RESPIRATORY (INHALATION) at 15:09

## 2019-04-08 RX ADMIN — METHYLPREDNISOLONE SODIUM SUCCINATE 125 MG: 125 INJECTION, POWDER, FOR SOLUTION INTRAMUSCULAR; INTRAVENOUS at 16:20

## 2019-04-08 NOTE — ED PROVIDER NOTES
EMERGENCY DEPARTMENT HISTORY AND PHYSICAL EXAM 
 
 
Date: 4/8/2019 Patient Name: Aiden Scott History of Presenting Illness Chief Complaint Patient presents with  Shortness of Breath  
  pt enters triage via wheelchair, pt states she has been SOB since yesterday, pt d/c last Friday from [address] for Respiratory Failure, pt took a nebulizer treatment this morning.  Nausea  
  this morning PIT/JET NOTE: 
11:25 AM 
Mars Mensah PA-C has evaluated the patient as the Provider in Triage (PIT) / provider in [facility] for shortness of breath. The vital signs and the triage nurse assessment have been reviewed. The patient and any available family have been advised that the appropriate studies have been ordered to initiate the work up based on the clinical presentation during the assessment. The pt has been advised that they will be accommodated in Emergency Department as soon as possible. The pt has been requested to contact the triage nurse or PIT/JET immediately if they experiences any changes in their condition during this brief waiting period. History Provided By: Patient HPI: Aiden Scott, 80 y.o. female with PMHx significant for chronic systolic heart failure, coronary artery disease, atrial fibrillation, COPD, presents to the ED with cc of shortness of breath onset 2 days ago. The patient was recently hospitalized and discharged about a week and a half ago. She says she has not been feeling well since she got home. She has had chills and says that she has had a low-grade fever but reports that it has been 99 °F.  Her shortness of breath is gradually worsened with increased cough. She has been using nebulizer treatments at home without relief. She began having pain in the center of her chest today. She also says that her legs and feet are swollen, although she acknowledges that it is not visible.   She finished taking prednisone 3 days ago and stopped taking Mucinex yesterday. She denies nausea, vomiting, abdominal pain, diarrhea, constipation, urinary symptoms. There are no other complaints, changes, or physical findings at this time. PCP: Stew Langston MD 
 
No current facility-administered medications on file prior to encounter. Current Outpatient Medications on File Prior to Encounter Medication Sig Dispense Refill  acetaminophen (TYLENOL) 325 mg tablet Take 2 Tabs by mouth every four (4) hours as needed for Pain or Fever. 40 Tab 0  
 guaiFENesin ER (MUCINEX) 600 mg ER tablet Take 1 Tab by mouth two (2) times a day. 20 Tab 0  
 SITagliptin (JANUVIA) 50 mg tablet Take 1 Tab by mouth daily. 30 Tab 1  predniSONE (DELTASONE) 20 mg tablet Take 20 mg by mouth daily (with breakfast). 3 Tab 0  
 benzocaine-menthol (CHLORASEPTIC MAX) 15-10 mg lozg lozenge Take 1 Lozenge by mouth every four to six (4-6) hours as needed for Sore throat. 30 Each 0  
 colchicine (COLCRYS) 0.6 mg tablet Take 1.2 mg by mouth as needed (for flare ups or when she would like to eat seafood (ie. shrimp)).  simvastatin (ZOCOR) 20 mg tablet Take 1 Tab by mouth nightly. Increased 9/1018 90 Tab 1  
 furosemide (LASIX) 20 mg tablet TAKE 1 TABLET BY MOUTH ONCE DAILY 90 Tab 1  
 albuterol (PROVENTIL HFA, VENTOLIN HFA, PROAIR HFA) 90 mcg/actuation inhaler Take 2 Puffs by inhalation every four (4) hours as needed for Wheezing. 1 Inhaler 1  
 albuterol (PROVENTIL VENTOLIN) 2.5 mg /3 mL (0.083 %) nebulizer solution 3 mL by Nebulization route every four (4) hours as needed for Wheezing. 1 Package 5  
 apixaban (ELIQUIS) 5 mg tablet Take 1 Tab by mouth two (2) times a day.  Resume from tomorrow 7/21/2018. 56 Tab 0  
 gabapentin (NEURONTIN) 800 mg tablet TAKE 1 TABLET BY MOUTH THREE TIMES DAILY 90 Tab 3  
 SYMBICORT 160-4.5 mcg/actuation HFAA INHALE ONE PUFF BY MOUTH TWICE DAILY 1 Inhaler 3  
  clopidogrel (PLAVIX) 75 mg tab TAKE ONE TABLET BY MOUTH ONCE DAILY 30 Tab 11  
 carvedilol (COREG) 6.25 mg tablet Take 1 Tab by mouth two (2) times daily (with meals). 60 Tab 11  
 tiotropium (SPIRIVA WITH HANDIHALER) 18 mcg inhalation capsule INHALE THE CONTENTS OF 1 CAPSULE THROUGH HANDIHALER DEVICE DAILY 90 Cap 3  
 fluticasone (FLONASE) 50 mcg/actuation nasal spray 2 Sprays by Both Nostrils route daily as needed.  cod liver oil cap Take 1 Cap by mouth daily. Past History Past Medical History: 
Past Medical History:  
Diagnosis Date  Arthritis   
 left shoulder  Atrial fibrillation ([facility] 6/2/2010 Dr. Jluis Chambers  CAD (coronary artery disease) stent; Dr. Jluis Chambers  Celiac disease  Chronic diastolic heart failure ([facility] 09/22/2014 Dr. Jluis Chambers  Chronic kidney disease   
 per cardio note  Chronic pain   
 left thigh:  L SFA intervention by Dr. Nicolas Haider 07/2018, as of 9/6/18:  still causing pain, pt to have MILLA checked per Dr. Nicolas Haider note  Chronic systolic HF (heart failure) ([facility] 5/10/2017  
 4/2017 EF 25-30%  COPD (chronic obstructive pulmonary disease) ([facility] 6/2/2010 Dr. Toni Gil  Diabetes [facility] Dr. Cesar Salcedo; no current medication per pt  Emphysema, unspecified ([facility] Dr. Toni Gil  Fibroid  Frequent falls 2017  Gout  Heart failure ([facility] Dr. Jluis Chambers  History of Clostridium difficile infection 5/10/2017  
 4/2017 CDiff positive  Hypertension 6/2/2010 Dr. Dina Macdonald  Hypertensive heart and chronic kidney disease   
 per PCP note  Hypotension 5/10/2017  Junctional tachycardia ([facility] 5/10/2017 Dr. Jluis Chambers  Neuropathy  NIDDM (non-insulin dependent diabetes mellitus) 6/2/2010  PAD (peripheral artery disease) ([facility]  S/P ablation of atrial fibrillation 03/2018  Screening mammogram 5/4/10  
 SOB (shortness of breath) 09/22/2014 Dr. Toni Gil  SSS (sick sinus syndrome) ([facility] per pacemaker form 18  Weakness   
 per pt weakness from c-diff 2017, mulitple falls with injury to rotator cuff Past Surgical History: 
Past Surgical History:  
Procedure Laterality Date  CARDIAC SURG PROCEDURE UNLIST    
 3 cardiac stent placed  COLONOSCOPY N/A 2017 COLONOSCOPY with biopsy performed by Erma Yost MD at [facility] OR  
 HX AFIB ABLATION  2018  
 has had 2 ablations per patient  HX  SECTION    
 HX HEART CATHETERIZATION  2018  HX OTHER SURGICAL    
 adrenal gland removed  HX PACEMAKER Left [employer] model: Edmore Speaks  VT EXCISE ADRENAL GLAND  VASCULAR SURGERY PROCEDURE UNLIST  2018 Left SFA intervention ; Dr. Denise Fowler Family History: 
Family History Problem Relation Age of Onset  Heart Disease Mother  Diabetes Father  Heart Disease Brother Social History: 
Social History Tobacco Use  Smoking status: Former Smoker Types: Cigarettes Start date: 5 Last attempt to quit: 2009 Years since quittin.2  Smokeless tobacco: Never Used Substance Use Topics  Alcohol use: No  
 Drug use: No  
 
 
Allergies: Allergies Allergen Reactions  Crestor [Rosuvastatin] Myalgia  Levaquin [Levofloxacin] Nausea Only GI Upset  Lipitor [Atorvastatin] Diarrhea  Lyrica [Pregabalin] Myalgia Review of Systems Review of Systems Constitutional: Positive for chills. Negative for fever. HENT: Negative for ear pain and sore throat. Eyes: Negative for redness and visual disturbance. Respiratory: Positive for cough and shortness of breath. Cardiovascular: Positive for chest pain. Negative for palpitations. Gastrointestinal: Negative for abdominal pain, nausea and vomiting. Genitourinary: Negative for dysuria and hematuria. Musculoskeletal: Negative for back pain and gait problem. Skin: Negative for rash and wound. Neurological: Negative for dizziness and headaches. Psychiatric/Behavioral: Negative for behavioral problems and confusion. All other systems reviewed and are negative. Physical Exam  
Physical Exam  
Constitutional: She is oriented to person, place, and time. She appears well-developed and well-nourished. No distress. Able to speak in full sentences without difficulty. Slightly anxious appearing. HENT:  
Head: Normocephalic and atraumatic. Eyes: Pupils are equal, round, and reactive to light. Conjunctivae and EOM are normal.  
Neck: Normal range of motion. Neck supple. Cardiovascular: Normal rate, regular rhythm and normal heart sounds. Pulmonary/Chest: Effort normal. No respiratory distress. She has wheezes (diffuse expiratory). Abdominal: Soft. She exhibits no distension. There is no tenderness. There is no rebound and no guarding. Musculoskeletal: Normal range of motion. Neurological: She is alert and oriented to person, place, and time. She has normal strength. No cranial nerve deficit or sensory deficit. GCS eye subscore is 4. GCS verbal subscore is 5. GCS motor subscore is 6. Skin: Skin is warm and dry. No rash noted. Psychiatric: She has a normal mood and affect. Her behavior is normal.  
Nursing note and vitals reviewed. Diagnostic Study Results Labs - Recent Results (from the past 12 hour(s)) EKG, 12 LEAD, INITIAL Collection Time: 04/08/19 10:18 AM  
Result Value Ref Range Ventricular Rate 80 BPM  
 Atrial Rate 82 BPM  
 P-R Interval 236 ms QRS Duration 104 ms Q-T Interval 406 ms QTC Calculation (Bezet) 468 ms Calculated R Axis -15 degrees Calculated T Axis 150 degrees Diagnosis Atrial-paced rhythm with prolonged AV conduction T wave abnormality, consider lateral ischemia When compared with ECG of 24-MAR-2019 21:37, 
QRS axis shifted right Nonspecific T wave abnormality now evident in Inferior leads T wave inversion more evident in Lateral leads Confirmed by Shawn An ([id number]) on 4/8/2019 12:43:28 PM 
  
CBC WITH AUTOMATED DIFF Collection Time: 04/08/19 11:00 AM  
Result Value Ref Range WBC 9.3 3.6 - 11.0 K/uL  
 RBC 4.31 3.80 - 5.20 M/uL  
 HGB 12.1 11.5 - 16.0 g/dL HCT 36.6 35.0 - 47.0 % MCV 84.9 80.0 - 99.0 FL  
 MCH 28.1 26.0 - 34.0 PG  
 MCHC 33.1 30.0 - 36.5 g/dL  
 RDW 13.1 11.5 - 14.5 % PLATELET 997 741 - 942 K/uL MPV 9.6 8.9 - 12.9 FL  
 NRBC 0.0 0  WBC ABSOLUTE NRBC 0.00 0.00 - 0.01 K/uL NEUTROPHILS 58 32 - 75 % LYMPHOCYTES 18 12 - 49 % MONOCYTES 16 (H) 5 - 13 % EOSINOPHILS 6 0 - 7 % BASOPHILS 1 0 - 1 % IMMATURE GRANULOCYTES 1 (H) 0.0 - 0.5 % ABS. NEUTROPHILS 5.5 1.8 - 8.0 K/UL  
 ABS. LYMPHOCYTES 1.7 0.8 - 3.5 K/UL  
 ABS. MONOCYTES 1.5 (H) 0.0 - 1.0 K/UL  
 ABS. EOSINOPHILS 0.6 (H) 0.0 - 0.4 K/UL  
 ABS. BASOPHILS 0.1 0.0 - 0.1 K/UL  
 ABS. IMM. GRANS. 0.1 (H) 0.00 - 0.04 K/UL  
 DF AUTOMATED METABOLIC PANEL, COMPREHENSIVE Collection Time: 04/08/19 11:00 AM  
Result Value Ref Range Sodium 136 136 - 145 mmol/L Potassium 4.6 3.5 - 5.1 mmol/L Chloride 105 97 - 108 mmol/L  
 CO2 27 21 - 32 mmol/L Anion gap 4 (L) 5 - 15 mmol/L Glucose 159 (H) 65 - 100 mg/dL BUN 13 6 - 20 MG/DL Creatinine 1.43 (H) 0.55 - 1.02 MG/DL  
 BUN/Creatinine ratio 9 (L) 12 - 20 GFR est AA 44 (L) >60 ml/min/1.73m2 GFR est non-AA 37 (L) >60 ml/min/1.73m2 Calcium 9.1 8.5 - 10.1 MG/DL Bilirubin, total 0.7 0.2 - 1.0 MG/DL  
 ALT (SGPT) 17 12 - 78 U/L  
 AST (SGOT) 29 15 - 37 U/L Alk. phosphatase 130 (H) 45 - 117 U/L Protein, total 8.4 (H) 6.4 - 8.2 g/dL Albumin 3.3 (L) 3.5 - 5.0 g/dL Globulin 5.1 (H) 2.0 - 4.0 g/dL A-G Ratio 0.6 (L) 1.1 - 2.2 CK W/ REFLX CKMB Collection Time: 04/08/19 11:00 AM  
Result Value Ref Range  26 - 192 U/L  
TROPONIN I Collection Time: 04/08/19 11:00 AM  
Result Value Ref Range Troponin-I, Qt. <0.05 <0.05 ng/mL NT-PRO BNP Collection Time: 04/08/19 11:00 AM  
Result Value Ref Range NT pro- (H) <125 PG/ML  
D DIMER Collection Time: 04/08/19  4:02 PM  
Result Value Ref Range D-dimer 0.49 0.00 - 0.65 mg/L FEU  
POC VENOUS BLOOD GAS Collection Time: 04/08/19  4:42 PM  
Result Value Ref Range Device: OTHER    
 pH, venous (POC) 7.420 7.32 - 7.42    
 pCO2, venous (POC) 31.4 (L) 41 - 51 MMHG  
 pO2, venous (POC) 34 25 - 40 mmHg HCO3, venous (POC) 20.4 (L) 23.0 - 28.0 MMOL/L  
 sO2, venous (POC) 67 65 - 88 % Base deficit, venous (POC) 4 mmol/L Allens test (POC) N/A Site OTHER Specimen type (POC) VENOUS BLOOD INFLUENZA A & B AG (RAPID TEST) Collection Time: 04/08/19  6:08 PM  
Result Value Ref Range Influenza A Antigen NEGATIVE  NEG Influenza B Antigen NEGATIVE  NEG    
LACTIC ACID Collection Time: 04/08/19  6:42 PM  
Result Value Ref Range Lactic acid 2.0 0.4 - 2.0 MMOL/L Radiologic Studies -  
XR CHEST PA LAT Final Result IMPRESSION:  
1. Emphysema 2. Probable interstitial lung disease at the lung bases. No pneumonia or  
pulmonary edema. CT Results  (Last 48 hours) None CXR Results  (Last 48 hours) 04/08/19 1311  XR CHEST PA LAT Final result Impression:  IMPRESSION:  
1. Emphysema 2. Probable interstitial lung disease at the lung bases. No pneumonia or  
pulmonary edema. Narrative:  Exam:  2 view chest  
   
Indication: Shortness of breath, COPD. COMPARISON: 3/24/2019 PA and lateral views demonstrate normal heart size. The pacemaker is in place. Lungs demonstrate improved aeration when compared to previous examination. There  
are changes of emphysema in the upper lung zones. There are reticular opacities  
at the lung bases suggesting some interstitial lung abnormality. . Blunting left  
costophrenic angle is stable. No definite pulmonary edema no pneumonia. Medical Decision Making I am the first provider for this patient. I reviewed the vital signs, available nursing notes, past medical history, past surgical history, family history and social history. Vital Signs-Reviewed the patient's vital signs. Patient Vitals for the past 12 hrs: 
 Temp Pulse Resp BP SpO2  
04/08/19 2015    121/76   
04/08/19 2000    117/54 92 % 04/08/19 1950     98 % 04/08/19 1949     98 % 04/08/19 1948     99 % 04/08/19 1947     98 % 04/08/19 1930    115/52 (!) 83 % 04/08/19 1915    102/50 92 % 04/08/19 1621     94 % 04/08/19 1621     (!) 88 % 04/08/19 1531     93 % 04/08/19 1530 99.8 °F (37.7 °C) 76 20 102/48 93 % 04/08/19 1515  83 18 107/46 93 % 04/08/19 1500    104/43 94 % 04/08/19 1445    103/48 (!) 88 % 04/08/19 1430    93/43   
04/08/19 1415    (!) 86/47   
04/08/19 1012 99.1 °F (37.3 °C) 74 18 112/45 100 % Pulse Oximetry Analysis - 100% on RA 
 
EKG interpretation: 
Rhythm: atrial paced; Axis: left axis deviation; WI interval: prolonged; QRS interval: normal ; ST/T wave: non-specific changes; Other findings: unchanged from previous ekg. Records Reviewed: Nursing Notes, Old Medical Records, Previous electrocardiograms, Previous Radiology Studies and Previous Laboratory Studies Provider Notes (Medical Decision Making):  
Patient presents with shortness of breath. She is maintaining appropriate oxygen saturation on room air and does not appear to be in any respiratory distress. Differential diagnosis includes COPD exacerbation, pneumonia, ACS, CHF exacerbation, anxiety. ED Course:  
Initial assessment performed. The patients presenting problems have been discussed, and they are in agreement with the care plan formulated and outlined with them. I have encouraged them to ask questions as they arise throughout their visit. ED Course as of Apr 08 2150 Mon Apr 08, 2019  
1335 Discussed case with Dr. Mandi Lane, who agrees with the plan. [JANIS] 1500 Rechecked the patient. She does not feel better after the first nebulizer treatment. Will do a repeat nebulizer. [JANIS] 1513 Discussed the case with Dr. Iliana Singh. Will give steroids. He will also evaluate her. [JANIS] 2007 Dr. Iliana Singh reassess the patient. The hypoxic oxygen saturations were being measured through her acrylic nails and once the pulse ox was moved, she was no longer hypoxic. Will treat for bronchitis. Her daughter is going to come and pick her up. [JANIS] ED Course User Index [JANIS] Pedrito Ibarra Disposition: 
 
Discharge note 8:15 PM - The pt is ready for discharge. The pt's signs, symptoms, diagnosis, and discharge instructions have been discussed and pt has conveyed their understanding. The pt is to follow up as recommended or return to ER should their symptoms worsen. Plan has been discussed and pt is in agreement. PLAN: 
1. Discharge Medication List as of 4/8/2019  8:10 PM  
  
START taking these medications Details  
azithromycin (ZITHROMAX Z-RINA) 250 mg tablet Take 2 tablets on day 1, and 1 tablet per day on days 2 through 5., Normal, Disp-6 Tab, R-0  
  
!! predniSONE (DELTASONE) 50 mg tablet Take 1 Tab by mouth daily for 5 days. , Normal, Disp-5 Tab, R-0  
  
 !! - Potential duplicate medications found. Please discuss with provider. CONTINUE these medications which have NOT CHANGED Details  
acetaminophen (TYLENOL) 325 mg tablet Take 2 Tabs by mouth every four (4) hours as needed for Pain or Fever., Print, Disp-40 Tab, R-0  
  
guaiFENesin ER (MUCINEX) 600 mg ER tablet Take 1 Tab by mouth two (2) times a day., Print, Disp-20 Tab, R-0  
  
SITagliptin (JANUVIA) 50 mg tablet Take 1 Tab by mouth daily. , Print, Disp-30 Tab, R-1  
  
!! predniSONE (DELTASONE) 20 mg tablet Take 20 mg by mouth daily (with breakfast). , Print, Disp-3 Tab, R-0  
  
 benzocaine-menthol (CHLORASEPTIC MAX) 15-10 mg lozg lozenge Take 1 Lozenge by mouth every four to six (4-6) hours as needed for Sore throat., Print, Disp-30 Each, R-0  
  
colchicine (COLCRYS) 0.6 mg tablet Take 1.2 mg by mouth as needed (for flare ups or when she would like to eat seafood (ie. shrimp)). , Historical Med  
  
simvastatin (ZOCOR) 20 mg tablet Take 1 Tab by mouth nightly. Increased 9/1018, Normal, Disp-90 Tab, R-1  
  
furosemide (LASIX) 20 mg tablet TAKE 1 TABLET BY MOUTH ONCE DAILY, Normal, Disp-90 Tab, R-1  
  
albuterol (PROVENTIL HFA, VENTOLIN HFA, PROAIR HFA) 90 mcg/actuation inhaler Take 2 Puffs by inhalation every four (4) hours as needed for Wheezing., Normal, Disp-1 Inhaler, R-1  
  
albuterol (PROVENTIL VENTOLIN) 2.5 mg /3 mL (0.083 %) nebulizer solution 3 mL by Nebulization route every four (4) hours as needed for Wheezing., Normal, Disp-1 Package, R-5  
  
apixaban (ELIQUIS) 5 mg tablet Take 1 Tab by mouth two (2) times a day. Resume from tomorrow 7/21/2018., Sample, Disp-56 Tab, R-0  
  
gabapentin (NEURONTIN) 800 mg tablet TAKE 1 TABLET BY MOUTH THREE TIMES DAILY, NormalPlease consider 90 day supplies to promote better adherenceDisp-90 Tab, R-3  
  
SYMBICORT 160-4.5 mcg/actuation HFAA INHALE ONE PUFF BY MOUTH TWICE DAILY, Normal, Disp-1 Inhaler, R-3  
  
clopidogrel (PLAVIX) 75 mg tab TAKE ONE TABLET BY MOUTH ONCE DAILY, NormalPlease consider 90 day supplies to promote better adherenceDisp-30 Tab, R-11  
  
carvedilol (COREG) 6.25 mg tablet Take 1 Tab by mouth two (2) times daily (with meals). , Normal, Disp-60 Tab, R-11  
  
tiotropium (SPIRIVA WITH HANDIHALER) 18 mcg inhalation capsule INHALE THE CONTENTS OF 1 CAPSULE THROUGH HANDIHALER DEVICE DAILY, Normal, Disp-90 Cap, R-3  
  
fluticasone (FLONASE) 50 mcg/actuation nasal spray 2 Sprays by Both Nostrils route daily as needed., Historical Med  
  
cod liver oil cap Take 1 Cap by mouth daily. , Historical Med  
  
 !! - Potential duplicate medications found. Please discuss with provider. 2.  
Follow-up Information Follow up With Specialties Details Why Contact Info Tom Ruggiero MD Internal Medicine Schedule an appointment as soon as possible for a visit in 2 days for a recheck [address] [place] [id number] [phone] [facility] EMERGENCY DEPT Emergency Medicine Go to If symptoms worsen [address] [facility] 
[phone] Return to ED if worse Diagnosis Clinical Impression: 1. Acute bronchitis, unspecified organism

## 2019-04-09 NOTE — DISCHARGE INSTRUCTIONS
Patient Education        Bronchitis: Care Instructions  Your Care Instructions    Bronchitis is inflammation of the bronchial tubes, which carry air to the lungs. The tubes swell and produce mucus, or phlegm. The mucus and inflamed bronchial tubes make you cough. You may have trouble breathing. Most cases of bronchitis are caused by viruses like those that cause colds. Antibiotics usually do not help and they may be harmful. Bronchitis usually develops rapidly and lasts about 2 to 3 weeks in otherwise healthy people. Follow-up care is a key part of your treatment and safety. Be sure to make and go to all appointments, and call your doctor if you are having problems. It's also a good idea to know your test results and keep a list of the medicines you take. How can you care for yourself at home? · Take all medicines exactly as prescribed. Call your doctor if you think you are having a problem with your medicine. · Get some extra rest.  · Take an over-the-counter pain medicine, such as acetaminophen (Tylenol), ibuprofen (Advil, Motrin), or naproxen (Aleve) to reduce fever and relieve body aches. Read and follow all instructions on the label. · Do not take two or more pain medicines at the same time unless the doctor told you to. Many pain medicines have acetaminophen, which is Tylenol. Too much acetaminophen (Tylenol) can be harmful. · Take an over-the-counter cough medicine that contains dextromethorphan to help quiet a dry, hacking cough so that you can sleep. Avoid cough medicines that have more than one active ingredient. Read and follow all instructions on the label. · Breathe moist air from a humidifier, hot shower, or sink filled with hot water. The heat and moisture will thin mucus so you can cough it out. · Do not smoke. Smoking can make bronchitis worse. If you need help quitting, talk to your doctor about stop-smoking programs and medicines.  These can increase your chances of quitting for good.  When should you call for help? Call 911 anytime you think you may need emergency care. For example, call if:    · You have severe trouble breathing.    Call your doctor now or seek immediate medical care if:    · You have new or worse trouble breathing.     · You cough up dark brown or bloody mucus (sputum).     · You have a new or higher fever.     · You have a new rash.    Watch closely for changes in your health, and be sure to contact your doctor if:    · You cough more deeply or more often, especially if you notice more mucus or a change in the color of your mucus.     · You are not getting better as expected. Where can you learn more? Go to http://rusty-marcela.info/. Enter H333 in the search box to learn more about \"Bronchitis: Care Instructions. \"  Current as of: September 5, 2018  Content Version: 11.9  © 2159-0846 [employer]. Care instructions adapted under license by [employer] (which disclaims liability or warranty for this information). If you have questions about a medical condition or this instruction, always ask your healthcare professional. [address] any warranty or liability for your use of this information.

## 2019-04-11 ENCOUNTER — PATIENT OUTREACH (OUTPATIENT)
Dept: INTERNAL MEDICINE CLINIC | Age: 69
End: 2019-04-11

## 2019-04-11 NOTE — PROGRESS NOTES
This writer reaches out to Ms Mehta as a follow up to our last conversation. Weight today 160 lb and yesterday was 159 lb. Pt will continue to weigh daily and reports gains to cardiology. Pulm f/u is to be rescheduled as she was having transportation concerns. VALERIY has been rescheduled for 4/19 Pt has been given telephone number for [facility] again should her condition worsens. This writer will continue to follow pt's progress. Goals  Reduce risk of COPD Exacerbations (ie. managing triggers, health maintenance with COPD). Ms Emerita Prater has chronic COPD that's been pretty controlled until recent admission. Pt reports feelings of fluid retention but not visible to her. Upon evaluation pt reports being told there was a fluid build up in her lungs. At home, over the next 30 days, she as agreed to be compliant with all medications, will use her incentive spirometer during commercial breaks in repetitions of 10, practice good handwashing, and will inquire about her new landlords changing her air filters frequently d/t her diagnosis and allergy season approaching. Reassess by 4/11 (ms). 
 
4/11: Ms Mehta since our last conversation has been seen in the ED for continued sob, diagnosed with Bronchitis. She was given a Z-Pack and Prednisone. Ms Mehta also tells this writer that she's been in touch with her cardiologist r/t her symptoms and feeling of heaviness in her lower extremities/weight gain, her lasix was adjust and today she reports feeling much better.

## 2019-04-13 LAB
BACTERIA SPEC CULT: NORMAL
SERVICE CMNT-IMP: NORMAL

## 2019-04-16 ENCOUNTER — HOSPITAL ENCOUNTER (EMERGENCY)
Age: 69
Discharge: HOME | End: 2019-04-16
Attending: EMERGENCY MEDICINE
Payer: MEDICARE

## 2019-04-16 VITALS
RESPIRATION RATE: 16 BRPM | BODY MASS INDEX: 25.54 KG/M2 | DIASTOLIC BLOOD PRESSURE: 68 MMHG | TEMPERATURE: 97.8 F | SYSTOLIC BLOOD PRESSURE: 141 MMHG | WEIGHT: 162.7 LBS | OXYGEN SATURATION: 95 % | HEART RATE: 75 BPM | HEIGHT: 67 IN
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
## 2019-04-16 DIAGNOSIS — S50.862A INSECT BITE OF LEFT FOREARM, INITIAL ENCOUNTER: Primary | ICD-10-CM

## 2019-04-16 DIAGNOSIS — T14.8XXA HEMATOMA: ICD-10-CM

## 2019-04-16 DIAGNOSIS — J44.9 CHRONIC OBSTRUCTIVE PULMONARY DISEASE, UNSPECIFIED COPD TYPE (HCC): ICD-10-CM

## 2019-04-16 PROCEDURE — 99283 EMERGENCY DEPT VISIT LOW MDM: CPT

## 2019-04-16 NOTE — DISCHARGE INSTRUCTIONS
Patient Education        Insect Stings and Bites: Care Instructions  Your Care Instructions  Stings and bites from bees, wasps, ants, and other insects often cause pain, swelling, redness, and itching. In some people, especially children, the redness and swelling may be worse. It may extend several inches beyond the affected area. But in most cases, stings and bites don't cause reactions all over the body. If you have had a reaction to an insect sting or bite, you are at risk for a reaction if you get stung or bitten again. Follow-up care is a key part of your treatment and safety. Be sure to make and go to all appointments, and call your doctor if you are having problems. It's also a good idea to know your test results and keep a list of the medicines you take. How can you care for yourself at home? · Do not scratch or rub the skin where the sting or bite occurred. · Put a cold pack or ice cube on the area. Put a thin cloth between the ice and your skin. For some people, a paste of baking soda mixed with a little water helps relieve pain and decrease the reaction. · Take an over-the-counter antihistamine, such as diphenhydramine (Benadryl) or loratadine (Claritin), to relieve swelling, redness, and itching. Calamine lotion or hydrocortisone cream may also help. Do not give antihistamines to your child unless you have checked with the doctor first.  · Be safe with medicines. If your doctor prescribed medicine for your allergy, take it exactly as prescribed. Call your doctor if you think you are having a problem with your medicine. You will get more details on the specific medicines your doctor prescribes. · Your doctor may prescribe a shot of epinephrine to carry with you in case you have a severe reaction. Learn how and when to give yourself the shot, and keep it with you at all times. Make sure it has not . · Go to the emergency room anytime you have a severe reaction.  Go even if you have given yourself epinephrine and are feeling better. Symptoms can come back. When should you call for help? Call 911 anytime you think you may need emergency care. For example, call if:    · You have symptoms of a severe allergic reaction. These may include:  ? Sudden raised, red areas (hives) all over your body. ? Swelling of the throat, mouth, lips, or tongue. ? Trouble breathing. ? Passing out (losing consciousness). Or you may feel very lightheaded or suddenly feel weak, confused, or restless.    Call your doctor now or seek immediate medical care if:    · You have symptoms of an allergic reaction not right at the sting or bite, such as:  ? A rash or small area of hives (raised, red areas on the skin). ? Itching. ? Swelling. ? Belly pain, nausea, or vomiting.     · You have a lot of swelling around the site (such as your entire arm or leg is swollen).     · You have signs of infection, such as:  ? Increased pain, swelling, redness, or warmth around the sting. ? Red streaks leading from the area. ? Pus draining from the sting. ? A fever.    Watch closely for changes in your health, and be sure to contact your doctor if:    · You do not get better as expected. Where can you learn more? Go to http://rusty-marcela.info/. Enter P390 in the search box to learn more about \"Insect Stings and Bites: Care Instructions. \"  Current as of: September 23, 2018  Content Version: 11.9  © 3493-6659 [employer]. Care instructions adapted under license by [employer] (which disclaims liability or warranty for this information). If you have questions about a medical condition or this instruction, always ask your healthcare professional. [place] [address] any warranty or liability for your use of this information. Patient Education        Hematoma: Care Instructions  Your Care Instructions    A hematoma is a bad bruise.  It happens when an injury causes blood to collect and pool under the skin. The pooling blood gives the skin a spongy, rubbery, lumpy feel. A hematoma usually is not a cause for concern. It is not the same thing as a blood clot in a vein, and it does not cause blood clots. Follow-up care is a key part of your treatment and safety. Be sure to make and go to all appointments, and call your doctor if you are having problems. It's also a good idea to know your test results and keep a list of the medicines you take. How can you care for yourself at home? · Rest and protect the bruised area. · Put ice or a cold pack on the area for 10 to 20 minutes at a time. · Prop up the bruised area on a pillow when you ice it or anytime you sit or lie down during the next 3 days. Try to keep it above the level of your heart. This will help reduce swelling. · Wrapping the bruised area with an elastic bandage such as an Ace wrap will help decrease swelling. Don't wrap it too tightly, as this can cause more swelling below the affected area. · Be safe with medicines. Read and follow all instructions on the label. ? If the doctor gave you a prescription medicine for pain, take it as prescribed. ? If you are not taking a prescription pain medicine, ask your doctor if you can take an over-the-counter medicine. · Do not take two or more pain medicines at the same time unless the doctor told you to. Many pain medicines have acetaminophen, which is Tylenol. Too much acetaminophen (Tylenol) can be harmful. When should you call for help? Call your doctor now or seek immediate medical care if:    · You have signs of skin infection, such as:  ? Increased pain, swelling, warmth, or redness. ? Red streaks leading from the area. ? Pus draining from the area.   ? A fever.    Watch closely for changes in your health, and be sure to contact your doctor if:    · The bruise lasts longer than 4 weeks.     · The bruise gets bigger or becomes more painful.     · You do not get better as expected. Where can you learn more? Go to http://rusty-marcela.info/. Enter P911 in the search box to learn more about \"Hematoma: Care Instructions. \"  Current as of: September 23, 2018  Content Version: 11.9  © 9571-0298 [employer]. Care instructions adapted under license by [employer] (which disclaims liability or warranty for this information). If you have questions about a medical condition or this instruction, always ask your healthcare professional. [address] any warranty or liability for your use of this information.

## 2019-04-16 NOTE — ED PROVIDER NOTES
EMERGENCY DEPARTMENT HISTORY AND PHYSICAL EXAM 
 
 
Date: 4/16/2019 Patient Name: Cornelio Gillespie History of Presenting Illness Chief Complaint Patient presents with  
 Skin Problem  
  pt's left lower inner forearm. A raised, bruised-appearing, itchy spot that appeared acutely around 0720 this morning. pt is on eliquis. History Provided By: Patient HPI: Cornelio Gillespie, 80 y.o. female presents to the ED with concerns of a place on her left forearm. She states that when she awoke this morning her left arm was itchy. After scratching it she noted a large knot appeared. The patient is on Eliquis. There is been no treatment prior to arrival. 
 
There are no other complaints, changes, or physical findings at this time. Social Hx: Tobacco (denies), EtOH (denies), Illicit drug use (denies) PCP: Florida Ornelas MD 
 
No current facility-administered medications on file prior to encounter. Current Outpatient Medications on File Prior to Encounter Medication Sig Dispense Refill  acetaminophen (TYLENOL) 325 mg tablet Take 2 Tabs by mouth every four (4) hours as needed for Pain or Fever. 40 Tab 0  
 guaiFENesin ER (MUCINEX) 600 mg ER tablet Take 1 Tab by mouth two (2) times a day. 20 Tab 0  
 SITagliptin (JANUVIA) 50 mg tablet Take 1 Tab by mouth daily. 30 Tab 1  predniSONE (DELTASONE) 20 mg tablet Take 20 mg by mouth daily (with breakfast). 3 Tab 0  
 benzocaine-menthol (CHLORASEPTIC MAX) 15-10 mg lozg lozenge Take 1 Lozenge by mouth every four to six (4-6) hours as needed for Sore throat. 30 Each 0  
 colchicine (COLCRYS) 0.6 mg tablet Take 1.2 mg by mouth as needed (for flare ups or when she would like to eat seafood (ie. shrimp)).  simvastatin (ZOCOR) 20 mg tablet Take 1 Tab by mouth nightly.  Increased 9/1018 90 Tab 1  
 furosemide (LASIX) 20 mg tablet TAKE 1 TABLET BY MOUTH ONCE DAILY 90 Tab 1  
  albuterol (PROVENTIL HFA, VENTOLIN HFA, PROAIR HFA) 90 mcg/actuation inhaler Take 2 Puffs by inhalation every four (4) hours as needed for Wheezing. 1 Inhaler 1  
 albuterol (PROVENTIL VENTOLIN) 2.5 mg /3 mL (0.083 %) nebulizer solution 3 mL by Nebulization route every four (4) hours as needed for Wheezing. 1 Package 5  
 apixaban (ELIQUIS) 5 mg tablet Take 1 Tab by mouth two (2) times a day. Resume from tomorrow 7/21/2018. 56 Tab 0  
 gabapentin (NEURONTIN) 800 mg tablet TAKE 1 TABLET BY MOUTH THREE TIMES DAILY 90 Tab 3  
 SYMBICORT 160-4.5 mcg/actuation HFAA INHALE ONE PUFF BY MOUTH TWICE DAILY 1 Inhaler 3  clopidogrel (PLAVIX) 75 mg tab TAKE ONE TABLET BY MOUTH ONCE DAILY 30 Tab 11  
 carvedilol (COREG) 6.25 mg tablet Take 1 Tab by mouth two (2) times daily (with meals). 60 Tab 11  
 tiotropium (SPIRIVA WITH HANDIHALER) 18 mcg inhalation capsule INHALE THE CONTENTS OF 1 CAPSULE THROUGH HANDIHALER DEVICE DAILY 90 Cap 3  
 fluticasone (FLONASE) 50 mcg/actuation nasal spray 2 Sprays by Both Nostrils route daily as needed.  cod liver oil cap Take 1 Cap by mouth daily. Past History Past Medical History: 
Past Medical History:  
Diagnosis Date  Arthritis   
 left shoulder  [facility]rial [facility] 6/2/2010 Dr. Lakeisha Dc  CAD (coronary artery disease) stent; Dr. Lakeisha Dc  Celiac disease  Chronic diastolic heart failure ([facility] 09/22/2014 Dr. Lakeisha Dc  Chronic kidney disease   
 per cardio note  Chronic pain   
 left thigh:  L SFA intervention by Dr. Florene Homans 07/2018, as of 9/6/18:  still causing pain, pt to have MILLA checked per Dr. Florene Homans note  Chronic systolic HF (heart failure) ([facility] 5/10/2017  
 4/2017 EF 25-30%  COPD (chronic obstructive pulmonary disease) ([facility] 6/2/2010 Dr. Marquis Crane  Diabetes ([facility] Dr. Kelly Reyes; no current medication per pt  Emphysema, unspecified ([facility] Dr. Marquis Crane  Fibroid  Frequent falls 2017  Gout  Heart failure ([facility] Dr. Israel Cooley  History of Clostridium difficile infection 5/10/2017  
 2017 CDiff positive  Hypertension 2010 Dr. Aye Hester  Hypertensive heart and chronic kidney disease   
 per PCP note  Hypotension 5/10/2017  Junctional tachycardia ([facility] 5/10/2017 Dr. Israel Cooley  Neuropathy  NIDDM (non-insulin dependent diabetes mellitus) 2010  PAD (peripheral artery disease) ([facility]  S/P ablation of atrial fibrillation 2018  Screening mammogram 5/4/10  
 SOB (shortness of breath) 2014 Dr. Jaclyn Carter  SSS (sick sinus syndrome) ([facility]   
 per pacemaker form 18  Weakness   
 per pt weakness from c-diff , mulitple falls with injury to rotator cuff Past Surgical History: 
Past Surgical History:  
Procedure Laterality Date  CARDIAC SURG PROCEDURE UNLIST    
 3 cardiac stent placed  COLONOSCOPY N/A 2017 COLONOSCOPY with biopsy performed by Zo Kate MD at [facility] AMBULATORY OR  
 HX AFIB ABLATION  2018  
 has had 2 ablations per patient  HX  SECTION    
 HX HEART CATHETERIZATION  2018  HX OTHER SURGICAL    
 adrenal gland removed  HX PACEMAKER Left [employer] model: Donatobria Chan  NM EXCISE ADRENAL GLAND  VASCULAR SURGERY PROCEDURE UNLIST  2018 Left SFA intervention ; Dr. Mtz Major Family History: 
Family History Problem Relation Age of Onset  Heart Disease Mother  Diabetes Father  Heart Disease Brother Social History: 
Social History Tobacco Use  Smoking status: Former Smoker Types: Cigarettes Start date: 5 Last attempt to quit: 2009 Years since quittin.2  Smokeless tobacco: Never Used Substance Use Topics  Alcohol use: No  
 Drug use: No  
 
 
Allergies: Allergies Allergen Reactions  Crestor [Rosuvastatin] Myalgia  Levaquin [Levofloxacin] Nausea Only GI Upset  Lipitor [Atorvastatin] Diarrhea  Lyrica [Pregabalin] Myalgia Review of Systems Review of Systems Constitutional: Negative for chills, diaphoresis and fever. HENT: Negative for congestion, ear pain, rhinorrhea and sore throat. Respiratory: Negative for cough and shortness of breath. Cardiovascular: Negative for chest pain. Gastrointestinal: Negative for abdominal pain, constipation, diarrhea, nausea and vomiting. Genitourinary: Negative for difficulty urinating, dysuria, frequency and hematuria. Musculoskeletal: Negative for arthralgias and myalgias. Skin: Positive for wound. Neurological: Negative for headaches. All other systems reviewed and are negative. Physical Exam  
Physical Exam  
Constitutional: She is oriented to person, place, and time. She appears well-developed and well-nourished. No distress. Pleasant 80 y.o. -American female HENT:  
Head: Normocephalic and atraumatic. Eyes: Conjunctivae are normal. Right eye exhibits no discharge. Left eye exhibits no discharge. Neck: Normal range of motion. Neck supple. Cardiovascular: Normal rate. Pulmonary/Chest: Effort normal.  
Neurological: She is alert and oriented to person, place, and time. Skin: Skin is warm and dry. She is not diaphoretic. Insect bite to the left forearm with surrounding hematoma. There is no signs of infection. There is no erythema. There is no drainage. Psychiatric: She has a normal mood and affect. Her behavior is normal.  
Nursing note and vitals reviewed. Diagnostic Study Results Labs - None Radiologic Studies - None Medical Decision Making I am the first provider for this patient. I reviewed the vital signs, available nursing notes, past medical history, past surgical history, family history and social history. Vital Signs-Reviewed the patient's vital signs. Patient Vitals for the past 12 hrs: 
 Temp Pulse Resp BP SpO2 04/16/19 0820 97.8 °F (36.6 °C) 75 16 141/68 95 % Records Reviewed: Nursing Notes Provider Notes (Medical Decision Making): Insect bite, abscess, hematoma,  
 
ED Course:  
Initial assessment performed. The patients presenting problems have been discussed, and they are in agreement with the care plan formulated and outlined with them. I have encouraged them to ask questions as they arise throughout their visit. Critical Care Time: None Disposition: 
DISCHARGE NOTE: 
8:57 AM 
The pt is ready for discharge. The pt's signs, symptoms, diagnosis, and discharge instructions have been discussed and pt has conveyed their understanding. The pt is to follow up as recommended or return to ER should their symptoms worsen. Plan has been discussed and pt is in agreement. PLAN: 
1. Current Discharge Medication List  
  
CONTINUE these medications which have NOT CHANGED Details  
acetaminophen (TYLENOL) 325 mg tablet Take 2 Tabs by mouth every four (4) hours as needed for Pain or Fever. Qty: 40 Tab, Refills: 0  
  
guaiFENesin ER (MUCINEX) 600 mg ER tablet Take 1 Tab by mouth two (2) times a day. Qty: 20 Tab, Refills: 0 SITagliptin (JANUVIA) 50 mg tablet Take 1 Tab by mouth daily. Qty: 30 Tab, Refills: 1  
  
predniSONE (DELTASONE) 20 mg tablet Take 20 mg by mouth daily (with breakfast). Qty: 3 Tab, Refills: 0  
  
benzocaine-menthol (CHLORASEPTIC MAX) 15-10 mg lozg lozenge Take 1 Lozenge by mouth every four to six (4-6) hours as needed for Sore throat. Qty: 30 Each, Refills: 0  
  
colchicine (COLCRYS) 0.6 mg tablet Take 1.2 mg by mouth as needed (for flare ups or when she would like to eat seafood (ie. shrimp)). simvastatin (ZOCOR) 20 mg tablet Take 1 Tab by mouth nightly. Increased 9/1018 Qty: 90 Tab, Refills: 1 Associated Diagnoses: Type 2 diabetes mellitus with nephropathy ([address]  Mixed hyperlipidemia; Coronary artery disease involving native coronary artery of native heart without angina pectoris  
  
furosemide (LASIX) 20 mg tablet TAKE 1 TABLET BY MOUTH ONCE DAILY Qty: 90 Tab, Refills: 1  
  
albuterol (PROVENTIL HFA, VENTOLIN HFA, PROAIR HFA) 90 mcg/actuation inhaler Take 2 Puffs by inhalation every four (4) hours as needed for Wheezing. Qty: 1 Inhaler, Refills: 1  
  
albuterol (PROVENTIL VENTOLIN) 2.5 mg /3 mL (0.083 %) nebulizer solution 3 mL by Nebulization route every four (4) hours as needed for Wheezing. Qty: 1 Package, Refills: 5 Associated Diagnoses: SOB (shortness of breath)  
  
apixaban (ELIQUIS) 5 mg tablet Take 1 Tab by mouth two (2) times a day. Resume from tomorrow 7/21/2018. Qty: 56 Tab, Refills: 0 Associated Diagnoses: Atrial fibrillation, unspecified type ([facility] Anticoagulant long-term use  
  
gabapentin (NEURONTIN) 800 mg tablet TAKE 1 TABLET BY MOUTH THREE TIMES DAILY Qty: 90 Tab, Refills: 3 Comments: Please consider 90 day supplies to promote better adherence Associated Diagnoses: Polyneuropathy associated with underlying disease ([facility] SYMBICORT 160-4.5 mcg/actuation HFAA INHALE ONE PUFF BY MOUTH TWICE DAILY Qty: 1 Inhaler, Refills: 3 Associated Diagnoses: Chronic obstructive pulmonary disease with acute exacerbation ([facility] clopidogrel (PLAVIX) 75 mg tab TAKE ONE TABLET BY MOUTH ONCE DAILY Qty: 30 Tab, Refills: 11 Comments: Please consider 90 day supplies to promote better adherence  
  
carvedilol (COREG) 6.25 mg tablet Take 1 Tab by mouth two (2) times daily (with meals). Qty: 60 Tab, Refills: 11  
  
tiotropium (SPIRIVA WITH HANDIHALER) 18 mcg inhalation capsule INHALE THE CONTENTS OF 1 CAPSULE THROUGH HANDIHALER DEVICE DAILY Qty: 90 Cap, Refills: 3 Associated Diagnoses: Chronic obstructive pulmonary disease, unspecified COPD type ([address]  
  
fluticasone (FLONASE) 50 mcg/actuation nasal spray 2 Sprays by Both Nostrils route daily as needed. cod liver oil cap Take 1 Cap by mouth daily. 2.  
Follow-up Information Follow up With Specialties Details Why Contact Info Adrian Be MD Internal Medicine  As needed for wound check [address] [facility] [id number] 
[phone] Return to ED if worse Diagnosis Clinical Impression:  
1. Insect bite of left forearm, initial encounter 2. Hematoma Please note that this dictation was completed with [employer], the computer voice recognition software. Quite often unanticipated grammatical, syntax, homophones, and other interpretive errors are inadvertently transcribed by the computer software. Please disregards these errors. Please excuse any errors that have escaped final proofreading. 8:09 AM 
I was personally available for consultation in the emergency department. I have reviewed the chart and agree with the documentation recorded by the [facility], including the assessment, treatment plan, and disposition. Marshall Torres MD 
 
 
This note will not be viewable in [address]

## 2019-04-16 NOTE — ED NOTES
Pt reports she felt itching on left inner forearm this morning, scratched and now has raised purplish area to arm

## 2019-04-18 ENCOUNTER — HOSPITAL ENCOUNTER (OUTPATIENT)
Dept: MAMMOGRAPHY | Age: 69
Discharge: HOME | End: 2019-04-18
Payer: MEDICARE

## 2019-04-18 PROCEDURE — 77067 SCR MAMMO BI INCL CAD: CPT

## 2019-04-19 ENCOUNTER — OFFICE VISIT (OUTPATIENT)
Dept: INTERNAL MEDICINE CLINIC | Age: 69
End: 2019-04-19

## 2019-04-19 VITALS
SYSTOLIC BLOOD PRESSURE: 103 MMHG | RESPIRATION RATE: 19 BRPM | OXYGEN SATURATION: 98 % | TEMPERATURE: 98.7 F | HEART RATE: 77 BPM | BODY MASS INDEX: 25.74 KG/M2 | HEIGHT: 67 IN | DIASTOLIC BLOOD PRESSURE: 44 MMHG | WEIGHT: 164 LBS

## 2019-04-19 DIAGNOSIS — G63 POLYNEUROPATHY ASSOCIATED WITH UNDERLYING DISEASE (HCC): ICD-10-CM

## 2019-04-19 DIAGNOSIS — E11.40 TYPE 2 DIABETES MELLITUS WITH DIABETIC NEUROPATHY, WITHOUT LONG-TERM CURRENT USE OF INSULIN (HCC): ICD-10-CM

## 2019-04-19 DIAGNOSIS — J43.9 PULMONARY EMPHYSEMA, UNSPECIFIED EMPHYSEMA TYPE (HCC): ICD-10-CM

## 2019-04-19 RX ORDER — GABAPENTIN 800 MG/1
TABLET ORAL
Qty: 270 TAB | Refills: 1 | Status: SHIPPED | OUTPATIENT
Start: 2019-04-19 | End: 2019-10-16 | Stop reason: SDUPTHER

## 2019-04-19 NOTE — PROGRESS NOTES
Roc Kay is a 80 y.o. female and presents with Transitions Of Care (3/23/2019-3/28/2019 [facility] for resp failure) Carin Mota Subjective: 
Pt comes-in for hospital discharge f/u. She was admitted for progressive SOB 3/24/19. Improved w diuresis and noninvasive positive pressure ventilation and steroids. Pt feels better and has appointment w pulmonology pending. Pt was started on januvia. Hypertensive heart and kidney disease-pt has no complaints, stable on med. Relays med compliance BP Readings from Last 3 Encounters:  
04/19/19 103/44  
04/16/19 141/68  
04/08/19 121/76 CAD s/p stent-noted CHF-systolic and diastolic-latest VD~15% Wt Readings from Last 3 Encounters:  
04/19/19 164 lb (74.4 kg) 04/16/19 162 lb 11.2 oz (73.8 kg) 04/08/19 162 lb 7.7 oz (73.7 kg) CKD 3-noted Lab Results Component Value Date/Time GFR est AA 44 (L) 04/08/2019 11:00 AM  
 GFR est non-AA 37 (L) 04/08/2019 11:00 AM  
 Creatinine 1.43 (H) 04/08/2019 11:00 AM  
 BUN 13 04/08/2019 11:00 AM  
 Sodium 136 04/08/2019 11:00 AM  
 Potassium 4.6 04/08/2019 11:00 AM  
 Chloride 105 04/08/2019 11:00 AM  
 CO2 27 04/08/2019 11:00 AM  
 
COPD-quiescent S/p pacemaker-noted Hx PAF s/p ablation-on eliquis Type 2 DM w nephropathy-diet controlled Lab Results Component Value Date/Time Hemoglobin A1c 5.8 09/24/2017 10:36 AM  
 Hemoglobin A1c (POC) 7.5 03/06/2019 09:45 AM  
 
Hyperlipidemia-  low dose statin 
 -t/c PCSK9 inhibitor Lab Results Component Value Date/Time  Cholesterol, total 177 04/17/2018 08:51 AM  
 Cholesterol (POC) 168 03/29/2018 08:24 AM  
 HDL Cholesterol 35 (L) 04/17/2018 08:51 AM  
 HDL Cholesterol (POC) n/a 03/29/2018 08:24 AM  
 LDL Cholesterol (POC) n/a 03/29/2018 08:24 AM  
 LDL, calculated 101 (H) 04/17/2018 08:51 AM  
 VLDL, calculated 41 (H) 04/17/2018 08:51 AM  
 Triglyceride 203 (H) 04/17/2018 08:51 AM  
 Triglycerides (POC) )650 03/29/2018 08:24 AM  
 CHOL/HDL Ratio 6.4 (H) 07/01/2014 03:10 AM  
 
 
 
Review of Systems Review of systems (12) negative, except noted above. Past Medical History:  
Diagnosis Date  Arthritis   
 left shoulder  Atrial fibrillation ([facility] 6/2/2010 Dr. Valdo Tate  CAD (coronary artery disease) stent; Dr. Valdo Tate  Celiac disease  Chronic diastolic heart failure ([address] 09/22/2014 Dr. Valdo Tate  Chronic kidney disease   
 per cardio note  Chronic pain   
 left thigh:  L SFA intervention by Dr. Remberto Churchill 07/2018, as of 9/6/18:  still causing pain, pt to have MILLA checked per Dr. Remberto Churchill note  Chronic systolic HF (heart failure) ([facility] 5/10/2017  
 4/2017 EF 25-30%  COPD (chronic obstructive pulmonary disease) ([facility] 6/2/2010 Dr. Rebecca Sosa  Diabetes ([facility] Dr. Pepper Knife; no current medication per pt  Emphysema, unspecified ([facility] Dr. Rebecca Sosa  Fibroid  Frequent falls 2017  Gout  Heart failure ([facility] Dr. Valdo Tate  History of Clostridium difficile infection 5/10/2017  
 4/2017 CDiff positive  Hypertension 6/2/2010 Dr. Sheela Kemp  Hypertensive heart and chronic kidney disease   
 per PCP note  Hypotension 5/10/2017  Junctional tachycardia ([facility] 5/10/2017 Dr. Valdo Tate  Neuropathy  NIDDM (non-insulin dependent diabetes mellitus) 6/2/2010  PAD (peripheral artery disease) ([facility]  S/P ablation of atrial fibrillation 03/2018  Screening mammogram 5/4/10  
 SOB (shortness of breath) 09/22/2014 Dr. Rebecca Sosa  SSS (sick sinus syndrome) ([facility]   
 per pacemaker form 9/6/18  Weakness   
 per pt weakness from c-diff 2017, mulitple falls with injury to rotator cuff Past Surgical History:  
Procedure Laterality Date  CARDIAC SURG PROCEDURE UNLIST    
 3 cardiac stent placed  COLONOSCOPY N/A 9/25/2017 COLONOSCOPY with biopsy performed by Niesha Calderón MD at hospitals AMBULATORY OR  
 HX AFIB ABLATION  03/2018 has had 2 ablations per patient  HX  SECTION    
 HX HEART CATHETERIZATION  2018  HX OTHER SURGICAL    
 adrenal gland removed  HX PACEMAKER Left Biotronik model: Donato PEGUERO EXCISE ADRENAL GLAND  VASCULAR SURGERY PROCEDURE UNLIST  2018 Left SFA intervention ; Dr. Mtz Major Social History Socioeconomic History  Marital status:  Spouse name: Not on file  Number of children: Not on file  Years of education: Not on file  Highest education level: Not on file Tobacco Use  Smoking status: Former Smoker Types: Cigarettes Start date: 5 Last attempt to quit: 2009 Years since quittin.3  Smokeless tobacco: Never Used Substance and Sexual Activity  Alcohol use: No  
 Drug use: No  
 Sexual activity: Not Currently Family History Problem Relation Age of Onset  Heart Disease Mother  Diabetes Father  Heart Disease Brother Current Outpatient Medications Medication Sig Dispense Refill  gabapentin (NEURONTIN) 800 mg tablet TAKE 1 TABLET BY MOUTH THREE TIMES DAILY 270 Tab 1  
 SITagliptin (JANUVIA) 50 mg tablet Take 1 Tab by mouth daily. 90 Tab 1  
 tiotropium (SPIRIVA WITH HANDIHALER) 18 mcg inhalation capsule INHALE THE CONTENTS OF 1 CAPSULE THROUGH HANDIHALER DEVICE DAILY 90 Cap 1  
 acetaminophen (TYLENOL) 325 mg tablet Take 2 Tabs by mouth every four (4) hours as needed for Pain or Fever. 40 Tab 0  
 colchicine (COLCRYS) 0.6 mg tablet Take 1.2 mg by mouth as needed (for flare ups or when she would like to eat seafood (ie. shrimp)).  simvastatin (ZOCOR) 20 mg tablet Take 1 Tab by mouth nightly. Increased 1018 90 Tab 1  
 furosemide (LASIX) 20 mg tablet TAKE 1 TABLET BY MOUTH ONCE DAILY 90 Tab 1  
 albuterol (PROVENTIL HFA, VENTOLIN HFA, PROAIR HFA) 90 mcg/actuation inhaler Take 2 Puffs by inhalation every four (4) hours as needed for Wheezing.  1 Inhaler 1  
  albuterol (PROVENTIL VENTOLIN) 2.5 mg /3 mL (0.083 %) nebulizer solution 3 mL by Nebulization route every four (4) hours as needed for Wheezing. 1 Package 5  
 apixaban (ELIQUIS) 5 mg tablet Take 1 Tab by mouth two (2) times a day. Resume from tomorrow 7/21/2018. 56 Tab 0  
 SYMBICORT 160-4.5 mcg/actuation HFAA INHALE ONE PUFF BY MOUTH TWICE DAILY 1 Inhaler 3  clopidogrel (PLAVIX) 75 mg tab TAKE ONE TABLET BY MOUTH ONCE DAILY 30 Tab 11  
 fluticasone (FLONASE) 50 mcg/actuation nasal spray 2 Sprays by Both Nostrils route daily as needed.  cod liver oil cap Take 1 Cap by mouth daily.  guaiFENesin ER (MUCINEX) 600 mg ER tablet Take 1 Tab by mouth two (2) times a day. 20 Tab 0  predniSONE (DELTASONE) 20 mg tablet Take 20 mg by mouth daily (with breakfast). 3 Tab 0  
 benzocaine-menthol (CHLORASEPTIC MAX) 15-10 mg lozg lozenge Take 1 Lozenge by mouth every four to six (4-6) hours as needed for Sore throat. 30 Each 0  
 carvedilol (COREG) 6.25 mg tablet Take 1 Tab by mouth two (2) times daily (with meals). 60 Tab 11 Allergies Allergen Reactions  Crestor [Rosuvastatin] Myalgia  Levaquin [Levofloxacin] Nausea Only GI Upset  Lipitor [Atorvastatin] Diarrhea  Lyrica [Pregabalin] Myalgia Objective: 
Visit Vitals /44 (BP 1 Location: Left arm, BP Patient Position: Sitting) Pulse 77 Temp 98.7 °F (37.1 °C) (Oral) Resp 19 Ht 5' 6.5\" (1.689 m) Wt 164 lb (74.4 kg) SpO2 98% BMI 26.07 kg/m² Physical Exam:  
General appearance - alert, well appearing, and in no distress Mental status - alert, oriented to person, place, and time EYE-ROCK, EOMI, corneas normal, no foreign bodies ENT-ENT exam normal, no neck nodes or sinus tenderness Chest - bibasilar rales Heart - normal rate, regular rhythm, normal S1, S2, +systolic murmur +S4 Ext-peripheral pulses normal, no pedal edema, no clubbing or cyanosis Skin-Warm and dry. no hyperpigmentation, vitiligo, or suspicious lesions Neuro -alert, oriented, normal speech, no focal findings walks w cane Results for orders placed or performed during the hospital encounter of 04/08/19 CULTURE, BLOOD, PAIRED Result Value Ref Range Special Requests: NO SPECIAL REQUESTS Culture result: NO GROWTH 5 DAYS INFLUENZA A & B AG (RAPID TEST) Result Value Ref Range Influenza A Antigen NEGATIVE  NEG Influenza B Antigen NEGATIVE  NEG    
CBC WITH AUTOMATED DIFF Result Value Ref Range WBC 9.3 3.6 - 11.0 K/uL  
 RBC 4.31 3.80 - 5.20 M/uL  
 HGB 12.1 11.5 - 16.0 g/dL HCT 36.6 35.0 - 47.0 % MCV 84.9 80.0 - 99.0 FL  
 MCH 28.1 26.0 - 34.0 PG  
 MCHC 33.1 30.0 - 36.5 g/dL  
 RDW 13.1 11.5 - 14.5 % PLATELET 798 967 - 109 K/uL MPV 9.6 8.9 - 12.9 FL  
 NRBC 0.0 0  WBC ABSOLUTE NRBC 0.00 0.00 - 0.01 K/uL NEUTROPHILS 58 32 - 75 % LYMPHOCYTES 18 12 - 49 % MONOCYTES 16 (H) 5 - 13 % EOSINOPHILS 6 0 - 7 % BASOPHILS 1 0 - 1 % IMMATURE GRANULOCYTES 1 (H) 0.0 - 0.5 % ABS. NEUTROPHILS 5.5 1.8 - 8.0 K/UL  
 ABS. LYMPHOCYTES 1.7 0.8 - 3.5 K/UL  
 ABS. MONOCYTES 1.5 (H) 0.0 - 1.0 K/UL  
 ABS. EOSINOPHILS 0.6 (H) 0.0 - 0.4 K/UL  
 ABS. BASOPHILS 0.1 0.0 - 0.1 K/UL  
 ABS. IMM. GRANS. 0.1 (H) 0.00 - 0.04 K/UL  
 DF AUTOMATED METABOLIC PANEL, COMPREHENSIVE Result Value Ref Range Sodium 136 136 - 145 mmol/L Potassium 4.6 3.5 - 5.1 mmol/L Chloride 105 97 - 108 mmol/L  
 CO2 27 21 - 32 mmol/L Anion gap 4 (L) 5 - 15 mmol/L Glucose 159 (H) 65 - 100 mg/dL BUN 13 6 - 20 MG/DL Creatinine 1.43 (H) 0.55 - 1.02 MG/DL  
 BUN/Creatinine ratio 9 (L) 12 - 20 GFR est AA 44 (L) >60 ml/min/1.73m2 GFR est non-AA 37 (L) >60 ml/min/1.73m2 Calcium 9.1 8.5 - 10.1 MG/DL Bilirubin, total 0.7 0.2 - 1.0 MG/DL  
 ALT (SGPT) 17 12 - 78 U/L  
 AST (SGOT) 29 15 - 37 U/L Alk.  phosphatase 130 (H) 45 - 117 U/L  
 Protein, total 8.4 (H) 6.4 - 8.2 g/dL Albumin 3.3 (L) 3.5 - 5.0 g/dL Globulin 5.1 (H) 2.0 - 4.0 g/dL A-G Ratio 0.6 (L) 1.1 - 2.2 CK W/ REFLX CKMB Result Value Ref Range  26 - 192 U/L  
TROPONIN I Result Value Ref Range Troponin-I, Qt. <0.05 <0.05 ng/mL NT-PRO BNP Result Value Ref Range NT pro- (H) <125 PG/ML  
LACTIC ACID Result Value Ref Range Lactic acid 2.0 0.4 - 2.0 MMOL/L  
D DIMER Result Value Ref Range D-dimer 0.49 0.00 - 0.65 mg/L FEU  
POC VENOUS BLOOD GAS Result Value Ref Range Device: OTHER    
 pH, venous (POC) 7.420 7.32 - 7.42    
 pCO2, venous (POC) 31.4 (L) 41 - 51 MMHG  
 pO2, venous (POC) 34 25 - 40 mmHg HCO3, venous (POC) 20.4 (L) 23.0 - 28.0 MMOL/L  
 sO2, venous (POC) 67 65 - 88 % Base deficit, venous (POC) 4 mmol/L Allens test (POC) N/A Site OTHER Specimen type (POC) VENOUS BLOOD    
EKG, 12 LEAD, INITIAL Result Value Ref Range Ventricular Rate 80 BPM  
 Atrial Rate 82 BPM  
 P-R Interval 236 ms QRS Duration 104 ms Q-T Interval 406 ms QTC Calculation (Bezet) 468 ms Calculated R Axis -15 degrees Calculated T Axis 150 degrees Diagnosis Atrial-paced rhythm with prolonged AV conduction T wave abnormality, consider lateral ischemia When compared with ECG of 24-MAR-2019 21:37, 
QRS axis shifted right Nonspecific T wave abnormality now evident in Inferior leads T wave inversion more evident in Lateral leads Confirmed by Peri Womack ([id number]) on 4/8/2019 12:43:28 PM 
  
 
*Note: Due to a large number of results and/or encounters for the requested time period, some results have not been displayed. A complete set of results can be found in Results Review. Assessment/Plan: ICD-10-CM ICD-9-CM 1. Transition of care performed with sharing of clinical summary Z91.89 V15.89 2. Pulmonary emphysema, unspecified emphysema type ([facility] J43.9 492.8 3. Polyneuropathy associated with underlying disease ([facility]) G63 357.4 gabapentin (NEURONTIN) 800 mg tablet 4. Type 2 diabetes mellitus with diabetic neuropathy, without long-term current use of insulin (HCC) E11.40 250.60 SITagliptin (JANUVIA) 50 mg tablet  
  357.2 Orders Placed This Encounter  gabapentin (NEURONTIN) 800 mg tablet Sig: TAKE 1 TABLET BY MOUTH THREE TIMES DAILY Dispense:  270 Tab Refill:  1 Please consider 90 day supplies to promote better adherence  SITagliptin (JANUVIA) 50 mg tablet Sig: Take 1 Tab by mouth daily. Dispense:  90 Tab Refill:  1 Cont current mngmnt F/u pulmonary There are no Patient Instructions on file for this visit. Follow-up and Dispositions · Return in about 3 months (around 7/19/2019) for DM f/u. I have reviewed with the patient details of the assessment and plan and all questions were answered. Relevent patient education was performed. The most recent lab findings were reviewed with the patient. An After Visit Summary was printed and given to the patient.

## 2019-04-19 NOTE — PROGRESS NOTES
Chief Complaint Patient presents with  Transitions Of Care 3/23/2019-3/28/2019 [address] for resp failure 1. Have you been to the ER, urgent care clinic since your last visit? Hospitalized since your last visit? Yes When: 4/16/2019 [facility] for insect bite 2. Have you seen or consulted any other health care providers outside of the [address] [place] [address] since your last visit? Include any pap smears or colon screening.  No

## 2019-04-22 NOTE — PROGRESS NOTES
[address] OUTPATIENT physical Therapy discharge note 4/22/2019: 
Patient will be discharged from physical therapy at this time. Criteria for termination of care: 
 
[]           Patient has plateaued 
[x]           Patient has not returned to therapy 
[]           Patient has missed three or more visits without prior notification 
[]           Other: 
 
Patient was seen for 6 visits from 12/4/18 to 1/18/19. Please refer to the most recent progress note for the latest PT info available. If you need anything further faxed to you, please contact us at [phone].  
 
Thank you for this referral. 
Kishor Woo, PT

## 2019-04-29 ENCOUNTER — PATIENT OUTREACH (OUTPATIENT)
Dept: INTERNAL MEDICINE CLINIC | Age: 69
End: 2019-04-29

## 2019-04-29 NOTE — PROGRESS NOTES
This writer has attempted to reach pt for final follow up, today's call goes unanswered lmom with contact information and reason for the call. This writer will attempt to reach pt in 1-2 bus days.

## 2019-04-30 ENCOUNTER — PATIENT OUTREACH (OUTPATIENT)
Dept: INTERNAL MEDICINE CLINIC | Age: 69
End: 2019-04-30

## 2019-04-30 NOTE — PROGRESS NOTES
Patient has graduated from the Transitions of Care Coordination  program on 4/30. Patient's symptoms are stable at this time. Patient/family has the ability to self-manage. Care management goals have been completed at this time. No further nurse navigator follow up scheduled. Goals Addressed This Visit's Progress  COMPLETED: Reduce risk of COPD Exacerbations (ie. managing triggers, health maintenance with COPD). Ms Jacob Rajan has chronic COPD that's been pretty controlled until recent admission. Pt reports feelings of fluid retention but not visible to her. Upon evaluation pt reports being told there was a fluid build up in her lungs. At home, over the next 30 days, she as agreed to be compliant with all medications, will use her incentive spirometer during commercial breaks in repetitions of 10, practice good handwashing, and will inquire about her new landlords changing her air filters frequently d/t her diagnosis and allergy season approaching. Reassess by 4/11 (ms). 
 
4/11: Ms Mehta since our last conversation has been seen in the ED for continued sob, diagnosed with Bronchitis. She was given a Z-Pack and Prednisone. Ms Mehta also tells this writer that she's been in touch with her cardiologist r/t her symptoms and feeling of heaviness in her lower extremities/weight gain, her lasix was adjust and today she reports feeling much better. 4/30: Pt has followed up with PCP, today reports copd control and no concerns at this time. Pt has nurse navigator's contact information for any further questions, concerns, or needs. Patients upcoming visits:   
Future Appointments Date Time Provider Bobby Hernandezi 5/13/2019  9:00 AM You Franco MD [address] [facility]  
5/23/2019 10:00 AM PACEMAKER, [facility] OBDULIO SCHED  
5/23/2019 10:15 AM Baltazar Click, Elon Aschoff, MD [facility] OBDULIO SCHED  
7/22/2019  8:15 AM Krista Sow MD [address] [place], [place] [address]y

## 2019-05-09 ENCOUNTER — DOCUMENTATION ONLY (OUTPATIENT)
Dept: INTERNAL MEDICINE CLINIC | Age: 69
End: 2019-05-09

## 2019-05-09 RX ORDER — COLCHICINE 0.6 MG/1
TABLET ORAL
Qty: 60 TAB | Refills: 5 | Status: SHIPPED | OUTPATIENT
Start: 2019-05-09 | End: 2019-05-23 | Stop reason: SDUPTHER

## 2019-05-21 ENCOUNTER — HOSPITAL ENCOUNTER (EMERGENCY)
Age: 69
Discharge: HOME | End: 2019-05-21
Attending: EMERGENCY MEDICINE
Payer: MEDICARE

## 2019-05-21 ENCOUNTER — APPOINTMENT (OUTPATIENT)
Dept: GENERAL RADIOLOGY | Age: 69
End: 2019-05-21
Attending: EMERGENCY MEDICINE
Payer: MEDICARE

## 2019-05-21 VITALS
DIASTOLIC BLOOD PRESSURE: 52 MMHG | WEIGHT: 160 LBS | RESPIRATION RATE: 11 BRPM | TEMPERATURE: 97.5 F | HEIGHT: 66 IN | SYSTOLIC BLOOD PRESSURE: 106 MMHG | HEART RATE: 77 BPM | BODY MASS INDEX: 25.71 KG/M2 | OXYGEN SATURATION: 93 %

## 2019-05-21 DIAGNOSIS — J43.8 OTHER EMPHYSEMA (HCC): ICD-10-CM

## 2019-05-21 DIAGNOSIS — R07.9 ACUTE CHEST PAIN: Primary | ICD-10-CM

## 2019-05-21 LAB
ALBUMIN SERPL-MCNC: 3.1 G/DL (ref 3.5–5)
ALBUMIN/GLOB SERPL: 0.6 {RATIO} (ref 1.1–2.2)
ALP SERPL-CCNC: 114 U/L (ref 45–117)
ALT SERPL-CCNC: 16 U/L (ref 12–78)
ANION GAP SERPL CALC-SCNC: 8 MMOL/L (ref 5–15)
AST SERPL-CCNC: 20 U/L (ref 15–37)
BASOPHILS # BLD: 0 K/UL (ref 0–0.1)
BASOPHILS NFR BLD: 0 % (ref 0–1)
BILIRUB SERPL-MCNC: 0.8 MG/DL (ref 0.2–1)
BUN SERPL-MCNC: 16 MG/DL (ref 6–20)
BUN/CREAT SERPL: 12 (ref 12–20)
CALCIUM SERPL-MCNC: 9.1 MG/DL (ref 8.5–10.1)
CHLORIDE SERPL-SCNC: 107 MMOL/L (ref 97–108)
CO2 SERPL-SCNC: 25 MMOL/L (ref 21–32)
CREAT SERPL-MCNC: 1.35 MG/DL (ref 0.55–1.02)
D DIMER PPP FEU-MCNC: 0.6 MG/L FEU (ref 0–0.65)
DIFFERENTIAL METHOD BLD: ABNORMAL
EOSINOPHIL # BLD: 0.2 K/UL (ref 0–0.4)
EOSINOPHIL NFR BLD: 2 % (ref 0–7)
ERYTHROCYTE [DISTWIDTH] IN BLOOD BY AUTOMATED COUNT: 13.3 % (ref 11.5–14.5)
GLOBULIN SER CALC-MCNC: 5.1 G/DL (ref 2–4)
GLUCOSE SERPL-MCNC: 134 MG/DL (ref 65–100)
HCT VFR BLD AUTO: 37.6 % (ref 35–47)
HGB BLD-MCNC: 11.8 G/DL (ref 11.5–16)
IMM GRANULOCYTES # BLD AUTO: 0 K/UL (ref 0–0.04)
IMM GRANULOCYTES NFR BLD AUTO: 1 % (ref 0–0.5)
LYMPHOCYTES # BLD: 2.4 K/UL (ref 0.8–3.5)
LYMPHOCYTES NFR BLD: 27 % (ref 12–49)
MCH RBC QN AUTO: 27.2 PG (ref 26–34)
MCHC RBC AUTO-ENTMCNC: 31.4 G/DL (ref 30–36.5)
MCV RBC AUTO: 86.6 FL (ref 80–99)
MONOCYTES # BLD: 1.7 K/UL (ref 0–1)
MONOCYTES NFR BLD: 19 % (ref 5–13)
NEUTS SEG # BLD: 4.5 K/UL (ref 1.8–8)
NEUTS SEG NFR BLD: 51 % (ref 32–75)
NRBC # BLD: 0 K/UL (ref 0–0.01)
NRBC BLD-RTO: 0 PER 100 WBC
PLATELET # BLD AUTO: 207 K/UL (ref 150–400)
PMV BLD AUTO: 10.5 FL (ref 8.9–12.9)
POTASSIUM SERPL-SCNC: 3.6 MMOL/L (ref 3.5–5.1)
PROT SERPL-MCNC: 8.2 G/DL (ref 6.4–8.2)
RBC # BLD AUTO: 4.34 M/UL (ref 3.8–5.2)
SODIUM SERPL-SCNC: 140 MMOL/L (ref 136–145)
TROPONIN I BLD-MCNC: <0.04 NG/ML (ref 0–0.08)
TROPONIN I SERPL-MCNC: <0.05 NG/ML
WBC # BLD AUTO: 8.8 K/UL (ref 3.6–11)

## 2019-05-21 PROCEDURE — 85379 FIBRIN DEGRADATION QUANT: CPT

## 2019-05-21 PROCEDURE — 96374 THER/PROPH/DIAG INJ IV PUSH: CPT

## 2019-05-21 PROCEDURE — 71046 X-RAY EXAM CHEST 2 VIEWS: CPT

## 2019-05-21 PROCEDURE — 93005 ELECTROCARDIOGRAM TRACING: CPT

## 2019-05-21 PROCEDURE — 80053 COMPREHEN METABOLIC PANEL: CPT

## 2019-05-21 PROCEDURE — 74011250636 HC RX REV CODE- 250/636: Performed by: EMERGENCY MEDICINE

## 2019-05-21 PROCEDURE — 85025 COMPLETE CBC W/AUTO DIFF WBC: CPT

## 2019-05-21 PROCEDURE — 96375 TX/PRO/DX INJ NEW DRUG ADDON: CPT

## 2019-05-21 PROCEDURE — 99285 EMERGENCY DEPT VISIT HI MDM: CPT

## 2019-05-21 PROCEDURE — 84484 ASSAY OF TROPONIN QUANT: CPT

## 2019-05-21 PROCEDURE — 36415 COLL VENOUS BLD VENIPUNCTURE: CPT

## 2019-05-21 RX ORDER — MORPHINE SULFATE 4 MG/ML
4 INJECTION INTRAVENOUS
Status: COMPLETED | OUTPATIENT
Start: 2019-05-21 | End: 2019-05-21

## 2019-05-21 RX ORDER — ONDANSETRON 2 MG/ML
4 INJECTION INTRAMUSCULAR; INTRAVENOUS
Status: COMPLETED | OUTPATIENT
Start: 2019-05-21 | End: 2019-05-21

## 2019-05-21 RX ADMIN — MORPHINE SULFATE 4 MG: 4 INJECTION INTRAVENOUS at 09:26

## 2019-05-21 RX ADMIN — ONDANSETRON 4 MG: 2 INJECTION INTRAMUSCULAR; INTRAVENOUS at 09:25

## 2019-05-21 NOTE — DISCHARGE INSTRUCTIONS

## 2019-05-21 NOTE — ED PROVIDER NOTES
EMERGENCY DEPARTMENT HISTORY AND PHYSICAL EXAM 
 
 
Date: 5/21/2019 Patient Name: Ada Pierson History of Presenting Illness Chief Complaint Patient presents with  Chest Pain  
  pt arrrives by EMS with reports of chest pain that began during the night, pt reports fever this morning that she took Tylenol for, has not been feeling well in past several days History Provided By: Patient HPI: Ada Pierson, 80 y.o. female with PMHx significant for atrial fibrillation, on Eliquis here with pain her left chest and armpit area starting at approximately 1 AM this morning. Patient notes it is worse with deep breath rates it a 7 out of 10 no radiation to the back jaw or shoulder. Patient denies any heavy lifting, some intermittent sweating but no shortness of breath or nausea. She denies any syncope or seizure and does not feel any palpitations. Denies any swelling of the lower legs though she says they \"feel swollen\". He has not had a recent cough or known chest trauma. There are no other complaints, changes, or physical findings at this time. PCP: Salina Richard MD 
 
No current facility-administered medications on file prior to encounter. Current Outpatient Medications on File Prior to Encounter Medication Sig Dispense Refill  colchicine 0.6 mg tablet TAKE 2 TABLETS BY MOUTH ONCE DAILY **TAKE  1.2  MG  (2 TABLETS) DAILY  AND  2.4  MG  (4  TABLETS)  AS  NEEDED  FOR  FLARE  UP** 60 Tab 5  
 gabapentin (NEURONTIN) 800 mg tablet TAKE 1 TABLET BY MOUTH THREE TIMES DAILY 270 Tab 1  
 SITagliptin (JANUVIA) 50 mg tablet Take 1 Tab by mouth daily. 90 Tab 1  
 tiotropium (SPIRIVA WITH HANDIHALER) 18 mcg inhalation capsule INHALE THE CONTENTS OF 1 CAPSULE THROUGH HANDIHALER DEVICE DAILY 90 Cap 1  
 acetaminophen (TYLENOL) 325 mg tablet Take 2 Tabs by mouth every four (4) hours as needed for Pain or Fever.  40 Tab 0  
  guaiFENesin ER (MUCINEX) 600 mg ER tablet Take 1 Tab by mouth two (2) times a day. 20 Tab 0  predniSONE (DELTASONE) 20 mg tablet Take 20 mg by mouth daily (with breakfast). 3 Tab 0  
 benzocaine-menthol (CHLORASEPTIC MAX) 15-10 mg lozg lozenge Take 1 Lozenge by mouth every four to six (4-6) hours as needed for Sore throat. 30 Each 0  
 colchicine (COLCRYS) 0.6 mg tablet Take 1.2 mg by mouth as needed (for flare ups or when she would like to eat seafood (ie. shrimp)).  simvastatin (ZOCOR) 20 mg tablet Take 1 Tab by mouth nightly. Increased 9/1018 90 Tab 1  
 furosemide (LASIX) 20 mg tablet TAKE 1 TABLET BY MOUTH ONCE DAILY 90 Tab 1  
 albuterol (PROVENTIL HFA, VENTOLIN HFA, PROAIR HFA) 90 mcg/actuation inhaler Take 2 Puffs by inhalation every four (4) hours as needed for Wheezing. 1 Inhaler 1  
 albuterol (PROVENTIL VENTOLIN) 2.5 mg /3 mL (0.083 %) nebulizer solution 3 mL by Nebulization route every four (4) hours as needed for Wheezing. 1 Package 5  
 apixaban (ELIQUIS) 5 mg tablet Take 1 Tab by mouth two (2) times a day. Resume from tomorrow 7/21/2018. 56 Tab 0  
 SYMBICORT 160-4.5 mcg/actuation HFAA INHALE ONE PUFF BY MOUTH TWICE DAILY 1 Inhaler 3  clopidogrel (PLAVIX) 75 mg tab TAKE ONE TABLET BY MOUTH ONCE DAILY 30 Tab 11  
 carvedilol (COREG) 6.25 mg tablet Take 1 Tab by mouth two (2) times daily (with meals). 60 Tab 11  
 fluticasone (FLONASE) 50 mcg/actuation nasal spray 2 Sprays by Both Nostrils route daily as needed.  cod liver oil cap Take 1 Cap by mouth daily. Past History Past Medical History: 
Past Medical History:  
Diagnosis Date  Arthritis   
 left shoulder  Atrial fibrillation ([facility] 6/2/2010 Dr. Angeline Nixon  CAD (coronary artery disease) stent; Dr. Angeline Nixon  Celiac disease  Chronic diastolic heart failure ([facility] 09/22/2014 Dr. Angeline Nixon  Chronic kidney disease   
 per cardio note  Chronic pain left thigh:  L SFA intervention by Dr. Roberta Velasquez 2018, as of 18:  still causing pain, pt to have MILLA checked per Dr. Roberta Velasquez note  Chronic systolic HF (heart failure) ([facility] 5/10/2017  
 2017 EF 25-30%  COPD (chronic obstructive pulmonary disease) ([facility] 2010 Dr. Edgardo Patterson  Diabetes ([facility] Dr. Iván Salmon; no current medication per pt  Emphysema, unspecified ([address] Dr. Edgardo Patterson  Fibroid  Frequent falls   Gout  Heart failure ([facility] Dr. Angeline Nixon  History of Clostridium difficile infection 5/10/2017  
 2017 CDiff positive  Hypertension 2010 Dr. Carmel Corcoran  Hypertensive heart and chronic kidney disease   
 per PCP note  Hypotension 5/10/2017  Junctional tachycardia ([facility] 5/10/2017 Dr. Angeline Nixon  Neuropathy  NIDDM (non-insulin dependent diabetes mellitus) 2010  PAD (peripheral artery disease) ([facility]  S/P ablation of atrial fibrillation 2018  Screening mammogram 5/4/10  
 SOB (shortness of breath) 2014 Dr. Edgardo Patterson  SSS [facility]) [facility]   
 per pacemaker form 18  Weakness   
 per pt weakness from c-diff , mulitple falls with injury to rotator cuff Past Surgical History: 
Past Surgical History:  
Procedure Laterality Date  CARDIAC SURG PROCEDURE UNLIST    
 3 cardiac stent placed  COLONOSCOPY N/A 2017 COLONOSCOPY with biopsy performed by Oxana Mullins MD at [facility] AMBULATORY OR  
 HX AFIB ABLATION  2018  
 has had 2 ablations per patient  HX  SECTION    
 HX HEART CATHETERIZATION  2018  HX OTHER SURGICAL    
 adrenal gland removed  HX PACEMAKER Left [employer] model: [place] [employer]  TX EXCISE ADRENAL GLAND  VASCULAR SURGERY PROCEDURE UNLIST  2018 Left SFA intervention ; Dr. Roberta Velasquez Family History: 
Family History Problem Relation Age of Onset  Heart Disease Mother  Diabetes Father  Heart Disease Brother Social History: 
Social History Tobacco Use  Smoking status: Former Smoker Types: Cigarettes Start date: 5 Last attempt to quit: 2009 Years since quittin.3  Smokeless tobacco: Never Used Substance Use Topics  Alcohol use: No  
 Drug use: No  
 
 
Allergies: Allergies Allergen Reactions  Crestor [Rosuvastatin] Myalgia  Levaquin [Levofloxacin] Nausea Only GI Upset  Lipitor [Atorvastatin] Diarrhea  Lyrica [Pregabalin] Myalgia Review of Systems Review of Systems Constitutional: Negative. HENT: Negative. Eyes: Negative. Respiratory: Negative for apnea, cough, choking, chest tightness, shortness of breath, wheezing and stridor. Cardiovascular: Positive for chest pain. Negative for palpitations and leg swelling. Gastrointestinal: Negative. Endocrine: Negative. Genitourinary: Negative. Musculoskeletal: Negative. Neurological: Negative. Hematological: Negative. All other systems reviewed and are negative. Physical Exam  
Physical Exam  
Vital signs and nursing notes reviewed CONSTITUTIONAL: Alert, in no apparent distress; well-developed; well-nourished. HEAD:  Normocephalic, atraumatic EYES: PERRL; EOM's intact. ENTM: Nose: no rhinorrhea; Throat: no erythema or exudate, mucous membranes moist 
Neck:  Supple. trachea is midline. RESP: Chest clear, equal breath sounds. - W/R/R, reproducible chest tenderness along the left chest wall without any overlying vesicles, abrasions contusions or lacerations, no rib crepitus. CV: S1 and S2 WNL; No murmurs, gallops or rubs. 2+ radial and DP pulses bilaterally. Right arm blood pressure 108/54, left arm blood pressure 112/56. GI: non-distended, normal bowel sounds, abdomen soft and non-tender. No masses or organomegaly. : No costo-vertebral angle tenderness. BACK:  Non-tender, normal appearance UPPER EXT:  Normal inspection. no joint or soft tissue swelling LOWER EXT: No edema, no calf tenderness. NEURO: Alert and oriented x3, 5/5 strength and light touch sensation intact in bilateral upper and lower extremities. SKIN: No rashes; Warm and dry PSYCH: Normal mood, normal affect Diagnostic Study Results Labs - Recent Results (from the past 12 hour(s)) EKG, 12 LEAD, INITIAL Collection Time: 05/21/19  9:03 AM  
Result Value Ref Range Ventricular Rate 79 BPM  
 Atrial Rate 92 BPM  
 P-R Interval 240 ms QRS Duration 106 ms  
 Q-T Interval 390 ms QTC Calculation (Bezet) 447 ms Calculated R Axis -7 degrees Calculated T Axis 108 degrees Diagnosis Atrial-paced rhythm with prolonged AV conduction with occasional  
ventricular-paced complexes T wave abnormality, consider lateral ischemia When compared with ECG of 08-APR-2019 10:18, 
Electronic ventricular pacemaker has replaced Electronic atrial pacemaker CBC WITH AUTOMATED DIFF Collection Time: 05/21/19  9:11 AM  
Result Value Ref Range WBC 8.8 3.6 - 11.0 K/uL  
 RBC 4.34 3.80 - 5.20 M/uL  
 HGB 11.8 11.5 - 16.0 g/dL HCT 37.6 35.0 - 47.0 % MCV 86.6 80.0 - 99.0 FL  
 MCH 27.2 26.0 - 34.0 PG  
 MCHC 31.4 30.0 - 36.5 g/dL  
 RDW 13.3 11.5 - 14.5 % PLATELET 408 146 - 313 K/uL MPV 10.5 8.9 - 12.9 FL  
 NRBC 0.0 0  WBC ABSOLUTE NRBC 0.00 0.00 - 0.01 K/uL NEUTROPHILS 51 32 - 75 % LYMPHOCYTES 27 12 - 49 % MONOCYTES 19 (H) 5 - 13 % EOSINOPHILS 2 0 - 7 % BASOPHILS 0 0 - 1 % IMMATURE GRANULOCYTES 1 (H) 0.0 - 0.5 % ABS. NEUTROPHILS 4.5 1.8 - 8.0 K/UL  
 ABS. LYMPHOCYTES 2.4 0.8 - 3.5 K/UL  
 ABS. MONOCYTES 1.7 (H) 0.0 - 1.0 K/UL  
 ABS. EOSINOPHILS 0.2 0.0 - 0.4 K/UL  
 ABS. BASOPHILS 0.0 0.0 - 0.1 K/UL  
 ABS. IMM. GRANS. 0.0 0.00 - 0.04 K/UL  
 DF AUTOMATED METABOLIC PANEL, COMPREHENSIVE Collection Time: 05/21/19  9:11 AM  
Result Value Ref Range Sodium 140 136 - 145 mmol/L  Potassium 3.6 3.5 - 5.1 mmol/L  
 Chloride 107 97 - 108 mmol/L  
 CO2 25 21 - 32 mmol/L Anion gap 8 5 - 15 mmol/L Glucose 134 (H) 65 - 100 mg/dL BUN 16 6 - 20 MG/DL Creatinine 1.35 (H) 0.55 - 1.02 MG/DL  
 BUN/Creatinine ratio 12 12 - 20 GFR est AA 47 (L) >60 ml/min/1.73m2 GFR est non-AA 39 (L) >60 ml/min/1.73m2 Calcium 9.1 8.5 - 10.1 MG/DL Bilirubin, total 0.8 0.2 - 1.0 MG/DL  
 ALT (SGPT) 16 12 - 78 U/L  
 AST (SGOT) 20 15 - 37 U/L Alk. phosphatase 114 45 - 117 U/L Protein, total 8.2 6.4 - 8.2 g/dL Albumin 3.1 (L) 3.5 - 5.0 g/dL Globulin 5.1 (H) 2.0 - 4.0 g/dL A-G Ratio 0.6 (L) 1.1 - 2.2    
TROPONIN I Collection Time: 05/21/19  9:11 AM  
Result Value Ref Range Troponin-I, Qt. <0.05 <0.05 ng/mL D DIMER Collection Time: 05/21/19 10:50 AM  
Result Value Ref Range D-dimer 0.60 0.00 - 0.65 mg/L FEU  
POC TROPONIN-I Collection Time: 05/21/19 11:30 AM  
Result Value Ref Range Troponin-I (POC) <0.04 0.00 - 0.08 ng/mL Radiologic Studies -  
XR CHEST PA LAT Final Result IMPRESSION:  
1. No pneumonia 2. Emphysema as described. This is fairly severe in the mid and upper lung  
zones. CT Results  (Last 48 hours) None CXR Results  (Last 48 hours) 05/21/19 0948  XR CHEST PA LAT Final result Impression:  IMPRESSION:  
1. No pneumonia 2. Emphysema as described. This is fairly severe in the mid and upper lung  
zones. Narrative:  Exam:  2 view chest  
   
Indication: Chest pain, fever COMPARISON: 4/8/2019 PA and lateral views demonstrate normal heart size. Left-sided asymmetric or is  
in place. The lungs demonstrate fairly severe appearing emphysema in the mid and upper  
lung zones. Chronic reticular opacities in the mid lower lung zones are  
unchanged. No pneumonia no pulmonary edema. Medical Decision Making I am the first provider for this patient. I reviewed the vital signs, available nursing notes, past medical history, past surgical history, family history and social history. Vital Signs-Reviewed the patient's vital signs. Patient Vitals for the past 12 hrs: 
 Temp Pulse Resp BP SpO2  
05/21/19 1245  77 11 106/52 93 % 05/21/19 1215  77 11 106/51 96 % 05/21/19 1115  77 10 108/59 94 % 05/21/19 0930  79 22 116/51 97 % 05/21/19 0904 97.5 °F (36.4 °C) 73 18 128/61 97 % Pulse Oximetry Analysis -94% on room air. Cardiac Monitor:  
Rate: 77 bpm 
Rhythm: Paced EKG interpretation: (Preliminary) EKG performed at 9:03 AM shows an atrial paced rhythm at a rate of 79, normal axis, slightly widened QRS interval occasional ventricular paced complexes T wave inversions present in 1 aVL V5 V6, similar T wave inversion pattern to April 8, 2019 EKG, possible early ST elevations in V2 V3. Repeat EKG performed at 9:22 AM shows no lingering ST elevation changes an atrial paced rhythm with AV conduction that is prolonged occasional sinus complexes rate of 80, normal axis, T wave inversions persist, similar to previous pattern. Records Reviewed: Nursing Notes, Old Medical Records, Previous electrocardiograms, Previous Radiology Studies and Previous Laboratory Studies Provider Notes (Medical Decision Making):  
21-year-old female with chest pain with reassuring troponin x2, appropriate age-adjusted d-dimer not requiring additional radiology, not hypoxic, not tachycardic. No abdominal pain to suggest intra-abdominal cause. Will discuss with cardiology for ultimate disposition but likely close follow-up. ED Course:  
Initial assessment performed. The patients presenting problems have been discussed, and they are in agreement with the care plan formulated and outlined with them. I have encouraged them to ask questions as they arise throughout their visit.  
 
  
 
1:10 PM 
 Patient without chest pain. Reviewed lab findings and EKG, reassuring d-dimer with patient. Patient has a follow-up with Dr. Gustabo Willson for 5/30/2019. Discussed return precautions with patient plan for discharge. Disposition: 
Discharge Discharge Note: 
12:23 PM 
The pt is ready for discharge. The pt's signs, symptoms, diagnosis, and discharge instructions have been discussed and pt has conveyed their understanding. The pt is to follow up as recommended or return to ER should their symptoms worsen. Plan has been discussed and pt is in agreement. PLAN: 
1. Current Discharge Medication List  
  
 
2. Follow-up Information Follow up With Specialties Details Why Contact Info Maria Stewart MD Cardiology, [address] [place] [address] Vascular Surgery, Internal Medicine Schedule an appointment as soon as possible for a visit  [address] [place] [address] 
[phone] [facility] EMERGENCY DEPT Emergency Medicine In 2 days If symptoms worsen including new chest pain, new difficulty breathing or other new concerning symptoms. [address] [facility] [address] [facility] 
[phone] Return to ED if worse Diagnosis Clinical Impression: 1. Acute chest pain 2. Other emphysema ([facility]

## 2019-05-21 NOTE — ED NOTES
Pt arrives by EMS with reports of chest pain that began at 0230 this morning, reports she has not felt well since Saturday, had a temp of 101 this morning that she took Tylenol for, reports chest pain comes and goes and increases with movement and deep breathing, reports she has a pacemaker (HOYA), reports she had tingling in arms on Saturday

## 2019-05-22 LAB
ATRIAL RATE: 80 BPM
ATRIAL RATE: 92 BPM
CALCULATED R AXIS, ECG10: -22 DEGREES
CALCULATED R AXIS, ECG10: -7 DEGREES
CALCULATED T AXIS, ECG11: 108 DEGREES
CALCULATED T AXIS, ECG11: 120 DEGREES
DIAGNOSIS, 93000: NORMAL
DIAGNOSIS, 93000: NORMAL
P-R INTERVAL, ECG05: 238 MS
P-R INTERVAL, ECG05: 240 MS
Q-T INTERVAL, ECG07: 390 MS
Q-T INTERVAL, ECG07: 394 MS
QRS DURATION, ECG06: 106 MS
QRS DURATION, ECG06: 126 MS
QTC CALCULATION (BEZET), ECG08: 447 MS
QTC CALCULATION (BEZET), ECG08: 454 MS
VENTRICULAR RATE, ECG03: 79 BPM
VENTRICULAR RATE, ECG03: 80 BPM

## 2019-05-23 ENCOUNTER — CLINICAL SUPPORT (OUTPATIENT)
Dept: CARDIOLOGY CLINIC | Age: 69
End: 2019-05-23

## 2019-05-23 ENCOUNTER — OFFICE VISIT (OUTPATIENT)
Dept: CARDIOLOGY CLINIC | Age: 69
End: 2019-05-23

## 2019-05-23 VITALS
HEIGHT: 66 IN | WEIGHT: 161 LBS | SYSTOLIC BLOOD PRESSURE: 110 MMHG | OXYGEN SATURATION: 95 % | RESPIRATION RATE: 18 BRPM | HEART RATE: 76 BPM | BODY MASS INDEX: 25.88 KG/M2 | DIASTOLIC BLOOD PRESSURE: 50 MMHG

## 2019-05-23 DIAGNOSIS — I10 ESSENTIAL HYPERTENSION: ICD-10-CM

## 2019-05-23 DIAGNOSIS — Z98.890 S/P ABLATION OF ATRIAL FIBRILLATION: ICD-10-CM

## 2019-05-23 DIAGNOSIS — I49.5 SICK SINUS SYNDROME (HCC): ICD-10-CM

## 2019-05-23 DIAGNOSIS — Z86.79 S/P ABLATION OF ATRIAL FIBRILLATION: ICD-10-CM

## 2019-05-23 DIAGNOSIS — J44.9 CHRONIC OBSTRUCTIVE PULMONARY DISEASE, UNSPECIFIED COPD TYPE (HCC): ICD-10-CM

## 2019-05-23 DIAGNOSIS — Z45.018 PACEMAKER REPROGRAMMING/CHECK: Primary | ICD-10-CM

## 2019-05-23 DIAGNOSIS — I48.91 ATRIAL FIBRILLATION, UNSPECIFIED TYPE (HCC): ICD-10-CM

## 2019-05-23 DIAGNOSIS — Z95.0 CARDIAC PACEMAKER IN SITU: Primary | ICD-10-CM

## 2019-05-23 NOTE — PROGRESS NOTES
1. Have you been to the ER, urgent care clinic since your last visit? Hospitalized since your last visit? ED [facility] ER Monday for arm pain, ED [facility] ER March 2019, resp failure    2. Have you seen or consulted any other health care providers outside of the [address] [place] [address] since your last visit? Include any pap smears or colon screening. No    Chief Complaint   Patient presents with    Pacemaker Check     6 mo appt. C/O SOB with exertion.      \

## 2019-05-23 NOTE — PROGRESS NOTES
Subjective:      Jeaneth May is a 80 y.o. female is here for device follow up s/p AF ablation 3/2018. She has some shortness of breath with exertion, recent hospitalization for acute respiratory failure. The patient denies chest pain, orthopnea, PND, LE edema, palpitations, syncope, presyncope or fatigue.        Patient Active Problem List    Diagnosis Date Noted    Respiratory failure with hypoxia ([facility] 03/25/2019    S/P rotator cuff repair 09/19/2018    PAD (peripheral artery disease) ([address] 05/22/2018    A-fib ([address] 03/30/2018    Type 2 diabetes mellitus with nephropathy ([facility] 12/27/2017    CKD (chronic kidney disease) stage 3, GFR 30-59 ml/min ([facility]) 09/22/2017    Chronic systolic HF (heart failure) ([facility] 05/10/2017    History of Clostridium difficile infection 05/10/2017    Junctional tachycardia ([address] 05/10/2017    Hypotension 05/10/2017    S/P ablation of atrial fibrillation 05/02/2017    Fear associated with illness and body function 04/07/2017    Counseling regarding advanced care planning and goals of care 18/95/5793    Systolic CHF, acute ([facility] 59/53/3955    Acute systolic CHF (congestive heart failure) ([facility] [facility] 04/06/2017    CHF (congestive heart failure) ([facility] 04/04/2017    Type 2 diabetes mellitus with diabetic neuropathy, without long-term current use of insulin ([address] 01/31/2017    GIB (gastrointestinal bleeding) 04/45/6496    Diastolic CHF, acute on chronic ([address] 09/22/2014    S/P coronary artery stent placement 07/04/2014    Back pain 11/14/2012    Sinoatrial node dysfunction ([address] 07/05/2012    Cardiac pacemaker in situ 07/05/2012    Mixed hyperlipidemia 07/05/2012    Chest pain 09/21/2011    Sick sinus syndrome ([address] 02/25/2011    S/P angioplasty with stent 02/07/2011    Hypokalemia 07/20/2010    Hip pain 06/08/2010    Benign hypertensive heart and CKD, stage 3 (GFR 30-59), w CHF ([facility] 06/02/2010    Atrial fibrillation--paroxysmal 06/02/2010    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 2010      Brooke Le MD  Past Medical History:   Diagnosis Date    Arthritis     left shoulder    Atrial fibrillation ([facility] 2010    Dr. Kami Skinner CAD (coronary artery disease)     stent; Dr. Kami Skinner Celiac disease     Chronic diastolic heart failure ([address] 2014    Dr. Adarsh Astudillo    Chronic kidney disease     per cardio note    Chronic pain     left thigh:  L SFA intervention by Dr. Willie Avina 2018, as of 18:  still causing pain, pt to have MILLA checked per Dr. Willie Avina note    Chronic systolic HF (heart failure) ([facility] [facility] 5/10/2017    2017 EF 25-30%    COPD (chronic obstructive pulmonary disease) ([facility] [facility] 2010    Dr. Sidra Rincon     Diabetes [facility])     Dr. Haylee Ratliff; no current medication per pt    Emphysema, unspecified ([facility] [address]     Dr. Aaron Velazco Frequent falls     Gout     Heart failure ([facility] [address]     Dr. Kami Skinner History of Clostridium difficile infection 5/10/2017    2017 CDiff positive    Hypertension 2010    Dr. Chitra Merino Hypertensive heart and chronic kidney disease     per PCP note    Hypotension 5/10/2017    Junctional tachycardia ([facility] 5/10/2017    Dr. Adarsh Astudillo    Neuropathy     NIDDM (non-insulin dependent diabetes mellitus) 2010    PAD (peripheral artery disease) ([facility] [facility]     S/P ablation of atrial fibrillation 2018    Screening mammogram 5/4/10    SOB (shortness of breath) 2014    Dr. Rannie Hodgkin (sick sinus syndrome) ([facility] [address]     per pacemaker form 18    Weakness     per pt weakness from c-diff , mulitple falls with injury to rotator cuff      Past Surgical History:   Procedure Laterality Date    CARDIAC SURG PROCEDURE UNLIST      3 cardiac stent placed     COLONOSCOPY N/A 2017    COLONOSCOPY with biopsy performed by Deanne Cristobal MD at [address] HX AFIB ABLATION  2018    has had 2 ablations per patient    HX  SECTION      HX HEART CATHETERIZATION  2018    HX OTHER SURGICAL      adrenal gland removed    HX PACEMAKER Left     [employer] model: Vaibhav    SUDHIR EXCISE ADRENAL GLAND      VASCULAR SURGERY PROCEDURE UNLIST  2018    Left SFA intervention ; Dr. Yuval Santo [Rosuvastatin] Myalgia    Levaquin [Levofloxacin] Nausea Only     GI Upset    Lipitor [Atorvastatin] Diarrhea    Lyrica [Pregabalin] Myalgia      Family History   Problem Relation Age of Onset    Heart Disease Mother     Diabetes Father     Heart Disease Brother     negative for cardiac disease  Social History     Socioeconomic History    Marital status:      Spouse name: Not on file    Number of children: Not on file    Years of education: Not on file    Highest education level: Not on file   Tobacco Use    Smoking status: Former Smoker     Types: Cigarettes     Start date:      Last attempt to quit: 2009     Years since quittin.3    Smokeless tobacco: Never Used   Substance and Sexual Activity    Alcohol use: No    Drug use: No    Sexual activity: Not Currently     Current Outpatient Medications   Medication Sig    gabapentin (NEURONTIN) 800 mg tablet TAKE 1 TABLET BY MOUTH THREE TIMES DAILY    SITagliptin (JANUVIA) 50 mg tablet Take 1 Tab by mouth daily.  tiotropium (SPIRIVA WITH HANDIHALER) 18 mcg inhalation capsule INHALE THE CONTENTS OF 1 CAPSULE THROUGH HANDIHALER DEVICE DAILY    acetaminophen (TYLENOL) 325 mg tablet Take 2 Tabs by mouth every four (4) hours as needed for Pain or Fever.  guaiFENesin ER (MUCINEX) 600 mg ER tablet Take 1 Tab by mouth two (2) times a day.  benzocaine-menthol (CHLORASEPTIC MAX) 15-10 mg lozg lozenge Take 1 Lozenge by mouth every four to six (4-6) hours as needed for Sore throat.  colchicine (COLCRYS) 0.6 mg tablet Take 1.2 mg by mouth as needed (for flare ups or when she would like to eat seafood (ie. shrimp)).     simvastatin (ZOCOR) 20 mg tablet Take 1 Tab by mouth nightly. Increased 9/1018    furosemide (LASIX) 20 mg tablet TAKE 1 TABLET BY MOUTH ONCE DAILY    albuterol (PROVENTIL HFA, VENTOLIN HFA, PROAIR HFA) 90 mcg/actuation inhaler Take 2 Puffs by inhalation every four (4) hours as needed for Wheezing.  albuterol (PROVENTIL VENTOLIN) 2.5 mg /3 mL (0.083 %) nebulizer solution 3 mL by Nebulization route every four (4) hours as needed for Wheezing.  apixaban (ELIQUIS) 5 mg tablet Take 1 Tab by mouth two (2) times a day. Resume from tomorrow 7/21/2018.  SYMBICORT 160-4.5 mcg/actuation HFAA INHALE ONE PUFF BY MOUTH TWICE DAILY    clopidogrel (PLAVIX) 75 mg tab TAKE ONE TABLET BY MOUTH ONCE DAILY    carvedilol (COREG) 6.25 mg tablet Take 1 Tab by mouth two (2) times daily (with meals).  fluticasone (FLONASE) 50 mcg/actuation nasal spray 2 Sprays by Both Nostrils route daily as needed.  cod liver oil cap Take 1 Cap by mouth daily. No current facility-administered medications for this visit. Vitals:    05/23/19 0949   BP: 110/50   Pulse: 76   Resp: 18   SpO2: 95%   Weight: 161 lb (73 kg)   Height: 5' 6\" (1.676 m)       I have reviewed the nurses notes, vitals, problem list, allergy list, medical history, family, social history and medications. Review of Symptoms:    General: Pt denies excessive weight gain or loss. Pt is able to conduct ADL's  HEENT: Denies blurred vision, headaches, epistaxis and difficulty swallowing. Respiratory: +sob with exertion, Denies wheezing or stridor. Cardiovascular: Denies precordial pain, palpitations, edema or PND  Gastrointestinal: Denies poor appetite, indigestion, abdominal pain or blood in stool  Urinary: Denies dysuria, pyuria  Musculoskeletal: Denies pain or swelling from muscles or joints  Neurologic: Denies tremor, paresthesias, or sensory motor disturbance  Skin: Denies rash, itching or texture change.   Psych: Denies depression      Physical Exam: General: Well developed, in no acute distress. HEENT: Eyes - PERRL, no jvd  Heart:  Normal S1/S2 negative S3 or S4. Regular, no murmur, gallop or rub. Respiratory: Clear bilaterally x 4, no wheezing or rales  Abdomen:   Soft, non-tender, bowel sounds are active. Extremities:  No edema, normal cap refill, no cyanosis. Musculoskeletal: No clubbing  Neuro: A&Ox3, speech clear, gait stable. Skin: Skin color is normal. No rashes or lesions. Non diaphoretic  Vascular: 2+ pulses symmetric in all extremities    Cardiographics    Ekg: dual chamber pacing    Results for orders placed or performed during the hospital encounter of 05/21/19   EKG, 12 LEAD, INITIAL   Result Value Ref Range    Ventricular Rate 80 BPM    Atrial Rate 80 BPM    P-R Interval 238 ms    QRS Duration 126 ms    Q-T Interval 394 ms    QTC Calculation (Bezet) 454 ms    Calculated R Axis -22 degrees    Calculated T Axis 120 degrees    Diagnosis       Atrial-paced rhythm with prolonged AV conduction with occasional sinus   complexes and premature atrial complexes  Nonspecific intraventricular block  T wave abnormality, consider lateral ischemia    Confirmed by YAZAN Chapin ([id number]) on 5/22/2019 4:09:59 PM       *Note: Due to a large number of results and/or encounters for the requested time period, some results have not been displayed. A complete set of results can be found in Results Review.          Lab Results   Component Value Date/Time    WBC 8.8 05/21/2019 09:11 AM    Hemoglobin (POC) 11.2 07/20/2017 09:02 AM    HGB 11.8 05/21/2019 09:11 AM    HCT 37.6 05/21/2019 09:11 AM    PLATELET 610 14/78/6154 09:11 AM    MCV 86.6 05/21/2019 09:11 AM      Lab Results   Component Value Date/Time    Sodium 140 05/21/2019 09:11 AM    Potassium 3.6 05/21/2019 09:11 AM    Chloride 107 05/21/2019 09:11 AM    CO2 25 05/21/2019 09:11 AM    Anion gap 8 05/21/2019 09:11 AM    Glucose 134 (H) 05/21/2019 09:11 AM    BUN 16 05/21/2019 09:11 AM    Creatinine 1.35 (H) 05/21/2019 09:11 AM    BUN/Creatinine ratio 12 05/21/2019 09:11 AM    GFR est AA 47 (L) 05/21/2019 09:11 AM    GFR est non-AA 39 (L) 05/21/2019 09:11 AM    Calcium 9.1 05/21/2019 09:11 AM    Bilirubin, total 0.8 05/21/2019 09:11 AM    AST (SGOT) 20 05/21/2019 09:11 AM    Alk. phosphatase 114 05/21/2019 09:11 AM    Protein, total 8.2 05/21/2019 09:11 AM    Albumin 3.1 (L) 05/21/2019 09:11 AM    Globulin 5.1 (H) 05/21/2019 09:11 AM    A-G Ratio 0.6 (L) 05/21/2019 09:11 AM    ALT (SGPT) 16 05/21/2019 09:11 AM         Assessment:     Assessment:        ICD-10-CM ICD-9-CM    1. Pacemaker reprogramming/check Z45.018 V53.31 AMB POC EKG ROUTINE W/ 12 LEADS, INTER & REP   2. Atrial fibrillation, unspecified type ([facility] [address] I48.91 427.31    3. Sick sinus syndrome (HCC) I49.5 427.81    4. S/P ablation of atrial fibrillation Z98.890 V45.89     Z86.79     5. Essential hypertension I10 401.9    6. Chronic obstructive pulmonary disease, unspecified COPD type ([facility] J44.9 496      Orders Placed This Encounter    AMB POC EKG ROUTINE W/ 12 LEADS, INTER & REP     Order Specific Question:   Reason for Exam:     Answer:   routine        Plan:   Ms. Jaime Roque is here for follow up, s/p AF/AFL ablation 3/30/2018. She feels well overall, but is short of breath with exertion. Device interrogation demonstrates normal functioning with 91% AP. RR was turned on and LRL lowered to 70. Will monitor for symptomatic improvement. Continue with eliquis and follow up in one year. Continue medical management for HTN, COPD, AF. Thank you for allowing me to participate in [facility] 's care. Vero Sheehan NP    Patient seen and examined by me with nurse practitioner. I personally performed all components of the history, physical, and medical decision making and agree with the assessment and plan with minor modifications as noted. A paced 91% for sick sinus. On oac for AF. Cont med rx for htn and copd.  F/u in one year    Alyssa Valencia MD, Rita Torres

## 2019-05-31 ENCOUNTER — OFFICE VISIT (OUTPATIENT)
Dept: CARDIOLOGY CLINIC | Age: 69
End: 2019-05-31

## 2019-05-31 VITALS
BODY MASS INDEX: 25.83 KG/M2 | WEIGHT: 160.7 LBS | SYSTOLIC BLOOD PRESSURE: 94 MMHG | OXYGEN SATURATION: 96 % | HEIGHT: 66 IN | RESPIRATION RATE: 16 BRPM | DIASTOLIC BLOOD PRESSURE: 50 MMHG | HEART RATE: 72 BPM

## 2019-05-31 DIAGNOSIS — Z95.5 S/P CORONARY ARTERY STENT PLACEMENT: ICD-10-CM

## 2019-05-31 DIAGNOSIS — I48.0 PAROXYSMAL ATRIAL FIBRILLATION (HCC): Chronic | ICD-10-CM

## 2019-05-31 DIAGNOSIS — I25.10 ASHD (ARTERIOSCLEROTIC HEART DISEASE): Primary | ICD-10-CM

## 2019-05-31 DIAGNOSIS — I49.5 SINOATRIAL NODE DYSFUNCTION (HCC): ICD-10-CM

## 2019-05-31 DIAGNOSIS — I73.9 PAD (PERIPHERAL ARTERY DISEASE) (HCC): ICD-10-CM

## 2019-05-31 DIAGNOSIS — J43.9 PULMONARY EMPHYSEMA, UNSPECIFIED EMPHYSEMA TYPE (HCC): ICD-10-CM

## 2019-05-31 DIAGNOSIS — I50.42 CHRONIC COMBINED SYSTOLIC AND DIASTOLIC CONGESTIVE HEART FAILURE (HCC): ICD-10-CM

## 2019-05-31 NOTE — PROGRESS NOTES
Richi Boykin DNP, ANP-BC  Subjective/HPI:     Theron Moore is a 80 y.o. female is here for routine follow-up visit. Patient was admitted in March for acute respiratory failure secondary to COPD exacerbation, CHF. She denies chest pain, currently has nonproductive cough using nebulizers. Follows weight at home has been fairly stable. Denies orthopnea or paroxysmal nocturnal dyspnea. Has peripheral arterial disease lower extremities she denies pain or cramping with ambulation has a persistent feeling that her ankles are swollen however they are not. Nuclear stress test September 2018: Findings: The overall quality of the study is excellent. Attenuation artifact was noted. Left ventricular cavity is noted to be normal on the rest and stress studies. No TID noted.      SPECT images demonstrate a small, reversible abnormality of mild degree in the anteroseptal region on the stress and rest images. Gated SPECT images reveals normal myocardial thickening and wall motion. The left ventricular ejection fraction was calculated to be 47 %. Impression:   Myocardial perfusion imaging is abnormal: there is a small area of ischemia in the anteroseptal region.  Overall left ventricular systolic function was low normal.     PCP Provider  Selene Nicolas MD  Past Medical History:   Diagnosis Date    Arthritis     left shoulder    Atrial fibrillation ([address] 6/2/2010    Dr. Shae Yost CAD (coronary artery disease)     stent; Dr. Shae Yost Celiac disease     Chronic diastolic heart failure ([address] 09/22/2014    Dr. Dickson Lozoya    Chronic kidney disease     per cardio note    Chronic pain     left thigh:  L SFA intervention by Dr. Margie Francisco 07/2018, as of 9/6/18:  still causing pain, pt to have IMLLA checked per Dr. Margie Francisco note    Chronic systolic HF (heart failure) ([address] 5/10/2017    4/2017 EF 25-30%    COPD (chronic obstructive pulmonary disease) ([address] 6/2/2010    Dr. Wolf Paiz Diabetes [facility]) Dr. Silvia Mcgowan; no current medication per pt    Emphysema, unspecified ([facility]     Dr. Violeta Centeno Frequent falls     Gout     Heart failure ([facility]     Dr. Aldair Andre History of Clostridium difficile infection 5/10/2017    2017 CDiff positive    Hypertension 2010    Dr. Queenie Jolly Hypertensive heart and chronic kidney disease     per PCP note    Hypotension 5/10/2017    Junctional tachycardia ([facility] 5/10/2017    Dr. Murray Gross    Neuropathy     NIDDM (non-insulin dependent diabetes mellitus) 2010    PAD (peripheral artery disease) ([facility]     S/P ablation of atrial fibrillation 2018    Screening mammogram 5/4/10    SOB (shortness of breath) 2014    Dr. Aleida Nino (sick sinus syndrome) ([facility]     per pacemaker form 18    Weakness     per pt weakness from c-diff , mulitple falls with injury to rotator cuff      Past Surgical History:   Procedure Laterality Date    CARDIAC SURG PROCEDURE UNLIST      3 cardiac stent placed     COLONOSCOPY N/A 2017    COLONOSCOPY with biopsy performed by [facility]tran [facility] at [facility] AMBULATORY OR    HX AFIB ABLATION  2018    has had 2 ablations per patient    HX  SECTION      HX HEART CATHETERIZATION  2018    HX OTHER SURGICAL      adrenal gland removed    HX PACEMAKER Left     Biotronik model: Evia    [place] EXCISE ADRENAL GLAND      VASCULAR SURGERY PROCEDURE UNLIST  2018    Left SFA intervention ; Dr. Cabral Forward [Rosuvastatin] Myalgia    Levaquin [Levofloxacin] Nausea Only     GI Upset    Lipitor [Atorvastatin] Diarrhea    Lyrica [Pregabalin] Myalgia      Family History   Problem Relation Age of Onset    Heart Disease Mother     Diabetes Father     Heart Disease Brother       Current Outpatient Medications   Medication Sig    gabapentin (NEURONTIN) 800 mg tablet TAKE 1 TABLET BY MOUTH THREE TIMES DAILY    SITagliptin (JANUVIA) 50 mg tablet Take 1 Tab by mouth daily.  tiotropium (SPIRIVA WITH HANDIHALER) 18 mcg inhalation capsule INHALE THE CONTENTS OF 1 CAPSULE THROUGH HANDIHALER DEVICE DAILY    acetaminophen (TYLENOL) 325 mg tablet Take 2 Tabs by mouth every four (4) hours as needed for Pain or Fever.  guaiFENesin ER (MUCINEX) 600 mg ER tablet Take 1 Tab by mouth two (2) times a day. (Patient taking differently: Take 600 mg by mouth as needed.)    benzocaine-menthol (CHLORASEPTIC MAX) 15-10 mg lozg lozenge Take 1 Lozenge by mouth every four to six (4-6) hours as needed for Sore throat.  colchicine (COLCRYS) 0.6 mg tablet Take 1.2 mg by mouth as needed (for flare ups or when she would like to eat seafood (ie. shrimp)).  simvastatin (ZOCOR) 20 mg tablet Take 1 Tab by mouth nightly. Increased 9/1018    furosemide (LASIX) 20 mg tablet TAKE 1 TABLET BY MOUTH ONCE DAILY    albuterol (PROVENTIL HFA, VENTOLIN HFA, PROAIR HFA) 90 mcg/actuation inhaler Take 2 Puffs by inhalation every four (4) hours as needed for Wheezing.  albuterol (PROVENTIL VENTOLIN) 2.5 mg /3 mL (0.083 %) nebulizer solution 3 mL by Nebulization route every four (4) hours as needed for Wheezing.  apixaban (ELIQUIS) 5 mg tablet Take 1 Tab by mouth two (2) times a day. Resume from tomorrow 7/21/2018.  SYMBICORT 160-4.5 mcg/actuation HFAA INHALE ONE PUFF BY MOUTH TWICE DAILY    clopidogrel (PLAVIX) 75 mg tab TAKE ONE TABLET BY MOUTH ONCE DAILY    carvedilol (COREG) 6.25 mg tablet Take 1 Tab by mouth two (2) times daily (with meals).  fluticasone (FLONASE) 50 mcg/actuation nasal spray 2 Sprays by Both Nostrils route daily as needed.  cod liver oil cap Take 1 Cap by mouth daily. No current facility-administered medications for this visit.        Vitals:    05/31/19 1106 05/31/19 1115   BP: 92/50 94/50   Pulse: 72    Resp: 16    SpO2: 96%    Weight: 160 lb 11.2 oz (72.9 kg)    Height: 5' 6\" (1.676 m)      Social History     Socioeconomic History    Marital status:      Spouse name: Not on file    Number of children: Not on file    Years of education: Not on file    Highest education level: Not on file   Occupational History    Not on file   Social Needs    Financial resource strain: Not on file    Food insecurity:     Worry: Not on file     Inability: Not on file    Transportation needs:     Medical: Not on file     Non-medical: Not on file   Tobacco Use    Smoking status: Former Smoker     Types: Cigarettes     Start date:      Last attempt to quit: 2009     Years since quittin.4    Smokeless tobacco: Never Used   Substance and Sexual Activity    Alcohol use: No    Drug use: No    Sexual activity: Not Currently   Lifestyle    Physical activity:     Days per week: Not on file     Minutes per session: Not on file    Stress: Not on file   Relationships    Social connections:     Talks on phone: Not on file     Gets together: Not on file     Attends Synagogue service: Not on file     Active member of club or organization: Not on file     Attends meetings of clubs or organizations: Not on file     Relationship status: Not on file    Intimate partner violence:     Fear of current or ex partner: Not on file     Emotionally abused: Not on file     Physically abused: Not on file     Forced sexual activity: Not on file   Other Topics Concern    Not on file   Social History Narrative    Not on file       I have reviewed the nurses notes, vitals, problem list, allergy list, medical history, family, social history and medications. Review of Symptoms:    General: Pt denies excessive weight gain or loss. Has some limitations of activities of daily life secondary to COPD  HEENT: Denies blurred vision, headaches, epistaxis and difficulty swallowing. Respiratory: Denies resting shortness of breath, + intermittent GREENFIELD, + intermittent wheezing denies stridor.   Cardiovascular: Denies precordial pain, palpitations, edema or PND  Gastrointestinal: Denies poor appetite, indigestion, abdominal pain or blood in stool  Musculoskeletal: Denies pain or swelling from muscles or joints  Neurologic: Denies tremor, paresthesias, or sensory motor disturbance  Skin: Denies rash, itching or texture change. Physical Exam:      General: Well developed, in no acute distress, cooperative and alert  HEENT: No carotid bruits, no JVD, trach is midline. Neck Supple, PEERL, EOM intact. Heart:  Normal S1/S2 negative S3 or S4. Regular, no murmur, gallop or rub.   Respiratory: Diminished all fields, no rales or rhonchi. Abdomen:   Soft, non-tender, no masses, bowel sounds are active.   Extremities:  No edema, normal cap refill, no cyanosis, atraumatic. Neuro: A&Ox3, speech clear, gait stable. Skin: Skin color is normal. No rashes or lesions. Non diaphoretic  Vascular: 2+ pulses symmetric in all extremities    Cardiographics    ECG: Paced  Results for orders placed or performed during the hospital encounter of 05/21/19   EKG, 12 LEAD, INITIAL   Result Value Ref Range    Ventricular Rate 80 BPM    Atrial Rate 80 BPM    P-R Interval 238 ms    QRS Duration 126 ms    Q-T Interval 394 ms    QTC Calculation (Bezet) 454 ms    Calculated R Axis -22 degrees    Calculated T Axis 120 degrees    Diagnosis       Atrial-paced rhythm with prolonged AV conduction with occasional sinus   complexes and premature atrial complexes  Nonspecific intraventricular block  T wave abnormality, consider lateral ischemia    Confirmed by Jluis Chambers, P.V. ([id number]) on 5/22/2019 4:09:59 PM       *Note: Due to a large number of results and/or encounters for the requested time period, some results have not been displayed. A complete set of results can be found in Results Review.          Cardiology Labs:  Lab Results   Component Value Date/Time    Cholesterol, total 177 04/17/2018 08:51 AM    HDL Cholesterol 35 (L) 04/17/2018 08:51 AM    LDL, calculated 101 (H) 04/17/2018 08:51 AM Triglyceride 203 (H) 04/17/2018 08:51 AM    CHOL/HDL Ratio 6.4 (H) 07/01/2014 03:10 AM       Lab Results   Component Value Date/Time    Sodium 140 05/21/2019 09:11 AM    Potassium 3.6 05/21/2019 09:11 AM    Chloride 107 05/21/2019 09:11 AM    CO2 25 05/21/2019 09:11 AM    Anion gap 8 05/21/2019 09:11 AM    Glucose 134 (H) 05/21/2019 09:11 AM    BUN 16 05/21/2019 09:11 AM    Creatinine 1.35 (H) 05/21/2019 09:11 AM    BUN/Creatinine ratio 12 05/21/2019 09:11 AM    GFR est AA 47 (L) 05/21/2019 09:11 AM    GFR est non-AA 39 (L) 05/21/2019 09:11 AM    Calcium 9.1 05/21/2019 09:11 AM    Bilirubin, total 0.8 05/21/2019 09:11 AM    AST (SGOT) 20 05/21/2019 09:11 AM    Alk. phosphatase 114 05/21/2019 09:11 AM    Protein, total 8.2 05/21/2019 09:11 AM    Albumin 3.1 (L) 05/21/2019 09:11 AM    Globulin 5.1 (H) 05/21/2019 09:11 AM    A-G Ratio 0.6 (L) 05/21/2019 09:11 AM    ALT (SGPT) 16 05/21/2019 09:11 AM           Assessment:     Assessment:     Diagnoses and all orders for this visit:    1. Paroxysmal atrial fibrillation (HCC)  -     AMB POC EKG ROUTINE W/ 12 LEADS, INTER & REP    2. Chronic combined systolic and diastolic congestive heart failure ([facility]    3. PAD (peripheral artery disease) ([facility]    4. Sinoatrial node dysfunction (HCC)    5. Pulmonary emphysema, unspecified emphysema type ([facility]    6. S/P coronary artery stent placement    7. ASHD (arteriosclerotic heart disease)        ICD-10-CM ICD-9-CM    1. Paroxysmal atrial fibrillation (HCC) I48.0 427.31 AMB POC EKG ROUTINE W/ 12 LEADS, INTER & REP   2. Chronic combined systolic and diastolic congestive heart failure (HCC) I50.42 428.42      428.0    3. PAD (peripheral artery disease) ([facility]) I73.9 443.9    4. Sinoatrial node dysfunction ([facility]) I49.5 427.81    5. Pulmonary emphysema, unspecified emphysema type ([facility] J43.9 492.8    6. S/P coronary artery stent placement Z95.5 V45.82    7.  ASHD (arteriosclerotic heart disease) I25.10 414.00      Orders Placed This Encounter  AMB POC EKG ROUTINE W/ 12 LEADS, INTER & REP     Order Specific Question:   Reason for Exam:     Answer:   routine        Plan:     1. Atherosclerotic heart disease: Denies chest pain. Small distal defect anterior septal region 2018. 2.  Peripheral arterial disease: Maintained on Plavix, denies claudication type symptoms  3. History of atrial fibrillation: Ablation, paced, on Eliquis  4. COPD: Presenting today with intermittent coughing and congestion possible exacerbation she is picking up medications this afternoon and will follow up with primary care  5. Combined systolic diastolic heart failure: Presents euvolemic, ejection fraction 60% 2018. Continue current therapy  6. Hyperlipidemia: On simvastatin 20 mg, goal LDL 70 or less. 2018 . Will recheck lipid panel for this year adjust statin therapy if needed  Follow-up in 6 months    Sapphire Cash NP    This note was created using voice recognition software. Despite editing, there may be syntax errors. [facility]    5/31/2019         Patient seen, examined by me personally. Plan discussed as detailed. Agree with note as outlined by  NP. I confirm findings in history and physical exam. No additional findings noted. Agree with plan as outlined above.      Isabela Walters MD

## 2019-05-31 NOTE — PROGRESS NOTES
1. Have you been to the ER, urgent care clinic since your last visit? Hospitalized since your last visit? YES, MARCH, RESPIRATORY FAILURE    2. Have you seen or consulted any other health care providers outside of the [address] [place] [address] since your last visit? Include any pap smears or colon screening. NO    C/O SWELLING IN BLE, SOB.

## 2019-06-26 RX ORDER — FUROSEMIDE 20 MG/1
TABLET ORAL
Qty: 90 TAB | Refills: 1 | Status: SHIPPED | OUTPATIENT
Start: 2019-06-26 | End: 2020-01-01

## 2019-07-15 RX ORDER — CLOPIDOGREL BISULFATE 75 MG/1
TABLET ORAL
Qty: 30 TAB | Refills: 11 | Status: CANCELLED | OUTPATIENT
Start: 2019-07-15

## 2019-07-15 RX ORDER — CLOPIDOGREL BISULFATE 75 MG/1
TABLET ORAL
Qty: 30 TAB | Refills: 11 | Status: SHIPPED | OUTPATIENT
Start: 2019-07-15 | End: 2019-07-15 | Stop reason: SDUPTHER

## 2019-07-16 RX ORDER — CLOPIDOGREL BISULFATE 75 MG/1
TABLET ORAL
Qty: 90 TAB | Refills: 1 | Status: SHIPPED | OUTPATIENT
Start: 2019-07-16 | End: 2019-10-28 | Stop reason: SDUPTHER

## 2019-09-25 DIAGNOSIS — E11.21 TYPE 2 DIABETES MELLITUS WITH NEPHROPATHY (HCC): ICD-10-CM

## 2019-09-25 DIAGNOSIS — E78.2 MIXED HYPERLIPIDEMIA: ICD-10-CM

## 2019-09-25 DIAGNOSIS — I25.10 CORONARY ARTERY DISEASE INVOLVING NATIVE CORONARY ARTERY OF NATIVE HEART WITHOUT ANGINA PECTORIS: ICD-10-CM

## 2019-09-25 RX ORDER — SIMVASTATIN 20 MG/1
TABLET, FILM COATED ORAL
Qty: 90 TAB | Refills: 1 | Status: SHIPPED | OUTPATIENT
Start: 2019-09-25 | End: 2020-01-01 | Stop reason: SDUPTHER

## 2019-09-30 ENCOUNTER — OFFICE VISIT (OUTPATIENT)
Dept: INTERNAL MEDICINE CLINIC | Age: 69
End: 2019-09-30

## 2019-09-30 VITALS
WEIGHT: 161 LBS | BODY MASS INDEX: 25.88 KG/M2 | DIASTOLIC BLOOD PRESSURE: 52 MMHG | RESPIRATION RATE: 19 BRPM | OXYGEN SATURATION: 96 % | SYSTOLIC BLOOD PRESSURE: 105 MMHG | TEMPERATURE: 97.7 F | HEART RATE: 60 BPM | HEIGHT: 66 IN

## 2019-09-30 DIAGNOSIS — Z00.00 ENCOUNTER FOR MEDICARE ANNUAL WELLNESS EXAM: Primary | ICD-10-CM

## 2019-09-30 DIAGNOSIS — Z23 ENCOUNTER FOR IMMUNIZATION: ICD-10-CM

## 2019-09-30 DIAGNOSIS — E11.21 TYPE 2 DIABETES MELLITUS WITH NEPHROPATHY (HCC): ICD-10-CM

## 2019-09-30 LAB
GLUCOSE POC: 162 MG/DL
HBA1C MFR BLD HPLC: 6.9 %

## 2019-09-30 NOTE — PROGRESS NOTES
This is the Subsequent Medicare Annual Wellness Exam, performed 12 months or more after the Initial AWV or the last Subsequent AWV    I have reviewed the patient's medical history in detail and updated the computerized patient record.      History     Past Medical History:   Diagnosis Date    Arthritis     left shoulder    Atrial fibrillation ([address] 6/2/2010    Dr. Donald Galdamez CAD (coronary artery disease)     stent; Dr. Donald Galdamez Celiac disease     Chronic diastolic heart failure ([address] 09/22/2014    Dr. Zendejas    Chronic kidney disease     per cardio note    Chronic pain     left thigh:  L SFA intervention by Dr. Jenn Lee 07/2018, as of 9/6/18:  still causing pain, pt to have MILLA checked per Dr. Jenn Lee note    Chronic systolic HF (heart failure) ([address] 5/10/2017    4/2017 EF 25-30%    COPD (chronic obstructive pulmonary disease) ([address] 6/2/2010    Dr. Iona Cheng     Diabetes [facility])     Dr. Abhishek Reddy; no current medication per pt    Emphysema, unspecified ([address]     Dr. Bates Men Frequent falls 2017    Gout     Heart failure ([address]     Dr. Donald Galdamez History of Clostridium difficile infection 5/10/2017    4/2017 CDiff positive    Hypertension 6/2/2010    Dr. Tristen Deleon Hypertensive heart and chronic kidney disease     per PCP note    Hypotension 5/10/2017    Junctional tachycardia ([address] 5/10/2017    Dr. Zendejas    Neuropathy     NIDDM (non-insulin dependent diabetes mellitus) 6/2/2010    PAD (peripheral artery disease) ([address]     S/P ablation of atrial fibrillation 03/2018    Screening mammogram 5/4/10    SOB (shortness of breath) 09/22/2014    Dr. Bob Salazar (sick sinus syndrome) [facility]     per pacemaker form 9/6/18    Weakness     per pt weakness from c-diff 2017, mulitple falls with injury to rotator cuff      Past Surgical History:   Procedure Laterality Date   [address]      3 cardiac stent placed     COLONOSCOPY N/A 9/25/2017    COLONOSCOPY with biopsy performed by Dana Vo [facility] at [facility]    HX AFIB ABLATION  2018    has had 2 ablations per patient    HX  SECTION      HX HEART CATHETERIZATION  2018    HX OTHER SURGICAL      adrenal gland removed    HX PACEMAKER Left     Biotronik model: Evia    [place] EXCISE ADRENAL GLAND      VASCULAR SURGERY PROCEDURE UNLIST  2018    Left SFA intervention ; Dr. Desi Rothman     Current Outpatient Medications   Medication Sig Dispense Refill    simvastatin (ZOCOR) 20 mg tablet TAKE 1 TABLET BY MOUTH NIGHTLY 90 Tab 1    clopidogrel (PLAVIX) 75 mg tab TAKE 1 TABLET BY MOUTH ONCE DAILY 90 Tab 1    carvedilol (COREG) 6.25 mg tablet Take 1 Tab by mouth two (2) times daily (with meals). 180 Tab 0    gabapentin (NEURONTIN) 800 mg tablet TAKE 1 TABLET BY MOUTH THREE TIMES DAILY 270 Tab 1    SITagliptin (JANUVIA) 50 mg tablet Take 1 Tab by mouth daily. 90 Tab 1    tiotropium (SPIRIVA WITH HANDIHALER) 18 mcg inhalation capsule INHALE THE CONTENTS OF 1 CAPSULE THROUGH HANDIHALER DEVICE DAILY 90 Cap 1    acetaminophen (TYLENOL) 325 mg tablet Take 2 Tabs by mouth every four (4) hours as needed for Pain or Fever. 40 Tab 0    guaiFENesin ER (MUCINEX) 600 mg ER tablet Take 1 Tab by mouth two (2) times a day. (Patient taking differently: Take 600 mg by mouth as needed.) 20 Tab 0    benzocaine-menthol (CHLORASEPTIC MAX) 15-10 mg lozg lozenge Take 1 Lozenge by mouth every four to six (4-6) hours as needed for Sore throat. 30 Each 0    colchicine (COLCRYS) 0.6 mg tablet Take 1.2 mg by mouth as needed (for flare ups or when she would like to eat seafood (ie. shrimp)).  furosemide (LASIX) 20 mg tablet TAKE 1 TABLET BY MOUTH ONCE DAILY 90 Tab 1    albuterol (PROVENTIL HFA, VENTOLIN HFA, PROAIR HFA) 90 mcg/actuation inhaler Take 2 Puffs by inhalation every four (4) hours as needed for Wheezing.  1 Inhaler 1    albuterol (PROVENTIL VENTOLIN) 2.5 mg /3 mL (0.083 %) nebulizer solution 3 mL by Nebulization route every four (4) hours as needed for Wheezing. 1 Package 5    apixaban (ELIQUIS) 5 mg tablet Take 1 Tab by mouth two (2) times a day. Resume from tomorrow 2018. 56 Tab 0    SYMBICORT 160-4.5 mcg/actuation HFAA INHALE ONE PUFF BY MOUTH TWICE DAILY 1 Inhaler 3    fluticasone (FLONASE) 50 mcg/actuation nasal spray 2 Sprays by Both Nostrils route daily as needed.  cod liver oil cap Take 1 Cap by mouth daily.       furosemide (LASIX) 20 mg tablet TAKE 1 TABLET BY MOUTH ONCE DAILY 90 Tab 1     Allergies   Allergen Reactions    Crestor [Rosuvastatin] Myalgia    Levaquin [Levofloxacin] Nausea Only     GI Upset    Lipitor [Atorvastatin] Diarrhea    Lyrica [Pregabalin] Myalgia     Family History   Problem Relation Age of Onset    Heart Disease Mother     Diabetes Father     Heart Disease Brother      Social History     Tobacco Use    Smoking status: Former Smoker     Types: Cigarettes     Start date:      Last attempt to quit: 2009     Years since quittin.7    Smokeless tobacco: Never Used   Substance Use Topics    Alcohol use: No     Patient Active Problem List   Diagnosis Code    Benign hypertensive heart and CKD, stage 3 (GFR 30-59), w CHF ([facility]) I13.0, N18.3    Atrial fibrillation--paroxysmal I48.91    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 J44.9    Hip pain M25.559    Hypokalemia E87.6    S/P angioplasty with stent Z95.820    Sick sinus syndrome (HCC) I49.5    Chest pain R07.9    Sinoatrial node dysfunction ([facility]) I49.5    Cardiac pacemaker in situ Z95.0    Mixed hyperlipidemia E78.2    Back pain M54.9    S/P coronary artery stent placement W56.2    Diastolic CHF, acute on chronic ([facility]) I50.33    GIB (gastrointestinal bleeding) K92.2    Type 2 diabetes mellitus with diabetic neuropathy, without long-term current use of insulin ([facility]) E11.40    CHF (congestive heart failure) ([facility]) M54.6    Systolic CHF, acute ([facility]) G31.04    Acute systolic CHF (congestive heart failure) ([facility]) I50.21    Fear associated with illness and body function F40.9    Counseling regarding advanced care planning and goals of care Z71.89    S/P ablation of atrial fibrillation Z98.890, Z86.79    Chronic systolic HF (heart failure) ([facility]) I50.22    History of Clostridium difficile infection Z86.19    Junctional tachycardia ([facility]) I47.1    Hypotension I95.9    CKD (chronic kidney disease) stage 3, GFR 30-59 ml/min ([facility]) N18.3    Type 2 diabetes mellitus with nephropathy ([facility]) E11.21    A-fib ([facility]) I48.91    PAD (peripheral artery disease) ([facility]) I73.9    S/P rotator cuff repair Z98.890    Respiratory failure with hypoxia ([facility]) J96.91    ASHD (arteriosclerotic heart disease) I25.10       Depression Risk Factor Screening:     3 most recent PHQ Screens 9/30/2019   Little interest or pleasure in doing things Not at all   Feeling down, depressed, irritable, or hopeless Not at all   Total Score PHQ 2 0     Alcohol Risk Factor Screening: You do not drink alcohol or very rarely. Functional Ability and Level of Safety:   Hearing Loss  Hearing is good. Activities of Daily Living  The home contains: grab bars and shower chair  Patient does total self care    Fall Risk  Fall Risk Assessment, last 12 mths 5/31/2019   Able to walk? Yes   Fall in past 12 months? Yes   Fall with injury?  Yes   Number of falls in past 12 months 1   Fall Risk Score 2       Abuse Screen  Patient is not abused    Cognitive Screening   Evaluation of Cognitive Function:  Has your family/caregiver stated any concerns about your memory: no  Normal    Patient Care Team   Patient Care Team:  Concepcion Bolton MD as PCP - General (Internal Medicine)  Lilly Dietrich MD as Physician (Cardiology)  Francois Walker MD as Physician (Cardiology)  Froilan Dsouza MD as Physician (Cardiology)  Cooper Runner, NP (Nurse Practitioner)    Assessment/Plan Education and counseling provided:  Are appropriate based on today's review and evaluation  Pneumococcal Vaccine  Influenza Vaccine    Diagnoses and all orders for this visit:    1. Type 2 diabetes mellitus with nephropathy (HCC)  -     AMB POC HEMOGLOBIN A1C  -     AMB POC GLUCOSE BLOOD, BY GLUCOSE MONITORING DEVICE    2. Encounter for immunization  -     INFLUENZA VACCINE INACTIVATED (IIV), SUBUNIT, ADJUVANTED, IM  -     PNEUMOCOCCAL CONJ VACCINE 13 VALENT IM        Health Maintenance Due   Topic Date Due    Shingrix Vaccine Age 49> (1 of 2) 06/24/2001    Pneumococcal 65+ years (1 of 2 - PCV13) 06/25/2016    EYE EXAM RETINAL OR DILATED  06/25/2017    FOOT EXAM Q1  07/20/2018    GLAUCOMA SCREENING Q2Y  07/28/2018    MICROALBUMIN Q1  03/29/2019    MEDICARE YEARLY EXAM  03/30/2019    LIPID PANEL Q1  04/17/2019    Influenza Age 9 to Adult  08/01/2019    HEMOGLOBIN A1C Q6M  09/06/2019      Tali Chaudhary is a 77 y.o. female and presents with Annual Wellness Visit  . Subjective:      Hypertensive heart and kidney disease-pt has no complaints, stable on med.  Relays med compliance  BP Readings from Last 3 Encounters:   09/30/19 105/52   05/31/19 94/50   05/23/19 110/50     CAD s/p stent-noted  CHF-systolic and diastolic-latest SG~69%  Wt Readings from Last 3 Encounters:   09/30/19 161 lb (73 kg)   05/31/19 160 lb 11.2 oz (72.9 kg)   05/23/19 161 lb (73 kg)       CKD 3-noted  Lab Results   Component Value Date/Time    GFR est AA 47 (L) 05/21/2019 09:11 AM    GFR est non-AA 39 (L) 05/21/2019 09:11 AM    Creatinine 1.35 (H) 05/21/2019 09:11 AM    BUN 16 05/21/2019 09:11 AM    Sodium 140 05/21/2019 09:11 AM    Potassium 3.6 05/21/2019 09:11 AM    Chloride 107 05/21/2019 09:11 AM    CO2 25 05/21/2019 09:11 AM     COPD-quiescent  S/p pacemaker-noted  Hx PAF s/p ablation-on eliquis  Type 2 DM w nephropathy-diet controlled  Lab Results   Component Value Date/Time    Hemoglobin A1c 5.8 09/24/2017 10:36 AM Hemoglobin A1c (POC) 6.9 09/30/2019 08:23 AM     Hyperlipidemia-  low dose statin   -t/c PCSK9 inhibitor  Lab Results   Component Value Date/Time    Cholesterol, total 177 04/17/2018 08:51 AM    Cholesterol (POC) 168 03/29/2018 08:24 AM    HDL Cholesterol 35 (L) 04/17/2018 08:51 AM    HDL Cholesterol (POC) n/a 03/29/2018 08:24 AM    LDL Cholesterol (POC) n/a 03/29/2018 08:24 AM    LDL, calculated 101 (H) 04/17/2018 08:51 AM    VLDL, calculated 41 (H) 04/17/2018 08:51 AM    Triglyceride 203 (H) 04/17/2018 08:51 AM    Triglycerides (POC) )650 03/29/2018 08:24 AM    CHOL/HDL Ratio 6.4 (H) 07/01/2014 03:10 AM         Review of Systems  Review of systems (12) negative, except noted above.       Past Medical History:   Diagnosis Date    Arthritis     left shoulder    Atrial fibrillation ([address] 6/2/2010    Dr. Jesusita Sandhoff CAD (coronary artery disease)     stent; Dr. Jesusita Sandhoff Celiac disease     Chronic diastolic heart failure ([address] 09/22/2014    Dr. Lola Saenz    Chronic kidney disease     per cardio note    Chronic pain     left thigh:  L SFA intervention by Dr. Ally Johnson 07/2018, as of 9/6/18:  still causing pain, pt to have MILLA checked per Dr. Ally Johnson note    Chronic systolic HF (heart failure) ([address] 5/10/2017    4/2017 EF 25-30%    COPD (chronic obstructive pulmonary disease) ([address] 6/2/2010    Dr. Chiquita Reina     Diabetes [facility])     Dr. Tiana Tolentino; no current medication per pt    Emphysema, unspecified ([address]     Dr. Patel Chavez Frequent falls 2017    Gout     Heart failure ([address]     Dr. Jesusita Sandhoff History of Clostridium difficile infection 5/10/2017    4/2017 CDiff positive    Hypertension 6/2/2010    Dr. Henri Garces Hypertensive heart and chronic kidney disease     per PCP note    Hypotension 5/10/2017    Junctional tachycardia ([address] 5/10/2017    Dr. Lola Saenz    Neuropathy     NIDDM (non-insulin dependent diabetes mellitus) 6/2/2010    [facility]     S/P ablation of atrial fibrillation 2018    Screening mammogram 5/4/10    SOB (shortness of breath) 2014    Dr. Lexie Joseph (sick sinus syndrome) ([facility]     per pacemaker form 18    Weakness     per pt weakness from c-diff 2017, mulitple falls with injury to rotator cuff     Past Surgical History:   Procedure Laterality Date    CARDIAC SURG PROCEDURE UNLIST      3 cardiac stent placed     COLONOSCOPY N/A 2017    COLONOSCOPY with biopsy performed by Hallie Fernandes MD at [facility] AMBULATORY OR    HX AFIB ABLATION  2018    has had 2 ablations per patient    HX  SECTION      HX HEART CATHETERIZATION  2018    HX OTHER SURGICAL      adrenal gland removed    HX PACEMAKER Left     Biotronik model: Evia    [place] EXCISE ADRENAL GLAND      VASCULAR SURGERY PROCEDURE UNLIST  2018    Left SFA intervention ; Dr. Nogueira [place] History     Socioeconomic History    Marital status:      Spouse name: Not on file    Number of children: Not on file    Years of education: Not on file    Highest education level: Not on file   Tobacco Use    Smoking status: Former Smoker     Types: Cigarettes     Start date:      Last attempt to quit: 2009     Years since quittin.7    Smokeless tobacco: Never Used   Substance and Sexual Activity    Alcohol use: No    Drug use: No    Sexual activity: Not Currently     Family History   Problem Relation Age of Onset    Heart Disease Mother     Diabetes Father     Heart Disease Brother      Current Outpatient Medications   Medication Sig Dispense Refill    simvastatin (ZOCOR) 20 mg tablet TAKE 1 TABLET BY MOUTH NIGHTLY 90 Tab 1    clopidogrel (PLAVIX) 75 mg tab TAKE 1 TABLET BY MOUTH ONCE DAILY 90 Tab 1    carvedilol (COREG) 6.25 mg tablet Take 1 Tab by mouth two (2) times daily (with meals).  180 Tab 0    gabapentin (NEURONTIN) 800 mg tablet TAKE 1 TABLET BY MOUTH THREE TIMES DAILY 270 Tab 1    SITagliptin (JANUVIA) 50 mg tablet Take 1 Tab by mouth daily. 90 Tab 1    tiotropium (SPIRIVA WITH HANDIHALER) 18 mcg inhalation capsule INHALE THE CONTENTS OF 1 CAPSULE THROUGH HANDIHALER DEVICE DAILY 90 Cap 1    acetaminophen (TYLENOL) 325 mg tablet Take 2 Tabs by mouth every four (4) hours as needed for Pain or Fever. 40 Tab 0    guaiFENesin ER (MUCINEX) 600 mg ER tablet Take 1 Tab by mouth two (2) times a day. (Patient taking differently: Take 600 mg by mouth as needed.) 20 Tab 0    benzocaine-menthol (CHLORASEPTIC MAX) 15-10 mg lozg lozenge Take 1 Lozenge by mouth every four to six (4-6) hours as needed for Sore throat. 30 Each 0    colchicine (COLCRYS) 0.6 mg tablet Take 1.2 mg by mouth as needed (for flare ups or when she would like to eat seafood (ie. shrimp)).  furosemide (LASIX) 20 mg tablet TAKE 1 TABLET BY MOUTH ONCE DAILY 90 Tab 1    albuterol (PROVENTIL HFA, VENTOLIN HFA, PROAIR HFA) 90 mcg/actuation inhaler Take 2 Puffs by inhalation every four (4) hours as needed for Wheezing. 1 Inhaler 1    albuterol (PROVENTIL VENTOLIN) 2.5 mg /3 mL (0.083 %) nebulizer solution 3 mL by Nebulization route every four (4) hours as needed for Wheezing. 1 Package 5    apixaban (ELIQUIS) 5 mg tablet Take 1 Tab by mouth two (2) times a day. Resume from tomorrow 7/21/2018. 56 Tab 0    SYMBICORT 160-4.5 mcg/actuation HFAA INHALE ONE PUFF BY MOUTH TWICE DAILY 1 Inhaler 3    fluticasone (FLONASE) 50 mcg/actuation nasal spray 2 Sprays by Both Nostrils route daily as needed.  cod liver oil cap Take 1 Cap by mouth daily.       furosemide (LASIX) 20 mg tablet TAKE 1 TABLET BY MOUTH ONCE DAILY 90 Tab 1     Allergies   Allergen Reactions    Crestor [Rosuvastatin] Myalgia    Levaquin [Levofloxacin] Nausea Only     GI Upset    Lipitor [Atorvastatin] Diarrhea    Lyrica [Pregabalin] Myalgia       Objective:  Visit Vitals  /52 (BP 1 Location: Left arm, BP Patient Position: Sitting)   Pulse 60   Temp 97.7 °F (36.5 °C) (Oral)   Resp 19   Ht 5' 6\" (1.676 m)   Wt 161 lb (73 kg)   SpO2 96%   BMI 25.99 kg/m²     Physical Exam:   General appearance - alert, well appearing, and in no distress  Mental status - alert, oriented to person, place, and time  EYE-ROCK, EOMI, corneas normal, no foreign bodies  ENT-ENT exam normal, no neck nodes or sinus tenderness  Chest - bibasilar rales  Heart - normal rate, regular rhythm, normal S1, S2, +systolic murmur +S4  Ext-peripheral pulses normal, no pedal edema, no clubbing or cyanosis  Skin-Warm and dry. no hyperpigmentation, vitiligo, or suspicious lesions  Neuro -alert, oriented, normal speech, no focal findings walks w cane      Results for orders placed or performed in visit on 09/30/19   AMB POC HEMOGLOBIN A1C   Result Value Ref Range    Hemoglobin A1c (POC) 6.9 %   AMB POC GLUCOSE BLOOD, BY GLUCOSE MONITORING DEVICE   Result Value Ref Range    Glucose  mg/dL     *Note: Due to a large number of results and/or encounters for the requested time period, some results have not been displayed. A complete set of results can be found in Results Review. Assessment/Plan:    ICD-10-CM ICD-9-CM    1. Encounter for Medicare annual wellness exam Z00.00 V70.0    2. Type 2 diabetes mellitus with nephropathy (HCC) E11.21 250.40 AMB POC HEMOGLOBIN A1C     583.81 AMB POC GLUCOSE BLOOD, BY GLUCOSE MONITORING DEVICE   3. Encounter for immunization Z23 V03.89 INFLUENZA VACCINE INACTIVATED (IIV), SUBUNIT, ADJUVANTED, IM      PNEUMOCOCCAL CONJ VACCINE 13 VALENT IM     Orders Placed This Encounter    INFLUENZA VACCINE INACTIVATED (IIV), SUBUNIT, ADJUVANTED, IM    PNEUMOCOCCAL CONJ VACCINE 13 VALENT IM    AMB POC HEMOGLOBIN A1C    AMB POC GLUCOSE BLOOD, BY GLUCOSE MONITORING DEVICE     Cont current mnnt        There are no Patient Instructions on file for this visit. Follow-up and Dispositions    · Return in about 6 months (around 3/30/2020) for DM.            I have reviewed with the patient details of the assessment and plan and all questions were answered. Relevent patient education was performed. The most recent lab findings were reviewed with the patient. An After Visit Summary was printed and given to the patient.

## 2019-09-30 NOTE — PROGRESS NOTES
Chief Complaint   Patient presents with   Phillip Notice Annual Wellness Visit     1. Have you been to the ER, urgent care clinic since your last visit? Hospitalized since your last visit? No    2. Have you seen or consulted any other health care providers outside of the [address] [place] [address] since your last visit? Include any pap smears or colon screening. No     After obtaining consent, and per orders of Dr. Angélica Graham, injection of High Dose Influenza given by Dev Pickens LPN. Patient instructed to remain in clinic for 15 minutes afterwards, and to report any adverse reaction to me immediately.     [facility] amb A1c and Glucose Dr. Shirlene Gonsales LPN

## 2019-10-05 RX ORDER — CARVEDILOL 6.25 MG/1
TABLET ORAL
Qty: 60 TAB | Refills: 11 | Status: SHIPPED | OUTPATIENT
Start: 2019-10-05 | End: 2020-01-06 | Stop reason: SDUPTHER

## 2019-10-16 DIAGNOSIS — G63 POLYNEUROPATHY ASSOCIATED WITH UNDERLYING DISEASE (HCC): ICD-10-CM

## 2019-10-16 RX ORDER — GABAPENTIN 800 MG/1
TABLET ORAL
Qty: 270 TAB | Refills: 1 | Status: CANCELLED | OUTPATIENT
Start: 2019-10-16

## 2019-10-17 RX ORDER — GABAPENTIN 800 MG/1
TABLET ORAL
Qty: 270 TAB | Refills: 5 | Status: SHIPPED | OUTPATIENT
Start: 2019-10-17 | End: 2020-01-01 | Stop reason: SDUPTHER

## 2019-10-28 DIAGNOSIS — I48.91 ATRIAL FIBRILLATION, UNSPECIFIED TYPE (HCC): Chronic | ICD-10-CM

## 2019-10-28 DIAGNOSIS — E11.40 TYPE 2 DIABETES MELLITUS WITH DIABETIC NEUROPATHY, WITHOUT LONG-TERM CURRENT USE OF INSULIN (HCC): ICD-10-CM

## 2019-10-28 DIAGNOSIS — Z79.01 ANTICOAGULANT LONG-TERM USE: ICD-10-CM

## 2019-10-28 RX ORDER — APIXABAN 5 MG/1
TABLET, FILM COATED ORAL
Qty: 180 TAB | Refills: 3 | Status: SHIPPED | OUTPATIENT
Start: 2019-10-28 | End: 2020-01-01 | Stop reason: SDUPTHER

## 2019-10-28 RX ORDER — ALBUTEROL SULFATE 90 UG/1
2 AEROSOL, METERED RESPIRATORY (INHALATION)
Qty: 1 INHALER | Refills: 1 | Status: SHIPPED | OUTPATIENT
Start: 2019-10-28 | End: 2020-01-01 | Stop reason: SDUPTHER

## 2019-10-28 RX ORDER — CLOPIDOGREL BISULFATE 75 MG/1
TABLET ORAL
Qty: 90 TAB | Refills: 1 | Status: SHIPPED | OUTPATIENT
Start: 2019-10-28 | End: 2020-01-01 | Stop reason: ALTCHOICE

## 2019-11-11 ENCOUNTER — APPOINTMENT (OUTPATIENT)
Dept: GENERAL RADIOLOGY | Age: 69
End: 2019-11-11
Attending: EMERGENCY MEDICINE
Payer: MEDICARE

## 2019-11-11 ENCOUNTER — HOSPITAL ENCOUNTER (EMERGENCY)
Age: 69
Discharge: HOME | End: 2019-11-12
Attending: EMERGENCY MEDICINE
Payer: MEDICARE

## 2019-11-11 DIAGNOSIS — J44.1 COPD EXACERBATION (HCC): Primary | ICD-10-CM

## 2019-11-11 LAB
COMMENT, HOLDF: NORMAL
ERYTHROCYTE [DISTWIDTH] IN BLOOD BY AUTOMATED COUNT: 14.3 % (ref 11.5–14.5)
HCT VFR BLD AUTO: 38.5 % (ref 35–47)
HGB BLD-MCNC: 12.5 G/DL (ref 11.5–16)
MCH RBC QN AUTO: 27.5 PG (ref 26–34)
MCHC RBC AUTO-ENTMCNC: 32.5 G/DL (ref 30–36.5)
MCV RBC AUTO: 84.6 FL (ref 80–99)
NRBC # BLD: 0 K/UL (ref 0–0.01)
NRBC BLD-RTO: 0 PER 100 WBC
PLATELET # BLD AUTO: 190 K/UL (ref 150–400)
PMV BLD AUTO: 10.7 FL (ref 8.9–12.9)
RBC # BLD AUTO: 4.55 M/UL (ref 3.8–5.2)
SAMPLES BEING HELD,HOLD: NORMAL
TROPONIN I SERPL-MCNC: <0.05 NG/ML
WBC # BLD AUTO: 6.3 K/UL (ref 3.6–11)

## 2019-11-11 PROCEDURE — 84484 ASSAY OF TROPONIN QUANT: CPT

## 2019-11-11 PROCEDURE — 80048 BASIC METABOLIC PNL TOTAL CA: CPT

## 2019-11-11 PROCEDURE — 74011250636 HC RX REV CODE- 250/636: Performed by: EMERGENCY MEDICINE

## 2019-11-11 PROCEDURE — 99285 EMERGENCY DEPT VISIT HI MDM: CPT

## 2019-11-11 PROCEDURE — 85027 COMPLETE CBC AUTOMATED: CPT

## 2019-11-11 PROCEDURE — 36415 COLL VENOUS BLD VENIPUNCTURE: CPT

## 2019-11-11 PROCEDURE — 96365 THER/PROPH/DIAG IV INF INIT: CPT

## 2019-11-11 PROCEDURE — 74011000250 HC RX REV CODE- 250: Performed by: EMERGENCY MEDICINE

## 2019-11-11 PROCEDURE — 71045 X-RAY EXAM CHEST 1 VIEW: CPT

## 2019-11-11 PROCEDURE — 94640 AIRWAY INHALATION TREATMENT: CPT

## 2019-11-11 PROCEDURE — 74011250637 HC RX REV CODE- 250/637: Performed by: EMERGENCY MEDICINE

## 2019-11-11 PROCEDURE — 93005 ELECTROCARDIOGRAM TRACING: CPT

## 2019-11-11 RX ORDER — MAGNESIUM SULFATE HEPTAHYDRATE 40 MG/ML
2 INJECTION, SOLUTION INTRAVENOUS ONCE
Status: COMPLETED | OUTPATIENT
Start: 2019-11-11 | End: 2019-11-12

## 2019-11-11 RX ORDER — IPRATROPIUM BROMIDE AND ALBUTEROL SULFATE 2.5; .5 MG/3ML; MG/3ML
3 SOLUTION RESPIRATORY (INHALATION)
Status: COMPLETED | OUTPATIENT
Start: 2019-11-11 | End: 2019-11-11

## 2019-11-11 RX ORDER — GABAPENTIN 300 MG/1
600 CAPSULE ORAL ONCE
Status: COMPLETED | OUTPATIENT
Start: 2019-11-11 | End: 2019-11-11

## 2019-11-11 RX ADMIN — MAGNESIUM SULFATE HEPTAHYDRATE 2 G: 40 INJECTION, SOLUTION INTRAVENOUS at 23:37

## 2019-11-11 RX ADMIN — APIXABAN 5 MG: 5 TABLET, FILM COATED ORAL at 23:59

## 2019-11-11 RX ADMIN — GABAPENTIN 600 MG: 300 CAPSULE ORAL at 23:59

## 2019-11-11 RX ADMIN — IPRATROPIUM BROMIDE AND ALBUTEROL SULFATE 3 ML: .5; 3 SOLUTION RESPIRATORY (INHALATION) at 23:37

## 2019-11-12 VITALS
SYSTOLIC BLOOD PRESSURE: 110 MMHG | WEIGHT: 167 LBS | BODY MASS INDEX: 26.84 KG/M2 | OXYGEN SATURATION: 100 % | HEART RATE: 81 BPM | DIASTOLIC BLOOD PRESSURE: 55 MMHG | TEMPERATURE: 98.2 F | RESPIRATION RATE: 18 BRPM | HEIGHT: 66 IN

## 2019-11-12 LAB
ANION GAP SERPL CALC-SCNC: 9 MMOL/L (ref 5–15)
ATRIAL RATE: 55 BPM
BUN SERPL-MCNC: 17 MG/DL (ref 6–20)
BUN/CREAT SERPL: 11 (ref 12–20)
CALCIUM SERPL-MCNC: 8.9 MG/DL (ref 8.5–10.1)
CALCULATED R AXIS, ECG10: -86 DEGREES
CALCULATED T AXIS, ECG11: 83 DEGREES
CHLORIDE SERPL-SCNC: 107 MMOL/L (ref 97–108)
CO2 SERPL-SCNC: 24 MMOL/L (ref 21–32)
CREAT SERPL-MCNC: 1.5 MG/DL (ref 0.55–1.02)
DIAGNOSIS, 93000: NORMAL
GLUCOSE SERPL-MCNC: 115 MG/DL (ref 65–100)
P-R INTERVAL, ECG05: 150 MS
POTASSIUM SERPL-SCNC: 3.8 MMOL/L (ref 3.5–5.1)
Q-T INTERVAL, ECG07: 454 MS
QRS DURATION, ECG06: 170 MS
QTC CALCULATION (BEZET), ECG08: 517 MS
SODIUM SERPL-SCNC: 140 MMOL/L (ref 136–145)
VENTRICULAR RATE, ECG03: 78 BPM

## 2019-11-12 RX ORDER — IPRATROPIUM BROMIDE AND ALBUTEROL SULFATE 2.5; .5 MG/3ML; MG/3ML
3 SOLUTION RESPIRATORY (INHALATION)
Qty: 30 NEBULE | Refills: 0 | Status: SHIPPED | OUTPATIENT
Start: 2019-11-12 | End: 2021-01-01
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
## 2019-11-12 NOTE — ED NOTES
Patient discharge by Noble Camargo MD - pt sent to the front lobby, with strong and steady gait -  Discharge information / home RX / and reasons to return to the ED were reviewed by the doctor.

## 2019-11-12 NOTE — DISCHARGE INSTRUCTIONS
Patient Education        Chronic Obstructive Pulmonary Disease (COPD): Care Instructions  Your Care Instructions    Chronic obstructive pulmonary disease (COPD) is a general term for a group of lung diseases, including emphysema and chronic bronchitis. People with COPD have decreased airflow in and out of the lungs, which makes it hard to breathe. The airways also can get clogged with thick mucus. Cigarette smoking is a major cause of COPD. Although there is no cure for COPD, you can slow its progress. Following your treatment plan and taking care of yourself can help you feel better and live longer. Follow-up care is a key part of your treatment and safety. Be sure to make and go to all appointments, and call your doctor if you are having problems. It's also a good idea to know your test results and keep a list of the medicines you take. How can you care for yourself at home?   Staying healthy    · Do not smoke. This is the most important step you can take to prevent more damage to your lungs. If you need help quitting, talk to your doctor about stop-smoking programs and medicines. These can increase your chances of quitting for good.     · Avoid colds and flu. Get a pneumococcal vaccine shot. If you have had one before, ask your doctor whether you need a second dose. Get the flu vaccine every fall. If you must be around people with colds or the flu, wash your hands often.     · Avoid secondhand smoke, air pollution, and high altitudes. Also avoid cold, dry air and hot, humid air. Stay at home with your windows closed when air pollution is bad.    Medicines and oxygen therapy    · Take your medicines exactly as prescribed. Call your doctor if you think you are having a problem with your medicine.     · You may be taking medicines such as:  ? Bronchodilators. These help open your airways and make breathing easier. Bronchodilators are either short-acting (work for 6 to 9 hours) or long-acting (work for 24 hours). You inhale most bronchodilators, so they start to act quickly. Always carry your quick-relief inhaler with you in case you need it while you are away from home. ? Corticosteroids (prednisone, budesonide). These reduce airway inflammation. They come in pill or inhaled form. You must take these medicines every day for them to work well.     · A spacer may help you get more inhaled medicine to your lungs. Ask your doctor or pharmacist if a spacer is right for you. If it is, ask how to use it properly.     · Do not take any vitamins, over-the-counter medicine, or herbal products without talking to your doctor first.     · If your doctor prescribed antibiotics, take them as directed. Do not stop taking them just because you feel better. You need to take the full course of antibiotics.     · Oxygen therapy boosts the amount of oxygen in your blood and helps you breathe easier. Use the flow rate your doctor has recommended, and do not change it without talking to your doctor first.   Activity    · Get regular exercise. Walking is an easy way to get exercise. Start out slowly, and walk a little more each day.     · Pay attention to your breathing. You are exercising too hard if you cannot talk while you are exercising.     · Take short rest breaks when doing household chores and other activities.     · Learn breathing methods--such as breathing through pursed lips--to help you become less short of breath.     · If your doctor has not set you up with a pulmonary rehabilitation program, talk to him or her about whether rehab is right for you. Rehab includes exercise programs, education about your disease and how to manage it, help with diet and other changes, and emotional support. Diet    · Eat regular, healthy meals. Use bronchodilators about 1 hour before you eat to make it easier to eat. Eat several small meals instead of three large ones.  Drink beverages at the end of the meal. Avoid foods that are hard to chew.     · Eat foods that contain protein so that you do not lose muscle mass.     · Talk with your doctor if you gain too much weight or if you lose weight without trying.    Mental health    · Talk to your family, friends, or a therapist about your feelings. It is normal to feel frightened, angry, hopeless, helpless, and even guilty. Talking openly about bad feelings can help you cope. If these feelings last, talk to your doctor. When should you call for help? Call 911 anytime you think you may need emergency care. For example, call if:    · You have severe trouble breathing.    Call your doctor now or seek immediate medical care if:    · You have new or worse trouble breathing.     · You cough up blood.     · You have a fever.    Watch closely for changes in your health, and be sure to contact your doctor if:    · You cough more deeply or more often, especially if you notice more mucus or a change in the color of your mucus.     · You have new or worse swelling in your legs or belly.     · You are not getting better as expected. Where can you learn more? Go to http://rusty-marcela.info/. Ludmila Bahena in the search box to learn more about \"Chronic Obstructive Pulmonary Disease (COPD): Care Instructions. \"  Current as of: June 9, 2019  Content Version: 12.2  © 7792-8880 [employer]. Care instructions adapted under license by [employer] (which disclaims liability or warranty for this information). If you have questions about a medical condition or this instruction, always ask your healthcare professional. [place] [address] any warranty or liability for your use of this information. Patient Education        COPD Exacerbation Plan: Care Instructions  Your Care Instructions    If you have chronic obstructive pulmonary disease (COPD), your usual shortness of breath could suddenly get worse. You may start coughing more and have more mucus.  This flare-up is called a COPD exacerbation (say \"[id number]\"). A lung infection or air pollution could set off an exacerbation. Sometimes it can happen after a quick change in temperature or being around chemicals. Work with your doctor to make a plan for dealing with an exacerbation. You can better manage it if you plan ahead. Follow-up care is a key part of your treatment and safety. Be sure to make and go to all appointments, and call your doctor if you are having problems. It's also a good idea to know your test results and keep a list of the medicines you take. How can you care for yourself at home? During an exacerbation  · Do not panic if you start to have one. Quick treatment at home may help you prevent serious breathing problems. If you have a COPD exacerbation plan that you developed with your doctor, follow it. · Take your medicines exactly as your doctor tells you.  ? Use your inhaler as directed by your doctor. If your symptoms do not get better after you use your medicine, have someone take you to the emergency room. Call an ambulance if necessary. ? With inhaled medicines, a spacer or a nebulizer may help you get more medicine to your lungs. Ask your doctor or pharmacist how to use them properly. Practice using the spacer in front of a mirror before you have an exacerbation. This may help you get the medicine into your lungs quickly. ? If your doctor has given you steroid pills, take them as directed. ? Your doctor may have given you a prescription for antibiotics, which you can fill if you need it. ? Talk to your doctor if you have any problems with your medicine. And call your doctor if you have to use your antibiotic or steroid pills. Preventing an exacerbation  · Do not smoke. This is the most important step you can take to prevent more damage to your lungs and prevent problems. If you already smoke, it is never too late to stop.  If you need help quitting, talk to your doctor about stop-smoking programs and medicines. These can increase your chances of quitting for good. · Take your daily medicines as prescribed. · Avoid colds and flu. ? Get a pneumococcal vaccine. ? Get a flu vaccine each year, as soon as it is available. Ask those you live or work with to do the same, so they will not get the flu and infect you. ? Try to stay away from people with colds or the flu. ? Wash your hands often. · Avoid secondhand smoke; air pollution; cold, dry air; hot, humid air; and high altitudes. Stay at home with your windows closed when air pollution is bad. · Learn breathing techniques for COPD, such as breathing through pursed lips. These techniques can help you breathe easier during an exacerbation. When should you call for help? Call 911 anytime you think you may need emergency care. For example, call if:    · You have severe trouble breathing.     · You have severe chest pain.    Call your doctor now or seek immediate medical care if:    · You have new or worse shortness of breath.     · You develop new chest pain.     · You are coughing more deeply or more often, especially if you notice more mucus or a change in the color of your mucus.     · You cough up blood.     · You have new or increased swelling in your legs or belly.     · You have a fever.    Watch closely for changes in your health, and be sure to contact your doctor if:    · You need to use your antibiotic or steroid pills.     · Your symptoms are getting worse. Where can you learn more? Go to http://rusty-marcela.info/. Enter H551 in the search box to learn more about \"COPD Exacerbation Plan: Care Instructions. \"  Current as of: June 9, 2019  Content Version: 12.2  © 0655-2455 [id number]. Care instructions adapted under license by [employer] (which disclaims liability or warranty for this information).  If you have questions about a medical condition or this instruction, always ask your healthcare professional. [place] [address] any warranty or liability for your use of this information.
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
## 2019-11-12 NOTE — ED NOTES
Pt resting in bed with no acute distress noted at this time;;  
 
Sleeping in bed - comfortable - normal respirations / remains on room air with no normal POX;;

## 2019-11-12 NOTE — ED NOTES
Report given to [place], [address] [facility]. Relinquished care of pt at this time.

## 2019-12-09 DIAGNOSIS — J44.1 CHRONIC OBSTRUCTIVE PULMONARY DISEASE WITH ACUTE EXACERBATION (HCC): ICD-10-CM

## 2019-12-09 RX ORDER — BUDESONIDE AND FORMOTEROL FUMARATE DIHYDRATE 160; 4.5 UG/1; UG/1
AEROSOL RESPIRATORY (INHALATION)
Qty: 1 INHALER | Refills: 3 | Status: SHIPPED | OUTPATIENT
Start: 2019-12-09 | End: 2020-01-01 | Stop reason: SDUPTHER

## 2020-01-01 ENCOUNTER — HOSPITAL ENCOUNTER (OUTPATIENT)
Dept: MAMMOGRAPHY | Age: 70
Discharge: HOME | End: 2020-12-07
Payer: MEDICARE

## 2020-01-01 ENCOUNTER — TRANSCRIBE ORDER (OUTPATIENT)
Dept: SCHEDULING | Age: 70
End: 2020-01-01

## 2020-01-01 ENCOUNTER — APPOINTMENT (OUTPATIENT)
Dept: GENERAL RADIOLOGY | Age: 70
End: 2020-01-01
Attending: EMERGENCY MEDICINE
Payer: MEDICARE

## 2020-01-01 ENCOUNTER — CLINICAL SUPPORT (OUTPATIENT)
Dept: CARDIOLOGY CLINIC | Age: 70
End: 2020-01-01

## 2020-01-01 ENCOUNTER — VIRTUAL VISIT (OUTPATIENT)
Dept: CARDIOLOGY CLINIC | Age: 70
End: 2020-01-01

## 2020-01-01 ENCOUNTER — OFFICE VISIT (OUTPATIENT)
Dept: ONCOLOGY | Age: 70
End: 2020-01-01
Payer: MEDICARE

## 2020-01-01 ENCOUNTER — HOSPITAL ENCOUNTER (EMERGENCY)
Age: 70
Discharge: HOME | End: 2020-11-13
Attending: STUDENT IN AN ORGANIZED HEALTH CARE EDUCATION/TRAINING PROGRAM | Admitting: STUDENT IN AN ORGANIZED HEALTH CARE EDUCATION/TRAINING PROGRAM
Payer: MEDICARE

## 2020-01-01 ENCOUNTER — OFFICE VISIT (OUTPATIENT)
Dept: INTERNAL MEDICINE CLINIC | Age: 70
End: 2020-01-01
Payer: MEDICARE

## 2020-01-01 ENCOUNTER — HOSPITAL ENCOUNTER (OUTPATIENT)
Dept: PET IMAGING | Age: 70
Discharge: HOME | End: 2020-12-18
Attending: INTERNAL MEDICINE
Payer: MEDICARE

## 2020-01-01 ENCOUNTER — HOSPITAL ENCOUNTER (OUTPATIENT)
Dept: CT IMAGING | Age: 70
Discharge: HOME | End: 2020-12-07
Attending: INTERNAL MEDICINE
Payer: MEDICARE

## 2020-01-01 ENCOUNTER — OFFICE VISIT (OUTPATIENT)
Dept: CARDIOLOGY CLINIC | Age: 70
End: 2020-01-01

## 2020-01-01 ENCOUNTER — PATIENT OUTREACH (OUTPATIENT)
Dept: CASE MANAGEMENT | Age: 70
End: 2020-01-01

## 2020-01-01 ENCOUNTER — VIRTUAL VISIT (OUTPATIENT)
Dept: INTERNAL MEDICINE CLINIC | Age: 70
End: 2020-01-01

## 2020-01-01 ENCOUNTER — TELEPHONE (OUTPATIENT)
Dept: SURGERY | Age: 70
End: 2020-01-01

## 2020-01-01 ENCOUNTER — DOCUMENTATION ONLY (OUTPATIENT)
Dept: ONCOLOGY | Age: 70
End: 2020-01-01

## 2020-01-01 VITALS
HEART RATE: 73 BPM | WEIGHT: 168.6 LBS | TEMPERATURE: 98.4 F | DIASTOLIC BLOOD PRESSURE: 60 MMHG | OXYGEN SATURATION: 94 % | BODY MASS INDEX: 27.1 KG/M2 | HEIGHT: 66 IN | SYSTOLIC BLOOD PRESSURE: 93 MMHG

## 2020-01-01 VITALS — HEIGHT: 66 IN | WEIGHT: 165 LBS | BODY MASS INDEX: 26.52 KG/M2

## 2020-01-01 VITALS
RESPIRATION RATE: 19 BRPM | HEART RATE: 93 BPM | BODY MASS INDEX: 25.55 KG/M2 | HEIGHT: 66 IN | TEMPERATURE: 97.6 F | OXYGEN SATURATION: 98 % | SYSTOLIC BLOOD PRESSURE: 111 MMHG | DIASTOLIC BLOOD PRESSURE: 48 MMHG | WEIGHT: 159 LBS

## 2020-01-01 VITALS — HEIGHT: 66 IN | BODY MASS INDEX: 26.36 KG/M2 | WEIGHT: 164 LBS

## 2020-01-01 VITALS
BODY MASS INDEX: 26.92 KG/M2 | HEART RATE: 77 BPM | OXYGEN SATURATION: 98 % | RESPIRATION RATE: 18 BRPM | SYSTOLIC BLOOD PRESSURE: 90 MMHG | WEIGHT: 167.5 LBS | DIASTOLIC BLOOD PRESSURE: 62 MMHG | HEIGHT: 66 IN

## 2020-01-01 VITALS — BODY MASS INDEX: 26.84 KG/M2 | WEIGHT: 167 LBS | HEIGHT: 66 IN

## 2020-01-01 VITALS
WEIGHT: 167 LBS | SYSTOLIC BLOOD PRESSURE: 110 MMHG | RESPIRATION RATE: 16 BRPM | HEART RATE: 86 BPM | HEIGHT: 67 IN | DIASTOLIC BLOOD PRESSURE: 66 MMHG | TEMPERATURE: 98.1 F | OXYGEN SATURATION: 99 % | BODY MASS INDEX: 26.21 KG/M2

## 2020-01-01 DIAGNOSIS — Z95.0 CARDIAC PACEMAKER IN SITU: ICD-10-CM

## 2020-01-01 DIAGNOSIS — E78.2 MIXED HYPERLIPIDEMIA: ICD-10-CM

## 2020-01-01 DIAGNOSIS — I48.0 PAROXYSMAL ATRIAL FIBRILLATION (HCC): ICD-10-CM

## 2020-01-01 DIAGNOSIS — I48.0 PAROXYSMAL ATRIAL FIBRILLATION (HCC): Primary | ICD-10-CM

## 2020-01-01 DIAGNOSIS — E11.40 TYPE 2 DIABETES MELLITUS WITH DIABETIC NEUROPATHY, WITHOUT LONG-TERM CURRENT USE OF INSULIN (HCC): ICD-10-CM

## 2020-01-01 DIAGNOSIS — Z95.0 CARDIAC PACEMAKER IN SITU: Primary | ICD-10-CM

## 2020-01-01 DIAGNOSIS — Z12.31 VISIT FOR SCREENING MAMMOGRAM: ICD-10-CM

## 2020-01-01 DIAGNOSIS — E11.21 TYPE 2 DIABETES MELLITUS WITH NEPHROPATHY (HCC): ICD-10-CM

## 2020-01-01 DIAGNOSIS — I50.42 CHRONIC COMBINED SYSTOLIC AND DIASTOLIC CONGESTIVE HEART FAILURE (HCC): Primary | ICD-10-CM

## 2020-01-01 DIAGNOSIS — J44.9 CHRONIC OBSTRUCTIVE PULMONARY DISEASE, UNSPECIFIED COPD TYPE (HCC): ICD-10-CM

## 2020-01-01 DIAGNOSIS — Z23 NEEDS FLU SHOT: ICD-10-CM

## 2020-01-01 DIAGNOSIS — J43.9 PULMONARY EMPHYSEMA, UNSPECIFIED EMPHYSEMA TYPE (HCC): ICD-10-CM

## 2020-01-01 DIAGNOSIS — N18.30 CKD (CHRONIC KIDNEY DISEASE) STAGE 3, GFR 30-59 ML/MIN (HCC): ICD-10-CM

## 2020-01-01 DIAGNOSIS — I50.33 DIASTOLIC CHF, ACUTE ON CHRONIC (HCC): ICD-10-CM

## 2020-01-01 DIAGNOSIS — I48.91 ATRIAL FIBRILLATION, UNSPECIFIED TYPE (HCC): ICD-10-CM

## 2020-01-01 DIAGNOSIS — R06.02 SOB (SHORTNESS OF BREATH): ICD-10-CM

## 2020-01-01 DIAGNOSIS — I13.0 BENIGN HYPERTENSIVE HEART AND CKD, STAGE 3 (GFR 30-59), W CHF (HCC): ICD-10-CM

## 2020-01-01 DIAGNOSIS — Z87.891 PERSONAL HISTORY OF NICOTINE DEPENDENCE: Primary | ICD-10-CM

## 2020-01-01 DIAGNOSIS — Z12.31 VISIT FOR SCREENING MAMMOGRAM: Primary | ICD-10-CM

## 2020-01-01 DIAGNOSIS — I50.22 CHRONIC SYSTOLIC HF (HEART FAILURE) (HCC): ICD-10-CM

## 2020-01-01 DIAGNOSIS — Z23 ENCOUNTER FOR IMMUNIZATION: ICD-10-CM

## 2020-01-01 DIAGNOSIS — Z87.891 PERSONAL HISTORY OF NICOTINE DEPENDENCE: ICD-10-CM

## 2020-01-01 DIAGNOSIS — I25.10 CORONARY ARTERY DISEASE INVOLVING NATIVE CORONARY ARTERY OF NATIVE HEART WITHOUT ANGINA PECTORIS: ICD-10-CM

## 2020-01-01 DIAGNOSIS — I50.42 CHRONIC COMBINED SYSTOLIC AND DIASTOLIC CONGESTIVE HEART FAILURE (HCC): ICD-10-CM

## 2020-01-01 DIAGNOSIS — N18.30 BENIGN HYPERTENSIVE HEART AND CKD, STAGE 3 (GFR 30-59), W CHF (HCC): ICD-10-CM

## 2020-01-01 DIAGNOSIS — I49.5 SINOATRIAL NODE DYSFUNCTION (HCC): ICD-10-CM

## 2020-01-01 DIAGNOSIS — Z00.00 ENCOUNTER FOR MEDICARE ANNUAL WELLNESS EXAM: ICD-10-CM

## 2020-01-01 DIAGNOSIS — E11.21 TYPE 2 DIABETES MELLITUS WITH NEPHROPATHY (HCC): Primary | ICD-10-CM

## 2020-01-01 DIAGNOSIS — C34.11 MALIGNANT NEOPLASM OF UPPER LOBE OF RIGHT LUNG (HCC): Primary | ICD-10-CM

## 2020-01-01 DIAGNOSIS — I49.5 SICK SINUS SYNDROME (HCC): ICD-10-CM

## 2020-01-01 DIAGNOSIS — Z79.01 ANTICOAGULANT LONG-TERM USE: ICD-10-CM

## 2020-01-01 DIAGNOSIS — G63 POLYNEUROPATHY ASSOCIATED WITH UNDERLYING DISEASE (HCC): ICD-10-CM

## 2020-01-01 DIAGNOSIS — R91.1 SOLITARY PULMONARY NODULE: Primary | ICD-10-CM

## 2020-01-01 DIAGNOSIS — M54.50 ACUTE MIDLINE LOW BACK PAIN WITHOUT SCIATICA: Primary | ICD-10-CM

## 2020-01-01 DIAGNOSIS — M54.50 ACUTE LOW BACK PAIN, UNSPECIFIED BACK PAIN LATERALITY, UNSPECIFIED WHETHER SCIATICA PRESENT: Primary | ICD-10-CM

## 2020-01-01 DIAGNOSIS — R91.1 SOLITARY PULMONARY NODULE: ICD-10-CM

## 2020-01-01 DIAGNOSIS — I73.9 PAD (PERIPHERAL ARTERY DISEASE) (HCC): ICD-10-CM

## 2020-01-01 DIAGNOSIS — I25.10 ASHD (ARTERIOSCLEROTIC HEART DISEASE): ICD-10-CM

## 2020-01-01 LAB
ALBUMIN SERPL-MCNC: 4.2 G/DL (ref 3.8–4.8)
ALBUMIN/GLOB SERPL: 1 {RATIO} (ref 1.2–2.2)
ALP SERPL-CCNC: ABNORMAL IU/L
ALT SERPL-CCNC: 24 IU/L (ref 0–32)
AST SERPL-CCNC: 67 IU/L (ref 0–40)
BILIRUB SERPL-MCNC: 0.4 MG/DL (ref 0–1.2)
BUN SERPL-MCNC: 19 MG/DL (ref 8–27)
BUN/CREAT SERPL: 15 (ref 12–28)
CALCIUM SERPL-MCNC: ABNORMAL MG/DL
CHLORIDE SERPL-SCNC: ABNORMAL MMOL/L
CHOLEST SERPL-MCNC: 156 MG/DL (ref 100–199)
CO2 SERPL-SCNC: ABNORMAL MMOL/L
CREAT SERPL-MCNC: 1.26 MG/DL (ref 0.57–1)
ERYTHROCYTE [DISTWIDTH] IN BLOOD BY AUTOMATED COUNT: 13.5 % (ref 11.7–15.4)
EST. AVERAGE GLUCOSE BLD GHB EST-MCNC: 140 MG/DL
GLOBULIN SER CALC-MCNC: 4.1 G/DL (ref 1.5–4.5)
GLUCOSE BLD STRIP.AUTO-MCNC: 121 MG/DL (ref 65–100)
GLUCOSE POC: 130 MG/DL
GLUCOSE SERPL-MCNC: 151 MG/DL (ref 65–99)
HBA1C MFR BLD HPLC: 6.6 %
HBA1C MFR BLD: 6.5 % (ref 4.8–5.6)
HCT VFR BLD AUTO: 38.5 % (ref 34–46.6)
HDLC SERPL-MCNC: 35 MG/DL
HGB BLD-MCNC: 12.3 G/DL (ref 11.1–15.9)
INTERPRETATION, 910389: NORMAL
INTERPRETATION: NORMAL
LDLC SERPL CALC-MCNC: 94 MG/DL (ref 0–99)
Lab: NORMAL
MCH RBC QN AUTO: 26.9 PG (ref 26.6–33)
MCHC RBC AUTO-ENTMCNC: 31.9 G/DL (ref 31.5–35.7)
MCV RBC AUTO: 84 FL (ref 79–97)
PDF IMAGE, 910387: NORMAL
PLATELET # BLD AUTO: 190 X10E3/UL (ref 150–450)
POTASSIUM SERPL-SCNC: ABNORMAL MMOL/L
PROT SERPL-MCNC: 8.3 G/DL (ref 6–8.5)
RBC # BLD AUTO: 4.58 X10E6/UL (ref 3.77–5.28)
SERVICE CMNT-IMP: ABNORMAL
SODIUM SERPL-SCNC: ABNORMAL MMOL/L
TRIGL SERPL-MCNC: 137 MG/DL (ref 0–149)
TSH SERPL DL<=0.005 MIU/L-ACNC: 2.24 UIU/ML (ref 0.45–4.5)
VLDLC SERPL CALC-MCNC: 27 MG/DL (ref 5–40)
WBC # BLD AUTO: 6.6 X10E3/UL (ref 3.4–10.8)

## 2020-01-01 PROCEDURE — 82962 GLUCOSE BLOOD TEST: CPT

## 2020-01-01 PROCEDURE — G8510 SCR DEP NEG, NO PLAN REQD: HCPCS

## 2020-01-01 PROCEDURE — 83036 HEMOGLOBIN GLYCOSYLATED A1C: CPT

## 2020-01-01 PROCEDURE — 1101F PT FALLS ASSESS-DOCD LE1/YR: CPT

## 2020-01-01 PROCEDURE — G8419 CALC BMI OUT NRM PARAM NOF/U: HCPCS | Performed by: INTERNAL MEDICINE

## 2020-01-01 PROCEDURE — G8427 DOCREV CUR MEDS BY ELIG CLIN: HCPCS

## 2020-01-01 PROCEDURE — 90732 PPSV23 VACC 2 YRS+ SUBQ/IM: CPT

## 2020-01-01 PROCEDURE — 99214 OFFICE O/P EST MOD 30 MIN: CPT

## 2020-01-01 PROCEDURE — 3017F COLORECTAL CA SCREEN DOC REV: CPT | Performed by: INTERNAL MEDICINE

## 2020-01-01 PROCEDURE — G0297 LDCT FOR LUNG CA SCREEN: HCPCS

## 2020-01-01 PROCEDURE — 74011250637 HC RX REV CODE- 250/637: Performed by: STUDENT IN AN ORGANIZED HEALTH CARE EDUCATION/TRAINING PROGRAM

## 2020-01-01 PROCEDURE — G8536 NO DOC ELDER MAL SCRN: HCPCS | Performed by: INTERNAL MEDICINE

## 2020-01-01 PROCEDURE — 2022F DILAT RTA XM EVC RTNOPTHY: CPT

## 2020-01-01 PROCEDURE — 99203 OFFICE O/P NEW LOW 30 MIN: CPT | Performed by: INTERNAL MEDICINE

## 2020-01-01 PROCEDURE — G8427 DOCREV CUR MEDS BY ELIG CLIN: HCPCS | Performed by: INTERNAL MEDICINE

## 2020-01-01 PROCEDURE — G0009 ADMIN PNEUMOCOCCAL VACCINE: HCPCS

## 2020-01-01 PROCEDURE — 90694 VACC AIIV4 NO PRSRV 0.5ML IM: CPT

## 2020-01-01 PROCEDURE — 3017F COLORECTAL CA SCREEN DOC REV: CPT

## 2020-01-01 PROCEDURE — G0439 PPPS, SUBSEQ VISIT: HCPCS

## 2020-01-01 PROCEDURE — G9899 SCRN MAM PERF RSLTS DOC: HCPCS

## 2020-01-01 PROCEDURE — 77067 SCR MAMMO BI INCL CAD: CPT

## 2020-01-01 PROCEDURE — 1090F PRES/ABSN URINE INCON ASSESS: CPT | Performed by: INTERNAL MEDICINE

## 2020-01-01 PROCEDURE — 1090F PRES/ABSN URINE INCON ASSESS: CPT

## 2020-01-01 PROCEDURE — G8536 NO DOC ELDER MAL SCRN: HCPCS

## 2020-01-01 PROCEDURE — G8432 DEP SCR NOT DOC, RNG: HCPCS | Performed by: INTERNAL MEDICINE

## 2020-01-01 PROCEDURE — 72100 X-RAY EXAM L-S SPINE 2/3 VWS: CPT

## 2020-01-01 PROCEDURE — G0008 ADMIN INFLUENZA VIRUS VAC: HCPCS

## 2020-01-01 PROCEDURE — G9899 SCRN MAM PERF RSLTS DOC: HCPCS | Performed by: INTERNAL MEDICINE

## 2020-01-01 PROCEDURE — 1101F PT FALLS ASSESS-DOCD LE1/YR: CPT | Performed by: INTERNAL MEDICINE

## 2020-01-01 PROCEDURE — G8419 CALC BMI OUT NRM PARAM NOF/U: HCPCS

## 2020-01-01 PROCEDURE — G8399 PT W/DXA RESULTS DOCUMENT: HCPCS | Performed by: INTERNAL MEDICINE

## 2020-01-01 PROCEDURE — 99283 EMERGENCY DEPT VISIT LOW MDM: CPT

## 2020-01-01 PROCEDURE — A9552 F18 FDG: HCPCS

## 2020-01-01 RX ORDER — ALBUTEROL SULFATE 90 UG/1
2 AEROSOL, METERED RESPIRATORY (INHALATION)
Qty: 1 INHALER | Refills: 1 | Status: SHIPPED | OUTPATIENT
Start: 2020-01-01 | End: 2020-01-01 | Stop reason: SDUPTHER

## 2020-01-01 RX ORDER — COLCHICINE 0.6 MG/1
TABLET ORAL
Qty: 60 TAB | Refills: 0 | Status: SHIPPED | OUTPATIENT
Start: 2020-01-01 | End: 2020-01-01

## 2020-01-01 RX ORDER — ALBUTEROL SULFATE 0.83 MG/ML
2.5 SOLUTION RESPIRATORY (INHALATION)
Qty: 24 EACH | Refills: 2 | Status: SHIPPED | OUTPATIENT
Start: 2020-01-01

## 2020-01-01 RX ORDER — COLCHICINE 0.6 MG/1
TABLET ORAL
Qty: 60 TAB | Refills: 0 | Status: SHIPPED | OUTPATIENT
Start: 2020-01-01 | End: 2020-01-01 | Stop reason: SDUPTHER

## 2020-01-01 RX ORDER — COLCHICINE 0.6 MG/1
TABLET ORAL
Qty: 60 TAB | Refills: 1 | Status: SHIPPED | OUTPATIENT
Start: 2020-01-01 | End: 2021-01-01

## 2020-01-01 RX ORDER — SIMVASTATIN 20 MG/1
TABLET, FILM COATED ORAL
Qty: 90 TAB | Refills: 1 | Status: SHIPPED | OUTPATIENT
Start: 2020-01-01 | End: 2020-01-01 | Stop reason: SDUPTHER

## 2020-01-01 RX ORDER — ACETAMINOPHEN 500 MG
1000 TABLET ORAL
Status: COMPLETED | OUTPATIENT
Start: 2020-01-01 | End: 2020-01-01

## 2020-01-01 RX ORDER — FUROSEMIDE 20 MG/1
TABLET ORAL
Qty: 90 TAB | Refills: 0 | Status: SHIPPED | OUTPATIENT
Start: 2020-01-01 | End: 2020-01-01 | Stop reason: SDUPTHER

## 2020-01-01 RX ORDER — DICLOFENAC SODIUM 10 MG/G
4 GEL TOPICAL
Qty: 100 G | Refills: 1 | Status: SHIPPED | OUTPATIENT
Start: 2020-01-01 | End: 2021-01-01

## 2020-01-01 RX ORDER — SODIUM CHLORIDE 0.9 % (FLUSH) 0.9 %
10 SYRINGE (ML) INJECTION
Status: COMPLETED | OUTPATIENT
Start: 2020-01-01 | End: 2020-01-01

## 2020-01-01 RX ORDER — CARVEDILOL 6.25 MG/1
6.25 TABLET ORAL 2 TIMES DAILY WITH MEALS
Qty: 180 TAB | Refills: 1 | Status: SHIPPED | OUTPATIENT
Start: 2020-01-01 | End: 2020-01-01 | Stop reason: ALTCHOICE

## 2020-01-01 RX ORDER — CARVEDILOL 6.25 MG/1
TABLET ORAL
Qty: 180 TAB | Refills: 1 | OUTPATIENT
Start: 2020-01-01

## 2020-01-01 RX ORDER — GABAPENTIN 800 MG/1
TABLET ORAL
Qty: 270 TAB | Refills: 1 | Status: SHIPPED | OUTPATIENT
Start: 2020-01-01 | End: 2020-01-01 | Stop reason: SDUPTHER

## 2020-01-01 RX ORDER — FUROSEMIDE 20 MG/1
TABLET ORAL
Qty: 90 TAB | Refills: 1 | Status: SHIPPED | OUTPATIENT
Start: 2020-01-01 | End: 2020-01-01 | Stop reason: SDUPTHER

## 2020-01-01 RX ADMIN — GABAPENTIN 800 MG: 100 CAPSULE ORAL at 09:04

## 2020-01-01 RX ADMIN — Medication 10 ML: at 12:55

## 2020-01-01 RX ADMIN — ACETAMINOPHEN 1000 MG: 500 TABLET ORAL at 09:04

## 2020-01-24 DIAGNOSIS — E11.21 TYPE 2 DIABETES MELLITUS WITH NEPHROPATHY (HCC): Primary | ICD-10-CM

## 2020-01-24 RX ORDER — LANCETS
EACH MISCELLANEOUS
Qty: 100 EACH | Refills: 11 | Status: SHIPPED | OUTPATIENT
Start: 2020-01-24 | End: 2021-01-01

## 2020-01-24 RX ORDER — INSULIN PUMP SYRINGE, 3 ML
EACH MISCELLANEOUS
Qty: 1 KIT | Refills: 0 | Status: SHIPPED | OUTPATIENT
Start: 2020-01-24 | End: 2020-01-27 | Stop reason: SDUPTHER

## 2020-01-27 DIAGNOSIS — E11.21 TYPE 2 DIABETES MELLITUS WITH NEPHROPATHY (HCC): ICD-10-CM

## 2020-01-27 RX ORDER — INSULIN PUMP SYRINGE, 3 ML
EACH MISCELLANEOUS
Qty: 1 KIT | Refills: 0 | Status: SHIPPED | OUTPATIENT
Start: 2020-01-27 | End: 2020-01-01

## 2020-01-28 ENCOUNTER — CLINICAL SUPPORT (OUTPATIENT)
Dept: CARDIOLOGY CLINIC | Age: 70
End: 2020-01-28

## 2020-01-28 DIAGNOSIS — I49.5 SINOATRIAL NODE DYSFUNCTION (HCC): ICD-10-CM

## 2020-01-28 DIAGNOSIS — Z95.0 CARDIAC PACEMAKER IN SITU: Primary | ICD-10-CM

## 2020-05-04 NOTE — TELEPHONE ENCOUNTER
Patient is switching pharmacies. She she is needing for the prescriptions to be sent to this pharmacy.

## 2020-05-26 NOTE — PROGRESS NOTES
Monae Olivares is a 77 y.o. female who was seen by synchronous (real-time) audio-video technology on 5/26/2020. Consent: Monae Olivares, who was seen by synchronous (real-time) audio-video technology, and/or her healthcare decision maker, is aware that this patient-initiated, Telehealth encounter on 5/26/2020 is a billable service, with coverage as determined by her insurance carrier. She is aware that she may receive a bill and has provided verbal consent to proceed: Yes. Assessment & Plan: ICD-10-CM ICD-9-CM 1. Type 2 diabetes mellitus with nephropathy ([facility]) R28.32 445.30 METABOLIC PANEL, COMPREHENSIVE  
  583.81 LIPID PANEL  
   TSH REFLEX TO T4  
   HEMOGLOBIN A1C WITH EAG  
   CBC W/O DIFF 2. Benign hypertensive heart and CKD, stage 3 (GFR 30-59), w CHF ([facility]) W92.4 913.98 METABOLIC PANEL, COMPREHENSIVE  
 N18.3 585.3 LIPID PANEL  
  428.0 TSH REFLEX TO T4  
   HEMOGLOBIN A1C WITH EAG  
   CBC W/O DIFF 3. Diastolic CHF, acute on chronic ([facility]) M57.00 776.16 METABOLIC PANEL, COMPREHENSIVE  
  428.0 LIPID PANEL  
   TSH REFLEX TO T4  
   HEMOGLOBIN A1C WITH EAG  
   CBC W/O DIFF 4. Chronic systolic HF (heart failure) ([facility]) Q53.38 439.23 METABOLIC PANEL, COMPREHENSIVE  
   LIPID PANEL  
   TSH REFLEX TO T4  
   HEMOGLOBIN A1C WITH EAG  
   CBC W/O DIFF 5. PAD (peripheral artery disease) ([facility]) I73.9 443.9 6. Sick sinus syndrome ([facility]) I49.5 427.81   
7. CKD (chronic kidney disease) stage 3, GFR 30-59 ml/min ([facility]) N18.3 585.3 8. Paroxysmal atrial fibrillation ([facility]) I48.0 427.31   
9. Pulmonary emphysema, unspecified emphysema type ([facility] J43.9 492.8 10. ASHD (arteriosclerotic heart disease) I25.10 414.00 Cont current mngmnt Check labs F/u, in office, in 8 weeks [address] Subjective:  
Monae Olivares is a 77 y.o. female who was seen for Diabetes (Fasting glucose 120 5/25/2020) Patient has no complaints. Pt has left her home ONCE in 2 mths.  
 
PMH: 
 Hypertensive heart and kidney disease- 
BP Readings from Last 3 Encounters:  
11/12/19 110/55  
09/30/19 105/52  
05/31/19 94/50 CAD s/p stent-noted CHF-systolic and diastolic-latest SS~47% Wt Readings from Last 3 Encounters:  
05/26/20 167 lb (75.8 kg)  
11/11/19 167 lb (75.8 kg) 09/30/19 161 lb (73 kg) CKD 3-noted Lab Results Component Value Date/Time GFR est AA 42 (L) 11/11/2019 10:13 PM  
 GFR est non-AA 35 (L) 11/11/2019 10:13 PM  
 Creatinine 1.50 (H) 11/11/2019 10:13 PM  
 BUN 17 11/11/2019 10:13 PM  
 Sodium 140 11/11/2019 10:13 PM  
 Potassium 3.8 11/11/2019 10:13 PM  
 Chloride 107 11/11/2019 10:13 PM  
 CO2 24 11/11/2019 10:13 PM  
 
COPD-quiescent S/p pacemaker-noted Hx PAF s/p ablation-on eliquis Type 2 DM w nephropathy-on januvia 50mg daily Lab Results Component Value Date/Time Hemoglobin A1c 5.8 09/24/2017 10:36 AM  
 Hemoglobin A1c (POC) 6.9 09/30/2019 08:23 AM  
 
Lab Results Component Value Date/Time Glucose 115 (H) 11/11/2019 10:13 PM  
 Glucose (POC) 156 (H) 03/28/2019 11:44 AM  
 Glucose  09/30/2019 08:20 AM  
 
Hyperlipidemia-  low dose statin 
 -t/c PCSK9 inhibitor Lab Results Component Value Date/Time Cholesterol, total 177 04/17/2018 08:51 AM  
 Cholesterol (POC) 168 03/29/2018 08:24 AM  
 HDL Cholesterol 35 (L) 04/17/2018 08:51 AM  
 HDL Cholesterol (POC) n/a 03/29/2018 08:24 AM  
 LDL Cholesterol (POC) n/a 03/29/2018 08:24 AM  
 LDL, calculated 101 (H) 04/17/2018 08:51 AM  
 VLDL, calculated 41 (H) 04/17/2018 08:51 AM  
 Triglyceride 203 (H) 04/17/2018 08:51 AM  
 Triglycerides (POC) )650 03/29/2018 08:24 AM  
 CHOL/HDL Ratio 6.4 (H) 07/01/2014 03:10 AM  
 
 
Prior to Admission medications Medication Sig Start Date End Date Taking? Authorizing Provider  
simvastatin (ZOCOR) 20 mg tablet TAKE 1 TABLET BY MOUTH NIGHTLY 5/7/20  Yes Denis Alanis MD  
SITagliptin (Januvia) 50 mg tablet Take 1 Tab by mouth daily.  5/7/20  Yes Pineda Stewart MD  
tiotropium (Spiriva with HandiHaler) 18 mcg inhalation capsule INHALE THE CONTENTS OF 1 CAPSULE THROUGH HANDIHALER DEVICE DAILY 5/7/20  Yes Pineda Stewart MD  
albuterol (PROVENTIL HFA, VENTOLIN HFA, PROAIR HFA) 90 mcg/actuation inhaler Take 2 Puffs by inhalation every four (4) hours as needed for Wheezing. 5/7/20  Yes Pineda Stewart MD  
albuterol (PROVENTIL VENTOLIN) 2.5 mg /3 mL (0.083 %) nebu 3 mL by Nebulization route every four (4) hours as needed for Wheezing. 5/7/20  Yes Pineda Stewart MD  
furosemide (LASIX) 20 mg tablet Take 1 tablet by mouth once daily 4/3/20  Yes Pineda Stewart MD  
Blood-Glucose Meter monitoring kit Use BID Dx E11.9 ONE TOUCH 1/27/20  Yes Pineda Stewart MD  
glucose blood VI test strips (BLOOD GLUCOSE TEST) strip Use BID Dx11.9 1/24/20  Yes Pineda Stewart MD  
lancets misc Use BID 1/24/20  Yes Pineda Stewart MD  
carvedilol (COREG) 6.25 mg tablet TAKE 1 TABLET BY MOUTH TWICE DAILY WITH MEALS 1/6/20  Yes Pineda Stewart MD  
colchicine 0.6 mg tablet TAKE TWO TABLETS BY MOUTH ONE TIME DAILY AND TAKE FOUR TABLETS AS NEEDED FOR FLAREUPS 1/3/20  Yes Pineda Stewart MD  
budesonide-formoterol (SYMBICORT) 160-4.5 mcg/actuation HFAA INHALE ONE PUFF BY MOUTH TWICE DAILY 12/9/19  Yes Pineda Stewart MD  
albuterol-ipratropium (DUO-NEB) 2.5 mg-0.5 mg/3 ml nebu 3 mL by Nebulization route every four (4) hours as needed (wheezing). 11/12/19  Yes Bianca Lofton DO  
ELIQUIS 5 mg tablet TAKE 1 TABLET BY MOUTH TWICE DAILY TO  PREVENT  THROMBOEMBOLISM  IN  CHRONIC  ATRIAL  FIBRILLATION 10/28/19  Yes Anil Hicks MD  
gabapentin (NEURONTIN) 800 mg tablet TAKE 1 TABLET BY MOUTH THREE TIMES DAILY 10/17/19  Yes Pineda Stewart MD  
acetaminophen (TYLENOL) 325 mg tablet Take 2 Tabs by mouth every four (4) hours as needed for Pain or Fever.  3/28/19  Yes Lexi Vizcaino MD  
 guaiFENesin ER (MUCINEX) 600 mg ER tablet Take 1 Tab by mouth two (2) times a day. Patient taking differently: Take 600 mg by mouth as needed. 3/28/19  Yes Drew Lopez MD  
benzocaine-menthol (CHLORASEPTIC MAX) 15-10 mg lozg lozenge Take 1 Lozenge by mouth every four to six (4-6) hours as needed for Sore throat. 3/28/19  Yes Drew Lopez MD  
apixaban (ELIQUIS) 5 mg tablet Take 1 Tab by mouth two (2) times a day. Resume from tomorrow 7/21/2018. 1/20/19  Yes Leigh Ahumada MD  
fluticasone (FLONASE) 50 mcg/actuation nasal spray 2 Sprays by Both Nostrils route daily as needed. Yes Other, MD Shannan  
cod liver oil cap Take 1 Cap by mouth daily. Yes Provider, Historical  
furosemide (LASIX) 20 mg tablet TAKE 1 TABLET BY MOUTH ONCE DAILY 5/7/20   Melanie Bryant MD  
carvediloL (COREG) 6.25 mg tablet Take 1 Tab by mouth two (2) times daily (with meals). 5/7/20   Melanie Bryant MD  
clopidogrel (PLAVIX) 75 mg tab TAKE 1 TABLET BY MOUTH ONCE DAILY 10/28/19   Melanie Bryant MD  
 
Allergies Allergen Reactions  Crestor [Rosuvastatin] Myalgia  Levaquin [Levofloxacin] Nausea Only GI Upset  Lipitor [Atorvastatin] Diarrhea  Lyrica [Pregabalin] Myalgia Review of systems (12) negative, except noted above. Objective:  
 
Visit Vitals Ht 5' 6\" (1.676 m) Wt 167 lb (75.8 kg) BMI 26.95 kg/m² General: alert, cooperative, no distress Mental  status: normal mood, behavior, speech, dress, motor activity, and thought processes, able to follow commands HENT: NCAT Neck: no visualized mass Resp: no respiratory distress Neuro: no gross deficits Skin: no discoloration or lesions of concern on visible areas Psychiatric: normal affect, consistent with stated mood, no evidence of hallucinations Additional exam findings:   
 
 
We discussed the expected course, resolution and complications of the diagnosis(es) in detail. Medication risks, benefits, costs, interactions, and alternatives were discussed as indicated. I advised her to contact the office if her condition worsens, changes or fails to improve as anticipated. She expressed understanding with the diagnosis(es) and plan. Kvng Gonzales is a 77 y.o. female who was evaluated by a video visit encounter for concerns as above. Patient identification was verified prior to start of the visit. A caregiver was present when appropriate. Due to this being a TeleHealth encounter (During [facility]- public health emergency), evaluation of the following organ systems was limited: Vitals/Constitutional/EENT/Resp/CV/GI//MS/Neuro/Skin/Heme-Lymph-Imm. Pursuant to the emergency declaration under the [facility], [facility]5 waiver authority and the [employer] and [employer] Act, this Virtual  Visit was conducted, with patient's (and/or legal guardian's) consent, to reduce the patient's risk of exposure to COVID-19 and provide necessary medical care. Services were provided through a video synchronous discussion virtually to substitute for in-person clinic visit. Patient and provider were located at their individual homes.  
 
 
Miroslava Sosa MD

## 2020-05-26 NOTE — PROGRESS NOTES
Chief Complaint Patient presents with  Diabetes Fasting glucose 120 5/25/2020 1. Have you been to the ER, urgent care clinic since your last visit? Hospitalized since your last visit? No 
 
2. Have you seen or consulted any other health care providers outside of the [address] [place] [address] since your last visit? Include any pap smears or colon screening.  No

## 2020-06-02 NOTE — PROGRESS NOTES
Cardiology Telemedicine Encounter Cloria Cooks is a 77 y.o. female who was seen by synchronous (real-time) audio-video technology on 6/2/2020 Subjective/HPI:  
 
Cloria Cooks is a 77 y.o. female is here for routine follow-up via virtual visit. She has a PMHx of CHF, paroxysmal afib, COPD. She is on eliquis, coreg. No specific complaints. Denies any chest pain, SOB, palpitations. Breathing better. PCP Provider Farhad Perdomo MD 
 
Past Medical History:  
Diagnosis Date  Arthritis   
 left shoulder  Atrial fibrillation ([address] 6/2/2010 Dr. Jessica Lyons  CAD (coronary artery disease) stent; Dr. Jessica Lyons  Celiac disease  Chronic diastolic heart failure ([address] 09/22/2014 Dr. Jessica Lyons  Chronic kidney disease   
 per cardio note  Chronic pain   
 left thigh:  L SFA intervention by Dr. Quentin Weber 07/2018, as of 9/6/18:  still causing pain, pt to have MILLA checked per Dr. Quentin Weber note  Chronic systolic HF (heart failure) ([address] 5/10/2017  
 4/2017 EF 25-30%  COPD (chronic obstructive pulmonary disease) ([address] 6/2/2010 Dr. Jordy Mendez  Diabetes ([address] Dr. Marci Voss; no current medication per pt  Emphysema, unspecified ([address] Dr. Jordy Mendez  Fibroid  Frequent falls 2017  Gout  Heart failure ([address] Dr. Jessica Lyons  History of Clostridium difficile infection 5/10/2017  
 4/2017 CDiff positive  Hypertension 6/2/2010 Dr. Harini Dugan  Hypertensive heart and chronic kidney disease   
 per PCP note  Hypotension 5/10/2017  Junctional tachycardia ([address] 5/10/2017 Dr. Jessica Lyons  Neuropathy  NIDDM (non-insulin dependent diabetes mellitus) 6/2/2010  PAD (peripheral artery disease) ([address]  S/P ablation of atrial fibrillation 03/2018  Screening mammogram 5/4/10  
 SOB (shortness of breath) 09/22/2014 Dr. Jordy Mendez  SSS (sick sinus syndrome) ([address]   
 per pacemaker form 9/6/18  Weakness per pt weakness from c-diff 2017, mulitple falls with injury to rotator cuff Allergies Allergen Reactions  Crestor [Rosuvastatin] Myalgia  Levaquin [Levofloxacin] Nausea Only GI Upset  Lipitor [Atorvastatin] Diarrhea  Lyrica [Pregabalin] Myalgia Outpatient Encounter Medications as of 6/2/2020 Medication Sig Dispense Refill  simvastatin (ZOCOR) 20 mg tablet TAKE 1 TABLET BY MOUTH NIGHTLY 90 Tab 1  
 SITagliptin (Januvia) 50 mg tablet Take 1 Tab by mouth daily. 90 Tab 1  
 tiotropium (Spiriva with HandiHaler) 18 mcg inhalation capsule INHALE THE CONTENTS OF 1 CAPSULE THROUGH HANDIHALER DEVICE DAILY 90 Cap 1  
 albuterol (PROVENTIL HFA, VENTOLIN HFA, PROAIR HFA) 90 mcg/actuation inhaler Take 2 Puffs by inhalation every four (4) hours as needed for Wheezing. 1 Inhaler 1  
 albuterol (PROVENTIL VENTOLIN) 2.5 mg /3 mL (0.083 %) nebu 3 mL by Nebulization route every four (4) hours as needed for Wheezing. 24 Each 2  
 furosemide (LASIX) 20 mg tablet Take 1 tablet by mouth once daily 90 Tab 0  
 glucose blood VI test strips (BLOOD GLUCOSE TEST) strip Use BID Dx11.9 100 Strip 11  
 lancets misc Use  Each 11  
 carvedilol (COREG) 6.25 mg tablet TAKE 1 TABLET BY MOUTH TWICE DAILY WITH MEALS 180 Tab 1  
 colchicine 0.6 mg tablet TAKE TWO TABLETS BY MOUTH ONE TIME DAILY AND TAKE FOUR TABLETS AS NEEDED FOR FLAREUPS 60 Tab 5  
 budesonide-formoterol (SYMBICORT) 160-4.5 mcg/actuation HFAA INHALE ONE PUFF BY MOUTH TWICE DAILY 1 Inhaler 3  
 albuterol-ipratropium (DUO-NEB) 2.5 mg-0.5 mg/3 ml nebu 3 mL by Nebulization route every four (4) hours as needed (wheezing). 30 Nebule 0  
 gabapentin (NEURONTIN) 800 mg tablet TAKE 1 TABLET BY MOUTH THREE TIMES DAILY 270 Tab 5  
 acetaminophen (TYLENOL) 325 mg tablet Take 2 Tabs by mouth every four (4) hours as needed for Pain or Fever.  40 Tab 0  
 guaiFENesin ER (MUCINEX) 600 mg ER tablet Take 1 Tab by mouth two (2) times a day. (Patient taking differently: Take 600 mg by mouth as needed.) 20 Tab 0  
 benzocaine-menthol (CHLORASEPTIC MAX) 15-10 mg lozg lozenge Take 1 Lozenge by mouth every four to six (4-6) hours as needed for Sore throat. 30 Each 0  
 apixaban (ELIQUIS) 5 mg tablet Take 1 Tab by mouth two (2) times a day. Resume from tomorrow 7/21/2018. 56 Tab 0  
 fluticasone (FLONASE) 50 mcg/actuation nasal spray 2 Sprays by Both Nostrils route daily as needed.  cod liver oil cap Take 1 Cap by mouth daily.  [DISCONTINUED] ELIQUIS 5 mg tablet TAKE 1 TABLET BY MOUTH TWICE DAILY TO  PREVENT  THROMBOEMBOLISM  IN  CHRONIC  ATRIAL  FIBRILLATION 180 Tab 3 No facility-administered encounter medications on file as of 6/2/2020. Review of Symptoms: 
 
Review of Systems Constitutional: Negative. Negative for chills and fever. HENT: Negative. Negative for hearing loss. Respiratory: Negative. Negative for cough, hemoptysis and shortness of breath. Cardiovascular: Negative. Negative for chest pain, palpitations, orthopnea, claudication, leg swelling and PND. Gastrointestinal: Negative. Negative for nausea and vomiting. Musculoskeletal: Negative for myalgias. Skin: Negative. Negative for rash. Neurological: Negative. Negative for dizziness, loss of consciousness and headaches. Physical Exam:   
 
General: Well developed, in no acute distress, cooperative and alert HEENT: trach is midline. PEERL, EOM intact. Respiratory: No audible wheezing, no acute respiratory distress, normal effort of breathing Extremities:  No cyanosis, atraumatic. No lower extremity edema. Neuro: A&Ox3, speech clear Skin: Skin color is normal. No rashes or lesions. Non diaphoretic Due to this being a TeleHealth evaluation, many elements of the physical examination are unable to be assessed. Cardiology Labs: 
 
Lab Results Component Value Date/Time  Cholesterol, total 177 04/17/2018 08:51 AM  
 HDL Cholesterol 35 (L) 04/17/2018 08:51 AM  
 LDL, calculated 101 (H) 04/17/2018 08:51 AM  
 LDL, calculated 160 (H) 10/31/2016 12:00 AM  
 LDL, calculated 183 (H) 06/25/2015 12:00 AM  
 VLDL, calculated 41 (H) 04/17/2018 08:51 AM  
 CHOL/HDL Ratio 6.4 (H) 07/01/2014 03:10 AM  
 
 
Lab Results Component Value Date/Time Hemoglobin A1c 5.8 09/24/2017 10:36 AM  
 Hemoglobin A1c (POC) 6.9 09/30/2019 08:23 AM  
 
 
Lab Results Component Value Date/Time Sodium 140 11/11/2019 10:13 PM  
 Potassium 3.8 11/11/2019 10:13 PM  
 Chloride 107 11/11/2019 10:13 PM  
 CO2 24 11/11/2019 10:13 PM  
 Glucose 115 (H) 11/11/2019 10:13 PM  
 BUN 17 11/11/2019 10:13 PM  
 Creatinine 1.50 (H) 11/11/2019 10:13 PM  
 BUN/Creatinine ratio 11 (L) 11/11/2019 10:13 PM  
 GFR est AA 42 (L) 11/11/2019 10:13 PM  
 GFR est non-AA 35 (L) 11/11/2019 10:13 PM  
 Calcium 8.9 11/11/2019 10:13 PM  
 Anion gap 9 11/11/2019 10:13 PM  
 Bilirubin, total 0.8 05/21/2019 09:11 AM  
 ALT (SGPT) 16 05/21/2019 09:11 AM  
 Alk. phosphatase 114 05/21/2019 09:11 AM  
 Protein, total 8.2 05/21/2019 09:11 AM  
 Albumin 3.1 (L) 05/21/2019 09:11 AM  
 Globulin 5.1 (H) 05/21/2019 09:11 AM  
 A-G Ratio 0.6 (L) 05/21/2019 09:11 AM  
 
 
 
 Assessment: ICD-10-CM ICD-9-CM 1. Paroxysmal atrial fibrillation (HCC) I48.0 427.31   
2. Chronic combined systolic and diastolic congestive heart failure (HCC) I50.42 428.42   
  428.0 3. ASHD (arteriosclerotic heart disease) I25.10 414.00   
4. Cardiac pacemaker in situ Z95.0 V45.01   
5. Pulmonary emphysema, unspecified emphysema type ([facility] J43.9 492.8 6. PAD (peripheral artery disease) ([facility]) I73.9 443.9 7. Type 2 diabetes mellitus with nephropathy ([facility]) E11.21 250.40   
  583.81 Plan: 1. Paroxysmal atrial fibrillation (HCC) Controlled, continue eliquis, coreg. 2. Chronic combined systolic and diastolic congestive heart failure ([facility] Compensated. Continue diuretic. 3. ASHD (arteriosclerotic heart disease) Stable. 4. Cardiac pacemaker in situ Per device clinic. 5. Pulmonary emphysema, unspecified emphysema type ([address] 6. PAD (peripheral artery disease) ([facility] 7. Type 2 diabetes mellitus with nephropathy ([facility] Controlled per patient. F/u in 6 months. Alexsandra Brush MD 
 
 
 
This visit was completed using [employer] Virtual Visit telemedicine services Pursuant to the emergency declaration under the [address] [place] [address], [facility] waiver authority and the [employer] Act, this Virtual Visit was conducted, with patient's consent, to reduce the patient's risk of exposure to COVID-19 and provide continuity of care for an established patient. Services were provided through a video synchronous discussion virtually to substitute for in-person clinic visit. 6 MONTH FOLLOW UP. NO BP MONITOR. NO CARDIAC C/O.

## 2020-07-29 NOTE — PROGRESS NOTES
Subjective:      Neema Perez is a 72 y.o. female is here for EP follow up. The patient denies chest pain/ shortness of breath, orthopnea, PND, LE edema, palpitations, syncope, presyncope or fatigue. Neema Perez  is on [address] [place] [address] Road, reports no melena, hematuria, or obvious signs of bleeding. No falls.        Patient Active Problem List    Diagnosis Date Noted    ASHD (arteriosclerotic heart disease) 05/31/2019    Respiratory failure with hypoxia ([facility] 03/25/2019    S/P rotator cuff repair 09/19/2018    PAD (peripheral artery disease) ([address] 05/22/2018    A-fib ([facility] 03/30/2018    Type 2 diabetes mellitus with nephropathy ([facility] 12/27/2017    CKD (chronic kidney disease) stage 3, GFR 30-59 ml/min ([facility]) 09/22/2017    Chronic systolic HF (heart failure) ([facility] 05/10/2017    History of Clostridium difficile infection 05/10/2017    Junctional tachycardia ([address] 05/10/2017    Hypotension 05/10/2017    S/P ablation of atrial fibrillation 05/02/2017    Fear associated with illness and body function 04/07/2017    Counseling regarding advanced care planning and goals of care 10/19/5184    Systolic CHF, acute ([facility] 13/52/4554    Acute systolic CHF (congestive heart failure) ([facility] 04/06/2017    CHF (congestive heart failure) ([facility] 04/04/2017    Type 2 diabetes mellitus with diabetic neuropathy, without long-term current use of insulin ([address] 01/31/2017    GIB (gastrointestinal bleeding) 78/96/3107    Diastolic CHF, acute on chronic ([address] 09/22/2014    S/P coronary artery stent placement 07/04/2014    Back pain 11/14/2012    Sinoatrial node dysfunction ([address] 07/05/2012    Cardiac pacemaker in situ 07/05/2012    Mixed hyperlipidemia 07/05/2012    Chest pain 09/21/2011    Sick sinus syndrome ([address] 02/25/2011    S/P angioplasty with stent 02/07/2011    Hypokalemia 07/20/2010    Hip pain 06/08/2010    Benign hypertensive heart and CKD, stage 3 (GFR 30-59), w CHF ([facility] 06/02/2010    Atrial fibrillation--paroxysmal 06/02/2010    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      Brendon Hector MD  Past Medical History:   Diagnosis Date    Arthritis     left shoulder    Atrial fibrillation ([facility] 6/2/2010    Dr. Lainey Francis CAD (coronary artery disease)     stent; Dr. Lainey Francis Celiac disease     Chronic diastolic heart failure ([address] 09/22/2014    Dr. Ruth Gibson    Chronic kidney disease     per cardio note    Chronic pain     left thigh:  L SFA intervention by Dr. Lamon Prader 07/2018, as of 9/6/18:  still causing pain, pt to have MILLA checked per Dr. Lamon Prader note    Chronic systolic HF (heart failure) ([facility] 5/10/2017    4/2017 EF 25-30%    COPD (chronic obstructive pulmonary disease) ([facility] 6/2/2010    Dr. Rico Hallmark     Diabetes [facility])     Dr. Cruz Wilhelm; no current medication per pt    Emphysema, unspecified ([facility]     Dr. Judie Escobar Frequent falls 2017    Gout     Heart failure ([facility]     Dr. Lainey Francis History of Clostridium difficile infection 5/10/2017    4/2017 CDiff positive    Hypertension 6/2/2010    Dr. Gustabo Candelaria Hypertensive heart and chronic kidney disease     per PCP note    Hypotension 5/10/2017    Junctional tachycardia ([address] 5/10/2017    Dr. Ruth Gibson    Neuropathy     NIDDM (non-insulin dependent diabetes mellitus) 6/2/2010    PAD (peripheral artery disease) ([facility]     S/P ablation of atrial fibrillation 03/2018    Screening mammogram 5/4/10    SOB (shortness of breath) 09/22/2014    Dr. Joleen Arreola (sick sinus syndrome) ([facility]     per pacemaker form 9/6/18    Weakness     per pt weakness from c-diff 2017, mulitple falls with injury to rotator cuff      Past Surgical History:   Procedure Laterality Date    CARDIAC SURG PROCEDURE UNLIST      3 cardiac stent placed     COLONOSCOPY N/A 9/25/2017    COLONOSCOPY with biopsy performed [facility] HX AFIB ABLATION  03/2018 has had 2 ablations per patient    HX  SECTION      HX HEART CATHETERIZATION  2018    HX OTHER SURGICAL      adrenal gland removed    HX PACEMAKER Left     Biotronik model: Vaibhav PEGUERO EXCISE ADRENAL GLAND      VASCULAR SURGERY PROCEDURE UNLIST  2018    Left SFA intervention ; Dr. Aaron French [Rosuvastatin] Myalgia    Levaquin [Levofloxacin] Nausea Only     GI Upset    Lipitor [Atorvastatin] Diarrhea    Lyrica [Pregabalin] Myalgia      Family History   Problem Relation Age of Onset    Heart Disease Mother     Diabetes Father     Heart Disease Brother     negative for cardiac disease  Social History     Socioeconomic History    Marital status:      Spouse name: Not on file    Number of children: Not on file    Years of education: Not on file    Highest education level: Not on file   Tobacco Use    Smoking status: Former Smoker     Packs/day: 0.50     Years: 30.00     Pack years: 15.00     Types: Cigarettes     Start date:      Last attempt to quit: 1999     Years since quittin.5    Smokeless tobacco: Never Used   Substance and Sexual Activity    Alcohol use: No    Drug use: No    Sexual activity: Not Currently     Current Outpatient Medications   Medication Sig    simvastatin (ZOCOR) 20 mg tablet TAKE 1 TABLET BY MOUTH NIGHTLY    SITagliptin (Januvia) 50 mg tablet Take 1 Tab by mouth daily.  tiotropium (Spiriva with HandiHaler) 18 mcg inhalation capsule INHALE THE CONTENTS OF 1 CAPSULE THROUGH HANDIHALER DEVICE DAILY    albuterol (PROVENTIL HFA, VENTOLIN HFA, PROAIR HFA) 90 mcg/actuation inhaler Take 2 Puffs by inhalation every four (4) hours as needed for Wheezing.  albuterol (PROVENTIL VENTOLIN) 2.5 mg /3 mL (0.083 %) nebu 3 mL by Nebulization route every four (4) hours as needed for Wheezing.     furosemide (LASIX) 20 mg tablet Take 1 tablet by mouth once daily    glucose blood VI test strips (BLOOD GLUCOSE TEST) strip Use BID Dx11.9    lancets misc Use BID    carvedilol (COREG) 6.25 mg tablet TAKE 1 TABLET BY MOUTH TWICE DAILY WITH MEALS    colchicine 0.6 mg tablet TAKE TWO TABLETS BY MOUTH ONE TIME DAILY AND TAKE FOUR TABLETS AS NEEDED FOR FLAREUPS    budesonide-formoterol (SYMBICORT) 160-4.5 mcg/actuation HFAA INHALE ONE PUFF BY MOUTH TWICE DAILY    albuterol-ipratropium (DUO-NEB) 2.5 mg-0.5 mg/3 ml nebu 3 mL by Nebulization route every four (4) hours as needed (wheezing).  gabapentin (NEURONTIN) 800 mg tablet TAKE 1 TABLET BY MOUTH THREE TIMES DAILY    acetaminophen (TYLENOL) 325 mg tablet Take 2 Tabs by mouth every four (4) hours as needed for Pain or Fever.  guaiFENesin ER (MUCINEX) 600 mg ER tablet Take 1 Tab by mouth two (2) times a day. (Patient taking differently: Take 600 mg by mouth as needed.)    benzocaine-menthol (CHLORASEPTIC MAX) 15-10 mg lozg lozenge Take 1 Lozenge by mouth every four to six (4-6) hours as needed for Sore throat.  apixaban (ELIQUIS) 5 mg tablet Take 1 Tab by mouth two (2) times a day. Resume from tomorrow 7/21/2018.  fluticasone (FLONASE) 50 mcg/actuation nasal spray 2 Sprays by Both Nostrils route daily as needed.  cod liver oil cap Take 1 Cap by mouth daily. No current facility-administered medications for this visit. Vitals:    07/30/20 0853   BP: 90/62   Pulse: 77   Resp: 18   SpO2: 98%   Weight: 167 lb 8 oz (76 kg)   Height: 5' 6\" (1.676 m)       I have reviewed the nurses notes, vitals, problem list, allergy list, medical history, family, social history and medications. Review of Symptoms:    General: Pt denies excessive weight gain or loss. Pt is able to conduct ADL's  HEENT: Denies blurred vision, headaches, epistaxis and difficulty swallowing. Respiratory: Denies shortness of breath, [place], wheezing or stridor.   Cardiovascular: Denies precordial pain, palpitations, edema or PND  Gastrointestinal: Denies poor appetite, indigestion, abdominal pain or blood in stool  Urinary: Denies dysuria, pyuria  Musculoskeletal: Denies pain or swelling from muscles or joints  Neurologic: Denies tremor, paresthesias, or sensory motor disturbance  Skin: Denies rash, itching or texture change. Psych: Denies depression      Physical Exam:      General: Well developed, in no acute distress. HEENT: Eyes - PERRL, no jvd  Heart:  Normal S1/S2 negative S3 or S4. Regular, no murmur, gallop or rub. Respiratory: Clear bilaterally x 4, no wheezing or rales  Extremities:  No edema, normal cap refill, no cyanosis. Musculoskeletal: No clubbing  Neuro: A&Ox3, speech clear, gait stable. Skin: Skin color is normal. No rashes or lesions. Non diaphoretic  Vascular: 2+ pulses symmetric in all extremities    Cardiographics    Ekg: V paced     Results for orders placed or performed during the hospital encounter of 11/11/19   EKG, 12 LEAD, INITIAL   Result Value Ref Range    Ventricular Rate 78 BPM    Atrial Rate 55 BPM    P-R Interval 150 ms    QRS Duration 170 ms    Q-T Interval 454 ms    QTC Calculation (Bezet) 517 ms    Calculated R Axis -86 degrees    Calculated T Axis 83 degrees    Diagnosis       AV dual-paced rhythm with occasional ventricular-paced complexes  Confirmed by Tab Matthews ([id number]) on 11/12/2019 8:51:05 PM       *Note: Due to a large number of results and/or encounters for the requested time period, some results have not been displayed. A complete set of results can be found in Results Review.          Lab Results   Component Value Date/Time    WBC 6.6 06/22/2020 08:43 AM    Hemoglobin (POC) 11.2 07/20/2017 09:02 AM    HGB 12.3 06/22/2020 08:43 AM    HCT 38.5 06/22/2020 08:43 AM    PLATELET 750 40/41/1581 08:43 AM    MCV 84 06/22/2020 08:43 AM      Lab Results   Component Value Date/Time    Sodium CANCELED 06/22/2020 08:43 AM    Potassium CANCELED 06/22/2020 08:43 AM    Chloride CANCELED 06/22/2020 08:43 AM    CO2 CANCELED 06/22/2020 08:43 AM    Anion gap 9 11/11/2019 10:13 PM    Glucose 151 (H) 06/22/2020 08:43 AM    BUN 19 06/22/2020 08:43 AM    Creatinine 1.26 (H) 06/22/2020 08:43 AM    BUN/Creatinine ratio 15 06/22/2020 08:43 AM    GFR est AA 51 (L) 06/22/2020 08:43 AM    GFR est non-AA 44 (L) 06/22/2020 08:43 AM    Calcium CANCELED 06/22/2020 08:43 AM    Bilirubin, total 0.4 06/22/2020 08:43 AM    Alk. phosphatase CANCELED 06/22/2020 08:43 AM    Protein, total 8.3 06/22/2020 08:43 AM    Albumin 4.2 06/22/2020 08:43 AM    Globulin 5.1 (H) 05/21/2019 09:11 AM    A-G Ratio 1.0 (L) 06/22/2020 08:43 AM    ALT (SGPT) 24 06/22/2020 08:43 AM      Lab Results   Component Value Date/Time    TSH 2.240 06/22/2020 08:43 AM    TSH 2.74 05/09/2017 05:04 PM        Assessment:           ICD-10-CM ICD-9-CM    1. Chronic combined systolic and diastolic congestive heart failure (HCC)  I50.42 428.42 AMB POC EKG ROUTINE W/ 12 LEADS, INTER & REP     428.0    2. Paroxysmal atrial fibrillation (HCC)  I48.0 427.31 AMB POC EKG ROUTINE W/ 12 LEADS, INTER & REP   3. Anticoagulant long-term use  Z79.01 V58.61 AMB POC EKG ROUTINE W/ 12 LEADS, INTER & REP   4. Cardiac pacemaker in situ  Z95.0 V45.01 AMB POC EKG ROUTINE W/ 12 LEADS, INTER & REP   5. CKD (chronic kidney disease) stage 3, GFR 30-59 ml/min (HCC)  N18.3 585.3 AMB POC EKG ROUTINE W/ 12 LEADS, INTER & REP     Orders Placed This Encounter    AMB POC EKG ROUTINE W/ 12 LEADS, INTER & REP     Order Specific Question:   Reason for Exam:     Answer:   routine        Plan:     Ms. Kyle Patten is here for follow up, s/p AF/AFL ablation 3/30/2018. She feels well overall, but is short of breath with exertion. Device interrogation demonstrates normal functioning with 88% AP, 97% RVP. Nine ATR, longest 6 min. EF 47% per stress 2018. Will get echo. Continue with eliquis and follow up in one year.      Continue medical management for HTN, COPD, AF.      Thank you for allowing me to participate in Pual Jasvir 's care.       Deborah Johnson, NP

## 2020-07-30 NOTE — PROGRESS NOTES
1. Have you been to the ER, urgent care clinic since your last visit? Hospitalized since your last visit? No.    2. Have you seen or consulted any other health care providers outside of the [address] [place] [address] since your last visit? Include any pap smears or colon screening.    No.      Chief Complaint   Patient presents with    Annual Exam     device check- pt denies any cardiac symptoms

## 2020-08-10 NOTE — TELEPHONE ENCOUNTER
MD Be Coker,    Patient calling is out of gabapentin. No access to . Patient requesting call once approved.  Thanks, Grace    Last Visit: VV 5/26/20   MD Be Coker   Next Appointment: 8/24/20  MD Be Coker  Previous Refill Encounter(s): 10/17/19  270 + 5    Requested Prescriptions     Pending Prescriptions Disp Refills    gabapentin (NEURONTIN) 800 mg tablet 270 Tab 5     Sig: TAKE 1 TABLET BY MOUTH THREE TIMES DAILY

## 2020-09-30 NOTE — PROGRESS NOTES
Adelina Connell is a 72 y.o. female and presents with Diabetes Carleen Olivera Subjective: 
 
Pt lost weight due to dietary changes. Pt has given up meat. Hypertensive heart and kidney disease-pt has no complaints, stable on med. Relays med compliance 
 -cardiologist Dr. Lali Lanier BP Readings from Last 3 Encounters:  
09/30/20 (!) 111/48  
07/30/20 90/62  
11/12/19 110/55 CAD s/p stent-noted CHF-systolic and diastolic-latest ZO~78% Wt Readings from Last 3 Encounters:  
09/30/20 159 lb (72.1 kg) 07/30/20 167 lb 8 oz (76 kg) 06/02/20 165 lb (74.8 kg) CKD 3-noted Lab Results Component Value Date/Time GFR est AA 51 (L) 06/22/2020 08:43 AM  
 GFR est non-AA 44 (L) 06/22/2020 08:43 AM  
 Creatinine 1.26 (H) 06/22/2020 08:43 AM  
 BUN 19 06/22/2020 08:43 AM  
 Sodium CANCELED 06/22/2020 08:43 AM  
 Potassium CANCELED 06/22/2020 08:43 AM  
 Chloride CANCELED 06/22/2020 08:43 AM  
 CO2 CANCELED 06/22/2020 08:43 AM  
 
COPD-quiescent S/p pacemaker-noted Hx PAF s/p ablation-on eliquis Type 2 DM w nephropathy-diet controlled Lab Results Component Value Date/Time Hemoglobin A1c 6.5 (H) 06/22/2020 08:43 AM  
 Hemoglobin A1c (POC) 6.6 09/30/2020 08:19 AM  
 
Lab Results Component Value Date/Time Glucose 151 (H) 06/22/2020 08:43 AM  
 Glucose (POC) 156 (H) 03/28/2019 11:44 AM  
 Glucose  09/30/2020 08:17 AM  
 
 
Hyperlipidemia-  low dose statin Lab Results Component Value Date/Time Cholesterol, total 156 06/22/2020 08:43 AM  
 Cholesterol (POC) 168 03/29/2018 08:24 AM  
 HDL Cholesterol 35 (L) 06/22/2020 08:43 AM  
 HDL Cholesterol (POC) n/a 03/29/2018 08:24 AM  
 LDL Cholesterol (POC) n/a 03/29/2018 08:24 AM  
 LDL, calculated 94 06/22/2020 08:43 AM  
 VLDL, calculated 27 06/22/2020 08:43 AM  
 Triglyceride 137 06/22/2020 08:43 AM  
 Triglycerides (POC) )650 03/29/2018 08:24 AM  
 CHOL/HDL Ratio 6.4 (H) 07/01/2014 03:10 AM  
 
 
 
Review of Systems Review of systems (12) negative, except noted above. Past Medical History:  
Diagnosis Date  Arthritis   
 left shoulder  Atrial fibrillation ([address] 2010 Dr. Reynold Opitz  CAD (coronary artery disease) stent; Dr. Reynold Opitz  Celiac disease  Chronic diastolic heart failure ([address] 2014 Dr. Reynold Opitz  Chronic kidney disease   
 per cardio note  Chronic pain   
 left thigh:  L SFA intervention by Dr. Nathaly Busch 2018, as of 18:  still causing pain, pt to have MILLA checked per Dr. Nathaly Busch note  Chronic systolic HF (heart failure) ([address] 5/10/2017  
 2017 EF 25-30%  COPD (chronic obstructive pulmonary disease) ([address] 2010 Dr. Iron Jenkins  Diabetes ([facility] Dr. Monica Almonte; no current medication per pt  Emphysema, unspecified ([facility] Dr. Iron Jenkins  Fibroid  Frequent falls   Gout  Heart failure ([facility] Dr. Reynold Opitz  History of Clostridium difficile infection 5/10/2017  
 2017 CDiff positive  Hypertension 2010 Dr. Mindy Oliveira  Hypertensive heart and chronic kidney disease   
 per PCP note  Hypotension 5/10/2017  Junctional tachycardia ([address] 5/10/2017 Dr. Reynold Opitz  Neuropathy  NIDDM (non-insulin dependent diabetes mellitus) 2010  PAD (peripheral artery disease) ([facility]  S/P ablation of atrial fibrillation 2018  Screening mammogram 5/4/10  
 SOB (shortness of breath) 2014 Dr. Iron Jenkins  SSS (sick sinus syndrome) ([facility]   
 per pacemaker form 18  Weakness   
 per pt weakness from c-diff , mulitple falls with injury to rotator cuff Past Surgical History:  
Procedure Laterality Date  CARDIAC SURG PROCEDURE UNLIST    
 3 cardiac stent placed  COLONOSCOPY N/A 2017 COLONOSCOPY with biopsy performed by Grecia Elizalde MD at [facility] AMBULATORY OR  
 HX AFIB ABLATION  2018  
 has had 2 ablations per patient  HX  SECTION    
  HX HEART CATHETERIZATION  2018  HX OTHER SURGICAL    
 adrenal gland removed  HX PACEMAKER Left Biot[employer] model: [employer]  GA EXCISE ADRENAL GLAND  VASCULAR SURGERY PROCEDURE UNLIST  2018 Left SFA intervention ; Dr. Jhon Penn Social History Socioeconomic History  Marital status:  Spouse name: Not on file  Number of children: Not on file  Years of education: Not on file  Highest education level: Not on file Tobacco Use  Smoking status: Former Smoker Packs/day: 0.50 Years: 30.00 Pack years: 15.00 Types: Cigarettes Start date: 5 Last attempt to quit: 1999 Years since quittin.7  Smokeless tobacco: Never Used Substance and Sexual Activity  Alcohol use: No  
 Drug use: No  
 Sexual activity: Not Currently Family History Problem Relation Age of Onset  Heart Disease Mother  Diabetes Father  Heart Disease Brother Current Outpatient Medications Medication Sig Dispense Refill  gabapentin (NEURONTIN) 800 mg tablet TAKE 1 TABLET BY MOUTH THREE TIMES DAILY 270 Tab 1  
 simvastatin (ZOCOR) 20 mg tablet TAKE 1 TABLET BY MOUTH NIGHTLY 90 Tab 1  
 SITagliptin (Januvia) 50 mg tablet Take 1 Tab by mouth daily. 90 Tab 1  
 tiotropium (Spiriva with HandiHaler) 18 mcg inhalation capsule INHALE THE CONTENTS OF 1 CAPSULE THROUGH HANDIHALER DEVICE DAILY 90 Cap 1  
 albuterol (PROVENTIL HFA, VENTOLIN HFA, PROAIR HFA) 90 mcg/actuation inhaler Take 2 Puffs by inhalation every four (4) hours as needed for Wheezing. 1 Inhaler 1  
 albuterol (PROVENTIL VENTOLIN) 2.5 mg /3 mL (0.083 %) nebu 3 mL by Nebulization route every four (4) hours as needed for Wheezing.  24 Each 2  
 furosemide (LASIX) 20 mg tablet Take 1 tablet by mouth once daily 90 Tab 0  
 glucose blood VI test strips (BLOOD GLUCOSE TEST) strip Use BID Dx11.9 100 Strip 11  
 lancets misc Use  Each 11  
  carvedilol (COREG) 6.25 mg tablet TAKE 1 TABLET BY MOUTH TWICE DAILY WITH MEALS 180 Tab 1  
 colchicine 0.6 mg tablet TAKE TWO TABLETS BY MOUTH ONE TIME DAILY AND TAKE FOUR TABLETS AS NEEDED FOR FLAREUPS 60 Tab 5  
 budesonide-formoterol (SYMBICORT) 160-4.5 mcg/actuation HFAA INHALE ONE PUFF BY MOUTH TWICE DAILY 1 Inhaler 3  
 albuterol-ipratropium (DUO-NEB) 2.5 mg-0.5 mg/3 ml nebu 3 mL by Nebulization route every four (4) hours as needed (wheezing). 30 Nebule 0  
 acetaminophen (TYLENOL) 325 mg tablet Take 2 Tabs by mouth every four (4) hours as needed for Pain or Fever. 40 Tab 0  
 guaiFENesin ER (MUCINEX) 600 mg ER tablet Take 1 Tab by mouth two (2) times a day. (Patient taking differently: Take 600 mg by mouth as needed.) 20 Tab 0  
 benzocaine-menthol (CHLORASEPTIC MAX) 15-10 mg lozg lozenge Take 1 Lozenge by mouth every four to six (4-6) hours as needed for Sore throat. 30 Each 0  
 apixaban (ELIQUIS) 5 mg tablet Take 1 Tab by mouth two (2) times a day. Resume from tomorrow 7/21/2018. 56 Tab 0  
 fluticasone (FLONASE) 50 mcg/actuation nasal spray 2 Sprays by Both Nostrils route daily as needed.  cod liver oil cap Take 1 Cap by mouth daily. Allergies Allergen Reactions  Crestor [Rosuvastatin] Myalgia  Levaquin [Levofloxacin] Nausea Only GI Upset  Lipitor [Atorvastatin] Diarrhea  Lyrica [Pregabalin] Myalgia Objective: 
Visit Vitals BP (!) 111/48 (BP 1 Location: Left arm, BP Patient Position: Sitting) Pulse 93 Temp 97.6 °F (36.4 °C) (Oral) Resp 19 Ht 5' 6\" (1.676 m) Wt 159 lb (72.1 kg) SpO2 98% BMI 25.66 kg/m² Physical Exam:  
General appearance - alert, well appearing, and in no distress Mental status - alert, oriented to person, place, and time EYE-ROCK, EOMI, corneas normal, no foreign bodies ENT-ENT exam normal, no neck nodes or sinus tenderness Chest - bibasilar rales Heart - normal rate, regular rhythm, normal S1, S2, +systolic murmur +S4 Ext-peripheral pulses normal, no pedal edema, no clubbing or cyanosis Skin-Warm and dry. no hyperpigmentation, vitiligo, or suspicious lesions Neuro -alert, oriented, normal speech, no focal findings walks w cane Results for orders placed or performed in visit on 09/30/20 AMB POC GLUCOSE BLOOD, BY GLUCOSE MONITORING DEVICE Result Value Ref Range Glucose  mg/dL AMB POC HEMOGLOBIN A1C Result Value Ref Range Hemoglobin A1c (POC) 6.6 % *Note: Due to a large number of results and/or encounters for the requested time period, some results have not been displayed. A complete set of results can be found in Results Review. Assessment/Plan: ICD-10-CM ICD-9-CM 1. Type 2 diabetes mellitus with nephropathy ([facility])  E11.21 250.40 AMB POC GLUCOSE BLOOD, BY GLUCOSE MONITORING DEVICE  
  583.81 AMB POC HEMOGLOBIN A1C 2. PAD (peripheral artery disease) ([facility])  I73.9 443.9 3. Benign hypertensive heart and CKD, stage 3 (GFR 30-59), w CHF ([facility])  I13.0 404.11   
 N18.3 585.3   
  428.0 4. Atrial fibrillation, unspecified type ([facility]  I48.91 427.31   
5. CKD (chronic kidney disease) stage 3, GFR 30-59 ml/min ([facility])  N18.3 585.3 6. Encounter for Medicare annual wellness exam  Z00.00 V70.0 7. Chronic systolic HF (heart failure) ([facility])  I50.22 428.22   
8. Diastolic CHF, acute on chronic ([facility])  I50.33 428.33   
  428.0 9. Pulmonary emphysema, unspecified emphysema type ([facility]  J43.9 492.8 10. Cardiac pacemaker in situ  Z95.0 V45.01   
11. Mixed hyperlipidemia  E78.2 272.2 12. Needs flu shot  Z23 V04.81 FLU (FLUAD QUAD INFLUENZA VACCINE,QUAD,ADJUVANTED) Orders Placed This Encounter  Influenza Vaccine, QUAD, 65 Yrs +  IM  (Fluad [id number] )  AMB POC GLUCOSE BLOOD, BY GLUCOSE MONITORING DEVICE  
 AMB POC HEMOGLOBIN A1C  
 
-cont current mngmnt 
-recommend shingles vaccine 
-flu vaccine administered Patient Instructions Vaccine Information Statement Influenza (Flu) Vaccine (Inactivated or Recombinant): What You Need to Know Many Vaccine Information Statements are available in Vietnamese and other languages. See www.immunize.org/vis Hojas de información sobre vacunas están disponibles en español y en muchos otros idiomas. Visite www.immunize.org/vis 1. Why get vaccinated? Influenza vaccine can prevent influenza (flu). Flu is a contagious disease that spreads around the [facility] every year, usually between October and May. Anyone can get the flu, but it is more dangerous for some people. Infants and young children, people 72years of age and older, pregnant women, and people with certain health conditions or a weakened immune system are at greatest risk of flu complications. Pneumonia, bronchitis, sinus infections and ear infections are examples of flu-related complications. If you have a medical condition, such as heart disease, cancer or diabetes, flu can make it worse. Flu can cause fever and chills, sore throat, muscle aches, fatigue, cough, headache, and runny or stuffy nose. Some people may have vomiting and diarrhea, though this is more common in children than adults. Each year thousands of people in the [facility] die from flu, and many more are hospitalized. Flu vaccine prevents millions of illnesses and flu-related visits to the doctor each year. 2. Influenza vaccines CDC recommends everyone 10months of age and older get vaccinated every flu season. Children 6 months through 6years of age may need 2 doses during a single flu season. Everyone else needs only 1 dose each flu season. It takes about 2 weeks for protection to develop after vaccination. There are many flu viruses, and they are always changing. Each year a new flu vaccine is made to protect against three or four viruses that are likely to cause disease in the upcoming flu season. Even when the vaccine doesnt exactly match these viruses, it may still provide some protection. Influenza vaccine does not cause flu. Influenza vaccine may be given at the same time as other vaccines. 3. Talk with your health care provider Tell your vaccine provider if the person getting the vaccine: 
 Has had an allergic reaction after a previous dose of influenza vaccine, or has any severe, life-threatening allergies.  Has ever had Guillain-Barré Syndrome (also called GBS). In some cases, your health care provider may decide to postpone influenza vaccination to a future visit. People with minor illnesses, such as a cold, may be vaccinated. People who are moderately or severely ill should usually wait until they recover before getting influenza vaccine. Your health care provider can give you more information. 4. Risks of a reaction  Soreness, redness, and swelling where shot is given, fever, muscle aches, and headache can happen after influenza vaccine.  There may be a very small increased risk of Guillain-Barré Syndrome (GBS) after inactivated influenza vaccine (the flu shot). Onofre Tapia children who get the flu shot along with pneumococcal vaccine (PCV13), and/or DTaP vaccine at the same time might be slightly more likely to have a seizure caused by fever. Tell your health care provider if a child who is getting flu vaccine has ever had a seizure. People sometimes faint after medical procedures, including vaccination. Tell your provider if you feel dizzy or have vision changes or ringing in the ears. As with any medicine, there is a very remote chance of a vaccine causing a severe allergic reaction, other serious injury, or death. 5. What if there is a serious problem? An allergic reaction could occur after the vaccinated person leaves the clinic. If you see signs of a severe allergic reaction (hives, swelling of the face and throat, difficulty breathing, a fast heartbeat, dizziness, or weakness), call 9-1-1 and get the person to the nearest hospital. 
 
For other signs that concern you, call your health care provider. Adverse reactions should be reported to the Vaccine Adverse Event Reporting System (VAERS). Your health care provider will usually file this report, or you can do it yourself. Visit the VAERS website at www.vaers. [facility].gov or call 7-921.500.9903. VAERS is only for reporting reactions, and VAERS staff do not give medical advice. 6. The National Vaccine Injury Compensation Program 
 
The [facility] Vaccine Injury Compensation Program (VICP) is a federal program that was created to compensate people who may have been injured by certain vaccines. Visit the VICP website at www.[facility].gov/vaccinecompensation or call 8-612.531.3231 to learn about the program and about filing a claim. There is a time limit to file a claim for compensation. 7. How can I learn more?  Ask your health care provider.  Call your local or state health department.  Contact the Centers for Disease Control and Prevention (CDC): 
- Call 4-265.208.6749 (1-800-CDC-INFO) or 
- Visit CDCs influenza website at www.cdc.gov/flu Vaccine Information Statement (Interim) Inactivated Influenza Vaccine 8/15/2019 
42 BETINA Solo Brochure [id number] Department of Health and [place] Centers for Disease Control and Prevention Office Use Only I have reviewed with the patient details of the assessment and plan and all questions were answered. Relevent patient education was performed. The most recent lab findings were reviewed with the patient. An After Visit Summary was printed and given to the patient. This is the Subsequent Medicare Annual Wellness Exam, performed 12 months or more after the Initial AWV or the last Subsequent AWV I have reviewed the patient's medical history in detail and updated the computerized patient record. History Patient Active Problem List  
Diagnosis Code  Benign hypertensive heart and CKD, stage 3 (GFR 30-59), w CHF ([facility]) I13.0, N18.3  Atrial fibrillation--paroxysmal I48.91  
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  
 Sick sinus syndrome ([facility]) I49.5  Chest pain R07.9  Sinoatrial node dysfunction ([facility]) I49.5  Cardiac pacemaker in situ Z95.0  Mixed hyperlipidemia E78.2  Back pain M54.9  S/P coronary artery stent placement Z95.5  Diastolic CHF, acute on chronic ([facility]) I50.33  
 GIB (gastrointestinal bleeding) K92.2  Type 2 diabetes mellitus with diabetic neuropathy, without long-term current use of insulin ([facility]) E11.40  
 CHF (congestive heart failure) ([facility]) I50.9  Systolic CHF, acute ([facility]) K47.00  
 Acute systolic CHF (congestive heart failure) ([facility]) I50.21  
 Fear associated with illness and body function F40.9  Counseling regarding advanced care planning and goals of care Z71.89  
 S/P ablation of atrial fibrillation Z98.890, Z86.79  
 Chronic systolic HF (heart failure) ([facility]) I50.22  
 History of Clostridium difficile infection Z86.19  
 Junctional tachycardia ([facility]) I47.1  Hypotension I95.9  CKD (chronic kidney disease) stage 3, GFR 30-59 ml/min ([facility]) N18.3  Type 2 diabetes mellitus with nephropathy ([facility]) E11.21  
 A-fib ([facility]) I48.91  
 PAD (peripheral artery disease) ([facility]) I73.9  
 S/P rotator cuff repair E90.844  Respiratory failure with hypoxia ([facility]) J96.91  
 ASHD (arteriosclerotic heart disease) I25.10 Past Medical History:  
Diagnosis Date  Arthritis   
 left shoulder  Atrial fibrillation ([facility] 2010 Dr. Farooq García  CAD (coronary artery disease) stent; Dr. Farooq García  Celiac disease  Chronic diastolic heart failure ([address] 2014 Dr. Farooq García  Chronic kidney disease   
 per cardio note  Chronic pain   
 left thigh:  L SFA intervention by Dr. Neelam Rachel 2018, as of 18:  still causing pain, pt to have MILLA checked per Dr. Neelam Rachel note  Chronic systolic HF (heart failure) ([address] 5/10/2017  
 2017 EF 25-30%  COPD (chronic obstructive pulmonary disease) ([facility] 2010 Dr. Jordin De Los Santos  Diabetes ([facility] Dr. Goyo Payton; no current medication per pt  Emphysema, unspecified ([facility] Dr. Jordin De Los Santos  Fibroid  Frequent falls   Gout  Heart failure ([facility] Dr. Farooq García  History of Clostridium difficile infection 5/10/2017  
 2017 CDiff positive  Hypertension 2010 Dr. Yves Ryan  Hypertensive heart and chronic kidney disease   
 per PCP note  Hypotension 5/10/2017  Junctional tachycardia ([address] 5/10/2017 Dr. Farooq García  Neuropathy  NIDDM (non-insulin dependent diabetes mellitus) 2010  PAD (peripheral artery disease) ([facility]  S/P ablation of atrial fibrillation 2018  Screening mammogram 5/4/10  
 SOB (shortness of breath) 2014 Dr. Jordin De Los Santos  SSS (sick sinus syndrome) ([facility]   
 per pacemaker form 18  Weakness   
 per pt weakness from c-diff , mulitple falls with injury to rotator cuff Past Surgical History:  
Procedure Laterality Date  CARDIAC SURG PROCEDURE UNLIST    
 3 cardiac stent placed  COLONOSCOPY N/A 2017 COLONOSCOPY with biopsy performed by Patel Gutierrez [facility] at [facility] AM[facility]UL[facility]  
 HX AFIB ABLATION  2018  
 has had 2 ablations per patient  HX  SECTION    
 HX HEART CATHETERIZATION  2018  HX OTHER SURGICAL    
 adrenal gland removed  HX PACEMAKER Left Biotronik model: Sadie Grater  ND EXCISE ADRENAL GLAND  VASCULAR SURGERY PROCEDURE UNLIST  07/20/2018 Left SFA intervention ; Dr. Ashly Patel Current Outpatient Medications Medication Sig Dispense Refill  gabapentin (NEURONTIN) 800 mg tablet TAKE 1 TABLET BY MOUTH THREE TIMES DAILY 270 Tab 1  
 simvastatin (ZOCOR) 20 mg tablet TAKE 1 TABLET BY MOUTH NIGHTLY 90 Tab 1  
 SITagliptin (Januvia) 50 mg tablet Take 1 Tab by mouth daily. 90 Tab 1  
 tiotropium (Spiriva with HandiHaler) 18 mcg inhalation capsule INHALE THE CONTENTS OF 1 CAPSULE THROUGH HANDIHALER DEVICE DAILY 90 Cap 1  
 albuterol (PROVENTIL HFA, VENTOLIN HFA, PROAIR HFA) 90 mcg/actuation inhaler Take 2 Puffs by inhalation every four (4) hours as needed for Wheezing. 1 Inhaler 1  
 albuterol (PROVENTIL VENTOLIN) 2.5 mg /3 mL (0.083 %) nebu 3 mL by Nebulization route every four (4) hours as needed for Wheezing. 24 Each 2  
 furosemide (LASIX) 20 mg tablet Take 1 tablet by mouth once daily 90 Tab 0  
 glucose blood VI test strips (BLOOD GLUCOSE TEST) strip Use BID Dx11.9 100 Strip 11  
 lancets misc Use  Each 11  
 carvedilol (COREG) 6.25 mg tablet TAKE 1 TABLET BY MOUTH TWICE DAILY WITH MEALS 180 Tab 1  
 colchicine 0.6 mg tablet TAKE TWO TABLETS BY MOUTH ONE TIME DAILY AND TAKE FOUR TABLETS AS NEEDED FOR FLAREUPS 60 Tab 5  
 budesonide-formoterol (SYMBICORT) 160-4.5 mcg/actuation HFAA INHALE ONE PUFF BY MOUTH TWICE DAILY 1 Inhaler 3  
 albuterol-ipratropium (DUO-NEB) 2.5 mg-0.5 mg/3 ml nebu 3 mL by Nebulization route every four (4) hours as needed (wheezing). 30 Nebule 0  
 acetaminophen (TYLENOL) 325 mg tablet Take 2 Tabs by mouth every four (4) hours as needed for Pain or Fever. 40 Tab 0  
 guaiFENesin ER (MUCINEX) 600 mg ER tablet Take 1 Tab by mouth two (2) times a day.  (Patient taking differently: Take 600 mg by mouth as needed.) 20 Tab 0  
  benzocaine-menthol (CHLORASEPTIC MAX) 15-10 mg lozg lozenge Take 1 Lozenge by mouth every four to six (4-6) hours as needed for Sore throat. 30 Each 0  
 apixaban (ELIQUIS) 5 mg tablet Take 1 Tab by mouth two (2) times a day. Resume from tomorrow 2018. 56 Tab 0  
 fluticasone (FLONASE) 50 mcg/actuation nasal spray 2 Sprays by Both Nostrils route daily as needed.  cod liver oil cap Take 1 Cap by mouth daily. Allergies Allergen Reactions  Crestor [Rosuvastatin] Myalgia  Levaquin [Levofloxacin] Nausea Only GI Upset  Lipitor [Atorvastatin] Diarrhea  Lyrica [Pregabalin] Myalgia Family History Problem Relation Age of Onset  Heart Disease Mother  Diabetes Father  Heart Disease Brother Social History Tobacco Use  Smoking status: Former Smoker Packs/day: 0.50 Years: 30.00 Pack years: 15.00 Types: Cigarettes Start date: 5 Last attempt to quit: 1999 Years since quittin.7  Smokeless tobacco: Never Used Substance Use Topics  Alcohol use: No  
 
 
Depression Risk Factor Screening:  
 
3 most recent PHQ Screens 2020 Little interest or pleasure in doing things Not at all Feeling down, depressed, irritable, or hopeless Not at all Total Score PHQ 2 0 Alcohol Risk Screen Do you average more than 1 drink per night or more than 7 drinks a week:  No 
 
On any one occasion in the past three months have you have had more than 3 drinks containing alcohol:  No 
 
 
 
Functional Ability and Level of Safety:  
Hearing: Hearing is good. Activities of Daily Living: The home contains: grab bars and shower chair Patient does total self care Ambulation: with no difficulty Fall Risk: 
Fall Risk Assessment, last 12 mths 2020 Able to walk? Yes Fall in past 12 months? No  
Fall with injury? -  
Number of falls in past 12 months - Fall Risk Score -  
 
Abuse Screen: 
Patient is not abused Cognitive Screening Has your family/caregiver stated any concerns about your memory: no 
  
Cognitive Screening: Normal - grossly nml Patient Care Team  
Patient Care Team: 
Michelet Arora MD as PCP - General (Internal Medicine) Michelet Arora MD as PCP - [facility] Gris Hart MD as Physician (Cardiology) Javier Davison MD as Physician (Cardiology) Radha Puri MD as Physician (Cardiology) Silvano Castillo NP (Nurse Practitioner) Assessment/Plan Education and counseling provided: 
Are appropriate based on today's review and evaluation Diagnoses and all orders for this visit: 
 
1. Type 2 diabetes mellitus with nephropathy ([address] -     AMB POC GLUCOSE BLOOD, BY GLUCOSE MONITORING DEVICE 
-     AMB POC HEMOGLOBIN A1C 2. PAD (peripheral artery disease) ([address] 3. Benign hypertensive heart and CKD, stage 3 (GFR 30-59), w CHF ([address] 4. Atrial fibrillation, unspecified type ([address] 5. CKD (chronic kidney disease) stage 3, GFR 30-59 ml/min (HCC) 6. Encounter for Medicare annual wellness exam 
 
7. Chronic systolic HF (heart failure) ([address] 8. Diastolic CHF, acute on chronic (HCC) 9. Pulmonary emphysema, unspecified emphysema type ([address] 10. Cardiac pacemaker in situ 11. Mixed hyperlipidemia 12. Needs flu shot 
-     FLU (FLUAD QUAD INFLUENZA VACCINE,QUAD,ADJUVANTED) Health Maintenance Due Topic Date Due  Shingrix Vaccine Age 50> (1 of 2) 06/24/2001  Foot Exam Q1  07/20/2018  MICROALBUMIN Q1  03/29/2019  Flu Vaccine (1) 09/01/2020  Pneumococcal 65+ years (2 of 2 - PPSV23) 09/30/2020  Medicare Yearly Exam  09/30/2020

## 2020-09-30 NOTE — PROGRESS NOTES
Chief Complaint Patient presents with  Diabetes 1. Have you been to the ER, urgent care clinic since your last visit? Hospitalized since your last visit? No 
2. Have you seen or consulted any other health care providers outside of the [address] [place] [address] since your last visit? Include any pap smears or colon screening. No  
 
After obtaining consent, and per orders of Dr. Severino Almeida, injection of High Dose influneza/pneumovax given by Carlos Enrique Roberto LPN. Patient instructed to remain in clinic for 15 minutes afterwards, and to report any adverse reaction to me immediately.

## 2020-09-30 NOTE — PATIENT INSTRUCTIONS
Vaccine Information Statement Influenza (Flu) Vaccine (Inactivated or Recombinant): What You Need to Know Many Vaccine Information Statements are available in [place] and other languages. See www.immunize.org/vis Hojas de información sobre vacunas están disponibles en español y en muchos otros idiomas. Visite www.immunize.org/vis 1. Why get vaccinated? Influenza vaccine can prevent influenza (flu). Flu is a contagious disease that spreads around the [facility] every year, usually between October and May. Anyone can get the flu, but it is more dangerous for some people. Infants and young children, people 72years of age and older, pregnant women, and people with certain health conditions or a weakened immune system are at greatest risk of flu complications. Pneumonia, bronchitis, sinus infections and ear infections are examples of flu-related complications. If you have a medical condition, such as heart disease, cancer or diabetes, flu can make it worse. Flu can cause fever and chills, sore throat, muscle aches, fatigue, cough, headache, and runny or stuffy nose. Some people may have vomiting and diarrhea, though this is more common in children than adults. Each year thousands of people in the [facility] die from flu, and many more are hospitalized. Flu vaccine prevents millions of illnesses and flu-related visits to the doctor each year. 2. Influenza vaccines CDC recommends everyone 10months of age and older get vaccinated every flu season. Children 6 months through 6years of age may need 2 doses during a single flu season. Everyone else needs only 1 dose each flu season. It takes about 2 weeks for protection to develop after vaccination. There are many flu viruses, and they are always changing. Each year a new flu vaccine is made to protect against three or four viruses that are likely to cause disease in the upcoming flu season.  Even when the vaccine doesnt exactly match these viruses, it may still provide some protection. Influenza vaccine does not cause flu. Influenza vaccine may be given at the same time as other vaccines. 3. Talk with your health care provider Tell your vaccine provider if the person getting the vaccine: 
 Has had an allergic reaction after a previous dose of influenza vaccine, or has any severe, life-threatening allergies.  Has ever had Guillain-Barré Syndrome (also called GBS). In some cases, your health care provider may decide to postpone influenza vaccination to a future visit. People with minor illnesses, such as a cold, may be vaccinated. People who are moderately or severely ill should usually wait until they recover before getting influenza vaccine. Your health care provider can give you more information. 4. Risks of a reaction  Soreness, redness, and swelling where shot is given, fever, muscle aches, and headache can happen after influenza vaccine.  There may be a very small increased risk of Guillain-Barré Syndrome (GBS) after inactivated influenza vaccine (the flu shot). Suki Sanderson children who get the flu shot along with pneumococcal vaccine (PCV13), and/or DTaP vaccine at the same time might be slightly more likely to have a seizure caused by fever. Tell your health care provider if a child who is getting flu vaccine has ever had a seizure. People sometimes faint after medical procedures, including vaccination. Tell your provider if you feel dizzy or have vision changes or ringing in the ears. As with any medicine, there is a very remote chance of a vaccine causing a severe allergic reaction, other serious injury, or death. 5. What if there is a serious problem? An allergic reaction could occur after the vaccinated person leaves the clinic.  If you see signs of a severe allergic reaction (hives, swelling of the face and throat, difficulty breathing, a fast heartbeat, dizziness, or weakness), call 9-1-1 and get the person to the nearest hospital. 
 
For other signs that concern you, call your health care provider. Adverse reactions should be reported to the Vaccine Adverse Event Reporting System (VAERS). Your health care provider will usually file this report, or you can do it yourself. Visit the VAERS website at www.vaers. hhs.gov or call 7-587.378.6609. VAERS is only for reporting reactions, and VAERS staff do not give medical advice. 6. The National Vaccine Injury Compensation Program 
 
The [facility] Vaccine Injury Compensation Program (VICP) is a federal program that was created to compensate people who may have been injured by certain vaccines. Visit the VICP website at www.hrsa.gov/vaccinecompensation or call 3-248.309.5920 to learn about the program and about filing a claim. There is a time limit to file a claim for compensation. 7. How can I learn more?  Ask your health care provider.  Call your local or state health department.  Contact the Centers for Disease Control and Prevention (CDC): 
- Call 2-836.308.8808 (1-800-CDC-INFO) or 
- Visit CDCs influenza website at www.cdc.gov/flu Vaccine Information Statement (Interim) Inactivated Influenza Vaccine 8/15/2019 
42 BETINA Farris [id number] Department of Health and [employer] Centers for Disease Control and Prevention Office Use Only

## 2020-11-13 NOTE — ED PROVIDER NOTES
HPI Ms. Leigh Rodriguez is a 59-year-old woman with a past medical history of emphysema/COPD, heart failure, coronary artery disease, atrial fibrillation on anticoagulation, diabetes, hypertension and sick sinus syndrome status post pacemaker who presents to the emergency department with sudden onset severe low back pain that began on Saturday. She states that she was watching TV when she suddenly developed pain in her low back. It does not travel anywhere however she does notice that her thighs appear to be tight. She denies any fevers, groin numbness or tingling, new neuropathy although she has baseline neuropathy in her lower feet change in bowel or bladder habits or any recent trauma or inciting events. Past Medical History:  
Diagnosis Date  Arthritis   
 left shoulder  Atrial fibrillation ([address] 6/2/2010 Dr. Octavio Ponce  CAD (coronary artery disease) stent; Dr. Octavio Ponce  Celiac disease  Chronic diastolic heart failure ([address] 09/22/2014 Dr. Octavio Ponce  Chronic kidney disease   
 per cardio note  Chronic pain   
 left thigh:  L SFA intervention by Dr. Robel Salazar 07/2018, as of 9/6/18:  still causing pain, pt to have MILLA checked per Dr. Robel Salazar note  Chronic systolic HF (heart failure) ([address] 5/10/2017  
 4/2017 EF 25-30%  COPD (chronic obstructive pulmonary disease) ([address] 6/2/2010 Dr. Ari Rojas  Diabetes ([address] Dr. Ayala Merchant; no current medication per pt  Emphysema, unspecified ([address] Dr. Ari Rojas  Fibroid  Frequent falls 2017  Gout  Heart failure ([address] Dr. Octavio Ponce  History of Clostridium difficile infection 5/10/2017  
 4/2017 CDiff positive  Hypertension 6/2/2010 Dr. Jane Arreola  Hypertensive heart and chronic kidney disease   
 per PCP note  Hypotension 5/10/2017  Junctional tachycardia ([address] 5/10/2017 Dr. Octavio Ponce  Neuropathy  NIDDM (non-insulin dependent diabetes mellitus) 6/2/2010  PAD (peripheral artery disease) ([facility]  S/P ablation of atrial fibrillation 2018  Screening mammogram 5/4/10  
 SOB (shortness of breath) 2014 Dr. Vitor Ricketts  SSS (sick sinus syndrome) ([facility]   
 per pacemaker form 18  Weakness   
 per pt weakness from c-diff , mulitple falls with injury to rotator cuff Past Surgical History:  
Procedure Laterality Date  CARDIAC SURG PROCEDURE UNLIST    
 3 cardiac stent placed  COLONOSCOPY N/A 2017 COLONOSCOPY with biopsy performed by Erin Duong [facility]  
 HX AFIB ABLATION  2018  
 has had 2 ablations per patient  HX  SECTION    
 HX HEART CATHETERIZATION  2018  HX OTHER SURGICAL    
 adrenal gland removed  HX PACEMAKER Left [employer] model: Autumn Quick  IL EXCISE ADRENAL GLAND  VASCULAR SURGERY PROCEDURE UNLIST  2018 Left SFA intervention ; Dr. Halie Ochoa Family History:  
Problem Relation Age of Onset  Heart Disease Mother  Diabetes Father  Heart Disease Brother Social History Socioeconomic History  Marital status:  Spouse name: Not on file  Number of children: Not on file  Years of education: Not on file  Highest education level: Not on file Occupational History  Not on file Social Needs  Financial resource strain: Not on file  Food insecurity Worry: Not on file Inability: Not on file  Transportation needs Medical: Not on file Non-medical: Not on file Tobacco Use  Smoking status: Former Smoker Packs/day: 0.50 Years: 30.00 Pack years: 15.00 Types: Cigarettes Start date: 5 Last attempt to quit: 1999 Years since quittin.8  Smokeless tobacco: Never Used Substance and Sexual Activity  Alcohol use: No  
 Drug use: No  
 Sexual activity: Not Currently Lifestyle  Physical activity Days per week: Not on file Minutes per session: Not on file  Stress: Not on file Relationships  Social connections Talks on phone: Not on file Gets together: Not on file Attends Sabianist service: Not on file Active member of club or organization: Not on file Attends meetings of clubs or organizations: Not on file Relationship status: Not on file  Intimate partner violence Fear of current or ex partner: Not on file Emotionally abused: Not on file Physically abused: Not on file Forced sexual activity: Not on file Other Topics Concern  Not on file Social History Narrative  Not on file ALLERGIES: Crestor [rosuvastatin]; Levaquin [levofloxacin]; Lipitor [atorvastatin]; and Lyrica [pregabalin] Review of Systems Constitutional: Negative for chills and fever. HENT: Negative for congestion and rhinorrhea. Eyes: Negative for visual disturbance. Respiratory: Negative for cough and shortness of breath. Cardiovascular: Negative for chest pain. Gastrointestinal: Negative for abdominal pain, diarrhea, nausea and vomiting. Genitourinary: Negative for difficulty urinating. Musculoskeletal: Positive for back pain. Negative for gait problem and myalgias. Skin: Negative for rash. Neurological: Negative for headaches. Vitals:  
 11/13/20 0522 BP: 110/66 Pulse: 86 Resp: 16 Temp: 98.1 °F (36.7 °C) SpO2: 99% Weight: 75.8 kg (167 lb) Height: 5' 6.5\" (1.689 m) Physical Exam 
Vitals signs and nursing note reviewed. Constitutional:   
   Appearance: She is well-developed. HENT:  
   Head: Normocephalic and atraumatic. Eyes:  
   General:     
   Right eye: No discharge. Left eye: No discharge. Conjunctiva/sclera: Conjunctivae normal.  
   Pupils: Pupils are equal, round, and reactive to light. Neck: Musculoskeletal: Normal range of motion and neck supple. Trachea: No tracheal deviation. Cardiovascular: Rate and Rhythm: Normal rate and regular rhythm. Heart sounds: Normal heart sounds. No murmur. Pulmonary:  
   Effort: Pulmonary effort is normal. No respiratory distress. Breath sounds: Normal breath sounds. No wheezing or rales. Abdominal:  
   General: Bowel sounds are normal.  
   Palpations: Abdomen is soft. Tenderness: There is no abdominal tenderness. There is no guarding or rebound. Musculoskeletal: Normal range of motion. General: No tenderness or deformity. Comments: Patient has midline tenderness over her sacrum. No pain at the SI joints. No paraspinal tenderness. No pain over the iliac crests. No pain over the trochanteric bursa. Patient has good strength and normal sensation in the upper portions of the lower leg (not including the areas of chronic neuropathy) Skin: 
   General: Skin is warm and dry. Findings: No erythema or rash. Neurological:  
   General: No focal deficit present. Mental Status: She is alert and oriented to person, place, and time. Psychiatric:     
   Behavior: Behavior normal.  
 
  
 
[facility] 
ED Course as of Nov 13 0845 Fri Nov 13, 2020  
0470 Patient going for Xray  
 [RG] 0845 There are slight to mild degenerative disc changes in the 
lumbar spine. There is facet arthropathy L5-S1. No fracture or subluxation is 
identified. There is extensive vascular calcification. .   
 [RG]  
  
ED Course User Index [RG] Dionna Calderón MD  
 
55-year-old woman with a history of abrupt onset low back pain that occurred at rest with no history of inciting trauma no fevers no bowel or bladder changes and no sensory changes. Differential diagnosis includes but is not limited to cauda equina versus compression fracture versus arthritis. Patient denies any trauma making compression fracture less likely. She has no personal history of malignancy and denies any B symptoms.   She has no signs of cord involvement has normal strength and sensation in her legs at her baseline. X-rays were obtained showing degenerative changes in the sacral area. Patient states that she initially had some relief with Tylenol. She is also requesting her home dose of home gabapentin 800 mg. She forgot to take this prior to arrival.  We will give her her home gabapentin 800 mg and 1 g of Tylenol. Will  the patient extensively on arthritis. We will give her a list of reasons to return to the emergency department. We will have her follow-up with her primary care doctor and encouraged her to use over-the-counter medication such as lidocaine patches for her low back. As the patient is on blood thinners will avoid ibuprofen or other NSAIDs. Procedures

## 2020-11-13 NOTE — ED NOTES
Joy Otero RN reviewed discharge instructions with the patient. The patient verbalized understanding. All questions and concerns were addressed. The patient is discharged via wheelchair in the care of family members with instructions and prescriptions in hand. Pt is alert and oriented x 4. Respirations are clear and unlabored.

## 2020-11-16 NOTE — PROGRESS NOTES
Patient contacted regarding recent discharge and COVID-19 risk. Discussed COVID-19 related testing which was not done at this time. Test results were not done. Patient informed of results, if available? no   
Care Transition Nurse/ Ambulatory Care Manager/ LPN Care Coordinator contacted the patient by telephone to perform post discharge assessment. Verified name and  with patient as identifiers. Patient has following risk factors of: heart failure, COPD, diabetes and chronic kidney disease. CTN/ACM/LPN reviewed discharge instructions, medical action plan and red flags related to discharge diagnosis. Reviewed and educated them on any new and changed medications related to discharge diagnosis. Advised obtaining a 90-day supply of all daily and as-needed medications. Advance Care Planning:  
Does patient have an Advance Directive: yes; reviewed and current Education provided regarding infection prevention, and signs and symptoms of COVID-19 and when to seek medical attention with patient who verbalized understanding. Discussed exposure protocols and quarantine from [address] [facility] [address] you at higher risk for severe illness  and given an opportunity for questions and concerns. The patient agrees to contact the COVID-19 hotline [phone] or PCP office for questions related to their healthcare. CTN/ACM/LPN provided contact information for future reference. From CDC: Are you at higher risk for severe illness?  Wash your hands often.  Avoid close contact (6 feet, which is about two arm lengths) with people who are sick.  Put distance between yourself and other people if COVID-19 is spreading in your community.  Clean and disinfect frequently touched surfaces.  Avoid all cruise travel and non-essential air travel.  Call your healthcare professional if you have concerns about COVID-19 and your underlying condition or if you are sick. For more information on steps you can take to protect yourself, see CDC's How to Protect Yourself Patient/family/caregiver given information for [employer] and agrees to enroll no Plan for follow-up call in 7-14 days based on severity of symptoms and risk factors. Ambulatory Care Management Note 
 
Date/Time:  11/16/2020 12:35 PM 
 
This patient was received as a referral from Daily Assignment Ambulatory Care Manager outreached to patient today to offer care management services. Introduction to self and role of care manager provided. Patient accepted care management services at this time. Follow up call scheduled at this time. Patient has Ambulatory Care Manager's contact number for for any questions or concerns.

## 2020-12-28 NOTE — PROGRESS NOTES
Appears only had a pet scan done. Will need to be worked up. See if he can come this Thursday at 11am. 12/31.

## 2020-12-28 NOTE — TELEPHONE ENCOUNTER
Called pt to set up appt with Dr Jose Palumbo for pulmonary nodules. Pt was referred by Dr Paulina Heller from [address] [facility]. We have records from pt that were faxed over from dr office. No answer, LVM to return call.

## 2020-12-29 NOTE — PROGRESS NOTES
Grisel Wyatt is a 72 y.o. female here for new patient appt for lung cancer. She has had SOB today because heat was turned up in house and she got too hot. 1. Have you been to the ER, urgent care clinic since your last visit? Hospitalized since your last visit? New Pt 
 
2. Have you seen or consulted any other health care providers outside of the [address] [place] [address] since your last visit? Include any pap smears or colon screening. New Pt Pt here alone. Lives with her brother who has Cerebral Palsy.

## 2020-12-31 NOTE — LETTER
12/31/2020 Patient: Arminda Bahena YOB: 1950 Date of Visit: 12/31/2020 Binh Dunne MD 
[address] [address] [address] [employer] Dear Binh Dunne MD, Thank you for referring Ms. Jude Millard to [address] [place] [address] for evaluation. My notes for this consultation are attached. If you have questions, please do not hesitate to call me. I look forward to following your patient along with you.  
 
 
Sincerely, 
 
Lexy Hernandez MD

## 2020-12-31 NOTE — PROGRESS NOTES
2001 [facility] 
at [place] [address], [facility], [address] 
[phone] Oncology Consultation Note Patient: Bernardo Hernandez MRN: [id number]  SSN: [id number] YOB: 1950  Age: 72 y.o. Sex: female Subjective:  
  
Bernardo Hernandez is a 72 y.o. female who I am seeing for a new diagnosis of RUL lung carcinoma. She has a 40 pack years of cigarette smoking history. She underwent a screening CT of the chest requested by Dr. Benny Maya. This showed a RUL lung mass. PET shows no distant disease. She has an appt with Dr. Esteban Garcia next week. She denies headache, bone pains, weight loss etc.       
 
 
Review of Systems: 
 
Constitutional: negative Eyes: negative Ears, Nose, Mouth, Throat, and Face: negative Respiratory: negative Cardiovascular: negative Gastrointestinal: negative Genitourinary:negative Integument/Breast: negative Hematologic/Lymphatic: negative Musculoskeletal:negative Neurological: negative Past Medical History:  
Diagnosis Date  Arthritis   
 left shoulder  Atrial fibrillation ([address] 6/2/2010 Dr. Savanna Okeefe  CAD (coronary artery disease) stent; Dr. Savanna Okeefe  Cancer ([facility] lung  Celiac disease  Chronic diastolic heart failure ([address] 09/22/2014 Dr. Savanna Okeefe  Chronic kidney disease   
 per cardio note  Chronic pain   
 left thigh:  L SFA intervention by Dr. Echo Easley 07/2018, as of 9/6/18:  still causing pain, pt to have MILLA checked per Dr. Echo Easley note  Chronic systolic HF (heart failure) ([address] 5/10/2017  
 4/2017 EF 25-30%  COPD (chronic obstructive pulmonary disease) ([address] 6/2/2010 Dr. Darryle Hopkins  Diabetes ([facility] Dr. Gali Cobb; no current medication per pt  Emphysema, unspecified ([address] Dr. Darryle Hopkins  Fibroid  Frequent falls 2017  Gout  Heart failure ([address] Dr. Savanna Okeefe  History of Clostridium difficile infection 5/10/2017  
 2017 CDiff positive  Hypertension 2010 Dr. Libertad Hines  Hypertensive heart and chronic kidney disease   
 per PCP note  Hypotension 5/10/2017  Junctional tachycardia ([facility] 5/10/2017 Dr. Pinto  Neuropathy  NIDDM (non-insulin dependent diabetes mellitus) 2010  PAD (peripheral artery disease) ([facility]  S/P ablation of atrial fibrillation 2018  Screening mammogram 5/4/10  
 SOB (shortness of breath) 2014 Dr. Bruno Dunlap  SSS (sick sinus syndrome) ([facility]   
 per pacemaker form 18  Weakness   
 per pt weakness from c-diff , mulitple falls with injury to rotator cuff Past Surgical History:  
Procedure Laterality Date  CARDIAC SURG PROCEDURE UNLIST    
 3 cardiac stent placed  COLONOSCOPY N/A 2017 COLONOSCOPY with biopsy performed by Natasha Wei MD at [facility] AMBULATORY OR  
 HX AFIB ABLATION  2018  
 has had 2 ablations per patient  HX  SECTION    
 HX HEART CATHETERIZATION  2018  HX OTHER SURGICAL    
 adrenal gland removed  HX PACEMAKER Left Biot[employer] model: Tyler Bail  MD EXCISE ADRENAL GLAND  VASCULAR SURGERY PROCEDURE UNLIST  2018 Left SFA intervention ; Dr. Albino Brice Family History Problem Relation Age of Onset  Heart Disease Mother  Diabetes Father  Heart Disease Brother Social History Tobacco Use  Smoking status: Former Smoker Packs/day: 0.50 Years: 30.00 Pack years: 15.00 Types: Cigarettes Start date: 5 Quit date: 2009 Years since quittin.0  Smokeless tobacco: Never Used Substance Use Topics  Alcohol use: No  
  
Prior to Admission medications Medication Sig Start Date End Date Taking?  Authorizing Provider  
gabapentin (NEURONTIN) 800 mg tablet TAKE 1 TABLET BY MOUTH THREE TIMES DAILY 20  Yes Jessica Vazquez NP  
 fluticasone propionate (Flonase) 50 mcg/actuation nasal spray 2 Sprays by Both Nostrils route daily as needed for Rhinitis. 12/18/20  Yes Juan Klein MD  
budesonide-formoteroL (Symbicort) 160-4.5 mcg/actuation HFAA INHALE ONE PUFF BY MOUTH TWICE DAILY 11/24/20  Yes Juan Klein MD  
carvediloL (COREG) 6.25 mg tablet TAKE 1 TABLET BY MOUTH TWICE DAILY WITH MEALS 11/24/20  Yes Juan Klein MD  
furosemide (LASIX) 20 mg tablet Take 1 Tab by mouth daily. 11/24/20  Yes Juan Klein MD  
simvastatin (ZOCOR) 20 mg tablet TAKE 1 TABLET BY MOUTH NIGHTLY 11/24/20  Yes Juan Klein MD  
SITagliptin (Januvia) 50 mg tablet Take 1 Tab by mouth daily. 11/24/20  Yes Juan Klein MD  
tiotropium (Spiriva with HandiHaler) 18 mcg inhalation capsule INHALE THE CONTENTS OF 1 CAPSULE THROUGH HANDIHALER DEVICE DAILY 11/24/20  Yes Juan Klein MD  
albuterol (PROVENTIL HFA, VENTOLIN HFA, PROAIR HFA) 90 mcg/actuation inhaler Take 2 Puffs by inhalation every four (4) hours as needed for Wheezing. 11/24/20  Yes Juan Klein MD  
colchicine 0.6 mg tablet TAKE 2 TABLETS BY MOUTH ONCE DAILY 11/20/20  Yes Juan Klein MD  
diclofenac (VOLTAREN) 1 % gel Apply 4 g to affected area four (4) times daily as needed for Pain. 11/11/20  Yes Juan Klein MD  
Eliquis 5 mg tablet Take 1 tablet by mouth twice daily 10/15/20  Yes Cristiane Overton, NP  
albuterol (PROVENTIL VENTOLIN) 2.5 mg /3 mL (0.083 %) nebu 3 mL by Nebulization route every four (4) hours as needed for Wheezing. 5/7/20  Yes Juan Klein MD  
glucose blood VI test strips (BLOOD GLUCOSE TEST) strip Use BID Dx11.9 1/24/20  Yes Juan Klein MD  
lancets misc Use BID 1/24/20  Yes Juan Klein MD  
albuterol-ipratropium (DUO-NEB) 2.5 mg-0.5 mg/3 ml nebu 3 mL by Nebulization route every four (4) hours as needed (wheezing).  11/12/19  Yes Nick Poe, DO  
 acetaminophen (TYLENOL) 325 mg tablet Take 2 Tabs by mouth every four (4) hours as needed for Pain or Fever. 3/28/19  Yes Diamond Rosales MD  
guaiFENesin ER (MUCINEX) 600 mg ER tablet Take 1 Tab by mouth two (2) times a day. Patient taking differently: Take 600 mg by mouth as needed. 3/28/19  Yes Diamond Rosales MD  
benzocaine-menthol (CHLORASEPTIC MAX) 15-10 mg lozg lozenge Take 1 Lozenge by mouth every four to six (4-6) hours as needed for Sore throat. 3/28/19  Yes Diamond Rosales MD  
cod liver oil cap Take 1 Cap by mouth daily. Yes Provider, Historical  
  
 
 
 
 
Allergies Allergen Reactions  Crestor [Rosuvastatin] Myalgia  Levaquin [Levofloxacin] Nausea Only GI Upset  Lipitor [Atorvastatin] Diarrhea  Lyrica [Pregabalin] Myalgia Objective:  
 
Vitals:  
 12/31/20 1119 BP: 93/60 Pulse: 73 Temp: 98.4 °F (36.9 °C) SpO2: 94% Weight: 168 lb 9.6 oz (76.5 kg) Height: 5' 6\" (1.676 m) Physical Exam: 
 
GENERAL: alert, cooperative, no distress, appears stated age EYE: conjunctivae/corneas clear. PERRL, EOM's intact LYMPHATIC: Cervical, supraclavicular, and axillary nodes normal.  
THROAT & NECK: normal and no erythema or exudates noted. LUNG: clear to auscultation bilaterally HEART: regular rate and rhythm, S1, S2 normal, no murmur, click, rub or gallop ABDOMEN: soft, non-tender. Bowel sounds normal. No masses,  no organomegaly EXTREMITIES:  extremities normal, atraumatic, no cyanosis or edema SKIN: Normal. 
NEUROLOGIC: AOx3. Gait normal. Reflexes and motor strength normal and symmetric. Cranial nerves 2-12 and sensation grossly intact. CT Results (most recent): 
[place] [facility] [place] encounter on 12/07/20 CT LOW DOSE LUNG CANCER SCREENING Narrative EXAM:  CT CHEST WITHOUT CONTRAST INDICATION: 40 pack-year smoking history. COMPARISON: 3/25/2019. CONTRAST: None. TECHNIQUE: Low dose unenhanced multislice helical CT was performed from the 
thoracic inlet to the adrenal glands without intravenous contrast 
administration. Contiguous 1.25 mm axial images were reconstructed and lung and 
soft tissue windows were generated. Coronal and sagittal reformations and axial 
MIP images were generated. CT dose reduction was achieved through use of a 
standardized protocol tailored for this examination and automatic exposure 
control for dose modulation. Lack of intravenous contrast limits evaluation of 
the vasculature, mediastinum, brianna, and solid organs. DOSE: CTDIvol 2.94 mGy FINDINGS: 
PLEURA: No effusion or pneumothorax. LUNGS: No nodule, mass, or airspace disease. No interstitial lung disease or air 
trapping. Severe emphysematous changes. New right upper lobe macrolobulated new mass 
measures 1.4 x 1.5 x 1.5 cm (axial image 97 and coronal image 36). CHEST WALL: No mass or axillary lymphadenopathy. THYROID: No nodule. MEDIASTINUM: No mass or lymphadenopathy. BRIANNA: No mass or lymphadenopathy. THORACIC AORTA: No dissection or aneurysm. MAIN PULMONARY ARTERY: Normal in caliber. TRACHEA/BRONCHI: Patent. ESOPHAGUS: No wall thickening or dilatation. HEART: Left atrial enlargement. Coronary artery calcification. Thomasena Dilling UPPER ABDOMEN: The incidentally imaged upper abdomen is unremarkable. Thomasena Dilling BONES: No evidence of aggressive bone lesion. Thomasena Dilling Impression IMPRESSION:  
New 1.5 cm right upper lobe mass, highly suspicious for lung cancer. Recommend 
correlation with PET/CT. Underlying emphysema Lung-RADS Category: 5: Highly suspicious Management recommendation: PET/CT 
 
[email or web address]/Quality-[email or web address] 
 
23X I personally reviewed the images. RUL lung mass Assessment: 1. RUL lung carcinoma Asymptomatic Amenable for surgical resection Set up to see Dr. Agustin Hall. Will see her 4 weeks after surgery Plan: 1. Thoracic surgery per Dr. Kenya Snow 2. Return at the end of Feb 2021. Signed By: Varun Do MD   
 December 31, 2020   
 
 
CC. Momo Luna MD 
CC.  Sandra Vazquez MD

## 2021-01-01 ENCOUNTER — APPOINTMENT (OUTPATIENT)
Dept: CT IMAGING | Age: 71
DRG: 064 | End: 2021-01-01
Attending: EMERGENCY MEDICINE
Payer: MEDICARE

## 2021-01-01 ENCOUNTER — APPOINTMENT (OUTPATIENT)
Dept: CT IMAGING | Age: 71
End: 2021-01-01
Attending: EMERGENCY MEDICINE
Payer: MEDICARE

## 2021-01-01 ENCOUNTER — APPOINTMENT (OUTPATIENT)
Dept: GENERAL RADIOLOGY | Age: 71
DRG: 828 | End: 2021-01-01
Attending: THORACIC SURGERY (CARDIOTHORACIC VASCULAR SURGERY)
Payer: MEDICARE

## 2021-01-01 ENCOUNTER — OFFICE VISIT (OUTPATIENT)
Dept: SURGERY | Age: 71
End: 2021-01-01
Payer: MEDICARE

## 2021-01-01 ENCOUNTER — TELEPHONE (OUTPATIENT)
Dept: CARDIOLOGY CLINIC | Age: 71
End: 2021-01-01

## 2021-01-01 ENCOUNTER — DOCUMENTATION ONLY (OUTPATIENT)
Dept: ONCOLOGY | Age: 71
End: 2021-01-01

## 2021-01-01 ENCOUNTER — HOSPITAL ENCOUNTER (OUTPATIENT)
Dept: INFUSION THERAPY | Age: 71
Discharge: HOME | End: 2021-03-10
Payer: MEDICARE

## 2021-01-01 ENCOUNTER — HOSPITAL ENCOUNTER (OUTPATIENT)
Dept: PREADMISSION TESTING | Age: 71
Discharge: HOME | End: 2021-01-18
Admitting: THORACIC SURGERY (CARDIOTHORACIC VASCULAR SURGERY)
Payer: MEDICARE

## 2021-01-01 ENCOUNTER — HOSPITAL ENCOUNTER (OUTPATIENT)
Dept: PULMONOLOGY | Age: 71
Discharge: HOME | DRG: 828 | End: 2021-01-20
Attending: THORACIC SURGERY (CARDIOTHORACIC VASCULAR SURGERY)
Payer: MEDICARE

## 2021-01-01 ENCOUNTER — HOSPITAL ENCOUNTER (OUTPATIENT)
Dept: INTERVENTIONAL RADIOLOGY/VASCULAR | Age: 71
Discharge: HOME | End: 2021-03-05
Attending: INTERNAL MEDICINE | Admitting: RADIOLOGY
Payer: MEDICARE

## 2021-01-01 ENCOUNTER — APPOINTMENT (OUTPATIENT)
Dept: CT IMAGING | Age: 71
DRG: 064 | End: 2021-01-01
Attending: HOSPITALIST
Payer: MEDICARE

## 2021-01-01 ENCOUNTER — HOSPITAL ENCOUNTER (OUTPATIENT)
Dept: INFUSION THERAPY | Age: 71
Discharge: HOME | End: 2021-03-09
Payer: MEDICARE

## 2021-01-01 ENCOUNTER — APPOINTMENT (OUTPATIENT)
Dept: VASCULAR SURGERY | Age: 71
End: 2021-01-01
Attending: EMERGENCY MEDICINE
Payer: MEDICARE

## 2021-01-01 ENCOUNTER — APPOINTMENT (OUTPATIENT)
Dept: GENERAL RADIOLOGY | Age: 71
DRG: 064 | End: 2021-01-01
Attending: HOSPITALIST
Payer: MEDICARE

## 2021-01-01 ENCOUNTER — APPOINTMENT (OUTPATIENT)
Dept: GENERAL RADIOLOGY | Age: 71
DRG: 828 | End: 2021-01-01
Attending: PHYSICIAN ASSISTANT
Payer: MEDICARE

## 2021-01-01 ENCOUNTER — OFFICE VISIT (OUTPATIENT)
Dept: ONCOLOGY | Age: 71
End: 2021-01-01
Payer: MEDICARE

## 2021-01-01 ENCOUNTER — HOSPITAL ENCOUNTER (INPATIENT)
Age: 71
LOS: 3 days | Discharge: HOME HEALTH | DRG: 828 | End: 2021-01-25
Attending: THORACIC SURGERY (CARDIOTHORACIC VASCULAR SURGERY) | Admitting: THORACIC SURGERY (CARDIOTHORACIC VASCULAR SURGERY)
Payer: MEDICARE

## 2021-01-01 ENCOUNTER — APPOINTMENT (OUTPATIENT)
Dept: GENERAL RADIOLOGY | Age: 71
DRG: 064 | End: 2021-01-01
Attending: EMERGENCY MEDICINE
Payer: MEDICARE

## 2021-01-01 ENCOUNTER — ANESTHESIA EVENT (OUTPATIENT)
Dept: SURGERY | Age: 71
DRG: 828 | End: 2021-01-01
Payer: MEDICARE

## 2021-01-01 ENCOUNTER — HOSPITAL ENCOUNTER (INPATIENT)
Age: 71
LOS: 1 days | DRG: 951 | End: 2021-03-20
Attending: FAMILY MEDICINE | Admitting: FAMILY MEDICINE
Payer: OTHER MISCELLANEOUS

## 2021-01-01 ENCOUNTER — PATIENT OUTREACH (OUTPATIENT)
Dept: CASE MANAGEMENT | Age: 71
End: 2021-01-01

## 2021-01-01 ENCOUNTER — HOSPITAL ENCOUNTER (OUTPATIENT)
Dept: PREADMISSION TESTING | Age: 71
Discharge: HOME | End: 2021-01-14
Payer: MEDICARE

## 2021-01-01 ENCOUNTER — TRANSCRIBE ORDER (OUTPATIENT)
Dept: REGISTRATION | Age: 71
End: 2021-01-01

## 2021-01-01 ENCOUNTER — TELEPHONE (OUTPATIENT)
Dept: SURGERY | Age: 71
End: 2021-01-01

## 2021-01-01 ENCOUNTER — APPOINTMENT (OUTPATIENT)
Dept: GENERAL RADIOLOGY | Age: 71
DRG: 828 | End: 2021-01-01
Attending: NURSE PRACTITIONER
Payer: MEDICARE

## 2021-01-01 ENCOUNTER — HOSPITAL ENCOUNTER (OUTPATIENT)
Dept: INFUSION THERAPY | Age: 71
Discharge: HOME | End: 2021-03-11
Payer: MEDICARE

## 2021-01-01 ENCOUNTER — TELEPHONE (OUTPATIENT)
Dept: ONCOLOGY | Age: 71
End: 2021-01-01

## 2021-01-01 ENCOUNTER — ANESTHESIA (OUTPATIENT)
Dept: SURGERY | Age: 71
DRG: 828 | End: 2021-01-01
Payer: MEDICARE

## 2021-01-01 ENCOUNTER — APPOINTMENT (OUTPATIENT)
Dept: NON INVASIVE DIAGNOSTICS | Age: 71
DRG: 064 | End: 2021-01-01
Attending: HOSPITALIST
Payer: MEDICARE

## 2021-01-01 ENCOUNTER — HOSPITAL ENCOUNTER (OUTPATIENT)
Age: 71
Setting detail: OBSERVATION
Discharge: HOME | End: 2021-02-02
Attending: EMERGENCY MEDICINE | Admitting: FAMILY MEDICINE
Payer: MEDICARE

## 2021-01-01 ENCOUNTER — APPOINTMENT (OUTPATIENT)
Dept: NUCLEAR MEDICINE | Age: 71
End: 2021-01-01
Attending: FAMILY MEDICINE
Payer: MEDICARE

## 2021-01-01 ENCOUNTER — HOSPITAL ENCOUNTER (EMERGENCY)
Age: 71
Discharge: HOME | DRG: 064 | End: 2021-03-11
Attending: EMERGENCY MEDICINE
Payer: MEDICARE

## 2021-01-01 ENCOUNTER — HOSPITAL ENCOUNTER (INPATIENT)
Age: 71
LOS: 5 days | Discharge: HOSPICE | DRG: 064 | End: 2021-03-19
Attending: EMERGENCY MEDICINE | Admitting: HOSPITALIST
Payer: MEDICARE

## 2021-01-01 ENCOUNTER — VIRTUAL VISIT (OUTPATIENT)
Dept: INTERNAL MEDICINE CLINIC | Age: 71
End: 2021-01-01
Payer: MEDICARE

## 2021-01-01 ENCOUNTER — HOSPICE ADMISSION (OUTPATIENT)
Dept: HOSPICE | Facility: HOSPICE | Age: 71
End: 2021-01-01
Payer: MEDICARE

## 2021-01-01 ENCOUNTER — APPOINTMENT (OUTPATIENT)
Dept: GENERAL RADIOLOGY | Age: 71
End: 2021-01-01
Attending: EMERGENCY MEDICINE
Payer: MEDICARE

## 2021-01-01 VITALS
DIASTOLIC BLOOD PRESSURE: 63 MMHG | OXYGEN SATURATION: 93 % | TEMPERATURE: 98.2 F | BODY MASS INDEX: 26.84 KG/M2 | WEIGHT: 167 LBS | HEIGHT: 66 IN | SYSTOLIC BLOOD PRESSURE: 95 MMHG | HEART RATE: 78 BPM

## 2021-01-01 VITALS
HEIGHT: 66 IN | HEART RATE: 79 BPM | BODY MASS INDEX: 27 KG/M2 | OXYGEN SATURATION: 91 % | WEIGHT: 168 LBS | TEMPERATURE: 97.7 F | SYSTOLIC BLOOD PRESSURE: 105 MMHG | DIASTOLIC BLOOD PRESSURE: 60 MMHG | RESPIRATION RATE: 18 BRPM

## 2021-01-01 VITALS
HEART RATE: 78 BPM | BODY MASS INDEX: 26.44 KG/M2 | RESPIRATION RATE: 18 BRPM | SYSTOLIC BLOOD PRESSURE: 117 MMHG | WEIGHT: 164.5 LBS | OXYGEN SATURATION: 95 % | HEIGHT: 66 IN | DIASTOLIC BLOOD PRESSURE: 64 MMHG | TEMPERATURE: 97.5 F

## 2021-01-01 VITALS
TEMPERATURE: 99.9 F | RESPIRATION RATE: 22 BRPM | OXYGEN SATURATION: 93 % | SYSTOLIC BLOOD PRESSURE: 55 MMHG | HEART RATE: 82 BPM | DIASTOLIC BLOOD PRESSURE: 37 MMHG

## 2021-01-01 VITALS
BODY MASS INDEX: 25.52 KG/M2 | RESPIRATION RATE: 22 BRPM | TEMPERATURE: 100.6 F | SYSTOLIC BLOOD PRESSURE: 122 MMHG | DIASTOLIC BLOOD PRESSURE: 59 MMHG | OXYGEN SATURATION: 100 % | HEIGHT: 66 IN | HEART RATE: 116 BPM | WEIGHT: 158.8 LBS

## 2021-01-01 VITALS
TEMPERATURE: 97.7 F | OXYGEN SATURATION: 94 % | HEART RATE: 72 BPM | SYSTOLIC BLOOD PRESSURE: 124 MMHG | WEIGHT: 168 LBS | BODY MASS INDEX: 27 KG/M2 | HEIGHT: 66 IN | RESPIRATION RATE: 13 BRPM | DIASTOLIC BLOOD PRESSURE: 55 MMHG

## 2021-01-01 VITALS
SYSTOLIC BLOOD PRESSURE: 95 MMHG | RESPIRATION RATE: 12 BRPM | TEMPERATURE: 98 F | DIASTOLIC BLOOD PRESSURE: 50 MMHG | BODY MASS INDEX: 27.32 KG/M2 | OXYGEN SATURATION: 99 % | HEIGHT: 66 IN | HEART RATE: 78 BPM | WEIGHT: 170 LBS

## 2021-01-01 VITALS
TEMPERATURE: 97.6 F | WEIGHT: 171.08 LBS | DIASTOLIC BLOOD PRESSURE: 80 MMHG | SYSTOLIC BLOOD PRESSURE: 105 MMHG | OXYGEN SATURATION: 95 % | BODY MASS INDEX: 27.61 KG/M2 | RESPIRATION RATE: 16 BRPM | HEART RATE: 82 BPM

## 2021-01-01 VITALS
HEART RATE: 73 BPM | OXYGEN SATURATION: 94 % | RESPIRATION RATE: 16 BRPM | DIASTOLIC BLOOD PRESSURE: 65 MMHG | WEIGHT: 168.3 LBS | HEIGHT: 66 IN | BODY MASS INDEX: 27.05 KG/M2 | TEMPERATURE: 96.3 F | SYSTOLIC BLOOD PRESSURE: 106 MMHG

## 2021-01-01 VITALS
HEART RATE: 74 BPM | RESPIRATION RATE: 18 BRPM | TEMPERATURE: 97.5 F | DIASTOLIC BLOOD PRESSURE: 63 MMHG | BODY MASS INDEX: 26.84 KG/M2 | SYSTOLIC BLOOD PRESSURE: 115 MMHG | HEIGHT: 66 IN | WEIGHT: 167 LBS | OXYGEN SATURATION: 94 %

## 2021-01-01 VITALS
DIASTOLIC BLOOD PRESSURE: 54 MMHG | HEIGHT: 66 IN | HEART RATE: 81 BPM | BODY MASS INDEX: 28.17 KG/M2 | OXYGEN SATURATION: 94 % | SYSTOLIC BLOOD PRESSURE: 101 MMHG | RESPIRATION RATE: 19 BRPM | WEIGHT: 175.3 LBS | TEMPERATURE: 97.2 F

## 2021-01-01 VITALS
HEART RATE: 80 BPM | TEMPERATURE: 98.2 F | HEIGHT: 66 IN | OXYGEN SATURATION: 89 % | DIASTOLIC BLOOD PRESSURE: 54 MMHG | BODY MASS INDEX: 27.16 KG/M2 | SYSTOLIC BLOOD PRESSURE: 99 MMHG

## 2021-01-01 VITALS
RESPIRATION RATE: 18 BRPM | BODY MASS INDEX: 26.71 KG/M2 | SYSTOLIC BLOOD PRESSURE: 113 MMHG | HEART RATE: 88 BPM | TEMPERATURE: 97.7 F | DIASTOLIC BLOOD PRESSURE: 61 MMHG | WEIGHT: 166.23 LBS | HEIGHT: 66 IN

## 2021-01-01 DIAGNOSIS — N18.30 STAGE 3 CHRONIC KIDNEY DISEASE, UNSPECIFIED WHETHER STAGE 3A OR 3B CKD (HCC): ICD-10-CM

## 2021-01-01 DIAGNOSIS — I63.9 ACUTE CVA (CEREBROVASCULAR ACCIDENT) (HCC): Primary | ICD-10-CM

## 2021-01-01 DIAGNOSIS — R79.89 ELEVATED LACTIC ACID LEVEL: ICD-10-CM

## 2021-01-01 DIAGNOSIS — C34.11 SMALL CELL LUNG CANCER, RIGHT UPPER LOBE (HCC): Primary | ICD-10-CM

## 2021-01-01 DIAGNOSIS — R91.8 LUNG MASS: Primary | ICD-10-CM

## 2021-01-01 DIAGNOSIS — M79.604 BILATERAL LOWER EXTREMITY PAIN: ICD-10-CM

## 2021-01-01 DIAGNOSIS — Z71.89 GOALS OF CARE, COUNSELING/DISCUSSION: ICD-10-CM

## 2021-01-01 DIAGNOSIS — Z76.89 ENCOUNTER FOR SUPPORT AND COORDINATION OF TRANSITION OF CARE: Primary | ICD-10-CM

## 2021-01-01 DIAGNOSIS — R91.8 LUNG MASS: ICD-10-CM

## 2021-01-01 DIAGNOSIS — N17.9 AKI (ACUTE KIDNEY INJURY) (HCC): ICD-10-CM

## 2021-01-01 DIAGNOSIS — Z51.5 HOSPICE CARE PATIENT: ICD-10-CM

## 2021-01-01 DIAGNOSIS — G93.40 ENCEPHALOPATHY ACUTE: ICD-10-CM

## 2021-01-01 DIAGNOSIS — E11.21 TYPE 2 DIABETES MELLITUS WITH NEPHROPATHY (HCC): ICD-10-CM

## 2021-01-01 DIAGNOSIS — R45.1 RESTLESSNESS: ICD-10-CM

## 2021-01-01 DIAGNOSIS — I63.9 ACUTE CVA (CEREBROVASCULAR ACCIDENT) (HCC): ICD-10-CM

## 2021-01-01 DIAGNOSIS — Z01.812 PRE-PROCEDURE LAB EXAM: Primary | ICD-10-CM

## 2021-01-01 DIAGNOSIS — Z01.812 PRE-PROCEDURE LAB EXAM: ICD-10-CM

## 2021-01-01 DIAGNOSIS — I63.9 ACUTE ISCHEMIC STROKE (HCC): ICD-10-CM

## 2021-01-01 DIAGNOSIS — M79.605 BILATERAL LOWER EXTREMITY PAIN: ICD-10-CM

## 2021-01-01 DIAGNOSIS — C34.11 SMALL CELL LUNG CANCER, RIGHT UPPER LOBE (HCC): ICD-10-CM

## 2021-01-01 DIAGNOSIS — S00.83XA FACIAL CONTUSION, INITIAL ENCOUNTER: ICD-10-CM

## 2021-01-01 DIAGNOSIS — R55 SYNCOPE AND COLLAPSE: Primary | ICD-10-CM

## 2021-01-01 DIAGNOSIS — I25.10 CORONARY ARTERY DISEASE INVOLVING NATIVE CORONARY ARTERY OF NATIVE HEART WITHOUT ANGINA PECTORIS: ICD-10-CM

## 2021-01-01 DIAGNOSIS — E11.40 TYPE 2 DIABETES MELLITUS WITH DIABETIC NEUROPATHY, WITHOUT LONG-TERM CURRENT USE OF INSULIN (HCC): ICD-10-CM

## 2021-01-01 DIAGNOSIS — G89.18 POST-OP PAIN: Primary | ICD-10-CM

## 2021-01-01 DIAGNOSIS — E78.2 MIXED HYPERLIPIDEMIA: ICD-10-CM

## 2021-01-01 DIAGNOSIS — I63.9 LARGE VESSEL STROKE (HCC): ICD-10-CM

## 2021-01-01 DIAGNOSIS — R06.03 RESPIRATORY DISTRESS: ICD-10-CM

## 2021-01-01 DIAGNOSIS — R09.02 HYPOXIA: Primary | ICD-10-CM

## 2021-01-01 DIAGNOSIS — R79.89 ELEVATED SERUM CREATININE: ICD-10-CM

## 2021-01-01 LAB
ABO + RH BLD: NORMAL
ALBUMIN SERPL-MCNC: 2.8 G/DL (ref 3.5–5)
ALBUMIN SERPL-MCNC: 2.9 G/DL (ref 3.5–5)
ALBUMIN SERPL-MCNC: 3.2 G/DL (ref 3.5–5)
ALBUMIN SERPL-MCNC: 3.3 G/DL (ref 3.5–5)
ALBUMIN SERPL-MCNC: 3.4 G/DL (ref 3.5–5)
ALBUMIN SERPL-MCNC: 3.4 G/DL (ref 3.5–5)
ALBUMIN SERPL-MCNC: 3.6 G/DL (ref 3.5–5)
ALBUMIN/GLOB SERPL: 0.6 {RATIO} (ref 1.1–2.2)
ALBUMIN/GLOB SERPL: 0.7 {RATIO} (ref 1.1–2.2)
ALP SERPL-CCNC: 102 U/L (ref 45–117)
ALP SERPL-CCNC: 74 U/L (ref 45–117)
ALP SERPL-CCNC: 78 U/L (ref 45–117)
ALP SERPL-CCNC: 92 U/L (ref 45–117)
ALP SERPL-CCNC: 95 U/L (ref 45–117)
ALP SERPL-CCNC: 96 U/L (ref 45–117)
ALP SERPL-CCNC: 96 U/L (ref 45–117)
ALT SERPL-CCNC: 19 U/L (ref 12–78)
ALT SERPL-CCNC: 19 U/L (ref 12–78)
ALT SERPL-CCNC: 20 U/L (ref 12–78)
ALT SERPL-CCNC: 20 U/L (ref 12–78)
ALT SERPL-CCNC: 25 U/L (ref 12–78)
ALT SERPL-CCNC: 26 U/L (ref 12–78)
ALT SERPL-CCNC: 26 U/L (ref 12–78)
ANION GAP BLD CALC-SCNC: 16 MMOL/L (ref 10–20)
ANION GAP SERPL CALC-SCNC: 11 MMOL/L (ref 5–15)
ANION GAP SERPL CALC-SCNC: 3 MMOL/L (ref 5–15)
ANION GAP SERPL CALC-SCNC: 4 MMOL/L (ref 5–15)
ANION GAP SERPL CALC-SCNC: 5 MMOL/L (ref 5–15)
ANION GAP SERPL CALC-SCNC: 6 MMOL/L (ref 5–15)
ANION GAP SERPL CALC-SCNC: 6 MMOL/L (ref 5–15)
ANION GAP SERPL CALC-SCNC: 7 MMOL/L (ref 5–15)
ANION GAP SERPL CALC-SCNC: 7 MMOL/L (ref 5–15)
ANION GAP SERPL CALC-SCNC: 8 MMOL/L (ref 5–15)
APPEARANCE UR: CLEAR
ARTERIAL PATENCY WRIST A: ABNORMAL
AST SERPL-CCNC: 29 U/L (ref 15–37)
AST SERPL-CCNC: 43 U/L (ref 15–37)
AST SERPL-CCNC: 44 U/L (ref 15–37)
AST SERPL-CCNC: 47 U/L (ref 15–37)
AST SERPL-CCNC: 52 U/L (ref 15–37)
AST SERPL-CCNC: 58 U/L (ref 15–37)
AST SERPL-CCNC: 63 U/L (ref 15–37)
ATRIAL RATE: 101 BPM
ATRIAL RATE: 416 BPM
ATRIAL RATE: 75 BPM
ATRIAL RATE: 89 BPM
ATRIAL RATE: 94 BPM
AV R PG: 56.02 MMHG
BACTERIA URNS QL MICRO: NEGATIVE /HPF
BASE DEFICIT BLD-SCNC: 1 MMOL/L
BASOPHILS # BLD: 0 K/UL (ref 0–0.1)
BASOPHILS NFR BLD: 0 % (ref 0–1)
BDY SITE: ABNORMAL
BILIRUB DIRECT SERPL-MCNC: 0.2 MG/DL (ref 0–0.2)
BILIRUB SERPL-MCNC: 0.5 MG/DL (ref 0.2–1)
BILIRUB SERPL-MCNC: 0.6 MG/DL (ref 0.2–1)
BILIRUB SERPL-MCNC: 0.6 MG/DL (ref 0.2–1)
BILIRUB SERPL-MCNC: 0.9 MG/DL (ref 0.2–1)
BILIRUB SERPL-MCNC: 1.1 MG/DL (ref 0.2–1)
BILIRUB SERPL-MCNC: 1.5 MG/DL (ref 0.2–1)
BILIRUB SERPL-MCNC: 1.6 MG/DL (ref 0.2–1)
BILIRUB UR QL: NEGATIVE
BLOOD GROUP ANTIBODIES SERPL: NORMAL
BUN BLD-MCNC: 13 MG/DL (ref 9–20)
BUN SERPL-MCNC: 10 MG/DL (ref 6–20)
BUN SERPL-MCNC: 12 MG/DL (ref 6–20)
BUN SERPL-MCNC: 14 MG/DL (ref 6–20)
BUN SERPL-MCNC: 14 MG/DL (ref 6–20)
BUN SERPL-MCNC: 15 MG/DL (ref 6–20)
BUN SERPL-MCNC: 17 MG/DL (ref 6–20)
BUN SERPL-MCNC: 18 MG/DL (ref 6–20)
BUN SERPL-MCNC: 20 MG/DL (ref 6–20)
BUN SERPL-MCNC: 22 MG/DL (ref 6–20)
BUN/CREAT SERPL: 10 (ref 12–20)
BUN/CREAT SERPL: 10 (ref 12–20)
BUN/CREAT SERPL: 12 (ref 12–20)
BUN/CREAT SERPL: 14 (ref 12–20)
BUN/CREAT SERPL: 14 (ref 12–20)
BUN/CREAT SERPL: 15 (ref 12–20)
BUN/CREAT SERPL: 17 (ref 12–20)
BUN/CREAT SERPL: 19 (ref 12–20)
BUN/CREAT SERPL: 23 (ref 12–20)
BUN/CREAT SERPL: 24 (ref 12–20)
BUN/CREAT SERPL: 8 (ref 12–20)
BUN/CREAT SERPL: 9 (ref 12–20)
CA-I BLD-MCNC: 1.24 MMOL/L (ref 1.12–1.32)
CA-I BLD-SCNC: 1.18 MMOL/L (ref 1.12–1.32)
CALCIUM SERPL-MCNC: 6.4 MG/DL (ref 8.5–10.1)
CALCIUM SERPL-MCNC: 7.7 MG/DL (ref 8.5–10.1)
CALCIUM SERPL-MCNC: 8.1 MG/DL (ref 8.5–10.1)
CALCIUM SERPL-MCNC: 8.3 MG/DL (ref 8.5–10.1)
CALCIUM SERPL-MCNC: 8.5 MG/DL (ref 8.5–10.1)
CALCIUM SERPL-MCNC: 8.6 MG/DL (ref 8.5–10.1)
CALCIUM SERPL-MCNC: 9 MG/DL (ref 8.5–10.1)
CALCIUM SERPL-MCNC: 9.3 MG/DL (ref 8.5–10.1)
CALCULATED P AXIS, ECG09: -72 DEGREES
CALCULATED P AXIS, ECG09: 100 DEGREES
CALCULATED P AXIS, ECG09: 90 DEGREES
CALCULATED R AXIS, ECG10: -65 DEGREES
CALCULATED R AXIS, ECG10: -84 DEGREES
CALCULATED R AXIS, ECG10: -84 DEGREES
CALCULATED R AXIS, ECG10: -86 DEGREES
CALCULATED R AXIS, ECG10: -86 DEGREES
CALCULATED T AXIS, ECG11: 128 DEGREES
CALCULATED T AXIS, ECG11: 77 DEGREES
CALCULATED T AXIS, ECG11: 81 DEGREES
CALCULATED T AXIS, ECG11: 83 DEGREES
CALCULATED T AXIS, ECG11: 89 DEGREES
CHLORIDE BLD-SCNC: 103 MMOL/L (ref 98–107)
CHLORIDE SERPL-SCNC: 101 MMOL/L (ref 97–108)
CHLORIDE SERPL-SCNC: 102 MMOL/L (ref 97–108)
CHLORIDE SERPL-SCNC: 103 MMOL/L (ref 97–108)
CHLORIDE SERPL-SCNC: 105 MMOL/L (ref 97–108)
CHLORIDE SERPL-SCNC: 105 MMOL/L (ref 97–108)
CHLORIDE SERPL-SCNC: 106 MMOL/L (ref 97–108)
CHLORIDE SERPL-SCNC: 106 MMOL/L (ref 97–108)
CHLORIDE SERPL-SCNC: 107 MMOL/L (ref 97–108)
CHLORIDE SERPL-SCNC: 107 MMOL/L (ref 97–108)
CHLORIDE SERPL-SCNC: 109 MMOL/L (ref 97–108)
CHLORIDE SERPL-SCNC: 110 MMOL/L (ref 97–108)
CHLORIDE SERPL-SCNC: 115 MMOL/L (ref 97–108)
CHLORIDE SERPL-SCNC: 98 MMOL/L (ref 97–108)
CHLORIDE SERPL-SCNC: 99 MMOL/L (ref 97–108)
CK SERPL-CCNC: 122 U/L (ref 26–192)
CO2 BLD-SCNC: 26 MMOL/L (ref 21–32)
CO2 SERPL-SCNC: 19 MMOL/L (ref 21–32)
CO2 SERPL-SCNC: 19 MMOL/L (ref 21–32)
CO2 SERPL-SCNC: 21 MMOL/L (ref 21–32)
CO2 SERPL-SCNC: 22 MMOL/L (ref 21–32)
CO2 SERPL-SCNC: 22 MMOL/L (ref 21–32)
CO2 SERPL-SCNC: 24 MMOL/L (ref 21–32)
CO2 SERPL-SCNC: 25 MMOL/L (ref 21–32)
CO2 SERPL-SCNC: 26 MMOL/L (ref 21–32)
CO2 SERPL-SCNC: 27 MMOL/L (ref 21–32)
COLOR UR: ABNORMAL
COMMENT, HOLDF: NORMAL
COVID-19 RAPID TEST, COVR: NOT DETECTED
CREAT BLD-MCNC: 1.3 MG/DL (ref 0.6–1.3)
CREAT SERPL-MCNC: 0.78 MG/DL (ref 0.55–1.02)
CREAT SERPL-MCNC: 0.86 MG/DL (ref 0.55–1.02)
CREAT SERPL-MCNC: 0.89 MG/DL (ref 0.55–1.02)
CREAT SERPL-MCNC: 0.92 MG/DL (ref 0.55–1.02)
CREAT SERPL-MCNC: 1.03 MG/DL (ref 0.55–1.02)
CREAT SERPL-MCNC: 1.04 MG/DL (ref 0.55–1.02)
CREAT SERPL-MCNC: 1.05 MG/DL (ref 0.55–1.02)
CREAT SERPL-MCNC: 1.16 MG/DL (ref 0.55–1.02)
CREAT SERPL-MCNC: 1.21 MG/DL (ref 0.55–1.02)
CREAT SERPL-MCNC: 1.21 MG/DL (ref 0.55–1.02)
CREAT SERPL-MCNC: 1.25 MG/DL (ref 0.55–1.02)
CREAT SERPL-MCNC: 1.27 MG/DL (ref 0.55–1.02)
CREAT SERPL-MCNC: 1.34 MG/DL (ref 0.55–1.02)
CREAT SERPL-MCNC: 1.6 MG/DL (ref 0.55–1.02)
D DIMER PPP FEU-MCNC: 14.45 MG/L FEU (ref 0–0.65)
DIAGNOSIS, 93000: NORMAL
DIFFERENTIAL METHOD BLD: ABNORMAL
DIFFERENTIAL METHOD BLD: NORMAL
ECHO AO ASC DIAM: 2.63 CM
ECHO AO ROOT DIAM: 2.75 CM
ECHO AR MAX VEL PISA: 373.13 CM/S
ECHO AV AREA PEAK VELOCITY: 2.52 CM2
ECHO AV AREA VTI: 2.49 CM2
ECHO AV AREA/BSA PEAK VELOCITY: 1.4 CM2/M2
ECHO AV AREA/BSA VTI: 1.4 CM2/M2
ECHO AV MEAN GRADIENT: 3.36 MMHG
ECHO AV PEAK GRADIENT: 7.53 MMHG
ECHO AV PEAK VELOCITY: 137.23 CM/S
ECHO AV REGURGITANT PHT: 476.61 MS
ECHO AV VTI: 20.04 CM
ECHO LA MAJOR AXIS: 4.23 CM
ECHO LA MINOR AXIS: 2.33 CM
ECHO LV E' SEPTAL VELOCITY: 4.12 CM/S
ECHO LV INTERNAL DIMENSION DIASTOLIC MMODE: 4.27 CM
ECHO LV INTERNAL DIMENSION DIASTOLIC: 4.06 CM (ref 3.9–5.3)
ECHO LV INTERNAL DIMENSION SYSTOLIC MMODE: 3.35 CM
ECHO LV INTERNAL DIMENSION SYSTOLIC: 3.74 CM
ECHO LV IVSD: 1.16 CM (ref 0.6–0.9)
ECHO LV MASS 2D: 190.7 G (ref 67–162)
ECHO LV MASS INDEX 2D: 104.8 G/M2 (ref 43–95)
ECHO LV POSTERIOR WALL DIASTOLIC: 1.44 CM (ref 0.6–0.9)
ECHO LVOT CARDIAC OUTPUT: 4.4 LITER/MINUTE
ECHO LVOT DIAM: 2.02 CM
ECHO LVOT PEAK GRADIENT: 4.64 MMHG
ECHO LVOT PEAK VELOCITY: 107.72 CM/S
ECHO LVOT SV: 50 ML
ECHO LVOT VTI: 15.6 CM
ECHO MV A VELOCITY: 28.82 CM/S
ECHO MV AREA VTI: 2.62 CM2
ECHO MV E DECELERATION TIME (DT): 170.42 MS
ECHO MV E VELOCITY: 54.92 CM/S
ECHO MV E/A RATIO: 1.91
ECHO MV E/E' SEPTAL: 13.33
ECHO MV MAX VELOCITY: 123.36 CM/S
ECHO MV MEAN GRADIENT: 2.2 MMHG
ECHO MV PEAK GRADIENT: 6.09 MMHG
ECHO MV VTI: 19.08 CM
ECHO PV MAX VELOCITY: 75.43 CM/S
ECHO PV PEAK INSTANTANEOUS GRADIENT SYSTOLIC: 2.28 MMHG
ECHO TV REGURGITANT MAX VELOCITY: 341.91 CM/S
EOSINOPHIL # BLD: 0 K/UL (ref 0–0.4)
EOSINOPHIL # BLD: 0 K/UL (ref 0–0.4)
EOSINOPHIL # BLD: 0.1 K/UL (ref 0–0.4)
EOSINOPHIL # BLD: 0.2 K/UL (ref 0–0.4)
EOSINOPHIL # BLD: 0.2 K/UL (ref 0–0.4)
EOSINOPHIL # BLD: 0.3 K/UL (ref 0–0.4)
EOSINOPHIL NFR BLD: 0 % (ref 0–7)
EOSINOPHIL NFR BLD: 0 % (ref 0–7)
EOSINOPHIL NFR BLD: 1 % (ref 0–7)
EOSINOPHIL NFR BLD: 2 % (ref 0–7)
EOSINOPHIL NFR BLD: 3 % (ref 0–7)
EOSINOPHIL NFR BLD: 4 % (ref 0–7)
EPITH CASTS URNS QL MICRO: ABNORMAL /LPF
ERYTHROCYTE [DISTWIDTH] IN BLOOD BY AUTOMATED COUNT: 12.9 % (ref 11.5–14.5)
ERYTHROCYTE [DISTWIDTH] IN BLOOD BY AUTOMATED COUNT: 12.9 % (ref 11.5–14.5)
ERYTHROCYTE [DISTWIDTH] IN BLOOD BY AUTOMATED COUNT: 13 % (ref 11.5–14.5)
ERYTHROCYTE [DISTWIDTH] IN BLOOD BY AUTOMATED COUNT: 13.2 % (ref 11.5–14.5)
ERYTHROCYTE [DISTWIDTH] IN BLOOD BY AUTOMATED COUNT: 13.2 % (ref 11.5–14.5)
ERYTHROCYTE [DISTWIDTH] IN BLOOD BY AUTOMATED COUNT: 13.5 % (ref 11.5–14.5)
ERYTHROCYTE [DISTWIDTH] IN BLOOD BY AUTOMATED COUNT: 13.5 % (ref 11.5–14.5)
ERYTHROCYTE [DISTWIDTH] IN BLOOD BY AUTOMATED COUNT: 13.6 % (ref 11.5–14.5)
ERYTHROCYTE [DISTWIDTH] IN BLOOD BY AUTOMATED COUNT: 13.7 % (ref 11.5–14.5)
ERYTHROCYTE [DISTWIDTH] IN BLOOD BY AUTOMATED COUNT: 13.8 % (ref 11.5–14.5)
ERYTHROCYTE [DISTWIDTH] IN BLOOD BY AUTOMATED COUNT: 13.8 % (ref 11.5–14.5)
ERYTHROCYTE [DISTWIDTH] IN BLOOD BY AUTOMATED COUNT: 14 % (ref 11.5–14.5)
EST. AVERAGE GLUCOSE BLD GHB EST-MCNC: 157 MG/DL
EST. AVERAGE GLUCOSE BLD GHB EST-MCNC: 166 MG/DL
GAS FLOW.O2 O2 DELIVERY SYS: ABNORMAL L/MIN
GAS FLOW.O2 SETTING OXYMISER: 6 L/M
GLOBULIN SER CALC-MCNC: 4.6 G/DL (ref 2–4)
GLOBULIN SER CALC-MCNC: 4.7 G/DL (ref 2–4)
GLOBULIN SER CALC-MCNC: 4.9 G/DL (ref 2–4)
GLOBULIN SER CALC-MCNC: 4.9 G/DL (ref 2–4)
GLOBULIN SER CALC-MCNC: 5 G/DL (ref 2–4)
GLOBULIN SER CALC-MCNC: 5 G/DL (ref 2–4)
GLOBULIN SER CALC-MCNC: 5.3 G/DL (ref 2–4)
GLUCOSE BLD STRIP.AUTO-MCNC: 107 MG/DL (ref 65–100)
GLUCOSE BLD STRIP.AUTO-MCNC: 113 MG/DL (ref 65–100)
GLUCOSE BLD STRIP.AUTO-MCNC: 114 MG/DL (ref 65–100)
GLUCOSE BLD STRIP.AUTO-MCNC: 114 MG/DL (ref 65–100)
GLUCOSE BLD STRIP.AUTO-MCNC: 118 MG/DL (ref 65–100)
GLUCOSE BLD STRIP.AUTO-MCNC: 126 MG/DL (ref 65–100)
GLUCOSE BLD STRIP.AUTO-MCNC: 132 MG/DL (ref 65–100)
GLUCOSE BLD STRIP.AUTO-MCNC: 135 MG/DL (ref 65–100)
GLUCOSE BLD STRIP.AUTO-MCNC: 136 MG/DL (ref 65–100)
GLUCOSE BLD STRIP.AUTO-MCNC: 136 MG/DL (ref 65–100)
GLUCOSE BLD STRIP.AUTO-MCNC: 146 MG/DL (ref 65–100)
GLUCOSE BLD STRIP.AUTO-MCNC: 148 MG/DL (ref 65–100)
GLUCOSE BLD STRIP.AUTO-MCNC: 152 MG/DL (ref 65–100)
GLUCOSE BLD STRIP.AUTO-MCNC: 161 MG/DL (ref 65–100)
GLUCOSE BLD STRIP.AUTO-MCNC: 168 MG/DL (ref 65–100)
GLUCOSE BLD STRIP.AUTO-MCNC: 169 MG/DL (ref 65–100)
GLUCOSE BLD STRIP.AUTO-MCNC: 174 MG/DL (ref 65–100)
GLUCOSE BLD STRIP.AUTO-MCNC: 175 MG/DL (ref 65–100)
GLUCOSE BLD STRIP.AUTO-MCNC: 179 MG/DL (ref 65–100)
GLUCOSE BLD STRIP.AUTO-MCNC: 182 MG/DL (ref 65–100)
GLUCOSE BLD STRIP.AUTO-MCNC: 182 MG/DL (ref 65–100)
GLUCOSE BLD STRIP.AUTO-MCNC: 183 MG/DL (ref 65–100)
GLUCOSE BLD STRIP.AUTO-MCNC: 189 MG/DL (ref 65–100)
GLUCOSE BLD STRIP.AUTO-MCNC: 192 MG/DL (ref 65–100)
GLUCOSE BLD STRIP.AUTO-MCNC: 209 MG/DL (ref 65–100)
GLUCOSE BLD STRIP.AUTO-MCNC: 227 MG/DL (ref 65–100)
GLUCOSE BLD STRIP.AUTO-MCNC: 282 MG/DL (ref 65–100)
GLUCOSE BLD STRIP.AUTO-MCNC: 98 MG/DL (ref 65–100)
GLUCOSE BLD-MCNC: 163 MG/DL (ref 65–100)
GLUCOSE SERPL-MCNC: 108 MG/DL (ref 65–100)
GLUCOSE SERPL-MCNC: 126 MG/DL (ref 65–100)
GLUCOSE SERPL-MCNC: 130 MG/DL (ref 65–100)
GLUCOSE SERPL-MCNC: 161 MG/DL (ref 65–100)
GLUCOSE SERPL-MCNC: 176 MG/DL (ref 65–100)
GLUCOSE SERPL-MCNC: 183 MG/DL (ref 65–100)
GLUCOSE SERPL-MCNC: 191 MG/DL (ref 65–100)
GLUCOSE SERPL-MCNC: 192 MG/DL (ref 65–100)
GLUCOSE SERPL-MCNC: 194 MG/DL (ref 65–100)
GLUCOSE SERPL-MCNC: 196 MG/DL (ref 65–100)
GLUCOSE SERPL-MCNC: 198 MG/DL (ref 65–100)
GLUCOSE SERPL-MCNC: 198 MG/DL (ref 65–100)
GLUCOSE SERPL-MCNC: 203 MG/DL (ref 65–100)
GLUCOSE SERPL-MCNC: 218 MG/DL (ref 65–100)
GLUCOSE UR STRIP.AUTO-MCNC: 100 MG/DL
HBA1C MFR BLD: 7.1 % (ref 4–5.6)
HBA1C MFR BLD: 7.4 % (ref 4–5.6)
HCO3 BLD-SCNC: 23.1 MMOL/L (ref 22–26)
HCT VFR BLD AUTO: 32.1 % (ref 35–47)
HCT VFR BLD AUTO: 32.2 % (ref 35–47)
HCT VFR BLD AUTO: 33.5 % (ref 35–47)
HCT VFR BLD AUTO: 34.1 % (ref 35–47)
HCT VFR BLD AUTO: 36 % (ref 35–47)
HCT VFR BLD AUTO: 36.3 % (ref 35–47)
HCT VFR BLD AUTO: 36.8 % (ref 35–47)
HCT VFR BLD AUTO: 36.9 % (ref 35–47)
HCT VFR BLD AUTO: 37.6 % (ref 35–47)
HCT VFR BLD AUTO: 37.6 % (ref 35–47)
HCT VFR BLD AUTO: 38.7 % (ref 35–47)
HCT VFR BLD AUTO: 39.5 % (ref 35–47)
HCT VFR BLD CALC: 39 % (ref 35–47)
HGB BLD-MCNC: 10.1 G/DL (ref 11.5–16)
HGB BLD-MCNC: 10.3 G/DL (ref 11.5–16)
HGB BLD-MCNC: 10.7 G/DL (ref 11.5–16)
HGB BLD-MCNC: 11 G/DL (ref 11.5–16)
HGB BLD-MCNC: 11.6 G/DL (ref 11.5–16)
HGB BLD-MCNC: 11.7 G/DL (ref 11.5–16)
HGB BLD-MCNC: 11.7 G/DL (ref 11.5–16)
HGB BLD-MCNC: 11.8 G/DL (ref 11.5–16)
HGB BLD-MCNC: 12.1 G/DL (ref 11.5–16)
HGB BLD-MCNC: 12.3 G/DL (ref 11.5–16)
HGB BLD-MCNC: 12.7 G/DL (ref 11.5–16)
HGB BLD-MCNC: 13 G/DL (ref 11.5–16)
HGB UR QL STRIP: NEGATIVE
HYALINE CASTS URNS QL MICRO: ABNORMAL /LPF (ref 0–5)
IMM GRANULOCYTES # BLD AUTO: 0 K/UL (ref 0–0.04)
IMM GRANULOCYTES # BLD AUTO: 0 K/UL (ref 0–0.04)
IMM GRANULOCYTES # BLD AUTO: 0.1 K/UL (ref 0–0.04)
IMM GRANULOCYTES # BLD AUTO: 0.2 K/UL (ref 0–0.04)
IMM GRANULOCYTES NFR BLD AUTO: 0 % (ref 0–0.5)
IMM GRANULOCYTES NFR BLD AUTO: 0 % (ref 0–0.5)
IMM GRANULOCYTES NFR BLD AUTO: 1 % (ref 0–0.5)
IMM GRANULOCYTES NFR BLD AUTO: 2 % (ref 0–0.5)
INR PPP: 1.1 (ref 0.9–1.1)
KETONES UR QL STRIP.AUTO: NEGATIVE MG/DL
LACTATE SERPL-SCNC: 1.9 MMOL/L (ref 0.4–2)
LACTATE SERPL-SCNC: 3.1 MMOL/L (ref 0.4–2)
LEUKOCYTE ESTERASE UR QL STRIP.AUTO: ABNORMAL
LVOT MG: 1.93 MMHG
LYMPHOCYTES # BLD: 0.8 K/UL (ref 0.8–3.5)
LYMPHOCYTES # BLD: 1.8 K/UL (ref 0.8–3.5)
LYMPHOCYTES # BLD: 2 K/UL (ref 0.8–3.5)
LYMPHOCYTES # BLD: 2.2 K/UL (ref 0.8–3.5)
LYMPHOCYTES # BLD: 2.2 K/UL (ref 0.8–3.5)
LYMPHOCYTES # BLD: 2.8 K/UL (ref 0.8–3.5)
LYMPHOCYTES NFR BLD: 17 % (ref 12–49)
LYMPHOCYTES NFR BLD: 22 % (ref 12–49)
LYMPHOCYTES NFR BLD: 23 % (ref 12–49)
LYMPHOCYTES NFR BLD: 30 % (ref 12–49)
LYMPHOCYTES NFR BLD: 35 % (ref 12–49)
LYMPHOCYTES NFR BLD: 7 % (ref 12–49)
MAGNESIUM SERPL-MCNC: 1.9 MG/DL (ref 1.6–2.4)
MAGNESIUM SERPL-MCNC: 2 MG/DL (ref 1.6–2.4)
MAGNESIUM SERPL-MCNC: 2 MG/DL (ref 1.6–2.4)
MAGNESIUM SERPL-MCNC: 2.1 MG/DL (ref 1.6–2.4)
MAGNESIUM SERPL-MCNC: 2.1 MG/DL (ref 1.6–2.4)
MCH RBC QN AUTO: 26.8 PG (ref 26–34)
MCH RBC QN AUTO: 27.1 PG (ref 26–34)
MCH RBC QN AUTO: 27.1 PG (ref 26–34)
MCH RBC QN AUTO: 27.3 PG (ref 26–34)
MCH RBC QN AUTO: 27.4 PG (ref 26–34)
MCH RBC QN AUTO: 27.5 PG (ref 26–34)
MCH RBC QN AUTO: 27.6 PG (ref 26–34)
MCH RBC QN AUTO: 27.6 PG (ref 26–34)
MCH RBC QN AUTO: 27.8 PG (ref 26–34)
MCH RBC QN AUTO: 27.9 PG (ref 26–34)
MCH RBC QN AUTO: 28.1 PG (ref 26–34)
MCH RBC QN AUTO: 28.1 PG (ref 26–34)
MCHC RBC AUTO-ENTMCNC: 31.4 G/DL (ref 30–36.5)
MCHC RBC AUTO-ENTMCNC: 31.4 G/DL (ref 30–36.5)
MCHC RBC AUTO-ENTMCNC: 31.8 G/DL (ref 30–36.5)
MCHC RBC AUTO-ENTMCNC: 31.9 G/DL (ref 30–36.5)
MCHC RBC AUTO-ENTMCNC: 32.1 G/DL (ref 30–36.5)
MCHC RBC AUTO-ENTMCNC: 32.2 G/DL (ref 30–36.5)
MCHC RBC AUTO-ENTMCNC: 32.2 G/DL (ref 30–36.5)
MCHC RBC AUTO-ENTMCNC: 32.3 G/DL (ref 30–36.5)
MCHC RBC AUTO-ENTMCNC: 32.7 G/DL (ref 30–36.5)
MCHC RBC AUTO-ENTMCNC: 32.8 G/DL (ref 30–36.5)
MCHC RBC AUTO-ENTMCNC: 32.8 G/DL (ref 30–36.5)
MCHC RBC AUTO-ENTMCNC: 32.9 G/DL (ref 30–36.5)
MCV RBC AUTO: 82.6 FL (ref 80–99)
MCV RBC AUTO: 82.8 FL (ref 80–99)
MCV RBC AUTO: 83.2 FL (ref 80–99)
MCV RBC AUTO: 83.4 FL (ref 80–99)
MCV RBC AUTO: 83.5 FL (ref 80–99)
MCV RBC AUTO: 86 FL (ref 80–99)
MCV RBC AUTO: 86.2 FL (ref 80–99)
MCV RBC AUTO: 87.1 FL (ref 80–99)
MCV RBC AUTO: 87.2 FL (ref 80–99)
MCV RBC AUTO: 87.5 FL (ref 80–99)
MCV RBC AUTO: 87.9 FL (ref 80–99)
MCV RBC AUTO: 88 FL (ref 80–99)
MONOCYTES # BLD: 0.1 K/UL (ref 0–1)
MONOCYTES # BLD: 0.6 K/UL (ref 0–1)
MONOCYTES # BLD: 0.9 K/UL (ref 0–1)
MONOCYTES # BLD: 1 K/UL (ref 0–1)
MONOCYTES # BLD: 1.2 K/UL (ref 0–1)
MONOCYTES # BLD: 1.2 K/UL (ref 0–1)
MONOCYTES NFR BLD: 1 % (ref 5–13)
MONOCYTES NFR BLD: 10 % (ref 5–13)
MONOCYTES NFR BLD: 12 % (ref 5–13)
MONOCYTES NFR BLD: 13 % (ref 5–13)
MONOCYTES NFR BLD: 15 % (ref 5–13)
MONOCYTES NFR BLD: 5 % (ref 5–13)
NEUTS SEG # BLD: 10.2 K/UL (ref 1.8–8)
NEUTS SEG # BLD: 3.8 K/UL (ref 1.8–8)
NEUTS SEG # BLD: 4 K/UL (ref 1.8–8)
NEUTS SEG # BLD: 5.8 K/UL (ref 1.8–8)
NEUTS SEG # BLD: 6.9 K/UL (ref 1.8–8)
NEUTS SEG # BLD: 7.2 K/UL (ref 1.8–8)
NEUTS SEG NFR BLD: 47 % (ref 32–75)
NEUTS SEG NFR BLD: 54 % (ref 32–75)
NEUTS SEG NFR BLD: 62 % (ref 32–75)
NEUTS SEG NFR BLD: 70 % (ref 32–75)
NEUTS SEG NFR BLD: 75 % (ref 32–75)
NEUTS SEG NFR BLD: 88 % (ref 32–75)
NITRITE UR QL STRIP.AUTO: NEGATIVE
NRBC # BLD: 0 K/UL (ref 0–0.01)
NRBC BLD-RTO: 0 PER 100 WBC
P-R INTERVAL, ECG05: 140 MS
P-R INTERVAL, ECG05: 144 MS
P-R INTERVAL, ECG05: 146 MS
P-R INTERVAL, ECG05: 148 MS
P-R INTERVAL, ECG05: 96 MS
PCO2 BLD: 32 MMHG (ref 35–45)
PH BLD: 7.47 [PH] (ref 7.35–7.45)
PH UR STRIP: 5.5 [PH] (ref 5–8)
PHOSPHATE SERPL-MCNC: 2.3 MG/DL (ref 2.6–4.7)
PHOSPHATE SERPL-MCNC: 2.5 MG/DL (ref 2.6–4.7)
PHOSPHATE SERPL-MCNC: 2.8 MG/DL (ref 2.6–4.7)
PHOSPHATE SERPL-MCNC: 3.7 MG/DL (ref 2.6–4.7)
PLATELET # BLD AUTO: 133 K/UL (ref 150–400)
PLATELET # BLD AUTO: 142 K/UL (ref 150–400)
PLATELET # BLD AUTO: 157 K/UL (ref 150–400)
PLATELET # BLD AUTO: 158 K/UL (ref 150–400)
PLATELET # BLD AUTO: 172 K/UL (ref 150–400)
PLATELET # BLD AUTO: 177 K/UL (ref 150–400)
PLATELET # BLD AUTO: 188 K/UL (ref 150–400)
PLATELET # BLD AUTO: 190 K/UL (ref 150–400)
PLATELET # BLD AUTO: 193 K/UL (ref 150–400)
PLATELET # BLD AUTO: 197 K/UL (ref 150–400)
PLATELET # BLD AUTO: 210 K/UL (ref 150–400)
PLATELET # BLD AUTO: 211 K/UL (ref 150–400)
PMV BLD AUTO: 10.1 FL (ref 8.9–12.9)
PMV BLD AUTO: 10.1 FL (ref 8.9–12.9)
PMV BLD AUTO: 10.2 FL (ref 8.9–12.9)
PMV BLD AUTO: 10.3 FL (ref 8.9–12.9)
PMV BLD AUTO: 10.3 FL (ref 8.9–12.9)
PMV BLD AUTO: 11.3 FL (ref 8.9–12.9)
PMV BLD AUTO: 9.4 FL (ref 8.9–12.9)
PMV BLD AUTO: 9.5 FL (ref 8.9–12.9)
PMV BLD AUTO: 9.8 FL (ref 8.9–12.9)
PMV BLD AUTO: 9.8 FL (ref 8.9–12.9)
PMV BLD AUTO: 9.9 FL (ref 8.9–12.9)
PO2 BLD: 99 MMHG (ref 80–100)
POTASSIUM BLD-SCNC: 4 MMOL/L (ref 3.5–5.1)
POTASSIUM SERPL-SCNC: 3.3 MMOL/L (ref 3.5–5.1)
POTASSIUM SERPL-SCNC: 3.7 MMOL/L (ref 3.5–5.1)
POTASSIUM SERPL-SCNC: 3.8 MMOL/L (ref 3.5–5.1)
POTASSIUM SERPL-SCNC: 4 MMOL/L (ref 3.5–5.1)
POTASSIUM SERPL-SCNC: 4.2 MMOL/L (ref 3.5–5.1)
POTASSIUM SERPL-SCNC: 4.2 MMOL/L (ref 3.5–5.1)
POTASSIUM SERPL-SCNC: 4.3 MMOL/L (ref 3.5–5.1)
POTASSIUM SERPL-SCNC: 4.3 MMOL/L (ref 3.5–5.1)
POTASSIUM SERPL-SCNC: 4.4 MMOL/L (ref 3.5–5.1)
POTASSIUM SERPL-SCNC: 4.5 MMOL/L (ref 3.5–5.1)
POTASSIUM SERPL-SCNC: 4.6 MMOL/L (ref 3.5–5.1)
POTASSIUM SERPL-SCNC: 4.6 MMOL/L (ref 3.5–5.1)
POTASSIUM SERPL-SCNC: 5 MMOL/L (ref 3.5–5.1)
POTASSIUM SERPL-SCNC: 5.1 MMOL/L (ref 3.5–5.1)
PROCALCITONIN SERPL-MCNC: 0.33 NG/ML
PROT SERPL-MCNC: 7.5 G/DL (ref 6.4–8.2)
PROT SERPL-MCNC: 7.5 G/DL (ref 6.4–8.2)
PROT SERPL-MCNC: 8.2 G/DL (ref 6.4–8.2)
PROT SERPL-MCNC: 8.3 G/DL (ref 6.4–8.2)
PROT SERPL-MCNC: 8.3 G/DL (ref 6.4–8.2)
PROT SERPL-MCNC: 8.5 G/DL (ref 6.4–8.2)
PROT SERPL-MCNC: 8.7 G/DL (ref 6.4–8.2)
PROT UR STRIP-MCNC: ABNORMAL MG/DL
PROTHROMBIN TIME: 11.1 SEC (ref 9–11.1)
Q-T INTERVAL, ECG07: 368 MS
Q-T INTERVAL, ECG07: 424 MS
Q-T INTERVAL, ECG07: 436 MS
Q-T INTERVAL, ECG07: 456 MS
Q-T INTERVAL, ECG07: 458 MS
QRS DURATION, ECG06: 124 MS
QRS DURATION, ECG06: 166 MS
QRS DURATION, ECG06: 168 MS
QRS DURATION, ECG06: 170 MS
QRS DURATION, ECG06: 170 MS
QTC CALCULATION (BEZET), ECG08: 477 MS
QTC CALCULATION (BEZET), ECG08: 504 MS
QTC CALCULATION (BEZET), ECG08: 509 MS
QTC CALCULATION (BEZET), ECG08: 511 MS
QTC CALCULATION (BEZET), ECG08: 530 MS
RBC # BLD AUTO: 3.66 M/UL (ref 3.8–5.2)
RBC # BLD AUTO: 3.67 M/UL (ref 3.8–5.2)
RBC # BLD AUTO: 3.84 M/UL (ref 3.8–5.2)
RBC # BLD AUTO: 4.1 M/UL (ref 3.8–5.2)
RBC # BLD AUTO: 4.17 M/UL (ref 3.8–5.2)
RBC # BLD AUTO: 4.2 M/UL (ref 3.8–5.2)
RBC # BLD AUTO: 4.28 M/UL (ref 3.8–5.2)
RBC # BLD AUTO: 4.36 M/UL (ref 3.8–5.2)
RBC # BLD AUTO: 4.36 M/UL (ref 3.8–5.2)
RBC # BLD AUTO: 4.54 M/UL (ref 3.8–5.2)
RBC # BLD AUTO: 4.64 M/UL (ref 3.8–5.2)
RBC # BLD AUTO: 4.73 M/UL (ref 3.8–5.2)
RBC #/AREA URNS HPF: ABNORMAL /HPF (ref 0–5)
SAMPLES BEING HELD,HOLD: NORMAL
SAO2 % BLD: 98 % (ref 92–97)
SARS-COV-2, COV2: NORMAL
SARS-COV-2, COV2NT: NOT DETECTED
SERVICE CMNT-IMP: ABNORMAL
SERVICE CMNT-IMP: NORMAL
SODIUM BLD-SCNC: 139 MMOL/L (ref 136–145)
SODIUM SERPL-SCNC: 129 MMOL/L (ref 136–145)
SODIUM SERPL-SCNC: 130 MMOL/L (ref 136–145)
SODIUM SERPL-SCNC: 131 MMOL/L (ref 136–145)
SODIUM SERPL-SCNC: 133 MMOL/L (ref 136–145)
SODIUM SERPL-SCNC: 134 MMOL/L (ref 136–145)
SODIUM SERPL-SCNC: 134 MMOL/L (ref 136–145)
SODIUM SERPL-SCNC: 135 MMOL/L (ref 136–145)
SODIUM SERPL-SCNC: 135 MMOL/L (ref 136–145)
SODIUM SERPL-SCNC: 136 MMOL/L (ref 136–145)
SODIUM SERPL-SCNC: 136 MMOL/L (ref 136–145)
SODIUM SERPL-SCNC: 137 MMOL/L (ref 136–145)
SODIUM SERPL-SCNC: 138 MMOL/L (ref 136–145)
SODIUM SERPL-SCNC: 138 MMOL/L (ref 136–145)
SODIUM SERPL-SCNC: 142 MMOL/L (ref 136–145)
SOURCE, COVRS: NORMAL
SP GR UR REFRACTOMETRY: 1.02 (ref 1–1.03)
SPECIMEN EXP DATE BLD: NORMAL
SPECIMEN TYPE: ABNORMAL
TOTAL RESP. RATE, ITRR: 17
TROPONIN I SERPL-MCNC: 0.98 NG/ML
TROPONIN I SERPL-MCNC: <0.05 NG/ML
UA: UC IF INDICATED,UAUC: ABNORMAL
UROBILINOGEN UR QL STRIP.AUTO: 0.2 EU/DL (ref 0.2–1)
VENTRICULAR RATE, ECG03: 101 BPM
VENTRICULAR RATE, ECG03: 75 BPM
VENTRICULAR RATE, ECG03: 75 BPM
VENTRICULAR RATE, ECG03: 85 BPM
VENTRICULAR RATE, ECG03: 89 BPM
WBC # BLD AUTO: 10.4 K/UL (ref 3.6–11)
WBC # BLD AUTO: 11.7 K/UL (ref 3.6–11)
WBC # BLD AUTO: 2.5 K/UL (ref 3.6–11)
WBC # BLD AUTO: 6 K/UL (ref 3.6–11)
WBC # BLD AUTO: 6.7 K/UL (ref 3.6–11)
WBC # BLD AUTO: 7.4 K/UL (ref 3.6–11)
WBC # BLD AUTO: 8.1 K/UL (ref 3.6–11)
WBC # BLD AUTO: 9 K/UL (ref 3.6–11)
WBC # BLD AUTO: 9.2 K/UL (ref 3.6–11)
WBC # BLD AUTO: 9.3 K/UL (ref 3.6–11)
WBC # BLD AUTO: 9.4 K/UL (ref 3.6–11)
WBC # BLD AUTO: 9.4 K/UL (ref 3.6–11)
WBC URNS QL MICRO: ABNORMAL /HPF (ref 0–4)

## 2021-01-01 PROCEDURE — 74011000250 HC RX REV CODE- 250: Performed by: INTERNAL MEDICINE

## 2021-01-01 PROCEDURE — 70450 CT HEAD/BRAIN W/O DYE: CPT

## 2021-01-01 PROCEDURE — 65660000000 HC RM CCU STEPDOWN

## 2021-01-01 PROCEDURE — 74018 RADEX ABDOMEN 1 VIEW: CPT

## 2021-01-01 PROCEDURE — 74011250636 HC RX REV CODE- 250/636: Performed by: ANESTHESIOLOGY

## 2021-01-01 PROCEDURE — 1101F PT FALLS ASSESS-DOCD LE1/YR: CPT | Performed by: INTERNAL MEDICINE

## 2021-01-01 PROCEDURE — 74011250636 HC RX REV CODE- 250/636: Performed by: PHYSICIAN ASSISTANT

## 2021-01-01 PROCEDURE — 74011250636 HC RX REV CODE- 250/636: Performed by: INTERNAL MEDICINE

## 2021-01-01 PROCEDURE — 74011250636 HC RX REV CODE- 250/636: Performed by: THORACIC SURGERY (CARDIOTHORACIC VASCULAR SURGERY)

## 2021-01-01 PROCEDURE — 1090F PRES/ABSN URINE INCON ASSESS: CPT | Performed by: INTERNAL MEDICINE

## 2021-01-01 PROCEDURE — 36415 COLL VENOUS BLD VENIPUNCTURE: CPT

## 2021-01-01 PROCEDURE — 93005 ELECTROCARDIOGRAM TRACING: CPT

## 2021-01-01 PROCEDURE — 94760 N-INVAS EAR/PLS OXIMETRY 1: CPT

## 2021-01-01 PROCEDURE — 3017F COLORECTAL CA SCREEN DOC REV: CPT | Performed by: INTERNAL MEDICINE

## 2021-01-01 PROCEDURE — 76010000153 HC OR TIME 1.5 TO 2 HR: Performed by: THORACIC SURGERY (CARDIOTHORACIC VASCULAR SURGERY)

## 2021-01-01 PROCEDURE — 99215 OFFICE O/P EST HI 40 MIN: CPT | Performed by: INTERNAL MEDICINE

## 2021-01-01 PROCEDURE — 74011250637 HC RX REV CODE- 250/637: Performed by: ANESTHESIOLOGY

## 2021-01-01 PROCEDURE — 74011250636 HC RX REV CODE- 250/636: Performed by: EMERGENCY MEDICINE

## 2021-01-01 PROCEDURE — 83735 ASSAY OF MAGNESIUM: CPT

## 2021-01-01 PROCEDURE — 96417 CHEMO IV INFUS EACH ADDL SEQ: CPT

## 2021-01-01 PROCEDURE — 74011250637 HC RX REV CODE- 250/637: Performed by: FAMILY MEDICINE

## 2021-01-01 PROCEDURE — 74011250637 HC RX REV CODE- 250/637: Performed by: HOSPITALIST

## 2021-01-01 PROCEDURE — 94640 AIRWAY INHALATION TREATMENT: CPT

## 2021-01-01 PROCEDURE — 84100 ASSAY OF PHOSPHORUS: CPT

## 2021-01-01 PROCEDURE — 80053 COMPREHEN METABOLIC PANEL: CPT

## 2021-01-01 PROCEDURE — 74011636637 HC RX REV CODE- 636/637: Performed by: FAMILY MEDICINE

## 2021-01-01 PROCEDURE — 74011250636 HC RX REV CODE- 250/636: Performed by: RADIOLOGY

## 2021-01-01 PROCEDURE — 71045 X-RAY EXAM CHEST 1 VIEW: CPT

## 2021-01-01 PROCEDURE — 77030011267 HC ELECTRD BLD COVD -A: Performed by: THORACIC SURGERY (CARDIOTHORACIC VASCULAR SURGERY)

## 2021-01-01 PROCEDURE — 87635 SARS-COV-2 COVID-19 AMP PRB: CPT

## 2021-01-01 PROCEDURE — 74011000258 HC RX REV CODE- 258: Performed by: INTERNAL MEDICINE

## 2021-01-01 PROCEDURE — 85027 COMPLETE CBC AUTOMATED: CPT

## 2021-01-01 PROCEDURE — 84484 ASSAY OF TROPONIN QUANT: CPT

## 2021-01-01 PROCEDURE — 82962 GLUCOSE BLOOD TEST: CPT

## 2021-01-01 PROCEDURE — C1788 PORT, INDWELLING, IMP: HCPCS

## 2021-01-01 PROCEDURE — 77030002996 HC SUT SLK J&J -A: Performed by: THORACIC SURGERY (CARDIOTHORACIC VASCULAR SURGERY)

## 2021-01-01 PROCEDURE — 85379 FIBRIN DEGRADATION QUANT: CPT

## 2021-01-01 PROCEDURE — 77010033678 HC OXYGEN DAILY

## 2021-01-01 PROCEDURE — 77030040361 HC SLV COMPR DVT MDII -B: Performed by: THORACIC SURGERY (CARDIOTHORACIC VASCULAR SURGERY)

## 2021-01-01 PROCEDURE — G8427 DOCREV CUR MEDS BY ELIG CLIN: HCPCS | Performed by: INTERNAL MEDICINE

## 2021-01-01 PROCEDURE — 74011250637 HC RX REV CODE- 250/637: Performed by: THORACIC SURGERY (CARDIOTHORACIC VASCULAR SURGERY)

## 2021-01-01 PROCEDURE — C1894 INTRO/SHEATH, NON-LASER: HCPCS

## 2021-01-01 PROCEDURE — 97165 OT EVAL LOW COMPLEX 30 MIN: CPT

## 2021-01-01 PROCEDURE — 74011250636 HC RX REV CODE- 250/636: Performed by: NURSE ANESTHETIST, CERTIFIED REGISTERED

## 2021-01-01 PROCEDURE — 95816 EEG AWAKE AND DROWSY: CPT | Performed by: PSYCHIATRY & NEUROLOGY

## 2021-01-01 PROCEDURE — 2709999900 HC NON-CHARGEABLE SUPPLY

## 2021-01-01 PROCEDURE — 99495 TRANSJ CARE MGMT MOD F2F 14D: CPT | Performed by: INTERNAL MEDICINE

## 2021-01-01 PROCEDURE — G8419 CALC BMI OUT NRM PARAM NOF/U: HCPCS | Performed by: INTERNAL MEDICINE

## 2021-01-01 PROCEDURE — 74011000250 HC RX REV CODE- 250: Performed by: THORACIC SURGERY (CARDIOTHORACIC VASCULAR SURGERY)

## 2021-01-01 PROCEDURE — G8419 CALC BMI OUT NRM PARAM NOF/U: HCPCS | Performed by: THORACIC SURGERY (CARDIOTHORACIC VASCULAR SURGERY)

## 2021-01-01 PROCEDURE — 65270000029 HC RM PRIVATE

## 2021-01-01 PROCEDURE — 76060000035 HC ANESTHESIA 2 TO 2.5 HR: Performed by: THORACIC SURGERY (CARDIOTHORACIC VASCULAR SURGERY)

## 2021-01-01 PROCEDURE — 94727 GAS DIL/WSHOT DETER LNG VOL: CPT

## 2021-01-01 PROCEDURE — G8432 DEP SCR NOT DOC, RNG: HCPCS | Performed by: THORACIC SURGERY (CARDIOTHORACIC VASCULAR SURGERY)

## 2021-01-01 PROCEDURE — 77030010507 HC ADH SKN DERMBND J&J -B: Performed by: THORACIC SURGERY (CARDIOTHORACIC VASCULAR SURGERY)

## 2021-01-01 PROCEDURE — 36600 WITHDRAWAL OF ARTERIAL BLOOD: CPT

## 2021-01-01 PROCEDURE — 96374 THER/PROPH/DIAG INJ IV PUSH: CPT

## 2021-01-01 PROCEDURE — 85025 COMPLETE CBC W/AUTO DIFF WBC: CPT

## 2021-01-01 PROCEDURE — 88307 TISSUE EXAM BY PATHOLOGIST: CPT

## 2021-01-01 PROCEDURE — 74011250637 HC RX REV CODE- 250/637: Performed by: INTERNAL MEDICINE

## 2021-01-01 PROCEDURE — 83605 ASSAY OF LACTIC ACID: CPT

## 2021-01-01 PROCEDURE — 76210000016 HC OR PH I REC 1 TO 1.5 HR: Performed by: THORACIC SURGERY (CARDIOTHORACIC VASCULAR SURGERY)

## 2021-01-01 PROCEDURE — 99285 EMERGENCY DEPT VISIT HI MDM: CPT

## 2021-01-01 PROCEDURE — 97535 SELF CARE MNGMENT TRAINING: CPT

## 2021-01-01 PROCEDURE — G8427 DOCREV CUR MEDS BY ELIG CLIN: HCPCS | Performed by: THORACIC SURGERY (CARDIOTHORACIC VASCULAR SURGERY)

## 2021-01-01 PROCEDURE — 93970 EXTREMITY STUDY: CPT

## 2021-01-01 PROCEDURE — 88305 TISSUE EXAM BY PATHOLOGIST: CPT

## 2021-01-01 PROCEDURE — 0656 HSPC GENERAL INPATIENT

## 2021-01-01 PROCEDURE — 74011000250 HC RX REV CODE- 250: Performed by: FAMILY MEDICINE

## 2021-01-01 PROCEDURE — 97116 GAIT TRAINING THERAPY: CPT

## 2021-01-01 PROCEDURE — C1729 CATH, DRAINAGE: HCPCS | Performed by: THORACIC SURGERY (CARDIOTHORACIC VASCULAR SURGERY)

## 2021-01-01 PROCEDURE — 99205 OFFICE O/P NEW HI 60 MIN: CPT | Performed by: THORACIC SURGERY (CARDIOTHORACIC VASCULAR SURGERY)

## 2021-01-01 PROCEDURE — 74011250636 HC RX REV CODE- 250/636: Performed by: FAMILY MEDICINE

## 2021-01-01 PROCEDURE — G8536 NO DOC ELDER MAL SCRN: HCPCS | Performed by: INTERNAL MEDICINE

## 2021-01-01 PROCEDURE — 96413 CHEMO IV INFUSION 1 HR: CPT

## 2021-01-01 PROCEDURE — 4A03X5D MEASUREMENT OF ARTERIAL FLOW, INTRACRANIAL, EXTERNAL APPROACH: ICD-10-PCS | Performed by: RADIOLOGY

## 2021-01-01 PROCEDURE — 77030037367 HC STPLR ENDO TRI-STPLR COVD -D: Performed by: THORACIC SURGERY (CARDIOTHORACIC VASCULAR SURGERY)

## 2021-01-01 PROCEDURE — 88331 PATH CONSLTJ SURG 1 BLK 1SPC: CPT

## 2021-01-01 PROCEDURE — G8399 PT W/DXA RESULTS DOCUMENT: HCPCS | Performed by: THORACIC SURGERY (CARDIOTHORACIC VASCULAR SURGERY)

## 2021-01-01 PROCEDURE — 99152 MOD SED SAME PHYS/QHP 5/>YRS: CPT

## 2021-01-01 PROCEDURE — 82550 ASSAY OF CK (CPK): CPT

## 2021-01-01 PROCEDURE — 81001 URINALYSIS AUTO W/SCOPE: CPT

## 2021-01-01 PROCEDURE — 80048 BASIC METABOLIC PNL TOTAL CA: CPT

## 2021-01-01 PROCEDURE — 74011250636 HC RX REV CODE- 250/636: Performed by: HOSPITALIST

## 2021-01-01 PROCEDURE — 97162 PT EVAL MOD COMPLEX 30 MIN: CPT

## 2021-01-01 PROCEDURE — G8432 DEP SCR NOT DOC, RNG: HCPCS | Performed by: INTERNAL MEDICINE

## 2021-01-01 PROCEDURE — 96375 TX/PRO/DX INJ NEW DRUG ADDON: CPT

## 2021-01-01 PROCEDURE — P9045 ALBUMIN (HUMAN), 5%, 250 ML: HCPCS | Performed by: THORACIC SURGERY (CARDIOTHORACIC VASCULAR SURGERY)

## 2021-01-01 PROCEDURE — 77030010507 HC ADH SKN DERMBND J&J -B

## 2021-01-01 PROCEDURE — 94762 N-INVAS EAR/PLS OXIMTRY CONT: CPT

## 2021-01-01 PROCEDURE — 74011000250 HC RX REV CODE- 250: Performed by: EMERGENCY MEDICINE

## 2021-01-01 PROCEDURE — 77030012965 HC NDL HUBR BBMI -A

## 2021-01-01 PROCEDURE — 74011636637 HC RX REV CODE- 636/637: Performed by: THORACIC SURGERY (CARDIOTHORACIC VASCULAR SURGERY)

## 2021-01-01 PROCEDURE — 77030031139 HC SUT VCRL2 J&J -A

## 2021-01-01 PROCEDURE — 96367 TX/PROPH/DG ADDL SEQ IV INF: CPT

## 2021-01-01 PROCEDURE — 77030037892: Performed by: THORACIC SURGERY (CARDIOTHORACIC VASCULAR SURGERY)

## 2021-01-01 PROCEDURE — 94664 DEMO&/EVAL PT USE INHALER: CPT

## 2021-01-01 PROCEDURE — 83036 HEMOGLOBIN GLYCOSYLATED A1C: CPT

## 2021-01-01 PROCEDURE — 1101F PT FALLS ASSESS-DOCD LE1/YR: CPT | Performed by: THORACIC SURGERY (CARDIOTHORACIC VASCULAR SURGERY)

## 2021-01-01 PROCEDURE — 82803 BLOOD GASES ANY COMBINATION: CPT

## 2021-01-01 PROCEDURE — U0003 INFECTIOUS AGENT DETECTION BY NUCLEIC ACID (DNA OR RNA); SEVERE ACUTE RESPIRATORY SYNDROME CORONAVIRUS 2 (SARS-COV-2) (CORONAVIRUS DISEASE [COVID-19]), AMPLIFIED PROBE TECHNIQUE, MAKING USE OF HIGH THROUGHPUT TECHNOLOGIES AS DESCRIBED BY CMS-2020-01-R: HCPCS

## 2021-01-01 PROCEDURE — 0BBC4ZZ EXCISION OF RIGHT UPPER LUNG LOBE, PERCUTANEOUS ENDOSCOPIC APPROACH: ICD-10-PCS | Performed by: THORACIC SURGERY (CARDIOTHORACIC VASCULAR SURGERY)

## 2021-01-01 PROCEDURE — 88360 TUMOR IMMUNOHISTOCHEM/MANUAL: CPT

## 2021-01-01 PROCEDURE — G8399 PT W/DXA RESULTS DOCUMENT: HCPCS | Performed by: INTERNAL MEDICINE

## 2021-01-01 PROCEDURE — 99218 HC RM OBSERVATION: CPT

## 2021-01-01 PROCEDURE — 99291 CRITICAL CARE FIRST HOUR: CPT | Performed by: PSYCHIATRY & NEUROLOGY

## 2021-01-01 PROCEDURE — 77030011266 HC ELECTRD BLD INSL COVD -A: Performed by: THORACIC SURGERY (CARDIOTHORACIC VASCULAR SURGERY)

## 2021-01-01 PROCEDURE — G9899 SCRN MAM PERF RSLTS DOC: HCPCS | Performed by: INTERNAL MEDICINE

## 2021-01-01 PROCEDURE — 85610 PROTHROMBIN TIME: CPT

## 2021-01-01 PROCEDURE — 77030037368 HC STPLR ENDO TRI-STPLR COVD -E: Performed by: THORACIC SURGERY (CARDIOTHORACIC VASCULAR SURGERY)

## 2021-01-01 PROCEDURE — 32666 THORACOSCOPY W/WEDGE RESECT: CPT | Performed by: PHYSICIAN ASSISTANT

## 2021-01-01 PROCEDURE — 74011250637 HC RX REV CODE- 250/637: Performed by: EMERGENCY MEDICINE

## 2021-01-01 PROCEDURE — 71046 X-RAY EXAM CHEST 2 VIEWS: CPT

## 2021-01-01 PROCEDURE — 97161 PT EVAL LOW COMPLEX 20 MIN: CPT

## 2021-01-01 PROCEDURE — 74011000250 HC RX REV CODE- 250: Performed by: NURSE ANESTHETIST, CERTIFIED REGISTERED

## 2021-01-01 PROCEDURE — 07B73ZX EXCISION OF THORAX LYMPHATIC, PERCUTANEOUS APPROACH, DIAGNOSTIC: ICD-10-PCS | Performed by: THORACIC SURGERY (CARDIOTHORACIC VASCULAR SURGERY)

## 2021-01-01 PROCEDURE — 94010 BREATHING CAPACITY TEST: CPT

## 2021-01-01 PROCEDURE — 3336500001 HSPC ELECTION

## 2021-01-01 PROCEDURE — 1090F PRES/ABSN URINE INCON ASSESS: CPT | Performed by: THORACIC SURGERY (CARDIOTHORACIC VASCULAR SURGERY)

## 2021-01-01 PROCEDURE — 93306 TTE W/DOPPLER COMPLETE: CPT

## 2021-01-01 PROCEDURE — 88341 IMHCHEM/IMCYTCHM EA ADD ANTB: CPT

## 2021-01-01 PROCEDURE — 99024 POSTOP FOLLOW-UP VISIT: CPT | Performed by: THORACIC SURGERY (CARDIOTHORACIC VASCULAR SURGERY)

## 2021-01-01 PROCEDURE — 99024 POSTOP FOLLOW-UP VISIT: CPT | Performed by: NURSE PRACTITIONER

## 2021-01-01 PROCEDURE — 99233 SBSQ HOSP IP/OBS HIGH 50: CPT | Performed by: PSYCHIATRY & NEUROLOGY

## 2021-01-01 PROCEDURE — 65270000015 HC RM PRIVATE ONCOLOGY

## 2021-01-01 PROCEDURE — 74011000258 HC RX REV CODE- 258: Performed by: PHYSICIAN ASSISTANT

## 2021-01-01 PROCEDURE — G9899 SCRN MAM PERF RSLTS DOC: HCPCS | Performed by: THORACIC SURGERY (CARDIOTHORACIC VASCULAR SURGERY)

## 2021-01-01 PROCEDURE — 77030029684 HC NEB SM VOL KT MONA -A

## 2021-01-01 PROCEDURE — 32674 THORACOSCOPY LYMPH NODE EXC: CPT | Performed by: THORACIC SURGERY (CARDIOTHORACIC VASCULAR SURGERY)

## 2021-01-01 PROCEDURE — 84145 PROCALCITONIN (PCT): CPT

## 2021-01-01 PROCEDURE — 92523 SPEECH SOUND LANG COMPREHEN: CPT

## 2021-01-01 PROCEDURE — 99223 1ST HOSP IP/OBS HIGH 75: CPT | Performed by: FAMILY MEDICINE

## 2021-01-01 PROCEDURE — 51798 US URINE CAPACITY MEASURE: CPT

## 2021-01-01 PROCEDURE — 93306 TTE W/DOPPLER COMPLETE: CPT | Performed by: INTERNAL MEDICINE

## 2021-01-01 PROCEDURE — 99238 HOSP IP/OBS DSCHRG MGMT 30/<: CPT | Performed by: NURSE PRACTITIONER

## 2021-01-01 PROCEDURE — 70498 CT ANGIOGRAPHY NECK: CPT

## 2021-01-01 PROCEDURE — 77030012390 HC DRN CHST BTL GTNG -B: Performed by: THORACIC SURGERY (CARDIOTHORACIC VASCULAR SURGERY)

## 2021-01-01 PROCEDURE — 86901 BLOOD TYPING SEROLOGIC RH(D): CPT

## 2021-01-01 PROCEDURE — 80076 HEPATIC FUNCTION PANEL: CPT

## 2021-01-01 PROCEDURE — 88342 IMHCHEM/IMCYTCHM 1ST ANTB: CPT

## 2021-01-01 PROCEDURE — 94729 DIFFUSING CAPACITY: CPT

## 2021-01-01 PROCEDURE — 99223 1ST HOSP IP/OBS HIGH 75: CPT | Performed by: INTERNAL MEDICINE

## 2021-01-01 PROCEDURE — 97530 THERAPEUTIC ACTIVITIES: CPT

## 2021-01-01 PROCEDURE — A9540 TC99M MAA: HCPCS

## 2021-01-01 PROCEDURE — 2709999900 HC NON-CHARGEABLE SUPPLY: Performed by: THORACIC SURGERY (CARDIOTHORACIC VASCULAR SURGERY)

## 2021-01-01 PROCEDURE — 74011000636 HC RX REV CODE- 636: Performed by: EMERGENCY MEDICINE

## 2021-01-01 PROCEDURE — 65620000000 HC RM CCU GENERAL

## 2021-01-01 PROCEDURE — 74011000250 HC RX REV CODE- 250: Performed by: RADIOLOGY

## 2021-01-01 PROCEDURE — 0042T CT CODE NEURO PERF W CBF: CPT

## 2021-01-01 PROCEDURE — 77030031139 HC SUT VCRL2 J&J -A: Performed by: THORACIC SURGERY (CARDIOTHORACIC VASCULAR SURGERY)

## 2021-01-01 PROCEDURE — 74011250637 HC RX REV CODE- 250/637: Performed by: PHYSICIAN ASSISTANT

## 2021-01-01 PROCEDURE — 77030011893 HC TY CUT DN TRIS -B

## 2021-01-01 PROCEDURE — 80047 BASIC METABLC PNL IONIZED CA: CPT

## 2021-01-01 PROCEDURE — 77030003666 HC NDL SPINAL BD -A: Performed by: THORACIC SURGERY (CARDIOTHORACIC VASCULAR SURGERY)

## 2021-01-01 PROCEDURE — 32666 THORACOSCOPY W/WEDGE RESECT: CPT | Performed by: THORACIC SURGERY (CARDIOTHORACIC VASCULAR SURGERY)

## 2021-01-01 PROCEDURE — 3017F COLORECTAL CA SCREEN DOC REV: CPT | Performed by: THORACIC SURGERY (CARDIOTHORACIC VASCULAR SURGERY)

## 2021-01-01 PROCEDURE — G8536 NO DOC ELDER MAL SCRN: HCPCS | Performed by: THORACIC SURGERY (CARDIOTHORACIC VASCULAR SURGERY)

## 2021-01-01 PROCEDURE — 77030022473 HC HNDL ENDO GIA UNIV USDA -C: Performed by: THORACIC SURGERY (CARDIOTHORACIC VASCULAR SURGERY)

## 2021-01-01 PROCEDURE — 77030010349 HC TRCR ENDOSC THOR COVD -B: Performed by: THORACIC SURGERY (CARDIOTHORACIC VASCULAR SURGERY)

## 2021-01-01 RX ORDER — DIPHENHYDRAMINE HYDROCHLORIDE 50 MG/ML
25 INJECTION, SOLUTION INTRAMUSCULAR; INTRAVENOUS AS NEEDED
Status: CANCELLED
Start: 2021-01-01

## 2021-01-01 RX ORDER — KETOROLAC TROMETHAMINE 30 MG/ML
30 INJECTION, SOLUTION INTRAMUSCULAR; INTRAVENOUS
Status: DISCONTINUED | OUTPATIENT
Start: 2021-01-01 | End: 2021-01-01 | Stop reason: HOSPADM

## 2021-01-01 RX ORDER — MORPHINE SULFATE 2 MG/ML
2 INJECTION, SOLUTION INTRAMUSCULAR; INTRAVENOUS
Status: DISCONTINUED | OUTPATIENT
Start: 2021-01-01 | End: 2021-01-01 | Stop reason: HOSPADM

## 2021-01-01 RX ORDER — HEPARIN 100 UNIT/ML
500 SYRINGE INTRAVENOUS
Status: COMPLETED | OUTPATIENT
Start: 2021-01-01 | End: 2021-01-01

## 2021-01-01 RX ORDER — DIPHENHYDRAMINE HYDROCHLORIDE 50 MG/ML
12.5 INJECTION, SOLUTION INTRAMUSCULAR; INTRAVENOUS AS NEEDED
Status: ACTIVE | OUTPATIENT
Start: 2021-01-01 | End: 2021-01-01

## 2021-01-01 RX ORDER — EPINEPHRINE 1 MG/ML
0.3 INJECTION, SOLUTION, CONCENTRATE INTRAVENOUS AS NEEDED
Status: CANCELLED | OUTPATIENT
Start: 2021-01-01

## 2021-01-01 RX ORDER — SODIUM CHLORIDE, SODIUM LACTATE, POTASSIUM CHLORIDE, CALCIUM CHLORIDE 600; 310; 30; 20 MG/100ML; MG/100ML; MG/100ML; MG/100ML
1000 INJECTION, SOLUTION INTRAVENOUS CONTINUOUS
Status: DISCONTINUED | OUTPATIENT
Start: 2021-01-01 | End: 2021-01-01

## 2021-01-01 RX ORDER — DEXTROSE, SODIUM CHLORIDE, AND POTASSIUM CHLORIDE 5; .45; .15 G/100ML; G/100ML; G/100ML
40 INJECTION INTRAVENOUS CONTINUOUS
Status: DISCONTINUED | OUTPATIENT
Start: 2021-01-01 | End: 2021-01-01

## 2021-01-01 RX ORDER — SODIUM CHLORIDE 0.9 % (FLUSH) 0.9 %
10 SYRINGE (ML) INJECTION AS NEEDED
Status: DISCONTINUED | OUTPATIENT
Start: 2021-01-01 | End: 2021-01-01 | Stop reason: HOSPADM

## 2021-01-01 RX ORDER — ATORVASTATIN CALCIUM 40 MG/1
40 TABLET, FILM COATED ORAL DAILY
Status: DISCONTINUED | OUTPATIENT
Start: 2021-01-01 | End: 2021-01-01 | Stop reason: HOSPADM

## 2021-01-01 RX ORDER — HYDROCODONE BITARTRATE AND ACETAMINOPHEN 5; 325 MG/1; MG/1
1 TABLET ORAL AS NEEDED
Status: DISCONTINUED | OUTPATIENT
Start: 2021-01-01 | End: 2021-01-01

## 2021-01-01 RX ORDER — POLYETHYLENE GLYCOL 3350 17 G/17G
17 POWDER, FOR SOLUTION ORAL DAILY PRN
Status: DISCONTINUED | OUTPATIENT
Start: 2021-01-01 | End: 2021-01-01 | Stop reason: HOSPADM

## 2021-01-01 RX ORDER — ACETAMINOPHEN 325 MG/1
650 TABLET ORAL
Status: COMPLETED | OUTPATIENT
Start: 2021-01-01 | End: 2021-01-01

## 2021-01-01 RX ORDER — PROPOFOL 10 MG/ML
INJECTION, EMULSION INTRAVENOUS AS NEEDED
Status: DISCONTINUED | OUTPATIENT
Start: 2021-01-01 | End: 2021-01-01 | Stop reason: HOSPADM

## 2021-01-01 RX ORDER — ONDANSETRON 2 MG/ML
4 INJECTION INTRAMUSCULAR; INTRAVENOUS
Status: DISCONTINUED | OUTPATIENT
Start: 2021-01-01 | End: 2021-01-01 | Stop reason: HOSPADM

## 2021-01-01 RX ORDER — FERROUS SULFATE, DRIED 160(50) MG
1 TABLET, EXTENDED RELEASE ORAL
Status: DISCONTINUED | OUTPATIENT
Start: 2021-01-01 | End: 2021-01-01 | Stop reason: HOSPADM

## 2021-01-01 RX ORDER — IPRATROPIUM BROMIDE AND ALBUTEROL SULFATE 2.5; .5 MG/3ML; MG/3ML
3 SOLUTION RESPIRATORY (INHALATION)
Status: DISCONTINUED | OUTPATIENT
Start: 2021-01-01 | End: 2021-01-01 | Stop reason: HOSPADM

## 2021-01-01 RX ORDER — HEPARIN 100 UNIT/ML
300-500 SYRINGE INTRAVENOUS AS NEEDED
Status: CANCELLED
Start: 2021-01-01

## 2021-01-01 RX ORDER — HYDROMORPHONE HYDROCHLORIDE 1 MG/ML
0.5 INJECTION, SOLUTION INTRAMUSCULAR; INTRAVENOUS; SUBCUTANEOUS
Status: DISCONTINUED | OUTPATIENT
Start: 2021-01-01 | End: 2021-01-01

## 2021-01-01 RX ORDER — ACETAMINOPHEN 650 MG/1
650 SUPPOSITORY RECTAL
Status: DISCONTINUED | OUTPATIENT
Start: 2021-01-01 | End: 2021-01-01

## 2021-01-01 RX ORDER — SODIUM CHLORIDE 9 MG/ML
10 INJECTION INTRAMUSCULAR; INTRAVENOUS; SUBCUTANEOUS AS NEEDED
Status: DISCONTINUED | OUTPATIENT
Start: 2021-01-01 | End: 2021-01-01 | Stop reason: HOSPADM

## 2021-01-01 RX ORDER — LIDOCAINE AND PRILOCAINE 25; 25 MG/G; MG/G
CREAM TOPICAL AS NEEDED
Qty: 30 G | Refills: 2 | Status: SHIPPED | OUTPATIENT
Start: 2021-01-01 | End: 2021-01-01

## 2021-01-01 RX ORDER — GLYCOPYRROLATE 0.2 MG/ML
0.2 INJECTION INTRAMUSCULAR; INTRAVENOUS
Status: DISCONTINUED | OUTPATIENT
Start: 2021-01-01 | End: 2021-01-01 | Stop reason: HOSPADM

## 2021-01-01 RX ORDER — OXYCODONE HYDROCHLORIDE 5 MG/1
5 TABLET ORAL
Status: DISCONTINUED | OUTPATIENT
Start: 2021-01-01 | End: 2021-01-01 | Stop reason: HOSPADM

## 2021-01-01 RX ORDER — LORAZEPAM 2 MG/ML
0.5 INJECTION INTRAMUSCULAR
Status: CANCELLED | OUTPATIENT
Start: 2021-01-01

## 2021-01-01 RX ORDER — HYDROMORPHONE HYDROCHLORIDE 1 MG/ML
0.2 INJECTION, SOLUTION INTRAMUSCULAR; INTRAVENOUS; SUBCUTANEOUS
Status: DISCONTINUED | OUTPATIENT
Start: 2021-01-01 | End: 2021-01-01

## 2021-01-01 RX ORDER — ACETAMINOPHEN 650 MG/1
650 SUPPOSITORY RECTAL
Status: DISCONTINUED | OUTPATIENT
Start: 2021-01-01 | End: 2021-01-01 | Stop reason: HOSPADM

## 2021-01-01 RX ORDER — DIPHENHYDRAMINE HYDROCHLORIDE 50 MG/ML
50 INJECTION, SOLUTION INTRAMUSCULAR; INTRAVENOUS
Status: COMPLETED | OUTPATIENT
Start: 2021-01-01 | End: 2021-01-01

## 2021-01-01 RX ORDER — SODIUM CHLORIDE 9 MG/ML
10 INJECTION INTRAMUSCULAR; INTRAVENOUS; SUBCUTANEOUS AS NEEDED
Status: ACTIVE | OUTPATIENT
Start: 2021-01-01 | End: 2021-01-01

## 2021-01-01 RX ORDER — FENTANYL CITRATE 50 UG/ML
25 INJECTION, SOLUTION INTRAMUSCULAR; INTRAVENOUS
Status: COMPLETED | OUTPATIENT
Start: 2021-01-01 | End: 2021-01-01

## 2021-01-01 RX ORDER — SODIUM CHLORIDE 9 MG/ML
25 INJECTION, SOLUTION INTRAVENOUS CONTINUOUS
Status: DISCONTINUED | OUTPATIENT
Start: 2021-01-01 | End: 2021-01-01

## 2021-01-01 RX ORDER — SODIUM CHLORIDE 9 MG/ML
25 INJECTION, SOLUTION INTRAVENOUS CONTINUOUS
Status: DISCONTINUED | OUTPATIENT
Start: 2021-01-01 | End: 2021-01-01 | Stop reason: HOSPADM

## 2021-01-01 RX ORDER — SODIUM CHLORIDE 0.9 % (FLUSH) 0.9 %
5-40 SYRINGE (ML) INJECTION EVERY 8 HOURS
Status: DISCONTINUED | OUTPATIENT
Start: 2021-01-01 | End: 2021-01-01 | Stop reason: HOSPADM

## 2021-01-01 RX ORDER — LIDOCAINE HYDROCHLORIDE 20 MG/ML
20 INJECTION, SOLUTION INFILTRATION; PERINEURAL
Status: COMPLETED | OUTPATIENT
Start: 2021-01-01 | End: 2021-01-01

## 2021-01-01 RX ORDER — FACIAL-BODY WIPES
10 EACH TOPICAL DAILY PRN
Status: DISCONTINUED | OUTPATIENT
Start: 2021-01-01 | End: 2021-01-01 | Stop reason: HOSPADM

## 2021-01-01 RX ORDER — GUAIFENESIN 100 MG/5ML
81 LIQUID (ML) ORAL DAILY
Qty: 30 TAB | Refills: 0 | Status: SHIPPED | OUTPATIENT
Start: 2021-01-01

## 2021-01-01 RX ORDER — SODIUM CHLORIDE, SODIUM LACTATE, POTASSIUM CHLORIDE, CALCIUM CHLORIDE 600; 310; 30; 20 MG/100ML; MG/100ML; MG/100ML; MG/100ML
INJECTION, SOLUTION INTRAVENOUS
Status: DISCONTINUED | OUTPATIENT
Start: 2021-01-01 | End: 2021-01-01 | Stop reason: HOSPADM

## 2021-01-01 RX ORDER — LORAZEPAM 2 MG/ML
1 INJECTION INTRAMUSCULAR
Status: DISCONTINUED | OUTPATIENT
Start: 2021-01-01 | End: 2021-01-01 | Stop reason: HOSPADM

## 2021-01-01 RX ORDER — GABAPENTIN 600 MG/1
300 TABLET ORAL 3 TIMES DAILY
Status: DISCONTINUED | OUTPATIENT
Start: 2021-01-01 | End: 2021-01-01

## 2021-01-01 RX ORDER — OXYCODONE HYDROCHLORIDE 5 MG/1
5 TABLET ORAL
Qty: 28 TAB | Refills: 0 | Status: SHIPPED | OUTPATIENT
Start: 2021-01-01 | End: 2021-01-01

## 2021-01-01 RX ORDER — ACETAMINOPHEN 650 MG/1
650 SUPPOSITORY RECTAL EVERY 6 HOURS
Status: DISCONTINUED | OUTPATIENT
Start: 2021-01-01 | End: 2021-01-01

## 2021-01-01 RX ORDER — SODIUM CHLORIDE 0.9 % (FLUSH) 0.9 %
5-10 SYRINGE (ML) INJECTION AS NEEDED
Status: DISCONTINUED | OUTPATIENT
Start: 2021-01-01 | End: 2021-01-01 | Stop reason: HOSPADM

## 2021-01-01 RX ORDER — FENTANYL CITRATE 50 UG/ML
25 INJECTION, SOLUTION INTRAMUSCULAR; INTRAVENOUS
Status: DISCONTINUED | OUTPATIENT
Start: 2021-01-01 | End: 2021-01-01 | Stop reason: HOSPADM

## 2021-01-01 RX ORDER — DEXAMETHASONE SODIUM PHOSPHATE 4 MG/ML
INJECTION, SOLUTION INTRA-ARTICULAR; INTRALESIONAL; INTRAMUSCULAR; INTRAVENOUS; SOFT TISSUE AS NEEDED
Status: DISCONTINUED | OUTPATIENT
Start: 2021-01-01 | End: 2021-01-01 | Stop reason: HOSPADM

## 2021-01-01 RX ORDER — GUAIFENESIN 600 MG/1
600 TABLET, EXTENDED RELEASE ORAL 2 TIMES DAILY
Status: DISCONTINUED | OUTPATIENT
Start: 2021-01-01 | End: 2021-01-01 | Stop reason: HOSPADM

## 2021-01-01 RX ORDER — ALBUTEROL SULFATE 90 UG/1
2 AEROSOL, METERED RESPIRATORY (INHALATION)
Status: DISCONTINUED | OUTPATIENT
Start: 2021-01-01 | End: 2021-01-01 | Stop reason: SDUPTHER

## 2021-01-01 RX ORDER — MAGNESIUM SULFATE 100 %
4 CRYSTALS MISCELLANEOUS AS NEEDED
Status: DISCONTINUED | OUTPATIENT
Start: 2021-01-01 | End: 2021-01-01 | Stop reason: HOSPADM

## 2021-01-01 RX ORDER — ACETAMINOPHEN 325 MG/1
650 TABLET ORAL
Status: DISCONTINUED | OUTPATIENT
Start: 2021-01-01 | End: 2021-01-01 | Stop reason: HOSPADM

## 2021-01-01 RX ORDER — SODIUM CHLORIDE 9 MG/ML
10 INJECTION INTRAMUSCULAR; INTRAVENOUS; SUBCUTANEOUS AS NEEDED
Status: CANCELLED | OUTPATIENT
Start: 2021-01-01

## 2021-01-01 RX ORDER — DEXAMETHASONE SODIUM PHOSPHATE 4 MG/ML
8 INJECTION, SOLUTION INTRA-ARTICULAR; INTRALESIONAL; INTRAMUSCULAR; INTRAVENOUS; SOFT TISSUE ONCE
Status: CANCELLED | OUTPATIENT
Start: 2021-01-01 | End: 2021-01-01

## 2021-01-01 RX ORDER — CARVEDILOL 6.25 MG/1
6.25 TABLET ORAL 2 TIMES DAILY WITH MEALS
Status: DISCONTINUED | OUTPATIENT
Start: 2021-01-01 | End: 2021-01-01 | Stop reason: HOSPADM

## 2021-01-01 RX ORDER — PROMETHAZINE HYDROCHLORIDE 25 MG/1
12.5 TABLET ORAL
Status: DISCONTINUED | OUTPATIENT
Start: 2021-01-01 | End: 2021-01-01 | Stop reason: HOSPADM

## 2021-01-01 RX ORDER — LORAZEPAM 2 MG/ML
0.5 INJECTION INTRAMUSCULAR
Status: DISCONTINUED | OUTPATIENT
Start: 2021-01-01 | End: 2021-01-01 | Stop reason: HOSPADM

## 2021-01-01 RX ORDER — DICLOFENAC SODIUM 10 MG/G
4 GEL TOPICAL
Status: DISCONTINUED | OUTPATIENT
Start: 2021-01-01 | End: 2021-01-01 | Stop reason: HOSPADM

## 2021-01-01 RX ORDER — POTASSIUM CHLORIDE 14.9 MG/ML
10 INJECTION INTRAVENOUS
Status: DISCONTINUED | OUTPATIENT
Start: 2021-01-01 | End: 2021-01-01

## 2021-01-01 RX ORDER — LIDOCAINE HYDROCHLORIDE 20 MG/ML
INJECTION, SOLUTION EPIDURAL; INFILTRATION; INTRACAUDAL; PERINEURAL AS NEEDED
Status: DISCONTINUED | OUTPATIENT
Start: 2021-01-01 | End: 2021-01-01 | Stop reason: HOSPADM

## 2021-01-01 RX ORDER — PHENYLEPHRINE HCL IN 0.9% NACL 0.4MG/10ML
SYRINGE (ML) INTRAVENOUS AS NEEDED
Status: DISCONTINUED | OUTPATIENT
Start: 2021-01-01 | End: 2021-01-01 | Stop reason: HOSPADM

## 2021-01-01 RX ORDER — HYDROCORTISONE SODIUM SUCCINATE 100 MG/2ML
100 INJECTION, POWDER, FOR SOLUTION INTRAMUSCULAR; INTRAVENOUS AS NEEDED
Status: CANCELLED | OUTPATIENT
Start: 2021-01-01

## 2021-01-01 RX ORDER — MIDAZOLAM HYDROCHLORIDE 1 MG/ML
1 INJECTION, SOLUTION INTRAMUSCULAR; INTRAVENOUS AS NEEDED
Status: DISCONTINUED | OUTPATIENT
Start: 2021-01-01 | End: 2021-01-01 | Stop reason: HOSPADM

## 2021-01-01 RX ORDER — FENTANYL CITRATE 50 UG/ML
INJECTION, SOLUTION INTRAMUSCULAR; INTRAVENOUS AS NEEDED
Status: DISCONTINUED | OUTPATIENT
Start: 2021-01-01 | End: 2021-01-01 | Stop reason: HOSPADM

## 2021-01-01 RX ORDER — IPRATROPIUM BROMIDE 0.5 MG/2.5ML
0.5 SOLUTION RESPIRATORY (INHALATION)
Status: DISCONTINUED | OUTPATIENT
Start: 2021-01-01 | End: 2021-01-01 | Stop reason: HOSPADM

## 2021-01-01 RX ORDER — ALBUMIN HUMAN 50 G/1000ML
25 SOLUTION INTRAVENOUS ONCE
Status: COMPLETED | OUTPATIENT
Start: 2021-01-01 | End: 2021-01-01

## 2021-01-01 RX ORDER — SODIUM CHLORIDE 0.9 % (FLUSH) 0.9 %
10 SYRINGE (ML) INJECTION AS NEEDED
Status: CANCELLED | OUTPATIENT
Start: 2021-01-01 | End: 2021-01-01

## 2021-01-01 RX ORDER — MIDAZOLAM HYDROCHLORIDE 1 MG/ML
0.5 INJECTION, SOLUTION INTRAMUSCULAR; INTRAVENOUS
Status: DISCONTINUED | OUTPATIENT
Start: 2021-01-01 | End: 2021-01-01 | Stop reason: HOSPADM

## 2021-01-01 RX ORDER — ALBUTEROL SULFATE 0.83 MG/ML
2.5 SOLUTION RESPIRATORY (INHALATION)
Status: DISCONTINUED | OUTPATIENT
Start: 2021-01-01 | End: 2021-01-01 | Stop reason: HOSPADM

## 2021-01-01 RX ORDER — ACETAMINOPHEN 325 MG/1
650 TABLET ORAL AS NEEDED
Status: CANCELLED
Start: 2021-01-01

## 2021-01-01 RX ORDER — ONDANSETRON 2 MG/ML
8 INJECTION INTRAMUSCULAR; INTRAVENOUS AS NEEDED
Status: CANCELLED | OUTPATIENT
Start: 2021-01-01

## 2021-01-01 RX ORDER — DIPHENHYDRAMINE HYDROCHLORIDE 50 MG/ML
50 INJECTION, SOLUTION INTRAMUSCULAR; INTRAVENOUS AS NEEDED
Status: CANCELLED
Start: 2021-01-01

## 2021-01-01 RX ORDER — PROCHLORPERAZINE EDISYLATE 5 MG/ML
10 INJECTION INTRAMUSCULAR; INTRAVENOUS
Status: DISCONTINUED | OUTPATIENT
Start: 2021-01-01 | End: 2021-01-01 | Stop reason: CLARIF

## 2021-01-01 RX ORDER — INSULIN LISPRO 100 [IU]/ML
INJECTION, SOLUTION INTRAVENOUS; SUBCUTANEOUS
Status: DISCONTINUED | OUTPATIENT
Start: 2021-01-01 | End: 2021-01-01 | Stop reason: HOSPADM

## 2021-01-01 RX ORDER — SODIUM CHLORIDE 9 MG/ML
25 INJECTION, SOLUTION INTRAVENOUS CONTINUOUS
Status: CANCELLED | OUTPATIENT
Start: 2021-01-01 | End: 2021-01-01

## 2021-01-01 RX ORDER — LIDOCAINE HYDROCHLORIDE AND EPINEPHRINE 10; 10 MG/ML; UG/ML
1.5 INJECTION, SOLUTION INFILTRATION; PERINEURAL
Status: COMPLETED | OUTPATIENT
Start: 2021-01-01 | End: 2021-01-01

## 2021-01-01 RX ORDER — COLCHICINE 0.6 MG/1
0.6 TABLET ORAL DAILY
Status: DISCONTINUED | OUTPATIENT
Start: 2021-01-01 | End: 2021-01-01 | Stop reason: HOSPADM

## 2021-01-01 RX ORDER — HEPARIN 100 UNIT/ML
300-500 SYRINGE INTRAVENOUS AS NEEDED
Status: ACTIVE | OUTPATIENT
Start: 2021-01-01 | End: 2021-01-01

## 2021-01-01 RX ORDER — HEPARIN 100 UNIT/ML
300-500 SYRINGE INTRAVENOUS AS NEEDED
Status: DISCONTINUED | OUTPATIENT
Start: 2021-01-01 | End: 2021-01-01 | Stop reason: HOSPADM

## 2021-01-01 RX ORDER — CYCLOBENZAPRINE HCL 10 MG
5 TABLET ORAL
Qty: 9 TAB | Refills: 0 | Status: SHIPPED | OUTPATIENT
Start: 2021-01-01 | End: 2021-01-01

## 2021-01-01 RX ORDER — MIDAZOLAM HYDROCHLORIDE 1 MG/ML
INJECTION, SOLUTION INTRAMUSCULAR; INTRAVENOUS AS NEEDED
Status: DISCONTINUED | OUTPATIENT
Start: 2021-01-01 | End: 2021-01-01 | Stop reason: HOSPADM

## 2021-01-01 RX ORDER — LIDOCAINE HYDROCHLORIDE 10 MG/ML
0.1 INJECTION, SOLUTION EPIDURAL; INFILTRATION; INTRACAUDAL; PERINEURAL AS NEEDED
Status: DISCONTINUED | OUTPATIENT
Start: 2021-01-01 | End: 2021-01-01 | Stop reason: HOSPADM

## 2021-01-01 RX ORDER — ONDANSETRON 4 MG/1
4 TABLET, ORALLY DISINTEGRATING ORAL
Qty: 40 TAB | Refills: 2 | Status: SHIPPED | OUTPATIENT
Start: 2021-01-01

## 2021-01-01 RX ORDER — ROCURONIUM BROMIDE 10 MG/ML
INJECTION, SOLUTION INTRAVENOUS AS NEEDED
Status: DISCONTINUED | OUTPATIENT
Start: 2021-01-01 | End: 2021-01-01 | Stop reason: HOSPADM

## 2021-01-01 RX ORDER — GUAIFENESIN 100 MG/5ML
81 LIQUID (ML) ORAL DAILY
Status: DISCONTINUED | OUTPATIENT
Start: 2021-01-01 | End: 2021-01-01 | Stop reason: HOSPADM

## 2021-01-01 RX ORDER — ONDANSETRON 2 MG/ML
4 INJECTION INTRAMUSCULAR; INTRAVENOUS AS NEEDED
Status: DISCONTINUED | OUTPATIENT
Start: 2021-01-01 | End: 2021-01-01 | Stop reason: HOSPADM

## 2021-01-01 RX ORDER — MORPHINE SULFATE 2 MG/ML
2 INJECTION, SOLUTION INTRAMUSCULAR; INTRAVENOUS EVERY 4 HOURS
Status: DISCONTINUED | OUTPATIENT
Start: 2021-01-01 | End: 2021-01-01 | Stop reason: HOSPADM

## 2021-01-01 RX ORDER — FUROSEMIDE 40 MG/1
20 TABLET ORAL DAILY
Status: DISCONTINUED | OUTPATIENT
Start: 2021-01-01 | End: 2021-01-01 | Stop reason: HOSPADM

## 2021-01-01 RX ORDER — GABAPENTIN 400 MG/1
800 CAPSULE ORAL 3 TIMES DAILY
Status: DISCONTINUED | OUTPATIENT
Start: 2021-01-01 | End: 2021-01-01 | Stop reason: HOSPADM

## 2021-01-01 RX ORDER — ACETAMINOPHEN 325 MG/1
650 TABLET ORAL
Status: DISCONTINUED | OUTPATIENT
Start: 2021-01-01 | End: 2021-01-01

## 2021-01-01 RX ORDER — SODIUM CHLORIDE 9 MG/ML
75 INJECTION, SOLUTION INTRAVENOUS CONTINUOUS
Status: DISCONTINUED | OUTPATIENT
Start: 2021-01-01 | End: 2021-01-01 | Stop reason: HOSPADM

## 2021-01-01 RX ORDER — ALBUMIN HUMAN 250 G/1000ML
12.5 SOLUTION INTRAVENOUS ONCE
Status: DISCONTINUED | OUTPATIENT
Start: 2021-01-01 | End: 2021-01-01

## 2021-01-01 RX ORDER — ACETAMINOPHEN 500 MG
1000 TABLET ORAL ONCE
Status: COMPLETED | OUTPATIENT
Start: 2021-01-01 | End: 2021-01-01

## 2021-01-01 RX ORDER — ALBUTEROL SULFATE 0.83 MG/ML
2.5 SOLUTION RESPIRATORY (INHALATION) AS NEEDED
Status: CANCELLED
Start: 2021-01-01

## 2021-01-01 RX ORDER — DEXTROSE 50 % IN WATER (D50W) INTRAVENOUS SYRINGE
25-50 AS NEEDED
Status: DISCONTINUED | OUTPATIENT
Start: 2021-01-01 | End: 2021-01-01 | Stop reason: HOSPADM

## 2021-01-01 RX ORDER — IPRATROPIUM BROMIDE AND ALBUTEROL SULFATE 2.5; .5 MG/3ML; MG/3ML
3 SOLUTION RESPIRATORY (INHALATION)
Status: COMPLETED | OUTPATIENT
Start: 2021-01-01 | End: 2021-01-01

## 2021-01-01 RX ORDER — SODIUM CHLORIDE 0.9 % (FLUSH) 0.9 %
5-40 SYRINGE (ML) INJECTION AS NEEDED
Status: DISCONTINUED | OUTPATIENT
Start: 2021-01-01 | End: 2021-01-01 | Stop reason: HOSPADM

## 2021-01-01 RX ORDER — DEXAMETHASONE SODIUM PHOSPHATE 4 MG/ML
8 INJECTION, SOLUTION INTRA-ARTICULAR; INTRALESIONAL; INTRAMUSCULAR; INTRAVENOUS; SOFT TISSUE ONCE
Status: COMPLETED | OUTPATIENT
Start: 2021-01-01 | End: 2021-01-01

## 2021-01-01 RX ORDER — 3% SODIUM CHLORIDE 3 G/100ML
60 INJECTION, SOLUTION INTRAVENOUS CONTINUOUS
Status: DISCONTINUED | OUTPATIENT
Start: 2021-01-01 | End: 2021-01-01

## 2021-01-01 RX ORDER — GUAIFENESIN 100 MG/5ML
81 LIQUID (ML) ORAL DAILY
Status: DISCONTINUED | OUTPATIENT
Start: 2021-01-01 | End: 2021-01-01

## 2021-01-01 RX ORDER — FENTANYL CITRATE 50 UG/ML
25 INJECTION, SOLUTION INTRAMUSCULAR; INTRAVENOUS ONCE
Status: COMPLETED | OUTPATIENT
Start: 2021-01-01 | End: 2021-01-01

## 2021-01-01 RX ORDER — FENTANYL CITRATE 50 UG/ML
100 INJECTION, SOLUTION INTRAMUSCULAR; INTRAVENOUS
Status: DISCONTINUED | OUTPATIENT
Start: 2021-01-01 | End: 2021-01-01

## 2021-01-01 RX ORDER — ACETAMINOPHEN 325 MG/1
650 TABLET ORAL ONCE
Status: COMPLETED | OUTPATIENT
Start: 2021-01-01 | End: 2021-01-01

## 2021-01-01 RX ORDER — SODIUM CHLORIDE 9 MG/ML
25 INJECTION, SOLUTION INTRAVENOUS CONTINUOUS
Status: DISPENSED | OUTPATIENT
Start: 2021-01-01 | End: 2021-01-01

## 2021-01-01 RX ORDER — SODIUM CHLORIDE, SODIUM LACTATE, POTASSIUM CHLORIDE, CALCIUM CHLORIDE 600; 310; 30; 20 MG/100ML; MG/100ML; MG/100ML; MG/100ML
25 INJECTION, SOLUTION INTRAVENOUS CONTINUOUS
Status: DISCONTINUED | OUTPATIENT
Start: 2021-01-01 | End: 2021-01-01 | Stop reason: HOSPADM

## 2021-01-01 RX ORDER — ALBUTEROL SULFATE 0.83 MG/ML
2.5 SOLUTION RESPIRATORY (INHALATION) AS NEEDED
Status: DISCONTINUED | OUTPATIENT
Start: 2021-01-01 | End: 2021-01-01 | Stop reason: HOSPADM

## 2021-01-01 RX ORDER — FUROSEMIDE 20 MG/1
20 TABLET ORAL DAILY
Status: DISCONTINUED | OUTPATIENT
Start: 2021-01-01 | End: 2021-01-01 | Stop reason: HOSPADM

## 2021-01-01 RX ORDER — DEXTROSE 50 % IN WATER (D50W) INTRAVENOUS SYRINGE
12.5-25 AS NEEDED
Status: DISCONTINUED | OUTPATIENT
Start: 2021-01-01 | End: 2021-01-01 | Stop reason: HOSPADM

## 2021-01-01 RX ORDER — MORPHINE SULFATE 10 MG/ML
2 INJECTION, SOLUTION INTRAMUSCULAR; INTRAVENOUS
Status: DISCONTINUED | OUTPATIENT
Start: 2021-01-01 | End: 2021-01-01 | Stop reason: HOSPADM

## 2021-01-01 RX ORDER — FENTANYL CITRATE 50 UG/ML
50 INJECTION, SOLUTION INTRAMUSCULAR; INTRAVENOUS AS NEEDED
Status: DISCONTINUED | OUTPATIENT
Start: 2021-01-01 | End: 2021-01-01 | Stop reason: HOSPADM

## 2021-01-01 RX ORDER — ONDANSETRON 2 MG/ML
INJECTION INTRAMUSCULAR; INTRAVENOUS AS NEEDED
Status: DISCONTINUED | OUTPATIENT
Start: 2021-01-01 | End: 2021-01-01 | Stop reason: HOSPADM

## 2021-01-01 RX ORDER — SUCCINYLCHOLINE CHLORIDE 20 MG/ML
INJECTION INTRAMUSCULAR; INTRAVENOUS AS NEEDED
Status: DISCONTINUED | OUTPATIENT
Start: 2021-01-01 | End: 2021-01-01 | Stop reason: HOSPADM

## 2021-01-01 RX ORDER — HYDROMORPHONE HYDROCHLORIDE 1 MG/ML
0.2 INJECTION, SOLUTION INTRAMUSCULAR; INTRAVENOUS; SUBCUTANEOUS
Status: DISCONTINUED | OUTPATIENT
Start: 2021-01-01 | End: 2021-01-01 | Stop reason: HOSPADM

## 2021-01-01 RX ORDER — MIDAZOLAM HYDROCHLORIDE 1 MG/ML
5 INJECTION, SOLUTION INTRAMUSCULAR; INTRAVENOUS
Status: DISCONTINUED | OUTPATIENT
Start: 2021-01-01 | End: 2021-01-01

## 2021-01-01 RX ORDER — CYCLOBENZAPRINE HCL 10 MG
5 TABLET ORAL 3 TIMES DAILY
Status: DISCONTINUED | OUTPATIENT
Start: 2021-01-01 | End: 2021-01-01 | Stop reason: HOSPADM

## 2021-01-01 RX ORDER — CYCLOBENZAPRINE HCL 10 MG
5 TABLET ORAL
Qty: 9 TAB | Refills: 0 | Status: SHIPPED | OUTPATIENT
Start: 2021-01-01 | End: 2021-01-01 | Stop reason: SDUPTHER

## 2021-01-01 RX ORDER — PALONOSETRON 0.05 MG/ML
0.25 INJECTION, SOLUTION INTRAVENOUS ONCE
Status: COMPLETED | OUTPATIENT
Start: 2021-01-01 | End: 2021-01-01

## 2021-01-01 RX ORDER — SIMVASTATIN 20 MG/1
TABLET, FILM COATED ORAL
Qty: 90 TAB | Refills: 1 | Status: SHIPPED | OUTPATIENT
Start: 2021-01-01 | End: 2021-01-01

## 2021-01-01 RX ORDER — ROPIVACAINE HYDROCHLORIDE 5 MG/ML
30 INJECTION, SOLUTION EPIDURAL; INFILTRATION; PERINEURAL AS NEEDED
Status: DISCONTINUED | OUTPATIENT
Start: 2021-01-01 | End: 2021-01-01 | Stop reason: HOSPADM

## 2021-01-01 RX ORDER — POTASSIUM CHLORIDE 750 MG/1
40 TABLET, FILM COATED, EXTENDED RELEASE ORAL
Status: COMPLETED | OUTPATIENT
Start: 2021-01-01 | End: 2021-01-01

## 2021-01-01 RX ORDER — SODIUM CHLORIDE 0.9 % (FLUSH) 0.9 %
10 SYRINGE (ML) INJECTION AS NEEDED
Status: DISPENSED | OUTPATIENT
Start: 2021-01-01 | End: 2021-01-01

## 2021-01-01 RX ORDER — SODIUM CHLORIDE 9 MG/ML
75 INJECTION, SOLUTION INTRAVENOUS CONTINUOUS
Status: DISPENSED | OUTPATIENT
Start: 2021-01-01 | End: 2021-01-01

## 2021-01-01 RX ORDER — PROCHLORPERAZINE MALEATE 10 MG
10 TABLET ORAL
Qty: 40 TAB | Refills: 2 | Status: SHIPPED | OUTPATIENT
Start: 2021-01-01 | End: 2021-04-26

## 2021-01-01 RX ORDER — FERROUS SULFATE, DRIED 160(50) MG
1 TABLET, EXTENDED RELEASE ORAL
Qty: 30 TAB | Refills: 0 | Status: SHIPPED | OUTPATIENT
Start: 2021-01-01 | End: 2021-01-01

## 2021-01-01 RX ORDER — DIPHENHYDRAMINE HCL 25 MG
25 CAPSULE ORAL
Status: DISCONTINUED | OUTPATIENT
Start: 2021-01-01 | End: 2021-01-01 | Stop reason: HOSPADM

## 2021-01-01 RX ORDER — SODIUM CHLORIDE, SODIUM LACTATE, POTASSIUM CHLORIDE, CALCIUM CHLORIDE 600; 310; 30; 20 MG/100ML; MG/100ML; MG/100ML; MG/100ML
1000 INJECTION, SOLUTION INTRAVENOUS CONTINUOUS
Status: DISCONTINUED | OUTPATIENT
Start: 2021-01-01 | End: 2021-01-01 | Stop reason: HOSPADM

## 2021-01-01 RX ORDER — PALONOSETRON 0.05 MG/ML
0.25 INJECTION, SOLUTION INTRAVENOUS ONCE
Status: CANCELLED | OUTPATIENT
Start: 2021-01-01 | End: 2021-01-01

## 2021-01-01 RX ADMIN — OXYCODONE 5 MG: 5 TABLET ORAL at 11:07

## 2021-01-01 RX ADMIN — HYDROMORPHONE HYDROCHLORIDE 0.2 MG: 1 INJECTION, SOLUTION INTRAMUSCULAR; INTRAVENOUS; SUBCUTANEOUS at 20:00

## 2021-01-01 RX ADMIN — SODIUM CHLORIDE, POTASSIUM CHLORIDE, SODIUM LACTATE AND CALCIUM CHLORIDE: 600; 310; 30; 20 INJECTION, SOLUTION INTRAVENOUS at 12:09

## 2021-01-01 RX ADMIN — CARVEDILOL 6.25 MG: 6.25 TABLET, FILM COATED ORAL at 17:40

## 2021-01-01 RX ADMIN — ROCURONIUM BROMIDE 25 MG: 10 SOLUTION INTRAVENOUS at 12:33

## 2021-01-01 RX ADMIN — DIPHENHYDRAMINE HYDROCHLORIDE 50 MG: 50 INJECTION, SOLUTION INTRAMUSCULAR; INTRAVENOUS at 23:13

## 2021-01-01 RX ADMIN — MORPHINE SULFATE 2 MG: 2 INJECTION, SOLUTION INTRAMUSCULAR; INTRAVENOUS at 05:37

## 2021-01-01 RX ADMIN — INSULIN LISPRO 2 UNITS: 100 INJECTION, SOLUTION INTRAVENOUS; SUBCUTANEOUS at 08:15

## 2021-01-01 RX ADMIN — CARVEDILOL 6.25 MG: 6.25 TABLET, FILM COATED ORAL at 21:40

## 2021-01-01 RX ADMIN — OXYCODONE 5 MG: 5 TABLET ORAL at 06:05

## 2021-01-01 RX ADMIN — OXYCODONE 5 MG: 5 TABLET ORAL at 22:55

## 2021-01-01 RX ADMIN — CARVEDILOL 6.25 MG: 6.25 TABLET, FILM COATED ORAL at 08:37

## 2021-01-01 RX ADMIN — HYDROMORPHONE HYDROCHLORIDE 0.2 MG: 1 INJECTION, SOLUTION INTRAMUSCULAR; INTRAVENOUS; SUBCUTANEOUS at 19:00

## 2021-01-01 RX ADMIN — Medication 10 ML: at 22:02

## 2021-01-01 RX ADMIN — POTASSIUM CHLORIDE, DEXTROSE MONOHYDRATE AND SODIUM CHLORIDE 40 ML/HR: 150; 5; 450 INJECTION, SOLUTION INTRAVENOUS at 15:00

## 2021-01-01 RX ADMIN — GABAPENTIN 800 MG: 400 CAPSULE ORAL at 09:26

## 2021-01-01 RX ADMIN — MORPHINE SULFATE 2 MG: 2 INJECTION, SOLUTION INTRAMUSCULAR; INTRAVENOUS at 07:59

## 2021-01-01 RX ADMIN — Medication 500 UNITS: at 13:40

## 2021-01-01 RX ADMIN — IPRATROPIUM BROMIDE AND ALBUTEROL SULFATE 3 ML: .5; 3 SOLUTION RESPIRATORY (INHALATION) at 19:49

## 2021-01-01 RX ADMIN — MIDAZOLAM HYDROCHLORIDE 1 MG: 1 INJECTION, SOLUTION INTRAMUSCULAR; INTRAVENOUS at 10:29

## 2021-01-01 RX ADMIN — APIXABAN 5 MG: 5 TABLET, FILM COATED ORAL at 08:47

## 2021-01-01 RX ADMIN — MORPHINE SULFATE 2 MG: 10 INJECTION INTRAVENOUS at 05:25

## 2021-01-01 RX ADMIN — Medication 10 ML: at 21:12

## 2021-01-01 RX ADMIN — MORPHINE SULFATE 2 MG: 2 INJECTION, SOLUTION INTRAMUSCULAR; INTRAVENOUS at 04:05

## 2021-01-01 RX ADMIN — SODIUM CHLORIDE, POTASSIUM CHLORIDE, SODIUM LACTATE AND CALCIUM CHLORIDE 25 ML/HR: 600; 310; 30; 20 INJECTION, SOLUTION INTRAVENOUS at 12:00

## 2021-01-01 RX ADMIN — INSULIN LISPRO 2 UNITS: 100 INJECTION, SOLUTION INTRAVENOUS; SUBCUTANEOUS at 07:23

## 2021-01-01 RX ADMIN — FENTANYL CITRATE 25 MCG: 50 INJECTION, SOLUTION INTRAMUSCULAR; INTRAVENOUS at 16:25

## 2021-01-01 RX ADMIN — GABAPENTIN 800 MG: 400 CAPSULE ORAL at 22:45

## 2021-01-01 RX ADMIN — Medication 10 ML: at 14:39

## 2021-01-01 RX ADMIN — ARFORMOTEROL TARTRATE: 15 SOLUTION RESPIRATORY (INHALATION) at 20:37

## 2021-01-01 RX ADMIN — IPRATROPIUM BROMIDE AND ALBUTEROL SULFATE 3 ML: .5; 3 SOLUTION RESPIRATORY (INHALATION) at 08:25

## 2021-01-01 RX ADMIN — OXYCODONE 5 MG: 5 TABLET ORAL at 04:10

## 2021-01-01 RX ADMIN — CARVEDILOL 6.25 MG: 6.25 TABLET, FILM COATED ORAL at 07:50

## 2021-01-01 RX ADMIN — MORPHINE SULFATE 2 MG: 10 INJECTION INTRAVENOUS at 05:51

## 2021-01-01 RX ADMIN — ATORVASTATIN CALCIUM 40 MG: 40 TABLET, FILM COATED ORAL at 08:47

## 2021-01-01 RX ADMIN — APIXABAN 5 MG: 5 TABLET, FILM COATED ORAL at 08:37

## 2021-01-01 RX ADMIN — IPRATROPIUM BROMIDE AND ALBUTEROL SULFATE 3 ML: .5; 3 SOLUTION RESPIRATORY (INHALATION) at 02:55

## 2021-01-01 RX ADMIN — Medication 10 ML: at 22:45

## 2021-01-01 RX ADMIN — Medication 1 AMPULE: at 08:18

## 2021-01-01 RX ADMIN — GABAPENTIN 800 MG: 400 CAPSULE ORAL at 17:40

## 2021-01-01 RX ADMIN — DEXAMETHASONE SODIUM PHOSPHATE 8 MG: 4 INJECTION, SOLUTION INTRAMUSCULAR; INTRAVENOUS at 14:21

## 2021-01-01 RX ADMIN — GABAPENTIN 800 MG: 400 CAPSULE ORAL at 22:00

## 2021-01-01 RX ADMIN — CARVEDILOL 6.25 MG: 6.25 TABLET, FILM COATED ORAL at 17:29

## 2021-01-01 RX ADMIN — INSULIN LISPRO 2 UNITS: 100 INJECTION, SOLUTION INTRAVENOUS; SUBCUTANEOUS at 13:00

## 2021-01-01 RX ADMIN — GABAPENTIN 800 MG: 400 CAPSULE ORAL at 23:04

## 2021-01-01 RX ADMIN — Medication 1 AMPULE: at 08:07

## 2021-01-01 RX ADMIN — FENTANYL CITRATE 25 MCG: 50 INJECTION, SOLUTION INTRAMUSCULAR; INTRAVENOUS at 15:30

## 2021-01-01 RX ADMIN — OXYCODONE 5 MG: 5 TABLET ORAL at 14:17

## 2021-01-01 RX ADMIN — SODIUM CHLORIDE 10 MG: 9 INJECTION INTRAMUSCULAR; INTRAVENOUS; SUBCUTANEOUS at 23:12

## 2021-01-01 RX ADMIN — DIPHENHYDRAMINE HYDROCHLORIDE 25 MG: 25 CAPSULE ORAL at 23:03

## 2021-01-01 RX ADMIN — INSULIN LISPRO 2 UNITS: 100 INJECTION, SOLUTION INTRAVENOUS; SUBCUTANEOUS at 11:48

## 2021-01-01 RX ADMIN — CARVEDILOL 6.25 MG: 6.25 TABLET, FILM COATED ORAL at 17:30

## 2021-01-01 RX ADMIN — POTASSIUM CHLORIDE 40 MEQ: 750 TABLET, FILM COATED, EXTENDED RELEASE ORAL at 09:25

## 2021-01-01 RX ADMIN — ARFORMOTEROL TARTRATE: 15 SOLUTION RESPIRATORY (INHALATION) at 07:48

## 2021-01-01 RX ADMIN — Medication 10 ML: at 14:19

## 2021-01-01 RX ADMIN — SODIUM CHLORIDE 60 ML/HR: 3 INJECTION, SOLUTION INTRAVENOUS at 08:37

## 2021-01-01 RX ADMIN — KETOROLAC TROMETHAMINE 30 MG: 30 INJECTION, SOLUTION INTRAMUSCULAR at 14:33

## 2021-01-01 RX ADMIN — Medication 10 ML: at 02:04

## 2021-01-01 RX ADMIN — LORAZEPAM 0.5 MG: 2 INJECTION INTRAMUSCULAR; INTRAVENOUS at 05:38

## 2021-01-01 RX ADMIN — SODIUM CHLORIDE 10 ML: 9 INJECTION, SOLUTION INTRAMUSCULAR; INTRAVENOUS; SUBCUTANEOUS at 10:10

## 2021-01-01 RX ADMIN — INSULIN LISPRO 2 UNITS: 100 INJECTION, SOLUTION INTRAVENOUS; SUBCUTANEOUS at 16:30

## 2021-01-01 RX ADMIN — SODIUM CHLORIDE 1000 ML: 9 INJECTION, SOLUTION INTRAVENOUS at 23:12

## 2021-01-01 RX ADMIN — ASPIRIN 81 MG: 81 TABLET, CHEWABLE ORAL at 08:47

## 2021-01-01 RX ADMIN — FENTANYL CITRATE 25 MCG: 50 INJECTION, SOLUTION INTRAMUSCULAR; INTRAVENOUS at 09:14

## 2021-01-01 RX ADMIN — FUROSEMIDE 20 MG: 20 TABLET ORAL at 08:26

## 2021-01-01 RX ADMIN — ACETAMINOPHEN 650 MG: 650 SUPPOSITORY RECTAL at 09:42

## 2021-01-01 RX ADMIN — LIDOCAINE HYDROCHLORIDE 10 ML: 20 INJECTION, SOLUTION INFILTRATION; PERINEURAL at 10:24

## 2021-01-01 RX ADMIN — APIXABAN 5 MG: 5 TABLET, FILM COATED ORAL at 18:18

## 2021-01-01 RX ADMIN — MIDAZOLAM 1 MG: 1 INJECTION INTRAMUSCULAR; INTRAVENOUS at 12:12

## 2021-01-01 RX ADMIN — SODIUM CHLORIDE 75 ML/HR: 9 INJECTION, SOLUTION INTRAVENOUS at 02:01

## 2021-01-01 RX ADMIN — Medication 1 AMPULE: at 09:03

## 2021-01-01 RX ADMIN — CYCLOBENZAPRINE 5 MG: 10 TABLET, FILM COATED ORAL at 21:59

## 2021-01-01 RX ADMIN — Medication 10 ML: at 13:48

## 2021-01-01 RX ADMIN — PHENYLEPHRINE HYDROCHLORIDE 40 MCG/MIN: 10 INJECTION INTRAVENOUS at 12:37

## 2021-01-01 RX ADMIN — GABAPENTIN 800 MG: 400 CAPSULE ORAL at 21:59

## 2021-01-01 RX ADMIN — APIXABAN 5 MG: 5 TABLET, FILM COATED ORAL at 17:05

## 2021-01-01 RX ADMIN — APIXABAN 5 MG: 5 TABLET, FILM COATED ORAL at 17:30

## 2021-01-01 RX ADMIN — APIXABAN 5 MG: 5 TABLET, FILM COATED ORAL at 08:07

## 2021-01-01 RX ADMIN — APIXABAN 5 MG: 5 TABLET, FILM COATED ORAL at 08:04

## 2021-01-01 RX ADMIN — ATORVASTATIN CALCIUM 40 MG: 40 TABLET, FILM COATED ORAL at 08:07

## 2021-01-01 RX ADMIN — Medication 10 ML: at 18:18

## 2021-01-01 RX ADMIN — INSULIN LISPRO 3 UNITS: 100 INJECTION, SOLUTION INTRAVENOUS; SUBCUTANEOUS at 21:59

## 2021-01-01 RX ADMIN — GUAIFENESIN 600 MG: 600 TABLET, EXTENDED RELEASE ORAL at 21:41

## 2021-01-01 RX ADMIN — MIDAZOLAM HYDROCHLORIDE 1 MG: 1 INJECTION, SOLUTION INTRAMUSCULAR; INTRAVENOUS at 10:24

## 2021-01-01 RX ADMIN — OXYCODONE 5 MG: 5 TABLET ORAL at 09:00

## 2021-01-01 RX ADMIN — GABAPENTIN 800 MG: 400 CAPSULE ORAL at 15:46

## 2021-01-01 RX ADMIN — ATORVASTATIN CALCIUM 40 MG: 40 TABLET, FILM COATED ORAL at 08:04

## 2021-01-01 RX ADMIN — Medication 1 AMPULE: at 08:47

## 2021-01-01 RX ADMIN — MORPHINE SULFATE 2 MG: 2 INJECTION, SOLUTION INTRAMUSCULAR; INTRAVENOUS at 15:53

## 2021-01-01 RX ADMIN — IPRATROPIUM BROMIDE AND ALBUTEROL SULFATE 3 ML: .5; 3 SOLUTION RESPIRATORY (INHALATION) at 09:14

## 2021-01-01 RX ADMIN — LORAZEPAM 1 MG: 2 INJECTION INTRAMUSCULAR; INTRAVENOUS at 10:35

## 2021-01-01 RX ADMIN — Medication 1 AMPULE: at 21:12

## 2021-01-01 RX ADMIN — MORPHINE SULFATE 2 MG: 2 INJECTION, SOLUTION INTRAMUSCULAR; INTRAVENOUS at 23:37

## 2021-01-01 RX ADMIN — HYDROMORPHONE HYDROCHLORIDE 0.2 MG: 1 INJECTION, SOLUTION INTRAMUSCULAR; INTRAVENOUS; SUBCUTANEOUS at 17:45

## 2021-01-01 RX ADMIN — INSULIN LISPRO 2 UNITS: 100 INJECTION, SOLUTION INTRAVENOUS; SUBCUTANEOUS at 18:07

## 2021-01-01 RX ADMIN — OXYCODONE 5 MG: 5 TABLET ORAL at 18:22

## 2021-01-01 RX ADMIN — GABAPENTIN 800 MG: 400 CAPSULE ORAL at 17:33

## 2021-01-01 RX ADMIN — IPRATROPIUM BROMIDE AND ALBUTEROL SULFATE 3 ML: .5; 3 SOLUTION RESPIRATORY (INHALATION) at 11:51

## 2021-01-01 RX ADMIN — OXYCODONE 5 MG: 5 TABLET ORAL at 05:16

## 2021-01-01 RX ADMIN — Medication 10 ML: at 10:10

## 2021-01-01 RX ADMIN — IPRATROPIUM BROMIDE AND ALBUTEROL SULFATE 3 ML: .5; 3 SOLUTION RESPIRATORY (INHALATION) at 20:56

## 2021-01-01 RX ADMIN — Medication 10 ML: at 23:04

## 2021-01-01 RX ADMIN — PROPOFOL 100 MG: 10 INJECTION, EMULSION INTRAVENOUS at 12:21

## 2021-01-01 RX ADMIN — CYCLOBENZAPRINE 5 MG: 10 TABLET, FILM COATED ORAL at 08:37

## 2021-01-01 RX ADMIN — GABAPENTIN 800 MG: 400 CAPSULE ORAL at 08:37

## 2021-01-01 RX ADMIN — CYCLOBENZAPRINE 5 MG: 10 TABLET, FILM COATED ORAL at 15:46

## 2021-01-01 RX ADMIN — COLCHICINE 0.6 MG: 0.6 TABLET, FILM COATED ORAL at 09:26

## 2021-01-01 RX ADMIN — FENTANYL CITRATE 25 MCG: 50 INJECTION, SOLUTION INTRAMUSCULAR; INTRAVENOUS at 20:10

## 2021-01-01 RX ADMIN — APIXABAN 5 MG: 5 TABLET, FILM COATED ORAL at 20:34

## 2021-01-01 RX ADMIN — DEXAMETHASONE SODIUM PHOSPHATE 12 MG: 4 INJECTION, SOLUTION INTRA-ARTICULAR; INTRALESIONAL; INTRAMUSCULAR; INTRAVENOUS; SOFT TISSUE at 11:25

## 2021-01-01 RX ADMIN — ACETAMINOPHEN 650 MG: 325 TABLET ORAL at 23:12

## 2021-01-01 RX ADMIN — ARFORMOTEROL TARTRATE: 15 SOLUTION RESPIRATORY (INHALATION) at 09:14

## 2021-01-01 RX ADMIN — Medication 10 ML: at 17:45

## 2021-01-01 RX ADMIN — Medication 10 ML: at 22:35

## 2021-01-01 RX ADMIN — LORAZEPAM 0.5 MG: 2 INJECTION INTRAMUSCULAR; INTRAVENOUS at 04:05

## 2021-01-01 RX ADMIN — ARFORMOTEROL TARTRATE: 15 SOLUTION RESPIRATORY (INHALATION) at 19:49

## 2021-01-01 RX ADMIN — SODIUM CHLORIDE 75 ML/HR: 9 INJECTION, SOLUTION INTRAVENOUS at 21:42

## 2021-01-01 RX ADMIN — LIDOCAINE HYDROCHLORIDE 60 MG: 20 INJECTION, SOLUTION EPIDURAL; INFILTRATION; INTRACAUDAL; PERINEURAL at 12:21

## 2021-01-01 RX ADMIN — DEXAMETHASONE SODIUM PHOSPHATE 8 MG: 4 INJECTION, SOLUTION INTRA-ARTICULAR; INTRALESIONAL; INTRAMUSCULAR; INTRAVENOUS; SOFT TISSUE at 15:40

## 2021-01-01 RX ADMIN — FENTANYL CITRATE 25 MCG: 50 INJECTION, SOLUTION INTRAMUSCULAR; INTRAVENOUS at 10:12

## 2021-01-01 RX ADMIN — SODIUM CHLORIDE 188 MG: 9 INJECTION, SOLUTION INTRAVENOUS at 15:49

## 2021-01-01 RX ADMIN — Medication 1 AMPULE: at 21:36

## 2021-01-01 RX ADMIN — Medication 10 ML: at 22:15

## 2021-01-01 RX ADMIN — ATORVASTATIN CALCIUM 40 MG: 40 TABLET, FILM COATED ORAL at 08:18

## 2021-01-01 RX ADMIN — Medication 10 ML: at 16:52

## 2021-01-01 RX ADMIN — ASPIRIN 81 MG: 81 TABLET, CHEWABLE ORAL at 08:06

## 2021-01-01 RX ADMIN — GUAIFENESIN 600 MG: 600 TABLET, EXTENDED RELEASE ORAL at 09:23

## 2021-01-01 RX ADMIN — INSULIN LISPRO 2 UNITS: 100 INJECTION, SOLUTION INTRAVENOUS; SUBCUTANEOUS at 17:32

## 2021-01-01 RX ADMIN — LIDOCAINE HYDROCHLORIDE,EPINEPHRINE BITARTRATE 4 ML: 10; .01 INJECTION, SOLUTION INFILTRATION; PERINEURAL at 10:24

## 2021-01-01 RX ADMIN — SODIUM CHLORIDE 150 MG: 900 INJECTION, SOLUTION INTRAVENOUS at 10:55

## 2021-01-01 RX ADMIN — GLYCOPYRROLATE 0.2 MG: 0.2 INJECTION, SOLUTION INTRAMUSCULAR; INTRAVENOUS at 14:33

## 2021-01-01 RX ADMIN — GUAIFENESIN 600 MG: 600 TABLET, EXTENDED RELEASE ORAL at 08:29

## 2021-01-01 RX ADMIN — CYCLOBENZAPRINE 5 MG: 10 TABLET, FILM COATED ORAL at 12:16

## 2021-01-01 RX ADMIN — ARFORMOTEROL TARTRATE: 15 SOLUTION RESPIRATORY (INHALATION) at 20:38

## 2021-01-01 RX ADMIN — SODIUM CHLORIDE 75 ML/HR: 9 INJECTION, SOLUTION INTRAVENOUS at 14:26

## 2021-01-01 RX ADMIN — LORAZEPAM 0.5 MG: 2 INJECTION INTRAMUSCULAR; INTRAVENOUS at 23:38

## 2021-01-01 RX ADMIN — ETOPOSIDE 188 MG: 20 INJECTION INTRAVENOUS at 14:33

## 2021-01-01 RX ADMIN — SODIUM CHLORIDE 25 ML/HR: 900 INJECTION, SOLUTION INTRAVENOUS at 10:53

## 2021-01-01 RX ADMIN — Medication 1 AMPULE: at 22:45

## 2021-01-01 RX ADMIN — FENTANYL CITRATE 25 MCG: 50 INJECTION, SOLUTION INTRAMUSCULAR; INTRAVENOUS at 14:30

## 2021-01-01 RX ADMIN — GABAPENTIN 300 MG: 600 TABLET, FILM COATED ORAL at 08:59

## 2021-01-01 RX ADMIN — SODIUM CHLORIDE 25 ML/HR: 900 INJECTION, SOLUTION INTRAVENOUS at 14:19

## 2021-01-01 RX ADMIN — SODIUM CHLORIDE 500 ML: 9 INJECTION, SOLUTION INTRAVENOUS at 22:20

## 2021-01-01 RX ADMIN — APIXABAN 5 MG: 5 TABLET, FILM COATED ORAL at 09:26

## 2021-01-01 RX ADMIN — ARFORMOTEROL TARTRATE: 15 SOLUTION RESPIRATORY (INHALATION) at 20:25

## 2021-01-01 RX ADMIN — ACETAMINOPHEN 650 MG: 325 TABLET ORAL at 08:18

## 2021-01-01 RX ADMIN — Medication 10 ML: at 15:47

## 2021-01-01 RX ADMIN — FENTANYL CITRATE 25 MCG: 50 INJECTION, SOLUTION INTRAMUSCULAR; INTRAVENOUS at 10:24

## 2021-01-01 RX ADMIN — GABAPENTIN 800 MG: 400 CAPSULE ORAL at 18:59

## 2021-01-01 RX ADMIN — APIXABAN 5 MG: 5 TABLET, FILM COATED ORAL at 08:45

## 2021-01-01 RX ADMIN — Medication 20 ML: at 05:27

## 2021-01-01 RX ADMIN — Medication 10 ML: at 06:50

## 2021-01-01 RX ADMIN — LORAZEPAM 0.5 MG: 2 INJECTION INTRAMUSCULAR; INTRAVENOUS at 21:18

## 2021-01-01 RX ADMIN — GUAIFENESIN 600 MG: 600 TABLET, EXTENDED RELEASE ORAL at 17:31

## 2021-01-01 RX ADMIN — FENTANYL CITRATE 25 MCG: 50 INJECTION, SOLUTION INTRAMUSCULAR; INTRAVENOUS at 02:50

## 2021-01-01 RX ADMIN — Medication 10 ML: at 06:07

## 2021-01-01 RX ADMIN — COLCHICINE 0.6 MG: 0.6 TABLET, FILM COATED ORAL at 09:23

## 2021-01-01 RX ADMIN — ROCURONIUM BROMIDE 10 MG: 10 SOLUTION INTRAVENOUS at 12:21

## 2021-01-01 RX ADMIN — CARVEDILOL 6.25 MG: 6.25 TABLET, FILM COATED ORAL at 09:01

## 2021-01-01 RX ADMIN — Medication 10 ML: at 14:55

## 2021-01-01 RX ADMIN — ROCURONIUM BROMIDE 15 MG: 10 SOLUTION INTRAVENOUS at 13:00

## 2021-01-01 RX ADMIN — ONDANSETRON HYDROCHLORIDE 4 MG: 2 INJECTION, SOLUTION INTRAMUSCULAR; INTRAVENOUS at 13:16

## 2021-01-01 RX ADMIN — APIXABAN 5 MG: 5 TABLET, FILM COATED ORAL at 17:33

## 2021-01-01 RX ADMIN — Medication 10 ML: at 03:21

## 2021-01-01 RX ADMIN — APIXABAN 5 MG: 5 TABLET, FILM COATED ORAL at 18:17

## 2021-01-01 RX ADMIN — MORPHINE SULFATE 2 MG: 10 INJECTION INTRAVENOUS at 05:37

## 2021-01-01 RX ADMIN — IPRATROPIUM BROMIDE AND ALBUTEROL SULFATE 3 ML: .5; 3 SOLUTION RESPIRATORY (INHALATION) at 01:12

## 2021-01-01 RX ADMIN — ARFORMOTEROL TARTRATE: 15 SOLUTION RESPIRATORY (INHALATION) at 20:09

## 2021-01-01 RX ADMIN — ACETAMINOPHEN 1000 MG: 500 TABLET, FILM COATED ORAL at 18:29

## 2021-01-01 RX ADMIN — INSULIN LISPRO 2 UNITS: 100 INJECTION, SOLUTION INTRAVENOUS; SUBCUTANEOUS at 12:15

## 2021-01-01 RX ADMIN — IPRATROPIUM BROMIDE AND ALBUTEROL SULFATE 3 ML: .5; 3 SOLUTION RESPIRATORY (INHALATION) at 20:37

## 2021-01-01 RX ADMIN — Medication 10 ML: at 15:31

## 2021-01-01 RX ADMIN — ARFORMOTEROL TARTRATE: 15 SOLUTION RESPIRATORY (INHALATION) at 08:25

## 2021-01-01 RX ADMIN — APIXABAN 5 MG: 5 TABLET, FILM COATED ORAL at 17:50

## 2021-01-01 RX ADMIN — MORPHINE SULFATE 2 MG: 2 INJECTION, SOLUTION INTRAMUSCULAR; INTRAVENOUS at 20:03

## 2021-01-01 RX ADMIN — MORPHINE SULFATE 2 MG: 2 INJECTION, SOLUTION INTRAMUSCULAR; INTRAVENOUS at 13:16

## 2021-01-01 RX ADMIN — HYDROMORPHONE HYDROCHLORIDE 0.2 MG: 1 INJECTION, SOLUTION INTRAMUSCULAR; INTRAVENOUS; SUBCUTANEOUS at 21:58

## 2021-01-01 RX ADMIN — ARFORMOTEROL TARTRATE: 15 SOLUTION RESPIRATORY (INHALATION) at 08:19

## 2021-01-01 RX ADMIN — APIXABAN 5 MG: 5 TABLET, FILM COATED ORAL at 08:18

## 2021-01-01 RX ADMIN — CARVEDILOL 6.25 MG: 6.25 TABLET, FILM COATED ORAL at 08:24

## 2021-01-01 RX ADMIN — GUAIFENESIN 600 MG: 600 TABLET, EXTENDED RELEASE ORAL at 09:26

## 2021-01-01 RX ADMIN — GABAPENTIN 800 MG: 400 CAPSULE ORAL at 17:29

## 2021-01-01 RX ADMIN — MIDAZOLAM 0.5 MG: 1 INJECTION INTRAMUSCULAR; INTRAVENOUS at 12:14

## 2021-01-01 RX ADMIN — SODIUM CHLORIDE 75 ML/HR: 9 INJECTION, SOLUTION INTRAVENOUS at 11:50

## 2021-01-01 RX ADMIN — CYCLOBENZAPRINE 5 MG: 10 TABLET, FILM COATED ORAL at 23:04

## 2021-01-01 RX ADMIN — HYDROMORPHONE HYDROCHLORIDE 0.2 MG: 1 INJECTION, SOLUTION INTRAMUSCULAR; INTRAVENOUS; SUBCUTANEOUS at 20:14

## 2021-01-01 RX ADMIN — CYCLOBENZAPRINE 5 MG: 10 TABLET, FILM COATED ORAL at 08:44

## 2021-01-01 RX ADMIN — ARFORMOTEROL TARTRATE: 15 SOLUTION RESPIRATORY (INHALATION) at 09:27

## 2021-01-01 RX ADMIN — APIXABAN 5 MG: 5 TABLET, FILM COATED ORAL at 09:00

## 2021-01-01 RX ADMIN — GABAPENTIN 800 MG: 100 CAPSULE ORAL at 18:29

## 2021-01-01 RX ADMIN — HYDROMORPHONE HYDROCHLORIDE 0.5 MG: 1 INJECTION, SOLUTION INTRAMUSCULAR; INTRAVENOUS; SUBCUTANEOUS at 19:24

## 2021-01-01 RX ADMIN — ASPIRIN 81 MG: 81 TABLET, CHEWABLE ORAL at 08:18

## 2021-01-01 RX ADMIN — SODIUM CHLORIDE, POTASSIUM CHLORIDE, SODIUM LACTATE AND CALCIUM CHLORIDE 1000 ML: 600; 310; 30; 20 INJECTION, SOLUTION INTRAVENOUS at 12:00

## 2021-01-01 RX ADMIN — CYCLOBENZAPRINE 5 MG: 10 TABLET, FILM COATED ORAL at 15:48

## 2021-01-01 RX ADMIN — SUGAMMADEX 200 MG: 100 INJECTION, SOLUTION INTRAVENOUS at 14:00

## 2021-01-01 RX ADMIN — PALONOSETRON 0.25 MG: 0.05 INJECTION, SOLUTION INTRAVENOUS at 11:43

## 2021-01-01 RX ADMIN — Medication 500 UNITS: at 16:52

## 2021-01-01 RX ADMIN — WATER 2 G: 1 INJECTION INTRAMUSCULAR; INTRAVENOUS; SUBCUTANEOUS at 12:32

## 2021-01-01 RX ADMIN — OXYCODONE 5 MG: 5 TABLET ORAL at 20:41

## 2021-01-01 RX ADMIN — IPRATROPIUM BROMIDE AND ALBUTEROL SULFATE 3 ML: .5; 3 SOLUTION RESPIRATORY (INHALATION) at 02:00

## 2021-01-01 RX ADMIN — GABAPENTIN 800 MG: 400 CAPSULE ORAL at 09:23

## 2021-01-01 RX ADMIN — CEFAZOLIN SODIUM 2 G: 1 INJECTION, POWDER, FOR SOLUTION INTRAMUSCULAR; INTRAVENOUS at 09:53

## 2021-01-01 RX ADMIN — Medication 10 ML: at 13:40

## 2021-01-01 RX ADMIN — SODIUM CHLORIDE 40 ML/HR: 3 INJECTION, SOLUTION INTRAVENOUS at 20:58

## 2021-01-01 RX ADMIN — Medication 500 UNITS: at 10:36

## 2021-01-01 RX ADMIN — KETOROLAC TROMETHAMINE 30 MG: 30 INJECTION, SOLUTION INTRAMUSCULAR at 07:59

## 2021-01-01 RX ADMIN — SUCCINYLCHOLINE CHLORIDE 100 MG: 20 INJECTION, SOLUTION INTRAMUSCULAR; INTRAVENOUS at 12:21

## 2021-01-01 RX ADMIN — GABAPENTIN 800 MG: 400 CAPSULE ORAL at 08:44

## 2021-01-01 RX ADMIN — ACETAMINOPHEN 650 MG: 325 TABLET ORAL at 11:21

## 2021-01-01 RX ADMIN — INSULIN LISPRO 2 UNITS: 100 INJECTION, SOLUTION INTRAVENOUS; SUBCUTANEOUS at 21:49

## 2021-01-01 RX ADMIN — CARBOPLATIN 375 MG: 10 INJECTION, SOLUTION INTRAVENOUS at 12:00

## 2021-01-01 RX ADMIN — SODIUM CHLORIDE 25 ML/HR: 900 INJECTION, SOLUTION INTRAVENOUS at 15:40

## 2021-01-01 RX ADMIN — IOPAMIDOL 125 ML: 755 INJECTION, SOLUTION INTRAVENOUS at 15:46

## 2021-01-01 RX ADMIN — FENTANYL CITRATE 25 MCG: 50 INJECTION, SOLUTION INTRAMUSCULAR; INTRAVENOUS at 14:50

## 2021-01-01 RX ADMIN — FENTANYL CITRATE 25 MCG: 50 INJECTION, SOLUTION INTRAMUSCULAR; INTRAVENOUS at 10:29

## 2021-01-01 RX ADMIN — MIDAZOLAM HYDROCHLORIDE 1 MG: 1 INJECTION, SOLUTION INTRAMUSCULAR; INTRAVENOUS at 10:12

## 2021-01-01 RX ADMIN — MORPHINE SULFATE 2 MG: 2 INJECTION, SOLUTION INTRAMUSCULAR; INTRAVENOUS at 10:34

## 2021-01-01 RX ADMIN — Medication 80 MCG: at 12:36

## 2021-01-01 RX ADMIN — CALCIUM GLUCONATE 2 G: 98 INJECTION, SOLUTION INTRAVENOUS at 10:27

## 2021-01-01 RX ADMIN — Medication 20 ML: at 05:11

## 2021-01-01 RX ADMIN — OXYCODONE 5 MG: 5 TABLET ORAL at 23:03

## 2021-01-01 RX ADMIN — SODIUM CHLORIDE 188 MG: 9 INJECTION, SOLUTION INTRAVENOUS at 12:35

## 2021-01-01 RX ADMIN — IPRATROPIUM BROMIDE AND ALBUTEROL SULFATE 3 ML: .5; 3 SOLUTION RESPIRATORY (INHALATION) at 14:22

## 2021-01-01 RX ADMIN — DEXAMETHASONE SODIUM PHOSPHATE 8 MG: 4 INJECTION, SOLUTION INTRAMUSCULAR; INTRAVENOUS at 13:27

## 2021-01-01 RX ADMIN — Medication 10 ML: at 21:36

## 2021-01-01 RX ADMIN — COLCHICINE 0.6 MG: 0.6 TABLET, FILM COATED ORAL at 08:25

## 2021-01-01 RX ADMIN — MORPHINE SULFATE 2 MG: 10 INJECTION INTRAVENOUS at 23:43

## 2021-01-01 RX ADMIN — Medication 1 AMPULE: at 22:00

## 2021-01-01 RX ADMIN — GUAIFENESIN 600 MG: 600 TABLET, EXTENDED RELEASE ORAL at 17:33

## 2021-01-01 RX ADMIN — ALBUMIN (HUMAN) 25 G: 12.5 INJECTION, SOLUTION INTRAVENOUS at 09:18

## 2021-01-01 RX ADMIN — ASPIRIN 81 MG: 81 TABLET, CHEWABLE ORAL at 08:04

## 2021-01-01 RX ADMIN — APIXABAN 5 MG: 5 TABLET, FILM COATED ORAL at 17:40

## 2021-01-01 RX ADMIN — APIXABAN 5 MG: 5 TABLET, FILM COATED ORAL at 09:23

## 2021-01-01 RX ADMIN — GABAPENTIN 800 MG: 400 CAPSULE ORAL at 08:28

## 2021-01-01 RX ADMIN — CARVEDILOL 6.25 MG: 6.25 TABLET, FILM COATED ORAL at 17:05

## 2021-01-01 RX ADMIN — INSULIN LISPRO 3 UNITS: 100 INJECTION, SOLUTION INTRAVENOUS; SUBCUTANEOUS at 18:58

## 2021-01-01 RX ADMIN — FENTANYL CITRATE 25 MCG: 50 INJECTION, SOLUTION INTRAMUSCULAR; INTRAVENOUS at 16:35

## 2021-01-01 RX ADMIN — SODIUM CHLORIDE 25 ML/HR: 9 INJECTION, SOLUTION INTRAVENOUS at 09:53

## 2021-01-01 RX ADMIN — CYCLOBENZAPRINE 5 MG: 10 TABLET, FILM COATED ORAL at 17:39

## 2021-01-01 RX ADMIN — OXYCODONE 5 MG: 5 TABLET ORAL at 15:54

## 2021-01-05 NOTE — PROGRESS NOTES
History and Physical    Subjective:     Reji Wang is a 72 y.o.  female who presents with right upper lobe lung nodule. Seen on LDCT for lung cancer screening. No chest pain or SOB. No cough or headache. Weight stable. Active, walks everyday. Stopped TOB in 2009. Last year needed hospitalization for COPD exacerbation but nothing for 10 months. No abdominal pain.     Past Medical History:   Diagnosis Date    Arthritis     left shoulder    Atrial fibrillation ([address] 6/2/2010    Dr. Wilfrid Madrigal CAD (coronary artery disease)     stent; Dr. Pelon Shepard [facility])     lung    Celiac disease     Chronic diastolic heart failure ([address] 09/22/2014    Dr. Uche Obrien    Chronic kidney disease     per cardio note    Chronic pain     left thigh:  L SFA intervention by Dr. Natanael Appiah 07/2018, as of 9/6/18:  still causing pain, pt to have MILLA checked per Dr. Natanael Appiah note    Chronic systolic HF (heart failure) ([facility] 5/10/2017    4/2017 EF 25-30%    COPD (chronic obstructive pulmonary disease) ([address] 6/2/2010    Dr. Blaine Redd     Diabetes [facility])     Dr. Whiting Ash; no current medication per pt    Emphysema, unspecified ([address]     Dr. Karen Bustillo Frequent falls 2017    Gout     Heart failure ([address]     Dr. Wilfrid Madrigal History of Clostridium difficile infection 5/10/2017    4/2017 CDiff positive    Hypertension 6/2/2010    Dr. Anita Gutiérrez Hypertensive heart and chronic kidney disease     per PCP note    Hypotension 5/10/2017    Junctional tachycardia ([address] 5/10/2017    Dr. Uche Obrien    Neuropathy     NIDDM (non-insulin dependent diabetes mellitus) 6/2/2010    PAD (peripheral artery disease) ([address]     S/P ablation of atrial fibrillation 03/2018    Screening mammogram 5/4/10    SOB (shortness of breath) 09/22/2014    Dr. Kwesi Campos (sick sinus syndrome) ([facility]     per pacemaker form 9/6/18    Weakness     per pt weakness from c-diff 2017, mulitple falls with injury to rotator cuff      Past Surgical History:   Procedure Laterality Date    COLONOSCOPY N/A 2017    COLONOSCOPY with biopsy performed by Skylar Quintanilla MD at [facility]"" AMBULATORY OR    HX AFIB ABLATION  2018    has had 2 ablations per patient    HX  SECTION      HX HEART CATHETERIZATION  2018    HX OTHER SURGICAL      adrenal gland removed    HX PACEMAKER Left     Biotron[employer] model: Evia    [facility] CARDIAC SUR[facility] PROCEDURE UNLIST      3 cardiac stent placed     [facility] EXCISE ADRENAL GLAND      VASCULAR SURGERY PROCEDURE UNLIST  2018    Left SFA intervention ; Dr. Crow Mujica     Family History   Problem Relation Age of Onset    Heart Disease Mother     Diabetes Father     Heart Disease Brother       Social History     Tobacco Use    Smoking status: Former Smoker     Packs/day: 0.50     Years: 30.00     Pack years: 15.00     Types: Cigarettes     Start date:      Quit date: 2009     Years since quittin.0    Smokeless tobacco: Never Used   Substance Use Topics    Alcohol use: No       Prior to Admission medications    Medication Sig Start Date End Date Taking? Authorizing Provider   gabapentin (NEURONTIN) 800 mg tablet TAKE 1 TABLET BY MOUTH THREE TIMES DAILY 20  Yes Stephanie Giraldo NP   fluticasone propionate (Flonase) 50 mcg/actuation nasal spray 2 Sprays by Both Nostrils route daily as needed for Rhinitis. 20  Yes Jonh Jones MD   budesonide-formoteroL (Symbicort) 160-4.5 mcg/actuation HFAA INHALE ONE PUFF BY MOUTH TWICE DAILY 20  Yes Jonh Jones MD   carvediloL (COREG) 6.25 mg tablet TAKE 1 TABLET BY MOUTH TWICE DAILY WITH MEALS 20  Yes Jonh Jones MD   furosemide (LASIX) 20 mg tablet Take 1 Tab by mouth daily. 20  Yes Jonh Jones MD   simvastatin (ZOCOR) 20 mg tablet TAKE 1 TABLET BY MOUTH NIGHTLY 20  Yes Jonh Jones MD   SITagliptin (Januvia) 50 mg tablet Take 1 Tab by mouth daily. 11/24/20  Yes Pineda Stewart MD   tiotropium (Spiriva with HandiHaler) 18 mcg inhalation capsule INHALE THE CONTENTS OF 1 CAPSULE THROUGH HANDIHALER DEVICE DAILY 11/24/20  Yes Pineda Stewart MD   albuterol (PROVENTIL HFA, VENTOLIN HFA, PROAIR HFA) 90 mcg/actuation inhaler Take 2 Puffs by inhalation every four (4) hours as needed for Wheezing. 11/24/20  Yes Pineda Stewart MD   colchicine 0.6 mg tablet TAKE 2 TABLETS BY MOUTH ONCE DAILY 11/20/20  Yes Pineda Stewart MD   diclofenac (VOLTAREN) 1 % gel Apply 4 g to affected area four (4) times daily as needed for Pain. 11/11/20  Yes Pineda Stewart MD   Eliquis 5 mg tablet Take 1 tablet by mouth twice daily 10/15/20  Yes Nicola Overton NP   albuterol (PROVENTIL VENTOLIN) 2.5 mg /3 mL (0.083 %) nebu 3 mL by Nebulization route every four (4) hours as needed for Wheezing. 5/7/20  Yes Pineda Stewart MD   glucose blood VI test strips (BLOOD GLUCOSE TEST) strip Use BID Dx11.9 1/24/20  Yes Pineda Stewart MD   lancets misc Use BID 1/24/20  Yes Pineda Stewart MD   albuterol-ipratropium (DUO-NEB) 2.5 mg-0.5 mg/3 ml nebu 3 mL by Nebulization route every four (4) hours as needed (wheezing). 11/12/19  Yes Jose Alejandro Carr DO   acetaminophen (TYLENOL) 325 mg tablet Take 2 Tabs by mouth every four (4) hours as needed for Pain or Fever. 3/28/19  Yes Lexi Vizcaino MD   guaiFENesin ER (MUCINEX) 600 mg ER tablet Take 1 Tab by mouth two (2) times a day. Patient taking differently: Take 600 mg by mouth as needed. 3/28/19  Yes Lexi Vizcaino MD   benzocaine-menthol (CHLORASEPTIC MAX) 15-10 mg lozg lozenge Take 1 Lozenge by mouth every four to six (4-6) hours as needed for Sore throat. 3/28/19  Yes Lexi Vizcaino MD   cod liver oil cap Take 1 Cap by mouth daily.    Yes Provider, Historical     Allergies   Allergen Reactions    Crestor [Rosuvastatin] Myalgia    Levaquin [Levofloxacin] Nausea Only     GI Upset    Lipitor [Atorvastatin] Diarrhea    Lyrica [Pregabalin] Myalgia        Review of Systems:  A comprehensive review of systems was negative except for that written in the History of Present Illness. Objective: Intake and Output:    [unfilled]  @GÓMEZAST3@    Physical Exam:   Visit Vitals  /60 (BP 1 Location: Left arm, BP Patient Position: Sitting)   Pulse 79   Temp 97.7 °F (36.5 °C) (Oral)   Resp 18   Ht 5' 6\" (1.676 m)   Wt 76.2 kg (168 lb)   SpO2 91%   BMI 27.12 kg/m²     General:  Alert, cooperative, no distress, appears stated age. Head:  Normocephalic, without obvious abnormality, atraumatic. Eyes:  Conjunctivae/corneas clear. PERRL, EOMs intact. Fundi benign. Ears:  Normal TMs and external ear canals both ears. Nose: Nares normal. Septum midline. Mucosa normal. No drainage or sinus tenderness. Throat: Lips, mucosa, and tongue normal. Teeth and gums normal.   Neck: Supple, symmetrical, trachea midline, no adenopathy, thyroid: no enlargement/tenderness/nodules, no carotid bruit and no JVD. Back:   Symmetric, no curvature. ROM normal. No CVA tenderness. Lungs:   Clear to auscultation bilaterally. Chest wall:  No tenderness or deformity. Heart:  Regular rate and rhythm, S1, S2 normal, no murmur, click, rub or gallop. Breast Exam:  No tenderness, masses, or nipple abnormality. Abdomen:   Soft, non-tender. Bowel sounds normal. No masses,  No organomegaly. Genitalia:  Normal female without lesion, discharge or tenderness. Rectal:  Normal tone,  no masses or tenderness  Guaiac negative stool. Extremities: Extremities normal, atraumatic, no cyanosis or edema. Pulses: 2+ and symmetric all extremities. Skin: Skin color, texture, turgor normal. No rashes or lesions. Lymph nodes: Cervical, supraclavicular, and axillary nodes normal.   Neurologic: CNII-XII intact. Normal strength, sensation and reflexes throughout.            Data Review:     CT- 1.5 cm RUL nodule with severe emphysema and bullous disease. No nodes    PET CT- SUV 6.3, no mediastinal uptake or distant disease    FEV1 1.5 L in 2018      Assessment:     PET positive RUL mass in ex smoker, lung cancer until proven otherwise. Plan:     Repeat PFTs  If adequate, Rt VATS/wedge/lobectomy with lymph node dissection. Post op care, recover, risk of surgery, poss temporary O2 need afterwards discussed. Plan for surgery in 2 weeks. PAT appt, covid test etc ordered.     Signed By: Meir Pacheco MD     January 5, 2021

## 2021-01-05 NOTE — PROGRESS NOTES
New Patient. Lung cancer. 1. Have you been to the ER, urgent care clinic since your last visit? Hospitalized since your last visit? No    2. Have you seen or consulted any other health care providers outside of the [address] [place] [address] since your last visit? Include any pap smears or colon screening. Yes, Dr. Maureen Gunderson.

## 2021-01-12 NOTE — TELEPHONE ENCOUNTER
Patient called to let Dr. Martni Guadarrama know that her apartment gets very hot in the wintertime and she suffers b/c of her COPD and emphysema. She said that Dr. Martin Guadarrama wants her to walk 30 minutes BID in preparation for her surgery on 1-. She stated that she cannot walk outside b/c it's a \"high crime area and they don't wear their masks. \"  She stated that she walks in her apartment (she breaks up the 30 minutes) and is getting ready for her surgery as best as she can. She couldn't remember if she told Dr. Martin Guadarrama about how hot it gets in her apartment when she saw him in the office. I advised the patient that I would let Dr. Martin Guadarrama know.

## 2021-01-14 NOTE — PERIOP NOTES
PRE-OPERATIVE INSTRUCTIONS REVIEWED WITH PATIENT. PT GIVEN TIME TO ASK QUESTION  
PATIENT GIVEN 2-BOTTLE OF CHG SOAP. REVIEWED PATIENT GIVEN [facility] INFECTION FAQ SHEET.  
  
INSTRUCTIONS GIVEN ON NEW ADMISSION PROCESS AND COVID TESTING

## 2021-01-15 NOTE — TELEPHONE ENCOUNTER
Patient wanted to inform of us her being diagnosed with lung cancer and is having surgery. Does patient need to stop blood thinners or medication prior to procedure.       [phone]    Thanks  Ravi Syeks

## 2021-01-15 NOTE — PROGRESS NOTES
01/15/21 Unable to reach patient for CCM follow up. Has scheduled surgery for next week r/t newly diagnosed lung ca. Will be followed by [facility] team after discharge. Will resolve CCM at this time.  ELENA

## 2021-01-15 NOTE — TELEPHONE ENCOUNTER
Called pt,verified pt with two pt identifiers, advised pt per our NP she can hold her Eliquis 48-72 hours prior to her surgery. Advised per surgeon to resume asap. Pt verbalized understanding would let surgeon office know. Pt thanked me and verbalized understanding.

## 2021-01-15 NOTE — TELEPHONE ENCOUNTER
Returned call,verified pt with two pt identifiers, pt advised Dx with small pea sized nodule on lung that will need a biopsy. It is scheduled for 1/22/21. She last had vv with  on 6/2/20 and next appt 5/20/21. They are not asking for clearance on surgery but clearance on stopping Eliquis before surgery. Advised I would send to NP and call pt back regarding the Eliquis. Pt verbalized understanding.

## 2021-01-18 NOTE — PERIOP NOTES
RECEIVED CALL FROM PATIENT WITH QUESTIONS REGARDING CARVEDILOL MEDICATION , INFORMEDTO TAKE ON THE MORNING OF SURGERY AND PATIENT INFORMED ME CARDIOLOGIST INSTRUCTED TO STOP TAKING ELIQUIS THREE DAYS PRIOR TO SCHEDULED SURGERY

## 2021-01-22 PROBLEM — R91.8 LUNG MASS: Status: ACTIVE | Noted: 2021-01-01

## 2021-01-22 NOTE — ANESTHESIA PREPROCEDURE EVALUATION
Relevant Problems No relevant active problems Anesthetic History No history of anesthetic complications Review of Systems / Medical History Patient summary reviewed, nursing notes reviewed and pertinent labs reviewed Pulmonary COPD: moderate Comments: Lung nodule Neuro/Psych Cardiovascular Hypertension Dysrhythmias : atrial fibrillation Pacemaker and CAD Exercise tolerance: >4 METS 
  
GI/Hepatic/Renal 
  
 
 
Renal disease: CRI Endo/Other Diabetes: well controlled, type 2 Arthritis Other Findings Physical Exam 
 
Airway Mallampati: II 
TM Distance: > 6 cm Neck ROM: normal range of motion Mouth opening: Normal 
 
 Cardiovascular Regular rate and rhythm,  S1 and S2 normal,  no murmur, click, rub, or gallop Dental 
 
Dentition: Full upper dentures Pulmonary Breath sounds clear to auscultation Abdominal 
GI exam deferred Other Findings Anesthetic Plan ASA: 3 Anesthesia type: general 
 
Monitoring Plan: Arterial line Induction: Intravenous Anesthetic plan and risks discussed with: Patient

## 2021-01-22 NOTE — BRIEF OP NOTE
Brief Postoperative Note 
 
Patient: Celine Smith 
YOB: 1950 
MRN: [id number] 
 
Date of Procedure: 1/22/2021  
 
Pre-Op Diagnosis: LUNG MASS 
 
Post-Op Diagnosis: Neuroendocrine Carcinoma   
 
Procedure(s): 
RIGHT VATS, RIGHT UPPER LOBE WEDGE RESECTION WITH MEDIASTINAL LYMPH NODE DISSECTION 
 
Surgeon(s): 
Freddy Monet MD 
 
Surgical Assistant: Physician Assistant: Sasha Merritt PA 
 
Anesthesia: General  
 
Estimated Blood Loss (mL): 20 cc 
 
Complications: None 
 
Specimens:  
ID Type Source Tests Collected by Time Destination  
1 : Right Upper Lobe Wedge Resection  Frozen Section Lung Biopsy  Freddy Monet MD 1/22/2021 1307 Pathology  
2 : 4R lymph node Fresh Lymph Node  Freddy Monet MD 1/22/2021 1312 Pathology  
3 : 2R lymph node Fresh Lymph Node  Freddy Monet MD 1/22/2021 1316 Pathology  
4 : Level 7 lymph node Fresh Lymph Node  Freddy Monet MD 1/22/2021 1328 Pathology  
5 : 10R lymph node Fresh Lymph Node  Freddy Monet MD 1/22/2021 1331 Pathology  
  
 
Implants: None 
Drains: 28 Fr Chest tube 
  
 
 
Findings: Severe emphysema with thick parenchyma and multiple blebs, reinforced staplers used 
 
Electronically Signed by Freddy Monet MD on 1/22/2021 at 1:53 PM

## 2021-01-22 NOTE — ANESTHESIA POSTPROCEDURE EVALUATION
Post-Anesthesia Evaluation and Assessment Patient: Kvng Gonzales MRN: [id number]  SSN: [id number] YOB: 1950  Age: 72 y.o. Sex: female I have evaluated the patient and they are stable and ready for discharge from the PACU. Cardiovascular Function/Vital Signs Visit Vitals /68 Pulse 80 Temp 36.5 °C (97.7 °F) Resp 20 SpO2 94% Patient is status post General anesthesia for Procedure(s): RIGHT VATS, RIGHT UPPER LOBE WEDGE RESECTION WITH MEDIASTINAL LYMPH NODE DISSECTION. Nausea/Vomiting: None Postoperative hydration reviewed and adequate. Pain: 
Pain Scale 1: Numeric (0 - 10) (01/22/21 1107) Pain Intensity 1: 0 (01/22/21 1107) Managed Neurological Status:  
Neuro (WDL): Within Defined Limits (01/22/21 1202) At baseline Mental Status, Level of Consciousness: Alert and  oriented to person, place, and time Pulmonary Status:  
O2 Device: Nasal cannula (01/22/21 1415) Adequate oxygenation and airway patent Complications related to anesthesia: None Post-anesthesia assessment completed. No concerns Signed By: Rakesh Islas MD   
 January 22, 2021 Procedure(s): RIGHT VATS, RIGHT UPPER LOBE WEDGE RESECTION WITH MEDIASTINAL LYMPH NODE DISSECTION. general 
 
<BSHSIANPOST> INITIAL Post-op Vital signs:  
Vitals Value Taken Time /62 01/22/21 1430 Temp 36.5 °C (97.7 °F) 01/22/21 1415 Pulse 76 01/22/21 1432 Resp 15 01/22/21 1432 SpO2 95 % 01/22/21 1432 Vitals shown include unvalidated device data.

## 2021-01-23 NOTE — PROGRESS NOTES
Physical Therapy 1/23/2021 Order received, chart reviewed. Patient remains in PACU awaiting bed assignment. Has been OOB per chart. Will f/u when patient in assigned room. Thank you.  
 
Laila Sinclair, PT, DPT

## 2021-01-23 NOTE — PROGRESS NOTES
Mrs. Thania Jordan is POD#1 after a VATS RLL wedge and MLND. No acute events overnight. Pain control improved. She has been OOB. Afebrile HD stable CT 250ml, tiny air leak On exam she is sleepy but arouseable and alert Lungs CTA b/l Dressing c/d/i CXR- good expansion, no pneumothorax Mrs. Thania Jordan is progressing well. Adv diet as tolerated. Leave CT in place. OOB with PT/OT.

## 2021-01-23 NOTE — OP NOTES
[address]  
OPERATIVE REPORT Name:  Valentine Tafoya 
MR#:  [id number] :  1950 ACCOUNT #:  [de-identified] DATE OF SERVICE:  2021 PREOPERATIVE DIAGNOSES: 
1. PET positive right upper lobe lung mass. 2.  Severe emphysema. 3.  Neuroendocrine carcinoma. POSTOPERATIVE DIAGNOSES: 
1. PET positive right upper lobe lung mass. 2.  Severe emphysema. 3.  Neuroendocrine carcinoma. PROCEDURES PERFORMED: 
1. Thorascopic right lower lobe wedge resection. 2.  Thoracoscopic mediastinal lymph node sampling. 3.  Multiple intercostal nerve block. SURGEON:  Meir Pacheco MD 
 
ASSISTANT:  AMENA Tyler 
 
ANESTHESIA:  General. 
 
COMPLICATIONS:  None. SPECIMENS REMOVED: 
1. Right lower lobe wedge resection/frozen section to get the neuroendocrine carcinoma. 2.  Right paratracheal lymph node. 3.  High right paratracheal lymph node. 4.  Subcarinal lymph node. 5.  Right hilar lymph node. IMPLANTS: None ESTIMATED BLOOD LOSS:  20 mL. PROCEDURE:  With the patient underwent general anesthesia, she was positioned for right thoracoscopy. Three standard thoracoscopic ports were placed. The lesion in question was identified within the right upper lobe posterior segment. Lung parenchyma was extremely thick and at the same time there were generous bullous disease present in the upper lobe and in the superior segment of the lower lobe. We used black load staplers, reinforced to get the lung to seal up. Wedge dissection was done with a grossly negative margin. Specimen was sent for pathology. This confirmed presence of neuroendocrine carcinoma. Right paratracheal, high right paratracheal, subcarinal and hilar lymph nodes were excised. There was adequate hemostasis. Multiple intercostal nerve blocks were done. Chest tube was left in place. Port sites were closed. The patient remained stable. ATTESTATION STATEMENT:  I was the attending surgeon. I was present for all key portions of this case. Operative findings were discussed with the patient's family. Donelda Goldberg was instrumental in assisting this case with camera, retraction and visualization. MD ALLY Ramey/JEN_CECILIA_I/ 
D:  01/22/2021 14:14 
T:  01/22/2021 21:01 
JOB #:  [id number]

## 2021-01-23 NOTE — PERIOP NOTES
TRANSFER - OUT REPORT: 
 
Verbal report given to Eliceo Gallego RN on Leyla Lea  being transferred to [address] [place] [address] 36 32 for routine post - op Report consisted of patients Situation, Background, Assessment and  
Recommendations(SBAR). Time Pre op antibiotic given:1232 1/22/2021 Anesthesia Stop time:1416, 1/22/2021 Luna Present on Transfer to floor none Order for Luna on Chart:none Discharge Prescriptions with Chart:none Information from the following report(s) OR Summary, Procedure Summary, Intake/Output, MAR, Accordion, Recent Results, Med Rec Status and Cardiac Rhythm paced was reviewed with the receiving nurse. Opportunity for questions and clarification was provided. Is the patient on 02? NO Is the patient on a monitor? YES Is the nurse transporting with the patient? YES Surgical Waiting Area notified of patient's transfer from PACU? YES. Daughter called. The following personal items collected during your admission accompanied patient upon transfer:  
Dental Appliance: Dental Appliances: None Vision: Visual Aid: None Hearing Aid:   
Jewelry:   
Clothing: Clothing: (sent to [employer]) Other Valuables:   
Valuables sent to safe:

## 2021-01-23 NOTE — PERIOP NOTES
Daughter updated on status and awaiting bed on stepdown unit. She will update other numerous family members on there status. Patient declined to update Rolandawily Fer her niece but will let daughter do that for her.

## 2021-01-23 NOTE — PERIOP NOTES
Dr Madeleine Martin bedside to evaluate pain, chest tubing and output. Continuing with water seal.  IVF to be discontinued. Reviewed pain meds.

## 2021-01-23 NOTE — PERIOP NOTES
Reviewed crackles left base. Right side 1/2 up where it previously was lower on the right. Has voided 1200 cc. Continue to monitor per Dr Tobi Pelaez MD on call. No new orders.   Patient for chest Xray in the am.

## 2021-01-23 NOTE — PROGRESS NOTES
1851: TRANSFER - IN REPORT: 
 
Verbal report received from Brandon Navarrete (name) on [place]  being received from PACU(unit) for routine post - op Report consisted of patients Situation, Background, Assessment and  
Recommendations(SBAR). Information from the following report(s) SBAR, Kardex, Procedure Summary, Intake/Output, MAR, Recent Results and Cardiac Rhythm A fib/paced was reviewed with the receiving nurse. Opportunity for questions and clarification was provided. Assessment completed upon patients arrival to unit and care assumed. 1930: Bedside shift change report given to Janessa Dalal (oncoming nurse). Report included the following information SBAR, Kardex, Procedure Summary, Intake/Output, MAR, Recent Results and Cardiac Rhythm A fib/paced.

## 2021-01-24 NOTE — PROGRESS NOTES
[facility] [address] 
____________________________________________________________ Admit Date: 2021 POD/HD 2 Days Post-Op Procedure:  Procedure(s): RIGHT VATS, RIGHT UPPER LOBE WEDGE RESECTION WITH MEDIASTINAL LYMPH NODE DISSECTION Subjective:  
 
Patient has no new complaints. Objective:  
 
Blood pressure (!) 95/57, pulse 78, temperature 98.4 °F (36.9 °C), resp. rate 16, weight 78 kg (171 lb 15.3 oz), SpO2 97 %. Temp (24hrs), Av.1 °F (36.7 °C), Min:97.4 °F (36.3 °C), Max:98.4 °F (36.9 °C) Date 21 - 21 0283 Shift 2842-6873 8404-2915 8259-6088 24 Hour Total  
INTAKE Shift Total(mL/kg) OUTPUT Chest Tube 30   30 Shift Total(mL/kg) 30(0.4)   30(0.4) Weight (kg) 78 78 78 78 Date 21 - 21 3854 Shift 4073-6424 6523-6943 6709-0295 24 Hour Total  
INTAKE Shift Total(mL/kg) OUTPUT Chest Tube 30   30 Shift Total(mL/kg) 30(0.4)   30(0.4) Weight (kg) 78 78 78 78 Physical Exam:  GENERAL: alert, cooperative, no distress, appears stated age, LUNG: clear to auscultation bilaterally, HEART: regular rate and rhythm, S1, S2 normal, no murmur, click, rub or gallop Incision:clean, dry and intact Chest Tube: no air leak minimal drainage Labs:  
Recent Results (from the past 24 hour(s)) GLUCOSE, POC Collection Time: 21  1:01 PM  
Result Value Ref Range Glucose (POC) 161 (H) 65 - 100 mg/dL Performed by Sheeba Villeda GLUCOSE, POC Collection Time: 21  5:21 PM  
Result Value Ref Range Glucose (POC) 152 (H) 65 - 100 mg/dL Performed by Sheeba Villeda GLUCOSE, POC Collection Time: 21  8:27 PM  
Result Value Ref Range Glucose (POC) 114 (H) 65 - 100 mg/dL Performed by Valery Benavidez ([facility]) GLUCOSE, POC Collection Time: 21  6:32 AM  
Result Value Ref Range Glucose (POC) 126 (H) 65 - 100 mg/dL Performed by Cynthia Carpio ([facility]) Data Review images and reports reviewed Assessment:  
 
Active Problems: 
  Lung mass (1/22/2021) Plan/Recommendations/Medical Decision Making:  
 
Plan at this time will give Albumin 5% 250cc and hold BP meds CT to water seal for now IV fentynal for now due to BP issues Thank you for allowing us to participate in the care of your patient.  
 
Errol Morgan MD

## 2021-01-24 NOTE — PROGRESS NOTES
Problem: Mobility Impaired (Adult and Pediatric) Goal: *Acute Goals and Plan of Care (Insert Text) Description: FUNCTIONAL STATUS PRIOR TO ADMISSION: Patient was independent and active without use of DME. 
 
HOME SUPPORT PRIOR TO ADMISSION: The patient lived with brother, who has CP, and is his caregiver. She reports she does not need to assist him physically, but she does the cooking and supervises his daily activities. Physical Therapy Goals Initiated 1/24/2021 1. Patient will move from supine to sit and sit to supine  in bed with modified independence within 7 day(s). 2.  Patient will transfer from bed to chair and chair to bed with modified independence using the least restrictive device within 7 day(s). 3.  Patient will perform sit to stand with modified independence within 7 day(s). 4.  Patient will ambulate with modified independence for 150 feet with the least restrictive device within 7 day(s). Outcome: Progressing Towards Goal 
 PHYSICAL THERAPY EVALUATION Patient: Leyla Lea (46 y.o. female) Date: 1/24/2021 Primary Diagnosis: Lung mass [R91.8] Procedure(s) (LRB): 
RIGHT VATS, RIGHT UPPER LOBE WEDGE RESECTION WITH MEDIASTINAL LYMPH NODE DISSECTION (Right) 2 Days Post-Op Precautions:     
 
 
ASSESSMENT Based on the objective data described below, the patient presents with decreased mobility compared to baseline after being admitted for R lung mass resection. She has R CT to water seal and remains on 2L nasal cannula, but she was able to maintain stable VS with limited activity in the room. She has very good overall strength and is highly motivated. Used RW for walking in the room simply to have a place to hang the chest tube and to assist with pain/balance, but do not anticipate that she will need one for home use. Expect that she will progress well with her mobility and she should be able to be OOB in the chair for all meals and up to [facility] with nursing assist as long as her VS are stable. We will continue to progress her activity as she is able to tolerate. Current Level of Function Impacting Discharge (mobility/balance): contact guard for short distance walking with RW 
 
Functional Outcome Measure: The patient scored Total: 60/100 on the Barthel Index which is indicative of moderately impaired ability to care for basic self needs/dependency on others. 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 PLAN : 
Recommendations and Planned Interventions: bed mobility training, transfer training, gait training, therapeutic exercises, patient and family training/education, and therapeutic activities Frequency/Duration: Patient will be followed by physical therapy:  5 times a week to address goals. Recommendation for discharge: (in order for the patient to meet his/her long term goals) To be determined: possibly HHPT, but likely none This discharge recommendation: 
Has not yet been discussed the attending provider and/or case management IF patient discharges home will need the following DME: to be determined (TBD) and none at this time, she has a cane and walker at home SUBJECTIVE:  
Patient stated I am going to have to learn how to slow down, I think.  OBJECTIVE DATA SUMMARY:  
HISTORY:   
Past Medical History:  
Diagnosis Date Arthritis   
 left shoulder Atrial fibrillation ([facility] 6/2/2010 Dr. Princess Heller CAD (coronary artery disease) stent; Dr. Princess Heller Cancer ([facility] [address] lung Celiac disease Chronic diastolic heart failure ([facility] 09/22/2014 Dr. Princess Heller Chronic kidney disease   
 per cardio note Chronic pain left thigh:  L SFA intervention by Dr. Johnathan Vera 07/2018, as of 9/6/18:  still causing pain, pt to have MILLA checked per Dr. Johnathan Vera note Chronic systolic HF (heart failure) ([address] 5/10/2017  
 4/2017 EF 25-30% Congestive heart failure (CHF) ([address] COPD (chronic obstructive pulmonary disease) ([address] 6/2/2010 Dr. Haskell Osgood Diabetes ([facility] Dr. Donato Mcgill; no current medication per pt Emphysema, unspecified ([address] Dr. Haskell Osgood Fibroid PT STATES NOT HAVING FIBROIDS Frequent falls 2017 Gout Heart failure ([address] Dr. Mindi Blanco History of Clostridium difficile infection 5/10/2017  
 4/2017 CDiff positive Hypertension 6/2/2010 Dr. Mariah De Los Santos Hypertensive heart and chronic kidney disease   
 per PCP note Hypotension 5/10/2017 Junctional tachycardia ([address] 5/10/2017 Dr. Mindi Blanco Neuropathy NIDDM (non-insulin dependent diabetes mellitus) 6/2/2010 PAD (peripheral artery disease) (HCC)   
 S/P ablation of atrial fibrillation 03/2018 Screening mammogram 5/4/10 SOB (shortness of breath) 09/22/2014 Dr. Haskell Osgood SSS (sick sinus syndrome) ([facility] [address]   
 per pacemaker form 9/6/18 Weakness   
 per pt weakness from c-diff 2017, mulitple falls with injury to rotator cuff Past Surgical History:  
Procedure Laterality Date COLONOSCOPY N/A 9/25/2017 COLONOSCOPY with biopsy performed by Herminio Chaudhari MD at \[facility]\"" AMBULATORY OR  
 HX AFIB ABLATION  03/2018  
 has had 2 ablations per patient [address] HX HEART CATHETERIZATION  07/23/2018 HX HEENT    
 HX OTHER SURGICAL    
 adrenal gland removed HX PACEMAKER Left [employer] model: Nisl Imelda HX ROTATOR CUFF REPAIR Right HX WISDOM TEETH EXTRACTION X2  
 WI CARDIAC SURG PROCEDURE UNLIST    
 3 cardiac stent placed [place] EXCISE ADRENAL GLAND Right 1994 VASCULAR SURGERY PROCEDURE UNLIST  07/20/2018 Left SFA intervention ; Dr. Johnathan Vera Personal factors and/or comorbidities impacting plan of care: as noted above Home Situation # Steps to Enter: 0 Living Alone: No 
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  
Critical Behavior: 
Neurologic State: Alert Orientation Level: Oriented X4 Hearing: 
  
Skin:  per nursing Edema: none Range Of Motion: 
AROM: Within functional limits Strength:   
Strength: Generally decreased, functional 
  
  
  
  
  
  
Tone & Sensation:  
Tone: Normal 
  
  
  
  
Sensation: Intact Coordination: 
Coordination: Within functional limits Vision:  
  
Functional Mobility: 
Bed Mobility: 
  
Supine to Sit: Supervision; Additional time;Bed Modified(HOB elevated) Sit to Supine: (up in chair after) Transfers: 
Sit to Stand: Contact guard assistance; Additional time Stand to Sit: Contact guard assistance; Additional time Bed to Chair: Contact guard assistance; Additional time Balance:  
Sitting: Intact; Without support Standing: Impaired; Without support Standing - Static: Fair;Occasional(improved with hands on the walker) Standing - Dynamic : Fair;Occasional(but intact with hands on a support/walker) Ambulation/Gait Training: 
Distance (ft): 20 Feet (ft) Assistive Device: Walker, rolling Ambulation - Level of Assistance: Contact guard assistance; Additional time; Adaptive equipment Gait Abnormalities: Decreased step clearance;Trunk sway increased Base of Support: Widened Speed/Cece: Slow Step Length: Right shortened;Left shortened Stairs: Therapeutic Exercises:  
 
 
Functional Measure: 
Barthel Index: 
 
Bathin Bladder: 10 Bowels: 10 
Groomin Dressing: 10 Feeding: 10 Mobility: 0(due to limited endurance at this time) Stairs: 0 Toilet Use: 5 Transfer (Bed to Chair and Back): 10 Total: 60/100 The Barthel ADL Index: Guidelines 1. The index should be used as a record of what a patient does, not as a record of what a patient could do. 2. The main aim is to establish degree of independence from any help, physical or verbal, however minor and for whatever reason. 3. The need for supervision renders the patient not independent. 4. A patient's performance should be established using the best available evidence. Asking the patient, friends/relatives and nurses are the usual sources, but direct observation and common sense are also important. However direct testing is not needed. 5. Usually the patient's performance over the preceding 24-48 hours is important, but occasionally longer periods will be relevant. 6. Middle categories imply that the patient supplies over 50 per cent of the effort. 7. Use of aids to be independent is allowed. Michelle Oakley., Barthel, D.W. (2819). Functional evaluation: the Barthel Index. [address] [facility] (14)2. [facility] ben BRAD Pelayo, Crispin Henley., Beni Whittington., [place], [address] [place] [address] (1999). Measuring the change indisability after inpatient rehabilitation; comparison of the responsiveness of the Barthel Index and Functional Heath Measure. Journal of Neurology, Neurosurgery, and Psychiatry, 66(4), 514-506. Mae Olson, N.J.A, MAL Bedolla, & Juan Pepe MSHADY. (2004.) Assessment of post-stroke quality of life in cost-effectiveness studies: The usefulness of the Barthel Index and the EuroQoL-5D. [facility], 13, 778-59 Physical Therapy Evaluation Charge Determination History Examination Presentation Decision-Making HIGH Complexity :3+ comorbidities / personal factors will impact the outcome/ POC  MEDIUM Complexity : 3 Standardized tests and measures addressing body structure, function, activity limitation and / or participation in recreation  MEDIUM Complexity : Evolving with changing characteristics  LOW Complexity : FOTO score of  Based on the above components, the patient evaluation is determined to be of the following complexity level: LOW Pain Rating: 
Minimal pain R CT site Activity Tolerance:  
Fair and requires rest breaks After treatment patient left in no apparent distress:  
Sitting in chair and Call bell within reach COMMUNICATION/EDUCATION:  
The patients plan of care was discussed with: Registered nurse. Fall prevention education was provided and the patient/caregiver indicated understanding., Patient/family have participated as able in goal setting and plan of care. , and Patient/family agree to work toward stated goals and plan of care. Thank you for this referral. 
Dorina Davis, PT Time Calculation: 28 mins

## 2021-01-24 NOTE — PROGRESS NOTES
1930 Bedside and Verbal shift change report given to Nazanin Ochoa (oncoming nurse) by Seth Gant RN (offgoing nurse). Report included the following information SBAR, Kardex, MAR and Alarm Parameters . 0730 Bedside and Verbal shift change report given to RN (oncoming nurse) by Jayson Chavarria RN (offgoing nurse). Report included the following information SBAR, Kardex, MAR and Alarm Parameters .

## 2021-01-25 NOTE — DISCHARGE SUMMARY
Physician Discharge Summary Patient ID: 
Paulie Wang [id number] 
72 y.o. 
1950 Admit Date: 1/22/2021 Discharge Date: 1/25/2021 Admission Diagnoses: Lung mass [R91.8] Discharge Diagnoses: Active Problems: 
  Lung mass (1/22/2021) Admission Condition: Fair Discharge Condition: [address] Last Procedure: Procedure(s): RIGHT VATS, RIGHT UPPER LOBE WEDGE RESECTION WITH MEDIASTINAL LYMPH NODE DISSECTION Hospital Course:  
Normal hospital course for this procedure. Surgery was on 1/22/21. Stabilized over the weekend. Chest tube was removed on 1/25/21. Consults: None Significant Diagnostic Studies: labs, radiology: CXR and cardiac graphics: Telemetry: PATH: pending Disposition: Home with Home PT Patient Instructions:  
Current Discharge Medication List  
  
START taking these medications Details  
calcium-vitamin D (OS-ROSA MARIA +D3) 500 mg-200 unit per tablet Take 1 Tab by mouth daily (with breakfast) for 30 days. Indications: low amount of calcium in the blood Qty: 30 Tab, Refills: 0  
  
oxyCODONE IR (ROXICODONE) 5 mg immediate release tablet Take 1 Tab by mouth every six (6) hours as needed for Pain for up to 7 days. Max Daily Amount: 20 mg. 
Qty: 28 Tab, Refills: 0 Associated Diagnoses: Post-op pain CONTINUE these medications which have NOT CHANGED Details  
gabapentin (NEURONTIN) 800 mg tablet TAKE 1 TABLET BY MOUTH THREE TIMES DAILY Qty: 270 Tab, Refills: 0 Associated Diagnoses: Polyneuropathy associated with underlying disease ([facility] [address] budesonide-formoteroL (Symbicort) 160-4.5 mcg/actuation HFAA INHALE ONE PUFF BY MOUTH TWICE DAILY Qty: 1 Inhaler, Refills: 3 Associated Diagnoses: Chronic obstructive pulmonary disease with acute exacerbation (HCC)  
  
carvediloL (COREG) 6.25 mg tablet TAKE 1 TABLET BY MOUTH TWICE DAILY WITH MEALS Qty: 180 Tab, Refills: 1 Comments: Please consider 90 day supplies to promote better adherence  
  
furosemide (LASIX) 20 mg tablet Take 1 Tab by mouth daily. Qty: 90 Tab, Refills: 1  
  
simvastatin (ZOCOR) 20 mg tablet TAKE 1 TABLET BY MOUTH NIGHTLY Qty: 90 Tab, Refills: 1 Associated Diagnoses: Type 2 diabetes mellitus with nephropathy ([facility] Mixed hyperlipidemia; Coronary artery disease involving native coronary artery of native heart without angina pectoris SITagliptin (Januvia) 50 mg tablet Take 1 Tab by mouth daily. Qty: 90 Tab, Refills: 1 Associated Diagnoses: Type 2 diabetes mellitus with diabetic neuropathy, without long-term current use of insulin ([facility])  
  
tiotropium (Spiriva with HandiHaler) 18 mcg inhalation capsule INHALE THE CONTENTS OF 1 CAPSULE THROUGH HANDIHALER DEVICE DAILY Qty: 90 Cap, Refills: 1 Associated Diagnoses: Chronic obstructive pulmonary disease, unspecified COPD type ([facility] colchicine 0.6 mg tablet TAKE 2 TABLETS BY MOUTH ONCE DAILY Qty: 60 Tab, Refills: 1  
  
diclofenac (VOLTAREN) 1 % gel Apply 4 g to affected area four (4) times daily as needed for Pain. Qty: 100 g, Refills: 1 Associated Diagnoses: Acute low back pain, unspecified back pain laterality, unspecified whether sciatica present  
  
glucose blood VI test strips (BLOOD GLUCOSE TEST) strip Use BID Dx11.9 Qty: 100 Strip, Refills: 11  
 Associated Diagnoses: Type 2 diabetes mellitus with nephropathy ([facility])  
  
lancets misc Use BID Qty: 100 Each, Refills: 11  
 Associated Diagnoses: Type 2 diabetes mellitus with nephropathy (HCC)  
  
acetaminophen (TYLENOL) 325 mg tablet Take 2 Tabs by mouth every four (4) hours as needed for Pain or Fever. Qty: 40 Tab, Refills: 0  
  
benzocaine-menthol (CHLORASEPTIC MAX) 15-10 mg lozg lozenge Take 1 Lozenge by mouth every four to six (4-6) hours as needed for Sore throat. Qty: 30 Each, Refills: 0 albuterol (PROVENTIL HFA, VENTOLIN HFA, PROAIR HFA) 90 mcg/actuation inhaler Take 2 Puffs by inhalation every four (4) hours as needed for Wheezing. Qty: 1 Inhaler, Refills: 1 Eliquis 5 mg tablet Take 1 tablet by mouth twice daily 
Qty: 180 Tab, Refills: 3 Associated Diagnoses: Atrial fibrillation, unspecified type ([address] Anticoagulant long-term use  
  
albuterol (PROVENTIL VENTOLIN) 2.5 mg /3 mL (0.083 %) nebu 3 mL by Nebulization route every four (4) hours as needed for Wheezing. Qty: 24 Each, Refills: 2 Associated Diagnoses: SOB (shortness of breath)  
  
albuterol-ipratropium (DUO-NEB) 2.5 mg-0.5 mg/3 ml nebu 3 mL by Nebulization route every four (4) hours as needed (wheezing). Qty: 30 Nebule, Refills: 0  
  
guaiFENesin ER (MUCINEX) 600 mg ER tablet Take 1 Tab by mouth two (2) times a day. Qty: 20 Tab, Refills: 0  
  
cod liver oil cap Take 1 Cap by mouth daily. Activity: Activity as tolerated and No lifting, Driving, or Strenuous exercise for 2-3 weeks Diet: Resume previous diet Wound Care: As directed in discharge instructions Follow-up with DR Madhav Mcbride on 2/2/21 at 2 PM 
Follow-up tests/labs CXR Sign: 
Julicoesar Argueta NP

## 2021-01-25 NOTE — PROGRESS NOTES
Thoracic Surgery Simple Progress Note Admit Date: 2021 POD: 3 Days Post-Op Procedure:  Procedure(s): RIGHT VATS, RIGHT UPPER LOBE WEDGE RESECTION WITH MEDIASTINAL LYMPH NODE DISSECTION Subjective:  
 
Patient has complaints: No significant medical complaints Review of Systems: 
 
CARDIAC: positive for H/O Afib,ASHD, SSS, and CAD s/p stents RESP: positive for lung mass and COPD NEURO:  negative INCISION: Clean, dry, and intact EXT: Denies new swelling or pain in the legs or calves. Objective:  
 
Blood pressure (!) 106/38, pulse 84, temperature 97.8 °F (36.6 °C), resp. rate 16, weight 171 lb 1.2 oz (77.6 kg), SpO2 96 %. Temp (24hrs), Av.9 °F (36.6 °C), Min:97.5 °F (36.4 °C), Max:98.2 °F (36.8 °C) Hemodynamics PAP 
  CO 
  CI 
 
 0701 -  1900 In: -  
Out: 70  
 190 -  0700 In: 0717 [P.O.:870; I.V.:500] Out: 1296 [Urine:2475] EXAM: 
GENERAL: VSS, afrible, alert and cooperative HEART:  regular rate and rhythm LUNG: clear to auscultation bilaterally, right sided chest tube in place, no air leak, recorded output of 150 ml yesterday, CXR reviewed NEURO:  mental status, speech normal, alert and oriented x iii INCISION: Clean, dry, and intact EXTREMITIES:No evidence of DVT seen on physical exam. 
GI/: Abd soft, nonterder with + bowel sounds. Voiding Labs: 
Recent Results (from the past 24 hour(s)) GLUCOSE, POC Collection Time: 21 11:22 AM  
Result Value Ref Range Glucose (POC) 189 (H) 65 - 100 mg/dL Performed by Lisa Jackson  PCT GLUCOSE, POC Collection Time: 21  5:05 PM  
Result Value Ref Range Glucose (POC) 148 (H) 65 - 100 mg/dL Performed by Lory Brennan GLUCOSE, POC Collection Time: 21  9:44 PM  
Result Value Ref Range Glucose (POC) 282 (H) 65 - 100 mg/dL Performed by Ivana Idler H PCT   
CBC W/O DIFF Collection Time: 21  2:56 AM  
Result Value Ref Range WBC 9.0 3.6 - 11.0 K/uL  
 RBC 3.67 (L) 3.80 - 5.20 M/uL  
 HGB 10.3 (L) 11.5 - 16.0 g/dL HCT 32.1 (L) 35.0 - 47.0 % MCV 87.5 80.0 - 99.0 FL  
 MCH 28.1 26.0 - 34.0 PG  
 MCHC 32.1 30.0 - 36.5 g/dL  
 RDW 13.6 11.5 - 14.5 % PLATELET 345 (L) 891 - 400 K/uL MPV 10.1 8.9 - 12.9 FL  
 NRBC 0.0 0  WBC ABSOLUTE NRBC 0.00 0.00 - 0.01 K/uL METABOLIC PANEL, BASIC Collection Time: 01/25/21  2:56 AM  
Result Value Ref Range Sodium 142 136 - 145 mmol/L Potassium 3.3 (L) 3.5 - 5.1 mmol/L Chloride 115 (H) 97 - 108 mmol/L  
 CO2 19 (L) 21 - 32 mmol/L Anion gap 8 5 - 15 mmol/L Glucose 130 (H) 65 - 100 mg/dL BUN 17 6 - 20 MG/DL Creatinine 0.89 0.55 - 1.02 MG/DL  
 BUN/Creatinine ratio 19 12 - 20 GFR est AA >60 >60 ml/min/1.73m2 GFR est non-AA >60 >60 ml/min/1.73m2 Calcium 6.4 (LL) 8.5 - 10.1 MG/DL  
GLUCOSE, POC Collection Time: 01/25/21  6:53 AM  
Result Value Ref Range Glucose (POC) 146 (H) 65 - 100 mg/dL Performed by Jesu PAULINO PCT Assessment:  
No evidence of DVT. Active Problems: 
  Lung mass (1/22/2021) PATH:   pending Plan/Recommendations:  
Replete K+ and Ca++ Chest tube removed Post pull CXR at 1145 See orders Signed By: Caterina De Los Santos [facility]

## 2021-01-25 NOTE — PROGRESS NOTES
1930: Bedside shift change report given to [address] (oncoming nurse) by Derek Gunderson RN (offgoing nurse). Report included the following information SBAR, Kardex, Intake/Output, MAR and Cardiac Rhythm paced, afib.

## 2021-01-25 NOTE — PROGRESS NOTES
1400: Per Petr Quiet, orders to discharge patient. 1500: IV Removed, tele disconnected. 1600: DISCHARGE SUMMARY from Nurse PATIENT INSTRUCTIONS: 
 
After general anesthesia or intravenous sedation, for 24 hours or while taking prescription Narcotics: · Limit your activities · Do not drive and operate hazardous machinery · Do not make important personal or business decisions · Do  not drink alcoholic beverages · If you have not urinated within 8 hours after discharge, please contact your surgeon on call. Report the following to your surgeon: 
· Excessive pain, swelling, redness or odor of or around the surgical area · Temperature over 100.5 · Nausea and vomiting lasting longer than 4 hours or if unable to take medications · Any signs of decreased circulation or nerve impairment to extremity: change in color, persistent  numbness, tingling, coldness or increase pain · Any questions What to do at Home: 
Recommended activity: Activity as tolerated and no driving for today. If you experience any of the following symptoms chest pain, please follow up with PCP. *  Please give a list of your current medications to your Primary Care Provider. *  Please update this list whenever your medications are discontinued, doses are 
    changed, or new medications (including over-the-counter products) are added. *  Please carry medication information at all times in case of emergency situations. These are general instructions for a healthy lifestyle: No smoking/ No tobacco products/ Avoid exposure to second hand smoke Surgeon General's Warning:  Quitting smoking now greatly reduces serious risk to your health. Obesity, smoking, and sedentary lifestyle greatly increases your risk for illness A healthy diet, regular physical exercise & weight monitoring are important for maintaining a healthy lifestyle You may be retaining fluid if you have a history of heart failure or if you experience any of the following symptoms:  Weight gain of 3 pounds or more overnight or 5 pounds in a week, increased swelling in our hands or feet or shortness of breath while lying flat in bed. Please call your doctor as soon as you notice any of these symptoms; do not wait until your next office visit. The discharge information has been reviewed with the patient. The patient verbalized understanding. Discharge medications reviewed with the patient and appropriate educational materials and side effects teaching were provided. ___________________________________________________________________________________________________________________________________

## 2021-01-25 NOTE — PROGRESS NOTES
1930: Bedside shift change report given to [address] (oncoming nurse) by Markel Granda RN (offgoing nurse). Report included the following information SBAR, Kardex, Intake/Output, MAR and Cardiac Rhythm paced, afib.  
 
0338: received critical Calcium of 6.4 
 
0353: Reported critical value to Dr on call. No new orders received. 0730: Bedside shift change report given to [address] (oncoming nurse) by Xenia Barton RN (offgoing nurse). Report included the following information SBAR, Kardex, Intake/Output, MAR and Cardiac Rhythm paced, afib.

## 2021-01-25 NOTE — ROUTINE PROCESS
Attempted to schedule VALERIY PCP appt with Dr. Ann Montelongo, patient already has an appt on Jan 27 @ 8:15AM with this provider.

## 2021-01-25 NOTE — DISCHARGE INSTRUCTIONS
*DISCHARGE INSTRUCTIONS AFTER LUNG [address] [facility]  [address] [facility] [address]  [phone]    Leave the chest tube dressing in place for 48 hours(1/27/2021), then you can remove it and shower. SURGICAL INCISION:    You will have two or three small incisions. These incisions are sealed with sutures that dissolve. The lower incision is from the chest tube. This lower incision will seal itself in 5 to 7 days. Until then you may have drainage from that incision. The drainage will be thin yellowish or red in color and may drain for 5 to 7 days. This is normal and expected. INCISION CARE:    Wash incisions daily with soap. Pat dry. Showers only, no tub baths. If you have drainage, you can place a bandage over the area, otherwise keep open to air. You may experience drainage of clear-strawberry colored fluid from the chest tube site. This is to be expected and will stop in 24-48 hours. PAIN:    What you will experience:     Tenderness and soreness around incisions   Burning and/or numbness   Soreness around front/back of chest    For relief of discomfort:     Take the pain medicine you were given at discharge   Aleve one or two tablets every 12 hrs (with food) along with pain medicine (this can be purchased over the counter at your local pharmacy/drug store). Advil may be substituted. Start this after you finish taking Toradol if prescribed.  Heating pad 10 minutes at a time to the upper incision area as often as you wish.  For the Ladies: Spandex or elastic sports bra will give you the support you need without pressure on the incision. Most will find this to be a great comfort. COUGHING:    Coughing is helpful after lung surgery. Place a pillow over your incision and apply pressure when coughing to reduce pain. ACTIVITY:    DO: 1. Walk daily outside if weather permits, at a mall if not.  Increase your distance each day. Exercise has many benefits. It promotes healing, expands your lungs,                helps you cough, relaxes your body, tones muscles, lowers blood pressure and               improves your appetite. After you exercise expect to feel tired and probably short                of breath. Plan to take a nap 1-2 times a day to regain your strength . 2. Climb stairs           3. Ride in a car           4. Perform breathing exercises (incentive spirometer)    DO NOT:               1. Lift heavy objects (8-10 pounds)  2. Drive a car/truck until after your post-op visit  3. Do heavy yard or housework     APPETITE:    It is usual to have a decreased appetite after surgery. Exercise is the best stimulant. You may expect to slowly improve over 4-10 days. CALL THE OFFICE IF YOU DEVELOP:     Increasing pain that is not controlled by the pain medicine.  Increasing redness or drainage around the incision.  Unusual or increasing shortness of breath   Fever greater than 101 degrees   Change in the color of your sputum to yellow or green, especially if you also have increasing shortness of breath and a fever greater than 101. YOUR MEDICATIONS:  As instructed        FOLLOW-UP APPOINTMENT:  Future Appointments   Date Time Provider Bobby Rios   1/27/2021  8:15 AM Karmen Colbert MD [facility] BS AMB   1/28/2021  9:00 AM PACEMAKER, RCAREENA [facility] BS AMB   2/2/2021  2:00 PM MD JOSE ANTONIO Cotton BS AMB   2/25/2021  1:30 PM Macie Mohs, MD [facility] BS AMB   5/20/2021  2:45 PM Lety Dawson MD [facility] BS AMB   8/26/2021  9:15 AM PACEMAKER, RCAM [facility] BS AMB   8/26/2021  9:40 AM Deborah Jimenez, ANP [facility] BS AMB       Please arrive 45 minutes prior to you appointment time in order to have a chest X-Ray. Check in at [address] on the ground floor of the [facility]. After your X-ray come to the [address] [place] [address] for your appointment.       Physician Signature

## 2021-01-25 NOTE — PROGRESS NOTES
Transitions of Care Plan: 
 RUR: 17% S/p rt VATS; CT removed today; anticipate discharge 1-2 days; [facility] services Baseline - independent without DME; resides with brother Disposition - home with home health; granddaughter to transport Reason for Admission:   Lung mass RUR Score:          17% Plan for utilizing home health:      Yes - referrals sent PCP: First and Last name:  Bola Harris MD 
 Name of Practice: [facility] Are you a current patient: Yes/No: Yes Approximate date of last visit: Last Fall - pt has appt every 3 months Can you participate in a virtual visit with your PCP: No 
                 
Current Advanced Directive/Advance Care Plan: Yes - granddaughter is decision maker. Transition of Care Plan:                   
 
CM spoke with patient re initial assessment and HH services: 
 
Baseline assessment: 
1) ADLs, self-care, orientation - independent without DME 
2) Social - resides at address on file with brother 3) [address] 4)  PCP and insurance - confirmed Disposition: 
Home with Home Health - pt and CM discussed agencies in network with patient's insurance; Referrals sent to: [address], [facility], and [id number]  [facility] via [facility] Transition of Care Plan: The Plan for Transition of Care is related to the following treatment goals: Home Health The Patient and/or patient representative PATIENT was provided with a choice of provider and agrees  with the discharge plan. Yes [x] No [] A Freedom of choice list was provided with basic dialogue that supports the patient's individualized plan of care/goals and shares the quality data associated with the providers. Yes [x] No [] CM will continue to follow. Medicare pt has received, reviewed, and signed 2nd IM letter informing them of their right to appeal the discharge. Signed copied has been placed on pt bedside chart. UPDATE: 
[facility] accepted for home health services. Added to AVS. Ting Hooper, MPH Care Management Interventions PCP Verified by CM: Yes 
Palliative Care Criteria Met (RRAT>21 & CHF Dx)?: No 
Mode of Transport at Discharge: Other (see comment)(Family, private car) Transition of Care Consult (CM Consult): Home Health [facility] - INPATIENT: No 
MyChart Signup: Yes Discharge Durable Medical Equipment: No 
Health Maintenance Reviewed: Yes Physical Therapy Consult: Yes Occupational Therapy Consult: No 
Current Support Network: Own Home(Resides with brother) Confirm Follow Up Transport: Family The Plan for Transition of Care is Related to the Following Treatment Goals : [address] The Patient and/or Patient Representative was Provided with a Choice of Provider and Agrees with the Discharge Plan?: Yes Name of the Patient Representative Who was Provided with a Choice of Provider and Agrees with the Discharge Plan: Irma Maynard Freedom of Choice List was Provided with Basic Dialogue that Supports the Patient's Individualized Plan of Care/Goals, Treatment Preferences and Shares the Quality Data Associated with the Providers?: Yes Discharge Location Discharge Placement: Home with home health

## 2021-01-26 NOTE — PROGRESS NOTES
No transition of care outreach indicated due to patient being managed by [facility] Team for 30 days. ---bobby

## 2021-01-30 PROBLEM — E87.20 LACTIC ACID ACIDOSIS: Status: ACTIVE | Noted: 2021-01-01

## 2021-01-30 PROBLEM — C34.91 SMALL CELL CARCINOMA OF RIGHT LUNG (HCC): Status: ACTIVE | Noted: 2021-01-01

## 2021-01-30 PROBLEM — N17.9 ARF (ACUTE RENAL FAILURE) (HCC): Status: ACTIVE | Noted: 2021-01-01

## 2021-01-31 NOTE — ED PROVIDER NOTES
HPI .  Patient has a history of atrial fibrillation, sick sinus syndrome, pacemaker, coronary stent, chronic diastolic heart failure, systolic heart failure, gout, hypertension, diabetes, and lung cancer. 8 days ago patient had a right VATS procedure with lobe wedge resection and mediastinal lymph node dissection. 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 Patient started having lower extremity pain either 24 hours or 90 minutes prior to arrival tonight. She says after having a nebulizer treatment 90 minutes ago both legs started hurting from the hip down and also her right arm started hurting. Patient later stated that the pain started yesterday evening. Patient was prescribed oxycodone after the surgery. Blood pressure was slightly elevated and pulse rate is normal.  Patient also complained of having diarrhea to squad. Past Medical History:  
Diagnosis Date  Arthritis   
 left shoulder  Atrial fibrillation ([address] 6/2/2010 Dr. Damian Joyce  CAD (coronary artery disease) stent; Dr. Damian Joyce  Cancer ([address] lung  Celiac disease  Chronic diastolic heart failure ([address] 09/22/2014 Dr. Damian Joyce  Chronic kidney disease   
 per cardio note  Chronic pain   
 left thigh:  L SFA intervention by Dr. Marie Hardy 07/2018, as of 9/6/18:  still causing pain, pt to have MILLA checked per Dr. Marie Hardy note  Chronic systolic HF (heart failure) ([address] 5/10/2017  
 4/2017 EF 25-30%  Congestive heart failure (CHF) ([address]  COPD (chronic obstructive pulmonary disease) ([address] 6/2/2010 Dr. Petr Espino  Diabetes ([address] Dr. Joe Leavitt; no current medication per pt  Emphysema, unspecified ([address] Dr. Petr Espino  Fibroid PT STATES NOT HAVING FIBROIDS  Frequent falls 2017  Gout  Heart failure ([address] Dr. Damian Joyce  History of Clostridium difficile infection 5/10/2017  
 4/2017 CDiff positive  Hypertension 6/2/2010 Dr. Maggie Mills  Hypertensive heart and chronic kidney disease   
 per PCP note  Hypotension 5/10/2017  Junctional tachycardia ([facility] 5/10/2017 Dr. Mychal Christie  Neuropathy  NIDDM (non-insulin dependent diabetes mellitus) 6/2/2010  PAD (peripheral artery disease) ([facility]  S/P ablation of atrial fibrillation 03/2018  Screening mammogram 5/4/10  
 SOB (shortness of breath) 09/22/2014 Dr. Rosalie Tineo  SSS (sick sinus syndrome) ([facility]   
 per pacemaker form 9/6/18  Weakness   
 per pt weakness from c-diff 2017, mulitple falls with injury to rotator cuff Past Surgical History:  
Procedure Laterality Date  COLONOSCOPY N/A 9/25/2017 COLONOSCOPY with biopsy performed by Yang Smith MD at [facility] AMBULATORY OR  
 HX AFIB ABLATION  03/2018  
 has had 2 ablations per patient [id number]  HX HEART CATHETERIZATION  07/23/2018  HX HEENT    
 HX OTHER SURGICAL    
 adrenal gland removed  HX PACEMAKER Left [employer] model: Justin Karolina  HX ROTATOR CUFF REPAIR Right  HX WISDOM TEETH EXTRACTION X2  
 [place] CARDIAC SURG PROCEDURE UNLIST    
 3 cardiac stent placed  [place] EXCISE ADRENAL GLAND Right 1994  VASCULAR SURGERY PROCEDURE UNLIST  07/20/2018 Left SFA intervention ; Dr. Petros Merino Family History:  
Problem Relation Age of Onset  Heart Disease Mother  Diabetes Father  Heart Disease Brother  Other Son MURDERED Social History Socioeconomic History  Marital status:  Spouse name: Not on file  Number of children: Not on file  Years of education: Not on file  Highest education level: Not on file Occupational History  Not on file Social Needs  Financial resource strain: Not on file  Food insecurity Worry: Not on file Inability: Not on file  Transportation needs Medical: Not on file Non-medical: Not on file Tobacco Use  Smoking status: Former Smoker Packs/day: 0.50 Years: 42.00 Pack years: 21.00 Types: Cigarettes Start date: 5 Quit date: 2009 Years since quittin.0  Smokeless tobacco: Never Used Substance and Sexual Activity  Alcohol use: No  
 Drug use: No  
 Sexual activity: Not Currently Lifestyle  Physical activity Days per week: Not on file Minutes per session: Not on file  Stress: Not on file Relationships  Social connections Talks on phone: Not on file Gets together: Not on file Attends Jainism service: Not on file Active member of club or organization: Not on file Attends meetings of clubs or organizations: Not on file Relationship status: Not on file  Intimate partner violence Fear of current or ex partner: Not on file Emotionally abused: Not on file Physically abused: Not on file Forced sexual activity: Not on file Other Topics Concern  Not on file Social History Narrative  Not on file ALLERGIES: Crestor [rosuvastatin], Levaquin [levofloxacin], Lipitor [atorvastatin], and Lyrica [pregabalin] Review of Systems All other systems reviewed and are negative. There were no vitals filed for this visit. Physical Exam 
Vitals signs and nursing note reviewed. Constitutional:   
   Appearance: She is well-developed. Comments: Quietly moaning initially until distracted; appears to be having some pain HENT:  
   Head: Normocephalic and atraumatic. Eyes:  
   Pupils: Pupils are equal, round, and reactive to light. Neck: Musculoskeletal: Normal range of motion and neck supple. Cardiovascular:  
   Rate and Rhythm: Normal rate and regular rhythm. Heart sounds: Normal heart sounds. No murmur. No friction rub. No gallop. Pulmonary:  
   Effort: Pulmonary effort is normal. No respiratory distress. Breath sounds: No wheezing or rales. Abdominal:  
   Palpations: Abdomen is soft. Tenderness: There is no abdominal tenderness. There is no rebound. Musculoskeletal: Normal range of motion. General: No tenderness. Skin: 
   Findings: No erythema. Neurological:  
   Mental Status: She is alert. Cranial Nerves: No cranial nerve deficit. Comments: Motor; symmetric; can lift feet back and knees off stretcher but not posterior thighs;  
Psychiatric:     
   Behavior: Behavior normal.  
 
  
 
MDM Number of Diagnoses or Management Options MODE (acute kidney injury) ([facility] Bilateral lower extremity pain Elevated lactic acid level Elevated serum creatinine Hypoxia Diagnosis management comments: Perfect Serve Consult for Admission 11:08 PM 
 
ED Room Number: IL22/20 Patient Name and age:  Bianca Smart 72 y.o.  female Working Diagnosis: Hypoxia  (primary encounter diagnosis) Elevated lactic acid level Elevated serum creatinine Bilateral lower extremity pain MODE (acute kidney injury) ([facility] COVID-19 Suspicion:  Other Sepsis present:  no  Reassessment needed: no 
Code Status:  Full Code Readmission: yes Isolation Requirements:  Other Recommended Level of Care:  telemetry Department:[facility] Adult ED - [phone] Other: I took over for Dr. Tim Ramirez. Patient had recent VATS for lung cancer. She is now having leg pain with negative Dopplers, but dimer is significantly elevated and she needed to be placed on 2 L of supplemental oxygen. Breathing well now. Complaining of headache. Kidney function limits ability for CTA, so she would likely need to be admitted for VQ scan. Let me know if you want any specific anticoagulation, we have held off for now. Procedures Note: Patient reports never starting her oxycodone and she took Tylenol for pain; running out of pain medicine is not a possible etiology for her symptoms tonight since she never started taking opiates. Corrine Abel MD 
7:55 PM 
 
ED EKG interpretation: Rhythm: paced; and regular . Rate (approx.): 75; Axis: left axis deviation; P wave: normal; QRS interval: prolonged; ST/T wave: non-specific changes; in  Lead: ; Other findings: . This EKG was interpreted by Carlota Tanner MD,ED Provider.  8:06 PM

## 2021-01-31 NOTE — PROGRESS NOTES
Bedside and Verbal shift change report given to [address] Ye[address] Montgomery (oncoming nurse) by Juliana Cristobal (offgoing nurse). Report included the following information SBAR, Kardex, ED Summary, Procedure Summary, Intake/Output, MAR and Recent Results.

## 2021-01-31 NOTE — PROGRESS NOTES
Problem: Falls - Risk of 
Goal: *Absence of Falls Description: Document Leafy Sanchez Fall Risk and appropriate interventions in the flowsheet. Outcome: Progressing Towards Goal 
Note: Fall Risk Interventions: 
Mobility Interventions: Communicate number of staff needed for ambulation/transfer Medication Interventions: Evaluate medications/consider consulting pharmacy Elimination Interventions: Patient to call for help with toileting needs Problem: Patient Education: Go to Patient Education Activity Goal: Patient/Family Education Outcome: Progressing Towards Goal

## 2021-01-31 NOTE — ACP (ADVANCE CARE PLANNING)
[address] [facility] Adult  Hospitalist Group Advance Care Planning Note Name: James Schulte YOB: 1950 MRN: [id number] Admission Date: 1/30/2021  7:14 PM 
 
Date of discussion: 1/31/2021 Active Diagnoses: 
 
Hospital Problems  Date Reviewed: 1/22/2021 Codes Class Noted POA  
 ARF (acute renal failure) ([facility] ICD-10-CM: N17.9 ICD-9-CM: 584.9  1/30/2021 Unknown Lactic acid acidosis ICD-10-CM: E87.2 ICD-9-CM: 276.2  1/30/2021 Unknown Small cell carcinoma of right lung [facility]) ICD-10-CM: C34.91 
ICD-9-CM: 162.9  1/30/2021 Unknown These active diagnoses are of sufficient risk that focused discussion on advance care planning is indicated in order to allow the patient to thoughtfully consider personal goals of care, and if situations arise that prevent the ability to personally give input, to ensure appropriate representation of their personal desires for different levels and aggressiveness of care. Discussion:  
 
Persons present and participating in discussion: Justa Quiros MD,. Topics Discussed: 
Patient's medical condition and diagnosis: [x  ] yes [  ] no  
Surrogate decision maker: [ x ] yes [  ] no Patient's current physical function/cognitive function/frailty: [ x ] yes [  ] no Code Status: [ x ] yes [  ] no  
Artificial Nutrition / Dialysis / Non-Invasive Ventilation / Blood Transfusion: [ x ] yes [  ] no Potential Resources for home (durable medical equipment, home nursing, home O2): [ x ] yes [  ] no Overview of Discussion: Patient wishes to be full code Time Spent:  
 
Total time spent face-to-face in education and discussion: 20 minutes.   
 
Regla Castellanos MD 
Date of Service:  1/31/2021 
12:07 AM

## 2021-01-31 NOTE — ED TRIAGE NOTES
Patient presents to ED via EMS for bilateral leg pain. Patient states that she began to have severe pain in both legs yesterday, denies trauma. Patient states that she is also having numbness in both feet. Patient recently had a tumor removed from her R upper lobe on Friday.

## 2021-01-31 NOTE — CONSULTS
Heme/ONC consult Consult received and will see Limited stage resected SCLC Admitted for MODE improving now Unusual to resect small cell Usual path for limited disease is chemo/ XRT Will discuss treatment plan with team as outpt,  regarding any systemic therapy or radiation. d/w hospitalist today.

## 2021-01-31 NOTE — PROGRESS NOTES
Physical Therapy Reviewed consult and order acknowledged, chart reviewed in prep for PT evaluation, pt is scheduled for VQ scan 2/1 to r/o PE. Will defer evaluation pending results of study. Will follow up tomorrow.   
Deborah Grove Service PT

## 2021-01-31 NOTE — PROGRESS NOTES
[address] [facility] Adult  Hospitalist Group Hospitalist Progress Note [address] [facility],  Answering service: [phone] OR [phone] from in house phone Date of Service:  2021 NAME:  Tatiana George :  1950 MRN:  [id number] Admission Summary:  
72 y.o.  female who presents with above symptoms. Reports it started yesterday. Progressively worse. Reports improved by  some exercises that she did while sitting down. As symptoms not resolved and was worsening  presented to emergency room for further evaluation. Recently diagnosed with small cell lung cancer. She is status post VATS with wedge resection of tumor 10 x 5 x 4.5 cm, negative all lymph nodes. Patient reports was feeling well after surgery. She was admitted for 4 days here. Discharged in a stable condition. Patient denies any shortness of breath after discharge. When patient arrives in the emergency room basic labs shows MODE, Doppler ultrasound negative for DVT as patient presented with lower extremity pain. Chest x-ray shows some changes on the right lung when chest x-ray is compared to previous x-ray. Otherwise no acute findings. Elevated D-dimer as well as elevated lactic acid. Hospitalist consulted for admission work-up of elevated D-dimer, rule out PE. Interval history / Subjective: Follow up MODE, leg pain, elevated d-dimer. Patient seen and examined. Has significant lower extremity leg pain, described as sharp, beginning in bilateral thighs, and radiating to all sides of her feet. No weakness noted. No bowel/bladder incontinence. Assessment & Plan:  
 
MODE: Multifactorial etiology: 
-Prerenal suspected, improving with IV fluids 
-S/p 1.5 L normal saline, will continue low maintenance fluids 
-Holding home diuretic Lactic acidosis: 
-No underlying signs of sepsis, improved with IV fluids -Procalcitonin unremarkable 
-Hold further antibiotics 
prerenal azotemia suspected. We will start patient on gentle hydration, monitor renal function, intake output. Avoid nephrotoxins. Hold diuretics today. Elevated D-dimer: 
-On home Eliquis twice a day 
-Lower extremity Dopplers negative 
-VQ scan 2/1 Bilateral lower extremity pain: 
-Dopplers negative for DVTs 
-Continue home gabapentin 
-Add Flexeril 
-Check CK Diabetes mellitus with hyperglycemia: 
-Continue SSI Small cell lung cancer: 
-Oncology consulted 
-Per note, limited disease. Probable outpatient chemo/radiation Code status: full DVT prophylaxis: eliquis Care Plan discussed with: Patient/Family Anticipated Disposition: Home w/Family Anticipated Discharge: 24 hours to 48 hours Hospital Problems  Date Reviewed: 1/22/2021 Codes Class Noted POA  
 ARF (acute renal failure) ([facility] ICD-10-CM: N17.9 ICD-9-CM: 584.9  1/30/2021 Unknown Lactic acid acidosis ICD-10-CM: E87.2 ICD-9-CM: 276.2  1/30/2021 Unknown Small cell carcinoma of right lung [facility]) ICD-10-CM: C34.91 
ICD-9-CM: 162.9  1/30/2021 Unknown Review of Systems:  
Negative unless stated above Vital Signs:  
 Last 24hrs VS reviewed since prior progress note. Most recent are: 
Visit Vitals BP (!) 92/45 (BP 1 Location: Right upper arm, BP Patient Position: Sitting) Pulse 74 Temp 97.7 °F (36.5 °C) Resp 18 Ht 5' 6\" (1.676 m) Wt 79.4 kg (175 lb 0.7 oz) SpO2 98% BMI 28.25 kg/m² Intake/Output Summary (Last 24 hours) at 1/31/2021 1337 Last data filed at 1/31/2021 3985 Gross per 24 hour Intake  Output 300 ml Net -300 ml Physical Examination:  
 
I had a face to face encounter with this patient and independently examined them on 1/31/2021 as outlined below: 
 
     
Constitutional:  No acute distress, cooperative, pleasant ENT:  Oral mucosa moist, oropharynx benign. Resp: CTA bilaterally. No wheezing/rhonchi/rales. No accessory muscle use CV:  Regular rhythm, normal rate, no murmurs, gallops, rubs GI:  Soft, non distended, non tender. normoactive bowel sounds, no hepatosplenomegaly Musculoskeletal:  Subjective bilateral lower extremity pain, no pain on palpation, no pain with movement, normal range of motion, intact pulses Neurologic:  Moves all extremities. Data Review:  
 Review and/or order of clinical lab test 
Review and/or order of tests in the radiology section of CPT Review and/or order of tests in the medicine section of [facility] Labs:  
 
Recent Labs  
  01/31/21 0345 01/30/21 2000 WBC 9.4 10.4 HGB 10.7* 12.1 HCT 33.5* 37.6  197 Recent Labs  
  01/31/21 0345 01/30/21 2000  136  
K 4.0 4.5  
* 103 CO2 25 22 BUN 12 14 CREA 1.21* 1.60* * 218* CA 8.6 9.0 MG  --  2.0 Recent Labs  
  01/30/21 2000 ALT 19  TBILI 0.6 TP 8.2 ALB 3.2*  
GLOB 5.0* No results for input(s): INR, PTP, APTT, INREXT in the last 72 hours. No results for input(s): FE, TIBC, PSAT, FERR in the last 72 hours. No results found for: FOL, RBCF No results for input(s): PH, PCO2, PO2 in the last 72 hours. Recent Labs  
  01/31/21 0345  Lab Results Component Value Date/Time Cholesterol, total 156 06/22/2020 08:43 AM  
 HDL Cholesterol 35 (L) 06/22/2020 08:43 AM  
 LDL, calculated 94 06/22/2020 08:43 AM  
 Triglyceride 137 06/22/2020 08:43 AM  
 CHOL/HDL Ratio 6.4 (H) 07/01/2014 03:10 AM  
 
Lab Results Component Value Date/Time Glucose (POC) 136 (H) 01/31/2021 12:02 PM  
 Glucose (POC) 175 (H) 01/25/2021 11:27 AM  
 Glucose (POC) 146 (H) 01/25/2021 06:53 AM  
 Glucose (POC) 282 (H) 01/24/2021 09:44 PM  
 Glucose (POC) 148 (H) 01/24/2021 05:05 PM  
 
Lab Results Component Value Date/Time  Color YELLOW/STRAW 09/13/2017 03:30 PM  
 Appearance CLOUDY (A) 09/13/2017 03:30 PM  
 Specific gravity 1.013 09/13/2017 03:30 PM  
 pH (UA) 5.5 09/13/2017 03:30 PM  
 Protein NEGATIVE  09/13/2017 03:30 PM  
 Glucose NEGATIVE  09/13/2017 03:30 PM  
 Ketone NEGATIVE  09/13/2017 03:30 PM  
 Bilirubin NEGATIVE  09/13/2017 03:30 PM  
 Urobilinogen 0.2 09/13/2017 03:30 PM  
 Nitrites NEGATIVE  09/13/2017 03:30 PM  
 Leukocyte Esterase MODERATE (A) 09/13/2017 03:30 PM  
 Epithelial cells FEW 09/13/2017 03:30 PM  
 Bacteria 1+ (A) 09/13/2017 03:30 PM  
 WBC 5-10 09/13/2017 03:30 PM  
 RBC 0-5 09/13/2017 03:30 PM  
 
 
 
Medications Reviewed:  
 
Current Facility-Administered Medications Medication Dose Route Frequency  gabapentin (NEURONTIN) capsule 800 mg  800 mg Oral TID  cyclobenzaprine (FLEXERIL) tablet 5 mg  5 mg Oral TID  arformoterol 15 mcg/budesonide 0.5 mg neb solution   Nebulization BID RT  
 acetaminophen (TYLENOL) tablet 650 mg  650 mg Oral Q4H PRN  
 albuterol-ipratropium (DUO-NEB) 2.5 MG-0.5 MG/3 ML  3 mL Nebulization Q4H PRN  
 carvediloL (COREG) tablet 6.25 mg  6.25 mg Oral BID WITH MEALS  
 apixaban (ELIQUIS) tablet 5 mg  5 mg Oral BID  [Held by provider] furosemide (LASIX) tablet 20 mg  20 mg Oral DAILY  oxyCODONE IR (ROXICODONE) tablet 5 mg  5 mg Oral Q6H PRN  
 ipratropium (ATROVENT) 0.02 % nebulizer solution 0.5 mg  0.5 mg Nebulization Q6H PRN  
 sodium chloride (NS) flush 5-40 mL  5-40 mL IntraVENous Q8H  
 sodium chloride (NS) flush 5-40 mL  5-40 mL IntraVENous PRN  
 acetaminophen (TYLENOL) tablet 650 mg  650 mg Oral Q6H PRN Or  
 acetaminophen (TYLENOL) suppository 650 mg  650 mg Rectal Q6H PRN  polyethylene glycol (MIRALAX) packet 17 g  17 g Oral DAILY PRN  promethazine (PHENERGAN) tablet 12.5 mg  12.5 mg Oral Q6H PRN  Or  
 ondansetron (ZOFRAN) injection 4 mg  4 mg IntraVENous Q6H PRN  
 glucose chewable tablet 16 g  4 Tab Oral PRN  
 dextrose (D50W) injection syrg 12.5-25 g  25-50 mL IntraVENous PRN  
  glucagon (GLUCAGEN) injection 1 mg  1 mg IntraMUSCular PRN  
 insulin lispro (HUMALOG) injection   SubCUTAneous AC&HS  
 albuterol (PROVENTIL VENTOLIN) nebulizer solution 2.5 mg  2.5 mg Nebulization Q4H PRN  
 
______________________________________________________________________ EXPECTED LENGTH OF STAY: - - - 
ACTUAL LENGTH OF STAY:          [id number] [id number] [facility],

## 2021-02-01 NOTE — PROGRESS NOTES
Bedside shift change report given to KATIE Boyce (oncoming nurse) by Rcoio Trujillo RN (offgoing nurse). Report included the following information SBAR, Kardex, Intake/Output, MAR, Accordion, Recent Results and Cardiac Rhythm Paced.

## 2021-02-01 NOTE — PROGRESS NOTES
[facility] [place] [address] [address], [address] W: [phone]  F: [phone] Reason for Visit:  
Sherry Obregon is a 72 y.o. female who is seen in consultation at the request of Dr. Zaina Og for evaluation of resected SCLC. Treatment History:  
1/22/21 RIGHT VATS, RIGHT UPPER LOBE WEDGE RESECTION WITH MEDIASTINAL LYMPH NODE DISSECTION 
 
STAGE: limited History of Present Illness:  
 
Pt seen today for office consult for [address] resected 1/22/21. Pt admitted now for MODE and neuropathy. Pt is in bed at my visit. PET 12/20: 
IMPRESSION: Increased tracer activity corresponds to the lesion in the right 
upper lobe compatible with neoplasm. No PET avid evidence of metastatic disease. Pt states she is having trouble walking due to neuropathy. Was supposed to see thoracic surgery today. States wants some papers filled out and is concerned about bills. Nursing at bedside. Has multiple medical problems. No F/C/CP/SOB/N/V/D. Past Medical History:  
Diagnosis Date  Arthritis   
 left shoulder  Atrial fibrillation ([address] 6/2/2010 Dr. Michael Anderson  CAD (coronary artery disease) stent; Dr. Michael Anderson  Cancer ([address] lung  Celiac disease  Chronic diastolic heart failure ([address] 09/22/2014 Dr. Michael Anderson  Chronic kidney disease   
 per cardio note  Chronic pain   
 left thigh:  L SFA intervention by Dr. Inga Olivera 07/2018, as of 9/6/18:  still causing pain, pt to have MILLA checked per Dr. Inga Olivera note  Chronic systolic HF (heart failure) ([address] 5/10/2017  
 4/2017 EF 25-30%  Congestive heart failure (CHF) ([address]  COPD (chronic obstructive pulmonary disease) ([address] 6/2/2010 Dr. Patrick Peña  Diabetes ([address] Dr. Chavo Chirinos; no current medication per pt  Emphysema, unspecified ([address] Dr. Patrick Peña  Fibroid PT STATES NOT HAVING FIBROIDS  Frequent falls 2017  Gout  Heart failure ([address] Dr. Sudhakar Lyn  History of Clostridium difficile infection 5/10/2017  
 2017 CDiff positive  Hypertension 2010 Dr. Kacey Owusu  Hypertensive heart and chronic kidney disease   
 per PCP note  Hypotension 5/10/2017  Junctional tachycardia ([facility] 5/10/2017 Dr. Sudhakar Lyn  Neuropathy  NIDDM (non-insulin dependent diabetes mellitus) 2010  PAD (peripheral artery disease) ([facility]  S/P ablation of atrial fibrillation 2018  Screening mammogram 5/4/10  
 SOB (shortness of breath) 2014 Dr. Do Khalil  SSS (sick sinus syndrome) ([facility]   
 per pacemaker form 18  Weakness   
 per pt weakness from c-diff , mulitple falls with injury to rotator cuff Past Surgical History:  
Procedure Laterality Date  COLONOSCOPY N/A 2017 COLONOSCOPY with biopsy performed by Selene Godwin MD at [facility] AMBULATORY OR  
 HX AFIB ABLATION  2018  
 has had 2 ablations per patient [address]  HX HEART CATHETERIZATION  2018  HX HEENT    
 HX OTHER SURGICAL    
 adrenal gland removed  HX PACEMAKER Left [employer] model: Fatoumata Aschoff  HX ROTATOR CUFF REPAIR Right  HX WISDOM TEETH EXTRACTION X2  
 SC CARDIAC SURG PROCEDURE UNLIST    
 3 cardiac stent placed  SC EXCISE ADRENAL GLAND Right 1994  VASCULAR SURGERY PROCEDURE UNLIST  2018 Left SFA intervention ; Dr. Jocelyne Kelly Social History Tobacco Use  Smoking status: Former Smoker Packs/day: 0.50 Years: 42.00 Pack years: 21.00 Types: Cigarettes Start date: 5 Quit date: 2009 Years since quittin.0  Smokeless tobacco: Never Used Substance Use Topics  Alcohol use: No  
  
Family History Problem Relation Age of Onset  Heart Disease Mother  Diabetes Father  Heart Disease Brother  Other Son MURDERED Current Facility-Administered Medications Medication Dose Route Frequency  gabapentin (NEURONTIN) capsule 800 mg  800 mg Oral TID  cyclobenzaprine (FLEXERIL) tablet 5 mg  5 mg Oral TID  arformoterol 15 mcg/budesonide 0.5 mg neb solution   Nebulization BID RT  
 0.9% sodium chloride infusion  75 mL/hr IntraVENous CONTINUOUS  
 acetaminophen (TYLENOL) tablet 650 mg  650 mg Oral Q4H PRN  
 albuterol-ipratropium (DUO-NEB) 2.5 MG-0.5 MG/3 ML  3 mL Nebulization Q4H PRN  
 carvediloL (COREG) tablet 6.25 mg  6.25 mg Oral BID WITH MEALS  
 apixaban (ELIQUIS) tablet 5 mg  5 mg Oral BID  [Held by provider] furosemide (LASIX) tablet 20 mg  20 mg Oral DAILY  oxyCODONE IR (ROXICODONE) tablet 5 mg  5 mg Oral Q6H PRN  
 ipratropium (ATROVENT) 0.02 % nebulizer solution 0.5 mg  0.5 mg Nebulization Q6H PRN  
 sodium chloride (NS) flush 5-40 mL  5-40 mL IntraVENous Q8H  
 sodium chloride (NS) flush 5-40 mL  5-40 mL IntraVENous PRN  
 acetaminophen (TYLENOL) tablet 650 mg  650 mg Oral Q6H PRN Or  
 acetaminophen (TYLENOL) suppository 650 mg  650 mg Rectal Q6H PRN  polyethylene glycol (MIRALAX) packet 17 g  17 g Oral DAILY PRN  promethazine (PHENERGAN) tablet 12.5 mg  12.5 mg Oral Q6H PRN Or  
 ondansetron (ZOFRAN) injection 4 mg  4 mg IntraVENous Q6H PRN  
 glucose chewable tablet 16 g  4 Tab Oral PRN  
 dextrose (D50W) injection syrg 12.5-25 g  25-50 mL IntraVENous PRN  
 glucagon (GLUCAGEN) injection 1 mg  1 mg IntraMUSCular PRN  
 insulin lispro (HUMALOG) injection   SubCUTAneous AC&HS  
 albuterol (PROVENTIL VENTOLIN) nebulizer solution 2.5 mg  2.5 mg Nebulization Q4H PRN Allergies Allergen Reactions  Crestor [Rosuvastatin] Myalgia  Levaquin [Levofloxacin] Nausea Only GI Upset  Lipitor [Atorvastatin] Diarrhea  Lyrica [Pregabalin] Myalgia Review of Systems: A complete review of systems was obtained, negative except as described above. Physical Exam:  
 
Visit Vitals BP (!) 96/55 (BP 1 Location: Right upper arm, BP Patient Position: Sitting) Pulse 74 Temp 98 °F (36.7 °C) Resp 18 Ht 5' 6\" (1.676 m) Wt 175 lb 4.8 oz (79.5 kg) SpO2 98% BMI 28.29 kg/m² ECOG PS: 2 General: No distress Eyes: PERRLA, anicteric sclerae HENT: Atraumatic Neck: Supple Respiratory: CTAB, normal respiratory effort CV: Normal rate, regular rhythm GI: Soft, nontender, nondistended MS: in bed Skin: No rashes, ecchymoses, or petechiae. Normal temperature, turgor, and texture. Psych: awake, conversant Neuro neuropathy Results:  
 
Lab Results Component Value Date/Time WBC 8.1 02/01/2021 04:05 AM  
 HGB 10.1 (L) 02/01/2021 04:05 AM  
 HCT 32.2 (L) 02/01/2021 04:05 AM  
 PLATELET 157 65/12/6976 04:05 AM  
 MCV 88.0 02/01/2021 04:05 AM  
 ABS. NEUTROPHILS 3.8 02/01/2021 04:05 AM  
 Hemoglobin (POC) 11.2 07/20/2017 09:02 AM  
 
Lab Results Component Value Date/Time Sodium 138 02/01/2021 04:05 AM  
 Potassium 4.4 02/01/2021 04:05 AM  
 Chloride 109 (H) 02/01/2021 04:05 AM  
 CO2 24 02/01/2021 04:05 AM  
 Glucose 108 (H) 02/01/2021 04:05 AM  
 BUN 10 02/01/2021 04:05 AM  
 Creatinine 1.27 (H) 02/01/2021 04:05 AM  
 GFR est AA 51 (L) 02/01/2021 04:05 AM  
 GFR est non-AA 42 (L) 02/01/2021 04:05 AM  
 Calcium 8.5 02/01/2021 04:05 AM  
 Glucose (POC) 183 (H) 02/01/2021 11:35 AM  
 
Lab Results Component Value Date/Time Bilirubin, total 0.6 01/30/2021 08:00 PM  
 ALT (SGPT) 19 01/30/2021 08:00 PM  
 Alk. phosphatase 102 01/30/2021 08:00 PM  
 Protein, total 8.2 01/30/2021 08:00 PM  
 Albumin 3.2 (L) 01/30/2021 08:00 PM  
 Globulin 5.0 (H) 01/30/2021 08:00 PM  
 
 
PET Results (most recent): 
Results from Hospital Encounter encounter on 12/18/20 PET/CT TUMOR IMAGE SKULL THIGH W (INI) Narrative PET/CT SCAN 
 
PROCEDURE: Following IV injection of 10.8 mCi 18 Fluoro 2 deoxyglucose (FDG) and 
a standard uptake delay, PET imaging is performed from the skull vertex to mid thigh and axial, sagittal and coronal images were acquired. Unenhanced CT is 
obtained for anatomic localization, and attenuation correction of the PET scan. Patient preprocedure blood glucose level: 121mg/dL. CORRELATIVE IMAGING STUDIES: Chest CT 12/7/2020. PRIOR PET: None HISTORY: The study is requested for evaluation of a 1.5 cm right upper lobe 
mass. FINDINGS: 
 
HEAD/NECK: No apparent foci of abnormal hypermetabolism. Cerebral evaluation is 
limited by normal intense activity. CHEST: Increased tracer activity corresponds to the lesion in the right upper 
lobe with a maximum SUV of 6.3. ABDOMEN/PELVIS: No foci of abnormal hypermetabolism. SKELETON: No foci of abnormal hypermetabolism in the axial and visualized 
appendicular skeleton. Impression IMPRESSION: Increased tracer activity corresponds to the lesion in the right 
upper lobe compatible with neoplasm. No PET avid evidence of metastatic disease. Records reviewed and summarized above. Pathology report(s) reviewed above. Radiology report(s) reviewed above. Assessment:  
 
1) limited stage resected SCLC post surgery 1/21. Records reviewed. Reviewed path and data. Reviewed dx, stage and treatment of lung cancer today. Pt did well with surgery. Readmitted for LE pain/ has neuropathy. Was to see thoracic surgery as outpt today. Reviewed lung cancer dx today. Discussed options of chemo/ XRT. Possible adjuvant outpt chemo. Will need brain MRI as outpt. Consideration for PCI. Had indeterminate V/Q scan here. On anticoagulation. Likely d/c soon. Pt will fu with us in office for further discussion of cancer treatment plan. No treatment from us while in hospital.  
Dw/ hospitalist 
Will d/w thoracic surgery 2) AF/ CAD/ CHF/ PAD/ COPD/DM/ arthritis/neuropathy. Per IM. 3) psychosocial.  Mood good. In bed. Likely d/c to home soon. We will fu as outpt for further discussion of cancer treatment. Call if questions I appreciate the opportunity to participate in Ms. Tapan Meeks estephanie.  
 
Signed By: Leanora Cockayne, DO

## 2021-02-01 NOTE — ROUTINE PROCESS
[facility] follow-up PCP transitional care appointment has been scheduled with Dr. Kelsey Bang for Feb 5 @ 315PM. Pending patient discharge.   Carmen Pickens, Care Management Specialist.

## 2021-02-01 NOTE — DISCHARGE SUMMARY
Discharge Summary PATIENT ID: Leyla Lea MRN: [id number] YOB: 1950 DATE OF ADMISSION: 1/30/2021  7:14 PM   
DATE OF DISCHARGE: 2/1/2021 PRIMARY CARE PROVIDER: Karen Burgos MD  
 
ATTENDING PHYSICIAN: Stiven Simms DO 
DISCHARGING PROVIDER: [address], DO To contact this individual call [phone] and ask the  to page. If unavailable ask to be transferred the Adult Hospitalist Department. CONSULTATIONS: IP CONSULT TO ONCOLOGY PROCEDURES/SURGERIES: * No surgery found * [address] COURSE:  
 
76-year-old female with past medical history recent VATS with wedge resection, negative lymph nodes, pathology positive for small cell lung cancer, presenting to the emergency department with lower extremity pain. In the ED, found to have MODE and elevated D-dimer. Lower extremity Dopplers negative for acute DVT. Patient on Eliquis chronically. Hospitalist consulted for further management. Patient underwent VQ scan and indeterminate. Patient denies shortness of breath during hospitalization. Her lower extremity pain improved with supportive care. On exam, patient was not weak and denied incontinence. Suspect underlying MSK etiology for leg pain. Her renal function improved with IVFs. Overall, patient stable for discharge home. VQ scan:  
IMPRESSION Rounded defects are noted in the upper lobes bilaterally. While this 
is of indeterminate probability given the lack of ventilation imaging, this may 
be related to significant bullous disease in the upper lobes as seen on prior 
chest CT. 
 
DISCHARGE DIAGNOSES / PLAN:   
 
MODE: improved 
-Prerenal suspected, improved 
-Discontinue home lasix  
-outpatient follow up  
  
Lactic acidosis: 
-No underlying signs of sepsis, improved with IV fluids 
-Procalcitonin unremarkable 
-Hold further antibiotics 
  
Elevated D-dimer: 
-On home Eliquis twice a day -Lower extremity Dopplers negative 
-VQ scan indeterminate  
-no shortness of breath, less likely acute PE 
-possibly elevated secondary to recent surgery  
  
Bilateral lower extremity pain: 
-suspect MSK pain  
-Dopplers negative for DVTs 
-Continue home gabapentin 
-Flexeril prescription given  
-CK normal  
  
Diabetes mellitus with hyperglycemia: 
-home medications on discharge 
  
Small cell lung cancer: 
-Oncology consulted, will follow outpatient  
-Per note, limited disease. Probable outpatient chemo/radiation 
   
 
ADDITIONAL CARE RECOMMENDATIONS:  
You were admitted to the hospital for high kidney function and D-dimer. Your kidney function got better with fluids. Please stop taking your lasix. You underwent a VQ scan to look for clots in your lungs. There was no definitive clot found. Please keep taking your Eliquis as prescribed. A prescription for flexeril has been sent to your pharmacy. PENDING TEST RESULTS:  
At the time of discharge the following test results are still pending: none FOLLOW UP APPOINTMENTS:   
Follow-up Information Follow up With Specialties Details Why Contact Info Pineda Stewart MD Internal Medicine On 2/5/2021 [facility] hospital follow up appt has been scheduled for Feb 5 @ 3:15PM [address] [facility] 7 [id number] 
[phone] DISCHARGE MEDICATIONS: 
Current Discharge Medication List  
  
START taking these medications Details  
cyclobenzaprine (FLEXERIL) 10 mg tablet Take 0.5 Tabs by mouth three (3) times daily as needed for Muscle Spasm(s). Qty: 9 Tab, Refills: 0 CONTINUE these medications which have NOT CHANGED Details  
calcium-vitamin D (OS-ROSA MARIA +D3) 500 mg-200 unit per tablet Take 1 Tab by mouth daily (with breakfast) for 30 days. Indications: low amount of calcium in the blood Qty: 30 Tab, Refills: 0 oxyCODONE IR (ROXICODONE) 5 mg immediate release tablet Take 1 Tab by mouth every six (6) hours as needed for Pain for up to 7 days. Max Daily Amount: 20 mg. 
Qty: 28 Tab, Refills: 0 Associated Diagnoses: Post-op pain  
  
gabapentin (NEURONTIN) 800 mg tablet TAKE 1 TABLET BY MOUTH THREE TIMES DAILY Qty: 270 Tab, Refills: 0 Associated Diagnoses: Polyneuropathy associated with underlying disease ([facility] budesonide-formoteroL (Symbicort) 160-4.5 mcg/actuation HFAA INHALE ONE PUFF BY MOUTH TWICE DAILY Qty: 1 Inhaler, Refills: 3 Associated Diagnoses: Chronic obstructive pulmonary disease with acute exacerbation ([facility])  
  
carvediloL (COREG) 6.25 mg tablet TAKE 1 TABLET BY MOUTH TWICE DAILY WITH MEALS Qty: 180 Tab, Refills: 1 Comments: Please consider 90 day supplies to promote better adherence  
  
simvastatin (ZOCOR) 20 mg tablet TAKE 1 TABLET BY MOUTH NIGHTLY Qty: 90 Tab, Refills: 1 Associated Diagnoses: Type 2 diabetes mellitus with nephropathy ([facility] Mixed hyperlipidemia; Coronary artery disease involving native coronary artery of native heart without angina pectoris SITagliptin (Januvia) 50 mg tablet Take 1 Tab by mouth daily. Qty: 90 Tab, Refills: 1 Associated Diagnoses: Type 2 diabetes mellitus with diabetic neuropathy, without long-term current use of insulin ([facility])  
  
tiotropium (Spiriva with HandiHaler) 18 mcg inhalation capsule INHALE THE CONTENTS OF 1 CAPSULE THROUGH HANDIHALER DEVICE DAILY Qty: 90 Cap, Refills: 1 Associated Diagnoses: Chronic obstructive pulmonary disease, unspecified COPD type ([facility]  
  
albuterol (PROVENTIL HFA, VENTOLIN HFA, PROAIR HFA) 90 mcg/actuation inhaler Take 2 Puffs by inhalation every four (4) hours as needed for Wheezing. Qty: 1 Inhaler, Refills: 1  
  
colchicine 0.6 mg tablet TAKE 2 TABLETS BY MOUTH ONCE DAILY Qty: 60 Tab, Refills: 1  
  
diclofenac (VOLTAREN) 1 % gel Apply 4 g to affected area four (4) times daily as needed for Pain. Qty: 100 g, Refills: 1 Associated Diagnoses: Acute low back pain, unspecified back pain laterality, unspecified whether sciatica present Eliquis 5 mg tablet Take 1 tablet by mouth twice daily 
Qty: 180 Tab, Refills: 3 Associated Diagnoses: Atrial fibrillation, unspecified type ([address] Anticoagulant long-term use  
  
albuterol (PROVENTIL VENTOLIN) 2.5 mg /3 mL (0.083 %) nebu 3 mL by Nebulization route every four (4) hours as needed for Wheezing. Qty: 24 Each, Refills: 2 Associated Diagnoses: SOB (shortness of breath) glucose blood VI test strips (BLOOD GLUCOSE TEST) strip Use BID Dx11.9 Qty: 100 Strip, Refills: 11  
 Associated Diagnoses: Type 2 diabetes mellitus with nephropathy (HCC)  
  
lancets misc Use BID Qty: 100 Each, Refills: 11  
 Associated Diagnoses: Type 2 diabetes mellitus with nephropathy (HCC)  
  
albuterol-ipratropium (DUO-NEB) 2.5 mg-0.5 mg/3 ml nebu 3 mL by Nebulization route every four (4) hours as needed (wheezing). Qty: 30 Nebule, Refills: 0  
  
acetaminophen (TYLENOL) 325 mg tablet Take 2 Tabs by mouth every four (4) hours as needed for Pain or Fever. Qty: 40 Tab, Refills: 0  
  
guaiFENesin ER (MUCINEX) 600 mg ER tablet Take 1 Tab by mouth two (2) times a day. Qty: 20 Tab, Refills: 0  
  
benzocaine-menthol (CHLORASEPTIC MAX) 15-10 mg lozg lozenge Take 1 Lozenge by mouth every four to six (4-6) hours as needed for Sore throat. Qty: 30 Each, Refills: 0  
  
cod liver oil cap Take 1 Cap by mouth daily. STOP taking these medications  
  
 furosemide (LASIX) 20 mg tablet Comments:  
Reason for Stopping:   
   
  
 
 
 
NOTIFY YOUR PHYSICIAN FOR ANY OF THE FOLLOWING:  
Fever over 101 degrees for 24 hours. Chest pain, shortness of breath, fever, chills, nausea, vomiting, diarrhea, change in mentation, falling, weakness, bleeding. Severe pain or pain not relieved by medications. Or, any other signs or symptoms that you may have questions about.  
 
DISPOSITION: 
 x  Home With: 
 OT  PT  HH  RN  
  
 Long term SNF/Inpatient Rehab Independent/assisted living Hospice Other:  
 
 
PATIENT CONDITION AT DISCHARGE:  
 
Functional status Poor   
x Deconditioned Independent Cognition  
x  Lucid Forgetful Dementia Catheters/lines (plus indication) Luna PICC   
 PEG   
x None Code status  
 x Full code DNR   
 
PHYSICAL EXAMINATION AT DISCHARGE: 
Constitutional:  No acute distress, cooperative, pleasant   
ENT:  Oral mucosa moist, oropharynx benign. Resp:  Diminished bilaterally. No wheezing/rhonchi/rales. No accessory muscle use CV:  Regular rhythm, normal rate, no murmurs, gallops, rubs GI:  Soft, non distended, non tender. normoactive bowel sounds, no hepatosplenomegaly Musculoskeletal:  Subjective bilateral lower extremity pain, no pain on palpation, no pain with movement, normal range of motion, intact pulses Neurologic:  Moves all extremities. CHRONIC MEDICAL DIAGNOSES: 
Problem List as of 2/1/2021 Date Reviewed: 1/22/2021 Codes Class Noted - Resolved ARF (acute renal failure) (HCC) ICD-10-CM: N17.9 ICD-9-CM: 584.9  1/30/2021 - Present Lactic acid acidosis ICD-10-CM: E87.2 ICD-9-CM: 276.2  1/30/2021 - Present Small cell carcinoma of right lung [facility]) ICD-10-CM: C34.91 
ICD-9-CM: 162.9  1/30/2021 - Present Lung mass ICD-10-CM: R91.8 ICD-9-CM: 786.6  1/22/2021 - Present ASHD (arteriosclerotic heart disease) ICD-10-CM: I25.10 ICD-9-CM: 414.00  5/31/2019 - Present Respiratory failure with hypoxia [facility]) ICD-10-CM: J96.91 
ICD-9-CM: 518.81  3/25/2019 - Present S/P rotator cuff repair ICD-10-CM: T83.099 ICD-9-CM: V45.89  9/19/2018 - Present PAD (peripheral artery disease) (HCC) ICD-10-CM: I73.9 ICD-9-CM: 443.9  5/22/2018 - Present A-fib [facility]) ICD-10-CM: I48.91 
ICD-9-CM: 427.31  3/30/2018 - Present Type 2 diabetes mellitus with nephropathy ([facility] ICD-10-CM: E11.21 
ICD-9-CM: 250.40, 583.81  12/27/2017 - Present CKD (chronic kidney disease) stage 3, GFR 30-59 ml/min ICD-10-CM: N18.30 ICD-9-CM: 585.3  9/22/2017 - Present Chronic systolic HF (heart failure) (HCC) ICD-10-CM: I50.22 ICD-9-CM: 428.22  5/10/2017 - Present Overview Signed 5/10/2017 11:42 AM by Sean Rey NP  
  4/2017 EF 25-30% History of Clostridium difficile infection ICD-10-CM: Z86.19 ICD-9-CM: V12.09  5/10/2017 - Present Overview Signed 5/10/2017 11:47 AM by Sean Rey NP  
  4/2017 CDiff positive Junctional tachycardia (HCC) ICD-10-CM: I47.1 ICD-9-CM: 427.89  5/10/2017 - Present Hypotension ICD-10-CM: I95.9 ICD-9-CM: 458.9  5/10/2017 - Present S/P ablation of atrial fibrillation ICD-10-CM: Z98.890, Z86.79 
ICD-9-CM: V45.89  5/2/2017 - Present Overview Signed 5/2/2017  9:45 AM by Maciej Paul NP  
  5/1/17 Fear associated with illness and body function ICD-10-CM: F40.9 ICD-9-CM: 300.20  4/7/2017 - Present Counseling regarding advanced care planning and goals of care ICD-10-CM: Z71.89 ICD-9-CM: V65.49  4/7/2017 - Present Systolic CHF, acute ([facility] ICD-10-CM: I50.21 ICD-9-CM: 428.21, 428.0  4/6/2017 - Present Acute systolic CHF (congestive heart failure) (HCC) ICD-10-CM: I50.21 ICD-9-CM: 428.21, 428.0  4/6/2017 - Present CHF (congestive heart failure) (HCC) ICD-10-CM: I50.9 ICD-9-CM: 428.0  4/4/2017 - Present Type 2 diabetes mellitus with diabetic neuropathy, without long-term current use of insulin (HCC) ICD-10-CM: E11.40 ICD-9-CM: 250.60, 357.2  1/31/2017 - Present GIB (gastrointestinal bleeding) ICD-10-CM: K92.2 ICD-9-CM: 578.9  4/12/2016 - Present Diastolic CHF, acute on chronic (HCC) ICD-10-CM: I50.33 ICD-9-CM: 428.33, 428.0  9/22/2014 - Present S/P coronary artery stent placement ICD-10-CM: Z95.5 ICD-9-CM: V45.82  7/4/2014 - Present Overview Signed 4/7/2017  1:21 PM by Jose Luis Bingham NP  
  4/7/17 PCI/ROSALINO Diagonal 
  
  
   
 Back pain ICD-10-CM: M54.9 ICD-9-CM: 724.5  11/14/2012 - Present Sinoatrial node dysfunction (HCC) ICD-10-CM: I49.5 ICD-9-CM: 427.81  7/5/2012 - Present Cardiac pacemaker in situ ICD-10-CM: Z95.0 ICD-9-CM: V45.01  7/5/2012 - Present Mixed hyperlipidemia ICD-10-CM: E78.2 ICD-9-CM: 272.2  7/5/2012 - Present Chest pain ICD-10-CM: R07.9 ICD-9-CM: 786.50  9/21/2011 - Present Sick sinus syndrome (HCC) ICD-10-CM: I49.5 ICD-9-CM: 427.81  2/25/2011 - Present S/P angioplasty with stent ICD-10-CM: Z95.820 ICD-9-CM: V45.89  2/7/2011 - Present Hypokalemia ICD-10-CM: E87.6 ICD-9-CM: 276.8  7/20/2010 - Present Hip pain ICD-10-CM: M25.559 ICD-9-CM: 719.45  6/8/2010 - Present Benign hypertensive heart and CKD, stage 3 (GFR 30-59), w CHF ([facility] ICD-10-CM: I13.0, N18.30 ICD-9-CM: 404.11, 585.3, 428.0  6/2/2010 - Present Atrial fibrillation--paroxysmal (Chronic) ICD-10-CM: I48.91 
ICD-9-CM: 427.31  6/2/2010 - Present 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 ICD-10-CM: J44.9 ICD-9-CM: 821  6/2/2010 - Present RESOLVED: Acute renal failure (ARF) (HCC) ICD-10-CM: N17.9 ICD-9-CM: 584.9  9/13/2017 - 3/29/2018 RESOLVED: Tachycardia ICD-10-CM: R00.0 ICD-9-CM: 785.0  5/9/2017 - 3/29/2018 RESOLVED: Anticoagulation monitoring, INR range 2-3 ICD-10-CM: Z79.01 
ICD-9-CM: V58.61  1/31/2017 - 3/29/2018 RESOLVED: Dizziness ICD-10-CM: P61 ICD-9-CM: 780.4  1/31/2017 - 4/17/2017 RESOLVED: Acute exacerbation of chronic obstructive pulmonary disease (COPD) ([facility] ICD-10-CM: J44.1 ICD-9-CM: 491.21  1/7/2015 - 1/31/2017  RESOLVED: SOB (shortness of breath) ICD-10-CM: R06.02 
 ICD-9-CM: 786.05  9/22/2014 - 1/31/2017 RESOLVED: S/P cardiac catheterization ICD-10-CM: Z98.890 ICD-9-CM: V45.89  7/2/2014 - 1/31/2017 RESOLVED: CHF (congestive heart failure) (HCC) ICD-10-CM: I50.9 ICD-9-CM: 428.0  6/29/2014 - 1/31/2017 RESOLVED: HTN (hypertension) ICD-10-CM: I10 
ICD-9-CM: 401.9  10/29/2013 - 1/31/2017 RESOLVED: Type 2 diabetes mellitus without complication (HCC) [id number]: E11.9 ICD-9-CM: 250.00  7/5/2012 - 1/31/2017 RESOLVED: Cold intolerance ICD-10-CM: R68.89 ICD-9-CM: 780.99  7/25/2011 - 1/31/2017 RESOLVED: Anemia ICD-10-CM: D64.9 ICD-9-CM: 285.9  7/25/2011 - 3/29/2018 RESOLVED: S/P cardiac pacemaker procedure ICD-10-CM: Z95.0 ICD-9-CM: V45.01  2/25/2011 - 1/31/2017 Overview Signed 2/25/2011  2:06 PM by Sue Sanders NP  
  2/25/11 [employer] dual chamber pacemaker implant RESOLVED: Cellulitis ICD-10-CM: L03.90 ICD-9-CM: 682.9  2/15/2011 - 11/2/2016 RESOLVED: Bradycardia hx ICD-10-CM: R00.1 ICD-9-CM: 427.89  7/20/2010 - 1/31/2017 RESOLVED: Peripheral neuropathy ICD-10-CM: G62.9 ICD-9-CM: 356.9  6/8/2010 - 1/31/2017 RESOLVED: Type 2 diabetes mellitus (HCC) (Chronic) ICD-10-CM: E11.9 ICD-9-CM: 250.00  6/2/2010 - 1/31/2017 Greater than 30 minutes were spent with the patient on counseling and coordination of care Signed:  
Juani Callaway DO 
2/1/2021 
4:34 PM

## 2021-02-01 NOTE — PROGRESS NOTES
Bedside and Verbal shift change report given to Abe Pinon (oncoming nurse) by Ana Maria Cruz (offgoing nurse). Report included the following information SBAR, Kardex, OR Summary, Procedure Summary, Intake/Output, MAR, Recent Results and Cardiac Rhythm Paced.

## 2021-02-01 NOTE — PROGRESS NOTES
Problem: Self Care Deficits Care Plan (Adult) Goal: *Therapy Goal (Edit Goal, Insert Text) Description:  
FUNCTIONAL STATUS PRIOR TO ADMISSION: Patient was independent and active without use of DME.  
 
HOME SUPPORT: The patient lived with family but did not require assist. 
 
Occupational Therapy Goals Initiated 2/1/2021 1. Patient will perform grooming with independence within 7 day(s). 2.  Patient will perform bathing with independence within 7 day(s). 3.  Patient will perform LB dressing with independence within 7 day(s). 4.  Patient will perform toilet transfers with independence within 7 day(s). 5.  Patient will perform all aspects of toileting with independence within 7 day(s). 6.  Patient will utilize energy conservation techniques during functional activities with verbal cues within 7 day(s). Outcome: Not Met OCCUPATIONAL THERAPY EVALUATION Patient: Bianca Smart (66 y.o. female) Date: 2/1/2021 Primary Diagnosis: ARF (acute renal failure) ([facility] [N17.9] Lactic acid acidosis [E87.2] Small cell carcinoma of right lung ([facility] [address] [C34.91] Precautions: fall ASSESSMENT Based on the objective data described below, the patient presents with minimal decline with basic ADL performance due to general weakness, decreased standing tolerance and balance, impaired activity tolerance related discomfort in chest (breathing difficulty), and numbness in feet. No cognitive impairment noted, good insight into deficits. Patient generally requiring CGA to close Stand by assist for ADL in stand and related transfers. Current Level of Function Impacting Discharge (ADLs/self-care): CGA for ADL in stand (LB bathing, dressing, toileting) and ADL related transfers Functional Outcome Measure: The patient scored Total: 70/100 on the Barthel Index outcome measure which is indicative of 30% impaired ability to care for basic self needs/dependency on others; inferred 100% dependency on others for instrumental ADLs. Other factors to consider for discharge: Patient lives with family Patient will benefit from skilled therapy intervention to address the above noted impairments. PLAN : 
Recommendations and Planned Interventions: self care training, functional mobility training, therapeutic exercise, balance training, therapeutic activities, endurance activities, neuromuscular re-education, patient education, home safety training, and family training/education Frequency/Duration: Patient will be followed by occupational therapy 3 times a week to address goals. Recommendation for discharge: (in order for the patient to meet his/her long term goals) No skilled occupational therapy/ follow up rehabilitation needs identified at this time. This discharge recommendation: 
Has been made in collaboration with the attending provider and/or case management IF patient discharges home will need the following DME: may need shower chair SUBJECTIVE:  
Patient stated it just hurts in my chest when I breathe hard.  OBJECTIVE DATA SUMMARY:  
HISTORY:  
Past Medical History:  
Diagnosis Date Arthritis   
 left shoulder Atrial fibrillation ([facility] 6/2/2010 Dr. Mindi Blanco CAD (coronary artery disease) stent; Dr. Mindi Blanco Cancer ([facility] [address] lung Celiac disease Chronic diastolic heart failure ([facility] [address] 09/22/2014 Dr. Mindi Blanco Chronic kidney disease   
 per cardio note Chronic pain   
 left thigh:  L SFA intervention by Dr. Johnathan eVra 07/2018, as of 9/6/18:  still causing pain, pt to have MILLA checked per Dr. Johnathan Vera note Chronic systolic HF (heart failure) ([address] 5/10/2017  
 4/2017 EF 25-30% Congestive heart failure (CHF) ([address] COPD (chronic obstructive pulmonary disease) ([facility] [address] 6/2/2010 Dr. Adelita Hanley Diabetes ([facility] [address] Dr. Ga Adams; no current medication per pt Emphysema, unspecified ([facility] [address] Dr. Adelita Hanley Fibroid PT STATES NOT HAVING FIBROIDS Frequent falls 2017 Gout Heart failure ([facility] [address] Dr. Ras oPsadas History of Clostridium difficile infection 5/10/2017  
 4/2017 CDiff positive Hypertension 6/2/2010 Dr. Analy Juarez Hypertensive heart and chronic kidney disease   
 per PCP note Hypotension 5/10/2017 Junctional tachycardia ([facility] [address] 5/10/2017 Dr. Ras Posadas Neuropathy NIDDM (non-insulin dependent diabetes mellitus) 6/2/2010 PAD (peripheral artery disease) ([facility])   
 S/P ablation of atrial fibrillation 03/2018 Screening mammogram 5/4/10 SOB (shortness of breath) 09/22/2014 Dr. Adelita Hanley SSS (sick sinus syndrome) ([facility] [address]   
 per pacemaker form 9/6/18 Weakness   
 per pt weakness from c-diff 2017, mulitple falls with injury to rotator cuff Past Surgical History:  
Procedure Laterality Date COLONOSCOPY N/A 9/25/2017 COLONOSCOPY with biopsy performed by Eden Li MD at [facility] AMBULATORY OR  
 HX AFIB ABLATION  03/2018  
 has had 2 ablations per patient [place] [address] HX HEART CATHETERIZATION  07/23/2018 HX HEENT    
 HX OTHER SURGICAL    
 adrenal gland removed HX PACEMAKER Left [employer] model: Gypsy Dawley HX ROTATOR CUFF REPAIR Right HX WISDOM TEETH EXTRACTION X2  
 TN CARDIAC SURG PROCEDURE UNLIST    
 3 cardiac stent placed TN EXCISE ADRENAL GLAND Right 1994 VASCULAR SURGERY PROCEDURE UNLIST  07/20/2018 Left SFA intervention ; Dr. Yudelka Erazo Expanded or extensive additional review of patient history:  
 
Home Situation Home Environment: Apartment # Steps to Enter: 0 One/Two Story Residence: One story Living Alone: No 
Support Systems: Family member(s) Patient Expects to be Discharged to[de-identified] Private residence Current DME Used/Available at Home: Walker, rolling, Cane, straight Tub or Shower Type: Tub/Shower combination Hand dominance: Right EXAMINATION OF PERFORMANCE DEFICITS: 
Cognitive/Behavioral Status: 
Neurologic State: Alert Orientation Level: Oriented X4 Cognition: Appropriate decision making Hearing: Auditory Auditory Impairment: None Vision/Perceptual:   
    
    
       
Corrective Lenses: Glasses Range of Motion: 
 
AROM: Within functional limits Strength: 
 
Strength: Generally decreased, functional 
  
  
  
  
 
Coordination: 
Coordination: Within functional limits Fine Motor Skills-Upper: Left Intact; Right Intact Gross Motor Skills-Upper: Left Intact; Right Intact Tone & Sensation: 
 
Tone: Normal 
Sensation: Impaired(reporting numbness in both feet) Balance: 
Sitting: Intact; Without support Standing: Impaired; Without support Standing - Static: Good Standing - Dynamic : Fair Functional Mobility and Transfers for ADLs: 
Bed Mobility: 
Supine to Sit: Independent Transfers: 
Sit to Stand: Contact guard assistance Stand to Sit: Contact guard assistance Bed to Chair: Contact guard assistance ADL Assessment: 
Feeding: Independent Oral Facial Hygiene/Grooming: Setup Bathing: Contact guard assistance Upper Body Dressing: Setup Lower Body Dressing: Contact guard assistance Toileting: Contact guard assistance ADL Intervention and task modifications: 
  
Patient uses cross-legged method for LB ADL while seated, without complaints of increased pain in chest 
  
 Functional Measure: 
Barthel Index: 
 
Bathin Bladder: 10 Bowels: 10 
Groomin Dressin Feeding: 10 Mobility: 10 Stairs: 5 Toilet Use: 5 Transfer (Bed to Chair and Back): 10 Total: 70/100 The Barthel ADL Index: Guidelines 1. The index should be used as a record of what a patient does, not as a record of what a patient could do. 2. The main aim is to establish degree of independence from any help, physical or verbal, however minor and for whatever reason. 3. The need for supervision renders the patient not independent. 4. A patient's performance should be established using the best available evidence. Asking the patient, friends/relatives and nurses are the usual sources, but direct observation and common sense are also important. However direct testing is not needed. 5. Usually the patient's performance over the preceding 24-48 hours is important, but occasionally longer periods will be relevant. 6. Middle categories imply that the patient supplies over 50 per cent of the effort. 7. Use of aids to be independent is allowed. Daniel Cavazos., Barthel, D.W. (6443). Functional evaluation: the Barthel Index. [facility] (14)2. BRAD Cornejo, Allan Allen., Annie Silva., [place], [address] [place] [address] (1999). Measuring the change indisability after inpatient rehabilitation; comparison of the responsiveness of the Barthel Index and Functional [place] Measure. Journal of Neurology, Neurosurgery, and Psychiatry, 66(4), 854-086. Charlie Fuentes, N.J.A, MAL Bedolla, & Ritu Gimenez, M.A. (2004.) Assessment of post-stroke quality of life in cost-effectiveness studies: The usefulness of the Barthel Index and the EuroQoL-5D. [facility], 13, 004-97 Occupational Therapy Evaluation Charge Determination History Examination Decision-Making LOW Complexity : Brief history review  LOW Complexity : 1-3 performance deficits relating to physical, cognitive , or psychosocial skils that result in activity limitations and / or participation restrictions  LOW Complexity : No comorbidities that affect functional and no verbal or physical assistance needed to complete eval tasks Based on the above components, the patient evaluation is determined to be of the following complexity level: LOW Pain Rating: No lasting pain reported Activity Tolerance:  
Fair and requires rest breaks After treatment patient left in no apparent distress:   
Sitting in chair and Call bell within reach COMMUNICATION/EDUCATION:  
The patients plan of care was discussed with: Registered nurse. Home safety education was provided and the patient/caregiver indicated understanding. This patients plan of care is appropriate for delegation to [facility]. Thank you for this referral. 
Luzmaria Tinoco, OT Time Calculation: 25 mins

## 2021-02-01 NOTE — PROGRESS NOTES
5:10 PM 
CM notified to cancel patient transport. Patient not medically ready for discharge at this time. Transport cancelled. CM will continue to follow and assist with VALERIY needs as they arise. RONEL Howard/NAKIA Care Management

## 2021-02-01 NOTE — PROGRESS NOTES
Notified by nursing staff that patient short of breath and weak while walking. Not hypoxic. Will hold discharge until formal PT consult.   
 
Ritesh Stewart,

## 2021-02-01 NOTE — PROGRESS NOTES
2/1/2021; 09:45 -  
CM went to meet with patient. Patient was being prepped by nursing and transport to be taken to nuclear medicine for VQ Scan. Observation notice provided in writing to patient and/or caregiver as well as verbal explanation of the policy. Patients who are in outpatient status also receive the Observation notice. CM to meet with patient after testing. CRM: Reginald Jacobs, MPH, [facility]; Z: [phone] 
 
11:30 -  
TRANSITIONS OF CARE PLAN:  
1. DESTINATION: Likely own home 2. TRANSPORT: Roundtrip 3. ADDITIONAL SUPPORT: Granddaughter, Chanel Tanner, Daughter 4. DME: Nebulizer, Glucometer, BP Cuff, Top Dentures 5. HOME HEALTH: [address] Orders for [facility] 6. CODE STATUS/AMD STATUS: Full Code; On file 7. FOLLOW UP APPOINTMENTS: PCP, Hem-Onc 
8. STILL NEEDS: PT, OT, IVF, VQ Scan Results, Pain Management, Hem-Onc Consult Reason for Readmission:  ARF    
      
RUR Score/Risk Level:   N/A; RRAT: 38; HIGH   
 
PCP: First and Last name:  Dr. 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   AARP Medicare Complete; uses [employer] at [facility] [employer] Transportation   Self or Family Support system or lack thereof? Granddaughter or Demluis Tanner, Daughter Living arrangements? With grandson and brother in a first floor apartment Self-care/ADLs/Cognition? Independent; alert and oriented x4 Current Advanced Directive/Advance Care Plan:  Full Code; on file Plan for utilizing home health:   [address] Orders for [facility] Transition of Care Plan:    Based on readmission, the patient's previous Plan of Care 
 has been evaluated and/or modified. The current Transition of Care Plan is:   CM met with patient, with patient alert and oriented x4. Patient had a recent admission 1/22-1/25 for Lung Mass with discharge to home with [facility] via [facility]. Patient has no hx of Rehab. Patient identified that she is the primary caretaker of her brother; her grandson also lives with her. Patient's granddaughter and daughter also assist as needed. Pharmacy preference is [facility] on [address] Patient has stated that she does not want to do a rehab placement. Likely disposition will be for discharge to own home with [address] for [facility]. Patient will likely need Roundtrip Lyft transport assist.      
 
Care Management Interventions PCP Verified by CM: Yes(followed by Dr. Maggie Mills) Palliative Care Criteria Met (RRAT>21 & CHF Dx)?: No 
Mode of Transport at Discharge: Other (see comment)(will need Roundtrip Lyft transport) Transition of Care Consult (CM Consult): Discharge Planning, Home Health [address] [place] [address] Road: No 
Reason Outside [place]: Patient already serviced by other home care/hospice agency(open to [facility]) MyChart Signup: Yes Discharge Durable Medical Equipment: No(has nebulizer, glucometer, bp cuff, top dentures) Health Maintenance Reviewed: Yes(cm met with patient, with patient alert and oriented x4) Physical Therapy Consult: Yes Occupational Therapy Consult: Yes Speech Therapy Consult: No 
Current Support Network: Own Home, Family Lives Nearby(lives with brother and grandson) Confirm Follow Up Transport: Self(Patient is independent in ADLs, to include driving) [employer] Information Provided?: No 
Discharge Location Discharge Placement: Home with home health(lives with brother and grandson in a first floor apartment with no exterior steps) Readmission Assessment Number of days since last admission?: 8-30 days Previous disposition: Home with Home Health Who is being interviewed?: Caregiver What was the patient's/caregiver's perception as to why they think they needed to return back to the hospital?: Other (Comment) Did you visit your Primary Care Physician after you left the hospital, before you returned this time?: No 
Why weren't you able to visit your PCP?: Did not have an appointment Did you see a specialist, such as Cardiac, Pulmonary, Orthopedic Physician, etc. after you left the hospital?: Yes Who advised the patient to return to the hospital?: Home Health Staff Does the patient report anything that got in the way of taking their medications?: No 
In our efforts to provide the best possible care to you and others like you, can you think of anything that we could have done to help you after you left the hospital the first time, so that you might not have needed to return so soon?: Arrange for more help when leaving the hospital, Teach back instructions regarding management of illness, Identify patient's health literacy needs, Discharge instructions that are concise, clear, and non contradictory, Improved written discharge instructions, Education on how to continue taking medications upon discharge CRM: Jessy Esquivel, MPH, [facility]; Z: [phone] 
 
16:15 -  
CM notes patient's pending discharge. CM discussed with nursing and submitted transport request to Renée [facility]e for 18:00. Round Trip Justification Date/Time:  2/1/2021; 4:23 PM  
 
Patient will be [facility] from [facility] on 2/1/2021. Transportation Mode: car Is Transportation paid for by Insurance: no 
 
 If transport is not covered by [employer] does the patient live within the 25-30 mile service area: yes Justification for LYFT Transport: Patient is paying Out of Pocket, There is no other feasible option of transportation, The mode of transportation provided is modest and only meets the patient¢s basic needs, The transportation requested is to a [facility] facility, The patient lives within the primary service area of the [facility], The patient has already accepted treatment at the [facility], The transport improves the patient¢s access to care, The patient poses no risk of harm to other patients, The patient poses no risk of harm to [facility]) Cost is estimated at $18-$22.  Nursing is aware of transport time of 18:00 from main entrance . CRM: Pete Romero, MPH, [address] [place] [address]; Z: [phone]

## 2021-02-01 NOTE — PROGRESS NOTES
Physical Therapy 2/1/2021 Chart reviewed. Patient off the unit at this time for testing. F/u later today as able for PT evaluation. Thank you.  
 
Laila Sinclair, PT, DPT

## 2021-02-02 NOTE — PROGRESS NOTES
Problem: Self Care Deficits Care Plan (Adult) Goal: *Therapy Goal (Edit Goal, Insert Text) Description:  
FUNCTIONAL STATUS PRIOR TO ADMISSION: Patient was independent and active without use of DME.  
 
HOME SUPPORT: The patient lived with family but did not require assist. 
 
Occupational Therapy Goals Initiated 2/1/2021 1. Patient will perform grooming with independence within 7 day(s). 2.  Patient will perform bathing with independence within 7 day(s). 3.  Patient will perform LB dressing with independence within 7 day(s). 4.  Patient will perform toilet transfers with independence within 7 day(s). 5.  Patient will perform all aspects of toileting with independence within 7 day(s). 6.  Patient will utilize energy conservation techniques during functional activities with verbal cues within 7 day(s). Outcome: Progressing Towards Goal 
 
OCCUPATIONAL THERAPY TREATMENT Patient: Angelique Hamman (73 y.o. female) Date: 2/2/2021 Diagnosis: [facility] [N17.9] Lactic acid acidosis [E87.2] Small cell carcinoma of right lung (HCC) [C34.91] <principal problem not specified> Precautions:   
Chart, occupational therapy assessment, plan of care, and goals were reviewed. ASSESSMENT Patient continues with skilled OT services and is progressing towards goals. Pt continues to present with decreased activity tolerance/endurance and decreased ability to perform ADLs at [facility]. Continues to report complaints of LE pain and SOB with activity (walking to/from bathroom). Pt declined to use RW and required CGA/min A with LOB x 1 with min A to recover. Endorsed dizziness, BP stable, however O2 sats briefly decreased to 89%, then rebounded to 97% (RA) with PLB and sitting. She benefits from CGA/Min A for Adl related mobility when she does not use AD. After returning from toileting, endorsed increased SOB and reported feeling concerns about if she was able to safely return home. Informed nursing of pts comments. Pt left with call bell within reach. All needs met. Pt may benefit from intensive HHOT vs short term rehab stay. However, pt adament about returning home. Concerns for falls based on self-report that she does not use AE for mobility. Recommend HHOT at d/c to assist with safe transition to home/community. OT to continue to follow during hospital stay. Current Level of Function Impacting Discharge (ADLs): up to min A for ADL related mobility and transfers Other factors to consider for discharge: caregiver for brother, mod I/I at baseline PLAN : 
Patient continues to benefit from skilled intervention to address the above impairments. Continue treatment per established plan of care. to address goals. Recommend with staff: CGA to/from bathroom with RW 
 
Recommendation for discharge: (in order for the patient to meet his/her long term goals) Therapy up to 5 days/week in SNF setting or an intensive home health therapy program 
 
This discharge recommendation: 
Has not yet been discussed the attending provider and/or case management IF patient discharges home will need the following DME: bedside commode, shower chair SUBJECTIVE:  
 Patient stated I just feel like I'm having a harder time breathing.  OBJECTIVE DATA SUMMARY:  
Cognitive/Behavioral Status: 
Neurologic State: Alert Orientation Level: Oriented X4 Cognition: Follows commands Functional Mobility and Transfers for ADLs: 
Bed Mobility: 
Supine to Sit: Stand-by assistance;Bed Modified Scooting: Stand-by assistance Transfers: 
Sit to Stand: Contact guard assistance Functional Transfers Bathroom Mobility: Contact guard assistance Balance: 
Sitting: Intact; Without support Standing: Impaired; With support Standing - Static: Fair Standing - Dynamic : Fair ADL Intervention: 
  
 
Grooming Washing Hands: Contact guard assistance Lower Body Dressing Assistance Socks: Modified independent Toileting Toileting Assistance: Supervision Bladder Hygiene: Modified independent Pain: Not rated/reported Activity Tolerance:  
Fair After treatment patient left in no apparent distress:  
Sitting in chair and Call bell within reach COMMUNICATION/COLLABORATION:  
The patients plan of care was discussed with: Physical therapist and Registered nurse. Epifanio Avina OT Time Calculation: 28 mins

## 2021-02-02 NOTE — PROGRESS NOTES
Problem: Mobility Impaired (Adult and Pediatric) Goal: *Acute Goals and Plan of Care (Insert Text) Description: FUNCTIONAL STATUS PRIOR TO ADMISSION: Patient was independent and active without use of DME. 
 
HOME SUPPORT PRIOR TO ADMISSION: The patient lived with her brother and grandson. 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 Physical Therapy Goals Initiated 2/2/2021 1. Patient will move from supine to sit and sit to supine  in bed with independence within 7 day(s). 2.  Patient will transfer from bed to chair and chair to bed with modified independence using the least restrictive device within 7 day(s). 3.  Patient will perform sit to stand with modified independence within 7 day(s). 4.  Patient will ambulate with modified independence for 150 feet with the least restrictive device within 7 day(s). Outcome: Progressing Towards Goal 
 PHYSICAL THERAPY EVALUATION Patient: Latonia Grijalva (29 y.o. female) Date: 2/2/2021 Primary Diagnosis: ARF (acute renal failure) ([facility] [address] [N17.9] Lactic acid acidosis [E87.2] Small cell carcinoma of right lung ([address] [C34.91] Precautions:     
 
ASSESSMENT Based on the objective data described below, the patient presents with decreased endurance and decreased independence with functional mobility S/P admission for ARF. Per chart review patient has had complaints of LE pain and SOB. Her PMHX is remarkable for recent VATS with wedge resection of tumor 10 x 5 x 4.5 cm, negative all lymph nodes. Patient has received a VQ scan which is indeterminate and LE dopplers are negative. She ambulates and transfers minimal distance on evaluation reporting the return of 8/10 L quadriceps pain. She demonstrates the need for Min A for gait and transfers without an AD ambulating with markedly shortened step length and decreased balance. When provided RW patient demonstrates the need for SBA. VSS throughout mobility including O2 sats that stay above 90 throughout evaluation. Supine: 107/53 Sittin/55 Standin/67 Post ambulation ~ 10 ft: 108/52 Current Level of Function Impacting Discharge (mobility/balance): Min A without an AD, SBA- CGA with RW 
 
Functional Outcome Measure: The patient scored 15/28 on the Tinetti outcome measure. 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 PLAN : 
Recommendations and Planned Interventions: bed mobility training, transfer training, gait training, therapeutic exercises, neuromuscular re-education, edema management/control, patient and family training/education and therapeutic activities Frequency/Duration: Patient will be followed by physical therapy:  5 times a week to address goals. Recommendation for discharge: (in order for the patient to meet his/her long term goals) Therapy up to 5 days/week in SNF setting or intensive home health therapy program  
 Patient declines rehab placement reporting she needs to go home to take care of family. She is at increased risk for falls considering her recent decline in function. She did not ambulate household distance during evaluation which increases concerns for her ability to get to and from the restroom and out of bed when she returns home. This discharge recommendation: 
Has been made in collaboration with the attending provider and/or case management IF patient discharges home will need the following DME: patient owns DME required for discharge SUBJECTIVE:  
Patient stated I need to go home .  OBJECTIVE DATA SUMMARY:  
HISTORY:   
Past Medical History:  
Diagnosis Date  Arthritis   
 left shoulder  Atrial fibrillation ([address] 6/2/2010 Dr. Debbie Macias  CAD (coronary artery disease) stent; Dr. Debbie Macias  Cancer ([facility] [address] lung  Celiac disease  Chronic diastolic heart failure ([address] 09/22/2014 Dr. Debbie Macias  Chronic kidney disease   
 per cardio note  Chronic pain   
 left thigh:  L SFA intervention by Dr. Helena River 07/2018, as of 9/6/18:  still causing pain, pt to have MILLA checked per Dr. Helena River note  Chronic systolic HF (heart failure) ([address] 5/10/2017  
 4/2017 EF 25-30%  Congestive heart failure (CHF) ([address]  COPD (chronic obstructive pulmonary disease) ([address] 6/2/2010 Dr. Anibal Alfred  Diabetes ([facility] [address] Dr. Jeremiah Dinh; no current medication per pt  Emphysema, unspecified ([facility] [address] Dr. Anibal Alfred  Fibroid PT STATES NOT HAVING FIBROIDS  Frequent falls 2017  Gout  Heart failure ([address] Dr. Debbie Macias  History of Clostridium difficile infection 5/10/2017  
 4/2017 CDiff positive  Hypertension 6/2/2010 Dr. JAMARCUS GROVE  Hypertensive heart and chronic kidney disease   
 per PCP note  Hypotension 5/10/2017  Junctional tachycardia ([address] 5/10/2017 Dr. Debbie Macias  Neuropathy  NIDDM (non-insulin dependent diabetes mellitus) 6/2/2010  PAD (peripheral artery disease) ([facility] [address]  S/P ablation of atrial fibrillation 03/2018  Screening mammogram 5/4/10  
 SOB (shortness of breath) 09/22/2014 Dr. Isabel Callaway  SSS (sick sinus syndrome) ([facility] [address]   
 per pacemaker form 9/6/18  Weakness   
 per pt weakness from c-diff 2017, mulitple falls with injury to rotator cuff Past Surgical History:  
Procedure Laterality Date  COLONOSCOPY N/A 9/25/2017 COLONOSCOPY with biopsy performed by Spring Serrato MD at [facility] AMBULATORY OR  
 HX AFIB ABLATION  03/2018  
 has had 2 ablations per patient [id number]  HX HEART CATHETERIZATION  07/23/2018  HX HEENT    
 HX OTHER SURGICAL    
 adrenal gland removed  HX PACEMAKER Left [employer] model: Tyree Arriaza  HX ROTATOR CUFF REPAIR Right  HX WISDOM TEETH EXTRACTION X2  
 CA CARDIAC SURG PROCEDURE UNLIST    
 3 cardiac stent placed  CA EXCISE ADRENAL GLAND Right 1994  VASCULAR SURGERY PROCEDURE UNLIST  07/20/2018 Left SFA intervention ; Dr. Ada Sandoval Personal factors and/or comorbidities impacting plan of care: independent Home Situation Home Environment: Apartment # Steps to Enter: 0 One/Two Story Residence: One story Living Alone: No 
Support Systems: Family member(s) Patient Expects [facility] be [facility] Current DME Used/Available at Home: Grab bars, Walker, rolling, Cane, straight, Raised toilet seat Tub or Shower Type: Tub/Shower combination EXAMINATION/PRESENTATION/DECISION MAKING:  
Critical Behavior: 
Neurologic State: Alert Orientation Level: Oriented X4 Cognition: Follows commands Hearing: Auditory Auditory Impairment: None Skin:   
Edema:  
Range Of Motion: 
AROM: Within functional limits Strength:   
Strength: Generally decreased, functional 
  
  
  
  
  
  
Tone & Sensation:  
Tone: Normal 
  
  
  
  
  
  
  
  
   
 Coordination: 
Coordination: Within functional limits Vision:  
  
Functional Mobility: 
Bed Mobility: 
  
Supine to Sit: Stand-by assistance;Bed Modified Scooting: Stand-by assistance Transfers: 
Sit to Stand: Contact guard assistance Stand to Sit: Contact guard assistance Balance:  
Sitting: Intact; Without support Standing: Impaired; Without support Standing - Static: Fair Standing - Dynamic : Fair Ambulation/Gait Training: 
Distance (ft): 15 Feet (ft) Assistive Device: Gait belt;Walker, rolling Ambulation - Level of Assistance: Contact guard assistance Gait Abnormalities: Decreased step clearance Base of Support: Widened Speed/Cece: Slow;Shuffled Step Length: Left shortened;Right shortened Stairs: Therapeutic Exercises:  
 
 
Functional Measure: 
Tinetti test: 
 
Sitting Balance: 1 Arises: 1 Attempts to Rise: 1 Immediate Standing Balance: 0 Standing Balance: 1 Nudged: 1 Eyes Closed: 0 Turn 360 Degrees - Continuous/Discontinuous: 0 Turn 360 Degrees - Steady/Unsteady: 1 Sitting Down: 1 Balance Score: 7 Balance total score Indication of Gait: 1 
R Step Length/Height: 1 L Step Length/Height: 1 
R Foot Clearance: 1 L Foot Clearance: 1 Step Symmetry: 1 Step Continuity: 1 Path: 1 Trunk: 0 Walking Time: 0 Gait Score: 8 Gait total score Total Score: 15/28 Overall total score Tinetti Tool Score Risk of Falls 
<19 = High Fall Risk 19-24 = Moderate Fall Risk 25-28 = Low Fall Risk Tinetti ME. Performance-Oriented Assessment of Mobility Problems in Elderly Patients. [facility] 66; [id number]. (Scoring Description: PT Bulletin Feb. 10, 1993) Older adults: Jonny Welch et al, 2009; n = 1601 [facility] elderly evaluated with ABC, WILMAN, ADL, and IADL) · Mean WILMAN score for males aged 69-68 years = 26.21(3.40) · Mean WILMAN score for females age 69-68 years = 25.16(4.30) · Mean WILMAN score for males over 80 years = 23.29(6.02) · Mean WILMAN score for females over 80 years = 17.20(8.32) Physical Therapy Evaluation Charge Determination History Examination Presentation Decision-Making MEDIUM  Complexity : 1-2 comorbidities / personal factors will impact the outcome/ POC  LOW Complexity : 1-2 Standardized tests and measures addressing body structure, function, activity limitation and / or participation in recreation  MEDIUM Complexity : Evolving with changing characteristics  Other outcome measures Tinetti  HIGH Based on the above components, the patient evaluation is determined to be of the following complexity level: MEDIUM Pain Rating: 
 
 
Activity Tolerance:  
Poor, SpO2 stable on RA and requires frequent rest breaks After treatment patient left in no apparent distress:  
Sitting in chair, Heels elevated for pressure relief and Call bell within reach COMMUNICATION/EDUCATION:  
The patients plan of care was discussed with: Case management. Fall prevention education was provided and the patient/caregiver indicated understanding., Patient/family have participated as able in goal setting and plan of care. and Patient/family agree to work toward stated goals and plan of care. Thank you for this referral. 
Korin Vela, PT Time Calculation: 33 mins

## 2021-02-02 NOTE — PROGRESS NOTES
Verbal shift change report given to Suzy Dinh RN by [facility], RN. Report included the following information SBAR, Kardex, Intake/Output, MAR and Cardiac Rhythm Paced.

## 2021-02-02 NOTE — DISCHARGE INSTRUCTIONS
Discharge Instructions       PATIENT ID: Gregoria Stein  MRN: [id number]   YOB: 1950    DATE OF ADMISSION: 1/30/2021  7:14 PM    DATE OF DISCHARGE: 2/1/2021    PRIMARY CARE PROVIDER: Claudia Shields MD     ATTENDING PHYSICIAN: Dez Taylor DO  DISCHARGING PROVIDER: Grecia Carbajal DO    To contact this individual call [phone] and ask the  to page. If unavailable ask to be transferred the Adult Hospitalist Department. DISCHARGE DIAGNOSES     Leg pain  Elevated D-dimer  Acute kidney injury      CONSULTATIONS: IP CONSULT TO ONCOLOGY    PROCEDURES/SURGERIES: * No surgery found *    PENDING TEST RESULTS:   At the time of discharge the following test results are still pending: none    FOLLOW UP APPOINTMENTS:   Follow-up Information     Follow up With Specialties Details Why Contact Info    Claudia Shields MD Internal Medicine On 2/5/2021 [facility] hospital follow up appt has been scheduled for Feb 5 @ 3:15PM [address]  [address] [place] [address] [address] Call for home health [address] [place] [address]  [address] [place] [address],  Hematology and Oncology, Internal Medicine, Hematology, Oncology  Please follow up for cancer treatment [address] [place] [address]  [phone]             ADDITIONAL CARE RECOMMENDATIONS:     You were admitted to the hospital for high kidney function and D-dimer. Your kidney function got better with fluids. Please stop taking your lasix. You underwent a VQ scan to look for clots in your lungs. There was no definitive clot found. Please keep taking your Eliquis as prescribed. A prescription for flexeril has been sent to your pharmacy. DISCHARGE MEDICATIONS:   See Medication Reconciliation Form    · It is important that you take the medication exactly as they are prescribed.    · Keep your medication in the bottles provided by the pharmacist and keep a list of the medication names, dosages, and times to be taken in your wallet. · Do not take other medications without consulting your doctor. NOTIFY YOUR PHYSICIAN FOR ANY OF THE FOLLOWING:   Fever over 101 degrees for 24 hours. Chest pain, shortness of breath, fever, chills, nausea, vomiting, diarrhea, change in mentation, falling, weakness, bleeding. Severe pain or pain not relieved by medications. Or, any other signs or symptoms that you may have questions about.       Signed:   Gila March, DO 2/1/2021  4:13 PM

## 2021-02-02 NOTE — PROGRESS NOTES
Transition of Care: home with HERON order with [address]; info added to AVS 
 
Transport Plan: [facility] at 4pm today (packet on patient chart) RUR: 25% 1100: CM confirmed with [address] by phone that they will resume home health  when patient is discharged; yes they will 1130: CM spoke with Dr. Marquis Chao who will be discharging patient later today; this CM sent [facility] referral via allscripts for transport at 4pm 
 
1200: CM updated patient on discharge plan at bedside; she verbalized understanding Medicare pt has received, reviewed, and signed 2nd IM letter informing them of their right to appeal the discharge. Signed copy has been placed on pt bedside chart. CM following Delilah Younger RN, CRM

## 2021-02-02 NOTE — PROGRESS NOTES
Pt pulled out Iv during the night. Pt requesting not to be stuck again, hoping she will leave today and not need Iv.   Told pt we would consult w/ am Md.

## 2021-02-02 NOTE — DISCHARGE SUMMARY
Discharge Summary PATIENT ID: Kvng Gonzales MRN: [id number] YOB: 1950 DATE OF ADMISSION: 1/30/2021  7:14 PM   
DATE OF DISCHARGE: 2/1/2021 PRIMARY CARE PROVIDER: Felipe Nicole MD  
 
ATTENDING PHYSICIAN: Leidy Garcia DO 
DISCHARGING PROVIDER: Leidy Garcia DO To contact this individual call [phone] and ask the  to page. If unavailable ask to be transferred the Adult Hospitalist Department. CONSULTATIONS: IP CONSULT TO ONCOLOGY PROCEDURES/SURGERIES: * No surgery found * [id number] [facility] COURSE:  
 
44-year-old female with past medical history recent VATS with wedge resection, negative lymph nodes, pathology positive for small cell lung cancer, presenting to the emergency department with lower extremity pain. In the ED, found to have MODE and elevated D-dimer. Lower extremity Dopplers negative for acute DVT. Patient on Eliquis chronically. Hospitalist consulted for further management. Patient underwent VQ scan and indeterminate. Patient denies shortness of breath during hospitalization. Her lower extremity pain improved with supportive care. On exam, patient was not weak and denied incontinence. Suspect underlying MSK etiology for leg pain. Her renal function improved with IVFs. PT/OT evaluated patient and recommend SNF placement or intensive home health therapy. Patient declined therapy and elected for discharge home. VQ scan:  
IMPRESSION Rounded defects are noted in the upper lobes bilaterally. While this 
is of indeterminate probability given the lack of ventilation imaging, this may 
be related to significant bullous disease in the upper lobes as seen on prior 
chest CT. 
 
DISCHARGE DIAGNOSES / PLAN:   
 
MODE: improved 
-Prerenal suspected, improved 
-Discontinue home lasix  
-outpatient follow up  
  
Lactic acidosis: 
-No underlying signs of sepsis, improved with IV fluids 
-Procalcitonin unremarkable -Hold further antibiotics 
  
Elevated D-dimer: 
-On home Eliquis twice a day 
-Lower extremity Dopplers negative 
-VQ scan indeterminate  
-no shortness of breath, less likely acute PE 
-possibly elevated secondary to recent surgery  
  
Bilateral lower extremity pain: 
-suspect MSK pain  
-Dopplers negative for DVTs 
-Continue home gabapentin 
-Flexeril prescription given  
-CK normal  
  
Diabetes mellitus with hyperglycemia: 
-home medications on discharge 
  
Small cell lung cancer: 
-Oncology consulted, will follow outpatient  
-Per note, limited disease. Probable outpatient chemo/radiation 
   
 
ADDITIONAL CARE RECOMMENDATIONS:  
You were admitted to the hospital for high kidney function and D-dimer. Your kidney function got better with fluids. Please stop taking your lasix. You underwent a VQ scan to look for clots in your lungs. There was no definitive clot found. Please keep taking your Eliquis as prescribed. A prescription for flexeril has been sent to your pharmacy. PENDING TEST RESULTS:  
At the time of discharge the following test results are still pending: none FOLLOW UP APPOINTMENTS:   
Follow-up Information Follow up With Specialties Details Why Contact Kris Ndiaye MD Internal Medicine On 2/5/2021 [facility] hospital follow up appt has been scheduled for Feb 5 @ 3:15PM [address] [place] [id number] 
[phone] [facility] Call for home health [address] [place] [id number] 
[phone] Jose Luis Dunlap DO Hematology and Oncology, Internal Medicine, Hematology, Oncology  Please follow up for cancer treatment [address] [place] [address] YAMIL Garcia [facility] 209 [address] 
[phone] DISCHARGE MEDICATIONS: 
Current Discharge Medication List  
  
START taking these medications Details cyclobenzaprine (FLEXERIL) 10 mg tablet Take 0.5 Tabs by mouth three (3) times daily as needed for Muscle Spasm(s). Qty: 9 Tab, Refills: 0 CONTINUE these medications which have NOT CHANGED Details  
calcium-vitamin D (OS-ROSA MARIA +D3) 500 mg-200 unit per tablet Take 1 Tab by mouth daily (with breakfast) for 30 days. Indications: low amount of calcium in the blood Qty: 30 Tab, Refills: 0  
  
gabapentin (NEURONTIN) 800 mg tablet TAKE 1 TABLET BY MOUTH THREE TIMES DAILY Qty: 270 Tab, Refills: 0 Associated Diagnoses: Polyneuropathy associated with underlying disease ([facility] budesonide-formoteroL (Symbicort) 160-4.5 mcg/actuation HFAA INHALE ONE PUFF BY MOUTH TWICE DAILY Qty: 1 Inhaler, Refills: 3 Associated Diagnoses: Chronic obstructive pulmonary disease with acute exacerbation (HCC)  
  
carvediloL (COREG) 6.25 mg tablet TAKE 1 TABLET BY MOUTH TWICE DAILY WITH MEALS Qty: 180 Tab, Refills: 1 Comments: Please consider 90 day supplies to promote better adherence  
  
simvastatin (ZOCOR) 20 mg tablet TAKE 1 TABLET BY MOUTH NIGHTLY Qty: 90 Tab, Refills: 1 Associated Diagnoses: Type 2 diabetes mellitus with nephropathy ([facility] Mixed hyperlipidemia; Coronary artery disease involving native coronary artery of native heart without angina pectoris SITagliptin (Januvia) 50 mg tablet Take 1 Tab by mouth daily. Qty: 90 Tab, Refills: 1 Associated Diagnoses: Type 2 diabetes mellitus with diabetic neuropathy, without long-term current use of insulin ([facility])  
  
tiotropium (Spiriva with HandiHaler) 18 mcg inhalation capsule INHALE THE CONTENTS OF 1 CAPSULE THROUGH HANDIHALER DEVICE DAILY Qty: 90 Cap, Refills: 1 Associated Diagnoses: Chronic obstructive pulmonary disease, unspecified COPD type ([facility]  
  
albuterol (PROVENTIL HFA, VENTOLIN HFA, PROAIR HFA) 90 mcg/actuation inhaler Take 2 Puffs by inhalation every four (4) hours as needed for Wheezing. Qty: 1 Inhaler, Refills: 1 colchicine 0.6 mg tablet TAKE 2 TABLETS BY MOUTH ONCE DAILY Qty: 60 Tab, Refills: 1  
  
diclofenac (VOLTAREN) 1 % gel Apply 4 g to affected area four (4) times daily as needed for Pain. Qty: 100 g, Refills: 1 Associated Diagnoses: Acute low back pain, unspecified back pain laterality, unspecified whether sciatica present Eliquis 5 mg tablet Take 1 tablet by mouth twice daily 
Qty: 180 Tab, Refills: 3 Associated Diagnoses: Atrial fibrillation, unspecified type ([address] Anticoagulant long-term use  
  
albuterol (PROVENTIL VENTOLIN) 2.5 mg /3 mL (0.083 %) nebu 3 mL by Nebulization route every four (4) hours as needed for Wheezing. Qty: 24 Each, Refills: 2 Associated Diagnoses: SOB (shortness of breath) glucose blood VI test strips (BLOOD GLUCOSE TEST) strip Use BID Dx11.9 Qty: 100 Strip, Refills: 11  
 Associated Diagnoses: Type 2 diabetes mellitus with nephropathy (HCC)  
  
lancets misc Use BID Qty: 100 Each, Refills: 11  
 Associated Diagnoses: Type 2 diabetes mellitus with nephropathy (HCC)  
  
albuterol-ipratropium (DUO-NEB) 2.5 mg-0.5 mg/3 ml nebu 3 mL by Nebulization route every four (4) hours as needed (wheezing). Qty: 30 Nebule, Refills: 0  
  
acetaminophen (TYLENOL) 325 mg tablet Take 2 Tabs by mouth every four (4) hours as needed for Pain or Fever. Qty: 40 Tab, Refills: 0  
  
guaiFENesin ER (MUCINEX) 600 mg ER tablet Take 1 Tab by mouth two (2) times a day. Qty: 20 Tab, Refills: 0  
  
benzocaine-menthol (CHLORASEPTIC MAX) 15-10 mg lozg lozenge Take 1 Lozenge by mouth every four to six (4-6) hours as needed for Sore throat. Qty: 30 Each, Refills: 0  
  
cod liver oil cap Take 1 Cap by mouth daily.   
  
  
STOP taking these medications  
  
 oxyCODONE IR (ROXICODONE) 5 mg immediate release tablet Comments:  
Reason for Stopping:   
   
 furosemide (LASIX) 20 mg tablet Comments:  
Reason for Stopping:   
   
  
 
 
 
NOTIFY YOUR PHYSICIAN FOR ANY OF THE FOLLOWING:  
 Fever over 101 degrees for 24 hours. Chest pain, shortness of breath, fever, chills, nausea, vomiting, diarrhea, change in mentation, falling, weakness, bleeding. Severe pain or pain not relieved by medications. Or, any other signs or symptoms that you may have questions about. DISPOSITION: 
x  Home With: 
 OT  PT  HH  RN  
  
 Long term SNF/Inpatient Rehab Independent/assisted living Hospice Other:  
 
 
PATIENT CONDITION AT DISCHARGE:  
 
Functional status Poor   
x Deconditioned Independent Cognition  
x  Lucid Forgetful Dementia Catheters/lines (plus indication) Luna PICC   
 PEG   
x None Code status  
 x Full code DNR   
 
PHYSICAL EXAMINATION AT DISCHARGE: 
Constitutional:  No acute distress, cooperative, pleasant   
ENT:  Oral mucosa moist, oropharynx benign. Resp:  Diminished bilaterally. No wheezing/rhonchi/rales. No accessory muscle use CV:  Regular rhythm, normal rate, no murmurs, gallops, rubs GI:  Soft, non distended, non tender. normoactive bowel sounds, no hepatosplenomegaly Musculoskeletal:  Subjective bilateral lower extremity pain, no pain on palpation, no pain with movement, normal range of motion, intact pulses Neurologic:  Moves all extremities. CHRONIC MEDICAL DIAGNOSES: 
Problem List as of 2/2/2021 Date Reviewed: 1/22/2021 Codes Class Noted - Resolved ARF (acute renal failure) (HCC) ICD-10-CM: N17.9 ICD-9-CM: 584.9  1/30/2021 - Present Lactic acid acidosis ICD-10-CM: E87.2 ICD-9-CM: 276.2  1/30/2021 - Present Small cell carcinoma of right lung [facility]) ICD-10-CM: C34.91 
ICD-9-CM: 162.9  1/30/2021 - Present Lung mass ICD-10-CM: R91.8 ICD-9-CM: 786.6  1/22/2021 - Present ASHD (arteriosclerotic heart disease) ICD-10-CM: I25.10 ICD-9-CM: 414.00  5/31/2019 - Present  Respiratory failure with hypoxia (HCC) ICD-10-CM: J96.91 
 ICD-9-CM: 518.81  3/25/2019 - Present S/P rotator cuff repair ICD-10-CM: H59.400 ICD-9-CM: V45.89  9/19/2018 - Present PAD (peripheral artery disease) (HCC) ICD-10-CM: I73.9 ICD-9-CM: 443.9  5/22/2018 - Present A-fib [facility]) ICD-10-CM: I48.91 
ICD-9-CM: 427.31  3/30/2018 - Present Type 2 diabetes mellitus with nephropathy ([facility] ICD-10-CM: E11.21 
ICD-9-CM: 250.40, 583.81  12/27/2017 - Present CKD (chronic kidney disease) stage 3, GFR 30-59 ml/min ICD-10-CM: N18.30 ICD-9-CM: 585.3  9/22/2017 - Present Chronic systolic HF (heart failure) (HCC) ICD-10-CM: I50.22 ICD-9-CM: 428.22  5/10/2017 - Present Overview Signed 5/10/2017 11:42 AM by Tim Freed NP  
  4/2017 EF 25-30% History of Clostridium difficile infection ICD-10-CM: Z86.19 ICD-9-CM: V12.09  5/10/2017 - Present Overview Signed 5/10/2017 11:47 AM by Tmi Freed NP  
  4/2017 CDiff positive Junctional tachycardia (HCC) ICD-10-CM: I47.1 ICD-9-CM: 427.89  5/10/2017 - Present Hypotension ICD-10-CM: I95.9 ICD-9-CM: 458.9  5/10/2017 - Present S/P ablation of atrial fibrillation ICD-10-CM: Z98.890, Z86.79 
ICD-9-CM: V45.89  5/2/2017 - Present Overview Signed 5/2/2017  9:45 AM by Dinora Middleton NP  
  5/1/17 Fear associated with illness and body function ICD-10-CM: F40.9 ICD-9-CM: 300.20  4/7/2017 - Present Counseling regarding advanced care planning and goals of care ICD-10-CM: Z71.89 ICD-9-CM: V65.49  4/7/2017 - Present Systolic CHF, acute ([facility] ICD-10-CM: I50.21 ICD-9-CM: 428.21, 428.0  4/6/2017 - Present Acute systolic CHF (congestive heart failure) (HCC) ICD-10-CM: I50.21 ICD-9-CM: 428.21, 428.0  4/6/2017 - Present CHF (congestive heart failure) (HCC) ICD-10-CM: I50.9 ICD-9-CM: 428.0  4/4/2017 - Present Type 2 diabetes mellitus with diabetic neuropathy, without long-term current use of insulin (HCC) ICD-10-CM: E11.40 ICD-9-CM: 250.60, 357.2  1/31/2017 - Present GIB (gastrointestinal bleeding) ICD-10-CM: K92.2 ICD-9-CM: 578.9  4/12/2016 - Present Diastolic CHF, acute on chronic (HCC) ICD-10-CM: I50.33 ICD-9-CM: 428.33, 428.0  9/22/2014 - Present S/P coronary artery stent placement ICD-10-CM: Z95.5 ICD-9-CM: V45.82  7/4/2014 - Present Overview Signed 4/7/2017  1:21 PM by Henri Alex NP  
  4/7/17 PCI/ROSALINO Diagonal 
  
  
   
 Back pain ICD-10-CM: M54.9 ICD-9-CM: 724.5  11/14/2012 - Present Sinoatrial node dysfunction (HCC) ICD-10-CM: I49.5 ICD-9-CM: 427.81  7/5/2012 - Present Cardiac pacemaker in situ ICD-10-CM: Z95.0 ICD-9-CM: V45.01  7/5/2012 - Present Mixed hyperlipidemia ICD-10-CM: E78.2 ICD-9-CM: 272.2  7/5/2012 - Present Chest pain ICD-10-CM: R07.9 ICD-9-CM: 786.50  9/21/2011 - Present Sick sinus syndrome (HCC) ICD-10-CM: I49.5 ICD-9-CM: 427.81  2/25/2011 - Present S/P angioplasty with stent ICD-10-CM: Z95.820 ICD-9-CM: V45.89  2/7/2011 - Present Hypokalemia ICD-10-CM: E87.6 ICD-9-CM: 276.8  7/20/2010 - Present Hip pain ICD-10-CM: M25.559 ICD-9-CM: 719.45  6/8/2010 - Present Benign hypertensive heart and CKD, stage 3 (GFR 30-59), w CHF ([facility] ICD-10-CM: I13.0, N18.30 ICD-9-CM: 404.11, 585.3, 428.0  6/2/2010 - Present Atrial fibrillation--paroxysmal (Chronic) ICD-10-CM: I48.91 
ICD-9-CM: 427.31  6/2/2010 - Present 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 ICD-10-CM: J44.9 ICD-9-CM: 831  6/2/2010 - Present RESOLVED: Acute renal failure (ARF) (HCC) ICD-10-CM: N17.9 ICD-9-CM: 584.9  9/13/2017 - 3/29/2018 RESOLVED: Tachycardia ICD-10-CM: R00.0 ICD-9-CM: 785.0  5/9/2017 - 3/29/2018 RESOLVED: Anticoagulation monitoring, INR range 2-3 ICD-10-CM: Z79.01 
ICD-9-CM: V58.61  1/31/2017 - 3/29/2018 RESOLVED: Dizziness ICD-10-CM: B77 ICD-9-CM: 780.4  1/31/2017 - 4/17/2017 RESOLVED: Acute exacerbation of chronic obstructive pulmonary disease (COPD) ([facility] ICD-10-CM: J44.1 ICD-9-CM: 491.21  1/7/2015 - 1/31/2017 RESOLVED: SOB (shortness of breath) ICD-10-CM: R06.02 
ICD-9-CM: 786.05  9/22/2014 - 1/31/2017 RESOLVED: S/P cardiac catheterization ICD-10-CM: Z98.890 ICD-9-CM: V45.89  7/2/2014 - 1/31/2017 RESOLVED: CHF (congestive heart failure) (HCC) ICD-10-CM: I50.9 ICD-9-CM: 428.0  6/29/2014 - 1/31/2017 RESOLVED: HTN (hypertension) ICD-10-CM: I10 
ICD-9-CM: 401.9  10/29/2013 - 1/31/2017 RESOLVED: Type 2 diabetes mellitus without complication (HCC) [id number]: E11.9 ICD-9-CM: 250.00  7/5/2012 - 1/31/2017 RESOLVED: Cold intolerance ICD-10-CM: R68.89 ICD-9-CM: 780.99  7/25/2011 - 1/31/2017 RESOLVED: Anemia ICD-10-CM: D64.9 ICD-9-CM: 285.9  7/25/2011 - 3/29/2018 RESOLVED: S/P cardiac pacemaker procedure ICD-10-CM: Z95.0 ICD-9-CM: V45.01  2/25/2011 - 1/31/2017 Overview Signed 2/25/2011  2:06 PM by Tim Freed NP  
  2/25/11 [employer] dual chamber pacemaker implant RESOLVED: Cellulitis ICD-10-CM: L03.90 ICD-9-CM: 682.9  2/15/2011 - 11/2/2016 RESOLVED: Bradycardia hx ICD-10-CM: R00.1 ICD-9-CM: 427.89  7/20/2010 - 1/31/2017 RESOLVED: Peripheral neuropathy ICD-10-CM: G62.9 ICD-9-CM: 356.9  6/8/2010 - 1/31/2017 RESOLVED: Type 2 diabetes mellitus (HCC) (Chronic) ICD-10-CM: E11.9 ICD-9-CM: 250.00  6/2/2010 - 1/31/2017 Greater than 30 minutes were spent with the patient on counseling and coordination of care Signed:  
Ivana Gomez DO 
2/2/2021 
4:34 PM

## 2021-02-02 NOTE — PROGRESS NOTES
TRANSFER - OUT REPORT: 
 
Verbal report given to Molly Lara RN on Sherry Obregon  being transferred to [facility] for routine progression of care Report consisted of patients Situation, Background, Assessment and  
Recommendations(SBAR). Information from the following report(s) SBAR, Kardex, Procedure Summary, Intake/Output, MAR and Cardiac Rhythm Paced was reviewed with the receiving nurse. Lines:  
Peripheral IV 01/30/21 Left Forearm (Active) Site Assessment Clean, dry, & intact 02/01/21 0830 Phlebitis Assessment 0 02/01/21 0830 Infiltration Assessment 0 02/01/21 0830 Dressing Status Clean, dry, & intact 02/01/21 0830 Dressing Type Transparent 02/01/21 0830 Hub Color/Line Status Pink;Capped 02/01/21 0402 Action Taken Open ports on tubing capped 02/01/21 0402 Alcohol Cap Used Yes 02/01/21 0402 Opportunity for questions and clarification was provided. Patient transported with: 
 [employer]

## 2021-02-03 NOTE — PROGRESS NOTES
Patient was admitted to [facility] on 2021 and discharged on 2021 for acute renal failure. - patient admitted on  for lung mass Outreach made within 2 business days of discharge: Yes Top Discharge Challenges to be reviewed by the provider MODE - prerenal - f/u labs? - Lasix was d/c  
- SNF was recommended but patient refused Discussed COVID-19 related testing which was not done at this time. Test results were not done. Patient informed of results, if available? n/a Method of communication with provider : chart routing Advance Care Planning:  
Does patient have an Advance Directive:  yes; reviewed and current Inpatient Readmission Risk score: n/a Was this a readmission? yes Patient stated reason for the admission: BLE pain Patients top risk factors for readmission: lack of knowledge about disease and medical condition Interventions to address risk factors: see goals Care Transition Nurse (CTN) contacted the patient by telephone to perform post hospital discharge assessment. Verified name and  with patient as identifiers. Provided introduction to self, and explanation of the CTN role. CTN reviewed discharge instructions, medical action plan and red flags with patient who verbalized understanding. Patient given an opportunity to ask questions and does not have any further questions or concerns at this time. The patient agrees to contact the PCP office for questions related to their healthcare. Medication reconciliation was performed with patient, who verbalizes understanding of administration of home medications. Advised obtaining a 90-day supply of all daily and as-needed medications. Referral to Pharm D needed: no  
 
START taking these medications  
  Details  
cyclobenzaprine (FLEXERIL) 10 mg tablet Take 0.5 Tabs by mouth three (3) times daily as needed for Muscle Spasm(s). Qty: 9 Tab, Refills: 0 STOP taking these medications  
   
   oxyCODONE IR (ROXICODONE) 5 mg immediate release tablet Comments:  
Reason for Stopping:   
     
  furosemide (LASIX) 20 mg tablet Comments:  
Reason for Stopping:   
     
 
 
 
Home Health/Outpatient orders at discharge:resumption of care [address]: [address] [facility] Date of initial visit: scheduled to be 2/5 Durable Medical Equipment ordered at discharge: none DME: Nebulizer, Glucometer, BP Cuff, Top Dentures Covid Risk Education Patient has following risk factors of: heart failure, immunocompromised and diabetes. Education provided regarding infection prevention, and signs and symptoms of COVID-19 and when to seek medical attention with patient who verbalized understanding. Discussed exposure protocols and quarantine From CDC: Are you at higher risk for severe illness?  and given an opportunity for questions and concerns. The patient agrees to contact the COVID-19 hotline 800-583-2565 or PCP office for questions related to COVID-19. For more information on steps you can take to protect yourself, see CDC's How to Protect Yourself Patient/family/caregiver given information for [employer] and agrees to enroll no Discussed follow-up appointments. If no appointment was previously scheduled, appointment scheduling offered: yes [facility] follow up appointment(s):  
Future Appointments Date Time Provider Bobby Rios 2/5/2021  3:15 PM Kishore Ch MD [facility] AMB  
2/25/2021  1:30 PM Haroldo Steel MD [facility] AMB  
3/11/2021  8:15 AM Kishore Ch MD [facility] AMB  
5/20/2021  2:45 PM Lety Dawson MD [facility] AMB  
8/26/2021  9:15 AM PACEMAKER, RCAREENA [facility] AMB  
8/26/2021  9:40 AM Deborah Jimenez, ANP [facility] follow up appointment(s): patient to call Dr. Ernesto Swift to reschedule- patient missed f/u appt due to being in the hospital  
Plan for follow-up call in 7-10 days based on severity of symptoms and risk factors. CTN provided contact information for future needs. Goals  Prevent complications post hospitalization. 2/4/2021 - Spoke to patient today. Patient reports she is doing well and she is glad to be home. 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 Jian Andre office to reschedule as she missed last appt due to being in the hospital.  
- patient checks her glucose daily it was 119 today. - patient was d/c off Lasix. Patient is to weight daily, keep log and notify MD/CTN of gain greater then 3 lbs in a day or 5 lbs in a week. Patient will monitor for edema, SOB, chest pain - Reviewed red flag s/s with patient, nausea, vomiting, inability to pass urine/stool, SOB, mental status change, chest pain, fever.  
- dispatch health contact info provided  
- patient is able to ambulate around the house with a cane - encouraged patient to take multiple walks around the home daily. Patient will contact Md/CTN if red flag s/s arise. CTN to f/u ~ 7-10 days.  AR

## 2021-02-08 NOTE — TELEPHONE ENCOUNTER
Patient being treated for lung cancer and they took her off of the furesomide and wants to talk to someone about what she is suppose to now.       [phone]    Thanks  Dorota Griffith

## 2021-02-08 NOTE — TELEPHONE ENCOUNTER
Returned call,verified pt with two pt identifiers, pt advised she was taken off of her lasix. I advised when I  looked in our system a hospital visit showed she was taken off because of her kidney function. Pt seemed unaware of this, she thought it was because of her lung cancer. She is not having any swelling. She said it feels like it is swelling but is not. She notes some sob but could be from the lung procedure also. She wanted to know if she needed to continue with the lasix or not. Advised I would send a message to  and call her back. Pt verbalized understanding.

## 2021-02-08 NOTE — TELEPHONE ENCOUNTER
Message  Received: Today  Message Contents   Cristina Penn, NP sent to Brittany Londono LPN   Caller: Unspecified (Today,  9:59 AM)      [facility] note said stop lasix      Called pt,verified pt with two pt identifiers, advised pt that they recommend to stay off of Lasix for now since stopped for abnormal kidney function. Advised to f/u with PCP and call us if she has increased sob and swelling. Pt verbalized understanding.

## 2021-02-12 PROBLEM — C34.11 SMALL CELL LUNG CANCER, RIGHT UPPER LOBE (HCC): Status: ACTIVE | Noted: 2021-01-01

## 2021-02-12 NOTE — PROGRESS NOTES
Kings Neville is a 72 y.o. female who was seen by synchronous (real-time) audio-video technology on 2/12/2021 for Transitions Of Care [facility] ) and Other (DOXY. -[phone]) Assessment & Plan: ICD-10-CM ICD-9-CM 1. Encounter for support and coordination of transition of care  Z76.89 V65.8 2. Stage 3 chronic kidney disease, unspecified whether stage 3a or 3b CKD  N18.30 585.3 3. Type 2 diabetes mellitus with diabetic neuropathy, without long-term current use of insulin (HCC)  E11.40 250.60 SITagliptin (Januvia) 50 mg tablet  
  357.2 4. Type 2 diabetes mellitus with nephropathy (HCC)  E11.21 250.40 simvastatin (ZOCOR) 20 mg tablet 583.81   
5. Mixed hyperlipidemia  E78.2 272.2 simvastatin (ZOCOR) 20 mg tablet 6. Coronary artery disease involving native coronary artery of native heart without angina pectoris  I25.10 414.01 simvastatin (ZOCOR) 20 mg tablet 7. Small cell lung cancer, right upper lobe (HCC)  C34.11 162.3   
 
-improved 
-cont current regimen 
-f/u routine/prn 
712 Subjective:  
 
Pt s/p VATS with wedge resection rt upper lobe lung due to small cell ca 1/22/2-1/25/2021 and readmitted to hospital 1/30/2021-2/1/2021 w le  Pain that responded to muscle relaxants. She was found to have an elevated creatinine and her furosemide was d/c'ed by cards. Pt feels better. Back to her baseline. No complaints. PMH: 
Hypertensive heart and kidney disease-pt has no complaints, stable on med. Relays med compliance 
 -cardiologist Dr. Tanja Abel BP Readings from Last 3 Encounters:  
09/30/20 (!) 111/48  
07/30/20 90/62  
11/12/19 110/55 CAD s/p stent-noted CHF-systolic and diastolic-latest KW~59% Wt Readings from Last 3 Encounters:  
09/30/20 159 lb (72.1 kg) 07/30/20 167 lb 8 oz (76 kg) 06/02/20 165 lb (74.8 kg) CKD 3-noted Lab Results Component Value Date/Time  GFR est AA 51 (L) 02/01/2021 04:05 AM  
 GFR est non-AA 42 (L) 02/01/2021 04:05 AM  
 Creatinine 1.27 (H) 02/01/2021 04:05 AM  
 BUN 10 02/01/2021 04:05 AM  
 Sodium 138 02/01/2021 04:05 AM  
 Potassium 4.4 02/01/2021 04:05 AM  
 Chloride 109 (H) 02/01/2021 04:05 AM  
 CO2 24 02/01/2021 04:05 AM  
 
COPD-quiescent S/p pacemaker-noted Hx PAF s/p ablation-on eliquis Type 2 DM w nephropathy-diet controlled Lab Results Component Value Date/Time Hemoglobin A1c 6.5 (H) 06/22/2020 08:43 AM  
 Hemoglobin A1c (POC) 6.6 09/30/2020 08:19 AM  
 
Lab Results Component Value Date/Time Glucose 151 (H) 06/22/2020 08:43 AM  
 Glucose (POC) 156 (H) 03/28/2019 11:44 AM  
 Glucose  09/30/2020 08:17 AM  
 
 
Hyperlipidemia-  low dose statin Lab Results Component Value Date/Time Cholesterol, total 156 06/22/2020 08:43 AM  
 Cholesterol (POC) 168 03/29/2018 08:24 AM  
 HDL Cholesterol 35 (L) 06/22/2020 08:43 AM  
 HDL Cholesterol (POC) n/a 03/29/2018 08:24 AM  
 LDL Cholesterol (POC) n/a 03/29/2018 08:24 AM  
 LDL, calculated 94 06/22/2020 08:43 AM  
 VLDL, calculated 27 06/22/2020 08:43 AM  
 Triglyceride 137 06/22/2020 08:43 AM  
 Triglycerides (POC) )650 03/29/2018 08:24 AM  
 CHOL/HDL Ratio 6.4 (H) 07/01/2014 03:10 AM  
 
Prior to Admission medications Medication Sig Start Date End Date Taking? Authorizing Provider SITagliptin (Januvia) 50 mg tablet Take 1 Tab by mouth daily. 2/12/21  Yes Walker Blackwood MD  
simvastatin (ZOCOR) 20 mg tablet TAKE 1 TABLET BY MOUTH NIGHTLY 2/12/21  Yes Walker Blackwood MD  
calcium-vitamin D (OS-ROSA MARIA +D3) 500 mg-200 unit per tablet Take 1 Tab by mouth daily (with breakfast) for 30 days.  Indications: low amount of calcium in the blood 1/26/21 2/25/21 Yes Jeanette Tobin NP  
gabapentin (NEURONTIN) 800 mg tablet TAKE 1 TABLET BY MOUTH THREE TIMES DAILY 12/29/20  Yes Robin Rees., NP  
budesonide-formoteroL (Symbicort) 160-4.5 mcg/actuation HFAA INHALE ONE PUFF BY MOUTH TWICE DAILY 11/24/20  Yes Walker Blackwood MD  
 carvediloL (COREG) 6.25 mg tablet TAKE 1 TABLET BY MOUTH TWICE DAILY WITH MEALS 11/24/20  Yes Luiza Parson MD  
tiotropium (Spiriva with HandiHaler) 18 mcg inhalation capsule INHALE THE CONTENTS OF 1 CAPSULE THROUGH HANDIHALER DEVICE DAILY 11/24/20  Yes Luiza Parson MD  
albuterol (PROVENTIL HFA, VENTOLIN HFA, PROAIR HFA) 90 mcg/actuation inhaler Take 2 Puffs by inhalation every four (4) hours as needed for Wheezing. 11/24/20  Yes Luiza Parson MD  
colchicine 0.6 mg tablet TAKE 2 TABLETS BY MOUTH ONCE DAILY 11/20/20  Yes Luiza Parson MD  
diclofenac (VOLTAREN) 1 % gel Apply 4 g to affected area four (4) times daily as needed for Pain. 11/11/20  Yes Luiza Parson MD  
Eliquis 5 mg tablet Take 1 tablet by mouth twice daily 10/15/20  Yes Shavonne Overton NP  
albuterol (PROVENTIL VENTOLIN) 2.5 mg /3 mL (0.083 %) nebu 3 mL by Nebulization route every four (4) hours as needed for Wheezing. 5/7/20  Yes Luiza Parson MD  
albuterol-ipratropium (DUO-NEB) 2.5 mg-0.5 mg/3 ml nebu 3 mL by Nebulization route every four (4) hours as needed (wheezing). 11/12/19  Yes Jorge Lofton DO  
cod liver oil cap Take 1 Cap by mouth daily.   Yes Provider, Historical  
cyclobenzaprine (FLEXERIL) 10 mg tablet Take 0.5 Tabs by mouth three (3) times daily as needed for Muscle Spasm(s). 2/2/21   Jessica Zhou DO  
simvastatin (ZOCOR) 20 mg tablet TAKE 1 TABLET BY MOUTH NIGHTLY 11/24/20 2/12/21  Luiza Parson MD  
SITagliptin (Januvia) 50 mg tablet Take 1 Tab by mouth daily. 11/24/20 2/12/21  Luiza Parson MD  
glucose blood VI test strips (BLOOD GLUCOSE TEST) strip Use BID Dx11.9 1/24/20   Luiza Parson MD  
lancets misc Use BID 1/24/20   Luiza Parson MD  
acetaminophen (TYLENOL) 325 mg tablet Take 2 Tabs by mouth every four (4) hours as needed for Pain or Fever. 3/28/19   Alen Castaneda MD  
 guaiFENesin ER (MUCINEX) 600 mg ER tablet Take 1 Tab by mouth two (2) times a day. Patient taking differently: Take 600 mg by mouth as needed. 3/28/19   Kwesi Dubon MD  
benzocaine-menthol (CHLORASEPTIC MAX) 15-10 mg lozg lozenge Take 1 Lozenge by mouth every four to six (4-6) hours as needed for Sore throat. 3/28/19   Kwesi Dubon MD  
 
Review of systems (12) negative, except noted above. Objective: No flowsheet data found. General: alert, cooperative, no distress Mental  status: normal mood, behavior, speech, dress, motor activity, and thought processes, able to follow commands HENT: NCAT Neck: no visualized mass Resp: no respiratory distress Neuro: no gross deficits Skin: no discoloration or lesions of concern on visible areas Psychiatric: normal affect, consistent with stated mood, no evidence of hallucinations We discussed the expected course, resolution and complications of the diagnosis(es) in detail. Medication risks, benefits, costs, interactions, and alternatives were discussed as indicated. I advised her to contact the office if her condition worsens, changes or fails to improve as anticipated. She expressed understanding with the diagnosis(es) and plan. Martine Dickerson, who was evaluated through a patient-initiated, synchronous (real-time) audio-video encounter, and/or her healthcare decision maker, is aware that it is a billable service, with coverage as determined by her insurance carrier. She provided verbal consent to proceed: Yes, and patient identification was verified. It was conducted pursuant to the emergency declaration under the [facility], [address] [place] [address] authority and the [employer] Act. A caregiver was present when appropriate. Ability to conduct physical exam was limited. I was at home. The patient was at home.  
 
 
Jay Jack MD

## 2021-02-12 NOTE — PROGRESS NOTES
Chief Complaint  
Patient presents with  
• Transitions Of Care  
  [facility]   
• Other  
  DOXY.-[phone]  
 
Pt reports no pain at this time 
 
1. Have you been to the ER, urgent care clinic since your last visit?  Hospitalized since your last visit?Yes When: 01/30/21  Where: [facility] Reason for visit: ACR 
 
2. Have you seen or consulted any other health care providers outside of the [facility] since your last visit?  Include any pap smears or colon screening. No

## 2021-02-23 NOTE — PROGRESS NOTES
Martina Huntley is a 72 y.o. female here for 8 week follow up for lung cancer. 1. Have you been to the ER, urgent care clinic since your last visit? Hospitalized since your last visit? 1/30/21 - 2/1/21 for lower extremity pain. (MODE, elevated D-Dimer) 2. Have you seen or consulted any other health care providers outside of the [address] [place] [address] since your last visit? Include any pap smears or colon screening. no 
 
Pt had procedure on: 1/22/21 RIGHT VATS, RIGHT UPPER LOBE WEDGE RESECTION WITH MEDIASTINAL LYMPH NODE DISSECTION Pt states she is doing well. No complaints.

## 2021-02-25 NOTE — LETTER
2/28/2021 Patient: Martina Huntley YOB: 1950 Date of Visit: 2/25/2021 Amadeo Durant MD 
[address] [address] [address] [employer] Dear Amadeo Durant MD, Thank you for referring Ms. Betty Elias to [address] [place] [address] for evaluation. My notes for this consultation are attached. If you have questions, please do not hesitate to call me. I look forward to following your patient along with you.  
 
 
Sincerely, 
 
Maria De Jesus Field MD

## 2021-02-25 NOTE — PROGRESS NOTES
[facility] [place] 
at [facility] 
[address] [facility], [facility], [address] 
[place] [address] 
[phone] 
 
 
   Oncology Follow Up Note 
 
 
Patient: Celine Smith MRN: [id number]  SSN: [id number]   
YOB: 1950  Age: 70 y.o.  Sex: female   
 
Subjective:  
  
Celine Smith is a 70 y.o. female who I am seeing for a diagnosis of RUL lung carcinoma. She has a 40 pack years of cigarette smoking history. She underwent a screening CT of the chest requested by Dr. Rhodes. This showed a RLL lung mass. PET shows no distant disease. She underwent a RLL wedge resection. The pathology shows small cell lung carcinoma. There is a focal are of visceral pleural involvement. She comes in to discuss the role of adjuvant treatment.  
 
 
Review of Systems: 
 
Constitutional: negative 
Eyes: negative 
Ears, Nose, Mouth, Throat, and Face: negative 
Respiratory: negative 
Cardiovascular: negative 
Gastrointestinal: negative 
Genitourinary:negative 
Integument/Breast: negative 
Hematologic/Lymphatic: negative 
Musculoskeletal:negative 
Neurological: negative 
 
 
 
Past Medical History:  
Diagnosis Date  
• Arthritis   
 left shoulder  
• Atrial fibrillation ([facility]) 6/2/2010  
 Dr. Dawson  
• CAD (coronary artery disease)   
 stent; Dr. Dawson  
• Cancer (HCC)   
 lung  
• Celiac disease   
• Chronic diastolic heart failure ([facility]) 09/22/2014  
 Dr. Dawson  
• Chronic kidney disease   
 per cardio note  
• Chronic pain   
 left thigh:  L SFA intervention by Dr. Weiss 07/2018, as of 9/6/18:  still causing pain, pt to have MILLA checked per Dr. Weiss note  
• Chronic systolic HF (heart failure) ([facility]) 5/10/2017  
 4/2017 EF 25-30%  
• Congestive heart failure (CHF) ([facility])   
• COPD (chronic obstructive pulmonary disease) ([facility]) 6/2/2010  
 Dr. Neil   
• Diabetes ([facility])   
 Dr. ANU Parson; no current medication per pt  
• Emphysema, unspecified ([facility])   
  Dr. Kira Alcala  Fibroid PT STATES NOT HAVING FIBROIDS  Frequent falls   Gout  Heart failure ([facility] Dr. Adarsh Pacheco  History of Clostridium difficile infection 5/10/2017  
 2017 CDiff positive  Hypertension 2010 Dr. Richi Beatty  Hypertensive heart and chronic kidney disease   
 per PCP note  Hypotension 5/10/2017  Junctional tachycardia ([facility] 5/10/2017 Dr. Adarsh Pacheco  Neuropathy  NIDDM (non-insulin dependent diabetes mellitus) 2010  PAD (peripheral artery disease) ([facility]  S/P ablation of atrial fibrillation 2018  Screening mammogram 5/4/10  
 SOB (shortness of breath) 2014 Dr. Kira Alcala  SSS (sick sinus syndrome) ([facility]   
 per pacemaker form 18  Weakness   
 per pt weakness from c-diff , mulitple falls with injury to rotator cuff Past Surgical History:  
Procedure Laterality Date  COLONOSCOPY N/A 2017 COLONOSCOPY with biopsy performed by Jannette Turcios MD at [facility] AMBULATORY OR  
 HX AFIB ABLATION  2018  
 has had 2 ablations per patient [address]  HX HEART CATHETERIZATION  2018  HX HEENT    
 HX OTHER SURGICAL    
 adrenal gland removed  HX PACEMAKER Left [employer] model: Zohreh Sartorius  HX ROTATOR CUFF REPAIR Right  HX WISDOM TEETH EXTRACTION X2  
 DE CARDIAC SURG PROCEDURE UNLIST    
 3 cardiac stent placed  DE EXCISE ADRENAL GLAND Right 1994  VASCULAR SURGERY PROCEDURE UNLIST  2018 Left SFA intervention ; Dr. Evert Merrill Family History Problem Relation Age of Onset  Heart Disease Mother  Diabetes Father  Heart Disease Brother  Other Son MURDERED Social History Tobacco Use  Smoking status: Former Smoker Packs/day: 0.50 Years: 42.00 Pack years: 21.00 Types: Cigarettes Start date: 5 Quit date: 2009 Years since quittin.1  Smokeless tobacco: Never Used Substance Use Topics  Alcohol use: No  
  
Prior to Admission medications Medication Sig Start Date End Date Taking? Authorizing Provider  
cholecalciferol, vitamin D3, (VITAMIN D3 PO) Take  by mouth. With calcium   Yes Provider, Historical  
lidocaine-prilocaine (EMLA) topical cream Apply  to affected area as needed for Pain. Apply to port site 30-45 min prior to treatments 2/25/21  Yes Kedar Awad MD  
prochlorperazine (Compazine) 10 mg tablet Take 1 Tab by mouth every six (6) hours as needed for Nausea or Vomiting for up to 60 days. 2/25/21 4/26/21 Yes Kedar Awad MD  
ondansetron (ZOFRAN ODT) 4 mg disintegrating tablet Take 1 Tab by mouth every eight (8) hours as needed for Nausea or Vomiting. 2/25/21  Yes Kedar Awad MD  
SITagliptin (Januvia) 50 mg tablet Take 1 Tab by mouth daily. 2/12/21  Yes Nancy Plummer MD  
simvastatin (ZOCOR) 20 mg tablet TAKE 1 TABLET BY MOUTH NIGHTLY 2/12/21  Yes Nancy Plummer MD  
cyclobenzaprine (FLEXERIL) 10 mg tablet Take 0.5 Tabs by mouth three (3) times daily as needed for Muscle Spasm(s). 2/2/21  Yes Jessica Zhou,   
calcium-vitamin D (OS-ROSA MARIA +D3) 500 mg-200 unit per tablet Take 1 Tab by mouth daily (with breakfast) for 30 days.  Indications: low amount of calcium in the blood 1/26/21 2/25/21 Yes Jenniffer Jimenes NP  
gabapentin (NEURONTIN) 800 mg tablet TAKE 1 TABLET BY MOUTH THREE TIMES DAILY 12/29/20  Yes Sue Kramer NP  
budesonide-formoteroL (Symbicort) 160-4.5 mcg/actuation HFAA INHALE ONE PUFF BY MOUTH TWICE DAILY 11/24/20  Yes Nancy Plummer MD  
carvediloL (COREG) 6.25 mg tablet TAKE 1 TABLET BY MOUTH TWICE DAILY WITH MEALS 11/24/20  Yes Nancy Plummer MD  
tiotropium (Spiriva with HandiHaler) 18 mcg inhalation capsule INHALE THE CONTENTS OF 1 CAPSULE THROUGH HANDIHALER DEVICE DAILY 11/24/20  Yes Nancy Plummer MD  
albuterol (PROVENTIL HFA, VENTOLIN HFA, PROAIR HFA) 90 mcg/actuation inhaler Take 2 Puffs by inhalation every four (4) hours as needed for Wheezing. 11/24/20  Yes Clint Sanchez MD  
colchicine 0.6 mg tablet TAKE 2 TABLETS BY MOUTH ONCE DAILY 11/20/20  Yes Clint Sanchez MD  
diclofenac (VOLTAREN) 1 % gel Apply 4 g to affected area four (4) times daily as needed for Pain. 11/11/20  Yes Clint Sanchez MD  
Eliquis 5 mg tablet Take 1 tablet by mouth twice daily 10/15/20  Yes Gilbert Overton NP  
albuterol (PROVENTIL VENTOLIN) 2.5 mg /3 mL (0.083 %) nebu 3 mL by Nebulization route every four (4) hours as needed for Wheezing. 5/7/20  Yes Clint Sanchez MD  
glucose blood VI test strips (BLOOD GLUCOSE TEST) strip Use BID Dx11.9 1/24/20  Yes Clint Sanchez MD  
lancets misc Use BID 1/24/20  Yes Clint Sanchez MD  
albuterol-ipratropium (DUO-NEB) 2.5 mg-0.5 mg/3 ml nebu 3 mL by Nebulization route every four (4) hours as needed (wheezing). 11/12/19  Yes Rico Gaona DO  
acetaminophen (TYLENOL) 325 mg tablet Take 2 Tabs by mouth every four (4) hours as needed for Pain or Fever. 3/28/19  Yes Kimmie Dallas MD  
guaiFENesin ER (MUCINEX) 600 mg ER tablet Take 1 Tab by mouth two (2) times a day. Patient taking differently: Take 600 mg by mouth as needed. 3/28/19  Yes Kimmie Dallas MD  
benzocaine-menthol (CHLORASEPTIC MAX) 15-10 mg lozg lozenge Take 1 Lozenge by mouth every four to six (4-6) hours as needed for Sore throat. 3/28/19  Yes Kimmie Dallas MD  
cod liver oil cap Take 1 Cap by mouth daily. Yes Provider, Historical  
  
 
 
 
 
Allergies Allergen Reactions  Crestor [Rosuvastatin] Myalgia  Levaquin [Levofloxacin] Nausea Only GI Upset  Lipitor [Atorvastatin] Diarrhea  Lyrica [Pregabalin] Myalgia Objective:  
 
Vitals:  
 02/25/21 1340 BP: 95/63 Pulse: 78 Temp: 98.2 °F (36.8 °C) SpO2: 93% Weight: 167 lb (75.8 kg) Height: 5' 6\" (1.676 m) Physical Exam: 
 
GENERAL: alert, cooperative, no distress, appears stated age EYE: conjunctivae/corneas clear. PERRL, EOM's intact LYMPHATIC: Cervical, supraclavicular, and axillary nodes normal.  
THROAT & NECK: normal and no erythema or exudates noted. LUNG: clear to auscultation bilaterally HEART: regular rate and rhythm, S1, S2 normal, no murmur, click, rub or gallop ABDOMEN: soft, non-tender. Bowel sounds normal. No masses,  no organomegaly EXTREMITIES:  extremities normal, atraumatic, no cyanosis or edema SKIN: Normal. 
NEUROLOGIC: AOx3. Gait normal. Reflexes and motor strength normal and symmetric. Cranial nerves 2-12 and sensation grossly intact. Physical exam and ROS has been modified from a prior visit to make it relevant and current Assessment: 1. Small cell lung carcinoma RLL 
 T4 N0 Focal pleural involevement ECOG PS 1 Prognosis: Guarded This is our best current assessment. Cancers respond differently to treatment. Overall prognosis depends on many factors including other conditions, cancer stage, side effects, and other unforeseen events. Goal of therapy: Curative Expected response to treatment:  Intermediate: Anticipate cancer shrinking or slowed growth but not remission Treatment benefits and harms:  We discussed potential short term side effects to include:GI upset, anemia, alopecia and fatigue Long term side effects of treatment:  neuropathy Quality of life: Quality of life concerns have been addressed. Treatment as outlined is expected to have moderate impact on patients quality of life. S/P RLL wedge resection 01/22/2021 The disease carries a high risk of recurrence. I recommend adjuvant chemotherapy with 4 cycles of Carboplatin/Etoposide. The duration of this treatment plan will be until 12 weeks, intolerable side effects, or patient choice. Patient will be meeting with navigation services to discuss any financial barriers to care/estimated cost of care.  The patient's emotional well being was addressed during this office visit and patient seems to be coping well with the diagnosis and the treatment. Common Side Effects of Chemotherapy Decreased Blood Counts Your blood counts can decrease temporarily due to chemotherapy, they will recover over time. This is an expected side effect that your Doctor will be monitoring. 
- If you experience fevers (temperature >100.4°F), bleeding or unexplained bruising, please call the office right away Risk of Infection Your white blood cells can decrease temporarily due to chemotherapy and can put you at higher risk of infection. Washing hands frequently with soap and avoiding sick contacts can reduce your risk of infection. 
- If you experience fevers (temperature >100.4°F), shaking chills, or any signs of infection, please call the office immediately Anemia Chemotherapy can cause your red blood cells to temporarily decrease; this is an expected side effect that your Doctor will be monitoring. 
- You may experience fatigue if this occurs, please notify the office if you experience bleeding, shortness of breath with minimal exertion or at rest, rapid heartbeat, or feeling as though you may lose consciousness. Hair Loss Chemotherapy can affect your hair follicles and cause you to lose hair. This can occur on your scalp hair but also all over your body including eyebrows and eye lashes Nausea  You have been prescribed nausea medication to take if needed. Please follow the directions given to you by your Doctor. - Please call the office if the medications you have been given are not relieving nausea. Vomiting Make sure you are taking anti-nausea medication as prescribed. Eating small amounts of bland foods frequently can help. - Please call the office right away if you are vomiting more than 4 times per day or are unable to keep down food or fluids Diarrhea Eating small amounts of bland foods frequently can help, increase your fluid intake.   It is usually ok to take Imodium for diarrhea. - Please call the office right away if you experience more than 4 episodes of watery diarrhea or if you are feeling dehydrated. You can reach Medical Oncology at [facility] with further questions or concerns at: [phone] 
- Calls during normal business hours will reach our office. 
- Calls after hours or on the weekend will reach an answering service and the on-call Oncologist will return your call. Plan: 1. Brain MRI 2. Port-a-cath placement 3. Start Carbo/Etoposide in the next 2-3 weeks I performed a history and physical examination of the patient and discussed his management with the NPP. I reviewed the NPP note and agree with the documented findings and plan of care. The patient was seen in conjunction with Ms. Bingham. Ms. Anupama Burroughs is women with small SCLC of the lung. She underwent a RLL wedge resection. She is doing well. Her exam looks normal. I plan to administer 4 cycles of platinum based chemotherapy to reduce the risk of recurrence. Signed by: Adeola Johnson MD 
                   February 28, 2021 
 
 
CC. Joan Nieves MD 
CC.  Damon Hudson MD

## 2021-02-26 NOTE — PROGRESS NOTES
Oncology Social Work  Psychosocial Assessment    Reason for Assessment:      [] Social Work Referral [x] Initial Assessment [x] New Diagnosis [] Other    Advance Care Planning:  Advance Care Planning 1/14/2021   Patient's Luci Hughes is: -   Primary Decision Maker Name -   Primary Decision Maker Phone Number -   Primary Decision Maker Relationship to Patient -   Secondary Decision [address] Name -   Secondary Decision [address] Phone Number -   Secondary Decision Maker Relationship to Patient -   Confirm Advance Directive Yes, not on file   Patient Would Like to Complete Advance Directive -       Sources of Information:    [x]Patient  []Family  [x]Staff  [x]Medical Record    Mental Status:    [x]Alert  []Lethargic  []Unresponsive   [] Unable to assess   Oriented to:  [x]Person  [x]Place  [x]Time  [x]Situation      Relationship Status:  []Single  []  []Significant Other/Life Partner  []  []  [x]    Living Circumstances:  []Lives Alone  [x]Family/Significant Other in Household  []Roommates  []Children in the Home  []Paid Caregivers  []Assisted Living Facility/Group [address]  []Homeless  []Incarcerated  []Environmental/Care Concerns  []Other:    Employment Status:  []Employed Full-time []Employed Part-time []Homemaker  [x] Retired [] Short-Term Disability [] Long-Term Disability  [] Unemployed     Barriers to Learning:    []Language  []Developmental  []Cognitive  []Altered Mental Status  []Visual/Hearing Impairment  []Unable to Read/Write  []Motivational  [] Challenges Understanding Medical Jargon [x]No Barriers Identified      Financial/Legal Concerns:    []Uninsured  [x]Limited Income/Resources  []Non-Citizen  []Food Insecurity []No Concerns Identified   []Other:    Jain/Spiritual/Existential:  Does patient have any spiritual or Druze beliefs? [x] Yes [] No  Is the patient involved in a spiritual, bernardo or Druze community?  [x] Yes [] No  Patient expressing spiritual/existential angst? [] Yes [x] No  Notes:    Support System:    Identified Support Person/Group:  []Strong  [x]Fair  []Limited    Coping with Illness:   []  Coping Well  [] Challenges Coping with Serious Illness [] Situational Depression [x] Situational Anxiety [] Anticipatory Grief  [] Recent Loss [] Caregiver Bunker            Narrative:    Met with patient  to introduce social work navigator role and supports. Pt  since [facility] and son was murdered in [facility]. 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 PT belongs to [address] [place] [address] and her brother who has CP Stone, 72 yo lives with her. Pt has 1 [employer], 3 grandchildren and 6 great grandchildren. Plan:   1. Introduce self and role of the  in the [address] [place] [address] Dr. 2. Informed the patient of the [facility] and available resources there. 3. Continue to meet with the patient when she returns to the clinic for ongoing assessment of the patient's adjustment to her diagnosis and treatment. 4. Ongoing psychosocial support as desired by patient. Referral:   Complementary therapies referral  Insurance/Entitlements referral  Financial/Medication assistance referral       Estevan Porter.  RONEL Parker/JASON  Supervisee in Social Work

## 2021-03-01 NOTE — TELEPHONE ENCOUNTER
Patient has lung cancer and is informing office     Believe all cancer has been removed but will have 4 rounds of chemo.   Chemo has not started yet but will let us know    [phone]    Thanks  Lucy Bhatti

## 2021-03-01 NOTE — TELEPHONE ENCOUNTER
Tried to reach pt. JOVANY we received her message, advised we are keeping her in our thoughts and prayers. Call back with any questions, concerns.

## 2021-03-02 NOTE — PROGRESS NOTES
Patient has graduated from the Transitions of Care Coordination  program on 03/02/21 Patient/family has the ability to self-manage at this time Care management goals have been completed. Patient was not referred to the [facility] team for further management. Goals Addressed This Visit's Progress  COMPLETED: Prevent complications post hospitalization. 03/02/21 
- patient needs MRI and port placed but it will take  her about a week to get scheduled due to needing info from cardiology on pacemaker. She will start chemo and have 12 week course for RUL lung cancer - patient saw PCP on 2/12 and oncology on 2/25 
- patient reports her glucose was 120 today which is normal today, she reports it has ranged from 115-125 
- patient reports a weight gain of 5 lbs over a course of 2 weeks, no edema, no SOB, no chest pain. Reminded patient when to contact MD for weight gain. She states that she has not contacted cardiology for weight gain since it was slow, message sent to Dr. Damian Joyce to inform of weight gain 
- she has not contacted dr Dionte Dasilva due to other appointments, patient stated that she will once she gets port placed, patient discouraged that port placement delayed by about a week b/c she is ready to start chemo. - patient reports no questions or concerns at this time. AR 
 
2/4/2021 - Spoke to patient today. Patient reports she is doing well and she is glad to be home. 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 Hernandez Espinosa office to reschedule as she missed last appt due to being in the hospital.  
- patient checks her glucose daily it was 119 today. - patient was d/c off Lasix. Patient is to weight daily, keep log and notify MD/CTN of gain greater then 3 lbs in a day or 5 lbs in a week. Patient will monitor for edema, SOB, chest pain - Reviewed red flag s/s with patient, nausea, vomiting, inability to pass urine/stool, SOB, mental status change, chest pain, fever.  
- dispatch health contact info provided  
- patient is able to ambulate around the house with a cane - encouraged patient to take multiple walks around the home daily. Patient will contact Md/CTN if red flag s/s arise. CTN to f/u ~ 7-10 days. AR Patient has Care Transition Nurse's contact information for any further questions, concerns, or needs. Patients upcoming visits:   
Future Appointments Date Time Provider Bobby Rios 3/11/2021  8:15 AM Julia Donahue MD [facility] BS AMB  
5/20/2021  2:45 PM Lety Dawson MD [facility] BS AMB  
8/26/2021  9:15 AM PACEMAKER, RCAREENA [facility] BS AMB  
8/26/2021  9:40 AM Deborah Jimenez, ANP [facility] BS AMB

## 2021-03-03 NOTE — TELEPHONE ENCOUNTER
Called patient back. Advised her, as per Dr. Felipe Peck, that she does not need to come into the office for follow-up. The patient verbalized understanding.

## 2021-03-03 NOTE — TELEPHONE ENCOUNTER
Patient called re her having to start chemo and that she was told she will not be able to share a bathroom with anyone at her home. Has questions.

## 2021-03-03 NOTE — ADDENDUM NOTE
Addendum  created 03/03/21 1043 by Fanny Melgar CRNA Intraprocedure Meds edited, Orders acknowledged in Narrator

## 2021-03-03 NOTE — TELEPHONE ENCOUNTER
Patient called asking if she needed to come in to see Dr. Alcocer? She had surgery with him on 1/22 and stated that she went back into the hospital before she had her post-op appointment. She stated that she has seen Dr. Cosme Lowe (oncology) who is going to be starting chemo for her. She has no c/o pain. She stated that she is doing fine, considering. .. I advised the patient that I would get in touch with Dr. Alcocer, the call her back with his recommendation. The patient verbalized understanding.

## 2021-03-03 NOTE — TELEPHONE ENCOUNTER
Returned call to pt. HIPAA verified by two patient identifiers. Informed of the elimination/half life of carbo/etop. Flush toilet with lid down. lysol toilet. Concern is for people people of childbearing age mostly. Pt stated understanding and thanked me for the call and verified she has her medications at home we ordered.

## 2021-03-05 NOTE — PROGRESS NOTES
Discharge instructions reviewed with patient with good understanding. Hard copy of discharge instructions signed and copy given to patient upon leaving. IV removed. Skin glue to right upper chest remains dry and intact. Patient taken to car via wheelchair with nurse at side.

## 2021-03-05 NOTE — H&P
Interventional and Vascular Radiology History and Physical    Patient: Ashely Campbell 72 y.o. female       Chief Complaint: No chief complaint on file.       History of Present Illness: chemotherapy     History:    Past Medical History:   Diagnosis Date    Arthritis     left shoulder    Atrial fibrillation ([address] 6/2/2010    Dr. Damian Joyce    CAD (coronary artery disease)     stent; Dr. Ja Mujica [facility])     lung    Celiac disease     Chronic diastolic heart failure ([address] 09/22/2014    Dr. Damian Joyce    Chronic kidney disease     per cardio note    Chronic pain     left thigh:  L SFA intervention by Dr. Marie Hardy 07/2018, as of 9/6/18:  still causing pain, pt to have MILLA checked per Dr. Marie Hardy note    Chronic systolic HF (heart failure) ([facility] 5/10/2017    4/2017 EF 25-30%    Congestive heart failure (CHF) ([facility]     COPD (chronic obstructive pulmonary disease) ([facility] 6/2/2010    Dr. Petr Espino     Diabetes [facility])     Dr. Joe Leavitt; no current medication per pt    Emphysema, unspecified ([facility]     Dr. Jhonatan Taveras Frequent falls 2017    Gout     Heart failure ([facility]     Dr. Santi Vázquez History of Clostridium difficile infection 5/10/2017    4/2017 CDiff positive    Hypertension 6/2/2010    Dr. Sabina Gay Hypertensive heart and chronic kidney disease     per PCP note    Hypotension 5/10/2017    Junctional tachycardia ([address] 5/10/2017    Dr. Damian Joyce    Neuropathy     NIDDM (non-insulin dependent diabetes mellitus) 6/2/2010    PAD (peripheral artery disease) ([address]     S/P ablation of atrial fibrillation 03/2018    Screening mammogram 5/4/10    SOB (shortness of breath) 09/22/2014    Dr. Amelia Henry (sick sinus syndrome) ([facility]     per pacemaker form 9/6/18    Weakness     per pt weakness from c-diff 2017, mulitple falls with injury to rotator cuff     Family History   Problem Relation Age of Onset    Heart Disease Mother     Diabetes Father  Heart Disease Brother     Other Son         MURDERED      Social History     Socioeconomic History    Marital status:      Spouse name: Not on file    Number of children: Not on file    Years of education: Not on file    Highest education level: Not on file   Occupational History    Not on file   Social Needs    Financial resource strain: Not on file    Food insecurity     Worry: Not on file     Inability: Not on file   [employer] needs     Medical: Not on file     Non-medical: Not on file   Tobacco Use    Smoking status: Former Smoker     Packs/day: 0.50     Years: 42.00     Pack years: 21.00     Types: Cigarettes     Start date: 5     Quit date: 2009     Years since quittin.1    Smokeless tobacco: Never Used   Substance and Sexual Activity    Alcohol use: No    Drug use: No    Sexual activity: Not Currently   Lifestyle    Physical activity     Days per week: Not on file     Minutes per session: Not on file    Stress: Not on file   Relationships    Social connections     Talks on phone: Not on file     Gets together: Not on file     Attends Jewish service: Not on file     Active member of club or organization: Not on file     Attends meetings of clubs or organizations: Not on file     Relationship status: Not on file    Intimate partner violence     Fear of current or ex partner: Not on file     Emotionally abused: Not on file     Physically abused: Not on file     Forced sexual activity: Not on file   Other Topics Concern    Not on file   Social History Narrative    Not on file       Allergies:    Allergies   Allergen Reactions    Crestor [Rosuvastatin] Myalgia    Levaquin [Levofloxacin] Nausea Only     GI Upset    Lipitor [Atorvastatin] Diarrhea    Lyrica [Pregabalin] Myalgia       Current Medications:  Current Facility-Administered Medications   Medication Dose Route Frequency    fentaNYL citrate (PF) injection 100 mcg  100 mcg IntraVENous Rad Multiple  0.9% sodium chloride infusion  25 mL/hr IntraVENous CONTINUOUS    midazolam (VERSED) injection 5 mg  5 mg IntraVENous Rad Multiple        Physical Exam:  Blood pressure (!) (P) 95/51, pulse (P) 81, temperature 98 °F (36.7 °C), resp. rate (P) 10, height 5' 6\" (1.676 m), weight 77.1 kg (170 lb), SpO2 (P) 97 %, not currently breastfeeding. LUNG: clear to auscultation bilaterally, HEART: regular rate and rhythm, S1, S2 normal, no murmur, click, rub or gallop      Alerts:    Hospital Problems  Date Reviewed: 1/22/2021    None          Laboratory:    No results for input(s): HGB, HCT, WBC, PLT, INR, BUN, CREA, K, CRCLT, HGBEXT, HCTEXT, PLTEXT, INREXT in the last 72 hours. No lab exists for component: PTT, PT      Plan of Care/Planned Procedure:  Risks, benefits, and alternatives reviewed with patient and she agrees to proceed with the procedure. Conscious sedation will be performed with IV fentanyl and versed.  Plan is for chest port placement       Lee Rodriguez MD

## 2021-03-05 NOTE — PROGRESS NOTES
Pt arrives ambulatory to angio department accompanied by self for [facility] placement procedure. All assessments completed and consent was reviewed. Education given was regarding procedure, conscious sedation, post-procedure care and  management/follow-up. Opportunity for questions was provided and all questions and concerns were addressed. Dr. Spencer Plum in to talk with patient about procedure. Consent obtained and signed for tunneled central catheter placement with conscious sedation.

## 2021-03-05 NOTE — DISCHARGE INSTRUCTIONS
[address] [facility]   Angiography Department  [phone]      Radiologist:     Dr. Javid Araya    Date:       3/5/2021      Portacath Discharge Instructions      Watch for signs of infection:    1. Redness,   2. Fever, chills,   3. Increased pain, and/or drainage from the site. If this occurs, call your physician at once. General Instructions:   A port is like an implanted IV. They are usually ordered for patients who will be getting chemotherapy, but can also be used as an IV for long term antibiotics, large amounts of fluids, and/or blood products. Your blood can be drawn from your port for labs also. Those patients who do not have good veins find the ports convenient as they can get the IV they need with one stick. The port can be used long term, and the care is easy. The device is under the skin, and once the skin heals, care is minimal. All that is required is the nurse who accesses the port will need to flush it with heparinized saline after each use. Ports are usually placed in the chest wall, usually on the right side. But they can be place in the arms and in the abdomen. Home Care Instructions: If your port is in your arm, do not allow blood pressure or other IVs to be place in that arm. Do not allow bra straps or any clothing to rub the skin over the port. Do not bathe or swim until the skin has healed and if the port is accessed. Once it is healed, and when the port is not accessed, it is okay to bathe and swim. Restrict yourself to light activity for the first 5 days after getting the port put in, after that, resume normal activity slowly. You may resume your normal diet and medications. Follow-Up Instructions: Please see your oncologist, or whatever physician ordered the port as he/she has requested of you.     Call If: You should call your Physician and/or the Radiology Nurse if you notice redness, pus, swelling, or pain from the area of your incision. Call if you should develop a fever. The nurses who access your port will know to call your doctor if the port does not seem to be working properly. You need to tell the nurses who use the port if you should have any pain or swelling at the site during an infusion. Side effects of sedation medications and other medications used today have been reviewed. Notify us of nausea, itching, hives, dizziness, or anything else out of the ordinary. Should you experience any of these significant changes, please call [phone] between the hours of 7:30 am and 10 pm or [phone] after hours.  After hours, ask the  to page the [address] Technologist, and describe the problem to the technologist.          Patient Signature:  Date: 3/5/2021  Discharging Nurse: Lexie Mazariegos RN

## 2021-03-09 NOTE — PROGRESS NOTES
1000- Pt arrived to [facility] ambulatory in no acute distress for C1D1 Carboplatin/Etoposide. Assessment unremarkable except baseline numbness/tingling to feet and dyspnea with exertion. R chest port accessed without issue and positive blood return noted. Patient denied having any symptoms of COVID-19, i.e. SOB, coughing, fever, or generally not feeling well. Also denies having been exposed to COVID-19 recently or having had any recent contact with family/friend that has a pending COVID test. 
 
Labs obtained CBC w/ diff, Chem8, Hepatic Function Panel Recent Results (from the past 12 hour(s)) CBC WITH AUTOMATED DIFF Collection Time: 03/09/21 10:13 AM  
Result Value Ref Range WBC 7.4 3.6 - 11.0 K/uL  
 RBC 4.28 3.80 - 5.20 M/uL  
 HGB 11.7 11.5 - 16.0 g/dL HCT 36.8 35.0 - 47.0 % MCV 86.0 80.0 - 99.0 FL  
 MCH 27.3 26.0 - 34.0 PG  
 MCHC 31.8 30.0 - 36.5 g/dL  
 RDW 13.8 11.5 - 14.5 % PLATELET 911 238 - 952 K/uL MPV 10.2 8.9 - 12.9 FL  
 NRBC 0.0 0  WBC ABSOLUTE NRBC 0.00 0.00 - 0.01 K/uL NEUTROPHILS 54 32 - 75 % LYMPHOCYTES 30 12 - 49 % MONOCYTES 12 5 - 13 % EOSINOPHILS 4 0 - 7 % BASOPHILS 0 0 - 1 % IMMATURE GRANULOCYTES 0 0.0 - 0.5 % ABS. NEUTROPHILS 4.0 1.8 - 8.0 K/UL  
 ABS. LYMPHOCYTES 2.2 0.8 - 3.5 K/UL  
 ABS. MONOCYTES 0.9 0.0 - 1.0 K/UL  
 ABS. EOSINOPHILS 0.3 0.0 - 0.4 K/UL  
 ABS. BASOPHILS 0.0 0.0 - 0.1 K/UL  
 ABS. IMM. GRANS. 0.0 0.00 - 0.04 K/UL  
 DF AUTOMATED HEPATIC FUNCTION PANEL Collection Time: 03/09/21 10:13 AM  
Result Value Ref Range Protein, total 8.5 (H) 6.4 - 8.2 g/dL Albumin 3.6 3.5 - 5.0 g/dL Globulin 4.9 (H) 2.0 - 4.0 g/dL A-G Ratio 0.7 (L) 1.1 - 2.2 Bilirubin, total 0.6 0.2 - 1.0 MG/DL Bilirubin, direct 0.2 0.0 - 0.2 MG/DL Alk. phosphatase 96 45 - 117 U/L  
 AST (SGOT) 29 15 - 37 U/L  
 ALT (SGPT) 20 12 - 78 U/L  
POC CHEM8 Collection Time: 03/09/21 10:17 AM  
Result Value Ref Range  Calcium, ionized (POC) 1.24 1.12 - 1.32 mmol/L Sodium (POC) 139 136 - 145 mmol/L Potassium (POC) 4.0 3.5 - 5.1 mmol/L Chloride (POC) 103 98 - 107 mmol/L  
 CO2 (POC) 26 21 - 32 mmol/L Anion gap (POC) 16 10 - 20 mmol/L Glucose (POC) 163 (H) 65 - 100 mg/dL BUN (POC) 13 9 - 20 mg/dL Creatinine (POC) 1.3 0.6 - 1.3 mg/dL GFRAA, POC 49 (L) >60 ml/min/1.73m2 GFRNA, POC 41 (L) >60 ml/min/1.73m2 Hematocrit (POC) 39 35.0 - 47.0 % Comment Notified RN or MD immediately by  The following medications administered: 
NS @ [facility] Emend 150 mg IVPB over 20 mins Aloxi 0.25 mg IVP Decadron 12 mg IVPB over 15 mins Carboplatin 375 mg IV over 30 mins Etoposide 188 mg IV over 60 mins Patient Vitals for the past 12 hrs: 
 Temp Pulse Resp BP SpO2  
03/09/21 1336  73 16 106/65   
03/09/21 1000 (!) 96.3 °F (35.7 °C) 81 16 97/62 94 % 1345- Pt tolerated treatment well, no adverse reactions noted. Chemo handout given to patient and discussed, patient verbalized understanding. Port flushed per policy and reinforced for use tomorrow. Pt discharged ambulatory in no acute distress, accompanied by self. Next appointment 3/10/21.

## 2021-03-09 NOTE — PROCEDURES
[address] PULMONARY FUNCTION TEST Name:  Daly Rodriguez 
MR#:  [id number] :  1950 ACCOUNT #:  [de-identified] DATE OF SERVICE:  2021 Spirometry, lung volumes, and diffusion were performed. Mild restriction is seen on spirometry which is confirmed by lung volumes. Moderate diffusion impairment is seen which only partly corrects when adjusted for alveolar volume. Specifically forced vital capacity is 67% of predicted. Total lung capacity is 63% of predicted and adjusted DLCO is 58% of predicted. Flow volume loop is suggestive of restriction. Emma Mercer,  
 
 
[id number]/ 
D:  2021 12:42 
T:  2021 14:41 JOB #:  [id number]

## 2021-03-09 NOTE — PROGRESS NOTES
Quinn Garcia is a 72 y.o. female here for follow up for lung cancer. Treatment today:  Cycle 1 Day 2 Carboplatin + Etoposide 1. Have you been to the ER, urgent care clinic since your last visit? Hospitalized since your last visit? no 
 
2. Have you seen or consulted any other health care providers outside of the [address] [place] [address] since your last visit? Include any pap smears or colon screening. no 
 
Pt states she was nauseous this morning but ok now.

## 2021-03-10 NOTE — PROGRESS NOTES
2001 [facility] 
at [address] [facility], [address] 
[phone] Oncology Follow Up Note Patient: Kylee Mendosa MRN: [id number]  SSN: [id number] YOB: 1950  Age: 72 y.o. Sex: female Diagnosis: 1. Small cell lung carcinoma RLL - Dx: 1/22/2021 T4 N0 Focal pleural involevement Treatment: 1. RLL wedge resection, 01/22/2021 2. Adjuvant chemotherapy Carboplatin + Etoposide cycle 1 day 2 Subjective:  
  
Kylee Mendosa is a 72 y.o. female who I am seeing for a diagnosis of RUL lung carcinoma. She has a 40 pack years of cigarette smoking history. She underwent a screening CT of the chest requested by Dr. Bj Diane. This showed a RLL lung mass. PET shows no distant disease. She underwent a RLL wedge resection. The pathology shows small cell lung carcinoma. She is here today for day 2 of chemotherapy. She said she did well last night and felt slightly nauseous this morning. She did not like the side effects of the dexamethasone. Review of Systems: 
 
Constitutional: negative Eyes: negative Ears, Nose, Mouth, Throat, and Face: negative Respiratory: negative Cardiovascular: negative Gastrointestinal: nausea Genitourinary:negative Integument/Breast: negative Hematologic/Lymphatic: negative Musculoskeletal:negative Neurological: negative Past Medical History:  
Diagnosis Date  Arthritis   
 left shoulder  Atrial fibrillation ([address] 6/2/2010 Dr. Sheela Cunha  CAD (coronary artery disease) stent; Dr. Sheela Cunha  Cancer ([facility] lung  Celiac disease  Chronic diastolic heart failure ([facility] 09/22/2014 Dr. Sheela Cunha  Chronic kidney disease   
 per cardio note  Chronic pain   
 left thigh:  L SFA intervention by Dr. Charity Evans 07/2018, as of 9/6/18:  still causing pain, pt to have MILLA checked per Dr. Charity Evans note  Chronic systolic HF (heart failure) ([facility] 5/10/2017  
 4/2017 EF 25-30%  Congestive heart failure (CHF) ([facility]  COPD (chronic obstructive pulmonary disease) ([facility] 6/2/2010 Dr. Altagracia Walters  Diabetes ([facility] Dr. Maren Leonardo; no current medication per pt  Emphysema, unspecified ([facility] Dr. Altagracia Walters  Fibroid PT STATES NOT HAVING FIBROIDS  Frequent falls 2017  Gout  Heart failure ([facility] Dr. Alonso Martin  History of Clostridium difficile infection 5/10/2017  
 4/2017 CDiff positive  Hypertension 6/2/2010 Dr. Shaye Sánchez  Hypertensive heart and chronic kidney disease   
 per PCP note  Hypotension 5/10/2017  Junctional tachycardia ([facility] 5/10/2017 Dr. Alonso Martin  Neuropathy  NIDDM (non-insulin dependent diabetes mellitus) 6/2/2010  PAD (peripheral artery disease) ([facility]  S/P ablation of atrial fibrillation 03/2018  Screening mammogram 5/4/10  
 SOB (shortness of breath) 09/22/2014 Dr. Altagracia Walters  SSS (sick sinus syndrome) ([facility]   
 per pacemaker form 9/6/18  Weakness   
 per pt weakness from c-diff 2017, mulitple falls with injury to rotator cuff Past Surgical History:  
Procedure Laterality Date  COLONOSCOPY N/A 9/25/2017 COLONOSCOPY with biopsy performed by Saad Jordan MD at [facility] AMBULATORY OR  
 HX AFIB ABLATION  03/2018  
 has had 2 ablations per patient [id number]  HX HEART CATHETERIZATION  07/23/2018  HX HEENT    
 HX OTHER SURGICAL    
 adrenal gland removed  HX PACEMAKER Left [employer] model: Nolon Nellie  HX ROTATOR CUFF REPAIR Right  HX WISDOM TEETH EXTRACTION X2  
 IR INSERT TUNL CVC W PORT OVER 5 YEARS  3/5/2021  [facility] CARDIAC SURG PROCEDURE UNLIST    
 3 cardiac stent placed  [facility] EXCISE ADRENAL GLAND Right 1994  VASCULAR SURGERY PROCEDURE UNLIST  07/20/2018 Left SFA intervention ; Dr. Svetlana Rajan Family History Problem Relation Age of Onset  Heart Disease Mother  Diabetes Father  Heart Disease Brother  Other Son MURDERED Social History Tobacco Use  Smoking status: Former Smoker Packs/day: 0.50 Years: 42.00 Pack years: 21.00 Types: Cigarettes Start date: 5 Quit date: 2009 Years since quittin.1  Smokeless tobacco: Never Used Substance Use Topics  Alcohol use: No  
  
Prior to Admission medications Medication Sig Start Date End Date Taking? Authorizing Provider  
cholecalciferol, vitamin D3, (VITAMIN D3 PO) Take  by mouth. With calcium   Yes Provider, Historical  
lidocaine-prilocaine (EMLA) topical cream Apply  to affected area as needed for Pain. Apply to port site 30-45 min prior to treatments 21  Yes Terry Deluca MD  
prochlorperazine (Compazine) 10 mg tablet Take 1 Tab by mouth every six (6) hours as needed for Nausea or Vomiting for up to 60 days. 21 Yes Terry Deluca MD  
ondansetron (ZOFRAN ODT) 4 mg disintegrating tablet Take 1 Tab by mouth every eight (8) hours as needed for Nausea or Vomiting. 21  Yes Terry Deluca MD  
SITagliptin (Januvia) 50 mg tablet Take 1 Tab by mouth daily. 21  Yes Sade Campos MD  
simvastatin (ZOCOR) 20 mg tablet TAKE 1 TABLET BY MOUTH NIGHTLY 21  Yes Sade Campos MD  
cyclobenzaprine (FLEXERIL) 10 mg tablet Take 0.5 Tabs by mouth three (3) times daily as needed for Muscle Spasm(s).  21  Yes Jessica Zhou DO  
gabapentin (NEURONTIN) 800 mg tablet TAKE 1 TABLET BY MOUTH THREE TIMES DAILY 20  Yes Jacqueline Agrawal, DEEPAK  
budesonide-formoteroL (Symbicort) 160-4.5 mcg/actuation HFAA INHALE ONE PUFF BY MOUTH TWICE DAILY 20  Yes Sade Campos MD  
carvediloL (COREG) 6.25 mg tablet TAKE 1 TABLET BY MOUTH TWICE DAILY WITH MEALS 20  Yes Sade Campos MD  
tiotropium (Spiriva with HandiHaler) 18 mcg inhalation capsule INHALE THE CONTENTS OF 1 CAPSULE THROUGH HANDIHALER DEVICE DAILY 11/24/20  Yes Sade Campos MD  
albuterol (PROVENTIL HFA, VENTOLIN HFA, PROAIR HFA) 90 mcg/actuation inhaler Take 2 Puffs by inhalation every four (4) hours as needed for Wheezing. 11/24/20  Yes Sade Campos MD  
colchicine 0.6 mg tablet TAKE 2 TABLETS BY MOUTH ONCE DAILY 11/20/20  Yes Sade Campos MD  
diclofenac (VOLTAREN) 1 % gel Apply 4 g to affected area four (4) times daily as needed for Pain. 11/11/20  Yes Saed Campos MD  
Eliquis 5 mg tablet Take 1 tablet by mouth twice daily 10/15/20  Yes Lena Overton NP  
albuterol (PROVENTIL VENTOLIN) 2.5 mg /3 mL (0.083 %) nebu 3 mL by Nebulization route every four (4) hours as needed for Wheezing. 5/7/20  Yes Sade Campos MD  
glucose blood VI test strips (BLOOD GLUCOSE TEST) strip Use BID Dx11.9 1/24/20  Yes Sade Campos MD  
lancets misc Use BID 1/24/20  Yes Sade Campos MD  
albuterol-ipratropium (DUO-NEB) 2.5 mg-0.5 mg/3 ml nebu 3 mL by Nebulization route every four (4) hours as needed (wheezing). 11/12/19  Yes Aliza Cruz DO  
acetaminophen (TYLENOL) 325 mg tablet Take 2 Tabs by mouth every four (4) hours as needed for Pain or Fever. 3/28/19  Yes Radha Nicolas MD  
guaiFENesin ER (MUCINEX) 600 mg ER tablet Take 1 Tab by mouth two (2) times a day. Patient taking differently: Take 600 mg by mouth as needed. 3/28/19  Yes Radha Nicolas MD  
benzocaine-menthol (CHLORASEPTIC MAX) 15-10 mg lozg lozenge Take 1 Lozenge by mouth every four to six (4-6) hours as needed for Sore throat. 3/28/19  Yes Radha Nicolas MD  
cod liver oil cap Take 1 Cap by mouth daily. Yes Provider, Historical  
  
 
Allergies Allergen Reactions  Crestor [Rosuvastatin] Myalgia  Levaquin [Levofloxacin] Nausea Only GI Upset  Lipitor [Atorvastatin] Diarrhea  Lyrica [Pregabalin] Myalgia Objective:  
 
Vitals:  
 03/10/21 1501 BP: (!) 99/54 Pulse: 80 Temp: 98.2 °F (36.8 °C) TempSrc: Temporal  
SpO2: (!) 89% Height: 5' 6\" (1.676 m) Pain Scale: 0 - No pain/10 Physical Exam: 
 
GENERAL: alert, cooperative, no distress, appears stated age EYE: conjunctivae/corneas clear. PERRL, EOM's intact LYMPHATIC: Cervical, supraclavicular, and axillary nodes normal.  
THROAT & NECK: normal and no erythema or exudates noted. LUNG: clear to auscultation bilaterally HEART: regular rate and rhythm, S1, S2 normal, no murmur, click, rub or gallop ABDOMEN: soft, non-tender. Bowel sounds normal. No masses,  no organomegaly EXTREMITIES:  extremities normal, atraumatic, no cyanosis or edema SKIN: Normal. 
NEUROLOGIC: AOx3. Gait normal. 
 
 
Physical exam and ROS has been modified from a prior visit to make it relevant and current Lab Results Component Value Date/Time WBC 7.4 03/09/2021 10:13 AM  
 Hemoglobin (POC) 11.2 07/20/2017 09:02 AM  
 HGB 11.7 03/09/2021 10:13 AM  
 Hematocrit (POC) 39 03/09/2021 10:17 AM  
 HCT 36.8 03/09/2021 10:13 AM  
 PLATELET 138 31/73/3995 10:13 AM  
 MCV 86.0 03/09/2021 10:13 AM  
 
 
Lab Results Component Value Date/Time Sodium 138 02/01/2021 04:05 AM  
 Potassium 4.4 02/01/2021 04:05 AM  
 Chloride 109 (H) 02/01/2021 04:05 AM  
 CO2 24 02/01/2021 04:05 AM  
 Anion gap 5 02/01/2021 04:05 AM  
 Glucose 108 (H) 02/01/2021 04:05 AM  
 BUN 10 02/01/2021 04:05 AM  
 Creatinine 1.27 (H) 02/01/2021 04:05 AM  
 BUN/Creatinine ratio 8 (L) 02/01/2021 04:05 AM  
 GFR est AA 51 (L) 02/01/2021 04:05 AM  
 GFR est non-AA 42 (L) 02/01/2021 04:05 AM  
 Calcium 8.5 02/01/2021 04:05 AM  
 Bilirubin, total 0.6 03/09/2021 10:13 AM  
 Alk. phosphatase 96 03/09/2021 10:13 AM  
 Protein, total 8.5 (H) 03/09/2021 10:13 AM  
 Albumin 3.6 03/09/2021 10:13 AM  
 Globulin 4.9 (H) 03/09/2021 10:13 AM  
 A-G Ratio 0.7 (L) 03/09/2021 10:13 AM  
 ALT (SGPT) 20 03/09/2021 10:13 AM  
 AST (SGOT) 29 03/09/2021 10:13 AM  
 
 
 
Assessment: 1.  Small cell lung carcinoma RLL 
 T4 N0 Focal pleural involevement ECOG PS 1 Prognosis: Guarded Goal of therapy: Curative S/P RLL wedge resection 01/22/2021 Receiving adjuvant chemotherapy Carboplatin + Etoposide cycle 1 day 2 Tolerating treatment A detailed system by system evaluation of side effect was performed to assess chemotherapy related toxicity. Blood counts are acceptable. Results reviewed with the patient. The disease has a high risk of recurrence. Plan: · Proceed on with C1 D2  of Carboplatin (AUC 5) on day1 and Etoposide (100mg/m2) Days 1-3 given every 3 weeks for 4 cycles · Labs prior to each treatment: CBC, BMP on day 1 
· Antiemetic prophylaxis: Palonosetron prior to each treatment on day 1 and Dexamethasone prior to therapy on day 1-3 · PRN antiemetics: Ondansetron, Prochlorperazine and Lorazepam at home · EMLA cream to port · Return to see me 3 weeks I performed a history and physical examination of the patient and discussed his management with the NPP. I reviewed the NPP note and agree with the documented findings and plan of care. The patient was seen in conjunction with Ms. Bingham. Ms. William Kurtz is a women who underwent a RLL wedge resection of the lung for a small lung cancer. She is receiving adjuvant chemotherapy. She feels fair. Exam is normal.  
 
 
Signed by: Stevenson Osullivan MD 
                   March 10, 2021 
 
 
CC. Bronson Schrader MD 
CC.  Annie Dunn MD

## 2021-03-10 NOTE — LETTER
3/10/2021 Patient: Paulie Wang YOB: 1950 Date of Visit: 3/10/2021 Alessandra Ellis MD 
[address] [facility] [address] [id number] [address] [employer] Dear Alessandra Ellis MD, Thank you for referring Ms. Mao Sutton to [address] [place] [address] for evaluation. My notes for this consultation are attached. If you have questions, please do not hesitate to call me. I look forward to following your patient along with you.  
 
 
Sincerely, 
 
Mary Dias MD

## 2021-03-10 NOTE — PROGRESS NOTES
Pt arrived to [facility] ambulatory in no acute distress at 1525 for Etoposide C1D2. Assessment unremarkable except nausea and jittery/ unsteadiness feeling from the steroid. R chest port accessed yesterday, flushes without issue with positive blood return noted. Patient denied having any symptoms of COVID-19, i.e. SOB, coughing, fever, or generally not feeling well. Also denies having been exposed to COVID-19 recently or having had any recent contact with family/friend that has a pending COVID test. 
 
Visit Vitals /60 (BP 1 Location: Left upper arm, BP Patient Position: Sitting) Pulse 90 Temp 97.5 °F (36.4 °C) Resp 18 Ht 5' 6\" (1.676 m) Wt 74.6 kg (164 lb 8 oz) SpO2 95% BMI 26.55 kg/m² The following medications administered: 
NS @ Lars Rist Decadron 8 mg IVP Etoposide 188 mg IV over 1 hour Visit Vitals /64 (BP 1 Location: Left upper arm, BP Patient Position: Lying) Pulse 78 Temp 97.5 °F (36.4 °C) Resp 18 Ht 5' 6\" (1.676 m) Wt 74.6 kg (164 lb 8 oz) SpO2 95% BMI 26.55 kg/m² Pt tolerated treatment well. No adverse reaction noted. Port flushed per policy and secured for use tomorrow. Pt discharged ambulatory in no acute distress at 165, accompanied by family member. Next appointment 3/11/21 @ 1400.

## 2021-03-11 NOTE — ED NOTES
Pt arrived via wheelchair to room #30 from triage. Pt with c/o of syncopal episode after finishing chemo PTA. Pt reports that she is currently receiving chemo for lung cancer and had just finished her treatment. Pt reports that she felt \"a little dizzy\" after chemo was complete, but she states this was normal. pt denies ever having syncopal episode in the past. Pt denies any sx's at this time. Pt notes that she has been eating/drinking appropriately. Pt resting in position of comfort. Call bell within reach. Pt placed on monitor x 3.

## 2021-03-11 NOTE — ED TRIAGE NOTES
Pt states she passed out, found face down on floor in MOB 3. Pt states she \"peed herself\". Pt is tearful. Pt c/o pain to L side of face.

## 2021-03-11 NOTE — PROGRESS NOTES
Pt arrived to [facility] ambulatory in no acute distress at 1410 for Etoposide C1D3. Assessment unremarkable, no new concerns voiced. R chest port accessed without issue and positive blood return noted. Patient denied having any symptoms of COVID-19, i.e. SOB, coughing, fever, or generally not feeling well. Also denies having been exposed to COVID-19 recently or having had any recent contact with family/friend that has a pending COVID test. 
 
Visit Vitals /62 (BP 1 Location: Left upper arm, BP Patient Position: Sitting) Pulse 94 Temp 97.5 °F (36.4 °C) Resp 18 Ht 5' 6\" (1.676 m) Wt 75.8 kg (167 lb) SpO2 94% BMI 26.95 kg/m² The following medications administered: 
NS @ Hildy Mikayla Decadron 8 mg IVP Etoposide 188 mg IV over 1 hour Visit Vitals /63 (BP 1 Location: Left upper arm, BP Patient Position: Sitting) Pulse 74 Temp 97.5 °F (36.4 °C) Resp 18 Ht 5' 6\" (1.676 m) Wt 75.8 kg (167 lb) SpO2 94% BMI 26.95 kg/m² Pt tolerated treatment well. No adverse reaction noted. Port flushed per policy and needle removed, 2x2 and paper tape placed. Pt discharged ambulatory in no acute distress at 1540, accompanied by self. Next appointment 3/29/21@ 1000.

## 2021-03-11 NOTE — ED NOTES
Spoke with Rapid Response RNs, who found the patient in MOB 1. They state that pt had a hard impact on the ground with her head, and were concerned because pt is on Eliquis. Pt with bruising, redness, and swelling to L side of face, but denies any head pain at this time.

## 2021-03-12 NOTE — TELEPHONE ENCOUNTER
Verified patient with 2 identifiers   Patient had last chemo tx for lung cancer yesterday. Upon leaving she fainted  She was transferred to ER. She had a cxr and a CT Scan and states she was told that the left side of brain showed she had a mild CVA at some point. She had a CT Scan in 12/2020 which did not show it therefore it was btw then and now when it happened. Patient concerned about above and would like to move up appt  Please call patient and schedule asap.

## 2021-03-12 NOTE — PROGRESS NOTES
Ambulatory Care Management Note 
 
Date/Time:  3/12/2021 10:55 AM 
 
This patient was received as a referral from Daily Assignment Ambulatory Care Manager outreached to patient today to offer care management services. Introduction to self and role of care manager provided. Patient accepted care management services at this time. Follow up call scheduled at this time. Patient has Ambulatory Care Manager's contact number for for any questions or concerns. Patient contacted regarding recent discharge and COVID-19 risk. Discussed COVID-19 related testing which was not done at this time. Test results were not done. Patient informed of results, if available? Outreach made within 2 business days of discharge: Yes Care Transition Nurse/ Ambulatory Care Manager/ LPN Care Coordinator contacted the patient by telephone to perform post discharge assessment. Verified name and  with patient as identifiers. Patient has following risk factors of:  small cell lung cancer on chemotherapy, CHF, CAD, diabetes, COPD . CTN/ACM/LPN reviewed discharge instructions, medical action plan and red flags related to discharge diagnosis. Reviewed and educated them on any new and changed medications related to discharge diagnosis. Advised obtaining a 90-day supply of all daily and as-needed medications. Advance Care Planning:  
Does patient have an Advance Directive: yes; reviewed and current Education provided regarding infection prevention, and signs and symptoms of COVID-19 and when to seek medical attention with patient who verbalized understanding. Discussed exposure protocols and quarantine from [facility] you at higher risk for severe illness  and given an opportunity for questions and concerns. The patient agrees to contact the COVID-19 hotline [phone] or PCP office for questions related to their healthcare. CTN/ACM/LPN provided contact information for future reference.  
 
From CDC: Are you at higher risk for severe illness?  Wash your hands often.  Avoid close contact (6 feet, which is about two arm lengths) with people who are sick.  Put distance between yourself and other people if COVID-19 is spreading in your community.  Clean and disinfect frequently touched surfaces.  Avoid all cruise travel and non-essential air travel.  Call your healthcare professional if you have concerns about COVID-19 and your underlying condition or if you are sick. For more information on steps you can take to protect yourself, see CDC's How to Protect Yourself Patient/family/caregiver given information for [employer] and agrees to enroll yes Patient's preferred e-mail:  [email or web address] Patient's preferred phone number: [phone] Based on Loop alert triggers, patient will be contacted by nurse care manager for worsening symptoms. Pt will be further monitored by COVID Loop Team, pending account activation by patient.  based on severity of symptoms and risk factors. Patient had follow up call with cardiology on today. The following appointments reviewed: Future Appointments Provider Bobby Rios 3/29/2021 10:00 AM CHAIR 2 [facility] REG  
3/30/2021 2:00 PM [facility] [address] [facility] REG  
3/31/2021 2:00 PM [place] INFUSION NURSE [address] [facility] REG  
3/31/2021 2:15 PM Sidney Vigil NP [facility]  
5/20/2021 2:45 PM Lety Dawson MD [facility] BS AMB  
8/26/2021 9:15 AM PACEMAKER, [facility] BS AMB  
8/26/2021 9:40 AM Deborah Jimenez ANP [facility]

## 2021-03-12 NOTE — ED PROVIDER NOTES
EMERGENCY DEPARTMENT HISTORY AND PHYSICAL EXAM 
 
 
Date: 3/11/2021 Patient Name: Kristan Vaughn Patient Age and Sex: 72 y.o. female History of Presenting Illness Chief Complaint Patient presents with  Syncope History Provided By: Patient Ability to gather history was limited by: HPI: Kristan Vaughn, 72 y.o. female with history of atrial fibrillation on Eliquis, small cell lung cancer on chemotherapy, CHF, CAD, diabetes, COPD, brought to the emergency department after a syncopal episode today. Patient states that she often becomes lightheaded after her chemotherapy sessions. Today patient was leaving her chemotherapy session in this hospital when she stood up and had a syncopal episode, fell to the ground and struck her face against the floor. Rapid response was called and she was brought to the emergency department. She complains of left facial pain and swelling, mild headache, broken dentures. No neck pain. Also complains of mild pain in the bilateral knees. No chest pain or shortness of breath. Location:   
Quality:     
Severity:   
Duration:  
Timing:     
Context:   
Modifying factors:  
Associated symptoms:  
 
 
The patient's medical, surgical, family, and social history on file were reviewed by me today. Past Medical History:  
Diagnosis Date  Arthritis   
 left shoulder  Atrial fibrillation ([address] 6/2/2010 Dr. Kimberly Fowler  CAD (coronary artery disease) stent; Dr. Kimberly Fowler  Cancer ([address] lung  Celiac disease  Chronic diastolic heart failure ([address] 09/22/2014 Dr. Kimberly Fowler  Chronic kidney disease   
 per cardio note  Chronic pain   
 left thigh:  L SFA intervention by Dr. Kuldip Leslie 07/2018, as of 9/6/18:  still causing pain, pt to have MILLA checked per Dr. Kuldip Leslie note  Chronic systolic HF (heart failure) ([address] 5/10/2017  
 4/2017 EF 25-30%  Congestive heart failure (CHF) ([address]  COPD (chronic obstructive pulmonary disease) ([facility] 6/2/2010 Dr. Codi High  Diabetes ([facility] Dr. Burt Bateman; no current medication per pt  Emphysema, unspecified ([facility] Dr. Codi High  Fibroid PT STATES NOT HAVING FIBROIDS  Frequent falls 2017  Gout  Heart failure ([facility] Dr. Shell Velasquez  History of Clostridium difficile infection 5/10/2017  
 4/2017 CDiff positive  Hypertension 6/2/2010 Dr. Liat Pollard  Hypertensive heart and chronic kidney disease   
 per PCP note  Hypotension 5/10/2017  Junctional tachycardia ([facility] 5/10/2017 Dr. Shell Velasquez  Neuropathy  NIDDM (non-insulin dependent diabetes mellitus) 6/2/2010  PAD (peripheral artery disease) ([facility]  S/P ablation of atrial fibrillation 03/2018  Screening mammogram 5/4/10  
 SOB (shortness of breath) 09/22/2014 Dr. Codi High  SSS (sick sinus syndrome) ([facility]   
 per pacemaker form 9/6/18  Weakness   
 per pt weakness from c-diff 2017, mulitple falls with injury to rotator cuff Past Surgical History:  
Procedure Laterality Date  COLONOSCOPY N/A 9/25/2017 COLONOSCOPY with biopsy performed by Bean Goldsmith MD at [facility] AMBULATORY OR  
 HX AFIB ABLATION  03/2018  
 has had 2 ablations per patient [id number]  HX HEART CATHETERIZATION  07/23/2018  HX HEENT    
 HX OTHER SURGICAL    
 adrenal gland removed  HX PACEMAKER Left Biotronik model: Ksenia Case  HX ROTATOR CUFF REPAIR Right  HX WISDOM TEETH EXTRACTION X2  
 IR INSERT TUNL CVC W PORT OVER 5 YEARS  3/5/2021  IA CARDIAC SURG PROCEDURE UNLIST    
 3 cardiac stent placed  IA EXCISE ADRENAL GLAND Right 1994  VASCULAR SURGERY PROCEDURE UNLIST  07/20/2018 Left SFA intervention ; Dr. Tato Barragan PCP: Erinn Restrepo MD 
 
Past History Past Medical History: 
Past Medical History:  
Diagnosis Date  Arthritis   
 left shoulder  Atrial fibrillation ([facility] 6/2/2010 Dr. Shell Velasquez  CAD (coronary artery disease) stent; Dr. John Forman  Cancer ([facility] lung  Celiac disease  Chronic diastolic heart failure ([facility] 09/22/2014 Dr. John Forman  Chronic kidney disease   
 per cardio note  Chronic pain   
 left thigh:  L SFA intervention by Dr. Janine Li 07/2018, as of 9/6/18:  still causing pain, pt to have MILLA checked per Dr. Janine Li note  Chronic systolic HF (heart failure) ([facility] 5/10/2017  
 4/2017 EF 25-30%  Congestive heart failure (CHF) ([facility]  COPD (chronic obstructive pulmonary disease) ([facility] 6/2/2010 Dr. Karen Potter  Diabetes ([facility] Dr. Lee Crain; no current medication per pt  Emphysema, unspecified ([facility] [facility] Dr. Karen Potter  Fibroid PT STATES NOT HAVING FIBROIDS  Frequent falls 2017  Gout  Heart failure ([facility] Dr. John Forman  History of Clostridium difficile infection 5/10/2017  
 4/2017 CDiff positive  Hypertension 6/2/2010 Dr. Shakira Edmondson  Hypertensive heart and chronic kidney disease   
 per PCP note  Hypotension 5/10/2017  Junctional tachycardia ([facility] 5/10/2017 Dr. John Forman  Neuropathy  NIDDM (non-insulin dependent diabetes mellitus) 6/2/2010  PAD (peripheral artery disease) ([facility]  S/P ablation of atrial fibrillation 03/2018  Screening mammogram 5/4/10  
 SOB (shortness of breath) 09/22/2014 Dr. Karen Potter  SSS (sick sinus syndrome) ([facility]   
 per pacemaker form 9/6/18  Weakness   
 per pt weakness from c-diff 2017, mulitple falls with injury to rotator cuff Past Surgical History: 
Past Surgical History:  
Procedure Laterality Date  COLONOSCOPY N/A 9/25/2017 COLONOSCOPY with biopsy performed by Nidia Cochran MD at [facility] AM[facility] OR  
 HX AFIB ABLATION  03/2018  
 has had 2 ablations per patient [id number]  HX HEART CATHETERIZATION  07/23/2018  HX HEENT    
 HX OTHER SURGICAL    
 adrenal gland removed  HX PACEMAKER Left Biotronik model: Lawrnce Fill  HX ROTATOR CUFF REPAIR Right  HX WISDOM TEETH EXTRACTION X2  
 IR INSERT TUNL CVC W PORT OVER 5 YEARS  3/5/2021  MO CARDIAC SURG PROCEDURE UNLIST    
 3 cardiac stent placed  MO EXCISE ADRENAL GLAND Right 1994  VASCULAR SURGERY PROCEDURE UNLIST  2018 Left SFA intervention ; Dr. Blanca Murcia Family History: 
Family History Problem Relation Age of Onset  Heart Disease Mother  Diabetes Father  Heart Disease Brother  Other Son MURDERED Social History: 
Social History Tobacco Use  Smoking status: Former Smoker Packs/day: 0.50 Years: 42.00 Pack years: 21.00 Types: Cigarettes Start date: 5 Quit date: 2009 Years since quittin.2  Smokeless tobacco: Never Used Substance Use Topics  Alcohol use: No  
 Drug use: No  
 
 
Allergies: Allergies Allergen Reactions  Crestor [Rosuvastatin] Myalgia  Levaquin [Levofloxacin] Nausea Only GI Upset  Lipitor [Atorvastatin] Diarrhea  Lyrica [Pregabalin] Myalgia Current Medications: 
Current Facility-Administered Medications on File Prior to Encounter Medication Dose Route Frequency Provider Last Rate Last Admin  
 0.9% sodium chloride infusion  25 mL/hr IntraVENous CONTINUOUS Barb Metzger MD   Stopped at 21 1538  [COMPLETED] dexamethasone (DECADRON) 4 mg/mL injection 8 mg  8 mg IntraVENous Alfonzo Ybarra MD   8 mg at 21 1421  
 [COMPLETED] etoposide (VEPESID) 188 mg in 0.9% sodium chloride 500 mL, overfill volume 50 mL chemo infusion  100 mg/m2 (Treatment Plan Recorded) IntraVENous Alfonzo Ybarra MD   Stopped at 21 1533  sodium chloride (NS) flush 10 mL  10 mL IntraVENous PRN Barb Metzger MD   10 mL at 21 1419  
 0.9% sodium chloride injection 10 mL  10 mL IntraVENous PRN Barb Metzger MD      
 heparin (porcine) pf 300-500 Units  300-500 Units InterCATHeter PRN Barb Metzger MD      
 [] 0.9% sodium chloride infusion  25 mL/hr IntraVENous CONTINUOUS Bess Dela Cruz MD   Stopped at 03/10/21 1652  [] sodium chloride (NS) flush 10 mL  10 mL IntraVENous PRN Bess Dela Cruz MD   10 mL at 03/10/21 1652  [] 0.9% sodium chloride injection 10 mL  10 mL IntraVENous PRN Bess Dela Cruz MD      
 [] heparin (porcine) pf 300-500 Units  300-500 Units InterCATHeter PRN Bess Dela Cruz MD   500 Units at 03/10/21 1652 Current Outpatient Medications on File Prior to Encounter Medication Sig Dispense Refill  cholecalciferol, vitamin D3, (VITAMIN D3 PO) Take  by mouth. With calcium  lidocaine-prilocaine (EMLA) topical cream Apply  to affected area as needed for Pain. Apply to port site 30-45 min prior to treatments 30 g 2  prochlorperazine (Compazine) 10 mg tablet Take 1 Tab by mouth every six (6) hours as needed for Nausea or Vomiting for up to 60 days. 40 Tab 2  
 ondansetron (ZOFRAN ODT) 4 mg disintegrating tablet Take 1 Tab by mouth every eight (8) hours as needed for Nausea or Vomiting. 40 Tab 2  
 SITagliptin (Januvia) 50 mg tablet Take 1 Tab by mouth daily. 90 Tab 1  
 simvastatin (ZOCOR) 20 mg tablet TAKE 1 TABLET BY MOUTH NIGHTLY 90 Tab 1  cyclobenzaprine (FLEXERIL) 10 mg tablet Take 0.5 Tabs by mouth three (3) times daily as needed for Muscle Spasm(s). 9 Tab 0  
 gabapentin (NEURONTIN) 800 mg tablet TAKE 1 TABLET BY MOUTH THREE TIMES DAILY 270 Tab 0  
 budesonide-formoteroL (Symbicort) 160-4.5 mcg/actuation HFAA INHALE ONE PUFF BY MOUTH TWICE DAILY 1 Inhaler 3  carvediloL (COREG) 6.25 mg tablet TAKE 1 TABLET BY MOUTH TWICE DAILY WITH MEALS 180 Tab 1  
 tiotropium (Spiriva with HandiHaler) 18 mcg inhalation capsule INHALE THE CONTENTS OF 1 CAPSULE THROUGH HANDIHALER DEVICE DAILY 90 Cap 1  
 albuterol (PROVENTIL HFA, VENTOLIN HFA, PROAIR HFA) 90 mcg/actuation inhaler Take 2 Puffs by inhalation every four (4) hours as needed for Wheezing. 1 Inhaler 1  colchicine 0.6 mg tablet TAKE 2 TABLETS BY MOUTH ONCE DAILY 60 Tab 1  
 diclofenac (VOLTAREN) 1 % gel Apply 4 g to affected area four (4) times daily as needed for Pain. 100 g 1  
 Eliquis 5 mg tablet Take 1 tablet by mouth twice daily 180 Tab 3  
 albuterol (PROVENTIL VENTOLIN) 2.5 mg /3 mL (0.083 %) nebu 3 mL by Nebulization route every four (4) hours as needed for Wheezing. 24 Each 2  
 glucose blood VI test strips (BLOOD GLUCOSE TEST) strip Use BID Dx11.9 100 Strip 11  
 lancets misc Use  Each 11  
 acetaminophen (TYLENOL) 325 mg tablet Take 2 Tabs by mouth every four (4) hours as needed for Pain or Fever. 40 Tab 0  
 guaiFENesin ER (MUCINEX) 600 mg ER tablet Take 1 Tab by mouth two (2) times a day. (Patient taking differently: Take 600 mg by mouth as needed.) 20 Tab 0  
 benzocaine-menthol (CHLORASEPTIC MAX) 15-10 mg lozg lozenge Take 1 Lozenge by mouth every four to six (4-6) hours as needed for Sore throat. 30 Each 0  
 cod liver oil cap Take 1 Cap by mouth daily.  [DISCONTINUED] albuterol-ipratropium (DUO-NEB) 2.5 mg-0.5 mg/3 ml nebu 3 mL by Nebulization route every four (4) hours as needed (wheezing). 30 Nebule 0 Review of Systems Review of Systems Constitutional: Positive for fatigue. Negative for fever. Respiratory: Negative for shortness of breath. Cardiovascular: Negative for chest pain. Gastrointestinal: Negative for abdominal pain. Neurological: Positive for syncope, light-headedness and headaches. All other systems reviewed and are negative. Physical Exam  
Vital Signs Patient Vitals for the past 8 hrs: 
 Temp Pulse Resp BP SpO2  
03/11/21 2000  72 13 (!) 124/55 94 % 03/11/21 1945  78 13 (!) 117/57 95 % 03/11/21 1930 97.7 °F (36.5 °C) 76 21 (!) 120/53 92 % 03/11/21 1830  76 16 (!) 119/54 96 % 03/11/21 1708  82 19 (!) 108/53 93 % 03/11/21 1616 97.3 °F (36.3 °C) 84 20 (!) 144/60 94 % Physical Exam 
Vitals signs and nursing note reviewed. Constitutional:   
   General: She is not in acute distress. Appearance: Normal appearance. She is well-developed. She is not ill-appearing. HENT:  
   Head: Normocephalic. Contusion present. Comments: Mild contusion left forehead and orbit Mouth/Throat:  
   Mouth: Mucous membranes are moist.  
Eyes:  
   General:     
   Right eye: No discharge. Left eye: No discharge. Extraocular Movements: Extraocular movements intact. Right eye: Normal extraocular motion. Left eye: Normal extraocular motion. Conjunctiva/sclera: Conjunctivae normal.  
   Pupils: Pupils are equal, round, and reactive to light. Pupils are equal.  
   Right eye: Pupil is reactive. Left eye: Pupil is reactive. Neck: Musculoskeletal: Full passive range of motion without pain, normal range of motion and neck supple. No spinous process tenderness or muscular tenderness. Cardiovascular:  
   Rate and Rhythm: Normal rate and regular rhythm. Heart sounds: Normal heart sounds. No murmur. Pulmonary:  
   Effort: Pulmonary effort is normal. No respiratory distress. Breath sounds: Normal breath sounds. No wheezing. Abdominal:  
   General: There is no distension. Palpations: Abdomen is soft. Tenderness: There is no abdominal tenderness. Musculoskeletal: Normal range of motion. General: No deformity. Skin: 
   General: Skin is warm and dry. Findings: Bruising present. No rash. Neurological:  
   General: No focal deficit present. Mental Status: She is alert and oriented to person, place, and time. GCS: GCS eye subscore is 4. GCS verbal subscore is 5. GCS motor subscore is 6. Cranial Nerves: Cranial nerves are intact. No cranial nerve deficit or facial asymmetry. Sensory: Sensation is intact. Motor: Motor function is intact. Coordination: Coordination is intact.  Finger-Nose-Finger Test normal.  
Psychiatric: Speech: Speech normal.     
   Behavior: Behavior normal.     
   Cognition and Memory: Cognition normal.  
 
 
 
Diagnostic Study Results Labs Recent Results (from the past 24 hour(s)) EKG, 12 LEAD, INITIAL Collection Time: 03/11/21  4:30 PM  
Result Value Ref Range Ventricular Rate 85 BPM  
 Atrial Rate 416 BPM  
 P-R Interval 146 ms  
 QRS Duration 168 ms Q-T Interval 424 ms QTC Calculation (Bezet) 504 ms Calculated R Axis -84 degrees Calculated T Axis 81 degrees Diagnosis AV dual-paced rhythm with frequent ventricular-paced complexes When compared with ECG of 30-JAN-2021 19:49, 
Vent. rate has increased BY  10 BPM 
  
CBC WITH AUTOMATED DIFF Collection Time: 03/11/21  4:42 PM  
Result Value Ref Range WBC 11.7 (H) 3.6 - 11.0 K/uL  
 RBC 4.20 3.80 - 5.20 M/uL  
 HGB 11.6 11.5 - 16.0 g/dL HCT 36.9 35.0 - 47.0 % MCV 87.9 80.0 - 99.0 FL  
 MCH 27.6 26.0 - 34.0 PG  
 MCHC 31.4 30.0 - 36.5 g/dL  
 RDW 14.0 11.5 - 14.5 % PLATELET 000 490 - 768 K/uL NRBC 0.0 0  WBC ABSOLUTE NRBC 0.00 0.00 - 0.01 K/uL NEUTROPHILS 88 (H) 32 - 75 % LYMPHOCYTES 7 (L) 12 - 49 % MONOCYTES 5 5 - 13 % EOSINOPHILS 0 0 - 7 % BASOPHILS 0 0 - 1 % IMMATURE GRANULOCYTES 0 0.0 - 0.5 % ABS. NEUTROPHILS 10.2 (H) 1.8 - 8.0 K/UL  
 ABS. LYMPHOCYTES 0.8 0.8 - 3.5 K/UL  
 ABS. MONOCYTES 0.6 0.0 - 1.0 K/UL  
 ABS. EOSINOPHILS 0.0 0.0 - 0.4 K/UL  
 ABS. BASOPHILS 0.0 0.0 - 0.1 K/UL  
 ABS. IMM. GRANS. 0.0 0.00 - 0.04 K/UL  
 DF AUTOMATED METABOLIC PANEL, COMPREHENSIVE Collection Time: 03/11/21  4:42 PM  
Result Value Ref Range Sodium 133 (L) 136 - 145 mmol/L Potassium 5.1 3.5 - 5.1 mmol/L Chloride 107 97 - 108 mmol/L  
 CO2 19 (L) 21 - 32 mmol/L Anion gap 7 5 - 15 mmol/L Glucose 198 (H) 65 - 100 mg/dL BUN 20 6 - 20 MG/DL Creatinine 1.21 (H) 0.55 - 1.02 MG/DL  
 BUN/Creatinine ratio 17 12 - 20 GFR est AA 53 (L) >60 ml/min/1.73m2  GFR est non-AA 44 (L) >60 ml/min/1.73m2 Calcium 8.3 (L) 8.5 - 10.1 MG/DL Bilirubin, total 0.5 0.2 - 1.0 MG/DL  
 ALT (SGPT) 25 12 - 78 U/L  
 AST (SGOT) 52 (H) 15 - 37 U/L Alk. phosphatase 95 45 - 117 U/L Protein, total 8.7 (H) 6.4 - 8.2 g/dL Albumin 3.4 (L) 3.5 - 5.0 g/dL Globulin 5.3 (H) 2.0 - 4.0 g/dL A-G Ratio 0.6 (L) 1.1 - 2.2    
TROPONIN I Collection Time: 03/11/21  4:42 PM  
Result Value Ref Range Troponin-I, Qt. <0.05 <0.05 ng/mL URINALYSIS W/ REFLEX CULTURE Collection Time: 03/11/21  7:27 PM  
 Specimen: Urine Result Value Ref Range Color YELLOW/STRAW Appearance CLEAR CLEAR Specific gravity 1.017 1.003 - 1.030    
 pH (UA) 5.5 5.0 - 8.0 Protein TRACE (A) NEG mg/dL Glucose 100 (A) NEG mg/dL Ketone Negative NEG mg/dL Bilirubin Negative NEG Blood Negative NEG Urobilinogen 0.2 0.2 - 1.0 EU/dL Nitrites Negative NEG Leukocyte Esterase SMALL (A) NEG    
 WBC 5-10 0 - 4 /hpf  
 RBC 0-5 0 - 5 /hpf Epithelial cells FEW FEW /lpf Bacteria Negative NEG /hpf  
 UA:UC IF INDICATED CULTURE NOT INDICATED BY UA RESULT CNI Hyaline cast 0-2 0 - 5 /lpf Radiologic Studies CT HEAD WO CONT Final Result Small, subacute, left frontal infarction, new from 1/30/2021. The findings were called to Dr. Alf Bailey on 3/11/2021 7:42 PM by Dr. Zeina Osorio. [address] XR CHEST PORT Final Result 1. When compared with 1/30/2021 the interstitial opacities have decreased likely  
due to decreasing edema. There is improved aeration in the right upper lobe. There is no pneumothorax. No pneumonia CT Results  (Last 48 hours) 03/11/21 1906  CT HEAD WO CONT Final result Impression:  Small, subacute, left frontal infarction, new from 1/30/2021. The findings were called to Dr. Alf Bailey on 3/11/2021 7:42 PM by Dr. Zeina Osorio. [address]  Narrative:  HEAD CT WITHOUT CONTRAST: 3/11/2021 7:06 PM  
   
INDICATION: Fall, on anticoagulation. COMPARISON: 1/30/2021, 9/13/2017. PROCEDURE: Axial images of the head were obtained without contrast. Coronal and  
sagittal reformats were performed. CT dose reduction was achieved through use of  
a standardized protocol tailored for this examination and automatic exposure  
control for dose modulation. FINDINGS: A small, subacute infarction in the left frontal lobe is new from 1/30/2021. The ventricles and sulci are appropriate in size and configuration  
for age. No loss of gray-white differentiation to suggest late acute or early  
subacute infarction. No mass effect or intracranial hemorrhage. An air-fluid  
level in the right maxillary sinus and subtotal opacification of the shrunken  
left maxillary sinus are stable from 1/30/2021. CXR Results  (Last 48 hours) 03/11/21 1704  XR CHEST PORT Final result Impression:  1. When compared with 1/30/2021 the interstitial opacities have decreased likely  
due to decreasing edema. There is improved aeration in the right upper lobe. There is no pneumothorax. No pneumonia Narrative:  EXAM: XR CHEST PORT INDICATION: syncope COMPARISON: 1/30/2021 and 1/25/2021 FINDINGS: A portable AP radiograph of the chest was obtained at 1651 hours. The  
patient is on a cardiac monitor. Port-A-Cath overlies the SVC. Pacemaker remains  
in place. Heart size is mildly enlarged. There is mild residual interstitial opacities which have decreased in the  
interval. Aeration has improved in the right upper lobe. There is no  
pneumothorax. No pneumonia. Billable Procedures EKG Date/Time: 3/11/2021 11:50 PM 
Performed by: Alexus Hawkins MD 
Authorized by: Alexus Hawkins MD  
 
ECG reviewed by ED Physician in the absence of a cardiologist: yes Previous ECG:  
  Previous ECG:  Compared to current   Similarity:  No change Interpretation: Interpretation: non-specific Rate:  
  ECG rate assessment: normal   
Rhythm:  
  Rhythm: paced Pacing:  
  Capture:  Complete Type of pacing:  Ventricular Ectopy:  
  Ectopy: none QRS:  
  QRS axis:  Left Cardiac monitoring was ordered by me for this patient. // The indication was to monitor for dysrhythmias secondary to, or as a cause of, the patient's complaint of Syncope  // The rhythm revealed by the monitor, interpreted by me, was Ventricularly paced Medical Decision Making I reviewed the patient's most recent Emergency Dept notes and diagnostic tests 
in formulating my MDM on today's visit. Provider Notes (Medical Decision Making):  
81-year-old female presenting to the emergency department after she had a syncopal episode in the setting of standing up after her chemotherapy session today. Minor head and face injuries. Minor headache. On examination she has no neurologic deficits. Alert and oriented. Minor bruising over the left forehead and left orbit/zygoma, without significant deformity or tenderness. Pupils equally round and reactive, EOMI. Otherwise normal examination of the scalp and cervical spine. She was noted to have fractured upper denture. No significant injuries to the upper or lower extremities by physical examination. CT scan demonstrates subacute infarct of unclear time course, likely sometime over the past few weeks or months. It is new since her CT scan 3 months ago. No focal neurologic deficits to suggest acute CVA. No signs of intracranial injury or bleed. Laboratories reassuring. Stable for discharge home. Jakob Kitchen MD 
11:45 PM 
 
Consults: 
 
Social History Tobacco Use  Smoking status: Former Smoker Packs/day: 0.50 Years: 42.00 Pack years: 21.00 Types: Cigarettes Start date: 5 Quit date: 2009 Years since quittin.2  Smokeless tobacco: Never Used  
Substance Use Topics  Alcohol use: No  
 Drug use: No  
 
Patient Vitals for the past 4 hrs: 
 Pulse Resp BP SpO2  
03/11/21 2000 72 13 (!) 124/55 94 % Prescriptions from today's ED visit: 
Discharge Medication List as of 3/11/2021  8:00 PM  
  
  
 
Medications Administered during ED course: 
Medications  
gabapentin (NEURONTIN) capsule 800 mg (800 mg Oral Given 3/11/21 1829)  
acetaminophen (TYLENOL) tablet 1,000 mg (1,000 mg Oral Given 3/11/21 1829) Diagnosis and Disposition Disposition:  Discharged Clinical Impression: 1. Syncope and collapse 2. Facial contusion, initial encounter Attestation: 
I personally performed the services described in this documentation on this date 3/11/2021 for patient Alma Delia Mcdaniel. Ulises Muhammad MD 
 
 
 
I was the first provider for this patient on this visit. To the best of my ability I reviewed relevant prior medical records, electrocardiograms, laboratories, and radiologic studies. The patient's presenting problems were discussed, and the patient was in agreement with the care plan formulated and outlined with them. Ulises Muhammad MD 
 
Please note that this dictation was completed with Dragon voice recognition software. Quite often unanticipated grammatical, syntax, homophones, and other interpretive errors are inadvertently transcribed by the computer software. Please disregard these errors and excuse any errors that have escaped final proofreading.

## 2021-03-14 PROBLEM — I63.9 ACUTE ISCHEMIC STROKE (HCC): Status: ACTIVE | Noted: 2021-01-01

## 2021-03-14 NOTE — ED NOTES
Pt cleaned of large incontinent bowel and bladder  episode from on arrival. 
 
Repositioned for comfort. Head of bead elevated, call bell within reach.

## 2021-03-14 NOTE — ED NOTES
Bedside shift change report given to Ned Solano and Liliana Alaniz RN (oncoming nurse) by Lauren Porter RN (offgoing nurse). Report included the following information SBAR, ED Summary, MAR and Recent Results.

## 2021-03-14 NOTE — ED NOTES
TRANSFER - OUT REPORT: 
 
Verbal report given to [facility] on Dior Gramajo  being transferred to room [phone] for routine progression of care Report consisted of patients Situation, Background, Assessment and  
Recommendations(SBAR). Information from the following report(s) SBAR, ED Summary and MAR was reviewed with the receiving nurse. Lines:  
Venous Access Device Vaccess CT power port Ref# [id number] 03/05/21 Upper chest (subclavicular area, right (Active) Peripheral IV 03/14/21 Right Antecubital (Active) Peripheral IV 03/14/21 Left Wrist (Active) Opportunity for questions and clarification was provided. Patient transported with: 
 O2 @ 6 liters Registered Nurse

## 2021-03-14 NOTE — H&P
Pulmonology Intensive Care Unit Initial Assessment Subjective: 
 
 
 
Subjective:  
 
Critical Care Initial Evaluation Note: 3/14/2021 6:43 PM 
 
Ms. 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 She is brought to ER due to unresponsiveness since this morning. She was last know awake by around 10:40pm yesterday and this morning she did not wake up and unarousable. In ER she is found to have large left MCA and CALE territory stroke with local mass effect. She is minimally responsive and spontaneously moves her left side and opens eyes but does not follow commands. CTA showed L ICA occlusion. Neuro IR and tele neurology was called by ED staff and deemed not a candidate for neuro intervetion and not candidate for TPA. She is going to be admitted to ICU for further care and monitoring. Past Medical History:  
Diagnosis Date  Arthritis   
 left shoulder  Atrial fibrillation ([address] 6/2/2010 Dr. Bronson Louie  CAD (coronary artery disease) stent; Dr. Bronson Louie  Cancer ([address] lung  Celiac disease  Chronic diastolic heart failure ([address] 09/22/2014 Dr. Bronson Louie  Chronic kidney disease   
 per cardio note  Chronic pain   
 left thigh:  L SFA intervention by Dr. Rylie Prasad 07/2018, as of 9/6/18:  still causing pain, pt to have MILLA checked per Dr. Rylie Prasad note  Chronic systolic HF (heart failure) ([address] 5/10/2017  
 4/2017 EF 25-30%  Congestive heart failure (CHF) ([address]  COPD (chronic obstructive pulmonary disease) ([address] 6/2/2010 Dr. Cheryl Gibbons  Diabetes ([address] Dr. Sage Lyn; no current medication per pt  Emphysema, unspecified ([address] Dr. Cheryl Gibbons  Fibroid PT STATES NOT HAVING FIBROIDS  Frequent falls 2017  Gout  Heart failure ([address] Dr. Bronson Louie  History of Clostridium difficile infection 5/10/2017  
 4/2017 CDiff positive  Hypertension 6/2/2010 Dr. Joseph Caballero  Hypertensive heart and chronic kidney disease   
 per PCP note  Hypotension 5/10/2017  Junctional tachycardia ([facility] [address] 5/10/2017 Dr. Zack Curiel  Neuropathy  NIDDM (non-insulin dependent diabetes mellitus) 6/2/2010  PAD (peripheral artery disease) ([facility] [address]  S/P ablation of atrial fibrillation 03/2018  Screening mammogram 5/4/10  
 SOB (shortness of breath) 09/22/2014 Dr. Milagro Ch  SSS (sick sinus syndrome) ([facility] [address]   
 per pacemaker form 9/6/18  Weakness   
 per pt weakness from c-diff 2017, mulitple falls with injury to rotator cuff Past Surgical History:  
Procedure Laterality Date  COLONOSCOPY N/A 9/25/2017 COLONOSCOPY with biopsy performed by Moon Amaro MD at [facility] AMBULATORY OR  
 HX AFIB ABLATION  03/2018  
 has had 2 ablations per patient [id number]  HX HEART CATHETERIZATION  07/23/2018  HX HEENT    
 HX OTHER SURGICAL    
 adrenal gland removed  HX PACEMAKER Left [employer] model: Miguelina Reeds  HX ROTATOR CUFF REPAIR Right  HX WISDOM TEETH EXTRACTION X2  
 IR INSERT TUNL CVC W PORT OVER 5 YEARS  3/5/2021  [place] CARDIAC SURG PROCEDURE UNLIST    
 3 cardiac stent placed  [place] EXCISE ADRENAL GLAND Right 1994  VASCULAR SURGERY PROCEDURE UNLIST  07/20/2018 Left SFA intervention ; Dr. Eran Solis Prior to Admission medications Medication Sig Start Date End Date Taking? Authorizing Provider  
cholecalciferol, vitamin D3, (VITAMIN D3 PO) Take  by mouth. With calcium    Provider, Historical  
lidocaine-prilocaine (EMLA) topical cream Apply  to affected area as needed for Pain. Apply to port site 30-45 min prior to treatments 2/25/21   Mabel Griffin MD  
prochlorperazine (Compazine) 10 mg tablet Take 1 Tab by mouth every six (6) hours as needed for Nausea or Vomiting for up to 60 days.  2/25/21 4/26/21  Mabel Griffin MD  
ondansetron (ZOFRAN ODT) 4 mg disintegrating tablet Take 1 Tab by mouth every eight (8) hours as needed for Nausea or Vomiting. 2/25/21   Amirah Desouza MD  
SITagliptin (Januvia) 50 mg tablet Take 1 Tab by mouth daily. 2/12/21   Raj Marquez MD  
simvastatin (ZOCOR) 20 mg tablet TAKE 1 TABLET BY MOUTH NIGHTLY 2/12/21   Raj Marquez MD  
cyclobenzaprine (FLEXERIL) 10 mg tablet Take 0.5 Tabs by mouth three (3) times daily as needed for Muscle Spasm(s). 2/2/21   Jessica Ayers DO  
gabapentin (NEURONTIN) 800 mg tablet TAKE 1 TABLET BY MOUTH THREE TIMES DAILY 12/29/20   Beba KENNEY NP  
budesonide-formoteroL (Symbicort) 160-4.5 mcg/actuation HFAA INHALE ONE PUFF BY MOUTH TWICE DAILY 11/24/20   Raj Marquez MD  
carvediloL (COREG) 6.25 mg tablet TAKE 1 TABLET BY MOUTH TWICE DAILY WITH MEALS 11/24/20   Raj Marquez MD  
tiotropium (Spiriva with HandiHaler) 18 mcg inhalation capsule INHALE THE CONTENTS OF 1 CAPSULE THROUGH HANDIHALER DEVICE DAILY 11/24/20   Raj Marquez MD  
albuterol (PROVENTIL HFA, VENTOLIN HFA, PROAIR HFA) 90 mcg/actuation inhaler Take 2 Puffs by inhalation every four (4) hours as needed for Wheezing. 11/24/20   Raj Marquez MD  
colchicine 0.6 mg tablet TAKE 2 TABLETS BY MOUTH ONCE DAILY 11/20/20   Raj Marquez MD  
diclofenac (VOLTAREN) 1 % gel Apply 4 g to affected area four (4) times daily as needed for Pain. 11/11/20   Raj Marquez MD  
Eliquis 5 mg tablet Take 1 tablet by mouth twice daily 10/15/20   Kira Overton NP  
albuterol (PROVENTIL VENTOLIN) 2.5 mg /3 mL (0.083 %) nebu 3 mL by Nebulization route every four (4) hours as needed for Wheezing. 5/7/20   Raj Marquez MD  
glucose blood VI test strips (BLOOD GLUCOSE TEST) strip Use BID Dx11.9 1/24/20   Raj Marquez MD  
lancets misc Use BID 1/24/20   Raj Marquez MD  
acetaminophen (TYLENOL) 325 mg tablet Take 2 Tabs by mouth every four (4) hours as needed for Pain or Fever.  3/28/19   Heydi Castaneda MD  
guaiFENesin ER (MUCINEX) 600 mg ER tablet Take 1 Tab by mouth two (2) times a day. Patient taking differently: Take 600 mg by mouth as needed. 3/28/19   Akash Corbin MD  
benzocaine-menthol (CHLORASEPTIC MAX) 15-10 mg lozg lozenge Take 1 Lozenge by mouth every four to six (4-6) hours as needed for Sore throat. 3/28/19   Michele Castaneda MD  
cod liver oil cap Take 1 Cap by mouth daily. Provider, Historical  
 
Allergies Allergen Reactions  Crestor [Rosuvastatin] Myalgia  Levaquin [Levofloxacin] Nausea Only GI Upset  Lipitor [Atorvastatin] Diarrhea  Lyrica [Pregabalin] Myalgia Social History Tobacco Use  Smoking status: Former Smoker Packs/day: 0.50 Years: 42.00 Pack years: 21.00 Types: Cigarettes Start date: 5 Quit date: 2009 Years since quittin.2  Smokeless tobacco: Never Used Substance Use Topics  Alcohol use: No  
  
Family History Problem Relation Age of Onset  Heart Disease Mother  Diabetes Father  Heart Disease Brother  Other Son MURDERED Review of Systems Unable to perform ROS: Patient unresponsive Objective:  
 
Vital signs reviewed. No intake/output data recorded. No intake/output data recorded. Physical Exam 
Constitutional:   
   Comments: Unresponsive HENT:  
   Head: Normocephalic and atraumatic. Nose: No congestion or rhinorrhea. Mouth/Throat:  
   Mouth: Mucous membranes are moist.  
Eyes:  
   Conjunctiva/sclera: Conjunctivae normal.  
Neck: Musculoskeletal: Neck supple. No neck rigidity. Cardiovascular:  
   Rate and Rhythm: Normal rate and regular rhythm. Pulmonary:  
   Effort: Pulmonary effort is normal. No respiratory distress. Breath sounds: Normal breath sounds. No wheezing. Abdominal:  
   General: Abdomen is flat. There is no distension. Palpations: Abdomen is soft. Tenderness: There is no abdominal tenderness. There is no guarding. Musculoskeletal:     
   General: No swelling. Data Review:  
 
Recent Results (from the past 24 hour(s)) GLUCOSE, POC Collection Time: 03/14/21  3:42 PM  
Result Value Ref Range Glucose (POC) 192 (H) 65 - 100 mg/dL Performed by June Nielsen RN   
CBC WITH AUTOMATED DIFF Collection Time: 03/14/21  3:52 PM  
Result Value Ref Range WBC 9.2 3.6 - 11.0 K/uL  
 RBC 4.64 3.80 - 5.20 M/uL  
 HGB 12.7 11.5 - 16.0 g/dL HCT 38.7 35.0 - 47.0 % MCV 83.4 80.0 - 99.0 FL  
 MCH 27.4 26.0 - 34.0 PG  
 MCHC 32.8 30.0 - 36.5 g/dL  
 RDW 13.2 11.5 - 14.5 % PLATELET 525 779 - 241 K/uL MPV 10.3 8.9 - 12.9 FL  
 NRBC 0.0 0  WBC ABSOLUTE NRBC 0.00 0.00 - 0.01 K/uL NEUTROPHILS 75 32 - 75 % LYMPHOCYTES 22 12 - 49 % MONOCYTES 1 (L) 5 - 13 % EOSINOPHILS 0 0 - 7 % BASOPHILS 0 0 - 1 % IMMATURE GRANULOCYTES 2 (H) 0.0 - 0.5 % ABS. NEUTROPHILS 6.9 1.8 - 8.0 K/UL  
 ABS. LYMPHOCYTES 2.0 0.8 - 3.5 K/UL  
 ABS. MONOCYTES 0.1 0.0 - 1.0 K/UL  
 ABS. EOSINOPHILS 0.0 0.0 - 0.4 K/UL  
 ABS. BASOPHILS 0.0 0.0 - 0.1 K/UL  
 ABS. IMM. GRANS. 0.2 (H) 0.00 - 0.04 K/UL  
 DF AUTOMATED METABOLIC PANEL, COMPREHENSIVE Collection Time: 03/14/21  3:52 PM  
Result Value Ref Range Sodium 129 (L) 136 - 145 mmol/L Potassium 4.6 3.5 - 5.1 mmol/L Chloride 99 97 - 108 mmol/L  
 CO2 24 21 - 32 mmol/L Anion gap 6 5 - 15 mmol/L Glucose 203 (H) 65 - 100 mg/dL BUN 17 6 - 20 MG/DL Creatinine 1.03 (H) 0.55 - 1.02 MG/DL  
 BUN/Creatinine ratio 17 12 - 20 GFR est AA >60 >60 ml/min/1.73m2 GFR est non-AA 53 (L) >60 ml/min/1.73m2 Calcium 8.6 8.5 - 10.1 MG/DL Bilirubin, total 1.6 (H) 0.2 - 1.0 MG/DL  
 ALT (SGPT) 26 12 - 78 U/L  
 AST (SGOT) 63 (H) 15 - 37 U/L Alk. phosphatase 96 45 - 117 U/L Protein, total 8.3 (H) 6.4 - 8.2 g/dL Albumin 3.3 (L) 3.5 - 5.0 g/dL Globulin 5.0 (H) 2.0 - 4.0 g/dL A-G Ratio 0.7 (L) 1.1 - 2.2 PROTHROMBIN TIME + INR  
 Collection Time: 03/14/21  3:52 PM  
Result Value Ref Range INR 1.1 0.9 - 1.1 Prothrombin time 11.1 9.0 - 11.1 sec TROPONIN I Collection Time: 03/14/21  3:52 PM  
Result Value Ref Range Troponin-I, Qt. 0.98 (H) <0.05 ng/mL SAMPLES BEING HELD Collection Time: 03/14/21  3:55 PM  
Result Value Ref Range SAMPLES BEING HELD BL,RD   
 COMMENT Add-on orders for these samples will be processed based on acceptable specimen integrity and analyte stability, which may vary by analyte. EKG, 12 LEAD, INITIAL Collection Time: 03/14/21  5:18 PM  
Result Value Ref Range Ventricular Rate 101 BPM  
 Atrial Rate 101 BPM  
 P-R Interval 96 ms QRS Duration 124 ms Q-T Interval 368 ms QTC Calculation (Bezet) 477 ms Calculated P Axis 100 degrees Calculated R Axis -65 degrees Calculated T Axis 128 degrees Diagnosis Sinus tachycardia with short [place] Left axis deviation Nonspecific intraventricular conduction delay Nonspecific T wave abnormality When compared with ECG of 11-MAR-2021 16:30, Sinus rhythm has replaced Electronic ventricular pacemaker POC EG7 Collection Time: 03/14/21  6:23 PM  
Result Value Ref Range Calcium, ionized (POC) 1.18 1.12 - 1.32 mmol/L  
 pH (POC) 7.47 (H) 7.35 - 7.45    
 pCO2 (POC) 32.0 (L) 35.0 - 45.0 MMHG  
 pO2 (POC) 99 80 - 100 MMHG  
 HCO3 (POC) 23.1 22 - 26 MMOL/L Base deficit (POC) 1 mmol/L  
 sO2 (POC) 98 (H) 92 - 97 % Site RIGHT BRACHIAL Device: NASAL CANNULA Flow rate (POC) 6 L/M Allens test (POC) N/A Specimen type (POC) ARTERIAL Total resp. rate 17 Assessment:  
 
-Left CALE and MCA territory stroke. 
-Left ICA stenosis. 
-Small cell lung CA on chemotherapy. -A fib. On eliquis. -HLD 
-COPD. 
-HTN. Plan:  
 
-Patient has catastrophic stroke with early cerebral edema. Neuro IR and neurology called, not a candidate for intervention.  Her prognosis is poor especially due to pre existing small cell CA with portends poor prognosis. Family wants to continue supportive care and change the code status to DNR/DNI. Family may pursue comfort care and ok with talking to palliative/hospice care. -Will admit to ICU. -obtain 2D echo. -Continue eliquis. -start ASA. -hyperonic saline with goal Na 150. 
-neurology consult. 
-Serial neuro exam. 
-continue bronchodilators. 
-Permissive HTN. -Hold BP meds. Prophylaxis: 
Stress Ulcer Protocol Active: Pepcid. DVT Protocol Active: on eliquis. 60min of CC time spent without overlap and excluding procedures.

## 2021-03-14 NOTE — ED NOTES
Returned to bedside, tele Neuro Neurologist on screen. Pt reconnected to central monitoring Daughter brought to bedside for Neurologist exam.

## 2021-03-14 NOTE — ED NOTES
Pt reports to ED via EMS with symptoms of stroke. Code Stroke level 2 called MD at bedside Blood glucose on arrival 192 Initial blood work completed Patient on monitoring Patient to CT with nurse

## 2021-03-14 NOTE — ED PROVIDER NOTES
EMERGENCY DEPARTMENT HISTORY AND PHYSICAL EXAM 
 
 
Date: 3/14/2021 Patient Name: Kristan Vaughn History of Presenting Illness No chief complaint on file. History Provided By: Patient and EMS  
 
HPI: Kristan Vaughn, 72 y.o. female with a past medical history significant for Atrial fibrillation on Eliquis, CAD, history of small cell lung cancer, history of CHF, COPD, multiple medical problems as stated below presents to the ED with cc of sudden onset of severe altered mental status since 2245 last evening. Family reports that she was last seen then and was found unresponsive today. In route EMS reports her blood sugars have been in the 190s, patient intermittently responding with the left upper extremity, leftward gaze. No reported trauma. Patient appears to have been incontinent of urine. History is severely limited due to patient's altered mental status on presentation. Patient did recently have a stroke back in January but reportedly had no residual deficits. No other associated symptoms. No other exacerbating ameliorating factors. There are no other complaints, changes, or physical findings at this time. PCP: Zaira Tubbs MD 
 
No current facility-administered medications on file prior to encounter. Current Outpatient Medications on File Prior to Encounter Medication Sig Dispense Refill  cholecalciferol, vitamin D3, (VITAMIN D3 PO) Take  by mouth. With calcium  lidocaine-prilocaine (EMLA) topical cream Apply  to affected area as needed for Pain. Apply to port site 30-45 min prior to treatments 30 g 2  prochlorperazine (Compazine) 10 mg tablet Take 1 Tab by mouth every six (6) hours as needed for Nausea or Vomiting for up to 60 days. 40 Tab 2  
 ondansetron (ZOFRAN ODT) 4 mg disintegrating tablet Take 1 Tab by mouth every eight (8) hours as needed for Nausea or Vomiting. 40 Tab 2  
 SITagliptin (Januvia) 50 mg tablet Take 1 Tab by mouth daily.  90 Tab 1  simvastatin (ZOCOR) 20 mg tablet TAKE 1 TABLET BY MOUTH NIGHTLY 90 Tab 1  cyclobenzaprine (FLEXERIL) 10 mg tablet Take 0.5 Tabs by mouth three (3) times daily as needed for Muscle Spasm(s). 9 Tab 0  
 gabapentin (NEURONTIN) 800 mg tablet TAKE 1 TABLET BY MOUTH THREE TIMES DAILY 270 Tab 0  
 budesonide-formoteroL (Symbicort) 160-4.5 mcg/actuation HFAA INHALE ONE PUFF BY MOUTH TWICE DAILY 1 Inhaler 3  carvediloL (COREG) 6.25 mg tablet TAKE 1 TABLET BY MOUTH TWICE DAILY WITH MEALS 180 Tab 1  
 tiotropium (Spiriva with HandiHaler) 18 mcg inhalation capsule INHALE THE CONTENTS OF 1 CAPSULE THROUGH HANDIHALER DEVICE DAILY 90 Cap 1  
 albuterol (PROVENTIL HFA, VENTOLIN HFA, PROAIR HFA) 90 mcg/actuation inhaler Take 2 Puffs by inhalation every four (4) hours as needed for Wheezing. 1 Inhaler 1  
 colchicine 0.6 mg tablet TAKE 2 TABLETS BY MOUTH ONCE DAILY 60 Tab 1  
 diclofenac (VOLTAREN) 1 % gel Apply 4 g to affected area four (4) times daily as needed for Pain. 100 g 1  
 Eliquis 5 mg tablet Take 1 tablet by mouth twice daily 180 Tab 3  
 albuterol (PROVENTIL VENTOLIN) 2.5 mg /3 mL (0.083 %) nebu 3 mL by Nebulization route every four (4) hours as needed for Wheezing. 24 Each 2  
 glucose blood VI test strips (BLOOD GLUCOSE TEST) strip Use BID Dx11.9 100 Strip 11  
 lancets misc Use  Each 11  
 acetaminophen (TYLENOL) 325 mg tablet Take 2 Tabs by mouth every four (4) hours as needed for Pain or Fever. 40 Tab 0  
 guaiFENesin ER (MUCINEX) 600 mg ER tablet Take 1 Tab by mouth two (2) times a day. (Patient taking differently: Take 600 mg by mouth as needed.) 20 Tab 0  
 benzocaine-menthol (CHLORASEPTIC MAX) 15-10 mg lozg lozenge Take 1 Lozenge by mouth every four to six (4-6) hours as needed for Sore throat. 30 Each 0  
 cod liver oil cap Take 1 Cap by mouth daily. Past History Past Medical History: 
Past Medical History:  
Diagnosis Date  Arthritis   
 left shoulder  Atrial fibrillation ([address] 6/2/2010 Dr. Parham Mediate  CAD (coronary artery disease) stent; Dr. Parham Mediate  Cancer ([address] lung  Celiac disease  Chronic diastolic heart failure ([address] 09/22/2014 Dr. Parham Mediate  Chronic kidney disease   
 per cardio note  Chronic pain   
 left thigh:  L SFA intervention by Dr. Liam Flynn 07/2018, as of 9/6/18:  still causing pain, pt to have MILLA checked per Dr. Liam Flynn note  Chronic systolic HF (heart failure) ([address] 5/10/2017  
 4/2017 EF 25-30%  Congestive heart failure (CHF) ([address]  COPD (chronic obstructive pulmonary disease) ([address] 6/2/2010 Dr. Elliot Fuentes  Diabetes ([facility] Dr. Edinson Salazar; no current medication per pt  Emphysema, unspecified ([address] Dr. Elliot Fuentes  Fibroid PT STATES NOT HAVING FIBROIDS  Frequent falls 2017  Gout  Heart failure ([address] Dr. Parham Mediate  History of Clostridium difficile infection 5/10/2017  
 4/2017 CDiff positive  Hypertension 6/2/2010 Dr. Be Coker  Hypertensive heart and chronic kidney disease   
 per PCP note  Hypotension 5/10/2017  Junctional tachycardia ([address] 5/10/2017 Dr. Parham Mediate  Neuropathy  NIDDM (non-insulin dependent diabetes mellitus) 6/2/2010  PAD (peripheral artery disease) ([address]  S/P ablation of atrial fibrillation 03/2018  Screening mammogram 5/4/10  
 SOB (shortness of breath) 09/22/2014 Dr. Elliot Fuentes  SSS (sick sinus syndrome) ([facility] [address] [facility]   
 per pacemaker form 9/6/18  Weakness   
 per pt weakness from c-diff 2017, mulitple falls with injury to rotator cuff Past Surgical History: 
Past Surgical History:  
Procedure Laterality Date  COLONOSCOPY N/A 9/25/2017 COLONOSCOPY with biopsy performed by Bre Goins MD at [facility] AMBULATORY OR  
 HX AFIB ABLATION  03/2018  
 has had 2 ablations per patient [id number]  HX HEART CATHETERIZATION  07/23/2018  HX HEENT    
 HX OTHER SURGICAL    
 adrenal gland removed  HX PACEMAKER Left [employer] model: Savilla [place]ic  HX ROTATOR CUFF REPAIR Right  HX WISDOM TEETH EXTRACTION X2  
 IR INSERT TUNL CVC W PORT OVER 5 YEARS  3/5/2021  [place] CARDIAC SURG PROCEDURE UNLIST    
 3 cardiac stent placed  [place] EXCISE ADRENAL GLAND Right 1994  VASCULAR SURGERY PROCEDURE UNLIST  2018 Left SFA intervention ; Dr. Nakia Somers Family History: 
Family History Problem Relation Age of Onset  Heart Disease Mother  Diabetes Father  Heart Disease Brother  Other Son MURDERED Social History: 
Social History Tobacco Use  Smoking status: Former Smoker Packs/day: 0.50 Years: 42.00 Pack years: 21.00 Types: Cigarettes Start date: 5 Quit date: 2009 Years since quittin.2  Smokeless tobacco: Never Used Substance Use Topics  Alcohol use: No  
 Drug use: No  
 
 
Allergies: Allergies Allergen Reactions  Crestor [Rosuvastatin] Myalgia  Levaquin [Levofloxacin] Nausea Only GI Upset  Lipitor [Atorvastatin] Diarrhea  Lyrica [Pregabalin] Myalgia Review of Systems Review of Systems Unable to perform ROS: Mental status change Physical Exam  
Physical Exam 
Vitals signs and nursing note reviewed. Constitutional:   
   Appearance: She is well-developed. She is ill-appearing and toxic-appearing. Comments: Severe altered mental status, intermittently responds to verbal and painful stimuli. She moves her left upper extremity intermittently. She is downgoing Babinski's bilaterally. HENT:  
   Head: Normocephalic and atraumatic. Mouth/Throat:  
   Pharynx: No oropharyngeal exudate. Eyes:  
   Conjunctiva/sclera: Conjunctivae normal.  
   Pupils: Pupils are equal, round, and reactive to light. Neck: Musculoskeletal: Normal range of motion. Cardiovascular:  
   Rate and Rhythm: Normal rate and regular rhythm. Heart sounds: No murmur. Pulmonary: Effort: Pulmonary effort is normal. No respiratory distress. Breath sounds: Normal breath sounds. No wheezing. Abdominal:  
   General: Bowel sounds are normal. There is no distension. Palpations: Abdomen is soft. Tenderness: There is no abdominal tenderness. Musculoskeletal: Normal range of motion. General: No deformity. Skin: 
   General: Skin is warm and dry. Findings: No rash. Neurological:  
   Mental Status: She is oriented to person, place, and time. She is lethargic. Coordination: Coordination normal.  
Psychiatric:     
   Behavior: Behavior normal.  
 
 
 
Diagnostic Study Results Labs - Recent Results (from the past 168 hour(s)) CBC WITH AUTOMATED DIFF Collection Time: 03/09/21 10:13 AM  
Result Value Ref Range WBC 7.4 3.6 - 11.0 K/uL  
 RBC 4.28 3.80 - 5.20 M/uL  
 HGB 11.7 11.5 - 16.0 g/dL HCT 36.8 35.0 - 47.0 % MCV 86.0 80.0 - 99.0 FL  
 MCH 27.3 26.0 - 34.0 PG  
 MCHC 31.8 30.0 - 36.5 g/dL  
 RDW 13.8 11.5 - 14.5 % PLATELET 581 047 - 011 K/uL MPV 10.2 8.9 - 12.9 FL  
 NRBC 0.0 0  WBC ABSOLUTE NRBC 0.00 0.00 - 0.01 K/uL NEUTROPHILS 54 32 - 75 % LYMPHOCYTES 30 12 - 49 % MONOCYTES 12 5 - 13 % EOSINOPHILS 4 0 - 7 % BASOPHILS 0 0 - 1 % IMMATURE GRANULOCYTES 0 0.0 - 0.5 % ABS. NEUTROPHILS 4.0 1.8 - 8.0 K/UL  
 ABS. LYMPHOCYTES 2.2 0.8 - 3.5 K/UL  
 ABS. MONOCYTES 0.9 0.0 - 1.0 K/UL  
 ABS. EOSINOPHILS 0.3 0.0 - 0.4 K/UL  
 ABS. BASOPHILS 0.0 0.0 - 0.1 K/UL  
 ABS. IMM. GRANS. 0.0 0.00 - 0.04 K/UL  
 DF AUTOMATED HEPATIC FUNCTION PANEL Collection Time: 03/09/21 10:13 AM  
Result Value Ref Range Protein, total 8.5 (H) 6.4 - 8.2 g/dL Albumin 3.6 3.5 - 5.0 g/dL Globulin 4.9 (H) 2.0 - 4.0 g/dL A-G Ratio 0.7 (L) 1.1 - 2.2 Bilirubin, total 0.6 0.2 - 1.0 MG/DL Bilirubin, direct 0.2 0.0 - 0.2 MG/DL Alk.  phosphatase 96 45 - 117 U/L  
 AST (SGOT) 29 15 - 37 U/L  
 ALT (SGPT) 20 12 - 78 U/L POC CHEM8 Collection Time: 03/09/21 10:17 AM  
Result Value Ref Range Calcium, ionized (POC) 1.24 1.12 - 1.32 mmol/L Sodium (POC) 139 136 - 145 mmol/L Potassium (POC) 4.0 3.5 - 5.1 mmol/L Chloride (POC) 103 98 - 107 mmol/L  
 CO2 (POC) 26 21 - 32 mmol/L Anion gap (POC) 16 10 - 20 mmol/L Glucose (POC) 163 (H) 65 - 100 mg/dL BUN (POC) 13 9 - 20 mg/dL Creatinine (POC) 1.3 0.6 - 1.3 mg/dL GFRAA, POC 49 (L) >60 ml/min/1.73m2 GFRNA, POC 41 (L) >60 ml/min/1.73m2 Hematocrit (POC) 39 35.0 - 47.0 % Comment Notified RN or MD immediately by  EKG, 12 LEAD, INITIAL Collection Time: 03/11/21  4:30 PM  
Result Value Ref Range Ventricular Rate 85 BPM  
 Atrial Rate 416 BPM  
 P-R Interval 146 ms  
 QRS Duration 168 ms Q-T Interval 424 ms QTC Calculation (Bezet) 504 ms Calculated R Axis -84 degrees Calculated T Axis 81 degrees Diagnosis AV dual-paced rhythm with frequent ventricular-paced complexes When compared with ECG of 30-JAN-2021 19:49, 
Vent. rate has increased BY  10 BPM 
Confirmed by Blayne Rao ([id number]) on 3/12/2021 8:23:05 AM 
  
CBC WITH AUTOMATED DIFF Collection Time: 03/11/21  4:42 PM  
Result Value Ref Range WBC 11.7 (H) 3.6 - 11.0 K/uL  
 RBC 4.20 3.80 - 5.20 M/uL  
 HGB 11.6 11.5 - 16.0 g/dL HCT 36.9 35.0 - 47.0 % MCV 87.9 80.0 - 99.0 FL  
 MCH 27.6 26.0 - 34.0 PG  
 MCHC 31.4 30.0 - 36.5 g/dL  
 RDW 14.0 11.5 - 14.5 % PLATELET 704 951 - 280 K/uL NRBC 0.0 0  WBC ABSOLUTE NRBC 0.00 0.00 - 0.01 K/uL NEUTROPHILS 88 (H) 32 - 75 % LYMPHOCYTES 7 (L) 12 - 49 % MONOCYTES 5 5 - 13 % EOSINOPHILS 0 0 - 7 % BASOPHILS 0 0 - 1 % IMMATURE GRANULOCYTES 0 0.0 - 0.5 % ABS. NEUTROPHILS 10.2 (H) 1.8 - 8.0 K/UL  
 ABS. LYMPHOCYTES 0.8 0.8 - 3.5 K/UL  
 ABS. MONOCYTES 0.6 0.0 - 1.0 K/UL  
 ABS. EOSINOPHILS 0.0 0.0 - 0.4 K/UL  
 ABS. BASOPHILS 0.0 0.0 - 0.1 K/UL  
 ABS. IMM.  GRANS. 0.0 0.00 - 0.04 K/UL  
 DF AUTOMATED METABOLIC PANEL, COMPREHENSIVE Collection Time: 03/11/21  4:42 PM  
Result Value Ref Range Sodium 133 (L) 136 - 145 mmol/L Potassium 5.1 3.5 - 5.1 mmol/L Chloride 107 97 - 108 mmol/L  
 CO2 19 (L) 21 - 32 mmol/L Anion gap 7 5 - 15 mmol/L Glucose 198 (H) 65 - 100 mg/dL BUN 20 6 - 20 MG/DL Creatinine 1.21 (H) 0.55 - 1.02 MG/DL  
 BUN/Creatinine ratio 17 12 - 20 GFR est AA 53 (L) >60 ml/min/1.73m2 GFR est non-AA 44 (L) >60 ml/min/1.73m2 Calcium 8.3 (L) 8.5 - 10.1 MG/DL Bilirubin, total 0.5 0.2 - 1.0 MG/DL  
 ALT (SGPT) 25 12 - 78 U/L  
 AST (SGOT) 52 (H) 15 - 37 U/L Alk. phosphatase 95 45 - 117 U/L Protein, total 8.7 (H) 6.4 - 8.2 g/dL Albumin 3.4 (L) 3.5 - 5.0 g/dL Globulin 5.3 (H) 2.0 - 4.0 g/dL A-G Ratio 0.6 (L) 1.1 - 2.2    
TROPONIN I Collection Time: 03/11/21  4:42 PM  
Result Value Ref Range Troponin-I, Qt. <0.05 <0.05 ng/mL URINALYSIS W/ REFLEX CULTURE Collection Time: 03/11/21  7:27 PM  
 Specimen: Urine Result Value Ref Range Color YELLOW/STRAW Appearance CLEAR CLEAR Specific gravity 1.017 1.003 - 1.030    
 pH (UA) 5.5 5.0 - 8.0 Protein TRACE (A) NEG mg/dL Glucose 100 (A) NEG mg/dL Ketone Negative NEG mg/dL Bilirubin Negative NEG Blood Negative NEG Urobilinogen 0.2 0.2 - 1.0 EU/dL Nitrites Negative NEG Leukocyte Esterase SMALL (A) NEG    
 WBC 5-10 0 - 4 /hpf  
 RBC 0-5 0 - 5 /hpf Epithelial cells FEW FEW /lpf Bacteria Negative NEG /hpf  
 UA:UC IF INDICATED CULTURE NOT INDICATED BY UA RESULT CNI Hyaline cast 0-2 0 - 5 /lpf  
GLUCOSE, POC Collection Time: 03/14/21  3:42 PM  
Result Value Ref Range Glucose (POC) 192 (H) 65 - 100 mg/dL Performed by Emily Brennan RN   
CBC WITH AUTOMATED DIFF Collection Time: 03/14/21  3:52 PM  
Result Value Ref Range WBC 9.2 3.6 - 11.0 K/uL  
 RBC 4.64 3.80 - 5.20 M/uL  
 HGB 12.7 11.5 - 16.0 g/dL  HCT 38.7 35.0 - 47.0 % MCV 83.4 80.0 - 99.0 FL  
 MCH 27.4 26.0 - 34.0 PG  
 MCHC 32.8 30.0 - 36.5 g/dL  
 RDW 13.2 11.5 - 14.5 % PLATELET 882 590 - 750 K/uL MPV 10.3 8.9 - 12.9 FL  
 NRBC 0.0 0  WBC ABSOLUTE NRBC 0.00 0.00 - 0.01 K/uL NEUTROPHILS 75 32 - 75 % LYMPHOCYTES 22 12 - 49 % MONOCYTES 1 (L) 5 - 13 % EOSINOPHILS 0 0 - 7 % BASOPHILS 0 0 - 1 % IMMATURE GRANULOCYTES 2 (H) 0.0 - 0.5 % ABS. NEUTROPHILS 6.9 1.8 - 8.0 K/UL  
 ABS. LYMPHOCYTES 2.0 0.8 - 3.5 K/UL  
 ABS. MONOCYTES 0.1 0.0 - 1.0 K/UL  
 ABS. EOSINOPHILS 0.0 0.0 - 0.4 K/UL  
 ABS. BASOPHILS 0.0 0.0 - 0.1 K/UL  
 ABS. IMM. GRANS. 0.2 (H) 0.00 - 0.04 K/UL  
 DF AUTOMATED METABOLIC PANEL, COMPREHENSIVE Collection Time: 03/14/21  3:52 PM  
Result Value Ref Range Sodium 129 (L) 136 - 145 mmol/L Potassium 4.6 3.5 - 5.1 mmol/L Chloride 99 97 - 108 mmol/L  
 CO2 24 21 - 32 mmol/L Anion gap 6 5 - 15 mmol/L Glucose 203 (H) 65 - 100 mg/dL BUN 17 6 - 20 MG/DL Creatinine 1.03 (H) 0.55 - 1.02 MG/DL  
 BUN/Creatinine ratio 17 12 - 20 GFR est AA >60 >60 ml/min/1.73m2 GFR est non-AA 53 (L) >60 ml/min/1.73m2 Calcium 8.6 8.5 - 10.1 MG/DL Bilirubin, total 1.6 (H) 0.2 - 1.0 MG/DL  
 ALT (SGPT) 26 12 - 78 U/L  
 AST (SGOT) 63 (H) 15 - 37 U/L Alk. phosphatase 96 45 - 117 U/L Protein, total 8.3 (H) 6.4 - 8.2 g/dL Albumin 3.3 (L) 3.5 - 5.0 g/dL Globulin 5.0 (H) 2.0 - 4.0 g/dL A-G Ratio 0.7 (L) 1.1 - 2.2 PROTHROMBIN TIME + INR Collection Time: 03/14/21  3:52 PM  
Result Value Ref Range INR 1.1 0.9 - 1.1 Prothrombin time 11.1 9.0 - 11.1 sec TROPONIN I Collection Time: 03/14/21  3:52 PM  
Result Value Ref Range Troponin-I, Qt. 0.98 (H) <0.05 ng/mL SAMPLES BEING HELD Collection Time: 03/14/21  3:55 PM  
Result Value Ref Range SAMPLES BEING HELD BL,RD   
 COMMENT   Add-on orders for these samples will be processed based on acceptable specimen integrity and analyte stability, which may vary by analyte. EKG, 12 LEAD, INITIAL Collection Time: 03/14/21  5:18 PM  
Result Value Ref Range Ventricular Rate 101 BPM  
 Atrial Rate 101 BPM  
 P-R Interval 96 ms QRS Duration 124 ms Q-T Interval 368 ms QTC Calculation (Bezet) 477 ms Calculated P Axis 100 degrees Calculated R Axis -65 degrees Calculated T Axis 128 degrees Diagnosis Sinus tachycardia with short TN Left axis deviation Nonspecific intraventricular conduction delay Nonspecific T wave abnormality Confirmed by Tess Jorge ([id number]) on 3/15/2021 9:30:43 AM 
  
POC EG7 Collection Time: 03/14/21  6:23 PM  
Result Value Ref Range Calcium, ionized (POC) 1.18 1.12 - 1.32 mmol/L  
 pH (POC) 7.47 (H) 7.35 - 7.45    
 pCO2 (POC) 32.0 (L) 35.0 - 45.0 MMHG  
 pO2 (POC) 99 80 - 100 MMHG  
 HCO3 (POC) 23.1 22 - 26 MMOL/L Base deficit (POC) 1 mmol/L  
 sO2 (POC) 98 (H) 92 - 97 % Site RIGHT BRACHIAL Device: NASAL CANNULA Flow rate (POC) 6 L/M Allens test (POC) N/A Specimen type (POC) ARTERIAL Total resp. rate 17 SARS-COV-2 Collection Time: 03/14/21  6:52 PM  
Result Value Ref Range SARS-CoV-2 Please find results under separate order METABOLIC PANEL, COMPREHENSIVE Collection Time: 03/14/21  6:52 PM  
Result Value Ref Range Sodium 131 (L) 136 - 145 mmol/L Potassium 4.3 3.5 - 5.1 mmol/L Chloride 98 97 - 108 mmol/L  
 CO2 25 21 - 32 mmol/L Anion gap 8 5 - 15 mmol/L Glucose 196 (H) 65 - 100 mg/dL BUN 17 6 - 20 MG/DL Creatinine 1.16 (H) 0.55 - 1.02 MG/DL  
 BUN/Creatinine ratio 15 12 - 20 GFR est AA 56 (L) >60 ml/min/1.73m2 GFR est non-AA 46 (L) >60 ml/min/1.73m2 Calcium 8.6 8.5 - 10.1 MG/DL Bilirubin, total 1.5 (H) 0.2 - 1.0 MG/DL  
 ALT (SGPT) 26 12 - 78 U/L  
 AST (SGOT) 58 (H) 15 - 37 U/L Alk. phosphatase 92 45 - 117 U/L Protein, total 8.3 (H) 6.4 - 8.2 g/dL Albumin 3.4 (L) 3.5 - 5.0 g/dL  Globulin 4.9 (H) 2.0 - 4.0 g/dL A-G Ratio 0.7 (L) 1.1 - 2.2    
CBC W/O DIFF Collection Time: 03/14/21  6:52 PM  
Result Value Ref Range WBC 9.3 3.6 - 11.0 K/uL  
 RBC 4.73 3.80 - 5.20 M/uL  
 HGB 13.0 11.5 - 16.0 g/dL HCT 39.5 35.0 - 47.0 % MCV 83.5 80.0 - 99.0 FL  
 MCH 27.5 26.0 - 34.0 PG  
 MCHC 32.9 30.0 - 36.5 g/dL  
 RDW 13.0 11.5 - 14.5 % PLATELET 100 544 - 086 K/uL MPV 9.5 8.9 - 12.9 FL  
 NRBC 0.0 0  WBC ABSOLUTE NRBC 0.00 0.00 - 0.01 K/uL COVID-19 RAPID TEST Collection Time: 03/14/21  6:52 PM  
Result Value Ref Range Specimen source Nasopharyngeal    
 COVID-19 rapid test Not detected NOTD MAGNESIUM Collection Time: 03/14/21  6:52 PM  
Result Value Ref Range Magnesium 1.9 1.6 - 2.4 mg/dL PHOSPHORUS Collection Time: 03/14/21  6:52 PM  
Result Value Ref Range Phosphorus 3.7 2.6 - 4.7 MG/DL  
EKG, 12 LEAD, INITIAL Collection Time: 03/14/21  8:16 PM  
Result Value Ref Range Ventricular Rate 89 BPM  
 Atrial Rate 89 BPM  
 P-R Interval 140 ms QRS Duration 166 ms  
 Q-T Interval 436 ms  
 QTC Calculation (Bezet) 530 ms Calculated P Axis 90 degrees Calculated R Axis -84 degrees Calculated T Axis 83 degrees Diagnosis AV dual-paced rhythm Abnormal ECG Confirmed by Jann Romero M.D. ([id number]) on 3/15/2021 6:90:78 AM 
  
METABOLIC PANEL, BASIC Collection Time: 03/14/21 11:33 PM  
Result Value Ref Range Sodium 130 (L) 136 - 145 mmol/L Potassium 4.3 3.5 - 5.1 mmol/L Chloride 101 97 - 108 mmol/L  
 CO2 21 21 - 32 mmol/L Anion gap 8 5 - 15 mmol/L Glucose 194 (H) 65 - 100 mg/dL BUN 15 6 - 20 MG/DL Creatinine 0.86 0.55 - 1.02 MG/DL  
 BUN/Creatinine ratio 17 12 - 20 GFR est AA >60 >60 ml/min/1.73m2 GFR est non-AA >60 >60 ml/min/1.73m2 Calcium 8.5 8.5 - 10.1 MG/DL  
HEMOGLOBIN A1C WITH EAG Collection Time: 03/15/21  4:01 AM  
Result Value Ref Range Hemoglobin A1c 7.1 (H) 4.0 - 5.6 %  Est. average glucose 157 mg/dL CBC W/O DIFF Collection Time: 03/15/21  4:01 AM  
Result Value Ref Range WBC 9.4 3.6 - 11.0 K/uL  
 RBC 4.54 3.80 - 5.20 M/uL  
 HGB 12.3 11.5 - 16.0 g/dL HCT 37.6 35.0 - 47.0 % MCV 82.8 80.0 - 99.0 FL  
 MCH 27.1 26.0 - 34.0 PG  
 MCHC 32.7 30.0 - 36.5 g/dL  
 RDW 13.2 11.5 - 14.5 % PLATELET 942 437 - 939 K/uL MPV 10.3 8.9 - 12.9 FL  
 NRBC 0.0 0  WBC ABSOLUTE NRBC 0.00 0.00 - 0.01 K/uL METABOLIC PANEL, BASIC Collection Time: 03/15/21  4:49 AM  
Result Value Ref Range Sodium 134 (L) 136 - 145 mmol/L Potassium 4.2 3.5 - 5.1 mmol/L Chloride 102 97 - 108 mmol/L  
 CO2 25 21 - 32 mmol/L Anion gap 7 5 - 15 mmol/L Glucose 176 (H) 65 - 100 mg/dL BUN 15 6 - 20 MG/DL Creatinine 1.05 (H) 0.55 - 1.02 MG/DL  
 BUN/Creatinine ratio 14 12 - 20 GFR est AA >60 >60 ml/min/1.73m2 GFR est non-AA 52 (L) >60 ml/min/1.73m2 Calcium 8.1 (L) 8.5 - 48.4 MG/DL  
METABOLIC PANEL, BASIC Collection Time: 03/15/21  9:00 AM  
Result Value Ref Range Sodium 136 136 - 145 mmol/L Potassium 4.6 3.5 - 5.1 mmol/L Chloride 105 97 - 108 mmol/L  
 CO2 26 21 - 32 mmol/L Anion gap 5 5 - 15 mmol/L Glucose 191 (H) 65 - 100 mg/dL BUN 15 6 - 20 MG/DL Creatinine 1.04 (H) 0.55 - 1.02 MG/DL  
 BUN/Creatinine ratio 14 12 - 20 GFR est AA >60 >60 ml/min/1.73m2 GFR est non-AA 53 (L) >60 ml/min/1.73m2 Calcium 7.7 (L) 8.5 - 10.1 MG/DL  
ECHO ADULT COMPLETE Collection Time: 03/15/21  1:09 PM  
Result Value Ref Range IVSd 1.16 (A) 0.60 - 0.90 cm LVIDd 4.06 3.90 - 5.30 cm LVIDs 3.74 cm  
 LVOT d 2.02 cm  
 LVPWd 1.44 (A) 0.60 - 0.90 cm LVIDd (M-mode) 4.27 3.90 - 5.30 cm LVIDs (M-mode) 3.35 cm  
 LVOT Cardiac Output 4.40 liter/minute LVOT Peak Gradient 4.64 mmHg Left Ventricular Outflow Tract Mean Gradient 1.93 mmHg LVOT SV 50.0 mL  
 LVOT Peak Velocity 107.72 cm/s LVOT VTI 15.60 cm Left Atrium Major Axis 4.23 cm  Aortic Valve Area by Continuity of Peak Velocity 2.52 cm2 Aortic Valve Area by Continuity of VTI 2.49 cm2  
 AV R PG 56.02 mmHg Aortic Regurgitant Pressure Half-time 476.61 ms  
 AR Max Jovan 373.13 cm/s AoV PG 7.53 mmHg Aortic Valve Systolic Mean Gradient 5.33 mmHg Aortic Valve Systolic Peak Velocity 814.13 cm/s AoV VTI 20.04 cm  
 MV A Jovan 28.82 cm/s Mitral Valve E Wave Deceleration Time 170.42 ms MV E Jovan 54.92 cm/s  
 E/E' septal 13.33 LV E' Septal Velocity 4.12 cm/s MVA VTI 2.62 cm2 MR Peak Gradient 46.76 mmHg  
 TR Max Velocity 341.91 cm/s  
 MV Peak Gradient 6.09 mmHg MV Mean Gradient 2.20 mmHg Mitral Valve Max Velocity 123.36 cm/s Mitral Valve Annulus Velocity Time Integral 19.08 cm Pulmonic Valve Systolic Peak Instantaneous Gradient 2.28 mmHg Pulmonic Valve Max Velocity 75.43 cm/s  
 AO ASC D 2.63 cm Ao Root D 2.75 cm Radiologic Studies -  
XR ABD (KUB) Final Result NG tube overlies proximal stomach. No acute process in the abdomen. CTA CODE NEURO HEAD AND NECK W CONT Final Result 1. Large completed acute left hemispheric infarct with mild mass effect on the  
left lateral ventricle and mild left to right subfalcine herniation. No  
associated hemorrhage. 2. Large matched perfusion defect in the left cerebral hemisphere. Calculated  
area of mismatch is smaller and may involve a portion of the right anterior  
cerebral artery territory. 3. Segmental occlusion of the mid to distal right common carotid artery, with  
reconstitution of the right internal carotid artery, but with critical stenosis  
and probable occlusion of the right internal carotid artery origin. 4. 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 5. 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 6. Patent vertebral basilar system. 7. Severe emphysema. Bilateral pleural effusions, right larger than left.   
  
  
  
CT CODE NEURO PERF W CBF Final Result 1. Large completed acute left hemispheric infarct with mild mass effect on the  
left lateral ventricle and mild left to right subfalcine herniation. No  
associated hemorrhage. 2. Large matched perfusion defect in the left cerebral hemisphere. Calculated  
area of mismatch is smaller and may involve a portion of the right anterior  
cerebral artery territory. 3. Segmental occlusion of the mid to distal right common carotid artery, with  
reconstitution of the right internal carotid artery, but with critical stenosis  
and probable occlusion of the right internal carotid artery origin. 4. 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 5. 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 6. Patent vertebral basilar system. 7. Severe emphysema. Bilateral pleural effusions, right larger than left. CT CODE NEURO HEAD WO CONTRAST Final Result Large acute left MCA and CALE infarcts with local mass effect but no midline  
shift or herniation and no hemorrhage. The findings were called to Dr. Chente Bah on 3/14/2021 at 4:15 PM by itself. [address] XR CHEST PORT    (Results Pending) CT HEAD WO CONT    (Results Pending) CT Results  (Last 48 hours) None CXR Results  (Last 48 hours) None Medical Decision Making I am the first provider for this patient. I reviewed the vital signs, available nursing notes, past medical history, past surgical history, family history and social history. Vital Signs-Reviewed the patient's vital signs. No data found. Vitals:  
 03/15/21 1200 03/15/21 1246 03/15/21 1300 03/15/21 1301 BP: (!) 137/48 (!) 137/48 (!) 142/56 Pulse: 98  98 Resp: 11  15 Temp: 98.7 °F (37.1 °C) SpO2: 100%  100% Weight:  72.6 kg (160 lb) Height:  5' 6\" (1.676 m)  5' 6\" (1.676 m) Records Reviewed: Nursing records and medical records reviewed MDM: 
Patient presenting with altered mental status. Pt has stable vitals and POC glucose was checked immediately upon arrival. DDx: medication toxicity, infection, anemia, electrolyte/metabolic anomoly, hypercapnea, stroke/bleed/mass, dehydration, illicit drug intoxication. Will obtain labwork, UA, EKG and CT imaging of the head, chest xray. Will consider adding toxicologic workup if history unclear or warrants further investigation of toxic source. Will continue to monitor and reassess for admission. Patient presents with stroke like symptoms and seen immediately by me on arrival. Spoke with EMS and/or family regarding symptoms, Time of onset, vitals and normal glucose. DDx: ischemic vs. Hemorrhagic stroke, complex migraine, lucille's paralysis. Given the history and physical, will get a CT head and Neurology consult to determine if tPA warranted. Will continue to monitor closely in the ED and will ultimately need admission for further w/u. Provider Notes (Medical Decision Making):  
Patient is a 67-year-old male with known small cell lung cancer presenting with acute onset of altered mental status, leftward gaze, inability to move extremities other than left upper extremity. Patient intermittently responds to verbal and tactile stimuli. She does have an intact gag reflex and a normal respiratory rate. Level 2 code stroke was called and patient was had found to have very large distribution strokes to the both the CALE and MCA distribution. Patient found to have an occlusive clot in the ICA and signs of completed infarction on CT perfusion. This was reviewed with both the teleneurologist, the neuro interventionalists, and the intensive care provider. All were in agreement the patient had no benefit from either IV TPA given her delayed presentation, nor from neuro interventional therapy given her completed stroke. At this time patient will be admitted to the ICU for close monitoring.   At this time there is not seem to be any indication to transfer the patient and goals of care being discussed with the family currently. It appears they do not want her to be made to suffer in any way and the family is gathering together to discuss goals of care. ED Course:  
Initial assessment performed. The patients presenting problems have been discussed, and they are in agreement with the care plan formulated and outlined with them. I have encouraged them to ask questions as they arise throughout their visit. ED Course as of Mar 15 1318 Fam Sheikh Mar 14, 2021  
1550 Level 2 code stroke called on arrival due to patient's altered mental status. Patient leaving for CT now.  
 [CC] [id number] Discussed with both radiology and teleneurology. Neurology is evaluating the bedside. They given the findings on the CT without contrast of the head, patient would not be a neuro IR candidate based on completed infarct in the distribution of the infarct. Therefore we will await the results of the CTA and perfusion as well as teleneurology bedside evaluation.  
 [CC] 5 With Dr. Chasity Barron from neuro IR. Agree this is a completed infarct and no neuro intervention is indicated. Will admit to ICU. [CC] 1756 Patient excepted by Dr. Viky Lopez from the ICU for ICU admission.  
 [CC] ED Course User Index 
[CC] Carmen Scherer MD  
 
 
 
 
 
 
Critical Care: 
I have spent 35 minutes of critical care time in evaluating and treating this patient. This includes time spent at bedside, time with family and decision makers, documentation, review of labs and imaging, and/or consultation with specialists. It does not include time spent on separately billed procedures. This patient presents with a critical illness or injury that acutely impairs one or more vital organ systems such that there is a high probability of imminent or life threatening deterioration in the patient's condition.   This case involved decision making of high complexity to assess, manipulate, and support vital organ system failure and/or to prevent further life threatening deterioration of the patient's condition. Failure to initiate these interventions on an urgent basis would likely result in sudden, clinically significant or life threatening deterioration in the patient's condition. Abnormal findings supporting critical care: Acute stroke, altered mental status Interventions to support critical care: Code stroke level 2 activated, neurology consultation with teleneurology, ICU consultation, neuro interventional's consultation Failure to intervene may result in: Progression to an further neurologic deterioration and/or death Disposition: 
Admit Note: 
1:26 PM 
Pt is being admitted by Dr. Marizol Nogueira. The results of their tests and reason(s) for their admission have been discussed with pt and/or available family. They convey agreement and understanding for the need to be admitted and for admission diagnosis. Diagnosis Clinical Impression: 1. Acute CVA (cerebrovascular accident) ([facility] 2. Acute ischemic stroke ([facility] 3. Encephalopathy acute Attestations: 
 
Minerva Blair MD 
 
Please note that this dictation was completed with [employer], the [employer] voice recognition software. Quite often unanticipated grammatical, syntax, homophones, and other interpretive errors are inadvertently transcribed by the computer software. Please disregard these errors. Please excuse any errors that have escaped final proofreading. Thank you.

## 2021-03-14 NOTE — PROGRESS NOTES
1940: Report received from [facility], ED RN for patient coming to 2546 
 
1953: Patient arrived to room with ED RN 
 
2000: Admission assessment complete. See flowsheets 0000: Reassessment complete. See flowsheets 0400: reassessment complete.  See flowsheets 
 
0700: Bedside report given to [facility] Post

## 2021-03-15 NOTE — CONSULTS
Palliative Medicine Consult Mukul: [phone] (2012) Patient Name: Sophie Tillman YOB: 1950 Date of Initial Consult: 3/15/21 Reason for Consult: care decisions Requesting Provider: Kiana Murphy MD 
Primary Care Physician: Chaka Lindsay MD 
 
 SUMMARY:  
Sophie Tillman is a 72 y.o. with a past history of atrial fibrillation, on Eliquis,CAD, hx of small cell lung cancer started on chemotherapy a week ago, CHF, copd,who was admitted on 3/14/2021 from home with a diagnosis of sudden onset of unresponsive , was found to have acute Left MCA and CALE stroke with early cerebral edema, with aphasia and dense right hemiplegia, per neurology not a candidate for intervention , there is a concern for hemorrhagic transformation as well as worsening of edema, her prognosis is guarded with no chances of meaningful recovery, family decided for DNR and may consider comfort care requiring Palliative care involvement to support care decisions. Social : retired from social service , worked for [employer] for 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 Her only daughter Titus Zaman is primary MPOA. Advance directives in place . PALLIATIVE DIAGNOSES:  
1. Large acute left MCA and CALE stroke with early brain edema. 2. Altered mental status 3. Dense hemiplegia 4. Aphasia 5. Recent diagnosis of small cell lung cancer 6. Goals of care PLAN:  
1. Case discussed with Dr Stacie Ortiz and chart reviewed prior to visit. 2. Met with patient daughter Jackelyn Blevins MPOA and grand daughter and [place] Jean-Baptiste/CNA/secondary MPOA. 3. Goals of care : 
· Family has understanding of extent of stroke, I filled them in regarding negligible chances of any meaningful recovery, possibility of limited recovery to bed bound state .  
· dtr is overwhelmed because it is a \" shock \" they are trying to process her change in functional status from independent to now bed bound unable to communicate. · Introduce Palliative care and extra layer of support . · They are very clear about patient previously expressed wishes, not wanting to live dependant on others for her care or in nursing home or wanting feeding tube. · Family goal is to bring her home to be cared by family (daughter, grand children and son in law ), they have agreed for hospice support . · Hospice consult placed. · Family confirms DNAR and DNI. · We are available to support . · Initial consult note routed to primary continuity provider and/or primary health care team members/Dr Tone Sal 4. Communicated plan of care with: Palliative IDT, [address] Team 
 
 GOALS OF CARE / TREATMENT PREFERENCES:  
 
GOALS OF CARE: 
Patient/Health Care Proxy Stated Goals: Comfort TREATMENT PREFERENCES:  
Code Status: DNR Advance Care Planning: 
[x] The [facility] Interdisciplinary Team has updated the ACP Navigator with [address] and Patient Capacity Primary Decision MakerIlona Ing - Child - [phone] Secondary Decision Maker: [address] - Other Relative - [phone] Advance Care Planning 3/9/2021 Patient's Healthcare Decision Maker is: Named in scanned ACP document Primary Decision Maker Name -  
Primary Decision Maker Phone Number -  
Primary Decision Maker Relationship to Patient - Secondary Decision Maker Name - Secondary Decision Maker Phone Number - Secondary Decision Maker Relationship to Patient -  
Confirm Advance Directive Yes, on file Patient Would Like to Complete Advance Directive - Medical Interventions: Comfort measures Other Instructions: Other: As far as possible, the palliative care team has discussed with patient / health care proxy about goals of care / treatment preferences for patient. HISTORY:  
 
History obtained from: CHIEF COMPLAINT: Chart, bed side RN and family. HPI/SUBJECTIVE:  Altered mental status, aphasia. The patient is:  
[x] Non-participatory due to: Non verbal signs of distress not observed, she opened her eyes on calling her name , spontaneously moves left upper extremity. Per family and chart review patient was seen in ER last Thursday 3/11/21, because she passed out after hr chemotherapy session, was found to have small stroke and was sent home . Clinical Pain Assessment (nonverbal scale for severity on nonverbal patients):  
Clinical Pain Assessment Severity: 0 Activity (Movement): Lying quietly, normal position Duration: for how long has pt been experiencing pain (e.g., 2 days, 1 month, years) Frequency: how often pain is an issue (e.g., several times per day, once every few days, constant) FUNCTIONAL ASSESSMENT:  
 
Palliative Performance Scale (PPS): PPS: 30 
 
 
 PSYCHOSOCIAL/SPIRITUAL SCREENING:  
 
Palliative IDT has assessed this patient for cultural preferences / practices and a referral made as appropriate to needs (Cultural Services, Patient Advocacy, Ethics, etc.) Any spiritual / Yazdanism concerns: 
[] Yes /  [x] No 
 
Caregiver Burnout: 
[] Yes /  [x] No /  [] No Caregiver Present Anticipatory grief assessment:  
[x] Normal  / [] Maladaptive ESAS Anxiety: ESAS Depression:    
 
 
 REVIEW OF SYSTEMS:  
 
Positive and pertinent negative findings in ROS are noted above in HPI. The following systems were [] reviewed / [x] unable to be reviewed as noted in HPI Other findings are noted below. Systems: constitutional, ears/nose/mouth/throat, respiratory, gastrointestinal, genitourinary, musculoskeletal, integumentary, neurologic, psychiatric, endocrine. Positive findings noted below. Modified ESAS Completed by: provider Pain: 0 Nausea: 0 Dyspnea: 0 PHYSICAL EXAM:  
 
From RN flowsheet: 
Wt Readings from Last 3 Encounters:  
03/15/21 160 lb (72.6 kg) 03/11/21 167 lb (75.8 kg) 03/11/21 168 lb (76.2 kg) Blood pressure (!) 142/56, pulse 98, temperature 98.7 °F (37.1 °C), resp. rate 15, height 5' 6\" (1.676 m), weight 160 lb (72.6 kg), SpO2 100 %. Pain Scale 1: Adult Nonverbal Pain Scale Last bowel movement, if known:  
 
Constitutional: age appropriate, briefly open her eyes on calling her name, able to squeeze my hand. Eyes: pupils equal, anicteric ENMT: no nasal discharge, moist mucous membranes Cardiovascular: regular rhythm, no edema Respiratory: breathing not labored, symmetric Gastrointestinal: soft non-tender, +bowel sounds Musculoskeletal: no deformity, no tenderness to palpation Skin: warm, dry Neurologic: altered mental status, non verbal , right sided hemiparesis, spontaneous movement of left side Psychiatric: not able to evaluate because of patient condition. Other: 
 
 
 HISTORY:  
 
Active Problems: 
  Acute ischemic stroke ([address] (3/14/2021) Past Medical History:  
Diagnosis Date  Arthritis   
 left shoulder  Atrial fibrillation ([address] 6/2/2010 Dr. Hari Blackwood  CAD (coronary artery disease) stent; Dr. Hari Blackwood  Cancer ([facility] lung  Celiac disease  Chronic diastolic heart failure ([address] 09/22/2014 Dr. Hari Blackwood  Chronic kidney disease   
 per cardio note  Chronic pain   
 left thigh:  L SFA intervention by Dr. Gonzalez Odonnell 07/2018, as of 9/6/18:  still causing pain, pt to have MILLA checked per Dr. Gonzalez Odonnell note  Chronic systolic HF (heart failure) ([address] 5/10/2017  
 4/2017 EF 25-30%  Congestive heart failure (CHF) ([address]  COPD (chronic obstructive pulmonary disease) ([address] 6/2/2010 Dr. Claudine Benavidez  Diabetes ([address] Dr. Jose Hernandez; no current medication per pt  Emphysema, unspecified ([address] Dr. Claudine Benavidez  Fibroid PT STATES NOT HAVING FIBROIDS  Frequent falls 2017  Gout  Heart failure ([address] Dr. Hari Blackwood  History of Clostridium difficile infection 5/10/2017 2017 CDiff positive  Hypertension 2010 Dr. Katerina Welch  Hypertensive heart and chronic kidney disease   
 per PCP note  Hypotension 5/10/2017  Junctional tachycardia ([facility] 5/10/2017 Dr. Eli Fink  Neuropathy  NIDDM (non-insulin dependent diabetes mellitus) 2010  PAD (peripheral artery disease) ([facility]  S/P ablation of atrial fibrillation 2018  Screening mammogram 5/4/10  
 SOB (shortness of breath) 2014 Dr. Katalina Sánchez  SSS (sick sinus syndrome) ([facility]   
 per pacemaker form 18  Weakness   
 per pt weakness from c-diff , mulitple falls with injury to rotator cuff Past Surgical History:  
Procedure Laterality Date  COLONOSCOPY N/A 2017 COLONOSCOPY with biopsy performed by Kermit Rodríguez MD at [facility] AMBULATORY OR  
 HX AFIB ABLATION  2018  
 has had 2 ablations per patient [id number]  HX HEART CATHETERIZATION  2018  HX HEENT    
 HX OTHER SURGICAL    
 adrenal gland removed  HX PACEMAKER Left [employer] model: [place]  HX ROTATOR CUFF REPAIR Right  HX WISDOM TEETH EXTRACTION X2  
 IR INSERT TUNL CVC W PORT OVER 5 YEARS  3/5/2021  SC CARDIAC SURG PROCEDURE UNLIST    
 3 cardiac stent placed  SC EXCISE ADRENAL GLAND Right 1994  VASCULAR SURGERY PROCEDURE UNLIST  2018 Left SFA intervention ; Dr. Carlo Garnica Family History Problem Relation Age of Onset  Heart Disease Mother  Diabetes Father  Heart Disease Brother  Other Son MURDERED History reviewed, no pertinent family history. Social History Tobacco Use  Smoking status: Former Smoker Packs/day: 0.50 Years: 42.00 Pack years: 21.00 Types: Cigarettes Start date: 5 Quit date: 2009 Years since quittin.2  Smokeless tobacco: Never Used Substance Use Topics  Alcohol use: No  
 
Allergies Allergen Reactions  Crestor [Rosuvastatin] Myalgia  Levaquin [Levofloxacin] Nausea Only GI Upset  Lipitor [Atorvastatin] Diarrhea  Lyrica [Pregabalin] Myalgia Current Facility-Administered Medications Medication Dose Route Frequency  alcohol 62% (NOZIN) nasal  1 Ampule  1 Ampule Topical Q12H  3% sodium chloride (*HIGH ALERT*CONCENTRATED IV*) infusion  60 mL/hr IntraVENous CONTINUOUS  
 sodium chloride (NS) flush 5-40 mL  5-40 mL IntraVENous Q8H  
 sodium chloride (NS) flush 5-40 mL  5-40 mL IntraVENous PRN  polyethylene glycol (MIRALAX) packet 17 g  17 g Oral DAILY PRN  promethazine (PHENERGAN) tablet 12.5 mg  12.5 mg Oral Q6H PRN Or  
 ondansetron (ZOFRAN) injection 4 mg  4 mg IntraVENous Q6H PRN  
 acetaminophen (TYLENOL) tablet 650 mg  650 mg Oral Q4H PRN Or  
 acetaminophen (TYLENOL) solution 650 mg  650 mg Per NG tube Q4H PRN Or  
 acetaminophen (TYLENOL) suppository 650 mg  650 mg Rectal Q4H PRN  
 apixaban (ELIQUIS) tablet 5 mg  5 mg Oral BID  atorvastatin (LIPITOR) tablet 40 mg  40 mg Oral DAILY  albuterol (PROVENTIL VENTOLIN) nebulizer solution 2.5 mg  2.5 mg Nebulization Q4H PRN  
 aspirin chewable tablet 81 mg  81 mg Oral DAILY  
 
 
 
 LAB AND IMAGING FINDINGS:  
 
Lab Results Component Value Date/Time WBC 9.4 03/15/2021 04:01 AM  
 HGB 12.3 03/15/2021 04:01 AM  
 PLATELET 272 97/22/2036 04:01 AM  
 
Lab Results Component Value Date/Time Sodium 136 03/15/2021 09:00 AM  
 Potassium 4.6 03/15/2021 09:00 AM  
 Chloride 105 03/15/2021 09:00 AM  
 CO2 26 03/15/2021 09:00 AM  
 BUN 15 03/15/2021 09:00 AM  
 Creatinine 1.04 (H) 03/15/2021 09:00 AM  
 Calcium 7.7 (L) 03/15/2021 09:00 AM  
 Magnesium 1.9 03/14/2021 06:52 PM  
 Phosphorus 3.7 03/14/2021 06:52 PM  
  
Lab Results Component Value Date/Time Alk.  phosphatase 92 03/14/2021 06:52 PM  
 Protein, total 8.3 (H) 03/14/2021 06:52 PM  
 Albumin 3.4 (L) 03/14/2021 06:52 PM  
 Globulin 4.9 (H) 03/14/2021 06:52 PM  
 
Lab Results Component Value Date/Time INR 1.1 03/14/2021 03:52 PM  
 Prothrombin time 11.1 03/14/2021 03:52 PM  
 aPTT 30.2 03/24/2019 09:57 PM  
  
Lab Results Component Value Date/Time Iron 59 06/30/2014 03:30 AM  
 TIBC 302 06/30/2014 03:30 AM  
 Iron % saturation 20 06/30/2014 03:30 AM  
  
Lab Results Component Value Date/Time pH 7.45 06/18/2013 04:53 AM  
 PCO2 38 06/18/2013 04:53 AM  
 PO2 146 (H) 06/18/2013 04:53 AM  
 
No components found for: [place] Lab Results Component Value Date/Time  01/31/2021 03:45 AM  
 CK - MB 2.0 09/20/2017 10:02 AM  
  
 
 
   
 
Total time:  
Counseling / coordination time, spent as noted above:  
> 50% counseling / coordination?:  
 
Prolonged service was provided for  []30 min   []75 min in face to face time in the presence of the patient, spent as noted above. Time Start:  
Time End:  
Note: this can only be billed with 43699 (initial) or 22374 (follow up). If multiple start / stop times, list each separately.

## 2021-03-15 NOTE — PROGRESS NOTES
Spiritual Care Assessment/Progress Note [facility] 
 
 
NAME: Keisha Hayden      MRN: [id number] AGE: 72 y.o. SEX: female Yazdanism Affiliation: bCommunities  
Language: [place] 3/15/2021     Total Time (in minutes): 15 Spiritual Assessment begun in [facility] 2 CRITICAL CARE 1 through conversation with: 
  
    [x]Patient        [x] Family    [] Friend(s) Reason for Consult: Palliative Care, Initial/Spiritual Assessment Spiritual beliefs: (Please include comment if needed) [x] Identifies with a bernardo tradition:     
   [x] Supported by a bernardo community:        
   [] Claims no spiritual orientation:       
   [] Seeking spiritual identity:            
   [] Adheres to an individual form of spirituality:       
   [] Not able to assess:                   
 
    
Identified resources for coping:  
   [x] Prayer                           
   [] Music                  [] Guided Imagery [x] Family/friends                 [] Pet visits [] Devotional reading                         [] Unknown 
   [] Other:                                          
 
 
Interventions offered during this visit: (See comments for more details) Patient Interventions: Initial visit Family/Friend(s): Affirmation of emotions/emotional suffering, Affirmation of bernardo, Catharsis/review of pertinent events in supportive environment, Coping skills reviewed/reinforced, Initial Assessment, Life review/legacy, Normalization of emotional/spiritual concerns, Prayer (assurance of), Prayer (actual) Plan of Care: 
 
 [] Support spiritual and/or cultural needs  
 [] Support AMD and/or advance care planning process    
 [] Support grieving process 
 [] Coordinate Rites and/or Rituals  
 [] Coordination with community clergy  [] No spiritual needs identified at this time 
 [] Detailed Plan of Care below (See Comments)  [] Make referral to Music Therapy 
[] Make referral to Pet Therapy    
[] Make referral to Addiction services 
[] Make referral to [facility] 
[] Make referral to Spiritual Care Partner 
[] No future visits requested       
[x] Follow up upon further referrals Comments: Provided initial spiritual assessment for pt Alonso Alvarado and family (daughter) at bedside after receiving palliative consult. Pt was able to squeeze hand of family and seemed comforted by her presence at bedside. Affirmed presence and supports, where multiple family members were able to call/video chat with pt. Processed anticipatory grief and concern about pain management. Family discussed that comfort and bringing pt home was her biggest priority. Family  was notified of pt condition/needs. Offered prayerful presence with daughter and pt. Advised of  services. [address] [facility] Spiritual Care Provider  Paging Service [phone])

## 2021-03-15 NOTE — PROGRESS NOTES
SPEECH LANGUAGE PATHOLOGY EVALUATION Patient: Baron Mullins (30 y.o. female) Date: 3/15/2021 Primary Diagnosis: Acute ischemic stroke ([facility] [address] [I63.9] Precautions:aspiration ASSESSMENT : 
Based on the objective data described below, the patient presents with severe aphasia both rec and exp following large L MCA infarct. . She is nonverbal, nonvocal. She does not follow commands or answer any y/n questions even with head nods/shakes. Very little eye contact. Pushed my hand away when cleaning her mouth. Her daughter was present and observed the eval.  
 
Patient will benefit from skilled intervention to address the above impairments. Patients rehabilitation potential is considered to be poor PLAN : 
Recommendations and Planned Interventions: 
Patient will be going to hospice service per ns. We will sign off. Discharge Recommendations: None SUBJECTIVE:  
Patient was nonverbal.  
 
OBJECTIVE:  
 
Past Medical History:  
Diagnosis Date  Arthritis   
 left shoulder  Atrial fibrillation ([address] 6/2/2010 Dr. John Forman  CAD (coronary artery disease) stent; Dr. John Forman  Cancer ([facility] [address] lung  Celiac disease  Chronic diastolic heart failure ([address] 09/22/2014 Dr. John Forman  Chronic kidney disease   
 per cardio note  Chronic pain   
 left thigh:  L SFA intervention by Dr. Janine Li 07/2018, as of 9/6/18:  still causing pain, pt to have MILLA checked per Dr. Janine Li note  Chronic systolic HF (heart failure) ([address] 5/10/2017  
 4/2017 EF 25-30%  Congestive heart failure (CHF) ([address]  COPD (chronic obstructive pulmonary disease) ([address] 6/2/2010 Dr. Karen Potter  Diabetes ([facility] [address] Dr. Lee Crain; no current medication per pt  Emphysema, unspecified ([address] Dr. Karen Potter  Fibroid PT STATES NOT HAVING FIBROIDS  Frequent falls 2017  Gout  Heart failure ([address] Dr. John Forman  History of Clostridium difficile infection 5/10/2017  
 4/2017 CDiff positive  Hypertension 6/2/2010 Dr. Kayden Olson  Hypertensive heart and chronic kidney disease   
 per PCP note  Hypotension 5/10/2017  Junctional tachycardia ([facility] [address] 5/10/2017 Dr. Roxann Hernández  Neuropathy  NIDDM (non-insulin dependent diabetes mellitus) 6/2/2010  PAD (peripheral artery disease) ([facility] [address]  S/P ablation of atrial fibrillation 03/2018  Screening mammogram 5/4/10  
 SOB (shortness of breath) 09/22/2014 Dr. Nicole Otoole  SSS (sick sinus syndrome) ([facility] [address]   
 per pacemaker form 9/6/18  Weakness   
 per pt weakness from c-diff 2017, mulitple falls with injury to rotator cuff Past Surgical History:  
Procedure Laterality Date  COLONOSCOPY N/A 9/25/2017 COLONOSCOPY with biopsy performed by Markel Dumont MD at [facility] AMBULATORY OR  
 HX AFIB ABLATION  03/2018  
 has had 2 ablations per patient [id number]  HX HEART CATHETERIZATION  07/23/2018  HX HEENT    
 HX OTHER SURGICAL    
 adrenal gland removed  HX PACEMAKER Left [employer] model: Suzieanda Ask  HX ROTATOR CUFF REPAIR Right  HX WISDOM TEETH EXTRACTION X2  
 IR INSERT TUNL CVC W PORT OVER 5 YEARS  3/5/2021  MT CARDIAC SURG PROCEDURE UNLIST    
 3 cardiac stent placed  MT EXCISE ADRENAL GLAND Right 1994  VASCULAR SURGERY PROCEDURE UNLIST  07/20/2018 Left SFA intervention ; Dr. Monico Anne Prior Level of Function/Home Situation:  
  
Mental Status: 
Neurologic State: Eyes open to voice Orientation Level: Unable to verbalize Cognition: No command following Perception: Appears intact Perseveration: No perseveration noted Motor Speech: 
  
Language Comprehension and Expression: Auditory Comprehension Auditory Impairment: Yes Response to Basic Yes/No Questions (%): 0 % One-Step Basic Commands (%): 0 % Verbal Expression Primary Mode of Expression: Nonverbal - Pointing NOMS: aud comp 1 Pain: 
Pain Scale 1: Adult Nonverbal Pain Scale After treatment:  
Patient left in no apparent distress in bed, daughter present COMMUNICATION/EDUCATION:  
 
The patient's plan of care including recommendations, planned interventions, and recommended diet changes were discussed with: Registered nurse. Patient is unable to participate in goal setting and plan of care.  
 
Thank you for this referral. 
Sherie Cervantes, SLP

## 2021-03-15 NOTE — PROGRESS NOTES
Report received from BILL Zepeda RN. To maintain comfort care as ordered. Two Sl appear without redness. [address] remains in place. 46  Family called, expects to see patient tomorrow again at 1230. 
1800 Pulled part of blanket off, not following any commands. Occasionally moans softly. 1910  Report given to PERFECTO Means

## 2021-03-15 NOTE — PROCEDURES
EEG REPORT Patient Name: Travis Campos : 1950 Age: 72 y.o. Ordering physician: Dr. Gail Bellamy Date of EEG: 3/15/2021  9:41-10:03 Diagnosis: encephalopathy Interpreting physician: West Brown D.O. Bedelia Sizer Procedure: EEG CLINICAL INDICATION: The patient is a 72 y.o. female who is being evaluated for baseline electro cerebral activities and to rule out seizure focus. Current Facility-Administered Medications Medication Dose Route Frequency  alcohol 62% (NOZIN) nasal  1 Ampule  1 Ampule Topical Q12H  3% sodium chloride (*HIGH ALERT*CONCENTRATED IV*) infusion  60 mL/hr IntraVENous CONTINUOUS  
 sodium chloride (NS) flush 5-40 mL  5-40 mL IntraVENous Q8H  
 sodium chloride (NS) flush 5-40 mL  5-40 mL IntraVENous PRN  polyethylene glycol (MIRALAX) packet 17 g  17 g Oral DAILY PRN  promethazine (PHENERGAN) tablet 12.5 mg  12.5 mg Oral Q6H PRN Or  
 ondansetron (ZOFRAN) injection 4 mg  4 mg IntraVENous Q6H PRN  
 acetaminophen (TYLENOL) tablet 650 mg  650 mg Oral Q4H PRN Or  
 acetaminophen (TYLENOL) solution 650 mg  650 mg Per NG tube Q4H PRN Or  
 acetaminophen (TYLENOL) suppository 650 mg  650 mg Rectal Q4H PRN  
 apixaban (ELIQUIS) tablet 5 mg  5 mg Oral BID  atorvastatin (LIPITOR) tablet 40 mg  40 mg Oral DAILY  albuterol (PROVENTIL VENTOLIN) nebulizer solution 2.5 mg  2.5 mg Nebulization Q4H PRN  
 aspirin chewable tablet 81 mg  81 mg Oral DAILY DESCRIPTION OF PROCEDURE:  
 
This is a digitally recorded electroencephalogram 
Electrodes were applied in accordance with the international 10-20 system of electrode placement. 18 channels of scalp EEG are recorded A channel was used for EoG Another channel was used for ECG The data is stored digitally and reviewed in reformatted montages for optimal display EEG  was reviewed in both bipolar and referential montages Description of Activity: The background of this recording contains no well-formed alpha activity seen. There was a mixtures of diffuse theta and delta activity identified throughout the study. In the left hemisphere, the degree of slowing was slightly more and the amplitude was slightly reduced compared to the right hemisphere. Throughout the recording, there were no  spike or spike-and-wave discharges seen. Hyperventilation was not performed. Photic stimulation produced no response in the posterior head regions. Clinical Interpretation: This EEG is abnormal. There is generalized slowing which was slightly greater in the left hemisphere suggesting underlying structural abnormality. There was no seizures or epileptiform discharges that were seen. Clinical correlation is recommended MICHELLE Nascimento Sin

## 2021-03-15 NOTE — INTERDISCIPLINARY ROUNDS
Interdisciplinary team rounds were held 3/15/2021 with the following team members:Care Management, Diabetes Treatment Specialist, Nursing, Nutrition, Pharmacy, Physical Therapy, Physician and Respiratory Therapy. Plan of care discussed. See clinical pathway and/or care plan for interventions and desired outcomes.

## 2021-03-15 NOTE — PROGRESS NOTES
VALERIY PLAN:  Probable home hospice when arrgmts are in place. Pt is a 71 yo female admitted from home for treatment of large MCA stroke. Prognosis for meaningful recovery is poor so Palliative Care was engaged to work w/ gene and grdau. DNR status was confirmed. Family would like to bring pt home for hospice care. Hospice consult received. [facility] in [place] was consulted to provide hospice info session. CM remains avbl to assist w/ any arrgmts. Rossy Scott, MSW

## 2021-03-15 NOTE — PALLIATIVE CARE
Brief summary of visit, full note will be placed. 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 Met with patient daughter Tobi Russell and grand daughter Iron Han,  they understand the extent of stroke and possible very limited/non meaning ful recovery to bed bound state,  with aphasia, knowing patient wishes, patient would not want to live in nursing home or want feeding tube,  they have decided to bring her home with support of hospice, they are confident they can  24/7 care giver support at home with help of family /daughter, son in law and three grand children. daughter confirms DNAR and DNI status . They would want to get the follow up CT scan of head scheduled for today to get more information . I have placed hospice consult. She will need DDNR upon discharge.

## 2021-03-15 NOTE — HOSPICE
[facility]  consult noted. Reviewing chart. Discussed pt with palliative MD Lund. In to meet with pt and daughter Christopher Lockhart at bedside. Christopher Lockhart seemed overwhelmed and in a hurry to leave the hospital.  Meeting arranged for tomorrow at 12:30pm tomorrow. Information packet with 24hr contact information provided. Thank you for the opportunity to care for this pt and family. Please contact hospice at [phone] with any questions or concerns.

## 2021-03-15 NOTE — PROGRESS NOTES
PT note:  
 
Orders received and acknowledged. Chart reviewed and spoke with nursing. Patient not currently following commands and not appropriate for PT evaluation. Will follow up later today or tomorrow.   
 
Amy Mclean, PT, DPT

## 2021-03-15 NOTE — CONSULTS
Neurology Note Patient ID: 
Alessandro Huerta [id number] 
72 y.o. 
1950 Date of Consultation:  March 15, 2021 Referring Physician:  Dr. Jefferson Prieto Reason for Consultation:  unresponsive Subjective: no verbal output. History of Present Illness:  
Alessandro Huerta is a 72 y.o. female with a history of atrial fibrillation, dyslipidemia, hypertension, and small cell cancer status post resection, COPD who was brought to the emergency department on 3/14/2021 due to unresponsiveness. Her last known well was the night prior and she had not woken the next morning. In the emergency department she was found to have a large left MCA and CALE territory stroke. She was found to have an ICA occlusion on her CTA. She was deemed not a candidate for intervention or TPA. Since admission, there has been no change in her neurological status. There is no family members at the bedside for additional information and history was from reviewing the medical records and speaking to nursing and physicians Past Medical History:  
Diagnosis Date  Arthritis   
 left shoulder  Atrial fibrillation ([address] 6/2/2010 Dr. Roxann Hernández  CAD (coronary artery disease) stent; Dr. Roxann Hernández  Cancer ([address] lung  Celiac disease  Chronic diastolic heart failure ([address] 09/22/2014 Dr. Roxann Hernández  Chronic kidney disease   
 per cardio note  Chronic pain   
 left thigh:  L SFA intervention by Dr. Monico Anne 07/2018, as of 9/6/18:  still causing pain, pt to have MILLA checked per Dr. Moinco Anne note  Chronic systolic HF (heart failure) ([address] 5/10/2017  
 4/2017 EF 25-30%  Congestive heart failure (CHF) ([address]  COPD (chronic obstructive pulmonary disease) ([address] 6/2/2010 Dr. Nicole Otoole  Diabetes ([address] Dr. Cady Mccauley; no current medication per pt  Emphysema, unspecified ([address] Dr. Nicole Otoole  Fibroid PT STATES NOT HAVING FIBROIDS  Frequent falls 2017  Gout  Heart failure ([facility] Dr. Valencia Goldmann  History of Clostridium difficile infection 5/10/2017  
 2017 CDiff positive  Hypertension 2010 Dr. Eva Duarte  Hypertensive heart and chronic kidney disease   
 per PCP note  Hypotension 5/10/2017  Junctional tachycardia ([facility] 5/10/2017 Dr. Valencia Goldmann  Neuropathy  NIDDM (non-insulin dependent diabetes mellitus) 2010  PAD (peripheral artery disease) ([facility]  S/P ablation of atrial fibrillation 2018  Screening mammogram 5/4/10  
 SOB (shortness of breath) 2014 Dr. Amirah Degorot  SSS (sick sinus syndrome) ([facility]   
 per pacemaker form 18  Weakness   
 per pt weakness from c-diff , mulitple falls with injury to rotator cuff Past Surgical History:  
Procedure Laterality Date  COLONOSCOPY N/A 2017 COLONOSCOPY with biopsy performed by Darling Solomon MD at [facility]  
 HX AFIB ABLATION  2018  
 has had 2 ablations per patient [id number]  HX HEART CATHETERIZATION  2018  HX HEENT    
 HX OTHER SURGICAL    
 adrenal gland removed  HX PACEMAKER Left [employer] model: Delwyn Gagan  HX ROTATOR CUFF REPAIR Right  HX WISDOM TEETH EXTRACTION X2  
 IR INSERT TUNL CVC W PORT OVER 5 YEARS  3/5/2021  MD CARDIAC SURG PROCEDURE UNLIST    
 3 cardiac stent placed  MD EXCISE ADRENAL GLAND Right 1994  VASCULAR SURGERY PROCEDURE UNLIST  2018 Left SFA intervention ; Dr. Neo Haney Family History Problem Relation Age of Onset  Heart Disease Mother  Diabetes Father  Heart Disease Brother  Other Son MURDERED Social History Tobacco Use  Smoking status: Former Smoker Packs/day: 0.50 Years: 42.00 Pack years: 21.00 Types: Cigarettes Start date: 5 Quit date: 2009 Years since quittin.2  Smokeless tobacco: Never Used Substance Use Topics  Alcohol use: No  
  
 
Allergies Allergen Reactions  Crestor [Rosuvastatin] Myalgia  Levaquin [Levofloxacin] Nausea Only GI Upset  Lipitor [Atorvastatin] Diarrhea  Lyrica [Pregabalin] Myalgia Prior to Admission medications Medication Sig Start Date End Date Taking? Authorizing Provider  
cholecalciferol, vitamin D3, (VITAMIN D3 PO) Take  by mouth. With calcium    Provider, Historical  
lidocaine-prilocaine (EMLA) topical cream Apply  to affected area as needed for Pain. Apply to port site 30-45 min prior to treatments 2/25/21   Joanna Denny MD  
prochlorperazine (Compazine) 10 mg tablet Take 1 Tab by mouth every six (6) hours as needed for Nausea or Vomiting for up to 60 days. 2/25/21 4/26/21  Joanna Denny MD  
ondansetron (ZOFRAN ODT) 4 mg disintegrating tablet Take 1 Tab by mouth every eight (8) hours as needed for Nausea or Vomiting. 2/25/21   Joanna Denny MD  
SITagliptin (Januvia) 50 mg tablet Take 1 Tab by mouth daily. 2/12/21   Will Mejias MD  
simvastatin (ZOCOR) 20 mg tablet TAKE 1 TABLET BY MOUTH NIGHTLY 2/12/21   Will Mejias MD  
cyclobenzaprine (FLEXERIL) 10 mg tablet Take 0.5 Tabs by mouth three (3) times daily as needed for Muscle Spasm(s). 2/2/21   Jessica Wallace DO  
gabapentin (NEURONTIN) 800 mg tablet TAKE 1 TABLET BY MOUTH THREE TIMES DAILY 12/29/20   Dewayne KENNEY NP  
budesonide-formoteroL (Symbicort) 160-4.5 mcg/actuation HFAA INHALE ONE PUFF BY MOUTH TWICE DAILY 11/24/20   Will Mejias MD  
carvediloL (COREG) 6.25 mg tablet TAKE 1 TABLET BY MOUTH TWICE DAILY WITH MEALS 11/24/20   Will Mejias MD  
tiotropium (Spiriva with HandiHaler) 18 mcg inhalation capsule INHALE THE CONTENTS OF 1 CAPSULE THROUGH HANDIHALER DEVICE DAILY 11/24/20   Will Mejias MD  
albuterol (PROVENTIL HFA, VENTOLIN HFA, PROAIR HFA) 90 mcg/actuation inhaler Take 2 Puffs by inhalation every four (4) hours as needed for Wheezing.  11/24/20   Will Mejias MD colchicine 0.6 mg tablet TAKE 2 TABLETS BY MOUTH ONCE DAILY 11/20/20   Talon Lofton MD  
diclofenac (VOLTAREN) 1 % gel Apply 4 g to affected area four (4) times daily as needed for Pain. 11/11/20   Talon Lofton MD  
Eliquis 5 mg tablet Take 1 tablet by mouth twice daily 10/15/20   Ele Overton NP  
albuterol (PROVENTIL VENTOLIN) 2.5 mg /3 mL (0.083 %) nebu 3 mL by Nebulization route every four (4) hours as needed for Wheezing. 5/7/20   Talon Lofton MD  
glucose blood VI test strips (BLOOD GLUCOSE TEST) strip Use BID Dx11.9 1/24/20   Talon Lofton MD  
lancets misc Use BID 1/24/20   Talon Lofton MD  
acetaminophen (TYLENOL) 325 mg tablet Take 2 Tabs by mouth every four (4) hours as needed for Pain or Fever. 3/28/19   Jesus Velasquez MD  
guaiFENesin ER (MUCINEX) 600 mg ER tablet Take 1 Tab by mouth two (2) times a day. Patient taking differently: Take 600 mg by mouth as needed. 3/28/19   Jesus Velasquez MD  
benzocaine-menthol (CHLORASEPTIC MAX) 15-10 mg lozg lozenge Take 1 Lozenge by mouth every four to six (4-6) hours as needed for Sore throat. 3/28/19   Judith Castaneda MD  
cod liver oil cap Take 1 Cap by mouth daily. Provider, Historical  
 
Current Facility-Administered Medications Medication Dose Route Frequency  alcohol 62% (NOZIN) nasal  1 Ampule  1 Ampule Topical Q12H  3% sodium chloride (*HIGH ALERT*CONCENTRATED IV*) infusion  60 mL/hr IntraVENous CONTINUOUS  
 sodium chloride (NS) flush 5-40 mL  5-40 mL IntraVENous Q8H  
 sodium chloride (NS) flush 5-40 mL  5-40 mL IntraVENous PRN  polyethylene glycol (MIRALAX) packet 17 g  17 g Oral DAILY PRN  promethazine (PHENERGAN) tablet 12.5 mg  12.5 mg Oral Q6H PRN Or  
 ondansetron (ZOFRAN) injection 4 mg  4 mg IntraVENous Q6H PRN  
 acetaminophen (TYLENOL) tablet 650 mg  650 mg Oral Q4H PRN  Or  
 acetaminophen (TYLENOL) solution 650 mg  650 mg Per NG tube Q4H PRN Or  
 acetaminophen (TYLENOL) suppository 650 mg  650 mg Rectal Q4H PRN  
 apixaban (ELIQUIS) tablet 5 mg  5 mg Oral BID  atorvastatin (LIPITOR) tablet 40 mg  40 mg Oral DAILY  albuterol (PROVENTIL VENTOLIN) nebulizer solution 2.5 mg  2.5 mg Nebulization Q4H PRN  
 aspirin chewable tablet 81 mg  81 mg Oral DAILY Review of Systems: 
[x]Unable to obtain  ROS due to  [x]mental status change  []sedated   []intubated Objective:  
 
Visit Vitals /62 Pulse 98 Temp 98.7 °F (37.1 °C) Resp 18 Ht 5' 6\" (1.676 m) Wt 160 lb 15 oz (73 kg) SpO2 100% BMI 25.98 kg/m² Physical Exam: 
 
General:  appears well nourished in no acute distress Neck: no carotid bruits Lungs: clear to auscultation Heart:  no murmurs, regular rate Lower extremity: peripheral pulses palpable and no edema Skin: intact Neurological exam: 
 
The patient had an aphasia. There was no verbal output. She would not mimic. She does not follow any one-step commands. She has a left gaze preference. Cranial nerves:  
II-XII were tested Perrrla She blinks to visual threat on the left. She does not blink on the right. Oculocephalic reflex was intact Facial sensation: She does have facial grimace to noxious stimulation on the left. Not on the right Facial motor: Right facial droop She would not protrude her tongue Motor: Tone -decreased on the right No evidence of fasciculations Strength testing: She has no movement in her right upper extremity and only trace withdrawal in the right lower extremity. She does have no spontaneous movement in her left upper and lower extremity but does withdrawal to painful stimulation. Sensory: 
Upper extremity: intact to pp, light touch, and vibration > 10 seconds Lower extremity: intact to pp, light touch, and vibration > 10 seconds Reflexes: 
  Right Left Biceps  2 1 Triceps 2 1 Brachiorad. 2           1 Patella  2 1 Achilles 2 - Plantar response:  Extensor on the right Cerebellar testing:  no tremor apparent Gait: This was not assessed due to her mental status. Labs:  
 
Lab Results Component Value Date/Time Hemoglobin A1c 7.1 (H) 03/15/2021 04:01 AM  
 Sodium 134 (L) 03/15/2021 04:49 AM  
 Potassium 4.2 03/15/2021 04:49 AM  
 Chloride 102 03/15/2021 04:49 AM  
 Glucose 176 (H) 03/15/2021 04:49 AM  
 BUN 15 03/15/2021 04:49 AM  
 Creatinine 1.05 (H) 03/15/2021 04:49 AM  
 Calcium 8.1 (L) 03/15/2021 04:49 AM  
 WBC 9.4 03/15/2021 04:01 AM  
 HCT 37.6 03/15/2021 04:01 AM  
 HGB 12.3 03/15/2021 04:01 AM  
 PLATELET 243 81/69/1731 04:01 AM  
 
 
Imaging: No results found for this or any previous visit. Results from [facility] [place] encounter on 03/14/21 CT CODE NEURO PERF W CBF Narrative Large completed left hemispheric infarct with diffuse hypodensity throughout the 
majority of the left cerebral hemisphere and mild mass effect on the left 
lateral ventricle. The AI Algorithm calculates a perfusion mismatch of 159 cc, 
although visually there appears to be a matched large in the left hemisphere. There may be a small area of perfusion mismatch in the anteromedial right 
hemisphere. Patent bilateral subclavian arteries with severe stenosis at the left SCA 
origin. Moderate stenosis at the left common carotid artery origin. Occlusion of the 
right common carotid artery approximately 3 cm distal to its origin, with 
suspected acute intraluminal thrombus. Remainder of right common carotid artery 
is occluded. Extensive calcification in the right carotid bifurcation with 
critical right internal carotid artery origin stenosis. Patent cervical right 
internal carotid artery. Patent cavernous carotid artery with moderate to severe 
calcific stenosis. Hypoplastic right A1 segment. Distal right anterior cerebral 
artery is patent and fills from the left carotid circulation. Patent left common carotid artery.  Densely calcified plaque in the left carotid 
bifurcation with critical stenosis and probable segmental occlusion of 
approximately 1 cm in length involving the left internal carotid artery origin. (No opacified lumen seen). Patent cervical left internal carotid artery. Patent anterior and middle cerebral arteries bilaterally with hypoplastic right A1 segment, as previously noted. Moderate focal stenosis of the left M2 
posterior branch. No acute thrombus seen in the middle cerebral artery. Poor 
opacification of left middle cerebral artery branch vessels. Patent vertebral basilar system. Very severe emphysema in the visualized lungs. Small right pleural effusion. Marked coronary artery calcification. Pulmonary 
arterial hypertension. Preliminary report was provided by Dr. Darden Olszewski, the on-call radiologist, at 
16:46 hours on 3/14/2021. Findings were discussed with Dr. Josemanuel Ugalde at the time 
of interpretation. Final report to follow. Hemoglobin A1c 7.1 I did independently review the head CT from March 14,2021. There was a large acute left MCA and CALE infarct with local mass-effect but no midline shift Assessment and Plan: The patient is a pleasant 71-year-old female with multiple medical conditions who presents with a large left MCA and CALE territory stroke. Her examination does reveal aphasia with dense right hemiplegia. Acute stroke Most likely embolic in origin. Her risk factors for stroke include hypertension, atrial fibrillation, diabetes, dyslipidemia She is continued on Eliquis and aspirin. Due to how large her stroke is, I am concerned about hemorrhagic transformation as well as worsening edema. Her neurological examination will need to be followed closely. She should have a follow-up head CT 24 hours after admission or if there is any change in neurological status. I will obtain a routine EEG to ensure there is no nonconvulsive status epilepticus. Diabetes: Updated hemoglobin A1c 7.1. Continue aggressive control Hypertension: 24 hours after onset of symptoms, goal systolic blood pressure should be under 140 Dyslipidemia: Continue current statin therapy. Lipid levels pending. The patient will need aggressive PT, OT, and speech. Neurology will continue to follow along closely during hospitalization. Active Problems: 
  Acute ischemic stroke ([facility] (3/14/2021) 55 minutes was spent providing medical care of this critically ill patient reviewing records, obtaining additional history from family, examining patient , discussing with collaborating physicians and nursing, and discussing treatment plans. Signed By:  Santi Beebe March 15, 2021

## 2021-03-15 NOTE — PROGRESS NOTES
Comprehensive Nutrition Assessment Type and Reason for Visit: Initial, Consult Nutrition Recommendations/Plan: If pt does not go hospice/comfort, initiate TF via NGT: 
 Start Jevity 1.2 @ 15mL/h, advance rate 10mL q 6h as tolerated to Goal Rate of 45mL/h + Prosource daily + 30mL flush q 6h (provides 1680kcals/84gPro/233gCHO/941mL) Nutrition Assessment:   Pt admitted with acute ischemic CVA. PMH: HTN, CHF, DM, COPD, lung CA on chemo. Chart reviewed, case discussed during CCU rounds. Curtain pulled at time of attempted visit as Palliative in the room, did not disturb. Per discussion during IDR with Dr. Hugo kapadia to start TF with minimal water flushes to keep tube patent. Placed orders and discussed with RN however just noted pt may be going comfort/hospice. Obviously TF will not be started if pt transitions to comfort, will leave orders in place for now and let Palliative team cancel them pending plan. Malnutrition Assessment: 
Malnutrition Status:     UTD, MST not filled out and pt occupied at time of attempted visit Estimated Daily Nutrient Needs: 
Energy (kcal): MSJ 1650 (1272 x 1.3); Weight Used for Energy Requirements: Current Protein (g): 73-88g (1-1.2gPro/kg); Weight Used for Protein Requirements: Current Fluid (ml/day): 1600mL; Method Used for Fluid Requirements: 1 ml/kcal 
 
 
Nutrition Related Findings:  Meds: 3% NS @ 60mL/h. BM PTA Wounds:   
None Current Nutrition Therapies: DIET NPO 
DIET TUBE FEEDING Anthropometric Measures: 
· Height:  5' 6\" (167.6 cm) · Current Body Wt:  72.6 kg (160 lb 0.9 oz) · Ideal Body Wt:  130 lbs:  123.1 % Nutrition Diagnosis:  
· Inadequate protein-energy intake related to cognitive or neurological impairment as evidenced by NPO or clear liquid status due to medical condition Nutrition Interventions:  
Food and/or Nutrient Delivery: Start tube feeding Nutrition Education and Counseling: No recommendations at this time 
Coordination of Nutrition Care: Continue to monitor while inpatient, Interdisciplinary rounds Goals: Pt will tolerate TF initiation with residuals <500mL and improving Na level in 2-4 days. Nutrition Monitoring and Evaluation:  
Behavioral-Environmental Outcomes: None identified Food/Nutrient Intake Outcomes: Enteral nutrition intake/tolerance Physical Signs/Symptoms Outcomes: Biochemical data, Nutrition focused physical findings, Skin, Weight, GI status, Fluid status or edema Discharge Planning: Too soon to determine Electronically signed by Jen Herman RD, [address]  on 3/15/2021 at 1:04 PM 
 
Contact: [phone]

## 2021-03-15 NOTE — PROGRESS NOTES
Occupational Therapy note:  
 
Orders received and acknowledged. Chart reviewed and spoke with nursing. Patient not currently following commands and not appropriate for OT evaluation. Will follow up later today or tomorrow.   
 
Toni Lang, OTR/L

## 2021-03-15 NOTE — PROGRESS NOTES
Critical Care Progress Note Klarissa Silva MD 
Answering service: [phone] OR [phone] from in house phone Date of Service:  3/15/2021 NAME:  Sophie Tillman :  1950 MRN:  [id number] Interval history / Subjective:  
  
 
Problem list: 
Hospital Problems  Date Reviewed: 2021 Codes Class Noted POA Acute ischemic stroke [facility]) ICD-10-CM: I63.9 ICD-9-CM: 434.91  3/14/2021 Unknown  
   
  
 
  
Assessment:  
  
-Left CALE and MCA territory stroke. 
-Left ICA stenosis. 
-Small cell lung CA on chemotherapy. -A fib. On eliquis. -HLD 
-COPD. 
-HTN.   
Plan:  
  
-Patient has catastrophic stroke with early cerebral edema. Neuro IR and neurology called, not a candidate for intervention. Her prognosis is poor especially due to pre existing small cell CA with portends poor prognosis. Family wants to continue supportive care and changed the code status to DNR/DNI. Family may pursue comfort care and ok with talking to palliative/hospice care. -F/u 2D echo. -Continue eliquis. -ASA. -hyperonic saline with goal Na 150. 
-neurology consult. 
-Serial neuro exam. 
-continue bronchodilators. 
-Permissive HTN. -Hold BP meds. 
  
Prophylaxis: 
Stress Ulcer Protocol Active: Pepcid. DVT Protocol Active: on eliquis. 
  
  
40min of CC time spent without overlap and excluding procedures. Review of Systems:  
Review of systems not obtained due to patient factors. Vital Signs:  
 Last 24hrs VS reviewed since prior progress note. Most recent are: 
Visit Vitals BP (!) 145/51 Pulse 97 Temp (P) 99.1 °F (37.3 °C) Resp 13 Ht 5' 6\" (1.676 m) Wt 73 kg (160 lb 15 oz) SpO2 100% BMI 25.98 kg/m² Intake/Output Summary (Last 24 hours) at 3/15/2021 0712 Last data filed at 3/15/2021 0400 Gross per 24 hour Intake 311.33 ml Output 400 ml Net -88.67 ml Physical Examination:  
   
Constitutional:  minimally responsive.    
ENT:  Oral mucous moist, oropharynx benign. Resp:  CTA bilaterally. No wheezing/rhonchi/rales. No accessory muscle use CV:  Regular rhythm, normal rate, no murmurs, gallops, rubs GI:  Soft, non distended, non tender. normoactive bowel sounds, no hepatosplenomegaly Musculoskeletal:  No edema, warm, 2+ pulses throughout Neurologic:  Paralyzed right side. Expressive aphasia. Labs:  
 
Recent Labs  
  03/15/21 
0401 03/14/21 
1852 WBC 9.4 9.3 HGB 12.3 13.0 HCT 37.6 39.5  190 Recent Labs  
  03/15/21 
0449 03/14/21 
2333 03/14/21 
1852 * 130* 131* K 4.2 4.3 4.3  101 98 CO2 25 21 25 BUN 15 15 17 CREA 1.05* 0.86 1.16* * 194* 196* CA 8.1* 8.5 8.6 MG  --   --  1.9 PHOS  --   --  3.7 Medications:  
 
Current Facility-Administered Medications Medication Dose Route Frequency  3% sodium chloride (*HIGH ALERT*CONCENTRATED IV*) infusion  40 mL/hr IntraVENous CONTINUOUS  
 sodium chloride (NS) flush 5-40 mL  5-40 mL IntraVENous Q8H  
 sodium chloride (NS) flush 5-40 mL  5-40 mL IntraVENous PRN  polyethylene glycol (MIRALAX) packet 17 g  17 g Oral DAILY PRN  promethazine (PHENERGAN) tablet 12.5 mg  12.5 mg Oral Q6H PRN Or  
 ondansetron (ZOFRAN) injection 4 mg  4 mg IntraVENous Q6H PRN  
 acetaminophen (TYLENOL) tablet 650 mg  650 mg Oral Q4H PRN Or  
 acetaminophen (TYLENOL) solution 650 mg  650 mg Per NG tube Q4H PRN Or  
 acetaminophen (TYLENOL) suppository 650 mg  650 mg Rectal Q4H PRN  
 apixaban (ELIQUIS) tablet 5 mg  5 mg Oral BID  atorvastatin (LIPITOR) tablet 40 mg  40 mg Oral DAILY  albuterol (PROVENTIL VENTOLIN) nebulizer solution 2.5 mg  2.5 mg Nebulization Q4H PRN  
 aspirin chewable tablet 81 mg  81 mg Oral DAILY  
 
______________________________________________________________________ EXPECTED LENGTH OF STAY: - - - 
ACTUAL LENGTH OF STAY:          1 Christal Mccracken MD

## 2021-03-15 NOTE — PROGRESS NOTES
0730 - Bedside verbal report received from off going shift. Left wrist PIV out, bed linin saturated. 3% Saline restarted with existing PIV. 0800 - Assessment complete, see summary for details. Eyes open to voice, turns toward voice but does not focus or follow commands. Purposeful movements with left upper and lower extremities. No movement noted right extremities to stimulation. External female catheter intact. Re-positioned for comfort, call bell within reach. 5799 - Dr. Arie Faulkner at bedside, updated on condition, orders noted. 0900 - Dr. Melanie Martinez at bedside, updated on condition, orders noted. 1300 - Dr. Chung To at bedside,  [facility] with daughter at length, verbal order noted for hospice/comfort care, awaiting orders. 4031 - Dr. Melanie Martinez at bedside, aware if altered lab results taken from PIV, verbal order noted to stop 3% saline solution, awaiting comfort measure orders. 1500 - Transported to CT. 
 
1535 - End of Shift Note Bedside shift change report given to Miles Suero RN (oncoming nurse) by Dennis Schwarz RN (offgoing nurse). Report included the following information SBAR, Kardex, ED Summary, Intake/Output, MAR, Recent Results and Quality Measures Shift worked:  7a-3p Shift summary and any significant changes:  
  see above Concerns for physician to address:  see above Zone phone for oncoming shift:   N/A Activity: 
Activity Level: Bed Rest 
Number times ambulated in hallways past shift: 0 Number of times OOB to chair past shift: 0 Cardiac:  
Cardiac Monitoring: Yes     
Cardiac Rhythm: Normal sinus rhythm, Sinus tachycardia, Paced Access:  
Current line(s): PIV Genitourinary:  
Urinary status: incontinent and external catheter Respiratory:  
O2 Device: Nasal cannula Chronic home O2 use?: yes Incentive spirometer at bedside: NO 
  
GI: 
  
Current diet:  DIET NPO Passing flatus: YES Tolerating current diet: YES Pain Management: Patient states pain is manageable on current regimen: YES Skin: 
Landon Score: 11 Interventions: speciality bed, float heels, PT/OT consult and internal/external urinary devices Patient Safety: 
Fall Score: Total Score: 3 Interventions: bed/chair alarm High Fall Risk: Yes Length of Stay: 
Expected LOS: 4d 9h 
Actual LOS: 1 Anna Joaquin RN

## 2021-03-16 NOTE — PROGRESS NOTES
End of Shift Note Bedside shift change report given to Vesta Sanz RN (oncoming nurse) by Elaine Rodriguez RN (offgoing nurse). Report included the following information SBAR, Kardex, MAR, Cardiac Rhythm paced/ A.fib and Alarm Parameters Shift worked:  7p-7a Shift summary and any significant changes:  
  Pt on comfort care; has NG tube (NPO); family meeting today at 200. Daughter is Lisy Delgadillo. Concerns for physician to address: n\a Zone phone for oncoming shift:   [phone] Patient Information Dearl Res 72 y.o. 
3/14/2021  3:39 PM by Hannah Hutton MD. Dearl Res was admitted from Home 
 
Problem List 
Patient Active Problem List  
 Diagnosis Date Noted  Acute ischemic stroke ([facility] 03/14/2021  ARF (acute renal failure) ([address] 01/30/2021  Lactic acid acidosis 01/30/2021  Small cell lung cancer, right upper lobe ([address] 01/30/2021  Lung mass 01/22/2021  ASHD (arteriosclerotic heart disease) 05/31/2019  Respiratory failure with hypoxia ([address] 03/25/2019  S/P rotator cuff repair 09/19/2018  PAD (peripheral artery disease) ([address] 05/22/2018  A-fib ([address] 03/30/2018  Type 2 diabetes mellitus with nephropathy ([address] 12/27/2017  CKD (chronic kidney disease) stage 3, GFR 30-59 ml/min 09/22/2017  Chronic systolic HF (heart failure) ([address] 05/10/2017  History of Clostridium difficile infection 05/10/2017  Junctional tachycardia ([address] 05/10/2017  Hypotension 05/10/2017  S/P ablation of atrial fibrillation 05/02/2017  Fear associated with illness and body function 04/07/2017  Counseling regarding advanced care planning and goals of care 04/07/2017  Systolic CHF, acute ([address] 04/06/2017  Acute systolic CHF (congestive heart failure) ([address] 04/06/2017  CHF (congestive heart failure) ([facility] 04/04/2017  Type 2 diabetes mellitus with diabetic neuropathy, without long-term current use of insulin ([facility] 01/31/2017  GIB (gastrointestinal bleeding) 04/12/2016  Diastolic CHF, acute on chronic (HCC) 09/22/2014  S/P coronary artery stent placement 07/04/2014  Back pain 11/14/2012  Sinoatrial node dysfunction ([address] 07/05/2012  Cardiac pacemaker in situ 07/05/2012  Mixed hyperlipidemia 07/05/2012  Chest pain 09/21/2011  Sick sinus syndrome ([address] 02/25/2011  S/P angioplasty with stent 02/07/2011  Hypokalemia 07/20/2010  Hip pain 06/08/2010  Benign hypertensive heart and CKD, stage 3 (GFR 30-59), w CHF ([address] 06/02/2010  Atrial fibrillation--paroxysmal 06/02/2010  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  
Diagnosis Date  Arthritis   
 left shoulder  Atrial fibrillation ([address] 6/2/2010 Dr. Candi Joyce  CAD (coronary artery disease) stent; Dr. Candi Joyce  Cancer ([facility] lung  Celiac disease  Chronic diastolic heart failure ([address] 09/22/2014 Dr. Candi Joyce  Chronic kidney disease   
 per cardio note  Chronic pain   
 left thigh:  L SFA intervention by Dr. Sudhir Morris 07/2018, as of 9/6/18:  still causing pain, pt to have MILLA checked per Dr. Sudhir Morris note  Chronic systolic HF (heart failure) ([address] 5/10/2017  
 4/2017 EF 25-30%  Congestive heart failure (CHF) ([address]  COPD (chronic obstructive pulmonary disease) ([address] 6/2/2010 Dr. Tricia Wall  Diabetes ([facility] Dr. Piedra Slider; no current medication per pt  Emphysema, unspecified ([address] Dr. Tricia Wall  Fibroid PT STATES NOT HAVING FIBROIDS  Frequent falls 2017  Gout  Heart failure ([address] Dr. Candi Joyce  History of Clostridium difficile infection 5/10/2017  
 4/2017 CDiff positive  Hypertension 6/2/2010 Dr. Lauren Brothers  Hypertensive heart and chronic kidney disease   
 per PCP note  Hypotension 5/10/2017  Junctional tachycardia ([address] 5/10/2017 Dr. Candi Joyce  Neuropathy  NIDDM (non-insulin dependent diabetes mellitus) 6/2/2010  PAD (peripheral artery disease) ([facility]  S/P ablation of atrial fibrillation 03/2018  Screening mammogram 5/4/10  
 SOB (shortness of breath) 09/22/2014 Dr. Codi High  SSS (sick sinus syndrome) ([facility]   
 per pacemaker form 9/6/18  Weakness   
 per pt weakness from c-diff 2017, mulitple falls with injury to rotator cuff Core Measures: CVA: Yes CHF:No 
PNA:No Not applicable Activity: 
Activity Level: Bed Rest 
Number times ambulated in hallways past shift: 0 Number of times OOB to chair past shift: 0 Cardiac:  
Cardiac Monitoring: Yes     
Cardiac Rhythm: Paced, Sinus tachycardia Access:  
Current line(s): PIV Central Line? Not applicable PICC LINE? No Placement date Reason Medically Necessary Genitourinary:  
Urinary status: incontinent Urinary Catheter? Not applicable Placement Date  Reason Medically Necessary Respiratory:  
O2 Device: Nasal cannula Chronic home O2 use?: NO Incentive spirometer at bedside: NO 
  
 
GI: 
  
Current diet:  DIET NPO Passing flatus: YES Tolerating current diet: YES Pain Management:  
Patient states pain is manageable on current regimen: YES Skin: 
Landon Score: 11 Interventions: turn team, speciality bed, float heels, limit briefs and internal/external urinary devices Patient Safety: 
Fall Score: Total Score: 3 Interventions: bed/chair alarm, gripper socks and pt to call before getting OOB High Fall Risk: Yes DVT prophylaxis: DVT prophylaxis Med- Yes DVT prophylaxis SCD or JULIANA- Yes Wounds: (If Applicable) Wounds- No 
Location Active Consults: 
IP CONSULT TO NEUROLOGY 
IP CONSULT TO PALLIATIVE CARE - PROVIDER Length of Stay: 
Expected LOS: 4d 9h 
Actual LOS: 2 Discharge Plan: Yes  
 
 
Cholo Villagomez RN

## 2021-03-16 NOTE — PROGRESS NOTES
Patient currently awaiting family meeting with hospice at 12:30 PM. Will hold and follow up pending family meeting.

## 2021-03-16 NOTE — PROGRESS NOTES
End of Shift Note 
  Bedside shift change report given to KATIE MART (oncoming nurse) by Junaid Hay RN (offgoing nurse). Report included the following information SBAR, Kardex, MAR, Cardiac Rhythm paced/ A.fib and Alarm Parameters  
  
Shift worked: day Shift summary and any significant changes:  
   hospice nurse and family had a meeting awaiting for new orders 
  
   
Concerns for physician to address: n\a Zone phone for oncoming shift:   [phone]  
  
Patient Information Tobi Maldonado 72 y.o. 
3/14/2021  3:39 PM by Rober Cleary MD. Tobi Maldonado was admitted from Home 
  
Problem List 
    
Patient Active Problem List  
  Diagnosis Date Noted  Acute ischemic stroke ([address] 03/14/2021  ARF (acute renal failure) ([address] 01/30/2021  Lactic acid acidosis 01/30/2021  Small cell lung cancer, right upper lobe ([address] 01/30/2021  Lung mass 01/22/2021  ASHD (arteriosclerotic heart disease) 05/31/2019  Respiratory failure with hypoxia ([address] 03/25/2019  S/P rotator cuff repair 09/19/2018  PAD (peripheral artery disease) ([address] 05/22/2018  A-fib ([address] 03/30/2018  Type 2 diabetes mellitus with nephropathy ([address] 12/27/2017  CKD (chronic kidney disease) stage 3, GFR 30-59 ml/min 09/22/2017  Chronic systolic HF (heart failure) ([address] 05/10/2017  History of Clostridium difficile infection 05/10/2017  Junctional tachycardia ([address] 05/10/2017  Hypotension 05/10/2017  S/P ablation of atrial fibrillation 05/02/2017  Fear associated with illness and body function 04/07/2017  Counseling regarding advanced care planning and goals of care 04/07/2017  Systolic CHF, acute ([address] 04/06/2017  Acute systolic CHF (congestive heart failure) ([address] 04/06/2017  CHF (congestive heart failure) ([address] 04/04/2017  Type 2 diabetes mellitus with diabetic neuropathy, without long-term current use of insulin ([address] 01/31/2017  GIB (gastrointestinal bleeding) 04/12/2016  Diastolic CHF, acute on chronic ([facility] 09/22/2014  S/P coronary artery stent placement 07/04/2014  Back pain 11/14/2012  Sinoatrial node dysfunction ([facility] 07/05/2012  Cardiac pacemaker in situ 07/05/2012  Mixed hyperlipidemia 07/05/2012  Chest pain 09/21/2011  Sick sinus syndrome ([facility] 02/25/2011  S/P angioplasty with stent 02/07/2011  Hypokalemia 07/20/2010  Hip pain 06/08/2010  Benign hypertensive heart and CKD, stage 3 (GFR 30-59), w CHF ([facility] 06/02/2010  Atrial fibrillation--paroxysmal 06/02/2010  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  
  
    
Past Medical History:  
Diagnosis Date  Arthritis    
  left shoulder  Atrial fibrillation ([facility] 6/2/2010  
  Dr. Bronson Louie  CAD (coronary artery disease)    
  stent; Dr. Bronson Louie  Cancer ([facility]    
  lung  Celiac disease    
 Chronic diastolic heart failure ([facility] 09/22/2014  
  Dr. Bronson Louie  Chronic kidney disease    
  per cardio note  Chronic pain    
  left thigh:  L SFA intervention by Dr. Rylie Prasad 07/2018, as of 9/6/18:  still causing pain, pt to have MILLA checked per Dr. Rylie Prasad note  Chronic systolic HF (heart failure) ([facility] 5/10/2017  
  4/2017 EF 25-30%  Congestive heart failure (CHF) (HCC)    
 COPD (chronic obstructive pulmonary disease) ([facility] 6/2/2010  
  Dr. Cheryl Gibbons  Diabetes ([facility]    
  Dr. Sage Lyn; no current medication per pt  Emphysema, unspecified ([facility]    
  Dr. Cheryl Gibbons  Fibroid    
  PT STATES NOT HAVING FIBROIDS  Frequent falls 2017  Gout    
 Heart failure [facility])    
  Dr. Bronson Louie  History of Clostridium difficile infection 5/10/2017  
  4/2017 CDiff positive  Hypertension 6/2/2010  
  Dr. Joseph Caballero  Hypertensive heart and chronic kidney disease    
  per PCP note  Hypotension 5/10/2017  Junctional tachycardia ([facility] 5/10/2017  
  Dr. Bronson Louie  Neuropathy    
 NIDDM (non-insulin dependent diabetes mellitus) 6/2/2010  [facility]AD [facility]peripheral [facility] disease) ([facility]    
 S/P ablation of atrial fibrillation 03/2018  Screening mammogram 5/4/10  
 SOB (shortness of breath) 09/22/2014  
  Dr. Jatin Adames  SSS (sick sinus syndrome) [facility]    
  per pacemaker form 9/6/18  Weakness    
  per pt weakness from c-diff 2017, mulitple falls with injury to rotator cuff  
  
  
Core Measures: CVA: Yes CHF:No 
PNA:No Not applicable 
  
Activity: 
Activity Level: Bed Rest 
Number times ambulated in hallways past shift: 0 Number of times OOB to chair past shift: 0 
  
Cardiac:  
Cardiac Monitoring: Yes     
Cardiac Rhythm: Paced, Sinus tachycardia 
  
Access:  
Current line(s): PIV Central Line? Not applicable PICC LINE? No Placement date Reason Medically Necessary  
  
Genitourinary:  
Urinary status: incontinent Urinary Catheter? Not applicable Placement Date  Reason Medically Necessary  
  
Respiratory:  
O2 Device: Nasal cannula Chronic home O2 use?: NO Incentive spirometer at bedside: NO 
  
GI: 
Current diet:  DIET NPO Passing flatus: YES Tolerating current diet: YES 
  
Pain Management:  
Patient states pain is manageable on current regimen: YES 
  
Skin: 
Landon Score: 11 Interventions: turn team, speciality bed, float heels, limit briefs and internal/external urinary devices Patient Safety: 
Fall Score: Total Score: 3 Interventions: bed/chair alarm, gripper socks and pt to call before getting OOB High Fall Risk: Yes 
  
DVT prophylaxis: DVT prophylaxis Med- Yes DVT prophylaxis SCD or JULIANA- Yes  
  
Wounds: (If Applicable) Wounds- No 
Location  
  
Active Consults: 
IP CONSULT TO NEUROLOGY 
IP CONSULT TO PALLIATIVE CARE - PROVIDER 
  
Length of Stay: 
Expected LOS: 4d 9h 
Actual LOS: 2 Discharge Plan:  Yes

## 2021-03-16 NOTE — PROGRESS NOTES
Problem: Falls - Risk of 
Goal: *Absence of Falls Description: Document Arnold Borrero Fall Risk and appropriate interventions in the flowsheet. Outcome: Progressing Towards Goal 
Note: Fall Risk Interventions: 
  
 
Mentation Interventions: Adequate sleep, hydration, pain control, Bed/chair exit alarm Medication Interventions: Bed/chair exit alarm Elimination Interventions: Bed/chair exit alarm, Call light in reach History of Falls Interventions: Bed/chair exit alarm Problem: Patient Education: Go to Patient Education Activity Goal: Patient/Family Education Outcome: Progressing Towards Goal 
  
Problem: Pressure Injury - Risk of 
Goal: *Prevention of pressure injury Description: Document Landon Scale and appropriate interventions in the flowsheet. Outcome: Progressing Towards Goal 
Note: Pressure Injury Interventions: 
Sensory Interventions: Pad between skin to skin Moisture Interventions: Absorbent underpads Activity Interventions: Pressure redistribution bed/mattress(bed type) Mobility Interventions: Pressure redistribution bed/mattress (bed type) Nutrition Interventions: Offer support with meals,snacks and hydration Friction and Shear Interventions: Lift sheet, Minimize layers

## 2021-03-16 NOTE — PROGRESS NOTES
3/15/2021 
9:09 PM 
 
RN called report to [address], RN on neuro unit; pt going to room 3116. [place]

## 2021-03-16 NOTE — HOSPICE
[facility] RN note: In to meet with pt, daughter Christopher Lockhart at bedside and grand daughter who is a CNA via phone. Pt appears restless, moving L side extremities, somewhat calmer with gentle touch and music. Palliative MD Lund to place comfort orders. Some secretions due to NG tube, discussed removal of NG tube for comfort. Conversation about hospice took place outside pt room. Christopher Lockhart emphasized how the family really want to take pt home so that everyone can be with her, aware that 24hr caregiving will be needed given current clinical status. Reviewed philosophy and services, levels of care including respite, GIP and continuous care for crisis circumstances. Also discussed use of artificial nutrition and hydration which pt made clear she did not want, family respecting wishes but appreciated additional education. Christopher Lockhart states that the family is all gathering tonight to work out a scheduled for care giving and to confirm consensus for hospice admission. Christopher Lockhart will contact this RN in am to start coordinating transition home if all family members in agreement. Christopher Lockhart requesting Dr Kieran Espinal come to pt room for update on condition and prognosis. Emphasized that there is a narrow window of time in which to get pt home, encouraged sooner than later. [id number] to reconnect with daughter in the morning. Thank you for the opportunity to care for this pt and family. Please contact hospice at [phone] with any questions or concerns.

## 2021-03-16 NOTE — PROGRESS NOTES
Hospitalist Progress Note NAME: Kristan Vaughn :  1950 MRN:  [id number] Admit date: 3/14/2021 Today's date: 21 PCP: Zaira Tubbs MD  
 
Admitted to ICU 3/14/2021 with large left CALE and MCA stroke Currently aphasic, dense right hemiparesis Followed by neurology and the ICU team 
Palliative care and hospice working with patient, family meeting set up for 3/16/2021 ICU team told me the plan is for discharge to home with hospice and comfort care Transferred out of ICU 3/15/2021 Assessment / Plan: 
Left CALE and MCA territory stroke POA BilateraL  ICA stenosis > 90% POA Found unresponsive at home last known well was the evening before Brought to emergency room Admit head CT scan Large acute left MCA and CALE infarcts Local mass effect but no midline shift or herniation and no hemorrhage. CTA with greater 90% right internal carotid artery stenosis Greater than 90% left internal carotid stenosis Repeat head CT scan 3/15 with further evolution of the left hemispheric infarct Increased mass-effect but no evidence of hemorrhagic transformation Opens eyes, dense right hemiparesis, aphasic Seen by Dr. Yas Olivarez from neurology NG tube in place Currently on aspirin, Eliquis, Lipitor Prognosis guarded, meeting with palliative care and hospice tomorrow ICU team states goal is home with hospice Small cell lung carcinoma RLL POA diagnosed 2021 T4 N0 Focal pleural involvement PET Scan with no metastatic disease 
 RLL wedge resection, 2021 Adjuvant chemotherapy with Dr. Collette Eisenmenger ongoing Carboplatin + Etoposide Acute respiratory failure with hypoxia POA currently on 6 liters 
stable sats will wean as tolerated Chronic systolic CHF LVEF 25 to 61% Permanent atrial fibrillation S/P AV pacer Hyperlipidemia Essential HTN. Continue aspirin, Eliquis, Lipitor for now Body mass index is 25.82 kg/m².  
 
Code status: DNR Prophylaxis: eliquis Recommended Disposition: home with hospice Subjective: Chief Complaint / Reason for Physician Visit follow-up left hemispheric stroke Transferred out of ICU 3/15/2021 Opens eyes, spontaneously moving left-sided of body Aphasic, not talking at all, not able to move right-sided body Not able to provide any history I spoke with ICU attending, meeting with hospice tomorrow, plan is home with hospice 
 discussed with RN events overnight. Review of Systems: 
Symptom Y/N Comments  Symptom Y/N Comments Fever/Chills    Chest Pain Poor Appetite    Edema Cough    Abdominal Pain Sputum    Joint Pain SOB/[place]    Headache Nausea/vomit    Tolerating PT/OT Diarrhea    Tolerating Diet Constipation    Other Could NOT obtain due to:  Aphasia Objective: VITALS:  
Last 24hrs VS reviewed since prior progress note. Most recent are: 
Patient Vitals for the past 24 hrs: 
 Temp Pulse Resp BP SpO2  
03/16/21 0049 98 °F (36.7 °C) 100 18 136/63 100 % 03/15/21 2200 97 °F (36.1 °C) 87 13 (!) 156/70 100 % 03/15/21 1631 97.9 °F (36.6 °C) (!) 103 13 (!) 94/41 100 % 03/15/21 1300  98 15 (!) 142/56 100 % 03/15/21 1246    (!) 137/48   
03/15/21 1200 98.7 °F (37.1 °C) 98 11 (!) 137/48 100 % 03/15/21 1100  100 13 (!) 142/55 100 % 03/15/21 1000  94 13 (!) 127/53 100 % 03/15/21 0900  99 14 (!) 137/55 100 % 03/15/21 0800 99.1 °F (37.3 °C) 97 13 (!) 145/51 100 % 03/15/21 0700  98 18 138/62   
03/15/21 0500  96 12 132/60   
03/15/21 0400 98.7 °F (37.1 °C) 96 15 (!) 143/57 100 % 03/15/21 0300  96 13 133/64 100 % Wt Readings from Last 12 Encounters:  
03/15/21 72.6 kg (160 lb)  
03/11/21 75.8 kg (167 lb)  
03/11/21 76.2 kg (168 lb)  
03/10/21 74.6 kg (164 lb 8 oz) 03/09/21 76.3 kg (168 lb 4.8 oz) 03/05/21 77.1 kg (170 lb)  
02/25/21 75.8 kg (167 lb) 02/01/21 79.5 kg (175 lb 4.8 oz) 01/25/21 77.6 kg (171 lb 1.2 oz) 01/14/21 75.4 kg (166 lb 3.6 oz) 01/05/21 76.2 kg (168 lb) 12/31/20 76.5 kg (168 lb 9.6 oz) PHYSICAL EXAM: 
 
I had a face to face encounter and independently examined this patient on 03/15/21 as outlined below: 
 
General: WD, WN. Alert, aphasic, moving left-sided body, not right side EENT:  PERRL. Anicteric sclerae. MMM Neck:  No meningismus, no thyromegaly Resp:  CTA bilaterally, no wheezing or rales. No accessory muscle use CV:  Regular  rhythm,  No edema GI:  Soft, Non distended, Non tender. +Bowel sounds, no rebound LN:  No cervical or inguinal ANOOP Neurologic:  Alert, his eyes, not consistently tracking Aphasic, dense right hemiparesis Psych:   Not anxious nor agitated Skin:  No rashes. No jaundice Reviewed most current lab test results and cultures  YES Reviewed most current radiology test results   YES Review and summation of old records today    NO Reviewed patient's current orders and MAR    YES 
PMH/ reviewed - no change compared to H&P 
________________________________________________________________________ Care Plan discussed with: 
  Comments Patient x Family RN x Care Manager Consultant  x ICU attending Multidiciplinary team rounds were held today with , nursing, pharmacist and clinical coordinator. Patient's plan of care was discussed; medications were reviewed and discharge planning was addressed. ________________________________________________________________________ Comments >50% of visit spent in counseling and coordination of care    
________________________________________________________________________ Esau Sandhoff, MD  
 
Procedures: see electronic medical records for all procedures/Xrays and details which were not copied into this note but were reviewed prior to creation of Plan. LABS: 
I reviewed today's most current labs and imaging studies.  
Pertinent labs include: 
Recent Labs 03/15/21 
0401 03/14/21 
1852 03/14/21 
1552 WBC 9.4 9.3 9.2 HGB 12.3 13.0 12.7 HCT 37.6 39.5 38.7  190 211 Recent Labs  
  03/15/21 
0900 03/15/21 
0449 03/14/21 
2333 03/14/21 
1852 03/14/21 
1552  134* 130* 131* 129*  
K 4.6 4.2 4.3 4.3 4.6  102 101 98 99 CO2 26 25 21 25 24 * 176* 194* 196* 203* BUN 15 15 15 17 17 CREA 1.04* 1.05* 0.86 1.16* 1.03* CA 7.7* 8.1* 8.5 8.6 8.6 MG  --   --   --  1.9  --   
PHOS  --   --   --  3.7  --   
ALB  --   --   --  3.4* 3.3* TBILI  --   --   --  1.5* 1.6* ALT  --   --   --  26 26 INR  --   --   --   --  1.1

## 2021-03-16 NOTE — PROGRESS NOTES
Physical Therapy Patient currently awaiting family meeting with hospice at 12:30 PM. Will hold and follow up pending family meeting.  
Lola Cross, PT, DPT

## 2021-03-16 NOTE — PROGRESS NOTES
Neurology Note Patient ID: 
Myrna Adams [id number] 
72 y.o. 
1950 Date of Consultation:  March 16, 2021 Subjective: no verbal output. History of Present Illness:  
Myrna Adams is a 72 y.o. female with a history of atrial fibrillation, dyslipidemia, hypertension, and small cell cancer status post resection, COPD who was brought to the emergency department on 3/14/2021 due to unresponsiveness. Her last known well was the night prior and she had not woken the next morning. In the emergency department she was found to have a large left MCA and CALE territory stroke. She was found to have an ICA occlusion on her CTA. She was deemed not a candidate for intervention or TPA. Since admission, there has been no change in her neurological status. Patient's daughter was at the bedside this afternoon and granddaughter was on the phone. Past Medical History:  
Diagnosis Date  Arthritis   
 left shoulder  Atrial fibrillation ([address] 6/2/2010 Dr. Mariza Marroquin  CAD (coronary artery disease) stent; Dr. Mariza Marroquin  Cancer ([address] lung  Celiac disease  Chronic diastolic heart failure ([address] 09/22/2014 Dr. Mariza Marroquin  Chronic kidney disease   
 per cardio note  Chronic pain   
 left thigh:  L SFA intervention by Dr. Miguel Brown 07/2018, as of 9/6/18:  still causing pain, pt to have MILLA checked per Dr. Miguel Brown note  Chronic systolic HF (heart failure) ([address] 5/10/2017  
 4/2017 EF 25-30%  Congestive heart failure (CHF) ([address]  COPD (chronic obstructive pulmonary disease) ([address] 6/2/2010 Dr. Jeff Poole  Diabetes ([address] Dr. Lindsey Robertson; no current medication per pt  Emphysema, unspecified ([address] Dr. Aguilar Slight  Fibroid PT STATES NOT HAVING FIBROIDS  Frequent falls 2017  Gout  Heart failure ([address] Dr. Mariza Marroquin  History of Clostridium difficile infection 5/10/2017  
 4/2017 CDiff positive  Hypertension 6/2/2010  Dr. Severino Almeida  Hypertensive heart and chronic kidney disease   
 per PCP note  Hypotension 5/10/2017  Junctional tachycardia ([facility] 5/10/2017 Dr. Ting Garcia  Neuropathy  NIDDM (non-insulin dependent diabetes mellitus) 2010  PAD (peripheral artery disease) ([facility]  S/P ablation of atrial fibrillation 2018  Screening mammogram 5/4/10  
 SOB (shortness of breath) 2014 Dr. Rashi Ashford  SSS (sick sinus syndrome) ([facility]   
 per pacemaker form 18  Weakness   
 per pt weakness from c-diff , mulitple falls with injury to rotator cuff Past Surgical History:  
Procedure Laterality Date  COLONOSCOPY N/A 2017 COLONOSCOPY with biopsy performed by Lauren Chou MD at [facility] AMBULATORY OR  
 HX AFIB ABLATION  2018  
 has had 2 ablations per patient [id number]  HX HEART CATHETERIZATION  2018  HX HEENT    
 HX OTHER SURGICAL    
 adrenal gland removed  HX PACEMAKER Left [employer] model: [facility]  HX ROTATOR CUFF REPAIR Right  HX WISDOM TEETH EXTRACTION X2  
 IR INSERT TUNL CVC W PORT OVER 5 YEARS  3/5/2021  SD CARDIAC SURG PROCEDURE UNLIST    
 3 cardiac stent placed  SD EXCISE ADRENAL GLAND Right 1994  VASCULAR SURGERY PROCEDURE UNLIST  2018 Left SFA intervention ; Dr. Rody Concepcion Family History Problem Relation Age of Onset  Heart Disease Mother  Diabetes Father  Heart Disease Brother  Other Son MURDERED Social History Tobacco Use  Smoking status: Former Smoker Packs/day: 0.50 Years: 42.00 Pack years: 21.00 Types: Cigarettes Start date: 5 Quit date: 2009 Years since quittin.2  Smokeless tobacco: Never Used Substance Use Topics  Alcohol use: No  
  
 
Allergies Allergen Reactions  Crestor [Rosuvastatin] Myalgia  Levaquin [Levofloxacin] Nausea Only GI Upset  Lipitor [Atorvastatin] Diarrhea  Lyrica [Pregabalin] Myalgia Current Facility-Administered Medications Medication Dose Route Frequency  LORazepam (ATIVAN) injection 1 mg  1 mg IntraVENous Q1H PRN  
 morphine injection 2 mg  2 mg IntraVENous Q1H PRN  
 alcohol 62% (NOZIN) nasal  1 Ampule  1 Ampule Topical Q12H  
 sodium chloride (NS) flush 5-40 mL  5-40 mL IntraVENous Q8H  
 sodium chloride (NS) flush 5-40 mL  5-40 mL IntraVENous PRN  polyethylene glycol (MIRALAX) packet 17 g  17 g Oral DAILY PRN  promethazine (PHENERGAN) tablet 12.5 mg  12.5 mg Oral Q6H PRN Or  
 ondansetron (ZOFRAN) injection 4 mg  4 mg IntraVENous Q6H PRN  
 acetaminophen (TYLENOL) tablet 650 mg  650 mg Oral Q4H PRN Or  
 acetaminophen (TYLENOL) solution 650 mg  650 mg Per NG tube Q4H PRN Or  
 acetaminophen (TYLENOL) suppository 650 mg  650 mg Rectal Q4H PRN  
 apixaban (ELIQUIS) tablet 5 mg  5 mg Oral BID  atorvastatin (LIPITOR) tablet 40 mg  40 mg Oral DAILY  albuterol (PROVENTIL VENTOLIN) nebulizer solution 2.5 mg  2.5 mg Nebulization Q4H PRN  
 aspirin chewable tablet 81 mg  81 mg Oral DAILY Review of Systems: 
[x]Unable to obtain  ROS due to  [x]mental status change  []sedated   []intubated Objective:  
 
Visit Vitals /78 Pulse 94 Temp 97.6 °F (36.4 °C) Resp 20 Ht 5' 6\" (1.676 m) Wt 160 lb (72.6 kg) SpO2 94% BMI 25.82 kg/m² Physical Exam: 
 
General:  appears well nourished in no acute distress Neck: no carotid bruits Lungs: clear to auscultation Heart:  no murmurs, regular rate Lower extremity: peripheral pulses palpable and no edema Skin: intact Neurological exam: 
 
The patient had an aphasia. There was no verbal output. She would not mimic. She does not follow any one-step commands. She has a left gaze preference. Cranial nerves:  
II-XII were tested Perrrla She blinks to visual threat on the left. She does not blink on the right.  
Oculocephalic reflex was intact Facial sensation: She does have facial grimace to noxious stimulation on the left. Not on the right Facial motor: Right facial droop She would not protrude her tongue Motor: Tone -decreased on the right No evidence of fasciculations Strength testing: She has no movement in her right upper extremity and only trace withdrawal in the right lower extremity. She does have no spontaneous movement in her left upper and lower extremity but does withdrawal to painful stimulation. This is unchanged from the day prior. Reflexes: 
  Right Left Biceps  2 1 Triceps 2 1 Brachiorad. 2           1 Patella  2 1 Achilles 2 - Plantar response:  Extensor on the right Cerebellar testing:  no tremor apparent Gait: This was not assessed due to her mental status. Labs:  
 
Lab Results Component Value Date/Time Hemoglobin A1c 7.1 (H) 03/15/2021 04:01 AM  
 Sodium 135 (L) 03/16/2021 02:48 AM  
 Potassium 3.8 03/16/2021 02:48 AM  
 Chloride 107 03/16/2021 02:48 AM  
 Glucose 183 (H) 03/16/2021 02:48 AM  
 BUN 18 03/16/2021 02:48 AM  
 Creatinine 0.78 03/16/2021 02:48 AM  
 Calcium 8.5 03/16/2021 02:48 AM  
 WBC 6.0 03/16/2021 02:48 AM  
 HCT 36.0 03/16/2021 02:48 AM  
 HGB 11.8 03/16/2021 02:48 AM  
 PLATELET 653 92/73/9103 02:48 AM  
 
 
Imaging: No results found for this or any previous visit. Results from [facility] Encounter encounter on 03/14/21 CT CODE NEURO PERF W CBF Narrative Large completed left hemispheric infarct with diffuse hypodensity throughout the 
majority of the left cerebral hemisphere and mild mass effect on the left 
lateral ventricle. The AI Algorithm calculates a perfusion mismatch of 159 cc, 
although visually there appears to be a matched large in the left hemisphere. There may be a small area of perfusion mismatch in the anteromedial right 
hemisphere. Patent bilateral subclavian arteries with severe stenosis at the left SCA 
origin.   
 
Moderate stenosis at the left common carotid artery origin. Occlusion of the 
right common carotid artery approximately 3 cm distal to its origin, with 
suspected acute intraluminal thrombus. Remainder of right common carotid artery 
is occluded. Extensive calcification in the right carotid bifurcation with 
critical right internal carotid artery origin stenosis. Patent cervical right 
internal carotid artery. Patent cavernous carotid artery with moderate to severe 
calcific stenosis. Hypoplastic right A1 segment. Distal right anterior cerebral 
artery is patent and fills from the left carotid circulation. Patent left common carotid artery. Densely calcified plaque in the left carotid 
bifurcation with critical stenosis and probable segmental occlusion of 
approximately 1 cm in length involving the left internal carotid artery origin. (No opacified lumen seen). Patent cervical left internal carotid artery. Patent anterior and middle cerebral arteries bilaterally with hypoplastic right A1 segment, as previously noted. Moderate focal stenosis of the left M2 
posterior branch. No acute thrombus seen in the middle cerebral artery. Poor 
opacification of left middle cerebral artery branch vessels. Patent vertebral basilar system. Very severe emphysema in the visualized lungs. Small right pleural effusion. Marked coronary artery calcification. Pulmonary 
arterial hypertension. Preliminary report was provided by Dr. Memo Crain, the on-call radiologist, at 
16:46 hours on 3/14/2021. Findings were discussed with Dr. Funmi Clayton at the time 
of interpretation. Final report to follow. Hemoglobin A1c 7.1 Did independently review the head CT from March 15, 2021. There is further evolution of the large left MCA and CALE stroke with no hemorrhagic transformation Assessment and Plan: The patient is a pleasant 42-year-old female with multiple medical conditions who presents with a large left MCA and CALE territory stroke. Her examination does reveal aphasia with dense right hemiplegia. There has been no improvement since yesterday. Acute stroke Most likely embolic in origin. Her risk factors for stroke include hypertension, atrial fibrillation, diabetes, dyslipidemia She is continued on Eliquis and aspirin. Due to how large her stroke is, I am concerned about hemorrhagic transformation as well as worsening edema. Follow-up head CT was essentially unchanged. EEG with no evidence of nonconvulsive status epilepticus. The patient's family had a meeting with palliative care and are considering hospice. I spent time speaking with the daughter and the granddaughter about the patient's neurological state and the chance of meaningful recovery being essentially zero. All of their questions were answered. I did reassure them that the patient is comfortable There is no other additional neurology recommendations at this time. If questions arise, please not hesitate to contact me and I will return to see the patient. Active Problems: 
  Acute ischemic stroke ([facility] (3/14/2021) I spent   35   minutes providing care to this  acutely ill inpatient with > 50% of the time counseling and assisting in the coordination of care of the patient on the patient's hospital floor/unit. Signed By:  Sonia Rodriguez March 16, 2021

## 2021-03-16 NOTE — PROGRESS NOTES
Hospitalist Progress Note NAME: Myrna Adams :  1950 MRN:  [id number] Admit date: 3/14/2021 Today's date: 21 PCP: Rylie Vazquez MD  
 
Admitted to ICU 3/14/2021 with large left CALE and MCA stroke Currently aphasic, dense right hemiparesis Followed by neurology and the ICU team 
Palliative care and hospice working with patient, family meeting set up for 3/16/2021 12:30pm today Looking into DC home with hospice and comfort care versus IP hospice if not able to get home Transferred out of ICU 3/15/2021 Assessment / Plan: 
Left CALE and MCA territory stroke POA BilateraL  ICA stenosis > 90% POA Found unresponsive at home last known well was the evening before Brought to emergency room Admit head CT scan Large acute left MCA and CALE infarcts Local mass effect but no midline shift or herniation and no hemorrhage. CTA with greater 90% right internal carotid artery stenosis Greater than 90% left internal carotid stenosis Repeat head CT scan 3/15 with further evolution of the left hemispheric infarct Increased mass-effect but no evidence of hemorrhagic transformation Opens eyes, dense right hemiparesis, aphasic Seen by Dr. Jennifer Cruz from neurology NG tube in place Currently on aspirin, Eliquis, Lipitor Prognosis guarded, 
Transition to home with hospice as able otherwise Ip Hospice-- pending Hospice Family meeting today afternoon with Hospice/Palliative team 
 
Small cell lung carcinoma RLL POA diagnosed 2021 T4 N0 Focal pleural involvement PET Scan with no metastatic disease 
 RLL wedge resection, 2021 Adjuvant chemotherapy with Dr. Rachel Alvarez ongoing Carboplatin + Etoposide Acute respiratory failure with hypoxia POA currently on 6 liters 
stable sats will wean as tolerated Chronic systolic CHF LVEF 25 to 30% Permanent atrial fibrillation S/P AV pacer Hyperlipidemia Essential HTN.  
Continue aspirin, Eliquis, Lipitor for now Body mass index is 25.82 kg/m². Code status: DNR/DNI now Prophylaxis: eliquis Recommended Disposition: home with hospice? ? Versus IP hospice - TBD Subjective: Chief Complaint / Reason for Physician Visit follow-up left hemispheric stroke Transferred out of ICU 3/15/2021 Opens eyes, spontaneously moving left-sided of body Aphasic, not talking at all, not able to move right-sided body Not able to provide any history Review of Systems: 
Symptom Y/N Comments  Symptom Y/N Comments Fever/Chills    Chest Pain Poor Appetite    Edema Cough    Abdominal Pain Sputum    Joint Pain SOB/[place]    Headache Nausea/vomit    Tolerating PT/OT Diarrhea    Tolerating Diet Constipation    Other Could NOT obtain due to:  Aphasia Objective: VITALS:  
Last 24hrs VS reviewed since prior progress note. Most recent are: 
Patient Vitals for the past 24 hrs: 
 Temp Pulse Resp BP SpO2  
03/16/21 0737 (!) 96.7 °F (35.9 °C) 96 20 (!) 128/93 99 % 03/16/21 0358 99.1 °F (37.3 °C) (!) 103 18 131/64 99 % 03/16/21 0049 98 °F (36.7 °C) 100 18 136/63 100 % 03/15/21 2200 97 °F (36.1 °C) 87 13 (!) 156/70 100 % 03/15/21 1631 97.9 °F (36.6 °C) (!) 103 13 (!) 94/41 100 % 03/15/21 1300  98 15 (!) 142/56 100 % 03/15/21 1246    (!) 137/48   
03/15/21 1200 98.7 °F (37.1 °C) 98 11 (!) 137/48 100 % 03/15/21 1100  100 13 (!) 142/55 100 % 03/15/21 1000  94 13 (!) 127/53 100 % 03/15/21 0900  99 14 (!) 137/55 100 % Wt Readings from Last 12 Encounters:  
03/15/21 72.6 kg (160 lb)  
03/11/21 75.8 kg (167 lb)  
03/11/21 76.2 kg (168 lb)  
03/10/21 74.6 kg (164 lb 8 oz) 03/09/21 76.3 kg (168 lb 4.8 oz) 03/05/21 77.1 kg (170 lb)  
02/25/21 75.8 kg (167 lb) 02/01/21 79.5 kg (175 lb 4.8 oz) 01/25/21 77.6 kg (171 lb 1.2 oz) 01/14/21 75.4 kg (166 lb 3.6 oz) 01/05/21 76.2 kg (168 lb) 12/31/20 76.5 kg (168 lb 9.6 oz) PHYSICAL EXAM: I had a face to face encounter and independently examined this patient on 03/15/21 as outlined below: 
 
General: WD, WN. Alert, aphasic, moving left-sided body, not right side EENT:  PERRL. Anicteric sclerae. MMM Neck:  No meningismus, no thyromegaly Resp:  CTA bilaterally, no wheezing or rales. No accessory muscle use CV:  Regular  rhythm,  No edema GI:  Soft, Non distended, Non tender. +Bowel sounds, no rebound LN:  No cervical or inguinal ANOOP Neurologic:  Alert, his eyes, not consistently tracking Aphasic, dense right hemiparesis Psych:   Not anxious nor agitated Skin:  No rashes. No jaundice Reviewed most current lab test results and cultures  YES Reviewed most current radiology test results   YES Review and summation of old records today    NO Reviewed patient's current orders and MAR    YES 
PMH/SH reviewed - no change compared to H&P 
________________________________________________________________________ Care Plan discussed with: 
  Comments Patient Family RN x Care Manager x Consultant     
                 x Multidiciplinary team rounds were held today with , nursing, pharmacist and clinical coordinator. Patient's plan of care was discussed; medications were reviewed and discharge planning was addressed. ________________________________________________________________________ Total Time: 26 mins Comments >50% of visit spent in counseling and coordination of care    
________________________________________________________________________ Vahe Cam MD  
 
Procedures: see electronic medical records for all procedures/Xrays and details which were not copied into this note but were reviewed prior to creation of Plan. LABS: 
I reviewed today's most current labs and imaging studies. Pertinent labs include: 
Recent Labs  
  03/16/21 
0248 03/15/21 
0401 03/14/21 
1852 WBC 6.0 9.4 9.3 HGB 11.8 12.3 13.0 HCT 36.0 37.6 39.5  177 190 Recent Labs  
  03/16/21 
0248 03/15/21 
0900 03/15/21 
0449 03/14/21 
1852 03/14/21 
1852 03/14/21 
1552 * 136 134*   < > 131* 129*  
K 3.8 4.6 4.2   < > 4.3 4.6  105 102   < > 98 99 CO2 22 26 25   < > 25 24 * 191* 176*   < > 196* 203* BUN 18 15 15   < > 17 17 CREA 0.78 1.04* 1.05*   < > 1.16* 1.03* CA 8.5 7.7* 8.1*   < > 8.6 8.6 MG 2.0  --   --   --  1.9  --   
PHOS 2.3*  --   --   --  3.7  --   
ALB 2.9*  --   --   --  3.4* 3.3* TBILI 0.9  --   --   --  1.5* 1.6* ALT 20  --   --   --  26 26 INR  --   --   --   --   --  1.1  
 < > = values in this interval not displayed.

## 2021-03-16 NOTE — PROGRESS NOTES
Problem: Falls - Risk of 
Goal: *Absence of Falls Description: Document Arnold Borrero Fall Risk and appropriate interventions in the flowsheet. Outcome: Progressing Towards Goal 
Note: Fall Risk Interventions: 
  
 
Mentation Interventions: Adequate sleep, hydration, pain control, Bed/chair exit alarm, Increase mobility, Reorient patient, Toileting rounds, Update white board Medication Interventions: Bed/chair exit alarm Elimination Interventions: Bed/chair exit alarm, Call light in reach, Patient to call for help with toileting needs, Toileting schedule/hourly rounds Problem: Patient Education: Go to Patient Education Activity Goal: Patient/Family Education Outcome: Progressing Towards Goal 
  
Problem: Pressure Injury - Risk of 
Goal: *Prevention of pressure injury Description: Document Landon Scale and appropriate interventions in the flowsheet. Outcome: Progressing Towards Goal 
Note: Pressure Injury Interventions: 
Sensory Interventions: Assess changes in LOC, Assess need for specialty bed, Avoid rigorous massage over bony prominences, Discuss PT/OT consult with provider, Float heels, Keep linens dry and wrinkle-free, Minimize linen layers, Monitor skin under medical devices, Pad between skin to skin, Pressure redistribution bed/mattress (bed type), Turn and reposition approx. every two hours (pillows and wedges if needed) Moisture Interventions: Absorbent underpads, Apply protective barrier, creams and emollients, Assess need for specialty bed, Check for incontinence Q2 hours and as needed, Internal/External urinary devices, Maintain skin hydration (lotion/cream), Minimize layers, Moisture barrier Activity Interventions: Assess need for specialty bed, Pressure redistribution bed/mattress(bed type), PT/OT evaluation Mobility Interventions: Assess need for specialty bed, Float heels, Pressure redistribution bed/mattress (bed type), PT/OT evaluation, Turn and reposition approx.  every two hours(pillow and wedges) Nutrition Interventions: Document food/fluid/supplement intake, Discuss nutritional consult with provider, Offer support with meals,snacks and hydration Friction and Shear Interventions: Apply protective barrier, creams and emollients, Foam dressings/transparent film/skin sealants, HOB 30 degrees or less, Lift sheet, Lift team/patient mobility team, Minimize layers Problem: Patient Education: Go to Patient Education Activity Goal: Patient/Family Education Outcome: Progressing Towards Goal 
  
Problem: Diabetes Self-Management Goal: *Disease process and treatment process Description: Define diabetes and identify own type of diabetes; list 3 options for treating diabetes. Outcome: Progressing Towards Goal 
Goal: *Incorporating nutritional management into lifestyle Description: Describe effect of type, amount and timing of food on blood glucose; list 3 methods for planning meals. Outcome: Progressing Towards Goal 
Goal: *Incorporating physical activity into lifestyle Description: State effect of exercise on blood glucose levels. Outcome: Progressing Towards Goal 
Goal: *Developing strategies to promote health/change behavior Description: Define the ABC's of diabetes; identify appropriate screenings, schedule and personal plan for screenings. Outcome: Progressing Towards Goal 
Goal: *Using medications safely Description: State effect of diabetes medications on diabetes; name diabetes medication taking, action and side effects. Outcome: Progressing Towards Goal 
Goal: *Monitoring blood glucose, interpreting and using results Description: Identify recommended blood glucose targets  and personal targets. Outcome: Progressing Towards Goal 
Goal: *Prevention, detection, treatment of acute complications Description: List symptoms of hyper- and hypoglycemia; describe how to treat low blood sugar and actions for lowering  high blood glucose level.  
Outcome: Progressing Towards Goal 
Goal: *Prevention, detection and treatment of chronic complications Description: Define the natural course of diabetes and describe the relationship of blood glucose levels to long term complications of diabetes. Outcome: Progressing Towards Goal 
Goal: *Developing strategies to address psychosocial issues Description: Describe feelings about living with diabetes; identify support needed and support network Outcome: Progressing Towards Goal 
Goal: *Insulin pump training Outcome: Progressing Towards Goal 
Goal: *Sick day guidelines Outcome: Progressing Towards Goal 
Goal: *Patient Specific Goal (EDIT GOAL, INSERT TEXT) Outcome: Progressing Towards Goal 
  
Problem: Patient Education: Go to Patient Education Activity Goal: Patient/Family Education Outcome: Progressing Towards Goal 
  
Problem: Delirium Treatment Goal: *Level of consciousness restored to baseline Outcome: Progressing Towards Goal 
Goal: *Level of environmental perceptions restored to baseline Outcome: Progressing Towards Goal 
Goal: *Sensory perception restored to baseline Outcome: Progressing Towards Goal 
Goal: *Emotional stability restored to baseline Outcome: Progressing Towards Goal 
Goal: *Functional assessment restored to baseline Outcome: Progressing Towards Goal 
Goal: *Absence of falls Outcome: Progressing Towards Goal 
Goal: *Will remain free of delirium, CAM Score negative Outcome: Progressing Towards Goal 
Goal: *Cognitive status will be restored to baseline Outcome: Progressing Towards Goal 
Goal: Interventions Outcome: Progressing Towards Goal 
  
Problem: Patient Education: Go to Patient Education Activity Goal: Patient/Family Education Outcome: Progressing Towards Goal 
  
Problem: Patient Education: Go to Patient Education Activity Goal: Patient/Family Education Outcome: Progressing Towards Goal 
  
Problem: TIA/CVA Stroke: 0-24 hours Goal: Off Pathway (Use only if patient is Off Pathway) Outcome: Progressing Towards Goal 
Goal: Activity/Safety Outcome: Progressing Towards Goal 
Goal: Consults, if ordered Outcome: Progressing Towards Goal 
Goal: Diagnostic Test/Procedures Outcome: Progressing Towards Goal 
Goal: Nutrition/Diet Outcome: Progressing Towards Goal 
Goal: Discharge Planning Outcome: Progressing Towards Goal 
Goal: Medications Outcome: Progressing Towards Goal 
Goal: Respiratory Outcome: Progressing Towards Goal 
Goal: Treatments/Interventions/Procedures Outcome: Progressing Towards Goal 
Goal: Minimize risk of bleeding post-thrombolytic infusion Outcome: Progressing Towards Goal 
Goal: Monitor for complications post-thrombolytic infusion Outcome: Progressing Towards Goal 
Goal: Psychosocial 
Outcome: Progressing Towards Goal 
Goal: *Hemodynamically stable Outcome: Progressing Towards Goal 
Goal: *Neurologically stable Description: Absence of additional neurological deficits Outcome: Progressing Towards Goal 
Goal: *Verbalizes anxiety and depression are reduced or absent Outcome: Progressing Towards Goal 
Goal: *Absence of Signs of Aspiration on Current Diet Outcome: Progressing Towards Goal 
Goal: *Absence of deep venous thrombosis signs and symptoms(Stroke Metric) Outcome: Progressing Towards Goal 
Goal: *Ability to perform ADLs and demonstrates progressive mobility and function Outcome: Progressing Towards Goal 
Goal: *Stroke education started(Stroke Metric) Outcome: Progressing Towards Goal 
Goal: *Dysphagia screen performed(Stroke Metric) Outcome: Progressing Towards Goal 
Goal: *Rehab consulted(Stroke Metric) Outcome: Progressing Towards Goal 
  
Problem: TIA/CVA Stroke: Day 2 Until Discharge Goal: Off Pathway (Use only if patient is Off Pathway) Outcome: Progressing Towards Goal 
Goal: Activity/Safety Outcome: Progressing Towards Goal 
Goal: Diagnostic Test/Procedures Outcome: Progressing Towards Goal 
Goal: Nutrition/Diet Outcome: Progressing Towards Goal 
Goal: Discharge Planning Outcome: Progressing Towards Goal 
Goal: Medications Outcome: Progressing Towards Goal 
Goal: Respiratory Outcome: Progressing Towards Goal 
Goal: Treatments/Interventions/Procedures Outcome: Progressing Towards Goal 
Goal: Psychosocial 
Outcome: Progressing Towards Goal 
Goal: *Verbalizes anxiety and depression are reduced or absent Outcome: Progressing Towards Goal 
Goal: *Absence of aspiration Outcome: Progressing Towards Goal 
Goal: *Absence of deep venous thrombosis signs and symptoms(Stroke Metric) Outcome: Progressing Towards Goal 
Goal: *Optimal pain control at patient's stated goal 
Outcome: Progressing Towards Goal 
Goal: *Tolerating diet Outcome: Progressing Towards Goal 
Goal: *Ability to perform ADLs and demonstrates progressive mobility and function Outcome: Progressing Towards Goal 
Goal: *Stroke education continued(Stroke Metric) Outcome: Progressing Towards Goal 
  
Problem: Ischemic Stroke: Discharge Outcomes Goal: *Verbalizes anxiety and depression are reduced or absent Outcome: Progressing Towards Goal 
Goal: *Verbalize understanding of risk factor modification(Stroke Metric) Outcome: Progressing Towards Goal 
Goal: *Hemodynamically stable Outcome: Progressing Towards Goal 
Goal: *Absence of aspiration pneumonia Outcome: Progressing Towards Goal 
Goal: *Aware of needed dietary changes Outcome: Progressing Towards Goal 
Goal: *Verbalize understanding of prescribed medications including anti-coagulants, anti-lipid, and/or anti-platelets(Stroke Metric) Outcome: Progressing Towards Goal 
Goal: *Tolerating diet Outcome: Progressing Towards Goal 
Goal: *Aware of follow-up diagnostics related to anticoagulants Outcome: Progressing Towards Goal 
Goal: *Ability to perform ADLs and demonstrates progressive mobility and function Outcome: Progressing Towards Goal 
Goal: *Absence of DVT(Stroke Metric) Outcome: Progressing Towards Goal 
Goal: *Absence of aspiration Outcome: Progressing Towards Goal 
Goal: *Optimal pain control at patient's stated goal 
Outcome: Progressing Towards Goal 
Goal: *Home safety concerns addressed Outcome: Progressing Towards Goal 
Goal: *Describes available resources and support systems Outcome: Progressing Towards Goal 
Goal: *Verbalizes understanding of activation of EMS(911) for stroke symptoms(Stroke Metric) Outcome: Progressing Towards Goal 
Goal: *Understands and describes signs and symptoms to report to providers(Stroke Metric) Outcome: Progressing Towards Goal 
Goal: *Neurolgocially stable (absence of additional neurological deficits) Outcome: Progressing Towards Goal 
Goal: *Verbalizes importance of follow-up with primary care physician(Stroke Metric) Outcome: Progressing Towards Goal 
Goal: *Smoking cessation discussed,if applicable(Stroke Metric) Outcome: Progressing Towards Goal 
Goal: *Depression screening completed(Stroke Metric) Outcome: Progressing Towards Goal

## 2021-03-17 NOTE — PROGRESS NOTES
End of Shift Note 
  Bedside shift change report given to Jeffrey Olivares RN (oncoming nurse) by Abundio Grewal RN (offgoing nurse). Report included the following information SBAR, Kardex, MAR, Cardiac Rhythm paced/ A.fib and Alarm Parameters  
  
Shift worked: 7p-7a Shift summary and any significant changes:  
   No changes 
  
   
Concerns for physician to address: n\a Zone phone for oncoming shift:   [phone]  
  
Patient Information Lucien Ramirez 72 y.o. 
3/14/2021  3:39 PM by Aguila Johns MD. Lucien Ramirez was admitted from Home 
  
Problem List 
    
Patient Active Problem List  
  Diagnosis Date Noted  Acute ischemic stroke ([address] 03/14/2021  ARF (acute renal failure) ([address] 01/30/2021  Lactic acid acidosis 01/30/2021  Small cell lung cancer, right upper lobe ([address] 01/30/2021  Lung mass 01/22/2021  ASHD (arteriosclerotic heart disease) 05/31/2019  Respiratory failure with hypoxia ([address] 03/25/2019  S/P rotator cuff repair 09/19/2018  PAD (peripheral artery disease) ([address] 05/22/2018  A-fib ([address] 03/30/2018  Type 2 diabetes mellitus with nephropathy ([address] 12/27/2017  CKD (chronic kidney disease) stage 3, GFR 30-59 ml/min 09/22/2017  Chronic systolic HF (heart failure) ([address] 05/10/2017  History of Clostridium difficile infection 05/10/2017  Junctional tachycardia ([address] 05/10/2017  Hypotension 05/10/2017  S/P ablation of atrial fibrillation 05/02/2017  Fear associated with illness and body function 04/07/2017  Counseling regarding advanced care planning and goals of care 04/07/2017  Systolic CHF, acute ([address] 04/06/2017  Acute systolic CHF (congestive heart failure) ([address] 04/06/2017  CHF (congestive heart failure) ([address] 04/04/2017  Type 2 diabetes mellitus with diabetic neuropathy, without long-term current use of insulin ([address] 01/31/2017  GIB (gastrointestinal bleeding) 04/12/2016  Diastolic CHF, acute on chronic (HCC) 09/22/2014  S/P coronary artery stent placement 07/04/2014  Back pain 11/14/2012  Sinoatrial node dysfunction ([address] 07/05/2012  Cardiac pacemaker in situ 07/05/2012  Mixed hyperlipidemia 07/05/2012  Chest pain 09/21/2011  Sick sinus syndrome ([address] 02/25/2011  S/P angioplasty with stent 02/07/2011  Hypokalemia 07/20/2010  Hip pain 06/08/2010  Benign hypertensive heart and CKD, stage 3 (GFR 30-59), w CHF ([address] 06/02/2010  Atrial fibrillation--paroxysmal 06/02/2010  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  
  
    
Past Medical History:  
Diagnosis Date  Arthritis    
  left shoulder  Atrial fibrillation ([facility] 6/2/2010  
  Dr. Eli Fink  CAD (coronary artery disease)    
  stent; Dr. Eli Fink  Cancer ([facility]    
  lung  Celiac disease    
 Chronic diastolic heart failure ([facility] 09/22/2014  
  Dr. Eli Fink  Chronic kidney disease    
  per cardio note  Chronic pain    
  left thigh:  L SFA intervention by Dr. Carlo Garnica 07/2018, as of 9/6/18:  still causing pain, pt to have MILLA checked per Dr. Carlo Garnica note  Chronic systolic HF (heart failure) ([facility] 5/10/2017  
  4/2017 EF 25-30%  Congestive heart failure (CHF) ([facility])    
 COPD (chronic obstructive pulmonary disease) ([facility] 6/2/2010  
  Dr. Katalina Sánchez  Diabetes ([facility]    
  Dr. Sander Porter; no current medication per pt  Emphysema, unspecified ([facility]    
  Dr. Katalina Sánchez  Fibroid    
  PT STATES NOT HAVING FIBROIDS  Frequent falls 2017  Gout    
 Heart failure [facility])    
  Dr. Eli Fink  History of Clostridium difficile infection 5/10/2017  
  4/2017 CDiff positive  Hypertension 6/2/2010  
  Dr. Katerina Welch  Hypertensive heart and chronic kidney disease    
  per PCP note  Hypotension 5/10/2017  Junctional tachycardia ([facility] 5/10/2017  
  Dr. Eli Fink  Neuropathy    
 NIDDM (non-insulin dependent diabetes mellitus) 6/2/2010  PAD (peripheral artery disease) (HCC)    
 S/P ablation of atrial fibrillation 03/2018  Screening mammogram 5/4/10  
 SOB (shortness of breath) 09/22/2014  
  Dr. Shun Waldrop  SSS (sick sinus syndrome) ([facility]    
  per pacemaker form 9/6/18  Weakness    
  per pt weakness from c-diff 2017, mulitple falls with injury to rotator cuff  
  
  
Core Measures: CVA: Yes CHF:No 
PNA:No Not applicable 
  
Activity: 
Activity Level: Bed Rest 
Number times ambulated in hallways past shift: 0 Number of times OOB to chair past shift: 0 
  
Cardiac:  
Cardiac Monitoring: Yes     
Cardiac Rhythm: Paced, Sinus tachycardia 
  
Access:  
Current line(s): PIV Central Line? Not applicable PICC LINE? No Placement date Reason Medically Necessary  
  
Genitourinary:  
Urinary status: incontinent Urinary Catheter? Not applicable Placement Date  Reason Medically Necessary  
  
Respiratory:  
O2 Device: Nasal cannula Chronic home O2 use?: NO Incentive spirometer at bedside: NO 
  
GI: 
Current diet:  DIET NPO Passing flatus: YES Tolerating current diet: YES 
  
Pain Management:  
Patient states pain is manageable on current regimen: YES 
  
Skin: 
Landon Score: 11 Interventions: turn team, speciality bed, float heels, limit briefs and internal/external urinary devices Patient Safety: 
Fall Score: Total Score: 3 Interventions: bed/chair alarm, gripper socks and pt to call before getting OOB High Fall Risk: Yes 
  
DVT prophylaxis: DVT prophylaxis Med- Yes DVT prophylaxis SCD or JULIANA- Yes  
  
Wounds: (If Applicable) Wounds- No 
Location  
  
Active Consults: 
IP CONSULT TO NEUROLOGY 
IP CONSULT TO PALLIATIVE CARE - PROVIDER 
  
Length of Stay: 
Expected LOS: 4d 9h 
Actual LOS: 3D Discharge Plan:  Yes

## 2021-03-17 NOTE — PROGRESS NOTES
Hospitalist Progress Note NAME: Mini Key :  [id number] MRN:  [id number] Admit date: 3/14/2021 Today's date: 21 PCP: Talon Lofton MD  
 
Admitted to ICU 3/14/2021 with large left CALE and MCA stroke Currently aphasic, dense right hemiparesis Followed by neurology and the ICU team 
Palliative care and hospice working with patient, s/p family meeting set up for 3/16/2021 & again today per [facility] Looking into DC home with hospice and comfort care versus IP hospice if not able to get home Transferred out of ICU 3/15/2021 Assessment / Plan: 
Left CALE and MCA territory stroke POA BilateraL  ICA stenosis > 90% POA Found unresponsive at home last known well was the evening before Brought to emergency room Admit head CT scan Large acute left MCA and CALE infarcts Local mass effect but no midline shift or herniation and no hemorrhage. CTA with greater 90% right internal carotid artery stenosis Greater than 90% left internal carotid stenosis Repeat head CT scan 3/15 with further evolution of the left hemispheric infarct Increased mass-effect but no evidence of hemorrhagic transformation Opens eyes, dense right hemiparesis, aphasic Seen by Dr. Cher Malik from neurology NG tube in place Currently on aspirin, Eliquis, Lipitor Prognosis guarded, 
Transition to home with hospice as able otherwise Ip Hospice-- pending Hospice Family meeting today afternoon with Hospice/Palliative team 
 
Small cell lung carcinoma RLL POA diagnosed 2021 T4 N0 
COPD Focal pleural involvement PET Scan with no metastatic disease 
 RLL wedge resection, 2021 Adjuvant chemotherapy with Dr. Flor Welch ongoing Carboplatin + Etoposide Plan to keep comfort care now Acute respiratory failure with hypoxia POA currently on 6 liters 
stable sats will wean as tolerated Chronic systolic CHF LVEF 25 to 52%-  
Permanent atrial fibrillation S/P AV pacer Hyperlipidemia Essential HTN. Continue aspirin, Eliquis, Lipitor for now Plan is to comfort care now Body mass index is 25.63 kg/m². Code status: DNR/DNI now Prophylaxis: eliquis Recommended Disposition: home with hospice? ? Versus IP hospice - TBD Subjective: Chief Complaint / Reason for Physician Visit follow-up left hemispheric stroke Transferred out of ICU 3/15/2021 Opens eyes, spontaneously moving left-sided of body Aphasic, not talking at all, not able to move right-sided body Not able to provide any history Review of Systems: 
Symptom Y/N Comments  Symptom Y/N Comments Fever/Chills    Chest Pain Poor Appetite    Edema Cough    Abdominal Pain Sputum    Joint Pain SOB/[place]    Headache Nausea/vomit    Tolerating PT/OT Diarrhea    Tolerating Diet Constipation    Other Could NOT obtain due to:  Aphasia Objective: VITALS:  
Last 24hrs VS reviewed since prior progress note. Most recent are: 
Patient Vitals for the past 24 hrs: 
 Temp Pulse Resp BP SpO2  
03/17/21 1216 98.6 °F (37 °C) 90 20 133/76 100 % 03/17/21 1200  98     
03/17/21 0801 98.7 °F (37.1 °C) 96 20 134/71 100 % 03/17/21 0348 99.2 °F (37.3 °C) 78 12 136/63 99 % 03/16/21 2345 98.9 °F (37.2 °C) 84 12 122/61 99 % 03/16/21 2000 97.5 °F (36.4 °C) 99 20 108/70 100 % 03/16/21 1600  98     
03/16/21 1530 97.6 °F (36.4 °C) 94 20 124/78 94 % Wt Readings from Last 12 Encounters:  
03/17/21 72 kg (158 lb 12.8 oz) 03/11/21 75.8 kg (167 lb)  
03/11/21 76.2 kg (168 lb)  
03/10/21 74.6 kg (164 lb 8 oz) 03/09/21 76.3 kg (168 lb 4.8 oz) 03/05/21 77.1 kg (170 lb)  
02/25/21 75.8 kg (167 lb) 02/01/21 79.5 kg (175 lb 4.8 oz) 01/25/21 77.6 kg (171 lb 1.2 oz) 01/14/21 75.4 kg (166 lb 3.6 oz) 01/05/21 76.2 kg (168 lb) 12/31/20 76.5 kg (168 lb 9.6 oz) PHYSICAL EXAM: 
 
I had a face to face encounter and independently examined this patient on 03/15/21 as outlined below: 
 
General: WD, WN. Alert, aphasic, moving left-sided body, not right side EENT:  PERRL. Anicteric sclerae. MMM Neck:  No meningismus, no thyromegaly Resp:  CTA bilaterally, no wheezing or rales. No accessory muscle use CV:  Regular  rhythm,  No edema GI:  Soft, Non distended, Non tender. +Bowel sounds, no rebound LN:  No cervical or inguinal ANOOP Neurologic:  Alert, his eyes, not consistently tracking Aphasic, dense right hemiparesis Psych:   Not anxious nor agitated Skin:  No rashes. No jaundice Reviewed most current lab test results and cultures  YES Reviewed most current radiology test results   YES Review and summation of old records today    NO Reviewed patient's current orders and MAR    YES 
PMH/SH reviewed - no change compared to H&P 
________________________________________________________________________ Care Plan discussed with: 
  Comments Patient Family  x At bedside yesterday RN x Care Manager x Consultant  x BS hospice team  
                 x Multidiciplinary team rounds were held today with , nursing, pharmacist and clinical coordinator. Patient's plan of care was discussed; medications were reviewed and discharge planning was addressed. ________________________________________________________________________ Total Time: 26 mins Comments >50% of visit spent in counseling and coordination of care    
________________________________________________________________________ Shayna Maharaj MD  
 
Procedures: see electronic medical records for all procedures/Xrays and details which were not copied into this note but were reviewed prior to creation of Plan. LABS: 
I reviewed today's most current labs and imaging studies. Pertinent labs include: 
Recent Labs  
  03/17/21 
0247 03/16/21 
0248 03/15/21 
0401 WBC 2.5* 6.0 9.4 HGB 11.0* 11.8 12.3 HCT 34.1* 36.0 37.6 * 158 177 Recent Labs  
  03/17/21 
0247 03/16/21 
0248 03/15/21 
0900 03/14/21 
1852 03/14/21 
1852 03/14/21 
1552 * 135* 136   < > 131* 129*  
K 3.7 3.8 4.6   < > 4.3 4.6  107 105   < > 98 99 CO2 24 22 26   < > 25 24 * 183* 191*   < > 196* 203* BUN 22* 18 15   < > 17 17 CREA 0.92 0.78 1.04*   < > 1.16* 1.03* CA 8.5 8.5 7.7*   < > 8.6 8.6 MG 2.1 2.0  --   --  1.9  --   
PHOS 2.5* 2.3*  --   --  3.7  --   
ALB 2.8* 2.9*  --   --  3.4* 3.3* TBILI 1.1* 0.9  --   --  1.5* 1.6* ALT 19 20  --   --  26 26 INR  --   --   --   --   --  1.1  
 < > = values in this interval not displayed.

## 2021-03-17 NOTE — PROGRESS NOTES
Problem: Falls - Risk of 
Goal: *Absence of Falls Description: Document Dashawn Gilliamn Fall Risk and appropriate interventions in the flowsheet. Outcome: Progressing Towards Goal 
Note: Fall Risk Interventions: 
  
 
Mentation Interventions: Adequate sleep, hydration, pain control, Bed/chair exit alarm, More frequent rounding, Increase mobility Medication Interventions: Bed/chair exit alarm, Patient to call before getting OOB, Teach patient to arise slowly Elimination Interventions: Bed/chair exit alarm, Call light in reach, Toileting schedule/hourly rounds History of Falls Interventions: Bed/chair exit alarm Problem: Patient Education: Go to Patient Education Activity Goal: Patient/Family Education Outcome: Progressing Towards Goal 
  
Problem: Pressure Injury - Risk of 
Goal: *Prevention of pressure injury Description: Document Landon Scale and appropriate interventions in the flowsheet. Outcome: Progressing Towards Goal 
Note: Pressure Injury Interventions: 
Sensory Interventions: Assess changes in LOC, Keep linens dry and wrinkle-free, Float heels, Minimize linen layers, Pressure redistribution bed/mattress (bed type), Suspension boots Moisture Interventions: Absorbent underpads, Apply protective barrier, creams and emollients, Internal/External urinary devices, Minimize layers, Moisture barrier Activity Interventions: PT/OT evaluation, Increase time out of bed Mobility Interventions: PT/OT evaluation, Pressure redistribution bed/mattress (bed type) Nutrition Interventions: Offer support with meals,snacks and hydration Friction and Shear Interventions: Lift sheet, Foam dressings/transparent film/skin sealants, Minimize layers Problem: Patient Education: Go to Patient Education Activity Goal: Patient/Family Education Outcome: Progressing Towards Goal 
  
Problem: Diabetes Self-Management Goal: *Disease process and treatment process Description: Define diabetes and identify own type of diabetes; list 3 options for treating diabetes. Outcome: Progressing Towards Goal 
Goal: *Incorporating nutritional management into lifestyle Description: Describe effect of type, amount and timing of food on blood glucose; list 3 methods for planning meals. Outcome: Progressing Towards Goal 
Goal: *Incorporating physical activity into lifestyle Description: State effect of exercise on blood glucose levels. Outcome: Progressing Towards Goal 
Goal: *Developing strategies to promote health/change behavior Description: Define the ABC's of diabetes; identify appropriate screenings, schedule and personal plan for screenings. Outcome: Progressing Towards Goal 
Goal: *Using medications safely Description: State effect of diabetes medications on diabetes; name diabetes medication taking, action and side effects. Outcome: Progressing Towards Goal 
Goal: *Monitoring blood glucose, interpreting and using results Description: Identify recommended blood glucose targets  and personal targets. Outcome: Progressing Towards Goal 
Goal: *Prevention, detection, treatment of acute complications Description: List symptoms of hyper- and hypoglycemia; describe how to treat low blood sugar and actions for lowering  high blood glucose level. Outcome: Progressing Towards Goal 
Goal: *Prevention, detection and treatment of chronic complications Description: Define the natural course of diabetes and describe the relationship of blood glucose levels to long term complications of diabetes. Outcome: Progressing Towards Goal 
Goal: *Developing strategies to address psychosocial issues Description: Describe feelings about living with diabetes; identify support needed and support network Outcome: Progressing Towards Goal 
Goal: *Insulin pump training Outcome: Progressing Towards Goal 
Goal: *Sick day guidelines Outcome: Progressing Towards Goal 
Goal: *Patient Specific Goal (EDIT GOAL, INSERT TEXT) Outcome: Progressing Towards Goal 
  
Problem: Patient Education: Go to Patient Education Activity Goal: Patient/Family Education Outcome: Progressing Towards Goal 
  
Problem: Delirium Treatment Goal: *Level of consciousness restored to baseline Outcome: Progressing Towards Goal 
Goal: *Level of environmental perceptions restored to baseline Outcome: Progressing Towards Goal 
Goal: *Sensory perception restored to baseline Outcome: Progressing Towards Goal 
Goal: *Emotional stability restored to baseline Outcome: Progressing Towards Goal 
Goal: *Functional assessment restored to baseline Outcome: Progressing Towards Goal 
Goal: *Absence of falls Outcome: Progressing Towards Goal 
Goal: *Will remain free of delirium, CAM Score negative Outcome: Progressing Towards Goal 
Goal: *Cognitive status will be restored to baseline Outcome: Progressing Towards Goal 
Goal: Interventions Outcome: Progressing Towards Goal 
  
Problem: Patient Education: Go to Patient Education Activity Goal: Patient/Family Education Outcome: Progressing Towards Goal 
  
Problem: Patient Education: Go to Patient Education Activity Goal: Patient/Family Education Outcome: Progressing Towards Goal 
  
Problem: TIA/CVA Stroke: 0-24 hours Goal: Off Pathway (Use only if patient is Off Pathway) Outcome: Progressing Towards Goal 
Goal: Activity/Safety Outcome: Progressing Towards Goal 
Goal: Consults, if ordered Outcome: Progressing Towards Goal 
Goal: Diagnostic Test/Procedures Outcome: Progressing Towards Goal 
Goal: Nutrition/Diet Outcome: Progressing Towards Goal 
Goal: Discharge Planning Outcome: Progressing Towards Goal 
Goal: Medications Outcome: Progressing Towards Goal 
Goal: Respiratory Outcome: Progressing Towards Goal 
Goal: Treatments/Interventions/Procedures Outcome: Progressing Towards Goal 
Goal: Minimize risk of bleeding post-thrombolytic infusion Outcome: Progressing Towards Goal 
Goal: Monitor for complications post-thrombolytic infusion Outcome: Progressing Towards Goal 
Goal: Psychosocial 
Outcome: Progressing Towards Goal 
Goal: *Hemodynamically stable Outcome: Progressing Towards Goal 
Goal: *Neurologically stable Description: Absence of additional neurological deficits Outcome: Progressing Towards Goal 
Goal: *Verbalizes anxiety and depression are reduced or absent Outcome: Progressing Towards Goal 
Goal: *Absence of Signs of Aspiration on Current Diet Outcome: Progressing Towards Goal 
Goal: *Absence of deep venous thrombosis signs and symptoms(Stroke Metric) Outcome: Progressing Towards Goal 
Goal: *Ability to perform ADLs and demonstrates progressive mobility and function Outcome: Progressing Towards Goal 
Goal: *Stroke education started(Stroke Metric) Outcome: Progressing Towards Goal 
Goal: *Dysphagia screen performed(Stroke Metric) Outcome: Progressing Towards Goal 
Goal: *Rehab consulted(Stroke Metric) Outcome: Progressing Towards Goal 
  
Problem: TIA/CVA Stroke: Day 2 Until Discharge Goal: Off Pathway (Use only if patient is Off Pathway) Outcome: Progressing Towards Goal 
Goal: Activity/Safety Outcome: Progressing Towards Goal 
Goal: Diagnostic Test/Procedures Outcome: Progressing Towards Goal 
Goal: Nutrition/Diet Outcome: Progressing Towards Goal 
Goal: Discharge Planning Outcome: Progressing Towards Goal 
Goal: Medications Outcome: Progressing Towards Goal 
Goal: Respiratory Outcome: Progressing Towards Goal 
Goal: Treatments/Interventions/Procedures Outcome: Progressing Towards Goal 
Goal: Psychosocial 
Outcome: Progressing Towards Goal 
Goal: *Verbalizes anxiety and depression are reduced or absent Outcome: Progressing Towards Goal 
Goal: *Absence of aspiration Outcome: Progressing Towards Goal 
Goal: *Absence of deep venous thrombosis signs and symptoms(Stroke Metric) Outcome: Progressing Towards Goal 
Goal: *Optimal pain control at patient's stated goal 
Outcome: Progressing Towards Goal 
Goal: *Tolerating diet Outcome: Progressing Towards Goal 
Goal: *Ability to perform ADLs and demonstrates progressive mobility and function Outcome: Progressing Towards Goal 
Goal: *Stroke education continued(Stroke Metric) Outcome: Progressing Towards Goal 
  
Problem: Ischemic Stroke: Discharge Outcomes Goal: *Verbalizes anxiety and depression are reduced or absent Outcome: Progressing Towards Goal 
Goal: *Verbalize understanding of risk factor modification(Stroke Metric) Outcome: Progressing Towards Goal 
Goal: *Hemodynamically stable Outcome: Progressing Towards Goal 
Goal: *Absence of aspiration pneumonia Outcome: Progressing Towards Goal 
Goal: *Aware of needed dietary changes Outcome: Progressing Towards Goal 
Goal: *Verbalize understanding of prescribed medications including anti-coagulants, anti-lipid, and/or anti-platelets(Stroke Metric) Outcome: Progressing Towards Goal 
Goal: *Tolerating diet Outcome: Progressing Towards Goal 
Goal: *Aware of follow-up diagnostics related to anticoagulants Outcome: Progressing Towards Goal 
Goal: *Ability to perform ADLs and demonstrates progressive mobility and function Outcome: Progressing Towards Goal 
Goal: *Absence of DVT(Stroke Metric) Outcome: Progressing Towards Goal 
Goal: *Absence of aspiration Outcome: Progressing Towards Goal 
Goal: *Optimal pain control at patient's stated goal 
Outcome: Progressing Towards Goal 
Goal: *Home safety concerns addressed Outcome: Progressing Towards Goal 
Goal: *Describes available resources and support systems Outcome: Progressing Towards Goal 
Goal: *Verbalizes understanding of activation of EMS(911) for stroke symptoms(Stroke Metric) Outcome: Progressing Towards Goal 
Goal: *Understands and describes signs and symptoms to report to providers(Stroke Metric) Outcome: Progressing Towards Goal 
Goal: *Neurolgocially stable (absence of additional neurological deficits) Outcome: Progressing Towards Goal 
Goal: *Verbalizes importance of follow-up with primary care physician(Stroke Metric) Outcome: Progressing Towards Goal 
Goal: *Smoking cessation discussed,if applicable(Stroke Metric) Outcome: Progressing Towards Goal 
Goal: *Depression screening completed(Stroke Metric) Outcome: Progressing Towards Goal

## 2021-03-17 NOTE — PROGRESS NOTES
Physical Therapy Note Patient to transition to comfort care. Will complete PT orders at this time. Thank you, 
Daria SIMPSONT

## 2021-03-17 NOTE — PROGRESS NOTES
Problem: Falls - Risk of 
Goal: *Absence of Falls Description: Document Marisela Wade Fall Risk and appropriate interventions in the flowsheet. Outcome: Progressing Towards Goal 
Note: Fall Risk Interventions: 
  
 
Mentation Interventions: Adequate sleep, hydration, pain control, Bed/chair exit alarm, Increase mobility Medication Interventions: Bed/chair exit alarm, Patient to call before getting OOB, Teach patient to arise slowly Elimination Interventions: Bed/chair exit alarm, Call light in reach, Toileting schedule/hourly rounds History of Falls Interventions: Bed/chair exit alarm Problem: Patient Education: Go to Patient Education Activity Goal: Patient/Family Education Outcome: Progressing Towards Goal 
  
Problem: Pressure Injury - Risk of 
Goal: *Prevention of pressure injury Description: Document Landon Scale and appropriate interventions in the flowsheet. Outcome: Progressing Towards Goal 
Note: Pressure Injury Interventions: 
Sensory Interventions: Assess changes in LOC, Keep linens dry and wrinkle-free, Float heels, Minimize linen layers, Pressure redistribution bed/mattress (bed type), Suspension boots Moisture Interventions: Absorbent underpads, Apply protective barrier, creams and emollients, Internal/External urinary devices, Minimize layers, Moisture barrier Activity Interventions: PT/OT evaluation, Increase time out of bed Mobility Interventions: PT/OT evaluation, Pressure redistribution bed/mattress (bed type) Nutrition Interventions: Offer support with meals,snacks and hydration Friction and Shear Interventions: Lift sheet, Foam dressings/transparent film/skin sealants, Minimize layers Problem: Pressure Injury - Risk of 
Goal: *Prevention of pressure injury Description: Document Landon Scale and appropriate interventions in the flowsheet.  
Outcome: Progressing Towards Goal 
Note: Pressure Injury Interventions: 
Sensory Interventions: Assess changes in LOC, Keep linens dry and wrinkle-free, Float heels, Minimize linen layers, Pressure redistribution bed/mattress (bed type), Suspension boots Moisture Interventions: Absorbent underpads, Apply protective barrier, creams and emollients, Internal/External urinary devices, Minimize layers, Moisture barrier Activity Interventions: PT/OT evaluation, Increase time out of bed Mobility Interventions: PT/OT evaluation, Pressure redistribution bed/mattress (bed type) Nutrition Interventions: Offer support with meals,snacks and hydration Friction and Shear Interventions: Lift sheet, Foam dressings/transparent film/skin sealants, Minimize layers Problem: Patient Education: Go to Patient Education Activity Goal: Patient/Family Education Outcome: Progressing Towards Goal 
  
Problem: Diabetes Self-Management Goal: *Disease process and treatment process Description: Define diabetes and identify own type of diabetes; list 3 options for treating diabetes. Outcome: Progressing Towards Goal 
Goal: *Incorporating nutritional management into lifestyle Description: Describe effect of type, amount and timing of food on blood glucose; list 3 methods for planning meals. Outcome: Progressing Towards Goal 
Goal: *Incorporating physical activity into lifestyle Description: State effect of exercise on blood glucose levels. Outcome: Progressing Towards Goal 
Goal: *Developing strategies to promote health/change behavior Description: Define the ABC's of diabetes; identify appropriate screenings, schedule and personal plan for screenings. Outcome: Progressing Towards Goal 
Goal: *Using medications safely Description: State effect of diabetes medications on diabetes; name diabetes medication taking, action and side effects. Outcome: Progressing Towards Goal 
Goal: *Monitoring blood glucose, interpreting and using results Description: Identify recommended blood glucose targets  and personal targets. Outcome: Progressing Towards Goal 
Goal: *Prevention, detection, treatment of acute complications Description: List symptoms of hyper- and hypoglycemia; describe how to treat low blood sugar and actions for lowering  high blood glucose level. Outcome: Progressing Towards Goal 
Goal: *Prevention, detection and treatment of chronic complications Description: Define the natural course of diabetes and describe the relationship of blood glucose levels to long term complications of diabetes. Outcome: Progressing Towards Goal 
Goal: *Developing strategies to address psychosocial issues Description: Describe feelings about living with diabetes; identify support needed and support network Outcome: Progressing Towards Goal 
Goal: *Insulin pump training Outcome: Progressing Towards Goal 
Goal: *Sick day guidelines Outcome: Progressing Towards Goal 
Goal: *Patient Specific Goal (EDIT GOAL, INSERT TEXT) Outcome: Progressing Towards Goal 
  
Problem: Patient Education: Go to Patient Education Activity Goal: Patient/Family Education Outcome: Progressing Towards Goal 
  
Problem: Delirium Treatment Goal: *Level of consciousness restored to baseline Outcome: Progressing Towards Goal 
Goal: *Level of environmental perceptions restored to baseline Outcome: Progressing Towards Goal 
Goal: *Sensory perception restored to baseline Outcome: Progressing Towards Goal 
Goal: *Emotional stability restored to baseline Outcome: Progressing Towards Goal 
Goal: *Functional assessment restored to baseline Outcome: Progressing Towards Goal 
Goal: *Absence of falls Outcome: Progressing Towards Goal 
Goal: *Will remain free of delirium, CAM Score negative Outcome: Progressing Towards Goal 
Goal: *Cognitive status will be restored to baseline Outcome: Progressing Towards Goal 
Goal: Interventions Outcome: Progressing Towards Goal 
  
Problem: Patient Education: Go to Patient Education Activity Goal: Patient/Family Education Outcome: Progressing Towards Goal 
  
Problem: Patient Education: Go to Patient Education Activity Goal: Patient/Family Education Outcome: Progressing Towards Goal 
  
Problem: TIA/CVA Stroke: 0-24 hours Goal: Off Pathway (Use only if patient is Off Pathway) Outcome: Progressing Towards Goal 
Goal: Activity/Safety Outcome: Progressing Towards Goal 
Goal: Consults, if ordered Outcome: Progressing Towards Goal 
Goal: Diagnostic Test/Procedures Outcome: Progressing Towards Goal 
Goal: Nutrition/Diet Outcome: Progressing Towards Goal 
Goal: Discharge Planning Outcome: Progressing Towards Goal 
Goal: Medications Outcome: Progressing Towards Goal 
Goal: Respiratory Outcome: Progressing Towards Goal 
Goal: Treatments/Interventions/Procedures Outcome: Progressing Towards Goal 
Goal: Minimize risk of bleeding post-thrombolytic infusion Outcome: Progressing Towards Goal 
Goal: Monitor for complications post-thrombolytic infusion Outcome: Progressing Towards Goal 
Goal: Psychosocial 
Outcome: Progressing Towards Goal 
Goal: *Hemodynamically stable Outcome: Progressing Towards Goal 
Goal: *Neurologically stable Description: Absence of additional neurological deficits Outcome: Progressing Towards Goal 
Goal: *Verbalizes anxiety and depression are reduced or absent Outcome: Progressing Towards Goal 
Goal: *Absence of Signs of Aspiration on Current Diet Outcome: Progressing Towards Goal 
Goal: *Absence of deep venous thrombosis signs and symptoms(Stroke Metric) Outcome: Progressing Towards Goal 
Goal: *Ability to perform ADLs and demonstrates progressive mobility and function Outcome: Progressing Towards Goal 
Goal: *Stroke education started(Stroke Metric) Outcome: Progressing Towards Goal 
Goal: *Dysphagia screen performed(Stroke Metric) Outcome: Progressing Towards Goal 
Goal: *Rehab consulted(Stroke Metric) Outcome: Progressing Towards Goal 
  
Problem: TIA/CVA Stroke: Day 2 Until Discharge Goal: Off Pathway (Use only if patient is Off Pathway) Outcome: Progressing Towards Goal 
Goal: Activity/Safety Outcome: Progressing Towards Goal 
Goal: Diagnostic Test/Procedures Outcome: Progressing Towards Goal 
Goal: Nutrition/Diet Outcome: Progressing Towards Goal 
Goal: Discharge Planning Outcome: Progressing Towards Goal 
Goal: Medications Outcome: Progressing Towards Goal 
Goal: Respiratory Outcome: Progressing Towards Goal 
Goal: Treatments/Interventions/Procedures Outcome: Progressing Towards Goal 
Goal: Psychosocial 
Outcome: Progressing Towards Goal 
Goal: *Verbalizes anxiety and depression are reduced or absent Outcome: Progressing Towards Goal 
Goal: *Absence of aspiration Outcome: Progressing Towards Goal 
Goal: *Absence of deep venous thrombosis signs and symptoms(Stroke Metric) Outcome: Progressing Towards Goal 
Goal: *Optimal pain control at patient's stated goal 
Outcome: Progressing Towards Goal 
Goal: *Tolerating diet Outcome: Progressing Towards Goal 
Goal: *Ability to perform ADLs and demonstrates progressive mobility and function Outcome: Progressing Towards Goal 
Goal: *Stroke education continued(Stroke Metric) Outcome: Progressing Towards Goal 
  
Problem: Ischemic Stroke: Discharge Outcomes Goal: *Verbalizes anxiety and depression are reduced or absent Outcome: Progressing Towards Goal 
Goal: *Verbalize understanding of risk factor modification(Stroke Metric) Outcome: Progressing Towards Goal 
Goal: *Hemodynamically stable Outcome: Progressing Towards Goal 
Goal: *Absence of aspiration pneumonia Outcome: Progressing Towards Goal 
Goal: *Aware of needed dietary changes Outcome: Progressing Towards Goal 
Goal: *Verbalize understanding of prescribed medications including anti-coagulants, anti-lipid, and/or anti-platelets(Stroke Metric) Outcome: Progressing Towards Goal 
Goal: *Tolerating diet Outcome: Progressing Towards Goal 
Goal: *Aware of follow-up diagnostics related to anticoagulants Outcome: Progressing Towards Goal 
Goal: *Ability to perform ADLs and demonstrates progressive mobility and function Outcome: Progressing Towards Goal 
Goal: *Absence of DVT(Stroke Metric) Outcome: Progressing Towards Goal 
Goal: *Absence of aspiration Outcome: Progressing Towards Goal 
Goal: *Optimal pain control at patient's stated goal 
Outcome: Progressing Towards Goal 
Goal: *Home safety concerns addressed Outcome: Progressing Towards Goal 
Goal: *Describes available resources and support systems Outcome: Progressing Towards Goal 
Goal: *Verbalizes understanding of activation of EMS(911) for stroke symptoms(Stroke Metric) Outcome: Progressing Towards Goal 
Goal: *Understands and describes signs and symptoms to report to providers(Stroke Metric) Outcome: Progressing Towards Goal 
Goal: *Neurolgocially stable (absence of additional neurological deficits) Outcome: Progressing Towards Goal 
Goal: *Verbalizes importance of follow-up with primary care physician(Stroke Metric) Outcome: Progressing Towards Goal 
Goal: *Smoking cessation discussed,if applicable(Stroke Metric) Outcome: Progressing Towards Goal 
Goal: *Depression screening completed(Stroke Metric) Outcome: Progressing Towards Goal

## 2021-03-17 NOTE — PROGRESS NOTES
[address] 35 March 17, 2021 RE: Gloria Ba To Whom It May Concern, This is to certify that Gloria Ba is admitted to the [facility] on 3/14/2021 into ICU bed for a catastrophic stroke and is currently in hospital under comfort measures being considered for home hospice for her grave prognosis. Kindly make consideration for the above as able. Please feel free to contact my office if you have any questions or concerns. Thank you for your assistance in this matter.  
 
 
Sincerely, 
Jose Isaac MD

## 2021-03-17 NOTE — HOSPICE
[address] [facility] RN note:  T/C to daughter Migue who states that she is waiting to talk to the MD re date of discharge. Migue plans to be at the hospital around 12:30 tomorrow. [id number] [facility] will plan to meet with Migue in pt room and finalize plans for discharge. Thank you for the opportunity to care for this pt and family. Please contact hospice at [phone] with any questions or concerns.

## 2021-03-17 NOTE — HOSPICE
[facility] Good Help to Those in Need 
[phone] Patient Name: Paul Tay YOB: 1950 Age: 72 y.o. [facility] Liaison Note: Follow up with patient, family, primary RN, & CM. Chart reviewed. Stepped in to see patient and granddaughter was at bedside, her daughter was on the phone. I inquired if they were ready to take her home tomorrow and they reported they were waiting to speak to the doctor tomorrow. Thank you for the opportunity to service MS. Kyle Patten  and family. We will continue to follow.

## 2021-03-18 NOTE — PROGRESS NOTES
Nutrition: Chart reviewed for follow up. Noted plans to discharge with hospice services. Aggressive nutrition interventions not desired or indicated. RD available by consult if needs arise. Thanks.  
Yousif Mullins, MARÍA

## 2021-03-18 NOTE — PROGRESS NOTES
Bedside shift change report given to [address] [place] [address]) by Yennifer Myrick RN (offgoing nurse).  Report included the following information SBAR, Kardex, MAR, Cardiac Rhythm paced/ A.fib and Alarm Parameters  
  
Shift worked: 7p-7a Shift summary and any significant changes:  
   hospice nurse and will be discharge home tomorrow 
  
   
Concerns for physician to address: none Zone phone for oncoming shift:   [phone]  
  
Patient Information Getachew Lujan 72 y.o. 
3/14/2021  3:39 PM by Christal Mccracken MD. Celine Smith was admitted from Home 
  
Problem List 
       
Patient Active Problem List  
  Diagnosis Date Noted  Acute ischemic stroke ([address] 03/14/2021  ARF (acute renal failure) ([address] 01/30/2021  Lactic acid acidosis 01/30/2021  Small cell lung cancer, right upper lobe ([address] 01/30/2021  Lung mass 01/22/2021  ASHD (arteriosclerotic heart disease) 05/31/2019  Respiratory failure with hypoxia ([address] 03/25/2019  S/P rotator cuff repair 09/19/2018  PAD (peripheral artery disease) ([address] 05/22/2018  A-fib ([address] 03/30/2018  Type 2 diabetes mellitus with nephropathy ([address] 12/27/2017  CKD (chronic kidney disease) stage 3, GFR 30-59 ml/min 09/22/2017  Chronic systolic HF (heart failure) ([address] 05/10/2017  History of Clostridium difficile infection 05/10/2017  Junctional tachycardia ([address] 05/10/2017  Hypotension 05/10/2017  S/P ablation of atrial fibrillation 05/02/2017  Fear associated with illness and body function 04/07/2017  Counseling regarding advanced care planning and goals of care 04/07/2017  Systolic CHF, acute ([address] 04/06/2017  Acute systolic CHF (congestive heart failure) ([address] 04/06/2017  CHF (congestive heart failure) ([address] 04/04/2017  Type 2 diabetes mellitus with diabetic neuropathy, without long-term current use of insulin ([address] 01/31/2017  GIB (gastrointestinal bleeding) 04/12/2016  Diastolic CHF, acute on chronic (HCC) 09/22/2014  S/P coronary artery stent placement 07/04/2014  Back pain 11/14/2012  Sinoatrial node dysfunction ([address] 07/05/2012  Cardiac pacemaker in situ 07/05/2012  Mixed hyperlipidemia 07/05/2012  Chest pain 09/21/2011  Sick sinus syndrome ([address] 02/25/2011  S/P angioplasty with stent 02/07/2011  Hypokalemia 07/20/2010  Hip pain 06/08/2010  Benign hypertensive heart and CKD, stage 3 (GFR 30-59), w CHF ([address] 06/02/2010  Atrial fibrillation--paroxysmal 06/02/2010  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  
  
       
Past Medical History:  
Diagnosis Date  Arthritis    
  left shoulder  Atrial fibrillation ([address] 6/2/2010  
  Dr. Eli Fink  CAD (coronary artery disease)    
  stent; Dr. Eli Fink  Cancer ([facility]    
  lung  Celiac disease    
 Chronic diastolic heart failure ([facility] 09/22/2014  
  Dr. Eli Fink  Chronic kidney disease    
  per cardio note  Chronic pain    
  left thigh:  L SFA intervention by Dr. Carlo Garnica 07/2018, as of 9/6/18: Ashlee Barron causing pain, pt to have MILLA checked per Dr. Carlo Garnica note  Chronic systolic HF (heart failure) ([facility] 5/10/2017  
  4/2017 EF 25-30%  Congestive heart failure (CHF) (HCC)    
 COPD (chronic obstructive pulmonary disease) ([facility] 6/2/2010  
  Dr. Katalina Sánchez  Diabetes [facility]    
  Dr. Sander Porter; no current medication per pt  Emphysema, unspecified ([facility]    
  Dr. Katalina Sánchez  Fibroid    
  PT STATES NOT HAVING FIBROIDS  Frequent falls 2017  Gout    
 Heart failure [facility])    
  Dr. Eli Fink  History of Clostridium difficile infection 5/10/2017  
  4/2017 CDiff positive  Hypertension 6/2/2010  
  Dr. Katerina Welch  Hypertensive heart and chronic kidney disease    
  per PCP note  Hypotension 5/10/2017  Junctional tachycardia ([facility] 5/10/2017  
  Dr. Eli Fink  Neuropathy    
 NIDDM (non-insulin dependent diabetes mellitus) 6/2/2010  PAD (peripheral artery disease) ([facility]    
 S/P ablation of atrial fibrillation 03/2018  Screening mammogram 5/4/10  
 SOB (shortness of breath) 09/22/2014  
  Dr. Katalina Sánchez  SSS (sick sinus syndrome) ([facility]    
  per pacemaker form 9/6/18  Weakness    
  per pt weakness from c-diff 2017, mulitple falls with injury to rotator cuff  
  
  
Core Measures: CVA: Yes  
CHF:No 
PNA:No Not applicable 
  
Activity: 
Activity Level: Bed Rest 
Number times ambulated in hallways past shift: 0 Number of times OOB to chair past shift: 0 
  
Cardiac:  
Cardiac Monitoring: Yes     
Cardiac Rhythm: Paced, Sinus tachycardia 
  
Access:  
Current line(s): PIV  
Central Line? Not applicable  
PICC LINE? No Placement date Reason Medically Necessary  
  
Genitourinary:  
Urinary status: incontinent Urinary Catheter? Not applicable Placement Date  Reason Medically Necessary  
  
Respiratory:  
O2 Device: Nasal cannula Chronic home O2 use?: NO Incentive spirometer at bedside: NO 
  
GI: 
Current diet:  DIET NPO Passing flatus: YES Tolerating current diet: YES 
  
Pain Management:  
Patient states pain is manageable on current regimen: YES 
  
Skin: 
Landon Score: 11 Interventions: turn team, speciality bed, float heels, limit briefs and internal/external urinary devices Patient Safety: 
Fall Score: Total Score: 3 Interventions: bed/chair alarm, gripper socks and pt to call before getting OOB High Fall Risk: Yes 
  
DVT prophylaxis: DVT prophylaxis Med- Yes DVT prophylaxis SCD or JULIANA- Yes  
  
Wounds: (If Applicable) Wounds- No 
Location  
  
Active Consults: 
IP CONSULT TO NEUROLOGY 
IP CONSULT TO PALLIATIVE CARE - PROVIDER 
  
Length of Stay: 
Expected LOS: 4d 9h 
Actual LOS: 2 Discharge Plan: Yes

## 2021-03-18 NOTE — PROGRESS NOTES
Hospitalist Progress Note NAME: Baron Mullins :  1950 MRN:  [id number] Admit date: 3/14/2021 Today's date: 21 PCP: Darrian Lynn MD  
 
Admitted to ICU 3/14/2021 with large left CALE and MCA stroke Currently aphasic, dense right hemiparesis Followed by neurology and the ICU team 
Palliative care and hospice working with patient, s/p family meeting again today - plan to go home with hospice Friday 10 AM 
Looking into DC home with hospice and comfort care versus IP hospice if not able to get home Transferred out of ICU 3/15/2021 Assessment / Plan: 
Left CALE and MCA territory stroke POA BilateraL  ICA stenosis > 90% POA Found unresponsive at home last known well was the evening before Brought to emergency room Admit head CT scan Large acute left MCA and CALE infarcts Local mass effect but no midline shift or herniation and no hemorrhage. CTA with greater 90% right internal carotid artery stenosis Greater than 90% left internal carotid stenosis Repeat head CT scan 3/15 with further evolution of the left hemispheric infarct Increased mass-effect but no evidence of hemorrhagic transformation Opens eyes, dense right hemiparesis, aphasic Seen by Dr. Nancy Newman from neurology NG tube in place Currently on aspirin, Eliquis, Lipitor Prognosis guarded, 
Transition to home with hospice as able otherwise Ip Hospice-- s/pHospice Family meeting again today - DC home with Hospice Friday 10 AM- transport set up Small cell lung carcinoma RLL POA diagnosed 2021 T4 N0 
COPD Focal pleural involvement PET Scan with no metastatic disease 
 RLL wedge resection, 2021 Adjuvant chemotherapy with Dr. Barber Oneill ongoing Carboplatin + Etoposide Plan to keep comfort care now Acute respiratory failure with hypoxia POA currently on 6 liters 
stable sats will wean as tolerated Chronic systolic CHF LVEF 25 to 21%- Permanent atrial fibrillation S/P AV pacer Hyperlipidemia Essential HTN. Continue aspirin, Eliquis, Lipitor for now Plan is to comfort care now Body mass index is 25.63 kg/m². Code status: DNR/DNI now Prophylaxis: eliquis Recommended Disposition: home with hospice? ? Versus IP hospice - TBD Subjective: Chief Complaint / Reason for Physician Visit follow-up left hemispheric stroke Transferred out of ICU 3/15/2021 Opens eyes, spontaneously moving left-sided of body Aphasic, not talking at all, not able to move right-sided body Not able to provide any history Review of Systems: 
Symptom Y/N Comments  Symptom Y/N Comments Fever/Chills    Chest Pain Poor Appetite    Edema Cough    Abdominal Pain Sputum    Joint Pain SOB/[place]    Headache Nausea/vomit    Tolerating PT/OT Diarrhea    Tolerating Diet Constipation    Other Could NOT obtain due to:  Aphasia Objective: VITALS:  
Last 24hrs VS reviewed since prior progress note. Most recent are: 
Patient Vitals for the past 24 hrs: 
 Temp Pulse Resp BP SpO2  
03/18/21 1158 97.8 °F (36.6 °C) (!) 115 18 115/76 96 % 03/18/21 0739 97.9 °F (36.6 °C) (!) 120 18 110/74 96 % 03/18/21 0413 98.2 °F (36.8 °C) 92 18 (!) 130/55 99 % 03/18/21 0036 98.3 °F (36.8 °C) (!) 112 20 (!) 121/57 98 % 03/17/21 1953 98 °F (36.7 °C) (!) 101 20 117/67 100 % 03/17/21 1600  90     
03/17/21 1544 98.6 °F (37 °C) 89 20 129/72 100 % Wt Readings from Last 12 Encounters:  
03/17/21 72 kg (158 lb 12.8 oz) 03/11/21 75.8 kg (167 lb)  
03/11/21 76.2 kg (168 lb)  
03/10/21 74.6 kg (164 lb 8 oz) 03/09/21 76.3 kg (168 lb 4.8 oz) 03/05/21 77.1 kg (170 lb)  
02/25/21 75.8 kg (167 lb) 02/01/21 79.5 kg (175 lb 4.8 oz) 01/25/21 77.6 kg (171 lb 1.2 oz) 01/14/21 75.4 kg (166 lb 3.6 oz) 01/05/21 76.2 kg (168 lb) 12/31/20 76.5 kg (168 lb 9.6 oz) PHYSICAL EXAM: 
 
I had a face to face encounter and independently examined this patient on 03/15/21 as outlined below: 
 
General: WD, WN. Alert, aphasic, moving left-sided body, not right side EENT:  PERRL. Anicteric sclerae. MMM Neck:  No meningismus, no thyromegaly Resp:  CTA bilaterally, no wheezing or rales. No accessory muscle use CV:  Regular  rhythm,  No edema GI:  Soft, Non distended, Non tender. +Bowel sounds, no rebound LN:  No cervical or inguinal ANOOP Neurologic:  Alert, his eyes, not consistently tracking Aphasic, dense right hemiparesis Psych:   Not anxious nor agitated Skin:  No rashes. No jaundice Reviewed most current lab test results and cultures  YES Reviewed most current radiology test results   YES Review and summation of old records today    NO Reviewed patient's current orders and MAR    YES 
PMH/ reviewed - no change compared to H&P 
________________________________________________________________________ Care Plan discussed with: 
  Comments Patient Family  x daughter RN x Care Manager x Jefe Silva Consultant     
                 x Multidiciplinary team rounds were held today with , nursing, pharmacist and clinical coordinator. Patient's plan of care was discussed; medications were reviewed and discharge planning was addressed. ________________________________________________________________________ Total Time: 26 mins Comments >50% of visit spent in counseling and coordination of care x   
________________________________________________________________________ Jose Isaac MD  
 
Procedures: see electronic medical records for all procedures/Xrays and details which were not copied into this note but were reviewed prior to creation of Plan. LABS: 
I reviewed today's most current labs and imaging studies. Pertinent labs include: 
Recent Labs  
  03/17/21 0247 03/16/21 0248 WBC 2.5* 6.0 HGB 11.0* 11.8 HCT 34.1* 36.0 * 158 Recent Labs  
  03/17/21 0247 03/16/21 0248 * 135* K 3.7 3.8  107 CO2 24 22 * 183* BUN 22* 18  
CREA 0.92 0.78  
CA 8.5 8.5 MG 2.1 2.0 PHOS 2.5* 2.3* ALB 2.8* 2.9* TBILI 1.1* 0.9 ALT 19 20

## 2021-03-18 NOTE — PALLIATIVE CARE
Goals are clear for d/c to home with hospice. I will sign off. Thank you for allowing us to participate in the care of Ms Lilian Pierre.

## 2021-03-18 NOTE — PROGRESS NOTES
End of Shift Note 
  Bedside shift change report given to [address]  [facility] [address] (oncoming nurse) by Jasvir Rosenberg RN (offgoing nurse). Report included the following information SBAR, Kardex, MAR, Cardiac Rhythm paced/ A.fib and Alarm Parameters  
  
Shift worked: 7p-7a Shift summary and any significant changes:  
   hospice nurse and family had a meeting today 
  
   
Concerns for physician to address: n\a Zone phone for oncoming shift:   [phone]  
  
Patient Information Ariel Melgar 72 y.o. 
3/14/2021  3:39 PM by Dallas Wei MD. Ariel Melgar was admitted from Home 
  
Problem List 
    
Patient Active Problem List  
  Diagnosis Date Noted  Acute ischemic stroke ([address] 03/14/2021  ARF (acute renal failure) ([address] 01/30/2021  Lactic acid acidosis 01/30/2021  Small cell lung cancer, right upper lobe ([address] 01/30/2021  Lung mass 01/22/2021  ASHD (arteriosclerotic heart disease) 05/31/2019  Respiratory failure with hypoxia ([address] 03/25/2019  S/P rotator cuff repair 09/19/2018  PAD (peripheral artery disease) ([address] 05/22/2018  A-fib ([address] 03/30/2018  Type 2 diabetes mellitus with nephropathy ([address] 12/27/2017  CKD (chronic kidney disease) stage 3, GFR 30-59 ml/min 09/22/2017  Chronic systolic HF (heart failure) ([address] 05/10/2017  History of Clostridium difficile infection 05/10/2017  Junctional tachycardia ([address] 05/10/2017  Hypotension 05/10/2017  S/P ablation of atrial fibrillation 05/02/2017  Fear associated with illness and body function 04/07/2017  Counseling regarding advanced care planning and goals of care 04/07/2017  Systolic CHF, acute ([address] 04/06/2017  Acute systolic CHF (congestive heart failure) ([address] 04/06/2017  CHF (congestive heart failure) ([address] 04/04/2017  Type 2 diabetes mellitus with diabetic neuropathy, without long-term current use of insulin ([address] 01/31/2017  GIB (gastrointestinal bleeding) 04/12/2016  Diastolic CHF, acute on chronic ([facility] 09/22/2014  S/P coronary artery stent placement 07/04/2014  Back pain 11/14/2012  Sinoatrial node dysfunction ([address] 07/05/2012  Cardiac pacemaker in situ 07/05/2012  Mixed hyperlipidemia 07/05/2012  Chest pain 09/21/2011  Sick sinus syndrome ([facility] 02/25/2011  S/P angioplasty with stent 02/07/2011  Hypokalemia 07/20/2010  Hip pain 06/08/2010  Benign hypertensive heart and CKD, stage 3 (GFR 30-59), w CHF (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  
  
    
Past Medical History:  
Diagnosis Date  Arthritis    
  left shoulder  Atrial fibrillation ([facility] 6/2/2010  
  Dr. Eli Fink  CAD (coronary artery disease)    
  stent; Dr. Eli Fink  Cancer ([facility]    
  lung  Celiac disease    
 Chronic diastolic heart failure ([facility] 09/22/2014  
  Dr. Eli Fink  Chronic kidney disease    
  per cardio note  Chronic pain    
  left thigh:  L SFA intervention by Dr. Carlo Garnica 07/2018, as of 9/6/18:  still causing pain, pt to have MILLA checked per Dr. Carlo Garnica note  Chronic systolic HF (heart failure) ([facility] 5/10/2017  
  4/2017 EF 25-30%  Congestive heart failure (CHF) (HCC)    
 COPD (chronic obstructive pulmonary disease) ([facility] 6/2/2010  
  Dr. Katalina Sánchez  Diabetes ([facility]    
  Dr. Sander Porter; no current medication per pt  Emphysema, unspecified ([facility]    
  Dr. Katalina Sánchez  Fibroid    
  PT STATES NOT HAVING FIBROIDS  Frequent falls 2017  Gout    
 Heart failure [facility])    
  Dr. Eli Fink  History of Clostridium difficile infection 5/10/2017  
  4/2017 CDiff positive  Hypertension 6/2/2010  
  Dr. Katerina Welch  Hypertensive heart and chronic kidney disease    
  per PCP note  Hypotension 5/10/2017  Junctional tachycardia ([facility] 5/10/2017  
  Dr. Eli Danae  Neuropathy    
 NIDDM (non-insulin dependent diabetes mellitus) 6/2/2010  PAD (peripheral artery disease) ([facility]    
 S/P ablation of atrial fibrillation 03/2018  Screening mammogram 5/4/10  
 SOB (shortness of breath) 09/22/2014  
  Dr. Ambrose Serrato  SSS (sick sinus syndrome) ([facility]    
  per pacemaker form 9/6/18  Weakness    
  per pt weakness from c-diff 2017, mulitple falls with injury to rotator cuff  
  
  
Core Measures: CVA: Yes CHF:No 
PNA:No Not applicable 
  
Activity: 
Activity Level: Bed Rest 
Number times ambulated in hallways past shift: 0 Number of times OOB to chair past shift: 0 
  
Cardiac:  
Cardiac Monitoring: Yes     
Cardiac Rhythm: Paced, Sinus tachycardia 
  
Access:  
Current line(s): PIV Central Line? Not applicable PICC LINE? No Placement date Reason Medically Necessary  
  
Genitourinary:  
Urinary status: incontinent Urinary Catheter? Not applicable Placement Date  Reason Medically Necessary  
  
Respiratory:  
O2 Device: Nasal cannula Chronic home O2 use?: NO Incentive spirometer at bedside: NO 
  
GI: 
Current diet:  DIET NPO Passing flatus: YES Tolerating current diet: YES 
  
Pain Management:  
Patient states pain is manageable on current regimen: YES 
  
Skin: 
Landon Score: 11 Interventions: turn team, speciality bed, float heels, limit briefs and internal/external urinary devices Patient Safety: 
Fall Score: Total Score: 3 Interventions: bed/chair alarm, gripper socks and pt to call before getting OOB High Fall Risk: Yes 
  
DVT prophylaxis: DVT prophylaxis Med- Yes DVT prophylaxis SCD or JULIANA- Yes  
  
Wounds: (If Applicable) Wounds- No 
Location  
  
Active Consults: 
IP CONSULT TO NEUROLOGY 
IP CONSULT TO PALLIATIVE CARE - PROVIDER 
  
Length of Stay: 
Expected LOS: 4d 9h 
Actual LOS: 2 Discharge Plan:  Yes

## 2021-03-18 NOTE — PROGRESS NOTES
Transition of Care Plan: 
 
RUR: 32% - \"high risk\" Disposition: Home with [facility] on 3/19/21 [facility] admission scheduled for 11:00 AM) Follow up appointments: Pt d/c home with hospice DME needed: [facility] to coordinate any DME necessary for d/c Transportation at Discharge: [facility] transport secured for 10:00 AM for anticipated d/c tomorrow (3/19/21); PCS completed, copy on chart Keys or means to access home: Pt's daughter & granddaughter will be present at the home to allow medical transport crew inside  IM Medicare letter: 2nd IM to be provided prior to d/c Caregiver Contact: Pt's daughter (Radhika Coy: [phone]) Discharge Caregiver contacted prior to discharge? Pt's daughter to be contacted prior to d/c Initial note: CM reviewed pt's chart prior to moving forward with d/c planning. CM consulted with attending MD who reported that pt is ready for d/c from a medical standpoint. CM reviewed [facility] RN (Daysi Gens: [phone]) note from encounter with pt's daughter Radhika Caballero) today (3/18/21). Per note, pt will be d/c home with [facility] tomorrow (3/19/21) with admission scheduled for 11:00 AM. CM secured [facility] transport for 10:00 AM on 3/19/21 per request. CM will contact pt's daughter to check in and review plan for d/c tomorrow. CM will continue to follow & remain accessible for d/c planning. RONEL Jaime Care Manager, [facility] 
[phone]

## 2021-03-18 NOTE — HOSPICE
[facility] Social Worker Note: 
 
Consents Reviewed: Yes Person Reviewed/Signed with: Alexy Wilson Right to NCD Reviewed: Yes NCD Requested: No 
Admission Nurse/Intake Notified: Yes Planned Start of Care Date: 3/19/2021 Hospice Witness Representative: Anna Marie Ahmadi MSW reviewed and signed consents with pt's daughter Alexy Wilson. Felipe Prasad plans for pt to go home and Felipe Prasad and her daughter stay there to provide care. Felipe Prasad was tearful but realistic about pt's condition and prognosis. DDNR signed by Felipe Prasad and placed in chart for MD signature. RONEL [place] [address] [facility] Worker [phone]

## 2021-03-18 NOTE — HOSPICE
[facility] Good Help to Those in Need 
[phone] Patient Name: Jerardo Min YOB: 1950 Age: 72 y.o. [facility] RN Note:  Hospice consult noted. Chart reviewed. Plan of care discussed with patients nurse & care manager. In to meet with daughter. Discussed Hospice philosophy, general plan of care, levels of care, services and on call procedures. Family information packet provided & reviewed with daughter Daughter wishes to take her mother home with hospice  Support. Patient will return to her home and daughter and granddaughter will be moving in to care for her Consents and DDNR signed Will need transport home at 10am for an 11am admission DME to be delivered to the home this afternoon. Please remove NG tube and place a pena prior to discharge. Per dtr she has a pacemaker but no AICD. Thank you for the opportunity to be of service to this patient. Linnea Reynoso RN Clinical Nurse Liaison [facility] (l)[phone]

## 2021-03-19 PROBLEM — I63.9 ACUTE CVA (CEREBROVASCULAR ACCIDENT) (HCC): Status: ACTIVE | Noted: 2021-01-01

## 2021-03-19 NOTE — HOSPICE
[facility] Good Help to Those in Need 
[phone] Inpatient Nursing Admission Patient Name: Gregoria Stein YOB: 1950 Age: 72 y.o. Date of Hospice Admission: 3/19/2021 Hospice Attending Elected by Patient: Dequan Oro MD 
Primary Care Physician: Claudia Shields MD 
Admitting RN: Daniel Butt : Vandana Mckeon Level of Care (GIP/Routine/Respite): GIP Facility of Care: [facility] Patient Room: 3116/01 HOSPICE SUMMARY  
ER Visits/ Hospitalizations in past year: 5 Hospice Diagnosis: Acute CVA (cerebrovascular accident) ([address] [I63.9] Onset Date of Hospice Diagnosis: 3/17/21 Summary of Disease Progression Leading to Hospice Diagnosis: Excerpted HPI from H&P of Margareth Anaya MD: 
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 She is brought to ER due to unresponsiveness since this morning. She was last know awake by around 10:40pm yesterday and this morning she did not wake up and unarousable. In ER she is found to have large left MCA and CALE territory stroke with local mass effect. She is minimally responsive and spontaneously moves her left side and opens eyes but does not follow commands. CTA showed L ICA occlusion. Neuro IR and tele neurology was called by ED staff and deemed not a candidate for neuro intervetion and not candidate for TPA. \"  
 
Co-Morbidities:  
Patient Active Problem List  
Diagnosis Code  Benign hypertensive heart and CKD, stage 3 (GFR 30-59), w CHF (HCC) I13.0, N18.30  Atrial fibrillation--paroxysmal I48.91  
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  
 Sick sinus syndrome (HCC) I49.5  Chest pain R07.9  Sinoatrial node dysfunction (HCC) I49.5  Cardiac pacemaker in situ Z95.0  Mixed hyperlipidemia E78.2  Back pain M54.9  S/P coronary artery stent placement Z95.5  Diastolic CHF, acute on chronic ([facility]) I50.33  
 GIB (gastrointestinal bleeding) K92.2  Type 2 diabetes mellitus with diabetic neuropathy, without long-term current use of insulin ([facility]) E11.40  
 CHF (congestive heart failure) ([facility]) I50.9  Systolic CHF, acute ([facility]) P15.49  
 Acute systolic CHF (congestive heart failure) ([facility]) I50.21  
 Fear associated with illness and body function F40.9  Counseling regarding advanced care planning and goals of care Z71.89  
 S/P ablation of atrial fibrillation Z98.890, Z86.79  
 Chronic systolic HF (heart failure) ([facility]) I50.22  
 History of Clostridium difficile infection Z86.19  
 Junctional tachycardia ([facility]) I47.1  Hypotension I95.9  CKD (chronic kidney disease) stage 3, GFR 30-59 ml/min N18.30  Type 2 diabetes mellitus with nephropathy ([facility]) E11.21  
 A-fib ([facility]) I48.91  
 PAD (peripheral artery disease) ([facility]) I73.9  
 S/P rotator cuff repair K00.407  Respiratory failure with hypoxia ([facility]) J96.91  
 ASHD (arteriosclerotic heart disease) I25.10  Lung mass R91.8  ARF (acute renal failure) ([facility]) N17.9  Lactic acid acidosis E87.2  Small cell lung cancer, right upper lobe ([facility]) C34.11  
 Acute ischemic stroke ([facility]) I63.9  Acute CVA (cerebrovascular accident) [facility] I63.9 Diagnoses RELATED to the terminal prognosis: CKD, DM2, ASHD, Small Cell Lung CA, CHF, stroke Other Diagnoses:  A-fib Rationale for a prognosis of life expectancy of 6 months or less if the disease follows its normal course (Disease Specific History):  
 
Mayra Vaughn is a 72 y.o. who was admitted to [facility]. The patient's principle diagnosis of Acute CVA has resulted in rapid decline. Functionally, the patient's Palliative Performance Scale has declined over a period of 5 days and is estimated at 10.  Objective information that support this patients limited prognosis includes: head CT scan 3/15 with further evolution of the left hemispheric infarct Increased mass-effect but no evidence of hemorrhagic transformation. The patient/family chose comfort measures with the support of Hospice. Patient meets for GIP LOC as evidenced by labored breathing, agitation, moaning Prognosis estimated based on 03/19/21 clinical assessment is:  
[] Few to Many Hours [x] Hours to Days  
[] Few to Many Days  
[] Days to Weeks  
[] Few to Many Weeks  
[] Weeks to Months  
[] Few to Many Months ASSESSMENT Patient self-reports:  []  Yes    [x] No 
 
SYMPTOMS: SOB, agitation, pain SIGNS/PHYSICAL FINDINGS: opens eyes/non-verbal, Learning Assessment: 
Patient Is patient willing/able to learn? Non-verbal 
What is the highest level of education completed? Learning preference (written material, demonstration, visual)? Learning barriers (ESOL, [place], poor vision)? Caregiver Is caregiver willing to learn care for patient? yes What is the highest level of education completed? vocational 
Learning preference (written material, demonstration, visual)? Written and verbal 
Learning barriers (ESOL, [place], poor vision)? none CLINICAL INFORMATION Wt Readings from Last 3 Encounters:  
03/17/21 72 kg (158 lb 12.8 oz) 03/11/21 75.8 kg (167 lb)  
03/11/21 76.2 kg (168 lb) Ht Readings from Last 3 Encounters:  
03/15/21 5' 6\" (1.676 m)  
03/11/21 5' 6\" (1.676 m)  
03/11/21 5' 6\" (1.676 m) There is no height or weight on file to calculate BMI. There were no vitals taken for this visit. LAB VALUES No results found for this visit on 03/19/21 (from the past 12 hour(s)). No results found for this visit on 03/19/21 (from the past 6 hour(s)). Lab Results Component Value Date/Time Protein, total 7.5 03/17/2021 02:47 AM  
 Albumin 2.8 (L) 03/17/2021 02:47 AM  
 
 
Currently this patient has: 
[x] Supplemental O2 [x] Peripheral IV  [] PICC    [] PORT [x] Luna Catheter [] NG Tube   [] PEG Tube [] Ostomy   
[] AICD: Has ICD been deactivated? [] Yes [] No:______ PLAN 1. Admit to [facility] LOC for symptom management of pain, agitation, SOB. 2.  Scheduled morphine 2mg q4hrs for management of SOB and pain. PRN morphine available. 3.  PRN robinul available for s/s of secretions. 4.  Education of family at bedside for EOL processes. 5.  Frequent assessments by skilled medical staff for non-verbal s/s of distress/discomfort. ADVANCE CARE PLANNING (Complete in ACP Flow Sheet) Code Status: DNR Durable DNR: [x]  Yes  []  No 
Code Status Discussed/Confirmed: yes Preference for Other Life Sustaining Treatment Discussed/Confirmed: yes Hospitalization Preference:  [facility] Advance Care Planning 3/9/2021 Patient's Healthcare Decision Maker is: Named in scanned ACP document Primary Decision Maker Name -  
Primary Decision Maker Phone Number -  
Primary Decision Maker Relationship to Patient - Secondary Decision Maker Name - Secondary Decision Maker Phone Number - Secondary Decision Maker Relationship to Patient -  
Confirm Advance Directive Yes, on file Patient Would Like to Complete Advance Directive -  Service: [] Yes  []  No      [x] Unknown Appropriate for Pinning Ceremony:  [] Yes     [] No 
[place]: [facility]  Home: Jose Christensen  DISCHARGE PLANNING 1. Discharge Plan: If pt stabilizes, family will take pt home with hospice services 2. Patient/Family teaching: EOL processes 3. Response to patient/family teaching: receptive SOCIAL/EMOTIONAL/SPIRITUAL NEEDS Spiritual Issues Identified: Hospital and Hospice Chaplain available  Psych/ Social/ Emotional Issues Identified: Family at bedside today. Huong/dtr was tearful but supported by her niece. See Carole Horton/MSW note Caregiver Support: 
[x] Provided information on End of Life Care  
[x] Material Provided: James Joel From My [facility]ight or [facility]  
 
CARE COORDINATION Dr. Bubba Rodriguez contacted, discharge to hospice order received Dr. Grisel Dias contacted, agrees to serve as attending provider for hospice and provided verbal certification of terminal illness with life expectancy of 6 months or less. Orders for hospice admission, medications and plan of treatment received. Medication reconciliation completed. MEDS: See medication list below DME: Per hospital 
Supplies: Per hospital 
IDT communication to include MD, , SW, CH and support team 
 
ALLERGIES AND MEDICATIONS Allergies: Allergies Allergen Reactions  Crestor [Rosuvastatin] Myalgia  Levaquin [Levofloxacin] Nausea Only GI Upset  Lipitor [Atorvastatin] Diarrhea  Lyrica [Pregabalin] Myalgia Current Facility-Administered Medications Medication Dose Route Frequency  LORazepam (ATIVAN) injection 0.5 mg  0.5 mg IntraVENous Q15MIN PRN  
 ketorolac (TORADOL) injection 30 mg  30 mg IntraVENous Q8H PRN  
 glycopyrrolate (ROBINUL) injection 0.2 mg  0.2 mg IntraVENous Q4H PRN  
 bisacodyL (DULCOLAX) suppository 10 mg  10 mg Rectal DAILY PRN  
 morphine injection 2 mg  2 mg IntraVENous Q15MIN PRN  
 sodium chloride (NS) flush 5-10 mL  5-10 mL IntraVENous PRN  
 morphine injection 2 mg  2 mg IntraVENous Q4H

## 2021-03-19 NOTE — PROGRESS NOTES
Problem: Dyspnea Due to End of Life Goal: Demonstrate understanding of and ability to manage respiratory symptoms at end of life Note: Pt with labored breathing and has O2 at 2L/nc. Scheduled morphine with prn available. Problem: Comfort Deficit Goal: Reduce/control pain Description: Patient will report that pain has been reduced or controlled through verbal and nonverbal means and that measures to promote comfort are effective. Note: Pt with s/s of pain. Scheduled morphine with prn available. Problem: Anxiety/Agitation Goal: Verbalize and demonstrate ability to manage anxiety Description: The patient/family/caregiver will verbalize and demonstrate ability to manage the patient's anxiety throughout hospice care. Note: No s/s of agitation. PRN lorazepam available.

## 2021-03-19 NOTE — PROGRESS NOTES
End of Shift Note Bedside shift change report given to Echo Isabel (oncoming nurse) by KRISSY Pablo (offgoing nurse). Report included the following information SBAR Shift worked:  [id number] Shift summary and any significant changes:  
  None Concerns for physician to address:  None Zone phone for oncoming shift:   [phone] Patient Information Paul Tay 72 y.o. 
3/14/2021  3:39 PM by Alexandria Pierce MD. Paul Tay was admitted from Home 
 
Problem List 
Patient Active Problem List  
 Diagnosis Date Noted  Acute ischemic stroke ([address] 03/14/2021  ARF (acute renal failure) ([address] 01/30/2021  Lactic acid acidosis 01/30/2021  Small cell lung cancer, right upper lobe ([address] 01/30/2021  Lung mass 01/22/2021  ASHD (arteriosclerotic heart disease) 05/31/2019  Respiratory failure with hypoxia ([address] 03/25/2019  S/P rotator cuff repair 09/19/2018  PAD (peripheral artery disease) ([address] 05/22/2018  A-fib ([address] 03/30/2018  Type 2 diabetes mellitus with nephropathy ([address] 12/27/2017  CKD (chronic kidney disease) stage 3, GFR 30-59 ml/min 09/22/2017  Chronic systolic HF (heart failure) ([address] 05/10/2017  History of Clostridium difficile infection 05/10/2017  Junctional tachycardia ([address] 05/10/2017  Hypotension 05/10/2017  S/P ablation of atrial fibrillation 05/02/2017  Fear associated with illness and body function 04/07/2017  Counseling regarding advanced care planning and goals of care 04/07/2017  Systolic CHF, acute ([address] 04/06/2017  Acute systolic CHF (congestive heart failure) ([address] 04/06/2017  CHF (congestive heart failure) ([address] 04/04/2017  Type 2 diabetes mellitus with diabetic neuropathy, without long-term current use of insulin ([address] 01/31/2017  GIB (gastrointestinal bleeding) 04/12/2016  Diastolic CHF, acute on chronic (HCC) 09/22/2014  S/P coronary artery stent placement 07/04/2014  Back pain 11/14/2012  Sinoatrial node dysfunction (HCC) 07/05/2012  Cardiac pacemaker in situ 07/05/2012  Mixed hyperlipidemia 07/05/2012  Chest pain 09/21/2011  Sick sinus syndrome ([facility] 02/25/2011  S/P angioplasty with stent 02/07/2011  Hypokalemia 07/20/2010  Hip pain 06/08/2010  Benign hypertensive heart and CKD, stage 3 (GFR 30-59), w CHF ([facility] 06/02/2010  Atrial fibrillation--paroxysmal 06/02/2010  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  
Diagnosis Date  Arthritis   
 left shoulder  Atrial fibrillation ([facility] 6/2/2010 Dr. Zack Curiel  CAD (coronary artery disease) stent; Dr. Zack Curiel  Cancer ([facility] lung  Celiac disease  Chronic diastolic heart failure ([facility] 09/22/2014 Dr. Zack Curiel  Chronic kidney disease   
 per cardio note  Chronic pain   
 left thigh:  L SFA intervention by Dr. Eran Solis 07/2018, as of 9/6/18:  still causing pain, pt to have MILLA checked per Dr. Eran Solis note  Chronic systolic HF (heart failure) ([facility] 5/10/2017  
 4/2017 EF 25-30%  Congestive heart failure (CHF) ([facility]  COPD (chronic obstructive pulmonary disease) ([facility] 6/2/2010 Dr. Milagro Ch  Diabetes ([facility] Dr. Kobi Mcdonald; no current medication per pt  Emphysema, unspecified [facility] Dr. Milagro Ch  Fibroid PT STATES NOT HAVING FIBROIDS  Frequent falls 2017  Gout  Heart failure ([facility] Dr. Zack Curiel  History of Clostridium difficile infection 5/10/2017  
 4/2017 CDiff positive  Hypertension 6/2/2010 Dr. Rita Jennings  Hypertensive heart and chronic kidney disease   
 per PCP note  Hypotension 5/10/2017  Junctional tachycardia ([facility] 5/10/2017 Dr. Zack Curiel  Neuropathy  NIDDM (non-insulin dependent diabetes mellitus) 6/2/2010  PAD (peripheral artery disease) ([facility]  S/P ablation of atrial fibrillation 03/2018  Screening mammogram 5/4/10  
 SOB (shortness of breath) 09/22/2014 Dr. Miguel Angel Fuentes  SSS (sick sinus syndrome) ([facility]   
 per pacemaker form 9/6/18  Weakness   
 per pt weakness from c-diff 2017, mulitple falls with injury to rotator cuff Core Measures: CVA: Yes Yes CHF:No No 
PNA:No No 
 
Activity: 
Activity Level: Bed Rest 
Number times ambulated in hallways past shift: 0 Number of times OOB to chair past shift: 0 Cardiac:  
Cardiac Monitoring: No     
Cardiac Rhythm: Normal sinus rhythm Access:  
Current line(s): PIV Genitourinary:  
Urinary status: incontinent and external catheter Respiratory:  
O2 Device: Nasal cannula Chronic home O2 use?: No 
Incentive spirometer at bedside: N/A 
  
 
GI: 
Last Bowel Movement Date: (unknown) Current diet:  DIET NPO Passing flatus: YES Tolerating current diet: NO 
  
 
Pain Management:  
Patient states pain is manageable on current regimen: N/A Skin: 
Landon Score: 11 Interventions: limit briefs and internal/external urinary devices Patient Safety: 
Fall Score: Total Score: 3 Interventions: bed/chair alarm and gripper socks High Fall Risk: Yes DVT prophylaxis: DVT prophylaxis Med- No 
DVT prophylaxis SCD or JULIANA- Yes Wounds: (If Applicable) Wounds- No 
Location Active Consults: 
IP CONSULT TO NEUROLOGY Length of Stay: 
Expected LOS: 4d 9h 
Actual LOS: 5 Discharge Plan: Yes; home with hospice at [address] [place] [address]

## 2021-03-19 NOTE — H&P
[facility] Good Help to Those in Need 
[phone] Patient Name: Vinayak Daugherty YOB: 1950 Date of Provider Hospice Visit: 03/19/21 Level of Care:   [x] General Inpatient (GIP)    [] Routine   [] Respite Current Location of Care: 
[] [facility] [] [facility] [x] [facility] [] [facility] [] Hospice [facility] IF [facility], patient referred from: 
[] [facility] [] [facility] [] [facility] [] [facility] [] Home [] Other:  
 
Date of Original Hospice Admission: 3/19/21 Hospice Medical Director at time of admission: Jorden Spatz Principle Hospice Diagnosis: CVA Diagnoses RELATED to the terminal prognosis: Acute respiratory failure, chronic systolic heart failure, small cell lung cancer, bilateral carotid stenosis, permanent A. fib Other Diagnoses: Henry Daugherty is a 72y.o. year old who was admitted to [facility]. Patient is a 69-year-old female presented to the [facility] after being found unresponsive at home. Downtime was unknown. Patient has CT scan that showed a large completed acute left hemispheric infarct with mild mass-effect on the left lateral ventricle and mild left to right subfalcine herniation. This appears to involve the right anterior cerebral artery territory. She also was found to have critical stenosis of the right internal carotid artery as well as subsegmental occlusion of the left internal carotid artery. Also noted to have severe emphysema. Further work-up showed an EF of 25%. The patient already had undergone wedge resection for small cell lung cancer. Patient unfortunately remained essentially unresponsive. Plan was for her to go home with hospice but upon evaluation on the morning of discharge, patient showed evidence of increased work of breathing, further decline and we did not feel she was safe for discharge home. Spent time talking to family elected to admit her inpatient level of care.  
 
The patient's principle diagnosis has resulted in unresponsive Refer to LCD Functionally, the patient's Karnofsky and/or Palliative Performance Scale has declined over a period of days and is estimated at 10. The patient is dependent on the following ADLs: All Objective information that support this patients limited prognosis includes:  
CT scan IMPRESSION 
  
1. Large completed acute left hemispheric infarct with mild mass effect on the 
left lateral ventricle and mild left to right subfalcine herniation. No 
associated hemorrhage. 2. Large matched perfusion defect in the left cerebral hemisphere. Calculated 
area of mismatch is smaller and may involve a portion of the right anterior 
cerebral artery territory. 3. Segmental occlusion of the mid to distal right common carotid artery, with 
reconstitution of the right internal carotid artery, but with critical stenosis 
and probable occlusion of the right internal carotid artery origin. 4. 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 5. 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 6. Patent vertebral basilar system. 7. Severe emphysema. Bilateral pleural effusions, right larger than The patient/family chose comfort measures with the support of Hospice. HOSPICE DIAGNOSES Active Symptoms: 1. Respiratory distress 2. Restlessness 3. Large cerebrovascular accident 4. Hospice care patient PLAN 1. Patient will be admitted to [facility] level care as patient needing frequent nursing assessment of symptoms, IV medication management that cannot be done in the home setting, not safe to transfer home given her instability, and not safe to transition to sublingual/oral medication. 2. Morphine 2 mg IV every 4 hours scheduled every 15 minutes as needed for pain and shortness of breath 3. Comfort meds for all other symptoms. Anticipate the need for scheduled Ativan and possible Robinul.  
4. Plan reviewed with bedside nurse, hospice liaison's 
5. We spent time talking with patient's familydaughter, brother, granddaughter at the bedside. We reviewed all the symptoms as well as discussed prognosis looks like hours to days at best.  Lots of family coming to see the patient. Family clearly wants her comfortable and agrees with plan on staying at the hospital for now. They understand if somehow she were to stabilize or improve, we certainly would look at the possibility of discharge home but I was honest with the family that I did not think that would happen 6.  and SW to support family needs 7. Disposition: Likely will die in the hospital but family certainly agreeable to take her home if she were to stabilize 8. Hospice Plan of care was reviewed in detail and agree with current plan of care Prognosis estimated based on 03/19/21 clinical assessment is:  
[x] Hours to Days   
[] Days to Weeks   
[] Other: 
 
Communicated plan of care with: Hospice Case Manager; Hospice IDT; Care Team 
 
 GOALS OF CARE Patient/Medical POA stated Goal of Care: Hospice care 
 
[] I have reviewed and/or updated ACP information in the Advance Care Planning Navigator. This information is available in the [facility] [address] link in the patient's chart header. Primary Decision [facility] ([address]):   Primary Decision Maker (Active): Lesvia Cano - [phone] Secondary Decision Maker: [address] [facility] - Other Relative - [phone] Resuscitation Status: DNR If DNR is there a Durable DNR on file? : [x] Yes [] No (If no, complete Durable DNR) HISTORY History obtained from: Chart, family, hospice liaison CHIEF COMPLAINT: Unresponsive The patient is:  
[] Verbal 
[] Nonverbal 
[x] Unresponsive HPI/SUBJECTIVE: Remains unresponsive. Does show evidence of increased work of breathing. REVIEW OF SYSTEMS The following systems were: [] reviewed  [x] unable to be reviewed Positive ROS include: 
Constitutional: fatigue, weakness, in pain, short of breath Ears/nose/mouth/throat: increased airway secretions Respiratory:shortness of breath, wheezing Gastrointestinal:poor appetite, nausea, vomiting, abdominal pain, constipation, diarrhea Musculoskeletal:pain, deformities, swelling legs Neurologic:confusion, hallucinations, weakness Psychiatric:anxiety, feeling depressed, poor sleep Endocrine:  
 
Adult Non-Verbal Pain Assessment Score: 4 Face 
[] 0   No particular expression or smile 
[x] 1   Occasional grimace, tearing, frowning, wrinkled forehead 
[] 2   Frequent grimace, tearing, frowning, wrinkled forehead Activity (movement) [] 0   Lying quietly, normal position 
[x] 1   Seeking attention through movement or slow, cautious movement 
[] 2   Restless, excessive activity and/or withdrawal reflexes Guarding 
[x] 0   Lying quietly, no positioning of hands over areas of body 
[] 1   Splinting areas of the body, tense 
[] 2   Rigid, stiff Physiology (vital signs) [x] 0   Stable vital signs [] 1   Change in any of the following: SBP > 20mm Hg; HR > 20/minute 
[] 2   Change in any of the following: SBP > 30mm Hg; HR > 25/minute Respiratory 
[] 0   Baseline RR/SpO2, compliant with ventilator 
[] 1   RR > 10 above baseline, or 5% drop SpO2, mild asynchrony with ventilator 
[x] 2   RR > 20 above baseline, or 10% drop SpO2, asynchrony with ventilator FUNCTIONAL ASSESSMENT Palliative Performance Scale (PPS): 10 PSYCHOSOCIAL/SPIRITUAL ASSESSMENT Active Problems: 
  Acute CVA (cerebrovascular accident) ([facility] [address] (3/19/2021) Past Medical History:  
Diagnosis Date  Arthritis   
 left shoulder  Atrial fibrillation ([address] 6/2/2010 Dr. Ras Posadas  CAD (coronary artery disease) stent; Dr. Ras Posadas  Cancer ([facility] [address] lung  Celiac disease  Chronic diastolic heart failure ([facility] [address] 09/22/2014 Dr. Ras Posadas  Chronic kidney disease   
 per cardio note  Chronic pain   
 left thigh:  L SFA intervention by Dr. Arelis Brown 07/2018, as of 9/6/18:  still causing pain, pt to have MILLA checked per Dr. Areils Brown note  Chronic systolic HF (heart failure) ([address] 5/10/2017  
 4/2017 EF 25-30%  Congestive heart failure (CHF) ([address]  COPD (chronic obstructive pulmonary disease) ([address] 6/2/2010 Dr. Barby Arriola  Diabetes ([address] Dr. Sindy Madrid; no current medication per pt  Emphysema, unspecified ([address] Dr. Barby Arriola  Fibroid PT STATES NOT HAVING FIBROIDS  Frequent falls 2017  Gout  Heart failure ([address] Dr. Tereso Villasenor  History of Clostridium difficile infection 5/10/2017  
 4/2017 CDiff positive  Hypertension 6/2/2010 Dr. Diamond Muhammad  Hypertensive heart and chronic kidney disease   
 per PCP note  Hypotension 5/10/2017  Junctional tachycardia ([address] 5/10/2017 Dr. Tereso Villasenor  Neuropathy  NIDDM (non-insulin dependent diabetes mellitus) 6/2/2010  PAD (peripheral artery disease) ([address]  S/P ablation of atrial fibrillation 03/2018  Screening mammogram 5/4/10  
 SOB (shortness of breath) 09/22/2014 Dr. Barby Arriola  SSS (sick sinus syndrome) ([facility] [address]   
 per pacemaker form 9/6/18  Weakness   
 per pt weakness from c-diff 2017, mulitple falls with injury to rotator cuff Past Surgical History:  
Procedure Laterality Date  COLONOSCOPY N/A 9/25/2017 COLONOSCOPY with biopsy performed by Ena Parnell MD at [facility] AMBULATORY OR  
 HX AFIB ABLATION  03/2018  
 has had 2 ablations per patient [id number]  HX HEART CATHETERIZATION  07/23/2018  HX HEENT    
 HX OTHER SURGICAL    
 adrenal gland removed  HX PACEMAKER Left [employer] model: Kenny Magana  HX ROTATOR CUFF REPAIR Right  HX WISDOM TEETH EXTRACTION X2  
 IR INSERT TUNL CVC W PORT OVER 5 YEARS  3/5/2021  [place] CARDIAC SURG PROCEDURE UNLIST    
 3 cardiac stent placed  [place] EXCISE ADRENAL GLAND Right 1994  VASCULAR SURGERY PROCEDURE UNLIST 2018 Left SFA intervention ; Dr. Natanael Appiah Social History Tobacco Use  Smoking status: Former Smoker Packs/day: 0.50 Years: 42.00 Pack years: 21.00 Types: Cigarettes Start date: 5 Quit date: 2009 Years since quittin.2  Smokeless tobacco: Never Used Substance Use Topics  Alcohol use: No  
 
Family History Problem Relation Age of Onset  Heart Disease Mother  Diabetes Father  Heart Disease Brother  Other Son MURDERED Allergies Allergen Reactions  Crestor [Rosuvastatin] Myalgia  Levaquin [Levofloxacin] Nausea Only GI Upset  Lipitor [Atorvastatin] Diarrhea  Lyrica [Pregabalin] Myalgia Current Facility-Administered Medications Medication Dose Route Frequency  LORazepam (ATIVAN) injection 0.5 mg  0.5 mg IntraVENous Q15MIN PRN  
 ketorolac (TORADOL) injection 30 mg  30 mg IntraVENous Q8H PRN  
 glycopyrrolate (ROBINUL) injection 0.2 mg  0.2 mg IntraVENous Q4H PRN  
 bisacodyL (DULCOLAX) suppository 10 mg  10 mg Rectal DAILY PRN  
 morphine injection 2 mg  2 mg IntraVENous Q15MIN PRN  
 sodium chloride (NS) flush 5-10 mL  5-10 mL IntraVENous PRN  
 morphine injection 2 mg  2 mg IntraVENous Q4H PHYSICAL EXAM  
 
Wt Readings from Last 3 Encounters:  
21 72 kg (158 lb 12.8 oz) 21 75.8 kg (167 lb)  
21 76.2 kg (168 lb) Visit Vitals BP (!) 82/36 (BP 1 Location: Left upper arm, BP Patient Position: At rest) Pulse 70 Temp 99.5 °F (37.5 °C) Resp 20 SpO2 99% Supplemental O2  [x] Yes  [] NO Last bowel movement:  
 
Currently this patient has: 
[x] Peripheral IV [] PICC  [] PORT [] ICD [x] Luna Catheter [] NG Tube   [] PEG Tube   
[] Rectal Tube [] Drain 
[] Other:  
 
Constitutional: Ill-appearing, ashen, unresponsive Eyes: Minimally reactive ENMT: Dry 
Cardiovascular: Irregularly irregular Respiratory: Respiratory rate in the low teens, few secretions, evidence of increased work of breathing with accessory muscle use noted Gastrointestinal: Soft Musculoskeletal: Muscle wasting Skin: Unremarkable Neurologic: Difficult to assess secondary to unresponsiveness Psychiatric: Slightly restless Other:  
 
 
Pertinent Lab and or Imaging Tests: 
Lab Results Component Value Date/Time Sodium 135 (L) 03/17/2021 02:47 AM  
 Potassium 3.7 03/17/2021 02:47 AM  
 Chloride 106 03/17/2021 02:47 AM  
 CO2 24 03/17/2021 02:47 AM  
 Anion gap 5 03/17/2021 02:47 AM  
 Glucose 161 (H) 03/17/2021 02:47 AM  
 BUN 22 (H) 03/17/2021 02:47 AM  
 Creatinine 0.92 03/17/2021 02:47 AM  
 BUN/Creatinine ratio 24 (H) 03/17/2021 02:47 AM  
 GFR est AA >60 03/17/2021 02:47 AM  
 GFR est non-AA >60 03/17/2021 02:47 AM  
 Calcium 8.5 03/17/2021 02:47 AM  
 
Lab Results Component Value Date/Time  Protein, total 7.5 03/17/2021 02:47 AM  
 Albumin 2.8 (L) 03/17/2021 02:47 AM  
 
   
 
Total time:  
Counseling / coordination time:  
> 50% counseling / coordination?:

## 2021-03-19 NOTE — DISCHARGE SUMMARY
Hospitalist Discharge Summary Patient ID: 
Mala Fountain [id number] 
72 y.o. 
1950 3/14/2021 PCP on record: Leo Maya MD 
 
Admit date: 3/14/2021 Discharge date and time: 3/19/2021 DISCHARGE DIAGNOSIS: 
 
Catastrophic Left CALE and MCA territory stroke POA- remains unresponsive, going home on Hospice today with [facility] as arranged BilateraL  ICA stenosis > 90% POA Small cell lung carcinoma RLL POA diagnosed 1/22/2021T4 N0 
COPD Acute respiratory failure with hypoxia POA currently on oxygen Chronic systolic CHF LVEF 25 to 26% Permanent atrial fibrillation S/P AV pacer Hyperlipidemia Essential HTN 
DNR 
 
 
CONSULTATIONS: 
IP CONSULT TO NEUROLOGY Excerpted HPI from H&P of Jony Beatty MD: 
\"41 yo AAF with h/o small cell CA s/p RLL wedge resection, COPD, 40pack yrs of smoking hx. She is brought to ER due to unresponsiveness since this morning. She was last know awake by around 10:40pm yesterday and this morning she did not wake up and unarousable. In ER she is found to have large left MCA and CALE territory stroke with local mass effect. She is minimally responsive and spontaneously moves her left side and opens eyes but does not follow commands. CTA showed L ICA occlusion. Neuro IR and tele neurology was called by ED staff and deemed not a candidate for neuro intervetion and not candidate for TPA. She is going to be admitted to ICU for further care and monitoring\" 
 
______________________________________________________________________ DISCHARGE SUMMARY/HOSPITAL COURSE:  for full details see H&P, daily progress notes, labs, consult notes. Left CALE and MCA territory stroke POA BilateraL  ICA stenosis > 90% POA Found unresponsive at home last known well was the evening before Brought to emergency room Admit head CT scan Large acute left MCA and CALE infarcts Local mass effect but no midline shift or herniation and no hemorrhage.  
CTA with greater 90% right internal carotid artery stenosis Greater than 90% left internal carotid stenosis Repeat head CT scan 3/15 with further evolution of the left hemispheric infarct Increased mass-effect but no evidence of hemorrhagic transformation Opens eyes, dense right hemiparesis, aphasic Seen by Dr. Letty Cordoba from neurology NG tube in place Currently on aspirin, Eliquis, Lipitor Prognosis guarded, 
Transition to home with hospice as able otherwise [facility] Hospice-- s/pHospice Family meeting again today - DC home with Hospice Friday 10 AM- transport set up 
  
Small cell lung carcinoma RLL POA diagnosed 1/22/2021 T4 N0 
COPD Focal pleural involvement PET Scan with no metastatic disease 
 RLL wedge resection, 01/22/2021 Adjuvant chemotherapy with Dr. Luzma Heaton ongoing 
            Carboplatin + Etoposide 
  
Plan to keep comfort care now 
  
Acute respiratory failure with hypoxia POA currently on 6 liters 
stable sats will wean as tolerated 
  
Chronic systolic CHF LVEF 25 to 29%-  
Permanent atrial fibrillation S/P AV pacer Hyperlipidemia Essential HTN. Continue aspirin, Eliquis, Lipitor for now Plan is to comfort care now 
  Body mass index is 25.63 kg/m². 
  
Code status: DNR/DNI now 
 
 
 
_______________________________________________________________________ Patient seen and examined by me on discharge day. Pertinent Findings: 
Gen:    Not in distress Chest: Clear lungs CVS:   Regular rhythm. No edema Abd:  Soft, not distended, not tender Neuro:  obtunded, orientedx 0 
_______________________________________________________________________ DISCHARGE MEDICATIONS:  
Current Discharge Medication List  
  
START taking these medications Details  
aspirin 81 mg chewable tablet Take 1 Tab by mouth daily. Qty: 30 Tab, Refills: 0 CONTINUE these medications which have NOT CHANGED  Details  
prochlorperazine (Compazine) 10 mg tablet Take 1 Tab by mouth every six (6) hours as needed for Nausea or Vomiting for up to 60 days. Qty: 40 Tab, Refills: 2 Associated Diagnoses: Small cell lung cancer, right upper lobe (HCC)  
  
ondansetron (ZOFRAN ODT) 4 mg disintegrating tablet Take 1 Tab by mouth every eight (8) hours as needed for Nausea or Vomiting. Qty: 40 Tab, Refills: 2 Associated Diagnoses: Small cell lung cancer, right upper lobe ([address] budesonide-formoteroL (Symbicort) 160-4.5 mcg/actuation HFAA INHALE ONE PUFF BY MOUTH TWICE DAILY Qty: 1 Inhaler, Refills: 3 Associated Diagnoses: Chronic obstructive pulmonary disease with acute exacerbation (HCC)  
  
albuterol (PROVENTIL HFA, VENTOLIN HFA, PROAIR HFA) 90 mcg/actuation inhaler Take 2 Puffs by inhalation every four (4) hours as needed for Wheezing. Qty: 1 Inhaler, Refills: 1  
  
albuterol (PROVENTIL VENTOLIN) 2.5 mg /3 mL (0.083 %) nebu 3 mL by Nebulization route every four (4) hours as needed for Wheezing. Qty: 24 Each, Refills: 2 Associated Diagnoses: SOB (shortness of breath)  
  
acetaminophen (TYLENOL) 325 mg tablet Take 2 Tabs by mouth every four (4) hours as needed for Pain or Fever. Qty: 40 Tab, Refills: 0  
  
benzocaine-menthol (CHLORASEPTIC MAX) 15-10 mg lozg lozenge Take 1 Lozenge by mouth every four to six (4-6) hours as needed for Sore throat. Qty: 30 Each, Refills: 0 STOP taking these medications  
  
 cholecalciferol, vitamin D3, (VITAMIN D3 PO) Comments:  
Reason for Stopping:   
   
 lidocaine-prilocaine (EMLA) topical cream Comments:  
Reason for Stopping:  SITagliptin (Januvia) 50 mg tablet Comments:  
Reason for Stopping:   
   
 simvastatin (ZOCOR) 20 mg tablet Comments:  
Reason for Stopping:   
   
 cyclobenzaprine (FLEXERIL) 10 mg tablet Comments:  
Reason for Stopping:   
   
 gabapentin (NEURONTIN) 800 mg tablet Comments:  
Reason for Stopping:   
   
 carvediloL (COREG) 6.25 mg tablet Comments:  
Reason for Stopping:   
   
 tiotropium (Spiriva with HandiHaler) 18 mcg inhalation capsule Comments:  
Reason for Stopping:   
   
 colchicine 0.6 mg tablet Comments:  
Reason for Stopping:   
   
 diclofenac (VOLTAREN) 1 % gel Comments:  
Reason for Stopping:   
   
 Eliquis 5 mg tablet Comments:  
Reason for Stopping:   
   
 glucose blood VI test strips (BLOOD GLUCOSE TEST) strip Comments:  
Reason for Stopping:   
   
 lancets misc Comments:  
Reason for Stopping:   
   
 guaiFENesin ER (MUCINEX) 600 mg ER tablet Comments:  
Reason for Stopping:   
   
 cod liver oil cap Comments:  
Reason for Stopping:   
   
  
 
 
 
Patient Follow Up Instructions: Activity: Bedrest 
Diet: Comfort feeding as tolerated Follow-up with  hospice at 11 am today as planned 
______________ Risk of deterioration: High 
 
Condition at Discharge:  Stable 
__________________________________________________________________ Disposition Home with hospice services 
 
____________________________________________________________________ Code Status: DNR/DNI 
___________________________________________________________________ Total time in minutes spent coordinating this discharge (includes going over instructions, follow-up, prescriptions, and preparing report for sign off to her PCP) :  >30 minutes Signed: 
Deidre Ba MD

## 2021-03-19 NOTE — PROGRESS NOTES
Problem: Falls - Risk of 
Goal: *Absence of Falls Description: Document Marisela Wade Fall Risk and appropriate interventions in the flowsheet. Note: Fall Risk Interventions: 
  
 
Mentation Interventions: Bed/chair exit alarm, More frequent rounding Medication Interventions: Bed/chair exit alarm Elimination Interventions: Bed/chair exit alarm, Call light in reach, Toileting schedule/hourly rounds History of Falls Interventions: Bed/chair exit alarm, Consult care management for discharge planning, Door open when patient unattended, Evaluate medications/consider consulting pharmacy

## 2021-03-19 NOTE — PROGRESS NOTES
Transition of Care Plan: 
  
RUR: 32% - \"high risk\" Disposition: in-pt hospice Follow up appointments: Pt d/c home with hospice DME needed: [facility] to coordinate any DME necessary for d/c Transportation at Discharge: [facility] transport secured for 10:00 AM canceled; pt to remain hospitalized to transition to in-pt hospice [place] or means to access home: Pt's daughter & granddaughter will be present at the home to allow medical transport crew inside  IM Medicare letter: 2nd  to be reviewed via telephone with pt's daughter prior to d/c Caregiver Contact: Pt's daughter (Lynette Alaniserer: [phone]) Discharge Caregiver contacted prior to discharge? Pt's daughter was contacted today (3/19/21) and confirmed being agreeable to the d/c plan 
  
Update - 10:13 AM: CM received update from Korey Calle) that pt's medical status has declined & she will be admitted to in-pt hospice. Latoya Suh reported she has already contacted the pt's family to inform them of update. [facility] crew arrived on the unit to provide transport; crew informed pt will no longer be leaving. Initial note: CM acknowledged d/c. CM reviewed pt's chart to note updates. Pt will d/c home today via medical transport with [facility]. [facility] scheduled admission today for 11:00 AM. CM placed [facility]'s information in pt's AVS for reference. [facility] transport secured for 10:00 AM per [facility]'s request; PCS completed, copy on chart. CM contacted pt's daughter (Lynette Alaniserer: [phone]) to check in, review plan for d/c, and address any last minute questions or concerns. CM confirmed pt's daughter is agreeable to the d/c plan. CM informed pt's daughter of [facility] transport scheduled for 10:00 AM; daughter verbalized understanding. Pt's daughter confirmed she will be present at pt's home upon her arrival. CM inquired if pt's daughter had any questions or concerns related to the d/c plan; daughter declined. No further CM needs identified.   
 
Care Management Interventions PCP Verified by CM: Yes 
Palliative Care Criteria Met (RRAT>21 & CHF Dx)?: Yes 
Palliative Consult Recommended?: Yes Mode of Transport at Discharge: BLS(S [facility] transport secured for 10:00 AM) Hospital Transport Time of Discharge: 1000 Transition of Care Consult (CM Consult): Home Hospice [facility] HSPTL: Yes Current Support Network: Family Lives Nearby, Own Home, Lives Alone(Pt's daughter and granddaughter will be staying with her following d/c) The Plan for Transition of Care is Related to the Following Treatment Goals : hospice The Patient and/or Patient Representative was Provided with a Choice of Provider and Agrees with the Discharge Plan?: Yes Name of the Patient Representative Who was Provided with a Choice of Provider and Agrees with the Discharge Plan: Pt's daughter Amanda Harley) Freedom of Choice List was Provided with Basic Dialogue that Supports the Patient's Individualized Plan of Care/Goals, Treatment Preferences and Shares the Quality Data Associated with the Providers?: Yes [employer] Information Provided?: No 
Discharge Location Discharge Placement: Home with hospice([facility]) RONEL Valladares Care Manager, [facility] 
[phone]

## 2021-03-19 NOTE — PROGRESS NOTES
Problem: Falls - Risk of 
Goal: *Absence of Falls Description: Document Willie Roth Fall Risk and appropriate interventions in the flowsheet. Outcome: Progressing Towards Goal 
Note: Fall Risk Interventions: 
  
 
Mentation Interventions: Bed/chair exit alarm, Adequate sleep, hydration, pain control Medication Interventions: Bed/chair exit alarm, Patient to call before getting OOB Elimination Interventions: Bed/chair exit alarm, Call light in reach History of Falls Interventions: Bed/chair exit alarm, Investigate reason for fall Problem: Pressure Injury - Risk of 
Goal: *Prevention of pressure injury Description: Document Landon Scale and appropriate interventions in the flowsheet. Outcome: Progressing Towards Goal 
Note: Pressure Injury Interventions: 
Sensory Interventions: Minimize linen layers Moisture Interventions: Absorbent underpads, Internal/External urinary devices Activity Interventions: Assess need for specialty bed Mobility Interventions: Assess need for specialty bed Nutrition Interventions: Document food/fluid/supplement intake(NPO ) Friction and Shear Interventions: Minimize layers Problem: Diabetes Self-Management Goal: *Disease process and treatment process Description: Define diabetes and identify own type of diabetes; list 3 options for treating diabetes. Outcome: Progressing Towards Goal 
Goal: *Incorporating nutritional management into lifestyle Description: Describe effect of type, amount and timing of food on blood glucose; list 3 methods for planning meals. Outcome: Progressing Towards Goal 
Goal: *Incorporating physical activity into lifestyle Description: State effect of exercise on blood glucose levels. Outcome: Progressing Towards Goal 
Goal: *Developing strategies to promote health/change behavior Description: Define the ABC's of diabetes; identify appropriate screenings, schedule and personal plan for screenings.  
Outcome: Progressing Towards Goal 
Goal: *Using medications safely Description: State effect of diabetes medications on diabetes; name diabetes medication taking, action and side effects. Outcome: Progressing Towards Goal 
Goal: *Monitoring blood glucose, interpreting and using results Description: Identify recommended blood glucose targets  and personal targets. Outcome: Progressing Towards Goal 
Goal: *Prevention, detection, treatment of acute complications Description: List symptoms of hyper- and hypoglycemia; describe how to treat low blood sugar and actions for lowering  high blood glucose level. Outcome: Progressing Towards Goal 
Goal: *Prevention, detection and treatment of chronic complications Description: Define the natural course of diabetes and describe the relationship of blood glucose levels to long term complications of diabetes. Outcome: Progressing Towards Goal 
Goal: *Developing strategies to address psychosocial issues Description: Describe feelings about living with diabetes; identify support needed and support network Outcome: Progressing Towards Goal 
Goal: *Insulin pump training Outcome: Progressing Towards Goal 
Goal: *Sick day guidelines Outcome: Progressing Towards Goal 
Goal: *Patient Specific Goal (EDIT GOAL, INSERT TEXT) Outcome: Progressing Towards Goal 
  
Problem: Delirium Treatment Goal: *Level of consciousness restored to baseline Outcome: Progressing Towards Goal 
Goal: *Level of environmental perceptions restored to baseline Outcome: Progressing Towards Goal 
Goal: *Sensory perception restored to baseline Outcome: Progressing Towards Goal 
Goal: *Emotional stability restored to baseline Outcome: Progressing Towards Goal 
Goal: *Functional assessment restored to baseline Outcome: Progressing Towards Goal 
Goal: *Absence of falls Outcome: Progressing Towards Goal 
Goal: *Will remain free of delirium, CAM Score negative Outcome: Progressing Towards Goal 
Goal: *Cognitive status will be restored to baseline Outcome: Progressing Towards Goal 
Goal: Interventions Outcome: Progressing Towards Goal

## 2021-03-19 NOTE — HOSPICE
[facility] [employer] Good Help to Those in Need 
[phone] Social Work Admission Note Patient Name: Fracisco Tony YOB: 1950 Age: 72 y.o. Date of Visit: 03/19/21 Facility of Care: [address] [facility] Patient Room: [facility]/ Hospice Attending: Nikolas Agarwal MD 
Hospice Diagnosis: Acute CVA (cerebrovascular accident) [facility]) [I63.9] Level of Care:  
 [x]  GIP []  Respite 
 []  Routine Consents/NCD Documentation:  
 
Consents Reviewed: Yes Person Reviewed/Signed with: Tyrone Berger (daughter and MPOA) Right to NCD Reviewed: Yes NCD Requested: No 
 
Admission Nurse/Intake Notified NCD was requested: Yes Planned Start of Care Date: 3/19/2021 Hospice Witness Representative: Jeannie KEEN  
MSW visited with pt and family. Pt's daughter Reyes Salinas, brother, and niece were present. Hospice RN, MD, and NP present. Pt admitted to hospice 3/19/2021 with a hospice diagnosis of acute CVA. This MSW signed consents with Reyes Salinas yesterday in anticipation of home admit, however due to pt's condition, pt was admitted GIP. Family was tearful but accepting of prognosis and comfort care. Other family members plan to visit throughout the day and weekend. Reyes Salinas reported she does not plan to stay long today and will not spend the night with pt. RN Darrian Barbour provided family with 24/7 hospice phone number for any questions or concerns. MSW provided emotional support and supportive counseling in processing pt's prognosis and decline. Pt is DNR. [facility] to serve. BR is moderate. MSW will continue to follow. ADVANCE CARE PLANNING Code Status: DNR Durable DNR: _ Yes  X_ No 
Advance Care Planning 3/9/2021 Patient's Healthcare Decision Maker is: Named in scanned ACP document Primary Decision Maker Name -  
Primary Decision Maker Phone Number -  
Primary Decision Maker Relationship to Patient - Secondary Decision Maker Name - Secondary Decision Maker Phone Number - Secondary Decision Maker Relationship to Patient -  
Confirm Advance Directive Yes, on file Patient Would Like to Complete Advance Directive - Relationship Status: 
[]  Single    
[]       
[]     
[]  Domestic Partner    
[x]  / 
[]  Common Law 
[]   
[]  Unknown If in a relationship, name of partner/spouse: 
Duration of relationship: 
 
Baptist: Scientologist  Home: [facility] ([phone]) Resources Provided: Bereavement services Social Work Initial Assessment Gender: 
female Race/Ethnicity: (thaddeus all that apply) []  [place] ([facility]) or [place] Native 
[]  [place] 
[x]  [place] or [place] 
[]   or  
[]  Native [place] or [place] 
[]  [place] 
[]  Unknown  Service:   
[]  Yes  
[x]  No      
[]  Unknown Appropriate for Pinning Ceremony:  
[]  Yes     
[x]  No 
Is patient using VA benefits? []  Yes     
[]  No 
  
Primary Language: English 
[]   Needed 
[]   utilized during visit Ability to express thoughts/needs/feelings 
[]  Expressed thoughts/feelings/needs without difficulty 
[]  Requires extra time and cuing 
[]  Speech limited single words 
[]  Uses only gestures (eye, blinking eye or head movement/pointing) []  Unable to express thoughts/feelings/needs (speech unintelligible or inappropriate) [x]  Unresponsive Notes:  
  
Mental Status: 
[]  Alert-oriented to:   
 []  Person   
 []  Place   
 []  Time 
[]  Comatose-responds to:  
 []   Verbal stimuli  
 []  Tactile stimuli  
 []  Painful stimuli 
[]  Forgetful 
[]  Disoriented/Confused 
[]  Lethargic 
[]  Agitated 
[x]  Other (specify): Unresponsive Notes:  
  
Patients description of Illness/Current Health Status:   
[x]  Patient unable to discuss 
[]  Patient unwilling to discuss [x]  (Specify)    Unresponsive Knowledge/Understanding of Disease Process Patient:  
 []  Demonstrates knowledge/understanding of disease process 
 []  Demonstrates knowledge/understanding of treatment plan 
 []  Demonstrates knowledge/understanding of prognosis []  Demonstrates acceptance of prognosis []  Demonstrates knowledge/understanding of resuscitation status [x]  Other (specify) Unresponsive Caregiver: 
 [x]  Demonstrates knowledge/understanding of disease process [x]  Demonstrates knowledge/understanding of treatment plan 
 [x]  Demonstrates knowledge/understanding of prognosis [x]  Demonstrates acceptance of prognosis [x]  Demonstrates knowledge/understanding of resuscitation status 
 []  Other (specify) Notes:  
  
Patients living arrangement/care setting: 
Use the PRIOR COLUMN when the PATIENTS current health status necessitated a change in his/her primary residence. Prior Current Response [x]             []    Patients own home/residence []             []    Home of family member/friend []             []    Boarding home  
           []             []    Assisted living facility/shelter center []             []    Hospital/Acute care facility []             []    Skilled nursing facility []             []    Long term care facility/Nursing home  
           []             [x]    Hospice in Patient Primary Caregiver: 
[]  No Primary Caregiver Name of Primary Caregiver: Kavya Salazar Relationship or Primary Caregiver:  
 []  Spouse/Significant other     
 [x]  Natural Child      
 []  Step child     
 []  Sibling 
 []  Parent 
 []  Friend/Neighbor 
 []  Community/Sabianism Volunteer 
 []  Paid help 
 []  Other (specify):___________ Notes:   
  
Family members/Significant others: 
Name: [employer]  Relationship: granddaughter Phone Number:  [phone] Actively involved in care? [x]  Yes  []  No 
 
Name: 
Relationship: 
Phone Number: Actively involved in care?   []  Yes  []  No 
 
Name: Relationship: 
Phone Number: Actively involved in care? []  Yes  []  No 
 
Social support systems: (select ONE best description) [x]  Excellent social support system which includes three or more family members or friends 
[]  Good social support system which includes two or less members or friends 
[]  [address] support which includes one family member or friend 
[]  Poor social support; no family members or friends; basically ALONE Notes:  
  
Emotional Status: (thaddeus all that apply) Patient Caregiver Response [x]                [x]    Mood/Affect stable and appropriate []                []    Angry  
              []                []    Anxious []                []    Apprehensive []                []    Avoidant  
              []                []    Clinging  
              []                []    Depressed  
              []                []    Distraught  
              []                []    Elated []                []    Euphoric  
              []                []    Fearful  
              []                []    Flat Affect  
              []                []    Helpless []                []    Hostile []                []    Impulsive []                []    Irritable  
              []                []    Labile  
              []                []    Manic  
              []                []    Restlessness []                [x]    Sad  
              []                []    Suspicious []                [x]    Tearful  
              []                []    Withdrawn Notes:  
 
Coping Skills (strengths/weakness):  
 Patient: Coping Skills (strength/weakness): Unresponsive Family/caregiver (strength/weakness): Good familial support, realistic, knowledge of disease/prognosis, quick decline, additional familial stressors [place] of care (thaddeus all that apply):    
[x]  No burden evident  
[]  Family must administer medications  
[]  Illness causing financial strain  
[]  Family/Support feels overwhelmed  
[]  Family/Support sleep disturbed with patients care  
[]  Patients care causes extra physical stress  of death 
[]  Illness causes changes in family lifestyle 
[]  Illness impacting family/support employment 
[]  Family experiencing increased time demands 
[]  Patients behavior endangers family 
[]  Denial of patients illness 
[]  Concern over outcome of illness/fear 
[]  Patients behavior embarrassing to family Notes:  
  
Risk Factors: (thaddeus all that apply):   
[x]  No burden evident  
[]  Alcohol abuse 
[]  Financial resources inadequate to meet basic needs (food/house/etc) []  Financial resources inadequate to meet health care needs (supplies/equipment/medications) 
[]  Food/nutrition resources inadequate 
[]  Home environment unsafe/inadequate for home care 
[]  Homicidal risk 
[]  Lives alone or without concerned relatives 
[]  Multiple medications/complex schedule 
[]  Physical limitations increase likelihood of falls 
[]  Plan of care/treatments complicated 
[]  Substance use/abuse 
[]  Suicidal risk 
[]  Visual impairment threatens safety/ability to perform self-care 
[]  Other (specify): Abuse/Neglect (actual/potential risks): 
[x]  No signs of abuse/neglect 
[]  History of abuse/neglect                 []  Physical          []  Sexual 
[]  History of domestic violence 
[]  Lacks adequate physical care 
[]  Lacks emotional nurturing/support 
[]  Lacks appropriate stimulation/cognitive experiences 
[]  Left alone inappropriately 
[]  Lacks necessary supervision 
[]  Inadequate or delayed medical care 
[]  Unsafe environment (i.e guns/drug use/history of violence in the home/etc.) []  Bruising or other physical signs of injury present 
[]  Other (specify): 
Notes:  
[]  Refer to child/adult protective services Current Sources of Stress (in Addition to Current Illness):  
[x]  None reported 
[]  Bills/Debt   
[]  Career/Job change   
[]   (short term) []   (long term)   
[]  Death of a child (recent) []  Death of a parent (recent) []  Death of a spouse (recent) []  Employment status changed  
[]  Family discord   
[]  Financial loss/Inadequate inther (specify):come 
[]  Job loss 
[]  Legal issues unresolved 
[]  Lifestyle change 
[]  Marital discord 
[]  Marriage within the last year 
[]  Paperwork (insurance/legal/etc) overwhelming 
[]  Separation/Divorce 
[]  Other (specify): 
Notes:  
  
Current Community Resources Being Utilized 1. Interventions/Plan of Care 1. Assess social and emotional factors related to coping with end of life issues 2. Community resource planning/referral  
3. Relocation to different care setting if/when symptoms stabilize 4. Discharge Planning 1. D/c home with daughter Stella Mendoza and granddaughter Daphne De Los Santos MSW Assessment Completed by: Raysa Brewer LMSW 
03/19/21 Time In: 11:40 am      
Time Out: 12:15 pm

## 2021-03-19 NOTE — [facility] IDG SOCIAL WORKER NOTES
MSW visited with pt and family. Pt's daughter Dwayne Serna, brother, and niece were present. MSW will provide emotional support and supportive counseling in processing pt's prognosis and decline. Pt is DNR. [facility] to serve. BR is moderate.

## 2021-03-19 NOTE — PROGRESS NOTES
TRANSFER - OUT REPORT: 
 
Verbal report given to [facility], RN(name) on [place]  being transferred to Oncology 200 (unit) for routine progression of care   HOSPICE Report consisted of patients Situation, Background, Assessment and  
Recommendations(SBAR). Information from the following report(s) SBAR, Kardex, Intake/Output, MAR and Med Rec Status was reviewed with the receiving nurse. Lines:  
Peripheral IV 03/19/21 Right Antecubital (Active) Opportunity for questions and clarification was provided. Patient transported with: 
 O2 @ 3 liters

## 2021-03-19 NOTE — DISCHARGE INSTRUCTIONS
HOSPITALIST DISCHARGE INSTRUCTIONS    NAME: Ulises Simon   :  1950   MRN:  [id number]     Date/Time:  3/19/2021 7:58 AM    ADMIT DATE: 3/14/2021     DISCHARGE DATE: 3/19/2021     DISCHARGE DIAGNOSIS:  Catastrophic Left CALE and MCA territory stroke POA- remains unresponsive, going home on Hospice today with BS hospice as arranged  BilateraL  ICA stenosis > 90% POA  Small cell lung carcinoma RLL POA diagnosed 2021T4 N0  COPD  Acute respiratory failure with hypoxia POA currently on oxygen  Chronic systolic CHF LVEF 25 to 64%  Permanent atrial fibrillation  S/P AV pacer  Hyperlipidemia  Essential HTN  DNR    Active Problems:    Acute ischemic stroke ([facility] [address] (3/14/2021)         MEDICATIONS:  As per medication reconciliation  list  · It is important that you take the medication exactly as they are prescribed. · Keep your medication in the bottles provided by the pharmacist and keep a list of the medication names, dosages, and times to be taken in your wallet. · Do not take other medications without consulting your doctor. Pain Management: per above medications    What to do at [address]:  Comfort feeding as able    Recommended activity: Bedrest    If you have questions regarding the hospital related prescriptions or hospital related issues please call [facility] at . Follow Up:  Dr. Deshaun Lopes MD as needed  [facility] as arranged at 11 am today at home      Information obtained by :  I understand that if any problems occur once I am at home I am to contact my physician. I understand and acknowledge receipt of the instructions indicated above.                                                                                                                                            Physician's or R.N.'s Signature                                                                  Date/Time Patient or Representative Signature                                                          Date/Time

## 2021-03-20 NOTE — PROGRESS NOTES
Responded to death on Oncology. Family was at bedside. Offered condolences and emotional support. Adjusted patient's bed at request of family. No spiritual needs were expressed. Offered assurance of prayer. Consulted with eBrtha Solitario RN who shared family had surrounded bed and prayed as patient passed peacefully away. VIVI Bloom, [facility], Staff  [facility]  Paging Service  [phone])

## 2021-03-20 NOTE — ROUTINE PROCESS
Bedside and Verbal shift change report given to KATIE Navarro (oncoming nurse) by [facility], RN (offgoing nurse). Report included the following information SBAR, Kardex and Quality Measures.

## 2021-03-20 NOTE — PROGRESS NOTES
End of Shift Note Bedside shift change report given to [facility] (oncoming nurse) by Beverley Pradhan (offgoing nurse). Report included the following information SBAR, Kardex and [facility] Shift worked:  7p-7a Shift summary and any significant changes:  
  Patient has been resting comfortably throughout my shift. Patient was given all scheduled medications, see MAR. Ativan was given four times and Morphine was given once due to restlessness and short of breath, see PRN MAR. Patient has been repositioned and Pena care has been done. Family was present at the bedside throughout my shift and education was provided about meication. Concerns for physician to address:   
  
Zone phone for oncoming shift:    
  
 
Activity: 
Activity Level: Bed Rest 
Number times ambulated in hallways past shift: 0 Number of times OOB to chair past shift: 0 Cardiac:  
Cardiac Monitoring: No     
  
 
Access:  
Current line(s): PIV Genitourinary:  
Urinary status: pena Respiratory:  
O2 Device: Nasal cannula Chronic home O2 use?: NO Incentive spirometer at bedside: NO 
  
GI: 
  
Current diet:  DIET ONE TIME MESSAGE Passing flatus: YES Tolerating current diet: YES Pain Management:  
Patient states pain is manageable on current regimen: YES Skin: 
Landon Score: 11 Interventions: float heels and internal/external urinary devices Patient Safety: 
Fall Score: Total Score: 2 Interventions: bed/chair alarm High Fall Risk: Yes Length of Stay: 
Expected LOS: - - - Actual LOS: 1 Beverley Pradhan

## 2021-03-20 NOTE — HOSPICE
[employer] Good Help to Those in Need 
[phone] Nursing Note Patient Name: Paulie Wang YOB: 1950 Age: 72 y.o. Vishal Gay RN Note:   
 
While at bedside with family present, patient had stopped breathing: No RR nor pulses for 1 full minute. TOD:  1125 Family very emotional, tearful.  paged.

## 2021-03-20 NOTE — HOSPICE
[employer] Good Help to Those in Need 
[phone] Discharge/Death Nursing Note Patient Name: Paulie Wang YOB: 1950 Age: 72 y.o. Date of Death: 21 Admitted Date: 3/19/2021 Time of Death: [id number] Facility of Care: [facility] Level of Care: [facility] Patient Room: [address] [place] [address] Hospice Attending: Shayy Mayer MD 
Hospice Diagnosis: Acute CVA (cerebrovascular accident) [facility]) [I63.9] Death Pronouncement Pronouncement of death completed by: Bola Cox hospice RN Agency staff was present at the time of death At the time of death the patient was documented as: 
 
No RR nor pulses for 1 full minute The pt  within the Oncology Unite The following were notified of the patient's death: Huong/dtr and 3 other family members present at bedside Medications were disposed of per facility protocol Discharge Summary Discharge Reason: Death Summary of Care Provided: 
 
[x] Post mortem care provided by Oncology Unit staff [x] Notification of  home by nursing supervisor 
[] Referrals/Community resources provided:  
[] Goals completed 
[] Durable Medical Equipment vendor notified Disciplines involved: [x] RN [] SW [x]  [] BARON [] Vol [] PT [] OT [] ST [] BC 
 
[x] IDT communication/notification Attending Physician, Dr. Asuncion Hernandez, notified of death Bereaved See initial assessment Advance Care Planning 3/9/2021 Patient's Healthcare Decision Maker is: Named in scanned ACP document Primary Decision Maker Name -  
Primary Decision Maker Phone Number -  
Primary Decision Maker Relationship to Patient - Secondary Decision Maker Name - Secondary Decision Maker Phone Number - Secondary Decision Maker Relationship to Patient -  
Confirm Advance Directive Yes, on file Patient Would Like to Complete Advance Directive -

## 2021-03-21 ENCOUNTER — HOME CARE VISIT (OUTPATIENT)
Dept: HOSPICE | Facility: HOSPICE | Age: 71
End: 2021-03-21
Payer: MEDICARE

## 2021-03-22 NOTE — HOSPICE
[address] Bereavement/Condolence Call: This MSW called pt's daughter Stan Hoang to offer condolences and support. Ziggy Show reported she was okay but felt overwhelmed with resolving pt's affairs. [place] Show requested a letter with the date pt was admitted to hospice and the date of death. MSW will complete and e-mail to Ziggy Show today. MSW offered [address] information for ongoing support. Allison Jensen, RONEL [address] [facility] Worker [phone]

## 2021-03-23 NOTE — DISCHARGE SUMMARY
Hospice Discharge Summary [employer] Good Help to Those in Need Date of Admission: 3/19/2021 Date of Discharge: 3/20/2021 Nikky Beltrán is a 72y.o. year old who [facility] diagnosis of Acute CVA (cerebrovascular accident) ([facility] [address] [I63.9]. The patient's care was focused on comfort and the patient passed away on 3/20/2021. Bianca Velásquez

## 2021-03-29 ENCOUNTER — APPOINTMENT (OUTPATIENT)
Dept: INFUSION THERAPY | Age: 71
End: 2021-03-29

## 2021-03-30 ENCOUNTER — APPOINTMENT (OUTPATIENT)
Dept: INFUSION THERAPY | Age: 71
End: 2021-03-30

## 2021-03-31 ENCOUNTER — APPOINTMENT (OUTPATIENT)
Dept: INFUSION THERAPY | Age: 71
End: 2021-03-31

## 2021-10-01 NOTE — PROGRESS NOTES
Chief Complaint   Patient presents with    Arm Pain     right     1. Have you been to the ER, urgent care clinic since your last visit? Hospitalized since your last visit? No    2. Have you seen or consulted any other health care providers outside of the [address] [place] [address] since your last visit? Include any pap smears or colon screening.  No Pt states  GF is positive for Chlamydia.  States he was exposure and would like an STD test.

## 2021-10-29 NOTE — H&P
Hospitalist Admission Note NAME: Angelique Hamman :  1950 MRN:  [id number] Date/Time:  2021 11:49 PM 
 
Patient PCP: Dahlia Dillon MD 
________________________________________________________________________ My assessment of this patient's clinical condition and my plan of care is as follows. Assessment / Plan: 
Patient 81-year-old female recently diagnosed with small cell lung cancer presented with lower extremity pain, MODE, lactic acidosis, elevated D-dimer, concern for pulmonary embolism 1. MODE: Multifactorial etiology, prerenal azotemia suspected. We will start patient on gentle hydration, monitor renal function, intake output. Avoid nephrotoxins. Hold diuretics today. 2.  Lactic acidosis: No leukocytosis patient not febrile, will gently hydrate. Repeat labs. Will check procalcitonin. 3.  Elevated D-dimer: Patient on Eliquis twice daily. Reports taking meds. Negative DVT screening by ultrasound. Will admit patient for VQ scan. 4.  Diabetes mellitus with hyperglycemia: Sliding scale insulin 5. Small cell lung cancer: Negative lymph node. Patient with large tumor, will consult oncology. 6.  Lower extremity pain: Negative for DVT. Continue symptomatic treatment. Neuropathy, paraneoplastic syndrome suspected. Continue gabapentin. Code Status: Patient wishes to be full code Surrogate Decision Maker: DVT Prophylaxis: Patient on Eliquis GI Prophylaxis: not indicated Baseline: Independent ADLs FUNCTIONAL STATUS PRIOR TO ADMISSION: Ambulates Independently Patient is from: Home Subjective: CHIEF COMPLAINT: Leg pain HISTORY OF PRESENT ILLNESS:    
 Cleopatra Xie is a 72 y.o.  female who presents with above symptoms. Reports it started yesterday. Progressively worse. Reports improved by  some exercises that she did while sitting down. As symptoms not resolved and was worsening  presented to emergency room for further evaluation. Recently diagnosed with small cell lung cancer. She is status post VATS with wedge resection of tumor 10 x 5 x 4.5 cm, negative all lymph nodes. Patient reports was feeling well after surgery. She was admitted for 4 days here. Discharged in a stable condition. Patient denies any shortness of breath after discharge. When patient arrives in the emergency room basic labs shows MODE, Doppler ultrasound negative for DVT as patient presented with lower extremity pain. Chest x-ray shows some changes on the right lung when chest x-ray is compared to previous x-ray. Otherwise no acute findings. Elevated D-dimer as well as elevated lactic acid. Hospitalist consulted for admission work-up of elevated D-dimer, rule out PE. We were asked to admit for work up and evaluation of the above problems. Past Medical History:  
Diagnosis Date  Arthritis   
 left shoulder  Atrial fibrillation ([address] 6/2/2010 Dr. Radhames Hess  CAD (coronary artery disease) stent; Dr. Radhames Hess  Cancer ([facility] lung  Celiac disease  Chronic diastolic heart failure ([address] 09/22/2014 Dr. Radhames Hess  Chronic kidney disease   
 per cardio note  Chronic pain   
 left thigh:  L SFA intervention by Dr. Tricia Richardson 07/2018, as of 9/6/18:  still causing pain, pt to have MILLA checked per Dr. Tricia Richardson note  Chronic systolic HF (heart failure) ([address] 5/10/2017  
 4/2017 EF 25-30%  Congestive heart failure (CHF) ([address]  COPD (chronic obstructive pulmonary disease) ([address] 6/2/2010 Dr. Royce Lovelace  Diabetes ([facility] [facility] Dr. Tunde Cade; no current medication per pt  Emphysema, unspecified ([address] Dr. Royce Lovelace  Fibroid PT STATES NOT HAVING FIBROIDS  Frequent falls   Gout  Heart failure ([facility] [address] Dr. Ras Posadas  History of Clostridium difficile infection 5/10/2017  
 2017 CDiff positive  Hypertension 2010 Dr. Analy Juarez  Hypertensive heart and chronic kidney disease   
 per PCP note  Hypotension 5/10/2017  Junctional tachycardia ([facility] 5/10/2017 Dr. Ras Posadas  Neuropathy  NIDDM (non-insulin dependent diabetes mellitus) 2010  PAD (peripheral artery disease) ([facility] [facility]  S/P ablation of atrial fibrillation 2018  Screening mammogram 5/4/10  
 SOB (shortness of breath) 2014 Dr. Adelita Hanley  SSS (sick sinus syndrome) ([facility] [address]   
 per pacemaker form 18  Weakness   
 per pt weakness from c-diff , mulitple falls with injury to rotator cuff Past Surgical History:  
Procedure Laterality Date  COLONOSCOPY N/A 2017 COLONOSCOPY with biopsy performed by Eden Li MD at [facility] AMBULATORY OR  
 HX AFIB ABLATION  2018  
 has had 2 ablations per patient [facility]  HX HEART CATHETERIZATION  2018  HX HEENT    
 HX OTHER SURGICAL    
 adrenal gland removed  HX PACEMAKER Left Biotronik model: Gypsy Dawley  HX ROTATOR CUFF REPAIR Right  HX WISDOM TEETH EXTRACTION X2  
 GA CARDIAC SURG PROCEDURE UNLIST    
 3 cardiac stent placed  GA EXCISE ADRENAL GLAND Right 1994  VASCULAR SURGERY PROCEDURE UNLIST  2018 Left SFA intervention ; Dr. uYdelka Erazo Social History Tobacco Use  Smoking status: Former Smoker Packs/day: 0.50 Years: 42.00 Pack years: 21.00 Types: Cigarettes Start date: 5 Quit date: 2009 Years since quittin.0  Smokeless tobacco: Never Used Substance Use Topics  Alcohol use: No  
  
 
Family History Problem Relation Age of Onset  Heart Disease Mother  Diabetes Father  Heart Disease Brother  Other Son   
 MURDERED Allergies Allergen Reactions  Crestor [Rosuvastatin] Myalgia  Levaquin [Levofloxacin] Nausea Only GI Upset  Lipitor [Atorvastatin] Diarrhea  Lyrica [Pregabalin] Myalgia Prior to Admission medications Medication Sig Start Date End Date Taking? Authorizing Provider  
calcium-vitamin D (OS-ROSA MARIA +D3) 500 mg-200 unit per tablet Take 1 Tab by mouth daily (with breakfast) for 30 days. Indications: low amount of calcium in the blood 1/26/21 2/25/21  Caterina Degroot NP  
oxyCODONE IR (ROXICODONE) 5 mg immediate release tablet Take 1 Tab by mouth every six (6) hours as needed for Pain for up to 7 days. Max Daily Amount: 20 mg. 1/25/21 2/1/21  Caterina Degroot NP  
gabapentin (NEURONTIN) 800 mg tablet TAKE 1 TABLET BY MOUTH THREE TIMES DAILY 12/29/20   Porfirio Warren NP  
budesonide-formoteroL (Symbicort) 160-4.5 mcg/actuation HFAA INHALE ONE PUFF BY MOUTH TWICE DAILY 11/24/20   Farhad Perdomo MD  
carvediloL (COREG) 6.25 mg tablet TAKE 1 TABLET BY MOUTH TWICE DAILY WITH MEALS 11/24/20   Farhad Perdomo MD  
furosemide (LASIX) 20 mg tablet Take 1 Tab by mouth daily. 11/24/20   Farhad Perdomo MD  
simvastatin (ZOCOR) 20 mg tablet TAKE 1 TABLET BY MOUTH NIGHTLY 11/24/20   Farhad Perdomo MD  
SITagliptin (Januvia) 50 mg tablet Take 1 Tab by mouth daily. 11/24/20   Farhad Perdomo MD  
tiotropium (Spiriva with HandiHaler) 18 mcg inhalation capsule INHALE THE CONTENTS OF 1 CAPSULE THROUGH HANDIHALER DEVICE DAILY 11/24/20   Farhad Perdomo MD  
albuterol (PROVENTIL HFA, VENTOLIN HFA, PROAIR HFA) 90 mcg/actuation inhaler Take 2 Puffs by inhalation every four (4) hours as needed for Wheezing.  11/24/20   Farhad Perdomo MD  
colchicine 0.6 mg tablet TAKE 2 TABLETS BY MOUTH ONCE DAILY 11/20/20   Farhad Perdomo MD  
 diclofenac (VOLTAREN) 1 % gel Apply 4 g to affected area four (4) times daily as needed for Pain. 11/11/20   Yaakov Case MD  
Eliquis 5 mg tablet Take 1 tablet by mouth twice daily 10/15/20   Renetta Overton NP  
albuterol (PROVENTIL VENTOLIN) 2.5 mg /3 mL (0.083 %) nebu 3 mL by Nebulization route every four (4) hours as needed for Wheezing. 5/7/20   Yaakov Case MD  
glucose blood VI test strips (BLOOD GLUCOSE TEST) strip Use BID Dx11.9 1/24/20   Yaakov Case MD  
lancets misc Use BID 1/24/20   Yaakov Case MD  
albuterol-ipratropium (DUO-NEB) 2.5 mg-0.5 mg/3 ml nebu 3 mL by Nebulization route every four (4) hours as needed (wheezing). 11/12/19   Radha Zapata,   
acetaminophen (TYLENOL) 325 mg tablet Take 2 Tabs by mouth every four (4) hours as needed for Pain or Fever. 3/28/19   Kwesi Dubon MD  
guaiFENesin ER (MUCINEX) 600 mg ER tablet Take 1 Tab by mouth two (2) times a day. Patient taking differently: Take 600 mg by mouth as needed. 3/28/19   Kwesi Dubon MD  
benzocaine-menthol (CHLORASEPTIC MAX) 15-10 mg lozg lozenge Take 1 Lozenge by mouth every four to six (4-6) hours as needed for Sore throat. 3/28/19   Lupillo Castaneda MD  
cod liver oil cap Take 1 Cap by mouth daily. Provider, Historical  
 
 
REVIEW OF SYSTEMS:    
I am not able to complete the review of systems because: The patient is intubated and sedated The patient has altered mental status due to his acute medical problems The patient has baseline aphasia from prior stroke(s) The patient has baseline dementia and is not reliable historian The patient is in acute medical distress and unable to provide information Total of 12 systems reviewed as follows:   
   POSITIVE= underlined text  Negative = text not underlined General:  fever, chills, sweats, generalized weakness, weight loss/gain,  
   loss of appetite Eyes:    blurred vision, eye pain, loss of vision, double vision ENT:    rhinorrhea, pharyngitis Respiratory:   cough, sputum production, SOB, [place], wheezing, pleuritic pain  
Cardiology:   chest pain, palpitations, orthopnea, PND, edema, syncope Gastrointestinal:  abdominal pain , N/V, diarrhea, dysphagia, constipation, bleeding Genitourinary:  frequency, urgency, dysuria, hematuria, incontinence Muskuloskeletal : Left thigh and leg pain Hematology:  easy bruising, nose or gum bleeding, lymphadenopathy Dermatological: rash, ulceration, pruritis, color change / jaundice Endocrine:   hot flashes or polydipsia Neurological:  headache, dizziness, confusion, focal weakness, paresthesia, Speech difficulties, memory loss, gait difficulty Psychological: Feelings of anxiety, depression, agitation Objective: VITALS:   
Visit Vitals BP (!) 125/50 Pulse 86 Temp 98.4 °F (36.9 °C) Resp 18 Ht 5' 6\" (1.676 m) SpO2 96% BMI 27.61 kg/m² PHYSICAL EXAM: 
 
General:    Alert, cooperative, no distress, appears stated age. HEENT: Atraumatic, anicteric sclerae, pink conjunctivae No oral ulcers, mucosa moist, throat clear, dentition fair Neck:  Supple, symmetrical,  thyroid: non tender Lungs:   Decreased breath sound both side no Wheezing or Rhonchi. No rales. Chest wall:  No tenderness  No Accessory muscle use. Heart:   Regular  rhythm,  No  murmur   No edema Abdomen:   Soft, non-tender. Not distended. Bowel sounds normal 
Extremities: No cyanosis. No clubbing Capillary refill normal,  Radial pulse 2+,  DP 2+ Skin:     Not pale. Not Jaundiced  No rashes Psych:  Good insight. Not depressed. Not anxious or agitated. Neurologic: EOMs intact. No facial asymmetry. No aphasia or slurred speech. Symmetrical strength, Sensation grossly intact. Alert and oriented X 4.  
 
_______________________________________________________________________ Care Plan discussed with: 
 Comments Patient Family RN Care Manager Consultant:     
_______________________________________________________________________ Expected  Disposition:  
Home with Family HH/PT/OT/RN   
SNF/LTC   
[facility]   
________________________________________________________________________ TOTAL TIME:  40 Minutes Critical Care Provided     Minutes non procedure based Comments >50% of visit spent in counseling and coordination of care  Chart review Discussion with patient and/or family and questions answered  
 
________________________________________________________________________ Chaka Haddad MD 
 
Procedures: see electronic medical records for all procedures/Xrays and details which were not copied into this note but were reviewed prior to creation of Plan. LAB DATA REVIEWED:   
Recent Results (from the past 24 hour(s)) SAMPLES BEING HELD Collection Time: 01/30/21  7:59 PM  
Result Value Ref Range SAMPLES BEING HELD 1red 1blu COMMENT Add-on orders for these samples will be processed based on acceptable specimen integrity and analyte stability, which may vary by analyte. D DIMER Collection Time: 01/30/21  7:59 PM  
Result Value Ref Range D-dimer 14.45 (H) 0.00 - 0.65 mg/L FEU  
CBC WITH AUTOMATED DIFF Collection Time: 01/30/21  8:00 PM  
Result Value Ref Range WBC 10.4 3.6 - 11.0 K/uL  
 RBC 4.36 3.80 - 5.20 M/uL  
 HGB 12.1 11.5 - 16.0 g/dL HCT 37.6 35.0 - 47.0 % MCV 86.2 80.0 - 99.0 FL  
 MCH 27.8 26.0 - 34.0 PG  
 MCHC 32.2 30.0 - 36.5 g/dL  
 RDW 13.5 11.5 - 14.5 % PLATELET 341 839 - 875 K/uL MPV 9.8 8.9 - 12.9 FL  
 NRBC 0.0 0  WBC ABSOLUTE NRBC 0.00 0.00 - 0.01 K/uL NEUTROPHILS 70 32 - 75 % LYMPHOCYTES 17 12 - 49 % MONOCYTES 10 5 - 13 % EOSINOPHILS 2 0 - 7 % BASOPHILS 0 0 - 1 % IMMATURE GRANULOCYTES 1 (H) 0.0 - 0.5 % ABS. NEUTROPHILS 7.2 1.8 - 8.0 K/UL ABS. LYMPHOCYTES 1.8 0.8 - 3.5 K/UL  
 ABS. MONOCYTES 1.0 0.0 - 1.0 K/UL  
 ABS. EOSINOPHILS 0.2 0.0 - 0.4 K/UL  
 ABS. BASOPHILS 0.0 0.0 - 0.1 K/UL  
 ABS. IMM. GRANS. 0.1 (H) 0.00 - 0.04 K/UL  
 DF AUTOMATED METABOLIC PANEL, COMPREHENSIVE Collection Time: 01/30/21  8:00 PM  
Result Value Ref Range Sodium 136 136 - 145 mmol/L Potassium 4.5 3.5 - 5.1 mmol/L Chloride 103 97 - 108 mmol/L  
 CO2 22 21 - 32 mmol/L Anion gap 11 5 - 15 mmol/L Glucose 218 (H) 65 - 100 mg/dL BUN 14 6 - 20 MG/DL Creatinine 1.60 (H) 0.55 - 1.02 MG/DL  
 BUN/Creatinine ratio 9 (L) 12 - 20 GFR est AA 39 (L) >60 ml/min/1.73m2 GFR est non-AA 32 (L) >60 ml/min/1.73m2 Calcium 9.0 8.5 - 10.1 MG/DL Bilirubin, total 0.6 0.2 - 1.0 MG/DL  
 ALT (SGPT) 19 12 - 78 U/L  
 AST (SGOT) 47 (H) 15 - 37 U/L Alk. phosphatase 102 45 - 117 U/L Protein, total 8.2 6.4 - 8.2 g/dL Albumin 3.2 (L) 3.5 - 5.0 g/dL Globulin 5.0 (H) 2.0 - 4.0 g/dL A-G Ratio 0.6 (L) 1.1 - 2.2 MAGNESIUM Collection Time: 01/30/21  8:00 PM  
Result Value Ref Range Magnesium 2.0 1.6 - 2.4 mg/dL LACTIC ACID Collection Time: 01/30/21  8:00 PM  
Result Value Ref Range  Lactic acid 3.1 (HH) 0.4 - 2.0 MMOL/L  
 
 Melolabial Transposition Flap Text: The defect edges were debeveled with a #15 scalpel blade.  Given the location of the defect and the proximity to free margins a melolabial flap was deemed most appropriate.  Using a sterile surgical marker, an appropriate melolabial transposition flap was drawn incorporating the defect.    The area thus outlined was incised deep to adipose tissue with a #15 scalpel blade.  The skin margins were undermined to an appropriate distance in all directions utilizing iris scissors.

## 2021-12-15 NOTE — ANESTHESIA POSTPROCEDURE EVALUATION
Post-Anesthesia Evaluation and Assessment    Patient: Solo Land MRN: [id number]  SSN: [id number]    YOB: 1950  Age: 78 y.o. Sex: female       Cardiovascular Function/Vital Signs  Visit Vitals    /63    Pulse (!) 102    Temp 36.3 °C (97.3 °F)    Resp 17    Ht 5' 6\" (1.676 m)    Wt 73 kg (161 lb)    SpO2 92%    BMI 25.99 kg/m2       Patient is status post general anesthesia for * No procedures listed *. Nausea/Vomiting: None    Postoperative hydration reviewed and adequate. Pain:  Pain Scale 1: Numeric (0 - 10) (03/30/18 1713)  Pain Intensity 1: 3 (03/30/18 1713)   Managed    Neurological Status:   Neuro (WDL): Exceptions to WDL (03/30/18 1536)  Neuro  Neurologic State: Drowsy (03/30/18 1713)  Orientation Level: Oriented X4 (03/30/18 1713)  Cognition: Appropriate for age attention/concentration; Appropriate decision making; Appropriate safety awareness (03/30/18 1713)  Speech: Clear (03/30/18 1713)  LUE Motor Response: Purposeful (03/30/18 1713)  LLE Motor Response: Purposeful (03/30/18 1713)  RUE Motor Response: Purposeful (03/30/18 1713)  RLE Motor Response: Purposeful (03/30/18 1713)   At baseline    Mental Status and Level of Consciousness: Arousable    Pulmonary Status:   O2 Device: Room air (03/30/18 1713)   Adequate oxygenation and airway patent    Complications related to anesthesia: None    Post-anesthesia assessment completed.  No concerns    Signed By: Armando Vela MD     March 30, 2018 Attending Only

## 2022-02-16 NOTE — ED NOTES
Addended by: BRYSON KRUGER on: 2/15/2022 06:01 PM     Modules accepted: Orders     Received call from pharmacy regarding correct dosage and frequency of warafin and sotalol. Pt takes warafin 7.5 mg once per day and her last dose was last night. Pt also states she missed Saturday-Monday due to surgery. Pt also takes 120 mg of sotalol twice per day. Pharmacy notified.

## 2025-01-20 NOTE — PROGRESS NOTES
PCU SHIFT NURSING NOTE Bedside shift change report given to Verónica Simms (oncoming nurse) by Annamarie Velasquez (offgoing nurse). Report included the following information SBAR, Kardex, MAR and Recent Results. Shift Summary:  
2200- Patient noticed to have small amount of BRB from nares. Patient keeps sticking tissues up nose to stop bleeding. Patient sats WNL on RA. No distress noted. Will continue to monitor. 0500- Patient ambulated to BR on RA. Spot checked O2 sat and was 90%. Returning to bed, O2 sat 95%. No distress noted. Will continue to monitor, Admission Date 3/24/2019 Admission Diagnosis Respiratory failure with hypoxia ([facility] [J96.91] Consults None Consults []PT []OT []Speech  
[]Case Management  
  
[] Palliative Cardiac Monitoring Order []Yes []No  
 
IV drips []Yes Drip:                            Dose: 
Drip:                            Dose: 
Drip:                            Dose:  
[]No  
 
GI Prophylaxis []Yes []No  
 
 
 
DVT Prophylaxis SCDs:     
     
 Mayo stockings:     
  
[] Medication []Contraindicated []None Activity Level Activity Level: Up with Assistance Activity Assistance: Partial (one person) Purposeful Rounding every 1-2 hour? []Yes Dunham Score  Total Score: 2 Bed Alarm (If score 3 or >) []Yes  
[] Refused (See signed refusal form in chart) Landon Score  Landon Score: 17 Landon Score (if score 14 or less) []PMT consult  
[]Wound Care consult []Specialty bed  
[] Nutrition consult Needs prior to discharge:  
Home O2 required:   
[]Yes []No  
 If yes, how much O2 required? Other:  
 Last Bowel Movement:    
  
Influenza Vaccine Received Flu Vaccine for Current Season (usually Sept-March): Yes Pneumonia Vaccine Diet Active Orders Diet DIET DIABETIC CONSISTENT CARB Regular; Gluten Free LDAs Peripheral IV 03/25/19 (Active) Site Assessment Clean, dry, & intact 3/26/2019  8:00 AM  
Phlebitis Assessment 0 3/26/2019  8:00 AM  
Infiltration Assessment 0 3/26/2019  8:00 AM  
Dressing Status Clean, dry, & intact 3/26/2019  8:00 AM  
Dressing Type Tape;Transparent 3/26/2019  8:00 AM  
Hub Color/Line Status Flushed 3/25/2019 11:10 PM  
Action Taken Other (comment) 3/25/2019 11:10 PM  
Alcohol Cap Used Yes 3/25/2019 11:10 PM  
      
External Female Catheter 03/25/19 (Active) Site Assessment Clean, dry, & intact 3/26/2019  3:39 AM  
Repositioned Yes 3/26/2019  3:39 AM  
Perineal Care No 3/26/2019  3:39 AM  
Wick Changed No 3/26/2019  3:39 AM  
Suction Canister/Tubing Changed No 3/26/2019 12:01 AM  
Urine Output (mL) 300 ml 3/26/2019  6:06 AM  
            
Urinary Catheter Intake & Output Date 03/25/19 1900 - 03/26/19 0659 03/26/19 0700 - 03/27/19 2496 Shift 3103-9764 24 Hour Total 2677-2138 0646-5292 24 Hour Total  
INTAKE  
P.O.   980  980  
  P. O.   980  980 Shift Total(mL/kg)   980(13.5)  980(13.5) OUTPUT Urine(mL/kg/hr) 1100 1800 1100(1.3)  1100 Urine Voided   1100 Urine Output (mL) (External Female Catheter 03/25/19) 900 900 Shift Total(mL/kg) 1100(15.1) 1800(24.8) 1100(15.1)  1100(15.1) NET -1100 -1800 -120  -120 Weight (kg) 72.7 72.7 72.7 72.7 72.7 Readmission Risk Assessment Tool Score High Risk 36 Total Score 3 Has Seen PCP in Last 6 Months (Yes=3, No=0)  
 4 IP Visits Last 12 Months (1-3=4, 4=9, >4=11) 5 Pt. Coverage (Medicare=5 , Medicaid, or Self-Pay=4) 28 Charlson Comorbidity Score (Age + Comorbid Conditions) Criteria that do not apply:  
 . Living with Significant Other. Assisted Living. LTAC. SNF. or  
Rehab Patient Length of Stay (>5 days = 3) Expected Length of Stay 3d 19h Actual Length of Stay 1  
  
 
 
 
 Opt out

## (undated) DEVICE — CANNULA ARTHSCP L4CM DIA8MM PASSPRT BTTN

## (undated) DEVICE — PROTECTOR E TIP CLR PLAS TB

## (undated) DEVICE — CONTAINER,SPECIMEN,3OZ,OR STRL: Brand: MEDLINE

## (undated) DEVICE — (D)PREP SKN CHLRAPRP APPL 26ML -- CONVERT TO ITEM 371833

## (undated) DEVICE — HANDLE LT SNAP ON ULT DURABLE LENS FOR TRUMPF ALC DISPOSABLE

## (undated) DEVICE — BURR ACROMIONIZER 4.0 LG SUCT WDO DSPL: Brand: DYONICS / INCISOR

## (undated) DEVICE — PAD BD MATTRESS 73X32 IN STD CONVOLUTED FOAM LTX FREE

## (undated) DEVICE — Device

## (undated) DEVICE — CLIP HEMO ENDOSCP 235CM BX/10 -- RESOLUTION 360

## (undated) DEVICE — 4.5 MM INCISOR STRAIGHT BLADES,                                    POWER/EP-1, LIME GREEN, PACKAGED 6                                    PER BOX, STERILE

## (undated) DEVICE — RELOAD STPL L45MM EXTRA THCK REINF FOR SIGNIA STPLR

## (undated) DEVICE — MEDI-VAC NON-CONDUCTIVE SUCTION TUBING: Brand: CARDINAL HEALTH

## (undated) DEVICE — SPONGE GZ W4XL4IN COT 12 PLY TYP VII WVN C FLD DSGN

## (undated) DEVICE — KENDALL SCD EXPRESS SLEEVES, KNEE LENGTH, MEDIUM: Brand: KENDALL SCD

## (undated) DEVICE — STRAP,POSITIONING,KNEE/BODY,FOAM,4X60": Brand: MEDLINE

## (undated) DEVICE — SHOULDER W/POUCH II-LF: Brand: MEDLINE INDUSTRIES, INC.

## (undated) DEVICE — SUT SLK 0 30IN FSL BLK --

## (undated) DEVICE — CONNECTOR TBNG WHT PLAS SUCT STR 5IN1 LTWT W/ M CONN

## (undated) DEVICE — STERILE POLYISOPRENE POWDER-FREE SURGICAL GLOVES: Brand: PROTEXIS

## (undated) DEVICE — TUBING, SUCTION, 1/4" X 10', STRAIGHT: Brand: MEDLINE

## (undated) DEVICE — INFECTION CONTROL KIT SYS

## (undated) DEVICE — UNIVERSAL STAPLER: Brand: ENDO GIA ULTRA

## (undated) DEVICE — SUTURE VCRL SZ 2-0 L27IN ABSRB VLT L26MM UR-6 5/8 CIR J602H

## (undated) DEVICE — CONTINU-FLO SOLUTION SET, 2 INJECTION SITES, MALE LUER LOCK ADAPTER WITH RETRACTABLE COLLAR, LARGE BORE STOPCOCK WITH ROTATING MALE LUER LOCK EXTENSION SET, 2 INJECTION SITES, MALE LUER LOCK ADAPTER WITH RETRACTABLE COLLAR: Brand: INTERLINK/CONTINU-FLO

## (undated) DEVICE — INSULATED BLADE ELECTRODE: Brand: EDGE

## (undated) DEVICE — CLIP INT L235CM WRK CHAN DIA2.8MM OPN 11MM LCK MECHANISM MR

## (undated) DEVICE — SOLUTION IRRIG 1000ML H2O STRL BLT

## (undated) DEVICE — GOWN,SIRUS,NONRNF,SETINSLV,XL,20/CS: Brand: MEDLINE

## (undated) DEVICE — NEEDLE SPNL 22GA L3.5IN BLK HUB S STL REG WALL FIT STYL W/

## (undated) DEVICE — SPONGE,DRAIN,NONWVN,4"X4",6PLY,STRL,LF: Brand: MEDLINE

## (undated) DEVICE — 3M™ MEDIPORE™ H SOFT CLOTH SURGICAL TAPE, 2863, 3 IN X 10 YD, 12/CASE: Brand: 3M™ MEDIPORE™

## (undated) DEVICE — SUTURE VCRL SZ 3-0 L27IN ABSRB UD L26MM SH 1/2 CIR J416H

## (undated) DEVICE — 3M™ DURAPORE™ SURGICAL TAPE 1538-3, 3 INCH X 10 YARD (7,5CM X 9,1M), 4 ROLLS/BOX: Brand: 3M™ DURAPORE™

## (undated) DEVICE — 4-PORT MANIFOLD: Brand: NEPTUNE 2

## (undated) DEVICE — TELFA ADHESIVE ISLAND DRESSING: Brand: TELFA

## (undated) DEVICE — CLEAR-TRAC THREADED CANNULA WITH                                    OBTURATOR 5 MM X 76 MM, LATEX FREE,                                    BOX OF 10: Brand: CLEAR-TRAC

## (undated) DEVICE — SUT ETHLN 3-0 18IN PS2 BLK --

## (undated) DEVICE — BAG SPEC BIOHZRD 10 X 10 IN --

## (undated) DEVICE — SHOULDER SUSPENSION KIT 6 PER BOX

## (undated) DEVICE — DERMABOND SKIN ADH 0.7ML -- DERMABOND ADVANCED 12/BX

## (undated) DEVICE — SPONGE TONSIL MED X RAY DETECTABLE STRL LTX FREE

## (undated) DEVICE — TOWEL SURG W17XL27IN STD BLU COT NONFENESTRATED PREWASHED

## (undated) DEVICE — SOLUTION IV 1000ML 0.9% SOD CHL

## (undated) DEVICE — (D)SYR 10ML 1/5ML GRAD NSAF -- PKGING CHANGE USE ITEM 338027

## (undated) DEVICE — DYONICS 25 INFLOW TUBE SET, 3 PER BOX

## (undated) DEVICE — SUPER TURBOVAC 90 INTEGRATED CABLE WAND ICW: Brand: COBLATION

## (undated) DEVICE — DRAPE,REIN 53X77,STERILE: Brand: MEDLINE

## (undated) DEVICE — BLOCK BITE ENDOSCP AD 21 MM W/ DIL BLU LF DISP

## (undated) DEVICE — KENDALL DL ECG CABLE AND LEAD WIRE SYSTEM, 3-LEAD, SINGLE PATIENT USE: Brand: KENDALL

## (undated) DEVICE — MAGNETIC INSTR DRAPE 20X16: Brand: MEDLINE INDUSTRIES, INC.

## (undated) DEVICE — SURGICAL PROCEDURE KIT GEN LAPAROSCOPY LF

## (undated) DEVICE — GARMENT,MEDLINE,DVT,INT,CALF,MED, GEN2: Brand: MEDLINE

## (undated) DEVICE — REM POLYHESIVE ADULT PATIENT RETURN ELECTRODE: Brand: VALLEYLAB

## (undated) DEVICE — INSTA-GARD PROCEDURE MASK, YELLOW: Brand: CONVERTORS

## (undated) DEVICE — SOLIDIFIER MEDC 1200ML -- CONVERT TO 356117

## (undated) DEVICE — SUTURE PDS II SZ 0 L36IN ABSRB VLT L36MM CT-1 1/2 CIR Z346H

## (undated) DEVICE — NEEDLE HYPO 22GA L1.5IN BLK S STL HUB POLYPR SHLD REG BVL

## (undated) DEVICE — SOLUTION LACTATED RINGERS INJECTION USP

## (undated) DEVICE — GAUZE SPONGES,12 PLY: Brand: CURITY

## (undated) DEVICE — CATHETER THORACENTESIS STR 28 FRX23 IN 6 EYELET TAPR TIP LF

## (undated) DEVICE — FORCEPS BX L240CM JAW DIA2.8MM L CAP W/ NDL MIC MESH TOOTH

## (undated) DEVICE — NEEDLE SUT PASS FOR ROT CUF LABRAL REP MULTFI SCORPION

## (undated) DEVICE — PREP SKN CHLRAPRP APL 26ML STR --

## (undated) DEVICE — SPONGE: SPECIALTY PEANUT XR 100/CS: Brand: MEDICAL ACTION INDUSTRIES

## (undated) DEVICE — 1200 GUARD II KIT W/5MM TUBE W/O VAC TUBE: Brand: GUARDIAN

## (undated) DEVICE — SOFT TROCAR: Brand: THORACOPORT

## (undated) DEVICE — INSULATED BLADE ELECTRODE;CAUTION: FOR MANUFACTURING, PROCESSING, OR REPACKING.: Brand: EDGE

## (undated) DEVICE — ENDO CARRY-ON PROCEDURE KIT INCLUDES ENZYMATIC SPONGE, GAUZE, BIOHAZARD LABEL, TRAY, LUBRICANT, DIRTY SCOPE LABEL, WATER LABEL, TRAY, DRAWSTRING PAD, AND DEFENDO 4-PIECE KIT.: Brand: ENDO CARRY-ON PROCEDURE KIT

## (undated) DEVICE — BAG SPEC RETRV 275ML 10ML DISPOSABLE RELIACATCH

## (undated) DEVICE — STERILE POLYISOPRENE POWDER-FREE SURGICAL GLOVES WITH EMOLLIENT COATING: Brand: PROTEXIS

## (undated) DEVICE — SOLUTION IRRIG 3000ML LAC R FLX CONT

## (undated) DEVICE — BLACK REINFORCED INTELLIGENT RELOAD, FOR USE WITH SIGNIA STAPLING SYSTEM: Brand: TRI-STAPLE 2.0

## (undated) DEVICE — SYR 10ML LUER LOK 1/5ML GRAD --

## (undated) DEVICE — TRAP,MUCUS SPECIMEN, 80CC: Brand: MEDLINE